# Patient Record
Sex: FEMALE | Race: WHITE | NOT HISPANIC OR LATINO | Employment: OTHER | URBAN - METROPOLITAN AREA
[De-identification: names, ages, dates, MRNs, and addresses within clinical notes are randomized per-mention and may not be internally consistent; named-entity substitution may affect disease eponyms.]

---

## 2017-01-26 DIAGNOSIS — C90.00 MULTIPLE MYELOMA, REMISSION STATUS UNSPECIFIED (H): Primary | ICD-10-CM

## 2017-02-23 DIAGNOSIS — C90.00 MULTIPLE MYELOMA, REMISSION STATUS UNSPECIFIED (H): ICD-10-CM

## 2017-02-28 DIAGNOSIS — C90.00 MULTIPLE MYELOMA WITHOUT REMISSION (H): ICD-10-CM

## 2017-02-28 LAB
ALBUMIN SERPL-MCNC: 3.5 G/DL (ref 3.4–5)
ALP SERPL-CCNC: 38 U/L (ref 40–150)
ALT SERPL W P-5'-P-CCNC: 24 U/L (ref 0–50)
ANION GAP SERPL CALCULATED.3IONS-SCNC: 10 MMOL/L (ref 3–14)
AST SERPL W P-5'-P-CCNC: 13 U/L (ref 0–45)
BASOPHILS # BLD AUTO: 0 10E9/L (ref 0–0.2)
BASOPHILS NFR BLD AUTO: 0.8 %
BILIRUB SERPL-MCNC: 0.4 MG/DL (ref 0.2–1.3)
BUN SERPL-MCNC: 10 MG/DL (ref 7–30)
CALCIUM SERPL-MCNC: 8.8 MG/DL (ref 8.5–10.1)
CHLORIDE SERPL-SCNC: 103 MMOL/L (ref 94–109)
CO2 SERPL-SCNC: 26 MMOL/L (ref 20–32)
CREAT SERPL-MCNC: 0.73 MG/DL (ref 0.52–1.04)
DIFFERENTIAL METHOD BLD: ABNORMAL
EOSINOPHIL # BLD AUTO: 0.1 10E9/L (ref 0–0.7)
EOSINOPHIL NFR BLD AUTO: 4.2 %
ERYTHROCYTE [DISTWIDTH] IN BLOOD BY AUTOMATED COUNT: 15.7 % (ref 10–15)
GFR SERPL CREATININE-BSD FRML MDRD: 80 ML/MIN/1.7M2
GLUCOSE SERPL-MCNC: 83 MG/DL (ref 70–99)
HCT VFR BLD AUTO: 36.3 % (ref 35–47)
HGB BLD-MCNC: 12.1 G/DL (ref 11.7–15.7)
IMM GRANULOCYTES # BLD: 0 10E9/L (ref 0–0.4)
IMM GRANULOCYTES NFR BLD: 0.4 %
LDH SERPL L TO P-CCNC: 141 U/L (ref 81–234)
LYMPHOCYTES # BLD AUTO: 1.4 10E9/L (ref 0.8–5.3)
LYMPHOCYTES NFR BLD AUTO: 54 %
MCH RBC QN AUTO: 33.4 PG (ref 26.5–33)
MCHC RBC AUTO-ENTMCNC: 33.3 G/DL (ref 31.5–36.5)
MCV RBC AUTO: 100 FL (ref 78–100)
MONOCYTES # BLD AUTO: 0.4 10E9/L (ref 0–1.3)
MONOCYTES NFR BLD AUTO: 13.4 %
NEUTROPHILS # BLD AUTO: 0.7 10E9/L (ref 1.6–8.3)
NEUTROPHILS NFR BLD AUTO: 27.2 %
NRBC # BLD AUTO: 0 10*3/UL
NRBC BLD AUTO-RTO: 0 /100
PLATELET # BLD AUTO: 135 10E9/L (ref 150–450)
POTASSIUM SERPL-SCNC: 3.9 MMOL/L (ref 3.4–5.3)
PROT SERPL-MCNC: 7.2 G/DL (ref 6.8–8.8)
RBC # BLD AUTO: 3.62 10E12/L (ref 3.8–5.2)
SODIUM SERPL-SCNC: 139 MMOL/L (ref 133–144)
WBC # BLD AUTO: 2.6 10E9/L (ref 4–11)

## 2017-02-28 PROCEDURE — 84165 PROTEIN E-PHORESIS SERUM: CPT | Performed by: INTERNAL MEDICINE

## 2017-02-28 PROCEDURE — 85025 COMPLETE CBC W/AUTO DIFF WBC: CPT | Performed by: INTERNAL MEDICINE

## 2017-02-28 PROCEDURE — 00000402 ZZHCL STATISTIC TOTAL PROTEIN: Performed by: INTERNAL MEDICINE

## 2017-02-28 PROCEDURE — 82232 ASSAY OF BETA-2 PROTEIN: CPT | Performed by: INTERNAL MEDICINE

## 2017-02-28 PROCEDURE — 36415 COLL VENOUS BLD VENIPUNCTURE: CPT | Performed by: INTERNAL MEDICINE

## 2017-02-28 PROCEDURE — 80053 COMPREHEN METABOLIC PANEL: CPT | Performed by: INTERNAL MEDICINE

## 2017-02-28 PROCEDURE — 83615 LACTATE (LD) (LDH) ENZYME: CPT | Performed by: INTERNAL MEDICINE

## 2017-02-28 NOTE — PROGRESS NOTES
Chief Complaint   Patient presents with     Blood Draw     lab draw in left hand      Lizzy Marino CMA

## 2017-03-01 LAB
ALBUMIN SERPL ELPH-MCNC: 4.2 G/DL (ref 3.7–5.1)
ALPHA1 GLOB SERPL ELPH-MCNC: 0.3 G/DL (ref 0.2–0.4)
ALPHA2 GLOB SERPL ELPH-MCNC: 0.6 G/DL (ref 0.5–0.9)
B-GLOBULIN SERPL ELPH-MCNC: 0.5 G/DL (ref 0.6–1)
B2 MICROGLOB SERPL-MCNC: 1.6 MG/L
GAMMA GLOB SERPL ELPH-MCNC: 1.3 G/DL (ref 0.7–1.6)
M PROTEIN SERPL ELPH-MCNC: 1 G/DL
PROT PATTERN SERPL ELPH-IMP: ABNORMAL

## 2017-03-17 ENCOUNTER — ONCOLOGY VISIT (OUTPATIENT)
Dept: TRANSPLANT | Facility: CLINIC | Age: 67
End: 2017-03-17
Attending: INTERNAL MEDICINE
Payer: COMMERCIAL

## 2017-03-17 VITALS
SYSTOLIC BLOOD PRESSURE: 132 MMHG | RESPIRATION RATE: 18 BRPM | OXYGEN SATURATION: 99 % | DIASTOLIC BLOOD PRESSURE: 76 MMHG | TEMPERATURE: 97.6 F | BODY MASS INDEX: 22.62 KG/M2 | WEIGHT: 124.5 LBS | HEART RATE: 55 BPM

## 2017-03-17 DIAGNOSIS — C90.00 MULTIPLE MYELOMA WITHOUT REMISSION (H): Primary | ICD-10-CM

## 2017-03-17 PROCEDURE — 99213 OFFICE O/P EST LOW 20 MIN: CPT | Mod: ZF

## 2017-03-17 ASSESSMENT — PAIN SCALES - GENERAL: PAINLEVEL: MILD PAIN (2)

## 2017-03-17 NOTE — NURSING NOTE
Chief Complaint   Patient presents with     RECHECK     Post BMT for MM here for provider       Roxana Laguna CMA March 17, 2017 11:24 AM  Medications and allergies reviewed. Vitals done see flow sheets. Face to face time 8 minutes.

## 2017-03-17 NOTE — MR AVS SNAPSHOT
After Visit Summary   3/17/2017    Shena Hartmann    MRN: [id number]           Patient Information     Date Of Birth          1950        Visit Information        Provider Department      3/17/2017 11:00 AM Lui Hills MD [facility] Blood and Marrow Transplant        Today's Diagnoses     Multiple myeloma without remission (H)    -  1          Cl[facility]ics and Surgery Center ([facility])  [address] [place] [address]  Phone: [phone]  Clinic Hours:   Monday-Friday:    8am to 4:30pm   Weekends and holidays:    8am to noon (in general)  If your fever is 100.5  or greater,   call the clinic.  After hours call the   hospital at [phone] or   [phone]. Ask for the BMT   fellow for pediatric or adult patients            Follow-ups after your visit        Follow-up notes from your care team     Return for Lab Work, Routine Visit.      Your next 10 appointments already scheduled     Jun 16, 2017  7:30 AM CDT   Masonic Lab Draw with  MASONIC LAB DRAW   [facility] Masonic Lab Draw ([facility])    [address] [place] [address]   [phone]            Sep 08, 2017  7:30 AM CDT   Masonic Lab Draw with  MASONIC LAB DRAW   [facility] Masonic Lab Draw ([facility])    [address] [place] [address]   [phone]            Sep 15, 2017 11:00 AM CDT   Return with Lui Hills MD   [facility] Blood and Marrow Transplant ([facility])    [address] [place] [address]   [phone]              Future tests that were ordered for you today     Open Standing Orders        Priority Remaining Interval Expires Ordered    Comprehensive metabolic panel Routine 2/2 each visit 12/17/2017 3/17/2017    Protein electrophoresis Routine 2/2 each visit 12/17/2017 3/17/2017    Lactate Dehydrogenase Routine 2/2 each visit 12/17/2017  3/17/2017    CRP inflammation Routine 2/2 each visit 12/17/2017 3/17/2017    CBC with platelets differential Routine 2/2 each visit 12/17/2017 3/17/2017            Who to contact     If you have questions or need follow up information about today's clinic visit or your schedule please contact [facility] BLOOD AND MARROW TRANSPLANT directly at [phone].  Normal or non-critical lab and imaging results will be communicated to you by [employer], letter or phone within 4 business days after the clinic has received the results. If you do not hear from us within 7 days, please contact the clinic through Purple[employer] or phone. If you have a critical or abnormal lab result, we will notify you by phone as soon as possible.  Submit refill requests through CleverMiles or call your pharmacy and they will forward the refill request to us. Please allow 3 business days for your refill to be completed.          Additional Information About Your Visit        [employer] Information     [employer] gives you secure access to your electronic health record. If you see a primary care provider, you can also send messages to your care team and make appointments. If you have questions, please call your primary care clinic.  If you do not have a primary care provider, please call [phone] and they will assist you.        Care EveryWhere ID     This is your Care EveryWhere ID. This could be used by other organizations to access your [place] medical records  [id number]        Your Vitals Were     Pulse Temperature Respirations Pulse Oximetry BMI (Body Mass Index)       55 97.6  F (36.4  C) (Oral) 18 99% 22.62 kg/m2        Blood Pressure from Last 3 Encounters:   03/17/17 132/76   12/16/16 126/75   09/09/16 124/74    Weight from Last 3 Encounters:   03/17/17 56.5 kg (124 lb 8 oz)   12/16/16 57.6 kg (126 lb 15.8 oz)   09/09/16 57.2 kg (126 lb)               Recent Review Flowsheet Data     BMT Recent Results Latest Ref Rng & Units 1/25/2016 3/11/2016  4/1/2016 6/10/2016 9/7/2016 12/9/2016 2/28/2017    WBC 4.0 - 11.0 10e9/L 3.3(L) 2.6(L) 2.1(L) 3.4(L) 3.3(L) 2.1(L) 2.6(L)    Hemoglobin 11.7 - 15.7 g/dL 12.1 12.5 12.3 12.5 12.8 12.2 12.1    Platelet Count 150 - 450 10e9/L 123(L) 143(L) 86(L) 150 121(L) 129(L) 135(L)    Neutrophils (Absolute) 1.6 - 8.3 10e9/L 1.1(L) 1.0(L) 0.4(LL) 1.5(L) 1.4(L) 0.6(L) 0.7(L)    Sodium 133 - 144 mmol/L 138 139 136 138 137 140 139    Potassium 3.4 - 5.3 mmol/L 4.1 4.2 4.2 4.1 4.0 3.6 3.9    Chloride 94 - 109 mmol/L 103 103 98 103 100 106 103    Glucose 70 - 99 mg/dL 77 81 100(H) 68(L) 88 99 83    Urea Nitrogen 7 - 30 mg/dL 13 15 10 16 15 15 10    Creatinine 0.52 - 1.04 mg/dL 0.77 0.74 0.76 0.81 0.74 0.80 0.73    Calcium (Total) 8.5 - 10.1 mg/dL 8.9 9.0 8.6 9.3 9.4 9.2 8.8    Protein (Total) 6.8 - 8.8 g/dL 7.6 7.9 7.8 7.9 7.8 7.4 7.2    Albumin 3.4 - 5.0 g/dL 3.8 3.8 3.6 3.8 3.9 3.6 3.5    Alkaline Phosphatase 40 - 150 U/L 48 53 44 50 46 41 38(L)    AST 0 - 45 U/L 17 16 22 22 17 18 13    ALT 0 - 50 U/L 30 35 32 28 32 32 24    MCV 78 - 100 fl 101(H) 103(H) 101(H) 104(H) 102(H) 100 100               Primary Care Provider Office Phone # Fax #    Lui Vivek Hills [phone]        [facility] [address] [facility] [address]  [facility] [address]        Thank you!     Thank you for choosing [facility] BLOOD AND MARROW TRANSPLANT  for your care. Our goal is always to provide you with excellent care. Hearing back from our patients is one way we can continue to improve our services. Please take a few minutes to complete the written survey that you may receive in the mail after your visit with us. Thank you!             Your Updated Medication List - Protect others around you: Learn how to safely use, store and throw away your medicines at www.disposemymeds.org.          This list is accurate as of: 3/17/17 11:53 AM.  Always use your most recent med list.                   Brand Name Dispense Instructions for use     CALCIUM-VITAMIN D-VITAMIN K PO      Take 2 tablets by mouth daily.       cholecalciferol 5000 UNITS Caps capsule    vitamin D3    90 capsule    Take 1 capsule (5,000 Units) by mouth daily       clobetasol 0.05 % ointment    TEMOVATE    45 g    Apply topically 2 times daily Apply to dermatitic plaques on hands 2 times per day for 2-3 days/week during flares.       COENZYME Q-10 PO      Take 30 mg by mouth daily       desonide 0.05 % ointment    DESOWEN    60 g    Use twice daily on the face as needed.       magnesium 100 MG Caps      Take 117 mg by mouth 3 times daily       MULTIVITAMIN PO      Take 1 tablet by mouth 2 times daily.       * order for DME     6 Device    6 mastectomy bras       * order for DME     1 Device    R mastectomy prosthesis       pomalidomide 1 MG capsule    POMALYST    32 capsule    Take 2 mg alternating with 1 mg daily. Take on days 1-21 days. [facility]   [id number]       triamcinolone 0.1 % cream    KENALOG    80 g    Apply topically 2 times daily       ZINC PO      Take by mouth daily       * Notice:  This list has 2 medication(s) that are the same as other medications prescribed for you. Read the directions carefully, and ask your doctor or other care provider to review them with you.

## 2017-03-17 NOTE — PROGRESS NOTES
/76  Pulse 55  Temp 97.6  F (36.4  C) (Oral)  Resp 18  Wt 56.5 kg (124 lb 8 oz)  SpO2 99%  BMI 22.62 kg/m2    Wt Readings from Last 4 Encounters:   03/17/17 56.5 kg (124 lb 8 oz)   12/16/16 57.6 kg (126 lb 15.8 oz)   09/09/16 57.2 kg (126 lb)   08/22/16 57.2 kg (126 lb 3.2 oz)     HISTORY OF PRESENT ILLNESS:  Shena was seen and examined by me today.  She is a 66-year-old woman with multiple myeloma who is now 1,086 days after autologous transplantation for multiple myeloma.  Shena has had quite resistant disease but remarkably has been doing well on pomalidomide at 2 mg alternating with 1 mg in 21-day cycles.  Symptomatically she is doing really well.  Her energy level is good and she really has no major restrictions.  She has been able to travel without difficulty.  She still has neuropathy which she manages on her own and cyclic rash with her pomalidomide.  She has no shortness of breath or cough.  She has had no signs or symptoms of infection.  She has had no evidence of thrombosis.  She has no known adenopathy.  The rest of a    10-point review of systems is unremarkable.       PHYSICAL EXAMINATION:   GENERAL:  Overall, Shena looks well and is in no distress.   VITAL SIGNS:  Her vital signs are normal and she is afebrile.  A weight is stable at 56.5 kg.   HEENT:  Exam is unremarkable.  There are no oral lesions.  Pupils are equal, round and reactive to light.   LYMPH:  There is no cervical, axillary or inguinal adenopathy.   LUNGS:  Clear to auscultation and percussion.   CARDIAC:  Exam is without murmurs.   ABDOMEN:  Exam is benign and there is no organomegaly.   EXTREMITIES:  Lower extremities are without edema.   SKIN:  There are no skin rashes.       LABORATORY DATA:  On 02/28 Shena had a full panel of restaging labs.  A CBC shows a hemoglobin of 12.1, a platelet count of 135,000 and a white count of 2,600 with 700 absolute neutrophils.  She has always had borderline neutropenia and this is relatively  stable for her.  Electrolytes are normal with a BUN of 10 and a creatinine of 0.73.  Liver function tests are really within normal limits.  The most important thing about her labs is that her SPEP showing her monoclonal protein is down to 1 gram.  It was 1 gram in 12/2016, but prior to that was even higher.        IMPRESSION:  She is clearly a good responder to pomalidomide and we will continue this therapy.  Since Shena has been so stable, I will see her for labs only in 3 months and then see her with a routine visit at 6 months.  She knows to contact us earlier than her next scheduled visit should the need arise.  We are going to keep her therapy the same for now.  We had a long discussion about long-term treatment and given the good response and decreased M-spike now compared to even 18 months ago, I would continue at this dose.  It is possible that we will see a further drop in her spike.  All of their questions have been answered today.

## 2017-03-22 DIAGNOSIS — C90.00 MULTIPLE MYELOMA, REMISSION STATUS UNSPECIFIED (H): ICD-10-CM

## 2017-04-17 ENCOUNTER — MYC MEDICAL ADVICE (OUTPATIENT)
Dept: ONCOLOGY | Facility: CLINIC | Age: 67
End: 2017-04-17

## 2017-04-17 DIAGNOSIS — C50.919 MALIGNANT NEOPLASM OF FEMALE BREAST, UNSPECIFIED LATERALITY, UNSPECIFIED SITE OF BREAST: ICD-10-CM

## 2017-04-18 DIAGNOSIS — C90.00 MULTIPLE MYELOMA, REMISSION STATUS UNSPECIFIED (H): ICD-10-CM

## 2017-05-19 DIAGNOSIS — C90.00 MULTIPLE MYELOMA, REMISSION STATUS UNSPECIFIED (H): ICD-10-CM

## 2017-06-15 DIAGNOSIS — C90.00 MULTIPLE MYELOMA, REMISSION STATUS UNSPECIFIED (H): ICD-10-CM

## 2017-06-16 DIAGNOSIS — C90.00 MULTIPLE MYELOMA WITHOUT REMISSION (H): ICD-10-CM

## 2017-06-16 LAB
ALBUMIN SERPL-MCNC: 3.9 G/DL (ref 3.4–5)
ALP SERPL-CCNC: 50 U/L (ref 40–150)
ALT SERPL W P-5'-P-CCNC: 29 U/L (ref 0–50)
ANION GAP SERPL CALCULATED.3IONS-SCNC: 7 MMOL/L (ref 3–14)
AST SERPL W P-5'-P-CCNC: 19 U/L (ref 0–45)
BASOPHILS # BLD AUTO: 0 10E9/L (ref 0–0.2)
BASOPHILS NFR BLD AUTO: 0.9 %
BILIRUB SERPL-MCNC: 0.5 MG/DL (ref 0.2–1.3)
BUN SERPL-MCNC: 13 MG/DL (ref 7–30)
CALCIUM SERPL-MCNC: 9.5 MG/DL (ref 8.5–10.1)
CHLORIDE SERPL-SCNC: 103 MMOL/L (ref 94–109)
CO2 SERPL-SCNC: 29 MMOL/L (ref 20–32)
CREAT SERPL-MCNC: 0.86 MG/DL (ref 0.52–1.04)
CRP SERPL-MCNC: <2.9 MG/L (ref 0–8)
DIFFERENTIAL METHOD BLD: ABNORMAL
EOSINOPHIL # BLD AUTO: 0.1 10E9/L (ref 0–0.7)
EOSINOPHIL NFR BLD AUTO: 4.3 %
ERYTHROCYTE [DISTWIDTH] IN BLOOD BY AUTOMATED COUNT: 15.3 % (ref 10–15)
GFR SERPL CREATININE-BSD FRML MDRD: 66 ML/MIN/1.7M2
GLUCOSE SERPL-MCNC: 69 MG/DL (ref 70–99)
HCT VFR BLD AUTO: 39.8 % (ref 35–47)
HGB BLD-MCNC: 13.2 G/DL (ref 11.7–15.7)
IMM GRANULOCYTES # BLD: 0 10E9/L (ref 0–0.4)
IMM GRANULOCYTES NFR BLD: 0 %
LDH SERPL L TO P-CCNC: 156 U/L (ref 81–234)
LYMPHOCYTES # BLD AUTO: 1.4 10E9/L (ref 0.8–5.3)
LYMPHOCYTES NFR BLD AUTO: 43.2 %
MCH RBC QN AUTO: 33.3 PG (ref 26.5–33)
MCHC RBC AUTO-ENTMCNC: 33.2 G/DL (ref 31.5–36.5)
MCV RBC AUTO: 101 FL (ref 78–100)
MONOCYTES # BLD AUTO: 0.6 10E9/L (ref 0–1.3)
MONOCYTES NFR BLD AUTO: 16.7 %
NEUTROPHILS # BLD AUTO: 1.2 10E9/L (ref 1.6–8.3)
NEUTROPHILS NFR BLD AUTO: 34.9 %
NRBC # BLD AUTO: 0 10*3/UL
NRBC BLD AUTO-RTO: 0 /100
PLATELET # BLD AUTO: 153 10E9/L (ref 150–450)
POTASSIUM SERPL-SCNC: 4 MMOL/L (ref 3.4–5.3)
PROT SERPL-MCNC: 7.6 G/DL (ref 6.8–8.8)
RBC # BLD AUTO: 3.96 10E12/L (ref 3.8–5.2)
SODIUM SERPL-SCNC: 139 MMOL/L (ref 133–144)
WBC # BLD AUTO: 3.3 10E9/L (ref 4–11)

## 2017-06-16 PROCEDURE — 36415 COLL VENOUS BLD VENIPUNCTURE: CPT | Performed by: INTERNAL MEDICINE

## 2017-06-16 PROCEDURE — 80053 COMPREHEN METABOLIC PANEL: CPT | Performed by: INTERNAL MEDICINE

## 2017-06-16 PROCEDURE — 84165 PROTEIN E-PHORESIS SERUM: CPT | Performed by: INTERNAL MEDICINE

## 2017-06-16 PROCEDURE — 86140 C-REACTIVE PROTEIN: CPT | Performed by: INTERNAL MEDICINE

## 2017-06-16 PROCEDURE — 85025 COMPLETE CBC W/AUTO DIFF WBC: CPT | Performed by: INTERNAL MEDICINE

## 2017-06-16 PROCEDURE — 83615 LACTATE (LD) (LDH) ENZYME: CPT | Performed by: INTERNAL MEDICINE

## 2017-06-16 PROCEDURE — 00000402 ZZHCL STATISTIC TOTAL PROTEIN: Performed by: INTERNAL MEDICINE

## 2017-06-19 LAB
ALBUMIN SERPL ELPH-MCNC: 4.5 G/DL (ref 3.7–5.1)
ALPHA1 GLOB SERPL ELPH-MCNC: 0.3 G/DL (ref 0.2–0.4)
ALPHA2 GLOB SERPL ELPH-MCNC: 0.7 G/DL (ref 0.5–0.9)
B-GLOBULIN SERPL ELPH-MCNC: 0.6 G/DL (ref 0.6–1)
GAMMA GLOB SERPL ELPH-MCNC: 1.4 G/DL (ref 0.7–1.6)
M PROTEIN SERPL ELPH-MCNC: 1 G/DL
PROT PATTERN SERPL ELPH-IMP: ABNORMAL

## 2017-07-12 DIAGNOSIS — C90.00 MULTIPLE MYELOMA, REMISSION STATUS UNSPECIFIED (H): ICD-10-CM

## 2017-08-08 DIAGNOSIS — C90.00 MULTIPLE MYELOMA, REMISSION STATUS UNSPECIFIED (H): ICD-10-CM

## 2017-09-08 DIAGNOSIS — C90.00 MULTIPLE MYELOMA WITHOUT REMISSION (H): ICD-10-CM

## 2017-09-08 LAB
ALBUMIN SERPL-MCNC: 3.6 G/DL (ref 3.4–5)
ALP SERPL-CCNC: 44 U/L (ref 40–150)
ALT SERPL W P-5'-P-CCNC: 34 U/L (ref 0–50)
ANION GAP SERPL CALCULATED.3IONS-SCNC: 6 MMOL/L (ref 3–14)
AST SERPL W P-5'-P-CCNC: 32 U/L (ref 0–45)
BASOPHILS # BLD AUTO: 0 10E9/L (ref 0–0.2)
BASOPHILS NFR BLD AUTO: 1.3 %
BILIRUB SERPL-MCNC: 0.4 MG/DL (ref 0.2–1.3)
BUN SERPL-MCNC: 13 MG/DL (ref 7–30)
CALCIUM SERPL-MCNC: 9 MG/DL (ref 8.5–10.1)
CHLORIDE SERPL-SCNC: 103 MMOL/L (ref 94–109)
CO2 SERPL-SCNC: 27 MMOL/L (ref 20–32)
CREAT SERPL-MCNC: 0.74 MG/DL (ref 0.52–1.04)
CRP SERPL-MCNC: <2.9 MG/L (ref 0–8)
DIFFERENTIAL METHOD BLD: ABNORMAL
EOSINOPHIL # BLD AUTO: 0.2 10E9/L (ref 0–0.7)
EOSINOPHIL NFR BLD AUTO: 5.8 %
ERYTHROCYTE [DISTWIDTH] IN BLOOD BY AUTOMATED COUNT: 15.2 % (ref 10–15)
GFR SERPL CREATININE-BSD FRML MDRD: 79 ML/MIN/1.7M2
GLUCOSE SERPL-MCNC: 93 MG/DL (ref 70–99)
HCT VFR BLD AUTO: 39.5 % (ref 35–47)
HGB BLD-MCNC: 13.1 G/DL (ref 11.7–15.7)
IMM GRANULOCYTES # BLD: 0 10E9/L (ref 0–0.4)
IMM GRANULOCYTES NFR BLD: 0 %
LDH SERPL L TO P-CCNC: 166 U/L (ref 81–234)
LYMPHOCYTES # BLD AUTO: 1.4 10E9/L (ref 0.8–5.3)
LYMPHOCYTES NFR BLD AUTO: 43.4 %
MCH RBC QN AUTO: 33.2 PG (ref 26.5–33)
MCHC RBC AUTO-ENTMCNC: 33.2 G/DL (ref 31.5–36.5)
MCV RBC AUTO: 100 FL (ref 78–100)
MONOCYTES # BLD AUTO: 0.4 10E9/L (ref 0–1.3)
MONOCYTES NFR BLD AUTO: 13.5 %
NEUTROPHILS # BLD AUTO: 1.1 10E9/L (ref 1.6–8.3)
NEUTROPHILS NFR BLD AUTO: 36 %
NRBC # BLD AUTO: 0 10*3/UL
NRBC BLD AUTO-RTO: 0 /100
PLATELET # BLD AUTO: 144 10E9/L (ref 150–450)
POTASSIUM SERPL-SCNC: 4 MMOL/L (ref 3.4–5.3)
PROT SERPL-MCNC: 7.4 G/DL (ref 6.8–8.8)
RBC # BLD AUTO: 3.94 10E12/L (ref 3.8–5.2)
SODIUM SERPL-SCNC: 136 MMOL/L (ref 133–144)
WBC # BLD AUTO: 3.1 10E9/L (ref 4–11)

## 2017-09-08 PROCEDURE — 83615 LACTATE (LD) (LDH) ENZYME: CPT | Performed by: INTERNAL MEDICINE

## 2017-09-08 PROCEDURE — 00000402 ZZHCL STATISTIC TOTAL PROTEIN: Performed by: INTERNAL MEDICINE

## 2017-09-08 PROCEDURE — 86140 C-REACTIVE PROTEIN: CPT | Performed by: INTERNAL MEDICINE

## 2017-09-08 PROCEDURE — 85025 COMPLETE CBC W/AUTO DIFF WBC: CPT | Performed by: INTERNAL MEDICINE

## 2017-09-08 PROCEDURE — 84165 PROTEIN E-PHORESIS SERUM: CPT | Performed by: INTERNAL MEDICINE

## 2017-09-08 PROCEDURE — 80053 COMPREHEN METABOLIC PANEL: CPT | Performed by: INTERNAL MEDICINE

## 2017-09-08 NOTE — NURSING NOTE
Chief Complaint   Patient presents with     Blood Draw     labs collected from L arm venipuncture     Alka Brown RN

## 2017-09-11 LAB
ALBUMIN SERPL ELPH-MCNC: 4.4 G/DL (ref 3.7–5.1)
ALPHA1 GLOB SERPL ELPH-MCNC: 0.3 G/DL (ref 0.2–0.4)
ALPHA2 GLOB SERPL ELPH-MCNC: 0.7 G/DL (ref 0.5–0.9)
B-GLOBULIN SERPL ELPH-MCNC: 0.6 G/DL (ref 0.6–1)
GAMMA GLOB SERPL ELPH-MCNC: 1.3 G/DL (ref 0.7–1.6)
M PROTEIN SERPL ELPH-MCNC: 0.9 G/DL
PROT PATTERN SERPL ELPH-IMP: ABNORMAL

## 2017-09-13 DIAGNOSIS — C90.00 MULTIPLE MYELOMA, REMISSION STATUS UNSPECIFIED (H): ICD-10-CM

## 2017-09-15 ENCOUNTER — ONCOLOGY VISIT (OUTPATIENT)
Dept: TRANSPLANT | Facility: CLINIC | Age: 67
End: 2017-09-15
Attending: INTERNAL MEDICINE
Payer: COMMERCIAL

## 2017-09-15 VITALS
OXYGEN SATURATION: 98 % | HEIGHT: 62 IN | BODY MASS INDEX: 23.57 KG/M2 | DIASTOLIC BLOOD PRESSURE: 76 MMHG | WEIGHT: 128.1 LBS | RESPIRATION RATE: 16 BRPM | HEART RATE: 78 BPM | TEMPERATURE: 97.1 F | SYSTOLIC BLOOD PRESSURE: 122 MMHG

## 2017-09-15 DIAGNOSIS — C90.00 MULTIPLE MYELOMA NOT HAVING ACHIEVED REMISSION (H): Primary | ICD-10-CM

## 2017-09-15 PROCEDURE — 99212 OFFICE O/P EST SF 10 MIN: CPT | Mod: ZF

## 2017-09-15 ASSESSMENT — PAIN SCALES - GENERAL: PAINLEVEL: MILD PAIN (2)

## 2017-09-15 NOTE — MR AVS SNAPSHOT
After Visit Summary   9/15/2017    Shena Hartmann    MRN: [id number]           Patient Information     Date Of Birth          1950        Visit Information        Provider Department      9/15/2017 11:00 AM Lui Hills MD [facility] Blood and Marrow Transplant        Today's Diagnoses     Multiple myeloma not having achieved remission (H)    -  1          Clinics and Surgery Center ([facility])  [address] [place] [address]  Phone: [phone]  Clinic Hours:   Monday-Thursday:7am to 7pm   Friday: 7am to 5pm   Weekends and holidays:    8am to noon (in general)  If your fever is 100.5  or greater,   call the clinic.  After hours call the   hospital at [phone] or   [phone]. Ask for the BMT   fellow on-call            Follow-ups after your visit        Your next 10 appointments already scheduled     Oct 17, 2017  3:15 PM CDT   (Arrive by 3:00 PM)   MR LUMBAR SPINE W/O & W CONTRAST with [facility] [facility] ([facility])    [address] [place] [address] [phone]           Take your medicines as usual, unless your doctor tells you not to. Bring a list of your current medicines to your exam (including vitamins, minerals and over-the-counter drugs).  You will be given intravenous contrast for this exam. To prepare:   The day before your exam, drink extra fluids at least six 8-ounce glasses (unless your doctor tells you to restrict your fluids).   Have a blood test (creatinine test) within 30 days of your exam. Go to your clinic or Diagnostic Imaging Department for this test.  The MRI machine uses a strong magnet. Please wear clothes without metal (snaps, zippers). A sweatsuit works well, or we may give you a hospital gown.  Please remove any body piercings and hair extensions before you arrive. You will also remove watches, jewelry, hairpins, wallets, dentures, partial dental plates and hearing aids.  You may wear contact lenses, and you may be able to wear your rings. We have a safe place to keep your personal items, but it is safer to leave them at home.   **IMPORTANT** THE INSTRUCTIONS BELOW ARE ONLY FOR THOSE PATIENTS WHO HAVE BEEN TOLD THEY WILL RECEIVE SEDATION OR GENERAL ANESTHESIA DURING THEIR MRI PROCEDURE:  IF YOU WILL RECEIVE SEDATION (take medicine to help you relax during your exam):   You must get the medicine from your doctor before you arrive. Bring the medicine to the exam. Do not take it at home.   Arrive one hour early. Bring someone who can take you home after the test. Your medicine will make you sleepy. After the exam, you may not drive, take a bus or take a taxi by yourself.   No eating 8 hours before your exam. You may have clear liquids up until 4 hours before your exam. (Clear liquids include water, clear tea, black coffee and fruit juice without pulp.)  IF YOU WILL RECEIVE ANESTHESIA (be asleep for your exam):   Arrive 1 1/2 hours early. Bring someone who can take you home after the test. You may not drive, take a bus or take a taxi by yourself.   No eating 8 hours before your exam. You may have clear liquids up until 4 hours before your exam. (Clear liquids include water, clear tea, black coffee and fruit juice without pulp.)  Please call the Imaging Department at your exam site with any questions.            Oct 17, 2017  4:00 PM CDT   (Arrive by 3:45 PM)   MR THORACIC SPINE W/O & W CONTRAST with [address] [facility] ([facility])    [address] [address] [phone]           Take your medicines as usual, unless your doctor tells you not to. Bring a list of your current medicines to your exam (including vitamins, minerals and over-the-counter drugs).  You will be given intravenous contrast for this exam. To prepare:   The day before your exam, drink extra fluids at least six 8-ounce glasses (unless your  doctor tells you to restrict your fluids).   Have a blood test (creatinine test) within 30 days of your exam. Go to your clinic or Diagnostic Imaging Department for this test.  The MRI machine uses a strong magnet. Please wear clothes without metal (snaps, zippers). A sweatsuit works well, or we may give you a hospital gown.  Please remove any body piercings and hair extensions before you arrive. You will also remove watches, jewelry, hairpins, wallets, dentures, partial dental plates and hearing aids. You may wear contact lenses, and you may be able to wear your rings. We have a safe place to keep your personal items, but it is safer to leave them at home.   **IMPORTANT** THE INSTRUCTIONS BELOW ARE ONLY FOR THOSE PATIENTS WHO HAVE BEEN TOLD THEY WILL RECEIVE SEDATION OR GENERAL ANESTHESIA DURING THEIR MRI PROCEDURE:  IF YOU WILL RECEIVE SEDATION (take medicine to help you relax during your exam):   You must get the medicine from your doctor before you arrive. Bring the medicine to the exam. Do not take it at home.   Arrive one hour early. Bring someone who can take you home after the test. Your medicine will make you sleepy. After the exam, you may not drive, take a bus or take a taxi by yourself.   No eating 8 hours before your exam. You may have clear liquids up until 4 hours before your exam. (Clear liquids include water, clear tea, black coffee and fruit juice without pulp.)  IF YOU WILL RECEIVE ANESTHESIA (be asleep for your exam):   Arrive 1 1/2 hours early. Bring someone who can take you home after the test. You may not drive, take a bus or take a taxi by yourself.   No eating 8 hours before your exam. You may have clear liquids up until 4 hours before your exam. (Clear liquids include water, clear tea, black coffee and fruit juice without pulp.)  Please call the Imaging Department at your exam site with any questions.            Dec 01, 2017  7:30 AM [facility]   Masonic Lab Draw with  wireWAXONIC LAB DRAW   M  [facility] Masonic Lab Draw ([facility])    [address]  [address] [facility] [address]   [phone]            Feb 23, 2018  7:30 AM CST   Masonic Lab Draw with  MASONIC LAB DRAW   [facility] Masonic Lab Draw ([facility])    [address] [address]   [phone]            Mar 02, 2018  7:30 AM CST   Return with Lui Hills MD   [facility] Blood and Marrow Transplant ([facility])    [address] [place] [address]   [phone]              Future tests that were ordered for you today     Open Standing Orders        Priority Remaining Interval Expires Ordered    Comprehensive metabolic panel Routine 4/4 every 3 months 9/15/2018 9/15/2017    CBC with platelets differential Routine 4/4 every 3 months 9/15/2018 9/15/2017    Protein electrophoresis Routine 4/4 every 3 months 9/15/2018 9/15/2017          Open Future Orders        Priority Expected Expires Ordered    MRI Lumbar spine w & w/o contrast Routine  9/15/2018 9/15/2017    MRI Thoracic spine w & w/o contrast Routine  9/15/2018 9/15/2017            Who to contact     If you have questions or need follow up information about today's clinic visit or your schedule please contact [facility] BLOOD AND MARROW TRANSPLANT directly at [phone].  Normal or non-critical lab and imaging results will be communicated to you by MyChart, letter or phone within 4 business days after the clinic has received the results. If you do not hear from us within 7 days, please contact the clinic through MyChart or phone. If you have a critical or abnormal lab result, we will notify you by phone as soon as possible.  Submit refill requests through [employer] or call your pharmacy and they will forward the refill request to us. Please allow 3 business days for your refill to be completed.          Additional Information About Your  "Visit        [employer] Information     [employer] gives you secure access to your electronic health record. If you see a primary care provider, you can also send messages to your care team and make appointments. If you have questions, please call your primary care clinic.  If you do not have a primary care provider, please call [phone] and they will assist you.        Care EveryWhere ID     This is your Care EveryWhere ID. This could be used by other organizations to access your [facility] medical records  [id number]        Your Vitals Were     Pulse Temperature Respirations Height Pulse Oximetry BMI (Body Mass Index)    78 97.1  F (36.2  C) (Oral) 16 1.575 m (5' 2\") 98% 23.43 kg/m2       Blood Pressure from Last 3 Encounters:   09/15/17 122/76   03/17/17 132/76   12/16/16 126/75    Weight from Last 3 Encounters:   09/15/17 58.1 kg (128 lb 1.6 oz)   03/17/17 56.5 kg (124 lb 8 oz)   12/16/16 57.6 kg (126 lb 15.8 oz)               Recent Review Flowsheet Data     BMT Recent Results Latest Ref Rng & Units 4/1/2016 6/10/2016 9/7/2016 12/9/2016 2/28/2017 6/16/2017 9/8/2017    WBC 4.0 - 11.0 10e9/L 2.1(L) 3.4(L) 3.3(L) 2.1(L) 2.6(L) 3.3(L) 3.1(L)    Hemoglobin 11.7 - 15.7 g/dL 12.3 12.5 12.8 12.2 12.1 13.2 13.1    Platelet Count 150 - 450 10e9/L 86(L) 150 121(L) 129(L) 135(L) 153 144(L)    Neutrophils (Absolute) 1.6 - 8.3 10e9/L 0.4(LL) 1.5(L) 1.4(L) 0.6(L) 0.7(L) 1.2(L) 1.1(L)    INR 0.86 - 1.14 - - - - - - -    Sodium 133 - 144 mmol/L 136 138 137 140 139 139 136    Potassium 3.4 - 5.3 mmol/L 4.2 4.1 4.0 3.6 3.9 4.0 4.0    Chloride 94 - 109 mmol/L 98 103 100 106 103 103 103    Glucose 70 - 99 mg/dL 100(H) 68(L) 88 99 83 69(L) 93    Urea Nitrogen 7 - 30 mg/dL 10 16 15 15 10 13 13    Creatinine 0.52 - 1.04 mg/dL 0.76 0.81 0.74 0.80 0.73 0.86 0.74    Calcium (Total) 8.5 - 10.1 mg/dL 8.6 9.3 9.4 9.2 8.8 9.5 9.0    Protein (Total) 6.8 - 8.8 g/dL 7.8 7.9 7.8 7.4 7.2 7.6 7.4    Albumin 3.4 - 5.0 g/dL 3.6 3.8 3.9 3.6 3.5 3.9 3.6 "    Alkaline Phosphatase 40 - 150 U/L 44 50 46 41 38(L) 50 44    AST 0 - 45 U/L 22 22 17 18 13 19 32    ALT 0 - 50 U/L 32 28 32 32 24 29 34    MCV 78 - 100 fl 101(H) 104(H) 102(H) 100 100 101(H) 100               Primary Care Provider Office Phone # Fax #    Lui Vivek Hills [phone]       [address] [facility] [address]  [facility] [address]        Equal Access to Services     KASANDRA ALVARENGA : Hadii aad ku hadasho Soomaali, waaxda luqadaha, qaybta kaalmada adeegyada, waxay idiin hayaan adejero garcía . So [facility] [phone].    ATENCIÓN: Si habla español, tiene a franks disposición servicios gratuitos de asistencia lingüística. [facility] [phone].    We comply with applicable federal civil rights laws and [place] laws. We do not discriminate on the basis of race, color, national origin, age, disability sex, sexual orientation or gender identity.            Thank you!     Thank you for choosing [facility] BLOOD AND MARROW TRANSPLANT  for your care. Our goal is always to provide you with excellent care. Hearing back from our patients is one way we can continue to improve our services. Please take a few minutes to complete the written survey that you may receive in the mail after your visit with us. Thank you!             Your Updated Medication List - Protect others around you: Learn how to safely use, store and throw away your medicines at www.disposemymeds.org.          This list is accurate as of: 9/15/17 11:40 AM.  Always use your most recent med list.                   Brand Name Dispense Instructions for use Diagnosis    CALCIUM-VITAMIN D-VITAMIN K PO      Take 2 tablets by mouth daily.        cholecalciferol 5000 UNITS Caps capsule    vitamin D3    90 capsule    Take 1 capsule (5,000 Units) by mouth daily    Osteoporosis       clobetasol 0.05 % ointment    TEMOVATE    45 g    Apply topically 2 times daily Apply to dermatitic plaques on hands 2 times per day for 2-3 days/week during flares.     Dermatitis       COENZYME Q-10 PO      Take 30 mg by mouth daily        desonide 0.05 % ointment    DESOWEN    60 g    Use twice daily on the face as needed.    Dermatitis       magnesium 100 MG Caps      Take 117 mg by mouth 3 times daily        MULTIVITAMIN PO      Take 1 tablet by mouth 2 times daily.        * order for DME     6 Device    6 mastectomy bras  Length of need: 99 months    Malignant neoplasm of female breast, unspecified laterality, unspecified site of breast       * order for DME     1 Device    R mastectomy prosthesis   Length of need:  99 months    Malignant neoplasm of female breast, unspecified laterality, unspecified site of breast       pomalidomide 1 MG capsule    POMALYST    32 capsule    Take 2 mg alternating with 1 mg daily. Take on days 1-21 days. auth  [id number]    Multiple myeloma, remission status unspecified (H)       triamcinolone 0.1 % cream    KENALOG    80 g    Apply topically 2 times daily    Dermatitis       ZINC PO      Take by mouth daily        * Notice:  This list has 2 medication(s) that are the same as other medications prescribed for you. Read the directions carefully, and ask your doctor or other care provider to review them with you.

## 2017-09-15 NOTE — NURSING NOTE
"Oncology Rooming Note    September 15, 2017 11:01 AM   Shena Hartmann is a 66 year old female who presents for:    Chief Complaint   Patient presents with     RECHECK     MM     Initial Vitals: /76  Pulse 78  Temp 97.1  F (36.2  C) (Oral)  Resp 16  Ht 1.575 m (5' 2\")  Wt 58.1 kg (128 lb 1.6 oz)  SpO2 98%  BMI 23.43 kg/m2 Estimated body mass index is 23.43 kg/(m^2) as calculated from the following:    Height as of this encounter: 1.575 m (5' 2\").    Weight as of this encounter: 58.1 kg (128 lb 1.6 oz). Body surface area is 1.59 meters squared.  Mild Pain (2) Comment: body ache & back   No LMP recorded. Patient is postmenopausal.  Allergies reviewed: Yes  Medications reviewed: Yes    Medications: Medication refills not needed today.  Pharmacy name entered into [employer]:    [facility] - [place], [place] - [address]  [facility] - [place], [place] - [address] AT [facility] [address]  WRITTEN PRESCRIPTION REQUESTED    Clinical concerns: n/a     7 minutes for nursing intake (face to face time)     JORGE A CHÁVEZ CMA              "

## 2017-09-15 NOTE — PROGRESS NOTES
"Wt Readings from Last 4 Encounters:   09/15/17 58.1 kg (128 lb 1.6 oz)   03/17/17 56.5 kg (124 lb 8 oz)   12/16/16 57.6 kg (126 lb 15.8 oz)   09/09/16 57.2 kg (126 lb)     /76  Pulse 78  Temp 97.1  F (36.2  C) (Oral)  Resp 16  Ht 1.575 m (5' 2\")  Wt 58.1 kg (128 lb 1.6 oz)  SpO2 98%  BMI 23.43 kg/m2    Shena was seen and examined by me today. She is a 66-year-old woman who is now over 3 years after autologous transplantation for an aggressive multiple myeloma. She has been on possible limited in my maintenance oral therapy since transplant. Symptomatically she is doing fairly well. She had prolonged pancytopenia which is now better. She does complain of ongoing low back pain which has been a chronic symptom. However, it is slightly worse and she is traveling more. This is only of concern since she has had a history of multiple myeloma and bony disease in the past. She has had no fevers chills or sweats. She has no change in GI symptoms. She continues to get skin symptoms wall taking Palmalyst. The rest of the 10 point review of systems is unremarkable.    Physical exam: Overall Shena looks well today and is in no acute distress. She is afebrile and her vital signs are normal. Her weight today is 58.1 kg. HEENT exam is unremarkable. There are no oral lesions. Pupils are equal round and reactive to light. There is no cervical, axillary or inguinal adenopathy. Lungs are clear to auscultation percussion. Abdominal exam is benign and there is no organomegaly. Lower extremities are without edema. There are no skin rashes.    Laboratory evaluation: Electrolytes are normal with the BUNs of 13 and a creatinine of 0.74. Liver function tests are within normal limits. A CBC shows a hemoglobin of 13.1, platelet count of 144,000 and a white count of 3100 with 1100 absolute neutrophils. A serum protein electrophoresis shows a monoclonal protein of 0.9 grams.  This is actually the best that it has been for years. Her " last protein spike was 1 g.    It is my overall impression at [facility] is stable. She will continue on her current oral maintenance therapy for her multiple myeloma. My only real concern today is her back pain which seems to be slightly worse. Since she has not had any high-resolution imaging for more than 4 years, I have ordered a thoracic and lumbar MRI of the back. Will get this study in October. I am expecting some ongoing degenerative disease and vertebral collapse that she has had previously. The main question is whether there is any evidence of myelomatous lesions. I did discuss localized radiation therapy should this occur. She agrees with the plan and we will get these imaging studies. I scheduled her to return to see me in November in last week find any abnormalities on the MRI. All of her questions have been answered today.    Lui Hills

## 2017-10-05 DIAGNOSIS — C90.00 MULTIPLE MYELOMA, REMISSION STATUS UNSPECIFIED (H): ICD-10-CM

## 2017-11-02 DIAGNOSIS — C90.00 MULTIPLE MYELOMA, REMISSION STATUS UNSPECIFIED (H): ICD-10-CM

## 2017-11-03 ENCOUNTER — TELEPHONE (OUTPATIENT)
Dept: PHARMACY | Facility: OTHER | Age: 67
End: 2017-11-03

## 2017-11-03 NOTE — TELEPHONE ENCOUNTER
PA Initiation    Medication: Pomalyst  Insurance Company:    [facility] - [place], [place] - [facility] [address]  Filling Pharmacy Phone: [phone]  Filling Pharmacy Fax: [phone]  Start Date: 11/3/2017

## 2017-11-06 NOTE — TELEPHONE ENCOUNTER
Prior Authorization Approval    Authorization Effective Date: 11/3/2017  Authorization Expiration Date: 11/3/2018  Medication: Pomalyst  Approved Dose/Quantity:  Reference #: [id number]   Insurance Company:    Expected CoPay:       CoPay Card Available:      Foundation Assistance Needed:    Which Pharmacy is filling the prescription (Not needed for infusion/clinic administered): [facility] - [place] [address]  Pharmacy Notified:    Patient Notified:

## 2017-11-30 DIAGNOSIS — C90.00 MULTIPLE MYELOMA, REMISSION STATUS UNSPECIFIED (H): ICD-10-CM

## 2017-12-01 DIAGNOSIS — C90.00 MULTIPLE MYELOMA NOT HAVING ACHIEVED REMISSION (H): ICD-10-CM

## 2017-12-01 LAB
ALBUMIN SERPL-MCNC: 3.6 G/DL (ref 3.4–5)
ALP SERPL-CCNC: 46 U/L (ref 40–150)
ALT SERPL W P-5'-P-CCNC: 26 U/L (ref 0–50)
ANION GAP SERPL CALCULATED.3IONS-SCNC: 8 MMOL/L (ref 3–14)
AST SERPL W P-5'-P-CCNC: 18 U/L (ref 0–45)
BASOPHILS # BLD AUTO: 0 10E9/L (ref 0–0.2)
BASOPHILS NFR BLD AUTO: 1 %
BILIRUB SERPL-MCNC: 0.5 MG/DL (ref 0.2–1.3)
BUN SERPL-MCNC: 15 MG/DL (ref 7–30)
CALCIUM SERPL-MCNC: 9.4 MG/DL (ref 8.5–10.1)
CHLORIDE SERPL-SCNC: 103 MMOL/L (ref 94–109)
CO2 SERPL-SCNC: 27 MMOL/L (ref 20–32)
CREAT SERPL-MCNC: 0.82 MG/DL (ref 0.52–1.04)
DIFFERENTIAL METHOD BLD: ABNORMAL
EOSINOPHIL # BLD AUTO: 0.2 10E9/L (ref 0–0.7)
EOSINOPHIL NFR BLD AUTO: 6.2 %
ERYTHROCYTE [DISTWIDTH] IN BLOOD BY AUTOMATED COUNT: 15.2 % (ref 10–15)
GFR SERPL CREATININE-BSD FRML MDRD: 70 ML/MIN/1.7M2
GLUCOSE SERPL-MCNC: 92 MG/DL (ref 70–99)
HCT VFR BLD AUTO: 38.3 % (ref 35–47)
HGB BLD-MCNC: 12.8 G/DL (ref 11.7–15.7)
IMM GRANULOCYTES # BLD: 0 10E9/L (ref 0–0.4)
IMM GRANULOCYTES NFR BLD: 0 %
LYMPHOCYTES # BLD AUTO: 1.1 10E9/L (ref 0.8–5.3)
LYMPHOCYTES NFR BLD AUTO: 38.4 %
MCH RBC QN AUTO: 33.2 PG (ref 26.5–33)
MCHC RBC AUTO-ENTMCNC: 33.4 G/DL (ref 31.5–36.5)
MCV RBC AUTO: 100 FL (ref 78–100)
MONOCYTES # BLD AUTO: 0.5 10E9/L (ref 0–1.3)
MONOCYTES NFR BLD AUTO: 16.4 %
NEUTROPHILS # BLD AUTO: 1.1 10E9/L (ref 1.6–8.3)
NEUTROPHILS NFR BLD AUTO: 38 %
NRBC # BLD AUTO: 0 10*3/UL
NRBC BLD AUTO-RTO: 0 /100
PLATELET # BLD AUTO: 158 10E9/L (ref 150–450)
POTASSIUM SERPL-SCNC: 3.8 MMOL/L (ref 3.4–5.3)
PROT SERPL-MCNC: 7.2 G/DL (ref 6.8–8.8)
RBC # BLD AUTO: 3.85 10E12/L (ref 3.8–5.2)
SODIUM SERPL-SCNC: 139 MMOL/L (ref 133–144)
WBC # BLD AUTO: 2.9 10E9/L (ref 4–11)

## 2017-12-01 PROCEDURE — 00000402 ZZHCL STATISTIC TOTAL PROTEIN: Performed by: INTERNAL MEDICINE

## 2017-12-01 PROCEDURE — 85025 COMPLETE CBC W/AUTO DIFF WBC: CPT | Performed by: INTERNAL MEDICINE

## 2017-12-01 PROCEDURE — 84165 PROTEIN E-PHORESIS SERUM: CPT | Performed by: INTERNAL MEDICINE

## 2017-12-01 PROCEDURE — 80053 COMPREHEN METABOLIC PANEL: CPT | Performed by: INTERNAL MEDICINE

## 2017-12-04 LAB
ALBUMIN SERPL ELPH-MCNC: 4.4 G/DL (ref 3.7–5.1)
ALPHA1 GLOB SERPL ELPH-MCNC: 0.3 G/DL (ref 0.2–0.4)
ALPHA2 GLOB SERPL ELPH-MCNC: 0.6 G/DL (ref 0.5–0.9)
B-GLOBULIN SERPL ELPH-MCNC: 0.6 G/DL (ref 0.6–1)
GAMMA GLOB SERPL ELPH-MCNC: 1.2 G/DL (ref 0.7–1.6)
M PROTEIN SERPL ELPH-MCNC: 0.8 G/DL
PROT PATTERN SERPL ELPH-IMP: ABNORMAL

## 2017-12-26 DIAGNOSIS — C90.00 MULTIPLE MYELOMA, REMISSION STATUS UNSPECIFIED (H): ICD-10-CM

## 2018-01-26 ENCOUNTER — OFFICE VISIT (OUTPATIENT)
Dept: DERMATOLOGY | Facility: CLINIC | Age: 68
End: 2018-01-26
Payer: COMMERCIAL

## 2018-01-26 DIAGNOSIS — D18.01 CHERRY ANGIOMA: Primary | ICD-10-CM

## 2018-01-26 DIAGNOSIS — L30.9 DERMATITIS: ICD-10-CM

## 2018-01-26 DIAGNOSIS — D22.9 MULTIPLE BENIGN NEVI: ICD-10-CM

## 2018-01-26 DIAGNOSIS — C90.00 MULTIPLE MYELOMA, REMISSION STATUS UNSPECIFIED (H): ICD-10-CM

## 2018-01-26 DIAGNOSIS — L82.1 SEBORRHEIC KERATOSIS: ICD-10-CM

## 2018-01-26 RX ORDER — DESONIDE 0.5 MG/G
OINTMENT TOPICAL
Qty: 60 G | Refills: 1 | Status: SHIPPED | OUTPATIENT
Start: 2018-01-26 | End: 2020-07-07

## 2018-01-26 RX ORDER — TRIAMCINOLONE ACETONIDE 1 MG/G
CREAM TOPICAL 2 TIMES DAILY
Qty: 80 G | Refills: 1 | Status: SHIPPED | OUTPATIENT
Start: 2018-01-26 | End: 2020-07-07

## 2018-01-26 RX ORDER — CLOBETASOL PROPIONATE 0.5 MG/G
OINTMENT TOPICAL 2 TIMES DAILY
Qty: 45 G | Refills: 0 | Status: SHIPPED | OUTPATIENT
Start: 2018-01-26 | End: 2020-07-07

## 2018-01-26 ASSESSMENT — PAIN SCALES - GENERAL: PAINLEVEL: NO PAIN (0)

## 2018-01-26 NOTE — PROGRESS NOTES
[facility] Dermatology Note      Dermatology Problem List:  1. Dermatitis  2. Hx of stem cell transplant for MM  - on pomalidomide    Encounter Date: Jan 26, 2018    CC:   Chief Complaint   Patient presents with     Skin Check     Shena is here today fr a skin check.          History of Present Illness:  Ms. Shena Hartmann is a 67 year old female who presents as a follow-up for dermatitis and skin check. The patient was last seen 10/28/2016 when she was encouraged to practice gentle skin care and use PRN topical corticosteroids for pomolidomide induced dermatitis. She had no evidence of skin cancer at that time. She is currently using the triamcinolone roughly twice a month, typically during the first week of her chemo cycle when the dermatitis flares the most. For the hand dermatitis she uses the clobetasol about once a week. She has no specific skin concerns today, no new spots that are changing, bleeding, tender, etc.    Past Medical History:   Patient Active Problem List   Diagnosis     Pain in thoracic spine     Febrile neutropenia (H)     Multiple myeloma, dx 2002,  s/p ASCBMT 3/26/14     Cough     RSV (respiratory syncytial virus pneumonia)     Personal history of malignant neoplasm of breast     Seasonal allergies     Osteoporosis     Past Medical History:   Diagnosis Date     Breast cancer (H) 1994    right     H/O stem cell transplant (H) 3/2014     Multiple myeloma, without mention of having achieved remission 11/6/2012     Past Surgical History:   Procedure Laterality Date     MASTECTOMY      right     PICC INSERTION  9/10/2013    5fr DL Power PICC, 44cm (1cm external) in the L lateral brachial vein with tip in the low SVC       Social History:  The patient is retired. The patient denies use of tanning beds. Recently returned from [place].    Medications:  Current Outpatient Prescriptions   Medication Sig Dispense Refill     pomalidomide (POMALYST) 1 MG capsule Take 2 mg alternating  with 1 mg daily. Take on days 1-21 days. auth  [id number] 32 capsule 0     order for DME 6 mastectomy bras  Length of need: 99 months 6 Device 1     order for DME R mastectomy prosthesis   Length of need:  99 months 1 Device 1     desonide (DESOWEN) 0.05 % ointment Use twice daily on the face as needed. 60 g 1     triamcinolone (KENALOG) 0.1 % cream Apply topically 2 times daily 80 g 1     cholecalciferol (VITAMIN D3) 5000 UNITS CAPS capsule Take 1 capsule (5,000 Units) by mouth daily 90 capsule 3     Multiple Vitamins-Minerals (ZINC PO) Take by mouth daily        clobetasol (TEMOVATE) 0.05 % ointment Apply topically 2 times daily Apply to dermatitic plaques on hands 2 times per day for 2-3 days/week during flares. 45 g 0     COENZYME Q-10 PO Take 30 mg by mouth daily        magnesium 100 MG CAPS Take 117 mg by mouth 3 times daily       Multiple Vitamins-Minerals (MULTIVITAMIN OR) Take 1 tablet by mouth 2 times daily.       CALCIUM-VITAMIN D-VITAMIN K PO Take 2 tablets by mouth daily.       [DISCONTINUED] pomalidomide (POMALYST) 1 MG capsule Take 2 mg alternating with 1 mg daily. Take on days 1-21 days. auth  [id number] 32 capsule 0        Allergies   Allergen Reactions     Cayenne Pepper [Cayenne]      Corn-Related Products GI Disturbance     Levaquin Other (See Comments)     Tendon pain     Milk Protein GI Disturbance     Mold      Facial rash/lip swelling     Morphine Sulfate Other (See Comments)     Per pt - see things (hallucination) when she was on Morphine .     Pollen Extract      Environmental allergies      Shrimp Nausea and Vomiting     Tomato      Lip swelling, facial rash     Azithromycin Rash     Keflex [Cephalexin] Rash     Oat Rash     Tape [Adhesive Tape] Itching and Rash     All adhesives     Wheat Rash     Swelling of lips         Review of Systems:  -As per HPI  -Constitutional: Otherwise well, in her general state of health  -Skin: As above in HPI. No additional skin concerns.    Physical  exam:  Vitals: There were no vitals taken for this visit.  GEN: This is a well developed, well-nourished female in no acute distress, in a pleasant mood.    SKIN: Full skin, which includes the head/face, both arms, chest, back, abdomen,both legs, genitalia and/or groin buttocks, digits and/or nails, was examined.  - FP type III  -Tanned skin  -Several medium brown well circumscribed waxy papules on the trunk and extremities, including a larger 1-1.5cm waxy plaque on the left anterior thigh w/ comedeolike openings under dermoscopy  - Several medium brown <6mm evenly pigmented brown macules on the trunk  -Numerous bright red dome shaped vascular papules on the trunk and extremities  -No other lesions of concern on areas examined.     Impression/Plan:  1. Seborrheic keratosis and Cherry angiomas    Benign etiology reviewed, no treatment indicated      2. Multiple clinically benign nevi on the trunk    ABCDs of melanoma were discussed and self skin checks were advised.     Sun precaution was advised including the use of sun screens of SPF 30 or higher, sun protective clothing, and avoidance of tanning beds.      3. Pomalidomide induced dermatitis, well controlled with topical steroids    Continue PRN triamcionlone to body, desonide to face and clobetasol to hands. Refills provided today. Did reiterate that it is important to take breaks with topical steroids to avoid atrophy/striae. If she is needing steroids for longer than 1-2 weeks at time she should make a follow up appointment      Follow-up in 1 year, earlier for new or changing lesions.       Dr. Linda Rebollar staffed the patient.    Staff Involved:  Resident(Hadry Mccarthy)/Staff(as above)    I saw and evaluated this patient with Dr. Mccarthy. The above note has been read and reviewed and where appropriate amended and corrected. It accurately reflects my clinical findings, observations, diagnoses, treatment recommendations and follow-up plans.    Ruma Mckeon  MD Smooth  Clinical Professor   Department of Dermatology  [facility]

## 2018-01-26 NOTE — NURSING NOTE
Dermatology Rooming Note    Shena Hartmann's goals for this visit include:   Chief Complaint   Patient presents with     Skin Check     Shena is here today fr a skin check.      DIVYA Hernandez

## 2018-01-26 NOTE — PATIENT INSTRUCTIONS
We refilled your steroid creams today. It is okay to continue using these as needed, as you are.  Follow up in 1 year.  Thank you!

## 2018-01-26 NOTE — MR AVS SNAPSHOT
After Visit Summary   1/26/2018    Shena Hartmann    MRN: [id number]           Patient Information     Date Of Birth          1950        Visit Information        Provider Department      1/26/2018 10:15 AM Ruma Rebollar MD [facility] Dermatology        Today's Diagnoses     Cherry angioma    -  1    Seborrheic keratosis        Multiple benign nevi        Dermatitis          Care Instructions    We refilled your steroid creams today. It is okay to continue using these as needed, as you are.  Follow up in 1 year.  Thank you!          Follow-ups after your visit        Follow-up notes from your care team     Return in about 1 year (around 1/26/2019).      Your next 10 appointments already scheduled     Feb 23, 2018  7:30 AM CST   [facility] with  [facility]   [facility] Draw ([facility])    [address] [place] [address]  [address] [place] [id number] [phone]            Mar 02, 2018  7:30 AM CST   Return with Lui Hills MD   [facility] Blood and Marrow Transplant ([facility])    [address] [place] [address]  [address] [place] [id number] [phone]              Who to contact     Please call your clinic at [phone] to:    Ask questions about your health    Make or cancel appointments    Discuss your medicines    Learn about your test results    Speak to your doctor   If you have compliments or concerns about an experience at your clinic, or if you wish to file a complaint, please contact [facility] Patient Relations at [phone] or email us at [email or web address]         Additional Information About Your Visit        MyChart Information     [employer] gives you secure access to your electronic health record. If you see a primary care provider, you can also send messages to your care team and make appointments. If you have questions, please call your primary care  clinic.  If you do not have a primary care provider, please call [phone] and they will assist you.      [employer] is an electronic gateway that provides easy, online access to your medical records. With [employer], you can request a clinic appointment, read your test results, renew a prescription or communicate with your care team.     To access your existing account, please contact your [facility] or call [phone] for assistance.        Care EveryWhere ID     This is your Care EveryWhere ID. This could be used by other organizations to access your [facility] medical records  [id number]         Blood Pressure from Last 3 Encounters:   09/15/17 122/76   03/17/17 132/76   12/16/16 126/75    Weight from Last 3 Encounters:   09/15/17 58.1 kg (128 lb 1.6 oz)   03/17/17 56.5 kg (124 lb 8 oz)   12/16/16 57.6 kg (126 lb 15.8 oz)              Today, you had the following     No orders found for display         Today's Medication Changes          These changes are accurate as of 1/26/18 11:13 AM.  If you have any questions, ask your nurse or doctor.               These medicines have changed or have updated prescriptions.        Dose/Directions    pomalidomide 1 MG capsule   Commonly known as:  POMALYST   This may have changed:  additional instructions   Used for:  Multiple myeloma, remission status unspecified (H)   Changed by:  Lui Hills MD        Take 2 mg alternating with 1 mg daily. Take on days 1-21 days. auth  [id number]   Quantity:  32 capsule   Refills:  0            Where to get your medicines      These medications were sent to [facility] - [place], [place] - [address] [place] [address], [facility] [address]    Hours:  Mon-Fri 8:30am-5:00pm Toll Free [phone] Phone:  [phone]     pomalidomide 1 MG capsule         These medications were sent to [facility] [place], [place] - [address] [place] [address]  [address] [facility] [address], [facility] [address]    Hours:  TRANSPLANT PHONE NUMBER [phone] Phone:  [phone]     clobetasol 0.05 % ointment    desonide 0.05 % ointment    triamcinolone 0.1 % cream                Primary Care Provider Office Phone # Fax #    Lui Vivek Hills [phone]       [address] [place] [address] [address]  [facility] [phone]        Equal Access to Services     KASANDRA ALVARENGA : Hadii aad ku hadasho Soomaali, waaxda luqadaha, qaybta kaalmada adeegyada, waxay idiin hayaan kelley garcía . So [facility] [phone].    ATENCIÓN: Si habla español, tiene a franks disposición servicios gratuitos de asistencia lingüística. [facility] [phone].    We comply with applicable federal civil rights laws and [place] laws. We do not discriminate on the basis of race, color, national origin, age, disability, sex, sexual orientation, or gender identity.            Thank you!     Thank you for choosing [facility]ERMAT[facility]  for your care. Our goal is always to provide you with excellent care. Hearing back from our patients is one way we can continue to improve our services. Please take a few minutes to complete the written survey that you may receive in the mail after your visit with us. Thank you!             Your Updated Medication List - Protect others around you: Learn how to safely use, store and throw away your medicines at www.disposemymeds.org.          This list is accurate as of 1/26/18 11:13 AM.  Always use your most recent med list.                   Brand Name Dispense Instructions for use Diagnosis    CALCIUM-VITAMIN D-VITAMIN K PO      Take 2 tablets by mouth daily.        cholecalciferol 5000 UNITS Caps capsule    vitamin D3    90 capsule    Take 1 capsule (5,000 Units) by mouth daily    Osteoporosis       clobetasol 0.05 % ointment    TEMOVATE    45 g    Apply topically 2 times daily Apply to dermatitic plaques on hands 2 times per day for 2-3 days/week during flares.     Dermatitis       COENZYME Q-10 PO      Take 30 mg by mouth daily        desonide 0.05 % ointment    DESOWEN    60 g    Use twice daily on the face as needed.    Dermatitis       magnesium 100 MG Caps      Take 117 mg by mouth 3 times daily        MULTIVITAMIN PO      Take 1 tablet by mouth 2 times daily.        * order for DME     6 Device    6 mastectomy bras  Length of need: 99 months    Malignant neoplasm of female breast, unspecified laterality, unspecified site of breast       * order for DME     1 Device    R mastectomy prosthesis   Length of need:  99 months    Malignant neoplasm of female breast, unspecified laterality, unspecified site of breast       pomalidomide 1 MG capsule    POMALYST    32 capsule    Take 2 mg alternating with 1 mg daily. Take on days 1-21 days. auth  [id number]    Multiple myeloma, remission status unspecified (H)       triamcinolone 0.1 % cream    KENALOG    80 g    Apply topically 2 times daily    Dermatitis       ZINC PO      Take by mouth daily        * Notice:  This list has 2 medication(s) that are the same as other medications prescribed for you. Read the directions carefully, and ask your doctor or other care provider to review them with you.

## 2018-01-26 NOTE — LETTER
1/26/2018       RE: Shena Hartmann  [address] [place] [address]     Dear Colleague,    Thank you for referring your patient, Shena Hartmann, to the [facility] DERMATOLOGY at [facility]. Please see a copy of my visit note below.    [facility] Dermatology Note      Dermatology Problem List:  1. Dermatitis  2. Hx of stem cell transplant for MM  - on pomalidomide    Encounter Date: Jan 26, 2018    CC:   Chief Complaint   Patient presents with     Skin Check     Shena is here today fr a skin check.          History of Present Illness:  Ms. Shena Hartmann is a 67 year old female who presents as a follow-up for dermatitis and skin check. The patient was last seen 10/28/2016 when she was encouraged to practice gentle skin care and use PRN topical corticosteroids for pomolidomide induced dermatitis. She had no evidence of skin cancer at that time. She is currently using the triamcinolone roughly twice a month, typically during the first week of her chemo cycle when the dermatitis flares the most. For the hand dermatitis she uses the clobetasol about once a week. She has no specific skin concerns today, no new spots that are changing, bleeding, tender, etc.    Past Medical History:   Patient Active Problem List   Diagnosis     Pain in thoracic spine     Febrile neutropenia (H)     Multiple myeloma, dx 2002,  s/p ASCBMT 3/26/14     Cough     RSV (respiratory syncytial virus pneumonia)     Personal history of malignant neoplasm of breast     Seasonal allergies     Osteoporosis     Past Medical History:   Diagnosis Date     Breast cancer (H) 1994    right     H/O stem cell transplant (H) 3/2014     Multiple myeloma, without mention of having achieved remission 11/6/2012     Past Surgical History:   Procedure Laterality Date     MASTECTOMY      right     PICC INSERTION  9/10/2013    5fr DL Power PICC, 44cm (1cm external) in the L lateral brachial vein with  tip in the low [facility]       Social History:  The patient is retired. The patient denies use of tanning beds. Recently returned from [place].    Medications:  Current Outpatient Prescriptions   Medication Sig Dispense Refill     pomalidomide (POMALYST) 1 MG capsule Take 2 mg alternating with 1 mg daily. Take on days 1-21 days. auth  [id number] 32 capsule 0     order for DME 6 mastectomy bras  Length of need: 99 months 6 Device 1     order for DME R mastectomy prosthesis   Length of need:  99 months 1 Device 1     desonide (DESOWEN) 0.05 % ointment Use twice daily on the face as needed. 60 g 1     triamcinolone (KENALOG) 0.1 % cream Apply topically 2 times daily 80 g 1     cholecalciferol (VITAMIN D3) 5000 UNITS CAPS capsule Take 1 capsule (5,000 Units) by mouth daily 90 capsule 3     Multiple Vitamins-Minerals (ZINC PO) Take by mouth daily        clobetasol (TEMOVATE) 0.05 % ointment Apply topically 2 times daily Apply to dermatitic plaques on hands 2 times per day for 2-3 days/week during flares. 45 g 0     COENZYME Q-10 PO Take 30 mg by mouth daily        magnesium 100 MG CAPS Take 117 mg by mouth 3 times daily       Multiple Vitamins-Minerals (MULTIVITAMIN OR) Take 1 tablet by mouth 2 times daily.       CALCIUM-VITAMIN D-VITAMIN K PO Take 2 tablets by mouth daily.       [DISCONTINUED] pomalidomide (POMALYST) 1 MG capsule Take 2 mg alternating with 1 mg daily. Take on days 1-21 days. auth  [id number] 32 capsule 0        Allergies   Allergen Reactions     Cayenne Pepper [Cayenne]      Corn-Related Products GI Disturbance     Levaquin Other (See Comments)     Tendon pain     Milk Protein GI Disturbance     Mold      Facial rash/lip swelling     Morphine Sulfate Other (See Comments)     Per pt - see things (hallucination) when she was on Morphine .     Pollen Extract      Environmental allergies      Shrimp Nausea and Vomiting     Tomato      Lip swelling, facial rash     Azithromycin Rash     Keflex [Cephalexin] Rash     Oat  Rash     Tape [Adhesive Tape] Itching and Rash     All adhesives     Wheat Rash     Swelling of lips         Review of Systems:  -As per HPI  -Constitutional: Otherwise well, in her general state of health  -Skin: As above in HPI. No additional skin concerns.    Physical exam:  Vitals: There were no vitals taken for this visit.  GEN: This is a well developed, well-nourished female in no acute distress, in a pleasant mood.    SKIN: Full skin, which includes the head/face, both arms, chest, back, abdomen,both legs, genitalia and/or groin buttocks, digits and/or nails, was examined.  - FP type III  -Tanned skin  -Several medium brown well circumscribed waxy papules on the trunk and extremities, including a larger 1-1.5cm waxy plaque on the left anterior thigh w/ comedeolike openings under dermoscopy  - Several medium brown <6mm evenly pigmented brown macules on the trunk  -Numerous bright red dome shaped vascular papules on the trunk and extremities  -No other lesions of concern on areas examined.     Impression/Plan:  1. Seborrheic keratosis and Cherry angiomas    Benign etiology reviewed, no treatment indicated      2. Multiple clinically benign nevi on the trunk    ABCDs of melanoma were discussed and self skin checks were advised.     Sun precaution was advised including the use of sun screens of SPF 30 or higher, sun protective clothing, and avoidance of tanning beds.      3. Pomalidomide induced dermatitis, well controlled with topical steroids    Continue PRN triamcionlone to body, desonide to face and clobetasol to hands. Refills provided today. Did reiterate that it is important to take breaks with topical steroids to avoid atrophy/striae. If she is needing steroids for longer than 1-2 weeks at time she should make a follow up appointment      Follow-up in 1 year, earlier for new or changing lesions.       Dr. Linda Rebollar staffed the patient.    Staff Involved:  Resident(Hardy Mccarthy)/Staff(as  above)    I saw and evaluated this patient with Dr. Mccarthy. The above note has been read and reviewed and where appropriate amended and corrected. It accurately reflects my clinical findings, observations, diagnoses, treatment recommendations and follow-up plans.    Ruma Rebollar MD  Clinical Professor   Department of Dermatology  [facility]

## 2018-02-15 DIAGNOSIS — C90.00 MULTIPLE MYELOMA (H): Primary | ICD-10-CM

## 2018-02-15 DIAGNOSIS — Z85.3 PERSONAL HISTORY OF MALIGNANT NEOPLASM OF BREAST: ICD-10-CM

## 2018-02-26 DIAGNOSIS — C90.00 MULTIPLE MYELOMA, REMISSION STATUS UNSPECIFIED (H): ICD-10-CM

## 2018-03-09 DIAGNOSIS — J32.9 SINUSITIS: ICD-10-CM

## 2018-03-21 ENCOUNTER — TRANSFERRED RECORDS (OUTPATIENT)
Dept: HEALTH INFORMATION MANAGEMENT | Facility: CLINIC | Age: 68
End: 2018-03-21

## 2018-03-23 DIAGNOSIS — C90.00 MULTIPLE MYELOMA (H): ICD-10-CM

## 2018-03-23 DIAGNOSIS — C90.00 MULTIPLE MYELOMA NOT HAVING ACHIEVED REMISSION (H): ICD-10-CM

## 2018-03-23 LAB
ALBUMIN SERPL-MCNC: 3.6 G/DL (ref 3.4–5)
ALP SERPL-CCNC: 48 U/L (ref 40–150)
ALT SERPL W P-5'-P-CCNC: 21 U/L (ref 0–50)
ANION GAP SERPL CALCULATED.3IONS-SCNC: 7 MMOL/L (ref 3–14)
AST SERPL W P-5'-P-CCNC: 17 U/L (ref 0–45)
BASOPHILS # BLD AUTO: 0 10E9/L (ref 0–0.2)
BASOPHILS NFR BLD AUTO: 1 %
BILIRUB SERPL-MCNC: 0.3 MG/DL (ref 0.2–1.3)
BUN SERPL-MCNC: 15 MG/DL (ref 7–30)
CALCIUM SERPL-MCNC: 9.4 MG/DL (ref 8.5–10.1)
CHLORIDE SERPL-SCNC: 100 MMOL/L (ref 94–109)
CHOLEST SERPL-MCNC: 234 MG/DL
CO2 SERPL-SCNC: 30 MMOL/L (ref 20–32)
CREAT SERPL-MCNC: 0.78 MG/DL (ref 0.52–1.04)
DIFFERENTIAL METHOD BLD: ABNORMAL
EOSINOPHIL # BLD AUTO: 0.2 10E9/L (ref 0–0.7)
EOSINOPHIL NFR BLD AUTO: 5.7 %
ERYTHROCYTE [DISTWIDTH] IN BLOOD BY AUTOMATED COUNT: 14.8 % (ref 10–15)
GFR SERPL CREATININE-BSD FRML MDRD: 74 ML/MIN/1.7M2
GLUCOSE SERPL-MCNC: 85 MG/DL (ref 70–99)
HCT VFR BLD AUTO: 38.4 % (ref 35–47)
HDLC SERPL-MCNC: 64 MG/DL
HGB BLD-MCNC: 12.8 G/DL (ref 11.7–15.7)
IMM GRANULOCYTES # BLD: 0 10E9/L (ref 0–0.4)
IMM GRANULOCYTES NFR BLD: 0 %
LDLC SERPL CALC-MCNC: 153 MG/DL
LYMPHOCYTES # BLD AUTO: 1.4 10E9/L (ref 0.8–5.3)
LYMPHOCYTES NFR BLD AUTO: 46.3 %
MCH RBC QN AUTO: 32.9 PG (ref 26.5–33)
MCHC RBC AUTO-ENTMCNC: 33.3 G/DL (ref 31.5–36.5)
MCV RBC AUTO: 99 FL (ref 78–100)
MONOCYTES # BLD AUTO: 0.4 10E9/L (ref 0–1.3)
MONOCYTES NFR BLD AUTO: 14.7 %
NEUTROPHILS # BLD AUTO: 1 10E9/L (ref 1.6–8.3)
NEUTROPHILS NFR BLD AUTO: 32.3 %
NONHDLC SERPL-MCNC: 170 MG/DL
NRBC # BLD AUTO: 0 10*3/UL
NRBC BLD AUTO-RTO: 0 /100
PLATELET # BLD AUTO: 158 10E9/L (ref 150–450)
POTASSIUM SERPL-SCNC: 4.1 MMOL/L (ref 3.4–5.3)
PROT SERPL-MCNC: 7.4 G/DL (ref 6.8–8.8)
RBC # BLD AUTO: 3.89 10E12/L (ref 3.8–5.2)
SODIUM SERPL-SCNC: 136 MMOL/L (ref 133–144)
TRIGL SERPL-MCNC: 85 MG/DL
WBC # BLD AUTO: 3 10E9/L (ref 4–11)

## 2018-03-23 PROCEDURE — 80061 LIPID PANEL: CPT | Performed by: INTERNAL MEDICINE

## 2018-03-23 PROCEDURE — 84165 PROTEIN E-PHORESIS SERUM: CPT | Performed by: INTERNAL MEDICINE

## 2018-03-23 PROCEDURE — 85025 COMPLETE CBC W/AUTO DIFF WBC: CPT | Performed by: INTERNAL MEDICINE

## 2018-03-23 PROCEDURE — 00000402 ZZHCL STATISTIC TOTAL PROTEIN: Performed by: INTERNAL MEDICINE

## 2018-03-23 PROCEDURE — 84478 ASSAY OF TRIGLYCERIDES: CPT | Performed by: INTERNAL MEDICINE

## 2018-03-23 PROCEDURE — 80053 COMPREHEN METABOLIC PANEL: CPT | Performed by: INTERNAL MEDICINE

## 2018-03-23 NOTE — NURSING NOTE
Chief Complaint   Patient presents with     Blood Draw     venipuncture labs drawn.     Annette Houston, CMA

## 2018-03-26 DIAGNOSIS — C90.00 MULTIPLE MYELOMA, REMISSION STATUS UNSPECIFIED (H): ICD-10-CM

## 2018-03-26 LAB
ALBUMIN SERPL ELPH-MCNC: 4 G/DL (ref 3.7–5.1)
ALPHA1 GLOB SERPL ELPH-MCNC: 0.3 G/DL (ref 0.2–0.4)
ALPHA2 GLOB SERPL ELPH-MCNC: 0.8 G/DL (ref 0.5–0.9)
B-GLOBULIN SERPL ELPH-MCNC: 0.6 G/DL (ref 0.6–1)
GAMMA GLOB SERPL ELPH-MCNC: 1.2 G/DL (ref 0.7–1.6)
M PROTEIN SERPL ELPH-MCNC: 0.9 G/DL
PROT PATTERN SERPL ELPH-IMP: ABNORMAL

## 2018-03-30 ENCOUNTER — ONCOLOGY VISIT (OUTPATIENT)
Dept: TRANSPLANT | Facility: CLINIC | Age: 68
End: 2018-03-30
Attending: INTERNAL MEDICINE
Payer: COMMERCIAL

## 2018-03-30 VITALS
BODY MASS INDEX: 23.19 KG/M2 | WEIGHT: 126.8 LBS | TEMPERATURE: 96.6 F | DIASTOLIC BLOOD PRESSURE: 64 MMHG | OXYGEN SATURATION: 98 % | SYSTOLIC BLOOD PRESSURE: 137 MMHG | HEART RATE: 90 BPM

## 2018-03-30 DIAGNOSIS — C90.00 MULTIPLE MYELOMA NOT HAVING ACHIEVED REMISSION (H): Primary | ICD-10-CM

## 2018-03-30 PROCEDURE — G0463 HOSPITAL OUTPT CLINIC VISIT: HCPCS

## 2018-03-30 ASSESSMENT — PAIN SCALES - GENERAL: PAINLEVEL: NO PAIN (0)

## 2018-03-30 NOTE — MR AVS SNAPSHOT
After Visit Summary   3/30/2018    Shena Hartmann    MRN: [id number]           Patient Information     Date Of Birth          1950        Visit Information        Provider Department      3/30/2018 7:30 AM Lui Hills MD [facility] Blood and Marrow Transplant        Today's Diagnoses     Multiple myeloma not having achieved remission (H)    -  1          [facility] ([facility]  [address] [place] [address]  Phone: [phone]  Clinic Hours:   Monday-Thursday:7am to 7pm   Friday: 7am to 5pm   Weekends and holidays:    8am to noon (in general)  If your fever is 100.5  or greater,   call the clinic.  After hours call the   hospital at [phone] or   [phone]. Ask for the BMT   fellow on-call            Follow-ups after your visit        Follow-up notes from your care team     Return in about 6 months (around 9/30/2018).      Your next 10 appointments already scheduled     Aug 31, 2018  7:30 AM CDT   [facility] Draw with  [facility]   [facility] Draw ([facility])    [address] [place] [address]  [address] [place] [address] [id number] [phone]            Sep 07, 2018  7:30 AM CDT   Return with Lui Hills MD   [facility] Blood and Marrow Transplant ([facility])    [address] [place] [id number] [phone]              Future tests that were ordered for you today     Open Future Orders        Priority Expected Expires Ordered    Comprehensive metabolic panel Routine 9/30/2018 12/30/2018 3/30/2018    CBC with platelets differential Routine 9/30/2018 12/30/2018 3/30/2018    Protein electrophoresis Routine 9/30/2018 12/30/2018 3/30/2018    25- OH-Vitamin D Routine 9/30/2018 12/30/2018 3/30/2018            Who to contact     If you have questions or need follow up information about today's clinic visit or your schedule please contact [facility] BLOOD AND  MARROW TRANSPLANT directly at [phone].  Normal or non-critical lab and imaging results will be communicated to you by LyfeSystemshart, letter or phone within 4 business days after the clinic has received the results. If you do not hear from us within 7 days, please contact the clinic through Crittercismt or phone. If you have a critical or abnormal lab result, we will notify you by phone as soon as possible.  Submit refill requests through Weemba or call your pharmacy and they will forward the refill request to us. Please allow 3 business days for your refill to be completed.          Additional Information About Your Visit        LyfeSystemsharDissolve Information     Weemba gives you secure access to your electronic health record. If you see a primary care provider, you can also send messages to your care team and make appointments. If you have questions, please call your primary care clinic.  If you do not have a primary care provider, please call [phone] and they will assist you.        Care EveryWhere ID     This is your Care EveryWhere ID. This could be used by other organizations to access your [place] medical records  [id number]        Your Vitals Were     Pulse Temperature Pulse Oximetry BMI (Body Mass Index)          90 96.6  F (35.9  C) (Oral) 98% 23.19 kg/m2         Blood Pressure from Last 3 Encounters:   03/30/18 137/64   09/15/17 122/76   03/17/17 132/76    Weight from Last 3 Encounters:   03/30/18 57.5 kg (126 lb 12.8 oz)   09/15/17 58.1 kg (128 lb 1.6 oz)   03/17/17 56.5 kg (124 lb 8 oz)               Recent Review Flowsheet Data     BMT Recent Results Latest Ref Rng & Units 9/7/2016 12/9/2016 2/28/2017 6/16/2017 9/8/2017 12/1/2017 3/23/2018    WBC 4.0 - 11.0 10e9/L 3.3(L) 2.1(L) 2.6(L) 3.3(L) 3.1(L) 2.9(L) 3.0(L)    Hemoglobin 11.7 - 15.7 g/dL 12.8 12.2 12.1 13.2 13.1 12.8 12.8    Platelet Count 150 - 450 10e9/L 121(L) 129(L) 135(L) 153 144(L) 158 158    Neutrophils (Absolute) 1.6 - 8.3 10e9/L 1.4(L) 0.6(L)  0.7(L) 1.2(L) 1.1(L) 1.1(L) 1.0(L)    INR 0.86 - 1.14 - - - - - - -    Sodium 133 - 144 mmol/L 137 140 139 139 136 139 136    Potassium 3.4 - 5.3 mmol/L 4.0 3.6 3.9 4.0 4.0 3.8 4.1    Chloride 94 - 109 mmol/L 100 106 103 103 103 103 100    Glucose 70 - 99 mg/dL 88 99 83 69(L) 93 92 85    Urea Nitrogen 7 - 30 mg/dL 15 15 10 13 13 15 15    Creatinine 0.52 - 1.04 mg/dL 0.74 0.80 0.73 0.86 0.74 0.82 0.78    Calcium (Total) 8.5 - 10.1 mg/dL 9.4 9.2 8.8 9.5 9.0 9.4 9.4    Protein (Total) 6.8 - 8.8 g/dL 7.8 7.4 7.2 7.6 7.4 7.2 7.4    Albumin 3.4 - 5.0 g/dL 3.9 3.6 3.5 3.9 3.6 3.6 3.6    Alkaline Phosphatase 40 - 150 U/L 46 41 38(L) 50 44 46 48    AST 0 - 45 U/L 17 18 13 19 32 18 17    ALT 0 - 50 U/L 32 32 24 29 34 26 21    MCV 78 - 100 fl 102(H) 100 100 101(H) 100 100 99               Primary Care Provider Office Phone # Fax #    Lui Vivek Hills [phone]       [address] [place] [address]        Equal Access to Services     [facility] AH: Hadii aad ku hadasho Soomaali, waaxda luqadaha, qaybta kaalmada adeegyada, jesse harper. So [facility] [phone].    ATENCIÓN: Si habla español, tiene a franks disposición servicios gratuitos de asistencia lingüística. Alanaame al [phone].    We comply with applicable federal civil rights laws and [place] laws. We do not discriminate on the basis of race, color, national origin, age, disability, sex, sexual orientation, or gender identity.            Thank you!     Thank you for choosing [facility] BLOOD AND MARROW TRANSPLANT  for your care. Our goal is always to provide you with excellent care. Hearing back from our patients is one way we can continue to improve our services. Please take a few minutes to complete the written survey that you may receive in the mail after your visit with us. Thank you!             Your Updated Medication List - Protect others around you: Learn how to safely use, store and throw away your  medicines at www.disposemymeds.org.          This list is accurate as of 3/30/18  8:22 AM.  Always use your most recent med list.                   Brand Name Dispense Instructions for use Diagnosis    amoxicillin-clavulanate 875-125 MG per tablet    AUGMENTIN    20 tablet    Take 1 tablet by mouth 2 times daily    Sinusitis       CALCIUM-VITAMIN D-VITAMIN K PO      Take 2 tablets by mouth daily.        cholecalciferol 5000 UNITS Caps capsule    vitamin D3    90 capsule    Take 1 capsule (5,000 Units) by mouth daily    Osteoporosis       clobetasol 0.05 % ointment    TEMOVATE    45 g    Apply topically 2 times daily Apply to dermatitic plaques on hands 2 times per day for 2-3 days/week during flares.    Dermatitis       COENZYME Q-10 PO      Take 30 mg by mouth daily        desonide 0.05 % ointment    DESOWEN    60 g    Use twice daily on the face as needed.    Dermatitis       magnesium 100 MG Caps      Take 117 mg by mouth 3 times daily        MULTIVITAMIN PO      Take 1 tablet by mouth 2 times daily.        * order for DME     6 Device    6 mastectomy bras  Length of need: 99 months    Malignant neoplasm of female breast, unspecified laterality, unspecified site of breast       * order for DME     1 Device    R mastectomy prosthesis   Length of need:  99 months    Malignant neoplasm of female breast, unspecified laterality, unspecified site of breast       pomalidomide 1 MG capsule    POMALYST    32 capsule    Take 2 mg alternating with 1 mg daily. Take on days 1-21 days. auth  [id number]    Multiple myeloma, remission status unspecified (H)       triamcinolone 0.1 % cream    KENALOG    80 g    Apply topically 2 times daily    Dermatitis       ZINC PO      Take by mouth daily        * Notice:  This list has 2 medication(s) that are the same as other medications prescribed for you. Read the directions carefully, and ask your doctor or other care provider to review them with you.

## 2018-03-30 NOTE — PROGRESS NOTES
/64 (BP Location: Left arm, Patient Position: Left side, Cuff Size: Adult Regular)  Pulse 90  Temp 96.6  F (35.9  C) (Oral)  Wt 57.5 kg (126 lb 12.8 oz)  SpO2 98%  BMI 23.19 kg/m2    Wt Readings from Last 4 Encounters:   03/30/18 57.5 kg (126 lb 12.8 oz)   09/15/17 58.1 kg (128 lb 1.6 oz)   03/17/17 56.5 kg (124 lb 8 oz)   12/16/16 57.6 kg (126 lb 15.8 oz)     Shena was seen and examined by me today. She is a 67-year-old woman who is now 4 years after autologous transplant for her multiple myeloma. The biggest problem is that she has had somewhat refractory disease. Higher to transplant and she never fully cleared her protein spike. She is actually doing remarkably well on pamalidimide at 2 mg alternating with 1 mg for the 21 day cycle. She has noted recurrent symptoms associated with being on treatment including fatigue, joint aches, muscle stiffness, muscle pain and this has some. Some of this is probably related to neuropathy which is gradually getting slightly worse. Her stiffness improves with exercise. Importantly, she has fairly good energy in her performance status is near normal other then these annoying symptoms. She has had some mild upper respiratory tract infections but is otherwise well. She denies any shortness of breath or cough. She has no new pain. Respiratory complaint review of systems is unremarkable.    Physical exam: Shena looks well and is in no distress today. Her vital signs are normal. HEENT exam is unremarkable. There are no oral lesions. Pupils are equal round and reactive to light. There is no cervical, axillary or inguinal adenopathy. Lungs are clear to auscultation percussion. Abdominal exam is benign and there is no organomegaly. Lower extremities are without edema. There are no skin rashes. She does have rash when on treatment.    Laboratory evaluation Shena R normal BUN of 15 and a creatinine of 0.78. A CBC is stable with a hemoglobin of 12.8, platelet count of 158,000  and a white count of 3000 with 1000 absolute neutrophils. Liver function tests are within normal limits.Her M-slike is stable at 0.9 grams for the past year.    Overall impression is that Shena is stable. We spent agreed to live time today talking about quality of life with side effects associated with her pamalidimide. I offered her the possibility of decreasing from 21 days monthly to 18 days monthly which is a minor change and would give her a few more days off therapy which she enjoys. She understands that there is no data regarding this change so the decision should be directed on quality issues. She understands this and will think about it. I will see her again in 6 months. We will add on a vitamin D at her next visit. Over questions have been answered today.    Lui Hills

## 2018-03-30 NOTE — NURSING NOTE
"Oncology Rooming Note    March 30, 2018 7:33 AM   Shena Hartmann is a 67 year old female who presents for:    No chief complaint on file.    Initial Vitals: /64 (BP Location: Left arm, Patient Position: Left side, Cuff Size: Adult Regular)  Pulse 90  Temp 96.6  F (35.9  C) (Oral)  Wt 57.5 kg (126 lb 12.8 oz)  SpO2 98%  BMI 23.19 kg/m2 Estimated body mass index is 23.19 kg/(m^2) as calculated from the following:    Height as of 9/15/17: 1.575 m (5' 2\").    Weight as of this encounter: 57.5 kg (126 lb 12.8 oz). Body surface area is 1.59 meters squared.  No Pain (0) Comment: Data Unavailable   No LMP recorded. Patient is postmenopausal.  Allergies reviewed: Yes  Medications reviewed: Yes    Medications: Medication refills not needed today.  Pharmacy name entered into HEMINGWAY:    [facility] - [place], [place] - [address]  [id number] - [place] - [address] [facility] [address]  WRITTEN PRESCRIPTION REQUESTED  [id number] - [place], [place] - [address] [facility] & [address].    Clinical concerns: none     6 minutes for nursing intake (face to face time)     Dacia Lei MA                "

## 2018-04-20 DIAGNOSIS — C90.00 MULTIPLE MYELOMA, REMISSION STATUS UNSPECIFIED (H): ICD-10-CM

## 2018-05-23 DIAGNOSIS — C90.00 MULTIPLE MYELOMA, REMISSION STATUS UNSPECIFIED (H): ICD-10-CM

## 2018-06-19 DIAGNOSIS — C90.00 MULTIPLE MYELOMA, REMISSION STATUS UNSPECIFIED (H): ICD-10-CM

## 2018-07-13 DIAGNOSIS — C90.00 MULTIPLE MYELOMA, REMISSION STATUS UNSPECIFIED (H): ICD-10-CM

## 2018-08-09 DIAGNOSIS — C90.00 MULTIPLE MYELOMA, REMISSION STATUS UNSPECIFIED (H): ICD-10-CM

## 2018-08-09 DIAGNOSIS — C90.00 MULTIPLE MYELOMA NOT HAVING ACHIEVED REMISSION (H): ICD-10-CM

## 2018-08-09 LAB
ALBUMIN SERPL-MCNC: 3.7 G/DL (ref 3.4–5)
ALP SERPL-CCNC: 50 U/L (ref 40–150)
ALT SERPL W P-5'-P-CCNC: 28 U/L (ref 0–50)
ANION GAP SERPL CALCULATED.3IONS-SCNC: 8 MMOL/L (ref 3–14)
AST SERPL W P-5'-P-CCNC: 20 U/L (ref 0–45)
BASOPHILS # BLD AUTO: 0 10E9/L (ref 0–0.2)
BASOPHILS NFR BLD AUTO: 1.1 %
BILIRUB SERPL-MCNC: 0.4 MG/DL (ref 0.2–1.3)
BUN SERPL-MCNC: 16 MG/DL (ref 7–30)
CALCIUM SERPL-MCNC: 9.1 MG/DL (ref 8.5–10.1)
CHLORIDE SERPL-SCNC: 102 MMOL/L (ref 94–109)
CO2 SERPL-SCNC: 29 MMOL/L (ref 20–32)
CREAT SERPL-MCNC: 0.87 MG/DL (ref 0.52–1.04)
DEPRECATED CALCIDIOL+CALCIFEROL SERPL-MC: 130 UG/L (ref 20–75)
DIFFERENTIAL METHOD BLD: ABNORMAL
EOSINOPHIL # BLD AUTO: 0.2 10E9/L (ref 0–0.7)
EOSINOPHIL NFR BLD AUTO: 4.3 %
ERYTHROCYTE [DISTWIDTH] IN BLOOD BY AUTOMATED COUNT: 14.8 % (ref 10–15)
GFR SERPL CREATININE-BSD FRML MDRD: 65 ML/MIN/1.7M2
GLUCOSE SERPL-MCNC: 54 MG/DL (ref 70–99)
HCT VFR BLD AUTO: 40.9 % (ref 35–47)
HGB BLD-MCNC: 13.8 G/DL (ref 11.7–15.7)
IMM GRANULOCYTES # BLD: 0 10E9/L (ref 0–0.4)
IMM GRANULOCYTES NFR BLD: 0 %
LYMPHOCYTES # BLD AUTO: 1.3 10E9/L (ref 0.8–5.3)
LYMPHOCYTES NFR BLD AUTO: 37.3 %
MCH RBC QN AUTO: 33.2 PG (ref 26.5–33)
MCHC RBC AUTO-ENTMCNC: 33.7 G/DL (ref 31.5–36.5)
MCV RBC AUTO: 98 FL (ref 78–100)
MONOCYTES # BLD AUTO: 0.7 10E9/L (ref 0–1.3)
MONOCYTES NFR BLD AUTO: 18.8 %
NEUTROPHILS # BLD AUTO: 1.4 10E9/L (ref 1.6–8.3)
NEUTROPHILS NFR BLD AUTO: 38.5 %
NRBC # BLD AUTO: 0 10*3/UL
NRBC BLD AUTO-RTO: 0 /100
PLATELET # BLD AUTO: 150 10E9/L (ref 150–450)
POTASSIUM SERPL-SCNC: 3.8 MMOL/L (ref 3.4–5.3)
PROT SERPL-MCNC: 7.4 G/DL (ref 6.8–8.8)
RBC # BLD AUTO: 4.16 10E12/L (ref 3.8–5.2)
SODIUM SERPL-SCNC: 139 MMOL/L (ref 133–144)
WBC # BLD AUTO: 3.5 10E9/L (ref 4–11)

## 2018-08-09 PROCEDURE — 80053 COMPREHEN METABOLIC PANEL: CPT | Performed by: INTERNAL MEDICINE

## 2018-08-09 PROCEDURE — 00000402 ZZHCL STATISTIC TOTAL PROTEIN: Performed by: INTERNAL MEDICINE

## 2018-08-09 PROCEDURE — 36415 COLL VENOUS BLD VENIPUNCTURE: CPT

## 2018-08-09 PROCEDURE — 85025 COMPLETE CBC W/AUTO DIFF WBC: CPT | Performed by: INTERNAL MEDICINE

## 2018-08-09 PROCEDURE — 82306 VITAMIN D 25 HYDROXY: CPT | Performed by: INTERNAL MEDICINE

## 2018-08-09 PROCEDURE — 84165 PROTEIN E-PHORESIS SERUM: CPT | Performed by: INTERNAL MEDICINE

## 2018-08-09 NOTE — NURSING NOTE
Chief Complaint   Patient presents with     Blood Draw     Labs only     Vitals done, labs drawn by venipuncture.  See doc flow sheets for details.  Rosamaria García CMA

## 2018-08-10 LAB
ALBUMIN SERPL ELPH-MCNC: 4.3 G/DL (ref 3.7–5.1)
ALPHA1 GLOB SERPL ELPH-MCNC: 0.3 G/DL (ref 0.2–0.4)
ALPHA2 GLOB SERPL ELPH-MCNC: 0.6 G/DL (ref 0.5–0.9)
B-GLOBULIN SERPL ELPH-MCNC: 0.6 G/DL (ref 0.6–1)
GAMMA GLOB SERPL ELPH-MCNC: 1.1 G/DL (ref 0.7–1.6)
M PROTEIN SERPL ELPH-MCNC: 0.7 G/DL
PROT PATTERN SERPL ELPH-IMP: ABNORMAL

## 2018-08-17 ENCOUNTER — ONCOLOGY VISIT (OUTPATIENT)
Dept: TRANSPLANT | Facility: CLINIC | Age: 68
End: 2018-08-17
Attending: INTERNAL MEDICINE
Payer: COMMERCIAL

## 2018-08-17 VITALS
HEART RATE: 76 BPM | WEIGHT: 126.4 LBS | DIASTOLIC BLOOD PRESSURE: 76 MMHG | SYSTOLIC BLOOD PRESSURE: 124 MMHG | BODY MASS INDEX: 23.12 KG/M2 | TEMPERATURE: 97.1 F | OXYGEN SATURATION: 97 %

## 2018-08-17 DIAGNOSIS — C90.00 MULTIPLE MYELOMA NOT HAVING ACHIEVED REMISSION (H): Primary | ICD-10-CM

## 2018-08-17 DIAGNOSIS — C90.01 MULTIPLE MYELOMA IN REMISSION (H): Primary | ICD-10-CM

## 2018-08-17 RX ORDER — SACCHAROMYCES BOULARDII 250 MG
250 CAPSULE ORAL 2 TIMES DAILY
COMMUNITY
End: 2019-03-04

## 2018-08-17 NOTE — MR AVS SNAPSHOT
After Visit Summary   8/17/2018    Shena Hartmann    MRN: [id number]           Patient Information     Date Of Birth          1950        Visit Information        Provider Department      8/17/2018 7:30 AM Lui Hills MD [facility] Blood and Marrow Transplant        Today's Diagnoses     Multiple myeloma not having achieved remission (H)    -  1          [facility] [place]  [address] [place] [address]  Phone: [phone]  Clinic Hours:   Monday-Thursday:7am to 7pm   Friday: 7am to 5pm   Weekends and holidays:    8am to noon (in general)  If your fever is 100.5  or greater,   call the clinic.  After hours call the   hospital at [phone] or   [phone]. Ask for the BMT   fellow on-call            Follow-ups after your visit        Follow-up notes from your care team     Return in about 6 months (around 2/15/2019).      Your next 10 appointments already scheduled     Feb 22, 2019  7:30 AM CST   [facility] with  [facility]   [facility] ([facility])    [address] [place] [address]  [address] [place] [address] [id number] [phone]            Mar 01, 2019  7:30 AM CST   Return with Lui Hills MD   [facility] Blood and Marrow Transplant ([facility])    [address] [place] [id number] [phone]              Future tests that were ordered for you today     Open Standing Orders        Priority Remaining Interval Expires Ordered    Comprehensive metabolic panel Routine 2/2 8/17/2019 8/17/2018    Protein electrophoresis Routine 2/2 8/17/2019 8/17/2018    CBC with platelets differential Routine 2/2 8/17/2019 8/17/2018    Beta 2 microglobulin Routine 2/2 8/17/2019 8/17/2018          Open Future Orders        Priority Expected Expires Ordered    MRI Lumbar spine w & w/o contrast Routine  8/17/2019 8/17/2018    MR Cervical Spine w/o & w Contrast  Routine  8/17/2019 8/17/2018            Who to contact     If you have questions or need follow up information about today's clinic visit or your schedule please contact [facility] BLOOD AND MARROW TRANSPLANT directly at [phone].  Normal or non-critical lab and imaging results will be communicated to you by [employer]art, letter or phone within 4 business days after the clinic has received the results. If you do not hear from us within 7 days, please contact the clinic through [employer] or phone. If you have a critical or abnormal lab result, we will notify you by phone as soon as possible.  Submit refill requests through [facility] [employer] ([facility] [place]) or call your pharmacy and they will forward the refill request to us. Please allow 3 business days for your refill to be completed.          Additional Information About Your Visit        [facility] ([facility] [place]) Information     [facility] [employer] [facility] [place] gives you secure access to your electronic health record. If you see a primary care provider, you can also send messages to your care team and make appointments. If you have questions, please call your primary care clinic.  If you do not have a primary care provider, please call [phone] and they will assist you.        Care EveryWhere ID     This is your Care EveryWhere ID. This could be used by other organizations to access your [facility] medical records  [id number]        Your Vitals Were     Pulse Temperature Pulse Oximetry BMI (Body Mass Index)          76 97.1  F (36.2  C) 97% 23.12 kg/m2         Blood Pressure from Last 3 Encounters:   08/17/18 124/76   03/30/18 137/64   09/15/17 122/76    Weight from Last 3 Encounters:   08/17/18 57.3 kg (126 lb 6.4 oz)   03/30/18 57.5 kg (126 lb 12.8 oz)   09/15/17 58.1 kg (128 lb 1.6 oz)               Recent Review Flowsheet Data     BMT Recent Results Latest Ref Rng & Units 12/9/2016 2/28/2017 6/16/2017 9/8/2017 12/1/2017 3/23/2018 8/9/2018    WBC 4.0 - 11.0 10e9/L 2.1(L) 2.6(L) 3.3(L) 3.1(L) 2.9(L) 3.0(L) 3.5(L)    Hemoglobin  11.7 - 15.7 g/dL 12.2 12.1 13.2 13.1 12.8 12.8 13.8    Platelet Count 150 - 450 10e9/L 129(L) 135(L) 153 144(L) 158 158 150    Neutrophils (Absolute) 1.6 - 8.3 10e9/L 0.6(L) 0.7(L) 1.2(L) 1.1(L) 1.1(L) 1.0(L) 1.4(L)    INR 0.86 - 1.14 - - - - - - -    Sodium 133 - 144 mmol/L 140 139 139 136 139 136 139    Potassium 3.4 - 5.3 mmol/L 3.6 3.9 4.0 4.0 3.8 4.1 3.8    Chloride 94 - 109 mmol/L 106 103 103 103 103 100 102    Glucose 70 - 99 mg/dL 99 83 69(L) 93 92 85 54(L)    Urea Nitrogen 7 - 30 mg/dL 15 10 13 13 15 15 16    Creatinine 0.52 - 1.04 mg/dL 0.80 0.73 0.86 0.74 0.82 0.78 0.87    Calcium (Total) 8.5 - 10.1 mg/dL 9.2 8.8 9.5 9.0 9.4 9.4 9.1    Protein (Total) 6.8 - 8.8 g/dL 7.4 7.2 7.6 7.4 7.2 7.4 7.4    Albumin 3.4 - 5.0 g/dL 3.6 3.5 3.9 3.6 3.6 3.6 3.7    Alkaline Phosphatase 40 - 150 U/L 41 38(L) 50 44 46 48 50    AST 0 - 45 U/L 18 13 19 32 18 17 20    ALT 0 - 50 U/L 32 24 29 34 26 21 28    MCV 78 - 100 fl 100 100 101(H) 100 100 99 98               Primary Care Provider Office Phone # Fax #    Lui Hills [phone]       [address] [place] [address]  [facility] [address]        Equal Access to Services     [facility] LYLE : Hadii aad ku luiz Hairston, waaxda luqlingha, qaybta kaalmada jesse nazarioDonaldaachula ah. So [facility] [phone].    ATENCIÓN: Si habla español, tiene a franks disposición servicios gratuitos de asistencia lingüística. Llame al [phone].    We comply with applicable federal civil rights laws and [place] laws. We do not discriminate on the basis of race, color, national origin, age, disability, sex, sexual orientation, or gender identity.            Thank you!     Thank you for choosing [facility] BLOOD [facility] MARROW TRANSPLANT  for your care. Our goal is always to provide you with excellent care. Hearing back from our patients is one way we can continue to improve our services. Please take a few minutes to complete the written survey that you may  receive in the mail after your visit with us. Thank you!             Your Updated Medication List - Protect others around you: Learn how to safely use, store and throw away your medicines at www.disposemymeds.org.          This list is accurate as of 8/17/18  8:26 AM.  Always use your most recent med list.                   Brand Name Dispense Instructions for use Diagnosis    amoxicillin-clavulanate 875-125 MG per tablet    AUGMENTIN    20 tablet    Take 1 tablet by mouth 2 times daily    Sinusitis       CALCIUM-VITAMIN D-VITAMIN K PO      Take 2 tablets by mouth daily.        cholecalciferol 5000 units Caps capsule    vitamin D3    90 capsule    Take 1 capsule (5,000 Units) by mouth daily    Osteoporosis       clobetasol 0.05 % ointment    TEMOVATE    45 g    Apply topically 2 times daily Apply to dermatitic plaques on hands 2 times per day for 2-3 days/week during flares.    Dermatitis       COENZYME Q-10 PO      Take 30 mg by mouth daily        desonide 0.05 % ointment    DESOWEN    60 g    Use twice daily on the face as needed.    Dermatitis       magnesium 100 MG Caps      Take 117 mg by mouth 3 times daily        MULTIVITAMIN PO      Take 1 tablet by mouth 2 times daily.        * order for DME     6 Device    6 mastectomy bras  Length of need: 99 months    Malignant neoplasm of female breast, unspecified laterality, unspecified site of breast       * order for DME     1 Device    R mastectomy prosthesis   Length of need:  99 months    Malignant neoplasm of female breast, unspecified laterality, unspecified site of breast       pomalidomide 1 MG capsule    POMALYST    29 capsule    Take 2 mg alternating with 1 mg daily. Take on days 1-18 days.  auth  :[id number]    Multiple myeloma, remission status unspecified (H)       saccharomyces boulardii 250 MG capsule    FLORASTOR     Take 250 mg by mouth 2 times daily        triamcinolone 0.1 % cream    KENALOG    80 g    Apply topically 2 times daily    Dermatitis        ZINC PO      Take by mouth daily        * Notice:  This list has 2 medication(s) that are the same as other medications prescribed for you. Read the directions carefully, and ask your doctor or other care provider to review them with you.

## 2018-08-17 NOTE — NURSING NOTE
"Oncology Rooming Note    August 17, 2018 7:37 AM   Shena Hartmann is a 67 year old female who presents for:    No chief complaint on file.    Initial Vitals: /76  Pulse 76  Temp 97.1  F (36.2  C)  Wt 57.3 kg (126 lb 6.4 oz)  SpO2 97%  BMI 23.12 kg/m2 Estimated body mass index is 23.12 kg/(m^2) as calculated from the following:    Height as of 9/15/17: 1.575 m (5' 2\").    Weight as of this encounter: 57.3 kg (126 lb 6.4 oz). Body surface area is 1.58 meters squared.  Data Unavailable Comment: usual aches and pains   No LMP recorded. Patient is postmenopausal.  Allergies reviewed: Yes  Medications reviewed: Yes    Medications: Medication refills not needed today.  Pharmacy name entered into [employer]:    [facility] - [place], [place] - [address]  [id number] - [place], [place] - [address] AT [facility] [address]  WRITTEN PRESCRIPTION REQUESTED  [id number] - [place], [place] - [address] AT [facility]. & [address].    Clinical concerns: Pt is having more headaches and pressure in her head, than normal.       6 minutes for nursing intake (face to face time)     Dacia Lei MA                "

## 2018-08-17 NOTE — PROGRESS NOTES
Shena was seen and examined by me today.  She is a 67-year-old woman who is now 4 years after autologous transplantation for an aggressive multiple myeloma.  I had followed her for a number of years without therapy as her myeloma was smoldering.  When she progressed, she did so aggressively.  Her M spike increased very shortly after transplant and since that time she has been on pomalidomide at 2 mg alternating with 1 mg 18 day cycle.  Overall, Shena is doing well.  She does continue to have Pomalyst side effects including headache, loose stools, and decreased energy with some skin changes.  Overall, these are manageable.  She has no signs or symptoms of infection.  She is no bleeding or signs of thrombosis.  Her energy level is fair.  She looks quite good today.  She is traveling in keeping busy.  Her performance status is 90%.  She works out frequently.    Physical exam overall Shena looks well and is in no distress today.  Her vital signs are normal.  HEENT exam is unremarkable.  There are no oral lesions.  Pupils are equal round and reactive to light.  There is no cervical, axillary or inguinal adenopathy.  Abdominal exam is benign.  Lower extremities without edema.  There are no skin rashes.  Lungs are clear to auscultation percussion.    Laboratory evaluation: Electrolytes are normal with a BUN of 16 and a creatinine of 0.87.  CBC shows a hemoglobin of 13.8, platelet count of 150,000 and a white count of 3500 with 1400 absolute neutrophils.  Serum protein electrophoresis is remarkably at its lowest at 0.7 g.    My only concern today is that she has mild neck and lower back pain.  This is not surprising since in 2017 and MRI showed stable disease but with significant arthritic changes.  Given that she is having mild side effects from the current therapy we talked about the possibility of decreasing her cycle from 18 days to 14 days.  I would feel more comfortable doing this having another evaluation of her  multiple myeloma other than her as SPEP.  We will order an MRI of the cervical and lumbar spine to be done sometime over the next 2 months.  If these do not show any progression then I would feel comfortable going down to 14 day cycles.  Shena is in agreement with this plan.  We discussed that we could always increase the dose but sometimes progressions are heard to predict or rescue.  I will see her again in February.  If we decrease her pomalidomide dose further we should check as perhaps yearly in 3 month intervals.  All of her questions have been answered today.    Lui Hills MD

## 2018-09-07 DIAGNOSIS — C90.00 MULTIPLE MYELOMA, REMISSION STATUS UNSPECIFIED (H): ICD-10-CM

## 2018-10-02 ENCOUNTER — RADIANT APPOINTMENT (OUTPATIENT)
Dept: MRI IMAGING | Facility: CLINIC | Age: 68
End: 2018-10-02
Payer: COMMERCIAL

## 2018-10-02 DIAGNOSIS — C90.01 MULTIPLE MYELOMA IN REMISSION (H): ICD-10-CM

## 2018-10-02 RX ORDER — GADOBUTROL 604.72 MG/ML
7.5 INJECTION INTRAVENOUS ONCE
Status: COMPLETED | OUTPATIENT
Start: 2018-10-02 | End: 2018-10-02

## 2018-10-02 RX ADMIN — GADOBUTROL 6 ML: 604.72 INJECTION INTRAVENOUS at 07:33

## 2018-10-02 NOTE — DISCHARGE INSTRUCTIONS
MRI Contrast Discharge Instructions    The IV contrast you received today will pass out of your body in your  urine. This will happen in the next 24 hours. You will not feel this process.  Your urine will not change color.    Drink at least 4 extra glasses of water or juice today (unless your doctor  has restricted your fluids). This reduces the stress on your kidneys.  You may take your regular medicines.    If you are on dialysis: It is best to have dialysis today.    If you have a reaction: Most reactions happen right away. If you have  any new symptoms after leaving the hospital (such as hives or swelling),  call your hospital at the correct number below. Or call your family doctor.  If you have breathing distress or wheezing, call 911.    Special instructions: ***    I have read and understand the above information.    Signature:______________________________________ Date:___________    Staff:__________________________________________ Date:___________     Time:__________    [facility] Departments:    ___[place]: [phone]  ___[facility]: [phone]  ___[place]: [phone] ___[facility]: [phone]  ___[facility]: [phone]  ___[facility]: [phone]  ___[phone]  ___[facility]: [phone]  ___[phone]

## 2018-10-04 ENCOUNTER — TELEPHONE (OUTPATIENT)
Dept: TRANSPLANT | Facility: CLINIC | Age: 68
End: 2018-10-04

## 2018-10-04 NOTE — TELEPHONE ENCOUNTER
[facility] Prior Authorization Team   Phone: [phone]  Fax: [phone]    PA Initiation    Medication: Pomalyst  Insurance Company: [employer] - Phone [phone] Fax [phone]  [facility] the [facility] - [place], [place] - [facility] [address]  Filling Pharmacy Phone: [phone]  Filling Pharmacy Fax: [phone]  Start Date: 10/4/2018

## 2018-10-04 NOTE — TELEPHONE ENCOUNTER
Prior Authorization Approval    Authorization Effective Date: 11/4/2018  Authorization Expiration Date: 11/4/2019  Medication: Pomalyst  Approved Dose/Quantity: 21 caps per 28 days  Reference #: [id number]   Insurance Company: SOHM - Phone [phone] Fax [phone]  Expected CoPay: $75.00     CoPay Card Available: No   Foundation Assistance Needed: N/A  Which Pharmacy is filling the prescription (Not needed for infusion/clinic administered): [facility] - [place] [address]  Pharmacy Notified: Yes  Patient Notified: No - Comment:  Proactive PA Request

## 2018-10-08 DIAGNOSIS — C90.00 MULTIPLE MYELOMA, REMISSION STATUS UNSPECIFIED (H): ICD-10-CM

## 2018-10-16 DIAGNOSIS — C90.00 MULTIPLE MYELOMA, REMISSION STATUS UNSPECIFIED (H): Primary | ICD-10-CM

## 2018-10-17 ENCOUNTER — RADIANT APPOINTMENT (OUTPATIENT)
Dept: GENERAL RADIOLOGY | Facility: CLINIC | Age: 68
End: 2018-10-17
Attending: INTERNAL MEDICINE
Payer: COMMERCIAL

## 2018-10-17 DIAGNOSIS — C90.00 MULTIPLE MYELOMA, REMISSION STATUS UNSPECIFIED (H): ICD-10-CM

## 2018-11-05 DIAGNOSIS — C90.00 MULTIPLE MYELOMA, REMISSION STATUS UNSPECIFIED (H): ICD-10-CM

## 2018-11-12 DIAGNOSIS — C90.00 MULTIPLE MYELOMA, REMISSION STATUS UNSPECIFIED (H): Primary | ICD-10-CM

## 2018-11-12 RX ORDER — HYDROCODONE BITARTRATE AND ACETAMINOPHEN 5; 325 MG/1; MG/1
1 TABLET ORAL EVERY 6 HOURS PRN
Qty: 25 TABLET | Refills: 0 | Status: SHIPPED | OUTPATIENT
Start: 2018-11-12 | End: 2019-03-04

## 2018-11-27 DIAGNOSIS — C90.00 MULTIPLE MYELOMA, REMISSION STATUS UNSPECIFIED (H): ICD-10-CM

## 2018-11-27 DIAGNOSIS — C90.00 MULTIPLE MYELOMA NOT HAVING ACHIEVED REMISSION (H): ICD-10-CM

## 2018-11-27 LAB
ALBUMIN SERPL-MCNC: 3.9 G/DL (ref 3.4–5)
ALP SERPL-CCNC: 52 U/L (ref 40–150)
ALT SERPL W P-5'-P-CCNC: 29 U/L (ref 0–50)
ANION GAP SERPL CALCULATED.3IONS-SCNC: 8 MMOL/L (ref 3–14)
AST SERPL W P-5'-P-CCNC: 18 U/L (ref 0–45)
B2 MICROGLOB SERPL-MCNC: 2.1 MG/L
BASOPHILS # BLD AUTO: 0 10E9/L (ref 0–0.2)
BASOPHILS NFR BLD AUTO: 0.8 %
BILIRUB SERPL-MCNC: 0.4 MG/DL (ref 0.2–1.3)
BUN SERPL-MCNC: 18 MG/DL (ref 7–30)
CALCIUM SERPL-MCNC: 8.8 MG/DL (ref 8.5–10.1)
CHLORIDE SERPL-SCNC: 102 MMOL/L (ref 94–109)
CO2 SERPL-SCNC: 27 MMOL/L (ref 20–32)
CREAT SERPL-MCNC: 0.74 MG/DL (ref 0.52–1.04)
DIFFERENTIAL METHOD BLD: ABNORMAL
EOSINOPHIL # BLD AUTO: 0.2 10E9/L (ref 0–0.7)
EOSINOPHIL NFR BLD AUTO: 4.5 %
ERYTHROCYTE [DISTWIDTH] IN BLOOD BY AUTOMATED COUNT: 15.4 % (ref 10–15)
GFR SERPL CREATININE-BSD FRML MDRD: 78 ML/MIN/1.7M2
GLUCOSE SERPL-MCNC: 117 MG/DL (ref 70–99)
HCT VFR BLD AUTO: 40.4 % (ref 35–47)
HGB BLD-MCNC: 13.7 G/DL (ref 11.7–15.7)
IMM GRANULOCYTES # BLD: 0 10E9/L (ref 0–0.4)
IMM GRANULOCYTES NFR BLD: 0.3 %
LYMPHOCYTES # BLD AUTO: 1.5 10E9/L (ref 0.8–5.3)
LYMPHOCYTES NFR BLD AUTO: 42.9 %
MCH RBC QN AUTO: 33.8 PG (ref 26.5–33)
MCHC RBC AUTO-ENTMCNC: 33.9 G/DL (ref 31.5–36.5)
MCV RBC AUTO: 100 FL (ref 78–100)
MONOCYTES # BLD AUTO: 0.7 10E9/L (ref 0–1.3)
MONOCYTES NFR BLD AUTO: 19.3 %
NEUTROPHILS # BLD AUTO: 1.2 10E9/L (ref 1.6–8.3)
NEUTROPHILS NFR BLD AUTO: 32.2 %
NRBC # BLD AUTO: 0 10*3/UL
NRBC BLD AUTO-RTO: 0 /100
PLATELET # BLD AUTO: 146 10E9/L (ref 150–450)
POTASSIUM SERPL-SCNC: 3.9 MMOL/L (ref 3.4–5.3)
PROT SERPL-MCNC: 7.6 G/DL (ref 6.8–8.8)
RBC # BLD AUTO: 4.05 10E12/L (ref 3.8–5.2)
SODIUM SERPL-SCNC: 137 MMOL/L (ref 133–144)
WBC # BLD AUTO: 3.6 10E9/L (ref 4–11)

## 2018-11-27 PROCEDURE — 82232 ASSAY OF BETA-2 PROTEIN: CPT | Performed by: INTERNAL MEDICINE

## 2018-11-27 PROCEDURE — 84165 PROTEIN E-PHORESIS SERUM: CPT | Performed by: INTERNAL MEDICINE

## 2018-11-27 PROCEDURE — 36415 COLL VENOUS BLD VENIPUNCTURE: CPT

## 2018-11-27 PROCEDURE — 85025 COMPLETE CBC W/AUTO DIFF WBC: CPT | Performed by: INTERNAL MEDICINE

## 2018-11-27 PROCEDURE — 00000402 ZZHCL STATISTIC TOTAL PROTEIN: Performed by: INTERNAL MEDICINE

## 2018-11-27 PROCEDURE — 80053 COMPREHEN METABOLIC PANEL: CPT | Performed by: INTERNAL MEDICINE

## 2018-11-27 NOTE — NURSING NOTE
Chief Complaint   Patient presents with     Blood Draw     venipuncture done by TYLOR Cabello CMA on 11/27/2018 at 8:05 AM

## 2018-11-29 LAB
ALBUMIN SERPL ELPH-MCNC: 4.6 G/DL (ref 3.7–5.1)
ALPHA1 GLOB SERPL ELPH-MCNC: 0.3 G/DL (ref 0.2–0.4)
ALPHA2 GLOB SERPL ELPH-MCNC: 0.7 G/DL (ref 0.5–0.9)
B-GLOBULIN SERPL ELPH-MCNC: 0.6 G/DL (ref 0.6–1)
GAMMA GLOB SERPL ELPH-MCNC: 1.2 G/DL (ref 0.7–1.6)
M PROTEIN SERPL ELPH-MCNC: 0.7 G/DL
PROT PATTERN SERPL ELPH-IMP: ABNORMAL

## 2018-11-30 DIAGNOSIS — M81.0 OSTEOPOROSIS: ICD-10-CM

## 2018-11-30 DIAGNOSIS — C90.00 MULTIPLE MYELOMA (H): Primary | ICD-10-CM

## 2018-12-06 ENCOUNTER — HOSPITAL ENCOUNTER (OUTPATIENT)
Dept: MRI IMAGING | Facility: CLINIC | Age: 68
Discharge: HOME | End: 2018-12-06
Attending: INTERNAL MEDICINE | Admitting: INTERNAL MEDICINE
Payer: COMMERCIAL

## 2018-12-06 DIAGNOSIS — M81.0 OSTEOPOROSIS: ICD-10-CM

## 2018-12-06 DIAGNOSIS — C90.00 MULTIPLE MYELOMA (H): ICD-10-CM

## 2018-12-06 PROCEDURE — 25500064 ZZH RX 255 OP 636: Performed by: INTERNAL MEDICINE

## 2018-12-06 PROCEDURE — 73723 MRI JOINT LWR EXTR W/O&W/DYE: CPT | Mod: RT

## 2018-12-06 PROCEDURE — A9585 GADOBUTROL INJECTION: HCPCS | Performed by: INTERNAL MEDICINE

## 2018-12-06 RX ORDER — GADOBUTROL 604.72 MG/ML
7.5 INJECTION INTRAVENOUS ONCE
Status: COMPLETED | OUTPATIENT
Start: 2018-12-06 | End: 2018-12-06

## 2018-12-06 RX ADMIN — GADOBUTROL 5.2 ML: 604.72 INJECTION INTRAVENOUS at 16:31

## 2018-12-11 DIAGNOSIS — C90.00 MULTIPLE MYELOMA, REMISSION STATUS UNSPECIFIED (H): Primary | ICD-10-CM

## 2018-12-13 NOTE — TELEPHONE ENCOUNTER
FUTURE VISIT INFORMATION      FUTURE VISIT INFORMATION:    Date: 12/14/18    Time: 1130    Location: [facility]TH[facility]  REFERRAL INFORMATION:    Referring provider:  DR STARR    Referring providers clinic:  BMT    Reason for visit/diagnosis  R HIP    RECORDS REQUESTED FROM:       Clinic name Comments Records Status Imaging Status                                         RECORDS IN CHART

## 2018-12-14 ENCOUNTER — PRE VISIT (OUTPATIENT)
Dept: ORTHOPEDICS | Facility: CLINIC | Age: 68
End: 2018-12-14

## 2018-12-14 ENCOUNTER — APPOINTMENT (OUTPATIENT)
Dept: LAB | Facility: CLINIC | Age: 68
End: 2018-12-14
Attending: NURSE PRACTITIONER
Payer: COMMERCIAL

## 2018-12-14 ENCOUNTER — ANCILLARY PROCEDURE (OUTPATIENT)
Dept: GENERAL RADIOLOGY | Facility: CLINIC | Age: 68
End: 2018-12-14
Attending: NURSE PRACTITIONER
Payer: COMMERCIAL

## 2018-12-14 ENCOUNTER — ONCOLOGY VISIT (OUTPATIENT)
Dept: TRANSPLANT | Facility: CLINIC | Age: 68
End: 2018-12-14
Attending: NURSE PRACTITIONER
Payer: COMMERCIAL

## 2018-12-14 VITALS
HEART RATE: 109 BPM | SYSTOLIC BLOOD PRESSURE: 161 MMHG | BODY MASS INDEX: 22.3 KG/M2 | OXYGEN SATURATION: 96 % | DIASTOLIC BLOOD PRESSURE: 85 MMHG | WEIGHT: 121.9 LBS | RESPIRATION RATE: 18 BRPM | TEMPERATURE: 99.8 F

## 2018-12-14 DIAGNOSIS — C90.01 MULTIPLE MYELOMA IN REMISSION (H): Primary | ICD-10-CM

## 2018-12-14 DIAGNOSIS — C90.00 MULTIPLE MYELOMA NOT HAVING ACHIEVED REMISSION (H): ICD-10-CM

## 2018-12-14 LAB
ANION GAP SERPL CALCULATED.3IONS-SCNC: 12 MMOL/L (ref 3–14)
BASOPHILS # BLD AUTO: 0 10E9/L (ref 0–0.2)
BASOPHILS NFR BLD AUTO: 0.2 %
BUN SERPL-MCNC: 8 MG/DL (ref 7–30)
CALCIUM SERPL-MCNC: 9.1 MG/DL (ref 8.5–10.1)
CHLORIDE SERPL-SCNC: 94 MMOL/L (ref 94–109)
CO2 SERPL-SCNC: 24 MMOL/L (ref 20–32)
CREAT SERPL-MCNC: 0.64 MG/DL (ref 0.52–1.04)
DIFFERENTIAL METHOD BLD: ABNORMAL
EOSINOPHIL # BLD AUTO: 0 10E9/L (ref 0–0.7)
EOSINOPHIL NFR BLD AUTO: 0.4 %
ERYTHROCYTE [DISTWIDTH] IN BLOOD BY AUTOMATED COUNT: 14.7 % (ref 10–15)
FLUAV+FLUBV AG SPEC QL: NEGATIVE
FLUAV+FLUBV AG SPEC QL: NEGATIVE
GFR SERPL CREATININE-BSD FRML MDRD: >90 ML/MIN/1.7M2
GLUCOSE SERPL-MCNC: 180 MG/DL (ref 70–99)
GRAM STN SPEC: NORMAL
HCT VFR BLD AUTO: 38.2 % (ref 35–47)
HGB BLD-MCNC: 13.2 G/DL (ref 11.7–15.7)
IMM GRANULOCYTES # BLD: 0 10E9/L (ref 0–0.4)
IMM GRANULOCYTES NFR BLD: 0.4 %
LYMPHOCYTES # BLD AUTO: 0.5 10E9/L (ref 0.8–5.3)
LYMPHOCYTES NFR BLD AUTO: 8.4 %
Lab: NORMAL
MCH RBC QN AUTO: 33.4 PG (ref 26.5–33)
MCHC RBC AUTO-ENTMCNC: 34.6 G/DL (ref 31.5–36.5)
MCV RBC AUTO: 97 FL (ref 78–100)
MONOCYTES # BLD AUTO: 0.2 10E9/L (ref 0–1.3)
MONOCYTES NFR BLD AUTO: 3.6 %
NEUTROPHILS # BLD AUTO: 4.8 10E9/L (ref 1.6–8.3)
NEUTROPHILS NFR BLD AUTO: 87 %
NRBC # BLD AUTO: 0 10*3/UL
NRBC BLD AUTO-RTO: 0 /100
PLATELET # BLD AUTO: 221 10E9/L (ref 150–450)
POTASSIUM SERPL-SCNC: 3.6 MMOL/L (ref 3.4–5.3)
RADIOLOGIST FLAGS: NORMAL
RBC # BLD AUTO: 3.95 10E12/L (ref 3.8–5.2)
RSV AG SPEC QL: NEGATIVE
SODIUM SERPL-SCNC: 129 MMOL/L (ref 133–144)
SPECIMEN SOURCE: NORMAL
WBC # BLD AUTO: 5.5 10E9/L (ref 4–11)

## 2018-12-14 PROCEDURE — 85025 COMPLETE CBC W/AUTO DIFF WBC: CPT | Performed by: NURSE PRACTITIONER

## 2018-12-14 PROCEDURE — 00000402 ZZHCL STATISTIC TOTAL PROTEIN: Performed by: NURSE PRACTITIONER

## 2018-12-14 PROCEDURE — 36415 COLL VENOUS BLD VENIPUNCTURE: CPT

## 2018-12-14 PROCEDURE — 84165 PROTEIN E-PHORESIS SERUM: CPT | Performed by: NURSE PRACTITIONER

## 2018-12-14 PROCEDURE — 87804 INFLUENZA ASSAY W/OPTIC: CPT | Performed by: NURSE PRACTITIONER

## 2018-12-14 PROCEDURE — 82232 ASSAY OF BETA-2 PROTEIN: CPT | Performed by: NURSE PRACTITIONER

## 2018-12-14 PROCEDURE — 87205 SMEAR GRAM STAIN: CPT | Performed by: NURSE PRACTITIONER

## 2018-12-14 PROCEDURE — 87077 CULTURE AEROBIC IDENTIFY: CPT | Performed by: NURSE PRACTITIONER

## 2018-12-14 PROCEDURE — G0463 HOSPITAL OUTPT CLINIC VISIT: HCPCS | Mod: ZF

## 2018-12-14 PROCEDURE — 80048 BASIC METABOLIC PNL TOTAL CA: CPT | Performed by: NURSE PRACTITIONER

## 2018-12-14 PROCEDURE — 87070 CULTURE OTHR SPECIMN AEROBIC: CPT | Performed by: NURSE PRACTITIONER

## 2018-12-14 PROCEDURE — 87807 RSV ASSAY W/OPTIC: CPT | Performed by: NURSE PRACTITIONER

## 2018-12-14 ASSESSMENT — PAIN SCALES - GENERAL: PAINLEVEL: EXTREME PAIN (8)

## 2018-12-14 NOTE — NURSING NOTE
Chief Complaint   Patient presents with     Blood Draw     Labs drawn via  by RN in lab. VS taken. T 99.8.      Philly Scott RN

## 2018-12-14 NOTE — NURSING NOTE
"Oncology Rooming Note    December 14, 2018 11:47 AM   Shena Hartmann is a 68 year old female who presents for:    Chief Complaint   Patient presents with     Blood Draw     Labs drawn via  by RN in lab. VS taken. T 99.8.      Initial Vitals: /85 (BP Location: Right arm, Patient Position: Sitting, Cuff Size: Adult Regular)   Pulse 109   Temp 99.8  F (37.7  C) (Oral)   Resp 18   Wt 55.3 kg (121 lb 14.4 oz)   SpO2 96%   BMI 22.30 kg/m   Estimated body mass index is 22.3 kg/m  as calculated from the following:    Height as of 9/15/17: 1.575 m (5' 2\").    Weight as of this encounter: 55.3 kg (121 lb 14.4 oz). Body surface area is 1.56 meters squared.  Extreme Pain (8) Comment: Data Unavailable   No LMP recorded. Patient is postmenopausal.  Allergies reviewed: Yes  Medications reviewed: Yes    Medications: Medication refills not needed today.  Pharmacy name entered into [employer]cer:    [facility] - [place], [place] - [address]   [id number] - [place], [place] - [address] AT [facility] [address]  WRITTEN PRESCRIPTION REQUESTED  [id number] - [place], [place] - [address] [facility] [facility] AT [facility]. & [address].    Clinical concerns: Yes patient has a cough, plugged ears, fever, chills, and sweats. States that this started Tuesday and is worsening.     8 minutes for nursing intake (face to face time)     Lizzy Morales CMA              "

## 2018-12-14 NOTE — PROGRESS NOTES
BMT Clinic Note     Ms. Hartmann is a 67yo female, 4yr s/p auto PBSCT for MM.      HPI:  Shena is an add on today.  She developed a sore throat on Monday.  By Tuesday she had chills & a temp to 101.3 with sinus congestion & cough.  C/O ear congestion.  Review of Systems: 10 point ROS negative except as noted above.    Physical Exam:   Blood pressure 161/85, pulse 109, temperature 99.8  F (37.7  C), temperature source Oral, resp. rate 18, weight 55.3 kg (121 lb 14.4 oz), SpO2 96 %, not currently breastfeeding.  General: NAD   Eyes: : KYAW, sclera anicteric   Nose/Mouth/Throat: OP clear, buccal mucosa moist, no ulcerations.  L TM injected  Lungs: CTA bilaterally  Cardiovascular: RRR, no M/R/G   Abdominal/Rectal: +BS, soft, NT, ND  Lymphatics: no edema  Skin: no rashes or petechaie  Neuro: A&O   Labs:  Lab Results   Component Value Date    WBC 5.5 12/14/2018    ANEU 4.8 12/14/2018    HGB 13.2 12/14/2018    HCT 38.2 12/14/2018     12/14/2018     (L) 12/14/2018    POTASSIUM 3.6 12/14/2018    CHLORIDE 94 12/14/2018    CO2 24 12/14/2018     (H) 12/14/2018    BUN 8 12/14/2018    CR 0.64 12/14/2018    MAG 1.8 12/12/2014    INR 1.14 03/31/2014     Assessment and Plan: Ms. Hartmann is a 67yo female, 4yr s/p auto PBSCT for MM.   Rapid RSV, Infl A & B negative.  RVP & sputum pending.  CXR shows mild COPD with faint RML opacity.  Pt was previously given a prescription for Augmentin that she never started.  Instructed her to start the Augmentin 1 tab BID.      Instructed to try Afrin nasal spray BID x3 days    Mari Blanco

## 2018-12-14 NOTE — NURSING NOTE
Nasal swabs were done in both nostrils, patient tolerated well with no complications. Samples sent down to lab.    Lizzy Morales MA

## 2018-12-16 ASSESSMENT — ENCOUNTER SYMPTOMS
EYE WATERING: 0
NECK PAIN: 0
EYE REDNESS: 0
MYALGIAS: 1
EYE PAIN: 0
MUSCLE WEAKNESS: 0
TROUBLE SWALLOWING: 0
ARTHRALGIAS: 1
DOUBLE VISION: 0
BACK PAIN: 1
EYE IRRITATION: 0
SINUS CONGESTION: 1
JOINT SWELLING: 0
SMELL DISTURBANCE: 0
STIFFNESS: 1
SORE THROAT: 1
NECK MASS: 0
MUSCLE CRAMPS: 1
SINUS PAIN: 1
TASTE DISTURBANCE: 0
HOARSE VOICE: 0

## 2018-12-17 LAB
ALBUMIN SERPL ELPH-MCNC: 4.3 G/DL (ref 3.7–5.1)
ALPHA1 GLOB SERPL ELPH-MCNC: 0.4 G/DL (ref 0.2–0.4)
ALPHA2 GLOB SERPL ELPH-MCNC: 0.8 G/DL (ref 0.5–0.9)
B-GLOBULIN SERPL ELPH-MCNC: 0.6 G/DL (ref 0.6–1)
B2 MICROGLOB SERPL-MCNC: 2.1 MG/L
BACTERIA SPEC CULT: ABNORMAL
BACTERIA SPEC CULT: ABNORMAL
GAMMA GLOB SERPL ELPH-MCNC: 1.2 G/DL (ref 0.7–1.6)
M PROTEIN SERPL ELPH-MCNC: 0.8 G/DL
PROT PATTERN SERPL ELPH-IMP: ABNORMAL
SPECIMEN SOURCE: ABNORMAL

## 2018-12-18 DIAGNOSIS — C90.00 MULTIPLE MYELOMA, REMISSION STATUS UNSPECIFIED (H): ICD-10-CM

## 2018-12-19 ENCOUNTER — TELEPHONE (OUTPATIENT)
Dept: TRANSPLANT | Facility: CLINIC | Age: 68
End: 2018-12-19

## 2018-12-19 ENCOUNTER — OFFICE VISIT (OUTPATIENT)
Dept: ORTHOPEDICS | Facility: CLINIC | Age: 68
End: 2018-12-19
Payer: COMMERCIAL

## 2018-12-19 VITALS — BODY MASS INDEX: 22.13 KG/M2 | WEIGHT: 121 LBS

## 2018-12-19 DIAGNOSIS — M25.551 HIP PAIN, RIGHT: Primary | ICD-10-CM

## 2018-12-19 NOTE — TELEPHONE ENCOUNTER
Talked with Shena.  She had a sputum cx on 12/14 with light growth of beta hemolytic strep.  She was started on Augmentin BID starting on that day & will complete a 10 day course.  Her fevers have resolved & congestion & cough are improved.  Today she is feeling a little better.      Mari

## 2018-12-19 NOTE — PROGRESS NOTES
Referring provider: Lui Hills MD    CC: L hip soreness    HPI: 68F, with history of MM (s/p BMT, undergoing chemotherapy),  L hip, groin, and thigh pain that began in September 2018. Began spontaneously with no preceding injury. First she had spasms in her lower back, which progressed to her gluteal region, and her iliopsoas. Then she developed an excruciating sharp shooting pain that radiated from the anterior thigh, the lateral side of the knee, and into her lateral proximal calf. This radicular pain is now resolved, but she has persisting anterior groin and lateral thigh pain. Interventions so far that have been helpful: massage therapy, , Tylenol, heat. On active chemotherapy for multiple myeloma. Exacerbated by sitting or resisted hip ROM with a Theraband. Improved by standing or lying down flat. No new paresthesias or motor weakness. Prevented her from being as active and work out. Currently recovering from beta-hemolytic strep sinus infection. Has undergone a lumbar spine MRI but not seen a spine specialist.    PMH:  Past Medical History:   Diagnosis Date     Breast cancer (H) 1994    right     H/O stem cell transplant (H) 3/2014     Multiple myeloma, without mention of having achieved remission 11/6/2012     PSH:  Past Surgical History:   Procedure Laterality Date     MASTECTOMY      right     PICC INSERTION  9/10/2013    5fr DL Power PICC, 44cm (1cm external) in the L lateral brachial vein with tip in the low SVC     Meds:  Current Outpatient Medications   Medication     amoxicillin-clavulanate (AUGMENTIN) 875-125 MG per tablet     CALCIUM-VITAMIN D-VITAMIN K PO     cholecalciferol (VITAMIN D3) 5000 UNITS CAPS capsule     clobetasol (TEMOVATE) 0.05 % ointment     COENZYME Q-10 PO     desonide (DESOWEN) 0.05 % ointment     HYDROcodone-acetaminophen (NORCO) 5-325 MG per tablet     magnesium 100 MG CAPS     Multiple Vitamins-Minerals (MULTIVITAMIN OR)     Multiple Vitamins-Minerals (ZINC  PO)     order for DME     order for DME     pomalidomide (POMALYST) 1 MG capsule     saccharomyces boulardii (FLORASTOR) 250 MG capsule     triamcinolone (KENALOG) 0.1 % cream     Allergies:   Allergies   Allergen Reactions     Cayenne Pepper [Cayenne]      Corn-Related Products GI Disturbance     Levaquin Other (See Comments)     Tendon pain     Milk Protein GI Disturbance     Mold      Facial rash/lip swelling     Morphine Sulfate Other (See Comments)     Per pt - see things (hallucination) when she was on Morphine .     Pollen Extract      Environmental allergies      Shrimp Nausea and Vomiting     Tomato      Lip swelling, facial rash     Azithromycin Rash     Keflex [Cephalexin] Rash     Oat Rash     Tape [Adhesive Tape] Itching and Rash     All adhesives     Wheat Rash     Swelling of lips     SH: Lives in [place] with . 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 Nonsmoker, no drugs, no ETOH.     ROS: Noncontributory except as stated above.    P/Ex:  General: Well-appearing 68-year-old female.  No apparent distress.  Alert and oriented appropriately.  Very pleasant.  HEENT: Atraumatic, normocephalic.  Respiratory: Breathing unlabored on room air.  Cardiovascular: Extremities warm and well-perfused throughout.  MSK: Focused exam of the right hip and thigh shows no joint effusion or any areas of swelling.  Very tender to palpation at the psoas tendon.  Mildly tender along the greater trochanter.  Also somewhat tender at the piriformis muscle near the initial tuberosity.  Otherwise, no tenderness on palpation in the remainder of the thigh, knee, or lower leg.  Performs an active straight leg raise to 75 degrees with no pain.  5/5 strength with resisted hip flexion, knee flexion/extension, ankle dorsiflexion/plantar flexion/eversion, and EHL/FHL.  Sensation intact light touch in the L3-S1 nerve distributions.  1+ reflexes at patella and Achilles.  Negative passive straight leg raise.  Negative  FADIR test. Hip passive motion is full and painless, flexion 100, internal rotation 25, external rotation 60.  Gait is normal and not antalgic.  Climbs unassisted onto the exam table.  Normal standing alignment of the spine.  No tenderness on palpation of the cervical, thoracic, or lumbar vertebrae, although she is somewhat tender in the right lumbar paraspinal muscles.    IMAGING:  MRI right hip on 12/6/2018: Mild degenerative changes with small anterior and superior labral tilt tears and subchondral cyst.  No bony abnormalities.  Mild amount of fluid with high T2 signal around the insertion of the short external rotators on the greater trochanter.  Tendons and ligaments are intact.    Bilateral hip x-rays on 10/17/2018: Mild degenerative changes in both hips.  No lytic lesions.  No acute osseous abnormalities.    MRI cervical, thoracic, and lumbar spine on 10/2/2018: Mild to moderate multilevel cervical spondylosis, with moderate right and left neuroforaminal stenosis at C3-4.  Multilevel thoracic and lumbar spondylosis with no significant neural foraminal or canal narrowing.    ASSESSMENT: Right hip iliopsoas tendinitis    PLAN: We discussed with the patient that her symptoms seem more consistent with muscular pain, psoas tendinitis, and mild greater trochanter bursitis. Her pain has gradually improved from September with conservative management, and we are optimistic that it will continue doing so. She has no new neurological deficits or manifestation of radicular pain. We recommend topical Voltaren gel for symptomatic relief on her upcoming plane flight and continuing PT. She is agreeable with this plan. Follow up PRN.    Seen and discussed with Dr. Quick, who agrees with above plan and findings.    Fawn Valdez [facility]  Orthopaedic PGY4  Pager: [phone]        Answers for HPI/ROS submitted by the patient on 12/16/2018   General Symptoms: No  Skin Symptoms: No  HENT Symptoms: Yes  EYE SYMPTOMS:  Yes  HEART SYMPTOMS: No  LUNG SYMPTOMS: No  INTESTINAL SYMPTOMS: No  URINARY SYMPTOMS: No  GYNECOLOGIC SYMPTOMS: No  BREAST SYMPTOMS: No  SKELETAL SYMPTOMS: Yes  BLOOD SYMPTOMS: No  NERVOUS SYSTEM SYMPTOMS: No  MENTAL HEALTH SYMPTOMS: No  Ear pain: No  Ear discharge: No  Hearing loss: No  Tinnitus: No  Nosebleeds: No  Congestion: Yes  Sinus pain: Yes  Trouble swallowing: No   Voice hoarseness: No  Mouth sores: No  Sore throat: Yes  Tooth pain: No  Gum tenderness: No  Bleeding gums: No  Change in taste: No  Change in sense of smell: No  Dry mouth: No  Hearing aid used: No  Neck lump: No  Eye pain: No  Vision loss: Yes  Dry eyes: No  Watery eyes: No  Eye bulging: No  Double vision: No  Flashing of lights: No  Spots: No  Floaters: No  Redness: No  Crossed eyes: No  Tunnel Vision: No  Yellowing of eyes: No  Eye irritation: No  Back pain: Yes  Muscle aches: Yes  Neck pain: No  Swollen joints: No  Joint pain: Yes  Bone pain: No  Muscle cramps: Yes  Muscle weakness: No  Joint stiffness: Yes  Bone fracture: No

## 2018-12-19 NOTE — LETTER
12/19/2018       RE: Shena Hartmann  [address] [place] [address]     Dear Colleague,    Thank you for referring your patient, Shena Hartmann, [facility]. Please see a copy of my visit note below.    I was present with the resident during the history and exam.  I discussed the case with the resident and agree with the findings as documented in the assessment and plan.    Referring provider: Lui Hills MD    CC: L hip soreness    HPI: 68F, with history of MM (s/p BMT, undergoing chemotherapy),  L hip, groin, and thigh pain that began in September 2018. Began spontaneously with no preceding injury. First she had spasms in her lower back, which progressed to her gluteal region, and her iliopsoas. Then she developed an excruciating sharp shooting pain that radiated from the anterior thigh, the lateral side of the knee, and into her lateral proximal calf. This radicular pain is now resolved, but she has persisting anterior groin and lateral thigh pain. Interventions so far that have been helpful: massage therapy, , Tylenol, heat. On active chemotherapy for multiple myeloma. Exacerbated by sitting or resisted hip ROM with a Theraband. Improved by standing or lying down flat. No new paresthesias or motor weakness. Prevented her from being as active and work out. Currently recovering from beta-hemolytic strep sinus infection. Has undergone a lumbar spine MRI but not seen a spine specialist.    PMH:  Past Medical History:   Diagnosis Date     Breast cancer (H) 1994    right     H/O stem cell transplant (H) 3/2014     Multiple myeloma, without mention of having achieved remission 11/6/2012     PSH:  Past Surgical History:   Procedure Laterality Date     MASTECTOMY      right     PICC INSERTION  9/10/2013    5fr DL Power PICC, 44cm (1cm external) in the L lateral brachial vein with tip in the low SVC      Meds:  Current Outpatient Medications   Medication     amoxicillin-clavulanate (AUGMENTIN) 875-125 MG per tablet     CALCIUM-VITAMIN D-VITAMIN K PO     cholecalciferol (VITAMIN D3) 5000 UNITS CAPS capsule     clobetasol (TEMOVATE) 0.05 % ointment     COENZYME Q-10 PO     desonide (DESOWEN) 0.05 % ointment     HYDROcodone-acetaminophen (NORCO) 5-325 MG per tablet     magnesium 100 MG CAPS     Multiple Vitamins-Minerals (MULTIVITAMIN OR)     Multiple Vitamins-Minerals (ZINC PO)     order for DME     order for DME     pomalidomide (POMALYST) 1 MG capsule     saccharomyces boulardii (FLORASTOR) 250 MG capsule     triamcinolone (KENALOG) 0.1 % cream     Allergies:   Allergies   Allergen Reactions     Cayenne Pepper [Cayenne]      Corn-Related Products GI Disturbance     Levaquin Other (See Comments)     Tendon pain     Milk Protein GI Disturbance     Mold      Facial rash/lip swelling     Morphine Sulfate Other (See Comments)     Per pt - see things (hallucination) when she was on Morphine .     Pollen Extract      Environmental allergies      Shrimp Nausea and Vomiting     Tomato      Lip swelling, facial rash     Azithromycin Rash     Keflex [Cephalexin] Rash     Oat Rash     Tape [Adhesive Tape] Itching and Rash     All adhesives     Wheat Rash     Swelling of lips     SH: Lives in [place] with . 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 Nonsmoker, no drugs, no ETOH.     ROS: Noncontributory except as stated above.    P/Ex:  General: Well-appearing 68-year-old female.  No apparent distress.  Alert and oriented appropriately.  Very pleasant.  HEENT: Atraumatic, normocephalic.  Respiratory: Breathing unlabored on room air.  Cardiovascular: Extremities warm and well-perfused throughout.  MSK: Focused exam of the right hip and thigh shows no joint effusion or any areas of swelling.  Very tender to palpation at the psoas tendon.  Mildly tender along the greater trochanter.  Also somewhat tender at  the piriformis muscle near the initial tuberosity.  Otherwise, no tenderness on palpation in the remainder of the thigh, knee, or lower leg.  Performs an active straight leg raise to 75 degrees with no pain.  5/5 strength with resisted hip flexion, knee flexion/extension, ankle dorsiflexion/plantar flexion/eversion, and EHL/FHL.  Sensation intact light touch in the L3-S1 nerve distributions.  1+ reflexes at patella and Achilles.  Negative passive straight leg raise.  Negative FADIR test. Hip passive motion is full and painless, flexion 100, internal rotation 25, external rotation 60.  Gait is normal and not antalgic.  Climbs unassisted onto the exam table.  Normal standing alignment of the spine.  No tenderness on palpation of the cervical, thoracic, or lumbar vertebrae, although she is somewhat tender in the right lumbar paraspinal muscles.    IMAGING:  MRI right hip on 12/6/2018: Mild degenerative changes with small anterior and superior labral tilt tears and subchondral cyst.  No bony abnormalities.  Mild amount of fluid with high T2 signal around the insertion of the short external rotators on the greater trochanter.  Tendons and ligaments are intact.    Bilateral hip x-rays on 10/17/2018: Mild degenerative changes in both hips.  No lytic lesions.  No acute osseous abnormalities.    MRI cervical, thoracic, and lumbar spine on 10/2/2018: Mild to moderate multilevel cervical spondylosis, with moderate right and left neuroforaminal stenosis at C3-4.  Multilevel thoracic and lumbar spondylosis with no significant neural foraminal or canal narrowing.    ASSESSMENT: Right hip iliopsoas tendinitis    PLAN: We discussed with the patient that her symptoms seem more consistent with muscular pain, psoas tendinitis, and mild greater trochanter bursitis. Her pain has gradually improved from September with conservative management, and we are optimistic that it will continue doing so. She has no new neurological deficits or  manifestation of radicular pain. We recommend topical Voltaren gel for symptomatic relief on her upcoming plane flight and continuing PT. She is agreeable with this plan. Follow up PRN.    Seen and discussed with Dr. Quick, who agrees with above plan and findings.    Fawn Arayah  Orthopaedic PGY4  Pager: [phone]        Answers for HPI/ROS submitted by the patient on 12/16/2018   General Symptoms: No  Skin Symptoms: No  HENT Symptoms: Yes  EYE SYMPTOMS: Yes  HEART SYMPTOMS: No  LUNG SYMPTOMS: No  INTESTINAL SYMPTOMS: No  URINARY SYMPTOMS: No  GYNECOLOGIC SYMPTOMS: No  BREAST SYMPTOMS: No  SKELETAL SYMPTOMS: Yes  BLOOD SYMPTOMS: No  NERVOUS SYSTEM SYMPTOMS: No  MENTAL HEALTH SYMPTOMS: No  Ear pain: No  Ear discharge: No  Hearing loss: No  Tinnitus: No  Nosebleeds: No  Congestion: Yes  Sinus pain: Yes  Trouble swallowing: No   Voice hoarseness: No  Mouth sores: No  Sore throat: Yes  Tooth pain: No  Gum tenderness: No  Bleeding gums: No  Change in taste: No  Change in sense of smell: No  Dry mouth: No  Hearing aid used: No  Neck lump: No  Eye pain: No  Vision loss: Yes  Dry eyes: No  Watery eyes: No  Eye bulging: No  Double vision: No  Flashing of lights: No  Spots: No  Floaters: No  Redness: No  Crossed eyes: No  Tunnel Vision: No  Yellowing of eyes: No  Eye irritation: No  Back pain: Yes  Muscle aches: Yes  Neck pain: No  Swollen joints: No  Joint pain: Yes  Bone pain: No  Muscle cramps: Yes  Muscle weakness: No  Joint stiffness: Yes  Bone fracture: No      Again, thank you for allowing me to participate in the care of your patient.      Sincerely,    Iglesia Quick MD

## 2018-12-19 NOTE — NURSING NOTE
Reason For Visit:   Chief Complaint   Patient presents with     Right Hip - Consult       Wt 54.9 kg (121 lb)   BMI 22.13 kg/m      Pain Assessment  Patient Currently in Pain: Yes  0-10 Pain Scale: 2  Primary Pain Location: Hip  Pain Orientation: Left  Pain Descriptors: Aching  Aggravating Factors: Exercise    Familia Burr, ATC

## 2019-01-24 ENCOUNTER — TELEPHONE (OUTPATIENT)
Dept: TRANSPLANT | Facility: CLINIC | Age: 69
End: 2019-01-24

## 2019-01-24 DIAGNOSIS — C90.00 MULTIPLE MYELOMA, REMISSION STATUS UNSPECIFIED (H): ICD-10-CM

## 2019-01-24 NOTE — TELEPHONE ENCOUNTER
Oral Chemotherapy Monitoring Program   Medication: Pomalyst    Rx: 4mg PO daily on days 1 through 21 of 28 day cycle   Auth #: [id number]   Risk Category:Adult Female Not of Reproductive Potential  Routine survey questions reviewed.   Rx to be Escribed to [facility]          Lian Vladimir      Oncology Clinic Liaison      [address], [address]      [place] [address]      Ph: [phone]/Fax: [phone]

## 2019-01-28 ENCOUNTER — OFFICE VISIT (OUTPATIENT)
Dept: DERMATOLOGY | Facility: CLINIC | Age: 69
End: 2019-01-28
Payer: COMMERCIAL

## 2019-01-28 DIAGNOSIS — D23.72 DYSPLASTIC NEVUS OF LEFT LOWER EXTREMITY: Primary | ICD-10-CM

## 2019-01-28 DIAGNOSIS — L23.89 ALLERGIC CONTACT DERMATITIS DUE TO OTHER AGENTS: ICD-10-CM

## 2019-01-28 DIAGNOSIS — L82.1 SK (SEBORRHEIC KERATOSIS): ICD-10-CM

## 2019-01-28 DIAGNOSIS — D18.00 ANGIOMA: ICD-10-CM

## 2019-01-28 DIAGNOSIS — L56.8 PHOTODERMATITIS OR PHOTOSENSITIVITY: ICD-10-CM

## 2019-01-28 RX ORDER — CHOLECALCIFEROL (VITAMIN D3) 125 MCG
CAPSULE ORAL DAILY
COMMUNITY
End: 2020-11-30

## 2019-01-28 ASSESSMENT — PAIN SCALES - GENERAL: PAINLEVEL: NO PAIN (0)

## 2019-01-28 NOTE — LETTER
"1/28/2019       RE: Shena Hartmann  [address] [place] [address]     Dear Colleague,    Thank you for referring your patient, Shena Hartmann, to the [facility] DERMATOLOGY at [facility]. Please see a copy of my visit note below.    [facility] D[facility]atology Note      Dermatology Problem List:    Specialty Problems     None        1. Hx of stem cell transplant for MM  - on pomalidomide    CC:   Skin Check (Shena is here for skin check, concern about \"Left ear feels different .\")        Encounter Date: Jan 28, 2019    History of Present Illness:  Ms. Shena Hartmann is a 68 year old female who presents as a referral from Self.    Patient had since early childhood problems with eczemas, but with Thalidomide and later Pomalidomide (since 4.5years) recurrent dermatitis with sometimes blisters and papules on face, neck, back of hands and forearms. Patient was 2 weeks ago in [place] and the rash seemed to be worse.  In addition some reaction with sweating and heat on the mastectomy site below the breast prosthetic (silicone)        Past Medical History:   Patient Active Problem List   Diagnosis     Pain in thoracic spine     Febrile neutropenia (H)     Multiple myeloma, dx 2002,  s/p ASCBMT 3/26/14     Cough     RSV (respiratory syncytial virus pneumonia)     Personal history of malignant neoplasm of breast     Seasonal allergies     Osteoporosis     Past Medical History:   Diagnosis Date     Breast cancer (H) 1994    right     H/O stem cell transplant (H) 3/2014     Multiple myeloma, without mention of having achieved remission 11/6/2012       Allergy History:     Allergies   Allergen Reactions     Cayenne Pepper [Cayenne]      Corn-Related Products GI Disturbance     Levaquin Other (See Comments)     Tendon pain     Milk Protein GI Disturbance     Mold      Facial rash/lip swelling     Morphine Sulfate Other (See Comments)     Per pt - see things " (hallucination) when she was on Morphine .     Pollen Extract      Environmental allergies      Shrimp Nausea and Vomiting     Tomato      Lip swelling, facial rash     Azithromycin Rash     Keflex [Cephalexin] Rash     Oat Rash     Tape [Adhesive Tape] Itching and Rash     All adhesives     Wheat Rash     Swelling of lips       Social History:       reports that  has never smoked. she has never used smokeless tobacco. She reports that she drinks alcohol. She reports that she does not use drugs.      Family History:  Family History   Problem Relation Age of Onset     Cancer Paternal Grandmother         skin cancer     Hypertension Mother      Hypertension Father      Cerebrovascular Disease Mother      Prostate Cancer Father      Cerebrovascular Disease Mother        Medications:  Current Outpatient Medications   Medication Sig Dispense Refill     CALCIUM-VITAMIN D-VITAMIN K PO Take 2 tablets by mouth daily.       cholecalciferol (VITAMIN D3) 5000 UNITS CAPS capsule Take 1 capsule (5,000 Units) by mouth daily 90 capsule 3     clobetasol (TEMOVATE) 0.05 % ointment Apply topically 2 times daily Apply to dermatitic plaques on hands 2 times per day for 2-3 days/week during flares. 45 g 0     COENZYME Q-10 PO Take 30 mg by mouth daily        desonide (DESOWEN) 0.05 % ointment Use twice daily on the face as needed. 60 g 1     Lactobacillus (PROBIOTIC ACIDOPHILUS) CAPS Take by mouth twice a week       magnesium 100 MG CAPS Take 117 mg by mouth 3 times daily       Multiple Vitamins-Minerals (MULTIVITAMIN OR) Take 1 tablet by mouth 2 times daily.       Multiple Vitamins-Minerals (ZINC PO) Take by mouth daily        order for DME 6 mastectomy bras  Length of need: 99 months 6 Device 1     order for DME R mastectomy prosthesis   Length of need:  99 months 1 Device 1     pomalidomide (POMALYST) 1 MG capsule Take 1 capsule (1 mg) by mouth daily Take 2 mg alternating with 1 mg daily. Take on days 1-14  auth  :  [id number] 29 capsule  0     triamcinolone (KENALOG) 0.1 % cream Apply topically 2 times daily 80 g 1     amoxicillin-clavulanate (AUGMENTIN) 875-125 MG per tablet Take 1 tablet by mouth 2 times daily (Patient not taking: Reported on 3/30/2018) 20 tablet 0     diclofenac (VOLTAREN) 1 % topical gel Place onto the skin 4 times daily (Patient not taking: Reported on 1/28/2019) 100 g 0     HYDROcodone-acetaminophen (NORCO) 5-325 MG per tablet Take 1 tablet by mouth every 6 hours as needed for severe pain (Patient not taking: Reported on 12/14/2018) 25 tablet 0     saccharomyces boulardii (FLORASTOR) 250 MG capsule Take 250 mg by mouth 2 times daily             Review of Systems:  -As per HPI  -Constitutional: The patient denies fatigue, fevers, chills, unintended weight loss, and night sweats.  -HEENT: Patient denies nonhealing oral sores.  -Skin: As above in HPI. No additional skin concerns.    Physical exam:  Vitals: There were no vitals taken for this visit.  GEN: This is a well developed, well-nourished female in no acute distress, in a pleasant mood.    SKIN: Full skin, which includes the head/face, both arms, chest, back, abdomen,both legs, genitalia and/or groin buttocks, digits and/or nails, was examined.  Many seborrhoic keratoses on trunk and less on extremities. One bigger one on right upper leg ventral side.  On the left upper leg ventrolateral a 5mm diameter, pigmented lesion, not symmetric and different colors. According to patient there since birth, but not sure if recently changes of color    -No other lesions of concern on areas examined.     Impression/Plan:    1) possible phototoxic/photoallergic reaction to Thalidomide and Pomalidomide    Intensify the physical sun protection (clothes, hat) and use physical sun screens    Uses for flare ups Triamcinolone cream and Clobetasole/Desowen (uses per year about 1/2 tube)    2) possible allergic contact dermatitis to silicone prosthesis and soaps/disinfectant    If worse can plan  patch tests --> but patient has it in the moment under control    3) atypical melanocytic naevus left upper leg ventrolateral  Shave biopsy:  After discussion of benefits and risks including but not limited to bleeding/bruising, pain/swelling, infection, scar, incomplete removal, nerve damage/numbness, recurrence, and non-diagnostic biopsy, written consent, verbal consent and photographs were obtained. Time-out was performed. The area was cleaned with isopropyl alcohol. 0.5mL of 1% lidocaine with epinephrine was injected to obtain adequate anesthesia of the lesion on the left upper leg. A shave biopsy was performed. Hemostasis was achieved with aluminium chloride. Vaseline and a sterile dressing were applied. The patient tolerated the procedure and no complications were noted. The patient was provided with verbal and written post care instructions.     4) many cherry angiomas and seborrhoic keratoses  --> observe    Follow-up in 1 year or according to histology    Again, thank you for allowing me to participate in the care of your patient.      Sincerely,  Sunil Rosario MD

## 2019-01-28 NOTE — PROGRESS NOTES
"[facility] Dermatology Note      Dermatology Problem List:    Specialty Problems     None        1. Hx of stem cell transplant for MM  - on pomalidomide    CC:   Skin Check (Shena is here for skin check, concern about \"Left ear feels different .\")        Encounter Date: Jan 28, 2019    History of Present Illness:  Ms. Shena Hartmann is a 68 year old female who presents as a referral from Self.    Patient had since early childhood problems with eczemas, but with Thalidomide and later Pomalidomide (since 4.5years) recurrent dermatitis with sometimes blisters and papules on face, neck, back of hands and forearms. Patient was 2 weeks ago in [place] and the rash seemed to be worse.  In addition some reaction with sweating and heat on the mastectomy site below the breast prosthetic (silicone)        Past Medical History:   Patient Active Problem List   Diagnosis     Pain in thoracic spine     Febrile neutropenia (H)     Multiple myeloma, dx 2002,  s/p ASCBMT 3/26/14     Cough     RSV (respiratory syncytial virus pneumonia)     Personal history of malignant neoplasm of breast     Seasonal allergies     Osteoporosis     Past Medical History:   Diagnosis Date     Breast cancer (H) 1994    right     H/O stem cell transplant (H) 3/2014     Multiple myeloma, without mention of having achieved remission 11/6/2012       Allergy History:     Allergies   Allergen Reactions     Cayenne Pepper [Cayenne]      Corn-Related Products GI Disturbance     Levaquin Other (See Comments)     Tendon pain     Milk Protein GI Disturbance     Mold      Facial rash/lip swelling     Morphine Sulfate Other (See Comments)     Per pt - see things (hallucination) when she was on Morphine .     Pollen Extract      Environmental allergies      Shrimp Nausea and Vomiting     Tomato      Lip swelling, facial rash     Azithromycin Rash     Keflex [Cephalexin] Rash     Oat Rash     Tape [Adhesive Tape] Itching and Rash     All adhesives "     Wheat Rash     Swelling of lips       Social History:       reports that  has never smoked. she has never used smokeless tobacco. She reports that she drinks alcohol. She reports that she does not use drugs.      Family History:  Family History   Problem Relation Age of Onset     Cancer Paternal Grandmother         skin cancer     Hypertension Mother      Hypertension Father      Cerebrovascular Disease Mother      Prostate Cancer Father      Cerebrovascular Disease Mother        Medications:  Current Outpatient Medications   Medication Sig Dispense Refill     CALCIUM-VITAMIN D-VITAMIN K PO Take 2 tablets by mouth daily.       cholecalciferol (VITAMIN D3) 5000 UNITS CAPS capsule Take 1 capsule (5,000 Units) by mouth daily 90 capsule 3     clobetasol (TEMOVATE) 0.05 % ointment Apply topically 2 times daily Apply to dermatitic plaques on hands 2 times per day for 2-3 days/week during flares. 45 g 0     COENZYME Q-10 PO Take 30 mg by mouth daily        desonide (DESOWEN) 0.05 % ointment Use twice daily on the face as needed. 60 g 1     Lactobacillus (PROBIOTIC ACIDOPHILUS) CAPS Take by mouth twice a week       magnesium 100 MG CAPS Take 117 mg by mouth 3 times daily       Multiple Vitamins-Minerals (MULTIVITAMIN OR) Take 1 tablet by mouth 2 times daily.       Multiple Vitamins-Minerals (ZINC PO) Take by mouth daily        order for DME 6 mastectomy bras  Length of need: 99 months 6 Device 1     order for DME R mastectomy prosthesis   Length of need:  99 months 1 Device 1     pomalidomide (POMALYST) 1 MG capsule Take 1 capsule (1 mg) by mouth daily Take 2 mg alternating with 1 mg daily. Take on days 1-14  auth  :  [id number] 29 capsule 0     triamcinolone (KENALOG) 0.1 % cream Apply topically 2 times daily 80 g 1     amoxicillin-clavulanate (AUGMENTIN) 875-125 MG per tablet Take 1 tablet by mouth 2 times daily (Patient not taking: Reported on 3/30/2018) 20 tablet 0     diclofenac (VOLTAREN) 1 % topical gel Place onto  the skin 4 times daily (Patient not taking: Reported on 1/28/2019) 100 g 0     HYDROcodone-acetaminophen (NORCO) 5-325 MG per tablet Take 1 tablet by mouth every 6 hours as needed for severe pain (Patient not taking: Reported on 12/14/2018) 25 tablet 0     saccharomyces boulardii (FLORASTOR) 250 MG capsule Take 250 mg by mouth 2 times daily             Review of Systems:  -As per HPI  -Constitutional: The patient denies fatigue, fevers, chills, unintended weight loss, and night sweats.  -HEENT: Patient denies nonhealing oral sores.  -Skin: As above in HPI. No additional skin concerns.    Physical exam:  Vitals: There were no vitals taken for this visit.  GEN: This is a well developed, well-nourished female in no acute distress, in a pleasant mood.    SKIN: Full skin, which includes the head/face, both arms, chest, back, abdomen,both legs, genitalia and/or groin buttocks, digits and/or nails, was examined.  Many seborrhoic keratoses on trunk and less on extremities. One bigger one on right upper leg ventral side.  On the left upper leg ventrolateral a 5mm diameter, pigmented lesion, not symmetric and different colors. According to patient there since birth, but not sure if recently changes of color    -No other lesions of concern on areas examined.     Impression/Plan:    1) possible phototoxic/photoallergic reaction to Thalidomide and Pomalidomide    Intensify the physical sun protection (clothes, hat) and use physical sun screens    Uses for flare ups Triamcinolone cream and Clobetasole/Desowen (uses per year about 1/2 tube)    2) possible allergic contact dermatitis to silicone prosthesis and soaps/disinfectant    If worse can plan patch tests --> but patient has it in the moment under control    3) atypical melanocytic naevus left upper leg ventrolateral  Shave biopsy:  After discussion of benefits and risks including but not limited to bleeding/bruising, pain/swelling, infection, scar, incomplete removal, nerve  damage/numbness, recurrence, and non-diagnostic biopsy, written consent, verbal consent and photographs were obtained. Time-out was performed. The area was cleaned with isopropyl alcohol. 0.5mL of 1% lidocaine with epinephrine was injected to obtain adequate anesthesia of the lesion on the left upper leg. A shave biopsy was performed. Hemostasis was achieved with aluminium chloride. Vaseline and a sterile dressing were applied. The patient tolerated the procedure and no complications were noted. The patient was provided with verbal and written post care instructions.     Histology:  Specimen #:  Collected: 1/28/2019   FINAL DIAGNOSIS: Skin, left upper leg ventrolateral: Compound melanocytic nevus with mild atypia -     4) many cherry angiomas and seborrhoic keratoses  --> observe    Follow-up in 1 year or according to histology

## 2019-01-29 NOTE — PATIENT INSTRUCTIONS

## 2019-02-01 LAB — COPATH REPORT: NORMAL

## 2019-02-12 ENCOUNTER — DOCUMENTATION ONLY (OUTPATIENT)
Dept: CARE COORDINATION | Facility: CLINIC | Age: 69
End: 2019-02-12

## 2019-02-18 ENCOUNTER — TELEPHONE (OUTPATIENT)
Dept: TRANSPLANT | Facility: CLINIC | Age: 69
End: 2019-02-18

## 2019-02-18 DIAGNOSIS — C90.00 MULTIPLE MYELOMA, REMISSION STATUS UNSPECIFIED (H): ICD-10-CM

## 2019-02-18 NOTE — TELEPHONE ENCOUNTER
Oral Chemotherapy Monitoring Program   Medication: Pomalyst  Rx: 1mg PO daily on days 1 through 1 of 14 day cycle   Auth #: [id number]   Risk Category: Adult Female Not of Reproductive Potential  Routine survey questions reviewed.   Rx to be Escribed to  [facility]      [facility]  Oncology Clinic Liaison  [address], [address]  [place] [address]  Ph: [phone]/Fax: [phone]

## 2019-02-19 DIAGNOSIS — Z12.31 VISIT FOR SCREENING MAMMOGRAM: ICD-10-CM

## 2019-02-22 DIAGNOSIS — C90.00 MULTIPLE MYELOMA NOT HAVING ACHIEVED REMISSION (H): ICD-10-CM

## 2019-02-22 LAB
ALBUMIN SERPL-MCNC: 3.7 G/DL (ref 3.4–5)
ALP SERPL-CCNC: 56 U/L (ref 40–150)
ALT SERPL W P-5'-P-CCNC: 24 U/L (ref 0–50)
ANION GAP SERPL CALCULATED.3IONS-SCNC: 6 MMOL/L (ref 3–14)
AST SERPL W P-5'-P-CCNC: 18 U/L (ref 0–45)
BASOPHILS # BLD AUTO: 0 10E9/L (ref 0–0.2)
BASOPHILS NFR BLD AUTO: 1.2 %
BILIRUB SERPL-MCNC: 0.4 MG/DL (ref 0.2–1.3)
BUN SERPL-MCNC: 18 MG/DL (ref 7–30)
CALCIUM SERPL-MCNC: 9.1 MG/DL (ref 8.5–10.1)
CHLORIDE SERPL-SCNC: 100 MMOL/L (ref 94–109)
CO2 SERPL-SCNC: 30 MMOL/L (ref 20–32)
CREAT SERPL-MCNC: 0.72 MG/DL (ref 0.52–1.04)
DIFFERENTIAL METHOD BLD: ABNORMAL
EOSINOPHIL # BLD AUTO: 0.1 10E9/L (ref 0–0.7)
EOSINOPHIL NFR BLD AUTO: 3.6 %
ERYTHROCYTE [DISTWIDTH] IN BLOOD BY AUTOMATED COUNT: 14.8 % (ref 10–15)
GFR SERPL CREATININE-BSD FRML MDRD: 86 ML/MIN/{1.73_M2}
GLUCOSE SERPL-MCNC: 71 MG/DL (ref 70–99)
HCT VFR BLD AUTO: 38.7 % (ref 35–47)
HGB BLD-MCNC: 13.5 G/DL (ref 11.7–15.7)
IMM GRANULOCYTES # BLD: 0 10E9/L (ref 0–0.4)
IMM GRANULOCYTES NFR BLD: 0 %
LYMPHOCYTES # BLD AUTO: 1.3 10E9/L (ref 0.8–5.3)
LYMPHOCYTES NFR BLD AUTO: 37.3 %
MCH RBC QN AUTO: 34.4 PG (ref 26.5–33)
MCHC RBC AUTO-ENTMCNC: 34.9 G/DL (ref 31.5–36.5)
MCV RBC AUTO: 99 FL (ref 78–100)
MONOCYTES # BLD AUTO: 0.6 10E9/L (ref 0–1.3)
MONOCYTES NFR BLD AUTO: 18.3 %
NEUTROPHILS # BLD AUTO: 1.3 10E9/L (ref 1.6–8.3)
NEUTROPHILS NFR BLD AUTO: 39.6 %
NRBC # BLD AUTO: 0 10*3/UL
NRBC BLD AUTO-RTO: 0 /100
PLATELET # BLD AUTO: 155 10E9/L (ref 150–450)
POTASSIUM SERPL-SCNC: 3.8 MMOL/L (ref 3.4–5.3)
PROT SERPL-MCNC: 7.4 G/DL (ref 6.8–8.8)
RBC # BLD AUTO: 3.92 10E12/L (ref 3.8–5.2)
SODIUM SERPL-SCNC: 136 MMOL/L (ref 133–144)
WBC # BLD AUTO: 3.4 10E9/L (ref 4–11)

## 2019-02-22 PROCEDURE — 84165 PROTEIN E-PHORESIS SERUM: CPT | Performed by: INTERNAL MEDICINE

## 2019-02-22 PROCEDURE — 36415 COLL VENOUS BLD VENIPUNCTURE: CPT

## 2019-02-22 PROCEDURE — 85025 COMPLETE CBC W/AUTO DIFF WBC: CPT | Performed by: INTERNAL MEDICINE

## 2019-02-22 PROCEDURE — 80053 COMPREHEN METABOLIC PANEL: CPT | Performed by: INTERNAL MEDICINE

## 2019-02-22 PROCEDURE — 00000402 ZZHCL STATISTIC TOTAL PROTEIN: Performed by: INTERNAL MEDICINE

## 2019-02-26 LAB
ALBUMIN SERPL ELPH-MCNC: 4.3 G/DL (ref 3.7–5.1)
ALPHA1 GLOB SERPL ELPH-MCNC: 0.2 G/DL (ref 0.2–0.4)
ALPHA2 GLOB SERPL ELPH-MCNC: 0.6 G/DL (ref 0.5–0.9)
B-GLOBULIN SERPL ELPH-MCNC: 0.6 G/DL (ref 0.6–1)
GAMMA GLOB SERPL ELPH-MCNC: 1.2 G/DL (ref 0.7–1.6)
M PROTEIN SERPL ELPH-MCNC: 0.9 G/DL
PROT PATTERN SERPL ELPH-IMP: ABNORMAL

## 2019-03-01 ENCOUNTER — ONCOLOGY VISIT (OUTPATIENT)
Dept: TRANSPLANT | Facility: CLINIC | Age: 69
End: 2019-03-01
Attending: INTERNAL MEDICINE
Payer: COMMERCIAL

## 2019-03-01 VITALS
DIASTOLIC BLOOD PRESSURE: 87 MMHG | BODY MASS INDEX: 21.95 KG/M2 | OXYGEN SATURATION: 96 % | TEMPERATURE: 96.6 F | WEIGHT: 120 LBS | HEART RATE: 96 BPM | SYSTOLIC BLOOD PRESSURE: 132 MMHG

## 2019-03-01 DIAGNOSIS — C90.00 MULTIPLE MYELOMA NOT HAVING ACHIEVED REMISSION (H): ICD-10-CM

## 2019-03-01 PROCEDURE — 83615 LACTATE (LD) (LDH) ENZYME: CPT | Performed by: INTERNAL MEDICINE

## 2019-03-01 PROCEDURE — G0463 HOSPITAL OUTPT CLINIC VISIT: HCPCS | Mod: ZF

## 2019-03-01 PROCEDURE — G0463 HOSPITAL OUTPT CLINIC VISIT: HCPCS

## 2019-03-01 ASSESSMENT — PAIN SCALES - GENERAL: PAINLEVEL: NO PAIN (0)

## 2019-03-01 NOTE — NURSING NOTE
"Oncology Rooming Note    March 1, 2019 7:40 AM   Shena Hartmann is a 68 year old female who presents for:    Chief Complaint   Patient presents with     RECHECK     RTN- Multiple Myeloma     Initial Vitals: /87   Pulse 96   Temp 96.6  F (35.9  C) (Oral)   Wt 54.4 kg (120 lb)   SpO2 96%   BMI 21.95 kg/m   Estimated body mass index is 21.95 kg/m  as calculated from the following:    Height as of 9/15/17: 1.575 m (5' 2\").    Weight as of this encounter: 54.4 kg (120 lb). Body surface area is 1.54 meters squared.  No Pain (0) Comment: Data Unavailable   No LMP recorded. Patient is postmenopausal.  Allergies reviewed: Yes  Medications reviewed: Yes    Medications: Medication refills not needed today.  Pharmacy name entered into [facility]:    [facility] - [place], [place] - [address]  [id number] - [place], [place] - [address] AT [facility] [address]  WRITTEN PRESCRIPTION REQUESTED  [id number] - [place], [place] - [address] AT [address]    Clinical concerns: None       Lizzy Morales CMA              "

## 2019-03-01 NOTE — PROGRESS NOTES
/87   Pulse 96   Temp 96.6  F (35.9  C) (Oral)   Wt 54.4 kg (120 lb)   SpO2 96%   BMI 21.95 kg/m      Wt Readings from Last 4 Encounters:   03/01/19 54.4 kg (120 lb)   12/19/18 54.9 kg (121 lb)   12/14/18 55.3 kg (121 lb 14.4 oz)   08/17/18 57.3 kg (126 lb 6.4 oz)     Shena was seen and examined by me today.  She is a 68-year-old woman with multiple myeloma who is now 4 years after autologous transplantation.  She has been on maintenance therapy with pomalidomide and recently we decreased her dose to 14 days on and 14 days off at 1 mg alternating with 2 mg for the 14 days.  She feels relatively well but has had some symptomatic issues.  She had a prolonged, presumed viral URI in December that lasted through January.  She finally recovered.  The other area of major concern was with some persistent right hip pain.  Given her diagnosis of multiple myeloma we were concerned but this was ultimately evaluated with MRI and found to be a hip bursitis.  There was no specific therapy and the symptoms have eventually resolved.  Otherwise her performance status is fairly good.  She keeps busy and is doing well.  Energy can be a problem on some occasions but she is doing a number of symptomatic therapies that help and she has been able to travel to [place] which she enjoyed.  The rest of a 10 point review of systems is unremarkable.    Physical exam overall Shena looks well and is in no distress.  HEENT exam is unremarkable.  There are no oral lesions.  Pupils are equal round and reactive to light.  Vital signs are normal and her weight is stable as listed above.  Lungs are clear to auscultation percussion.  Cardiac exam is without gallops or murmurs.  Abdominal exam is benign and there is no organomegaly.  Lower extremities are without edema.  There are no skin rashes.  I note that she had a recent skin lesion biopsied which showed some atypia but was benign.    Laboratory evaluation: Electrolytes are normal with a  BUN of 18 and a creatinine of 0.72.  CBC shows a hemoglobin of 13.5, platelet count of 155,000 and a white count of 3400 with 1300 absolute neutrophils.    The major change today is that her as Pap is very slightly higher at 0.9 g.  Her da had been 0.7 g after transplant and there is been a very gradual increase to 0.8 and now 0.9.    Is my overall impression that Zach is doing well.  The last time her M spike was 1 g was in 2017.  This may be reflective of our current dosing of 14 days on and 14 days off.  She really appreciates the this schedule because of minor side effects.  If her SPEP increases to 1 g we will increase her dose to 2 mg daily for the 14 days she is on drug.  Shena agrees with this plan.  I scheduled follow-up for her on September 6 and she will check labs only in 3 months.  All of her questions have been answered today.    Dr. Lui Hills MD

## 2019-03-04 ENCOUNTER — ONCOLOGY VISIT (OUTPATIENT)
Dept: ONCOLOGY | Facility: CLINIC | Age: 69
End: 2019-03-04
Attending: INTERNAL MEDICINE
Payer: COMMERCIAL

## 2019-03-04 VITALS
SYSTOLIC BLOOD PRESSURE: 131 MMHG | OXYGEN SATURATION: 97 % | BODY MASS INDEX: 21.94 KG/M2 | WEIGHT: 119.2 LBS | RESPIRATION RATE: 16 BRPM | HEART RATE: 84 BPM | HEIGHT: 62 IN | DIASTOLIC BLOOD PRESSURE: 88 MMHG

## 2019-03-04 DIAGNOSIS — Z85.3 PERSONAL HISTORY OF MALIGNANT NEOPLASM OF BREAST: Primary | ICD-10-CM

## 2019-03-04 PROCEDURE — G0463 HOSPITAL OUTPT CLINIC VISIT: HCPCS | Mod: ZF

## 2019-03-04 PROCEDURE — 99214 OFFICE O/P EST MOD 30 MIN: CPT | Mod: ZP | Performed by: INTERNAL MEDICINE

## 2019-03-04 ASSESSMENT — MIFFLIN-ST. JEOR: SCORE: 1023.94

## 2019-03-04 NOTE — LETTER
"3/4/2019       RE: Shena Hartmann  [address] [place] [address]     Dear Colleague,    Thank you for referring your patient, Shena Hartmann, to the [facility]. Please see a copy of my visit note below.    March 4, 2019    CC:  History of breast cancer and multiple myeloma    HPI:  Ms Chatterjee has a history of multiple myeloma being followed by Dr Hills.     Her myeloma is briefly summarized later on in this note.   Today she is here because she has a past history of Breast cancer which was diagnosed in Oct 1994. It was treated with a right mastectomy and right axillary LN dissection. 8 out of 16 lymph nodes were positive. She did not have reconstruction done. This was followed by 4 cycles of adjuvant Cytoxan, Adriamycin and 5 FU ( CAF ) chemotherapy which finished in Feb 1995. She did not get radiation or hormonal treatment. Note I do not have those records to review and this history is per patient's recall of events.  Since then she has been followed by serial annual mammograms and has been doing well from the breast cancer aspect. Recently in Nov 2015 she had Thermal Imaging study done which showed some abnormality on the right side. Therefore she was referred by Dr Hills to see us.  She is now seen in the breast clinic prn.      Briefly her myeloma history is summarized below:  She was diagnosed with \"smoldering multiple myeloma\" in 2001, which was watched until 12/2012, when she presented with symptomatic multiple myeloma requiring therapy.   She initially was treated with Revlimid and dexamethasone. However, given poor tolerance and not adequate response to therapy, that was subsequently switched to Velcade and dexamethasone. She received this therapy until 04/2013. However, given minimal response to therapy, Cytoxan was added to the regimen. She received CyBorD until 08/2013, and had a maximal reduction of M-protein to 2.6-2.8 g/dL. A bone marrow biopsy still showed " significant disease. Then she received 2 cycles of high-dose Cytoxan with inadequate decline in M-spike. She was then switched D-PACE. She received 2 cycles; one in November 2013, and the last one on 12/05/2013. Despite that she had significant disease burden. She underwent Cytoxan priming on 2/20/14. She received Melphalan conditioning and then received her Auto HCT on 3/27/14.   Since then she has been maintained on pomalidomide maintenance therapy after really a modest response after autologous transplant. She remains on this regimen.  Her M spike is mildly increased.      Today she tells me that she is feeling fine and has good energy.  She has no new pain or new swellings or lumps. She has been eating well without nausea or vomiting and her weight has not changed. She exercises regularly and has no trouble doing that. Her breathing is stable. No frequent infections.   She does comment on irritation in the right chest wall after exercising or perspiring.      She has no new medical history.  She has been working with PT for back pain and hip pain.    No new family history.      ROS:  A 10 point comprehensive ROS was otherwise neg        Active Ambulatory Problems     Diagnosis Date Noted     Pain in thoracic spine 08/26/2013     Febrile neutropenia (H) 09/18/2013     Multiple myeloma, dx 2002,  s/p ASCBMT 3/26/14 10/07/2013     Cough 01/24/2014     RSV (respiratory syncytial virus pneumonia) 01/27/2014     Personal history of malignant neoplasm of breast 10/14/2014     Seasonal allergies 10/27/2015     Osteoporosis 11/01/2015     Resolved Ambulatory Problems     Diagnosis Date Noted     RSV bronchiolitis 01/27/2014     Past Medical History:   Diagnosis Date     Breast cancer (H) 1994     H/O stem cell transplant (H) 3/2014     Multiple myeloma, without mention of having achieved remission 11/6/2012     Current Outpatient Medications   Medication     CALCIUM-VITAMIN D-VITAMIN K PO     cholecalciferol (VITAMIN D3)  5000 UNITS CAPS capsule     clobetasol (TEMOVATE) 0.05 % ointment     COENZYME Q-10 PO     desonide (DESOWEN) 0.05 % ointment     Lactobacillus (PROBIOTIC ACIDOPHILUS) CAPS     magnesium 100 MG CAPS     Multiple Vitamins-Minerals (MULTIVITAMIN OR)     Multiple Vitamins-Minerals (ZINC PO)     order for DME     order for DME     pomalidomide (POMALYST) 1 MG capsule     triamcinolone (KENALOG) 0.1 % cream     diclofenac (VOLTAREN) 1 % topical gel     No current facility-administered medications for this visit.      Facility-Administered Medications Ordered in Other Visits   Medication     plerixafor (MOZOBIL) injection SOLN 12.4 mg     potassium chloride 20 mEq in 50 mL IVPB     potassium chloride SA (K-DUR,KLOR-CON M) CR tablet 20 mEq     Family History   Problem Relation Age of Onset     Cancer Paternal Grandmother         skin cancer     Hypertension Mother      Hypertension Father      Cerebrovascular Disease Mother      Prostate Cancer Father      Cerebrovascular Disease Mother    She has no siblings. She has 2 daughters aged 40 and 37.   Father had Prostate ca at 80  Paternal GF - Glioblastoma at 69  Maternal GF- Lung cancer  Social History     Socioeconomic History     Marital status:      Spouse name: Not on file     Number of children: Not on file     Years of education: Not on file     Highest education level: Not on file   Occupational History     Not on file   Social Needs     Financial resource strain: Not on file     Food insecurity:     Worry: Not on file     Inability: Not on file     Transportation needs:     Medical: Not on file     Non-medical: Not on file   Tobacco Use     Smoking status: Never Smoker     Smokeless tobacco: Never Used   Substance and Sexual Activity     Alcohol use: Yes     Comment: occ     Drug use: No     Sexual activity: Not on file   Lifestyle     Physical activity:     Days per week: Not on file     Minutes per session: Not on file     Stress: Not on file   Relationships  "    Social connections:     Talks on phone: Not on file     Gets together: Not on file     Attends Buddhism service: Not on file     Active member of club or organization: Not on file     Attends meetings of clubs or organizations: Not on file     Relationship status: Not on file     Intimate partner violence:     Fear of current or ex partner: Not on file     Emotionally abused: Not on file     Physically abused: Not on file     Forced sexual activity: Not on file   Other Topics Concern     Parent/sibling w/ CABG, MI or angioplasty before 65F 55M? Not Asked      Service No     Blood Transfusions No     Caffeine Concern No     Occupational Exposure No     Hobby Hazards No     Sleep Concern No     Stress Concern No     Weight Concern No     Special Diet No     Back Care No     Exercise Yes     Comment: 3-4 x a week.      Bike Helmet Yes     Seat Belt Yes     Self-Exams No   Social History Narrative     Not on file   She has never smoked and occasionally drinks etoh. No drugs. 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     Pollen Extract      Environmental allergies      Shrimp Nausea and Vomiting     Tomato      Lip swelling, facial rash     Azithromycin Rash     Keflex [Cephalexin] Rash     Oat Rash     Tape [Adhesive Tape] Itching and Rash     All adhesives     Wheat Rash     Swelling of lips     Physical exam  /88   Pulse 84   Resp 16   Ht 1.575 m (5' 2\")   Wt 54.1 kg (119 lb 3.2 oz)   SpO2 97%   Breastfeeding? No   BMI 21.80 kg/m     General: no acute distress  HEENT: ANATOLY, " no palor or icterus.   NECK: supple - no cervical or supraclavicular LAD  CVS: s1s2 no m r g . No edema  CHEST: clear to auscultation b/l  ABDOMEN: soft non tender no hepatosplenomegaly  NEURO: AAOX3  Grossly non focal neuro exam  SKIN: no obvious rashes  LYMPH NODES: no palpable cervical, supraclavicular, axillary or inguinal LAD  BREAST: right sided mastectomy and well healed surgical scar. No palpable abnormality or lumps noted on either side.        Mammogram 2/19/19 - benign      Assessment and Recommendations    1. Breast Cancer: She is now more than 20 years out from her diagnosis and completion of the therapy.  She had an updated mammogram a few weeks ago that was benign.  We discussed the importance of yearly mammograms.      We discussed prn follow up in the breast clinic.    She herself prefers thermography alternating with mammography.  She and I have discussed the role of thermography in breast cancer screening.  This is her preference.    2. Osteopororis- she has tried pamidronate and is not interested in retreating this.  Weight bearing exercises.  Calcium and vitamin D.      3. Multiple Myeloma s/p Auto HCT on 3/27/2014- on Pomalidomide maintenance- being followed by Dr Reinier Da Silva MD

## 2019-03-04 NOTE — PROGRESS NOTES
"March 4, 2019    CC:  History of breast cancer and multiple myeloma    HPI:  Ms Shena has a history of multiple myeloma being followed by Dr Hills.     Her myeloma is briefly summarized later on in this note.   Today she is here because she has a past history of Breast cancer which was diagnosed in Oct 1994. It was treated with a right mastectomy and right axillary LN dissection. 8 out of 16 lymph nodes were positive. She did not have reconstruction done. This was followed by 4 cycles of adjuvant Cytoxan, Adriamycin and 5 FU ( CAF ) chemotherapy which finished in Feb 1995. She did not get radiation or hormonal treatment. Note I do not have those records to review and this history is per patient's recall of events.  Since then she has been followed by serial annual mammograms and has been doing well from the breast cancer aspect. Recently in Nov 2015 she had Thermal Imaging study done which showed some abnormality on the right side. Therefore she was referred by Dr Hills to see us.  She is now seen in the breast clinic prn.      Briefly her myeloma history is summarized below:  She was diagnosed with \"smoldering multiple myeloma\" in 2001, which was watched until 12/2012, when she presented with symptomatic multiple myeloma requiring therapy.   She initially was treated with Revlimid and dexamethasone. However, given poor tolerance and not adequate response to therapy, that was subsequently switched to Velcade and dexamethasone. She received this therapy until 04/2013. However, given minimal response to therapy, Cytoxan was added to the regimen. She received CyBorD until 08/2013, and had a maximal reduction of M-protein to 2.6-2.8 g/dL. A bone marrow biopsy still showed significant disease. Then she received 2 cycles of high-dose Cytoxan with inadequate decline in M-spike. She was then switched D-PACE. She received 2 cycles; one in November 2013, and the last one on 12/05/2013. Despite that she had significant " disease burden. She underwent Cytoxan priming on 2/20/14. She received Melphalan conditioning and then received her Auto HCT on 3/27/14.   Since then she has been maintained on pomalidomide maintenance therapy after really a modest response after autologous transplant. She remains on this regimen.  Her M spike is mildly increased.      Today she tells me that she is feeling fine and has good energy.  She has no new pain or new swellings or lumps. She has been eating well without nausea or vomiting and her weight has not changed. She exercises regularly and has no trouble doing that. Her breathing is stable. No frequent infections.   She does comment on irritation in the right chest wall after exercising or perspiring.      She has no new medical history.  She has been working with PT for back pain and hip pain.    No new family history.      ROS:  A 10 point comprehensive ROS was otherwise neg        Active Ambulatory Problems     Diagnosis Date Noted     Pain in thoracic spine 08/26/2013     Febrile neutropenia (H) 09/18/2013     Multiple myeloma, dx 2002,  s/p ASCBMT 3/26/14 10/07/2013     Cough 01/24/2014     RSV (respiratory syncytial virus pneumonia) 01/27/2014     Personal history of malignant neoplasm of breast 10/14/2014     Seasonal allergies 10/27/2015     Osteoporosis 11/01/2015     Resolved Ambulatory Problems     Diagnosis Date Noted     RSV bronchiolitis 01/27/2014     Past Medical History:   Diagnosis Date     Breast cancer (H) 1994     H/O stem cell transplant (H) 3/2014     Multiple myeloma, without mention of having achieved remission 11/6/2012     Current Outpatient Medications   Medication     CALCIUM-VITAMIN D-VITAMIN K PO     cholecalciferol (VITAMIN D3) 5000 UNITS CAPS capsule     clobetasol (TEMOVATE) 0.05 % ointment     COENZYME Q-10 PO     desonide (DESOWEN) 0.05 % ointment     Lactobacillus (PROBIOTIC ACIDOPHILUS) CAPS     magnesium 100 MG CAPS     Multiple Vitamins-Minerals (MULTIVITAMIN  OR)     Multiple Vitamins-Minerals (ZINC PO)     order for DME     order for DME     pomalidomide (POMALYST) 1 MG capsule     triamcinolone (KENALOG) 0.1 % cream     diclofenac (VOLTAREN) 1 % topical gel     No current facility-administered medications for this visit.      Facility-Administered Medications Ordered in Other Visits   Medication     plerixafor (MOZOBIL) injection SOLN 12.4 mg     potassium chloride 20 mEq in 50 mL IVPB     potassium chloride SA (K-DUR,KLOR-CON M) CR tablet 20 mEq     Family History   Problem Relation Age of Onset     Cancer Paternal Grandmother         skin cancer     Hypertension Mother      Hypertension Father      Cerebrovascular Disease Mother      Prostate Cancer Father      Cerebrovascular Disease Mother    She has no siblings. She has 2 daughters aged 40 and 37.   Father had Prostate ca at 80  Paternal GF - Glioblastoma at 69  Maternal GF- Lung cancer  Social History     Socioeconomic History     Marital status:      Spouse name: Not on file     Number of children: Not on file     Years of education: Not on file     Highest education level: Not on file   Occupational History     Not on file   Social Needs     Financial resource strain: Not on file     Food insecurity:     Worry: Not on file     Inability: Not on file     Transportation needs:     Medical: Not on file     Non-medical: Not on file   Tobacco Use     Smoking status: Never Smoker     Smokeless tobacco: Never Used   Substance and Sexual Activity     Alcohol use: Yes     Comment: occ     Drug use: No     Sexual activity: Not on file   Lifestyle     Physical activity:     Days per week: Not on file     Minutes per session: Not on file     Stress: Not on file   Relationships     Social connections:     Talks on phone: Not on file     Gets together: Not on file     Attends Jew service: Not on file     Active member of club or organization: Not on file     Attends meetings of clubs or organizations: Not on  "file     Relationship status: Not on file     Intimate partner violence:     Fear of current or ex partner: Not on file     Emotionally abused: Not on file     Physically abused: Not on file     Forced sexual activity: Not on file   Other Topics Concern     Parent/sibling w/ CABG, MI or angioplasty before 65F 55M? Not Asked      Service No     Blood Transfusions No     Caffeine Concern No     Occupational Exposure No     Hobby Hazards No     Sleep Concern No     Stress Concern No     Weight Concern No     Special Diet No     Back Care No     Exercise Yes     Comment: 3-4 x a week.      Bike Helmet Yes     Seat Belt Yes     Self-Exams No   Social History Narrative     Not on file   She has never smoked and occasionally drinks etoh. No drugs. 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     Pollen Extract      Environmental allergies      Shrimp Nausea and Vomiting     Tomato      Lip swelling, facial rash     Azithromycin Rash     Keflex [Cephalexin] Rash     Oat Rash     Tape [Adhesive Tape] Itching and Rash     All adhesives     Wheat Rash     Swelling of lips     Physical exam  /88   Pulse 84   Resp 16   Ht 1.575 m (5' 2\")   Wt 54.1 kg (119 lb 3.2 oz)   SpO2 97%   Breastfeeding? No   BMI 21.80 kg/m    General: no acute distress  HEENT: PERRLA, no palor or icterus.   NECK: supple - no cervical or supraclavicular LAD  CVS: s1s2 no m r g . No edema  CHEST: clear to auscultation b/l  ABDOMEN: soft non tender no hepatosplenomegaly  NEURO: AAOX3  Grossly non focal neuro exam  SKIN: no " obvious rashes  LYMPH NODES: no palpable cervical, supraclavicular, axillary or inguinal LAD  BREAST: right sided mastectomy and well healed surgical scar. No palpable abnormality or lumps noted on either side.        Mammogram 2/19/19 - benign      Assessment and Recommendations    1. Breast Cancer: She is now more than 20 years out from her diagnosis and completion of the therapy.  She had an updated mammogram a few weeks ago that was benign.  We discussed the importance of yearly mammograms.      We discussed prn follow up in the breast clinic.    She herself prefers thermography alternating with mammography.  She and I have discussed the role of thermography in breast cancer screening.  This is her preference.    2. Osteopororis- she has tried pamidronate and is not interested in retreating this.  Weight bearing exercises.  Calcium and vitamin D.      3. Multiple Myeloma s/p Auto HCT on 3/27/2014- on Pomalidomide maintenance- being followed by Dr Reinier Da Silva MD

## 2019-03-05 ENCOUNTER — MEDICAL CORRESPONDENCE (OUTPATIENT)
Dept: HEALTH INFORMATION MANAGEMENT | Facility: CLINIC | Age: 69
End: 2019-03-05

## 2019-03-05 ENCOUNTER — OFFICE VISIT (OUTPATIENT)
Dept: FAMILY MEDICINE | Facility: CLINIC | Age: 69
End: 2019-03-05
Payer: COMMERCIAL

## 2019-03-05 VITALS
OXYGEN SATURATION: 96 % | HEART RATE: 86 BPM | WEIGHT: 120.5 LBS | HEIGHT: 62 IN | SYSTOLIC BLOOD PRESSURE: 129 MMHG | BODY MASS INDEX: 22.18 KG/M2 | DIASTOLIC BLOOD PRESSURE: 84 MMHG

## 2019-03-05 DIAGNOSIS — Z01.818 PREOP GENERAL PHYSICAL EXAM: Primary | ICD-10-CM

## 2019-03-05 DIAGNOSIS — Z85.3 PERSONAL HISTORY OF MALIGNANT NEOPLASM OF BREAST: ICD-10-CM

## 2019-03-05 ASSESSMENT — PAIN SCALES - GENERAL: PAINLEVEL: NO PAIN (0)

## 2019-03-05 ASSESSMENT — MIFFLIN-ST. JEOR: SCORE: 1029.83

## 2019-03-05 NOTE — NURSING NOTE
"Chief Complaint   Patient presents with     Pre-Op Exam     Pt is here for a pre-op examination.      /84   Pulse 86   Ht 1.575 m (5' 2\")   Wt 54.7 kg (120 lb 8 oz)   SpO2 96%   BMI 22.04 kg/m      Diana [facility]  11:05 AM 3/5/2019  "

## 2019-03-05 NOTE — LETTER
3/5/2019      RE: Shena Hartmann  [address] [place] [address]          [facility]  [address]  [address]  [facility] [address] [phone]    PRE-OP EVALUATION:  Today's date: 3/5/2019    Shena Hartmann (: 1950) presents for pre-operative evaluation assessment as requested by Dr. Rohith Butcher  She requires evaluation and anesthesia risk assessment prior to undergoing surgery/procedure for treatment of cataracts.    Proposed Surgery/ Procedure: KPE, cataract  Surgery   Date of Surgery/ Procedure: 3/12/19 and 19  Time of Surgery/ Procedure:  unknown  Hospital/Surgical Facility: [facility] - Fax #:  [phone]  Primary Physician: Lui Hills  Type of Anesthesia Anticipated: topical anesthesia     Patient does have a healthcare directive.      1. NO - Do you have a history of heart attack, stroke, stent, bypass or surgery on an artery in the head, neck, heart or legs?  2. NO - Do you ever have any pain or discomfort in your chest?  3. NO - Do you have a history of  Heart Failure?  4. NO - Are you troubled by shortness of breath when: walking on the level, up a slight hill or at night?  5. NO - Do you currently have a cold, bronchitis or other respiratory infection?  6. NO - Do you have a cough, shortness of breath or wheezing?  7. NO - Do you sometimes get pains in the calves of your legs when you walk?  8. NO - Do you or anyone in your family have previous history of blood clots?  9. NO - Do you or does anyone in your family have a serious bleeding problem such as prolonged bleeding following surgeries or cuts?  10. NO - Have you ever had problems with anemia or been told to take iron pills?  Stem cell transplant in 2014.   History of multiple myeloma followed by oncology.   11. NO - Have you had any abnormal blood loss such as black, tarry or bloody stools, or abnormal vaginal bleeding?  12. YES - Have you ever had a  blood transfusion?   2014.   Hemoglobin   Date Value Ref Range Status   02/22/2019 13.5 11.7 - 15.7 g/dL Final     13. NO - Have you or any of your relatives ever had problems with anesthesia?  14. NO - Do you have sleep apnea, excessive snoring or daytime drowsiness?  15. NO - Do you have any prosthetic heart valves?  16. NO - Do you have prosthetic joints?  17. NO - Is there any chance that you may be pregnant?      HPI:     HPI related to upcoming procedure:    Shena presents to clinic today for cataract surgery of her right eye. Her vision has progressively declined over the past several months. She has noticed increased difficutly with nighttime driving. Bight lights often cause extreme blurring for her. She has also noticed increased difficulty with reading subtitles on the tv and street signs while driving.  She will have be having cataract surgery of her left eye.     See problem list for active medical problems.  Problems all longstanding and stable, except as noted/documented.  See ROS for pertinent symptoms related to these conditions.                 History of breast cancer which was diagnosed October 1994 - treated with right mastectomy and right axillary LN dissection.   History of multiple myeloma - last saw oncology on 3/4/2019.    Diagnosed 2001.       History of BMT in 2014.     Has been maintained on pomalidomide maintenance therapy since .                                                                                                                             .    MEDICAL HISTORY:     Patient Active Problem List    Diagnosis Date Noted     Osteoporosis 11/01/2015     Priority: Medium     Seasonal allergies 10/27/2015     Priority: Medium     Personal history of malignant neoplasm of breast 10/14/2014     Priority: Medium     RSV (respiratory syncytial virus pneumonia) 01/27/2014     Priority: Medium     Cough 01/24/2014     Priority: Medium     Multiple myeloma, dx 2002,  s/p ASCBMT 3/26/14  10/07/2013     Priority: Medium     Febrile neutropenia (H) 09/18/2013     Priority: Medium     Pain in thoracic spine 08/26/2013     Priority: Medium      Past Medical History:   Diagnosis Date     Breast cancer (H) 1994    right     H/O stem cell transplant (H) 3/2014     Multiple myeloma, without mention of having achieved remission 11/6/2012     Past Surgical History:   Procedure Laterality Date     MASTECTOMY      Right, with lymphe node disection       PICC INSERTION  9/10/2013    5fr DL Power PICC, 44cm (1cm external) in the L lateral brachial vein with tip in the low SVC     Current Outpatient Medications   Medication Sig Dispense Refill     CALCIUM-VITAMIN D-VITAMIN K PO Take 2 tablets by mouth daily.       cholecalciferol (VITAMIN D3) 5000 UNITS CAPS capsule Take 1 capsule (5,000 Units) by mouth daily 90 capsule 3     clobetasol (TEMOVATE) 0.05 % ointment Apply topically 2 times daily Apply to dermatitic plaques on hands 2 times per day for 2-3 days/week during flares. 45 g 0     COENZYME Q-10 PO Take 30 mg by mouth daily        desonide (DESOWEN) 0.05 % ointment Use twice daily on the face as needed. 60 g 1     Lactobacillus (PROBIOTIC ACIDOPHILUS) CAPS Take by mouth twice a week       magnesium 100 MG CAPS Take 117 mg by mouth 3 times daily       Multiple Vitamins-Minerals (MULTIVITAMIN OR) Take 1 tablet by mouth 2 times daily.       Multiple Vitamins-Minerals (ZINC PO) Take by mouth daily        order for DME Equipment being ordered: right breast prosthesis 2 Units 3     order for DME 6 mastectomy bras  Length of need: 99 months 6 Device 1     order for DME R mastectomy prosthesis   Length of need:  99 months 1 Device 1     pomalidomide (POMALYST) 1 MG capsule Take 1 capsule (1 mg) by mouth daily Take 2 mg alternating with 1 mg daily. Take on days 1-14  auth  :   [id number] 29 capsule 0     triamcinolone (KENALOG) 0.1 % cream Apply topically 2 times daily 80 g 1     diclofenac (VOLTAREN) 1 % topical gel  "Place onto the skin 4 times daily (Patient not taking: Reported on 1/28/2019) 100 g 0     OTC products: None, except as noted above    Allergies   Allergen Reactions     Cayenne Pepper [Cayenne]      Corn-Related Products GI Disturbance     Levaquin Other (See Comments)     Tendon pain     Milk Protein GI Disturbance     Mold      Facial rash/lip swelling     Morphine Sulfate Other (See Comments)     Per pt - see things (hallucination) when she was on Morphine .     Pollen Extract      Environmental allergies      Shrimp Nausea and Vomiting     Tomato      Lip swelling, facial rash     Azithromycin Rash     Keflex [Cephalexin] Rash     Oat Rash     Tape [Adhesive Tape] Itching and Rash     All adhesives     Wheat Rash     Swelling of lips      Latex Allergy: latex sensitivity     Social History     Tobacco Use     Smoking status: Never Smoker     Smokeless tobacco: Never Used   Substance Use Topics     Alcohol use: Yes     Comment: occ     History   Drug Use No       REVIEW OF SYSTEMS:   Constitutional, neuro, ENT, endocrine, pulmonary, cardiac, gastrointestinal, genitourinary, musculoskeletal, integument and psychiatric systems are negative, except as otherwise noted.    EXAM:   /84   Pulse 86   Ht 1.575 m (5' 2\")   Wt 54.7 kg (120 lb 8 oz)   SpO2 96%   BMI 22.04 kg/m       GENERAL APPEARANCE: healthy, alert and no distress     EYES: EOMI, PERRL     HENT: ear canals and TM's normal and nose and mouth without ulcers or lesions     NECK: no adenopathy, no asymmetry, masses, or scars and thyroid normal to palpation     RESP: lungs clear to auscultation - no rales, rhonchi or wheezes     CV: regular rates and rhythm, normal S1 S2, no S3 or S4 and no murmur, click or rub     ABDOMEN:  soft, nontender, no HSM or masses and bowel sounds normal     MS: extremities normal- no gross deformities noted, no evidence of inflammation in joints, FROM in all extremities.     SKIN: no suspicious lesions or rashes     " NEURO: Normal strength and tone, sensory exam grossly normal, mentation intact and speech normal     PSYCH: mentation appears normal. and affect normal/bright     LYMPHATICS: No cervical adenopathy    DIAGNOSTICS:   No labs or EKG required for low risk surgery (cataract, skin procedure, breast biopsy, etc)    Recent lab work 2/22/19.    Recent Labs   Lab Test 02/22/19  0810 12/14/18  1124  03/31/14  0322  03/25/14  0941   HGB 13.5 13.2   < > 8.9*   < > 10.3*    221   < > 122*   < > 184   INR  --   --   --  1.14  --  1.05    129*   < > 138   < > 139   POTASSIUM 3.8 3.6   < > 3.1*   < > 3.9   CR 0.72 0.64   < > 0.53   < > 0.70    < > = values in this interval not displayed.     IMPRESSION:   Reason for surgery/procedure: cataracts  Diagnosis/reason for consult: cataract surgery - right and left. KPE    The proposed surgical procedure is considered LOW risk.    REVISED CARDIAC RISK INDEX  The patient has the following serious cardiovascular risks for perioperative complications such as (MI, PE, VFib and 3  AV Block):  No serious cardiac risks  INTERPRETATION: 0 risks: Class I (very low risk - 0.4% complication rate)    The patient has the following additional risks for perioperative complications:  No identified additional risks    Alexadenies questions at this time regarding surgery. She would like to establish care with the NP clinic at a future date. She was provided contact information and advised to contact the clinic to make an appointment once she has completed her surgeries. She would like to defer a flu vaccine today.      ICD-10-CM    1. Preop general physical exam Z01.818    2. Personal history of malignant neoplasm of breast Z85.3      RECOMMENDATIONS:       --Patient is to take all scheduled medications on the day of surgery EXCEPT for modifications listed below.    APPROVAL GIVEN to proceed with proposed procedure, without further diagnostic evaluation       I was present with the NPP student  who participated in the service and in the documentation of the services provided. I have verified the history and personally performed the physical exam and medical decision making, as documented by the student and edited by eleazar Macedo NP Student MARINO Hernandes CNP  03/05/19  12:15 PM        Signed Electronically by:        MARINO Garrett CNP    Copy of this evaluation report is provided to requesting physician.    [place] Preop Guidelines    Revised Cardiac Risk Index    Surgeon (please enter first and last name):  Rohith Butcher  Fax number for Preop Evaluation:  [phone]  Location of Surgery: [facility]  [place]  Date of Surgery:  3/12/19 , 4/2/19  Procedure:  Cataracts Surgery  History of reaction to anesthesia?  No      MARINO Garrett CNP

## 2019-03-05 NOTE — PROGRESS NOTES
Surgeon (please enter first and last name):  Rohith Butcher  Fax number for Preop Evaluation:  [phone]  Location of Surgery: [facility]  [place]  Date of Surgery:  3/12/19 , 4/2/19  Procedure:  Cataracts Surgery  History of reaction to anesthesia?  No

## 2019-03-05 NOTE — PATIENT INSTRUCTIONS
[facility] Medication Refill Request Information:  * Please contact your pharmacy regarding ANY request for medication refills.  ** NP Clinic Prescription Fax = [phone]  * Please allow 3 business days for routine medication refills.  * Please allow 5 business days for controlled substance medication refills.     [facility] Test Result notification information:  *You will be notified with in 7-10 days of your appointment day regarding the results of your test.  If you are on MyChart you will be notified as soon as the provider has reviewed the results and signed off on them.    [facility]: [phone]     Before Your Surgery      Call your surgeon if there is any change in your health. This includes signs of a cold or flu (such as a sore throat, runny nose, cough, rash or fever).    Do not smoke, drink alcohol or take over the counter medicine (unless your surgeon or primary care doctor tells you to) for the 24 hours before and after surgery.    If you take prescribed drugs: Follow your doctor s orders about which medicines to take and which to stop until after surgery.    Eating and drinking prior to surgery: follow the instructions from your surgeon    Take a shower or bath the night before surgery. Use the soap your surgeon gave you to gently clean your skin. If you do not have soap from your surgeon, use your regular soap. Do not shave or scrub the surgery site.  Wear clean pajamas and have clean sheets on your bed.

## 2019-03-05 NOTE — PROGRESS NOTES
[facility]  [address]  5th Floor  [facility] [id number] [phone]    PRE-OP EVALUATION:  Today's date: 3/5/2019    Shena Hartmann (: 1950) presents for pre-operative evaluation assessment as requested by Dr. Rohith Butcher  She requires evaluation and anesthesia risk assessment prior to undergoing surgery/procedure for treatment of cataracts.    Proposed Surgery/ Procedure: KPE, cataract  Surgery   Date of Surgery/ Procedure: 3/12/19 and 19  Time of Surgery/ Procedure:  unknown  Hospital/Surgical Facility: [facility] - Fax #:  [phone]  Primary Physician: Lui Hills  Type of Anesthesia Anticipated: topical anesthesia     Patient does have a healthcare directive.      1. NO - Do you have a history of heart attack, stroke, stent, bypass or surgery on an artery in the head, neck, heart or legs?  2. NO - Do you ever have any pain or discomfort in your chest?  3. NO - Do you have a history of  Heart Failure?  4. NO - Are you troubled by shortness of breath when: walking on the level, up a slight hill or at night?  5. NO - Do you currently have a cold, bronchitis or other respiratory infection?  6. NO - Do you have a cough, shortness of breath or wheezing?  7. NO - Do you sometimes get pains in the calves of your legs when you walk?  8. NO - Do you or anyone in your family have previous history of blood clots?  9. NO - Do you or does anyone in your family have a serious bleeding problem such as prolonged bleeding following surgeries or cuts?  10. NO - Have you ever had problems with anemia or been told to take iron pills?  Stem cell transplant in .   History of multiple myeloma followed by oncology.   11. NO - Have you had any abnormal blood loss such as black, tarry or bloody stools, or abnormal vaginal bleeding?  12. YES - Have you ever had a blood transfusion?   .   Hemoglobin   Date Value Ref Range Status   2019 13.5  11.7 - 15.7 g/dL Final     13. NO - Have you or any of your relatives ever had problems with anesthesia?  14. NO - Do you have sleep apnea, excessive snoring or daytime drowsiness?  15. NO - Do you have any prosthetic heart valves?  16. NO - Do you have prosthetic joints?  17. NO - Is there any chance that you may be pregnant?      HPI:     HPI related to upcoming procedure:    Shena presents to clinic today for cataract surgery of her right eye. Her vision has progressively declined over the past several months. She has noticed increased difficutly with nighttime driving. Bight lights often cause extreme blurring for her. She has also noticed increased difficulty with reading subtitles on the tv and street signs while driving.  She will have be having cataract surgery of her left eye.     See problem list for active medical problems.  Problems all longstanding and stable, except as noted/documented.  See ROS for pertinent symptoms related to these conditions.                 History of breast cancer which was diagnosed October 1994 - treated with right mastectomy and right axillary LN dissection.   History of multiple myeloma - last saw oncology on 3/4/2019.    Diagnosed 2001.       History of BMT in 2014.     Has been maintained on pomalidomide maintenance therapy since .                                                                                                                             .    MEDICAL HISTORY:     Patient Active Problem List    Diagnosis Date Noted     Osteoporosis 11/01/2015     Priority: Medium     Seasonal allergies 10/27/2015     Priority: Medium     Personal history of malignant neoplasm of breast 10/14/2014     Priority: Medium     RSV (respiratory syncytial virus pneumonia) 01/27/2014     Priority: Medium     Cough 01/24/2014     Priority: Medium     Multiple myeloma, dx 2002,  s/p ASCBMT 3/26/14 10/07/2013     Priority: Medium     Febrile neutropenia (H) 09/18/2013     Priority:  Medium     Pain in thoracic spine 08/26/2013     Priority: Medium      Past Medical History:   Diagnosis Date     Breast cancer (H) 1994    right     H/O stem cell transplant (H) 3/2014     Multiple myeloma, without mention of having achieved remission 11/6/2012     Past Surgical History:   Procedure Laterality Date     MASTECTOMY      Right, with lymphe node disection       PICC INSERTION  9/10/2013    5fr DL Power PICC, 44cm (1cm external) in the L lateral brachial vein with tip in the low SVC     Current Outpatient Medications   Medication Sig Dispense Refill     CALCIUM-VITAMIN D-VITAMIN K PO Take 2 tablets by mouth daily.       cholecalciferol (VITAMIN D3) 5000 UNITS CAPS capsule Take 1 capsule (5,000 Units) by mouth daily 90 capsule 3     clobetasol (TEMOVATE) 0.05 % ointment Apply topically 2 times daily Apply to dermatitic plaques on hands 2 times per day for 2-3 days/week during flares. 45 g 0     COENZYME Q-10 PO Take 30 mg by mouth daily        desonide (DESOWEN) 0.05 % ointment Use twice daily on the face as needed. 60 g 1     Lactobacillus (PROBIOTIC ACIDOPHILUS) CAPS Take by mouth twice a week       magnesium 100 MG CAPS Take 117 mg by mouth 3 times daily       Multiple Vitamins-Minerals (MULTIVITAMIN OR) Take 1 tablet by mouth 2 times daily.       Multiple Vitamins-Minerals (ZINC PO) Take by mouth daily        order for DME Equipment being ordered: right breast prosthesis 2 Units 3     order for DME 6 mastectomy bras  Length of need: 99 months 6 Device 1     order for DME R mastectomy prosthesis   Length of need:  99 months 1 Device 1     pomalidomide (POMALYST) 1 MG capsule Take 1 capsule (1 mg) by mouth daily Take 2 mg alternating with 1 mg daily. Take on days 1-14  auth  :   [id number] 29 capsule 0     triamcinolone (KENALOG) 0.1 % cream Apply topically 2 times daily 80 g 1     diclofenac (VOLTAREN) 1 % topical gel Place onto the skin 4 times daily (Patient not taking: Reported on 1/28/2019) 100 g 0  "    OTC products: None, except as noted above    Allergies   Allergen Reactions     Cayenne Pepper [Cayenne]      Corn-Related Products GI Disturbance     Levaquin Other (See Comments)     Tendon pain     Milk Protein GI Disturbance     Mold      Facial rash/lip swelling     Morphine Sulfate Other (See Comments)     Per pt - see things (hallucination) when she was on Morphine .     Pollen Extract      Environmental allergies      Shrimp Nausea and Vomiting     Tomato      Lip swelling, facial rash     Azithromycin Rash     Keflex [Cephalexin] Rash     Oat Rash     Tape [Adhesive Tape] Itching and Rash     All adhesives     Wheat Rash     Swelling of lips      Latex Allergy: latex sensitivity     Social History     Tobacco Use     Smoking status: Never Smoker     Smokeless tobacco: Never Used   Substance Use Topics     Alcohol use: Yes     Comment: occ     History   Drug Use No       REVIEW OF SYSTEMS:   Constitutional, neuro, ENT, endocrine, pulmonary, cardiac, gastrointestinal, genitourinary, musculoskeletal, integument and psychiatric systems are negative, except as otherwise noted.    EXAM:   /84   Pulse 86   Ht 1.575 m (5' 2\")   Wt 54.7 kg (120 lb 8 oz)   SpO2 96%   BMI 22.04 kg/m      GENERAL APPEARANCE: healthy, alert and no distress     EYES: EOMI, PERRL     HENT: ear canals and TM's normal and nose and mouth without ulcers or lesions     NECK: no adenopathy, no asymmetry, masses, or scars and thyroid normal to palpation     RESP: lungs clear to auscultation - no rales, rhonchi or wheezes     CV: regular rates and rhythm, normal S1 S2, no S3 or S4 and no murmur, click or rub     ABDOMEN:  soft, nontender, no HSM or masses and bowel sounds normal     MS: extremities normal- no gross deformities noted, no evidence of inflammation in joints, FROM in all extremities.     SKIN: no suspicious lesions or rashes     NEURO: Normal strength and tone, sensory exam grossly normal, mentation intact and speech " normal     PSYCH: mentation appears normal. and affect normal/bright     LYMPHATICS: No cervical adenopathy    DIAGNOSTICS:   No labs or EKG required for low risk surgery (cataract, skin procedure, breast biopsy, etc)    Recent lab work 2/22/19.    Recent Labs   Lab Test 02/22/19  0810 12/14/18  1124  03/31/14  0322  03/25/14  0941   HGB 13.5 13.2   < > 8.9*   < > 10.3*    221   < > 122*   < > 184   INR  --   --   --  1.14  --  1.05    129*   < > 138   < > 139   POTASSIUM 3.8 3.6   < > 3.1*   < > 3.9   CR 0.72 0.64   < > 0.53   < > 0.70    < > = values in this interval not displayed.     IMPRESSION:   Reason for surgery/procedure: cataracts  Diagnosis/reason for consult: cataract surgery - right and left. KPE    The proposed surgical procedure is considered LOW risk.    REVISED CARDIAC RISK INDEX  The patient has the following serious cardiovascular risks for perioperative complications such as (MI, PE, VFib and 3  AV Block):  No serious cardiac risks  INTERPRETATION: 0 risks: Class I (very low risk - 0.4% complication rate)    The patient has the following additional risks for perioperative complications:  No identified additional risks    Alexadenies questions at this time regarding surgery. She would like to establish care with the NP clinic at a future date. She was provided contact information and advised to contact the clinic to make an appointment once she has completed her surgeries. She would like to defer a flu vaccine today.      ICD-10-CM    1. Preop general physical exam Z01.818    2. Personal history of malignant neoplasm of breast Z85.3      RECOMMENDATIONS:       --Patient is to take all scheduled medications on the day of surgery EXCEPT for modifications listed below.    APPROVAL GIVEN to proceed with proposed procedure, without further diagnostic evaluation       I was present with the NPP student who participated in the service and in the documentation of the services provided. I have  verified the history and personally performed the physical exam and medical decision making, as documented by the student and edited by eleazar Macedo NP Student MARINO Hernandes CNP  03/05/19  12:15 PM        Signed Electronically by:        MARINO Garrett, CNP    Copy of this evaluation report is provided to requesting physician.    [place] Preop Guidelines    Revised Cardiac Risk Index

## 2019-03-18 ENCOUNTER — TELEPHONE (OUTPATIENT)
Dept: ONCOLOGY | Facility: CLINIC | Age: 69
End: 2019-03-18

## 2019-03-18 DIAGNOSIS — C90.00 MULTIPLE MYELOMA, REMISSION STATUS UNSPECIFIED (H): ICD-10-CM

## 2019-03-18 NOTE — TELEPHONE ENCOUNTER
Oral Chemotherapy Monitoring Program   Medication: Pomalyst  Rx: 1mg PO daily on days 1 through 21 of 28 day cycle   Auth #: [id number]   Risk Category: Adult Female Not of Reproductive Potential  Routine survey questions reviewed.   Rx to be Escribed to [facility]      Lian Vladimir  Oncology Clinic Liaison  [address], [address]  [place] [address]  Ph: [phone]/Fax: [phone]

## 2019-04-17 ENCOUNTER — TELEPHONE (OUTPATIENT)
Dept: PHARMACY | Facility: CLINIC | Age: 69
End: 2019-04-17

## 2019-04-17 DIAGNOSIS — C90.00 MULTIPLE MYELOMA, REMISSION STATUS UNSPECIFIED (H): ICD-10-CM

## 2019-04-17 NOTE — TELEPHONE ENCOUNTER
Oral Chemotherapy Monitoring Program   Medication: Pomalyst  Rx: 1mg PO daily on days 1 through 21 of 28 day cycle   Auth #: [id number]   Risk Category: Adult Female Not of Reproductive Potential  Routine survey questions reviewed.   Rx to be Escribed to [facility]      Lian [facility]  Oncology Clinic Liaison  [address], [address]  [place] [address]  Ph: [phone]/Fax: [phone]

## 2019-05-09 ENCOUNTER — TELEPHONE (OUTPATIENT)
Dept: PHARMACY | Facility: CLINIC | Age: 69
End: 2019-05-09

## 2019-05-09 DIAGNOSIS — C90.00 MULTIPLE MYELOMA, REMISSION STATUS UNSPECIFIED (H): ICD-10-CM

## 2019-05-09 NOTE — TELEPHONE ENCOUNTER
Oral Chemotherapy Monitoring Program   Medication: Pomalyst   Rx: take 1mg alternating with 2mg PO daily on days 1 through 14 of 21 day cycle   Auth #: [id number]   Risk Category: Adult female not of reproductive potential  Routine survey questions reviewed.   Rx to be Escribed to [facility]      Lian Gilbert  Oncology Clinic Liaison  [address], [address]  [place] [address]  Ph: [phone]/Fax: [phone]

## 2019-06-03 DIAGNOSIS — C90.00 MULTIPLE MYELOMA NOT HAVING ACHIEVED REMISSION (H): ICD-10-CM

## 2019-06-03 LAB
ALBUMIN SERPL-MCNC: 3.8 G/DL (ref 3.4–5)
ALP SERPL-CCNC: 70 U/L (ref 40–150)
ALT SERPL W P-5'-P-CCNC: 25 U/L (ref 0–50)
ANION GAP SERPL CALCULATED.3IONS-SCNC: 5 MMOL/L (ref 3–14)
AST SERPL W P-5'-P-CCNC: 18 U/L (ref 0–45)
BASOPHILS # BLD AUTO: 0 10E9/L (ref 0–0.2)
BASOPHILS NFR BLD AUTO: 0.8 %
BILIRUB SERPL-MCNC: 0.4 MG/DL (ref 0.2–1.3)
BUN SERPL-MCNC: 15 MG/DL (ref 7–30)
CALCIUM SERPL-MCNC: 9 MG/DL (ref 8.5–10.1)
CHLORIDE SERPL-SCNC: 102 MMOL/L (ref 94–109)
CO2 SERPL-SCNC: 29 MMOL/L (ref 20–32)
CREAT SERPL-MCNC: 0.77 MG/DL (ref 0.52–1.04)
CRP SERPL-MCNC: <2.9 MG/L (ref 0–8)
DIFFERENTIAL METHOD BLD: NORMAL
EOSINOPHIL # BLD AUTO: 0.1 10E9/L (ref 0–0.7)
EOSINOPHIL NFR BLD AUTO: 1.2 %
ERYTHROCYTE [DISTWIDTH] IN BLOOD BY AUTOMATED COUNT: 14.6 % (ref 10–15)
GFR SERPL CREATININE-BSD FRML MDRD: 79 ML/MIN/{1.73_M2}
GLUCOSE SERPL-MCNC: 68 MG/DL (ref 70–99)
HCT VFR BLD AUTO: 38.8 % (ref 35–47)
HGB BLD-MCNC: 13.1 G/DL (ref 11.7–15.7)
IMM GRANULOCYTES # BLD: 0 10E9/L (ref 0–0.4)
IMM GRANULOCYTES NFR BLD: 0.2 %
LDH SERPL L TO P-CCNC: 176 U/L (ref 81–234)
LYMPHOCYTES # BLD AUTO: 1.3 10E9/L (ref 0.8–5.3)
LYMPHOCYTES NFR BLD AUTO: 27 %
MCH RBC QN AUTO: 33 PG (ref 26.5–33)
MCHC RBC AUTO-ENTMCNC: 33.8 G/DL (ref 31.5–36.5)
MCV RBC AUTO: 98 FL (ref 78–100)
MONOCYTES # BLD AUTO: 0.3 10E9/L (ref 0–1.3)
MONOCYTES NFR BLD AUTO: 6.4 %
NEUTROPHILS # BLD AUTO: 3.1 10E9/L (ref 1.6–8.3)
NEUTROPHILS NFR BLD AUTO: 64.4 %
NRBC # BLD AUTO: 0 10*3/UL
NRBC BLD AUTO-RTO: 0 /100
PLATELET # BLD AUTO: 254 10E9/L (ref 150–450)
POTASSIUM SERPL-SCNC: 3.8 MMOL/L (ref 3.4–5.3)
PROT SERPL-MCNC: 7.6 G/DL (ref 6.8–8.8)
RBC # BLD AUTO: 3.97 10E12/L (ref 3.8–5.2)
SODIUM SERPL-SCNC: 136 MMOL/L (ref 133–144)
WBC # BLD AUTO: 4.8 10E9/L (ref 4–11)

## 2019-06-03 PROCEDURE — 83615 LACTATE (LD) (LDH) ENZYME: CPT | Performed by: INTERNAL MEDICINE

## 2019-06-03 PROCEDURE — 80053 COMPREHEN METABOLIC PANEL: CPT | Performed by: INTERNAL MEDICINE

## 2019-06-03 PROCEDURE — 86140 C-REACTIVE PROTEIN: CPT | Performed by: INTERNAL MEDICINE

## 2019-06-03 PROCEDURE — 00000402 ZZHCL STATISTIC TOTAL PROTEIN: Performed by: INTERNAL MEDICINE

## 2019-06-03 PROCEDURE — 84165 PROTEIN E-PHORESIS SERUM: CPT | Performed by: INTERNAL MEDICINE

## 2019-06-03 PROCEDURE — 85025 COMPLETE CBC W/AUTO DIFF WBC: CPT | Performed by: INTERNAL MEDICINE

## 2019-06-03 NOTE — NURSING NOTE
Chief Complaint   Patient presents with     Lab Only     labs drawn with vpt by rn.     Labs drawn with vpt by rn.  Pt tolerated well.     Mahnaz Andersen RN

## 2019-06-04 LAB
ALBUMIN SERPL ELPH-MCNC: 4.5 G/DL (ref 3.7–5.1)
ALPHA1 GLOB SERPL ELPH-MCNC: 0.3 G/DL (ref 0.2–0.4)
ALPHA2 GLOB SERPL ELPH-MCNC: 0.8 G/DL (ref 0.5–0.9)
B-GLOBULIN SERPL ELPH-MCNC: 0.6 G/DL (ref 0.6–1)
GAMMA GLOB SERPL ELPH-MCNC: 1.3 G/DL (ref 0.7–1.6)
M PROTEIN SERPL ELPH-MCNC: 0.9 G/DL
PROT PATTERN SERPL ELPH-IMP: ABNORMAL

## 2019-06-11 ENCOUNTER — TELEPHONE (OUTPATIENT)
Dept: PHARMACY | Facility: CLINIC | Age: 69
End: 2019-06-11

## 2019-06-11 DIAGNOSIS — C90.00 MULTIPLE MYELOMA, REMISSION STATUS UNSPECIFIED (H): ICD-10-CM

## 2019-06-11 NOTE — TELEPHONE ENCOUNTER
Oral Chemotherapy Monitoring Program   Medication: pomalyst  Rx: 2mg alternating with 1mg every other day on days 1 through 14 of 28 day cycle   Auth #: [id number]   Risk Category: Adult Female not of Reproductive Potential  Routine survey questions reviewed.   Rx to be Escribed to [facility]      Lian Gilbert  Oncology Clinic Liaison  [address], [address]  [place] [address]  Ph: [phone]/Fax: [phone]

## 2019-07-15 ENCOUNTER — TELEPHONE (OUTPATIENT)
Dept: PHARMACY | Facility: CLINIC | Age: 69
End: 2019-07-15

## 2019-07-15 DIAGNOSIS — C90.00 MULTIPLE MYELOMA, REMISSION STATUS UNSPECIFIED (H): ICD-10-CM

## 2019-07-15 NOTE — TELEPHONE ENCOUNTER
Oral Chemotherapy Monitoring Program   Medication: Pomalyst    Rx: take 1mg alternating with 2mg PO daily on days 1 through 14 of 21 day cycle   Auth #: [id number]  Risk Category: Adult female not of reproductive potential  Routine survey questions reviewed.   Rx to be Escribed to [facility]        Lian Gilbert  Oncology Clinic Liaison  [address], [address]  [place] [address]  Ph: [phone]/Fax: [phone]

## 2019-08-06 ENCOUNTER — TELEPHONE (OUTPATIENT)
Dept: PHARMACY | Facility: CLINIC | Age: 69
End: 2019-08-06

## 2019-08-06 DIAGNOSIS — C90.00 MULTIPLE MYELOMA, REMISSION STATUS UNSPECIFIED (H): ICD-10-CM

## 2019-08-06 NOTE — TELEPHONE ENCOUNTER
Oral Chemotherapy Monitoring Program   Medication: Pomalyst  Rx: 1mg alternating with 2mg PO daily on days 1 through 14 of 21 day cycle   Auth #:[id number]  Risk Category: Adult Female Not of Reproductive Potential  Routine survey questions reviewed.   Rx to be Escribed to [facility]      Lian Gilbert  Oncology Clinic Liaison  [address], [address]  [place] [address]  Ph: [phone]/Fax: [phone]

## 2019-08-29 DIAGNOSIS — C90.00 MULTIPLE MYELOMA NOT HAVING ACHIEVED REMISSION (H): ICD-10-CM

## 2019-08-29 LAB
ALBUMIN SERPL-MCNC: 3.8 G/DL (ref 3.4–5)
ALP SERPL-CCNC: 52 U/L (ref 40–150)
ALT SERPL W P-5'-P-CCNC: 25 U/L (ref 0–50)
ANION GAP SERPL CALCULATED.3IONS-SCNC: 5 MMOL/L (ref 3–14)
AST SERPL W P-5'-P-CCNC: 15 U/L (ref 0–45)
BASOPHILS # BLD AUTO: 0 10E9/L (ref 0–0.2)
BASOPHILS NFR BLD AUTO: 0.7 %
BILIRUB SERPL-MCNC: 0.4 MG/DL (ref 0.2–1.3)
BUN SERPL-MCNC: 13 MG/DL (ref 7–30)
CALCIUM SERPL-MCNC: 9.2 MG/DL (ref 8.5–10.1)
CHLORIDE SERPL-SCNC: 106 MMOL/L (ref 94–109)
CO2 SERPL-SCNC: 28 MMOL/L (ref 20–32)
CREAT SERPL-MCNC: 0.83 MG/DL (ref 0.52–1.04)
CRP SERPL-MCNC: <2.9 MG/L (ref 0–8)
DIFFERENTIAL METHOD BLD: ABNORMAL
EOSINOPHIL # BLD AUTO: 0.1 10E9/L (ref 0–0.7)
EOSINOPHIL NFR BLD AUTO: 2.1 %
ERYTHROCYTE [DISTWIDTH] IN BLOOD BY AUTOMATED COUNT: 14.6 % (ref 10–15)
GFR SERPL CREATININE-BSD FRML MDRD: 72 ML/MIN/{1.73_M2}
GLUCOSE SERPL-MCNC: 60 MG/DL (ref 70–99)
HCT VFR BLD AUTO: 40.7 % (ref 35–47)
HGB BLD-MCNC: 13.4 G/DL (ref 11.7–15.7)
IMM GRANULOCYTES # BLD: 0 10E9/L (ref 0–0.4)
IMM GRANULOCYTES NFR BLD: 0.2 %
LDH SERPL L TO P-CCNC: 156 U/L (ref 81–234)
LYMPHOCYTES # BLD AUTO: 1.4 10E9/L (ref 0.8–5.3)
LYMPHOCYTES NFR BLD AUTO: 31.2 %
MCH RBC QN AUTO: 33.1 PG (ref 26.5–33)
MCHC RBC AUTO-ENTMCNC: 32.9 G/DL (ref 31.5–36.5)
MCV RBC AUTO: 101 FL (ref 78–100)
MONOCYTES # BLD AUTO: 0.4 10E9/L (ref 0–1.3)
MONOCYTES NFR BLD AUTO: 9.3 %
NEUTROPHILS # BLD AUTO: 2.5 10E9/L (ref 1.6–8.3)
NEUTROPHILS NFR BLD AUTO: 56.5 %
NRBC # BLD AUTO: 0 10*3/UL
NRBC BLD AUTO-RTO: 0 /100
PLATELET # BLD AUTO: 179 10E9/L (ref 150–450)
POTASSIUM SERPL-SCNC: 4 MMOL/L (ref 3.4–5.3)
PROT SERPL-MCNC: 7.3 G/DL (ref 6.8–8.8)
RBC # BLD AUTO: 4.05 10E12/L (ref 3.8–5.2)
SODIUM SERPL-SCNC: 139 MMOL/L (ref 133–144)
WBC # BLD AUTO: 4.4 10E9/L (ref 4–11)

## 2019-08-29 PROCEDURE — 86140 C-REACTIVE PROTEIN: CPT | Performed by: INTERNAL MEDICINE

## 2019-08-29 PROCEDURE — 36415 COLL VENOUS BLD VENIPUNCTURE: CPT

## 2019-08-29 PROCEDURE — 84165 PROTEIN E-PHORESIS SERUM: CPT | Performed by: INTERNAL MEDICINE

## 2019-08-29 PROCEDURE — 83615 LACTATE (LD) (LDH) ENZYME: CPT | Performed by: INTERNAL MEDICINE

## 2019-08-29 PROCEDURE — 85025 COMPLETE CBC W/AUTO DIFF WBC: CPT | Performed by: INTERNAL MEDICINE

## 2019-08-29 PROCEDURE — 00000402 ZZHCL STATISTIC TOTAL PROTEIN: Performed by: INTERNAL MEDICINE

## 2019-08-29 PROCEDURE — 80053 COMPREHEN METABOLIC PANEL: CPT | Performed by: INTERNAL MEDICINE

## 2019-08-29 NOTE — NURSING NOTE
Chief Complaint   Patient presents with     Blood Draw     labs drawn via vpt by rn. vs taken      Blood drawn via vpt by RN in lab. VS taken. Pt checked into next appointment.   Christine To RN

## 2019-08-30 LAB
ALBUMIN SERPL ELPH-MCNC: 4.3 G/DL (ref 3.7–5.1)
ALPHA1 GLOB SERPL ELPH-MCNC: 0.3 G/DL (ref 0.2–0.4)
ALPHA2 GLOB SERPL ELPH-MCNC: 0.7 G/DL (ref 0.5–0.9)
B-GLOBULIN SERPL ELPH-MCNC: 0.6 G/DL (ref 0.6–1)
GAMMA GLOB SERPL ELPH-MCNC: 1.3 G/DL (ref 0.7–1.6)
M PROTEIN SERPL ELPH-MCNC: 1 G/DL
PROT PATTERN SERPL ELPH-IMP: ABNORMAL

## 2019-09-06 ENCOUNTER — ONCOLOGY VISIT (OUTPATIENT)
Dept: TRANSPLANT | Facility: CLINIC | Age: 69
End: 2019-09-06
Attending: INTERNAL MEDICINE
Payer: COMMERCIAL

## 2019-09-06 VITALS
DIASTOLIC BLOOD PRESSURE: 76 MMHG | TEMPERATURE: 97.3 F | WEIGHT: 121.5 LBS | SYSTOLIC BLOOD PRESSURE: 132 MMHG | OXYGEN SATURATION: 96 % | BODY MASS INDEX: 22.22 KG/M2 | HEART RATE: 78 BPM

## 2019-09-06 DIAGNOSIS — C90.00 MULTIPLE MYELOMA NOT HAVING ACHIEVED REMISSION (H): Primary | ICD-10-CM

## 2019-09-06 DIAGNOSIS — C90.00 MULTIPLE MYELOMA, REMISSION STATUS UNSPECIFIED (H): ICD-10-CM

## 2019-09-06 PROCEDURE — G0463 HOSPITAL OUTPT CLINIC VISIT: HCPCS

## 2019-09-06 ASSESSMENT — PAIN SCALES - GENERAL: PAINLEVEL: NO PAIN (0)

## 2019-09-06 NOTE — PROGRESS NOTES
/76   Pulse 78   Temp 97.3  F (36.3  C) (Oral)   Wt 55.1 kg (121 lb 8 oz)   SpO2 96%   BMI 22.22 kg/m      Wt Readings from Last 4 Encounters:   09/06/19 55.1 kg (121 lb 8 oz)   03/05/19 54.7 kg (120 lb 8 oz)   03/04/19 54.1 kg (119 lb 3.2 oz)   03/01/19 54.4 kg (120 lb)     Shena was seen and examined by me today.  She is a 68-year-old woman with breast cancer 25 years ago and multiple myeloma for many years who underwent autologous transplant approximately 4 years ago.  Zach is now on maintenance therapy with pomalidomide which is now being dosed at 2 alternating with 1 mg orally every 14 days followed by 14 days of no therapy.  This dosing schedule was modified based on her side effects.  She is feeling remarkably better on this dosing.  Her energy level is better and she has been traveling.  She has no major signs or symptoms or physical limitations.  She did have some hip pain which is finally improved and probably was musculoskeletal in origin.  Initially we were concerned that this represented bony pain.  However, this was not the case and the symptoms are now resolved.  She has had no infections or signs or symptoms of bleeding or thrombosis.  She is otherwise doing well.  The rest of the 10 point review of systems is unremarkable.    Physical exam overall Shena looks well and is in no distress today.  Her vital signs are stable as he is her weight.  HEENT exam is unremarkable.  Pupils are equal round reactive to light.  There is no cervical, axillary or inguinal adenopathy.  Lungs are clear to auscultation percussion.  Cardiac exam is without gallops or murmurs.  Abdominal exam is benign and there is no organomegaly.  Lower extremities are without edema.  There are no skin rashes.      Laboratory evaluation: Electrolytes are normal with a BUN of 13 and a creatinine of 0.83.  CBC shows a hemoglobin of 13.4, platelet count of 179,000 and a white count of 4400 with 2500 absolute neutrophils.   Serum protein electrophoresis shows 1 g of a monoclonal protein.  I have reviewed the monoclonal proteins over the past year and this is really within range of what she has been for several years.  Having said that, this is slightly increased from a da of 0.7 g which may be reflective of our modified dosing.    It is my overall impression that Zach is doing well and has really minimal symptoms.  She has no obvious progression of her multiple myeloma but obviously still has residual disease.  I spent a great deal of time with her discussing her M spike's and we will continue to monitor these every 3 months.  I will also see her again in 6 months.  Given her schedule that is tailored to symptom management and the likelihood that this will be on going for many years, we agreed that keeping with this dosing interval is our first priority.  If her M spike continues to creep upward we may consider increasing her dose to 2 mg daily for the 14-day on treatment period.  She is in agreement with this plan.  All of her questions have been answered today.    Dr. Lui Hills MD

## 2019-09-06 NOTE — NURSING NOTE
"Oncology Rooming Note    September 6, 2019 2:56 PM   Shena Hartmann is a 68 year old female who presents for:    Chief Complaint   Patient presents with     RECHECK     Return: MM     Initial Vitals: /76   Pulse 78   Temp 97.3  F (36.3  C) (Oral)   Wt 55.1 kg (121 lb 8 oz)   SpO2 96%   BMI 22.22 kg/m   Estimated body mass index is 22.22 kg/m  as calculated from the following:    Height as of 3/5/19: 1.575 m (5' 2\").    Weight as of this encounter: 55.1 kg (121 lb 8 oz). Body surface area is 1.55 meters squared.  No Pain (0) Comment: Data Unavailable   No LMP recorded. Patient is postmenopausal.  Allergies reviewed: Yes  Medications reviewed: Yes    Medications: Medication refills not needed today.  Pharmacy name entered into [facility]:    [facility][id number] - [place], [place] - [address] AT [facility] [address]  WRITTEN PRESCRIPTION REQUESTED  [facility] #[id number] - [place], [place] - [address]  [facility] - [place], [place] - [address]    Clinical concerns: Would like to discuss Mahin Morales CMA              "

## 2019-10-01 ENCOUNTER — TELEPHONE (OUTPATIENT)
Dept: PHARMACY | Facility: CLINIC | Age: 69
End: 2019-10-01

## 2019-10-01 DIAGNOSIS — C90.00 MULTIPLE MYELOMA, REMISSION STATUS UNSPECIFIED (H): ICD-10-CM

## 2019-10-01 NOTE — TELEPHONE ENCOUNTER
Oral Chemotherapy Monitoring Program   Medication: Pomalyst  Rx: 1mg PO daily on days 1 through 21 of 28 day cycle   Auth #: [id number]   Risk Category: Adult female not of reproductive potential  Routine survey questions reviewed.   Rx to be Escribed to [facility]      Lian [facility]  Oncology Clinic Liaison  [address], [address]  [place] [address]  Ph: [phone]/Fax: [phone]

## 2019-10-04 ENCOUNTER — HEALTH MAINTENANCE LETTER (OUTPATIENT)
Age: 69
End: 2019-10-04

## 2019-10-25 ENCOUNTER — TELEPHONE (OUTPATIENT)
Dept: TRANSPLANT | Facility: CLINIC | Age: 69
End: 2019-10-25

## 2019-10-25 NOTE — TELEPHONE ENCOUNTER
Prior Authorization Approval    Authorization Effective Date: 11/5/2019  Authorization Expiration Date: 11/5/2020  Medication: Pomalyst approved  Approved Dose/Quantity: 21 for 28  Reference #: [id number]   Insurance Company: [employer] Phone [phone] Fax [phone]  Expected CoPay: $15     CoPay Card Available:      Foundation Assistance Needed:    Which Pharmacy is filling the prescription (Not needed for infusion/clinic administered): [facility] - [place], [place] - [address]  Pharmacy Notified: Yes  Patient Notified:

## 2019-10-28 ENCOUNTER — TELEPHONE (OUTPATIENT)
Dept: PHARMACY | Facility: CLINIC | Age: 69
End: 2019-10-28

## 2019-10-28 DIAGNOSIS — C90.00 MULTIPLE MYELOMA, REMISSION STATUS UNSPECIFIED (H): ICD-10-CM

## 2019-10-28 NOTE — TELEPHONE ENCOUNTER
Oral Chemotherapy Monitoring Program   Medication: Pomalyst  Rx: 1mg alternating with 2mg PO daily on days 1 through 14 of 21 day cycle   Auth #: [id number]   Risk Category: Adult Female Not of reproductive potential  Routine survey questions reviewed.   Rx to be Escribed to [facility]      Lian Gilbert  Oncology Clinic Liaison  [address], [address]  [place] [address]  Ph: [phone]/Fax: [phone]

## 2019-11-05 DIAGNOSIS — M25.551 HIP PAIN, RIGHT: ICD-10-CM

## 2019-11-27 DIAGNOSIS — C90.00 MULTIPLE MYELOMA, REMISSION STATUS UNSPECIFIED (H): ICD-10-CM

## 2019-12-06 DIAGNOSIS — C90.00 MULTIPLE MYELOMA NOT HAVING ACHIEVED REMISSION (H): ICD-10-CM

## 2019-12-06 LAB
ALBUMIN SERPL-MCNC: 4 G/DL (ref 3.4–5)
ALP SERPL-CCNC: 52 U/L (ref 40–150)
ALT SERPL W P-5'-P-CCNC: 31 U/L (ref 0–50)
ANION GAP SERPL CALCULATED.3IONS-SCNC: 5 MMOL/L (ref 3–14)
AST SERPL W P-5'-P-CCNC: 19 U/L (ref 0–45)
B2 MICROGLOB SERPL-MCNC: 2.2 MG/L
BASOPHILS # BLD AUTO: 0 10E9/L (ref 0–0.2)
BASOPHILS NFR BLD AUTO: 1.3 %
BILIRUB SERPL-MCNC: 0.5 MG/DL (ref 0.2–1.3)
BUN SERPL-MCNC: 17 MG/DL (ref 7–30)
CALCIUM SERPL-MCNC: 9.6 MG/DL (ref 8.5–10.1)
CHLORIDE SERPL-SCNC: 104 MMOL/L (ref 94–109)
CO2 SERPL-SCNC: 29 MMOL/L (ref 20–32)
CREAT SERPL-MCNC: 0.86 MG/DL (ref 0.52–1.04)
CRP SERPL-MCNC: <2.9 MG/L (ref 0–8)
DIFFERENTIAL METHOD BLD: ABNORMAL
EOSINOPHIL # BLD AUTO: 0.1 10E9/L (ref 0–0.7)
EOSINOPHIL NFR BLD AUTO: 2.4 %
ERYTHROCYTE [DISTWIDTH] IN BLOOD BY AUTOMATED COUNT: 15.2 % (ref 10–15)
GFR SERPL CREATININE-BSD FRML MDRD: 69 ML/MIN/{1.73_M2}
GLUCOSE SERPL-MCNC: 73 MG/DL (ref 70–99)
HCT VFR BLD AUTO: 40.5 % (ref 35–47)
HGB BLD-MCNC: 13.5 G/DL (ref 11.7–15.7)
IMM GRANULOCYTES # BLD: 0 10E9/L (ref 0–0.4)
IMM GRANULOCYTES NFR BLD: 0 %
LDH SERPL L TO P-CCNC: 178 U/L (ref 81–234)
LYMPHOCYTES # BLD AUTO: 1.3 10E9/L (ref 0.8–5.3)
LYMPHOCYTES NFR BLD AUTO: 44.1 %
MCH RBC QN AUTO: 33.3 PG (ref 26.5–33)
MCHC RBC AUTO-ENTMCNC: 33.3 G/DL (ref 31.5–36.5)
MCV RBC AUTO: 100 FL (ref 78–100)
MONOCYTES # BLD AUTO: 0.6 10E9/L (ref 0–1.3)
MONOCYTES NFR BLD AUTO: 19.9 %
NEUTROPHILS # BLD AUTO: 1 10E9/L (ref 1.6–8.3)
NEUTROPHILS NFR BLD AUTO: 32.3 %
NRBC # BLD AUTO: 0 10*3/UL
NRBC BLD AUTO-RTO: 0 /100
PLATELET # BLD AUTO: 206 10E9/L (ref 150–450)
POTASSIUM SERPL-SCNC: 3.4 MMOL/L (ref 3.4–5.3)
PROT SERPL-MCNC: 7.8 G/DL (ref 6.8–8.8)
RBC # BLD AUTO: 4.06 10E12/L (ref 3.8–5.2)
SODIUM SERPL-SCNC: 138 MMOL/L (ref 133–144)
WBC # BLD AUTO: 3 10E9/L (ref 4–11)

## 2019-12-06 PROCEDURE — 86140 C-REACTIVE PROTEIN: CPT | Performed by: INTERNAL MEDICINE

## 2019-12-06 PROCEDURE — 80053 COMPREHEN METABOLIC PANEL: CPT | Performed by: INTERNAL MEDICINE

## 2019-12-06 PROCEDURE — 85025 COMPLETE CBC W/AUTO DIFF WBC: CPT | Performed by: INTERNAL MEDICINE

## 2019-12-06 PROCEDURE — 82232 ASSAY OF BETA-2 PROTEIN: CPT | Performed by: INTERNAL MEDICINE

## 2019-12-06 PROCEDURE — 00000402 ZZHCL STATISTIC TOTAL PROTEIN: Performed by: INTERNAL MEDICINE

## 2019-12-06 PROCEDURE — 83615 LACTATE (LD) (LDH) ENZYME: CPT | Performed by: INTERNAL MEDICINE

## 2019-12-06 PROCEDURE — 84165 PROTEIN E-PHORESIS SERUM: CPT | Performed by: INTERNAL MEDICINE

## 2019-12-06 NOTE — NURSING NOTE
Chief Complaint   Patient presents with     Blood Draw     Labs drawn via VPT by RN in lab.      Labs collected from venipuncture by RN.     Ching KRAUS RN PHN BSN  BMT/Oncology Lab

## 2019-12-09 LAB
ALBUMIN SERPL ELPH-MCNC: 4.5 G/DL (ref 3.7–5.1)
ALPHA1 GLOB SERPL ELPH-MCNC: 0.3 G/DL (ref 0.2–0.4)
ALPHA2 GLOB SERPL ELPH-MCNC: 0.7 G/DL (ref 0.5–0.9)
B-GLOBULIN SERPL ELPH-MCNC: 0.6 G/DL (ref 0.6–1)
GAMMA GLOB SERPL ELPH-MCNC: 1.4 G/DL (ref 0.7–1.6)
M PROTEIN SERPL ELPH-MCNC: 1.1 G/DL
PROT PATTERN SERPL ELPH-IMP: ABNORMAL

## 2019-12-18 DIAGNOSIS — C90.00 MULTIPLE MYELOMA, REMISSION STATUS UNSPECIFIED (H): ICD-10-CM

## 2020-01-21 ENCOUNTER — TELEPHONE (OUTPATIENT)
Dept: ONCOLOGY | Facility: CLINIC | Age: 70
End: 2020-01-21

## 2020-01-21 DIAGNOSIS — C90.00 MULTIPLE MYELOMA, REMISSION STATUS UNSPECIFIED (H): ICD-10-CM

## 2020-01-21 NOTE — TELEPHONE ENCOUNTER
Oral Chemotherapy Monitoring Program   Medication: Pomalyst  Rx: 1mg PO daily on days 1 through 14 of 28 day cycle   Auth #: [id number]   Risk Category: Adult female not of reproductive potential  Routine survey questions reviewed.   Rx to be Escribed to [facility]    Paloma Payne  [facility]  Oncology Pharmacy Liaison   [email or web address]  [phone] (phone)  [phone] (fax)

## 2020-02-08 ENCOUNTER — HEALTH MAINTENANCE LETTER (OUTPATIENT)
Age: 70
End: 2020-02-08

## 2020-02-20 ENCOUNTER — TELEPHONE (OUTPATIENT)
Dept: ONCOLOGY | Facility: CLINIC | Age: 70
End: 2020-02-20

## 2020-02-20 DIAGNOSIS — C90.00 MULTIPLE MYELOMA, REMISSION STATUS UNSPECIFIED (H): ICD-10-CM

## 2020-02-20 NOTE — TELEPHONE ENCOUNTER
Oral Chemotherapy Monitoring Program   Medication: Pomalyst  Rx: 1mg PO daily on days 1 through 14 of 28 day cycle   Auth #: [id number]   Risk Category: Adult female not of reproductive capacity  Routine survey questions reviewed.   Rx to be Escribed to [facility]    Paloma Payne  [facility]  Oncology Pharmacy Liaison   [email or web address]  [phone] (phone)  [phone] (fax)

## 2020-03-02 DIAGNOSIS — C90.00 MULTIPLE MYELOMA NOT HAVING ACHIEVED REMISSION (H): ICD-10-CM

## 2020-03-02 LAB
ALBUMIN SERPL-MCNC: 3.6 G/DL (ref 3.4–5)
ALP SERPL-CCNC: 59 U/L (ref 40–150)
ALT SERPL W P-5'-P-CCNC: 27 U/L (ref 0–50)
ANION GAP SERPL CALCULATED.3IONS-SCNC: 6 MMOL/L (ref 3–14)
AST SERPL W P-5'-P-CCNC: 18 U/L (ref 0–45)
BASOPHILS # BLD AUTO: 0 10E9/L (ref 0–0.2)
BASOPHILS NFR BLD AUTO: 1 %
BILIRUB SERPL-MCNC: 0.3 MG/DL (ref 0.2–1.3)
BUN SERPL-MCNC: 14 MG/DL (ref 7–30)
CALCIUM SERPL-MCNC: 9.4 MG/DL (ref 8.5–10.1)
CHLORIDE SERPL-SCNC: 104 MMOL/L (ref 94–109)
CO2 SERPL-SCNC: 27 MMOL/L (ref 20–32)
CREAT SERPL-MCNC: 0.72 MG/DL (ref 0.52–1.04)
CRP SERPL-MCNC: <2.9 MG/L (ref 0–8)
DIFFERENTIAL METHOD BLD: NORMAL
EOSINOPHIL # BLD AUTO: 0.1 10E9/L (ref 0–0.7)
EOSINOPHIL NFR BLD AUTO: 2.2 %
ERYTHROCYTE [DISTWIDTH] IN BLOOD BY AUTOMATED COUNT: 15 % (ref 10–15)
GFR SERPL CREATININE-BSD FRML MDRD: 86 ML/MIN/{1.73_M2}
GLUCOSE SERPL-MCNC: 79 MG/DL (ref 70–99)
HCT VFR BLD AUTO: 37.8 % (ref 35–47)
HGB BLD-MCNC: 12.8 G/DL (ref 11.7–15.7)
IMM GRANULOCYTES # BLD: 0 10E9/L (ref 0–0.4)
IMM GRANULOCYTES NFR BLD: 0.2 %
LDH SERPL L TO P-CCNC: 148 U/L (ref 81–234)
LYMPHOCYTES # BLD AUTO: 1.3 10E9/L (ref 0.8–5.3)
LYMPHOCYTES NFR BLD AUTO: 32.1 %
MCH RBC QN AUTO: 33 PG (ref 26.5–33)
MCHC RBC AUTO-ENTMCNC: 33.9 G/DL (ref 31.5–36.5)
MCV RBC AUTO: 97 FL (ref 78–100)
MONOCYTES # BLD AUTO: 0.6 10E9/L (ref 0–1.3)
MONOCYTES NFR BLD AUTO: 13.4 %
NEUTROPHILS # BLD AUTO: 2.1 10E9/L (ref 1.6–8.3)
NEUTROPHILS NFR BLD AUTO: 51.1 %
NRBC # BLD AUTO: 0 10*3/UL
NRBC BLD AUTO-RTO: 0 /100
PLATELET # BLD AUTO: 227 10E9/L (ref 150–450)
POTASSIUM SERPL-SCNC: 4 MMOL/L (ref 3.4–5.3)
PROT SERPL-MCNC: 7.7 G/DL (ref 6.8–8.8)
RBC # BLD AUTO: 3.88 10E12/L (ref 3.8–5.2)
SODIUM SERPL-SCNC: 136 MMOL/L (ref 133–144)
WBC # BLD AUTO: 4.2 10E9/L (ref 4–11)

## 2020-03-02 PROCEDURE — 00000402 ZZHCL STATISTIC TOTAL PROTEIN: Performed by: INTERNAL MEDICINE

## 2020-03-02 PROCEDURE — 86140 C-REACTIVE PROTEIN: CPT | Performed by: INTERNAL MEDICINE

## 2020-03-02 PROCEDURE — 82232 ASSAY OF BETA-2 PROTEIN: CPT | Performed by: INTERNAL MEDICINE

## 2020-03-02 PROCEDURE — 80053 COMPREHEN METABOLIC PANEL: CPT | Performed by: INTERNAL MEDICINE

## 2020-03-02 PROCEDURE — 84165 PROTEIN E-PHORESIS SERUM: CPT | Performed by: INTERNAL MEDICINE

## 2020-03-02 PROCEDURE — 85025 COMPLETE CBC W/AUTO DIFF WBC: CPT | Performed by: INTERNAL MEDICINE

## 2020-03-02 PROCEDURE — 83615 LACTATE (LD) (LDH) ENZYME: CPT | Performed by: INTERNAL MEDICINE

## 2020-03-03 LAB
ALBUMIN SERPL ELPH-MCNC: 4.3 G/DL (ref 3.7–5.1)
ALPHA1 GLOB SERPL ELPH-MCNC: 0.3 G/DL (ref 0.2–0.4)
ALPHA2 GLOB SERPL ELPH-MCNC: 0.8 G/DL (ref 0.5–0.9)
B-GLOBULIN SERPL ELPH-MCNC: 0.6 G/DL (ref 0.6–1)
B2 MICROGLOB SERPL-MCNC: 2.5 MG/L
GAMMA GLOB SERPL ELPH-MCNC: 1.6 G/DL (ref 0.7–1.6)
M PROTEIN SERPL ELPH-MCNC: 1.2 G/DL
PROT PATTERN SERPL ELPH-IMP: ABNORMAL

## 2020-03-06 ENCOUNTER — ONCOLOGY VISIT (OUTPATIENT)
Dept: TRANSPLANT | Facility: CLINIC | Age: 70
End: 2020-03-06
Attending: INTERNAL MEDICINE
Payer: COMMERCIAL

## 2020-03-06 VITALS
SYSTOLIC BLOOD PRESSURE: 141 MMHG | DIASTOLIC BLOOD PRESSURE: 81 MMHG | BODY MASS INDEX: 22.7 KG/M2 | RESPIRATION RATE: 16 BRPM | HEART RATE: 84 BPM | TEMPERATURE: 97.5 F | WEIGHT: 124.12 LBS | OXYGEN SATURATION: 98 %

## 2020-03-06 DIAGNOSIS — C90.00 MULTIPLE MYELOMA NOT HAVING ACHIEVED REMISSION (H): Primary | ICD-10-CM

## 2020-03-06 PROCEDURE — G0463 HOSPITAL OUTPT CLINIC VISIT: HCPCS | Mod: ZF

## 2020-03-06 ASSESSMENT — PAIN SCALES - GENERAL: PAINLEVEL: NO PAIN (0)

## 2020-03-06 NOTE — PROGRESS NOTES
BP (!) 141/81   Pulse 84   Temp 97.5  F (36.4  C)   Resp 16   Wt 56.3 kg (124 lb 1.9 oz)   SpO2 98%   BMI 22.70 kg/m      Wt Readings from Last 4 Encounters:   03/06/20 56.3 kg (124 lb 1.9 oz)   09/06/19 55.1 kg (121 lb 8 oz)   03/05/19 54.7 kg (120 lb 8 oz)   03/04/19 54.1 kg (119 lb 3.2 oz)     Shena was seen and examined by me today.  She is a 69-year-old woman with breast cancer more than 25 years ago and multiple myeloma for more than 10 years who underwent autologous transplantation approximately 6 years ago.  Her disease has been somewhat resistant to standard therapies but she has been doing well on pomalidomide at attenuated doses.  She has a number of different side effects from medications and additionally we had her on 2 mg alternating with 1 mg every 21 days.  Her current schedule is 2 mg alternating with 1 mg every 14 days.  She is tolerating this very well and we have done this for enough time that she is very attentive to her side effect profile.  Symptomatically she is been doing remarkably well.  Her major issues are some recurrent viral infections and these are typical upper respiratory tract infections.  She has not required any courses of antibiotics and she is recovered on her own is a typical URI.  Energy wise she has been doing fairly well.  Her life has been quite busy as her daughter and daughters children are living with her and her .  She travels frequently and her performance status is essentially normal.  Depressed for 10 point review of systems is unremarkable.    Physical exam: Overall Shena looks well and is in no distress today.  Her vital signs are normal except for a slightly elevated blood pressure which she will follow.  Her weight is stable.  HEENT exam is unremarkable.  There are no oral lesions.  Pupils are equal round reactive to light.  There is no cervical, axillary or inguinal adenopathy.  Lungs are clear to auscultation percussion.  Cardiac exam is without  murmurs.  Abdominal exam is benign and there is no organomegaly.  Lower extremities are without edema.    Laboratory evaluation: Electrolytes are normal with a BUN of 14 and a creatinine of 0.72.  A beta-2 microglobulin is at the upper limit of normal at 2.5.  CRP is normal.  Liver function tests are within normal limits including a total protein and albumin.  CBC shows a hemoglobin of 12.8, platelet count of 227,000 and a white count of 4200 with 2100 absolute neutrophils.  Her counts have been stable for some time.    It is my overall impression that Rickey is clinically stable.  Her biggest concern in discussion today is on her serum protein electrophoresis where her monoclonal protein is increased to 1.2 g.  Her da monoclonal protein on pomalidomide after transplant was 0.7 g.  Even though the paraprotein is modest, there has been a real and convincing 30% increase.  Edwin is not having any bone pain or any other symptoms related to multiple myeloma.  Given this slight progression, we discussed going up on her pomalidomide dosing to 2 mg 14 days on and 14 days off.  She is amenable to trying this on her next cycle which starts on March 29.  My goal is to slowly increase her dose on this schedule to maintain suppression and ideally get her monoclonal protein closer to 1 g or lower.  She agrees with this strategy and will carefully monitor her side effects.  I will see her again on August 28 and she knows how to contact me if she should develop any difficulties.  All of her questions have been answered today.    Dr. Lui Hills MD

## 2020-03-06 NOTE — NURSING NOTE
"Oncology Rooming Note    March 6, 2020 7:52 AM   Shena Hartmann is a 69 year old female who presents for:    Chief Complaint   Patient presents with     RECHECK     provider visit s/p bmt txp for multiple myeloma     Initial Vitals: BP (!) 141/81   Pulse 84   Temp 97.5  F (36.4  C)   Resp 16   Wt 56.3 kg (124 lb 1.9 oz)   SpO2 98%   BMI 22.70 kg/m   Estimated body mass index is 22.7 kg/m  as calculated from the following:    Height as of 3/5/19: 1.575 m (5' 2\").    Weight as of this encounter: 56.3 kg (124 lb 1.9 oz). Body surface area is 1.57 meters squared.  No Pain (0) Comment: Data Unavailable   No LMP recorded. Patient is postmenopausal.  Allergies reviewed: Yes  Medications reviewed: Yes    Medications: Medication refills not needed today.  Pharmacy name entered into [employer]:    [id number] - [place], [place] - [address] [facility] [address]  WRITTEN PRESCRIPTION REQUESTED  [facility] - [place], [place] - [address]    Clinical concerns: Patient denies fevers/N/V/D.  She has had a \"cold\" the last week and symptoms are resolving.      TAMMY REYES RN              "

## 2020-03-19 ENCOUNTER — TELEPHONE (OUTPATIENT)
Dept: ONCOLOGY | Facility: CLINIC | Age: 70
End: 2020-03-19

## 2020-03-19 DIAGNOSIS — C90.00 MULTIPLE MYELOMA, REMISSION STATUS UNSPECIFIED (H): ICD-10-CM

## 2020-03-19 NOTE — TELEPHONE ENCOUNTER
Oral Chemotherapy Monitoring Program   Medication:Pomalyst  Rx: 2mg PO daily on days 1 through 14 of 28 day cycle   Auth #:[id number]  Risk Category:Adult Female NOT of reproductive potential  Routine survey questions reviewed.   Rx to be Escribed to [facility]   Oncology Pharmacy Liaison  [email or web address]   [phone] (phone)   [phone] (fax)

## 2020-04-15 DIAGNOSIS — C90.00 MULTIPLE MYELOMA, REMISSION STATUS UNSPECIFIED (H): ICD-10-CM

## 2020-04-22 ENCOUNTER — TELEPHONE (OUTPATIENT)
Dept: DERMATOLOGY | Facility: CLINIC | Age: 70
End: 2020-04-22

## 2020-04-22 NOTE — TELEPHONE ENCOUNTER
Attempted to call patient to offer video/telephone visits if patient has no urgent concerns for skin check, no answer. LVM for patient to call back, number was provided. Pt informed that NextDigest message will be sent.    Radha Connell LPN

## 2020-05-11 DIAGNOSIS — C90.00 MULTIPLE MYELOMA, REMISSION STATUS UNSPECIFIED (H): ICD-10-CM

## 2020-06-01 DIAGNOSIS — C90.00 MULTIPLE MYELOMA NOT HAVING ACHIEVED REMISSION (H): ICD-10-CM

## 2020-06-01 LAB
ALBUMIN SERPL-MCNC: 3.4 G/DL (ref 3.4–5)
ALP SERPL-CCNC: 60 U/L (ref 40–150)
ALT SERPL W P-5'-P-CCNC: 27 U/L (ref 0–50)
ANION GAP SERPL CALCULATED.3IONS-SCNC: 6 MMOL/L (ref 3–14)
AST SERPL W P-5'-P-CCNC: 16 U/L (ref 0–45)
BASOPHILS # BLD AUTO: 0 10E9/L (ref 0–0.2)
BASOPHILS NFR BLD AUTO: 0.6 %
BILIRUB SERPL-MCNC: 0.3 MG/DL (ref 0.2–1.3)
BUN SERPL-MCNC: 14 MG/DL (ref 7–30)
CALCIUM SERPL-MCNC: 9.3 MG/DL (ref 8.5–10.1)
CHLORIDE SERPL-SCNC: 104 MMOL/L (ref 94–109)
CO2 SERPL-SCNC: 28 MMOL/L (ref 20–32)
CREAT SERPL-MCNC: 0.8 MG/DL (ref 0.52–1.04)
CRP SERPL-MCNC: <2.9 MG/L (ref 0–8)
DIFFERENTIAL METHOD BLD: ABNORMAL
EOSINOPHIL # BLD AUTO: 0.1 10E9/L (ref 0–0.7)
EOSINOPHIL NFR BLD AUTO: 1.9 %
ERYTHROCYTE [DISTWIDTH] IN BLOOD BY AUTOMATED COUNT: 14.6 % (ref 10–15)
GFR SERPL CREATININE-BSD FRML MDRD: 75 ML/MIN/{1.73_M2}
GLUCOSE SERPL-MCNC: 77 MG/DL (ref 70–99)
HCT VFR BLD AUTO: 36.9 % (ref 35–47)
HGB BLD-MCNC: 12.5 G/DL (ref 11.7–15.7)
IMM GRANULOCYTES # BLD: 0 10E9/L (ref 0–0.4)
IMM GRANULOCYTES NFR BLD: 0.3 %
LDH SERPL L TO P-CCNC: 135 U/L (ref 81–234)
LYMPHOCYTES # BLD AUTO: 1.3 10E9/L (ref 0.8–5.3)
LYMPHOCYTES NFR BLD AUTO: 39.2 %
MCH RBC QN AUTO: 33.2 PG (ref 26.5–33)
MCHC RBC AUTO-ENTMCNC: 33.9 G/DL (ref 31.5–36.5)
MCV RBC AUTO: 98 FL (ref 78–100)
MONOCYTES # BLD AUTO: 0.3 10E9/L (ref 0–1.3)
MONOCYTES NFR BLD AUTO: 7.8 %
NEUTROPHILS # BLD AUTO: 1.6 10E9/L (ref 1.6–8.3)
NEUTROPHILS NFR BLD AUTO: 50.2 %
NRBC # BLD AUTO: 0 10*3/UL
NRBC BLD AUTO-RTO: 0 /100
PLATELET # BLD AUTO: 220 10E9/L (ref 150–450)
POTASSIUM SERPL-SCNC: 3.8 MMOL/L (ref 3.4–5.3)
PROT SERPL-MCNC: 7.9 G/DL (ref 6.8–8.8)
RBC # BLD AUTO: 3.76 10E12/L (ref 3.8–5.2)
SODIUM SERPL-SCNC: 138 MMOL/L (ref 133–144)
WBC # BLD AUTO: 3.2 10E9/L (ref 4–11)

## 2020-06-01 PROCEDURE — 86140 C-REACTIVE PROTEIN: CPT | Performed by: INTERNAL MEDICINE

## 2020-06-01 PROCEDURE — 80053 COMPREHEN METABOLIC PANEL: CPT | Performed by: INTERNAL MEDICINE

## 2020-06-01 PROCEDURE — 84165 PROTEIN E-PHORESIS SERUM: CPT | Performed by: INTERNAL MEDICINE

## 2020-06-01 PROCEDURE — 83615 LACTATE (LD) (LDH) ENZYME: CPT | Performed by: INTERNAL MEDICINE

## 2020-06-01 PROCEDURE — 00000402 ZZHCL STATISTIC TOTAL PROTEIN: Performed by: INTERNAL MEDICINE

## 2020-06-01 PROCEDURE — 85025 COMPLETE CBC W/AUTO DIFF WBC: CPT | Performed by: INTERNAL MEDICINE

## 2020-06-01 PROCEDURE — 82232 ASSAY OF BETA-2 PROTEIN: CPT | Performed by: INTERNAL MEDICINE

## 2020-06-02 LAB
ALBUMIN SERPL ELPH-MCNC: 4 G/DL (ref 3.7–5.1)
ALPHA1 GLOB SERPL ELPH-MCNC: 0.3 G/DL (ref 0.2–0.4)
ALPHA2 GLOB SERPL ELPH-MCNC: 0.7 G/DL (ref 0.5–0.9)
B-GLOBULIN SERPL ELPH-MCNC: 0.6 G/DL (ref 0.6–1)
GAMMA GLOB SERPL ELPH-MCNC: 2 G/DL (ref 0.7–1.6)
M PROTEIN SERPL ELPH-MCNC: 1.7 G/DL
PROT PATTERN SERPL ELPH-IMP: ABNORMAL

## 2020-06-03 LAB — B2 MICROGLOB SERPL-MCNC: 2.4 MG/L

## 2020-06-05 ENCOUNTER — VIRTUAL VISIT (OUTPATIENT)
Dept: TRANSPLANT | Facility: CLINIC | Age: 70
End: 2020-06-05
Attending: INTERNAL MEDICINE
Payer: COMMERCIAL

## 2020-06-05 DIAGNOSIS — C90.02 MULTIPLE MYELOMA IN RELAPSE (H): Primary | ICD-10-CM

## 2020-06-05 NOTE — PROGRESS NOTES
"Shena Hartmann is a 69 year old female who is being evaluated via a billable video visit.      The patient has been notified of following:     \"This video visit will be conducted via a call between you and your physician/provider. We have found that certain health care needs can be provided without the need for an in-person physical exam.  This service lets us provide the care you need with a video conversation.  If a prescription is necessary we can send it directly to your pharmacy.  If lab work is needed we can place an order for that and you can then stop by our lab to have the test done at a later time.    Video visits are billed at different rates depending on your insurance coverage.  Please reach out to your insurance provider with any questions.    If during the course of the call the physician/provider feels a video visit is not appropriate, you will not be charged for this service.\"    Patient has given verbal consent for Video visit? Yes    How would you like to obtain your AVS? [facility]    Patient would like the video invitation sent by: Send to e-mail at: [email or web address]    Will anyone else be joining your video visit? No       Milo Holguin LPN      Video-Visit Details    Type of service:  Video Visit    Video Start Time: 4:00 PM  Video End Time: 4:58 PM    Originating Location (pt. Location): Home    Distant Location (provider location):  [facility]     Platform used for Video Visit: [facility]    Lui Hills MD    BMT Staff Note for this visit    Shena and I had a long conversation today regarding the progression of her multiple myeloma.  She is a 69-year-old woman who I have known for many years.  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  Shena's disease has been somewhat refractory in that she progressed soon after autologous transplant and did not fully " suppress her disease as well as standard maintenance therapy.  It was not until we started her on pomalidomide that she gained some control.  Her best response to pomalidomide was an M spike under 1 g.  She is been very sensitive to medications and initially only tolerated pomalidomide at 1 mg alternating with 2 mg daily for 21-day cycles.  Because of quality of life and side effects, eventually this was decreased to 18-day cycles in 2018 and eventually to 14 days on and 14 days off.  Her M protein gradually increased to 1.2 g.  Unfortunately, the main reason for this call today is to discuss her recent labs showing a further increase to 1.7 g.    Symptomatically, Shena is doing well and is up at her cabin today.  Her performance status is still quite good and she remains busy.  She denies any fevers chills or sweats.  She has no nausea vomiting or diarrhea.  Her energy level is low while on pomalidomide and the side effects are still quite annoying.  She has no shortness of breath or cough.  She has no headache, visual disturbance or other neurologic complaints.  She is actually doing quite well.    Physical exam: By appearance, Shena looks well and is in no distress today.  There are no obvious facial abnormalities or rash visible on the call today.  She denies any edema.  She denies any abdominal tenderness.    Laboratory evaluation: Electrolytes are normal with a BUN of 14 and a creatinine of 0.8.  Liver function tests are within normal limits today.  A C-reactive protein is low.  A total LDH is 135.  A CBC shows a hemoglobin of 12.5, platelet count of 220,000 and a white count of 3200 with 1600 absolute neutrophils.  As already mentioned above, her monoclonal protein is 1.7 g and this is increased from 1.2 g on March 2, 2020.  Her da monoclonal protein spike was 0.7 g on August 9, 2018 and November 27, 2018.  While her disease has been steady, the current rise represents a real and significant increase  despite the fact that she is asymptomatic and has no evidence of any increased bone pain.    I spent approximately 1 hour with Shena today on this video conference.  We discussed the significance of her labs and many different options.  She understands that while this progression is not urgent, it will likely continue.  Therefore a change in course is warranted.  We discussed the following options.  First, we can go back to her 21-day course of pomalidomide that was tolerated.  This can be alone or in combination with weekly Decadron.  We had a long discussion regarding the side effects of Decadron and how she tolerated it in the past.  I think that 40 mg of Decadron weekly would likely be too much for her.  We also discussed monoclonal antibody therapy in addition to pomalidomide and Decadron and our first choice would be daratumumab.  We reviewed the infusional related side effects and the clinical benefits of this antibody.  Zach and her  are well versed on these options during our conversation today.  At the present time we will go back to daratumumab 2 mg alternating with 1 mg and 21-day cycles and add on weekly Decadron.  She will start this new therapy in approximately 2 weeks and we will get an SPEP prior to her starting.  In August, after 2 cycles, we will repeat her labs.  If her SPEP continues to increase will need to add on daratumumab.  If she is getting good control, then we can continue this regimen which obviates the need for frequent in clinic infusions.  Edwin understands that when we start daratumumab that it is given weekly for 8 doses, every other week for 8 doses and then monthly.  Part of her choices based on quality of life decisions and continuing her therapy as oral without frequent visits.  However, I do think that adding daratumumab is the next best step if needed.  Her and her  agree with this plan.  All of their questions have been answered today.  I will write for the  ROX[facility] on June 19 and send a prescription for weekly Decadron starting at 20 mg.  She understands that if this becomes intolerable intolerable she can decrease to 10 mg weekly.  I will see her again in August.    Dr. Lui Hills MD

## 2020-06-05 NOTE — LETTER
"    6/5/2020         RE: Shena Hartmann  [address] [place] [address]        Dear Colleague,    Thank you for referring your patient, Shena Hartmann, to the [facility]. Please see a copy of my visit note below.    Shena Hartmann is a 69 year old female who is being evaluated via a billable video visit.      The patient has been notified of following:     \"This video visit will be conducted via a call between you and your physician/provider. We have found that certain health care needs can be provided without the need for an in-person physical exam.  This service lets us provide the care you need with a video conversation.  If a prescription is necessary we can send it directly to your pharmacy.  If lab work is needed we can place an order for that and you can then stop by our lab to have the test done at a later time.    Video visits are billed at different rates depending on your insurance coverage.  Please reach out to your insurance provider with any questions.    If during the course of the call the physician/provider feels a video visit is not appropriate, you will not be charged for this service.\"    Patient has given verbal consent for Video visit? Yes    How would you like to obtain your AVS? [facility]    Patient would like the video invitation sent by: Send to e-mail at: [email or web address]    Will anyone else be joining your video visit? Ardiana Holguin LPN      Video-Visit Details    Type of service:  Video Visit    Video Start Time: 4:00 PM  Video End Time: 4:58 PM    Originating Location (pt. Location): Home    Distant Location (provider location):  [facility]     Platform used for Video Visit: Tiffany Hills MD    BMT Staff Note for this visit    Shena and I had a long conversation today regarding the progression of her multiple myeloma.  She is a 69-year-old woman who I have known for many years.  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  Shena's disease has been somewhat refractory in that she progressed soon after autologous transplant and did not fully suppress her disease as well as standard maintenance therapy.  It was not until we started her on pomalidomide that she gained some control.  Her best response to pomalidomide was an M spike under 1 g.  She is been very sensitive to medications and initially only tolerated pomalidomide at 1 mg alternating with 2 mg daily for 21-day cycles.  Because of quality of life and side effects, eventually this was decreased to 18-day cycles in 2018 and eventually to 14 days on and 14 days off.  Her M protein gradually increased to 1.2 g.  Unfortunately, the main reason for this call today is to discuss her recent labs showing a further increase to 1.7 g.    Symptomatically, Shena is doing well and is up at her cabin today.  Her performance status is still quite good and she remains busy.  She denies any fevers chills or sweats.  She has no nausea vomiting or diarrhea.  Her energy level is low while on pomalidomide and the side effects are still quite annoying.  She has no shortness of breath or cough.  She has no headache, visual disturbance or other neurologic complaints.  She is actually doing quite well.    Physical exam: By appearance, Shena looks well and is in no distress today.  There are no obvious facial abnormalities or rash visible on the call today.  She denies any edema.  She denies any abdominal tenderness.    Laboratory evaluation: Electrolytes are normal with a BUN of 14 and a creatinine of 0.8.  Liver function tests are within normal limits today.  A C-reactive protein is low.  A total LDH is 135.  A CBC shows a hemoglobin of 12.5, platelet count of 220,000 and a white count of 3200 with 1600 absolute neutrophils.  As already mentioned above, her  monoclonal protein is 1.7 g and this is increased from 1.2 g on March 2, 2020.  Her da monoclonal protein spike was 0.7 g on August 9, 2018 and November 27, 2018.  While her disease has been steady, the current rise represents a real and significant increase despite the fact that she is asymptomatic and has no evidence of any increased bone pain.    I spent approximately 1 hour with Shena today on this video conference.  We discussed the significance of her labs and many different options.  She understands that while this progression is not urgent, it will likely continue.  Therefore a change in course is warranted.  We discussed the following options.  First, we can go back to her 21-day course of pomalidomide that was tolerated.  This can be alone or in combination with weekly Decadron.  We had a long discussion regarding the side effects of Decadron and how she tolerated it in the past.  I think that 40 mg of Decadron weekly would likely be too much for her.  We also discussed monoclonal antibody therapy in addition to pomalidomide and Decadron and our first choice would be daratumumab.  We reviewed the infusional related side effects and the clinical benefits of this antibody.  Zach and her  are well versed on these options during our conversation today.  At the present time we will go back to daratumumab 2 mg alternating with 1 mg and 21-day cycles and add on weekly Decadron.  She will start this new therapy in approximately 2 weeks and we will get an SPEP prior to her starting.  In August, after 2 cycles, we will repeat her labs.  If her SPEP continues to increase will need to add on daratumumab.  If she is getting good control, then we can continue this regimen which obviates the need for frequent in clinic infusions.  Edwin understands that when we start daratumumab that it is given weekly for 8 doses, every other week for 8 doses and then monthly.  Part of her choices based on quality of life  decisions and continuing her therapy as oral without frequent visits.  However, I do think that adding daratumumab is the next best step if needed.  Her and her  agree with this plan.  All of their questions have been answered today.  I will write for the SPEP on June 19 and send a prescription for weekly Decadron starting at 20 mg.  She understands that if this becomes intolerable intolerable she can decrease to 10 mg weekly.  I will see her again in August.    Dr. Lui Hills MD        Again, thank you for allowing me to participate in the care of your patient.        Sincerely,        BMT DOM

## 2020-06-06 RX ORDER — DEXAMETHASONE 4 MG/1
20 TABLET ORAL WEEKLY
Qty: 60 TABLET | Refills: 3 | Status: SHIPPED | OUTPATIENT
Start: 2020-06-06 | End: 2020-09-08

## 2020-06-08 ENCOUNTER — CARE COORDINATION (OUTPATIENT)
Dept: TRANSPLANT | Facility: CLINIC | Age: 70
End: 2020-06-08

## 2020-06-08 RX ORDER — ASPIRIN 81 MG/1
81 TABLET, CHEWABLE ORAL DAILY
Qty: 30 TABLET | Refills: 0 | Status: ON HOLD | COMMUNITY
Start: 2020-06-08 | End: 2024-08-05

## 2020-06-08 NOTE — PROGRESS NOTES
Pt adding dex to her regimen. Per pharm, and SD pt should start a baby asa to  Prevent clots. Pt called and will buy some otc

## 2020-06-16 DIAGNOSIS — C90.00 MULTIPLE MYELOMA, REMISSION STATUS UNSPECIFIED (H): ICD-10-CM

## 2020-06-19 DIAGNOSIS — C90.02 MULTIPLE MYELOMA IN RELAPSE (H): ICD-10-CM

## 2020-06-19 DIAGNOSIS — C90.00 MULTIPLE MYELOMA, REMISSION STATUS UNSPECIFIED (H): Primary | ICD-10-CM

## 2020-06-19 LAB
ALBUMIN SERPL ELPH-MCNC: 4.3 G/DL (ref 3.7–5.1)
ALPHA1 GLOB SERPL ELPH-MCNC: 0.3 G/DL (ref 0.2–0.4)
ALPHA2 GLOB SERPL ELPH-MCNC: 0.7 G/DL (ref 0.5–0.9)
B-GLOBULIN SERPL ELPH-MCNC: 0.6 G/DL (ref 0.6–1)
GAMMA GLOB SERPL ELPH-MCNC: 2.1 G/DL (ref 0.7–1.6)
M PROTEIN SERPL ELPH-MCNC: 2 G/DL
PROT PATTERN SERPL ELPH-IMP: ABNORMAL

## 2020-06-19 PROCEDURE — 84165 PROTEIN E-PHORESIS SERUM: CPT | Performed by: INTERNAL MEDICINE

## 2020-06-19 PROCEDURE — 00000402 ZZHCL STATISTIC TOTAL PROTEIN: Performed by: INTERNAL MEDICINE

## 2020-07-03 ENCOUNTER — TELEPHONE (OUTPATIENT)
Dept: DERMATOLOGY | Facility: CLINIC | Age: 70
End: 2020-07-03

## 2020-07-03 NOTE — TELEPHONE ENCOUNTER
Left voicemail asking to call clinic back, to let us know if she wants to reschedule her upcoming appointment, do a virtual visit, or be seen in person. Clinic number provided for call back.    Germaine Carreon LPN

## 2020-07-07 ENCOUNTER — OFFICE VISIT (OUTPATIENT)
Dept: DERMATOLOGY | Facility: CLINIC | Age: 70
End: 2020-07-07
Payer: COMMERCIAL

## 2020-07-07 DIAGNOSIS — L82.1 SK (SEBORRHEIC KERATOSIS): ICD-10-CM

## 2020-07-07 DIAGNOSIS — L30.9 DERMATITIS: Primary | ICD-10-CM

## 2020-07-07 RX ORDER — TRIAMCINOLONE ACETONIDE 1 MG/G
OINTMENT TOPICAL 2 TIMES DAILY
Qty: 80 G | Refills: 4 | Status: SHIPPED | OUTPATIENT
Start: 2020-07-07 | End: 2022-06-08

## 2020-07-07 RX ORDER — DESONIDE 0.5 MG/G
OINTMENT TOPICAL 2 TIMES DAILY
Qty: 60 G | Refills: 4 | Status: SHIPPED | OUTPATIENT
Start: 2020-07-07 | End: 2022-06-08

## 2020-07-07 RX ORDER — TRIAMCINOLONE ACETONIDE 1 MG/G
OINTMENT TOPICAL 2 TIMES DAILY
Qty: 80 G | Refills: 4 | Status: SHIPPED | OUTPATIENT
Start: 2020-07-07 | End: 2020-07-07

## 2020-07-07 RX ORDER — CLOBETASOL PROPIONATE 0.5 MG/G
OINTMENT TOPICAL 2 TIMES DAILY
Qty: 60 G | Refills: 4 | Status: SHIPPED | OUTPATIENT
Start: 2020-07-07 | End: 2020-07-07

## 2020-07-07 RX ORDER — CLOBETASOL PROPIONATE 0.5 MG/G
OINTMENT TOPICAL 2 TIMES DAILY
Qty: 60 G | Refills: 4 | Status: SHIPPED | OUTPATIENT
Start: 2020-07-07 | End: 2022-06-08

## 2020-07-07 RX ORDER — DESONIDE 0.5 MG/G
OINTMENT TOPICAL 2 TIMES DAILY
Qty: 60 G | Refills: 4 | Status: SHIPPED | OUTPATIENT
Start: 2020-07-07 | End: 2020-07-07

## 2020-07-07 ASSESSMENT — PAIN SCALES - GENERAL: PAINLEVEL: MILD PAIN (3)

## 2020-07-07 NOTE — LETTER
7/7/2020       RE: Shena Hartmann  [address] [place] [address]     Dear Colleague,    Thank you for referring your patient, Shena Hartmann, to the [facility] DERMATOLOGY at [facility]. Please see a copy of my visit note below.    [facility] Dermatology Note      Dermatology Problem List:  1. History of stem cell transplant for multiple myeloma  2. Possible phototoxic/photoallergic reaction to thalidomide or pamolidomide  3. Possible allergic contact dermatitis to silicone prosthesis and soaps/disinfectant  4. Atypical melanocytic nevus left upper leg s/p biopsy   5. Cherry angiomas  6. Seborrheic keratoses       Encounter Date: Jul 7, 2020    CC:   Skin check    History of Present Illness:  Ms. Shena Hartmann is a 69 year old female who presents as a follow-up for total body skin exam. The patient was last seen 1/28/2019 by Dr. Rosario for possible phototoxic/photoallergic reaction pomalidomide which she had been taking for multiple myeloma. This rash is under control with tramcinolone 0.1% for the skin, clobetasol 0.05% for hands and desonide .05% for face.  She has two brown spots one on the neck and one on the leg that she feels are growing but not changing color.  She also was at the [employer] recently and developed a rash after cleaning the boat.  She has various allergies and believes this to be the cause.  The rash is located on the thighs and legs, also smaller areas include the arms and back.  It is itchy but not painful.  There are small bumps associated.  She has been using triamcinalone ointment and it is improving.      Past Medical History:   Patient Active Problem List   Diagnosis     Pain in thoracic spine     Febrile neutropenia (H)     Multiple myeloma, dx 2002,  s/p ASCBMT 3/26/14     Cough     RSV (respiratory syncytial virus pneumonia)     Personal history of malignant neoplasm of breast     Seasonal allergies      Osteoporosis     Past Medical History:   Diagnosis Date     Breast cancer (H) 1994    right     H/O stem cell transplant (H) 3/2014     Multiple myeloma, without mention of having achieved remission 11/6/2012     Past Surgical History:   Procedure Laterality Date     MASTECTOMY      Right, with lymphe node disection       PICC INSERTION  9/10/2013    5fr DL Power PICC, 44cm (1cm external) in the L lateral brachial vein with tip in the low SVC       Social History:  Patient reports that she has never smoked. She has never used smokeless tobacco. She reports current alcohol use. She reports that she does not use drugs.    Family History:  Family History   Problem Relation Age of Onset     Cancer Paternal Grandmother         skin cancer     Hypertension Mother      Cerebrovascular Disease Mother      Hypertension Father      Prostate Cancer Father        Medications:  Current Outpatient Medications   Medication Sig Dispense Refill     aspirin (ASA) 81 MG chewable tablet Take 1 tablet (81 mg) by mouth daily 30 tablet 0     CALCIUM-VITAMIN D-VITAMIN K PO Take 2 tablets by mouth daily.       cholecalciferol (VITAMIN D3) 5000 UNITS CAPS capsule Take 1 capsule (5,000 Units) by mouth daily 90 capsule 3     clobetasol (TEMOVATE) 0.05 % ointment Apply topically 2 times daily Apply to dermatitic plaques on hands 2 times per day for 2-3 days/week during flares. 45 g 0     COENZYME Q-10 PO Take 30 mg by mouth daily        desonide (DESOWEN) 0.05 % ointment Use twice daily on the face as needed. 60 g 1     dexamethasone (DECADRON) 4 MG tablet Take 5 tablets (20 mg) by mouth once a week 60 tablet 3     Lactobacillus (PROBIOTIC ACIDOPHILUS) CAPS Take by mouth twice a week       magnesium 100 MG CAPS Take 117 mg by mouth 3 times daily       Multiple Vitamins-Minerals (MULTIVITAMIN OR) Take 1 tablet by mouth 2 times daily.       Multiple Vitamins-Minerals (ZINC PO) Take by mouth daily        order for DME Equipment being ordered:  right breast prosthesis 2 Units 3     order for DME 6 mastectomy bras  Length of need: 99 months 6 Device 1     order for DME R mastectomy prosthesis   Length of need:  99 months 1 Device 1     pomalidomide (POMALYST) 1 MG capsule Alternate 1 cap with 2 caps. Take on days 1-21 of repeated 28 day cycles. 32 capsule 0     triamcinolone (KENALOG) 0.1 % cream Apply topically 2 times daily 80 g 1        Allergies   Allergen Reactions     Cayenne Pepper [Cayenne]      Corn-Related Products GI Disturbance     Levaquin Other (See Comments)     Tendon pain     Milk Protein GI Disturbance     Mold      Facial rash/lip swelling     Morphine Sulfate Other (See Comments)     Per pt - see things (hallucination) when she was on Morphine .     Pollen Extract      Environmental allergies      Shrimp Nausea and Vomiting     Tomato      Lip swelling, facial rash     Azithromycin Rash     Keflex [Cephalexin] Rash     Oat Rash     Tape [Adhesive Tape] Itching and Rash     All adhesives     Wheat Rash     Swelling of lips         Review of Systems:  -As per HPI  -Constitutional: Otherwise feeling well today, in usual state of health.  -HEENT: Patient denies nonhealing oral sores.  -Skin: As above in HPI. No additional skin concerns.    Physical exam:  Vitals: There were no vitals taken for this visit.  GEN: This is a well developed, well-nourished female in no acute distress, in a pleasant mood.    SKIN: Total skin excluding the undergarment areas but including the buttocks was performed. The exam included the head/face, neck, both arms, chest, back, abdomen, both legs, digits and/or nails.   -Bhagat skin type: III  -There are waxy stuck on tan to brown papules on the trunk, face, neck, legs.  -There are dome shaped bright red papules on the trunk, arms.   -Groups of erythematous papules in scattered distribution involving the thighs, arms, back.  Lesions appear to be resolving.    Impression/Plan:  1. Allergic contact dermatitis-  resolving - likely reaction to allergen at her [place].    - Continue triamcinolone ointment to affected area  - counseled on PIH    2. Rash with breast prosthesis  - keep area dry  - can apply desonide for the breast area    3. Seborrheic keratoses   - Seborrheic keratosis: Discussed benign nature of lesions.    4. Possible phototoxic/photoallergic reaction to thalidomide or pamolidomide, well-controlled per patient  - Provided refills for triamcinolone 0.1%, desonide 0.05%, clobetasol 0.05%      CC Referred Self, MD  No address on file on close of this encounter.  Follow-up in 1 year, earlier for new or changing lesions.       Dr. Mcknight staffed the patient.    Staff Involved:  Resident(Adelia Grant MD)/Staff  I, Neela Mcknight MD, saw this patient with the resident and agree with the resident s findings and plan of care as documented in the resident s note.    Again, thank you for allowing me to participate in the care of your patient.      Sincerely,    Neela Mcknight MD

## 2020-07-07 NOTE — PROGRESS NOTES
[facility] Dermatology Note      Dermatology Problem List:  1. History of stem cell transplant for multiple myeloma  2. Possible phototoxic/photoallergic reaction to thalidomide or pamolidomide  3. Possible allergic contact dermatitis to silicone prosthesis and soaps/disinfectant  4. Atypical melanocytic nevus left upper leg s/p biopsy   5. Cherry angiomas  6. Seborrheic keratoses       Encounter Date: Jul 7, 2020    CC:   Skin check    History of Present Illness:  Ms. Shena Hartmann is a 69 year old female who presents as a follow-up for total body skin exam. The patient was last seen 1/28/2019 by Dr. Rosario for possible phototoxic/photoallergic reaction pomalidomide which she had been taking for multiple myeloma. This rash is under control with tramcinolone 0.1% for the skin, clobetasol 0.05% for hands and desonide .05% for face.  She has two brown spots one on the neck and one on the leg that she feels are growing but not changing color.  She also was at the [email or web address] recently and developed a rash after cleaning the boat.  She has various allergies and believes this to be the cause.  The rash is located on the thighs and legs, also smaller areas include the arms and back.  It is itchy but not painful.  There are small bumps associated.  She has been using triamcinalone ointment and it is improving.      Past Medical History:   Patient Active Problem List   Diagnosis     Pain in thoracic spine     Febrile neutropenia (H)     Multiple myeloma, dx 2002,  s/p ASCBMT 3/26/14     Cough     RSV (respiratory syncytial virus pneumonia)     Personal history of malignant neoplasm of breast     Seasonal allergies     Osteoporosis     Past Medical History:   Diagnosis Date     Breast cancer (H) 1994    right     H/O stem cell transplant (H) 3/2014     Multiple myeloma, without mention of having achieved remission 11/6/2012     Past Surgical History:   Procedure Laterality Date     MASTECTOMY       Right, with lymphe node disection       PICC INSERTION  9/10/2013    5fr DL Power PICC, 44cm (1cm external) in the L lateral brachial vein with tip in the low SVC       Social History:  Patient reports that she has never smoked. She has never used smokeless tobacco. She reports current alcohol use. She reports that she does not use drugs.    Family History:  Family History   Problem Relation Age of Onset     Cancer Paternal Grandmother         skin cancer     Hypertension Mother      Cerebrovascular Disease Mother      Hypertension Father      Prostate Cancer Father        Medications:  Current Outpatient Medications   Medication Sig Dispense Refill     aspirin (ASA) 81 MG chewable tablet Take 1 tablet (81 mg) by mouth daily 30 tablet 0     CALCIUM-VITAMIN D-VITAMIN K PO Take 2 tablets by mouth daily.       cholecalciferol (VITAMIN D3) 5000 UNITS CAPS capsule Take 1 capsule (5,000 Units) by mouth daily 90 capsule 3     clobetasol (TEMOVATE) 0.05 % ointment Apply topically 2 times daily Apply to dermatitic plaques on hands 2 times per day for 2-3 days/week during flares. 45 g 0     COENZYME Q-10 PO Take 30 mg by mouth daily        desonide (DESOWEN) 0.05 % ointment Use twice daily on the face as needed. 60 g 1     dexamethasone (DECADRON) 4 MG tablet Take 5 tablets (20 mg) by mouth once a week 60 tablet 3     Lactobacillus (PROBIOTIC ACIDOPHILUS) CAPS Take by mouth twice a week       magnesium 100 MG CAPS Take 117 mg by mouth 3 times daily       Multiple Vitamins-Minerals (MULTIVITAMIN OR) Take 1 tablet by mouth 2 times daily.       Multiple Vitamins-Minerals (ZINC PO) Take by mouth daily        order for DME Equipment being ordered: right breast prosthesis 2 Units 3     order for DME 6 mastectomy bras  Length of need: 99 months 6 Device 1     order for DME R mastectomy prosthesis   Length of need:  99 months 1 Device 1     pomalidomide (POMALYST) 1 MG capsule Alternate 1 cap with 2 caps. Take on days 1-21 of  repeated 28 day cycles. 32 capsule 0     triamcinolone (KENALOG) 0.1 % cream Apply topically 2 times daily 80 g 1        Allergies   Allergen Reactions     Cayenne Pepper [Cayenne]      Corn-Related Products GI Disturbance     Levaquin Other (See Comments)     Tendon pain     Milk Protein GI Disturbance     Mold      Facial rash/lip swelling     Morphine Sulfate Other (See Comments)     Per pt - see things (hallucination) when she was on Morphine .     Pollen Extract      Environmental allergies      Shrimp Nausea and Vomiting     Tomato      Lip swelling, facial rash     Azithromycin Rash     Keflex [Cephalexin] Rash     Oat Rash     Tape [Adhesive Tape] Itching and Rash     All adhesives     Wheat Rash     Swelling of lips         Review of Systems:  -As per HPI  -Constitutional: Otherwise feeling well today, in usual state of health.  -HEENT: Patient denies nonhealing oral sores.  -Skin: As above in HPI. No additional skin concerns.    Physical exam:  Vitals: There were no vitals taken for this visit.  GEN: This is a well developed, well-nourished female in no acute distress, in a pleasant mood.    SKIN: Total skin excluding the undergarment areas but including the buttocks was performed. The exam included the head/face, neck, both arms, chest, back, abdomen, both legs, digits and/or nails.   -Bhagat skin type: III  -There are waxy stuck on tan to brown papules on the trunk, face, neck, legs.  -There are dome shaped bright red papules on the trunk, arms.   -Groups of erythematous papules in scattered distribution involving the thighs, arms, back.  Lesions appear to be resolving.    Impression/Plan:  1. Allergic contact dermatitis- resolving - likely reaction to allergen at her [employer].    - Continue triamcinolone ointment to affected area  - counseled on PIH    2. Rash with breast prosthesis  - keep area dry  - can apply desonide for the breast area    3. Seborrheic keratoses   - Seborrheic keratosis:  Discussed benign nature of lesions.    4. Possible phototoxic/photoallergic reaction to thalidomide or pamolidomide, well-controlled per patient  - Provided refills for triamcinolone 0.1%, desonide 0.05%, clobetasol 0.05%      CC Referred Self, MD  No address on file on close of this encounter.  Follow-up in 1 year, earlier for new or changing lesions.       Dr. Mcknight staffed the patient.    Staff Involved:  Resident(Adelia Grant MD)/Staff  I, Neela Mcknight MD, saw this patient with the resident and agree with the resident s findings and plan of care as documented in the resident s note.

## 2020-07-16 ENCOUNTER — TELEPHONE (OUTPATIENT)
Dept: ONCOLOGY | Facility: CLINIC | Age: 70
End: 2020-07-16

## 2020-07-16 DIAGNOSIS — C90.00 MULTIPLE MYELOMA, REMISSION STATUS UNSPECIFIED (H): Primary | ICD-10-CM

## 2020-07-16 NOTE — TELEPHONE ENCOUNTER
PA Initiation    Medication: POMALYST - QTY EXCEPTION PA SUBMITTED - (Key: [id number])  Insurance Company: [employer] - Phone [phone] Fax [phone]  Start Date: 7/16/2020    [facility]   Oncology Pharmacy Liaison  [email or web address]   [phone] (phone)   [phone] (fax)

## 2020-07-20 NOTE — TELEPHONE ENCOUNTER
Oral Chemotherapy Monitoring Program   Medication:POMALYST  Rx: TAKE 1MG ALTERNATING EVERY OTHER DAY WITH 2MG ON DAYS 1-21 OF 28 DAY CYCLE  Auth #: [id number]   Risk Category: Adult Female NOT of reproductive potential  Routine survey questions reviewed.   Rx to be Escribed to [facility]   Oncology Pharmacy Liaison  [email or web address]   [phone] (phone)   [phone] (fax)

## 2020-08-05 DIAGNOSIS — C90.00 MULTIPLE MYELOMA, REMISSION STATUS UNSPECIFIED (H): ICD-10-CM

## 2020-08-10 DIAGNOSIS — C90.00 MULTIPLE MYELOMA, REMISSION STATUS UNSPECIFIED (H): ICD-10-CM

## 2020-08-24 DIAGNOSIS — C90.00 MULTIPLE MYELOMA NOT HAVING ACHIEVED REMISSION (H): ICD-10-CM

## 2020-08-24 LAB
ALBUMIN SERPL-MCNC: 3.4 G/DL (ref 3.4–5)
ALP SERPL-CCNC: 61 U/L (ref 40–150)
ALT SERPL W P-5'-P-CCNC: 24 U/L (ref 0–50)
ANION GAP SERPL CALCULATED.3IONS-SCNC: 5 MMOL/L (ref 3–14)
AST SERPL W P-5'-P-CCNC: 17 U/L (ref 0–45)
B2 MICROGLOB SERPL-MCNC: 3.2 MG/L
BASOPHILS # BLD AUTO: 0 10E9/L (ref 0–0.2)
BASOPHILS NFR BLD AUTO: 0.9 %
BILIRUB SERPL-MCNC: 0.4 MG/DL (ref 0.2–1.3)
BUN SERPL-MCNC: 14 MG/DL (ref 7–30)
CALCIUM SERPL-MCNC: 9.1 MG/DL (ref 8.5–10.1)
CHLORIDE SERPL-SCNC: 101 MMOL/L (ref 94–109)
CO2 SERPL-SCNC: 28 MMOL/L (ref 20–32)
CREAT SERPL-MCNC: 0.84 MG/DL (ref 0.52–1.04)
CRP SERPL-MCNC: <2.9 MG/L (ref 0–8)
DIFFERENTIAL METHOD BLD: ABNORMAL
EOSINOPHIL # BLD AUTO: 0.2 10E9/L (ref 0–0.7)
EOSINOPHIL NFR BLD AUTO: 5 %
ERYTHROCYTE [DISTWIDTH] IN BLOOD BY AUTOMATED COUNT: 15.5 % (ref 10–15)
GFR SERPL CREATININE-BSD FRML MDRD: 71 ML/MIN/{1.73_M2}
GLUCOSE SERPL-MCNC: 77 MG/DL (ref 70–99)
HCT VFR BLD AUTO: 35.5 % (ref 35–47)
HGB BLD-MCNC: 12 G/DL (ref 11.7–15.7)
IMM GRANULOCYTES # BLD: 0 10E9/L (ref 0–0.4)
IMM GRANULOCYTES NFR BLD: 0.3 %
LDH SERPL L TO P-CCNC: 143 U/L (ref 81–234)
LYMPHOCYTES # BLD AUTO: 1.6 10E9/L (ref 0.8–5.3)
LYMPHOCYTES NFR BLD AUTO: 48.9 %
MCH RBC QN AUTO: 33.9 PG (ref 26.5–33)
MCHC RBC AUTO-ENTMCNC: 33.8 G/DL (ref 31.5–36.5)
MCV RBC AUTO: 100 FL (ref 78–100)
MONOCYTES # BLD AUTO: 0.4 10E9/L (ref 0–1.3)
MONOCYTES NFR BLD AUTO: 13.1 %
NEUTROPHILS # BLD AUTO: 1 10E9/L (ref 1.6–8.3)
NEUTROPHILS NFR BLD AUTO: 31.8 %
NRBC # BLD AUTO: 0 10*3/UL
NRBC BLD AUTO-RTO: 0 /100
PLATELET # BLD AUTO: 205 10E9/L (ref 150–450)
POTASSIUM SERPL-SCNC: 3.7 MMOL/L (ref 3.4–5.3)
PROT SERPL-MCNC: 8.6 G/DL (ref 6.8–8.8)
RBC # BLD AUTO: 3.54 10E12/L (ref 3.8–5.2)
SODIUM SERPL-SCNC: 134 MMOL/L (ref 133–144)
WBC # BLD AUTO: 3.2 10E9/L (ref 4–11)

## 2020-08-24 PROCEDURE — 82232 ASSAY OF BETA-2 PROTEIN: CPT | Performed by: INTERNAL MEDICINE

## 2020-08-24 PROCEDURE — 80053 COMPREHEN METABOLIC PANEL: CPT | Performed by: INTERNAL MEDICINE

## 2020-08-24 PROCEDURE — 00000402 ZZHCL STATISTIC TOTAL PROTEIN: Performed by: INTERNAL MEDICINE

## 2020-08-24 PROCEDURE — 86140 C-REACTIVE PROTEIN: CPT | Performed by: INTERNAL MEDICINE

## 2020-08-24 PROCEDURE — 83615 LACTATE (LD) (LDH) ENZYME: CPT | Performed by: INTERNAL MEDICINE

## 2020-08-24 PROCEDURE — 85025 COMPLETE CBC W/AUTO DIFF WBC: CPT | Performed by: INTERNAL MEDICINE

## 2020-08-24 PROCEDURE — 84165 PROTEIN E-PHORESIS SERUM: CPT | Performed by: INTERNAL MEDICINE

## 2020-08-25 LAB
ALBUMIN SERPL ELPH-MCNC: 4.2 G/DL (ref 3.7–5.1)
ALPHA1 GLOB SERPL ELPH-MCNC: 0.3 G/DL (ref 0.2–0.4)
ALPHA2 GLOB SERPL ELPH-MCNC: 0.8 G/DL (ref 0.5–0.9)
B-GLOBULIN SERPL ELPH-MCNC: 0.6 G/DL (ref 0.6–1)
GAMMA GLOB SERPL ELPH-MCNC: 2.6 G/DL (ref 0.7–1.6)
M PROTEIN SERPL ELPH-MCNC: 2.4 G/DL
PROT PATTERN SERPL ELPH-IMP: ABNORMAL

## 2020-08-28 ENCOUNTER — ONCOLOGY VISIT (OUTPATIENT)
Dept: TRANSPLANT | Facility: CLINIC | Age: 70
End: 2020-08-28
Attending: INTERNAL MEDICINE
Payer: COMMERCIAL

## 2020-08-28 VITALS
WEIGHT: 124.3 LBS | TEMPERATURE: 97.8 F | BODY MASS INDEX: 22.73 KG/M2 | RESPIRATION RATE: 16 BRPM | OXYGEN SATURATION: 97 % | HEART RATE: 86 BPM | SYSTOLIC BLOOD PRESSURE: 130 MMHG | DIASTOLIC BLOOD PRESSURE: 77 MMHG

## 2020-08-28 DIAGNOSIS — C90.00 MULTIPLE MYELOMA NOT HAVING ACHIEVED REMISSION (H): Primary | ICD-10-CM

## 2020-08-28 PROCEDURE — G0463 HOSPITAL OUTPT CLINIC VISIT: HCPCS

## 2020-08-28 RX ORDER — NALOXONE HYDROCHLORIDE 0.4 MG/ML
.1-.4 INJECTION, SOLUTION INTRAMUSCULAR; INTRAVENOUS; SUBCUTANEOUS
Status: CANCELLED | OUTPATIENT
Start: 2020-09-01

## 2020-08-28 RX ORDER — ACETAMINOPHEN 325 MG/1
650 TABLET ORAL ONCE
Status: CANCELLED | OUTPATIENT
Start: 2020-09-07

## 2020-08-28 RX ORDER — LORAZEPAM 2 MG/ML
0.5 INJECTION INTRAMUSCULAR EVERY 4 HOURS PRN
Status: CANCELLED
Start: 2020-09-21

## 2020-08-28 RX ORDER — LORAZEPAM 2 MG/ML
0.5 INJECTION INTRAMUSCULAR EVERY 4 HOURS PRN
Status: CANCELLED
Start: 2020-09-14

## 2020-08-28 RX ORDER — METHYLPREDNISOLONE SODIUM SUCCINATE 125 MG/2ML
125 INJECTION, POWDER, LYOPHILIZED, FOR SOLUTION INTRAMUSCULAR; INTRAVENOUS
Status: CANCELLED
Start: 2020-09-21

## 2020-08-28 RX ORDER — DIPHENHYDRAMINE HYDROCHLORIDE 50 MG/ML
50 INJECTION INTRAMUSCULAR; INTRAVENOUS
Status: CANCELLED
Start: 2020-09-21

## 2020-08-28 RX ORDER — LORAZEPAM 2 MG/ML
0.5 INJECTION INTRAMUSCULAR EVERY 4 HOURS PRN
Status: CANCELLED
Start: 2020-09-07

## 2020-08-28 RX ORDER — DEXAMETHASONE 4 MG/1
4 TABLET ORAL ONCE
Status: CANCELLED | OUTPATIENT
Start: 2020-09-01

## 2020-08-28 RX ORDER — HEPARIN SODIUM,PORCINE 10 UNIT/ML
5 VIAL (ML) INTRAVENOUS
Status: CANCELLED | OUTPATIENT
Start: 2020-09-07

## 2020-08-28 RX ORDER — ALBUTEROL SULFATE 90 UG/1
1-2 AEROSOL, METERED RESPIRATORY (INHALATION)
Status: CANCELLED
Start: 2020-09-14

## 2020-08-28 RX ORDER — NALOXONE HYDROCHLORIDE 0.4 MG/ML
.1-.4 INJECTION, SOLUTION INTRAMUSCULAR; INTRAVENOUS; SUBCUTANEOUS
Status: CANCELLED | OUTPATIENT
Start: 2020-09-21

## 2020-08-28 RX ORDER — ALBUTEROL SULFATE 90 UG/1
1-2 AEROSOL, METERED RESPIRATORY (INHALATION)
Status: CANCELLED
Start: 2020-09-07

## 2020-08-28 RX ORDER — DIPHENHYDRAMINE HYDROCHLORIDE 50 MG/ML
50 INJECTION INTRAMUSCULAR; INTRAVENOUS
Status: CANCELLED
Start: 2020-09-01

## 2020-08-28 RX ORDER — ALBUTEROL SULFATE 90 UG/1
1-2 AEROSOL, METERED RESPIRATORY (INHALATION)
Status: CANCELLED
Start: 2020-09-01

## 2020-08-28 RX ORDER — HEPARIN SODIUM,PORCINE 10 UNIT/ML
5 VIAL (ML) INTRAVENOUS
Status: CANCELLED | OUTPATIENT
Start: 2020-09-14

## 2020-08-28 RX ORDER — HEPARIN SODIUM (PORCINE) LOCK FLUSH IV SOLN 100 UNIT/ML 100 UNIT/ML
5 SOLUTION INTRAVENOUS
Status: CANCELLED | OUTPATIENT
Start: 2020-09-14

## 2020-08-28 RX ORDER — SODIUM CHLORIDE 9 MG/ML
1000 INJECTION, SOLUTION INTRAVENOUS CONTINUOUS PRN
Status: CANCELLED
Start: 2020-09-14

## 2020-08-28 RX ORDER — SODIUM CHLORIDE 9 MG/ML
1000 INJECTION, SOLUTION INTRAVENOUS CONTINUOUS PRN
Status: CANCELLED
Start: 2020-09-07

## 2020-08-28 RX ORDER — METHYLPREDNISOLONE SODIUM SUCCINATE 125 MG/2ML
125 INJECTION, POWDER, LYOPHILIZED, FOR SOLUTION INTRAMUSCULAR; INTRAVENOUS
Status: CANCELLED
Start: 2020-09-01

## 2020-08-28 RX ORDER — MEPERIDINE HYDROCHLORIDE 25 MG/ML
25 INJECTION INTRAMUSCULAR; INTRAVENOUS; SUBCUTANEOUS EVERY 30 MIN PRN
Status: CANCELLED | OUTPATIENT
Start: 2020-09-14

## 2020-08-28 RX ORDER — DIPHENHYDRAMINE HCL 25 MG
25 CAPSULE ORAL ONCE
Status: CANCELLED | OUTPATIENT
Start: 2020-09-21

## 2020-08-28 RX ORDER — HEPARIN SODIUM (PORCINE) LOCK FLUSH IV SOLN 100 UNIT/ML 100 UNIT/ML
5 SOLUTION INTRAVENOUS
Status: CANCELLED | OUTPATIENT
Start: 2020-09-01

## 2020-08-28 RX ORDER — MEPERIDINE HYDROCHLORIDE 25 MG/ML
25 INJECTION INTRAMUSCULAR; INTRAVENOUS; SUBCUTANEOUS EVERY 30 MIN PRN
Status: CANCELLED | OUTPATIENT
Start: 2020-09-01

## 2020-08-28 RX ORDER — DIPHENHYDRAMINE HCL 25 MG
25 CAPSULE ORAL ONCE
Status: CANCELLED | OUTPATIENT
Start: 2020-09-14

## 2020-08-28 RX ORDER — DEXAMETHASONE 4 MG/1
4 TABLET ORAL ONCE
Status: CANCELLED | OUTPATIENT
Start: 2020-09-21

## 2020-08-28 RX ORDER — ALBUTEROL SULFATE 0.83 MG/ML
2.5 SOLUTION RESPIRATORY (INHALATION)
Status: CANCELLED | OUTPATIENT
Start: 2020-09-07

## 2020-08-28 RX ORDER — HEPARIN SODIUM,PORCINE 10 UNIT/ML
5 VIAL (ML) INTRAVENOUS
Status: CANCELLED | OUTPATIENT
Start: 2020-09-01

## 2020-08-28 RX ORDER — DIPHENHYDRAMINE HYDROCHLORIDE 50 MG/ML
50 INJECTION INTRAMUSCULAR; INTRAVENOUS
Status: CANCELLED
Start: 2020-09-14

## 2020-08-28 RX ORDER — DIPHENHYDRAMINE HYDROCHLORIDE 50 MG/ML
50 INJECTION INTRAMUSCULAR; INTRAVENOUS
Status: CANCELLED
Start: 2020-09-07

## 2020-08-28 RX ORDER — MEPERIDINE HYDROCHLORIDE 25 MG/ML
25 INJECTION INTRAMUSCULAR; INTRAVENOUS; SUBCUTANEOUS EVERY 30 MIN PRN
Status: CANCELLED | OUTPATIENT
Start: 2020-09-07

## 2020-08-28 RX ORDER — EPINEPHRINE 1 MG/ML
0.3 INJECTION, SOLUTION INTRAMUSCULAR; SUBCUTANEOUS EVERY 5 MIN PRN
Status: CANCELLED | OUTPATIENT
Start: 2020-09-01

## 2020-08-28 RX ORDER — MEPERIDINE HYDROCHLORIDE 25 MG/ML
25 INJECTION INTRAMUSCULAR; INTRAVENOUS; SUBCUTANEOUS EVERY 30 MIN PRN
Status: CANCELLED | OUTPATIENT
Start: 2020-09-21

## 2020-08-28 RX ORDER — METHYLPREDNISOLONE SODIUM SUCCINATE 125 MG/2ML
125 INJECTION, POWDER, LYOPHILIZED, FOR SOLUTION INTRAMUSCULAR; INTRAVENOUS
Status: CANCELLED
Start: 2020-09-14

## 2020-08-28 RX ORDER — DEXAMETHASONE 4 MG/1
4 TABLET ORAL ONCE
Status: CANCELLED | OUTPATIENT
Start: 2020-09-14

## 2020-08-28 RX ORDER — MONTELUKAST SODIUM 10 MG/1
10 TABLET ORAL ONCE
Status: CANCELLED | OUTPATIENT
Start: 2020-09-01

## 2020-08-28 RX ORDER — HEPARIN SODIUM,PORCINE 10 UNIT/ML
5 VIAL (ML) INTRAVENOUS
Status: CANCELLED | OUTPATIENT
Start: 2020-09-21

## 2020-08-28 RX ORDER — LORAZEPAM 2 MG/ML
0.5 INJECTION INTRAMUSCULAR EVERY 4 HOURS PRN
Status: CANCELLED
Start: 2020-09-01

## 2020-08-28 RX ORDER — MONTELUKAST SODIUM 10 MG/1
10 TABLET ORAL ONCE
Status: CANCELLED | OUTPATIENT
Start: 2020-09-14

## 2020-08-28 RX ORDER — ALBUTEROL SULFATE 90 UG/1
1-2 AEROSOL, METERED RESPIRATORY (INHALATION)
Status: CANCELLED
Start: 2020-09-21

## 2020-08-28 RX ORDER — EPINEPHRINE 1 MG/ML
0.3 INJECTION, SOLUTION INTRAMUSCULAR; SUBCUTANEOUS EVERY 5 MIN PRN
Status: CANCELLED | OUTPATIENT
Start: 2020-09-21

## 2020-08-28 RX ORDER — DIPHENHYDRAMINE HCL 25 MG
25 CAPSULE ORAL ONCE
Status: CANCELLED | OUTPATIENT
Start: 2020-09-07

## 2020-08-28 RX ORDER — METHYLPREDNISOLONE SODIUM SUCCINATE 125 MG/2ML
125 INJECTION, POWDER, LYOPHILIZED, FOR SOLUTION INTRAMUSCULAR; INTRAVENOUS
Status: CANCELLED
Start: 2020-09-07

## 2020-08-28 RX ORDER — SODIUM CHLORIDE 9 MG/ML
1000 INJECTION, SOLUTION INTRAVENOUS CONTINUOUS PRN
Status: CANCELLED
Start: 2020-09-21

## 2020-08-28 RX ORDER — EPINEPHRINE 1 MG/ML
0.3 INJECTION, SOLUTION INTRAMUSCULAR; SUBCUTANEOUS EVERY 5 MIN PRN
Status: CANCELLED | OUTPATIENT
Start: 2020-09-07

## 2020-08-28 RX ORDER — ALBUTEROL SULFATE 0.83 MG/ML
2.5 SOLUTION RESPIRATORY (INHALATION)
Status: CANCELLED | OUTPATIENT
Start: 2020-09-21

## 2020-08-28 RX ORDER — NALOXONE HYDROCHLORIDE 0.4 MG/ML
.1-.4 INJECTION, SOLUTION INTRAMUSCULAR; INTRAVENOUS; SUBCUTANEOUS
Status: CANCELLED | OUTPATIENT
Start: 2020-09-07

## 2020-08-28 RX ORDER — HEPARIN SODIUM (PORCINE) LOCK FLUSH IV SOLN 100 UNIT/ML 100 UNIT/ML
5 SOLUTION INTRAVENOUS
Status: CANCELLED | OUTPATIENT
Start: 2020-09-21

## 2020-08-28 RX ORDER — NALOXONE HYDROCHLORIDE 0.4 MG/ML
.1-.4 INJECTION, SOLUTION INTRAMUSCULAR; INTRAVENOUS; SUBCUTANEOUS
Status: CANCELLED | OUTPATIENT
Start: 2020-09-14

## 2020-08-28 RX ORDER — EPINEPHRINE 1 MG/ML
0.3 INJECTION, SOLUTION INTRAMUSCULAR; SUBCUTANEOUS EVERY 5 MIN PRN
Status: CANCELLED | OUTPATIENT
Start: 2020-09-14

## 2020-08-28 RX ORDER — HEPARIN SODIUM (PORCINE) LOCK FLUSH IV SOLN 100 UNIT/ML 100 UNIT/ML
5 SOLUTION INTRAVENOUS
Status: CANCELLED | OUTPATIENT
Start: 2020-09-07

## 2020-08-28 RX ORDER — ALBUTEROL SULFATE 0.83 MG/ML
2.5 SOLUTION RESPIRATORY (INHALATION)
Status: CANCELLED | OUTPATIENT
Start: 2020-09-14

## 2020-08-28 RX ORDER — ACETAMINOPHEN 325 MG/1
650 TABLET ORAL ONCE
Status: CANCELLED | OUTPATIENT
Start: 2020-09-21

## 2020-08-28 RX ORDER — SODIUM CHLORIDE 9 MG/ML
1000 INJECTION, SOLUTION INTRAVENOUS CONTINUOUS PRN
Status: CANCELLED
Start: 2020-09-01

## 2020-08-28 RX ORDER — DIPHENHYDRAMINE HCL 25 MG
25 CAPSULE ORAL ONCE
Status: CANCELLED | OUTPATIENT
Start: 2020-09-01

## 2020-08-28 RX ORDER — MONTELUKAST SODIUM 10 MG/1
10 TABLET ORAL ONCE
Status: CANCELLED | OUTPATIENT
Start: 2020-09-07

## 2020-08-28 RX ORDER — ACETAMINOPHEN 325 MG/1
650 TABLET ORAL ONCE
Status: CANCELLED | OUTPATIENT
Start: 2020-09-14

## 2020-08-28 RX ORDER — DEXAMETHASONE 4 MG/1
4 TABLET ORAL ONCE
Status: CANCELLED | OUTPATIENT
Start: 2020-09-07

## 2020-08-28 RX ORDER — ACETAMINOPHEN 325 MG/1
650 TABLET ORAL ONCE
Status: CANCELLED | OUTPATIENT
Start: 2020-09-01

## 2020-08-28 RX ORDER — ALBUTEROL SULFATE 0.83 MG/ML
2.5 SOLUTION RESPIRATORY (INHALATION)
Status: CANCELLED | OUTPATIENT
Start: 2020-09-01

## 2020-08-28 ASSESSMENT — PAIN SCALES - GENERAL: PAINLEVEL: NO PAIN (0)

## 2020-08-28 NOTE — NURSING NOTE
"Oncology Rooming Note    August 28, 2020 7:36 AM   Shena Hartmann is a 69 year old female who presents for:    Chief Complaint   Patient presents with     RECHECK     Pt is here for a rnt for MM     Initial Vitals: Blood Pressure 130/77   Pulse 86   Temperature 97.8  F (36.6  C) (Oral)   Respiration 16   Weight 56.4 kg (124 lb 4.8 oz)   Oxygen Saturation 97%   Body Mass Index 22.73 kg/m   Estimated body mass index is 22.73 kg/m  as calculated from the following:    Height as of 3/5/19: 1.575 m (5' 2\").    Weight as of this encounter: 56.4 kg (124 lb 4.8 oz). Body surface area is 1.57 meters squared.  No Pain (0) Comment: Data Unavailable   No LMP recorded. Patient is postmenopausal.  Allergies reviewed: Yes  Medications reviewed: Yes    Medications: Medication refills not needed today.  Pharmacy name entered into [employer]:    [id number] - [place], [place] - [address] [facility] [facility] [address]  WRITTEN PRESCRIPTION REQUESTED  [facility] - [place], [place] - [address]    Clinical concerns: none       Dacia Lei MA            "

## 2020-08-28 NOTE — PROGRESS NOTES
[facility] note    Shena was seen and examined by me today.  She is a 69-year-old woman with multiple myeloma that was smoldering for many years but ultimately progressed with refractory disease and underwent autologous transplantation 6 years ago.  She has never been in a complete remission and has been on multiple medications since transplant.  Most recently she has been on pomalidomide 2 weeks on and 2 weeks off because of side effects.  She now has clear evidence of progression with an increasing M spike.  Since June she has been On pomalidomide at 2 alternating with 1 mg p.o. for 21 days of each month.  We also tried putting her dexamethasone initially at 20 mg weekly but this was poorly tolerated.  After 3 weeks, she decreased to 10 mg and then 1 week later to 4 mg.  She has now been off Decadron for 5 weeks and is actually starting to feel somewhat better.  On Decadron she had headaches and blood pressure spikes.  I should note that Shena has been incredibly sensitive to her medications and this is not completely unexpected.  She is currently still on her pomalidomide.  She is here today to discuss her restaging labs.  Symptomatically she denies any fevers chills or sweats.  She has no nausea vomiting or diarrhea.  She has no shortness of breath or cough.  She has not had any infections or COVID exposures.  She has no new bone pain.  The rest of the 10 point review of systems is unremarkable.    Physical exam overall Shena looks well and is in no distress today.  HEENT exam is unremarkable.  There are no oral lesions.  Pupils were equal round reactive to light.  There is no cervical, axillary or inguinal adenopathy.  Lungs are clear to auscultation percussion.  Cardiac exam is without gallops or murmurs.  Abdominal exam is benign and there is no organomegaly.  Lower extremities are without edema.  There are no skin rashes.    Laboratory evaluation: Electrolytes are normal with a creatinine of 0.84.  CBC shows a  hemoglobin of 12, platelet count of 205,000 and a white count of 3200 with adequate neutrophils.  Her C-reactive protein is normal.  Her beta-2 microglobulin is elevated at 3.2 and this is a change from past values.  Her serum protein electrophoresis is also elevated again despite being back on pomalidomide at the doses that she previously responded to.  Her SPEP is 2.4 g which is the highest that it is been post transplant.    I spent 45 minutes face-to-face with Shena today discussing the risks, options and benefits of different therapies and how to proceed.  It is very clear that she is unable to tolerate Decadron for any extended doses.  She is tolerating her pomalidomide and we will keep this the same.  I have discussed her care with some of my colleagues.  I think the next step in her therapy is to try daratumumab which can now be given subcutaneously to minimize side effects.  I had a long discussion with Shena and her  (by telephone) about these options.  We also discussed experimental therapies including an anti-CD38 antibody conjugated with a toxin as well as emerging cell therapies in this area.  At the present time we have all agreed to proceed with daratumumab.  I have written orders and this will be given subcutaneously on a weekly basis for 8 doses and then 8 doses on an every other week basis and then monthly.  She understands and premedications will be given even with the subcutaneous dosing.  We will try to minimize her drug Decadron exposure because of side effects to 4 mg and an additional 4 mg after the first dose.  I am hoping that she will see a decrease in her M spike which is the best way to follow her disease.  I have scheduled her for follow-up October 2 when I will see her again.  She was also scheduled for weekly infusion visits for the next 8 weeks.  All of her and her 's questions have been answered.  They are well-informed about this new treatment plan and she will get  her first dose on Monday.    Dr. Lui Hills MD

## 2020-08-28 NOTE — LETTER
8/28/2020         RE: Shena Hartmann  [address] [place] [address]        Dear Colleague,    Thank you for referring your patient, Shena Hartmann, to the [facility] BLOOD AND MARROW TRANSPLANT. Please see a copy of my visit note below.    BMTstaff note    Shena was seen and examined by me today.  She is a 69-year-old woman with multiple myeloma that was smoldering for many years but ultimately progressed with refractory disease and underwent autologous transplantation 6 years ago.  She has never been in a complete remission and has been on multiple medications since transplant.  Most recently she has been on pomalidomide 2 weeks on and 2 weeks off because of side effects.  She now has clear evidence of progression with an increasing M spike.  Since June she has been On pomalidomide at 2 alternating with 1 mg p.o. for 21 days of each month.  We also tried putting her dexamethasone initially at 20 mg weekly but this was poorly tolerated.  After 3 weeks, she decreased to 10 mg and then 1 week later to 4 mg.  She has now been off Decadron for 5 weeks and is actually starting to feel somewhat better.  On Decadron she had headaches and blood pressure spikes.  I should note that Shena has been incredibly sensitive to her medications and this is not completely unexpected.  She is currently still on her pomalidomide.  She is here today to discuss her restaging labs.  Symptomatically she denies any fevers chills or sweats.  She has no nausea vomiting or diarrhea.  She has no shortness of breath or cough.  She has not had any infections or COVID exposures.  She has no new bone pain.  The rest of the 10 point review of systems is unremarkable.    Physical exam overall Shena looks well and is in no distress today.  HEENT exam is unremarkable.  There are no oral lesions.  Pupils were equal round reactive to light.  There is no cervical, axillary or inguinal adenopathy.  Lungs are clear to auscultation  percussion.  Cardiac exam is without gallops or murmurs.  Abdominal exam is benign and there is no organomegaly.  Lower extremities are without edema.  There are no skin rashes.    Laboratory evaluation: Electrolytes are normal with a creatinine of 0.84.  CBC shows a hemoglobin of 12, platelet count of 205,000 and a white count of 3200 with adequate neutrophils.  Her C-reactive protein is normal.  Her beta-2 microglobulin is elevated at 3.2 and this is a change from past values.  Her serum protein electrophoresis is also elevated again despite being back on pomalidomide at the doses that she previously responded to.  Her SPEP is 2.4 g which is the highest that it is been post transplant.    I spent 45 minutes face-to-face with Shena today discussing the risks, options and benefits of different therapies and how to proceed.  It is very clear that she is unable to tolerate Decadron for any extended doses.  She is tolerating her pomalidomide and we will keep this the same.  I have discussed her care with some of my colleagues.  I think the next step in her therapy is to try daratumumab which can now be given subcutaneously to minimize side effects.  I had a long discussion with Shena and her  (by telephone) about these options.  We also discussed experimental therapies including an anti-CD38 antibody conjugated with a toxin as well as emerging cell therapies in this area.  At the present time we have all agreed to proceed with daratumumab.  I have written orders and this will be given subcutaneously on a weekly basis for 8 doses and then 8 doses on an every other week basis and then monthly.  She understands and premedications will be given even with the subcutaneous dosing.  We will try to minimize her drug Decadron exposure because of side effects to 4 mg and an additional 4 mg after the first dose.  I am hoping that she will see a decrease in her M spike which is the best way to follow her disease.  I have  scheduled her for follow-up October 2 when I will see her again.  She was also scheduled for weekly infusion visits for the next 8 weeks.  All of her and her 's questions have been answered.  They are well-informed about this new treatment plan and she will get her first dose on Monday.    Dr. Lui Hills MD

## 2020-08-31 DIAGNOSIS — C90.00 MULTIPLE MYELOMA NOT HAVING ACHIEVED REMISSION (H): Primary | ICD-10-CM

## 2020-09-01 ENCOUNTER — INFUSION THERAPY VISIT (OUTPATIENT)
Dept: TRANSPLANT | Facility: CLINIC | Age: 70
End: 2020-09-01
Attending: INTERNAL MEDICINE
Payer: COMMERCIAL

## 2020-09-01 VITALS
OXYGEN SATURATION: 94 % | RESPIRATION RATE: 12 BRPM | TEMPERATURE: 98.7 F | HEART RATE: 86 BPM | DIASTOLIC BLOOD PRESSURE: 70 MMHG | SYSTOLIC BLOOD PRESSURE: 120 MMHG

## 2020-09-01 DIAGNOSIS — C90.00 MULTIPLE MYELOMA NOT HAVING ACHIEVED REMISSION (H): Primary | ICD-10-CM

## 2020-09-01 LAB
BASOPHILS # BLD AUTO: 0 10E9/L (ref 0–0.2)
BASOPHILS NFR BLD AUTO: 1.1 %
DIFFERENTIAL METHOD BLD: ABNORMAL
EOSINOPHIL # BLD AUTO: 0.1 10E9/L (ref 0–0.7)
EOSINOPHIL NFR BLD AUTO: 3.2 %
ERYTHROCYTE [DISTWIDTH] IN BLOOD BY AUTOMATED COUNT: 15.4 % (ref 10–15)
HCT VFR BLD AUTO: 33.7 % (ref 35–47)
HGB BLD-MCNC: 11.6 G/DL (ref 11.7–15.7)
IMM GRANULOCYTES # BLD: 0 10E9/L (ref 0–0.4)
IMM GRANULOCYTES NFR BLD: 0.3 %
LYMPHOCYTES # BLD AUTO: 1.4 10E9/L (ref 0.8–5.3)
LYMPHOCYTES NFR BLD AUTO: 38.2 %
MCH RBC QN AUTO: 34.1 PG (ref 26.5–33)
MCHC RBC AUTO-ENTMCNC: 34.4 G/DL (ref 31.5–36.5)
MCV RBC AUTO: 99 FL (ref 78–100)
MONOCYTES # BLD AUTO: 0.6 10E9/L (ref 0–1.3)
MONOCYTES NFR BLD AUTO: 15.6 %
NEUTROPHILS # BLD AUTO: 1.6 10E9/L (ref 1.6–8.3)
NEUTROPHILS NFR BLD AUTO: 41.6 %
NRBC # BLD AUTO: 0 10*3/UL
NRBC BLD AUTO-RTO: 0 /100
PLATELET # BLD AUTO: 194 10E9/L (ref 150–450)
RBC # BLD AUTO: 3.4 10E12/L (ref 3.8–5.2)
WBC # BLD AUTO: 3.8 10E9/L (ref 4–11)

## 2020-09-01 PROCEDURE — 96365 THER/PROPH/DIAG IV INF INIT: CPT

## 2020-09-01 PROCEDURE — 85025 COMPLETE CBC W/AUTO DIFF WBC: CPT | Performed by: INTERNAL MEDICINE

## 2020-09-01 PROCEDURE — 40000141 ZZH STATISTIC PERIPHERAL IV START W/O US GUIDANCE: Mod: ZF

## 2020-09-01 PROCEDURE — 80053 COMPREHEN METABOLIC PANEL: CPT | Performed by: INTERNAL MEDICINE

## 2020-09-01 PROCEDURE — C9399 UNCLASSIFIED DRUGS OR BIOLOG: HCPCS | Mod: ZF | Performed by: INTERNAL MEDICINE

## 2020-09-01 PROCEDURE — 96372 THER/PROPH/DIAG INJ SC/IM: CPT

## 2020-09-01 PROCEDURE — 96413 CHEMO IV INFUSION 1 HR: CPT

## 2020-09-01 PROCEDURE — 25800030 ZZH RX IP 258 OP 636: Mod: ZF | Performed by: INTERNAL MEDICINE

## 2020-09-01 PROCEDURE — 25000128 H RX IP 250 OP 636: Mod: ZF | Performed by: INTERNAL MEDICINE

## 2020-09-01 PROCEDURE — 25000132 ZZH RX MED GY IP 250 OP 250 PS 637: Mod: ZF | Performed by: INTERNAL MEDICINE

## 2020-09-01 RX ORDER — ACETAMINOPHEN 325 MG/1
650 TABLET ORAL ONCE
Status: COMPLETED | OUTPATIENT
Start: 2020-09-01 | End: 2020-09-01

## 2020-09-01 RX ORDER — DIPHENHYDRAMINE HCL 25 MG
25 CAPSULE ORAL ONCE
Status: COMPLETED | OUTPATIENT
Start: 2020-09-01 | End: 2020-09-01

## 2020-09-01 RX ORDER — MONTELUKAST SODIUM 10 MG/1
10 TABLET ORAL ONCE
Status: COMPLETED | OUTPATIENT
Start: 2020-09-01 | End: 2020-09-01

## 2020-09-01 RX ADMIN — ACETAMINOPHEN 650 MG: 325 TABLET ORAL at 08:34

## 2020-09-01 RX ADMIN — DIPHENHYDRAMINE HYDROCHLORIDE 25 MG: 25 CAPSULE ORAL at 08:35

## 2020-09-01 RX ADMIN — DARATUMUMAB AND HYALURONIDASE-FIHJ (HUMAN RECOMBINANT) 1800 MG: 1800; 30000 INJECTION SUBCUTANEOUS at 09:46

## 2020-09-01 RX ADMIN — DEXAMETHASONE SODIUM PHOSPHATE 4 MG: 10 INJECTION, SOLUTION INTRAMUSCULAR; INTRAVENOUS at 08:35

## 2020-09-01 RX ADMIN — MONTELUKAST SODIUM 10 MG: 10 TABLET, COATED ORAL at 08:35

## 2020-09-01 ASSESSMENT — PAIN SCALES - GENERAL: PAINLEVEL: MILD PAIN (2)

## 2020-09-01 NOTE — PROGRESS NOTES
"Infusion Nursing Note:  Shena Hartmann presents today for subcutaneous Yen first injection.    Patient seen by provider today: No   present during visit today: Not Applicable.    Note: Patient presents for first subcutaneous injection of Yen. Patient reports generalized pain rated 2/10 otherwise stable.     Pre medication given. Dexamethasone given IV per pharmacy/provider recommendation. Patient tolerated Yen injection relatively well in left lower abdomen. Injection given via 23g needle over 6 minutes. Half way through patient reported \"fuzziness and pressure\" in head. BP noted to slightly increase ( see doc note). Following injection, BP normalized.     Patient monitored for 3.5 hours. VSS, denies any additional symptoms. Injection site unremarkable. Patient aware to take Decadron 4mg PO tomorrow.     Patient provider discharge paperwork and stable at discharge.     Intravenous Access:  Peripheral IV placed.    Treatment Conditions:  Lab Results   Component Value Date    HGB 11.6 09/01/2020     Lab Results   Component Value Date    WBC 3.8 09/01/2020      Lab Results   Component Value Date    ANEU 1.6 09/01/2020     Lab Results   Component Value Date     09/01/2020      Lab Results   Component Value Date     08/24/2020                   Lab Results   Component Value Date    POTASSIUM 3.7 08/24/2020           Lab Results   Component Value Date    MAG 1.8 12/12/2014            Lab Results   Component Value Date    CR 0.84 08/24/2020                   Lab Results   Component Value Date    PAULINE 9.1 08/24/2020                Lab Results   Component Value Date    BILITOTAL 0.4 08/24/2020           Lab Results   Component Value Date    ALBUMIN 3.4 08/24/2020                    Lab Results   Component Value Date    ALT 24 08/24/2020           Lab Results   Component Value Date    AST 17 08/24/2020           Post Infusion Assessment:  Patient tolerated injection without incident.  Patient " observed for 3.5 hours post injection per protocol.  Site patent and intact, free from redness, edema or discomfort.  Access discontinued per protocol.       Discharge Plan:   Discharge instructions reviewed with patient.  Patient and/or family verbalized understanding of discharge instructions and all questions answered.  AVS to patient via [employer].  Patient will return Tuesday 9/8 for next appointment.   Patient discharged in stable condition accompanied by: .  Departure Mode: Ambulatory.    Radha Walter RN

## 2020-09-01 NOTE — NURSING NOTE
Chief Complaint   Patient presents with     Infusion     Scheduled Daratunamub s/p BMT txp for MM

## 2020-09-01 NOTE — LETTER
"    9/1/2020         RE: Shena Hartmann  [address] [place] [address]        Dear Colleague,    Thank you for referring your patient, Shena Hartmann, to the [facility]PL[facility]. Please see a copy of my visit note below.    Infusion Nursing Note:  Shena Hartmann presents today for subcutaneous Yen first injection.    Patient seen by provider today: No   present during visit today: Not Applicable.    Note: Patient presents for first subcutaneous injection of Yen. Patient reports generalized pain rated 2/10 otherwise stable.     Pre medication given. Dexamethasone given IV per pharmacy/provider recommendation. Patient tolerated Yen injection relatively well in left lower abdomen. Injection given via 23g needle over 6 minutes. Half way through patient reported \"fuzziness and pressure\" in head. BP noted to slightly increase ( see doc note). Following injection, BP normalized.     Patient monitored for 3.5 hours. VSS, denies any additional symptoms. Injection site unremarkable. Patient aware to take Decadron 4mg PO tomorrow.     Patient provider discharge paperwork and stable at discharge.     Intravenous Access:  Peripheral IV placed.    Treatment Conditions:  Lab Results   Component Value Date    HGB 11.6 09/01/2020     Lab Results   Component Value Date    WBC 3.8 09/01/2020      Lab Results   Component Value Date    ANEU 1.6 09/01/2020     Lab Results   Component Value Date     09/01/2020      Lab Results   Component Value Date     08/24/2020                   Lab Results   Component Value Date    POTASSIUM 3.7 08/24/2020           Lab Results   Component Value Date    MAG 1.8 12/12/2014            Lab Results   Component Value Date    CR 0.84 08/24/2020                   Lab Results   Component Value Date    PAULINE 9.1 08/24/2020                Lab Results   Component Value Date    BILITOTAL 0.4 08/24/2020           Lab Results   Component Value Date    " ALBUMIN 3.4 08/24/2020                    Lab Results   Component Value Date    ALT 24 08/24/2020           Lab Results   Component Value Date    AST 17 08/24/2020           Post Infusion Assessment:  Patient tolerated injection without incident.  Patient observed for 3.5 hours post injection per protocol.  Site patent and intact, free from redness, edema or discomfort.  Access discontinued per protocol.       Discharge Plan:   Discharge instructions reviewed with patient.  Patient and/or family verbalized understanding of discharge instructions and all questions answered.  AVS to patient via [employer].  Patient will return Tuesday 9/8 for next appointment.   Patient discharged in stable condition accompanied by: .  Departure Mode: Ambulatory.    Radha Walter RN                          Again, thank you for allowing me to participate in the care of your patient.        Sincerely,        [facility] [place]

## 2020-09-08 ENCOUNTER — APPOINTMENT (OUTPATIENT)
Dept: LAB | Facility: CLINIC | Age: 70
End: 2020-09-08
Attending: INTERNAL MEDICINE
Payer: COMMERCIAL

## 2020-09-08 ENCOUNTER — INFUSION THERAPY VISIT (OUTPATIENT)
Dept: TRANSPLANT | Facility: CLINIC | Age: 70
End: 2020-09-08
Attending: INTERNAL MEDICINE
Payer: COMMERCIAL

## 2020-09-08 VITALS
WEIGHT: 124.4 LBS | DIASTOLIC BLOOD PRESSURE: 64 MMHG | BODY MASS INDEX: 22.75 KG/M2 | HEART RATE: 79 BPM | RESPIRATION RATE: 16 BRPM | TEMPERATURE: 97.7 F | SYSTOLIC BLOOD PRESSURE: 136 MMHG | OXYGEN SATURATION: 98 %

## 2020-09-08 DIAGNOSIS — C90.00 MULTIPLE MYELOMA NOT HAVING ACHIEVED REMISSION (H): Primary | ICD-10-CM

## 2020-09-08 LAB
BASOPHILS # BLD AUTO: 0 10E9/L (ref 0–0.2)
BASOPHILS NFR BLD AUTO: 0.8 %
DIFFERENTIAL METHOD BLD: ABNORMAL
EOSINOPHIL # BLD AUTO: 0.3 10E9/L (ref 0–0.7)
EOSINOPHIL NFR BLD AUTO: 9.6 %
ERYTHROCYTE [DISTWIDTH] IN BLOOD BY AUTOMATED COUNT: 15.1 % (ref 10–15)
HCT VFR BLD AUTO: 37.4 % (ref 35–47)
HGB BLD-MCNC: 12.6 G/DL (ref 11.7–15.7)
IMM GRANULOCYTES # BLD: 0 10E9/L (ref 0–0.4)
IMM GRANULOCYTES NFR BLD: 0 %
LYMPHOCYTES # BLD AUTO: 0.9 10E9/L (ref 0.8–5.3)
LYMPHOCYTES NFR BLD AUTO: 35.8 %
MCH RBC QN AUTO: 34 PG (ref 26.5–33)
MCHC RBC AUTO-ENTMCNC: 33.7 G/DL (ref 31.5–36.5)
MCV RBC AUTO: 101 FL (ref 78–100)
MONOCYTES # BLD AUTO: 0.4 10E9/L (ref 0–1.3)
MONOCYTES NFR BLD AUTO: 15 %
NEUTROPHILS # BLD AUTO: 1 10E9/L (ref 1.6–8.3)
NEUTROPHILS NFR BLD AUTO: 38.8 %
NRBC # BLD AUTO: 0 10*3/UL
NRBC BLD AUTO-RTO: 0 /100
PLATELET # BLD AUTO: 145 10E9/L (ref 150–450)
RBC # BLD AUTO: 3.71 10E12/L (ref 3.8–5.2)
WBC # BLD AUTO: 2.6 10E9/L (ref 4–11)

## 2020-09-08 PROCEDURE — 25000128 H RX IP 250 OP 636: Mod: ZF | Performed by: INTERNAL MEDICINE

## 2020-09-08 PROCEDURE — 96365 THER/PROPH/DIAG IV INF INIT: CPT

## 2020-09-08 PROCEDURE — 25800030 ZZH RX IP 258 OP 636: Mod: ZF | Performed by: INTERNAL MEDICINE

## 2020-09-08 PROCEDURE — 36415 COLL VENOUS BLD VENIPUNCTURE: CPT

## 2020-09-08 PROCEDURE — 96366 THER/PROPH/DIAG IV INF ADDON: CPT

## 2020-09-08 PROCEDURE — 85025 COMPLETE CBC W/AUTO DIFF WBC: CPT | Performed by: INTERNAL MEDICINE

## 2020-09-08 PROCEDURE — C9399 UNCLASSIFIED DRUGS OR BIOLOG: HCPCS | Mod: ZF | Performed by: INTERNAL MEDICINE

## 2020-09-08 PROCEDURE — 25000132 ZZH RX MED GY IP 250 OP 250 PS 637: Mod: ZF | Performed by: INTERNAL MEDICINE

## 2020-09-08 PROCEDURE — 96372 THER/PROPH/DIAG INJ SC/IM: CPT

## 2020-09-08 PROCEDURE — 96401 CHEMO ANTI-NEOPL SQ/IM: CPT

## 2020-09-08 RX ORDER — MONTELUKAST SODIUM 10 MG/1
10 TABLET ORAL ONCE
Status: COMPLETED | OUTPATIENT
Start: 2020-09-08 | End: 2020-09-08

## 2020-09-08 RX ORDER — DIPHENHYDRAMINE HCL 25 MG
25 CAPSULE ORAL ONCE
Status: COMPLETED | OUTPATIENT
Start: 2020-09-08 | End: 2020-09-08

## 2020-09-08 RX ORDER — ACETAMINOPHEN 325 MG/1
650 TABLET ORAL ONCE
Status: COMPLETED | OUTPATIENT
Start: 2020-09-08 | End: 2020-09-08

## 2020-09-08 RX ADMIN — ACETAMINOPHEN 650 MG: 325 TABLET ORAL at 08:13

## 2020-09-08 RX ADMIN — MONTELUKAST SODIUM 10 MG: 10 TABLET, COATED ORAL at 08:13

## 2020-09-08 RX ADMIN — DIPHENHYDRAMINE HYDROCHLORIDE 25 MG: 25 CAPSULE ORAL at 08:13

## 2020-09-08 RX ADMIN — DARATUMUMAB AND HYALURONIDASE-FIHJ (HUMAN RECOMBINANT) 1800 MG: 1800; 30000 INJECTION SUBCUTANEOUS at 09:37

## 2020-09-08 RX ADMIN — DEXAMETHASONE SODIUM PHOSPHATE 4 MG: 10 INJECTION, SOLUTION INTRAMUSCULAR; INTRAVENOUS at 08:30

## 2020-09-08 ASSESSMENT — PAIN SCALES - GENERAL: PAINLEVEL: NO PAIN (0)

## 2020-09-08 NOTE — LETTER
9/8/2020         RE: Shena Hartmann  [address] [place] [address]        Dear Colleague,    Thank you for referring your patient, Shena Hartmann, to the [facility] BLOOD AND MARROW TRANSPLANT. Please see a copy of my visit note below.    Infusion Nursing Note:  Shena Hartmann presents today for Yen.    Patient seen by provider today: No   present during visit today: Not Applicable.    Note: Patient received subcutaneous Daratumumab given over 5 minutes in the right abdomen. Patient had premeds of Dex 4mg IV, Benedryl 25mg, Tylenol 650mg, Singulair 10mg.  Patient complains of head pressure r/t Dex and is a familiar side effect for her. Also acid, heartburn feeling but declined Pepcid. Patient and Dr. Hills agreed she will not take Dex dose tomorrow. Reinier OK'd administration despite low ANC over the telephone.  Post injection, site was clean dry and intact.  Vitals were stable.    Intravenous Access:  Peripheral IV placed for IV Dex.    Treatment Conditions:  Lab Results   Component Value Date    HGB 12.6 09/08/2020     Lab Results   Component Value Date    WBC 2.6 09/08/2020      Lab Results   Component Value Date    ANEU 1.0 09/08/2020     Lab Results   Component Value Date     09/08/2020      Lab Results   Component Value Date     08/24/2020                   Lab Results   Component Value Date    POTASSIUM 3.7 08/24/2020           Lab Results   Component Value Date    MAG 1.8 12/12/2014            Lab Results   Component Value Date    CR 0.84 08/24/2020                   Lab Results   Component Value Date    PAULINE 9.1 08/24/2020                Lab Results   Component Value Date    BILITOTAL 0.4 08/24/2020           Lab Results   Component Value Date    ALBUMIN 3.4 08/24/2020                    Lab Results   Component Value Date    ALT 24 08/24/2020           Lab Results   Component Value Date    AST 17 08/24/2020       Results reviewed, labs MET treatment parameters, ok  to proceed with treatment.      Post Infusion Assessment:  Patient tolerated infusion without incident.  Patient tolerated injection without incident.  Blood return noted pre and post infusion.  Site patent and intact, free from redness, edema or discomfort.  No evidence of extravasations.  Access discontinued per protocol.       Discharge Plan:   Discharge instructions reviewed with: Patient.  Patient and/or family verbalized understanding of discharge instructions and all questions answered.  Patient discharged in stable condition accompanied by: self.  Departure Mode: Ambulatory.    Marj Holder RN                          Again, thank you for allowing me to participate in the care of your patient.        Sincerely,        [facility]

## 2020-09-08 NOTE — NURSING NOTE
Blood drawn via vpt by Paramedic in lab. VS taken. Pt checked into next appointment.   Baron Malcolm  Paramedic

## 2020-09-08 NOTE — PROGRESS NOTES
Infusion Nursing Note:  Shena Hartmann presents today for Yen.    Patient seen by provider today: No   present during visit today: Not Applicable.    Note: Patient received subcutaneous Daratumumab given over 5 minutes in the right abdomen. Patient had premeds of Dex 4mg IV, Benedryl 25mg, Tylenol 650mg, Singulair 10mg.  Patient complains of head pressure r/t Dex and is a familiar side effect for her. Also acid, heartburn feeling but declined Pepcid. Patient and Dr. Hills agreed she will not take Dex dose tomorrow. Reinier OK'd administration despite low ANC over the telephone.  Post injection, site was clean dry and intact.  Vitals were stable.    Intravenous Access:  Peripheral IV placed for IV Dex.    Treatment Conditions:  Lab Results   Component Value Date    HGB 12.6 09/08/2020     Lab Results   Component Value Date    WBC 2.6 09/08/2020      Lab Results   Component Value Date    ANEU 1.0 09/08/2020     Lab Results   Component Value Date     09/08/2020      Lab Results   Component Value Date     08/24/2020                   Lab Results   Component Value Date    POTASSIUM 3.7 08/24/2020           Lab Results   Component Value Date    MAG 1.8 12/12/2014            Lab Results   Component Value Date    CR 0.84 08/24/2020                   Lab Results   Component Value Date    PAULINE 9.1 08/24/2020                Lab Results   Component Value Date    BILITOTAL 0.4 08/24/2020           Lab Results   Component Value Date    ALBUMIN 3.4 08/24/2020                    Lab Results   Component Value Date    ALT 24 08/24/2020           Lab Results   Component Value Date    AST 17 08/24/2020       Results reviewed, labs MET treatment parameters, ok to proceed with treatment.      Post Infusion Assessment:  Patient tolerated infusion without incident.  Patient tolerated injection without incident.  Blood return noted pre and post infusion.  Site patent and intact, free from redness, edema or  discomfort.  No evidence of extravasations.  Access discontinued per protocol.       Discharge Plan:   Discharge instructions reviewed with: Patient.  Patient and/or family verbalized understanding of discharge instructions and all questions answered.  Patient discharged in stable condition accompanied by: self.  Departure Mode: Ambulatory.    Marj Holder RN

## 2020-09-09 DIAGNOSIS — C90.00 MULTIPLE MYELOMA, REMISSION STATUS UNSPECIFIED (H): Primary | ICD-10-CM

## 2020-09-14 ENCOUNTER — TELEPHONE (OUTPATIENT)
Dept: ONCOLOGY | Facility: CLINIC | Age: 70
End: 2020-09-14

## 2020-09-14 ENCOUNTER — INFUSION THERAPY VISIT (OUTPATIENT)
Dept: TRANSPLANT | Facility: CLINIC | Age: 70
End: 2020-09-14
Attending: INTERNAL MEDICINE
Payer: COMMERCIAL

## 2020-09-14 VITALS
DIASTOLIC BLOOD PRESSURE: 67 MMHG | OXYGEN SATURATION: 98 % | TEMPERATURE: 98.3 F | BODY MASS INDEX: 23.41 KG/M2 | WEIGHT: 128 LBS | HEART RATE: 98 BPM | RESPIRATION RATE: 16 BRPM | SYSTOLIC BLOOD PRESSURE: 137 MMHG

## 2020-09-14 DIAGNOSIS — C90.00 MULTIPLE MYELOMA, REMISSION STATUS UNSPECIFIED (H): Primary | ICD-10-CM

## 2020-09-14 DIAGNOSIS — C90.00 MULTIPLE MYELOMA, REMISSION STATUS UNSPECIFIED (H): ICD-10-CM

## 2020-09-14 DIAGNOSIS — C90.00 MULTIPLE MYELOMA NOT HAVING ACHIEVED REMISSION (H): Primary | ICD-10-CM

## 2020-09-14 DIAGNOSIS — C90.00 MULTIPLE MYELOMA NOT HAVING ACHIEVED REMISSION (H): ICD-10-CM

## 2020-09-14 LAB
ALBUMIN SERPL-MCNC: 3.3 G/DL (ref 3.4–5)
ALP SERPL-CCNC: 67 U/L (ref 40–150)
ALT SERPL W P-5'-P-CCNC: 27 U/L (ref 0–50)
ANION GAP SERPL CALCULATED.3IONS-SCNC: 5 MMOL/L (ref 3–14)
AST SERPL W P-5'-P-CCNC: 14 U/L (ref 0–45)
B2 MICROGLOB SERPL-MCNC: 2.3 MG/L
BASOPHILS # BLD AUTO: 0 10E9/L (ref 0–0.2)
BASOPHILS NFR BLD AUTO: 0.8 %
BILIRUB SERPL-MCNC: 0.4 MG/DL (ref 0.2–1.3)
BUN SERPL-MCNC: 20 MG/DL (ref 7–30)
CALCIUM SERPL-MCNC: 8.7 MG/DL (ref 8.5–10.1)
CHLORIDE SERPL-SCNC: 106 MMOL/L (ref 94–109)
CO2 SERPL-SCNC: 28 MMOL/L (ref 20–32)
CREAT SERPL-MCNC: 0.71 MG/DL (ref 0.52–1.04)
CRP SERPL-MCNC: <2.9 MG/L (ref 0–8)
DIFFERENTIAL METHOD BLD: ABNORMAL
EOSINOPHIL # BLD AUTO: 0.1 10E9/L (ref 0–0.7)
EOSINOPHIL NFR BLD AUTO: 3.8 %
ERYTHROCYTE [DISTWIDTH] IN BLOOD BY AUTOMATED COUNT: 15.2 % (ref 10–15)
GFR SERPL CREATININE-BSD FRML MDRD: 86 ML/MIN/{1.73_M2}
GLUCOSE SERPL-MCNC: 75 MG/DL (ref 70–99)
HCT VFR BLD AUTO: 35 % (ref 35–47)
HGB BLD-MCNC: 11.9 G/DL (ref 11.7–15.7)
IMM GRANULOCYTES # BLD: 0 10E9/L (ref 0–0.4)
IMM GRANULOCYTES NFR BLD: 0.4 %
KAPPA LC UR-MCNC: 29.08 MG/DL (ref 0.33–1.94)
KAPPA LC/LAMBDA SER: 153.05 {RATIO} (ref 0.26–1.65)
LAMBDA LC SERPL-MCNC: 0.19 MG/DL (ref 0.57–2.63)
LYMPHOCYTES # BLD AUTO: 0.9 10E9/L (ref 0.8–5.3)
LYMPHOCYTES NFR BLD AUTO: 33.5 %
MCH RBC QN AUTO: 33.9 PG (ref 26.5–33)
MCHC RBC AUTO-ENTMCNC: 34 G/DL (ref 31.5–36.5)
MCV RBC AUTO: 100 FL (ref 78–100)
MONOCYTES # BLD AUTO: 0.4 10E9/L (ref 0–1.3)
MONOCYTES NFR BLD AUTO: 16 %
NEUTROPHILS # BLD AUTO: 1.2 10E9/L (ref 1.6–8.3)
NEUTROPHILS NFR BLD AUTO: 45.5 %
NRBC # BLD AUTO: 0 10*3/UL
NRBC BLD AUTO-RTO: 0 /100
PLATELET # BLD AUTO: 189 10E9/L (ref 150–450)
POTASSIUM SERPL-SCNC: 3.8 MMOL/L (ref 3.4–5.3)
PROT SERPL-MCNC: 7.9 G/DL (ref 6.8–8.8)
RBC # BLD AUTO: 3.51 10E12/L (ref 3.8–5.2)
SODIUM SERPL-SCNC: 139 MMOL/L (ref 133–144)
WBC # BLD AUTO: 2.6 10E9/L (ref 4–11)

## 2020-09-14 PROCEDURE — 80053 COMPREHEN METABOLIC PANEL: CPT | Performed by: INTERNAL MEDICINE

## 2020-09-14 PROCEDURE — 82784 ASSAY IGA/IGD/IGG/IGM EACH: CPT | Performed by: INTERNAL MEDICINE

## 2020-09-14 PROCEDURE — 40000141 ZZH STATISTIC PERIPHERAL IV START W/O US GUIDANCE

## 2020-09-14 PROCEDURE — 83883 ASSAY NEPHELOMETRY NOT SPEC: CPT | Performed by: INTERNAL MEDICINE

## 2020-09-14 PROCEDURE — 25800030 ZZH RX IP 258 OP 636: Mod: ZF | Performed by: INTERNAL MEDICINE

## 2020-09-14 PROCEDURE — 96372 THER/PROPH/DIAG INJ SC/IM: CPT

## 2020-09-14 PROCEDURE — 86334 IMMUNOFIX E-PHORESIS SERUM: CPT | Performed by: INTERNAL MEDICINE

## 2020-09-14 PROCEDURE — 84165 PROTEIN E-PHORESIS SERUM: CPT | Performed by: INTERNAL MEDICINE

## 2020-09-14 PROCEDURE — 00000402 ZZHCL STATISTIC TOTAL PROTEIN: Performed by: INTERNAL MEDICINE

## 2020-09-14 PROCEDURE — C9399 UNCLASSIFIED DRUGS OR BIOLOG: HCPCS | Mod: ZF | Performed by: INTERNAL MEDICINE

## 2020-09-14 PROCEDURE — 36415 COLL VENOUS BLD VENIPUNCTURE: CPT

## 2020-09-14 PROCEDURE — 96365 THER/PROPH/DIAG IV INF INIT: CPT

## 2020-09-14 PROCEDURE — 25000128 H RX IP 250 OP 636: Mod: ZF | Performed by: INTERNAL MEDICINE

## 2020-09-14 PROCEDURE — 96401 CHEMO ANTI-NEOPL SQ/IM: CPT

## 2020-09-14 PROCEDURE — 85025 COMPLETE CBC W/AUTO DIFF WBC: CPT | Performed by: INTERNAL MEDICINE

## 2020-09-14 PROCEDURE — 86140 C-REACTIVE PROTEIN: CPT | Performed by: INTERNAL MEDICINE

## 2020-09-14 PROCEDURE — 82232 ASSAY OF BETA-2 PROTEIN: CPT | Performed by: INTERNAL MEDICINE

## 2020-09-14 PROCEDURE — 25000132 ZZH RX MED GY IP 250 OP 250 PS 637: Mod: ZF | Performed by: INTERNAL MEDICINE

## 2020-09-14 RX ORDER — MONTELUKAST SODIUM 10 MG/1
10 TABLET ORAL ONCE
Status: COMPLETED | OUTPATIENT
Start: 2020-09-14 | End: 2020-09-14

## 2020-09-14 RX ORDER — ACETAMINOPHEN 325 MG/1
650 TABLET ORAL ONCE
Status: COMPLETED | OUTPATIENT
Start: 2020-09-14 | End: 2020-09-14

## 2020-09-14 RX ORDER — DIPHENHYDRAMINE HCL 25 MG
25 CAPSULE ORAL ONCE
Status: COMPLETED | OUTPATIENT
Start: 2020-09-14 | End: 2020-09-14

## 2020-09-14 RX ADMIN — DIPHENHYDRAMINE HYDROCHLORIDE 25 MG: 25 CAPSULE ORAL at 08:37

## 2020-09-14 RX ADMIN — ACETAMINOPHEN 650 MG: 325 TABLET ORAL at 08:37

## 2020-09-14 RX ADMIN — DARATUMUMAB AND HYALURONIDASE-FIHJ (HUMAN RECOMBINANT) 1800 MG: 1800; 30000 INJECTION SUBCUTANEOUS at 09:47

## 2020-09-14 RX ADMIN — MONTELUKAST SODIUM 10 MG: 10 TABLET, COATED ORAL at 08:37

## 2020-09-14 RX ADMIN — DEXAMETHASONE SODIUM PHOSPHATE 4 MG: 10 INJECTION, SOLUTION INTRAMUSCULAR; INTRAVENOUS at 08:44

## 2020-09-14 ASSESSMENT — PAIN SCALES - GENERAL: PAINLEVEL: MILD PAIN (2)

## 2020-09-14 NOTE — PROGRESS NOTES
Infusion Nursing Note:  Shena Hartmann presents today for subcutaneous Daratumumab.    Patient seen by provider today: No   present during visit today: Not Applicable.    Note: Patient here for 3rd dose of subcutaneous Daratumumab.  Lab parameters were met to receive today's dose.  Assessment today unremarkable and patient states she has tolerated the previous 2 doses well.  She states she does have some issues with the decadron pre med where she gets a fuzzy feeling around her head like a band and has some acid reflux.  She manages these symptoms with her homeopathic meds and declines any intervention at this point.      Daratumumab given subcutaneous over 7 minutes today per patient request.  She tolerated it well. Pre meds given: Decadron IV over 15 minutes, 25 mg oral benadryl, 650 mg tylenol, 10 mg oral singulair.     Intravenous Access:  Peripheral IV placed.    Treatment Conditions:  Lab Results   Component Value Date    HGB 11.9 09/14/2020     Lab Results   Component Value Date    WBC 2.6 09/14/2020      Lab Results   Component Value Date    ANEU 1.2 09/14/2020     Lab Results   Component Value Date     09/14/2020      Lab Results   Component Value Date     09/14/2020                   Lab Results   Component Value Date    POTASSIUM 3.8 09/14/2020           Lab Results   Component Value Date    MAG 1.8 12/12/2014            Lab Results   Component Value Date    CR 0.71 09/14/2020                   Lab Results   Component Value Date    PAULINE 8.7 09/14/2020                Lab Results   Component Value Date    BILITOTAL 0.4 09/14/2020           Lab Results   Component Value Date    ALBUMIN 3.3 09/14/2020                    Lab Results   Component Value Date    ALT 27 09/14/2020           Lab Results   Component Value Date    AST 14 09/14/2020       Results reviewed, labs MET treatment parameters, ok to proceed with treatment.      Post Infusion Assessment:  Patient tolerated infusion  without incident.  Patient tolerated injection without incident.  Blood return noted pre and post infusion.  Site patent and intact, free from redness, edema or discomfort.  No evidence of extravasations.  Access discontinued per protocol.       Discharge Plan:   AVS to patient via MYCHART.  Patient will return next week for next appointment.   Patient discharged in stable condition accompanied by: self.  Departure Mode: Ambulatory.    JANETH MINAYA RN

## 2020-09-14 NOTE — TELEPHONE ENCOUNTER
Oral Chemotherapy Monitoring Program   Medication: POMALYST  Rx: 1mg PO EVERY OTHER DAY interchanging WITH 2MG daily on days 1 through 21 of 28 day cycle   Auth #:[id number]  Risk Category: adult female not of reproductive potential  Routine survey questions reviewed.   Rx to be Escribed to [facility]   Oncology Pharmacy Liaison  [email or web address]   [phone] (phone)   [phone] (fax)

## 2020-09-14 NOTE — LETTER
9/14/2020         RE: Shena Hartmann  [address] [place] [address]        Dear Colleague,    Thank you for referring your patient, Shena Hartmann, to the [facility] BLOOD AND MARROW TRANSPLANT. Please see a copy of my visit note below.    Infusion Nursing Note:  Shena Hartmann presents today for subcutaneous Daratumumab.    Patient seen by provider today: No   present during visit today: Not Applicable.    Note: Patient here for 3rd dose of subcutaneous Daratumumab.  Lab parameters were met to receive today's dose.  Assessment today unremarkable and patient states she has tolerated the previous 2 doses well.  She states she does have some issues with the decadron pre med where she gets a fuzzy feeling around her head like a band and has some acid reflux.  She manages these symptoms with her homeopathic meds and declines any intervention at this point.      Daratumumab given subcutaneous over 7 minutes today per patient request.  She tolerated it well. Pre meds given: Decadron IV over 15 minutes, 25 mg oral benadryl, 650 mg tylenol, 10 mg oral singulair.     Intravenous Access:  Peripheral IV placed.    Treatment Conditions:  Lab Results   Component Value Date    HGB 11.9 09/14/2020     Lab Results   Component Value Date    WBC 2.6 09/14/2020      Lab Results   Component Value Date    ANEU 1.2 09/14/2020     Lab Results   Component Value Date     09/14/2020      Lab Results   Component Value Date     09/14/2020                   Lab Results   Component Value Date    POTASSIUM 3.8 09/14/2020           Lab Results   Component Value Date    MAG 1.8 12/12/2014            Lab Results   Component Value Date    CR 0.71 09/14/2020                   Lab Results   Component Value Date    PAULINE 8.7 09/14/2020                Lab Results   Component Value Date    BILITOTAL 0.4 09/14/2020           Lab Results   Component Value Date    ALBUMIN 3.3 09/14/2020                    Lab Results    Component Value Date    ALT 27 09/14/2020           Lab Results   Component Value Date    AST 14 09/14/2020       Results reviewed, labs MET treatment parameters, ok to proceed with treatment.      Post Infusion Assessment:  Patient tolerated infusion without incident.  Patient tolerated injection without incident.  Blood return noted pre and post infusion.  Site patent and intact, free from redness, edema or discomfort.  No evidence of extravasations.  Access discontinued per protocol.       Discharge Plan:   AVS to patient via MYCHART.  Patient will return next week for next appointment.   Patient discharged in stable condition accompanied by: self.  Departure Mode: Ambulatory.    JANETH MINAYA RN                          Again, thank you for allowing me to participate in the care of your patient.        Sincerely,        [facility]

## 2020-09-14 NOTE — NURSING NOTE
"Oncology Rooming Note    September 14, 2020 9:08 AM   Shena Hartmann is a 69 year old female who presents for:    Chief Complaint   Patient presents with     Infusion     Daratumumab, pre meds, med review, Multiple Myeloma     Initial Vitals: /67 (BP Location: Left arm, Patient Position: Sitting, Cuff Size: Adult Regular)   Pulse 98   Temp 98.3  F (36.8  C) (Oral)   Resp 16   Wt 58.1 kg (128 lb)   SpO2 98%   BMI 23.41 kg/m   Estimated body mass index is 23.41 kg/m  as calculated from the following:    Height as of 3/5/19: 1.575 m (5' 2\").    Weight as of this encounter: 58.1 kg (128 lb). Body surface area is 1.59 meters squared.  Mild Pain (2) Comment: Data Unavailable   No LMP recorded. Patient is postmenopausal.  Allergies reviewed: Yes  Medications reviewed: Yes    Medications: Medication refills not needed today.  Pharmacy name entered into [facility]:    [facility] #[id number] - [place], [place] - [address] [place] [facility] [place] [address]  WRITTEN PRESCRIPTION REQUESTED  [facility] - [place], [place] - [address]              "

## 2020-09-15 LAB
ALBUMIN SERPL ELPH-MCNC: 4.1 G/DL (ref 3.7–5.1)
ALPHA1 GLOB SERPL ELPH-MCNC: 0.3 G/DL (ref 0.2–0.4)
ALPHA2 GLOB SERPL ELPH-MCNC: 0.9 G/DL (ref 0.5–0.9)
B-GLOBULIN SERPL ELPH-MCNC: 0.7 G/DL (ref 0.6–1)
GAMMA GLOB SERPL ELPH-MCNC: 2.3 G/DL (ref 0.7–1.6)
IGA SERPL-MCNC: 6 MG/DL (ref 84–499)
IGG SERPL-MCNC: 2416 MG/DL (ref 610–1616)
IGM SERPL-MCNC: <10 MG/DL (ref 35–242)
M PROTEIN SERPL ELPH-MCNC: 2.1 G/DL
PROT PATTERN SERPL ELPH-IMP: ABNORMAL
PROT PATTERN SERPL IFE-IMP: ABNORMAL

## 2020-09-16 DIAGNOSIS — C90.00 MULTIPLE MYELOMA NOT HAVING ACHIEVED REMISSION (H): Primary | ICD-10-CM

## 2020-09-16 RX ORDER — HEPARIN SODIUM,PORCINE 10 UNIT/ML
5 VIAL (ML) INTRAVENOUS
Status: CANCELLED | OUTPATIENT
Start: 2020-10-19

## 2020-09-16 RX ORDER — EPINEPHRINE 1 MG/ML
0.3 INJECTION, SOLUTION INTRAMUSCULAR; SUBCUTANEOUS EVERY 5 MIN PRN
Status: CANCELLED | OUTPATIENT
Start: 2020-10-12

## 2020-09-16 RX ORDER — HEPARIN SODIUM,PORCINE 10 UNIT/ML
5 VIAL (ML) INTRAVENOUS
Status: CANCELLED | OUTPATIENT
Start: 2020-09-28

## 2020-09-16 RX ORDER — LORAZEPAM 2 MG/ML
0.5 INJECTION INTRAMUSCULAR EVERY 4 HOURS PRN
Status: CANCELLED
Start: 2020-10-12

## 2020-09-16 RX ORDER — HEPARIN SODIUM (PORCINE) LOCK FLUSH IV SOLN 100 UNIT/ML 100 UNIT/ML
5 SOLUTION INTRAVENOUS
Status: CANCELLED | OUTPATIENT
Start: 2020-10-12

## 2020-09-16 RX ORDER — EPINEPHRINE 1 MG/ML
0.3 INJECTION, SOLUTION INTRAMUSCULAR; SUBCUTANEOUS EVERY 5 MIN PRN
Status: CANCELLED | OUTPATIENT
Start: 2020-09-28

## 2020-09-16 RX ORDER — ACETAMINOPHEN 325 MG/1
650 TABLET ORAL ONCE
Status: CANCELLED | OUTPATIENT
Start: 2020-10-05

## 2020-09-16 RX ORDER — HEPARIN SODIUM (PORCINE) LOCK FLUSH IV SOLN 100 UNIT/ML 100 UNIT/ML
5 SOLUTION INTRAVENOUS
Status: CANCELLED | OUTPATIENT
Start: 2020-10-19

## 2020-09-16 RX ORDER — DIPHENHYDRAMINE HYDROCHLORIDE 50 MG/ML
50 INJECTION INTRAMUSCULAR; INTRAVENOUS
Status: CANCELLED
Start: 2020-10-05

## 2020-09-16 RX ORDER — HEPARIN SODIUM,PORCINE 10 UNIT/ML
5 VIAL (ML) INTRAVENOUS
Status: CANCELLED | OUTPATIENT
Start: 2020-10-05

## 2020-09-16 RX ORDER — HEPARIN SODIUM,PORCINE 10 UNIT/ML
5 VIAL (ML) INTRAVENOUS
Status: CANCELLED | OUTPATIENT
Start: 2020-10-12

## 2020-09-16 RX ORDER — ALBUTEROL SULFATE 90 UG/1
1-2 AEROSOL, METERED RESPIRATORY (INHALATION)
Status: CANCELLED
Start: 2020-10-12

## 2020-09-16 RX ORDER — NALOXONE HYDROCHLORIDE 0.4 MG/ML
.1-.4 INJECTION, SOLUTION INTRAMUSCULAR; INTRAVENOUS; SUBCUTANEOUS
Status: CANCELLED | OUTPATIENT
Start: 2020-10-12

## 2020-09-16 RX ORDER — EPINEPHRINE 1 MG/ML
0.3 INJECTION, SOLUTION INTRAMUSCULAR; SUBCUTANEOUS EVERY 5 MIN PRN
Status: CANCELLED | OUTPATIENT
Start: 2020-10-05

## 2020-09-16 RX ORDER — DIPHENHYDRAMINE HCL 25 MG
25 CAPSULE ORAL ONCE
Status: CANCELLED | OUTPATIENT
Start: 2020-10-12

## 2020-09-16 RX ORDER — ALBUTEROL SULFATE 90 UG/1
1-2 AEROSOL, METERED RESPIRATORY (INHALATION)
Status: CANCELLED
Start: 2020-09-28

## 2020-09-16 RX ORDER — HEPARIN SODIUM (PORCINE) LOCK FLUSH IV SOLN 100 UNIT/ML 100 UNIT/ML
5 SOLUTION INTRAVENOUS
Status: CANCELLED | OUTPATIENT
Start: 2020-10-05

## 2020-09-16 RX ORDER — MEPERIDINE HYDROCHLORIDE 25 MG/ML
25 INJECTION INTRAMUSCULAR; INTRAVENOUS; SUBCUTANEOUS EVERY 30 MIN PRN
Status: CANCELLED | OUTPATIENT
Start: 2020-10-19

## 2020-09-16 RX ORDER — DIPHENHYDRAMINE HYDROCHLORIDE 50 MG/ML
50 INJECTION INTRAMUSCULAR; INTRAVENOUS
Status: CANCELLED
Start: 2020-10-19

## 2020-09-16 RX ORDER — ACETAMINOPHEN 325 MG/1
650 TABLET ORAL ONCE
Status: CANCELLED | OUTPATIENT
Start: 2020-10-19

## 2020-09-16 RX ORDER — MEPERIDINE HYDROCHLORIDE 25 MG/ML
25 INJECTION INTRAMUSCULAR; INTRAVENOUS; SUBCUTANEOUS EVERY 30 MIN PRN
Status: CANCELLED | OUTPATIENT
Start: 2020-10-05

## 2020-09-16 RX ORDER — MEPERIDINE HYDROCHLORIDE 25 MG/ML
25 INJECTION INTRAMUSCULAR; INTRAVENOUS; SUBCUTANEOUS EVERY 30 MIN PRN
Status: CANCELLED | OUTPATIENT
Start: 2020-10-12

## 2020-09-16 RX ORDER — SODIUM CHLORIDE 9 MG/ML
1000 INJECTION, SOLUTION INTRAVENOUS CONTINUOUS PRN
Status: CANCELLED
Start: 2020-09-28

## 2020-09-16 RX ORDER — LORAZEPAM 2 MG/ML
0.5 INJECTION INTRAMUSCULAR EVERY 4 HOURS PRN
Status: CANCELLED
Start: 2020-09-28

## 2020-09-16 RX ORDER — SODIUM CHLORIDE 9 MG/ML
1000 INJECTION, SOLUTION INTRAVENOUS CONTINUOUS PRN
Status: CANCELLED
Start: 2020-10-19

## 2020-09-16 RX ORDER — DIPHENHYDRAMINE HYDROCHLORIDE 50 MG/ML
50 INJECTION INTRAMUSCULAR; INTRAVENOUS
Status: CANCELLED
Start: 2020-10-12

## 2020-09-16 RX ORDER — ALBUTEROL SULFATE 0.83 MG/ML
2.5 SOLUTION RESPIRATORY (INHALATION)
Status: CANCELLED | OUTPATIENT
Start: 2020-10-05

## 2020-09-16 RX ORDER — METHYLPREDNISOLONE SODIUM SUCCINATE 125 MG/2ML
125 INJECTION, POWDER, LYOPHILIZED, FOR SOLUTION INTRAMUSCULAR; INTRAVENOUS
Status: CANCELLED
Start: 2020-10-19

## 2020-09-16 RX ORDER — SODIUM CHLORIDE 9 MG/ML
1000 INJECTION, SOLUTION INTRAVENOUS CONTINUOUS PRN
Status: CANCELLED
Start: 2020-10-05

## 2020-09-16 RX ORDER — METHYLPREDNISOLONE SODIUM SUCCINATE 125 MG/2ML
125 INJECTION, POWDER, LYOPHILIZED, FOR SOLUTION INTRAMUSCULAR; INTRAVENOUS
Status: CANCELLED
Start: 2020-09-28

## 2020-09-16 RX ORDER — HEPARIN SODIUM (PORCINE) LOCK FLUSH IV SOLN 100 UNIT/ML 100 UNIT/ML
5 SOLUTION INTRAVENOUS
Status: CANCELLED | OUTPATIENT
Start: 2020-09-28

## 2020-09-16 RX ORDER — NALOXONE HYDROCHLORIDE 0.4 MG/ML
.1-.4 INJECTION, SOLUTION INTRAMUSCULAR; INTRAVENOUS; SUBCUTANEOUS
Status: CANCELLED | OUTPATIENT
Start: 2020-10-05

## 2020-09-16 RX ORDER — LORAZEPAM 2 MG/ML
0.5 INJECTION INTRAMUSCULAR EVERY 4 HOURS PRN
Status: CANCELLED
Start: 2020-10-05

## 2020-09-16 RX ORDER — ALBUTEROL SULFATE 0.83 MG/ML
2.5 SOLUTION RESPIRATORY (INHALATION)
Status: CANCELLED | OUTPATIENT
Start: 2020-10-12

## 2020-09-16 RX ORDER — METHYLPREDNISOLONE SODIUM SUCCINATE 125 MG/2ML
125 INJECTION, POWDER, LYOPHILIZED, FOR SOLUTION INTRAMUSCULAR; INTRAVENOUS
Status: CANCELLED
Start: 2020-10-05

## 2020-09-16 RX ORDER — DIPHENHYDRAMINE HCL 25 MG
25 CAPSULE ORAL ONCE
Status: CANCELLED | OUTPATIENT
Start: 2020-10-19

## 2020-09-16 RX ORDER — ALBUTEROL SULFATE 90 UG/1
1-2 AEROSOL, METERED RESPIRATORY (INHALATION)
Status: CANCELLED
Start: 2020-10-05

## 2020-09-16 RX ORDER — DIPHENHYDRAMINE HCL 25 MG
25 CAPSULE ORAL ONCE
Status: CANCELLED | OUTPATIENT
Start: 2020-10-05

## 2020-09-16 RX ORDER — METHYLPREDNISOLONE SODIUM SUCCINATE 125 MG/2ML
125 INJECTION, POWDER, LYOPHILIZED, FOR SOLUTION INTRAMUSCULAR; INTRAVENOUS
Status: CANCELLED
Start: 2020-10-12

## 2020-09-16 RX ORDER — MEPERIDINE HYDROCHLORIDE 25 MG/ML
25 INJECTION INTRAMUSCULAR; INTRAVENOUS; SUBCUTANEOUS EVERY 30 MIN PRN
Status: CANCELLED | OUTPATIENT
Start: 2020-09-28

## 2020-09-16 RX ORDER — EPINEPHRINE 1 MG/ML
0.3 INJECTION, SOLUTION INTRAMUSCULAR; SUBCUTANEOUS EVERY 5 MIN PRN
Status: CANCELLED | OUTPATIENT
Start: 2020-10-19

## 2020-09-16 RX ORDER — ACETAMINOPHEN 325 MG/1
650 TABLET ORAL ONCE
Status: CANCELLED | OUTPATIENT
Start: 2020-10-12

## 2020-09-16 RX ORDER — ALBUTEROL SULFATE 0.83 MG/ML
2.5 SOLUTION RESPIRATORY (INHALATION)
Status: CANCELLED | OUTPATIENT
Start: 2020-10-19

## 2020-09-16 RX ORDER — ACETAMINOPHEN 325 MG/1
650 TABLET ORAL ONCE
Status: CANCELLED | OUTPATIENT
Start: 2020-09-28

## 2020-09-16 RX ORDER — NALOXONE HYDROCHLORIDE 0.4 MG/ML
.1-.4 INJECTION, SOLUTION INTRAMUSCULAR; INTRAVENOUS; SUBCUTANEOUS
Status: CANCELLED | OUTPATIENT
Start: 2020-09-28

## 2020-09-16 RX ORDER — DIPHENHYDRAMINE HCL 25 MG
25 CAPSULE ORAL ONCE
Status: CANCELLED | OUTPATIENT
Start: 2020-09-28

## 2020-09-16 RX ORDER — ALBUTEROL SULFATE 0.83 MG/ML
2.5 SOLUTION RESPIRATORY (INHALATION)
Status: CANCELLED | OUTPATIENT
Start: 2020-09-28

## 2020-09-16 RX ORDER — NALOXONE HYDROCHLORIDE 0.4 MG/ML
.1-.4 INJECTION, SOLUTION INTRAMUSCULAR; INTRAVENOUS; SUBCUTANEOUS
Status: CANCELLED | OUTPATIENT
Start: 2020-10-19

## 2020-09-16 RX ORDER — LORAZEPAM 2 MG/ML
0.5 INJECTION INTRAMUSCULAR EVERY 4 HOURS PRN
Status: CANCELLED
Start: 2020-10-19

## 2020-09-16 RX ORDER — DIPHENHYDRAMINE HYDROCHLORIDE 50 MG/ML
50 INJECTION INTRAMUSCULAR; INTRAVENOUS
Status: CANCELLED
Start: 2020-09-28

## 2020-09-16 RX ORDER — ALBUTEROL SULFATE 90 UG/1
1-2 AEROSOL, METERED RESPIRATORY (INHALATION)
Status: CANCELLED
Start: 2020-10-19

## 2020-09-16 RX ORDER — SODIUM CHLORIDE 9 MG/ML
1000 INJECTION, SOLUTION INTRAVENOUS CONTINUOUS PRN
Status: CANCELLED
Start: 2020-10-12

## 2020-09-21 ENCOUNTER — INFUSION THERAPY VISIT (OUTPATIENT)
Dept: TRANSPLANT | Facility: CLINIC | Age: 70
End: 2020-09-21
Attending: INTERNAL MEDICINE
Payer: COMMERCIAL

## 2020-09-21 VITALS
TEMPERATURE: 97.9 F | OXYGEN SATURATION: 98 % | SYSTOLIC BLOOD PRESSURE: 119 MMHG | BODY MASS INDEX: 22.68 KG/M2 | RESPIRATION RATE: 16 BRPM | HEART RATE: 85 BPM | DIASTOLIC BLOOD PRESSURE: 71 MMHG | WEIGHT: 124 LBS

## 2020-09-21 DIAGNOSIS — C90.00 MULTIPLE MYELOMA NOT HAVING ACHIEVED REMISSION (H): Primary | ICD-10-CM

## 2020-09-21 DIAGNOSIS — C90.00 MULTIPLE MYELOMA NOT HAVING ACHIEVED REMISSION (H): ICD-10-CM

## 2020-09-21 DIAGNOSIS — C90.00 MULTIPLE MYELOMA, REMISSION STATUS UNSPECIFIED (H): ICD-10-CM

## 2020-09-21 LAB
ALBUMIN SERPL-MCNC: 3.2 G/DL (ref 3.4–5)
ALP SERPL-CCNC: 59 U/L (ref 40–150)
ALT SERPL W P-5'-P-CCNC: 24 U/L (ref 0–50)
ANION GAP SERPL CALCULATED.3IONS-SCNC: 9 MMOL/L (ref 3–14)
AST SERPL W P-5'-P-CCNC: 12 U/L (ref 0–45)
BASOPHILS # BLD AUTO: 0 10E9/L (ref 0–0.2)
BASOPHILS NFR BLD AUTO: 0.9 %
BILIRUB SERPL-MCNC: 0.2 MG/DL (ref 0.2–1.3)
BUN SERPL-MCNC: 17 MG/DL (ref 7–30)
CALCIUM SERPL-MCNC: 9 MG/DL (ref 8.5–10.1)
CHLORIDE SERPL-SCNC: 103 MMOL/L (ref 94–109)
CO2 SERPL-SCNC: 26 MMOL/L (ref 20–32)
CREAT SERPL-MCNC: 0.81 MG/DL (ref 0.52–1.04)
CRP SERPL-MCNC: <2.9 MG/L (ref 0–8)
DIFFERENTIAL METHOD BLD: ABNORMAL
EOSINOPHIL # BLD AUTO: 0.1 10E9/L (ref 0–0.7)
EOSINOPHIL NFR BLD AUTO: 2.7 %
ERYTHROCYTE [DISTWIDTH] IN BLOOD BY AUTOMATED COUNT: 14.9 % (ref 10–15)
GFR SERPL CREATININE-BSD FRML MDRD: 74 ML/MIN/{1.73_M2}
GLUCOSE SERPL-MCNC: 70 MG/DL (ref 70–99)
HCT VFR BLD AUTO: 35.6 % (ref 35–47)
HGB BLD-MCNC: 11.9 G/DL (ref 11.7–15.7)
IMM GRANULOCYTES # BLD: 0 10E9/L (ref 0–0.4)
IMM GRANULOCYTES NFR BLD: 0 %
LYMPHOCYTES # BLD AUTO: 0.8 10E9/L (ref 0.8–5.3)
LYMPHOCYTES NFR BLD AUTO: 35.6 %
MCH RBC QN AUTO: 33.4 PG (ref 26.5–33)
MCHC RBC AUTO-ENTMCNC: 33.4 G/DL (ref 31.5–36.5)
MCV RBC AUTO: 100 FL (ref 78–100)
MONOCYTES # BLD AUTO: 0.3 10E9/L (ref 0–1.3)
MONOCYTES NFR BLD AUTO: 12.9 %
NEUTROPHILS # BLD AUTO: 1.1 10E9/L (ref 1.6–8.3)
NEUTROPHILS NFR BLD AUTO: 47.9 %
NRBC # BLD AUTO: 0 10*3/UL
NRBC BLD AUTO-RTO: 0 /100
PLATELET # BLD AUTO: 196 10E9/L (ref 150–450)
POTASSIUM SERPL-SCNC: 3.7 MMOL/L (ref 3.4–5.3)
PROT SERPL-MCNC: 8.1 G/DL (ref 6.8–8.8)
RBC # BLD AUTO: 3.56 10E12/L (ref 3.8–5.2)
SODIUM SERPL-SCNC: 138 MMOL/L (ref 133–144)
WBC # BLD AUTO: 2.3 10E9/L (ref 4–11)

## 2020-09-21 PROCEDURE — 82784 ASSAY IGA/IGD/IGG/IGM EACH: CPT | Performed by: INTERNAL MEDICINE

## 2020-09-21 PROCEDURE — 83883 ASSAY NEPHELOMETRY NOT SPEC: CPT | Performed by: INTERNAL MEDICINE

## 2020-09-21 PROCEDURE — 25000132 ZZH RX MED GY IP 250 OP 250 PS 637: Mod: ZF | Performed by: INTERNAL MEDICINE

## 2020-09-21 PROCEDURE — 86140 C-REACTIVE PROTEIN: CPT | Performed by: INTERNAL MEDICINE

## 2020-09-21 PROCEDURE — 86334 IMMUNOFIX E-PHORESIS SERUM: CPT | Performed by: INTERNAL MEDICINE

## 2020-09-21 PROCEDURE — 96372 THER/PROPH/DIAG INJ SC/IM: CPT

## 2020-09-21 PROCEDURE — 82232 ASSAY OF BETA-2 PROTEIN: CPT | Performed by: INTERNAL MEDICINE

## 2020-09-21 PROCEDURE — 96401 CHEMO ANTI-NEOPL SQ/IM: CPT

## 2020-09-21 PROCEDURE — C9399 UNCLASSIFIED DRUGS OR BIOLOG: HCPCS | Mod: ZF | Performed by: INTERNAL MEDICINE

## 2020-09-21 PROCEDURE — 25000128 H RX IP 250 OP 636: Mod: ZF | Performed by: INTERNAL MEDICINE

## 2020-09-21 PROCEDURE — 40000141 ZZH STATISTIC PERIPHERAL IV START W/O US GUIDANCE

## 2020-09-21 PROCEDURE — 84165 PROTEIN E-PHORESIS SERUM: CPT | Performed by: INTERNAL MEDICINE

## 2020-09-21 PROCEDURE — 00000402 ZZHCL STATISTIC TOTAL PROTEIN: Performed by: INTERNAL MEDICINE

## 2020-09-21 PROCEDURE — 85025 COMPLETE CBC W/AUTO DIFF WBC: CPT | Performed by: INTERNAL MEDICINE

## 2020-09-21 PROCEDURE — 80053 COMPREHEN METABOLIC PANEL: CPT | Performed by: INTERNAL MEDICINE

## 2020-09-21 RX ORDER — ACETAMINOPHEN 325 MG/1
650 TABLET ORAL ONCE
Status: COMPLETED | OUTPATIENT
Start: 2020-09-21 | End: 2020-09-21

## 2020-09-21 RX ORDER — DIPHENHYDRAMINE HCL 25 MG
25 CAPSULE ORAL ONCE
Status: COMPLETED | OUTPATIENT
Start: 2020-09-21 | End: 2020-09-21

## 2020-09-21 RX ADMIN — DARATUMUMAB AND HYALURONIDASE-FIHJ (HUMAN RECOMBINANT) 1800 MG: 1800; 30000 INJECTION SUBCUTANEOUS at 09:26

## 2020-09-21 RX ADMIN — ACETAMINOPHEN 650 MG: 325 TABLET ORAL at 08:50

## 2020-09-21 RX ADMIN — DIPHENHYDRAMINE HYDROCHLORIDE 25 MG: 25 CAPSULE ORAL at 08:50

## 2020-09-21 ASSESSMENT — PAIN SCALES - GENERAL: PAINLEVEL: MODERATE PAIN (4)

## 2020-09-21 NOTE — LETTER
9/21/2020         RE: Shena Hartmann  [address] [place] [address]        Dear Colleague,    Thank you for referring your patient, Shena Hartmann, to the [facility] BLOOD AND MARROW TRANSPLANT. Please see a copy of my visit note below.    Infusion Nursing Note:  Shena Hartmann presents today for Cycle 1, Day 22 Daratumumab Fastpro.    Patient seen by provider today: No   present during visit today: Not Applicable.    Note: Labs were monitored.  ANC was 1.1 (parameter 1.2) - Dr. Hills was contacted and he gave verbal okay to go ahead with today's treatment.  Patient assessment was completed and unremarkable except:  Patient has mild fatigue.  She woke this morning with generalized bone and muscle pain.  Patient states this is chronic and as she moves around and exercises the pain/discomfort resolves.  Patient has a history of numbness in her bilateral legs and feet.  She has tingling in her left hand and on the tip of her tongue.  Patient states this has been exacerbated from the Decadron and as she is weaning off of it, the symptoms are improving.  Patient denies any recent fevers/nausea/vomiting/diarrhea/respiratory symptoms.      Intravenous Access:  Peripheral IV placed.    Treatment Conditions:  Dr. Hills was contacted and he gave verbal order to hold premed 4 mg Decadron today due to Patient's previous side effects from medication (headache, head pressure, increased blood pressure.)  Patient was given 650 mg oral Tylenol and 25 mg oral Benadryl prior to receiving scheduled Daratumumab subcutaneous injection in her lower right abdomen.      Post Infusion Assessment:  Patient tolerated injection without incident.      Discharge Plan:   Patient discharged in stable condition accompanied by: father.    TAMMY REYES RN                          Again, thank you for allowing me to participate in the care of your patient.        Sincerely,        [facility]

## 2020-09-21 NOTE — PROGRESS NOTES
Infusion Nursing Note:  Shena Hartmann presents today for Cycle 1, Day 22 Daratumumab Fastpro.    Patient seen by provider today: No   present during visit today: Not Applicable.    Note: Labs were monitored.  ANC was 1.1 (parameter 1.2) - Dr. Hills was contacted and he gave verbal okay to go ahead with today's treatment.  Patient assessment was completed and unremarkable except:  Patient has mild fatigue.  She woke this morning with generalized bone and muscle pain.  Patient states this is chronic and as she moves around and exercises the pain/discomfort resolves.  Patient has a history of numbness in her bilateral legs and feet.  She has tingling in her left hand and on the tip of her tongue.  Patient states this has been exacerbated from the Decadron and as she is weaning off of it, the symptoms are improving.  Patient denies any recent fevers/nausea/vomiting/diarrhea/respiratory symptoms.      Intravenous Access:  Peripheral IV placed.    Treatment Conditions:  Dr. Hills was contacted and he gave verbal order to hold premed 4 mg Decadron today due to Patient's previous side effects from medication (headache, head pressure, increased blood pressure.)  Patient was given 650 mg oral Tylenol and 25 mg oral Benadryl prior to receiving scheduled Daratumumab subcutaneous injection in her lower right abdomen.      Post Infusion Assessment:  Patient tolerated injection without incident.      Discharge Plan:   Patient discharged in stable condition accompanied by: father.    TAMMY REYES RN

## 2020-09-21 NOTE — NURSING NOTE
"Oncology Rooming Note    September 21, 2020 10:11 AM   Shena Hartmann is a 69 year old female who presents for:    Chief Complaint   Patient presents with     Infusion     scheduled chemo injection s/p bmt txp for multiple myeloma     Initial Vitals: /71 (BP Location: Left arm, Patient Position: Sitting, Cuff Size: Adult Regular)   Pulse 85   Temp 97.9  F (36.6  C) (Oral)   Resp 16   Wt 56.2 kg (124 lb)   SpO2 98%   BMI 22.68 kg/m   Estimated body mass index is 22.68 kg/m  as calculated from the following:    Height as of 3/5/19: 1.575 m (5' 2\").    Weight as of this encounter: 56.2 kg (124 lb). Body surface area is 1.57 meters squared.  Moderate Pain (4) Comment: Data Unavailable   No LMP recorded. Patient is postmenopausal.  Allergies reviewed: Yes  Medications reviewed: Yes    Medications: Medication refills not needed today.  Pharmacy name entered into [facility]:    [facility] #[id number] - [place], [place] - [address] AT [facility] [address]  WRITTEN PRESCRIPTION REQUESTED  [facility] - [place], [place] - [address]              "

## 2020-09-22 LAB
ALBUMIN SERPL ELPH-MCNC: 4.1 G/DL (ref 3.7–5.1)
ALPHA1 GLOB SERPL ELPH-MCNC: 0.3 G/DL (ref 0.2–0.4)
ALPHA2 GLOB SERPL ELPH-MCNC: 0.7 G/DL (ref 0.5–0.9)
B-GLOBULIN SERPL ELPH-MCNC: 0.6 G/DL (ref 0.6–1)
GAMMA GLOB SERPL ELPH-MCNC: 2.1 G/DL (ref 0.7–1.6)
IGA SERPL-MCNC: 5 MG/DL (ref 84–499)
IGG SERPL-MCNC: 2404 MG/DL (ref 610–1616)
IGM SERPL-MCNC: <10 MG/DL (ref 35–242)
KAPPA LC UR-MCNC: 31.63 MG/DL (ref 0.33–1.94)
KAPPA LC/LAMBDA SER: ABNORMAL {RATIO} (ref 0.26–1.65)
LAMBDA LC SERPL-MCNC: <0.13 MG/DL (ref 0.57–2.63)
M PROTEIN SERPL ELPH-MCNC: 1.8 G/DL
PROT PATTERN SERPL ELPH-IMP: ABNORMAL
PROT PATTERN SERPL IFE-IMP: ABNORMAL

## 2020-09-23 LAB — B2 MICROGLOB SERPL-MCNC: 2.2 MG/L

## 2020-09-28 ENCOUNTER — APPOINTMENT (OUTPATIENT)
Dept: LAB | Facility: CLINIC | Age: 70
End: 2020-09-28
Attending: INTERNAL MEDICINE
Payer: COMMERCIAL

## 2020-09-28 ENCOUNTER — INFUSION THERAPY VISIT (OUTPATIENT)
Dept: TRANSPLANT | Facility: CLINIC | Age: 70
End: 2020-09-28
Attending: INTERNAL MEDICINE
Payer: COMMERCIAL

## 2020-09-28 VITALS
TEMPERATURE: 97.8 F | HEART RATE: 72 BPM | BODY MASS INDEX: 22.81 KG/M2 | DIASTOLIC BLOOD PRESSURE: 82 MMHG | RESPIRATION RATE: 16 BRPM | WEIGHT: 124.7 LBS | SYSTOLIC BLOOD PRESSURE: 148 MMHG | OXYGEN SATURATION: 98 %

## 2020-09-28 DIAGNOSIS — C90.00 MULTIPLE MYELOMA NOT HAVING ACHIEVED REMISSION (H): Primary | ICD-10-CM

## 2020-09-28 LAB
ALBUMIN SERPL-MCNC: 3.2 G/DL (ref 3.4–5)
ALP SERPL-CCNC: 53 U/L (ref 40–150)
ALT SERPL W P-5'-P-CCNC: 23 U/L (ref 0–50)
ANION GAP SERPL CALCULATED.3IONS-SCNC: 5 MMOL/L (ref 3–14)
AST SERPL W P-5'-P-CCNC: 12 U/L (ref 0–45)
BASOPHILS # BLD AUTO: 0 10E9/L (ref 0–0.2)
BASOPHILS NFR BLD AUTO: 0.5 %
BILIRUB SERPL-MCNC: 0.3 MG/DL (ref 0.2–1.3)
BUN SERPL-MCNC: 16 MG/DL (ref 7–30)
CALCIUM SERPL-MCNC: 9.1 MG/DL (ref 8.5–10.1)
CHLORIDE SERPL-SCNC: 102 MMOL/L (ref 94–109)
CO2 SERPL-SCNC: 29 MMOL/L (ref 20–32)
CREAT SERPL-MCNC: 0.81 MG/DL (ref 0.52–1.04)
DIFFERENTIAL METHOD BLD: ABNORMAL
EOSINOPHIL # BLD AUTO: 0.1 10E9/L (ref 0–0.7)
EOSINOPHIL NFR BLD AUTO: 1.9 %
ERYTHROCYTE [DISTWIDTH] IN BLOOD BY AUTOMATED COUNT: 14.9 % (ref 10–15)
GFR SERPL CREATININE-BSD FRML MDRD: 74 ML/MIN/{1.73_M2}
GLUCOSE SERPL-MCNC: 79 MG/DL (ref 70–99)
HCT VFR BLD AUTO: 35.9 % (ref 35–47)
HGB BLD-MCNC: 12 G/DL (ref 11.7–15.7)
IMM GRANULOCYTES # BLD: 0 10E9/L (ref 0–0.4)
IMM GRANULOCYTES NFR BLD: 0.3 %
LYMPHOCYTES # BLD AUTO: 1.1 10E9/L (ref 0.8–5.3)
LYMPHOCYTES NFR BLD AUTO: 28.2 %
MCH RBC QN AUTO: 33.4 PG (ref 26.5–33)
MCHC RBC AUTO-ENTMCNC: 33.4 G/DL (ref 31.5–36.5)
MCV RBC AUTO: 100 FL (ref 78–100)
MONOCYTES # BLD AUTO: 0.7 10E9/L (ref 0–1.3)
MONOCYTES NFR BLD AUTO: 19.9 %
NEUTROPHILS # BLD AUTO: 1.8 10E9/L (ref 1.6–8.3)
NEUTROPHILS NFR BLD AUTO: 49.2 %
NRBC # BLD AUTO: 0 10*3/UL
NRBC BLD AUTO-RTO: 0 /100
PLATELET # BLD AUTO: 172 10E9/L (ref 150–450)
POTASSIUM SERPL-SCNC: 3.9 MMOL/L (ref 3.4–5.3)
PROT SERPL-MCNC: 7.6 G/DL (ref 6.8–8.8)
RBC # BLD AUTO: 3.59 10E12/L (ref 3.8–5.2)
SODIUM SERPL-SCNC: 135 MMOL/L (ref 133–144)
WBC # BLD AUTO: 3.7 10E9/L (ref 4–11)

## 2020-09-28 PROCEDURE — 25000128 H RX IP 250 OP 636: Mod: ZF | Performed by: INTERNAL MEDICINE

## 2020-09-28 PROCEDURE — 25000132 ZZH RX MED GY IP 250 OP 250 PS 637: Mod: ZF | Performed by: INTERNAL MEDICINE

## 2020-09-28 PROCEDURE — C9399 UNCLASSIFIED DRUGS OR BIOLOG: HCPCS | Mod: ZF | Performed by: INTERNAL MEDICINE

## 2020-09-28 PROCEDURE — 36415 COLL VENOUS BLD VENIPUNCTURE: CPT

## 2020-09-28 PROCEDURE — 80053 COMPREHEN METABOLIC PANEL: CPT | Performed by: INTERNAL MEDICINE

## 2020-09-28 PROCEDURE — 96372 THER/PROPH/DIAG INJ SC/IM: CPT

## 2020-09-28 PROCEDURE — 96401 CHEMO ANTI-NEOPL SQ/IM: CPT

## 2020-09-28 PROCEDURE — 85025 COMPLETE CBC W/AUTO DIFF WBC: CPT | Performed by: INTERNAL MEDICINE

## 2020-09-28 RX ORDER — DIPHENHYDRAMINE HCL 25 MG
25 CAPSULE ORAL ONCE
Status: COMPLETED | OUTPATIENT
Start: 2020-09-28 | End: 2020-09-28

## 2020-09-28 RX ORDER — ACETAMINOPHEN 325 MG/1
650 TABLET ORAL ONCE
Status: COMPLETED | OUTPATIENT
Start: 2020-09-28 | End: 2020-09-28

## 2020-09-28 RX ADMIN — DARATUMUMAB AND HYALURONIDASE-FIHJ (HUMAN RECOMBINANT) 1800 MG: 1800; 30000 INJECTION SUBCUTANEOUS at 09:00

## 2020-09-28 RX ADMIN — ACETAMINOPHEN 650 MG: 325 TABLET ORAL at 08:16

## 2020-09-28 RX ADMIN — DIPHENHYDRAMINE HYDROCHLORIDE 25 MG: 25 CAPSULE ORAL at 08:16

## 2020-09-28 ASSESSMENT — PAIN SCALES - GENERAL: PAINLEVEL: MILD PAIN (2)

## 2020-09-28 NOTE — PROGRESS NOTES
Infusion Nursing Note:  Shena Hartmann presents today for scheduled Cycle 2, Day 1 Daratumumab Fastpro injection.    Patient seen by provider today: No   present during visit today: Not Applicable.    Note: Labs were monitored.  Lab parameters for today's treatment were met.  Patient assessment was completed and unremarkable except:  Patient has mild fatigue.  She has mild generalized body discomfort.  Patient has a history of tingling in bilateral hands and feet - which she says changes in regards to where she is in her treatment cycle.        Treatment Conditions:  Patient received 650 mg oral Tylenol and 25 mg oral Benadryl prior to receiving today's daratumumab fastpro injection in her lower left abdomen.    Note:  Patient refused 4 mg IV Decadron - which has been ok'd by Dr. Hills.  For future injections, please try the 23 gauge butterfly needle.      Post Infusion Assessment:  Patient tolerated injection without incident.       Discharge Plan:   Patient discharged in stable condition accompanied by: .    TAMMY REYES RN

## 2020-09-28 NOTE — NURSING NOTE
Chief Complaint   Patient presents with     Infusion     scheduled chemo injection s/p bmt txp for multiple myeloma     Blood Draw     Labs drawn via VPT by RN in lab. VS taken. Pt checked in for next appt     Labs collected from venipuncture by RN. Vitals taken. Checked in for appointment(s).    Per pt, she will discuss her light chains, CRP, BETA 1, and protein electrophoresis with her provider to clarify orders. JIC tubes drawn if they are needed.     Ching KRAUS RN PHN BSN  BMT/Oncology Lab

## 2020-09-28 NOTE — LETTER
9/28/2020         RE: Shena Hartmann  [address] [place] [address]        Dear Colleague,    Thank you for referring your patient, Shena Hartmann, to the [facility] BLOOD AND MARROW TRANSPLANT. Please see a copy of my visit note below.    Infusion Nursing Note:  Shena Hartmann presents today for scheduled Cycle 2, Day 1 Daratumumab Fastpro injection.    Patient seen by provider today: No   present during visit today: Not Applicable.    Note: Labs were monitored.  Lab parameters for today's treatment were met.  Patient assessment was completed and unremarkable except:  Patient has mild fatigue.  She has mild generalized body discomfort.  Patient has a history of tingling in bilateral hands and feet - which she says changes in regards to where she is in her treatment cycle.        Treatment Conditions:  Patient received 650 mg oral Tylenol and 25 mg oral Benadryl prior to receiving today's daratumumab fastpro injection in her lower left abdomen.    Note:  Patient refused 4 mg IV Decadron - which has been ok'd by Dr. Hills.  For future injections, please try the 23 gauge butterfly needle.      Post Infusion Assessment:  Patient tolerated injection without incident.       Discharge Plan:   Patient discharged in stable condition accompanied by: .    TAMMY REYES RN                          Again, thank you for allowing me to participate in the care of your patient.        Sincerely,        [facility]

## 2020-09-28 NOTE — NURSING NOTE
"Oncology Rooming Note    September 28, 2020 8:24 AM   Shena Hartmann is a 69 year old female who presents for:    Chief Complaint   Patient presents with     Infusion     scheduled chemo injection s/p bmt txp for multiple myeloma     Blood Draw     Labs drawn via VPT by RN in lab. VS taken. Pt checked in for next appt     Initial Vitals: BP (!) 148/82 (BP Location: Right arm, Patient Position: Sitting, Cuff Size: Adult Regular)   Pulse 72   Temp 97.8  F (36.6  C) (Oral)   Resp 16   Wt 56.6 kg (124 lb 11.2 oz)   SpO2 98%   BMI 22.81 kg/m   Estimated body mass index is 22.81 kg/m  as calculated from the following:    Height as of 3/5/19: 1.575 m (5' 2\").    Weight as of this encounter: 56.6 kg (124 lb 11.2 oz). Body surface area is 1.57 meters squared.  Mild Pain (2) Comment: general aches and pain   No LMP recorded. Patient is postmenopausal.  Allergies reviewed: Yes  Medications reviewed: Yes    Medications: Medication refills not needed today.  Pharmacy name entered into [facility]:    [id number] - [place], [place] - [address] AT [facility] [address]  WRITTEN PRESCRIPTION REQUESTED  [facility] - [place], [place] - [address]              "

## 2020-10-05 ENCOUNTER — INFUSION THERAPY VISIT (OUTPATIENT)
Dept: TRANSPLANT | Facility: CLINIC | Age: 70
End: 2020-10-05
Attending: INTERNAL MEDICINE
Payer: COMMERCIAL

## 2020-10-05 ENCOUNTER — APPOINTMENT (OUTPATIENT)
Dept: LAB | Facility: CLINIC | Age: 70
End: 2020-10-05
Attending: INTERNAL MEDICINE
Payer: COMMERCIAL

## 2020-10-05 VITALS
RESPIRATION RATE: 18 BRPM | BODY MASS INDEX: 22.79 KG/M2 | TEMPERATURE: 97.9 F | DIASTOLIC BLOOD PRESSURE: 68 MMHG | OXYGEN SATURATION: 96 % | HEART RATE: 76 BPM | SYSTOLIC BLOOD PRESSURE: 133 MMHG | WEIGHT: 124.6 LBS

## 2020-10-05 DIAGNOSIS — C90.00 MULTIPLE MYELOMA NOT HAVING ACHIEVED REMISSION (H): Primary | ICD-10-CM

## 2020-10-05 LAB
BASOPHILS # BLD AUTO: 0 10E9/L (ref 0–0.2)
BASOPHILS NFR BLD AUTO: 1 %
DIFFERENTIAL METHOD BLD: ABNORMAL
EOSINOPHIL # BLD AUTO: 0.2 10E9/L (ref 0–0.7)
EOSINOPHIL NFR BLD AUTO: 4.9 %
ERYTHROCYTE [DISTWIDTH] IN BLOOD BY AUTOMATED COUNT: 15 % (ref 10–15)
HCT VFR BLD AUTO: 34.1 % (ref 35–47)
HGB BLD-MCNC: 11.2 G/DL (ref 11.7–15.7)
IMM GRANULOCYTES # BLD: 0 10E9/L (ref 0–0.4)
IMM GRANULOCYTES NFR BLD: 0 %
LYMPHOCYTES # BLD AUTO: 1 10E9/L (ref 0.8–5.3)
LYMPHOCYTES NFR BLD AUTO: 32.1 %
MCH RBC QN AUTO: 33.5 PG (ref 26.5–33)
MCHC RBC AUTO-ENTMCNC: 32.8 G/DL (ref 31.5–36.5)
MCV RBC AUTO: 102 FL (ref 78–100)
MONOCYTES # BLD AUTO: 0.6 10E9/L (ref 0–1.3)
MONOCYTES NFR BLD AUTO: 18.8 %
NEUTROPHILS # BLD AUTO: 1.3 10E9/L (ref 1.6–8.3)
NEUTROPHILS NFR BLD AUTO: 43.2 %
NRBC # BLD AUTO: 0 10*3/UL
NRBC BLD AUTO-RTO: 0 /100
PLATELET # BLD AUTO: 130 10E9/L (ref 150–450)
RBC # BLD AUTO: 3.34 10E12/L (ref 3.8–5.2)
WBC # BLD AUTO: 3.1 10E9/L (ref 4–11)

## 2020-10-05 PROCEDURE — 250N000013 HC RX MED GY IP 250 OP 250 PS 637: Performed by: INTERNAL MEDICINE

## 2020-10-05 PROCEDURE — 96372 THER/PROPH/DIAG INJ SC/IM: CPT | Performed by: INTERNAL MEDICINE

## 2020-10-05 PROCEDURE — C9399 UNCLASSIFIED DRUGS OR BIOLOG: HCPCS | Performed by: INTERNAL MEDICINE

## 2020-10-05 PROCEDURE — C9062 DARATUMUMAB HYALURONIDASE: HCPCS | Performed by: INTERNAL MEDICINE

## 2020-10-05 PROCEDURE — 85025 COMPLETE CBC W/AUTO DIFF WBC: CPT | Performed by: PATHOLOGY

## 2020-10-05 PROCEDURE — 96401 CHEMO ANTI-NEOPL SQ/IM: CPT | Performed by: INTERNAL MEDICINE

## 2020-10-05 PROCEDURE — 99207 PR NO CHARGE LOS: CPT

## 2020-10-05 PROCEDURE — 36415 COLL VENOUS BLD VENIPUNCTURE: CPT

## 2020-10-05 PROCEDURE — 250N000011 HC RX IP 250 OP 636: Performed by: INTERNAL MEDICINE

## 2020-10-05 RX ORDER — DIPHENHYDRAMINE HCL 25 MG
25 CAPSULE ORAL ONCE
Status: COMPLETED | OUTPATIENT
Start: 2020-10-05 | End: 2020-10-05

## 2020-10-05 RX ORDER — ACETAMINOPHEN 325 MG/1
650 TABLET ORAL ONCE
Status: COMPLETED | OUTPATIENT
Start: 2020-10-05 | End: 2020-10-05

## 2020-10-05 RX ADMIN — DIPHENHYDRAMINE HYDROCHLORIDE 25 MG: 25 CAPSULE ORAL at 08:28

## 2020-10-05 RX ADMIN — DARATUMUMAB AND HYALURONIDASE-FIHJ (HUMAN RECOMBINANT) 1800 MG: 1800; 30000 INJECTION SUBCUTANEOUS at 08:56

## 2020-10-05 RX ADMIN — ACETAMINOPHEN 650 MG: 325 TABLET ORAL at 08:28

## 2020-10-05 ASSESSMENT — PAIN SCALES - GENERAL: PAINLEVEL: MILD PAIN (2)

## 2020-10-05 NOTE — NURSING NOTE
"Oncology Rooming Note    October 5, 2020 8:21 AM   Shena Hartmann is a 69 year old female who presents for:    Chief Complaint   Patient presents with     Infusion     scheduled chemo injection s/p bmt txp for multiple myeloma     Lab Only     venipuncture     Initial Vitals: /68   Pulse 76   Temp 97.9  F (36.6  C)   Resp 18   Wt 56.5 kg (124 lb 9.6 oz)   SpO2 96%   BMI 22.79 kg/m   Estimated body mass index is 22.79 kg/m  as calculated from the following:    Height as of 3/5/19: 1.575 m (5' 2\").    Weight as of this encounter: 56.5 kg (124 lb 9.6 oz). Body surface area is 1.57 meters squared.  Mild Pain (2) Comment: generalized body aches   No LMP recorded. Patient is postmenopausal.  Allergies reviewed: Yes  Medications reviewed: Yes    Medications: Medication refills not needed today.  Pharmacy name entered into [facility]:    [facility] #[id number] - [place], [place] - [address] AT [facility] [address]  WRITTEN PRESCRIPTION REQUESTED  [facility] - [place], [place] - [address]              "

## 2020-10-05 NOTE — PROGRESS NOTES
,Infusion Nursing Note:  Shena Hartmann presents today for scheduled Daratumumab Fastpro injection.    Patient seen by provider today: No   present during visit today: Not Applicable.    Note: Labs were monitored and lab parameters for today's treatment were met.  Patient assessment was completed and unremarkable except:  Patient has tingling in bilateral feet - which she states is at her baseline.  Patient has mild generalized aching in her bones/muscles mostly when she wakes in the morning.  The discomfort lessens with activity.        Treatment Conditions:  Patient received 650 mg oral Tylenol and 25 mg oral Benadryl prior to receiving scheduled Cycle 2, Day 8 Daratumumab Fastpro injection in her lower right abdomen.      Post Infusion Assessment:  Patient tolerated injection without incident. There was a small area of light pink around the injection site immediately after injection.      Discharge Plan:   Patient discharged in stable condition accompanied by: .    TAMMY REYES RN

## 2020-10-05 NOTE — LETTER
10/5/2020         RE: Shena Hartmann  [address] [place] [address]        Dear Colleague,    Thank you for referring your patient, Shena Hartmann, to the [facility] BLOOD AND MARROW TRANSPLANT PROGRAM [place]. Please see a copy of my visit note below.    ,Infusion Nursing Note:  Shena Hartmann presents today for scheduled Daratumumab Fastpro injection.    Patient seen by provider today: No   present during visit today: Not Applicable.    Note: Labs were monitored and lab parameters for today's treatment were met.  Patient assessment was completed and unremarkable except:  Patient has tingling in bilateral feet - which she states is at her baseline.  Patient has mild generalized aching in her bones/muscles mostly when she wakes in the morning.  The discomfort lessens with activity.        Treatment Conditions:  Patient received 650 mg oral Tylenol and 25 mg oral Benadryl prior to receiving scheduled Cycle 2, Day 8 Daratumumab Fastpro injection in her lower right abdomen.      Post Infusion Assessment:  Patient tolerated injection without incident. There was a small area of light pink around the injection site immediately after injection.      Discharge Plan:   Patient discharged in stable condition accompanied by: .    TAMMY REYES, MELISSA                            Again, thank you for allowing me to participate in the care of your patient.        Sincerely,        [facility]

## 2020-10-08 ENCOUNTER — TELEPHONE (OUTPATIENT)
Dept: ONCOLOGY | Facility: CLINIC | Age: 70
End: 2020-10-08

## 2020-10-08 DIAGNOSIS — C90.00 MULTIPLE MYELOMA, REMISSION STATUS UNSPECIFIED (H): Primary | ICD-10-CM

## 2020-10-08 NOTE — TELEPHONE ENCOUNTER
Oral Chemotherapy Monitoring Program   Medication: POMALYST  Rx: 1mg PO EVERY OTHER DAY interchanging WITH 2MG daily on days 1 through 21 of 28 day cycle   Auth #: [id number]  Risk Category: ADULT FEMALE NOT OF REPRODUCTIVE POTENTIAL  Routine survey questions reviewed.   Rx to be Escribed to [facility]   Oncology Pharmacy Liaison  [email or web address]   [phone] (phone)   [phone] (fax)

## 2020-10-12 ENCOUNTER — INFUSION THERAPY VISIT (OUTPATIENT)
Dept: TRANSPLANT | Facility: CLINIC | Age: 70
End: 2020-10-12
Attending: INTERNAL MEDICINE
Payer: COMMERCIAL

## 2020-10-12 ENCOUNTER — ONCOLOGY VISIT (OUTPATIENT)
Dept: TRANSPLANT | Facility: CLINIC | Age: 70
End: 2020-10-12
Attending: STUDENT IN AN ORGANIZED HEALTH CARE EDUCATION/TRAINING PROGRAM
Payer: COMMERCIAL

## 2020-10-12 ENCOUNTER — APPOINTMENT (OUTPATIENT)
Dept: LAB | Facility: CLINIC | Age: 70
End: 2020-10-12
Attending: INTERNAL MEDICINE
Payer: COMMERCIAL

## 2020-10-12 ENCOUNTER — TELEPHONE (OUTPATIENT)
Dept: ONCOLOGY | Facility: CLINIC | Age: 70
End: 2020-10-12

## 2020-10-12 VITALS
OXYGEN SATURATION: 96 % | DIASTOLIC BLOOD PRESSURE: 73 MMHG | TEMPERATURE: 97.9 F | BODY MASS INDEX: 22.5 KG/M2 | WEIGHT: 123 LBS | RESPIRATION RATE: 18 BRPM | HEART RATE: 74 BPM | SYSTOLIC BLOOD PRESSURE: 124 MMHG

## 2020-10-12 DIAGNOSIS — J12.1 RSV (RESPIRATORY SYNCYTIAL VIRUS PNEUMONIA): ICD-10-CM

## 2020-10-12 DIAGNOSIS — C90.00 MULTIPLE MYELOMA, REMISSION STATUS UNSPECIFIED (H): ICD-10-CM

## 2020-10-12 DIAGNOSIS — C90.00 MULTIPLE MYELOMA NOT HAVING ACHIEVED REMISSION (H): Primary | ICD-10-CM

## 2020-10-12 LAB
ALBUMIN SERPL-MCNC: 3.3 G/DL (ref 3.4–5)
ALP SERPL-CCNC: 58 U/L (ref 40–150)
ALT SERPL W P-5'-P-CCNC: 23 U/L (ref 0–50)
ANION GAP SERPL CALCULATED.3IONS-SCNC: 4 MMOL/L (ref 3–14)
AST SERPL W P-5'-P-CCNC: 14 U/L (ref 0–45)
BASOPHILS # BLD AUTO: 0 10E9/L (ref 0–0.2)
BASOPHILS NFR BLD AUTO: 0.6 %
BILIRUB SERPL-MCNC: 0.3 MG/DL (ref 0.2–1.3)
BUN SERPL-MCNC: 10 MG/DL (ref 7–30)
CALCIUM SERPL-MCNC: 9.1 MG/DL (ref 8.5–10.1)
CHLORIDE SERPL-SCNC: 102 MMOL/L (ref 94–109)
CO2 SERPL-SCNC: 29 MMOL/L (ref 20–32)
CREAT SERPL-MCNC: 0.85 MG/DL (ref 0.52–1.04)
DIFFERENTIAL METHOD BLD: ABNORMAL
EOSINOPHIL # BLD AUTO: 0.1 10E9/L (ref 0–0.7)
EOSINOPHIL NFR BLD AUTO: 2.6 %
ERYTHROCYTE [DISTWIDTH] IN BLOOD BY AUTOMATED COUNT: 14.9 % (ref 10–15)
GFR SERPL CREATININE-BSD FRML MDRD: 70 ML/MIN/{1.73_M2}
GLUCOSE SERPL-MCNC: 72 MG/DL (ref 70–99)
HCT VFR BLD AUTO: 35.8 % (ref 35–47)
HGB BLD-MCNC: 11.9 G/DL (ref 11.7–15.7)
IMM GRANULOCYTES # BLD: 0 10E9/L (ref 0–0.4)
IMM GRANULOCYTES NFR BLD: 0.3 %
LYMPHOCYTES # BLD AUTO: 1.2 10E9/L (ref 0.8–5.3)
LYMPHOCYTES NFR BLD AUTO: 39.4 %
MCH RBC QN AUTO: 33.5 PG (ref 26.5–33)
MCHC RBC AUTO-ENTMCNC: 33.2 G/DL (ref 31.5–36.5)
MCV RBC AUTO: 101 FL (ref 78–100)
MONOCYTES # BLD AUTO: 0.8 10E9/L (ref 0–1.3)
MONOCYTES NFR BLD AUTO: 24.5 %
NEUTROPHILS # BLD AUTO: 1 10E9/L (ref 1.6–8.3)
NEUTROPHILS NFR BLD AUTO: 32.6 %
NRBC # BLD AUTO: 0 10*3/UL
NRBC BLD AUTO-RTO: 0 /100
PLATELET # BLD AUTO: 168 10E9/L (ref 150–450)
POTASSIUM SERPL-SCNC: 4.5 MMOL/L (ref 3.4–5.3)
PROT SERPL-MCNC: 8.1 G/DL (ref 6.8–8.8)
RBC # BLD AUTO: 3.55 10E12/L (ref 3.8–5.2)
SODIUM SERPL-SCNC: 135 MMOL/L (ref 133–144)
WBC # BLD AUTO: 3.1 10E9/L (ref 4–11)

## 2020-10-12 PROCEDURE — 96372 THER/PROPH/DIAG INJ SC/IM: CPT | Performed by: STUDENT IN AN ORGANIZED HEALTH CARE EDUCATION/TRAINING PROGRAM

## 2020-10-12 PROCEDURE — 250N000013 HC RX MED GY IP 250 OP 250 PS 637: Performed by: INTERNAL MEDICINE

## 2020-10-12 PROCEDURE — 96372 THER/PROPH/DIAG INJ SC/IM: CPT | Performed by: INTERNAL MEDICINE

## 2020-10-12 PROCEDURE — 80053 COMPREHEN METABOLIC PANEL: CPT | Performed by: PATHOLOGY

## 2020-10-12 PROCEDURE — 85025 COMPLETE CBC W/AUTO DIFF WBC: CPT | Performed by: PATHOLOGY

## 2020-10-12 PROCEDURE — C9062 DARATUMUMAB HYALURONIDASE: HCPCS | Performed by: INTERNAL MEDICINE

## 2020-10-12 PROCEDURE — 250N000011 HC RX IP 250 OP 636: Performed by: STUDENT IN AN ORGANIZED HEALTH CARE EDUCATION/TRAINING PROGRAM

## 2020-10-12 PROCEDURE — 99214 OFFICE O/P EST MOD 30 MIN: CPT

## 2020-10-12 PROCEDURE — 250N000011 HC RX IP 250 OP 636: Performed by: INTERNAL MEDICINE

## 2020-10-12 PROCEDURE — C9399 UNCLASSIFIED DRUGS OR BIOLOG: HCPCS | Performed by: INTERNAL MEDICINE

## 2020-10-12 PROCEDURE — G0463 HOSPITAL OUTPT CLINIC VISIT: HCPCS | Mod: 25

## 2020-10-12 RX ORDER — ACYCLOVIR 400 MG/1
800 TABLET ORAL EVERY 12 HOURS
Qty: 120 TABLET | Refills: 1 | Status: SHIPPED | OUTPATIENT
Start: 2020-10-12 | End: 2020-11-30

## 2020-10-12 RX ORDER — FLUCONAZOLE 100 MG/1
100 TABLET ORAL DAILY
Qty: 30 TABLET | Refills: 1 | Status: SHIPPED | OUTPATIENT
Start: 2020-10-12 | End: 2020-11-30

## 2020-10-12 RX ORDER — HEPARIN SODIUM,PORCINE 10 UNIT/ML
5 VIAL (ML) INTRAVENOUS
Status: CANCELLED | OUTPATIENT
Start: 2020-10-12

## 2020-10-12 RX ORDER — HEPARIN SODIUM (PORCINE) LOCK FLUSH IV SOLN 100 UNIT/ML 100 UNIT/ML
5 SOLUTION INTRAVENOUS
Status: CANCELLED | OUTPATIENT
Start: 2020-10-12

## 2020-10-12 RX ORDER — HEPARIN SODIUM,PORCINE 10 UNIT/ML
5 VIAL (ML) INTRAVENOUS
Status: DISCONTINUED | OUTPATIENT
Start: 2020-10-12 | End: 2020-10-12 | Stop reason: HOSPADM

## 2020-10-12 RX ORDER — HEPARIN SODIUM (PORCINE) LOCK FLUSH IV SOLN 100 UNIT/ML 100 UNIT/ML
5 SOLUTION INTRAVENOUS
Status: DISCONTINUED | OUTPATIENT
Start: 2020-10-12 | End: 2020-10-12 | Stop reason: HOSPADM

## 2020-10-12 RX ORDER — PENICILLIN V POTASSIUM 250 MG/1
250 TABLET, FILM COATED ORAL 2 TIMES DAILY
Qty: 60 TABLET | Refills: 1 | Status: SHIPPED | OUTPATIENT
Start: 2020-10-12 | End: 2020-11-30

## 2020-10-12 RX ORDER — ACETAMINOPHEN 325 MG/1
650 TABLET ORAL ONCE
Status: COMPLETED | OUTPATIENT
Start: 2020-10-12 | End: 2020-10-12

## 2020-10-12 RX ORDER — DIPHENHYDRAMINE HCL 25 MG
25 CAPSULE ORAL ONCE
Status: COMPLETED | OUTPATIENT
Start: 2020-10-12 | End: 2020-10-12

## 2020-10-12 RX ADMIN — DARATUMUMAB AND HYALURONIDASE-FIHJ (HUMAN RECOMBINANT) 1800 MG: 1800; 30000 INJECTION SUBCUTANEOUS at 09:05

## 2020-10-12 RX ADMIN — FILGRASTIM-SNDZ 300 MCG: 300 INJECTION, SOLUTION INTRAVENOUS; SUBCUTANEOUS at 09:33

## 2020-10-12 RX ADMIN — DIPHENHYDRAMINE HYDROCHLORIDE 25 MG: 25 CAPSULE ORAL at 08:16

## 2020-10-12 RX ADMIN — ACETAMINOPHEN 650 MG: 325 TABLET ORAL at 08:16

## 2020-10-12 ASSESSMENT — PAIN SCALES - GENERAL: PAINLEVEL: MODERATE PAIN (4)

## 2020-10-12 NOTE — PROGRESS NOTES
"BMT Progress Note      Subjective  CC \"cold symptoms\"    HPI: Shena has been noticed a transient set of symptoms over the last 1-2 weeks during her most recent pomalyst cycle. She has paresthesias on her jaw to her head, then on opposite ear lobe. Now since resolved. Congestion with ear popping. Change in taste on half of tongue. Sensation of mouth sores without visible sores. Sneezing. No cough, no fever. No sick contacts.    ROS: 8 point review with pertinent positive and negatives in HPI      Physical Exam:  Vitals reviewed in infusion, wnl  Gen: NAD  HEENT: Sclera anicteric, MMM, no erythema or lesions on OP  Pulm: CTA bilaterally  Cardiac:RRR without murmurs rubs or gallops  Abd: soft, non tender, non distended  Skin: no rash on face or head visible  Extremities: no edema  Neuro: A&O    Lab Results   Component Value Date    WBC 3.1 10/12/2020     Lab Results   Component Value Date    RBC 3.55 10/12/2020     Lab Results   Component Value Date    HGB 11.9 10/12/2020     Lab Results   Component Value Date    HCT 35.8 10/12/2020     Lab Results   Component Value Date     10/12/2020     Lab Results   Component Value Date    MCH 33.5 10/12/2020     Lab Results   Component Value Date    MCHC 33.2 10/12/2020     Lab Results   Component Value Date    RDW 14.9 10/12/2020     Lab Results   Component Value Date     10/12/2020     Last Comprehensive Metabolic Panel:  Sodium   Date Value Ref Range Status   10/12/2020 135 133 - 144 mmol/L Final     Potassium   Date Value Ref Range Status   10/12/2020 4.5 3.4 - 5.3 mmol/L Final     Chloride   Date Value Ref Range Status   10/12/2020 102 94 - 109 mmol/L Final     Carbon Dioxide   Date Value Ref Range Status   10/12/2020 29 20 - 32 mmol/L Final     Anion Gap   Date Value Ref Range Status   10/12/2020 4 3 - 14 mmol/L Final     Glucose   Date Value Ref Range Status   10/12/2020 72 70 - 99 mg/dL Final     Urea Nitrogen   Date Value Ref Range Status   10/12/2020 10 7 - " 30 mg/dL Final     Creatinine   Date Value Ref Range Status   10/12/2020 0.85 0.52 - 1.04 mg/dL Final     GFR Estimate   Date Value Ref Range Status   10/12/2020 70 >60 mL/min/[1.73_m2] Final     Comment:     Non  GFR Calc  Starting 12/18/2018, serum creatinine based estimated GFR (eGFR) will be   calculated using the Chronic Kidney Disease Epidemiology Collaboration   (CKD-EPI) equation.       Calcium   Date Value Ref Range Status   10/12/2020 9.1 8.5 - 10.1 mg/dL Final           Assessment and plan    BMT: MM s/p auto hsct 6 years 6 mo  Pom/Yen with falling ANC. See ID plan below    ID:  Resume prophy meds: fluconazole, penVK (allergies), ACV (VZV dosing)  Symptomatic COVID swab ordered for varied symptoms.         Plan: start acyclovir, penvk and fluc for falling ANC.   GCSF given as anc 1000  Requested patient call if symptoms worsen, and to go to the ED for SOB, fever or difficulty breathing.  RTC: See DOM next week  Dr Hills at his next available (he requested to see her 10/2, however this appt was never made)    Marjan Pendleton PAC  [phone]

## 2020-10-12 NOTE — TELEPHONE ENCOUNTER
PA Initiation    Medication: POMALYST - PA SUBMITTED - (Key: [id number])  Insurance Company: [employer] - Phone [phone] Fax [phone]  Pharmacy Filling the Rx: [facility] - [place], [place] - [address]  Start Date: 10/12/2020    [facility]   Oncology Pharmacy Liaison  [email or web address]   [phone] (phone)   [phone] (fax)

## 2020-10-12 NOTE — PROGRESS NOTES
"Infusion Nursing Note:  Shena Hartmann presents today for Cycle 2, Day 15 Daratumumab Fastpro.    Patient seen by provider today: Yes: Marjan Pendleton - assessed vague cold symptoms   present during visit today: Not Applicable.    Note: Labs were monitored.  Patient's ANC was 1.0 today, parameter 1.2. Dr. Hills was contacted and he gave verbal okay to go ahead with today's treatment.  Patient assessment was completed and unremarkable except: Patient is fatigued. She reports having sore gums. Patient reports having \"vague cold symptoms,\" but denies any nasal congestion/cough.  Patient has a history of tingling in bilateral feet and fingers.  Occasionally, she has mild tingling in the tip of her tongue - she states this is all at her baseline.      Treatment Conditions:  Patient was given 650 mg oral Tylenol and 25 mg oral Benadryl prior to receiving scheduled Daratumumab Fastpro injection in her lower left abdomen.  Please note:  Patient does not take Decadron pre-med and Dr. Hills has okayed this.  Per Provider Keerthi, Patient received an add-on G-CSF injection in her lower right abdomen.      Post Infusion Assessment:  Patient tolerated injection without incident.       Discharge Plan:   Patient discharged in stable condition accompanied by: .    TAMMY REYES RN                        "

## 2020-10-12 NOTE — LETTER
"    10/12/2020         RE: Shena Hartmann  [address] [place] [address]        Dear Colleague,    Thank you for referring your patient, Shena Hartmann, to the [facility] BLOOD AND MARROW TRANSPLANT PROGRAM [place]. Please see a copy of my visit note below.    Chief Complaint   Patient presents with     Blood Draw     VPT blood draw and vitals     Venipuncture labs drawn from left arm       Infusion Nursing Note:  Shena Hartmann presents today for Cycle 2, Day 15 Daratumumab Fastpro.    Patient seen by provider today: Yes: Marjan Pendleton - assessed vague cold symptoms   present during visit today: Not Applicable.    Note: Labs were monitored.  Patient's ANC was 1.0 today, parameter 1.2. Dr. Hills was contacted and he gave verbal okay to go ahead with today's treatment.  Patient assessment was completed and unremarkable except: Patient is fatigued. She reports having sore gums. Patient reports having \"vague cold symptoms,\" but denies any nasal congestion/cough.  Patient has a history of tingling in bilateral feet and fingers.  Occasionally, she has mild tingling in the tip of her tongue - she states this is all at her baseline.      Treatment Conditions:  Patient was given 650 mg oral Tylenol and 25 mg oral Benadryl prior to receiving scheduled Daratumumab Fastpro injection in her lower left abdomen.  Please note:  Patient does not take Decadron pre-med and Dr. Hills has okayed this.  Per Provider Keerthi, Patient received an add-on G-CSF injection in her lower right abdomen.      Post Infusion Assessment:  Patient tolerated injection without incident.       Discharge Plan:   Patient discharged in stable condition accompanied by: .    TAMMY REYES RN                            Again, thank you for allowing me to participate in the care of your patient.        Sincerely,        [facility]    "

## 2020-10-12 NOTE — NURSING NOTE
"Oncology Rooming Note    October 12, 2020 8:33 AM   Shena Hartmann is a 69 year old female who presents for:    Chief Complaint   Patient presents with     Blood Draw     VPT blood draw and vitals     Infusion     scheduled chemo injection s/p bmt txp for multiple myeloma     Initial Vitals: /73   Pulse 74   Temp 97.9  F (36.6  C) (Oral)   Resp 18   Wt 55.8 kg (123 lb)   SpO2 96%   BMI 22.50 kg/m   Estimated body mass index is 22.5 kg/m  as calculated from the following:    Height as of 3/5/19: 1.575 m (5' 2\").    Weight as of this encounter: 55.8 kg (123 lb). Body surface area is 1.56 meters squared.  Moderate Pain (4) Comment: generalized aching pain   No LMP recorded. Patient is postmenopausal.  Allergies reviewed: Yes  Medications reviewed: Yes    Medications: Medication refills not needed today.  Pharmacy name entered into [facility]:    [facility] #[id number] - [place], [place] - [address] AT [facility] [address]  WRITTEN PRESCRIPTION REQUESTED  [facility] - [place], [place] - [address]    Clinical concerns: Patient denies fevers/N/V/D.  She has had vague cold symptoms this week.  Requested Provider to assess.      TAMMY REYES RN              "

## 2020-10-12 NOTE — TELEPHONE ENCOUNTER
Prior Authorization Not Needed per Insurance    Medication: POMALYST - PA NOT NEEDED - (Key: [id number])  Insurance Company: [employer] - Phone [phone] Fax [phone]  Pharmacy Filling the Rx: [facility] - [place], [place] - [facility] [address]    [facility]   Oncology Pharmacy Liaison  [email or web address]   [phone] (phone)   [phone] (fax)

## 2020-10-12 NOTE — LETTER
"    10/12/2020         RE: Shena Hartmann  [address] [place] [address]        Dear Colleague,    Thank you for referring your patient, Shena Hartmann, to the [facility] BLOOD AND MARROW TRANSPLANT PROGRAM [place]. Please see a copy of my visit note below.    BMT Progress Note      Subjective  CC \"cold symptoms\"    HPI: Shena has been noticed a transient set of symptoms over the last 1-2 weeks during her most recent pomalyst cycle. She has paresthesias on her jaw to her head, then on opposite ear lobe. Now since resolved. Congestion with ear popping. Change in taste on half of tongue. Sensation of mouth sores without visible sores. Sneezing. No cough, no fever. No sick contacts.    ROS: 8 point review with pertinent positive and negatives in HPI      Physical Exam:  Vitals reviewed in infusion, wnl  Gen: NAD  HEENT: Sclera anicteric, MMM, no erythema or lesions on OP  Pulm: CTA bilaterally  Cardiac:RRR without murmurs rubs or gallops  Abd: soft, non tender, non distended  Skin: no rash on face or head visible  Extremities: no edema  Neuro: A&O    Lab Results   Component Value Date    WBC 3.1 10/12/2020     Lab Results   Component Value Date    RBC 3.55 10/12/2020     Lab Results   Component Value Date    HGB 11.9 10/12/2020     Lab Results   Component Value Date    HCT 35.8 10/12/2020     Lab Results   Component Value Date     10/12/2020     Lab Results   Component Value Date    MCH 33.5 10/12/2020     Lab Results   Component Value Date    MCHC 33.2 10/12/2020     Lab Results   Component Value Date    RDW 14.9 10/12/2020     Lab Results   Component Value Date     10/12/2020     Last Comprehensive Metabolic Panel:  Sodium   Date Value Ref Range Status   10/12/2020 135 133 - 144 mmol/L Final     Potassium   Date Value Ref Range Status   10/12/2020 4.5 3.4 - 5.3 mmol/L Final     Chloride   Date Value Ref Range Status   10/12/2020 102 94 - 109 mmol/L Final     Carbon Dioxide "   Date Value Ref Range Status   10/12/2020 29 20 - 32 mmol/L Final     Anion Gap   Date Value Ref Range Status   10/12/2020 4 3 - 14 mmol/L Final     Glucose   Date Value Ref Range Status   10/12/2020 72 70 - 99 mg/dL Final     Urea Nitrogen   Date Value Ref Range Status   10/12/2020 10 7 - 30 mg/dL Final     Creatinine   Date Value Ref Range Status   10/12/2020 0.85 0.52 - 1.04 mg/dL Final     GFR Estimate   Date Value Ref Range Status   10/12/2020 70 >60 mL/min/[1.73_m2] Final     Comment:     Non  GFR Calc  Starting 12/18/2018, serum creatinine based estimated GFR (eGFR) will be   calculated using the Chronic Kidney Disease Epidemiology Collaboration   (CKD-EPI) equation.       Calcium   Date Value Ref Range Status   10/12/2020 9.1 8.5 - 10.1 mg/dL Final           Assessment and plan    BMT: MM s/p auto hsct 6 years 6 mo  Pom/Yen with falling ANC. See ID plan below    ID:  Resume prophy meds: fluconazole, penVK (allergies), ACV (VZV dosing)  Symptomatic COVID swab ordered for varied symptoms.         Plan: start acyclovir, penvk and fluc for falling ANC.   GCSF given as anc 1000  Requested patient call if symptoms worsen, and to go to the ED for SOB, fever or difficulty breathing.  RTC: See DOM next week  Dr Hlils at his next available (he requested to see her 10/2, however this appt was never made)    Marjan Pendleton PAC  [phone]      Again, thank you for allowing me to participate in the care of your patient.        Sincerely,        BMT Advanced Practice Provider

## 2020-10-19 ENCOUNTER — INFUSION THERAPY VISIT (OUTPATIENT)
Dept: TRANSPLANT | Facility: CLINIC | Age: 70
End: 2020-10-19
Attending: INTERNAL MEDICINE
Payer: COMMERCIAL

## 2020-10-19 ENCOUNTER — APPOINTMENT (OUTPATIENT)
Dept: LAB | Facility: CLINIC | Age: 70
End: 2020-10-19
Attending: INTERNAL MEDICINE
Payer: COMMERCIAL

## 2020-10-19 DIAGNOSIS — C90.00 MULTIPLE MYELOMA NOT HAVING ACHIEVED REMISSION (H): Primary | ICD-10-CM

## 2020-10-19 PROCEDURE — 36415 COLL VENOUS BLD VENIPUNCTURE: CPT

## 2020-10-19 PROCEDURE — C9399 UNCLASSIFIED DRUGS OR BIOLOG: HCPCS | Performed by: INTERNAL MEDICINE

## 2020-10-19 PROCEDURE — 99207 PR NO CHARGE LOS: CPT

## 2020-10-19 PROCEDURE — 250N000011 HC RX IP 250 OP 636: Performed by: INTERNAL MEDICINE

## 2020-10-19 PROCEDURE — 250N000013 HC RX MED GY IP 250 OP 250 PS 637: Performed by: INTERNAL MEDICINE

## 2020-10-19 PROCEDURE — 96372 THER/PROPH/DIAG INJ SC/IM: CPT | Performed by: INTERNAL MEDICINE

## 2020-10-19 PROCEDURE — C9062 DARATUMUMAB HYALURONIDASE: HCPCS | Performed by: INTERNAL MEDICINE

## 2020-10-19 RX ORDER — ACETAMINOPHEN 325 MG/1
650 TABLET ORAL ONCE
Status: COMPLETED | OUTPATIENT
Start: 2020-10-19 | End: 2020-10-19

## 2020-10-19 RX ORDER — DIPHENHYDRAMINE HCL 25 MG
25 CAPSULE ORAL ONCE
Status: COMPLETED | OUTPATIENT
Start: 2020-10-19 | End: 2020-10-19

## 2020-10-19 RX ADMIN — ACETAMINOPHEN 650 MG: 325 TABLET ORAL at 08:21

## 2020-10-19 RX ADMIN — DIPHENHYDRAMINE HYDROCHLORIDE 25 MG: 25 CAPSULE ORAL at 08:21

## 2020-10-19 RX ADMIN — DARATUMUMAB AND HYALURONIDASE-FIHJ (HUMAN RECOMBINANT) 1800 MG: 1800; 30000 INJECTION SUBCUTANEOUS at 08:48

## 2020-10-19 NOTE — PROGRESS NOTES
Infusion Nursing Note:  Shena Hartmann presents today for scheduled Cycle 2, Day 22 Daratumumab Fastpro.    Patient seen by provider today: No   present during visit today: Not Applicable.    Note: Labs were monitored and lab parameters for today's treatment were met.  Patient assessment was completed and unremarkable except:  Patient has a history of tingling in bilateral feet and hands and mild tingling in her tongue - which she states is at her baseline.  Patient has chronic generalized body aches, which is at her baseline this morning.      Treatment Conditions:  Patient received 650 mg oral Tylenol and 25 mg oral Benadryl prior to receiving scheduled Daratumumab Fastpro injection in her lower right abdomen.      Note:  Patient does not take Decadron pre-med and Dr. Hills has approved this.      Post Infusion Assessment:  Patient tolerated injection without incident.       Discharge Plan:   Patient was ambulatory and discharged in the care of her .    TAMMY REYES RN

## 2020-10-19 NOTE — NURSING NOTE
"Oncology Rooming Note    October 19, 2020 8:24 AM   Shena Hartmann is a 69 year old female who presents for:    Chief Complaint   Patient presents with     Infusion     scheduled chemo injection s/p bmt txp for multiple myeloma     Initial Vitals: There were no vitals taken for this visit. Estimated body mass index is 22.44 kg/m  as calculated from the following:    Height as of 3/5/19: 1.575 m (5' 2\").    Weight as of an earlier encounter on 10/19/20: 55.7 kg (122 lb 11.2 oz). There is no height or weight on file to calculate BSA.  Data Unavailable Comment: Data Unavailable   No LMP recorded. Patient is postmenopausal.  Allergies reviewed: Yes  Medications reviewed: Yes    Medications: Medication refills not needed today.  Pharmacy name entered into [employer]:    [facility] #[id number] - [place], [place] - [address] AT [facility] [address]  WRITTEN PRESCRIPTION REQUESTED  [facility] - [place], [place] - [address]    Clinical concerns: Patient denies fevers/N/V/D/respiratory symptoms.  Patient does report chronic generalized aching and declined the need for intervention today.      TAMMY REYES RN              "

## 2020-10-30 DIAGNOSIS — C90.00 MULTIPLE MYELOMA NOT HAVING ACHIEVED REMISSION (H): Primary | ICD-10-CM

## 2020-10-30 RX ORDER — HEPARIN SODIUM (PORCINE) LOCK FLUSH IV SOLN 100 UNIT/ML 100 UNIT/ML
5 SOLUTION INTRAVENOUS
Status: CANCELLED | OUTPATIENT
Start: 2020-11-02

## 2020-10-30 RX ORDER — DIPHENHYDRAMINE HCL 25 MG
25 CAPSULE ORAL ONCE
Status: CANCELLED | OUTPATIENT
Start: 2020-11-16

## 2020-10-30 RX ORDER — HEPARIN SODIUM,PORCINE 10 UNIT/ML
5 VIAL (ML) INTRAVENOUS
Status: CANCELLED | OUTPATIENT
Start: 2020-11-16

## 2020-10-30 RX ORDER — ALBUTEROL SULFATE 0.83 MG/ML
2.5 SOLUTION RESPIRATORY (INHALATION)
Status: CANCELLED | OUTPATIENT
Start: 2020-11-02

## 2020-10-30 RX ORDER — MEPERIDINE HYDROCHLORIDE 25 MG/ML
25 INJECTION INTRAMUSCULAR; INTRAVENOUS; SUBCUTANEOUS EVERY 30 MIN PRN
Status: CANCELLED | OUTPATIENT
Start: 2020-11-16

## 2020-10-30 RX ORDER — ACETAMINOPHEN 325 MG/1
650 TABLET ORAL ONCE
Status: CANCELLED | OUTPATIENT
Start: 2020-11-16

## 2020-10-30 RX ORDER — DIPHENHYDRAMINE HCL 25 MG
25 CAPSULE ORAL ONCE
Status: CANCELLED | OUTPATIENT
Start: 2020-11-02

## 2020-10-30 RX ORDER — EPINEPHRINE 1 MG/ML
0.3 INJECTION, SOLUTION, CONCENTRATE INTRAVENOUS EVERY 5 MIN PRN
Status: CANCELLED | OUTPATIENT
Start: 2020-11-02

## 2020-10-30 RX ORDER — NALOXONE HYDROCHLORIDE 0.4 MG/ML
.1-.4 INJECTION, SOLUTION INTRAMUSCULAR; INTRAVENOUS; SUBCUTANEOUS
Status: CANCELLED | OUTPATIENT
Start: 2020-11-16

## 2020-10-30 RX ORDER — NALOXONE HYDROCHLORIDE 0.4 MG/ML
.1-.4 INJECTION, SOLUTION INTRAMUSCULAR; INTRAVENOUS; SUBCUTANEOUS
Status: CANCELLED | OUTPATIENT
Start: 2020-11-02

## 2020-10-30 RX ORDER — ALBUTEROL SULFATE 90 UG/1
1-2 AEROSOL, METERED RESPIRATORY (INHALATION)
Status: CANCELLED
Start: 2020-11-16

## 2020-10-30 RX ORDER — HEPARIN SODIUM (PORCINE) LOCK FLUSH IV SOLN 100 UNIT/ML 100 UNIT/ML
5 SOLUTION INTRAVENOUS
Status: CANCELLED | OUTPATIENT
Start: 2020-11-16

## 2020-10-30 RX ORDER — MEPERIDINE HYDROCHLORIDE 25 MG/ML
25 INJECTION INTRAMUSCULAR; INTRAVENOUS; SUBCUTANEOUS EVERY 30 MIN PRN
Status: CANCELLED | OUTPATIENT
Start: 2020-11-02

## 2020-10-30 RX ORDER — LORAZEPAM 2 MG/ML
0.5 INJECTION INTRAMUSCULAR EVERY 4 HOURS PRN
Status: CANCELLED
Start: 2020-11-16

## 2020-10-30 RX ORDER — HEPARIN SODIUM,PORCINE 10 UNIT/ML
5 VIAL (ML) INTRAVENOUS
Status: CANCELLED | OUTPATIENT
Start: 2020-11-02

## 2020-10-30 RX ORDER — ACETAMINOPHEN 325 MG/1
650 TABLET ORAL ONCE
Status: CANCELLED | OUTPATIENT
Start: 2020-11-02

## 2020-10-30 RX ORDER — SODIUM CHLORIDE 9 MG/ML
1000 INJECTION, SOLUTION INTRAVENOUS CONTINUOUS PRN
Status: CANCELLED
Start: 2020-11-02

## 2020-10-30 RX ORDER — LORAZEPAM 2 MG/ML
0.5 INJECTION INTRAMUSCULAR EVERY 4 HOURS PRN
Status: CANCELLED
Start: 2020-11-02

## 2020-10-30 RX ORDER — DIPHENHYDRAMINE HYDROCHLORIDE 50 MG/ML
50 INJECTION INTRAMUSCULAR; INTRAVENOUS
Status: CANCELLED
Start: 2020-11-02

## 2020-10-30 RX ORDER — SODIUM CHLORIDE 9 MG/ML
1000 INJECTION, SOLUTION INTRAVENOUS CONTINUOUS PRN
Status: CANCELLED
Start: 2020-11-16

## 2020-10-30 RX ORDER — EPINEPHRINE 1 MG/ML
0.3 INJECTION, SOLUTION, CONCENTRATE INTRAVENOUS EVERY 5 MIN PRN
Status: CANCELLED | OUTPATIENT
Start: 2020-11-16

## 2020-10-30 RX ORDER — ALBUTEROL SULFATE 90 UG/1
1-2 AEROSOL, METERED RESPIRATORY (INHALATION)
Status: CANCELLED
Start: 2020-11-02

## 2020-10-30 RX ORDER — DIPHENHYDRAMINE HYDROCHLORIDE 50 MG/ML
50 INJECTION INTRAMUSCULAR; INTRAVENOUS
Status: CANCELLED
Start: 2020-11-16

## 2020-10-30 RX ORDER — ALBUTEROL SULFATE 0.83 MG/ML
2.5 SOLUTION RESPIRATORY (INHALATION)
Status: CANCELLED | OUTPATIENT
Start: 2020-11-16

## 2020-11-02 ENCOUNTER — INFUSION THERAPY VISIT (OUTPATIENT)
Dept: TRANSPLANT | Facility: CLINIC | Age: 70
End: 2020-11-02
Payer: COMMERCIAL

## 2020-11-02 ENCOUNTER — ONCOLOGY VISIT (OUTPATIENT)
Dept: TRANSPLANT | Facility: CLINIC | Age: 70
End: 2020-11-02
Attending: INTERNAL MEDICINE
Payer: COMMERCIAL

## 2020-11-02 ENCOUNTER — APPOINTMENT (OUTPATIENT)
Dept: LAB | Facility: CLINIC | Age: 70
End: 2020-11-02
Attending: INTERNAL MEDICINE
Payer: COMMERCIAL

## 2020-11-02 VITALS
TEMPERATURE: 97.6 F | HEART RATE: 77 BPM | DIASTOLIC BLOOD PRESSURE: 70 MMHG | SYSTOLIC BLOOD PRESSURE: 126 MMHG | WEIGHT: 122.2 LBS | OXYGEN SATURATION: 97 % | RESPIRATION RATE: 18 BRPM | BODY MASS INDEX: 22.35 KG/M2

## 2020-11-02 DIAGNOSIS — C90.00 MULTIPLE MYELOMA, REMISSION STATUS UNSPECIFIED (H): ICD-10-CM

## 2020-11-02 DIAGNOSIS — C90.00 MULTIPLE MYELOMA NOT HAVING ACHIEVED REMISSION (H): ICD-10-CM

## 2020-11-02 DIAGNOSIS — Z86.19: ICD-10-CM

## 2020-11-02 DIAGNOSIS — C90.00 MULTIPLE MYELOMA NOT HAVING ACHIEVED REMISSION (H): Primary | ICD-10-CM

## 2020-11-02 LAB
ALBUMIN SERPL-MCNC: 3.7 G/DL (ref 3.4–5)
ALP SERPL-CCNC: 56 U/L (ref 40–150)
ALT SERPL W P-5'-P-CCNC: 28 U/L (ref 0–50)
ANION GAP SERPL CALCULATED.3IONS-SCNC: 7 MMOL/L (ref 3–14)
ANISOCYTOSIS BLD QL SMEAR: SLIGHT
AST SERPL W P-5'-P-CCNC: 12 U/L (ref 0–45)
BASOPHILS # BLD AUTO: 0 10E9/L (ref 0–0.2)
BASOPHILS NFR BLD AUTO: 0 %
BILIRUB SERPL-MCNC: 0.3 MG/DL (ref 0.2–1.3)
BUN SERPL-MCNC: 16 MG/DL (ref 7–30)
CALCIUM SERPL-MCNC: 9.4 MG/DL (ref 8.5–10.1)
CHLORIDE SERPL-SCNC: 104 MMOL/L (ref 94–109)
CO2 SERPL-SCNC: 26 MMOL/L (ref 20–32)
CREAT SERPL-MCNC: 0.81 MG/DL (ref 0.52–1.04)
DIFFERENTIAL METHOD BLD: ABNORMAL
EOSINOPHIL # BLD AUTO: 0.3 10E9/L (ref 0–0.7)
EOSINOPHIL NFR BLD AUTO: 10.5 %
ERYTHROCYTE [DISTWIDTH] IN BLOOD BY AUTOMATED COUNT: 15.5 % (ref 10–15)
GFR SERPL CREATININE-BSD FRML MDRD: 74 ML/MIN/{1.73_M2}
GLUCOSE SERPL-MCNC: 73 MG/DL (ref 70–99)
HCT VFR BLD AUTO: 35.6 % (ref 35–47)
HGB BLD-MCNC: 11.8 G/DL (ref 11.7–15.7)
LYMPHOCYTES # BLD AUTO: 1.3 10E9/L (ref 0.8–5.3)
LYMPHOCYTES NFR BLD AUTO: 51.8 %
MCH RBC QN AUTO: 33.5 PG (ref 26.5–33)
MCHC RBC AUTO-ENTMCNC: 33.1 G/DL (ref 31.5–36.5)
MCV RBC AUTO: 101 FL (ref 78–100)
MONOCYTES # BLD AUTO: 0.3 10E9/L (ref 0–1.3)
MONOCYTES NFR BLD AUTO: 11.4 %
NEUTROPHILS # BLD AUTO: 0.7 10E9/L (ref 1.6–8.3)
NEUTROPHILS NFR BLD AUTO: 26.3 %
PLATELET # BLD AUTO: 133 10E9/L (ref 150–450)
PLATELET # BLD EST: ABNORMAL 10*3/UL
POTASSIUM SERPL-SCNC: 4 MMOL/L (ref 3.4–5.3)
PROT SERPL-MCNC: 8.1 G/DL (ref 6.8–8.8)
RBC # BLD AUTO: 3.52 10E12/L (ref 3.8–5.2)
SODIUM SERPL-SCNC: 138 MMOL/L (ref 133–144)
WBC # BLD AUTO: 2.6 10E9/L (ref 4–11)

## 2020-11-02 PROCEDURE — 250N000013 HC RX MED GY IP 250 OP 250 PS 637: Performed by: INTERNAL MEDICINE

## 2020-11-02 PROCEDURE — 96401 CHEMO ANTI-NEOPL SQ/IM: CPT | Performed by: STUDENT IN AN ORGANIZED HEALTH CARE EDUCATION/TRAINING PROGRAM

## 2020-11-02 PROCEDURE — 85025 COMPLETE CBC W/AUTO DIFF WBC: CPT | Performed by: INTERNAL MEDICINE

## 2020-11-02 PROCEDURE — C9062 DARATUMUMAB HYALURONIDASE: HCPCS | Performed by: INTERNAL MEDICINE

## 2020-11-02 PROCEDURE — 250N000011 HC RX IP 250 OP 636: Performed by: INTERNAL MEDICINE

## 2020-11-02 PROCEDURE — 80053 COMPREHEN METABOLIC PANEL: CPT | Performed by: INTERNAL MEDICINE

## 2020-11-02 PROCEDURE — 99207 PR NO CHARGE LOS: CPT

## 2020-11-02 PROCEDURE — 999N000104 HC STATISTIC NO CHARGE

## 2020-11-02 PROCEDURE — 96372 THER/PROPH/DIAG INJ SC/IM: CPT | Performed by: STUDENT IN AN ORGANIZED HEALTH CARE EDUCATION/TRAINING PROGRAM

## 2020-11-02 PROCEDURE — 250N000011 HC RX IP 250 OP 636: Performed by: STUDENT IN AN ORGANIZED HEALTH CARE EDUCATION/TRAINING PROGRAM

## 2020-11-02 PROCEDURE — 99214 OFFICE O/P EST MOD 30 MIN: CPT

## 2020-11-02 PROCEDURE — 96372 THER/PROPH/DIAG INJ SC/IM: CPT | Performed by: INTERNAL MEDICINE

## 2020-11-02 PROCEDURE — G0463 HOSPITAL OUTPT CLINIC VISIT: HCPCS | Mod: 25

## 2020-11-02 RX ORDER — HEPARIN SODIUM (PORCINE) LOCK FLUSH IV SOLN 100 UNIT/ML 100 UNIT/ML
5 SOLUTION INTRAVENOUS
Status: CANCELLED | OUTPATIENT
Start: 2020-11-02

## 2020-11-02 RX ORDER — HEPARIN SODIUM,PORCINE 10 UNIT/ML
5 VIAL (ML) INTRAVENOUS
Status: CANCELLED | OUTPATIENT
Start: 2020-11-02

## 2020-11-02 RX ORDER — DIPHENHYDRAMINE HCL 25 MG
25 CAPSULE ORAL ONCE
Status: COMPLETED | OUTPATIENT
Start: 2020-11-02 | End: 2020-11-02

## 2020-11-02 RX ORDER — ACETAMINOPHEN 325 MG/1
650 TABLET ORAL ONCE
Status: COMPLETED | OUTPATIENT
Start: 2020-11-02 | End: 2020-11-02

## 2020-11-02 RX ADMIN — ACETAMINOPHEN 650 MG: 325 TABLET ORAL at 08:05

## 2020-11-02 RX ADMIN — DIPHENHYDRAMINE HYDROCHLORIDE 25 MG: 25 CAPSULE ORAL at 08:05

## 2020-11-02 RX ADMIN — DARATUMUMAB AND HYALURONIDASE-FIHJ (HUMAN RECOMBINANT) 1800 MG: 1800; 30000 INJECTION SUBCUTANEOUS at 08:46

## 2020-11-02 RX ADMIN — FILGRASTIM-SNDZ 300 MCG: 300 INJECTION, SOLUTION INTRAVENOUS; SUBCUTANEOUS at 09:01

## 2020-11-02 ASSESSMENT — PAIN SCALES - GENERAL: PAINLEVEL: MODERATE PAIN (4)

## 2020-11-02 NOTE — LETTER
11/2/2020         RE: Shena Hartmann  [address] [place] [address]        Dear Colleague,    Thank you for referring your patient, Shena Hartmann, to the [facility] BLOOD AND MARROW TRANSPLANT PROGRAM [place]. Please see a copy of my visit note below.    BMT Daily Progress Note   11/02/2020    Patient ID:  Shena Hartmann is a 70 year old female, currently 6 years, 7 months s/p autologous peripheral blood stem cell transplant.  She has never had a complete response, and has been on various medications since her transplant, including pomalidomide, decadron and, most recently, Pom + daratumamab.      Diagnosis MM Multiple myeloma  HCT Type Autologous    Prep Regimen Melphalan  Cytoxan   Donor Source No data was found    GVHD Prophylaxis No  Primary BMT MD Oscar Hills     INTERVAL  HISTORY     Shena is here for labs, follow up and daratumumab, today.  Her last visit on 10/19; per notes, a provider did not see her that day.  Prior to that, on 10/12, she was noted to have falling counts and prophylactic antimicrobials were resumed.  Today, Shena complains of fatigue, generalized aches/pains and head pressure when her blood pressure is high.  Her blood pressure fluctuates daily.  She also notes that her neuropathy is a bit worse on pom + riky, but not as bad as it was when she was on pom/dex.  She has no rashes, n/v/d, bleeding, abdominal pain, cough, fever, shortness of breath or other acute issues.     Review of Systems: 8 point ROS negative except as noted above.    Scheduled Medications    Current Outpatient Medications   Medication     acyclovir (ZOVIRAX) 400 MG tablet     aspirin (ASA) 81 MG chewable tablet     CALCIUM-VITAMIN D-VITAMIN K PO     cholecalciferol (VITAMIN D3) 5000 UNITS CAPS capsule     clobetasol (TEMOVATE) 0.05 % external ointment     COENZYME Q-10 PO     desonide (DESOWEN) 0.05 % external ointment     fluconazole (DIFLUCAN) 100 MG tablet     Lactobacillus (PROBIOTIC  ACIDOPHILUS) CAPS     magnesium 100 MG CAPS     Multiple Vitamins-Minerals (MULTIVITAMIN OR)     order for DME     order for DME     penicillin V (VEETID) 250 MG tablet     pomalidomide (POMALYST) 1 MG capsule     pomalidomide (POMALYST) 1 MG capsule     triamcinolone (KENALOG) 0.1 % external ointment     No current facility-administered medications for this visit.      Facility-Administered Medications Ordered in Other Visits   Medication     plerixafor (MOZOBIL) injection SOLN 12.4 mg     potassium chloride 20 mEq in 50 mL IVPB     potassium chloride SA (K-DUR,KLOR-CON M) CR tablet 20 mEq       PHYSICAL EXAM     Weight In/Out     Wt Readings from Last 3 Encounters:   10/19/20 55.7 kg (122 lb 11.2 oz)   10/12/20 55.8 kg (123 lb)   10/05/20 56.5 kg (124 lb 9.6 oz)      [unfilled]       KPS:  90    /70   Pulse 77   Temp 97.6  F (36.4  C)   Resp 18   Wt 55.4 kg (122 lb 3.2 oz)   SpO2 97%   BMI 22.35 kg/m         General: NAD   Eyes: : KYAW, sclera anicteric   Nose/Mouth/Throat: OP clear, buccal mucosa moist, no ulcerations   Lungs: no increased work of breathing or coughing  Cardiovascular: strong radial pulse with regular rhythm  Abdominal/Rectal: +BS, soft, NT, ND, No HSM   Lymphatics: no edema   Skin: no rashes or petechaie; no injection site reaction on her abdomen  Neuro: A&O     Current aGVHD staging:  Skin 0, UGI 0, LGI 0, Liver 0 (keep in note through day +180, delete if auto)    LABS AND IMAGING      I have assessed all abnormal lab values for their clinical significance and any values considered clinically significant have been addressed in the assessment and plan      Lab Results   Component Value Date    WBC 2.6 (L) 11/02/2020    ANEU 2.5 10/19/2020    HGB 11.8 11/02/2020    HCT 35.6 11/02/2020     (L) 11/02/2020     11/02/2020    POTASSIUM 4.0 11/02/2020    CHLORIDE 104 11/02/2020    CO2 26 11/02/2020    GLC 73 11/02/2020    BUN 16 11/02/2020    CR 0.81 11/02/2020    MAG 1.8  12/12/2014    INR 1.14 03/31/2014             OVERALL PLAN   Shena Hartmann is a 70 year old female, currently on pom/riky for her multiple myeloma.    Proceed with planned daratumumab today; noted to have ANC of 0.6 and plts down to 133K, today .  Will give growth factor and proceed with riky as planned.     Continue prophylactic antimicrobials.    Return in 2 weeks for labs, provider, daratumamab; will need refills of antimicrobials if they are to continue.     HTN: this is intermittent.  Offered her the option of prn hydralazine if her symptoms (head pressure) are bothersome.  She is hesitant to add a medication at this time.    PN: patient's neuropathy is not interfering with ADLs, though it is somewhat worse since adding riky.  Better than when she was on pom/dex, however.  No additional interventions.    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        Sincerely,        BMT Advanced Practice Provider

## 2020-11-02 NOTE — NURSING NOTE
Chief Complaint   Patient presents with     Lab Only     venipuncture, vitals checked     Maria Eugenia Murdock RN on 11/2/2020 at 7:38 AM

## 2020-11-02 NOTE — PROGRESS NOTES
Infusion Nursing Note:  Shena Hartmann presents today for Daratumumab SQ.    Patient seen by provider today: Yes: MADISON Gaspar   present during visit today: Not Applicable.    Note: Patient here for D1, C3 Daratumumab.  Assessment by provider prior to treatment today.      ANC 0.7 today.  Dr. Hills was contacted and verbal OK to give Yen today and to also give Zarxio.      Pre meds of 25mg oral benadryl and 650 mg tylenol.  Patient declines Decadron and Dr. Hills aware.      Daratumumab given subcutaneous in left lower abdomen over 6 minutes.  Zarxio given subcutaneous in right lower abdomen.  Patient tolerated both well.      Intravenous Access:  N/A.    Treatment Conditions:  Lab Results   Component Value Date    HGB 11.8 11/02/2020     Lab Results   Component Value Date    WBC 2.6 11/02/2020      Lab Results   Component Value Date    ANEU 0.7 11/02/2020     Lab Results   Component Value Date     11/02/2020      Lab Results   Component Value Date     11/02/2020                   Lab Results   Component Value Date    POTASSIUM 4.0 11/02/2020           Lab Results   Component Value Date    MAG 1.8 12/12/2014            Lab Results   Component Value Date    CR 0.81 11/02/2020                   Lab Results   Component Value Date    PAULINE 9.4 11/02/2020                Lab Results   Component Value Date    BILITOTAL 0.3 11/02/2020           Lab Results   Component Value Date    ALBUMIN 3.7 11/02/2020                    Lab Results   Component Value Date    ALT 28 11/02/2020           Lab Results   Component Value Date    AST 12 11/02/2020       Results reviewed, labs did NOT meet treatment parameters: ANC 0.7 (see above).      Post Infusion Assessment:  Patient tolerated injection without incident.       Discharge Plan:   Copy of AVS reviewed with patient and/or family.  Patient will return in 2 weeks for next appointment.  Patient discharged in stable condition accompanied by:  self.  Departure Mode: Ambulatory.    JANETH MINAYA RN

## 2020-11-02 NOTE — PROGRESS NOTES
BMT Daily Progress Note   11/02/2020    Patient ID:  Shena Hartmann is a 70 year old female, currently 6 years, 7 months s/p autologous peripheral blood stem cell transplant.  She has never had a complete response, and has been on various medications since her transplant, including pomalidomide, decadron and, most recently, Pom + daratumamab.      Diagnosis MM Multiple myeloma  HCT Type Autologous    Prep Regimen Melphalan  Cytoxan   Donor Source No data was found    GVHD Prophylaxis No  Primary BMT MD Oscar Hills     INTERVAL  HISTORY     Shena is here for labs, follow up and daratumumab, today.  Her last visit on 10/19; per notes, a provider did not see her that day.  Prior to that, on 10/12, she was noted to have falling counts and prophylactic antimicrobials were resumed.  Today, Shena complains of fatigue, generalized aches/pains and head pressure when her blood pressure is high.  Her blood pressure fluctuates daily.  She also notes that her neuropathy is a bit worse on pom + riky, but not as bad as it was when she was on pom/dex.  She has no rashes, n/v/d, bleeding, abdominal pain, cough, fever, shortness of breath or other acute issues.     Review of Systems: 8 point ROS negative except as noted above.    Scheduled Medications    Current Outpatient Medications   Medication     acyclovir (ZOVIRAX) 400 MG tablet     aspirin (ASA) 81 MG chewable tablet     CALCIUM-VITAMIN D-VITAMIN K PO     cholecalciferol (VITAMIN D3) 5000 UNITS CAPS capsule     clobetasol (TEMOVATE) 0.05 % external ointment     COENZYME Q-10 PO     desonide (DESOWEN) 0.05 % external ointment     fluconazole (DIFLUCAN) 100 MG tablet     Lactobacillus (PROBIOTIC ACIDOPHILUS) CAPS     magnesium 100 MG CAPS     Multiple Vitamins-Minerals (MULTIVITAMIN OR)     order for DME     order for DME     penicillin V (VEETID) 250 MG tablet     pomalidomide (POMALYST) 1 MG capsule     pomalidomide (POMALYST) 1 MG capsule     triamcinolone (KENALOG) 0.1 %  external ointment     No current facility-administered medications for this visit.      Facility-Administered Medications Ordered in Other Visits   Medication     plerixafor (MOZOBIL) injection SOLN 12.4 mg     potassium chloride 20 mEq in 50 mL IVPB     potassium chloride SA (K-DUR,KLOR-CON M) CR tablet 20 mEq       PHYSICAL EXAM     Weight In/Out     Wt Readings from Last 3 Encounters:   10/19/20 55.7 kg (122 lb 11.2 oz)   10/12/20 55.8 kg (123 lb)   10/05/20 56.5 kg (124 lb 9.6 oz)      [unfilled]       S:  90    /70   Pulse 77   Temp 97.6  F (36.4  C)   Resp 18   Wt 55.4 kg (122 lb 3.2 oz)   SpO2 97%   BMI 22.35 kg/m         General: NAD   Eyes: : KYAW, sclera anicteric   Nose/Mouth/Throat: OP clear, buccal mucosa moist, no ulcerations   Lungs: no increased work of breathing or coughing  Cardiovascular: strong radial pulse with regular rhythm  Abdominal/Rectal: +BS, soft, NT, ND, No HSM   Lymphatics: no edema   Skin: no rashes or petechaie; no injection site reaction on her abdomen  Neuro: A&O     Current aGVHD staging:  Skin 0, UGI 0, LGI 0, Liver 0 (keep in note through day +180, delete if auto)    LABS AND IMAGING      I have assessed all abnormal lab values for their clinical significance and any values considered clinically significant have been addressed in the assessment and plan      Lab Results   Component Value Date    WBC 2.6 (L) 11/02/2020    ANEU 2.5 10/19/2020    HGB 11.8 11/02/2020    HCT 35.6 11/02/2020     (L) 11/02/2020     11/02/2020    POTASSIUM 4.0 11/02/2020    CHLORIDE 104 11/02/2020    CO2 26 11/02/2020    GLC 73 11/02/2020    BUN 16 11/02/2020    CR 0.81 11/02/2020    MAG 1.8 12/12/2014    INR 1.14 03/31/2014             OVERALL PLAN   Shena Hartmann is a 70 year old female, currently on pom/riky for her multiple myeloma.    Proceed with planned daratumumab today; noted to have ANC of 0.6 and plts down to 133K, today .  Will give growth factor and proceed  with riky as planned.     Continue prophylactic antimicrobials.    Return in 2 weeks for labs, provider, debraatumamab; will need refills of antimicrobials if they are to continue.     HTN: this is intermittent.  Offered her the option of prn hydralazine if her symptoms (head pressure) are bothersome.  She is hesitant to add a medication at this time.    PN: patient's neuropathy is not interfering with ADLs, though it is somewhat worse since adding riky.  Better than when she was on pom/dex, however.  No additional interventions.    Known issues that I take into account for medical decisions, with salient changes to the plan considering these complexities noted above:  1) multiple myeloma, s/p autologous peripheral blood stem cell transplant  2) intermittent hypertension  3) peripheral neuropathy  4) neutropenia and thrombocytopenia secondary to biologic agents for the treatment of multiple myeloma    Analy West PA-C  11/2/2020

## 2020-11-05 ENCOUNTER — TELEPHONE (OUTPATIENT)
Dept: ONCOLOGY | Facility: CLINIC | Age: 70
End: 2020-11-05

## 2020-11-05 DIAGNOSIS — C90.00 MULTIPLE MYELOMA, REMISSION STATUS UNSPECIFIED (H): Primary | ICD-10-CM

## 2020-11-05 NOTE — TELEPHONE ENCOUNTER
Oral Chemotherapy Monitoring Program   Medication:POMALYST  Rx: 1mg PO daily on days 1 through 21 of 28 day cycle   Auth #:[id number]  Risk Category:ADULT FEMALE NOT OF REPRODUCTIVE POTENTIAL  Routine survey questions reviewed.   Rx to be Escribed to [facility]   Oncology Pharmacy Liaison  [email or web address]   [phone] (phone)   [phone] (fax)

## 2020-11-08 ENCOUNTER — HEALTH MAINTENANCE LETTER (OUTPATIENT)
Age: 70
End: 2020-11-08

## 2020-11-16 ENCOUNTER — APPOINTMENT (OUTPATIENT)
Dept: LAB | Facility: CLINIC | Age: 70
End: 2020-11-16
Attending: INTERNAL MEDICINE
Payer: COMMERCIAL

## 2020-11-16 ENCOUNTER — INFUSION THERAPY VISIT (OUTPATIENT)
Dept: TRANSPLANT | Facility: CLINIC | Age: 70
End: 2020-11-16
Attending: INTERNAL MEDICINE
Payer: COMMERCIAL

## 2020-11-16 VITALS
TEMPERATURE: 97.8 F | RESPIRATION RATE: 16 BRPM | DIASTOLIC BLOOD PRESSURE: 74 MMHG | BODY MASS INDEX: 22.17 KG/M2 | HEART RATE: 76 BPM | OXYGEN SATURATION: 97 % | SYSTOLIC BLOOD PRESSURE: 135 MMHG | WEIGHT: 121.2 LBS

## 2020-11-16 DIAGNOSIS — C90.00 MULTIPLE MYELOMA NOT HAVING ACHIEVED REMISSION (H): Primary | ICD-10-CM

## 2020-11-16 LAB
ALBUMIN SERPL-MCNC: 3.5 G/DL (ref 3.4–5)
ALP SERPL-CCNC: 54 U/L (ref 40–150)
ALT SERPL W P-5'-P-CCNC: 28 U/L (ref 0–50)
ANION GAP SERPL CALCULATED.3IONS-SCNC: 5 MMOL/L (ref 3–14)
AST SERPL W P-5'-P-CCNC: 18 U/L (ref 0–45)
BASOPHILS # BLD AUTO: 0 10E9/L (ref 0–0.2)
BASOPHILS NFR BLD AUTO: 0.8 %
BILIRUB SERPL-MCNC: 0.4 MG/DL (ref 0.2–1.3)
BUN SERPL-MCNC: 17 MG/DL (ref 7–30)
CALCIUM SERPL-MCNC: 9.3 MG/DL (ref 8.5–10.1)
CHLORIDE SERPL-SCNC: 102 MMOL/L (ref 94–109)
CO2 SERPL-SCNC: 28 MMOL/L (ref 20–32)
CREAT SERPL-MCNC: 0.83 MG/DL (ref 0.52–1.04)
DIFFERENTIAL METHOD BLD: ABNORMAL
EOSINOPHIL # BLD AUTO: 0.1 10E9/L (ref 0–0.7)
EOSINOPHIL NFR BLD AUTO: 2.5 %
ERYTHROCYTE [DISTWIDTH] IN BLOOD BY AUTOMATED COUNT: 16.3 % (ref 10–15)
GFR SERPL CREATININE-BSD FRML MDRD: 71 ML/MIN/{1.73_M2}
GLUCOSE SERPL-MCNC: 76 MG/DL (ref 70–99)
HCT VFR BLD AUTO: 35.4 % (ref 35–47)
HGB BLD-MCNC: 11.7 G/DL (ref 11.7–15.7)
IMM GRANULOCYTES # BLD: 0 10E9/L (ref 0–0.4)
IMM GRANULOCYTES NFR BLD: 0.3 %
LYMPHOCYTES # BLD AUTO: 1.3 10E9/L (ref 0.8–5.3)
LYMPHOCYTES NFR BLD AUTO: 32.8 %
MCH RBC QN AUTO: 33.3 PG (ref 26.5–33)
MCHC RBC AUTO-ENTMCNC: 33.1 G/DL (ref 31.5–36.5)
MCV RBC AUTO: 101 FL (ref 78–100)
MONOCYTES # BLD AUTO: 0.2 10E9/L (ref 0–1.3)
MONOCYTES NFR BLD AUTO: 5.6 %
NEUTROPHILS # BLD AUTO: 2.3 10E9/L (ref 1.6–8.3)
NEUTROPHILS NFR BLD AUTO: 58 %
NRBC # BLD AUTO: 0 10*3/UL
NRBC BLD AUTO-RTO: 0 /100
PLATELET # BLD AUTO: 216 10E9/L (ref 150–450)
POTASSIUM SERPL-SCNC: 3.7 MMOL/L (ref 3.4–5.3)
PROT SERPL-MCNC: 7.8 G/DL (ref 6.8–8.8)
RBC # BLD AUTO: 3.51 10E12/L (ref 3.8–5.2)
SODIUM SERPL-SCNC: 135 MMOL/L (ref 133–144)
WBC # BLD AUTO: 4 10E9/L (ref 4–11)

## 2020-11-16 PROCEDURE — 96372 THER/PROPH/DIAG INJ SC/IM: CPT | Performed by: INTERNAL MEDICINE

## 2020-11-16 PROCEDURE — 250N000013 HC RX MED GY IP 250 OP 250 PS 637: Performed by: INTERNAL MEDICINE

## 2020-11-16 PROCEDURE — 250N000011 HC RX IP 250 OP 636: Performed by: INTERNAL MEDICINE

## 2020-11-16 PROCEDURE — 85025 COMPLETE CBC W/AUTO DIFF WBC: CPT | Performed by: PHYSICIAN ASSISTANT

## 2020-11-16 PROCEDURE — 36415 COLL VENOUS BLD VENIPUNCTURE: CPT

## 2020-11-16 PROCEDURE — 80053 COMPREHEN METABOLIC PANEL: CPT | Performed by: PHYSICIAN ASSISTANT

## 2020-11-16 PROCEDURE — C9062 DARATUMUMAB HYALURONIDASE: HCPCS | Performed by: INTERNAL MEDICINE

## 2020-11-16 RX ORDER — ACETAMINOPHEN 325 MG/1
650 TABLET ORAL ONCE
Status: COMPLETED | OUTPATIENT
Start: 2020-11-16 | End: 2020-11-16

## 2020-11-16 RX ORDER — DIPHENHYDRAMINE HCL 25 MG
25 CAPSULE ORAL ONCE
Status: COMPLETED | OUTPATIENT
Start: 2020-11-16 | End: 2020-11-16

## 2020-11-16 RX ADMIN — DIPHENHYDRAMINE HYDROCHLORIDE 25 MG: 25 CAPSULE ORAL at 08:29

## 2020-11-16 RX ADMIN — ACETAMINOPHEN 650 MG: 325 TABLET ORAL at 08:29

## 2020-11-16 RX ADMIN — DARATUMUMAB AND HYALURONIDASE-FIHJ (HUMAN RECOMBINANT) 1800 MG: 1800; 30000 INJECTION SUBCUTANEOUS at 08:54

## 2020-11-16 ASSESSMENT — PAIN SCALES - GENERAL: PAINLEVEL: MILD PAIN (3)

## 2020-11-16 NOTE — NURSING NOTE
Chief Complaint   Patient presents with     Blood Draw     Labs drawn via VPT by RN in lab. VS taken. Pt checked in for next appt     Labs collected from venipuncture by RN. Vitals taken. Checked in for appointment(s).    Ching KRAUS RN PHN BSN  BMT/Oncology Lab

## 2020-11-16 NOTE — PROGRESS NOTES
Infusion Nursing Note:  Shena Hartmann presents today for scheduled Cycle 3, Day 15 Daratumumab Fastpro.    Patient seen by provider today: No   present during visit today: Not Applicable.    Note: Labs were monitored.  Lab parameters for today's treatment were met.  Patient assessment was completed and unremarkable except:  Patient has mild fatigue.  She has a history of mild tingling in bilateral hands.  She has tingling in bilateral lower legs - which she states is at her baseline.  Patient has generalized body aches, rating it a 3/10 this morning.  She declined the need for intervention today.    Treatment Conditions:  Patient received 650 mg oral Tylenol and 25 mg oral Benadryl prior to receiving scheduled Daratumumab Fastpro injection in her lower right lower abdomen.    Note:  Patient declines taking the prescribed Decadron prescribes as a premed.  Dr. Hills is aware and has agreed to  Patient not taking it.    Post Infusion Assessment:  Patient tolerated injection without incident.       Discharge Plan:   Patient discharged in stable condition accompanied by: self.   is picking Patient up at front door.    TAMMY REYES RN

## 2020-11-16 NOTE — LETTER
11/16/2020         RE: Shena Hartmann  [address] [place] [address]        Dear Colleague,    Thank you for referring your patient, Shena Hartmann, to the [facility] BLOOD AND MARROW TRANSPLANT PROGRAM [place]. Please see a copy of my visit note below.    Infusion Nursing Note:  Shena Hartmann presents today for scheduled Cycle 3, Day 15 Daratumumab Fastpro.    Patient seen by provider today: No   present during visit today: Not Applicable.    Note: Labs were monitored.  Lab parameters for today's treatment were met.  Patient assessment was completed and unremarkable except:  Patient has mild fatigue.  She has a history of mild tingling in bilateral hands.  She has tingling in bilateral lower legs - which she states is at her baseline.  Patient has generalized body aches, rating it a 3/10 this morning.  She declined the need for intervention today.    Treatment Conditions:  Patient received 650 mg oral Tylenol and 25 mg oral Benadryl prior to receiving scheduled Daratumumab Fastpro injection in her lower right lower abdomen.    Note:  Patient declines taking the prescribed Decadron prescribes as a premed.  Dr. Hills is aware and has agreed to  Patient not taking it.    Post Infusion Assessment:  Patient tolerated injection without incident.       Discharge Plan:   Patient discharged in stable condition accompanied by: self.   is picking Patient up at front door.    TAMMY REYES RN                            Again, thank you for allowing me to participate in the care of your patient.        Sincerely,        [facility]

## 2020-11-25 DIAGNOSIS — C90.00 MULTIPLE MYELOMA NOT HAVING ACHIEVED REMISSION (H): Primary | ICD-10-CM

## 2020-11-25 RX ORDER — LORAZEPAM 2 MG/ML
0.5 INJECTION INTRAMUSCULAR EVERY 4 HOURS PRN
Status: CANCELLED
Start: 2020-11-30

## 2020-11-25 RX ORDER — ACETAMINOPHEN 325 MG/1
650 TABLET ORAL ONCE
Status: CANCELLED | OUTPATIENT
Start: 2020-12-14

## 2020-11-25 RX ORDER — ACETAMINOPHEN 325 MG/1
650 TABLET ORAL ONCE
Status: CANCELLED | OUTPATIENT
Start: 2020-11-30

## 2020-11-25 RX ORDER — DIPHENHYDRAMINE HYDROCHLORIDE 50 MG/ML
50 INJECTION INTRAMUSCULAR; INTRAVENOUS
Status: CANCELLED
Start: 2020-12-14

## 2020-11-25 RX ORDER — HEPARIN SODIUM,PORCINE 10 UNIT/ML
5 VIAL (ML) INTRAVENOUS
Status: CANCELLED | OUTPATIENT
Start: 2020-11-30

## 2020-11-25 RX ORDER — DIPHENHYDRAMINE HYDROCHLORIDE 50 MG/ML
50 INJECTION INTRAMUSCULAR; INTRAVENOUS
Status: CANCELLED
Start: 2020-11-30

## 2020-11-25 RX ORDER — NALOXONE HYDROCHLORIDE 0.4 MG/ML
.1-.4 INJECTION, SOLUTION INTRAMUSCULAR; INTRAVENOUS; SUBCUTANEOUS
Status: CANCELLED | OUTPATIENT
Start: 2020-11-30

## 2020-11-25 RX ORDER — LORAZEPAM 2 MG/ML
0.5 INJECTION INTRAMUSCULAR EVERY 4 HOURS PRN
Status: CANCELLED
Start: 2020-12-14

## 2020-11-25 RX ORDER — HEPARIN SODIUM (PORCINE) LOCK FLUSH IV SOLN 100 UNIT/ML 100 UNIT/ML
5 SOLUTION INTRAVENOUS
Status: CANCELLED | OUTPATIENT
Start: 2020-11-30

## 2020-11-25 RX ORDER — ALBUTEROL SULFATE 90 UG/1
1-2 AEROSOL, METERED RESPIRATORY (INHALATION)
Status: CANCELLED
Start: 2020-11-30

## 2020-11-25 RX ORDER — EPINEPHRINE 1 MG/ML
0.3 INJECTION, SOLUTION, CONCENTRATE INTRAVENOUS EVERY 5 MIN PRN
Status: CANCELLED | OUTPATIENT
Start: 2020-12-14

## 2020-11-25 RX ORDER — MEPERIDINE HYDROCHLORIDE 25 MG/ML
25 INJECTION INTRAMUSCULAR; INTRAVENOUS; SUBCUTANEOUS EVERY 30 MIN PRN
Status: CANCELLED | OUTPATIENT
Start: 2020-12-14

## 2020-11-25 RX ORDER — SODIUM CHLORIDE 9 MG/ML
1000 INJECTION, SOLUTION INTRAVENOUS CONTINUOUS PRN
Status: CANCELLED
Start: 2020-11-30

## 2020-11-25 RX ORDER — DIPHENHYDRAMINE HCL 25 MG
25 CAPSULE ORAL ONCE
Status: CANCELLED | OUTPATIENT
Start: 2020-11-30

## 2020-11-25 RX ORDER — HEPARIN SODIUM,PORCINE 10 UNIT/ML
5 VIAL (ML) INTRAVENOUS
Status: CANCELLED | OUTPATIENT
Start: 2020-12-14

## 2020-11-25 RX ORDER — MEPERIDINE HYDROCHLORIDE 25 MG/ML
25 INJECTION INTRAMUSCULAR; INTRAVENOUS; SUBCUTANEOUS EVERY 30 MIN PRN
Status: CANCELLED | OUTPATIENT
Start: 2020-11-30

## 2020-11-25 RX ORDER — HEPARIN SODIUM (PORCINE) LOCK FLUSH IV SOLN 100 UNIT/ML 100 UNIT/ML
5 SOLUTION INTRAVENOUS
Status: CANCELLED | OUTPATIENT
Start: 2020-12-14

## 2020-11-25 RX ORDER — NALOXONE HYDROCHLORIDE 0.4 MG/ML
.1-.4 INJECTION, SOLUTION INTRAMUSCULAR; INTRAVENOUS; SUBCUTANEOUS
Status: CANCELLED | OUTPATIENT
Start: 2020-12-14

## 2020-11-25 RX ORDER — ALBUTEROL SULFATE 0.83 MG/ML
2.5 SOLUTION RESPIRATORY (INHALATION)
Status: CANCELLED | OUTPATIENT
Start: 2020-11-30

## 2020-11-25 RX ORDER — SODIUM CHLORIDE 9 MG/ML
1000 INJECTION, SOLUTION INTRAVENOUS CONTINUOUS PRN
Status: CANCELLED
Start: 2020-12-14

## 2020-11-25 RX ORDER — EPINEPHRINE 1 MG/ML
0.3 INJECTION, SOLUTION, CONCENTRATE INTRAVENOUS EVERY 5 MIN PRN
Status: CANCELLED | OUTPATIENT
Start: 2020-11-30

## 2020-11-25 RX ORDER — ALBUTEROL SULFATE 0.83 MG/ML
2.5 SOLUTION RESPIRATORY (INHALATION)
Status: CANCELLED | OUTPATIENT
Start: 2020-12-14

## 2020-11-25 RX ORDER — DIPHENHYDRAMINE HCL 25 MG
25 CAPSULE ORAL ONCE
Status: CANCELLED | OUTPATIENT
Start: 2020-12-14

## 2020-11-25 RX ORDER — ALBUTEROL SULFATE 90 UG/1
1-2 AEROSOL, METERED RESPIRATORY (INHALATION)
Status: CANCELLED
Start: 2020-12-14

## 2020-11-30 ENCOUNTER — ONCOLOGY VISIT (OUTPATIENT)
Dept: TRANSPLANT | Facility: CLINIC | Age: 70
End: 2020-11-30
Attending: PHYSICIAN ASSISTANT
Payer: COMMERCIAL

## 2020-11-30 ENCOUNTER — APPOINTMENT (OUTPATIENT)
Dept: LAB | Facility: CLINIC | Age: 70
End: 2020-11-30
Attending: PHYSICIAN ASSISTANT
Payer: COMMERCIAL

## 2020-11-30 VITALS
RESPIRATION RATE: 16 BRPM | TEMPERATURE: 97.6 F | SYSTOLIC BLOOD PRESSURE: 131 MMHG | OXYGEN SATURATION: 98 % | DIASTOLIC BLOOD PRESSURE: 65 MMHG | BODY MASS INDEX: 22.7 KG/M2 | HEART RATE: 72 BPM | WEIGHT: 124.1 LBS

## 2020-11-30 DIAGNOSIS — C90.00 MULTIPLE MYELOMA, REMISSION STATUS UNSPECIFIED (H): ICD-10-CM

## 2020-11-30 DIAGNOSIS — C90.00 MULTIPLE MYELOMA NOT HAVING ACHIEVED REMISSION (H): Primary | ICD-10-CM

## 2020-11-30 DIAGNOSIS — Z86.19: ICD-10-CM

## 2020-11-30 DIAGNOSIS — C90.00 MULTIPLE MYELOMA NOT HAVING ACHIEVED REMISSION (H): ICD-10-CM

## 2020-11-30 LAB
ALBUMIN SERPL-MCNC: 3.4 G/DL (ref 3.4–5)
ALP SERPL-CCNC: 64 U/L (ref 40–150)
ALT SERPL W P-5'-P-CCNC: 32 U/L (ref 0–50)
ANION GAP SERPL CALCULATED.3IONS-SCNC: 4 MMOL/L (ref 3–14)
AST SERPL W P-5'-P-CCNC: 17 U/L (ref 0–45)
B2 MICROGLOB SERPL-MCNC: 2.4 MG/L
BASOPHILS # BLD AUTO: 0 10E9/L (ref 0–0.2)
BASOPHILS NFR BLD AUTO: 1 %
BILIRUB SERPL-MCNC: 0.3 MG/DL (ref 0.2–1.3)
BUN SERPL-MCNC: 22 MG/DL (ref 7–30)
CALCIUM SERPL-MCNC: 9.1 MG/DL (ref 8.5–10.1)
CHLORIDE SERPL-SCNC: 106 MMOL/L (ref 94–109)
CO2 SERPL-SCNC: 28 MMOL/L (ref 20–32)
CREAT SERPL-MCNC: 0.81 MG/DL (ref 0.52–1.04)
CRP SERPL-MCNC: <2.9 MG/L (ref 0–8)
DIFFERENTIAL METHOD BLD: ABNORMAL
EOSINOPHIL # BLD AUTO: 0.3 10E9/L (ref 0–0.7)
EOSINOPHIL NFR BLD AUTO: 9.4 %
ERYTHROCYTE [DISTWIDTH] IN BLOOD BY AUTOMATED COUNT: 16.1 % (ref 10–15)
GFR SERPL CREATININE-BSD FRML MDRD: 74 ML/MIN/{1.73_M2}
GLUCOSE SERPL-MCNC: 70 MG/DL (ref 70–99)
HCT VFR BLD AUTO: 36.1 % (ref 35–47)
HGB BLD-MCNC: 12.1 G/DL (ref 11.7–15.7)
IMM GRANULOCYTES # BLD: 0 10E9/L (ref 0–0.4)
IMM GRANULOCYTES NFR BLD: 0 %
LYMPHOCYTES # BLD AUTO: 1.4 10E9/L (ref 0.8–5.3)
LYMPHOCYTES NFR BLD AUTO: 43.9 %
MCH RBC QN AUTO: 34.1 PG (ref 26.5–33)
MCHC RBC AUTO-ENTMCNC: 33.5 G/DL (ref 31.5–36.5)
MCV RBC AUTO: 102 FL (ref 78–100)
MONOCYTES # BLD AUTO: 0.6 10E9/L (ref 0–1.3)
MONOCYTES NFR BLD AUTO: 20.6 %
NEUTROPHILS # BLD AUTO: 0.8 10E9/L (ref 1.6–8.3)
NEUTROPHILS NFR BLD AUTO: 25.1 %
NRBC # BLD AUTO: 0 10*3/UL
NRBC BLD AUTO-RTO: 0 /100
PLATELET # BLD AUTO: 163 10E9/L (ref 150–450)
PLATELET # BLD EST: ABNORMAL 10*3/UL
POTASSIUM SERPL-SCNC: 4.1 MMOL/L (ref 3.4–5.3)
PROT SERPL-MCNC: 7.7 G/DL (ref 6.8–8.8)
RBC # BLD AUTO: 3.55 10E12/L (ref 3.8–5.2)
RBC MORPH BLD: ABNORMAL
SODIUM SERPL-SCNC: 138 MMOL/L (ref 133–144)
WBC # BLD AUTO: 3.1 10E9/L (ref 4–11)

## 2020-11-30 PROCEDURE — 80053 COMPREHEN METABOLIC PANEL: CPT | Performed by: INTERNAL MEDICINE

## 2020-11-30 PROCEDURE — 96372 THER/PROPH/DIAG INJ SC/IM: CPT | Performed by: STUDENT IN AN ORGANIZED HEALTH CARE EDUCATION/TRAINING PROGRAM

## 2020-11-30 PROCEDURE — 250N000013 HC RX MED GY IP 250 OP 250 PS 637: Performed by: INTERNAL MEDICINE

## 2020-11-30 PROCEDURE — 84165 PROTEIN E-PHORESIS SERUM: CPT | Mod: TC | Performed by: INTERNAL MEDICINE

## 2020-11-30 PROCEDURE — 99214 OFFICE O/P EST MOD 30 MIN: CPT

## 2020-11-30 PROCEDURE — C9062 DARATUMUMAB HYALURONIDASE: HCPCS | Performed by: INTERNAL MEDICINE

## 2020-11-30 PROCEDURE — 86140 C-REACTIVE PROTEIN: CPT | Performed by: INTERNAL MEDICINE

## 2020-11-30 PROCEDURE — G0463 HOSPITAL OUTPT CLINIC VISIT: HCPCS

## 2020-11-30 PROCEDURE — 96372 THER/PROPH/DIAG INJ SC/IM: CPT | Performed by: INTERNAL MEDICINE

## 2020-11-30 PROCEDURE — 85025 COMPLETE CBC W/AUTO DIFF WBC: CPT | Performed by: INTERNAL MEDICINE

## 2020-11-30 PROCEDURE — 82232 ASSAY OF BETA-2 PROTEIN: CPT | Performed by: INTERNAL MEDICINE

## 2020-11-30 PROCEDURE — 999N001036 HC STATISTIC TOTAL PROTEIN: Performed by: INTERNAL MEDICINE

## 2020-11-30 PROCEDURE — 250N000011 HC RX IP 250 OP 636: Performed by: INTERNAL MEDICINE

## 2020-11-30 PROCEDURE — 250N000011 HC RX IP 250 OP 636: Performed by: STUDENT IN AN ORGANIZED HEALTH CARE EDUCATION/TRAINING PROGRAM

## 2020-11-30 PROCEDURE — 84165 PROTEIN E-PHORESIS SERUM: CPT | Mod: 26 | Performed by: PATHOLOGY

## 2020-11-30 PROCEDURE — 36415 COLL VENOUS BLD VENIPUNCTURE: CPT

## 2020-11-30 RX ORDER — DIPHENHYDRAMINE HCL 25 MG
25 CAPSULE ORAL ONCE
Status: COMPLETED | OUTPATIENT
Start: 2020-11-30 | End: 2020-11-30

## 2020-11-30 RX ORDER — ACETAMINOPHEN 325 MG/1
650 TABLET ORAL ONCE
Status: COMPLETED | OUTPATIENT
Start: 2020-11-30 | End: 2020-11-30

## 2020-11-30 RX ORDER — HEPARIN SODIUM,PORCINE 10 UNIT/ML
5 VIAL (ML) INTRAVENOUS
Status: CANCELLED | OUTPATIENT
Start: 2020-11-30

## 2020-11-30 RX ORDER — HEPARIN SODIUM (PORCINE) LOCK FLUSH IV SOLN 100 UNIT/ML 100 UNIT/ML
5 SOLUTION INTRAVENOUS
Status: CANCELLED | OUTPATIENT
Start: 2020-11-30

## 2020-11-30 RX ADMIN — FILGRASTIM-SNDZ 300 MCG: 300 INJECTION, SOLUTION INTRAVENOUS; SUBCUTANEOUS at 09:11

## 2020-11-30 RX ADMIN — DARATUMUMAB AND HYALURONIDASE-FIHJ (HUMAN RECOMBINANT) 1800 MG: 1800; 30000 INJECTION SUBCUTANEOUS at 09:11

## 2020-11-30 RX ADMIN — DIPHENHYDRAMINE HYDROCHLORIDE 25 MG: 25 CAPSULE ORAL at 08:25

## 2020-11-30 RX ADMIN — ACETAMINOPHEN 650 MG: 325 TABLET ORAL at 08:25

## 2020-11-30 ASSESSMENT — PAIN SCALES - GENERAL: PAINLEVEL: MODERATE PAIN (4)

## 2020-11-30 NOTE — PROGRESS NOTES
Infusion Nursing Note:  Shena Hartmann presents today for chemo.    Patient seen by provider today: No   present during visit today: Not Applicable.    Note: Patient arrives for day 1, cycle 4 daratumumab.  Premedicated with 650mg tylenol, 25mg benadryl.  Patient declines dexamethasone as has done previously and provider is aware.  Daratumumab administered subcutaneous left abdomen over 5 minutes and patient tolerated well.  GCSF given subcutaneous right abdomen, no further complaints today.    Intravenous Access:  No Intravenous access/labs at this visit.    Treatment Conditions:  Lab Results   Component Value Date    HGB 12.1 11/30/2020     Lab Results   Component Value Date    WBC 3.1 11/30/2020      Lab Results   Component Value Date    ANEU 0.8 11/30/2020     Lab Results   Component Value Date     11/30/2020      Results reviewed, labs MET treatment parameters, ok to proceed with treatment.      Post Infusion Assessment:  Patient tolerated injection without incident.  No evidence of extravasations.  Access discontinued per protocol.       Discharge Plan:   Patient discharged in stable condition accompanied by: self.  Departure Mode: Ambulatory.    Alka Us RN

## 2020-11-30 NOTE — NURSING NOTE
Chief Complaint   Patient presents with     Blood Draw     labs drawn with vpt by rn.  vs taken     Labs drawn with vpt by rn.  Pt tolerated well.  VS taken.  Pt checked in for next appt.    Mahnaz Andersen RN

## 2020-11-30 NOTE — NURSING NOTE
"Oncology Rooming Note    November 30, 2020 7:45 AM   Shena Hartmann is a 70 year old female who presents for:    Chief Complaint   Patient presents with     Blood Draw     labs drawn with vpt by rn.  vs taken     Oncology Clinic Visit     multiple myeloma     Initial Vitals: /65 (BP Location: Left arm, Patient Position: Sitting, Cuff Size: Adult Regular)   Pulse 72   Temp 97.6  F (36.4  C) (Tympanic)   Resp 16   Wt 56.3 kg (124 lb 1.6 oz)   SpO2 98%   BMI 22.70 kg/m   Estimated body mass index is 22.7 kg/m  as calculated from the following:    Height as of 3/5/19: 1.575 m (5' 2\").    Weight as of this encounter: 56.3 kg (124 lb 1.6 oz). Body surface area is 1.57 meters squared.  Moderate Pain (4) Comment: Data Unavailable   No LMP recorded. Patient is postmenopausal.  Allergies reviewed: Yes  Medications reviewed: Yes    Medications: MEDICATION REFILLS NEEDED TODAY. Provider was notified. Pt needs Pomalyst refilled.    Pharmacy name entered into [facility]:    [id number] - [place], [place] - [address] [facility] [address]  WRITTEN PRESCRIPTION REQUESTED  [facility] - [place], [place] - [address]    Clinical concerns: no new concerns       Lisette Brian CMA            "

## 2020-11-30 NOTE — PROGRESS NOTES
"BMT Daily Progress Note   11/30/2020    Patient ID:  Shena Hartmann is a 70 year old female, currently 6 years, 7 months s/p autologous peripheral blood stem cell transplant.  She has never had a complete response, and has been on various medications since her transplant, including pomalidomide, decadron and, most recently, Pom + daratumamab.      Diagnosis MM Multiple myeloma  HCT Type Autologous    Prep Regimen Melphalan  Cytoxan   Donor Source No data was found    GVHD Prophylaxis No  Primary BMT MD Oscar Hills     CC: here for daratumumab   INTERVAL  HISTORY   Shena still notes fluctuating blood pressure and stable neuropathy.  She states that she feels a bit better now that she is no longer taking antimicrobials.    She has no rashes, n/v/d, bleeding, abdominal pain, cough, fever, shortness of breath, chest pain, abdominal pain nor other acute issues. She did have a burst blood vessel in her right eye that occurred while she was preparing Thanksgiving dinner.  She also has some \"pressure\" sensation in her head when her blood pressure runs higher, which it does intermittently per her home BP device.  Readings here have been okay.     Review of Systems: 8 point ROS negative except as noted above.    Scheduled Medications    Current Outpatient Medications   Medication     aspirin (ASA) 81 MG chewable tablet     CALCIUM-VITAMIN D-VITAMIN K PO     cholecalciferol (VITAMIN D3) 5000 UNITS CAPS capsule     clobetasol (TEMOVATE) 0.05 % external ointment     COENZYME Q-10 PO     desonide (DESOWEN) 0.05 % external ointment     magnesium 100 MG CAPS     Multiple Vitamins-Minerals (MULTIVITAMIN OR)     order for DME     order for DME     pomalidomide (POMALYST) 1 MG capsule     selenium 20 mcg/mL SUSP suspension     triamcinolone (KENALOG) 0.1 % external ointment     No current facility-administered medications for this visit.      Facility-Administered Medications Ordered in Other Visits   Medication     plerixafor " (MOZOBIL) injection SOLN 12.4 mg     potassium chloride 20 mEq in 50 mL IVPB     potassium chloride SA (K-DUR,KLOR-CON M) CR tablet 20 mEq       PHYSICAL EXAM     Weight In/Out     Wt Readings from Last 3 Encounters:   11/30/20 56.3 kg (124 lb 1.6 oz)   11/16/20 55 kg (121 lb 3.2 oz)   11/02/20 55.4 kg (122 lb 3.2 oz)      [unfilled]       KPS:  90    /65 (BP Location: Left arm, Patient Position: Sitting, Cuff Size: Adult Regular)   Pulse 72   Temp 97.6  F (36.4  C) (Tympanic)   Resp 16   Wt 56.3 kg (124 lb 1.6 oz)   SpO2 98%   BMI 22.70 kg/m         General: NAD   Eyes: : KYAW, sclera anicteric but right eye has a resolving subconjunctival hemorrhage medially.   Nose/Mouth/Throat: OP clear, buccal mucosa moist, no ulcerations   Lungs: no increased work of breathing or coughing; lungs sound clear throughout  Cardiovascular: rrr, no m/r/g  Abdominal/Rectal: +BS, soft, NT, ND, No HSM   Lymphatics: no edema   Skin: no rashes or petechaie   Neuro: A&O     Current aGVHD staging:  Skin 0, UGI 0, LGI 0, Liver 0 (keep in note through day +180, delete if auto)    LABS AND IMAGING      I have assessed all abnormal lab values for their clinical significance and any values considered clinically significant have been addressed in the assessment and plan      Lab Results   Component Value Date    WBC 3.1 (L) 11/30/2020    ANEU 0.8 (L) 11/30/2020    HGB 12.1 11/30/2020    HCT 36.1 11/30/2020     11/30/2020     11/16/2020    POTASSIUM 3.7 11/16/2020    CHLORIDE 102 11/16/2020    CO2 28 11/16/2020    GLC 76 11/16/2020    BUN 17 11/16/2020    CR 0.83 11/16/2020    MAG 1.8 12/12/2014    INR 1.14 03/31/2014             OVERALL PLAN   Shena Hartmann is a 70 year old female, currently on pom/riky for her multiple myeloma.    Proceed with planned daratumumab today; noted to have ANC of 0.8. Will give growth factor and proceed with riky as planned.   In cycle 3, she did pomalyst 1mg daily (instead of her  usual 1mg alternating every other day with 2mg) to hopefully avoid neutropenia.  She will discuss dosing with Dr. Hills on Friday.     ID: she is hoping we can avoid antimicrobials, if possible.  She isn't opposed to taking them, but hopefully she will get an adequate response to GCSF, as she did last time, and not need them.  GCSF today and confer with Dr. Hills on Friday about whether or not to resume antimicrobials.     HTN: this is intermittent.      PN: stable neuropathy.  She wears an infrared boot that she got from her chiropractor for this and she finds that very helpful.     Note: chemistry machine is broken. Checked back about 1 hour and 20 minutes after they were drawn and the machine is still down.    Known issues that I take into account for medical decisions, with salient changes to the plan considering these complexities noted above:  1) multiple myeloma, s/p autologous peripheral blood stem cell transplant  2) intermittent hypertension  3) peripheral neuropathy  4) neutropenia secondary to biologic agents for the treatment of multiple myeloma    Analy West PA-C  11/30/2020

## 2020-11-30 NOTE — LETTER
"    11/30/2020         RE: Shena Hartmann  [address] [place] [address]        Dear Colleague,    Thank you for referring your patient, Shena Hartmann, to the [facility] BLOOD AND MARROW TRANSPLANT PROGRAM [place]. Please see a copy of my visit note below.    BMT Daily Progress Note   11/30/2020    Patient ID:  Shena Hartmann is a 70 year old female, currently 6 years, 7 months s/p autologous peripheral blood stem cell transplant.  She has never had a complete response, and has been on various medications since her transplant, including pomalidomide, decadron and, most recently, Pom + daratumamab.      Diagnosis MM Multiple myeloma  HCT Type Autologous    Prep Regimen Melphalan  Cytoxan   Donor Source No data was found    GVHD Prophylaxis No  Primary BMT MD Oscar Hills     CC: here for daratumumab   INTERVAL  HISTORY   Shena flanagan notes fluctuating blood pressure and stable neuropathy.  She states that she feels a bit better now that she is no longer taking antimicrobials.    She has no rashes, n/v/d, bleeding, abdominal pain, cough, fever, shortness of breath, chest pain, abdominal pain nor other acute issues. She did have a burst blood vessel in her right eye that occurred while she was preparing Thanksgiving dinner.  She also has some \"pressure\" sensation in her head when her blood pressure runs higher, which it does intermittently per her home BP device.  Readings here have been okay.     Review of Systems: 8 point ROS negative except as noted above.    Scheduled Medications    Current Outpatient Medications   Medication     aspirin (ASA) 81 MG chewable tablet     CALCIUM-VITAMIN D-VITAMIN K PO     cholecalciferol (VITAMIN D3) 5000 UNITS CAPS capsule     clobetasol (TEMOVATE) 0.05 % external ointment     COENZYME Q-10 PO     desonide (DESOWEN) 0.05 % external ointment     magnesium 100 MG CAPS     Multiple Vitamins-Minerals (MULTIVITAMIN OR)     order for DME     order for DME     " pomalidomide (POMALYST) 1 MG capsule     selenium 20 mcg/mL SUSP suspension     triamcinolone (KENALOG) 0.1 % external ointment     No current facility-administered medications for this visit.      Facility-Administered Medications Ordered in Other Visits   Medication     plerixafor (MOZOBIL) injection SOLN 12.4 mg     potassium chloride 20 mEq in 50 mL IVPB     potassium chloride SA (K-DUR,KLOR-CON M) CR tablet 20 mEq       PHYSICAL EXAM     Weight In/Out     Wt Readings from Last 3 Encounters:   11/30/20 56.3 kg (124 lb 1.6 oz)   11/16/20 55 kg (121 lb 3.2 oz)   11/02/20 55.4 kg (122 lb 3.2 oz)      [unfilled]       KPS:  90    /65 (BP Location: Left arm, Patient Position: Sitting, Cuff Size: Adult Regular)   Pulse 72   Temp 97.6  F (36.4  C) (Tympanic)   Resp 16   Wt 56.3 kg (124 lb 1.6 oz)   SpO2 98%   BMI 22.70 kg/m         General: NAD   Eyes: : KYAW, sclera anicteric but right eye has a resolving subconjunctival hemorrhage medially.   Nose/Mouth/Throat: OP clear, buccal mucosa moist, no ulcerations   Lungs: no increased work of breathing or coughing; lungs sound clear throughout  Cardiovascular: rrr, no m/r/g  Abdominal/Rectal: +BS, soft, NT, ND, No HSM   Lymphatics: no edema   Skin: no rashes or petechaie   Neuro: A&O     Current aGVHD staging:  Skin 0, UGI 0, LGI 0, Liver 0 (keep in note through day +180, delete if auto)    LABS AND IMAGING      I have assessed all abnormal lab values for their clinical significance and any values considered clinically significant have been addressed in the assessment and plan      Lab Results   Component Value Date    WBC 3.1 (L) 11/30/2020    ANEU 0.8 (L) 11/30/2020    HGB 12.1 11/30/2020    HCT 36.1 11/30/2020     11/30/2020     11/16/2020    POTASSIUM 3.7 11/16/2020    CHLORIDE 102 11/16/2020    CO2 28 11/16/2020    GLC 76 11/16/2020    BUN 17 11/16/2020    CR 0.83 11/16/2020    MAG 1.8 12/12/2014    INR 1.14 03/31/2014             OVERALL  PLAN   Shena Hartmann is a 70 year old female, currently on pom/riky for her multiple myeloma.    Proceed with planned daratumumab today; noted to have ANC of 0.8. Will give growth factor and proceed with riky as planned.   In cycle 3, she did pomalyst 1mg daily (instead of her usual 1mg alternating every other day with 2mg) to hopefully avoid neutropenia.  She will discuss dosing with Dr. Hills on Friday.     ID: she is hoping we can avoid antimicrobials, if possible.  She isn't opposed to taking them, but hopefully she will get an adequate response to GCSF, as she did last time, and not need them.  GCSF today and confer with Dr. Hills on Friday about whether or not to resume antimicrobials.     HTN: this is intermittent.      PN: stable neuropathy.  She wears an infrared boot that she got from her chiropractor for this and she finds that very helpful.     Note: chemistry machine is broken. Checked back about 1 hour and 20 minutes after they were drawn and the machine is still down.    Known issues that I take into account for medical decisions, with salient changes to the plan considering these complexities noted above:  1) multiple myeloma, s/p autologous peripheral blood stem cell transplant  2) intermittent hypertension  3) peripheral neuropathy  4) neutropenia secondary to biologic agents for the treatment of multiple myeloma    Analy West PA-C  11/30/2020       Again, thank you for allowing me to participate in the care of your patient.        Sincerely,        BMT Advanced Practice Provider

## 2020-11-30 NOTE — LETTER
11/30/2020         RE: Shena Hartmann  [address] [place] [address]        Dear Colleague,    Thank you for referring your patient, Shena Hartmann, to the [facility] BLOOD AND MARROW TRANSPLANT PROGRAM [place]. Please see a copy of my visit note below.    Infusion Nursing Note:  Shena Hartmann presents today for chemo.    Patient seen by provider today: No   present during visit today: Not Applicable.    Note: Patient arrives for day 1, cycle 4 daratumumab.  Premedicated with 650mg tylenol, 25mg benadryl.  Patient declines dexamethasone as has done previously and provider is aware.  Daratumumab administered subcutaneous left abdomen over 5 minutes and patient tolerated well.  GCSF given subcutaneous right abdomen, no further complaints today.    Intravenous Access:  No Intravenous access/labs at this visit.    Treatment Conditions:  Lab Results   Component Value Date    HGB 12.1 11/30/2020     Lab Results   Component Value Date    WBC 3.1 11/30/2020      Lab Results   Component Value Date    ANEU 0.8 11/30/2020     Lab Results   Component Value Date     11/30/2020      Results reviewed, labs MET treatment parameters, ok to proceed with treatment.      Post Infusion Assessment:  Patient tolerated injection without incident.  No evidence of extravasations.  Access discontinued per protocol.       Discharge Plan:   Patient discharged in stable condition accompanied by: self.  Departure Mode: Ambulatory.    Alka Us RN                            Again, thank you for allowing me to participate in the care of your patient.        Sincerely,        [facility]

## 2020-12-01 ENCOUNTER — TELEPHONE (OUTPATIENT)
Dept: ONCOLOGY | Facility: CLINIC | Age: 70
End: 2020-12-01

## 2020-12-01 DIAGNOSIS — C90.00 MULTIPLE MYELOMA, REMISSION STATUS UNSPECIFIED (H): Primary | ICD-10-CM

## 2020-12-01 LAB
ALBUMIN SERPL ELPH-MCNC: 4.1 G/DL (ref 3.7–5.1)
ALPHA1 GLOB SERPL ELPH-MCNC: 0.3 G/DL (ref 0.2–0.4)
ALPHA2 GLOB SERPL ELPH-MCNC: 0.7 G/DL (ref 0.5–0.9)
B-GLOBULIN SERPL ELPH-MCNC: 0.5 G/DL (ref 0.6–1)
GAMMA GLOB SERPL ELPH-MCNC: 1.5 G/DL (ref 0.7–1.6)
M PROTEIN SERPL ELPH-MCNC: 1.2 G/DL
PROT PATTERN SERPL ELPH-IMP: ABNORMAL

## 2020-12-01 NOTE — TELEPHONE ENCOUNTER
Oral Chemotherapy Monitoring Program   Medication: POMALYST  Rx: 1mg PO daily alternating with 2mg on days 1 through 21 of 28 day cycle   Auth #:[id number]  Risk Category:adult female not of reproductive potential  Routine survey questions reviewed.   Rx to be Escribed to [facility]   Oncology Pharmacy Liaison  [email or web address]   [phone] (phone)   [phone] (fax)

## 2020-12-04 ENCOUNTER — VIRTUAL VISIT (OUTPATIENT)
Dept: TRANSPLANT | Facility: CLINIC | Age: 70
End: 2020-12-04
Attending: INTERNAL MEDICINE
Payer: COMMERCIAL

## 2020-12-04 DIAGNOSIS — C90.02 MULTIPLE MYELOMA IN RELAPSE (H): Primary | ICD-10-CM

## 2020-12-04 PROCEDURE — 99215 OFFICE O/P EST HI 40 MIN: CPT | Mod: 95 | Performed by: INTERNAL MEDICINE

## 2020-12-04 NOTE — PROGRESS NOTES
"Shena Hartmann is a 70 year old female who is being evaluated via a billable video visit.      The patient has been notified of following:     \"This video visit will be conducted via a call between you and your physician/provider. We have found that certain health care needs can be provided without the need for an in-person physical exam.  This service lets us provide the care you need with a video conversation.  If a prescription is necessary we can send it directly to your pharmacy.  If lab work is needed we can place an order for that and you can then stop by our lab to have the test done at a later time.    Video visits are billed at different rates depending on your insurance coverage.  Please reach out to your insurance provider with any questions.    If during the course of the call the physician/provider feels a video visit is not appropriate, you will not be charged for this service.\"    Patient has given verbal consent for Video visit? Yes  How would you like to obtain your AVS? MyChart  If you are dropped from the video visit, the video invite should be resent to: Text to cell phone: [phone]  Will anyone else be joining your video visit? No        Video-Visit Details    Type of service:  Video Visit    Video Start Time: 8:22   Video End Time: 8:59    Originating Location (pt. Location): Home    Distant Location (provider location):  [facility] BLOOD AND MARROW TRANSPLANT PROGRAM [place]     Platform used for Video Visit: Tiffany Hills MD        Pt has no new needs   Hien Otto CMA on 12/4/2020 at 7:26 AM    Shena was seen and examined by me today.  She is a 70-year-old woman with multiple myeloma who is over 6-1/2 years after autologous transplantation.  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  After transplant, she clearly had a modest response " with rapid progression of her M spike.  She had fairly good control with pomalidomide alone and her M spike da nicely came down to 0.7 in 2018.  Because of side effects and modest neutropenia and thrombocytopenia, we have been decreasing her dose.  She also is very sensitive to medications and has had body aches with pomalidomide.  She obviously had myeloma progression with an M spike increase in June of this year to 2 g and an even higher peak on the August 24 of 2.4 pounds.  At that time it was clear that we needed to change therapy.  We started her on daratumumab, pomalidomide and Decadron.  She tolerated the Decadron poorly and once we found that she tolerated daratumumab well, we discontinued the Decadron.  She completed 8 weekly doses and is currently getting daratumumab every other week.  Because of low blood counts, her last pomalidomide course was 1 mg daily for 21 days.  My intention was to again increase this to 2 mg alternating with 1 mg for the 21 days.    Symptomatically she seems to be doing fair.  She has not had any signs or symptoms of Covid.  She remains careful and is socially distance.  She knows that she is immunosuppressed and at increased risk.  She denies any fever chills or sweats.  She has no nausea vomiting or diarrhea.  The other new complaint is that she has had intermittent hypertension.  Now that she is off of steroids I cannot think of any part of her therapy that is clearly causing this.  She has taken intermittent hydralazine and I did recommend that if it continues we probably need to start a low-dose of something in the future.  She will continue to watch this and is capable of taking her blood pressure at home.  She also complains of some restless sleep.    Laboratory evaluation: Electrolytes are normal with a BUN of 22 and a creatinine of 0.81.  Liver function tests are within normal limits.  A beta-2 microglobulin is 2.4.  A CBC shows a hemoglobin of 12.1, platelet count of  163,000 and a white count of 3100 with 800 absolute neutrophils.  I should note that she has a CBC checked every time she gets daratumumab and she has had occasional growth factor for moderate neutropenia.  We did discuss this risk and at the present time we will hold off on prophylactic antivirals and antibacterials pending her blood counts.  We talked about varicella reactivation and starting acyclovir immediately should this occur.  If her neutrophil count continues to be on the lower side we can revisit this at some point in the future.    The most important thing we discussed at length at this visit was her current M spike of 1.2 g.  She is currently responding to her current regimen of daratumumab and pomalidomide.    It is my overall impression that Shena is clinically stable but is still having side effects that she relates to her pomalidomide.  For this reason I am going to recommend that we permanently decrease her 21-day cycles to 1 mg daily.  It is combination regimens it is very hard to understand exactly what is mediating the response.  My sense is that that daratumumab is contributing significantly.  We will make this change on her next planned cycle of pomalidomide and we will recheck an M spike in February.  Depending on that M spike, we will make her next follow-up appointment.  All of her and her 's questions have been answered today.    Dr. Lui Hills MD

## 2020-12-04 NOTE — LETTER
"    12/4/2020         RE: Shena Hartmann  [address] [place] [address]        Dear Colleague,    Thank you for referring your patient, Shena Hartmann, to the [facility] BLOOD AND MARROW TRANSPLANT PROGRAM [place]. Please see a copy of my visit note below.    Shena Hartmann is a 70 year old female who is being evaluated via a billable video visit.      The patient has been notified of following:     \"This video visit will be conducted via a call between you and your physician/provider. We have found that certain health care needs can be provided without the need for an in-person physical exam.  This service lets us provide the care you need with a video conversation.  If a prescription is necessary we can send it directly to your pharmacy.  If lab work is needed we can place an order for that and you can then stop by our lab to have the test done at a later time.    Video visits are billed at different rates depending on your insurance coverage.  Please reach out to your insurance provider with any questions.    If during the course of the call the physician/provider feels a video visit is not appropriate, you will not be charged for this service.\"    Patient has given verbal consent for Video visit? Yes  How would you like to obtain your AVS? MyChart  If you are dropped from the video visit, the video invite should be resent to: Text to cell phone: [phone]  Will anyone else be joining your video visit? No        Video-Visit Details    Type of service:  Video Visit    Video Start Time: 8:22   Video End Time: 8:59    Originating Location (pt. Location): Home    Distant Location (provider location):  [facility] BLOOD AND MARROW TRANSPLANT PROGRAM [place]     Platform used for Video Visit: Tiffany Hills MD        Pt has no new needs   Hien Otto CMA on 12/4/2020 at 7:26 AM    Shena was seen and examined by me today.  She is a 70-year-old woman with " multiple myeloma who is over 6-1/2 years after autologous transplantation.  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  After transplant, she clearly had a modest response with rapid progression of her M spike.  She had fairly good control with pomalidomide alone and her M spike da nicely came down to 0.7 in 2018.  Because of side effects and modest neutropenia and thrombocytopenia, we have been decreasing her dose.  She also is very sensitive to medications and has had body aches with pomalidomide.  She obviously had myeloma progression with an M spike increase in June of this year to 2 g and an even higher peak on the August 24 of 2.4 pounds.  At that time it was clear that we needed to change therapy.  We started her on daratumumab, pomalidomide and Decadron.  She tolerated the Decadron poorly and once we found that she tolerated daratumumab well, we discontinued the Decadron.  She completed 8 weekly doses and is currently getting daratumumab every other week.  Because of low blood counts, her last pomalidomide course was 1 mg daily for 21 days.  My intention was to again increase this to 2 mg alternating with 1 mg for the 21 days.    Symptomatically she seems to be doing fair.  She has not had any signs or symptoms of Covid.  She remains careful and is socially distance.  She knows that she is immunosuppressed and at increased risk.  She denies any fever chills or sweats.  She has no nausea vomiting or diarrhea.  The other new complaint is that she has had intermittent hypertension.  Now that she is off of steroids I cannot think of any part of her therapy that is clearly causing this.  She has taken intermittent hydralazine and I did recommend that if it continues we probably need to start a low-dose of something in the future.  She will continue to watch this and is capable of taking her  blood pressure at home.  She also complains of some restless sleep.    Laboratory evaluation: Electrolytes are normal with a BUN of 22 and a creatinine of 0.81.  Liver function tests are within normal limits.  A beta-2 microglobulin is 2.4.  A CBC shows a hemoglobin of 12.1, platelet count of 163,000 and a white count of 3100 with 800 absolute neutrophils.  I should note that she has a CBC checked every time she gets daratumumab and she has had occasional growth factor for moderate neutropenia.  We did discuss this risk and at the present time we will hold off on prophylactic antivirals and antibacterials pending her blood counts.  We talked about varicella reactivation and starting acyclovir immediately should this occur.  If her neutrophil count continues to be on the lower side we can revisit this at some point in the future.    The most important thing we discussed at length at this visit was her current M spike of 1.2 g.  She is currently responding to her current regimen of daratumumab and pomalidomide.    It is my overall impression that Shena is clinically stable but is still having side effects that she relates to her pomalidomide.  For this reason I am going to recommend that we permanently decrease her 21-day cycles to 1 mg daily.  It is combination regimens it is very hard to understand exactly what is mediating the response.  My sense is that that daratumumab is contributing significantly.  We will make this change on her next planned cycle of pomalidomide and we will recheck an M spike in February.  Depending on that M spike, we will make her next follow-up appointment.  All of her and her 's questions have been answered today.    Dr. Lui Hills MD

## 2020-12-05 ENCOUNTER — TELEPHONE (OUTPATIENT)
Dept: PHARMACY | Facility: CLINIC | Age: 70
End: 2020-12-05

## 2020-12-14 ENCOUNTER — APPOINTMENT (OUTPATIENT)
Dept: LAB | Facility: CLINIC | Age: 70
End: 2020-12-14
Attending: INTERNAL MEDICINE
Payer: COMMERCIAL

## 2020-12-14 ENCOUNTER — INFUSION THERAPY VISIT (OUTPATIENT)
Dept: TRANSPLANT | Facility: CLINIC | Age: 70
End: 2020-12-14
Attending: INTERNAL MEDICINE
Payer: COMMERCIAL

## 2020-12-14 VITALS
SYSTOLIC BLOOD PRESSURE: 111 MMHG | RESPIRATION RATE: 16 BRPM | OXYGEN SATURATION: 99 % | HEART RATE: 77 BPM | TEMPERATURE: 96.6 F | DIASTOLIC BLOOD PRESSURE: 66 MMHG | BODY MASS INDEX: 22.39 KG/M2 | WEIGHT: 122.4 LBS

## 2020-12-14 DIAGNOSIS — C90.00 MULTIPLE MYELOMA NOT HAVING ACHIEVED REMISSION (H): Primary | ICD-10-CM

## 2020-12-14 LAB
ALBUMIN SERPL-MCNC: 3.5 G/DL (ref 3.4–5)
ALP SERPL-CCNC: 60 U/L (ref 40–150)
ALT SERPL W P-5'-P-CCNC: 31 U/L (ref 0–50)
ANION GAP SERPL CALCULATED.3IONS-SCNC: 5 MMOL/L (ref 3–14)
AST SERPL W P-5'-P-CCNC: 18 U/L (ref 0–45)
BASOPHILS # BLD AUTO: 0 10E9/L (ref 0–0.2)
BASOPHILS NFR BLD AUTO: 0.7 %
BILIRUB SERPL-MCNC: 0.4 MG/DL (ref 0.2–1.3)
BUN SERPL-MCNC: 16 MG/DL (ref 7–30)
CALCIUM SERPL-MCNC: 9.4 MG/DL (ref 8.5–10.1)
CHLORIDE SERPL-SCNC: 103 MMOL/L (ref 94–109)
CO2 SERPL-SCNC: 30 MMOL/L (ref 20–32)
CREAT SERPL-MCNC: 0.88 MG/DL (ref 0.52–1.04)
DIFFERENTIAL METHOD BLD: ABNORMAL
EOSINOPHIL # BLD AUTO: 0.2 10E9/L (ref 0–0.7)
EOSINOPHIL NFR BLD AUTO: 3.8 %
ERYTHROCYTE [DISTWIDTH] IN BLOOD BY AUTOMATED COUNT: 15.9 % (ref 10–15)
GFR SERPL CREATININE-BSD FRML MDRD: 67 ML/MIN/{1.73_M2}
GLUCOSE SERPL-MCNC: 69 MG/DL (ref 70–99)
HCT VFR BLD AUTO: 37.4 % (ref 35–47)
HGB BLD-MCNC: 12.4 G/DL (ref 11.7–15.7)
IMM GRANULOCYTES # BLD: 0 10E9/L (ref 0–0.4)
IMM GRANULOCYTES NFR BLD: 0 %
LYMPHOCYTES # BLD AUTO: 1.4 10E9/L (ref 0.8–5.3)
LYMPHOCYTES NFR BLD AUTO: 33.7 %
MCH RBC QN AUTO: 33.1 PG (ref 26.5–33)
MCHC RBC AUTO-ENTMCNC: 33.2 G/DL (ref 31.5–36.5)
MCV RBC AUTO: 100 FL (ref 78–100)
MONOCYTES # BLD AUTO: 0.2 10E9/L (ref 0–1.3)
MONOCYTES NFR BLD AUTO: 5.7 %
NEUTROPHILS # BLD AUTO: 2.4 10E9/L (ref 1.6–8.3)
NEUTROPHILS NFR BLD AUTO: 56.1 %
NRBC # BLD AUTO: 0 10*3/UL
NRBC BLD AUTO-RTO: 0 /100
PLATELET # BLD AUTO: 209 10E9/L (ref 150–450)
POTASSIUM SERPL-SCNC: 4.1 MMOL/L (ref 3.4–5.3)
PROT SERPL-MCNC: 8 G/DL (ref 6.8–8.8)
RBC # BLD AUTO: 3.75 10E12/L (ref 3.8–5.2)
SODIUM SERPL-SCNC: 138 MMOL/L (ref 133–144)
WBC # BLD AUTO: 4.2 10E9/L (ref 4–11)

## 2020-12-14 PROCEDURE — 85025 COMPLETE CBC W/AUTO DIFF WBC: CPT | Performed by: PHYSICIAN ASSISTANT

## 2020-12-14 PROCEDURE — 250N000013 HC RX MED GY IP 250 OP 250 PS 637: Performed by: INTERNAL MEDICINE

## 2020-12-14 PROCEDURE — 96372 THER/PROPH/DIAG INJ SC/IM: CPT | Performed by: INTERNAL MEDICINE

## 2020-12-14 PROCEDURE — 250N000011 HC RX IP 250 OP 636: Performed by: INTERNAL MEDICINE

## 2020-12-14 PROCEDURE — C9062 DARATUMUMAB HYALURONIDASE: HCPCS | Performed by: INTERNAL MEDICINE

## 2020-12-14 PROCEDURE — 36415 COLL VENOUS BLD VENIPUNCTURE: CPT

## 2020-12-14 PROCEDURE — 80053 COMPREHEN METABOLIC PANEL: CPT | Performed by: PHYSICIAN ASSISTANT

## 2020-12-14 RX ORDER — DIPHENHYDRAMINE HCL 25 MG
25 CAPSULE ORAL ONCE
Status: COMPLETED | OUTPATIENT
Start: 2020-12-14 | End: 2020-12-14

## 2020-12-14 RX ORDER — ACETAMINOPHEN 325 MG/1
650 TABLET ORAL ONCE
Status: COMPLETED | OUTPATIENT
Start: 2020-12-14 | End: 2020-12-14

## 2020-12-14 RX ADMIN — DARATUMUMAB AND HYALURONIDASE-FIHJ (HUMAN RECOMBINANT) 1800 MG: 1800; 30000 INJECTION SUBCUTANEOUS at 08:45

## 2020-12-14 RX ADMIN — DIPHENHYDRAMINE HYDROCHLORIDE 25 MG: 25 CAPSULE ORAL at 08:22

## 2020-12-14 RX ADMIN — ACETAMINOPHEN 650 MG: 325 TABLET ORAL at 08:22

## 2020-12-14 ASSESSMENT — PAIN SCALES - GENERAL: PAINLEVEL: MODERATE PAIN (5)

## 2020-12-14 NOTE — PROGRESS NOTES
Infusion Nursing Note:  Shena Hartmann presents today for scheduled Cycle 4, Day 15 Daratumumab      Patient seen by provider today: No   present during visit today: Not Applicable.    Note: Labs were monitored and lab parameters for today's treatment were met.  Patient assessment was completed and unremarkable except:  Patient has increased fatigue.  Patient has a history of tingling mildly in bilateral hands, more in bilateral lower extremities.        Treatment Conditions:  Patient received 650 mg oral Tylenol and 25 mg oral Benadryl prior to receiving scheduled Daratumumab injection in her lower right abdomen.    Patient has a 4 mg oral Decadron listed as a premed - she refuses this med.  Provider is aware and is ok with her not taking this med.      Post Infusion Assessment:  Patient tolerated injection without incident.       Discharge Plan:   Patient discharged in stable condition accompanied by: self.   is picking Patient up at Clinic front door.  She is aware of her next appointment date/time.    TAMMY REYES RN

## 2020-12-14 NOTE — LETTER
12/14/2020         RE: Shena Hartmann  [address] [place] [address]        Dear Colleague,    Thank you for referring your patient, Shena Hartmann, to the [facility] BLOOD AND MARROW TRANSPLANT PROGRAM [place]. Please see a copy of my visit note below.    Infusion Nursing Note:  Shena Hartmann presents today for scheduled Cycle 4, Day 15 Daratumumab      Patient seen by provider today: No   present during visit today: Not Applicable.    Note: Labs were monitored and lab parameters for today's treatment were met.  Patient assessment was completed and unremarkable except:  Patient has increased fatigue.  Patient has a history of tingling mildly in bilateral hands, more in bilateral lower extremities.        Treatment Conditions:  Patient received 650 mg oral Tylenol and 25 mg oral Benadryl prior to receiving scheduled Daratumumab injection in her lower right abdomen.    Patient has a 4 mg oral Decadron listed as a premed - she refuses this med.  Provider is aware and is ok with her not taking this med.      Post Infusion Assessment:  Patient tolerated injection without incident.       Discharge Plan:   Patient discharged in stable condition accompanied by: self.   is picking Patient up at Clinic front door.  She is aware of her next appointment date/time.    TAMMY REYES, MELISSA                            Again, thank you for allowing me to participate in the care of your patient.        Sincerely,        [facility]

## 2020-12-14 NOTE — NURSING NOTE
Chief Complaint   Patient presents with     Infusion     scheduled chemo injection s/p bmt txp for multiple myeloma     Blood Draw     Labs drawn via  by RN in lab. VS taken.      Labs drawn via venipuncture. Vital signs taken. Checked into next appointment.   Radha Walter RN

## 2020-12-22 ENCOUNTER — DOCUMENTATION ONLY (OUTPATIENT)
Dept: ONCOLOGY | Facility: CLINIC | Age: 70
End: 2020-12-22

## 2020-12-27 DIAGNOSIS — C90.00 MULTIPLE MYELOMA NOT HAVING ACHIEVED REMISSION (H): Primary | ICD-10-CM

## 2020-12-27 RX ORDER — ACETAMINOPHEN 325 MG/1
650 TABLET ORAL ONCE
Status: CANCELLED | OUTPATIENT
Start: 2020-12-28

## 2020-12-27 RX ORDER — DIPHENHYDRAMINE HCL 25 MG
25 CAPSULE ORAL ONCE
Status: CANCELLED | OUTPATIENT
Start: 2020-12-28

## 2020-12-27 RX ORDER — SODIUM CHLORIDE 9 MG/ML
1000 INJECTION, SOLUTION INTRAVENOUS CONTINUOUS PRN
Status: CANCELLED
Start: 2021-01-11

## 2020-12-27 RX ORDER — DIPHENHYDRAMINE HYDROCHLORIDE 50 MG/ML
50 INJECTION INTRAMUSCULAR; INTRAVENOUS
Status: CANCELLED
Start: 2021-01-11

## 2020-12-27 RX ORDER — LORAZEPAM 2 MG/ML
0.5 INJECTION INTRAMUSCULAR EVERY 4 HOURS PRN
Status: CANCELLED
Start: 2021-01-11

## 2020-12-27 RX ORDER — ALBUTEROL SULFATE 90 UG/1
1-2 AEROSOL, METERED RESPIRATORY (INHALATION)
Status: CANCELLED
Start: 2020-12-28

## 2020-12-27 RX ORDER — METHYLPREDNISOLONE SODIUM SUCCINATE 125 MG/2ML
125 INJECTION, POWDER, LYOPHILIZED, FOR SOLUTION INTRAMUSCULAR; INTRAVENOUS
Status: CANCELLED
Start: 2020-12-28

## 2020-12-27 RX ORDER — DIPHENHYDRAMINE HCL 25 MG
25 CAPSULE ORAL ONCE
Status: CANCELLED | OUTPATIENT
Start: 2021-01-11

## 2020-12-27 RX ORDER — NALOXONE HYDROCHLORIDE 0.4 MG/ML
.1-.4 INJECTION, SOLUTION INTRAMUSCULAR; INTRAVENOUS; SUBCUTANEOUS
Status: CANCELLED | OUTPATIENT
Start: 2021-01-11

## 2020-12-27 RX ORDER — ACETAMINOPHEN 325 MG/1
650 TABLET ORAL ONCE
Status: CANCELLED | OUTPATIENT
Start: 2021-01-11

## 2020-12-27 RX ORDER — ALBUTEROL SULFATE 90 UG/1
1-2 AEROSOL, METERED RESPIRATORY (INHALATION)
Status: CANCELLED
Start: 2021-01-11

## 2020-12-27 RX ORDER — LORAZEPAM 2 MG/ML
0.5 INJECTION INTRAMUSCULAR EVERY 4 HOURS PRN
Status: CANCELLED
Start: 2020-12-28

## 2020-12-27 RX ORDER — ALBUTEROL SULFATE 0.83 MG/ML
2.5 SOLUTION RESPIRATORY (INHALATION)
Status: CANCELLED | OUTPATIENT
Start: 2020-12-28

## 2020-12-27 RX ORDER — METHYLPREDNISOLONE SODIUM SUCCINATE 125 MG/2ML
125 INJECTION, POWDER, LYOPHILIZED, FOR SOLUTION INTRAMUSCULAR; INTRAVENOUS
Status: CANCELLED
Start: 2021-01-11

## 2020-12-27 RX ORDER — HEPARIN SODIUM (PORCINE) LOCK FLUSH IV SOLN 100 UNIT/ML 100 UNIT/ML
5 SOLUTION INTRAVENOUS
Status: CANCELLED | OUTPATIENT
Start: 2020-12-28

## 2020-12-27 RX ORDER — EPINEPHRINE 1 MG/ML
0.3 INJECTION, SOLUTION INTRAMUSCULAR; SUBCUTANEOUS EVERY 5 MIN PRN
Status: CANCELLED | OUTPATIENT
Start: 2021-01-11

## 2020-12-27 RX ORDER — HEPARIN SODIUM,PORCINE 10 UNIT/ML
5 VIAL (ML) INTRAVENOUS
Status: CANCELLED | OUTPATIENT
Start: 2021-01-11

## 2020-12-27 RX ORDER — MEPERIDINE HYDROCHLORIDE 25 MG/ML
25 INJECTION INTRAMUSCULAR; INTRAVENOUS; SUBCUTANEOUS EVERY 30 MIN PRN
Status: CANCELLED | OUTPATIENT
Start: 2020-12-28

## 2020-12-27 RX ORDER — MEPERIDINE HYDROCHLORIDE 25 MG/ML
25 INJECTION INTRAMUSCULAR; INTRAVENOUS; SUBCUTANEOUS EVERY 30 MIN PRN
Status: CANCELLED | OUTPATIENT
Start: 2021-01-11

## 2020-12-27 RX ORDER — EPINEPHRINE 1 MG/ML
0.3 INJECTION, SOLUTION INTRAMUSCULAR; SUBCUTANEOUS EVERY 5 MIN PRN
Status: CANCELLED | OUTPATIENT
Start: 2020-12-28

## 2020-12-27 RX ORDER — NALOXONE HYDROCHLORIDE 0.4 MG/ML
.1-.4 INJECTION, SOLUTION INTRAMUSCULAR; INTRAVENOUS; SUBCUTANEOUS
Status: CANCELLED | OUTPATIENT
Start: 2020-12-28

## 2020-12-27 RX ORDER — DIPHENHYDRAMINE HYDROCHLORIDE 50 MG/ML
50 INJECTION INTRAMUSCULAR; INTRAVENOUS
Status: CANCELLED
Start: 2020-12-28

## 2020-12-27 RX ORDER — ALBUTEROL SULFATE 0.83 MG/ML
2.5 SOLUTION RESPIRATORY (INHALATION)
Status: CANCELLED | OUTPATIENT
Start: 2021-01-11

## 2020-12-27 RX ORDER — SODIUM CHLORIDE 9 MG/ML
1000 INJECTION, SOLUTION INTRAVENOUS CONTINUOUS PRN
Status: CANCELLED
Start: 2020-12-28

## 2020-12-27 RX ORDER — HEPARIN SODIUM (PORCINE) LOCK FLUSH IV SOLN 100 UNIT/ML 100 UNIT/ML
5 SOLUTION INTRAVENOUS
Status: CANCELLED | OUTPATIENT
Start: 2021-01-11

## 2020-12-27 RX ORDER — HEPARIN SODIUM,PORCINE 10 UNIT/ML
5 VIAL (ML) INTRAVENOUS
Status: CANCELLED | OUTPATIENT
Start: 2020-12-28

## 2020-12-28 ENCOUNTER — ONCOLOGY VISIT (OUTPATIENT)
Dept: TRANSPLANT | Facility: CLINIC | Age: 70
End: 2020-12-28
Attending: NURSE PRACTITIONER
Payer: COMMERCIAL

## 2020-12-28 ENCOUNTER — APPOINTMENT (OUTPATIENT)
Dept: LAB | Facility: CLINIC | Age: 70
End: 2020-12-28
Attending: NURSE PRACTITIONER
Payer: COMMERCIAL

## 2020-12-28 VITALS
OXYGEN SATURATION: 98 % | WEIGHT: 122.2 LBS | TEMPERATURE: 97.4 F | SYSTOLIC BLOOD PRESSURE: 131 MMHG | RESPIRATION RATE: 18 BRPM | DIASTOLIC BLOOD PRESSURE: 68 MMHG | HEART RATE: 84 BPM | BODY MASS INDEX: 22.35 KG/M2

## 2020-12-28 DIAGNOSIS — C90.00 MULTIPLE MYELOMA NOT HAVING ACHIEVED REMISSION (H): ICD-10-CM

## 2020-12-28 DIAGNOSIS — C90.00 MULTIPLE MYELOMA NOT HAVING ACHIEVED REMISSION (H): Primary | ICD-10-CM

## 2020-12-28 LAB
ALBUMIN SERPL-MCNC: 3.6 G/DL (ref 3.4–5)
ALP SERPL-CCNC: 61 U/L (ref 40–150)
ALT SERPL W P-5'-P-CCNC: 34 U/L (ref 0–50)
ANION GAP SERPL CALCULATED.3IONS-SCNC: 6 MMOL/L (ref 3–14)
AST SERPL W P-5'-P-CCNC: 17 U/L (ref 0–45)
BASOPHILS # BLD AUTO: 0 10E9/L (ref 0–0.2)
BASOPHILS NFR BLD AUTO: 0.7 %
BILIRUB SERPL-MCNC: 0.3 MG/DL (ref 0.2–1.3)
BUN SERPL-MCNC: 20 MG/DL (ref 7–30)
CALCIUM SERPL-MCNC: 9.3 MG/DL (ref 8.5–10.1)
CHLORIDE SERPL-SCNC: 104 MMOL/L (ref 94–109)
CO2 SERPL-SCNC: 30 MMOL/L (ref 20–32)
CREAT SERPL-MCNC: 0.8 MG/DL (ref 0.52–1.04)
DIFFERENTIAL METHOD BLD: ABNORMAL
EOSINOPHIL # BLD AUTO: 0.2 10E9/L (ref 0–0.7)
EOSINOPHIL NFR BLD AUTO: 5.6 %
ERYTHROCYTE [DISTWIDTH] IN BLOOD BY AUTOMATED COUNT: 15.6 % (ref 10–15)
GFR SERPL CREATININE-BSD FRML MDRD: 75 ML/MIN/{1.73_M2}
GLUCOSE SERPL-MCNC: 78 MG/DL (ref 70–99)
HCT VFR BLD AUTO: 38.9 % (ref 35–47)
HGB BLD-MCNC: 12.7 G/DL (ref 11.7–15.7)
IMM GRANULOCYTES # BLD: 0 10E9/L (ref 0–0.4)
IMM GRANULOCYTES NFR BLD: 0 %
LYMPHOCYTES # BLD AUTO: 2 10E9/L (ref 0.8–5.3)
LYMPHOCYTES NFR BLD AUTO: 49.3 %
MCH RBC QN AUTO: 33.7 PG (ref 26.5–33)
MCHC RBC AUTO-ENTMCNC: 32.6 G/DL (ref 31.5–36.5)
MCV RBC AUTO: 103 FL (ref 78–100)
MONOCYTES # BLD AUTO: 0.7 10E9/L (ref 0–1.3)
MONOCYTES NFR BLD AUTO: 17.6 %
NEUTROPHILS # BLD AUTO: 1.1 10E9/L (ref 1.6–8.3)
NEUTROPHILS NFR BLD AUTO: 26.8 %
NRBC # BLD AUTO: 0 10*3/UL
NRBC BLD AUTO-RTO: 0 /100
PLATELET # BLD AUTO: 160 10E9/L (ref 150–450)
POTASSIUM SERPL-SCNC: 4 MMOL/L (ref 3.4–5.3)
PROT SERPL-MCNC: 7.9 G/DL (ref 6.8–8.8)
RBC # BLD AUTO: 3.77 10E12/L (ref 3.8–5.2)
SODIUM SERPL-SCNC: 139 MMOL/L (ref 133–144)
WBC # BLD AUTO: 4.1 10E9/L (ref 4–11)

## 2020-12-28 PROCEDURE — G0463 HOSPITAL OUTPT CLINIC VISIT: HCPCS | Mod: 25

## 2020-12-28 PROCEDURE — 999N000104 HC STATISTIC NO CHARGE

## 2020-12-28 PROCEDURE — 80053 COMPREHEN METABOLIC PANEL: CPT | Performed by: INTERNAL MEDICINE

## 2020-12-28 PROCEDURE — 99214 OFFICE O/P EST MOD 30 MIN: CPT

## 2020-12-28 PROCEDURE — 85025 COMPLETE CBC W/AUTO DIFF WBC: CPT | Performed by: INTERNAL MEDICINE

## 2020-12-28 PROCEDURE — 250N000013 HC RX MED GY IP 250 OP 250 PS 637: Performed by: INTERNAL MEDICINE

## 2020-12-28 PROCEDURE — 250N000011 HC RX IP 250 OP 636: Performed by: INTERNAL MEDICINE

## 2020-12-28 PROCEDURE — 96372 THER/PROPH/DIAG INJ SC/IM: CPT | Performed by: INTERNAL MEDICINE

## 2020-12-28 PROCEDURE — C9062 DARATUMUMAB HYALURONIDASE: HCPCS | Performed by: INTERNAL MEDICINE

## 2020-12-28 RX ORDER — ACETAMINOPHEN 325 MG/1
650 TABLET ORAL ONCE
Status: COMPLETED | OUTPATIENT
Start: 2020-12-28 | End: 2020-12-28

## 2020-12-28 RX ORDER — DIPHENHYDRAMINE HCL 25 MG
25 CAPSULE ORAL ONCE
Status: DISCONTINUED | OUTPATIENT
Start: 2020-12-28 | End: 2020-12-28 | Stop reason: HOSPADM

## 2020-12-28 RX ADMIN — ACETAMINOPHEN 650 MG: 325 TABLET ORAL at 08:04

## 2020-12-28 RX ADMIN — DARATUMUMAB AND HYALURONIDASE-FIHJ (HUMAN RECOMBINANT) 1800 MG: 1800; 30000 INJECTION SUBCUTANEOUS at 08:32

## 2020-12-28 ASSESSMENT — PAIN SCALES - GENERAL: PAINLEVEL: MODERATE PAIN (4)

## 2020-12-28 NOTE — NURSING NOTE
Chief Complaint   Patient presents with     Lab Only     venipuncture, vitals checked     Maria Eugenia Murdock RN on 12/28/2020 at 7:34 AM

## 2020-12-28 NOTE — PROGRESS NOTES
BMT Clinic Note   12/28/2020    Patient ID:  Shena Hartmann is a 70 year old female, currently 6 years, 9 months s/p autologous peripheral blood stem cell transplant.  She has never had a complete response, and has been on various medications since her transplant, including pomalidomide, decadron and, most recently, Pom + daratumamab.      Diagnosis MM Multiple myeloma  HCT Type Autologous    Prep Regimen Melphalan  Cytoxan   Donor Source No data was found    GVHD Prophylaxis No  Primary BMT MD Oscar Hills     INTERVAL  HISTORY   Shena continues to be bothered by fatigue & diffuse achiness.  She manages her symptoms with homeopathic remedies & only occasionaly takes Tylenol.  Her BP has been good this week with SBP in the 120's at home.  She is eating well with no N/V/D.  Afebrile with no cough or SOB.  No bleeding or rash.      Review of Systems: 8 point ROS negative except as noted above.  PHYSICAL EXAM     KPS:  90    /68   Pulse 84   Temp 97.4  F (36.3  C)   Resp 18   Wt 55.4 kg (122 lb 3.2 oz)   SpO2 98%   BMI 22.35 kg/m       General: NAD   Eyes: : KYAW, sclera anicteric   Nose/Mouth/Throat: OP clear, buccal mucosa moist, no ulcerations   Lungs: CTA  Cardiovascular: rrr, no m/r/g  Abdominal/Rectal: +BS, soft, NT, ND  Lymphatics: no edema   Skin: no rashes or petechaie   Neuro: A&O     LABS AND IMAGING      I have assessed all abnormal lab values for their clinical significance and any values considered clinically significant have been addressed in the assessment and plan      Lab Results   Component Value Date    WBC 4.1 12/28/2020    ANEU 1.1 (L) 12/28/2020    HGB 12.7 12/28/2020    HCT 38.9 12/28/2020     12/28/2020     12/28/2020    POTASSIUM 4.0 12/28/2020    CHLORIDE 104 12/28/2020    CO2 30 12/28/2020    GLC 78 12/28/2020    BUN 20 12/28/2020    CR 0.80 12/28/2020    MAG 1.8 12/12/2014    INR 1.14 03/31/2014     OVERALL PLAN   Shena Hartmann is a 70 year old female, currently  on pom/riky for her multiple myeloma.    1.  MM:  Proceed with planned daratumumab today  - Cont pomalyst 1mg daily.     2.  ID: she is hoping we can avoid antimicrobials, if possible.  Not neutropenic today, no GCSF    3.  CV:  Hx of HTN: Not an issue recenetly     4.  Neuro: stable neuropathy.  She wears an infrared boot that she got from her chiropractor for this and she finds that very helpful.     Mari Blanco

## 2020-12-28 NOTE — NURSING NOTE
"Oncology Rooming Note    December 28, 2020 9:32 AM   Shena Hartmann is a 70 year old female who presents for:    Chief Complaint   Patient presents with     Lab Only     venipuncture, vitals checked     RECHECK     provider visit, sceduled chemo injection s/p bmt txp for multiple myeloma     Initial Vitals: /68   Pulse 84   Temp 97.4  F (36.3  C)   Resp 18   Wt 55.4 kg (122 lb 3.2 oz)   SpO2 98%   BMI 22.35 kg/m   Estimated body mass index is 22.35 kg/m  as calculated from the following:    Height as of 3/5/19: 1.575 m (5' 2\").    Weight as of this encounter: 55.4 kg (122 lb 3.2 oz). Body surface area is 1.56 meters squared.  Moderate Pain (4) Comment: Data Unavailable   No LMP recorded. Patient is postmenopausal.  Allergies reviewed: Yes  Medications reviewed: Yes    Medications: Medication refills not needed today.  Pharmacy name entered into [facility]:    [id number] - [place], [place] - [address] [facility] [address]  WRITTEN PRESCRIPTION REQUESTED  [facility] - [place], [place] - [address]    Clinical concerns:  Patient denies fevers/N/V/D/respiratory symptoms.  She reports having 4/10 generalized bone/joint/muscle pain she relates to pomalyst.  She decline the need for pain intervention today.      TAMMY REYES RN              "

## 2020-12-28 NOTE — LETTER
12/28/2020         RE: Shena Hartmann  [address] [place] [address]        Dear Colleague,    Thank you for referring your patient, Shena Hartmann, to the [facility] BLOOD AND MARROW TRANSPLANT PROGRAM [place]. Please see a copy of my visit note below.    Infusion Nursing Note:  Shena Hartmann presents today for scheduled Cycle 5, Day 1 Daratumumab.    Patient seen by provider today: Yes: Mari Blanco   present during visit today: Not Applicable.    Note: Labs were monitored.  Lab parameters for today's treatment were met. Patient assessment was completed and unremarkable except:  Patient is fatigued.  Patient reports 4/10 generalized pain in her bones/joints/muscles she relates to Pomalyst.  She declined the need for pain intervention today.  Patient has a history of mild tingling in bilateral hands and more tingling in her bilateral lower extremities - which she states is at her baseline.          Treatment Conditions:  Patient received 650 mg oral Tylenol prior to her scheduled Daratumumab injection in her lower left abdomen.      Note: Patient declined her Benadryl premed today. Patient has not taken her Decadron premed for the past several injections - Dr. Hills is aware and is okay with this.      Post Infusion Assessment:  Patient tolerated injection without incident.       Discharge Plan:   Patient discharged in stable condition accompanied by: self.  Patient's  is picking her up at the front door of the clinic.  She was given a copy of her AVS and is aware of her next scheduled appointment.    TAMMY REYES RN                            Again, thank you for allowing me to participate in the care of your patient.        Sincerely,        [facility]

## 2020-12-28 NOTE — LETTER
12/28/2020         RE: Shena Hartmann  [address] [place] [address]        Dear Colleague,    Thank you for referring your patient, Shena Hartmann, to the [facility] BLOOD AND MARROW TRANSPLANT PROGRAM [place]. Please see a copy of my visit note below.    BMT Clinic Note   12/28/2020    Patient ID:  Shena Hartmann is a 70 year old female, currently 6 years, 9 months s/p autologous peripheral blood stem cell transplant.  She has never had a complete response, and has been on various medications since her transplant, including pomalidomide, decadron and, most recently, Pom + daratumamab.      Diagnosis MM Multiple myeloma  HCT Type Autologous    Prep Regimen Melphalan  Cytoxan   Donor Source No data was found    GVHD Prophylaxis No  Primary BMT MD Oscar Hills     INTERVAL  HISTORY   Shena continues to be bothered by fatigue & diffuse achiness.  She manages her symptoms with homeopathic remedies & only occasionaly takes Tylenol.  Her BP has been good this week with SBP in the 120's at home.  She is eating well with no N/V/D.  Afebrile with no cough or SOB.  No bleeding or rash.      Review of Systems: 8 point ROS negative except as noted above.  PHYSICAL EXAM     KPS:  90    /68   Pulse 84   Temp 97.4  F (36.3  C)   Resp 18   Wt 55.4 kg (122 lb 3.2 oz)   SpO2 98%   BMI 22.35 kg/m       General: NAD   Eyes: : KYAW, sclera anicteric   Nose/Mouth/Throat: OP clear, buccal mucosa moist, no ulcerations   Lungs: CTA  Cardiovascular: rrr, no m/r/g  Abdominal/Rectal: +BS, soft, NT, ND  Lymphatics: no edema   Skin: no rashes or petechaie   Neuro: A&O     LABS AND IMAGING      I have assessed all abnormal lab values for their clinical significance and any values considered clinically significant have been addressed in the assessment and plan      Lab Results   Component Value Date    WBC 4.1 12/28/2020    ANEU 1.1 (L) 12/28/2020    HGB 12.7 12/28/2020    HCT 38.9 12/28/2020      12/28/2020     12/28/2020    POTASSIUM 4.0 12/28/2020    CHLORIDE 104 12/28/2020    CO2 30 12/28/2020    GLC 78 12/28/2020    BUN 20 12/28/2020    CR 0.80 12/28/2020    MAG 1.8 12/12/2014    INR 1.14 03/31/2014     OVERALL PLAN   Shena Hartmann is a 70 year old female, currently on pom/riky for her multiple myeloma.    1.  MM:  Proceed with planned daratumumab today  - Cont pomalyst 1mg daily.     2.  ID: she is hoping we can avoid antimicrobials, if possible.  Not neutropenic today, no GCSF    3.  CV:  Hx of HTN: Not an issue recenetly     4.  Neuro: stable neuropathy.  She wears an infrared boot that she got from her chiropractor for this and she finds that very helpful.     Mari Blanco      Again, thank you for allowing me to participate in the care of your patient.        Sincerely,        BMT Advanced Practice Provider

## 2020-12-28 NOTE — PROGRESS NOTES
Infusion Nursing Note:  Shena Hartmann presents today for scheduled Cycle 5, Day 1 Daratumumab.    Patient seen by provider today: Yes: Mari Blanco   present during visit today: Not Applicable.    Note: Labs were monitored.  Lab parameters for today's treatment were met. Patient assessment was completed and unremarkable except:  Patient is fatigued.  Patient reports 4/10 generalized pain in her bones/joints/muscles she relates to Pomalyst.  She declined the need for pain intervention today.  Patient has a history of mild tingling in bilateral hands and more tingling in her bilateral lower extremities - which she states is at her baseline.          Treatment Conditions:  Patient received 650 mg oral Tylenol prior to her scheduled Daratumumab injection in her lower left abdomen.      Note: Patient declined her Benadryl premed today. Patient has not taken her Decadron premed for the past several injections - Dr. Hills is aware and is okay with this.      Post Infusion Assessment:  Patient tolerated injection without incident.       Discharge Plan:   Patient discharged in stable condition accompanied by: self.  Patient's  is picking her up at the front door of the clinic.  She was given a copy of her AVS and is aware of her next scheduled appointment.    TAMMY REYES, RN

## 2020-12-29 DIAGNOSIS — C90.00 MULTIPLE MYELOMA, REMISSION STATUS UNSPECIFIED (H): Primary | ICD-10-CM

## 2020-12-30 ENCOUNTER — TELEPHONE (OUTPATIENT)
Dept: ONCOLOGY | Facility: CLINIC | Age: 70
End: 2020-12-30

## 2020-12-30 NOTE — TELEPHONE ENCOUNTER
Oral Chemotherapy Monitoring Program   Medication:POMALYST  Rx: 1mg PO daily on days 1 through 21 (ONLY FILLING 10# CAPSULES) of 28 day cycle   Auth #:[id number]  Risk Category: ADULT FEMALE NOT OF REPRODUCTIVE POTENTIAL  Routine survey questions reviewed.   Rx to be Escribed to [facility]   Oncology Pharmacy Liaison  [email or web address]   [phone] (phone)   [phone] (fax)

## 2021-01-11 ENCOUNTER — APPOINTMENT (OUTPATIENT)
Dept: LAB | Facility: CLINIC | Age: 71
End: 2021-01-11
Attending: INTERNAL MEDICINE
Payer: COMMERCIAL

## 2021-01-11 ENCOUNTER — INFUSION THERAPY VISIT (OUTPATIENT)
Dept: TRANSPLANT | Facility: CLINIC | Age: 71
End: 2021-01-11
Attending: INTERNAL MEDICINE
Payer: COMMERCIAL

## 2021-01-11 VITALS
WEIGHT: 124.2 LBS | DIASTOLIC BLOOD PRESSURE: 81 MMHG | BODY MASS INDEX: 22.72 KG/M2 | TEMPERATURE: 97.9 F | OXYGEN SATURATION: 97 % | HEART RATE: 80 BPM | SYSTOLIC BLOOD PRESSURE: 141 MMHG | RESPIRATION RATE: 16 BRPM

## 2021-01-11 DIAGNOSIS — C90.00 MULTIPLE MYELOMA NOT HAVING ACHIEVED REMISSION (H): Primary | ICD-10-CM

## 2021-01-11 LAB
BASOPHILS # BLD AUTO: 0 10E9/L (ref 0–0.2)
BASOPHILS NFR BLD AUTO: 0.5 %
DIFFERENTIAL METHOD BLD: ABNORMAL
EOSINOPHIL # BLD AUTO: 0.1 10E9/L (ref 0–0.7)
EOSINOPHIL NFR BLD AUTO: 2.1 %
ERYTHROCYTE [DISTWIDTH] IN BLOOD BY AUTOMATED COUNT: 14.9 % (ref 10–15)
HCT VFR BLD AUTO: 37.2 % (ref 35–47)
HGB BLD-MCNC: 12.4 G/DL (ref 11.7–15.7)
IMM GRANULOCYTES # BLD: 0 10E9/L (ref 0–0.4)
IMM GRANULOCYTES NFR BLD: 0 %
LYMPHOCYTES # BLD AUTO: 2.1 10E9/L (ref 0.8–5.3)
LYMPHOCYTES NFR BLD AUTO: 55.5 %
MCH RBC QN AUTO: 33.3 PG (ref 26.5–33)
MCHC RBC AUTO-ENTMCNC: 33.3 G/DL (ref 31.5–36.5)
MCV RBC AUTO: 100 FL (ref 78–100)
MONOCYTES # BLD AUTO: 0.5 10E9/L (ref 0–1.3)
MONOCYTES NFR BLD AUTO: 13.7 %
NEUTROPHILS # BLD AUTO: 1.1 10E9/L (ref 1.6–8.3)
NEUTROPHILS NFR BLD AUTO: 28.2 %
NRBC # BLD AUTO: 0 10*3/UL
NRBC BLD AUTO-RTO: 0 /100
PLATELET # BLD AUTO: 184 10E9/L (ref 150–450)
RBC # BLD AUTO: 3.72 10E12/L (ref 3.8–5.2)
WBC # BLD AUTO: 3.8 10E9/L (ref 4–11)

## 2021-01-11 PROCEDURE — 250N000013 HC RX MED GY IP 250 OP 250 PS 637: Performed by: INTERNAL MEDICINE

## 2021-01-11 PROCEDURE — 250N000011 HC RX IP 250 OP 636: Performed by: INTERNAL MEDICINE

## 2021-01-11 PROCEDURE — 96409 CHEMO IV PUSH SNGL DRUG: CPT

## 2021-01-11 PROCEDURE — 36415 COLL VENOUS BLD VENIPUNCTURE: CPT

## 2021-01-11 PROCEDURE — 85025 COMPLETE CBC W/AUTO DIFF WBC: CPT | Performed by: INTERNAL MEDICINE

## 2021-01-11 RX ORDER — ACETAMINOPHEN 325 MG/1
650 TABLET ORAL ONCE
Status: COMPLETED | OUTPATIENT
Start: 2021-01-11 | End: 2021-01-11

## 2021-01-11 RX ORDER — DIPHENHYDRAMINE HCL 25 MG
25 CAPSULE ORAL ONCE
Status: DISCONTINUED | OUTPATIENT
Start: 2021-01-11 | End: 2021-01-11 | Stop reason: HOSPADM

## 2021-01-11 RX ADMIN — ACETAMINOPHEN 650 MG: 325 TABLET ORAL at 08:10

## 2021-01-11 RX ADMIN — DARATUMUMAB AND HYALURONIDASE-FIHJ (HUMAN RECOMBINANT) 1800 MG: 1800; 30000 INJECTION SUBCUTANEOUS at 08:39

## 2021-01-11 ASSESSMENT — PAIN SCALES - GENERAL: PAINLEVEL: MODERATE PAIN (4)

## 2021-01-11 NOTE — PROGRESS NOTES
Infusion Nursing Note:  Shena Hartmann presents today for Cycle 5 day 15 subcutaneous Daratumumab.    Patient seen by provider today: No   present during visit today: Not Applicable.    Note: Labs monitored. Meets parameters for inj. Premeds administered. Pt declines benadryl and decadron.  Subcutaneous Yen administered via 23g butterfly needle in right abd over 5mins. Pt tolerated inj. Pressure held on inj site for 3mins post.     Intravenous Access:  No Intravenous access/labs at this visit.    Treatment Conditions:  Results reviewed, labs MET treatment parameters, ok to proceed with treatment.      Post Infusion Assessment:  Patient tolerated injection without incident.       Discharge Plan:   Patient discharged in stable condition accompanied by: self.  Departure Mode: Ambulatory.    Darek Bryant RN

## 2021-01-11 NOTE — PROGRESS NOTES
Chief Complaint   Patient presents with     Blood Draw     Vitals and blood drawn via VPT by LPN. Pt checked into appt.      DOREEN Chamberlain LPN

## 2021-01-11 NOTE — LETTER
1/11/2021         RE: Shena Hartmann  [address] [place] [address]        Dear Colleague,    Thank you for referring your patient, Shena Hartmann, to the [facility] BLOOD AND MARROW TRANSPLANT PROGRAM [place]. Please see a copy of my visit note below.    Chief Complaint   Patient presents with     Blood Draw     Vitals and blood drawn via VPT by LPN. Pt checked into keyana Chamberlain LPN    Infusion Nursing Note:  Shena Hartmann presents today for Cycle 5 day 15 subcutaneous Daratumumab.    Patient seen by provider today: No   present during visit today: Not Applicable.    Note: Labs monitored. Meets parameters for inj. Premeds administered. Pt declines benadryl and decadron.  Subcutaneous Yen administered via 23g butterfly needle in right abd over 5mins. Pt tolerated inj. Pressure held on inj site for 3mins post.     Intravenous Access:  No Intravenous access/labs at this visit.    Treatment Conditions:  Results reviewed, labs MET treatment parameters, ok to proceed with treatment.      Post Infusion Assessment:  Patient tolerated injection without incident.       Discharge Plan:   Patient discharged in stable condition accompanied by: self.  Departure Mode: Ambulatory.    Darek Bryant RN                            Again, thank you for allowing me to participate in the care of your patient.        Sincerely,        [facility]

## 2021-01-21 DIAGNOSIS — C90.00 MULTIPLE MYELOMA NOT HAVING ACHIEVED REMISSION (H): Primary | ICD-10-CM

## 2021-01-21 RX ORDER — NALOXONE HYDROCHLORIDE 0.4 MG/ML
.1-.4 INJECTION, SOLUTION INTRAMUSCULAR; INTRAVENOUS; SUBCUTANEOUS
Status: CANCELLED | OUTPATIENT
Start: 2021-02-08

## 2021-01-21 RX ORDER — ACETAMINOPHEN 325 MG/1
650 TABLET ORAL ONCE
Status: CANCELLED | OUTPATIENT
Start: 2021-01-25

## 2021-01-21 RX ORDER — DIPHENHYDRAMINE HCL 25 MG
25 CAPSULE ORAL ONCE
Status: CANCELLED | OUTPATIENT
Start: 2021-01-25

## 2021-01-21 RX ORDER — EPINEPHRINE 1 MG/ML
0.3 INJECTION, SOLUTION, CONCENTRATE INTRAVENOUS EVERY 5 MIN PRN
Status: CANCELLED | OUTPATIENT
Start: 2021-01-25

## 2021-01-21 RX ORDER — ACETAMINOPHEN 325 MG/1
650 TABLET ORAL ONCE
Status: CANCELLED | OUTPATIENT
Start: 2021-02-08

## 2021-01-21 RX ORDER — ALBUTEROL SULFATE 90 UG/1
1-2 AEROSOL, METERED RESPIRATORY (INHALATION)
Status: CANCELLED
Start: 2021-02-08

## 2021-01-21 RX ORDER — ALBUTEROL SULFATE 0.83 MG/ML
2.5 SOLUTION RESPIRATORY (INHALATION)
Status: CANCELLED | OUTPATIENT
Start: 2021-01-25

## 2021-01-21 RX ORDER — HEPARIN SODIUM (PORCINE) LOCK FLUSH IV SOLN 100 UNIT/ML 100 UNIT/ML
5 SOLUTION INTRAVENOUS
Status: CANCELLED | OUTPATIENT
Start: 2021-02-08

## 2021-01-21 RX ORDER — ALBUTEROL SULFATE 0.83 MG/ML
2.5 SOLUTION RESPIRATORY (INHALATION)
Status: CANCELLED | OUTPATIENT
Start: 2021-02-08

## 2021-01-21 RX ORDER — DIPHENHYDRAMINE HCL 25 MG
25 CAPSULE ORAL ONCE
Status: CANCELLED | OUTPATIENT
Start: 2021-02-08

## 2021-01-21 RX ORDER — LORAZEPAM 2 MG/ML
0.5 INJECTION INTRAMUSCULAR EVERY 4 HOURS PRN
Status: CANCELLED
Start: 2021-02-08

## 2021-01-21 RX ORDER — SODIUM CHLORIDE 9 MG/ML
1000 INJECTION, SOLUTION INTRAVENOUS CONTINUOUS PRN
Status: CANCELLED
Start: 2021-01-25

## 2021-01-21 RX ORDER — DIPHENHYDRAMINE HYDROCHLORIDE 50 MG/ML
50 INJECTION INTRAMUSCULAR; INTRAVENOUS
Status: CANCELLED
Start: 2021-01-25

## 2021-01-21 RX ORDER — HEPARIN SODIUM (PORCINE) LOCK FLUSH IV SOLN 100 UNIT/ML 100 UNIT/ML
5 SOLUTION INTRAVENOUS
Status: CANCELLED | OUTPATIENT
Start: 2021-01-25

## 2021-01-21 RX ORDER — ALBUTEROL SULFATE 90 UG/1
1-2 AEROSOL, METERED RESPIRATORY (INHALATION)
Status: CANCELLED
Start: 2021-01-25

## 2021-01-21 RX ORDER — HEPARIN SODIUM,PORCINE 10 UNIT/ML
5 VIAL (ML) INTRAVENOUS
Status: CANCELLED | OUTPATIENT
Start: 2021-01-25

## 2021-01-21 RX ORDER — MEPERIDINE HYDROCHLORIDE 25 MG/ML
25 INJECTION INTRAMUSCULAR; INTRAVENOUS; SUBCUTANEOUS EVERY 30 MIN PRN
Status: CANCELLED | OUTPATIENT
Start: 2021-02-08

## 2021-01-21 RX ORDER — SODIUM CHLORIDE 9 MG/ML
1000 INJECTION, SOLUTION INTRAVENOUS CONTINUOUS PRN
Status: CANCELLED
Start: 2021-02-08

## 2021-01-21 RX ORDER — EPINEPHRINE 1 MG/ML
0.3 INJECTION, SOLUTION, CONCENTRATE INTRAVENOUS EVERY 5 MIN PRN
Status: CANCELLED | OUTPATIENT
Start: 2021-02-08

## 2021-01-21 RX ORDER — NALOXONE HYDROCHLORIDE 0.4 MG/ML
.1-.4 INJECTION, SOLUTION INTRAMUSCULAR; INTRAVENOUS; SUBCUTANEOUS
Status: CANCELLED | OUTPATIENT
Start: 2021-01-25

## 2021-01-21 RX ORDER — HEPARIN SODIUM,PORCINE 10 UNIT/ML
5 VIAL (ML) INTRAVENOUS
Status: CANCELLED | OUTPATIENT
Start: 2021-02-08

## 2021-01-21 RX ORDER — DIPHENHYDRAMINE HYDROCHLORIDE 50 MG/ML
50 INJECTION INTRAMUSCULAR; INTRAVENOUS
Status: CANCELLED
Start: 2021-02-08

## 2021-01-21 RX ORDER — MEPERIDINE HYDROCHLORIDE 25 MG/ML
25 INJECTION INTRAMUSCULAR; INTRAVENOUS; SUBCUTANEOUS EVERY 30 MIN PRN
Status: CANCELLED | OUTPATIENT
Start: 2021-01-25

## 2021-01-21 RX ORDER — LORAZEPAM 2 MG/ML
0.5 INJECTION INTRAMUSCULAR EVERY 4 HOURS PRN
Status: CANCELLED
Start: 2021-01-25

## 2021-01-25 ENCOUNTER — INFUSION THERAPY VISIT (OUTPATIENT)
Dept: TRANSPLANT | Facility: CLINIC | Age: 71
End: 2021-01-25
Attending: INTERNAL MEDICINE
Payer: COMMERCIAL

## 2021-01-25 ENCOUNTER — APPOINTMENT (OUTPATIENT)
Dept: LAB | Facility: CLINIC | Age: 71
End: 2021-01-25
Attending: INTERNAL MEDICINE
Payer: COMMERCIAL

## 2021-01-25 VITALS
RESPIRATION RATE: 16 BRPM | OXYGEN SATURATION: 99 % | DIASTOLIC BLOOD PRESSURE: 68 MMHG | SYSTOLIC BLOOD PRESSURE: 109 MMHG | WEIGHT: 122.9 LBS | TEMPERATURE: 97.4 F | HEART RATE: 77 BPM | BODY MASS INDEX: 22.48 KG/M2

## 2021-01-25 DIAGNOSIS — C90.00 MULTIPLE MYELOMA NOT HAVING ACHIEVED REMISSION (H): Primary | ICD-10-CM

## 2021-01-25 LAB
ALBUMIN SERPL-MCNC: 3.5 G/DL (ref 3.4–5)
ALP SERPL-CCNC: 58 U/L (ref 40–150)
ALT SERPL W P-5'-P-CCNC: 27 U/L (ref 0–50)
ANION GAP SERPL CALCULATED.3IONS-SCNC: 6 MMOL/L (ref 3–14)
AST SERPL W P-5'-P-CCNC: 16 U/L (ref 0–45)
BASOPHILS # BLD AUTO: 0 10E9/L (ref 0–0.2)
BASOPHILS NFR BLD AUTO: 0.5 %
BILIRUB SERPL-MCNC: 0.2 MG/DL (ref 0.2–1.3)
BUN SERPL-MCNC: 18 MG/DL (ref 7–30)
CALCIUM SERPL-MCNC: 9 MG/DL (ref 8.5–10.1)
CHLORIDE SERPL-SCNC: 104 MMOL/L (ref 94–109)
CO2 SERPL-SCNC: 26 MMOL/L (ref 20–32)
CREAT SERPL-MCNC: 0.76 MG/DL (ref 0.52–1.04)
DIFFERENTIAL METHOD BLD: ABNORMAL
EOSINOPHIL # BLD AUTO: 0.1 10E9/L (ref 0–0.7)
EOSINOPHIL NFR BLD AUTO: 3.1 %
ERYTHROCYTE [DISTWIDTH] IN BLOOD BY AUTOMATED COUNT: 14.5 % (ref 10–15)
GFR SERPL CREATININE-BSD FRML MDRD: 79 ML/MIN/{1.73_M2}
GLUCOSE SERPL-MCNC: 84 MG/DL (ref 70–99)
HCT VFR BLD AUTO: 35.6 % (ref 35–47)
HGB BLD-MCNC: 12.1 G/DL (ref 11.7–15.7)
IMM GRANULOCYTES # BLD: 0 10E9/L (ref 0–0.4)
IMM GRANULOCYTES NFR BLD: 0 %
LYMPHOCYTES # BLD AUTO: 1.7 10E9/L (ref 0.8–5.3)
LYMPHOCYTES NFR BLD AUTO: 44 %
MCH RBC QN AUTO: 33.5 PG (ref 26.5–33)
MCHC RBC AUTO-ENTMCNC: 34 G/DL (ref 31.5–36.5)
MCV RBC AUTO: 99 FL (ref 78–100)
MONOCYTES # BLD AUTO: 0.7 10E9/L (ref 0–1.3)
MONOCYTES NFR BLD AUTO: 18 %
NEUTROPHILS # BLD AUTO: 1.3 10E9/L (ref 1.6–8.3)
NEUTROPHILS NFR BLD AUTO: 34.4 %
NRBC # BLD AUTO: 0 10*3/UL
NRBC BLD AUTO-RTO: 0 /100
PLATELET # BLD AUTO: 136 10E9/L (ref 150–450)
POTASSIUM SERPL-SCNC: 4.1 MMOL/L (ref 3.4–5.3)
PROT SERPL-MCNC: 7.6 G/DL (ref 6.8–8.8)
RBC # BLD AUTO: 3.61 10E12/L (ref 3.8–5.2)
SODIUM SERPL-SCNC: 136 MMOL/L (ref 133–144)
WBC # BLD AUTO: 3.8 10E9/L (ref 4–11)

## 2021-01-25 PROCEDURE — 85025 COMPLETE CBC W/AUTO DIFF WBC: CPT | Performed by: INTERNAL MEDICINE

## 2021-01-25 PROCEDURE — 36415 COLL VENOUS BLD VENIPUNCTURE: CPT

## 2021-01-25 PROCEDURE — 250N000013 HC RX MED GY IP 250 OP 250 PS 637: Performed by: INTERNAL MEDICINE

## 2021-01-25 PROCEDURE — 96413 CHEMO IV INFUSION 1 HR: CPT

## 2021-01-25 PROCEDURE — 250N000011 HC RX IP 250 OP 636: Performed by: INTERNAL MEDICINE

## 2021-01-25 PROCEDURE — 80053 COMPREHEN METABOLIC PANEL: CPT | Performed by: INTERNAL MEDICINE

## 2021-01-25 RX ORDER — ACETAMINOPHEN 325 MG/1
650 TABLET ORAL ONCE
Status: COMPLETED | OUTPATIENT
Start: 2021-01-25 | End: 2021-01-25

## 2021-01-25 RX ADMIN — ACETAMINOPHEN 650 MG: 325 TABLET ORAL at 08:10

## 2021-01-25 RX ADMIN — DARATUMUMAB AND HYALURONIDASE-FIHJ (HUMAN RECOMBINANT) 1800 MG: 1800; 30000 INJECTION SUBCUTANEOUS at 09:13

## 2021-01-25 ASSESSMENT — PAIN SCALES - GENERAL: PAINLEVEL: SEVERE PAIN (6)

## 2021-01-25 NOTE — LETTER
1/25/2021         RE: Shena Hartmann  [address] [place] [address]        Dear Colleague,    Thank you for referring your patient, Shena Hartmann, to the [facility] BLOOD AND MARROW TRANSPLANT PROGRAM [place]. Please see a copy of my visit note below.    Infusion Nursing Note:  Shena Hartmann presents today for subcutaneous Yen.    Patient seen by provider today: No   present during visit today: Not Applicable.    Note: Patient here for subcutaneous Daratumumab.  She reports having joint and muscle pain, and states this is her typical pattern of pain towards the end of her Pomalyst cycle and she declines any intervention today.  Remainder of assessment unremarkable.    Labs met parameter for today's dose.  Patient declines decadron and benadryl pre meds, so only 650 mg tylenol given today.    Daratumumab given subcutaneous x1 slowly over 6 minutes.     Intravenous Access:  n/a.    Treatment Conditions:  Lab Results   Component Value Date    HGB 12.1 01/25/2021     Lab Results   Component Value Date    WBC 3.8 01/25/2021      Lab Results   Component Value Date    ANEU 1.3 01/25/2021     Lab Results   Component Value Date     01/25/2021      Lab Results   Component Value Date     01/25/2021                   Lab Results   Component Value Date    POTASSIUM 4.1 01/25/2021           Lab Results   Component Value Date    MAG 1.8 12/12/2014            Lab Results   Component Value Date    CR 0.76 01/25/2021                   Lab Results   Component Value Date    PAULINE 9.0 01/25/2021                Lab Results   Component Value Date    BILITOTAL 0.2 01/25/2021           Lab Results   Component Value Date    ALBUMIN 3.5 01/25/2021                    Lab Results   Component Value Date    ALT 27 01/25/2021           Lab Results   Component Value Date    AST 16 01/25/2021       Results reviewed, labs MET treatment parameters, ok to proceed with treatment.      Post  Infusion Assessment:  Patient tolerated injection without incident.       Discharge Plan:   AVS to patient via MYCHART.  Patient will return in 2 Mondays for next appointment.   Patient discharged in stable condition accompanied by: self.  Departure Mode: Ambulatory.    JANETH MINAYA RN                            Again, thank you for allowing me to participate in the care of your patient.        Sincerely,        [facility]

## 2021-01-25 NOTE — NURSING NOTE
Chief Complaint   Patient presents with     Blood Draw     Labs drawn via  by RN. VS taken     Labs drawn with vpt by rn.  Pt tolerated well.  VS taken.  Pt checked in for next appt.    Bernardino Pisano RN

## 2021-01-25 NOTE — PROGRESS NOTES
Infusion Nursing Note:  Shena Hartmann presents today for subcutaneous Yen.    Patient seen by provider today: No   present during visit today: Not Applicable.    Note: Patient here for subcutaneous Daratumumab.  She reports having joint and muscle pain, and states this is her typical pattern of pain towards the end of her Pomalyst cycle and she declines any intervention today.  Remainder of assessment unremarkable.    Labs met parameter for today's dose.  Patient declines decadron and benadryl pre meds, so only 650 mg tylenol given today.    Daratumumab given subcutaneous x1 slowly over 6 minutes.     Intravenous Access:  n/a.    Treatment Conditions:  Lab Results   Component Value Date    HGB 12.1 01/25/2021     Lab Results   Component Value Date    WBC 3.8 01/25/2021      Lab Results   Component Value Date    ANEU 1.3 01/25/2021     Lab Results   Component Value Date     01/25/2021      Lab Results   Component Value Date     01/25/2021                   Lab Results   Component Value Date    POTASSIUM 4.1 01/25/2021           Lab Results   Component Value Date    MAG 1.8 12/12/2014            Lab Results   Component Value Date    CR 0.76 01/25/2021                   Lab Results   Component Value Date    PAULINE 9.0 01/25/2021                Lab Results   Component Value Date    BILITOTAL 0.2 01/25/2021           Lab Results   Component Value Date    ALBUMIN 3.5 01/25/2021                    Lab Results   Component Value Date    ALT 27 01/25/2021           Lab Results   Component Value Date    AST 16 01/25/2021       Results reviewed, labs MET treatment parameters, ok to proceed with treatment.      Post Infusion Assessment:  Patient tolerated injection without incident.       Discharge Plan:   [facility]  Patient will return in 2 Mondays for next appointment.   Patient discharged in stable condition accompanied by: self.  Departure Mode: Ambulatory.    JANETH MINAYA,  RN

## 2021-01-25 NOTE — NURSING NOTE
"Oncology Rooming Note    January 25, 2021 8:53 AM   Shena Hartmann is a 70 year old female who presents for:    Chief Complaint   Patient presents with     Blood Draw     Labs drawn via  by RN. VS taken     Infusion     SQ Daratumumab, med review, Multiple Myeloma     Initial Vitals: /68   Pulse 77   Temp 97.4  F (36.3  C) (Oral)   Resp 16   Wt 55.7 kg (122 lb 14.4 oz)   SpO2 99%   BMI 22.48 kg/m   Estimated body mass index is 22.48 kg/m  as calculated from the following:    Height as of 3/5/19: 1.575 m (5' 2\").    Weight as of this encounter: 55.7 kg (122 lb 14.4 oz). Body surface area is 1.56 meters squared.  Severe Pain (6) Comment: Data Unavailable   No LMP recorded. Patient is postmenopausal.  Allergies reviewed: Yes  Medications reviewed: Yes    Medications: Medication refills not needed today.  Pharmacy name entered into [facility]:    [facility][id number] - [place], [place] - [address] [facility] [address]  WRITTEN PRESCRIPTION REQUESTED  [facility] - [place], [place] - [address]              "

## 2021-01-27 DIAGNOSIS — C90.00 MULTIPLE MYELOMA, REMISSION STATUS UNSPECIFIED (H): Primary | ICD-10-CM

## 2021-01-28 ENCOUNTER — TELEPHONE (OUTPATIENT)
Dept: ONCOLOGY | Facility: CLINIC | Age: 71
End: 2021-01-28

## 2021-01-28 NOTE — TELEPHONE ENCOUNTER
Oral Chemotherapy Monitoring Program   Medication: POMALYST  Rx: 1mg PO daily on days 1 through 21 of 28 day cycle   Auth #: [id number]  Risk Category: ADULT FEMALE NOT OF REPRODUCTIVE POTENTIAL  Routine survey questions reviewed.   Rx to be Escribed to [facility]   Oncology Pharmacy Liaison  [email or web address]   [phone] (phone)   [phone] (fax)

## 2021-02-08 ENCOUNTER — INFUSION THERAPY VISIT (OUTPATIENT)
Dept: TRANSPLANT | Facility: CLINIC | Age: 71
End: 2021-02-08
Attending: INTERNAL MEDICINE
Payer: COMMERCIAL

## 2021-02-08 ENCOUNTER — APPOINTMENT (OUTPATIENT)
Dept: LAB | Facility: CLINIC | Age: 71
End: 2021-02-08
Attending: INTERNAL MEDICINE
Payer: COMMERCIAL

## 2021-02-08 VITALS
WEIGHT: 124.2 LBS | HEART RATE: 65 BPM | BODY MASS INDEX: 22.72 KG/M2 | SYSTOLIC BLOOD PRESSURE: 123 MMHG | RESPIRATION RATE: 16 BRPM | DIASTOLIC BLOOD PRESSURE: 77 MMHG | TEMPERATURE: 97.9 F | OXYGEN SATURATION: 96 %

## 2021-02-08 DIAGNOSIS — C90.00 MULTIPLE MYELOMA NOT HAVING ACHIEVED REMISSION (H): Primary | ICD-10-CM

## 2021-02-08 LAB
B2 MICROGLOB SERPL-MCNC: 2.4 MG/L
BASOPHILS # BLD AUTO: 0 10E9/L (ref 0–0.2)
BASOPHILS NFR BLD AUTO: 0.5 %
CRP SERPL-MCNC: <2.9 MG/L (ref 0–8)
DEPRECATED CALCIDIOL+CALCIFEROL SERPL-MC: 96 UG/L (ref 20–75)
DIFFERENTIAL METHOD BLD: ABNORMAL
EOSINOPHIL # BLD AUTO: 0.1 10E9/L (ref 0–0.7)
EOSINOPHIL NFR BLD AUTO: 3.5 %
ERYTHROCYTE [DISTWIDTH] IN BLOOD BY AUTOMATED COUNT: 14.6 % (ref 10–15)
HCT VFR BLD AUTO: 37.6 % (ref 35–47)
HGB BLD-MCNC: 12.6 G/DL (ref 11.7–15.7)
IMM GRANULOCYTES # BLD: 0 10E9/L (ref 0–0.4)
IMM GRANULOCYTES NFR BLD: 0.3 %
LYMPHOCYTES # BLD AUTO: 1.8 10E9/L (ref 0.8–5.3)
LYMPHOCYTES NFR BLD AUTO: 48.9 %
MCH RBC QN AUTO: 33.3 PG (ref 26.5–33)
MCHC RBC AUTO-ENTMCNC: 33.5 G/DL (ref 31.5–36.5)
MCV RBC AUTO: 100 FL (ref 78–100)
MONOCYTES # BLD AUTO: 0.6 10E9/L (ref 0–1.3)
MONOCYTES NFR BLD AUTO: 14.8 %
NEUTROPHILS # BLD AUTO: 1.2 10E9/L (ref 1.6–8.3)
NEUTROPHILS NFR BLD AUTO: 32 %
NRBC # BLD AUTO: 0 10*3/UL
NRBC BLD AUTO-RTO: 0 /100
PLATELET # BLD AUTO: 196 10E9/L (ref 150–450)
RBC # BLD AUTO: 3.78 10E12/L (ref 3.8–5.2)
WBC # BLD AUTO: 3.7 10E9/L (ref 4–11)

## 2021-02-08 PROCEDURE — 86140 C-REACTIVE PROTEIN: CPT | Performed by: INTERNAL MEDICINE

## 2021-02-08 PROCEDURE — 84165 PROTEIN E-PHORESIS SERUM: CPT | Mod: TC | Performed by: INTERNAL MEDICINE

## 2021-02-08 PROCEDURE — 85025 COMPLETE CBC W/AUTO DIFF WBC: CPT | Performed by: INTERNAL MEDICINE

## 2021-02-08 PROCEDURE — 96401 CHEMO ANTI-NEOPL SQ/IM: CPT

## 2021-02-08 PROCEDURE — 96372 THER/PROPH/DIAG INJ SC/IM: CPT

## 2021-02-08 PROCEDURE — 999N001036 HC STATISTIC TOTAL PROTEIN: Performed by: INTERNAL MEDICINE

## 2021-02-08 PROCEDURE — 82306 VITAMIN D 25 HYDROXY: CPT | Performed by: INTERNAL MEDICINE

## 2021-02-08 PROCEDURE — 82232 ASSAY OF BETA-2 PROTEIN: CPT | Performed by: INTERNAL MEDICINE

## 2021-02-08 PROCEDURE — 36415 COLL VENOUS BLD VENIPUNCTURE: CPT

## 2021-02-08 PROCEDURE — 250N000011 HC RX IP 250 OP 636: Performed by: INTERNAL MEDICINE

## 2021-02-08 PROCEDURE — 84165 PROTEIN E-PHORESIS SERUM: CPT | Mod: 26 | Performed by: PATHOLOGY

## 2021-02-08 PROCEDURE — 250N000013 HC RX MED GY IP 250 OP 250 PS 637: Performed by: INTERNAL MEDICINE

## 2021-02-08 RX ORDER — ACETAMINOPHEN 325 MG/1
650 TABLET ORAL ONCE
Status: COMPLETED | OUTPATIENT
Start: 2021-02-08 | End: 2021-02-08

## 2021-02-08 RX ADMIN — DARATUMUMAB AND HYALURONIDASE-FIHJ (HUMAN RECOMBINANT) 1800 MG: 1800; 30000 INJECTION SUBCUTANEOUS at 09:10

## 2021-02-08 RX ADMIN — ACETAMINOPHEN 650 MG: 325 TABLET ORAL at 08:37

## 2021-02-08 ASSESSMENT — PAIN SCALES - GENERAL: PAINLEVEL: MODERATE PAIN (4)

## 2021-02-08 NOTE — LETTER
2/8/2021         RE: Shena Hartmann  [address] [place] [address]        Dear Colleague,    Thank you for referring your patient, Shena Hartmann, to the [facility] BLOOD AND MARROW TRANSPLANT PROGRAM [place]. Please see a copy of my visit note below.    Infusion Nursing Note:  Shena Hartmann presents today for subcutaneous Darazalex Faspro. See MAR.     Patient seen by provider today: No   present during visit today: Not Applicable.    Note: Pt received Cycle 6, Day 15 Darzalex Faspro subcutaneous injection over 6 minutes in Right Lower Abd. Pt tolerated injection well. Pt met lab parameters for today's dose. Pt assessment completed by this RN prior as Pt was not seen by a Provider today- see Doc Flowsheet. Pt declined IV Decadron Premed as well as oral Benadryl Premed. Tylenol 650 mg PO given 30 minutes prior to Yen. Chemo checked with another RN Prior to administration per protocol. Pt d/c'd in stable condition- has been tolerating drug well so no need to monitor.       Intravenous Access:  No Intravenous access    Treatment Conditions:  Results reviewed, labs MET treatment parameters, ok to proceed with treatment.      Post Infusion Assessment:  Patient tolerated injection without incident.       Discharge Plan:   Patient discharged in stable condition accompanied by: self.  Departure Mode: Ambulatory.    Rachel Merritt RN                            Again, thank you for allowing me to participate in the care of your patient.        Sincerely,        [facility]

## 2021-02-08 NOTE — PROGRESS NOTES
Infusion Nursing Note:  Shena Hartmann presents today for subcutaneous Darazalex Faspro. See MAR.     Patient seen by provider today: No   present during visit today: Not Applicable.    Note: Pt received Cycle 6, Day 15 Darzalex Faspro subcutaneous injection over 6 minutes in Right Lower Abd. Pt tolerated injection well. Pt met lab parameters for today's dose. Pt assessment completed by this RN prior as Pt was not seen by a Provider today- see Doc Flowsheet. Pt declined IV Decadron Premed as well as oral Benadryl Premed. Tylenol 650 mg PO given 30 minutes prior to Yen. Chemo checked with another RN Prior to administration per protocol. Pt d/c'd in stable condition- has been tolerating drug well so no need to monitor.       Intravenous Access:  No Intravenous access    Treatment Conditions:  Results reviewed, labs MET treatment parameters, ok to proceed with treatment.      Post Infusion Assessment:  Patient tolerated injection without incident.       Discharge Plan:   Patient discharged in stable condition accompanied by: self.  Departure Mode: Ambulatory.    Rachel Merritt RN

## 2021-02-08 NOTE — NURSING NOTE
Chief Complaint   Patient presents with     Blood Draw     Vitals, blood drawn via VPT by LPN. Pt checked into appt.      RECHECK     Pt here for SQ Yen today. Pt is s/p BMT for Multiple Myeloma.      Rachel Merritt RN

## 2021-02-08 NOTE — NURSING NOTE
Chief Complaint   Patient presents with     Blood Draw     Vitals, blood drawn via VPT by LPN. Pt checked into appt.      JIC tube drawn and sent IB to provider regarding orders for a CMP.    DOREEN Chamberlain LPN

## 2021-02-09 LAB
ALBUMIN SERPL ELPH-MCNC: 4.5 G/DL (ref 3.7–5.1)
ALPHA1 GLOB SERPL ELPH-MCNC: 0.3 G/DL (ref 0.2–0.4)
ALPHA2 GLOB SERPL ELPH-MCNC: 0.7 G/DL (ref 0.5–0.9)
B-GLOBULIN SERPL ELPH-MCNC: 0.6 G/DL (ref 0.6–1)
GAMMA GLOB SERPL ELPH-MCNC: 1.6 G/DL (ref 0.7–1.6)
M PROTEIN SERPL ELPH-MCNC: 1.2 G/DL
PROT PATTERN SERPL ELPH-IMP: ABNORMAL

## 2021-02-23 ENCOUNTER — TELEPHONE (OUTPATIENT)
Dept: ONCOLOGY | Facility: CLINIC | Age: 71
End: 2021-02-23

## 2021-02-23 DIAGNOSIS — Z12.31 VISIT FOR SCREENING MAMMOGRAM: ICD-10-CM

## 2021-02-23 DIAGNOSIS — C90.00 MULTIPLE MYELOMA, REMISSION STATUS UNSPECIFIED (H): Primary | ICD-10-CM

## 2021-02-23 PROCEDURE — 77067 SCR MAMMO BI INCL CAD: CPT | Mod: GC | Performed by: RADIOLOGY

## 2021-02-23 PROCEDURE — 77063 BREAST TOMOSYNTHESIS BI: CPT | Mod: GC | Performed by: RADIOLOGY

## 2021-02-23 NOTE — TELEPHONE ENCOUNTER
Oral Chemotherapy Monitoring Program   Medication: POMALYST  Rx: 1mg PO daily on days 1 through 21 of 28 day cycle   Auth #:[id number]   Risk Category: ADULT FEMALE NOT OF REPRODUCTIVE POTENTIAL  Routine survey questions reviewed.   Rx to be Escribed to [facility]   Oncology Pharmacy Liaison  [email or web address]   [phone] (phone)   [phone] (fax)

## 2021-03-15 ENCOUNTER — INFUSION THERAPY VISIT (OUTPATIENT)
Dept: TRANSPLANT | Facility: CLINIC | Age: 71
End: 2021-03-15
Attending: INTERNAL MEDICINE
Payer: COMMERCIAL

## 2021-03-15 ENCOUNTER — APPOINTMENT (OUTPATIENT)
Dept: LAB | Facility: CLINIC | Age: 71
End: 2021-03-15
Attending: INTERNAL MEDICINE
Payer: COMMERCIAL

## 2021-03-15 VITALS
RESPIRATION RATE: 16 BRPM | TEMPERATURE: 98.1 F | BODY MASS INDEX: 22.57 KG/M2 | DIASTOLIC BLOOD PRESSURE: 74 MMHG | OXYGEN SATURATION: 98 % | HEART RATE: 84 BPM | WEIGHT: 123.4 LBS | SYSTOLIC BLOOD PRESSURE: 139 MMHG

## 2021-03-15 DIAGNOSIS — C90.00 MULTIPLE MYELOMA NOT HAVING ACHIEVED REMISSION (H): Primary | ICD-10-CM

## 2021-03-15 DIAGNOSIS — C90.00 MULTIPLE MYELOMA NOT HAVING ACHIEVED REMISSION (H): ICD-10-CM

## 2021-03-15 DIAGNOSIS — C90.00 MULTIPLE MYELOMA, REMISSION STATUS UNSPECIFIED (H): Primary | ICD-10-CM

## 2021-03-15 LAB
BASOPHILS # BLD AUTO: 0 10E9/L (ref 0–0.2)
BASOPHILS NFR BLD AUTO: 0.6 %
DIFFERENTIAL METHOD BLD: ABNORMAL
EOSINOPHIL # BLD AUTO: 0.1 10E9/L (ref 0–0.7)
EOSINOPHIL NFR BLD AUTO: 1.8 %
ERYTHROCYTE [DISTWIDTH] IN BLOOD BY AUTOMATED COUNT: 14.9 % (ref 10–15)
HCT VFR BLD AUTO: 38.4 % (ref 35–47)
HGB BLD-MCNC: 12.8 G/DL (ref 11.7–15.7)
IMM GRANULOCYTES # BLD: 0 10E9/L (ref 0–0.4)
IMM GRANULOCYTES NFR BLD: 0.2 %
LYMPHOCYTES # BLD AUTO: 1.7 10E9/L (ref 0.8–5.3)
LYMPHOCYTES NFR BLD AUTO: 35 %
MCH RBC QN AUTO: 33.2 PG (ref 26.5–33)
MCHC RBC AUTO-ENTMCNC: 33.3 G/DL (ref 31.5–36.5)
MCV RBC AUTO: 100 FL (ref 78–100)
MONOCYTES # BLD AUTO: 0.9 10E9/L (ref 0–1.3)
MONOCYTES NFR BLD AUTO: 17.4 %
NEUTROPHILS # BLD AUTO: 2.2 10E9/L (ref 1.6–8.3)
NEUTROPHILS NFR BLD AUTO: 45 %
NRBC # BLD AUTO: 0 10*3/UL
NRBC BLD AUTO-RTO: 0 /100
PLATELET # BLD AUTO: 185 10E9/L (ref 150–450)
RBC # BLD AUTO: 3.86 10E12/L (ref 3.8–5.2)
WBC # BLD AUTO: 4.9 10E9/L (ref 4–11)

## 2021-03-15 PROCEDURE — 36415 COLL VENOUS BLD VENIPUNCTURE: CPT

## 2021-03-15 PROCEDURE — 250N000011 HC RX IP 250 OP 636: Performed by: INTERNAL MEDICINE

## 2021-03-15 PROCEDURE — 96401 CHEMO ANTI-NEOPL SQ/IM: CPT

## 2021-03-15 PROCEDURE — 250N000013 HC RX MED GY IP 250 OP 250 PS 637: Performed by: INTERNAL MEDICINE

## 2021-03-15 PROCEDURE — 85025 COMPLETE CBC W/AUTO DIFF WBC: CPT | Performed by: INTERNAL MEDICINE

## 2021-03-15 RX ORDER — ALBUTEROL SULFATE 90 UG/1
1-2 AEROSOL, METERED RESPIRATORY (INHALATION)
Status: CANCELLED
Start: 2021-03-15

## 2021-03-15 RX ORDER — NALOXONE HYDROCHLORIDE 0.4 MG/ML
.1-.4 INJECTION, SOLUTION INTRAMUSCULAR; INTRAVENOUS; SUBCUTANEOUS
Status: CANCELLED | OUTPATIENT
Start: 2021-03-15

## 2021-03-15 RX ORDER — ALBUTEROL SULFATE 0.83 MG/ML
2.5 SOLUTION RESPIRATORY (INHALATION)
Status: CANCELLED | OUTPATIENT
Start: 2021-03-15

## 2021-03-15 RX ORDER — LORAZEPAM 2 MG/ML
0.5 INJECTION INTRAMUSCULAR EVERY 4 HOURS PRN
Status: CANCELLED
Start: 2021-03-15

## 2021-03-15 RX ORDER — DIPHENHYDRAMINE HYDROCHLORIDE 50 MG/ML
50 INJECTION INTRAMUSCULAR; INTRAVENOUS
Status: CANCELLED
Start: 2021-03-15

## 2021-03-15 RX ORDER — SODIUM CHLORIDE 9 MG/ML
1000 INJECTION, SOLUTION INTRAVENOUS CONTINUOUS PRN
Status: CANCELLED
Start: 2021-03-15

## 2021-03-15 RX ORDER — MEPERIDINE HYDROCHLORIDE 25 MG/ML
25 INJECTION INTRAMUSCULAR; INTRAVENOUS; SUBCUTANEOUS EVERY 30 MIN PRN
Status: CANCELLED | OUTPATIENT
Start: 2021-03-15

## 2021-03-15 RX ORDER — ACETAMINOPHEN 325 MG/1
650 TABLET ORAL ONCE
Status: COMPLETED | OUTPATIENT
Start: 2021-03-15 | End: 2021-03-15

## 2021-03-15 RX ORDER — EPINEPHRINE 1 MG/ML
0.3 INJECTION, SOLUTION, CONCENTRATE INTRAVENOUS EVERY 5 MIN PRN
Status: CANCELLED | OUTPATIENT
Start: 2021-03-15

## 2021-03-15 RX ORDER — DIPHENHYDRAMINE HCL 25 MG
25 CAPSULE ORAL ONCE
Status: CANCELLED | OUTPATIENT
Start: 2021-03-15

## 2021-03-15 RX ORDER — HEPARIN SODIUM (PORCINE) LOCK FLUSH IV SOLN 100 UNIT/ML 100 UNIT/ML
5 SOLUTION INTRAVENOUS
Status: CANCELLED | OUTPATIENT
Start: 2021-03-15

## 2021-03-15 RX ORDER — HEPARIN SODIUM,PORCINE 10 UNIT/ML
5 VIAL (ML) INTRAVENOUS
Status: CANCELLED | OUTPATIENT
Start: 2021-03-15

## 2021-03-15 RX ADMIN — ACETAMINOPHEN 650 MG: 325 TABLET ORAL at 08:57

## 2021-03-15 RX ADMIN — DARATUMUMAB AND HYALURONIDASE-FIHJ (HUMAN RECOMBINANT) 1800 MG: 1800; 30000 INJECTION SUBCUTANEOUS at 09:23

## 2021-03-15 ASSESSMENT — PAIN SCALES - GENERAL: PAINLEVEL: MODERATE PAIN (4)

## 2021-03-15 NOTE — PROGRESS NOTES
Infusion Nursing Note:  Shena Hartmann presents today for Cycle 7 Day 1 Darzalex Faspro injection.    Patient seen by provider today: No   present during visit today: Not Applicable.    Note:   Pt received 1800 mg Darzalex Faspro injection to left side abdomen over 6 minutes. Pt tolerated injection well.  Pt assessment completed on pt arrival.  Pt declined benadryl and decadron but was given 650 mg tylenol as premed 30 minutes prior to Darzalex injection.        Intravenous Access:  No Intravenous access at this visit.    Treatment Conditions:  Lab Results   Component Value Date    HGB 12.8 03/15/2021     Lab Results   Component Value Date    WBC 4.9 03/15/2021      Lab Results   Component Value Date    ANEU 2.2 03/15/2021     Lab Results   Component Value Date     03/15/2021      Pt met parameters for daratumumab injection today, no replacement needs today.    Post Infusion Assessment:  Patient tolerated injection without incident.       Discharge Plan:   Discharge instructions reviewed with: Patient.  Copy of AVS reviewed with patient and/or family.  Patient discharged in stable condition accompanied by: .  Departure Mode: Ambulatory.    Neela Ding RN

## 2021-03-15 NOTE — NURSING NOTE
Chief Complaint   Patient presents with     Blood Draw     labs drawn with vpt by rn.   vs taken     Infusion     pt here for subcutaneous daratumumab injection.  history multiple myeloma.     Smitha Ding RN

## 2021-03-15 NOTE — LETTER
3/15/2021         RE: Shena Hartmann  [address] [place] [address]        Dear Colleague,    Thank you for referring your patient, Shena Hartmann, to the [facility] BLOOD AND MARROW TRANSPLANT PROGRAM [place]. Please see a copy of my visit note below.    Infusion Nursing Note:  Shena Hartmann presents today for Cycle 7 Day 1 Darzalex Faspro injection.    Patient seen by provider today: No   present during visit today: Not Applicable.    Note:   Pt received 1800 mg Darzalex Faspro injection to left side abdomen over 6 minutes. Pt tolerated injection well.  Pt assessment completed on pt arrival.  Pt declined benadryl and decadron but was given 650 mg tylenol as premed 30 minutes prior to Darzalex injection.        Intravenous Access:  No Intravenous access at this visit.    Treatment Conditions:  Lab Results   Component Value Date    HGB 12.8 03/15/2021     Lab Results   Component Value Date    WBC 4.9 03/15/2021      Lab Results   Component Value Date    ANEU 2.2 03/15/2021     Lab Results   Component Value Date     03/15/2021      Pt met parameters for daratumumab injection today, no replacement needs today.    Post Infusion Assessment:  Patient tolerated injection without incident.       Discharge Plan:   Discharge instructions reviewed with: Patient.  Copy of AVS reviewed with patient and/or family.  Patient discharged in stable condition accompanied by: .  Departure Mode: Ambulatory.    Neela Ding RN                            Again, thank you for allowing me to participate in the care of your patient.        Sincerely,        [facility]

## 2021-03-16 ENCOUNTER — TELEPHONE (OUTPATIENT)
Dept: ONCOLOGY | Facility: CLINIC | Age: 71
End: 2021-03-16

## 2021-03-16 NOTE — TELEPHONE ENCOUNTER
Oral Chemotherapy Monitoring Program   Medication: POMALYST  Rx: 1mg PO daily on days 1 through 28 of 28 day cycle   Auth #:[id number]  Risk Category: ADULT FEMALE NOT OF REPRODUCTIVE POTENTIAL  Routine survey questions reviewed.   Rx to be Escribed to [facility]   Oncology Pharmacy Liaison  [email or web address]   [phone] (phone)   [phone] (fax)

## 2021-03-28 ENCOUNTER — HEALTH MAINTENANCE LETTER (OUTPATIENT)
Age: 71
End: 2021-03-28

## 2021-04-02 ENCOUNTER — VIRTUAL VISIT (OUTPATIENT)
Dept: TRANSPLANT | Facility: CLINIC | Age: 71
End: 2021-04-02
Attending: INTERNAL MEDICINE
Payer: COMMERCIAL

## 2021-04-02 DIAGNOSIS — C90.02 MULTIPLE MYELOMA IN RELAPSE (H): Primary | ICD-10-CM

## 2021-04-02 PROCEDURE — 99215 OFFICE O/P EST HI 40 MIN: CPT | Mod: 95 | Performed by: INTERNAL MEDICINE

## 2021-04-02 NOTE — LETTER
4/2/2021         RE: Shena Hartmann  [address] [place] [address]        Dear Colleague,    Thank you for referring your patient, Shena Hartmann, to the [facility] BLOOD AND MARROW TRANSPLANT PROGRAM [place]. Please see a copy of my visit note below.    BMT virtual visit follow-up note    I saw Shena on a virtual visit from 8 AM to 8:40 AM totaling 40 minutes of face-to-face time.    Briefly, Shena is a 70-year-old woman initially diagnosed with an elevated M spike in 2002.  She eventually progressed and had fairly refractory disease and in March 2014 (7 years ago) she underwent autologous transplantation.  She has been on maintenance therapy ever since transplant where she had only a transient response to the transplant itself.  Initially she was on Revlimid and then pomalidomide.  On pomalidomide, she eventually obtained an M spike of less than 1 g.  She is really had a difficult time tolerating all of her therapies without side effects.  We have been slowly decreasing her pomalidomide dose initially tolerating 2 alternating with 1 mg and then try to give her more treatment 3 days.  Eventually she began to again progress and was started on daratumumab, and increase in pomalidomide and Decadron (which also induces many side effects).  She is currently on monthly daratumumab, off Decadron and on pomalidomide (at doses attenuated based on side effects) and 1 mg 21 days out of every month.  She has really been feeling fairly well although continues to have some right hip and shoulder pain which is chronic.  These were evaluated by MRI last year but eventually the symptoms resolved.  She has had no Covid exposures or infections.  She has no shortness of breath or cough.  She continues to have some fatigue while on pomalidomide.  The symptoms have been chronic.  She has otherwise been active and is doing well.  The biggest issues on this visit are how to respond to her long-term plan for  multiple myeloma control.  The rest of the 10 point review of systems is unremarkable.    Physical exam: Overall Shena looks well and is in no distress today.  She has no facial abnormalities and pupils appear normal.  I do not see any rash.  She is easily conversant and in good spirits today.    Laboratory evaluation: A CBC shows a hemoglobin of 12.8, platelet count of 185,000 and a white count of 4900 with 2200 absolute neutrophils.  Her last few M spike's were 1.2 g on November 30 and February 8.  Note that before any daratumumab, her monoclonal protein peak at 2.4 g on August 24, 2020.  Her post transplant da on November 27, 2018 was an M spike of 0.7.  She has had no evidence of renal dysfunction.  Her last bone imaging was in December 2018.    Patient had a great deal of time with Shena discussing her disease and whether or not the are getting adequate control.  She is certainly responding to the current therapy but her current M spike is greater than her best response after autologous transplantation.  She is clinically stable and other than minor aches and pains, has no obvious bone progression.  However, given her recent progression looking at her bones at higher resolution is warranted.  I have ordered a PET scan which should be in the better screen for bony lesions and x-rays.  I am hoping that this will be scheduled later this month.  We will check another SPEP on her April 12 visit.  I have also scheduled additional Yen infusion visits including May 10, June 7, July 5 in August 2.  I will have a virtual visit with her on August 20.  I also discussed some of the newer unfolding cell therapies given the history of her disease.  She is aware that some TCAR therapy is being FDA approved for multiple myeloma.  I would like her to see my colleague, Dr. Callahan in May.  I spoke with him about Shena and he is happy to give recommendations regarding the timing and next steps of therapy should she not  maintain or improve her current response.  Zach is in agreement with this plan.  Orders have been written for the schedulers to carry out this plan.    Dr. Lui Hills MD      Again, thank you for allowing me to participate in the care of your patient.      Sincerely,    Lui Hills MD

## 2021-04-07 NOTE — PROGRESS NOTES
BMT virtual visit follow-up note    I saw Shena on a virtual visit from 8 AM to 8:40 AM totaling 40 minutes of face-to-face time.    Briefly, Shena is a 70-year-old woman initially diagnosed with an elevated M spike in 2002.  She eventually progressed and had fairly refractory disease and in March 2014 (7 years ago) she underwent autologous transplantation.  She has been on maintenance therapy ever since transplant where she had only a transient response to the transplant itself.  Initially she was on Revlimid and then pomalidomide.  On pomalidomide, she eventually obtained an M spike of less than 1 g.  She is really had a difficult time tolerating all of her therapies without side effects.  We have been slowly decreasing her pomalidomide dose initially tolerating 2 alternating with 1 mg and then try to give her more treatment 3 days.  Eventually she began to again progress and was started on daratumumab, and increase in pomalidomide and Decadron (which also induces many side effects).  She is currently on monthly daratumumab, off Decadron and on pomalidomide (at doses attenuated based on side effects) and 1 mg 21 days out of every month.  She has really been feeling fairly well although continues to have some right hip and shoulder pain which is chronic.  These were evaluated by MRI last year but eventually the symptoms resolved.  She has had no Covid exposures or infections.  She has no shortness of breath or cough.  She continues to have some fatigue while on pomalidomide.  The symptoms have been chronic.  She has otherwise been active and is doing well.  The biggest issues on this visit are how to respond to her long-term plan for multiple myeloma control.  The rest of the 10 point review of systems is unremarkable.    Physical exam: Overall Shena looks well and is in no distress today.  She has no facial abnormalities and pupils appear normal.  I do not see any rash.  She is easily conversant and in good  spirits today.    Laboratory evaluation: A CBC shows a hemoglobin of 12.8, platelet count of 185,000 and a white count of 4900 with 2200 absolute neutrophils.  Her last few M spike's were 1.2 g on November 30 and February 8.  Note that before any daratumumab, her monoclonal protein peak at 2.4 g on August 24, 2020.  Her post transplant da on November 27, 2018 was an M spike of 0.7.  She has had no evidence of renal dysfunction.  Her last bone imaging was in December 2018.    Patient had a great deal of time with Shena discussing her disease and whether or not the are getting adequate control.  She is certainly responding to the current therapy but her current M spike is greater than her best response after autologous transplantation.  She is clinically stable and other than minor aches and pains, has no obvious bone progression.  However, given her recent progression looking at her bones at higher resolution is warranted.  I have ordered a PET scan which should be in the better screen for bony lesions and x-rays.  I am hoping that this will be scheduled later this month.  We will check another SPEP on her April 12 visit.  I have also scheduled additional Yen infusion visits including May 10, June 7, July 5 in August 2.  I will have a virtual visit with her on August 20.  I also discussed some of the newer unfolding cell therapies given the history of her disease.  She is aware that some TCAR therapy is being FDA approved for multiple myeloma.  I would like her to see my colleague, Dr. Callahan in May.  I spoke with him about Shena and he is happy to give recommendations regarding the timing and next steps of therapy should she not maintain or improve her current response.  Zach is in agreement with this plan.  Orders have been written for the schedulers to carry out this plan.    Dr. Lui Hills MD

## 2021-04-11 RX ORDER — MEPERIDINE HYDROCHLORIDE 25 MG/ML
25 INJECTION INTRAMUSCULAR; INTRAVENOUS; SUBCUTANEOUS EVERY 30 MIN PRN
Status: CANCELLED | OUTPATIENT
Start: 2021-04-12

## 2021-04-11 RX ORDER — SODIUM CHLORIDE 9 MG/ML
1000 INJECTION, SOLUTION INTRAVENOUS CONTINUOUS PRN
Status: CANCELLED
Start: 2021-04-12

## 2021-04-11 RX ORDER — HEPARIN SODIUM (PORCINE) LOCK FLUSH IV SOLN 100 UNIT/ML 100 UNIT/ML
5 SOLUTION INTRAVENOUS
Status: CANCELLED | OUTPATIENT
Start: 2021-04-12

## 2021-04-11 RX ORDER — EPINEPHRINE 1 MG/ML
0.3 INJECTION, SOLUTION INTRAMUSCULAR; SUBCUTANEOUS EVERY 5 MIN PRN
Status: CANCELLED | OUTPATIENT
Start: 2021-04-12

## 2021-04-11 RX ORDER — LORAZEPAM 2 MG/ML
0.5 INJECTION INTRAMUSCULAR EVERY 4 HOURS PRN
Status: CANCELLED
Start: 2021-04-12

## 2021-04-11 RX ORDER — METHYLPREDNISOLONE SODIUM SUCCINATE 125 MG/2ML
125 INJECTION, POWDER, LYOPHILIZED, FOR SOLUTION INTRAMUSCULAR; INTRAVENOUS
Status: CANCELLED
Start: 2021-04-12

## 2021-04-11 RX ORDER — DIPHENHYDRAMINE HCL 25 MG
25 CAPSULE ORAL ONCE
Status: CANCELLED | OUTPATIENT
Start: 2021-04-12

## 2021-04-11 RX ORDER — ALBUTEROL SULFATE 0.83 MG/ML
2.5 SOLUTION RESPIRATORY (INHALATION)
Status: CANCELLED | OUTPATIENT
Start: 2021-04-12

## 2021-04-11 RX ORDER — DIPHENHYDRAMINE HYDROCHLORIDE 50 MG/ML
50 INJECTION INTRAMUSCULAR; INTRAVENOUS
Status: CANCELLED
Start: 2021-04-12

## 2021-04-11 RX ORDER — NALOXONE HYDROCHLORIDE 0.4 MG/ML
.1-.4 INJECTION, SOLUTION INTRAMUSCULAR; INTRAVENOUS; SUBCUTANEOUS
Status: CANCELLED | OUTPATIENT
Start: 2021-04-12

## 2021-04-11 RX ORDER — ALBUTEROL SULFATE 90 UG/1
1-2 AEROSOL, METERED RESPIRATORY (INHALATION)
Status: CANCELLED
Start: 2021-04-12

## 2021-04-11 RX ORDER — HEPARIN SODIUM,PORCINE 10 UNIT/ML
5 VIAL (ML) INTRAVENOUS
Status: CANCELLED | OUTPATIENT
Start: 2021-04-12

## 2021-04-12 ENCOUNTER — INFUSION THERAPY VISIT (OUTPATIENT)
Dept: TRANSPLANT | Facility: CLINIC | Age: 71
End: 2021-04-12
Payer: COMMERCIAL

## 2021-04-12 ENCOUNTER — APPOINTMENT (OUTPATIENT)
Dept: LAB | Facility: CLINIC | Age: 71
End: 2021-04-12
Attending: INTERNAL MEDICINE
Payer: COMMERCIAL

## 2021-04-12 VITALS
TEMPERATURE: 97.7 F | WEIGHT: 123.5 LBS | OXYGEN SATURATION: 97 % | DIASTOLIC BLOOD PRESSURE: 75 MMHG | SYSTOLIC BLOOD PRESSURE: 128 MMHG | BODY MASS INDEX: 22.59 KG/M2 | HEART RATE: 75 BPM | RESPIRATION RATE: 18 BRPM

## 2021-04-12 DIAGNOSIS — C90.02 MULTIPLE MYELOMA IN RELAPSE (H): ICD-10-CM

## 2021-04-12 DIAGNOSIS — C90.00 MULTIPLE MYELOMA NOT HAVING ACHIEVED REMISSION (H): Primary | ICD-10-CM

## 2021-04-12 LAB
ALBUMIN SERPL-MCNC: 3.5 G/DL (ref 3.4–5)
ALP SERPL-CCNC: 59 U/L (ref 40–150)
ALT SERPL W P-5'-P-CCNC: 27 U/L (ref 0–50)
ANION GAP SERPL CALCULATED.3IONS-SCNC: <1 MMOL/L (ref 3–14)
AST SERPL W P-5'-P-CCNC: 12 U/L (ref 0–45)
B2 MICROGLOB SERPL-MCNC: 2.1 MG/L
BASOPHILS # BLD AUTO: 0 10E9/L (ref 0–0.2)
BASOPHILS NFR BLD AUTO: 0.4 %
BILIRUB SERPL-MCNC: 0.3 MG/DL (ref 0.2–1.3)
BUN SERPL-MCNC: 15 MG/DL (ref 7–30)
CALCIUM SERPL-MCNC: 9 MG/DL (ref 8.5–10.1)
CHLORIDE SERPL-SCNC: 107 MMOL/L (ref 94–109)
CO2 SERPL-SCNC: 31 MMOL/L (ref 20–32)
CREAT SERPL-MCNC: 0.87 MG/DL (ref 0.52–1.04)
CRP SERPL-MCNC: <2.9 MG/L (ref 0–8)
DIFFERENTIAL METHOD BLD: ABNORMAL
EOSINOPHIL # BLD AUTO: 0.2 10E9/L (ref 0–0.7)
EOSINOPHIL NFR BLD AUTO: 3.3 %
ERYTHROCYTE [DISTWIDTH] IN BLOOD BY AUTOMATED COUNT: 15.2 % (ref 10–15)
GFR SERPL CREATININE-BSD FRML MDRD: 67 ML/MIN/{1.73_M2}
GLUCOSE SERPL-MCNC: 53 MG/DL (ref 70–99)
HCT VFR BLD AUTO: 38.4 % (ref 35–47)
HGB BLD-MCNC: 12.6 G/DL (ref 11.7–15.7)
IMM GRANULOCYTES # BLD: 0 10E9/L (ref 0–0.4)
IMM GRANULOCYTES NFR BLD: 0 %
LYMPHOCYTES # BLD AUTO: 2 10E9/L (ref 0.8–5.3)
LYMPHOCYTES NFR BLD AUTO: 41.8 %
MCH RBC QN AUTO: 33 PG (ref 26.5–33)
MCHC RBC AUTO-ENTMCNC: 32.8 G/DL (ref 31.5–36.5)
MCV RBC AUTO: 101 FL (ref 78–100)
MONOCYTES # BLD AUTO: 0.8 10E9/L (ref 0–1.3)
MONOCYTES NFR BLD AUTO: 16.1 %
NEUTROPHILS # BLD AUTO: 1.8 10E9/L (ref 1.6–8.3)
NEUTROPHILS NFR BLD AUTO: 38.4 %
NRBC # BLD AUTO: 0 10*3/UL
NRBC BLD AUTO-RTO: 0 /100
PLATELET # BLD AUTO: 174 10E9/L (ref 150–450)
POTASSIUM SERPL-SCNC: 4.3 MMOL/L (ref 3.4–5.3)
PROT SERPL-MCNC: 7.6 G/DL (ref 6.8–8.8)
RBC # BLD AUTO: 3.82 10E12/L (ref 3.8–5.2)
SODIUM SERPL-SCNC: 140 MMOL/L (ref 133–144)
WBC # BLD AUTO: 4.8 10E9/L (ref 4–11)

## 2021-04-12 PROCEDURE — 85025 COMPLETE CBC W/AUTO DIFF WBC: CPT | Performed by: INTERNAL MEDICINE

## 2021-04-12 PROCEDURE — 84165 PROTEIN E-PHORESIS SERUM: CPT | Mod: 26 | Performed by: STUDENT IN AN ORGANIZED HEALTH CARE EDUCATION/TRAINING PROGRAM

## 2021-04-12 PROCEDURE — 80053 COMPREHEN METABOLIC PANEL: CPT | Performed by: INTERNAL MEDICINE

## 2021-04-12 PROCEDURE — 250N000011 HC RX IP 250 OP 636: Performed by: INTERNAL MEDICINE

## 2021-04-12 PROCEDURE — 82232 ASSAY OF BETA-2 PROTEIN: CPT | Performed by: INTERNAL MEDICINE

## 2021-04-12 PROCEDURE — 999N001036 HC STATISTIC TOTAL PROTEIN: Performed by: INTERNAL MEDICINE

## 2021-04-12 PROCEDURE — 86140 C-REACTIVE PROTEIN: CPT | Performed by: INTERNAL MEDICINE

## 2021-04-12 PROCEDURE — 36415 COLL VENOUS BLD VENIPUNCTURE: CPT

## 2021-04-12 PROCEDURE — 96401 CHEMO ANTI-NEOPL SQ/IM: CPT

## 2021-04-12 PROCEDURE — 250N000013 HC RX MED GY IP 250 OP 250 PS 637: Performed by: INTERNAL MEDICINE

## 2021-04-12 PROCEDURE — 84165 PROTEIN E-PHORESIS SERUM: CPT | Mod: TC | Performed by: INTERNAL MEDICINE

## 2021-04-12 RX ORDER — ACETAMINOPHEN 325 MG/1
650 TABLET ORAL ONCE
Status: COMPLETED | OUTPATIENT
Start: 2021-04-12 | End: 2021-04-12

## 2021-04-12 RX ADMIN — DARATUMUMAB AND HYALURONIDASE-FIHJ (HUMAN RECOMBINANT) 1800 MG: 1800; 30000 INJECTION SUBCUTANEOUS at 08:40

## 2021-04-12 RX ADMIN — ACETAMINOPHEN 650 MG: 325 TABLET ORAL at 08:19

## 2021-04-12 ASSESSMENT — PAIN SCALES - GENERAL: PAINLEVEL: MODERATE PAIN (5)

## 2021-04-12 NOTE — PROGRESS NOTES
Infusion Nursing Note:  Shena Hartmann presents today for Cycle 8 subcutaneous Daratumumab.    Patient seen by provider today: No   present during visit today: Not Applicable.     Note: Labs monitored. Meets parameters for inj. Premeds tylenol administered. Pt declines benadryl and decadron.  Subcutaneous Yen administered via 21g butterfly needle in right abd over 5mins. Pt tolerated inj. Pressure held on inj site for 3mins post.      Intravenous Access:  No Intravenous access/labs at this visit.     Treatment Conditions:  Results reviewed, labs MET treatment parameters, ok to proceed with treatment.        Post Infusion Assessment:  Patient tolerated injection without incident.         Discharge Plan:   Patient discharged in stable condition accompanied by: self.  Departure Mode: Ambulatory.

## 2021-04-13 LAB
ALBUMIN SERPL ELPH-MCNC: 4.3 G/DL (ref 3.7–5.1)
ALPHA1 GLOB SERPL ELPH-MCNC: 0.3 G/DL (ref 0.2–0.4)
ALPHA2 GLOB SERPL ELPH-MCNC: 0.7 G/DL (ref 0.5–0.9)
B-GLOBULIN SERPL ELPH-MCNC: 0.6 G/DL (ref 0.6–1)
GAMMA GLOB SERPL ELPH-MCNC: 1.5 G/DL (ref 0.7–1.6)
M PROTEIN SERPL ELPH-MCNC: 1.2 G/DL
PROT PATTERN SERPL ELPH-IMP: ABNORMAL

## 2021-04-15 ENCOUNTER — TELEPHONE (OUTPATIENT)
Dept: ONCOLOGY | Facility: CLINIC | Age: 71
End: 2021-04-15

## 2021-04-15 DIAGNOSIS — C90.00 MULTIPLE MYELOMA, REMISSION STATUS UNSPECIFIED (H): Primary | ICD-10-CM

## 2021-04-15 NOTE — TELEPHONE ENCOUNTER
Oral Chemotherapy Monitoring Program   Medication: POMALYST  Rx: 1mg PO daily on days 1 through 21 of 28 day cycle   Auth #:[id number]  Risk Category: ADULT FEMALE NOT OF REPRODUCTIVE POTENTIAL  Routine survey questions reviewed.   Rx to be Escribed to [facility]   Oncology Pharmacy Liaison  [email or web address]   [phone] (phone)   [phone] (fax)

## 2021-04-23 ENCOUNTER — ANCILLARY PROCEDURE (OUTPATIENT)
Dept: PET IMAGING | Facility: CLINIC | Age: 71
End: 2021-04-23
Attending: INTERNAL MEDICINE
Payer: COMMERCIAL

## 2021-04-23 DIAGNOSIS — C90.02 MULTIPLE MYELOMA IN RELAPSE (H): ICD-10-CM

## 2021-04-23 LAB — GLUCOSE SERPL-MCNC: 105 MG/DL (ref 70–99)

## 2021-05-09 DIAGNOSIS — C90.00 MULTIPLE MYELOMA NOT HAVING ACHIEVED REMISSION (H): Primary | ICD-10-CM

## 2021-05-09 RX ORDER — METHYLPREDNISOLONE SODIUM SUCCINATE 125 MG/2ML
125 INJECTION, POWDER, LYOPHILIZED, FOR SOLUTION INTRAMUSCULAR; INTRAVENOUS
Status: CANCELLED
Start: 2021-05-10

## 2021-05-09 RX ORDER — MEPERIDINE HYDROCHLORIDE 25 MG/ML
25 INJECTION INTRAMUSCULAR; INTRAVENOUS; SUBCUTANEOUS EVERY 30 MIN PRN
Status: CANCELLED | OUTPATIENT
Start: 2021-05-10

## 2021-05-09 RX ORDER — DIPHENHYDRAMINE HCL 25 MG
25 CAPSULE ORAL ONCE
Status: CANCELLED | OUTPATIENT
Start: 2021-05-10

## 2021-05-09 RX ORDER — SODIUM CHLORIDE 9 MG/ML
1000 INJECTION, SOLUTION INTRAVENOUS CONTINUOUS PRN
Status: CANCELLED
Start: 2021-05-10

## 2021-05-09 RX ORDER — NALOXONE HYDROCHLORIDE 0.4 MG/ML
.1-.4 INJECTION, SOLUTION INTRAMUSCULAR; INTRAVENOUS; SUBCUTANEOUS
Status: CANCELLED | OUTPATIENT
Start: 2021-05-10

## 2021-05-09 RX ORDER — LORAZEPAM 2 MG/ML
0.5 INJECTION INTRAMUSCULAR EVERY 4 HOURS PRN
Status: CANCELLED
Start: 2021-05-10

## 2021-05-09 RX ORDER — ALBUTEROL SULFATE 90 UG/1
1-2 AEROSOL, METERED RESPIRATORY (INHALATION)
Status: CANCELLED
Start: 2021-05-10

## 2021-05-09 RX ORDER — EPINEPHRINE 1 MG/ML
0.3 INJECTION, SOLUTION INTRAMUSCULAR; SUBCUTANEOUS EVERY 5 MIN PRN
Status: CANCELLED | OUTPATIENT
Start: 2021-05-10

## 2021-05-09 RX ORDER — HEPARIN SODIUM (PORCINE) LOCK FLUSH IV SOLN 100 UNIT/ML 100 UNIT/ML
5 SOLUTION INTRAVENOUS
Status: CANCELLED | OUTPATIENT
Start: 2021-05-10

## 2021-05-09 RX ORDER — HEPARIN SODIUM,PORCINE 10 UNIT/ML
5 VIAL (ML) INTRAVENOUS
Status: CANCELLED | OUTPATIENT
Start: 2021-05-10

## 2021-05-09 RX ORDER — ACETAMINOPHEN 325 MG/1
650 TABLET ORAL ONCE
Status: CANCELLED | OUTPATIENT
Start: 2021-05-10

## 2021-05-09 RX ORDER — DIPHENHYDRAMINE HYDROCHLORIDE 50 MG/ML
50 INJECTION INTRAMUSCULAR; INTRAVENOUS
Status: CANCELLED
Start: 2021-05-10

## 2021-05-09 RX ORDER — ALBUTEROL SULFATE 0.83 MG/ML
2.5 SOLUTION RESPIRATORY (INHALATION)
Status: CANCELLED | OUTPATIENT
Start: 2021-05-10

## 2021-05-10 ENCOUNTER — APPOINTMENT (OUTPATIENT)
Dept: LAB | Facility: CLINIC | Age: 71
End: 2021-05-10
Attending: INTERNAL MEDICINE
Payer: COMMERCIAL

## 2021-05-10 ENCOUNTER — INFUSION THERAPY VISIT (OUTPATIENT)
Dept: TRANSPLANT | Facility: CLINIC | Age: 71
End: 2021-05-10
Attending: INTERNAL MEDICINE
Payer: COMMERCIAL

## 2021-05-10 VITALS
BODY MASS INDEX: 22.81 KG/M2 | DIASTOLIC BLOOD PRESSURE: 73 MMHG | RESPIRATION RATE: 16 BRPM | OXYGEN SATURATION: 96 % | HEART RATE: 66 BPM | TEMPERATURE: 98 F | SYSTOLIC BLOOD PRESSURE: 131 MMHG | WEIGHT: 124.7 LBS

## 2021-05-10 DIAGNOSIS — C90.00 MULTIPLE MYELOMA NOT HAVING ACHIEVED REMISSION (H): Primary | ICD-10-CM

## 2021-05-10 LAB
ALBUMIN SERPL-MCNC: 3.6 G/DL (ref 3.4–5)
ALP SERPL-CCNC: 54 U/L (ref 40–150)
ALT SERPL W P-5'-P-CCNC: 28 U/L (ref 0–50)
ANION GAP SERPL CALCULATED.3IONS-SCNC: 5 MMOL/L (ref 3–14)
AST SERPL W P-5'-P-CCNC: 13 U/L (ref 0–45)
BASOPHILS # BLD AUTO: 0 10E9/L (ref 0–0.2)
BASOPHILS NFR BLD AUTO: 0.9 %
BILIRUB SERPL-MCNC: 0.4 MG/DL (ref 0.2–1.3)
BUN SERPL-MCNC: 20 MG/DL (ref 7–30)
CALCIUM SERPL-MCNC: 9.2 MG/DL (ref 8.5–10.1)
CHLORIDE SERPL-SCNC: 105 MMOL/L (ref 94–109)
CO2 SERPL-SCNC: 30 MMOL/L (ref 20–32)
CREAT SERPL-MCNC: 0.82 MG/DL (ref 0.52–1.04)
DIFFERENTIAL METHOD BLD: NORMAL
EOSINOPHIL # BLD AUTO: 0.1 10E9/L (ref 0–0.7)
EOSINOPHIL NFR BLD AUTO: 2 %
ERYTHROCYTE [DISTWIDTH] IN BLOOD BY AUTOMATED COUNT: 15 % (ref 10–15)
GFR SERPL CREATININE-BSD FRML MDRD: 73 ML/MIN/{1.73_M2}
GLUCOSE SERPL-MCNC: 67 MG/DL (ref 70–99)
HCT VFR BLD AUTO: 37.5 % (ref 35–47)
HGB BLD-MCNC: 12.6 G/DL (ref 11.7–15.7)
IMM GRANULOCYTES # BLD: 0 10E9/L (ref 0–0.4)
IMM GRANULOCYTES NFR BLD: 0.2 %
LYMPHOCYTES # BLD AUTO: 1.9 10E9/L (ref 0.8–5.3)
LYMPHOCYTES NFR BLD AUTO: 42.3 %
MCH RBC QN AUTO: 33 PG (ref 26.5–33)
MCHC RBC AUTO-ENTMCNC: 33.6 G/DL (ref 31.5–36.5)
MCV RBC AUTO: 98 FL (ref 78–100)
MONOCYTES # BLD AUTO: 0.8 10E9/L (ref 0–1.3)
MONOCYTES NFR BLD AUTO: 17.3 %
NEUTROPHILS # BLD AUTO: 1.7 10E9/L (ref 1.6–8.3)
NEUTROPHILS NFR BLD AUTO: 37.3 %
NRBC # BLD AUTO: 0 10*3/UL
NRBC BLD AUTO-RTO: 0 /100
PLATELET # BLD AUTO: 176 10E9/L (ref 150–450)
POTASSIUM SERPL-SCNC: 4 MMOL/L (ref 3.4–5.3)
PROT SERPL-MCNC: 7.6 G/DL (ref 6.8–8.8)
RBC # BLD AUTO: 3.82 10E12/L (ref 3.8–5.2)
SODIUM SERPL-SCNC: 140 MMOL/L (ref 133–144)
WBC # BLD AUTO: 4.5 10E9/L (ref 4–11)

## 2021-05-10 PROCEDURE — 250N000011 HC RX IP 250 OP 636: Performed by: INTERNAL MEDICINE

## 2021-05-10 PROCEDURE — 250N000013 HC RX MED GY IP 250 OP 250 PS 637: Performed by: INTERNAL MEDICINE

## 2021-05-10 PROCEDURE — 80053 COMPREHEN METABOLIC PANEL: CPT | Performed by: INTERNAL MEDICINE

## 2021-05-10 PROCEDURE — 36415 COLL VENOUS BLD VENIPUNCTURE: CPT

## 2021-05-10 PROCEDURE — 96401 CHEMO ANTI-NEOPL SQ/IM: CPT

## 2021-05-10 PROCEDURE — 85025 COMPLETE CBC W/AUTO DIFF WBC: CPT | Performed by: INTERNAL MEDICINE

## 2021-05-10 RX ORDER — ACETAMINOPHEN 325 MG/1
650 TABLET ORAL ONCE
Status: COMPLETED | OUTPATIENT
Start: 2021-05-10 | End: 2021-05-10

## 2021-05-10 RX ADMIN — ACETAMINOPHEN 650 MG: 325 TABLET ORAL at 08:29

## 2021-05-10 RX ADMIN — DARATUMUMAB AND HYALURONIDASE-FIHJ (HUMAN RECOMBINANT) 1800 MG: 1800; 30000 INJECTION SUBCUTANEOUS at 09:17

## 2021-05-10 ASSESSMENT — PAIN SCALES - GENERAL: PAINLEVEL: MODERATE PAIN (4)

## 2021-05-10 NOTE — LETTER
5/10/2021         RE: Shena Hartmann  [address] [place] [address]        Dear Colleague,    Thank you for referring your patient, Shena Hartmann, to the [facility] BLOOD AND MARROW TRANSPLANT PROGRAM [place]. Please see a copy of my visit note below.    Infusion Nursing Note:  Shena Hartmann presents today for Day 1 cycle 9 Daratumumab.    Patient seen by provider today: No   present during visit today: Not Applicable.    Note: Patient here for her scheduled subcutaneous Daratumumab. No provider visit. Labs monitored and patient met parameters for treatment today. Assessment completed prior to chemo administration. Pre-meds of or tylenol 30 minutes prior. She declined the decadron and benadryl and says she has tolerated chemo without in prior doses. Chemo checked with two RNs prior to administration. Administered in left abdominal tissue over 6 minutes. Patient held pressure with gauze afterwards.     Intravenous Access:  No Intravenous access/labs at this visit.    Treatment Conditions:  Lab Results   Component Value Date    HGB 12.6 05/10/2021     Lab Results   Component Value Date    WBC 4.5 05/10/2021      Lab Results   Component Value Date    ANEU 1.7 05/10/2021     Lab Results   Component Value Date     05/10/2021      Results reviewed, labs MET treatment parameters, ok to proceed with treatment.      Post Infusion Assessment:  Patient tolerated injection without incident.       Discharge Plan:   Patient discharged in stable condition accompanied by: self.  Departure Mode: Ambulatory.    MARTY BAEZ RN                            Again, thank you for allowing me to participate in the care of your patient.        Sincerely,        [facility]

## 2021-05-10 NOTE — PROGRESS NOTES
Infusion Nursing Note:  Shena Hartmann presents today for Day 1 cycle 9 Daratumumab.    Patient seen by provider today: No   present during visit today: Not Applicable.    Note: Patient here for her scheduled subcutaneous Daratumumab. No provider visit. Labs monitored and patient met parameters for treatment today. Assessment completed prior to chemo administration. Pre-meds of or tylenol 30 minutes prior. She declined the decadron and benadryl and says she has tolerated chemo without in prior doses. Chemo checked with two RNs prior to administration. Administered in left abdominal tissue over 6 minutes. Patient held pressure with gauze afterwards.     Intravenous Access:  No Intravenous access/labs at this visit.    Treatment Conditions:  Lab Results   Component Value Date    HGB 12.6 05/10/2021     Lab Results   Component Value Date    WBC 4.5 05/10/2021      Lab Results   Component Value Date    ANEU 1.7 05/10/2021     Lab Results   Component Value Date     05/10/2021      Results reviewed, labs MET treatment parameters, ok to proceed with treatment.      Post Infusion Assessment:  Patient tolerated injection without incident.       Discharge Plan:   Patient discharged in stable condition accompanied by: self.  Departure Mode: Ambulatory.    MARTY BAEZ RN

## 2021-05-10 NOTE — NURSING NOTE
"Oncology Rooming Note    May 10, 2021 8:20 AM   Shena Hartmann is a 70 year old female who presents for:    Chief Complaint   Patient presents with     Blood Draw     Vitals, blood drawn via VPT by LPN. Pt checked into appt.      Infusion     day 1 cycle 9 Yen s/p bmt for MM     Initial Vitals: /71   Pulse 71   Temp 97.6  F (36.4  C) (Oral)   Resp 16   Wt 56.6 kg (124 lb 11.2 oz)   SpO2 99%   BMI 22.81 kg/m   Estimated body mass index is 22.81 kg/m  as calculated from the following:    Height as of 3/5/19: 1.575 m (5' 2\").    Weight as of this encounter: 56.6 kg (124 lb 11.2 oz). Body surface area is 1.57 meters squared.  Moderate Pain (4) Comment: Data Unavailable   No LMP recorded. Patient is postmenopausal.  Allergies reviewed: Yes  Medications reviewed: Yes    Medications: Medication refills not needed today.  Pharmacy name entered into [facility]:    [id number] - [place], [place] - [address] AT [facility] [place] [place] [address]  WRITTEN PRESCRIPTION REQUESTED  [facility] - [place], [place] - [address]    Clinical concerns: none    MARTY BAEZ RN              "

## 2021-05-12 ENCOUNTER — OFFICE VISIT (OUTPATIENT)
Dept: TRANSPLANT | Facility: CLINIC | Age: 71
End: 2021-05-12
Attending: INTERNAL MEDICINE
Payer: COMMERCIAL

## 2021-05-12 VITALS
HEART RATE: 70 BPM | TEMPERATURE: 98 F | OXYGEN SATURATION: 97 % | RESPIRATION RATE: 16 BRPM | SYSTOLIC BLOOD PRESSURE: 158 MMHG | DIASTOLIC BLOOD PRESSURE: 81 MMHG | HEIGHT: 62 IN | BODY MASS INDEX: 23.08 KG/M2 | WEIGHT: 125.4 LBS

## 2021-05-12 DIAGNOSIS — C90.02 MULTIPLE MYELOMA IN RELAPSE (H): Primary | ICD-10-CM

## 2021-05-12 PROCEDURE — G0463 HOSPITAL OUTPT CLINIC VISIT: HCPCS

## 2021-05-12 PROCEDURE — 99215 OFFICE O/P EST HI 40 MIN: CPT | Mod: GC | Performed by: INTERNAL MEDICINE

## 2021-05-12 ASSESSMENT — PAIN SCALES - GENERAL: PAINLEVEL: MODERATE PAIN (4)

## 2021-05-12 ASSESSMENT — MIFFLIN-ST. JEOR: SCORE: 1039.06

## 2021-05-12 NOTE — PROGRESS NOTES
"   Hematology/Oncology Consult Note    Shena Hartmann MRN# [id number]   Age: 70 year old YOB: 1950          Reason for Consult:   RRMM           History of Present Illness:   History obtained from chart review and confirmed with patient.    Shena Hartmann is a 70 year old female with a history of multiple myeloma outlined below (from Dr. Moncada note and updated from patient history)  \"Briefly, Shena is a 70-year-old woman initially diagnosed with an elevated M spike in 2002.  She eventually progressed and had fairly refractory disease and in March 2014 (7 years ago) she underwent autologous transplantation.   Prior to autologous transplantation she also received velcade for several cycles which she reports did not have much effect to her disease..   She had been on maintenance therapy ever since transplant where she had only a transient response to the transplant itself.  Initially she was on Revlimid and then pomalidomide.  She was also on thalidomide in 2014 which caused her to have a severe rash.  On pomalidomide, she eventually obtained an M spike of less than 1 g.  She is really had a difficult time tolerating all of her therapies without side effects.  We have been slowly decreasing her pomalidomide dose initially tolerating 2 alternating with 1 mg and then try to give her more treatment 3 days.  Eventually she began to again progress and was started on daratumumab, and increase in pomalidomide and Decadron (which also induces many side effects).  She is currently on monthly daratumumab, off Decadron and on pomalidomide (at doses attenuated based on side effects) and 1 mg 21 days out of every month\"    Ms. Hartmann presents to clinic today with her . She confirms the above myeloma history and adds that the side effects with decadron are completely intolerable, with headaches and high blood pressure being predominant. With the imids, she has had significant rashes requiring her to visit " with dermatology and severe fatigue, even with dose reductions, that affects her daily activities.             Assessment and Plan:   Shena Hartmann is a 70 year old female with Multiple myeloma (see oncology history outlined below) currently on daratumumab plus reduced dose pomalidomide, with recent lab work showing a stable M spike of 1.2 and a recent PET CT scan with no evidence of active myeloma lesions.  She presents today to discuss available therapeutic options at the time she does progress    She is very sensitive to imids and dexamethasone which presents a challenge in choosing subsequent therapies. Nearly every therapy in myeloma is with dexamethasone.    We reviewed the available options under the following categories.    1.  FDA approved medications    Under this category we discussed first that a very good option for her would be carfilzomib which is a proteosome inhibitor similar to bortezomib but can be efficacious in patients who have progressed on bortezomib.  It is given as an IV treatment and has good response rates.  Major side effect of concern with carfilzomib is cardiac toxicity which would require a baseline echo and clinical monitoring.      Another option is selinexor which  acts to induce apoptosis in myeloma cells via selective inhibitor of nuclear export (SINE).  It is an oral agent however its efficacy is on the lower side and can come with significant side effects such as nausea and fatigue, requiring IV fluids support during initiation of therapy .     The third agent is elotuzumab, which is a SLAMF7 antibody.  It is given as an IV therapy and is generally well-tolerated.    The next we  mentioned was Isatuximab which is an anti-CD38 monoclonal antibody similar to daratumumab.      Recently approved is Belantamab Mafadotin  which is an antibody drug conjugate targeting BCMA. It is given as an IV therapy and does not require the use of steroids with it, but the particular toxicity of  concern is ocular toxicity that requires frequent visits with ophthalmology for monitoring. Fortunately, so far this particular toxicity appears to be reversible in most patients.    Another option would be utilization of venetoclax if she were found to harbor a t(11,14 translocation), so we would consider repeating a bone marrow biopsy to evaluate for this at time of progression.     The other class of medications includes chemotherapy options including melflufen (melphelan flufenamide) or bendamustine.    2. Cellular therapies    CAR-T therapy (Clint-vita) was recently approved for treatment of relapsed refractory multiple myeloma.  We briefly discussed that this involves collection of T cells from her, their genetic modification in a lab to attack myeloma,  followed by chemotherapy and reinfusion of the cells.  It carries a very high efficacy rate on the order of 70 to 80% and responses can last long on an average of 6 to 12 months.  However it does come with risk of 5% severe side effects in terms of cytokine release syndrome or neurologic toxicity or even mortality.     Other options include enrolling in clinical trial of therapies like  NK cells, which can be off-the-shelf therapy given his IV infusion of cells and potentially has less toxicity profile than CAR-T.      Also in clinical trials are therapies like bispecific antibodies with varying targets which can be efficacious like CAR-T with similar toxicity profile.    At this time, her disease appears stable. Her M spike is at 1.2 for the last 6 months, she does not have anemia or renal dysfunction and PET scan in 4/2021 has no evidence of active diseae. There does not appear to be any current need to change her regimen of daratumumab-pomalidomide. She is unable to tolerate decadron due to significant side effects, so future regimens would have to be two drug rather than three drug. At time of progression, we discussed that the most reasonable options  would be to   1. explore carfilzomib or elotuzumab or in combination -https://ashpublications.org/blood/article/132/Supplement%201/1975/615635/K-Nlpgb-3-Study-of-Carfilzomib-Plus-Elotuzumab  2. Obtain a bone marrow biopsy to assess for t(11,14) to see if venetoclax would be beneficial (venetoclax-velcade)  3. Explore clinical trial options like    4: Explore clinical trial options at [facility]    She and her  expressed understanding.     Patient seen with Dr. London Pham MD  PGY5 Fellow. Hematology/Oncology/Transplantation    The patient was discussed with the clinical fellow, seen and examined by me. All labs and imaging were reviewed. I  I agree with the fellows note and have been responsible for the care plan and interpretation of progress. Any additional information to the fellows note has been documented below.      I discussed all the above and modified Dr. Pham's note  -My first choice in progression would be a carfilzomib based regimen +/- renee ;  [facility] clinical trial or BCMA CART  -She will continue to followup with Dr. Hills at this time        Mao MACKENZIE MS  Attending Physician  Pager - [phone]  Email - [email or web address]               Review of Systems:   A comprehensive ROS was performed with the patient and was found to be negative with the exception of that noted in the HPI above.       Past Medical History:     Past Medical History:   Diagnosis Date     Breast cancer (H) 1994    right     H/O stem cell transplant (H) 3/2014     Multiple myeloma, without mention of having achieved remission 11/6/2012            Past Surgical History:     Past Surgical History:   Procedure Laterality Date     MASTECTOMY      Right, with lymphe node disection       PICC INSERTION  9/10/2013    5fr DL Power PICC, 44cm (1cm external) in the L lateral brachial vein with tip in the low SVC            Social History:     Social History     Social History Narrative     Not on  "file              Family History:     Family History   Problem Relation Age of Onset     Cancer Paternal Grandmother         skin cancer     Hypertension Mother      Cerebrovascular Disease Mother      Hypertension Father      Prostate Cancer Father             Allergies:     Allergies   Allergen Reactions     Cayenne Pepper [Cayenne]      Corn-Related Products GI Disturbance     Levaquin Other (See Comments)     Tendon pain     Milk Protein GI Disturbance     Mold      Facial rash/lip swelling     Morphine Sulfate Other (See Comments)     Per pt - see things (hallucination) when she was on Morphine .     Pollen Extract      Environmental allergies      Shrimp Nausea and Vomiting     Tomato      Lip swelling, facial rash     Azithromycin Rash     Keflex [Cephalexin] Rash     Oat Rash     Tape [Adhesive Tape] Itching and Rash     All adhesives     Wheat Rash     Swelling of lips            Medications:   (Not in a hospital admission)           Physical Exam:   BP (!) 158/81 (BP Location: Left arm, Patient Position: Sitting, Cuff Size: Adult Regular)   Pulse 70   Temp 98  F (36.7  C) (Oral)   Resp 16   Ht 1.57 m (5' 1.81\")   Wt 56.9 kg (125 lb 6.4 oz)   SpO2 97%   BMI 23.08 kg/m    Vitals:    05/12/21 1126   Weight: 56.9 kg (125 lb 6.4 oz)     Appears well, no distress, sitting in chair  Breathing comfortably on room air         Data:     Lab Results   Component Value Date    WBC 4.5 05/10/2021     Lab Results   Component Value Date    RBC 3.82 05/10/2021     Lab Results   Component Value Date    HGB 12.6 05/10/2021     Lab Results   Component Value Date    HCT 37.5 05/10/2021     No components found for: MCT  Lab Results   Component Value Date    MCV 98 05/10/2021     Lab Results   Component Value Date    MCH 33.0 05/10/2021     Lab Results   Component Value Date    MCHC 33.6 05/10/2021     Lab Results   Component Value Date    RDW 15.0 05/10/2021     Lab Results   Component Value Date     05/10/2021 "                           PET CT 4/23/2021                                           IMPRESSION: This patient with a history of multiple myeloma status  post autologous stem cell transplant and chemotherapy: No active  disease.  1. No suspicious FDG uptake visualized within the head/neck neck,  chest, abdomen or pelvis.  2. Permeative appearance of the spine consistent with treated myeloma.

## 2021-05-12 NOTE — LETTER
"    5/12/2021         RE: Shena Hartmann  [address] [place] [address]        Dear Colleague,    Thank you for referring your patient, Shena Hartmann, to the [facility] BLOOD AND MARROW TRANSPLANT PROGRAM [place]. Please see a copy of my visit note below.       Hematology/Oncology Consult Note    Shena Hartmann MRN# [id number]   Age: 70 year old YOB: 1950          Reason for Consult:   RRMM           History of Present Illness:   History obtained from chart review and confirmed with patient.    Shena Hartmann is a 70 year old female with a history of multiple myeloma outlined below (from Dr. Moncada note and updated from patient history)  \"Briefly, Shena is a 70-year-old woman initially diagnosed with an elevated M spike in 2002.  She eventually progressed and had fairly refractory disease and in March 2014 (7 years ago) she underwent autologous transplantation.   Prior to autologous transplantation she also received velcade for several cycles which she reports did not have much effect to her disease..   She had been on maintenance therapy ever since transplant where she had only a transient response to the transplant itself.  Initially she was on Revlimid and then pomalidomide.  She was also on thalidomide in 2014 which caused her to have a severe rash.  On pomalidomide, she eventually obtained an M spike of less than 1 g.  She is really had a difficult time tolerating all of her therapies without side effects.  We have been slowly decreasing her pomalidomide dose initially tolerating 2 alternating with 1 mg and then try to give her more treatment 3 days.  Eventually she began to again progress and was started on daratumumab, and increase in pomalidomide and Decadron (which also induces many side effects).  She is currently on monthly daratumumab, off Decadron and on pomalidomide (at doses attenuated based on side effects) and 1 mg 21 days out of every month\"    Ms. " Shahbaz presents to clinic today with her . She confirms the above myeloma history and adds that the side effects with decadron are completely intolerable, with headaches and high blood pressure being predominant. With the imids, she has had significant rashes requiring her to visit with dermatology and severe fatigue, even with dose reductions, that affects her daily activities.             Assessment and Plan:   Shena Hartmann is a 70 year old female with Multiple myeloma (see oncology history outlined below) currently on daratumumab plus reduced dose pomalidomide, with recent lab work showing a stable M spike of 1.2 and a recent PET CT scan with no evidence of active myeloma lesions.  She presents today to discuss available therapeutic options at the time she does progress    She is very sensitive to imids and dexamethasone which presents a challenge in choosing subsequent therapies. Nearly every therapy in myeloma is with dexamethasone.    We reviewed the available options under the following categories.    1.  FDA approved medications    Under this category we discussed first that a very good option for her would be carfilzomib which is a proteosome inhibitor similar to bortezomib but can be efficacious in patients who have progressed on bortezomib.  It is given as an IV treatment and has good response rates.  Major side effect of concern with carfilzomib is cardiac toxicity which would require a baseline echo and clinical monitoring.      Another option is selinexor which  acts to induce apoptosis in myeloma cells via selective inhibitor of nuclear export (SINE).  It is an oral agent however its efficacy is on the lower side and can come with significant side effects such as nausea and fatigue, requiring IV fluids support during initiation of therapy .     The third agent is elotuzumab, which is a SLAMF7 antibody.  It is given as an IV therapy and is generally well-tolerated.    The next we  mentioned  was Isatuximab which is an anti-CD38 monoclonal antibody similar to daratumumab.      Recently approved is Belantamab Mafadotin  which is an antibody drug conjugate targeting BCMA. It is given as an IV therapy and does not require the use of steroids with it, but the particular toxicity of concern is ocular toxicity that requires frequent visits with ophthalmology for monitoring. Fortunately, so far this particular toxicity appears to be reversible in most patients.    Another option would be utilization of venetoclax if she were found to harbor a t(11,14 translocation), so we would consider repeating a bone marrow biopsy to evaluate for this at time of progression.     The other class of medications includes chemotherapy options including melflufen (melphelan flufenamide) or bendamustine.    2. Cellular therapies    CAR-T therapy (Clint-vita) was recently approved for treatment of relapsed refractory multiple myeloma.  We briefly discussed that this involves collection of T cells from her, their genetic modification in a lab to attack myeloma,  followed by chemotherapy and reinfusion of the cells.  It carries a very high efficacy rate on the order of 70 to 80% and responses can last long on an average of 6 to 12 months.  However it does come with risk of 5% severe side effects in terms of cytokine release syndrome or neurologic toxicity or even mortality.     Other options include enrolling in clinical trial of therapies like  NK cells, which can be off-the-shelf therapy given his IV infusion of cells and potentially has less toxicity profile than CAR-T.      Also in clinical trials are therapies like bispecific antibodies with varying targets which can be efficacious like CAR-T with similar toxicity profile.    At this time, her disease appears stable. Her M spike is at 1.2 for the last 6 months, she does not have anemia or renal dysfunction and PET scan in 4/2021 has no evidence of active diseae. There does not  appear to be any current need to change her regimen of daratumumab-pomalidomide. She is unable to tolerate decadron due to significant side effects, so future regimens would have to be two drug rather than three drug. At time of progression, we discussed that the most reasonable options would be to   1. explore carfilzomib or elotuzumab or in combination -https://ashpublications.org/blood/article/132/Supplement%201/1975/507630/C-Lulwr-8-Study-of-Carfilzomib-Plus-Elotuzumab  2. Obtain a bone marrow biopsy to assess for t(11,14) to see if venetoclax would be beneficial (venetoclax-velcade)  3. Explore clinical trial options like    4: Explore clinical trial options at [facility]    She and her  expressed understanding.     Patient seen with Dr. London Pham MD  PGY5 Fellow. Hematology/Oncology/Transplantation    The patient was discussed with the clinical fellow, seen and examined by me. All labs and imaging were reviewed. I  I agree with the fellows note and have been responsible for the care plan and interpretation of progress. Any additional information to the fellows note has been documented below.      I discussed all the above and modified Dr. Pham's note  -My first choice in progression would be a carfilzomib based regimen +/- renee ;  [facility] clinical trial or BCMA CART  -She will continue to followup with Dr. Hills at this time        Mao MACKENZIE MS  Attending Physician  Pager - [phone]  Email - [email or web address]               Review of Systems:   A comprehensive ROS was performed with the patient and was found to be negative with the exception of that noted in the HPI above.       Past Medical History:     Past Medical History:   Diagnosis Date     Breast cancer (H) 1994    right     H/O stem cell transplant (H) 3/2014     Multiple myeloma, without mention of having achieved remission 11/6/2012            Past Surgical History:     Past Surgical History:  "  Procedure Laterality Date     MASTECTOMY      Right, with lymphe node disection       PICC INSERTION  9/10/2013    5fr DL Power PICC, 44cm (1cm external) in the L lateral brachial vein with tip in the low SVC            Social History:     Social History     Social History Narrative     Not on file              Family History:     Family History   Problem Relation Age of Onset     Cancer Paternal Grandmother         skin cancer     Hypertension Mother      Cerebrovascular Disease Mother      Hypertension Father      Prostate Cancer Father             Allergies:     Allergies   Allergen Reactions     Cayenne Pepper [Cayenne]      Corn-Related Products GI Disturbance     Levaquin Other (See Comments)     Tendon pain     Milk Protein GI Disturbance     Mold      Facial rash/lip swelling     Morphine Sulfate Other (See Comments)     Per pt - see things (hallucination) when she was on Morphine .     Pollen Extract      Environmental allergies      Shrimp Nausea and Vomiting     Tomato      Lip swelling, facial rash     Azithromycin Rash     Keflex [Cephalexin] Rash     Oat Rash     Tape [Adhesive Tape] Itching and Rash     All adhesives     Wheat Rash     Swelling of lips            Medications:   (Not in a hospital admission)           Physical Exam:   BP (!) 158/81 (BP Location: Left arm, Patient Position: Sitting, Cuff Size: Adult Regular)   Pulse 70   Temp 98  F (36.7  C) (Oral)   Resp 16   Ht 1.57 m (5' 1.81\")   Wt 56.9 kg (125 lb 6.4 oz)   SpO2 97%   BMI 23.08 kg/m    Vitals:    05/12/21 1126   Weight: 56.9 kg (125 lb 6.4 oz)     Appears well, no distress, sitting in chair  Breathing comfortably on room air         Data:     Lab Results   Component Value Date    WBC 4.5 05/10/2021     Lab Results   Component Value Date    RBC 3.82 05/10/2021     Lab Results   Component Value Date    HGB 12.6 05/10/2021     Lab Results   Component Value Date    HCT 37.5 05/10/2021     No components found for: MCT  Lab " Results   Component Value Date    MCV 98 05/10/2021     Lab Results   Component Value Date    MCH 33.0 05/10/2021     Lab Results   Component Value Date    MCHC 33.6 05/10/2021     Lab Results   Component Value Date    RDW 15.0 05/10/2021     Lab Results   Component Value Date     05/10/2021                           PET CT 4/23/2021                                           IMPRESSION: This patient with a history of multiple myeloma status  post autologous stem cell transplant and chemotherapy: No active  disease.  1. No suspicious FDG uptake visualized within the head/neck neck,  chest, abdomen or pelvis.  2. Permeative appearance of the spine consistent with treated myeloma.      Again, thank you for allowing me to participate in the care of your patient.        Sincerely,        Mao Callahan MD

## 2021-05-12 NOTE — NURSING NOTE
"Oncology Rooming Note    May 12, 2021 11:29 AM   Shena Hartmann is a 70 year old female who presents for:    Chief Complaint   Patient presents with     Oncology Clinic Visit     NEW; MULTIPLE MYELOMA     Initial Vitals: BP (!) 158/81 (BP Location: Left arm, Patient Position: Sitting, Cuff Size: Adult Regular)   Pulse 70   Temp 98  F (36.7  C) (Oral)   Resp 16   Ht 1.57 m (5' 1.81\")   Wt 56.9 kg (125 lb 6.4 oz)   SpO2 97%   BMI 23.08 kg/m   Estimated body mass index is 23.08 kg/m  as calculated from the following:    Height as of this encounter: 1.57 m (5' 1.81\").    Weight as of this encounter: 56.9 kg (125 lb 6.4 oz). Body surface area is 1.58 meters squared.  Moderate Pain (4) Comment: Data Unavailable   No LMP recorded. Patient is postmenopausal.  Allergies reviewed: Yes  Medications reviewed: Yes    Medications: Medication refills not needed today.  Pharmacy name entered into [facility]:    [id number] - [place], [place] - [address] [facility] [address]  WRITTEN PRESCRIPTION REQUESTED  [facility] - [place], [place] - [address]    Clinical concerns: New emerging therapies.       Iesha Quevedo CMA              "

## 2021-05-14 ENCOUNTER — TELEPHONE (OUTPATIENT)
Dept: ONCOLOGY | Facility: CLINIC | Age: 71
End: 2021-05-14

## 2021-05-14 DIAGNOSIS — C90.00 MULTIPLE MYELOMA, REMISSION STATUS UNSPECIFIED (H): Primary | ICD-10-CM

## 2021-05-14 NOTE — TELEPHONE ENCOUNTER
Oral Chemotherapy Monitoring Program   Medication: pomalyst  Rx: 1mg PO daily on days 1 through 21 of 28 day cycle   Auth #: [id number]   Risk Category: Adult Female Not of Reproductive Potential  Routine survey questions reviewed.   [facility]  Oncology Pharmacy [email or web address]  [phone] (phone  [phone] (fax

## 2021-06-07 ENCOUNTER — APPOINTMENT (OUTPATIENT)
Dept: LAB | Facility: CLINIC | Age: 71
End: 2021-06-07
Attending: FAMILY MEDICINE
Payer: COMMERCIAL

## 2021-06-07 ENCOUNTER — TELEPHONE (OUTPATIENT)
Dept: TRANSPLANT | Facility: CLINIC | Age: 71
End: 2021-06-07

## 2021-06-07 ENCOUNTER — INFUSION THERAPY VISIT (OUTPATIENT)
Dept: TRANSPLANT | Facility: CLINIC | Age: 71
End: 2021-06-07
Attending: FAMILY MEDICINE
Payer: COMMERCIAL

## 2021-06-07 VITALS
OXYGEN SATURATION: 98 % | BODY MASS INDEX: 22.84 KG/M2 | HEART RATE: 77 BPM | TEMPERATURE: 98.4 F | DIASTOLIC BLOOD PRESSURE: 84 MMHG | RESPIRATION RATE: 16 BRPM | WEIGHT: 124.1 LBS | SYSTOLIC BLOOD PRESSURE: 147 MMHG

## 2021-06-07 DIAGNOSIS — C90.00 MULTIPLE MYELOMA NOT HAVING ACHIEVED REMISSION (H): Primary | ICD-10-CM

## 2021-06-07 LAB
BASOPHILS # BLD AUTO: 0 10E9/L (ref 0–0.2)
BASOPHILS NFR BLD AUTO: 0.9 %
DIFFERENTIAL METHOD BLD: ABNORMAL
EOSINOPHIL # BLD AUTO: 0.1 10E9/L (ref 0–0.7)
EOSINOPHIL NFR BLD AUTO: 2.3 %
ERYTHROCYTE [DISTWIDTH] IN BLOOD BY AUTOMATED COUNT: 14.6 % (ref 10–15)
HCT VFR BLD AUTO: 36 % (ref 35–47)
HGB BLD-MCNC: 12.2 G/DL (ref 11.7–15.7)
IMM GRANULOCYTES # BLD: 0 10E9/L (ref 0–0.4)
IMM GRANULOCYTES NFR BLD: 0.2 %
LYMPHOCYTES # BLD AUTO: 2 10E9/L (ref 0.8–5.3)
LYMPHOCYTES NFR BLD AUTO: 43.4 %
MCH RBC QN AUTO: 33.2 PG (ref 26.5–33)
MCHC RBC AUTO-ENTMCNC: 33.9 G/DL (ref 31.5–36.5)
MCV RBC AUTO: 98 FL (ref 78–100)
MONOCYTES # BLD AUTO: 0.9 10E9/L (ref 0–1.3)
MONOCYTES NFR BLD AUTO: 18.1 %
NEUTROPHILS # BLD AUTO: 1.7 10E9/L (ref 1.6–8.3)
NEUTROPHILS NFR BLD AUTO: 35.1 %
NRBC # BLD AUTO: 0 10*3/UL
NRBC BLD AUTO-RTO: 0 /100
PLATELET # BLD AUTO: 169 10E9/L (ref 150–450)
RBC # BLD AUTO: 3.67 10E12/L (ref 3.8–5.2)
WBC # BLD AUTO: 4.7 10E9/L (ref 4–11)

## 2021-06-07 PROCEDURE — 250N000011 HC RX IP 250 OP 636: Performed by: INTERNAL MEDICINE

## 2021-06-07 PROCEDURE — 250N000013 HC RX MED GY IP 250 OP 250 PS 637: Performed by: INTERNAL MEDICINE

## 2021-06-07 PROCEDURE — 85025 COMPLETE CBC W/AUTO DIFF WBC: CPT | Performed by: INTERNAL MEDICINE

## 2021-06-07 PROCEDURE — 36415 COLL VENOUS BLD VENIPUNCTURE: CPT

## 2021-06-07 PROCEDURE — 96401 CHEMO ANTI-NEOPL SQ/IM: CPT

## 2021-06-07 RX ORDER — ACETAMINOPHEN 325 MG/1
650 TABLET ORAL ONCE
Status: COMPLETED | OUTPATIENT
Start: 2021-06-07 | End: 2021-06-07

## 2021-06-07 RX ORDER — SODIUM CHLORIDE 9 MG/ML
1000 INJECTION, SOLUTION INTRAVENOUS CONTINUOUS PRN
Status: CANCELLED
Start: 2021-06-07

## 2021-06-07 RX ORDER — HEPARIN SODIUM,PORCINE 10 UNIT/ML
5 VIAL (ML) INTRAVENOUS
Status: CANCELLED | OUTPATIENT
Start: 2021-06-07

## 2021-06-07 RX ORDER — MEPERIDINE HYDROCHLORIDE 25 MG/ML
25 INJECTION INTRAMUSCULAR; INTRAVENOUS; SUBCUTANEOUS EVERY 30 MIN PRN
Status: CANCELLED | OUTPATIENT
Start: 2021-06-07

## 2021-06-07 RX ORDER — HEPARIN SODIUM (PORCINE) LOCK FLUSH IV SOLN 100 UNIT/ML 100 UNIT/ML
5 SOLUTION INTRAVENOUS
Status: CANCELLED | OUTPATIENT
Start: 2021-06-07

## 2021-06-07 RX ORDER — DIPHENHYDRAMINE HCL 25 MG
25 CAPSULE ORAL ONCE
Status: DISCONTINUED | OUTPATIENT
Start: 2021-06-07 | End: 2021-06-07 | Stop reason: HOSPADM

## 2021-06-07 RX ORDER — DIPHENHYDRAMINE HYDROCHLORIDE 50 MG/ML
50 INJECTION INTRAMUSCULAR; INTRAVENOUS
Status: CANCELLED
Start: 2021-06-07

## 2021-06-07 RX ORDER — LORAZEPAM 2 MG/ML
0.5 INJECTION INTRAMUSCULAR EVERY 4 HOURS PRN
Status: CANCELLED
Start: 2021-06-07

## 2021-06-07 RX ORDER — EPINEPHRINE 1 MG/ML
0.3 INJECTION, SOLUTION INTRAMUSCULAR; SUBCUTANEOUS EVERY 5 MIN PRN
Status: CANCELLED | OUTPATIENT
Start: 2021-06-07

## 2021-06-07 RX ORDER — NALOXONE HYDROCHLORIDE 0.4 MG/ML
.1-.4 INJECTION, SOLUTION INTRAMUSCULAR; INTRAVENOUS; SUBCUTANEOUS
Status: CANCELLED | OUTPATIENT
Start: 2021-06-07

## 2021-06-07 RX ORDER — ACETAMINOPHEN 325 MG/1
650 TABLET ORAL ONCE
Status: CANCELLED | OUTPATIENT
Start: 2021-06-07

## 2021-06-07 RX ORDER — METHYLPREDNISOLONE SODIUM SUCCINATE 125 MG/2ML
125 INJECTION, POWDER, LYOPHILIZED, FOR SOLUTION INTRAMUSCULAR; INTRAVENOUS
Status: CANCELLED
Start: 2021-06-07

## 2021-06-07 RX ORDER — ALBUTEROL SULFATE 0.83 MG/ML
2.5 SOLUTION RESPIRATORY (INHALATION)
Status: CANCELLED | OUTPATIENT
Start: 2021-06-07

## 2021-06-07 RX ORDER — DIPHENHYDRAMINE HCL 25 MG
25 CAPSULE ORAL ONCE
Status: CANCELLED | OUTPATIENT
Start: 2021-06-07

## 2021-06-07 RX ORDER — ALBUTEROL SULFATE 90 UG/1
1-2 AEROSOL, METERED RESPIRATORY (INHALATION)
Status: CANCELLED
Start: 2021-06-07

## 2021-06-07 RX ADMIN — ACETAMINOPHEN 650 MG: 325 TABLET, FILM COATED ORAL at 08:27

## 2021-06-07 RX ADMIN — DARATUMUMAB AND HYALURONIDASE-FIHJ (HUMAN RECOMBINANT) 1800 MG: 1800; 30000 INJECTION SUBCUTANEOUS at 09:21

## 2021-06-07 ASSESSMENT — PAIN SCALES - GENERAL: PAINLEVEL: MODERATE PAIN (4)

## 2021-06-07 NOTE — PROGRESS NOTES
Infusion Nursing Note:  Shena Hartmann presents today for Day 1, Cycle 10 of Daratumumab.    Patient seen by provider today: No   present during visit today: Not Applicable.    Note: Lab results monitored; no parameters for treatment    Intravenous Access:  Lab draw via venipuncture    Treatment Conditions:  Day 1, Cycle 10 of subcutaneous daratumumab. Received 650mg PO tylenol, declined benadryl and decadron, states she has tolerated chemo without it in the past. Administered in RLQ of abdomen.      Post Infusion Assessment:  Patient tolerated injection without incident.       Discharge Plan:   Patient discharged in stable condition accompanied by: self.      Yusra Brush RN

## 2021-06-07 NOTE — NURSING NOTE
Chief Complaint   Patient presents with     Blood Draw     Labs drawn via  by RN in lab. VS taken     Infusion     Scheduled chemo s/p BMT r/t MM     Day 1, Cycle 10 Daratumumab    Yusra Brush RN

## 2021-06-07 NOTE — TELEPHONE ENCOUNTER
[facility] Prior Authorization Team   Phone: [phone]  Fax: [phone]    PA Initiation    Medication: Pomalyst  Insurance Company: [employer] - Phone [phone] Fax [phone]  Pharmacy Filling the Rx: [facility] - [place], [place] - [facility] [address]  Filling Pharmacy Phone: [phone]  Filling Pharmacy Fax: [phone]  Start Date: 6/7/2021  *Proactive* PA

## 2021-06-07 NOTE — NURSING NOTE
Chief Complaint   Patient presents with     Blood Draw     Labs drawn via  by RN in lab. VS taken       Labs collected from venipuncture by RN. Vitals taken. Checked in for appointment(s).    Alize Horton RN

## 2021-06-08 DIAGNOSIS — C90.00 MULTIPLE MYELOMA NOT HAVING ACHIEVED REMISSION (H): Primary | ICD-10-CM

## 2021-06-09 NOTE — TELEPHONE ENCOUNTER
Prior Authorization Approval    Authorization Effective Date: 6/20/2021  Authorization Expiration Date: 6/20/2022  Medication: Pomalyst  Approved Dose/Quantity: ud  Reference #: [id number]   Insurance Company: [employer] Phone [phone] Fax [phone]  Expected CoPay: $25     CoPay Card Available: Yes    Foundation Assistance Needed:    Which Pharmacy is filling the prescription (Not needed for infusion/clinic administered): [facility] - [place] [address]  Pharmacy Notified: Yes  Patient Notified: Yes

## 2021-06-16 DIAGNOSIS — C90.00 MULTIPLE MYELOMA, REMISSION STATUS UNSPECIFIED (H): Primary | ICD-10-CM

## 2021-07-05 ENCOUNTER — APPOINTMENT (OUTPATIENT)
Dept: LAB | Facility: CLINIC | Age: 71
End: 2021-07-05
Attending: INTERNAL MEDICINE
Payer: COMMERCIAL

## 2021-07-05 ENCOUNTER — INFUSION THERAPY VISIT (OUTPATIENT)
Dept: TRANSPLANT | Facility: CLINIC | Age: 71
End: 2021-07-05
Attending: INTERNAL MEDICINE
Payer: COMMERCIAL

## 2021-07-05 VITALS
DIASTOLIC BLOOD PRESSURE: 77 MMHG | RESPIRATION RATE: 16 BRPM | WEIGHT: 125 LBS | BODY MASS INDEX: 23 KG/M2 | HEART RATE: 81 BPM | TEMPERATURE: 97.7 F | SYSTOLIC BLOOD PRESSURE: 136 MMHG | OXYGEN SATURATION: 98 %

## 2021-07-05 DIAGNOSIS — C90.00 MULTIPLE MYELOMA NOT HAVING ACHIEVED REMISSION (H): Primary | ICD-10-CM

## 2021-07-05 LAB
BASOPHILS # BLD AUTO: 0 10E9/L (ref 0–0.2)
BASOPHILS NFR BLD AUTO: 0.6 %
DIFFERENTIAL METHOD BLD: ABNORMAL
EOSINOPHIL # BLD AUTO: 0.1 10E9/L (ref 0–0.7)
EOSINOPHIL NFR BLD AUTO: 2.3 %
ERYTHROCYTE [DISTWIDTH] IN BLOOD BY AUTOMATED COUNT: 15.3 % (ref 10–15)
HCT VFR BLD AUTO: 38.1 % (ref 35–47)
HGB BLD-MCNC: 13 G/DL (ref 11.7–15.7)
IMM GRANULOCYTES # BLD: 0 10E9/L (ref 0–0.4)
IMM GRANULOCYTES NFR BLD: 0.2 %
LYMPHOCYTES # BLD AUTO: 1.7 10E9/L (ref 0.8–5.3)
LYMPHOCYTES NFR BLD AUTO: 36.2 %
MCH RBC QN AUTO: 33.3 PG (ref 26.5–33)
MCHC RBC AUTO-ENTMCNC: 34.1 G/DL (ref 31.5–36.5)
MCV RBC AUTO: 98 FL (ref 78–100)
MONOCYTES # BLD AUTO: 0.7 10E9/L (ref 0–1.3)
MONOCYTES NFR BLD AUTO: 15.4 %
NEUTROPHILS # BLD AUTO: 2.1 10E9/L (ref 1.6–8.3)
NEUTROPHILS NFR BLD AUTO: 45.3 %
NRBC # BLD AUTO: 0 10*3/UL
NRBC BLD AUTO-RTO: 0 /100
PLATELET # BLD AUTO: 216 10E9/L (ref 150–450)
RBC # BLD AUTO: 3.9 10E12/L (ref 3.8–5.2)
WBC # BLD AUTO: 4.7 10E9/L (ref 4–11)

## 2021-07-05 PROCEDURE — 250N000011 HC RX IP 250 OP 636: Performed by: INTERNAL MEDICINE

## 2021-07-05 PROCEDURE — 84165 PROTEIN E-PHORESIS SERUM: CPT | Mod: TC | Performed by: INTERNAL MEDICINE

## 2021-07-05 PROCEDURE — 250N000013 HC RX MED GY IP 250 OP 250 PS 637: Performed by: INTERNAL MEDICINE

## 2021-07-05 PROCEDURE — 84165 PROTEIN E-PHORESIS SERUM: CPT | Mod: 26 | Performed by: PATHOLOGY

## 2021-07-05 PROCEDURE — 36415 COLL VENOUS BLD VENIPUNCTURE: CPT

## 2021-07-05 PROCEDURE — 999N001036 HC STATISTIC TOTAL PROTEIN: Performed by: INTERNAL MEDICINE

## 2021-07-05 PROCEDURE — 96401 CHEMO ANTI-NEOPL SQ/IM: CPT

## 2021-07-05 PROCEDURE — 85025 COMPLETE CBC W/AUTO DIFF WBC: CPT | Performed by: INTERNAL MEDICINE

## 2021-07-05 RX ORDER — SODIUM CHLORIDE 9 MG/ML
1000 INJECTION, SOLUTION INTRAVENOUS CONTINUOUS PRN
Status: CANCELLED
Start: 2021-07-05

## 2021-07-05 RX ORDER — ACETAMINOPHEN 325 MG/1
650 TABLET ORAL ONCE
Status: COMPLETED | OUTPATIENT
Start: 2021-07-05 | End: 2021-07-05

## 2021-07-05 RX ORDER — ALBUTEROL SULFATE 0.83 MG/ML
2.5 SOLUTION RESPIRATORY (INHALATION)
Status: CANCELLED | OUTPATIENT
Start: 2021-07-05

## 2021-07-05 RX ORDER — DIPHENHYDRAMINE HCL 25 MG
25 CAPSULE ORAL ONCE
Status: CANCELLED | OUTPATIENT
Start: 2021-07-05

## 2021-07-05 RX ORDER — MEPERIDINE HYDROCHLORIDE 25 MG/ML
25 INJECTION INTRAMUSCULAR; INTRAVENOUS; SUBCUTANEOUS EVERY 30 MIN PRN
Status: CANCELLED | OUTPATIENT
Start: 2021-07-05

## 2021-07-05 RX ORDER — HEPARIN SODIUM,PORCINE 10 UNIT/ML
5 VIAL (ML) INTRAVENOUS
Status: CANCELLED | OUTPATIENT
Start: 2021-07-05

## 2021-07-05 RX ORDER — ALBUTEROL SULFATE 90 UG/1
1-2 AEROSOL, METERED RESPIRATORY (INHALATION)
Status: CANCELLED
Start: 2021-07-05

## 2021-07-05 RX ORDER — DIPHENHYDRAMINE HYDROCHLORIDE 50 MG/ML
50 INJECTION INTRAMUSCULAR; INTRAVENOUS
Status: CANCELLED
Start: 2021-07-05

## 2021-07-05 RX ORDER — NALOXONE HYDROCHLORIDE 0.4 MG/ML
.1-.4 INJECTION, SOLUTION INTRAMUSCULAR; INTRAVENOUS; SUBCUTANEOUS
Status: CANCELLED | OUTPATIENT
Start: 2021-07-05

## 2021-07-05 RX ORDER — HEPARIN SODIUM (PORCINE) LOCK FLUSH IV SOLN 100 UNIT/ML 100 UNIT/ML
5 SOLUTION INTRAVENOUS
Status: CANCELLED | OUTPATIENT
Start: 2021-07-05

## 2021-07-05 RX ORDER — LORAZEPAM 2 MG/ML
0.5 INJECTION INTRAMUSCULAR EVERY 4 HOURS PRN
Status: CANCELLED
Start: 2021-07-05

## 2021-07-05 RX ORDER — EPINEPHRINE 1 MG/ML
0.3 INJECTION, SOLUTION INTRAMUSCULAR; SUBCUTANEOUS EVERY 5 MIN PRN
Status: CANCELLED | OUTPATIENT
Start: 2021-07-05

## 2021-07-05 RX ORDER — METHYLPREDNISOLONE SODIUM SUCCINATE 125 MG/2ML
125 INJECTION, POWDER, LYOPHILIZED, FOR SOLUTION INTRAMUSCULAR; INTRAVENOUS
Status: CANCELLED
Start: 2021-07-05

## 2021-07-05 RX ADMIN — ACETAMINOPHEN 650 MG: 325 TABLET ORAL at 08:45

## 2021-07-05 RX ADMIN — DARATUMUMAB AND HYALURONIDASE-FIHJ (HUMAN RECOMBINANT) 1800 MG: 1800; 30000 INJECTION SUBCUTANEOUS at 09:16

## 2021-07-05 ASSESSMENT — PAIN SCALES - GENERAL: PAINLEVEL: MODERATE PAIN (4)

## 2021-07-05 NOTE — LETTER
7/5/2021         RE: Shena Hartmann  [address] [place] [address]        Dear Colleague,    Thank you for referring your patient, Shena Hartmann, to the [facility] BLOOD AND MARROW TRANSPLANT PROGRAM [place]. Please see a copy of my visit note below.    Infusion Nursing Note:  Shena Hartmann presents today for subcutaneous daratumumab.    Patient seen by provider today: No   present during visit today: Not Applicable.    Note: Lab results monitored    Intravenous Access:  Labs drawn without difficulty via     Treatment Conditions:  650mg PO tylenol given as premeds; pt declined dex and benadryl.  Yen checked by 2nd RN prior to administration. Subcutaneous yen given in LLQ of abdomen over 6 minutes.    Post Infusion Assessment:  Patient tolerated injection without incident.       Discharge Plan:   Patient discharged in stable condition accompanied by: self.      Yusra Brush, RN                          Again, thank you for allowing me to participate in the care of your patient.        Sincerely,        [facility]

## 2021-07-05 NOTE — NURSING NOTE
Chief Complaint   Patient presents with     Blood Draw     Labs drawn via  by RN in lab . VS taken.      Infusion     Scheduled chemo injection s/p BMT r/t MM     Day 1, Cycle 11 Daratumumab    Yusra Brush RN

## 2021-07-05 NOTE — NURSING NOTE
Chief Complaint   Patient presents with     Blood Draw     Labs drawn via  by RN in lab . VS taken.      Diaz Webb RN

## 2021-07-05 NOTE — PROGRESS NOTES
Infusion Nursing Note:  Shena Hartmann presents today for subcutaneous daratumumab.    Patient seen by provider today: No   present during visit today: Not Applicable.    Note: Lab results monitored    Intravenous Access:  Labs drawn without difficulty via     Treatment Conditions:  650mg PO tylenol given as premeds; pt declined dex and benadryl.  Yen checked by 2nd RN prior to administration. Subcutaneous yen given in LLQ of abdomen over 6 minutes.    Post Infusion Assessment:  Patient tolerated injection without incident.       Discharge Plan:   Patient discharged in stable condition accompanied by: self.      Yusra Brush RN

## 2021-07-06 LAB
ALBUMIN SERPL ELPH-MCNC: 4.5 G/DL (ref 3.7–5.1)
ALPHA1 GLOB SERPL ELPH-MCNC: 0.3 G/DL (ref 0.2–0.4)
ALPHA2 GLOB SERPL ELPH-MCNC: 0.8 G/DL (ref 0.5–0.9)
B-GLOBULIN SERPL ELPH-MCNC: 0.7 G/DL (ref 0.6–1)
GAMMA GLOB SERPL ELPH-MCNC: 1.7 G/DL (ref 0.7–1.6)
M PROTEIN SERPL ELPH-MCNC: 1.5 G/DL
PROT PATTERN SERPL ELPH-IMP: ABNORMAL

## 2021-07-08 ENCOUNTER — TELEPHONE (OUTPATIENT)
Dept: ONCOLOGY | Facility: CLINIC | Age: 71
End: 2021-07-08

## 2021-07-08 DIAGNOSIS — C90.00 MULTIPLE MYELOMA, REMISSION STATUS UNSPECIFIED (H): Primary | ICD-10-CM

## 2021-07-08 NOTE — TELEPHONE ENCOUNTER
Oral Chemotherapy Monitoring Program    Medication: Pomalyst  Rx:  1mg PO daily on days 1-21 of 28 day cycle  #21/28 days    Auth #: [id number]  Risk Category:Adult female NOT of reproductive capacity    Routine survey questions reviewed.    Thank you, any further questions feel free to contact me if needed    Tavia Posadas   [facility] Liaison, alpha split P[facility]Z  Phone: [phone]  Fax: [phone]  Email: [email or web address]

## 2021-08-01 RX ORDER — ALBUTEROL SULFATE 0.83 MG/ML
2.5 SOLUTION RESPIRATORY (INHALATION)
Status: CANCELLED | OUTPATIENT
Start: 2021-08-02

## 2021-08-01 RX ORDER — ALBUTEROL SULFATE 90 UG/1
1-2 AEROSOL, METERED RESPIRATORY (INHALATION)
Status: CANCELLED
Start: 2021-08-02

## 2021-08-01 RX ORDER — LORAZEPAM 2 MG/ML
0.5 INJECTION INTRAMUSCULAR EVERY 4 HOURS PRN
Status: CANCELLED
Start: 2021-08-02

## 2021-08-01 RX ORDER — EPINEPHRINE 1 MG/ML
0.3 INJECTION, SOLUTION INTRAMUSCULAR; SUBCUTANEOUS EVERY 5 MIN PRN
Status: CANCELLED | OUTPATIENT
Start: 2021-08-02

## 2021-08-01 RX ORDER — METHYLPREDNISOLONE SODIUM SUCCINATE 125 MG/2ML
125 INJECTION, POWDER, LYOPHILIZED, FOR SOLUTION INTRAMUSCULAR; INTRAVENOUS
Status: CANCELLED
Start: 2021-08-02

## 2021-08-01 RX ORDER — DIPHENHYDRAMINE HCL 25 MG
25 CAPSULE ORAL ONCE
Status: CANCELLED | OUTPATIENT
Start: 2021-08-02

## 2021-08-01 RX ORDER — DIPHENHYDRAMINE HYDROCHLORIDE 50 MG/ML
50 INJECTION INTRAMUSCULAR; INTRAVENOUS
Status: CANCELLED
Start: 2021-08-02

## 2021-08-01 RX ORDER — HEPARIN SODIUM (PORCINE) LOCK FLUSH IV SOLN 100 UNIT/ML 100 UNIT/ML
5 SOLUTION INTRAVENOUS
Status: CANCELLED | OUTPATIENT
Start: 2021-08-02

## 2021-08-01 RX ORDER — NALOXONE HYDROCHLORIDE 0.4 MG/ML
.1-.4 INJECTION, SOLUTION INTRAMUSCULAR; INTRAVENOUS; SUBCUTANEOUS
Status: CANCELLED | OUTPATIENT
Start: 2021-08-02

## 2021-08-01 RX ORDER — HEPARIN SODIUM,PORCINE 10 UNIT/ML
5 VIAL (ML) INTRAVENOUS
Status: CANCELLED | OUTPATIENT
Start: 2021-08-02

## 2021-08-01 RX ORDER — SODIUM CHLORIDE 9 MG/ML
1000 INJECTION, SOLUTION INTRAVENOUS CONTINUOUS PRN
Status: CANCELLED
Start: 2021-08-02

## 2021-08-01 RX ORDER — MEPERIDINE HYDROCHLORIDE 25 MG/ML
25 INJECTION INTRAMUSCULAR; INTRAVENOUS; SUBCUTANEOUS EVERY 30 MIN PRN
Status: CANCELLED | OUTPATIENT
Start: 2021-08-02

## 2021-08-02 ENCOUNTER — INFUSION THERAPY VISIT (OUTPATIENT)
Dept: TRANSPLANT | Facility: CLINIC | Age: 71
End: 2021-08-02
Attending: INTERNAL MEDICINE
Payer: COMMERCIAL

## 2021-08-02 ENCOUNTER — APPOINTMENT (OUTPATIENT)
Dept: LAB | Facility: CLINIC | Age: 71
End: 2021-08-02
Attending: INTERNAL MEDICINE
Payer: COMMERCIAL

## 2021-08-02 VITALS
TEMPERATURE: 97.9 F | SYSTOLIC BLOOD PRESSURE: 117 MMHG | DIASTOLIC BLOOD PRESSURE: 67 MMHG | OXYGEN SATURATION: 98 % | WEIGHT: 125.2 LBS | HEART RATE: 76 BPM | RESPIRATION RATE: 16 BRPM | BODY MASS INDEX: 23.04 KG/M2

## 2021-08-02 DIAGNOSIS — C90.00 MULTIPLE MYELOMA NOT HAVING ACHIEVED REMISSION (H): Primary | ICD-10-CM

## 2021-08-02 LAB
BASOPHILS # BLD AUTO: 0 10E3/UL (ref 0–0.2)
BASOPHILS NFR BLD AUTO: 1 %
EOSINOPHIL # BLD AUTO: 0.1 10E3/UL (ref 0–0.7)
EOSINOPHIL NFR BLD AUTO: 3 %
ERYTHROCYTE [DISTWIDTH] IN BLOOD BY AUTOMATED COUNT: 15.2 % (ref 10–15)
HCT VFR BLD AUTO: 34.5 % (ref 35–47)
HGB BLD-MCNC: 12 G/DL (ref 11.7–15.7)
HOLD SPECIMEN: NORMAL
IMM GRANULOCYTES # BLD: 0 10E3/UL
IMM GRANULOCYTES NFR BLD: 0 %
LYMPHOCYTES # BLD AUTO: 2.2 10E3/UL (ref 0.8–5.3)
LYMPHOCYTES NFR BLD AUTO: 43 %
MCH RBC QN AUTO: 33.9 PG (ref 26.5–33)
MCHC RBC AUTO-ENTMCNC: 34.8 G/DL (ref 31.5–36.5)
MCV RBC AUTO: 98 FL (ref 78–100)
MONOCYTES # BLD AUTO: 0.8 10E3/UL (ref 0–1.3)
MONOCYTES NFR BLD AUTO: 16 %
NEUTROPHILS # BLD AUTO: 1.9 10E3/UL (ref 1.6–8.3)
NEUTROPHILS NFR BLD AUTO: 37 %
NRBC # BLD AUTO: 0 10E3/UL
NRBC BLD AUTO-RTO: 0 /100
PLATELET # BLD AUTO: 182 10E3/UL (ref 150–450)
RBC # BLD AUTO: 3.54 10E6/UL (ref 3.8–5.2)
WBC # BLD AUTO: 5.1 10E3/UL (ref 4–11)

## 2021-08-02 PROCEDURE — 36415 COLL VENOUS BLD VENIPUNCTURE: CPT

## 2021-08-02 PROCEDURE — 250N000011 HC RX IP 250 OP 636: Performed by: INTERNAL MEDICINE

## 2021-08-02 PROCEDURE — 96401 CHEMO ANTI-NEOPL SQ/IM: CPT

## 2021-08-02 PROCEDURE — 250N000013 HC RX MED GY IP 250 OP 250 PS 637: Performed by: INTERNAL MEDICINE

## 2021-08-02 PROCEDURE — 85025 COMPLETE CBC W/AUTO DIFF WBC: CPT

## 2021-08-02 RX ORDER — ACETAMINOPHEN 325 MG/1
650 TABLET ORAL ONCE
Status: COMPLETED | OUTPATIENT
Start: 2021-08-02 | End: 2021-08-02

## 2021-08-02 RX ADMIN — DARATUMUMAB AND HYALURONIDASE-FIHJ (HUMAN RECOMBINANT) 1800 MG: 1800; 30000 INJECTION SUBCUTANEOUS at 08:45

## 2021-08-02 RX ADMIN — ACETAMINOPHEN 650 MG: 325 TABLET ORAL at 08:15

## 2021-08-02 ASSESSMENT — PAIN SCALES - GENERAL: PAINLEVEL: MILD PAIN (3)

## 2021-08-02 NOTE — LETTER
8/2/2021         RE: Shena Hartmann  [address] [place] [address]        Dear Colleague,    Thank you for referring your patient, Shena Hartmann, to the [facility] BLOOD AND MARROW TRANSPLANT PROGRAM [place]. Please see a copy of my visit note below.    Infusion Nursing Note:  Shena Hartmann presents today for darsalex injection.    Patient seen by provider today: No   present during visit today: Not Applicable.    Note:     Pt received 650 mg oral tylenol as premed.  Pt states no longer taking benadryl or dexamethasone as premeds.    Pt received darzalex faspro 1800 mg injection to right side lower abdomen, given over 6 minutes.      Intravenous Access:  No Intravenous access/labs at this visit.    Treatment Conditions:  Lab Results   Component Value Date    HGB 12.0 08/02/2021    HGB 13.0 07/05/2021     Lab Results   Component Value Date    WBC 5.1 08/02/2021    WBC 4.7 07/05/2021      Lab Results   Component Value Date    ANEU 2.1 07/05/2021     Lab Results   Component Value Date     08/02/2021     07/05/2021      Lab Results   Component Value Date     05/10/2021                   Lab Results   Component Value Date    POTASSIUM 4.0 05/10/2021           Lab Results   Component Value Date    MAG 1.8 12/12/2014            Lab Results   Component Value Date    CR 0.82 05/10/2021                   Lab Results   Component Value Date    PAULINE 9.2 05/10/2021                Lab Results   Component Value Date    BILITOTAL 0.4 05/10/2021           Lab Results   Component Value Date    ALBUMIN 3.6 05/10/2021                    Lab Results   Component Value Date    ALT 28 05/10/2021           Lab Results   Component Value Date    AST 13 05/10/2021           Post Infusion Assessment:  Patient tolerated injection without incident.  Site patent and intact, free from redness, edema or discomfort.        Discharge Plan:   Copy of AVS reviewed with patient and/or family.     Patient discharged in stable condition accompanied by: self.  Departure Mode: Ambulatory.      Neela Ding RN                          Again, thank you for allowing me to participate in the care of your patient.        Sincerely,        [facility]

## 2021-08-02 NOTE — NURSING NOTE
Chief Complaint   Patient presents with     Blood Draw     Labs drawn from  by RN in lab. Vitals taken. Checked into next appointment.      Labs drawn by venipuncture by RN in lab.  Vital signs taken.  Pt checked in to next appointment.    Nidih Jaramillo RN

## 2021-08-02 NOTE — NURSING NOTE
"Oncology Rooming Note    August 2, 2021 9:12 AM   Shena Hartmann is a 70 year old female who presents for:    Chief Complaint   Patient presents with     Blood Draw     Labs drawn from  by RN in lab. Vitals taken. Checked into next appointment.      Infusion     pt here for scheduled darzalex injection.  history of multiple myeloma.     Initial Vitals: /67 (BP Location: Right arm, Patient Position: Sitting, Cuff Size: Adult Regular)   Pulse 76   Temp 97.9  F (36.6  C) (Oral)   Resp 16   Wt 56.8 kg (125 lb 3.2 oz)   SpO2 98%   BMI 23.04 kg/m   Estimated body mass index is 23.04 kg/m  as calculated from the following:    Height as of 5/12/21: 1.57 m (5' 1.81\").    Weight as of this encounter: 56.8 kg (125 lb 3.2 oz). Body surface area is 1.57 meters squared.  Mild Pain (3) Comment: Data Unavailable   No LMP recorded. Patient is postmenopausal.  Allergies reviewed: Yes  Medications reviewed: Yes    Medications: Medication refills not needed today.  Pharmacy name entered into [facility]:    [id number] - [place], [place] - [address] AT [facility] [address]  WRITTEN PRESCRIPTION REQUESTED  [facility] - [place], [place] - [address]    Clinical concerns: none       Neela Ding RN                  "

## 2021-08-02 NOTE — PROGRESS NOTES
Infusion Nursing Note:  Shena Hartmann presents today for darsalex injection.    Patient seen by provider today: No   present during visit today: Not Applicable.    Note:     Pt received 650 mg oral tylenol as premed.  Pt states no longer taking benadryl or dexamethasone as premeds.    Pt received darzalex faspro 1800 mg injection to right side lower abdomen, given over 6 minutes.      Intravenous Access:  No Intravenous access/labs at this visit.    Treatment Conditions:  Lab Results   Component Value Date    HGB 12.0 08/02/2021    HGB 13.0 07/05/2021     Lab Results   Component Value Date    WBC 5.1 08/02/2021    WBC 4.7 07/05/2021      Lab Results   Component Value Date    ANEU 2.1 07/05/2021     Lab Results   Component Value Date     08/02/2021     07/05/2021      Lab Results   Component Value Date     05/10/2021                   Lab Results   Component Value Date    POTASSIUM 4.0 05/10/2021           Lab Results   Component Value Date    MAG 1.8 12/12/2014            Lab Results   Component Value Date    CR 0.82 05/10/2021                   Lab Results   Component Value Date    PAULINE 9.2 05/10/2021                Lab Results   Component Value Date    BILITOTAL 0.4 05/10/2021           Lab Results   Component Value Date    ALBUMIN 3.6 05/10/2021                    Lab Results   Component Value Date    ALT 28 05/10/2021           Lab Results   Component Value Date    AST 13 05/10/2021           Post Infusion Assessment:  Patient tolerated injection without incident.  Site patent and intact, free from redness, edema or discomfort.        Discharge Plan:   Copy of AVS reviewed with patient and/or family.    Patient discharged in stable condition accompanied by: self.  Departure Mode: Ambulatory.      Neela Ding RN

## 2021-08-05 DIAGNOSIS — C90.00 MULTIPLE MYELOMA, REMISSION STATUS UNSPECIFIED (H): Primary | ICD-10-CM

## 2021-08-06 ENCOUNTER — TELEPHONE (OUTPATIENT)
Dept: ONCOLOGY | Facility: CLINIC | Age: 71
End: 2021-08-06

## 2021-08-06 NOTE — TELEPHONE ENCOUNTER
Oral Chemotherapy Monitoring Program    Medication: Pomalyst  Rx:  1mg PO daily on days 1-21 of 28 day cycle    Auth #: [id number]  Risk Category: Adult female NOT of reproductive capacity    Routine survey questions reviewed.    Thank you, if you have any further questions please feel free to contact me.    Nidhi Mora  Lead Oncology Liaison  [email or web address]  Phone: [phone]  Fax: [phone]

## 2021-08-27 ENCOUNTER — VIRTUAL VISIT (OUTPATIENT)
Dept: TRANSPLANT | Facility: CLINIC | Age: 71
End: 2021-08-27
Attending: INTERNAL MEDICINE
Payer: COMMERCIAL

## 2021-08-27 DIAGNOSIS — C90.01 MULTIPLE MYELOMA IN REMISSION (H): ICD-10-CM

## 2021-08-27 DIAGNOSIS — C90.00 MULTIPLE MYELOMA NOT HAVING ACHIEVED REMISSION (H): ICD-10-CM

## 2021-08-27 DIAGNOSIS — C90.01 MULTIPLE MYELOMA IN REMISSION (H): Primary | ICD-10-CM

## 2021-08-27 PROCEDURE — 99215 OFFICE O/P EST HI 40 MIN: CPT | Mod: 95 | Performed by: INTERNAL MEDICINE

## 2021-08-27 RX ORDER — LORAZEPAM 2 MG/ML
0.5 INJECTION INTRAMUSCULAR EVERY 4 HOURS PRN
Status: CANCELLED
Start: 2021-08-30

## 2021-08-27 RX ORDER — ALBUTEROL SULFATE 0.83 MG/ML
2.5 SOLUTION RESPIRATORY (INHALATION)
Status: CANCELLED | OUTPATIENT
Start: 2021-08-30

## 2021-08-27 RX ORDER — ACETAMINOPHEN 325 MG/1
650 TABLET ORAL ONCE
Status: CANCELLED | OUTPATIENT
Start: 2021-08-30

## 2021-08-27 RX ORDER — EPINEPHRINE 1 MG/ML
0.3 INJECTION, SOLUTION INTRAMUSCULAR; SUBCUTANEOUS EVERY 5 MIN PRN
Status: CANCELLED | OUTPATIENT
Start: 2021-08-30

## 2021-08-27 RX ORDER — METHYLPREDNISOLONE SODIUM SUCCINATE 125 MG/2ML
125 INJECTION, POWDER, LYOPHILIZED, FOR SOLUTION INTRAMUSCULAR; INTRAVENOUS
Status: CANCELLED
Start: 2021-08-30

## 2021-08-27 RX ORDER — HEPARIN SODIUM,PORCINE 10 UNIT/ML
5 VIAL (ML) INTRAVENOUS
Status: CANCELLED | OUTPATIENT
Start: 2021-08-30

## 2021-08-27 RX ORDER — NALOXONE HYDROCHLORIDE 0.4 MG/ML
.1-.4 INJECTION, SOLUTION INTRAMUSCULAR; INTRAVENOUS; SUBCUTANEOUS
Status: CANCELLED | OUTPATIENT
Start: 2021-08-30

## 2021-08-27 RX ORDER — SODIUM CHLORIDE 9 MG/ML
1000 INJECTION, SOLUTION INTRAVENOUS CONTINUOUS PRN
Status: CANCELLED
Start: 2021-08-30

## 2021-08-27 RX ORDER — MEPERIDINE HYDROCHLORIDE 25 MG/ML
25 INJECTION INTRAMUSCULAR; INTRAVENOUS; SUBCUTANEOUS EVERY 30 MIN PRN
Status: CANCELLED | OUTPATIENT
Start: 2021-08-30

## 2021-08-27 RX ORDER — ALBUTEROL SULFATE 90 UG/1
1-2 AEROSOL, METERED RESPIRATORY (INHALATION)
Status: CANCELLED
Start: 2021-08-30

## 2021-08-27 RX ORDER — DIPHENHYDRAMINE HYDROCHLORIDE 50 MG/ML
50 INJECTION INTRAMUSCULAR; INTRAVENOUS
Status: CANCELLED
Start: 2021-08-30

## 2021-08-27 RX ORDER — HEPARIN SODIUM (PORCINE) LOCK FLUSH IV SOLN 100 UNIT/ML 100 UNIT/ML
5 SOLUTION INTRAVENOUS
Status: CANCELLED | OUTPATIENT
Start: 2021-08-30

## 2021-08-27 RX ORDER — DIPHENHYDRAMINE HCL 25 MG
25 CAPSULE ORAL ONCE
Status: CANCELLED | OUTPATIENT
Start: 2021-08-30

## 2021-08-27 NOTE — PROGRESS NOTES
"Shena is a 70 year old who is being evaluated via a billable video visit.      How would you like to obtain your AVS? MyChart     If the video visit is dropped, the invitation should be resent by: Text to cell phone: [phone]     Will anyone else be joining your video visit? No          Vitals - Patient Reported  Weight (Patient Reported): 55.3 kg (122 lb)  Height (Patient Reported): 157.5 cm (5' 2\")  BMI (Based on Pt Reported Ht/Wt): 22.31  Pain Score: Moderate Pain (4)  Pain Loc:  (BONES AND MUSCLE PAIN)    Milo Holguin LPN    Video Start Time: 8am  Video-Visit Details    Type of service:  Video Visit    Video End Time:830    Originating Location (pt. Location): Home    Distant Location (provider location):  [facility] BLOOD AND MARROW TRANSPLANT PROGRAM [place]     Platform used for Video Visit: Tiffany  "

## 2021-08-27 NOTE — PROGRESS NOTES
BMT staff note    Shena was seen by me on the video visit this morning for 30 minutes with a total working care time of 45 minutes.  She is a 70-year-old woman who I have followed for more than 10 years who initially had breast cancer and then smoldering multiple myeloma that evidently progressed.  She has undergone autologous transplantation with very little response and has been on maintenance therapy with pomalidomide and daratumumab.  The biggest issue has been drug intolerance but she still seems to be eliciting disease control.  I recently referred her to my colleague, Dr. Gil, for second opinion.  I have extensively reviewed his recent note from May 12, 2021 and I will briefly summarize his recommendations below.  Over the past several months she has been doing clinically well. She is currently on monthly daratumumab and pomalidomide at 1 mg daily for 21-day cycles. She continues to have increased fatigue with her daratumumab and also side effects from pomalidomide. She has had various lower back pains but these have not been seen and may be due to increased activity. Occasionally during her pomalidomide she requires Tylenol. Overall she remains clinically active and has had no signs or symptoms of infection. She has had no Covid exposures and has not been vaccinated. I spent a great deal of time with her discussing our recommendations for vaccination including in patients who are immunosuppressed. She understands that some immunosuppressed patients are also now approved for third dosing. I gave her significant information to promote the importance of vaccination given the severity of the pandemic.    The rest of the review of symptoms other than that listed above is really unremarkable. Again, we have been dealing with multiple myeloma for almost 20 years.    Physical exam: Overall, Shena looks well.  She is talkative and articulate.  On visual virtual exam HEENT exam is unremarkable.  Pupils and  extraocular movements are normal.  She has no respiratory distress.  There are no obvious rashes.  She verbally confirmed this today.    Laboratory evaluation: A CBC on August 2 showed a hemoglobin of 12, platelet count of 182,000 and white count of 5100 with 1900 absolute neutrophils.  Her last M spike was 1.5 g on July 5 which is just slightly elevated from 1.2.  Chemistries were normal in May.  An M spike in labs needs repeating.    It is my overall impression, which Dr. Callahan agrees with, that there does not appear to be any current need to change her regimen of daratumumab-pomalidomide. I have ordered a repeat M spike for her next daratumumab infusion on Monday. Further infusions were also ordered for September 27, October 25, November 22 and December 20. We had a long discussion about the fact that her M spike is 1.5 g, up slightly from 1.2 g. We have agreed that if her M spike is 1.2 g or lower we will make no changes. However, if the M spike is still greater than 1.2 g we will increase her pomalidomide to 2 mg alternating with 1 mg and her 21-day cycle. In the past she has tolerated this regimen. We plan to follow the plan below as recommended in her recent second opinion. She is worried about some of the side effects outlined in her recent consultation. Both her and her  read the discussion today and all of their questions have been answered. I will see Shena on a return visit in clinic on December 3. Orders in follow-up daratumumab appointments have been made.    From Dr. Callahan's note: She is unable to tolerate decadron due to significant side effects, so future regimens would have to be two drug rather than three drug. At time of progression, we discussed that the most reasonable options would be to   1. explore carfilzomib or elotuzumab or in combination -https://ashpublications.org/blood/article/132/Supplement%201/1975/956146/K-Adsfr-8-Study-of-Carfilzomib-Plus-Elotuzumab  2. Obtain a  bone marrow biopsy to assess for t(11,14) to see if venetoclax would be beneficial (venetoclax-velcade)  3. Explore clinical trial options like    4: Explore clinical trial options at [facility]

## 2021-08-27 NOTE — LETTER
8/27/2021         RE: Shena Hartmann  [address] [place] [address]        Dear Colleague,    Thank you for referring your patient, Shena Hartmann, to the [facility] BLOOD AND MARROW TRANSPLANT PROGRAM [place]. Please see a copy of my visit note below.    BMT staff note    Shena was seen by me on the video visit this morning for 30 minutes with a total working care time of 45 minutes.  She is a 70-year-old woman who I have followed for more than 10 years who initially had breast cancer and then smoldering multiple myeloma that evidently progressed.  She has undergone autologous transplantation with very little response and has been on maintenance therapy with pomalidomide and daratumumab.  The biggest issue has been drug intolerance but she still seems to be eliciting disease control.  I recently referred her to my colleague, Dr. Gil, for second opinion.  I have extensively reviewed his recent note from May 12, 2021 and I will briefly summarize his recommendations below.  Over the past several months she has been doing clinically well. She is currently on monthly daratumumab and pomalidomide at 1 mg daily for 21-day cycles. She continues to have increased fatigue with her daratumumab and also side effects from pomalidomide. She has had various lower back pains but these have not been seen and may be due to increased activity. Occasionally during her pomalidomide she requires Tylenol. Overall she remains clinically active and has had no signs or symptoms of infection. She has had no Covid exposures and has not been vaccinated. I spent a great deal of time with her discussing our recommendations for vaccination including in patients who are immunosuppressed. She understands that some immunosuppressed patients are also now approved for third dosing. I gave her significant information to promote the importance of vaccination given the severity of the pandemic.    The rest of the  review of symptoms other than that listed above is really unremarkable. Again, we have been dealing with multiple myeloma for almost 20 years.    Physical exam: Overall, Shena looks well.  She is talkative and articulate.  On visual virtual exam HEENT exam is unremarkable.  Pupils and extraocular movements are normal.  She has no respiratory distress.  There are no obvious rashes.  She verbally confirmed this today.    Laboratory evaluation: A CBC on August 2 showed a hemoglobin of 12, platelet count of 182,000 and white count of 5100 with 1900 absolute neutrophils.  Her last M spike was 1.5 g on July 5 which is just slightly elevated from 1.2.  Chemistries were normal in May.  An M spike in labs needs repeating.    It is my overall impression, which Dr. Callahan agrees with, that there does not appear to be any current need to change her regimen of daratumumab-pomalidomide. I have ordered a repeat M spike for her next daratumumab infusion on Monday. Further infusions were also ordered for September 27, October 25, November 22 and December 20. We had a long discussion about the fact that her M spike is 1.5 g, up slightly from 1.2 g. We have agreed that if her M spike is 1.2 g or lower we will make no changes. However, if the M spike is still greater than 1.2 g we will increase her pomalidomide to 2 mg alternating with 1 mg and her 21-day cycle. In the past she has tolerated this regimen. We plan to follow the plan below as recommended in her recent second opinion. She is worried about some of the side effects outlined in her recent consultation. Both her and her  read the discussion today and all of their questions have been answered. I will see Shena on a return visit in clinic on December 3. Orders in follow-up daratumumab appointments have been made.    From Dr. Callahan's note: She is unable to tolerate decadron due to significant side effects, so future regimens would have to be two drug rather than  "three drug. At time of progression, we discussed that the most reasonable options would be to   1. explore carfilzomib or elotuzumab or in combination -https://ashpublications.org/blood/article/132/Supplement%201/1825/557408/F-Wnigs-4-Study-of-Carfilzomib-Plus-Elotuzumab  2. Obtain a bone marrow biopsy to assess for t(11,14) to see if venetoclax would be beneficial (venetoclax-velcade)  3. Explore clinical trial options like    4: Explore clinical trial options at [facility]       Shena is a 70 year old who is being evaluated via a billable video visit.      Vitals - Patient Reported  Weight (Patient Reported): 55.3 kg (122 lb)  Height (Patient Reported): 157.5 cm (5' 2\")  BMI (Based on Pt Reported Ht/Wt): 22.31  Pain Score: Moderate Pain (4)  Pain Loc:  (BONES AND MUSCLE PAIN)    Milo Hills MD  "

## 2021-08-30 ENCOUNTER — APPOINTMENT (OUTPATIENT)
Dept: LAB | Facility: CLINIC | Age: 71
End: 2021-08-30
Attending: INTERNAL MEDICINE
Payer: COMMERCIAL

## 2021-08-30 ENCOUNTER — MYC MEDICAL ADVICE (OUTPATIENT)
Dept: TRANSPLANT | Facility: CLINIC | Age: 71
End: 2021-08-30

## 2021-08-30 ENCOUNTER — INFUSION THERAPY VISIT (OUTPATIENT)
Dept: TRANSPLANT | Facility: CLINIC | Age: 71
End: 2021-08-30
Attending: INTERNAL MEDICINE
Payer: COMMERCIAL

## 2021-08-30 VITALS
RESPIRATION RATE: 16 BRPM | SYSTOLIC BLOOD PRESSURE: 120 MMHG | WEIGHT: 124.5 LBS | DIASTOLIC BLOOD PRESSURE: 73 MMHG | TEMPERATURE: 97.6 F | BODY MASS INDEX: 22.91 KG/M2 | OXYGEN SATURATION: 100 % | HEART RATE: 76 BPM

## 2021-08-30 DIAGNOSIS — C90.00 MULTIPLE MYELOMA NOT HAVING ACHIEVED REMISSION (H): ICD-10-CM

## 2021-08-30 DIAGNOSIS — C90.01 MULTIPLE MYELOMA IN REMISSION (H): Primary | ICD-10-CM

## 2021-08-30 LAB
ALBUMIN SERPL-MCNC: 3.5 G/DL (ref 3.4–5)
ALP SERPL-CCNC: 55 U/L (ref 40–150)
ALT SERPL W P-5'-P-CCNC: 28 U/L (ref 0–50)
ANION GAP SERPL CALCULATED.3IONS-SCNC: 4 MMOL/L (ref 3–14)
AST SERPL W P-5'-P-CCNC: 15 U/L (ref 0–45)
B2 MICROGLOB TUMOR MARKER SER-MCNC: 2.1 MG/L
BASOPHILS # BLD AUTO: 0.1 10E3/UL (ref 0–0.2)
BASOPHILS NFR BLD AUTO: 1 %
BILIRUB SERPL-MCNC: 0.4 MG/DL (ref 0.2–1.3)
BUN SERPL-MCNC: 18 MG/DL (ref 7–30)
CALCIUM SERPL-MCNC: 9.4 MG/DL (ref 8.5–10.1)
CHLORIDE BLD-SCNC: 105 MMOL/L (ref 94–109)
CO2 SERPL-SCNC: 28 MMOL/L (ref 20–32)
CREAT SERPL-MCNC: 0.74 MG/DL (ref 0.52–1.04)
CRP SERPL-MCNC: <2.9 MG/L (ref 0–8)
EOSINOPHIL # BLD AUTO: 0.2 10E3/UL (ref 0–0.7)
EOSINOPHIL NFR BLD AUTO: 3 %
ERYTHROCYTE [DISTWIDTH] IN BLOOD BY AUTOMATED COUNT: 15.2 % (ref 10–15)
GFR SERPL CREATININE-BSD FRML MDRD: 82 ML/MIN/1.73M2
GLUCOSE BLD-MCNC: 90 MG/DL (ref 70–99)
HCT VFR BLD AUTO: 36.5 % (ref 35–47)
HGB BLD-MCNC: 12.4 G/DL (ref 11.7–15.7)
IMM GRANULOCYTES # BLD: 0 10E3/UL
IMM GRANULOCYTES NFR BLD: 0 %
LYMPHOCYTES # BLD AUTO: 1.9 10E3/UL (ref 0.8–5.3)
LYMPHOCYTES NFR BLD AUTO: 38 %
MCH RBC QN AUTO: 33 PG (ref 26.5–33)
MCHC RBC AUTO-ENTMCNC: 34 G/DL (ref 31.5–36.5)
MCV RBC AUTO: 97 FL (ref 78–100)
MONOCYTES # BLD AUTO: 0.8 10E3/UL (ref 0–1.3)
MONOCYTES NFR BLD AUTO: 16 %
NEUTROPHILS # BLD AUTO: 2.1 10E3/UL (ref 1.6–8.3)
NEUTROPHILS NFR BLD AUTO: 42 %
NRBC # BLD AUTO: 0 10E3/UL
NRBC BLD AUTO-RTO: 0 /100
PLATELET # BLD AUTO: 190 10E3/UL (ref 150–450)
POTASSIUM BLD-SCNC: 3.8 MMOL/L (ref 3.4–5.3)
PROT SERPL-MCNC: 7.7 G/DL (ref 6.8–8.8)
RBC # BLD AUTO: 3.76 10E6/UL (ref 3.8–5.2)
SODIUM SERPL-SCNC: 137 MMOL/L (ref 133–144)
TOTAL PROTEIN SERUM FOR ELP: 7.4 G/DL (ref 6.8–8.8)
WBC # BLD AUTO: 5 10E3/UL (ref 4–11)

## 2021-08-30 PROCEDURE — 86140 C-REACTIVE PROTEIN: CPT

## 2021-08-30 PROCEDURE — 82565 ASSAY OF CREATININE: CPT

## 2021-08-30 PROCEDURE — 85025 COMPLETE CBC W/AUTO DIFF WBC: CPT

## 2021-08-30 PROCEDURE — 250N000013 HC RX MED GY IP 250 OP 250 PS 637: Performed by: INTERNAL MEDICINE

## 2021-08-30 PROCEDURE — 36415 COLL VENOUS BLD VENIPUNCTURE: CPT

## 2021-08-30 PROCEDURE — 82232 ASSAY OF BETA-2 PROTEIN: CPT

## 2021-08-30 PROCEDURE — 250N000011 HC RX IP 250 OP 636: Performed by: INTERNAL MEDICINE

## 2021-08-30 PROCEDURE — 96401 CHEMO ANTI-NEOPL SQ/IM: CPT

## 2021-08-30 PROCEDURE — 82040 ASSAY OF SERUM ALBUMIN: CPT

## 2021-08-30 PROCEDURE — 84165 PROTEIN E-PHORESIS SERUM: CPT | Mod: TC | Performed by: PATHOLOGY

## 2021-08-30 PROCEDURE — 84155 ASSAY OF PROTEIN SERUM: CPT

## 2021-08-30 PROCEDURE — 84165 PROTEIN E-PHORESIS SERUM: CPT | Mod: 26 | Performed by: PATHOLOGY

## 2021-08-30 RX ORDER — ZINC GLUCONATE 50 MG
50 TABLET ORAL DAILY
COMMUNITY
End: 2022-05-16

## 2021-08-30 RX ORDER — ACETAMINOPHEN 325 MG/1
650 TABLET ORAL ONCE
Status: COMPLETED | OUTPATIENT
Start: 2021-08-30 | End: 2021-08-30

## 2021-08-30 RX ADMIN — DARATUMUMAB AND HYALURONIDASE-FIHJ (HUMAN RECOMBINANT) 1800 MG: 1800; 30000 INJECTION SUBCUTANEOUS at 08:49

## 2021-08-30 RX ADMIN — ACETAMINOPHEN 650 MG: 325 TABLET ORAL at 08:21

## 2021-08-30 ASSESSMENT — PAIN SCALES - GENERAL: PAINLEVEL: MODERATE PAIN (4)

## 2021-08-30 NOTE — PROGRESS NOTES
Infusion Nursing Note:  Shena Hartmann presents today for scheduled darzalex injection.    Patient seen by provider today: No   present during visit today: Not Applicable.    Note: Patient assessment completed. Premedicated with Tylenol 650 mg PO. Patient declined benadryl and decadron. Met parameters for injection.     Received darzalex 1800 mg subcutaneous injection to left lower abdomen over 6 minutes.       Intravenous Access:  No Intravenous access.    Treatment Conditions:  Lab Results   Component Value Date    HGB 12.4 08/30/2021    HGB 13.0 07/05/2021     Lab Results   Component Value Date    WBC 5.0 08/30/2021    WBC 4.7 07/05/2021      Lab Results   Component Value Date    ANEU 2.1 07/05/2021     Lab Results   Component Value Date     08/30/2021     07/05/2021          Post Infusion Assessment:  Patient tolerated injection without incident.       Discharge Plan:   Copy of AVS reviewed with patient and/or family.   Patient discharged in stable condition accompanied by: .  Departure Mode: Ambulatory.      Radha Walter RN

## 2021-08-30 NOTE — LETTER
8/30/2021         RE: Shena Hartmann  [address] [place] [address]        Dear Colleague,    Thank you for referring your patient, Shena Hartmann, to the [facility] BLOOD AND MARROW TRANSPLANT PROGRAM [place]. Please see a copy of my visit note below.    Infusion Nursing Note:  Shena Hartmann presents today for scheduled darzalex injection.    Patient seen by provider today: No   present during visit today: Not Applicable.    Note: Patient assessment completed. Premedicated with Tylenol 650 mg PO. Patient declined benadryl and decadron. Met parameters for injection.     Received darzalex 1800 mg subcutaneous injection to left lower abdomen over 6 minutes.       Intravenous Access:  No Intravenous access.    Treatment Conditions:  Lab Results   Component Value Date    HGB 12.4 08/30/2021    HGB 13.0 07/05/2021     Lab Results   Component Value Date    WBC 5.0 08/30/2021    WBC 4.7 07/05/2021      Lab Results   Component Value Date    ANEU 2.1 07/05/2021     Lab Results   Component Value Date     08/30/2021     07/05/2021          Post Infusion Assessment:  Patient tolerated injection without incident.       Discharge Plan:   Copy of AVS reviewed with patient and/or family.   Patient discharged in stable condition accompanied by: .  Departure Mode: Ambulatory.      Radha Walter RN                          Again, thank you for allowing me to participate in the care of your patient.        Sincerely,        [facility]

## 2021-08-30 NOTE — NURSING NOTE
Chief Complaint   Patient presents with     Blood Draw     VItals, blood drawn via VPT by LPN. Pt checked into appt.      DOREEN Chamberlain LPN

## 2021-08-31 LAB
ALBUMIN SERPL ELPH-MCNC: 4.2 G/DL (ref 3.7–5.1)
ALPHA1 GLOB SERPL ELPH-MCNC: 0.3 G/DL (ref 0.2–0.4)
ALPHA2 GLOB SERPL ELPH-MCNC: 0.7 G/DL (ref 0.5–0.9)
B-GLOBULIN SERPL ELPH-MCNC: 0.6 G/DL (ref 0.6–1)
GAMMA GLOB SERPL ELPH-MCNC: 1.7 G/DL (ref 0.7–1.6)
M PROTEIN SERPL ELPH-MCNC: 1.1 G/DL
PROT PATTERN SERPL ELPH-IMP: ABNORMAL

## 2021-09-03 ENCOUNTER — TELEPHONE (OUTPATIENT)
Dept: ONCOLOGY | Facility: CLINIC | Age: 71
End: 2021-09-03

## 2021-09-03 DIAGNOSIS — C90.00 MULTIPLE MYELOMA, REMISSION STATUS UNSPECIFIED (H): Primary | ICD-10-CM

## 2021-09-03 NOTE — TELEPHONE ENCOUNTER
Oral Chemotherapy Monitoring Program    Medication: Pomalyst  Rx:  1-2mg PO daily 31/28 ds    Auth #: [id number]  Risk Category: Adult female NOT of reproductive capacity  Routine survey questions reviewed. yes      Tavia Suazo Pharmacy Liaison, alpha split [facility]  Phone: [phone]  Fax: [phone]  Email: [email or web address]

## 2021-09-03 NOTE — TELEPHONE ENCOUNTER
PA needed for dose increase.   PA Initiation    Medication: Pomalyst   Insurance Company: [employer] - Phone [phone] Fax [phone]  Pharmacy Filling the Rx: [facility] - [place], [place] - [facility] [address]  Filling Pharmacy Phone:    Filling Pharmacy Fax:    Start Date: 9/3/2021

## 2021-09-03 NOTE — TELEPHONE ENCOUNTER
Prior Authorization Approval    Authorization Effective Date: 9/3/2021  Authorization Expiration Date: 6/20/2022  Medication: Pomalyst approval  Approved Dose/Quantity: 31/28   Reference #: Key: [id number]   Insurance Company: [employer] [phone] Fax [phone]  Expected CoPay:       CoPay Card Available:      Foundation Assistance Needed:    Which Pharmacy is filling the prescription (Not needed for infusion/clinic administered): [facility] - [place], [place] - [facility] [address]  Pharmacy Notified: No  Patient Notified: No

## 2021-09-11 ENCOUNTER — HEALTH MAINTENANCE LETTER (OUTPATIENT)
Age: 71
End: 2021-09-11

## 2021-09-26 DIAGNOSIS — C90.00 MULTIPLE MYELOMA NOT HAVING ACHIEVED REMISSION (H): Primary | ICD-10-CM

## 2021-09-26 RX ORDER — ALBUTEROL SULFATE 90 UG/1
1-2 AEROSOL, METERED RESPIRATORY (INHALATION)
Status: CANCELLED
Start: 2021-10-25

## 2021-09-26 RX ORDER — DIPHENHYDRAMINE HCL 25 MG
25 CAPSULE ORAL
Status: CANCELLED
Start: 2021-10-25

## 2021-09-26 RX ORDER — HEPARIN SODIUM,PORCINE 10 UNIT/ML
5 VIAL (ML) INTRAVENOUS
Status: CANCELLED | OUTPATIENT
Start: 2021-10-25

## 2021-09-26 RX ORDER — MEPERIDINE HYDROCHLORIDE 25 MG/ML
25 INJECTION INTRAMUSCULAR; INTRAVENOUS; SUBCUTANEOUS EVERY 30 MIN PRN
Status: CANCELLED | OUTPATIENT
Start: 2021-09-27

## 2021-09-26 RX ORDER — DIPHENHYDRAMINE HYDROCHLORIDE 50 MG/ML
50 INJECTION INTRAMUSCULAR; INTRAVENOUS
Status: CANCELLED
Start: 2021-10-25

## 2021-09-26 RX ORDER — EPINEPHRINE 1 MG/ML
0.3 INJECTION, SOLUTION INTRAMUSCULAR; SUBCUTANEOUS EVERY 5 MIN PRN
Status: CANCELLED | OUTPATIENT
Start: 2021-10-25

## 2021-09-26 RX ORDER — DIPHENHYDRAMINE HYDROCHLORIDE 50 MG/ML
50 INJECTION INTRAMUSCULAR; INTRAVENOUS
Status: CANCELLED
Start: 2021-09-27

## 2021-09-26 RX ORDER — HEPARIN SODIUM,PORCINE 10 UNIT/ML
5 VIAL (ML) INTRAVENOUS
Status: CANCELLED | OUTPATIENT
Start: 2021-09-27

## 2021-09-26 RX ORDER — DIPHENHYDRAMINE HCL 25 MG
25 CAPSULE ORAL
Status: CANCELLED
Start: 2021-09-27

## 2021-09-26 RX ORDER — ALBUTEROL SULFATE 0.83 MG/ML
2.5 SOLUTION RESPIRATORY (INHALATION)
Status: CANCELLED | OUTPATIENT
Start: 2021-09-27

## 2021-09-26 RX ORDER — METHYLPREDNISOLONE SODIUM SUCCINATE 125 MG/2ML
125 INJECTION, POWDER, LYOPHILIZED, FOR SOLUTION INTRAMUSCULAR; INTRAVENOUS
Status: CANCELLED
Start: 2021-10-25

## 2021-09-26 RX ORDER — LORAZEPAM 2 MG/ML
0.5 INJECTION INTRAMUSCULAR EVERY 4 HOURS PRN
Status: CANCELLED
Start: 2021-10-25

## 2021-09-26 RX ORDER — HEPARIN SODIUM (PORCINE) LOCK FLUSH IV SOLN 100 UNIT/ML 100 UNIT/ML
5 SOLUTION INTRAVENOUS
Status: CANCELLED | OUTPATIENT
Start: 2021-09-27

## 2021-09-26 RX ORDER — ALBUTEROL SULFATE 0.83 MG/ML
2.5 SOLUTION RESPIRATORY (INHALATION)
Status: CANCELLED | OUTPATIENT
Start: 2021-10-25

## 2021-09-26 RX ORDER — SODIUM CHLORIDE 9 MG/ML
1000 INJECTION, SOLUTION INTRAVENOUS CONTINUOUS PRN
Status: CANCELLED
Start: 2021-09-27

## 2021-09-26 RX ORDER — HEPARIN SODIUM (PORCINE) LOCK FLUSH IV SOLN 100 UNIT/ML 100 UNIT/ML
5 SOLUTION INTRAVENOUS
Status: CANCELLED | OUTPATIENT
Start: 2021-10-25

## 2021-09-26 RX ORDER — MEPERIDINE HYDROCHLORIDE 25 MG/ML
25 INJECTION INTRAMUSCULAR; INTRAVENOUS; SUBCUTANEOUS EVERY 30 MIN PRN
Status: CANCELLED | OUTPATIENT
Start: 2021-10-25

## 2021-09-26 RX ORDER — METHYLPREDNISOLONE SODIUM SUCCINATE 125 MG/2ML
125 INJECTION, POWDER, LYOPHILIZED, FOR SOLUTION INTRAMUSCULAR; INTRAVENOUS
Status: CANCELLED
Start: 2021-09-27

## 2021-09-26 RX ORDER — ACETAMINOPHEN 325 MG/1
650 TABLET ORAL
Status: CANCELLED
Start: 2021-09-27

## 2021-09-26 RX ORDER — ALBUTEROL SULFATE 90 UG/1
1-2 AEROSOL, METERED RESPIRATORY (INHALATION)
Status: CANCELLED
Start: 2021-09-27

## 2021-09-26 RX ORDER — NALOXONE HYDROCHLORIDE 0.4 MG/ML
.1-.4 INJECTION, SOLUTION INTRAMUSCULAR; INTRAVENOUS; SUBCUTANEOUS
Status: CANCELLED | OUTPATIENT
Start: 2021-10-25

## 2021-09-26 RX ORDER — EPINEPHRINE 1 MG/ML
0.3 INJECTION, SOLUTION INTRAMUSCULAR; SUBCUTANEOUS EVERY 5 MIN PRN
Status: CANCELLED | OUTPATIENT
Start: 2021-09-27

## 2021-09-26 RX ORDER — ACETAMINOPHEN 325 MG/1
650 TABLET ORAL
Status: CANCELLED
Start: 2021-10-25

## 2021-09-26 RX ORDER — LORAZEPAM 2 MG/ML
0.5 INJECTION INTRAMUSCULAR EVERY 4 HOURS PRN
Status: CANCELLED
Start: 2021-09-27

## 2021-09-26 RX ORDER — SODIUM CHLORIDE 9 MG/ML
1000 INJECTION, SOLUTION INTRAVENOUS CONTINUOUS PRN
Status: CANCELLED
Start: 2021-10-25

## 2021-09-26 RX ORDER — NALOXONE HYDROCHLORIDE 0.4 MG/ML
.1-.4 INJECTION, SOLUTION INTRAMUSCULAR; INTRAVENOUS; SUBCUTANEOUS
Status: CANCELLED | OUTPATIENT
Start: 2021-09-27

## 2021-09-27 ENCOUNTER — APPOINTMENT (OUTPATIENT)
Dept: LAB | Facility: CLINIC | Age: 71
End: 2021-09-27
Attending: INTERNAL MEDICINE
Payer: COMMERCIAL

## 2021-09-27 ENCOUNTER — INFUSION THERAPY VISIT (OUTPATIENT)
Dept: TRANSPLANT | Facility: CLINIC | Age: 71
End: 2021-09-27
Attending: INTERNAL MEDICINE
Payer: COMMERCIAL

## 2021-09-27 VITALS
WEIGHT: 124.1 LBS | TEMPERATURE: 97.9 F | RESPIRATION RATE: 16 BRPM | BODY MASS INDEX: 22.84 KG/M2 | DIASTOLIC BLOOD PRESSURE: 74 MMHG | OXYGEN SATURATION: 97 % | HEART RATE: 71 BPM | SYSTOLIC BLOOD PRESSURE: 136 MMHG

## 2021-09-27 DIAGNOSIS — C90.01 MULTIPLE MYELOMA IN REMISSION (H): Primary | ICD-10-CM

## 2021-09-27 DIAGNOSIS — C90.00 MULTIPLE MYELOMA NOT HAVING ACHIEVED REMISSION (H): ICD-10-CM

## 2021-09-27 LAB
ALBUMIN SERPL-MCNC: 3.3 G/DL (ref 3.4–5)
ALP SERPL-CCNC: 52 U/L (ref 40–150)
ALT SERPL W P-5'-P-CCNC: 26 U/L (ref 0–50)
ANION GAP SERPL CALCULATED.3IONS-SCNC: 6 MMOL/L (ref 3–14)
AST SERPL W P-5'-P-CCNC: 18 U/L (ref 0–45)
B2 MICROGLOB TUMOR MARKER SER-MCNC: 2.6 MG/L
BASOPHILS # BLD AUTO: 0 10E3/UL (ref 0–0.2)
BASOPHILS NFR BLD AUTO: 1 %
BILIRUB SERPL-MCNC: 0.4 MG/DL (ref 0.2–1.3)
BUN SERPL-MCNC: 16 MG/DL (ref 7–30)
CALCIUM SERPL-MCNC: 9.2 MG/DL (ref 8.5–10.1)
CHLORIDE BLD-SCNC: 103 MMOL/L (ref 94–109)
CO2 SERPL-SCNC: 27 MMOL/L (ref 20–32)
CREAT SERPL-MCNC: 0.84 MG/DL (ref 0.52–1.04)
CRP SERPL-MCNC: <2.9 MG/L (ref 0–8)
EOSINOPHIL # BLD AUTO: 0.1 10E3/UL (ref 0–0.7)
EOSINOPHIL NFR BLD AUTO: 3 %
ERYTHROCYTE [DISTWIDTH] IN BLOOD BY AUTOMATED COUNT: 15.1 % (ref 10–15)
GFR SERPL CREATININE-BSD FRML MDRD: 71 ML/MIN/1.73M2
GLUCOSE BLD-MCNC: 70 MG/DL (ref 70–99)
HCT VFR BLD AUTO: 36.7 % (ref 35–47)
HGB BLD-MCNC: 12.3 G/DL (ref 11.7–15.7)
IMM GRANULOCYTES # BLD: 0 10E3/UL
IMM GRANULOCYTES NFR BLD: 0 %
LYMPHOCYTES # BLD AUTO: 1.5 10E3/UL (ref 0.8–5.3)
LYMPHOCYTES NFR BLD AUTO: 35 %
MCH RBC QN AUTO: 32.7 PG (ref 26.5–33)
MCHC RBC AUTO-ENTMCNC: 33.5 G/DL (ref 31.5–36.5)
MCV RBC AUTO: 98 FL (ref 78–100)
MONOCYTES # BLD AUTO: 0.7 10E3/UL (ref 0–1.3)
MONOCYTES NFR BLD AUTO: 16 %
NEUTROPHILS # BLD AUTO: 1.9 10E3/UL (ref 1.6–8.3)
NEUTROPHILS NFR BLD AUTO: 45 %
NRBC # BLD AUTO: 0 10E3/UL
NRBC BLD AUTO-RTO: 0 /100
PLATELET # BLD AUTO: 174 10E3/UL (ref 150–450)
POTASSIUM BLD-SCNC: 3.9 MMOL/L (ref 3.4–5.3)
PROT SERPL-MCNC: 7.9 G/DL (ref 6.8–8.8)
RBC # BLD AUTO: 3.76 10E6/UL (ref 3.8–5.2)
SODIUM SERPL-SCNC: 136 MMOL/L (ref 133–144)
TOTAL PROTEIN SERUM FOR ELP: 7.8 G/DL (ref 6.8–8.8)
WBC # BLD AUTO: 4.2 10E3/UL (ref 4–11)

## 2021-09-27 PROCEDURE — 80053 COMPREHEN METABOLIC PANEL: CPT

## 2021-09-27 PROCEDURE — 86140 C-REACTIVE PROTEIN: CPT

## 2021-09-27 PROCEDURE — 82232 ASSAY OF BETA-2 PROTEIN: CPT

## 2021-09-27 PROCEDURE — 36415 COLL VENOUS BLD VENIPUNCTURE: CPT

## 2021-09-27 PROCEDURE — 250N000011 HC RX IP 250 OP 636: Performed by: INTERNAL MEDICINE

## 2021-09-27 PROCEDURE — 85004 AUTOMATED DIFF WBC COUNT: CPT

## 2021-09-27 PROCEDURE — 96401 CHEMO ANTI-NEOPL SQ/IM: CPT

## 2021-09-27 PROCEDURE — 84165 PROTEIN E-PHORESIS SERUM: CPT | Mod: TC | Performed by: PATHOLOGY

## 2021-09-27 PROCEDURE — 84165 PROTEIN E-PHORESIS SERUM: CPT | Mod: 26 | Performed by: PATHOLOGY

## 2021-09-27 PROCEDURE — 84155 ASSAY OF PROTEIN SERUM: CPT

## 2021-09-27 RX ADMIN — DARATUMUMAB AND HYALURONIDASE-FIHJ (HUMAN RECOMBINANT) 1800 MG: 1800; 30000 INJECTION SUBCUTANEOUS at 09:07

## 2021-09-27 ASSESSMENT — PAIN SCALES - GENERAL: PAINLEVEL: MILD PAIN (3)

## 2021-09-27 NOTE — NURSING NOTE
Chief Complaint   Patient presents with     Blood Draw     Labs drawn by  in lab by RN. VS taken.      Infusion     Scheduled chemo s/p BMT r/t MM     Day 1, Cycle 14 Subcutaneous Daratumumab    Yusra Brush RN

## 2021-09-27 NOTE — NURSING NOTE
Chief Complaint   Patient presents with     Blood Draw     Labs drawn by  in lab by RN. VS taken.      Labs collected from venipuncture by RN. Vitals taken. Checked in for appointment(s).  Shane Vargas RN

## 2021-09-27 NOTE — PROGRESS NOTES
Infusion Nursing Note:  Shena Hartmann presents today for Day 1, Cycle 14 of Daratumumab.    Patient seen by provider today: No   present during visit today: Not Applicable.    Note: Lab results monitored; met treatment parameters.    Intravenous Access:  n/a    Treatment Conditions:  PRN premeds not given; Daratumumab checked by 2nd RN; given subcutaneously over 5 minutes in RLQ of abdomen.    Post Infusion Assessment:  Patient tolerated injection without incident.     Discharge Plan:   Patient discharged in stable condition accompanied by: self.      Yusra Brush RN

## 2021-09-27 NOTE — LETTER
9/27/2021         RE: Shena Hartmann  [address] [place] [address]        Dear Colleague,    Thank you for referring your patient, Shena Hartmann, to the [facility] BLOOD AND MARROW TRANSPLANT PROGRAM [place]. Please see a copy of my visit note below.    Infusion Nursing Note:  Shena Hartmann presents today for Day 1, Cycle 14 of Daratumumab.    Patient seen by provider today: No   present during visit today: Not Applicable.    Note: Lab results monitored; met treatment parameters.    Intravenous Access:  n/a    Treatment Conditions:  PRN premeds not given; Daratumumab checked by 2nd RN; given subcutaneously over 5 minutes in RLQ of abdomen.    Post Infusion Assessment:  Patient tolerated injection without incident.     Discharge Plan:   Patient discharged in stable condition accompanied by: self.      Yusra Brush RN                          Again, thank you for allowing me to participate in the care of your patient.        Sincerely,        [facility] Treatment [place]

## 2021-09-28 LAB
ALBUMIN SERPL ELPH-MCNC: 4.3 G/DL (ref 3.7–5.1)
ALPHA1 GLOB SERPL ELPH-MCNC: 0.3 G/DL (ref 0.2–0.4)
ALPHA2 GLOB SERPL ELPH-MCNC: 0.8 G/DL (ref 0.5–0.9)
B-GLOBULIN SERPL ELPH-MCNC: 0.6 G/DL (ref 0.6–1)
GAMMA GLOB SERPL ELPH-MCNC: 1.8 G/DL (ref 0.7–1.6)
M PROTEIN SERPL ELPH-MCNC: 1.4 G/DL
PROT PATTERN SERPL ELPH-IMP: ABNORMAL

## 2021-10-01 ENCOUNTER — TELEPHONE (OUTPATIENT)
Dept: ONCOLOGY | Facility: CLINIC | Age: 71
End: 2021-10-01

## 2021-10-01 DIAGNOSIS — C90.00 MULTIPLE MYELOMA, REMISSION STATUS UNSPECIFIED (H): Primary | ICD-10-CM

## 2021-10-01 NOTE — ORAL ONC MGMT
Oral Chemotherapy Monitoring Program    Medication: Pomalyst  Rx:  1mg PO daily on days 1-21    Auth #: [id number]  Risk Category:Adult female NOT of reproductive capacity    Routine survey questions reviewed.    Thank you, if you have any further questions please feel free to contact me.    Josué Alfonso  Lead Oncology Liaison  [email or web address]  Phone: [phone]  Fax: [phone]

## 2021-10-25 ENCOUNTER — APPOINTMENT (OUTPATIENT)
Dept: LAB | Facility: CLINIC | Age: 71
End: 2021-10-25
Attending: INTERNAL MEDICINE
Payer: COMMERCIAL

## 2021-10-25 ENCOUNTER — INFUSION THERAPY VISIT (OUTPATIENT)
Dept: TRANSPLANT | Facility: CLINIC | Age: 71
End: 2021-10-25
Attending: INTERNAL MEDICINE
Payer: COMMERCIAL

## 2021-10-25 VITALS
SYSTOLIC BLOOD PRESSURE: 140 MMHG | OXYGEN SATURATION: 97 % | TEMPERATURE: 98.5 F | BODY MASS INDEX: 22.86 KG/M2 | RESPIRATION RATE: 16 BRPM | WEIGHT: 124.2 LBS | DIASTOLIC BLOOD PRESSURE: 72 MMHG | HEART RATE: 75 BPM

## 2021-10-25 DIAGNOSIS — C90.01 MULTIPLE MYELOMA IN REMISSION (H): Primary | ICD-10-CM

## 2021-10-25 DIAGNOSIS — C90.00 MULTIPLE MYELOMA NOT HAVING ACHIEVED REMISSION (H): ICD-10-CM

## 2021-10-25 LAB
ALBUMIN SERPL-MCNC: 3.6 G/DL (ref 3.4–5)
ALP SERPL-CCNC: 56 U/L (ref 40–150)
ALT SERPL W P-5'-P-CCNC: 30 U/L (ref 0–50)
ANION GAP SERPL CALCULATED.3IONS-SCNC: 4 MMOL/L (ref 3–14)
AST SERPL W P-5'-P-CCNC: 21 U/L (ref 0–45)
B2 MICROGLOB TUMOR MARKER SER-MCNC: 2.4 MG/L
BASOPHILS # BLD AUTO: 0 10E3/UL (ref 0–0.2)
BASOPHILS NFR BLD AUTO: 1 %
BILIRUB SERPL-MCNC: 0.5 MG/DL (ref 0.2–1.3)
BUN SERPL-MCNC: 20 MG/DL (ref 7–30)
CALCIUM SERPL-MCNC: 9.6 MG/DL (ref 8.5–10.1)
CHLORIDE BLD-SCNC: 103 MMOL/L (ref 94–109)
CO2 SERPL-SCNC: 29 MMOL/L (ref 20–32)
CREAT SERPL-MCNC: 0.88 MG/DL (ref 0.52–1.04)
CRP SERPL-MCNC: <2.9 MG/L (ref 0–8)
EOSINOPHIL # BLD AUTO: 0.1 10E3/UL (ref 0–0.7)
EOSINOPHIL NFR BLD AUTO: 3 %
ERYTHROCYTE [DISTWIDTH] IN BLOOD BY AUTOMATED COUNT: 15.5 % (ref 10–15)
GFR SERPL CREATININE-BSD FRML MDRD: 66 ML/MIN/1.73M2
GLUCOSE BLD-MCNC: 66 MG/DL (ref 70–99)
HCT VFR BLD AUTO: 38.9 % (ref 35–47)
HGB BLD-MCNC: 12.7 G/DL (ref 11.7–15.7)
IMM GRANULOCYTES # BLD: 0 10E3/UL
IMM GRANULOCYTES NFR BLD: 0 %
LYMPHOCYTES # BLD AUTO: 1.9 10E3/UL (ref 0.8–5.3)
LYMPHOCYTES NFR BLD AUTO: 39 %
MCH RBC QN AUTO: 32.2 PG (ref 26.5–33)
MCHC RBC AUTO-ENTMCNC: 32.6 G/DL (ref 31.5–36.5)
MCV RBC AUTO: 99 FL (ref 78–100)
MONOCYTES # BLD AUTO: 0.7 10E3/UL (ref 0–1.3)
MONOCYTES NFR BLD AUTO: 13 %
NEUTROPHILS # BLD AUTO: 2.2 10E3/UL (ref 1.6–8.3)
NEUTROPHILS NFR BLD AUTO: 44 %
NRBC # BLD AUTO: 0 10E3/UL
NRBC BLD AUTO-RTO: 0 /100
PLATELET # BLD AUTO: 195 10E3/UL (ref 150–450)
POTASSIUM BLD-SCNC: 3.4 MMOL/L (ref 3.4–5.3)
PROT SERPL-MCNC: 8.5 G/DL (ref 6.8–8.8)
RBC # BLD AUTO: 3.95 10E6/UL (ref 3.8–5.2)
SODIUM SERPL-SCNC: 136 MMOL/L (ref 133–144)
TOTAL PROTEIN SERUM FOR ELP: 7.9 G/DL (ref 6.8–8.8)
WBC # BLD AUTO: 4.9 10E3/UL (ref 4–11)

## 2021-10-25 PROCEDURE — 36415 COLL VENOUS BLD VENIPUNCTURE: CPT

## 2021-10-25 PROCEDURE — 84165 PROTEIN E-PHORESIS SERUM: CPT | Mod: TC | Performed by: PATHOLOGY

## 2021-10-25 PROCEDURE — 250N000013 HC RX MED GY IP 250 OP 250 PS 637: Performed by: INTERNAL MEDICINE

## 2021-10-25 PROCEDURE — 86140 C-REACTIVE PROTEIN: CPT

## 2021-10-25 PROCEDURE — 96401 CHEMO ANTI-NEOPL SQ/IM: CPT

## 2021-10-25 PROCEDURE — 250N000011 HC RX IP 250 OP 636: Performed by: INTERNAL MEDICINE

## 2021-10-25 PROCEDURE — 85004 AUTOMATED DIFF WBC COUNT: CPT

## 2021-10-25 PROCEDURE — 84155 ASSAY OF PROTEIN SERUM: CPT

## 2021-10-25 PROCEDURE — 80053 COMPREHEN METABOLIC PANEL: CPT

## 2021-10-25 PROCEDURE — 82232 ASSAY OF BETA-2 PROTEIN: CPT

## 2021-10-25 PROCEDURE — 84165 PROTEIN E-PHORESIS SERUM: CPT | Mod: 26 | Performed by: PATHOLOGY

## 2021-10-25 RX ORDER — ACETAMINOPHEN 325 MG/1
650 TABLET ORAL
Status: COMPLETED | OUTPATIENT
Start: 2021-10-25 | End: 2021-10-25

## 2021-10-25 RX ADMIN — DARATUMUMAB AND HYALURONIDASE-FIHJ (HUMAN RECOMBINANT) 1800 MG: 1800; 30000 INJECTION SUBCUTANEOUS at 08:54

## 2021-10-25 RX ADMIN — ACETAMINOPHEN 650 MG: 325 TABLET ORAL at 08:36

## 2021-10-25 ASSESSMENT — PAIN SCALES - GENERAL: PAINLEVEL: MODERATE PAIN (4)

## 2021-10-25 NOTE — PROGRESS NOTES
Infusion Nursing Note:  Shena Hartmann presents today for daratumumab injection.    Patient seen by provider today: No   present during visit today: Not Applicable.    Note: Shena presents today for scheduled daratumumab injection. Labs were monitored, no other transfusion needed. Shena received 650mg tylenol as premed. Feeling overall well today despite some ongoing fatigue. See flowsheets. Injection administered in left lower quadrant over 5 minutes. Tolerated well.      Intravenous Access:  No Intravenous access/labs at this visit.    Treatment Conditions:  Lab Results   Component Value Date    HGB 12.7 10/25/2021    WBC 4.9 10/25/2021    ANEU 2.1 07/05/2021    ANEUTAUTO 2.2 10/25/2021     10/25/2021      Lab Results   Component Value Date     10/25/2021    POTASSIUM 3.4 10/25/2021    MAG 1.8 12/12/2014    CR 0.88 10/25/2021    PAULINE 9.6 10/25/2021    BILITOTAL 0.5 10/25/2021    ALBUMIN 3.6 10/25/2021    ALT 30 10/25/2021    AST 21 10/25/2021     Results reviewed, labs MET treatment parameters, ok to proceed with treatment.      Post Infusion Assessment:  Patient tolerated injection without incident.  Site patent and intact, free from redness, edema or discomfort.       Discharge Plan:   Copy of AVS reviewed with patient and/or family.  Patient aware of next appt.  Patient discharged in stable condition accompanied by: self.  Departure Mode: Ambulatory.      Yusra Nava RN

## 2021-10-25 NOTE — NURSING NOTE
Chief Complaint   Patient presents with     Blood Draw     Labs drawn via  by RN. VS taken.     Labs drawn with vpt by rn.  Pt tolerated well.  VS taken.  Pt checked in for next appt.    Bernardino Pisano RN

## 2021-10-25 NOTE — LETTER
10/25/2021         RE: Shena Hartmann  [address] [place] [address]        Dear Colleague,    Thank you for referring your patient, Shena Hartmann, to the [facility] BLOOD AND MARROW TRANSPLANT PROGRAM [place]. Please see a copy of my visit note below.    Infusion Nursing Note:  Shena Hartmann presents today for daratumumab injection.    Patient seen by provider today: No   present during visit today: Not Applicable.    Note: Shena presents today for scheduled daratumumab injection. Labs were monitored, no other transfusion needed. hSena received 650mg tylenol as premed. Feeling overall well today despite some ongoing fatigue. See flowsheets. Injection administered in left lower quadrant over 5 minutes. Tolerated well.      Intravenous Access:  No Intravenous access/labs at this visit.    Treatment Conditions:  Lab Results   Component Value Date    HGB 12.7 10/25/2021    WBC 4.9 10/25/2021    ANEU 2.1 07/05/2021    ANEUTAUTO 2.2 10/25/2021     10/25/2021      Lab Results   Component Value Date     10/25/2021    POTASSIUM 3.4 10/25/2021    MAG 1.8 12/12/2014    CR 0.88 10/25/2021    PAULINE 9.6 10/25/2021    BILITOTAL 0.5 10/25/2021    ALBUMIN 3.6 10/25/2021    ALT 30 10/25/2021    AST 21 10/25/2021     Results reviewed, labs MET treatment parameters, ok to proceed with treatment.      Post Infusion Assessment:  Patient tolerated injection without incident.  Site patent and intact, free from redness, edema or discomfort.       Discharge Plan:   Copy of AVS reviewed with patient and/or family.  Patient aware of next appt.  Patient discharged in stable condition accompanied by: self.  Departure Mode: Ambulatory.      Yusra Nava RN                        Again, thank you for allowing me to participate in the care of your patient.        Sincerely,        [facility]

## 2021-10-26 LAB
ALBUMIN SERPL ELPH-MCNC: 4.2 G/DL (ref 3.7–5.1)
ALPHA1 GLOB SERPL ELPH-MCNC: 0.3 G/DL (ref 0.2–0.4)
ALPHA2 GLOB SERPL ELPH-MCNC: 0.8 G/DL (ref 0.5–0.9)
B-GLOBULIN SERPL ELPH-MCNC: 0.6 G/DL (ref 0.6–1)
GAMMA GLOB SERPL ELPH-MCNC: 2 G/DL (ref 0.7–1.6)
M PROTEIN SERPL ELPH-MCNC: 1.1 G/DL
PROT PATTERN SERPL ELPH-IMP: ABNORMAL

## 2021-10-28 DIAGNOSIS — C90.00 MULTIPLE MYELOMA, REMISSION STATUS UNSPECIFIED (H): Primary | ICD-10-CM

## 2021-11-01 ENCOUNTER — TELEPHONE (OUTPATIENT)
Dept: ONCOLOGY | Facility: CLINIC | Age: 71
End: 2021-11-01

## 2021-11-01 NOTE — ORAL ONC MGMT
Per Dr. Hills, patient will not increase pomalidomide dose, continue pomalidomide 1mg daily for 21 days of 28 day cycle. Updated [place] plan and released refill to [facility].      Oh Vuong, PharmD  Hematology/Oncology Clinical Pharmacist  [facility]  [facility]

## 2021-11-01 NOTE — TELEPHONE ENCOUNTER
Oral Chemotherapy Monitoring Program    Medication: Pomalyst  Rx:  1mg PO daily 21/28 day cycle    Auth #: [id number]  Risk Category: Adult female NOT of reproductive capacity    Routine survey questions reviewed. yes        Tavia Suazo Pharmacy Liaison, alpha split P[employer]  Phone: [phone]  Fax: [phone]  Email: [email or web address]

## 2021-11-22 ENCOUNTER — APPOINTMENT (OUTPATIENT)
Dept: LAB | Facility: CLINIC | Age: 71
End: 2021-11-22
Attending: INTERNAL MEDICINE
Payer: COMMERCIAL

## 2021-11-22 ENCOUNTER — INFUSION THERAPY VISIT (OUTPATIENT)
Dept: TRANSPLANT | Facility: CLINIC | Age: 71
End: 2021-11-22
Attending: INTERNAL MEDICINE
Payer: COMMERCIAL

## 2021-11-22 VITALS
TEMPERATURE: 97.4 F | SYSTOLIC BLOOD PRESSURE: 140 MMHG | RESPIRATION RATE: 16 BRPM | OXYGEN SATURATION: 99 % | DIASTOLIC BLOOD PRESSURE: 80 MMHG | BODY MASS INDEX: 22.93 KG/M2 | WEIGHT: 124.6 LBS | HEART RATE: 85 BPM

## 2021-11-22 DIAGNOSIS — C90.01 MULTIPLE MYELOMA IN REMISSION (H): Primary | ICD-10-CM

## 2021-11-22 DIAGNOSIS — C90.00 MULTIPLE MYELOMA NOT HAVING ACHIEVED REMISSION (H): ICD-10-CM

## 2021-11-22 DIAGNOSIS — C90.00 MULTIPLE MYELOMA NOT HAVING ACHIEVED REMISSION (H): Primary | ICD-10-CM

## 2021-11-22 LAB
ALBUMIN SERPL-MCNC: 3.3 G/DL (ref 3.4–5)
ALP SERPL-CCNC: 55 U/L (ref 40–150)
ALT SERPL W P-5'-P-CCNC: 27 U/L (ref 0–50)
ANION GAP SERPL CALCULATED.3IONS-SCNC: 10 MMOL/L (ref 3–14)
AST SERPL W P-5'-P-CCNC: 17 U/L (ref 0–45)
B2 MICROGLOB TUMOR MARKER SER-MCNC: 2.1 MG/L
BASOPHILS # BLD AUTO: 0 10E3/UL (ref 0–0.2)
BASOPHILS NFR BLD AUTO: 0 %
BILIRUB SERPL-MCNC: 0.4 MG/DL (ref 0.2–1.3)
BUN SERPL-MCNC: 17 MG/DL (ref 7–30)
CALCIUM SERPL-MCNC: 8.9 MG/DL (ref 8.5–10.1)
CHLORIDE BLD-SCNC: 104 MMOL/L (ref 94–109)
CO2 SERPL-SCNC: 27 MMOL/L (ref 20–32)
CREAT SERPL-MCNC: 0.7 MG/DL (ref 0.52–1.04)
CRP SERPL-MCNC: <2.9 MG/L (ref 0–8)
EOSINOPHIL # BLD AUTO: 0.1 10E3/UL (ref 0–0.7)
EOSINOPHIL NFR BLD AUTO: 2 %
ERYTHROCYTE [DISTWIDTH] IN BLOOD BY AUTOMATED COUNT: 15.9 % (ref 10–15)
GFR SERPL CREATININE-BSD FRML MDRD: 87 ML/MIN/1.73M2
GLUCOSE BLD-MCNC: 72 MG/DL (ref 70–99)
HCT VFR BLD AUTO: 35.3 % (ref 35–47)
HGB BLD-MCNC: 11.9 G/DL (ref 11.7–15.7)
IMM GRANULOCYTES # BLD: 0 10E3/UL
IMM GRANULOCYTES NFR BLD: 0 %
LYMPHOCYTES # BLD AUTO: 1.7 10E3/UL (ref 0.8–5.3)
LYMPHOCYTES NFR BLD AUTO: 31 %
MCH RBC QN AUTO: 33 PG (ref 26.5–33)
MCHC RBC AUTO-ENTMCNC: 33.7 G/DL (ref 31.5–36.5)
MCV RBC AUTO: 98 FL (ref 78–100)
MONOCYTES # BLD AUTO: 0.9 10E3/UL (ref 0–1.3)
MONOCYTES NFR BLD AUTO: 16 %
NEUTROPHILS # BLD AUTO: 2.6 10E3/UL (ref 1.6–8.3)
NEUTROPHILS NFR BLD AUTO: 51 %
NRBC # BLD AUTO: 0 10E3/UL
NRBC BLD AUTO-RTO: 0 /100
PLATELET # BLD AUTO: 183 10E3/UL (ref 150–450)
POTASSIUM BLD-SCNC: 3.9 MMOL/L (ref 3.4–5.3)
PROT SERPL-MCNC: 8 G/DL (ref 6.8–8.8)
RBC # BLD AUTO: 3.61 10E6/UL (ref 3.8–5.2)
SODIUM SERPL-SCNC: 141 MMOL/L (ref 133–144)
TOTAL PROTEIN SERUM FOR ELP: 7.8 G/DL (ref 6.8–8.8)
WBC # BLD AUTO: 5.3 10E3/UL (ref 4–11)

## 2021-11-22 PROCEDURE — 84165 PROTEIN E-PHORESIS SERUM: CPT | Mod: TC | Performed by: PATHOLOGY

## 2021-11-22 PROCEDURE — 96401 CHEMO ANTI-NEOPL SQ/IM: CPT

## 2021-11-22 PROCEDURE — 84165 PROTEIN E-PHORESIS SERUM: CPT | Mod: 26 | Performed by: PATHOLOGY

## 2021-11-22 PROCEDURE — 86140 C-REACTIVE PROTEIN: CPT

## 2021-11-22 PROCEDURE — 85025 COMPLETE CBC W/AUTO DIFF WBC: CPT

## 2021-11-22 PROCEDURE — 36415 COLL VENOUS BLD VENIPUNCTURE: CPT

## 2021-11-22 PROCEDURE — 250N000013 HC RX MED GY IP 250 OP 250 PS 637: Performed by: INTERNAL MEDICINE

## 2021-11-22 PROCEDURE — 80053 COMPREHEN METABOLIC PANEL: CPT

## 2021-11-22 PROCEDURE — 84155 ASSAY OF PROTEIN SERUM: CPT | Mod: 91

## 2021-11-22 PROCEDURE — 250N000011 HC RX IP 250 OP 636: Performed by: INTERNAL MEDICINE

## 2021-11-22 PROCEDURE — 82232 ASSAY OF BETA-2 PROTEIN: CPT

## 2021-11-22 RX ORDER — SODIUM CHLORIDE 9 MG/ML
1000 INJECTION, SOLUTION INTRAVENOUS CONTINUOUS PRN
Status: CANCELLED
Start: 2021-11-22

## 2021-11-22 RX ORDER — MEPERIDINE HYDROCHLORIDE 25 MG/ML
25 INJECTION INTRAMUSCULAR; INTRAVENOUS; SUBCUTANEOUS EVERY 30 MIN PRN
Status: CANCELLED | OUTPATIENT
Start: 2021-11-22

## 2021-11-22 RX ORDER — HEPARIN SODIUM (PORCINE) LOCK FLUSH IV SOLN 100 UNIT/ML 100 UNIT/ML
5 SOLUTION INTRAVENOUS
Status: CANCELLED | OUTPATIENT
Start: 2021-11-22

## 2021-11-22 RX ORDER — DIPHENHYDRAMINE HYDROCHLORIDE 50 MG/ML
50 INJECTION INTRAMUSCULAR; INTRAVENOUS
Status: CANCELLED
Start: 2021-11-22

## 2021-11-22 RX ORDER — EPINEPHRINE 1 MG/ML
0.3 INJECTION, SOLUTION, CONCENTRATE INTRAVENOUS EVERY 5 MIN PRN
Status: CANCELLED | OUTPATIENT
Start: 2021-11-22

## 2021-11-22 RX ORDER — HEPARIN SODIUM,PORCINE 10 UNIT/ML
5 VIAL (ML) INTRAVENOUS
Status: CANCELLED | OUTPATIENT
Start: 2021-11-22

## 2021-11-22 RX ORDER — ACETAMINOPHEN 325 MG/1
650 TABLET ORAL
Status: CANCELLED
Start: 2021-11-22

## 2021-11-22 RX ORDER — LORAZEPAM 2 MG/ML
0.5 INJECTION INTRAMUSCULAR EVERY 4 HOURS PRN
Status: CANCELLED
Start: 2021-11-22

## 2021-11-22 RX ORDER — ACETAMINOPHEN 325 MG/1
650 TABLET ORAL
Status: COMPLETED | OUTPATIENT
Start: 2021-11-22 | End: 2021-11-22

## 2021-11-22 RX ORDER — ALBUTEROL SULFATE 90 UG/1
1-2 AEROSOL, METERED RESPIRATORY (INHALATION)
Status: CANCELLED
Start: 2021-11-22

## 2021-11-22 RX ORDER — NALOXONE HYDROCHLORIDE 0.4 MG/ML
.1-.4 INJECTION, SOLUTION INTRAMUSCULAR; INTRAVENOUS; SUBCUTANEOUS
Status: CANCELLED | OUTPATIENT
Start: 2021-11-22

## 2021-11-22 RX ORDER — ALBUTEROL SULFATE 0.83 MG/ML
2.5 SOLUTION RESPIRATORY (INHALATION)
Status: CANCELLED | OUTPATIENT
Start: 2021-11-22

## 2021-11-22 RX ORDER — DIPHENHYDRAMINE HCL 25 MG
25 CAPSULE ORAL
Status: CANCELLED
Start: 2021-11-22

## 2021-11-22 RX ADMIN — DARATUMUMAB AND HYALURONIDASE-FIHJ (HUMAN RECOMBINANT) 1800 MG: 1800; 30000 INJECTION SUBCUTANEOUS at 09:09

## 2021-11-22 RX ADMIN — ACETAMINOPHEN 650 MG: 325 TABLET ORAL at 08:25

## 2021-11-22 ASSESSMENT — PAIN SCALES - GENERAL: PAINLEVEL: MODERATE PAIN (4)

## 2021-11-22 NOTE — LETTER
11/22/2021         RE: Shena Hartmann  [address] [place] [address]        Dear Colleague,    Thank you for referring your patient, Shena Hartmann, to the [facility] BLOOD AND MARROW TRANSPLANT PROGRAM [place]. Please see a copy of my visit note below.    Infusion Nursing Note:  Shena Hartmann presents today for scheduled injection.    Patient seen by provider today: No   present during visit today: Not Applicable.    Note: Patient received subcutaneous daratumumab per treatment plan. Patient met parameters. Injection given over 5 minutes in lower R quadrant of abdomen. Patient requested 650 tylenol prior to injection. Patient tolerated well. Provider confirmed that patient does not need to be taking dexamethasone and will remove this for future injections.      Intravenous Access:  Lab drawn via venipuncture    Treatment Conditions:  Results reviewed, labs MET treatment parameters, ok to proceed with treatment.      Post Infusion Assessment:  Patient tolerated infusion without incident.       Discharge Plan:   Patient and/or family verbalized understanding of discharge instructions and all questions answered.      Neelima Pepe RN                          Again, thank you for allowing me to participate in the care of your patient.        Sincerely,        [facility]

## 2021-11-22 NOTE — NURSING NOTE
"Oncology Rooming Note    November 22, 2021 10:30 AM   Shena Hartmann is a 71 year old female who presents for:    Chief Complaint   Patient presents with     Blood Draw     Labs drawn via  by rn in lab. VS taken.     Infusion     Hx MM here for scheduled infusion     Initial Vitals: BP (!) 140/80 (BP Location: Right arm, Patient Position: Sitting, Cuff Size: Adult Regular)   Pulse 85   Temp 97.4  F (36.3  C) (Oral)   Resp 16   Wt 56.5 kg (124 lb 9.6 oz)   SpO2 99%   BMI 22.93 kg/m   Estimated body mass index is 22.93 kg/m  as calculated from the following:    Height as of 5/12/21: 1.57 m (5' 1.81\").    Weight as of this encounter: 56.5 kg (124 lb 9.6 oz). Body surface area is 1.57 meters squared.  Moderate Pain (4) Comment: Data Unavailable   No LMP recorded. Patient is postmenopausal.  Allergies reviewed: Yes  Medications reviewed: Yes    Medications: Medication refills not needed today.  Pharmacy name entered into [facility]:    [id number] - [place], [place] - [address] AT [facility] [address]  WRITTEN PRESCRIPTION REQUESTED  [facility] - [place], [place] - [address]    Clinical concerns: N/A      Neelima Pepe RN              "

## 2021-11-22 NOTE — PROGRESS NOTES
Infusion Nursing Note:  Shena Hartmann presents today for scheduled injection.    Patient seen by provider today: No   present during visit today: Not Applicable.    Note: Patient received subcutaneous daratumumab per treatment plan. Patient met parameters. Injection given over 5 minutes in lower R quadrant of abdomen. Patient requested 650 tylenol prior to injection. Patient tolerated well. Provider confirmed that patient does not need to be taking dexamethasone and will remove this for future injections.      Intravenous Access:  Lab drawn via venipuncture    Treatment Conditions:  Results reviewed, labs MET treatment parameters, ok to proceed with treatment.      Post Infusion Assessment:  Patient tolerated infusion without incident.       Discharge Plan:   Patient and/or family verbalized understanding of discharge instructions and all questions answered.      Neelima Pepe RN

## 2021-11-22 NOTE — NURSING NOTE
Chief Complaint   Patient presents with     Blood Draw     Labs drawn via  by rn in lab. VS taken.     Labs collected from venipuncture by RN. Vitals taken. Checked in for appointment(s).    Balwinder Andrade RN

## 2021-11-23 LAB
ALBUMIN SERPL ELPH-MCNC: 4 G/DL (ref 3.7–5.1)
ALPHA1 GLOB SERPL ELPH-MCNC: 0.4 G/DL (ref 0.2–0.4)
ALPHA2 GLOB SERPL ELPH-MCNC: 0.8 G/DL (ref 0.5–0.9)
B-GLOBULIN SERPL ELPH-MCNC: 0.6 G/DL (ref 0.6–1)
GAMMA GLOB SERPL ELPH-MCNC: 2 G/DL (ref 0.7–1.6)
M PROTEIN SERPL ELPH-MCNC: 1.4 G/DL
PROT PATTERN SERPL ELPH-IMP: ABNORMAL

## 2021-12-01 ENCOUNTER — TELEPHONE (OUTPATIENT)
Dept: ONCOLOGY | Facility: CLINIC | Age: 71
End: 2021-12-01
Payer: COMMERCIAL

## 2021-12-01 DIAGNOSIS — C90.00 MULTIPLE MYELOMA, REMISSION STATUS UNSPECIFIED (H): Primary | ICD-10-CM

## 2021-12-01 NOTE — TELEPHONE ENCOUNTER
Oral Chemotherapy Monitoring Program    Medication: Pomalyst  Rx:  1mg PO daily 21/28    Auth #: [id number]  Risk Category: Adult female NOT of reproductive capacity    Routine survey questions reviewed. yes        Tavia Posadas   [facility] Liaison, alpha split P-[facility]  Phone: [phone]  Fax: [phone]  Email: [email or web address]

## 2021-12-03 ENCOUNTER — ONCOLOGY VISIT (OUTPATIENT)
Dept: TRANSPLANT | Facility: CLINIC | Age: 71
End: 2021-12-03
Attending: INTERNAL MEDICINE
Payer: COMMERCIAL

## 2021-12-03 VITALS
TEMPERATURE: 97.5 F | HEART RATE: 74 BPM | WEIGHT: 123.4 LBS | DIASTOLIC BLOOD PRESSURE: 78 MMHG | BODY MASS INDEX: 22.71 KG/M2 | SYSTOLIC BLOOD PRESSURE: 162 MMHG | OXYGEN SATURATION: 99 %

## 2021-12-03 DIAGNOSIS — C90.00 MULTIPLE MYELOMA NOT HAVING ACHIEVED REMISSION (H): Primary | ICD-10-CM

## 2021-12-03 PROCEDURE — G0463 HOSPITAL OUTPT CLINIC VISIT: HCPCS

## 2021-12-03 PROCEDURE — 99214 OFFICE O/P EST MOD 30 MIN: CPT | Performed by: INTERNAL MEDICINE

## 2021-12-03 RX ORDER — VALACYCLOVIR HYDROCHLORIDE 500 MG/1
500 TABLET, FILM COATED ORAL 2 TIMES DAILY
Qty: 180 TABLET | Refills: 3 | Status: SHIPPED | OUTPATIENT
Start: 2021-12-03 | End: 2023-03-20

## 2021-12-03 ASSESSMENT — PAIN SCALES - GENERAL: PAINLEVEL: NO PAIN (0)

## 2021-12-03 NOTE — PROGRESS NOTES
BMT Staff Note    Shena was seen and examined by me in clinic today in person.  I spent a total of 35 minutes face-to-face with her and her .  Briefly, she is a 71-year-old woman who I have followed for more than 10 years who initially had breast cancer and then smoldering multiple myeloma that eventually progressed.  She has undergone autologous transplantation with very little response and has been on maintenance therapy with pomalidomide and daratumumab.  Her current pomalidomide dose is 1 mg every 21 days of the month.  She is now on monthly daratumumab.  She still is having ongoing issues with her therapy which she has been a chronic problem.  She discontinued the steroids with her daratumumab because of side effects.  She only uses Tylenol premedication.  She still notices some symptoms for about a week after each her dose.  She continues to have some GI side effects as well as fatigue and some ongoing malaise.  A newer issue was repeated cold sores.  Spent a great deal of time today discussing Covid vaccination and she still has not been vaccinated.  Am not sure that would be effective in her because of her multiple myeloma but I did clearly recommend proceeding with vaccination.  Cold sores have been an intermittent issue but have been more persistent over the past several weeks.  She continues to be active despite her fatigue.  She has had no acute infections or hospitalizations.  She has no shortness of breath or cough.  The rest of the 10 point review of systems is unremarkable.    Physical exam: She looks overall well and in no distress today.  HEENT exam is unremarkable.  There are no oral lesions.  Pupils are equal round reactive to light.  There is no cervical, axillary or inguinal adenopathy.  Lungs are clear to auscultation percussion.  Cardiac exam is without murmurs.  Abdominal exam is benign and there is no organomegaly.  Lower extremities are without edema.  There are no skin  rashes.    Laboratory evaluation: Electrolytes are normal with a BUN of 17 and a creatinine of 0.7.  Liver function tests are within normal limits.  An albumin is slightly low at 3.3.  CBC shows a hemoglobin of 11.9, platelet count of 183,000 and white count of 5300 with 2600 absolute neutrophils.  Her most recent M spike is at 1.4 g, up slightly from 1.1 g but similar to the measurement prior to that.    It is my overall impression that Shena is stable on current therapy.  Given that her M spike is fluctuating, she is reluctant to increase her pomalidomide further because of increased side effects and quality of life issues.  I completely understand and agree with her decision.  However, if her M spike goes above 1.5, we will need to further discuss these options or alternative therapy.  We discussed her cold sores and given her increased frequency, it probably represents her immune health.  We will start her on Valtrex prophylaxis at 500 mg p.o. twice daily.  I gave her a 3-month supply today.  I would like to follow her labs monthly for the next 3 months to understand her M protein stability.  We will get this at the time of her monthly Yen which is planned for January 24, February 21, March 21, and April 18.  All of her and her 's questions have been answered today.    Dr. Lui Hills

## 2021-12-03 NOTE — LETTER
12/3/2021         RE: Shena Hartmann  [address] [place] [address]        Dear Colleague,    Thank you for referring your patient, Shena Hartmann, to the [facility]LOOD [facility]PL[facility] PROGRAM [place]. Please see a copy of my visit note below.    BMT Staff Note    Shena was seen and examined by me in clinic today in person.  I spent a total of 35 minutes face-to-face with her and her .  Briefly, she is a 71-year-old woman who I have followed for more than 10 years who initially had breast cancer and then smoldering multiple myeloma that eventually progressed.  She has undergone autologous transplantation with very little response and has been on maintenance therapy with pomalidomide and daratumumab.  Her current pomalidomide dose is 1 mg every 21 days of the month.  She is now on monthly daratumumab.  She still is having ongoing issues with her therapy which she has been a chronic problem.  She discontinued the steroids with her daratumumab because of side effects.  She only uses Tylenol premedication.  She still notices some symptoms for about a week after each her dose.  She continues to have some GI side effects as well as fatigue and some ongoing malaise.  A newer issue was repeated cold sores.  Spent a great deal of time today discussing Covid vaccination and she still has not been vaccinated.  Am not sure that would be effective in her because of her multiple myeloma but I did clearly recommend proceeding with vaccination.  Cold sores have been an intermittent issue but have been more persistent over the past several weeks.  She continues to be active despite her fatigue.  She has had no acute infections or hospitalizations.  She has no shortness of breath or cough.  The rest of the 10 point review of systems is unremarkable.    Physical exam: She looks overall well and in no distress today.  HEENT exam is unremarkable.  There are no oral lesions.  Pupils are  equal round reactive to light.  There is no cervical, axillary or inguinal adenopathy.  Lungs are clear to auscultation percussion.  Cardiac exam is without murmurs.  Abdominal exam is benign and there is no organomegaly.  Lower extremities are without edema.  There are no skin rashes.    Laboratory evaluation: Electrolytes are normal with a BUN of 17 and a creatinine of 0.7.  Liver function tests are within normal limits.  An albumin is slightly low at 3.3.  CBC shows a hemoglobin of 11.9, platelet count of 183,000 and white count of 5300 with 2600 absolute neutrophils.  Her most recent M spike is at 1.4 g, up slightly from 1.1 g but similar to the measurement prior to that.    It is my overall impression that Shena is stable on current therapy.  Given that her M spike is fluctuating, she is reluctant to increase her pomalidomide further because of increased side effects and quality of life issues.  I completely understand and agree with her decision.  However, if her M spike goes above 1.5, we will need to further discuss these options or alternative therapy.  We discussed her cold sores and given her increased frequency, it probably represents her immune health.  We will start her on Valtrex prophylaxis at 500 mg p.o. twice daily.  I gave her a 3-month supply today.  I would like to follow her labs monthly for the next 3 months to understand her M protein stability.  We will get this at the time of her monthly Yen which is planned for January 24, February 21, March 21, and April 18.  All of her and her 's questions have been answered today.    Dr. Lui Hills

## 2021-12-16 DIAGNOSIS — C50.911 MALIGNANT NEOPLASM OF RIGHT FEMALE BREAST, UNSPECIFIED ESTROGEN RECEPTOR STATUS, UNSPECIFIED SITE OF BREAST (H): Primary | ICD-10-CM

## 2021-12-20 ENCOUNTER — INFUSION THERAPY VISIT (OUTPATIENT)
Dept: TRANSPLANT | Facility: CLINIC | Age: 71
End: 2021-12-20
Attending: INTERNAL MEDICINE
Payer: COMMERCIAL

## 2021-12-20 ENCOUNTER — APPOINTMENT (OUTPATIENT)
Dept: LAB | Facility: CLINIC | Age: 71
End: 2021-12-20
Attending: INTERNAL MEDICINE
Payer: COMMERCIAL

## 2021-12-20 VITALS
BODY MASS INDEX: 22.95 KG/M2 | TEMPERATURE: 97.5 F | WEIGHT: 124.7 LBS | RESPIRATION RATE: 16 BRPM | OXYGEN SATURATION: 98 % | DIASTOLIC BLOOD PRESSURE: 81 MMHG | HEART RATE: 77 BPM | SYSTOLIC BLOOD PRESSURE: 139 MMHG

## 2021-12-20 DIAGNOSIS — C90.00 MULTIPLE MYELOMA NOT HAVING ACHIEVED REMISSION (H): Primary | ICD-10-CM

## 2021-12-20 LAB
ALBUMIN SERPL-MCNC: 3.5 G/DL (ref 3.4–5)
ALP SERPL-CCNC: 57 U/L (ref 40–150)
ALT SERPL W P-5'-P-CCNC: 28 U/L (ref 0–50)
ANION GAP SERPL CALCULATED.3IONS-SCNC: 6 MMOL/L (ref 3–14)
AST SERPL W P-5'-P-CCNC: 19 U/L (ref 0–45)
BASOPHILS # BLD AUTO: 0.1 10E3/UL (ref 0–0.2)
BASOPHILS NFR BLD AUTO: 1 %
BILIRUB SERPL-MCNC: 0.4 MG/DL (ref 0.2–1.3)
BUN SERPL-MCNC: 18 MG/DL (ref 7–30)
CALCIUM SERPL-MCNC: 9.2 MG/DL (ref 8.5–10.1)
CHLORIDE BLD-SCNC: 105 MMOL/L (ref 94–109)
CO2 SERPL-SCNC: 29 MMOL/L (ref 20–32)
CREAT SERPL-MCNC: 0.73 MG/DL (ref 0.52–1.04)
EOSINOPHIL # BLD AUTO: 0.1 10E3/UL (ref 0–0.7)
EOSINOPHIL NFR BLD AUTO: 2 %
ERYTHROCYTE [DISTWIDTH] IN BLOOD BY AUTOMATED COUNT: 16.6 % (ref 10–15)
GFR SERPL CREATININE-BSD FRML MDRD: 83 ML/MIN/1.73M2
GLUCOSE BLD-MCNC: 58 MG/DL (ref 70–99)
HCT VFR BLD AUTO: 35.7 % (ref 35–47)
HGB BLD-MCNC: 11.9 G/DL (ref 11.7–15.7)
IMM GRANULOCYTES # BLD: 0 10E3/UL
IMM GRANULOCYTES NFR BLD: 0 %
LYMPHOCYTES # BLD AUTO: 1.9 10E3/UL (ref 0.8–5.3)
LYMPHOCYTES NFR BLD AUTO: 38 %
MCH RBC QN AUTO: 33.1 PG (ref 26.5–33)
MCHC RBC AUTO-ENTMCNC: 33.3 G/DL (ref 31.5–36.5)
MCV RBC AUTO: 99 FL (ref 78–100)
MONOCYTES # BLD AUTO: 0.9 10E3/UL (ref 0–1.3)
MONOCYTES NFR BLD AUTO: 18 %
NEUTROPHILS # BLD AUTO: 2 10E3/UL (ref 1.6–8.3)
NEUTROPHILS NFR BLD AUTO: 41 %
NRBC # BLD AUTO: 0 10E3/UL
NRBC BLD AUTO-RTO: 0 /100
PLATELET # BLD AUTO: 173 10E3/UL (ref 150–450)
POTASSIUM BLD-SCNC: 3.8 MMOL/L (ref 3.4–5.3)
PROT SERPL-MCNC: 8.1 G/DL (ref 6.8–8.8)
RBC # BLD AUTO: 3.6 10E6/UL (ref 3.8–5.2)
SODIUM SERPL-SCNC: 140 MMOL/L (ref 133–144)
TOTAL PROTEIN SERUM FOR ELP: 7.8 G/DL (ref 6.8–8.8)
WBC # BLD AUTO: 4.9 10E3/UL (ref 4–11)

## 2021-12-20 PROCEDURE — 250N000011 HC RX IP 250 OP 636: Performed by: INTERNAL MEDICINE

## 2021-12-20 PROCEDURE — 84165 PROTEIN E-PHORESIS SERUM: CPT | Mod: TC | Performed by: PATHOLOGY

## 2021-12-20 PROCEDURE — 36415 COLL VENOUS BLD VENIPUNCTURE: CPT

## 2021-12-20 PROCEDURE — 250N000013 HC RX MED GY IP 250 OP 250 PS 637: Performed by: INTERNAL MEDICINE

## 2021-12-20 PROCEDURE — 80053 COMPREHEN METABOLIC PANEL: CPT

## 2021-12-20 PROCEDURE — 84165 PROTEIN E-PHORESIS SERUM: CPT | Mod: 26 | Performed by: PATHOLOGY

## 2021-12-20 PROCEDURE — 84155 ASSAY OF PROTEIN SERUM: CPT | Mod: 91

## 2021-12-20 PROCEDURE — 85025 COMPLETE CBC W/AUTO DIFF WBC: CPT

## 2021-12-20 PROCEDURE — 96401 CHEMO ANTI-NEOPL SQ/IM: CPT

## 2021-12-20 RX ORDER — ACETAMINOPHEN 325 MG/1
650 TABLET ORAL
Status: COMPLETED | OUTPATIENT
Start: 2021-12-20 | End: 2021-12-20

## 2021-12-20 RX ORDER — SODIUM CHLORIDE 9 MG/ML
1000 INJECTION, SOLUTION INTRAVENOUS CONTINUOUS PRN
Status: CANCELLED
Start: 2021-12-20

## 2021-12-20 RX ORDER — HEPARIN SODIUM,PORCINE 10 UNIT/ML
5 VIAL (ML) INTRAVENOUS
Status: CANCELLED | OUTPATIENT
Start: 2021-12-20

## 2021-12-20 RX ORDER — EPINEPHRINE 1 MG/ML
0.3 INJECTION, SOLUTION INTRAMUSCULAR; SUBCUTANEOUS EVERY 5 MIN PRN
Status: CANCELLED | OUTPATIENT
Start: 2021-12-20

## 2021-12-20 RX ORDER — DIPHENHYDRAMINE HCL 25 MG
25 CAPSULE ORAL
Status: CANCELLED
Start: 2021-12-20

## 2021-12-20 RX ORDER — HEPARIN SODIUM (PORCINE) LOCK FLUSH IV SOLN 100 UNIT/ML 100 UNIT/ML
5 SOLUTION INTRAVENOUS
Status: CANCELLED | OUTPATIENT
Start: 2021-12-20

## 2021-12-20 RX ORDER — MEPERIDINE HYDROCHLORIDE 25 MG/ML
25 INJECTION INTRAMUSCULAR; INTRAVENOUS; SUBCUTANEOUS EVERY 30 MIN PRN
Status: CANCELLED | OUTPATIENT
Start: 2021-12-20

## 2021-12-20 RX ORDER — NALOXONE HYDROCHLORIDE 0.4 MG/ML
.1-.4 INJECTION, SOLUTION INTRAMUSCULAR; INTRAVENOUS; SUBCUTANEOUS
Status: CANCELLED | OUTPATIENT
Start: 2021-12-20

## 2021-12-20 RX ORDER — METHYLPREDNISOLONE SODIUM SUCCINATE 125 MG/2ML
125 INJECTION, POWDER, LYOPHILIZED, FOR SOLUTION INTRAMUSCULAR; INTRAVENOUS
Status: CANCELLED
Start: 2021-12-20

## 2021-12-20 RX ORDER — LORAZEPAM 2 MG/ML
0.5 INJECTION INTRAMUSCULAR EVERY 4 HOURS PRN
Status: CANCELLED
Start: 2021-12-20

## 2021-12-20 RX ORDER — DIPHENHYDRAMINE HYDROCHLORIDE 50 MG/ML
50 INJECTION INTRAMUSCULAR; INTRAVENOUS
Status: CANCELLED
Start: 2021-12-20

## 2021-12-20 RX ORDER — ALBUTEROL SULFATE 0.83 MG/ML
2.5 SOLUTION RESPIRATORY (INHALATION)
Status: CANCELLED | OUTPATIENT
Start: 2021-12-20

## 2021-12-20 RX ORDER — ALBUTEROL SULFATE 90 UG/1
1-2 AEROSOL, METERED RESPIRATORY (INHALATION)
Status: CANCELLED
Start: 2021-12-20

## 2021-12-20 RX ADMIN — DARATUMUMAB AND HYALURONIDASE-FIHJ (HUMAN RECOMBINANT) 1800 MG: 1800; 30000 INJECTION SUBCUTANEOUS at 08:41

## 2021-12-20 RX ADMIN — ACETAMINOPHEN 650 MG: 325 TABLET ORAL at 08:12

## 2021-12-20 ASSESSMENT — PAIN SCALES - GENERAL: PAINLEVEL: MODERATE PAIN (4)

## 2021-12-20 NOTE — LETTER
12/20/2021         RE: Shena Hartmann  [address] [place] [address]        Dear Colleague,    Thank you for referring your patient, Shena Hartmann, to the [facility] BLOOD AND MARROW TRANSPLANT PROGRAM [place]. Please see a copy of my visit note below.    orders signed    Infusion Nursing Note:  Shena Hartmann presents today for scheduled injection.    Patient seen by provider today: No   present during visit today: Not Applicable.    Note: Patient received riky subcutaneous injection in L lower abdomen per treatment plan. Patient met parameters. Premedicated with tylenol per patient's request.      Intravenous Access: Labs assessed and parameters met.     Treatment Conditions:  Results reviewed, labs MET treatment parameters, ok to proceed with treatment.      Post Infusion Assessment:  Patient tolerated infusion without incident.       Discharge Plan:   Discharge instructions reviewed with: Patient.      Neelima Pepe RN                          Again, thank you for allowing me to participate in the care of your patient.        [facility]

## 2021-12-20 NOTE — NURSING NOTE
"Oncology Rooming Note    December 20, 2021 8:14 AM   Shena Hartmann is a 71 year old female who presents for:    Chief Complaint   Patient presents with     Blood Draw     Labs drawn via  by RN in lab.  VS taken     Infusion     Hx MM here for scheduled injection     Initial Vitals: /81   Pulse 77   Temp 97.5  F (36.4  C) (Oral)   Resp 16   Wt 56.6 kg (124 lb 11.2 oz)   SpO2 98%   BMI 22.95 kg/m   Estimated body mass index is 22.95 kg/m  as calculated from the following:    Height as of 5/12/21: 1.57 m (5' 1.81\").    Weight as of this encounter: 56.6 kg (124 lb 11.2 oz). Body surface area is 1.57 meters squared.  Moderate Pain (4) Comment: Data Unavailable   No LMP recorded. Patient is postmenopausal.  Allergies reviewed: Yes  Medications reviewed: Yes    Medications: Medication refills not needed today.  Pharmacy name entered into [facility]:    [id number] - [place], [place] - [address] [facility] [address]  WRITTEN PRESCRIPTION REQUESTED  [facility] - [place], [place] - [address]    Clinical concerns: N/A      Neelima Pepe RN              "

## 2021-12-20 NOTE — NURSING NOTE
Chief Complaint   Patient presents with     Blood Draw     Labs drawn via  by RN in lab.  VS taken       Labs collected from venipuncture by RN. Vitals taken. Checked in for appointment(s).    Audrey Hammonds RN

## 2021-12-20 NOTE — PROGRESS NOTES
Infusion Nursing Note:  Shena Hartmann presents today for scheduled injection.    Patient seen by provider today: No   present during visit today: Not Applicable.    Note: Patient received riky subcutaneous injection in L lower abdomen per treatment plan. Patient met parameters. Premedicated with tylenol per patient's request.      Intravenous Access: Labs assessed and parameters met.     Treatment Conditions:  Results reviewed, labs MET treatment parameters, ok to proceed with treatment.      Post Infusion Assessment:  Patient tolerated infusion without incident.       Discharge Plan:   Discharge instructions reviewed with: Patient.      Neelima Pepe RN

## 2021-12-21 LAB
ALBUMIN SERPL ELPH-MCNC: 4 G/DL (ref 3.7–5.1)
ALPHA1 GLOB SERPL ELPH-MCNC: 0.3 G/DL (ref 0.2–0.4)
ALPHA2 GLOB SERPL ELPH-MCNC: 0.8 G/DL (ref 0.5–0.9)
B-GLOBULIN SERPL ELPH-MCNC: 0.6 G/DL (ref 0.6–1)
GAMMA GLOB SERPL ELPH-MCNC: 2.1 G/DL (ref 0.7–1.6)
M PROTEIN SERPL ELPH-MCNC: 1.9 G/DL
PROT PATTERN SERPL ELPH-IMP: ABNORMAL

## 2021-12-28 ENCOUNTER — TELEPHONE (OUTPATIENT)
Dept: ONCOLOGY | Facility: CLINIC | Age: 71
End: 2021-12-28
Payer: COMMERCIAL

## 2021-12-28 DIAGNOSIS — C90.00 MULTIPLE MYELOMA, REMISSION STATUS UNSPECIFIED (H): Primary | ICD-10-CM

## 2021-12-28 NOTE — TELEPHONE ENCOUNTER
PA Initiation    Medication: Pomalyst  Insurance Company: [email or web address] - Phone [phone] Fax [phone]  Pharmacy Filling the Rx: [facility] - [place], [place] - [facility] [address]  Filling Pharmacy Phone:    Filling Pharmacy Fax:    Start Date: 12/28/2021

## 2021-12-28 NOTE — TELEPHONE ENCOUNTER
Prior Authorization Approval    Authorization Effective Date: 12/28/2021  Authorization Expiration Date: 6/20/2022  Medication: Pomalyst- approved  Approved Dose/Quantity: 32/ 28 day supply  Reference #: [id number]   Insurance Company: [employer] Phone [phone] Fax [phone]  Expected CoPay: $0     CoPay Card Available:      Foundation Assistance Needed:    Which Pharmacy is filling the prescription (Not needed for infusion/clinic administered): [facility] - [place], [place] - [address]  Pharmacy Notified: No  Patient Notified: No

## 2021-12-28 NOTE — TELEPHONE ENCOUNTER
Oral Chemotherapy Monitoring Program    Medication:Pomalyst  Rx:  1mg PO daily 32 capsules for a 28 day supply    Auth #: [id number]  Risk Category: Adult female NOT of reproductive capacity    Routine survey questions reviewed. yes        Tavia Suazo Pharmacy Liaison, alpha split P-Z  Phone: [phone]  Fax: [phone]  Email: [email or web address]

## 2022-01-11 ENCOUNTER — TELEPHONE (OUTPATIENT)
Dept: ONCOLOGY | Facility: CLINIC | Age: 72
End: 2022-01-11
Payer: COMMERCIAL

## 2022-01-11 NOTE — TELEPHONE ENCOUNTER
Oral Chemotherapy Monitoring Program    Placed call to patient in follow up of pomalidomide therapy.    Left message to please call back in follow up of therapy. No patient or drug names were mentioned.    Elaina Sheppard  Pharmacy Intern  [facility]  [phone]

## 2022-01-13 ENCOUNTER — TELEPHONE (OUTPATIENT)
Dept: ONCOLOGY | Facility: CLINIC | Age: 72
End: 2022-01-13
Payer: COMMERCIAL

## 2022-01-13 NOTE — TELEPHONE ENCOUNTER
Oral Chemotherapy Monitoring Program     Placed call to Shena Hartmann in follow up of Polmalyst oral chemotherapy.     Left a message requesting a call back. No drug names were mentioned in the voicemail. Will update when response is received.      Billie MatosD, BCACP  Oral Chemotherapy Monitoring Program  [facility]  [phone]  January 13, 2022

## 2022-01-17 ENCOUNTER — TELEPHONE (OUTPATIENT)
Dept: ONCOLOGY | Facility: CLINIC | Age: 72
End: 2022-01-17
Payer: COMMERCIAL

## 2022-01-17 NOTE — TELEPHONE ENCOUNTER
Oral Chemotherapy Monitoring Program     Placed call to patient in follow up of oral chemotherapy. Left message requesting call back. No drug names were mentioned. Will update when response received.     Grace Myhre, PharmD  Hematology/Oncology Clinical Pharmacist  [facility]  [phone]

## 2022-01-19 ENCOUNTER — TELEPHONE (OUTPATIENT)
Dept: ONCOLOGY | Facility: CLINIC | Age: 72
End: 2022-01-19
Payer: COMMERCIAL

## 2022-01-19 NOTE — TELEPHONE ENCOUNTER
Oral Chemotherapy Monitoring Program     Placed call to patient in follow up of oral chemotherapy. Left message requesting call back. No drug names were mentioned. Will update when response received.     This is the 4th attempt to reach the patient to assess how she is doing since increasing her Pomalyst dose from 1mg daily to 1mg/2mg alternating for 3 weeks on, 1 week off.    Grace Myhre, PharmD  Hematology/Oncology Clinical Pharmacist  [facility]  [phone]

## 2022-01-23 DIAGNOSIS — C90.00 MULTIPLE MYELOMA NOT HAVING ACHIEVED REMISSION (H): Primary | ICD-10-CM

## 2022-01-23 RX ORDER — ALBUTEROL SULFATE 90 UG/1
1-2 AEROSOL, METERED RESPIRATORY (INHALATION)
Status: CANCELLED
Start: 2022-01-23

## 2022-01-23 RX ORDER — ACETAMINOPHEN 325 MG/1
650 TABLET ORAL
Status: CANCELLED
Start: 2022-01-23

## 2022-01-23 RX ORDER — ALBUTEROL SULFATE 0.83 MG/ML
2.5 SOLUTION RESPIRATORY (INHALATION)
Status: CANCELLED | OUTPATIENT
Start: 2022-01-23

## 2022-01-23 RX ORDER — HEPARIN SODIUM,PORCINE 10 UNIT/ML
5 VIAL (ML) INTRAVENOUS
Status: CANCELLED | OUTPATIENT
Start: 2022-01-23

## 2022-01-23 RX ORDER — HEPARIN SODIUM (PORCINE) LOCK FLUSH IV SOLN 100 UNIT/ML 100 UNIT/ML
5 SOLUTION INTRAVENOUS
Status: CANCELLED | OUTPATIENT
Start: 2022-01-23

## 2022-01-23 RX ORDER — DIPHENHYDRAMINE HCL 25 MG
25 CAPSULE ORAL
Status: CANCELLED
Start: 2022-01-23

## 2022-01-23 RX ORDER — DIPHENHYDRAMINE HYDROCHLORIDE 50 MG/ML
50 INJECTION INTRAMUSCULAR; INTRAVENOUS
Status: CANCELLED
Start: 2022-01-23

## 2022-01-23 RX ORDER — LORAZEPAM 2 MG/ML
0.5 INJECTION INTRAMUSCULAR EVERY 4 HOURS PRN
Status: CANCELLED
Start: 2022-01-23

## 2022-01-23 RX ORDER — NALOXONE HYDROCHLORIDE 0.4 MG/ML
.1-.4 INJECTION, SOLUTION INTRAMUSCULAR; INTRAVENOUS; SUBCUTANEOUS
Status: CANCELLED | OUTPATIENT
Start: 2022-01-23

## 2022-01-23 RX ORDER — METHYLPREDNISOLONE SODIUM SUCCINATE 125 MG/2ML
125 INJECTION, POWDER, LYOPHILIZED, FOR SOLUTION INTRAMUSCULAR; INTRAVENOUS
Status: CANCELLED
Start: 2022-01-23

## 2022-01-23 RX ORDER — SODIUM CHLORIDE 9 MG/ML
1000 INJECTION, SOLUTION INTRAVENOUS CONTINUOUS PRN
Status: CANCELLED
Start: 2022-01-23

## 2022-01-23 RX ORDER — EPINEPHRINE 1 MG/ML
0.3 INJECTION, SOLUTION INTRAMUSCULAR; SUBCUTANEOUS EVERY 5 MIN PRN
Status: CANCELLED | OUTPATIENT
Start: 2022-01-23

## 2022-01-23 RX ORDER — MEPERIDINE HYDROCHLORIDE 25 MG/ML
25 INJECTION INTRAMUSCULAR; INTRAVENOUS; SUBCUTANEOUS EVERY 30 MIN PRN
Status: CANCELLED | OUTPATIENT
Start: 2022-01-23

## 2022-01-24 ENCOUNTER — TELEPHONE (OUTPATIENT)
Dept: ONCOLOGY | Facility: CLINIC | Age: 72
End: 2022-01-24

## 2022-01-24 ENCOUNTER — APPOINTMENT (OUTPATIENT)
Dept: LAB | Facility: CLINIC | Age: 72
End: 2022-01-24
Attending: INTERNAL MEDICINE
Payer: COMMERCIAL

## 2022-01-24 ENCOUNTER — INFUSION THERAPY VISIT (OUTPATIENT)
Dept: TRANSPLANT | Facility: CLINIC | Age: 72
End: 2022-01-24
Attending: INTERNAL MEDICINE
Payer: COMMERCIAL

## 2022-01-24 VITALS
HEART RATE: 68 BPM | RESPIRATION RATE: 16 BRPM | OXYGEN SATURATION: 97 % | DIASTOLIC BLOOD PRESSURE: 80 MMHG | WEIGHT: 122.9 LBS | TEMPERATURE: 98.1 F | BODY MASS INDEX: 22.62 KG/M2 | SYSTOLIC BLOOD PRESSURE: 144 MMHG

## 2022-01-24 DIAGNOSIS — C90.00 MULTIPLE MYELOMA NOT HAVING ACHIEVED REMISSION (H): Primary | ICD-10-CM

## 2022-01-24 DIAGNOSIS — C90.00 MULTIPLE MYELOMA, REMISSION STATUS UNSPECIFIED (H): ICD-10-CM

## 2022-01-24 LAB
ALBUMIN SERPL-MCNC: 3.3 G/DL (ref 3.4–5)
ALP SERPL-CCNC: 66 U/L (ref 40–150)
ALT SERPL W P-5'-P-CCNC: 30 U/L (ref 0–50)
ANION GAP SERPL CALCULATED.3IONS-SCNC: 10 MMOL/L (ref 3–14)
AST SERPL W P-5'-P-CCNC: 16 U/L (ref 0–45)
BASOPHILS # BLD AUTO: 0 10E3/UL (ref 0–0.2)
BASOPHILS NFR BLD AUTO: 1 %
BILIRUB SERPL-MCNC: 0.3 MG/DL (ref 0.2–1.3)
BUN SERPL-MCNC: 20 MG/DL (ref 7–30)
CALCIUM SERPL-MCNC: 9 MG/DL (ref 8.5–10.1)
CHLORIDE BLD-SCNC: 103 MMOL/L (ref 94–109)
CO2 SERPL-SCNC: 27 MMOL/L (ref 20–32)
CREAT SERPL-MCNC: 0.8 MG/DL (ref 0.52–1.04)
EOSINOPHIL # BLD AUTO: 0.2 10E3/UL (ref 0–0.7)
EOSINOPHIL NFR BLD AUTO: 5 %
ERYTHROCYTE [DISTWIDTH] IN BLOOD BY AUTOMATED COUNT: 16.6 % (ref 10–15)
GFR SERPL CREATININE-BSD FRML MDRD: 78 ML/MIN/1.73M2
GLUCOSE BLD-MCNC: 99 MG/DL (ref 70–99)
HCT VFR BLD AUTO: 34.4 % (ref 35–47)
HGB BLD-MCNC: 11.2 G/DL (ref 11.7–15.7)
IMM GRANULOCYTES # BLD: 0 10E3/UL
IMM GRANULOCYTES NFR BLD: 0 %
LYMPHOCYTES # BLD AUTO: 1.6 10E3/UL (ref 0.8–5.3)
LYMPHOCYTES NFR BLD AUTO: 40 %
MCH RBC QN AUTO: 32.3 PG (ref 26.5–33)
MCHC RBC AUTO-ENTMCNC: 32.6 G/DL (ref 31.5–36.5)
MCV RBC AUTO: 99 FL (ref 78–100)
MONOCYTES # BLD AUTO: 0.8 10E3/UL (ref 0–1.3)
MONOCYTES NFR BLD AUTO: 20 %
NEUTROPHILS # BLD AUTO: 1.3 10E3/UL (ref 1.6–8.3)
NEUTROPHILS NFR BLD AUTO: 34 %
NRBC # BLD AUTO: 0 10E3/UL
NRBC BLD AUTO-RTO: 0 /100
PLATELET # BLD AUTO: 138 10E3/UL (ref 150–450)
POTASSIUM BLD-SCNC: 4.1 MMOL/L (ref 3.4–5.3)
PROT SERPL-MCNC: 7.8 G/DL (ref 6.8–8.8)
RBC # BLD AUTO: 3.47 10E6/UL (ref 3.8–5.2)
SODIUM SERPL-SCNC: 140 MMOL/L (ref 133–144)
TOTAL PROTEIN SERUM FOR ELP: 7.7 G/DL (ref 6.8–8.8)
WBC # BLD AUTO: 3.9 10E3/UL (ref 4–11)

## 2022-01-24 PROCEDURE — 96401 CHEMO ANTI-NEOPL SQ/IM: CPT

## 2022-01-24 PROCEDURE — 36415 COLL VENOUS BLD VENIPUNCTURE: CPT

## 2022-01-24 PROCEDURE — 84165 PROTEIN E-PHORESIS SERUM: CPT | Mod: 26 | Performed by: PATHOLOGY

## 2022-01-24 PROCEDURE — 85025 COMPLETE CBC W/AUTO DIFF WBC: CPT

## 2022-01-24 PROCEDURE — 250N000011 HC RX IP 250 OP 636: Performed by: INTERNAL MEDICINE

## 2022-01-24 PROCEDURE — 250N000013 HC RX MED GY IP 250 OP 250 PS 637: Performed by: INTERNAL MEDICINE

## 2022-01-24 PROCEDURE — 84165 PROTEIN E-PHORESIS SERUM: CPT | Mod: TC | Performed by: PATHOLOGY

## 2022-01-24 PROCEDURE — 84155 ASSAY OF PROTEIN SERUM: CPT

## 2022-01-24 PROCEDURE — 80053 COMPREHEN METABOLIC PANEL: CPT

## 2022-01-24 RX ORDER — ACETAMINOPHEN 325 MG/1
650 TABLET ORAL
Status: COMPLETED | OUTPATIENT
Start: 2022-01-24 | End: 2022-01-24

## 2022-01-24 RX ADMIN — DARATUMUMAB AND HYALURONIDASE-FIHJ (HUMAN RECOMBINANT) 1800 MG: 1800; 30000 INJECTION SUBCUTANEOUS at 09:12

## 2022-01-24 RX ADMIN — ACETAMINOPHEN 650 MG: 325 TABLET ORAL at 08:37

## 2022-01-24 ASSESSMENT — PAIN SCALES - GENERAL: PAINLEVEL: MODERATE PAIN (4)

## 2022-01-24 NOTE — TELEPHONE ENCOUNTER
Oral Chemotherapy Monitoring Program    Medication: Pomalyst  Rx:  1mg PO daily 32/28 ds    Auth #: [id number]  Risk Category: Adult female NOT of reproductive capacity    Routine survey questions reviewed. Left voicemail        Tavia Posadas   [facility] Pharmacy Liaison, alpha split P-Z  Phone: [phone]  Fax: [phone]  Email: [email or web address]

## 2022-01-24 NOTE — NURSING NOTE
Chief Complaint   Patient presents with     Blood Draw     labs drawn by venipuncture by rn in lab. vital signs taken.     Infusion     Pt here for SQ Daratumumab. Pt is s/p BMT for Multiple Myeloma.      Rachel Merritt RN

## 2022-01-24 NOTE — PROGRESS NOTES
Infusion Nursing Note:  Shena Hartmann presents today for subcutaneous Daratumumab. See MAR.     Patient seen by provider today: No   present during visit today: Not Applicable.    Note: Pt received Cycle 18, Day 1 Daratumumab subcutaneous administration in Right Lower Abd over 6 minutes. Tylenol 650 mg PO given 30 min prior per Pt request. Pt tolerated Yen well. Pt assessment completed by this RN prior as Pt was not scheduled with a provider today (see flowsheet). Refill sent to speciality pharmacy for Pomalyst. Chemo checked with another RN per protocol. Following Yen Pt left clinic ambulatory-  picking her up at front entrance.          Intravenous Access:  NA    Treatment Conditions:  Lab Results   Component Value Date    HGB 11.2 (L) 01/24/2022    WBC 3.9 (L) 01/24/2022    ANEU 2.1 07/05/2021    ANEUTAUTO 1.3 (L) 01/24/2022     (L) 01/24/2022      Results reviewed, labs MET treatment parameters, ok to proceed with treatment.      Post Infusion Assessment:  Patient tolerated injection without incident.       Discharge Plan:   Prescription refills given for Pomalyst.  Patient discharged in stable condition accompanied by: self and .  Departure Mode: Ambulatory.      Rachel Merritt RN

## 2022-01-24 NOTE — LETTER
1/24/2022         RE: Shena Hartmann  [address] [place] [address]        Dear Colleague,    Thank you for referring your patient, Shena Hartmann, to the [facility] BLOOD AND MARROW TRANSPLANT PROGRAM [place]. Please see a copy of my visit note below.    Infusion Nursing Note:  Shena Hartmann presents today for subcutaneous Daratumumab. See MAR.     Patient seen by provider today: No   present during visit today: Not Applicable.    Note: Pt received Cycle 18, Day 1 Daratumumab subcutaneous administration in Right Lower Abd over 6 minutes. Tylenol 650 mg PO given 30 min prior per Pt request. Pt tolerated Yen well. Pt assessment completed by this RN prior as Pt was not scheduled with a provider today (see flowsheet). Refill sent to speciality pharmacy for Pomalyst. Chemo checked with another RN per protocol. Following Yen Pt left clinic ambulatory-  picking her up at front entrance.          Intravenous Access:  NA    Treatment Conditions:  Lab Results   Component Value Date    HGB 11.2 (L) 01/24/2022    WBC 3.9 (L) 01/24/2022    ANEU 2.1 07/05/2021    ANEUTAUTO 1.3 (L) 01/24/2022     (L) 01/24/2022      Results reviewed, labs MET treatment parameters, ok to proceed with treatment.      Post Infusion Assessment:  Patient tolerated injection without incident.       Discharge Plan:   Prescription refills given for Pomalyst.  Patient discharged in stable condition accompanied by: self and .  Departure Mode: Ambulatory.    Rachel Merritt RN          Again, thank you for allowing me to participate in the care of your patient.      Sincerely,    [facility]

## 2022-01-24 NOTE — NURSING NOTE
Chief Complaint   Patient presents with     Blood Draw     labs drawn by venipuncture by rn in lab. vital signs taken.     Labs drawn by venipuncture by RN in lab.  Vital signs taken.  Pt checked in to next appointment.    Smitha Ding RN

## 2022-01-25 LAB
ALBUMIN SERPL ELPH-MCNC: 4.1 G/DL (ref 3.7–5.1)
ALPHA1 GLOB SERPL ELPH-MCNC: 0.3 G/DL (ref 0.2–0.4)
ALPHA2 GLOB SERPL ELPH-MCNC: 0.7 G/DL (ref 0.5–0.9)
B-GLOBULIN SERPL ELPH-MCNC: 0.6 G/DL (ref 0.6–1)
GAMMA GLOB SERPL ELPH-MCNC: 2 G/DL (ref 0.7–1.6)
M PROTEIN SERPL ELPH-MCNC: 1.7 G/DL
PROT PATTERN SERPL ELPH-IMP: ABNORMAL

## 2022-01-25 ASSESSMENT — ACTIVITIES OF DAILY LIVING (ADL): CURRENT_FUNCTION: NO ASSISTANCE NEEDED

## 2022-02-01 ENCOUNTER — OFFICE VISIT (OUTPATIENT)
Dept: FAMILY MEDICINE | Facility: CLINIC | Age: 72
End: 2022-02-01
Payer: COMMERCIAL

## 2022-02-01 VITALS
SYSTOLIC BLOOD PRESSURE: 154 MMHG | HEART RATE: 77 BPM | OXYGEN SATURATION: 97 % | DIASTOLIC BLOOD PRESSURE: 76 MMHG | WEIGHT: 119 LBS | HEIGHT: 62 IN | BODY MASS INDEX: 21.9 KG/M2

## 2022-02-01 DIAGNOSIS — Z78.0 POSTMENOPAUSAL STATUS: ICD-10-CM

## 2022-02-01 DIAGNOSIS — Z00.00 ENCOUNTER FOR MEDICARE ANNUAL WELLNESS EXAM: Primary | ICD-10-CM

## 2022-02-01 DIAGNOSIS — I10 BENIGN ESSENTIAL HYPERTENSION: ICD-10-CM

## 2022-02-01 DIAGNOSIS — E55.9 VITAMIN D DEFICIENCY: ICD-10-CM

## 2022-02-01 PROCEDURE — 99387 INIT PM E/M NEW PAT 65+ YRS: CPT | Mod: 25 | Performed by: STUDENT IN AN ORGANIZED HEALTH CARE EDUCATION/TRAINING PROGRAM

## 2022-02-01 PROCEDURE — 99213 OFFICE O/P EST LOW 20 MIN: CPT | Mod: 25 | Performed by: STUDENT IN AN ORGANIZED HEALTH CARE EDUCATION/TRAINING PROGRAM

## 2022-02-01 PROCEDURE — 90732 PPSV23 VACC 2 YRS+ SUBQ/IM: CPT | Performed by: STUDENT IN AN ORGANIZED HEALTH CARE EDUCATION/TRAINING PROGRAM

## 2022-02-01 PROCEDURE — 90471 IMMUNIZATION ADMIN: CPT | Performed by: STUDENT IN AN ORGANIZED HEALTH CARE EDUCATION/TRAINING PROGRAM

## 2022-02-01 RX ORDER — LISINOPRIL 2.5 MG/1
2.5 TABLET ORAL DAILY
Qty: 90 TABLET | Refills: 0 | Status: SHIPPED | OUTPATIENT
Start: 2022-02-01 | End: 2022-04-28

## 2022-02-01 ASSESSMENT — MIFFLIN-ST. JEOR: SCORE: 1000.35

## 2022-02-01 ASSESSMENT — ACTIVITIES OF DAILY LIVING (ADL): CURRENT_FUNCTION: NO ASSISTANCE NEEDED

## 2022-02-01 NOTE — PATIENT INSTRUCTIONS
Thomas Villalobos    Patient Education     Low-Salt Diet  This diet removes foods that are high in salt. It also limits the amount of salt you use when cooking. It is most often used for people with high blood pressure, fluid retention (edema), and kidney, liver, or heart disease.   Table salt has the mineral sodium. Your body needs sodium to work normally. But too much sodium can make your health problems worse. Your healthcare provider advises a low-salt (low-sodium) diet for you. Your total daily allowed amount of salt is 1,500 to 2,300 milligrams (mg). This is less than 1 teaspoon of table salt. This means you can have only about 500 to 700 mg of sodium at each meal. People with certain health problems should limit salt intake to the lower end of the advised range.     When you cook, don t add much salt. If you can cook without using salt, even better. Don t add salt to your food at the table.   When shopping, read food labels. Salt is often called sodium on the label. Choose foods that are salt-free, low salt, or very low salt. Note that foods with reduced salt may not lower your salt intake enough.     Beans, potatoes, and pasta  OK: Dry beans, split peas, lentils, potatoes, rice, macaroni, pasta, spaghetti with no added salt, canned beans with no added salt   Avoid: Salted potato chips; regular (salt added) canned beans   Breads and grains  OK: Low-sodium breads, rolls, cereals, and cakes; low-salt crackers, matzo crackers   Avoid: Salted crackers, pretzels, tortilla chips, popcorn, and other salty snacks; Indonesian toast, pancakes, muffins, regular bread   Dairy  OK: Milk, chocolate milk, hot chocolate mix, low-salt cheeses, yogurt   Avoid: Processed cheese and cheese spreads; Roquefort, Camembert, and cottage cheese; buttermilk, instant breakfast drink   Desserts  OK: Ice cream, frozen yogurt, juice bars, gelatin, cookies and pies, sugar, honey, jelly, hard candy   Avoid: Most pies, cakes and cookies made with  salt; instant pudding   Drinks  OK: Tea, coffee, fizzy (carbonated) drinks, juices   Avoid: Flavored coffees, electrolyte replacement drinks, sports drinks   Meats  OK: All fresh meat, fish, poultry, low-salt tuna, eggs, egg substitute   Avoid: Smoked, pickled, brine-cured, or salted meats and fish, and processed poultry injected with salt or marinade. This includes nguyen, chipped beef, corned beef, hot dogs, deli meats, ham, kosher meats, salt pork, sausage, canned tuna, salted codfish, smoked salmon, herring, sardines, and anchovies.   Seasonings  OK: Most seasonings are okay. Good substitutes for salt include: fresh herb blends, hot sauce, lemon, garlic, gayle, vinegar, dry mustard, parsley, cilantro, horseradish, tomato paste, margarine, mayonnaise, unsalted butter, cream cheese, vegetable and olive oil, cream, low-salt salad dressing and gravy.   Avoid: Regular ketchup, relishes, pickles, soy sauce, teriyaki sauce, Worcestershire sauce, BBQ sauce, tartar sauce, meat tenderizer, chili sauce, regular gravy, regular salad dressing, salted butter   Soups  OK: Low-salt soups and broths made with allowed foods   Avoid: Bouillon cubes, soups with smoked or salted meats, regular soup and broth   Vegetables  OK: Most vegetables are okay, including canned vegetables with no salt added, and plain frozen vegetables; low-salt tomato and vegetable juices   Avoid: Sauerkraut and other brined vegetables; pickles and pickled vegetables; tomato juice, olives, canned vegetables with salt, frozen vegetables with sauces   Materialise last reviewed this educational content on 2/1/2020 2000-2021 [employer]. All rights reserved. This information is not intended as a substitute for professional medical care. Always follow your healthcare professional's instructions.           Patient Education   Personalized Prevention Plan  You are due for the preventive services outlined below.  Your care team is available to assist  you in scheduling these services.  If you have already completed any of these items, please share that information with your care team to update in your medical record.  Health Maintenance Due   Topic Date Due     ANNUAL REVIEW OF HM ORDERS  Never done     COVID-19 Vaccine (1) Never done     Zoster (Shingles) Vaccine (1 of 2) Never done     Pneumococcal Vaccine (2 of 4 - PPSV23) 08/12/2016     FALL RISK ASSESSMENT  07/07/2021     Flu Vaccine (1) 09/01/2021       Urinary Incontinence, Female (Adult)   Urinary incontinence means loss of bladder control. This problem affects many women, especially as they get older. If you have incontinence, you may be embarrassed to ask for help. But know that this problem can be treated.   Types of Incontinence  There are different types of incontinence. Two of the main types are described here. You can have more than one type.     Stress incontinence. With this type, urine leaks when pressure (stress) is put on the bladder. This may happen when you cough, sneeze, or laugh. Stress incontinence most often occurs because the pelvic floor muscles that support the bladder and urethra are weak. This can happen after pregnancy and vaginal childbirth or a hysterectomy. It can also be due to excess body weight or hormone changes.    Urge incontinence (also called overactive bladder). With this type, a sudden urge to urinate is felt often. This may happen even though there may not be much urine in the bladder. The need to urinate often during the night is common. Urge incontinence most often occurs because of bladder spasms. This may be due to bladder irritation or infection. Damage to bladder nerves or pelvic muscles, constipation, and certain medicines can also lead to urge incontinence.  Treatment depends on the cause. Further evaluation is needed to find the type you have. This will likely include an exam and certain tests. Based on the results, you and your healthcare provider can then  plan treatment. Until a diagnosis is made, the home care tips below can help ease symptoms.   Home care    Do pelvic floor muscle exercises, if they are prescribed. The pelvic floor muscles help support the bladder and urethra. Many women find that their symptoms improve when doing special exercises that strengthen these muscles. To do the exercises, contract the muscles you would use to stop your stream of urine. But do this when you re not urinating. Hold for 10 seconds, then relax. Repeat 10 to 20 times in a row, at least 3 times a day. Your healthcare provider may give you other instructions for how to do the exercises and how often.    Keep a bladder diary. This helps track how often and how much you urinate over a set period of time. Bring this diary with you to your next visit with the provider. The information can help your provider learn more about your bladder problem.    Lose weight, if advised to by your provider. Extra weight puts pressure on the bladder. Your provider can help you create a weight-loss plan that s right for you. This may include exercising more and making certain diet changes.    Don't have foods and drinks that may irritate the bladder. These can include alcohol and caffeinated drinks.    Quit smoking. Smoking and other tobacco use can lead to a long-term (chronic) cough that strains the pelvic floor muscles. Smoking may also damage the bladder and urethra. Talk with your provider about treatments or methods you can use to quit smoking.    If drinking large amounts of fluid makes you have symptoms, you may be advised to limit your fluid intake. You may also be advised to drink most of your fluids during the day and to limit fluids at night.    If you re worried about urine leakage or accidents, you may wear absorbent pads to catch urine. Change the pads often. This helps reduce discomfort. It may also reduce the risk of skin or bladder infections.    Follow-up care  Follow up with your  healthcare provider, or as directed. It may take some to find the right treatment for your problem. But healthy lifestyle changes can be made right away. These include such things as exercising on a regular basis, eating a healthy diet, losing weight (if needed), and quitting smoking. Your treatment plan may include special therapies or medicines. Certain procedures or surgery may also be options. Talk about any questions you have with your provider.   When to seek medical advice  Call the healthcare provider right away if any of these occur:    Fever of 100.4 F (38 C) or higher, or as directed by your provider    Bladder pain or fullness    Belly swelling    Nausea or vomiting    Back pain    Weakness, dizziness, or fainting  StayWell last reviewed this educational content on 1/1/2020 2000-2021 [employer]. All rights reserved. This information is not intended as a substitute for professional medical care. Always follow your healthcare professional's instructions.

## 2022-02-01 NOTE — PROGRESS NOTES
"SUBJECTIVE:   Shena Hartmann is a 71 year old female who presents for Preventive Visit.    Patient has been advised of split billing requirements and indicates understanding: Yes  Are you in the first 12 months of your Medicare coverage?  No    Healthy Habits:     In general, how would you rate your overall health?  Good    Frequency of exercise:  4-5 days/week    Duration of exercise:  45-60 minutes    Do you usually eat at least 4 servings of fruit and vegetables a day, include whole grains    & fiber and avoid regularly eating high fat or \"junk\" foods?  Yes    Taking medications regularly:  Yes    Medication side effects:  Muscle aches and Other    Ability to successfully perform activities of daily living:  No assistance needed    Home Safety:  Throw rugs in the hallway    Hearing Impairment:  No hearing concerns    In the past 6 months, have you been bothered by leaking of urine? Yes    In general, how would you rate your overall mental or emotional health?  Excellent      PHQ-2 Total Score: 0    Additional concerns today:  Yes    Patient requesting to establish care.  Offered providers accepting new patients at the clinic.    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  Currently on immunotherapy.  Patient notes these medications have slowly increase her blood pressure over time.  Current readings at home are 140-160/80 in the morning.  Patient utilizing alternative therapies and lifestyle changes but not noticing a change.  Open to starting any medication.    Patient exercises at anytime fitness, defy aging class    Lives at home with  and daughter    Do you feel safe in your environment? Yes    Have you ever done Advance Care Planning? (For example, a Health Directive, POLST, or a discussion with a medical provider or your loved ones about your wishes): Yes, patient states has an Advance Care Planning document and will bring a copy to the clinic.    Fall risk       Cognitive " Screening   1) Repeat 3 items (Leader, Season, Table)    2) Clock draw: NORMAL  3) 3 item recall: Recalls 3 objects  Results: 3 items recalled: COGNITIVE IMPAIRMENT LESS LIKELY    Mini-CogTM Copyright S Alex. Licensed by the author for use in [facility]; reprinted with permission (rafia@[facility]). All rights reserved.      Do you have sleep apnea, excessive snoring or daytime drowsiness?: no    Reviewed and updated as needed this visit by clinical staff  Tobacco  Allergies  Meds  Problems  Med Hx  Surg Hx  Fam Hx         Reviewed and updated as needed this visit by Provider  Tobacco  Allergies  Meds  Problems  Med Hx  Surg Hx  Fam Hx        Social History     Tobacco Use     Smoking status: Never Smoker     Smokeless tobacco: Never Used   Substance Use Topics     Alcohol use: Yes     Comment: occ     Alcohol Use 1/25/2022   Prescreen: >3 drinks/day or >7 drinks/week? Not Applicable       Current providers sharing in care for this patient include:   Patient Care Team:  Lui Hills MD as PCP - General (Internal Medicine)  Lui Hills MD as Referring Physician (Oncology)  Misty Jackson APRN CNP as Referring Physician (Nurse Practitioner)  Kevin Eagle MD as MD (Gastroenterology)  Ruma Rebollar MD as MD (Dermatology)  Sunil Rosario MD as MD (Dermatology)  Iglesia Quick MD as MD (Orthopaedic Surgery)  Neeal Mcknight MD as MD (Dermatology)  Lui Hills MD as Assigned PCP  Mao Callahan MD as Assigned Cancer Care Provider    The following health maintenance items are reviewed in Epic and correct as of today:  Health Maintenance Due   Topic Date Due     COVID-19 Vaccine (1) Never done     ZOSTER IMMUNIZATION (1 of 2) Never done     INFLUENZA VACCINE (1) 09/01/2021   Discussed each immunization.  Patient open to pneumococcal but declines all others    Lab work is in process  Pneumonia Vaccine:Adults age 65+ who  "received Pneumovax (PPSV23) at 65 years or older: Should be given PCV13 > 1 year after their most recent PPSV23  Mammogram Screening: Mammogram Screening: sees breast specialist for history of breast cancer    Review of Systems  Constitutional, HEENT, cardiovascular, pulmonary, gi and gu systems are negative, except as otherwise noted.    OBJECTIVE:   BP (!) 154/76   Pulse 77   Ht 1.563 m (5' 1.52\")   Wt 54 kg (119 lb)   SpO2 97%   BMI 22.11 kg/m   Estimated body mass index is 22.11 kg/m  as calculated from the following:    Height as of this encounter: 1.563 m (5' 1.52\").    Weight as of this encounter: 54 kg (119 lb).  Physical Exam  GENERAL: healthy, alert and no distress  EYES: Eyes grossly normal to inspection, PERRL and conjunctivae and sclerae normal  HENT: ear canals and TM's normal, nose and mouth without ulcers or lesions  NECK: no adenopathy, no asymmetry, masses, or scars and thyroid normal to palpation  RESP: lungs clear to auscultation - no rales, rhonchi or wheezes  CV: regular rate and rhythm, normal S1 S2, no S3 or S4, no murmur, click or rub, no peripheral edema and peripheral pulses strong  ABDOMEN: soft, nontender, no hepatosplenomegaly, no masses and bowel sounds normal  MS: no gross musculoskeletal defects noted, no edema  SKIN: no suspicious lesions or rashes  NEURO: Normal strength and tone, mentation intact and speech normal  PSYCH: mentation appears normal, affect normal/bright    The 10-year ASCVD risk score (Magy LOIS Jr., et al., 2013) is: 20%    Values used to calculate the score:      Age: 71 years      Sex: Female      Is Non- : No      Diabetic: No      Tobacco smoker: No      Systolic Blood Pressure: 154 mmHg      Is BP treated: Yes      HDL Cholesterol: 64 mg/dL      Total Cholesterol: 234 mg/dL    Diagnostic Test Results:  Labs reviewed in Epic    ASSESSMENT / PLAN:   Shena was seen today for medicare visit.    Diagnoses and all orders for this " "visit:    Encounter for Medicare annual wellness exam: Patient doing well.  Has established a healthy lifestyle.  Patient seeing oncology for status post bone marrow transplant.  Patient's blood pressure noted to be slightly elevated today.  Discussed treatment.  Patient open to a small dose.  Patient's ASCVD risk elevated to 20%.  Patient declines statin therapy.  Discussed all immunizations.  Patient not interested in influenza zoster COVID.    Benign essential hypertension: Patient to follow-up in 4 weeks for blood pressure reevaluation.  She takes her blood pressure at home  -     lisinopril (ZESTRIL) 2.5 MG tablet; Take 1 tablet (2.5 mg) by mouth daily    Postmenopausal status  -     DX Hip/Pelvis/Spine; Future    Vitamin D deficiency  -     Vitamin D Deficiency; Future    Other orders  -     REVIEW OF HEALTH MAINTENANCE PROTOCOL ORDERS  -     PPSV23, IM/SUBQ (2+ YRS) - [id number]    Patient has been advised of split billing requirements and indicates understanding: No  .  COUNSELING:  Reviewed preventive health counseling, as reflected in patient instructions       Regular exercise       Healthy diet/nutrition       Bladder control       Fall risk prevention       Osteoporosis prevention/bone health       Advanced Planning     Estimated body mass index is 22.11 kg/m  as calculated from the following:    Height as of this encounter: 1.563 m (5' 1.52\").    Weight as of this encounter: 54 kg (119 lb).      She reports that she has never smoked. She has never used smokeless tobacco.    Appropriate preventive services were discussed with this patient, inluding applicable screening as appropriate for cardiovascular disease, diabetes, osteopenia/osteoporosis, and glaucoma.  As appropriate for age/gender, discussed screening for colorectal cancer, prostate cancer, breast cancer, and cervical cancer. Checklist reviewing preventive services available has been given to the patient.    Reviewed patients plan of care and " provided an AVS. The Basic Care Plan (routine screening as documented in Health Maintenance) for Shena meets the Care Plan requirement. This Care Plan has been established and reviewed with the Patient.    Counseling Resources:  ATP IV Guidelines  Pooled Cohorts Equation Calculator  Breast Cancer Risk Calculator  Breast Cancer: Medication to Reduce Risk  FRAX Risk Assessment  ICSI Preventive Guidelines  Dietary Guidelines for Americans, 2010  [employer]'s MyPlate  ASA Prophylaxis  Lung CA Screening    Chelsie Bunch DO  [facility]    Identified Health Risks:    Information on urinary incontinence and treatment options given to patient.

## 2022-02-08 ENCOUNTER — ANCILLARY PROCEDURE (OUTPATIENT)
Dept: BONE DENSITY | Facility: CLINIC | Age: 72
End: 2022-02-08
Attending: STUDENT IN AN ORGANIZED HEALTH CARE EDUCATION/TRAINING PROGRAM
Payer: COMMERCIAL

## 2022-02-08 DIAGNOSIS — Z78.0 POST-MENOPAUSAL: ICD-10-CM

## 2022-02-08 DIAGNOSIS — Z78.0 POSTMENOPAUSAL STATUS: ICD-10-CM

## 2022-02-08 PROCEDURE — 77081 DXA BONE DENSITY APPENDICULR: CPT | Mod: 59 | Performed by: INTERNAL MEDICINE

## 2022-02-08 PROCEDURE — 77080 DXA BONE DENSITY AXIAL: CPT | Mod: 59 | Performed by: INTERNAL MEDICINE

## 2022-02-20 DIAGNOSIS — C90.00 MULTIPLE MYELOMA NOT HAVING ACHIEVED REMISSION (H): Primary | ICD-10-CM

## 2022-02-20 RX ORDER — MEPERIDINE HYDROCHLORIDE 25 MG/ML
25 INJECTION INTRAMUSCULAR; INTRAVENOUS; SUBCUTANEOUS EVERY 30 MIN PRN
Status: CANCELLED | OUTPATIENT
Start: 2022-02-20

## 2022-02-20 RX ORDER — LORAZEPAM 2 MG/ML
0.5 INJECTION INTRAMUSCULAR EVERY 4 HOURS PRN
Status: CANCELLED
Start: 2022-02-20

## 2022-02-20 RX ORDER — ACETAMINOPHEN 325 MG/1
650 TABLET ORAL
Status: CANCELLED
Start: 2022-02-20

## 2022-02-20 RX ORDER — DIPHENHYDRAMINE HCL 25 MG
25 CAPSULE ORAL
Status: CANCELLED
Start: 2022-02-20

## 2022-02-20 RX ORDER — EPINEPHRINE 1 MG/ML
0.3 INJECTION, SOLUTION INTRAMUSCULAR; SUBCUTANEOUS EVERY 5 MIN PRN
Status: CANCELLED | OUTPATIENT
Start: 2022-02-20

## 2022-02-20 RX ORDER — HEPARIN SODIUM (PORCINE) LOCK FLUSH IV SOLN 100 UNIT/ML 100 UNIT/ML
5 SOLUTION INTRAVENOUS
Status: CANCELLED | OUTPATIENT
Start: 2022-02-20

## 2022-02-20 RX ORDER — ALBUTEROL SULFATE 0.83 MG/ML
2.5 SOLUTION RESPIRATORY (INHALATION)
Status: CANCELLED | OUTPATIENT
Start: 2022-02-20

## 2022-02-20 RX ORDER — HEPARIN SODIUM,PORCINE 10 UNIT/ML
5 VIAL (ML) INTRAVENOUS
Status: CANCELLED | OUTPATIENT
Start: 2022-02-20

## 2022-02-20 RX ORDER — NALOXONE HYDROCHLORIDE 0.4 MG/ML
.1-.4 INJECTION, SOLUTION INTRAMUSCULAR; INTRAVENOUS; SUBCUTANEOUS
Status: CANCELLED | OUTPATIENT
Start: 2022-02-20

## 2022-02-20 RX ORDER — SODIUM CHLORIDE 9 MG/ML
1000 INJECTION, SOLUTION INTRAVENOUS CONTINUOUS PRN
Status: CANCELLED
Start: 2022-02-20

## 2022-02-20 RX ORDER — ALBUTEROL SULFATE 90 UG/1
1-2 AEROSOL, METERED RESPIRATORY (INHALATION)
Status: CANCELLED
Start: 2022-02-20

## 2022-02-20 RX ORDER — METHYLPREDNISOLONE SODIUM SUCCINATE 125 MG/2ML
125 INJECTION, POWDER, LYOPHILIZED, FOR SOLUTION INTRAMUSCULAR; INTRAVENOUS
Status: CANCELLED
Start: 2022-02-20

## 2022-02-20 RX ORDER — DIPHENHYDRAMINE HYDROCHLORIDE 50 MG/ML
50 INJECTION INTRAMUSCULAR; INTRAVENOUS
Status: CANCELLED
Start: 2022-02-20

## 2022-02-21 ENCOUNTER — TELEPHONE (OUTPATIENT)
Dept: TRANSPLANT | Facility: CLINIC | Age: 72
End: 2022-02-21

## 2022-02-21 ENCOUNTER — APPOINTMENT (OUTPATIENT)
Dept: LAB | Facility: CLINIC | Age: 72
End: 2022-02-21
Attending: INTERNAL MEDICINE
Payer: COMMERCIAL

## 2022-02-21 ENCOUNTER — INFUSION THERAPY VISIT (OUTPATIENT)
Dept: TRANSPLANT | Facility: CLINIC | Age: 72
End: 2022-02-21
Attending: INTERNAL MEDICINE
Payer: COMMERCIAL

## 2022-02-21 VITALS
OXYGEN SATURATION: 98 % | SYSTOLIC BLOOD PRESSURE: 153 MMHG | DIASTOLIC BLOOD PRESSURE: 72 MMHG | BODY MASS INDEX: 22.7 KG/M2 | RESPIRATION RATE: 16 BRPM | HEART RATE: 70 BPM | WEIGHT: 122.2 LBS

## 2022-02-21 DIAGNOSIS — C90.00 MULTIPLE MYELOMA NOT HAVING ACHIEVED REMISSION (H): Primary | ICD-10-CM

## 2022-02-21 DIAGNOSIS — C90.00 MULTIPLE MYELOMA, REMISSION STATUS UNSPECIFIED (H): ICD-10-CM

## 2022-02-21 DIAGNOSIS — E55.9 VITAMIN D DEFICIENCY: ICD-10-CM

## 2022-02-21 LAB
ALBUMIN SERPL-MCNC: 3.6 G/DL (ref 3.4–5)
ALP SERPL-CCNC: 53 U/L (ref 40–150)
ALT SERPL W P-5'-P-CCNC: 25 U/L (ref 0–50)
ANION GAP SERPL CALCULATED.3IONS-SCNC: 7 MMOL/L (ref 3–14)
AST SERPL W P-5'-P-CCNC: 18 U/L (ref 0–45)
BASOPHILS # BLD AUTO: 0 10E3/UL (ref 0–0.2)
BASOPHILS NFR BLD AUTO: 1 %
BILIRUB SERPL-MCNC: 0.4 MG/DL (ref 0.2–1.3)
BUN SERPL-MCNC: 20 MG/DL (ref 7–30)
CALCIUM SERPL-MCNC: 9.2 MG/DL (ref 8.5–10.1)
CHLORIDE BLD-SCNC: 103 MMOL/L (ref 94–109)
CO2 SERPL-SCNC: 28 MMOL/L (ref 20–32)
CREAT SERPL-MCNC: 0.74 MG/DL (ref 0.52–1.04)
DEPRECATED CALCIDIOL+CALCIFEROL SERPL-MC: 139 UG/L (ref 20–75)
EOSINOPHIL # BLD AUTO: 0.2 10E3/UL (ref 0–0.7)
EOSINOPHIL NFR BLD AUTO: 7 %
ERYTHROCYTE [DISTWIDTH] IN BLOOD BY AUTOMATED COUNT: 17 % (ref 10–15)
GFR SERPL CREATININE-BSD FRML MDRD: 86 ML/MIN/1.73M2
GLUCOSE BLD-MCNC: 62 MG/DL (ref 70–99)
HCT VFR BLD AUTO: 36 % (ref 35–47)
HGB BLD-MCNC: 12.1 G/DL (ref 11.7–15.7)
IMM GRANULOCYTES # BLD: 0 10E3/UL
IMM GRANULOCYTES NFR BLD: 0 %
LYMPHOCYTES # BLD AUTO: 1.6 10E3/UL (ref 0.8–5.3)
LYMPHOCYTES NFR BLD AUTO: 45 %
MCH RBC QN AUTO: 33.5 PG (ref 26.5–33)
MCHC RBC AUTO-ENTMCNC: 33.6 G/DL (ref 31.5–36.5)
MCV RBC AUTO: 100 FL (ref 78–100)
MONOCYTES # BLD AUTO: 0.6 10E3/UL (ref 0–1.3)
MONOCYTES NFR BLD AUTO: 16 %
NEUTROPHILS # BLD AUTO: 1.1 10E3/UL (ref 1.6–8.3)
NEUTROPHILS NFR BLD AUTO: 31 %
NRBC # BLD AUTO: 0 10E3/UL
NRBC BLD AUTO-RTO: 0 /100
PLATELET # BLD AUTO: 147 10E3/UL (ref 150–450)
POTASSIUM BLD-SCNC: 3.9 MMOL/L (ref 3.4–5.3)
PROT SERPL-MCNC: 8.5 G/DL (ref 6.8–8.8)
RBC # BLD AUTO: 3.61 10E6/UL (ref 3.8–5.2)
SODIUM SERPL-SCNC: 138 MMOL/L (ref 133–144)
TOTAL PROTEIN SERUM FOR ELP: 8 G/DL (ref 6.8–8.8)
WBC # BLD AUTO: 3.6 10E3/UL (ref 4–11)

## 2022-02-21 PROCEDURE — 96401 CHEMO ANTI-NEOPL SQ/IM: CPT

## 2022-02-21 PROCEDURE — 250N000013 HC RX MED GY IP 250 OP 250 PS 637: Performed by: INTERNAL MEDICINE

## 2022-02-21 PROCEDURE — 84155 ASSAY OF PROTEIN SERUM: CPT

## 2022-02-21 PROCEDURE — 84165 PROTEIN E-PHORESIS SERUM: CPT | Mod: TC | Performed by: PATHOLOGY

## 2022-02-21 PROCEDURE — 82306 VITAMIN D 25 HYDROXY: CPT

## 2022-02-21 PROCEDURE — 85004 AUTOMATED DIFF WBC COUNT: CPT

## 2022-02-21 PROCEDURE — 36415 COLL VENOUS BLD VENIPUNCTURE: CPT

## 2022-02-21 PROCEDURE — 250N000011 HC RX IP 250 OP 636: Performed by: INTERNAL MEDICINE

## 2022-02-21 PROCEDURE — 80053 COMPREHEN METABOLIC PANEL: CPT

## 2022-02-21 PROCEDURE — 84165 PROTEIN E-PHORESIS SERUM: CPT | Mod: 26 | Performed by: PATHOLOGY

## 2022-02-21 RX ORDER — ACETAMINOPHEN 325 MG/1
650 TABLET ORAL
Status: COMPLETED | OUTPATIENT
Start: 2022-02-21 | End: 2022-02-21

## 2022-02-21 RX ADMIN — ACETAMINOPHEN 650 MG: 325 TABLET ORAL at 08:15

## 2022-02-21 RX ADMIN — DARATUMUMAB AND HYALURONIDASE-FIHJ (HUMAN RECOMBINANT) 1800 MG: 1800; 30000 INJECTION SUBCUTANEOUS at 08:35

## 2022-02-21 ASSESSMENT — PAIN SCALES - GENERAL: PAINLEVEL: MODERATE PAIN (4)

## 2022-02-21 NOTE — LETTER
2/21/2022         RE: Shena Hartmann  [address] [place] [address]        Dear Colleague,    Thank you for referring your patient, Shena Hartmann, to the [facility]LOOD [facility]ARR[facility]PLANT [facility] [place]. Please see a copy of my visit note below.    Infusion Nursing Note:  Shena Hartmann presents today for Day 1, Cycle 19 of Daratumumab.  Patient seen by provider today: No   present during visit today: Not Applicable.    Note: Lab results monitored; ANC 1.1, Plt ct 147. Premed of 650mg PO Tylenol given prior to injection. Daratumumab checked with 2nd RN prior to administration, given subcutaneously over 5 minutes in LLQ of abdomen. Patient tolerated well.    Intravenous Access:  No Intravenous access/labs at this visit.    Treatment Conditions:  Results reviewed, labs MET treatment parameters, ok to proceed with treatment.      Post Infusion Assessment:  Patient tolerated injection without incident.       Discharge Plan:   Patient discharged in stable condition accompanied by: self.      Yusra Brush RN                          Again, thank you for allowing me to participate in the care of your patient.        Sincerely,        [facility]

## 2022-02-21 NOTE — TELEPHONE ENCOUNTER
----- Message from Chelsie Bunch DO sent at 2/9/2022 12:48 PM CST -----  Result provided via my chart. Will you call to get her scheduled or a osteoporosis consult?     Chelsie Bunch DO  [facility]

## 2022-02-21 NOTE — PROGRESS NOTES
Infusion Nursing Note:  Shena Hartmann presents today for Day 1, Cycle 19 of Daratumumab.  Patient seen by provider today: No   present during visit today: Not Applicable.    Note: Lab results monitored; ANC 1.1, Plt ct 147. Premed of 650mg PO Tylenol given prior to injection. Daratumumab checked with 2nd RN prior to administration, given subcutaneously over 5 minutes in LLQ of abdomen. Patient tolerated well.    Intravenous Access:  No Intravenous access/labs at this visit.    Treatment Conditions:  Results reviewed, labs MET treatment parameters, ok to proceed with treatment.      Post Infusion Assessment:  Patient tolerated injection without incident.       Discharge Plan:   Patient discharged in stable condition accompanied by: self.      Yusra Brush RN

## 2022-02-21 NOTE — NURSING NOTE
Chief Complaint   Patient presents with     Blood Draw     Labs drawn via  by RN in lab.  VS taken       Labs drawn from PIV placed by RN. Line flushed with saline. Vitals taken. Pt checked in for appointment(s).    Audrey Hammonds RN

## 2022-02-22 ENCOUNTER — TELEPHONE (OUTPATIENT)
Dept: ONCOLOGY | Facility: CLINIC | Age: 72
End: 2022-02-22
Payer: COMMERCIAL

## 2022-02-22 DIAGNOSIS — C90.00 MULTIPLE MYELOMA, REMISSION STATUS UNSPECIFIED (H): Primary | ICD-10-CM

## 2022-02-22 LAB
ALBUMIN SERPL ELPH-MCNC: 4.2 G/DL (ref 3.7–5.1)
ALPHA1 GLOB SERPL ELPH-MCNC: 0.3 G/DL (ref 0.2–0.4)
ALPHA2 GLOB SERPL ELPH-MCNC: 0.7 G/DL (ref 0.5–0.9)
B-GLOBULIN SERPL ELPH-MCNC: 0.6 G/DL (ref 0.6–1)
GAMMA GLOB SERPL ELPH-MCNC: 2.2 G/DL (ref 0.7–1.6)
M PROTEIN SERPL ELPH-MCNC: 1.8 G/DL
PROT PATTERN SERPL ELPH-IMP: ABNORMAL

## 2022-02-22 NOTE — TELEPHONE ENCOUNTER
Oral Chemotherapy Monitoring Program    Medication: Pomalyst  Rx:  1mg PO daily 32/28 ds    Auth #: [id number]  Risk Category: Adult female NOT of reproductive capacity    Routine survey questions reviewed. Left voicemail         Tavia Posadas   [facility]  Phone: [phone]  Fax: [phone]  Email: [email or web address]

## 2022-02-24 ENCOUNTER — ANCILLARY PROCEDURE (OUTPATIENT)
Dept: MAMMOGRAPHY | Facility: CLINIC | Age: 72
End: 2022-02-24
Attending: INTERNAL MEDICINE
Payer: COMMERCIAL

## 2022-02-24 ENCOUNTER — ONCOLOGY VISIT (OUTPATIENT)
Dept: ONCOLOGY | Facility: CLINIC | Age: 72
End: 2022-02-24
Attending: INTERNAL MEDICINE
Payer: COMMERCIAL

## 2022-02-24 VITALS — WEIGHT: 121 LBS | OXYGEN SATURATION: 100 % | HEART RATE: 66 BPM | BODY MASS INDEX: 22.48 KG/M2

## 2022-02-24 DIAGNOSIS — C50.911 MALIGNANT NEOPLASM OF RIGHT FEMALE BREAST, UNSPECIFIED ESTROGEN RECEPTOR STATUS, UNSPECIFIED SITE OF BREAST (H): ICD-10-CM

## 2022-02-24 DIAGNOSIS — C90.00 MULTIPLE MYELOMA, REMISSION STATUS UNSPECIFIED (H): Primary | ICD-10-CM

## 2022-02-24 PROCEDURE — 76642 ULTRASOUND BREAST LIMITED: CPT | Mod: LT | Performed by: STUDENT IN AN ORGANIZED HEALTH CARE EDUCATION/TRAINING PROGRAM

## 2022-02-24 PROCEDURE — 77065 DX MAMMO INCL CAD UNI: CPT | Mod: LT | Performed by: STUDENT IN AN ORGANIZED HEALTH CARE EDUCATION/TRAINING PROGRAM

## 2022-02-24 PROCEDURE — 77067 SCR MAMMO BI INCL CAD: CPT | Performed by: STUDENT IN AN ORGANIZED HEALTH CARE EDUCATION/TRAINING PROGRAM

## 2022-02-24 PROCEDURE — 99213 OFFICE O/P EST LOW 20 MIN: CPT | Performed by: INTERNAL MEDICINE

## 2022-02-24 PROCEDURE — 77061 BREAST TOMOSYNTHESIS UNI: CPT | Mod: LT | Performed by: STUDENT IN AN ORGANIZED HEALTH CARE EDUCATION/TRAINING PROGRAM

## 2022-02-24 PROCEDURE — 77063 BREAST TOMOSYNTHESIS BI: CPT | Mod: 59 | Performed by: STUDENT IN AN ORGANIZED HEALTH CARE EDUCATION/TRAINING PROGRAM

## 2022-02-24 PROCEDURE — G0463 HOSPITAL OUTPT CLINIC VISIT: HCPCS

## 2022-02-24 ASSESSMENT — PAIN SCALES - GENERAL: PAINLEVEL: NO PAIN (1)

## 2022-02-24 NOTE — NURSING NOTE
"Oncology Rooming Note    February 24, 2022 1:51 PM   Shena Hartmann is a 71 year old female who presents for:    Chief Complaint   Patient presents with     Oncology Clinic Visit     breast cancer     Initial Vitals: Pulse 66   Wt 54.9 kg (121 lb)   SpO2 100%   BMI 22.48 kg/m   Estimated body mass index is 22.48 kg/m  as calculated from the following:    Height as of 2/1/22: 1.563 m (5' 1.52\").    Weight as of this encounter: 54.9 kg (121 lb). Body surface area is 1.54 meters squared.  No Pain (1) Comment: Data Unavailable   No LMP recorded. Patient is postmenopausal.  Allergies reviewed: Yes  Medications reviewed: Yes    Medications: Medication refills not needed today.  Pharmacy name entered into [employer]:    [id number] - [place], [place] - [address] [place] [facility] [address]  WRITTEN PRESCRIPTION REQUESTED  [facility] - [place], [place] - [address]    Clinical concerns: none       Lisette Brian CMA            "

## 2022-02-24 NOTE — PROGRESS NOTES
"February 24, 2022    CC:  History of breast cancer and multiple myeloma    HPI:  Ms Chatterjee has a history of multiple myeloma being followed by Dr Hills.     Breast cancer history:    Today she is here because she has a past history of Breast cancer which was diagnosed in Oct 1994. It was treated with a right mastectomy and right axillary LN dissection. 8 out of 16 lymph nodes were positive. She did not have reconstruction done. This was followed by 4 cycles of adjuvant Cytoxan, Adriamycin and 5 FU ( CAF ) chemotherapy which finished in Feb 1995. She did not get radiation or hormonal treatment. Note I do not have those records to review and this history is per patient's recall of events.  Since then she has been followed by serial annual mammograms and has been doing well from the breast cancer aspect.    Briefly her myeloma history is summarized below:  She was diagnosed with \"smoldering multiple myeloma\" in 2001, which was watched until 12/2012, when she presented with symptomatic multiple myeloma requiring therapy.   She initially was treated with Revlimid and dexamethasone. However, given poor tolerance and not adequate response to therapy, that was subsequently switched to Velcade and dexamethasone. She received this therapy until 04/2013. However, given minimal response to therapy, Cytoxan was added to the regimen. She received CyBorD until 08/2013, and had a maximal reduction of M-protein to 2.6-2.8 g/dL. A bone marrow biopsy still showed significant disease. Then she received 2 cycles of high-dose Cytoxan with inadequate decline in M-spike. She was then switched D-PACE. She received 2 cycles; one in November 2013, and the last one on 12/05/2013. Despite that she had significant disease burden. She underwent Cytoxan priming on 2/20/14. She received Melphalan conditioning and then received her Auto HCT on 3/27/14.   Since then she has been maintained on pomalidomide maintenance therapy along with daratumumab.  " Her M spike is mildly increased.      Interval History:    In returning today, she tells me that she is feeling fine and has good energy.  She has no new pain or new swellings or lumps. She has been eating well without nausea or vomiting and her weight has not changed. She exercises regularly and has no trouble doing that. Her breathing is stable. No frequent infections.   She had a mammogram today and has questions about it.    She has not received covid vaccinations and is not interested in the current ones.      She has mild fatigue from her infusion earlier this week.      No breast nodules, no upper extremity restriction or lymphedema.    She exercises daily.      She has no new medical history.       No new family history.      ROS:  A 10 point comprehensive ROS was otherwise neg        Active Ambulatory Problems     Diagnosis Date Noted     Pain in thoracic spine 08/26/2013     Multiple myeloma, dx 2002,  s/p ASCBMT 3/26/14 10/07/2013     History of respiratory syncytial virus infection 01/27/2014     Personal history of malignant neoplasm of breast 10/14/2014     Seasonal allergies 10/27/2015     Osteoporosis 11/01/2015     Resolved Ambulatory Problems     Diagnosis Date Noted     Febrile neutropenia (H) 09/18/2013     Cough 01/24/2014     RSV bronchiolitis 01/27/2014     Past Medical History:   Diagnosis Date     Breast cancer (H) 1994     H/O stem cell transplant (H) 3/2014     Multiple myeloma, without mention of having achieved remission 11/6/2012     Current Outpatient Medications   Medication     Ascorbic Acid (VITAMIN C PO)     aspirin (ASA) 81 MG chewable tablet     CALCIUM-VITAMIN D-VITAMIN K PO     cholecalciferol (VITAMIN D3) 5000 UNITS CAPS capsule     clobetasol (TEMOVATE) 0.05 % external ointment     COENZYME Q-10 PO     Cyanocobalamin 1000 MCG/ML LIQD     desonide (DESOWEN) 0.05 % external ointment     lisinopril (ZESTRIL) 2.5 MG tablet     magnesium 100 MG CAPS     Multiple Vitamins-Minerals  (MULTIVITAMIN OR)     order for DME     order for DME     pomalidomide (POMALYST) 1 MG capsule     pomalidomide (POMALYST) 1 MG capsule     triamcinolone (KENALOG) 0.1 % external ointment     UNABLE TO FIND     valACYclovir (VALTREX) 500 MG tablet     zinc gluconate 50 MG tablet     No current facility-administered medications for this visit.     Facility-Administered Medications Ordered in Other Visits   Medication     plerixafor (MOZOBIL) injection SOLN 12.4 mg     potassium chloride 20 mEq in 50 mL IVPB     potassium chloride SA (K-DUR,KLOR-CON M) CR tablet 20 mEq     Family History   Problem Relation Age of Onset     Cancer Paternal Grandmother         skin cancer     Hypertension Mother      Cerebrovascular Disease Mother      Hypertension Father      Prostate Cancer Father    She has no siblings. She has 2 daughters aged 40 and 37.   Father had Prostate ca at 80  Paternal GF - Glioblastoma at 69  Maternal GF- Lung cancer  Social History     Socioeconomic History     Marital status:      Spouse name: Not on file     Number of children: Not on file     Years of education: Not on file     Highest education level: Not on file   Occupational History     Not on file   Tobacco Use     Smoking status: Never Smoker     Smokeless tobacco: Never Used   Substance and Sexual Activity     Alcohol use: Yes     Comment: occ     Drug use: No     Sexual activity: Not on file   Other Topics Concern     Parent/sibling w/ CABG, MI or angioplasty before 65F 55M? Not Asked      Service No     Blood Transfusions No     Caffeine Concern No     Occupational Exposure No     Hobby Hazards No     Sleep Concern No     Stress Concern No     Weight Concern No     Special Diet No     Back Care No     Exercise Yes     Comment: 3-4 x a week.      Bike Helmet Yes     Seat Belt Yes     Self-Exams No   Social History Narrative     Not on file     Social Determinants of Health     Financial Resource Strain: Not on file   Food  Insecurity: Not on file   Transportation Needs: Not on file   Physical Activity: Not on file   Stress: Not on file   Social Connections: Not on file   Intimate Partner Violence: Not At Risk     Fear of Current or Ex-Partner: No     Emotionally Abused: No     Physically Abused: No     Sexually Abused: No   Housing Stability: Not on file   She has never smoked and occasionally drinks etoh. No drugs. 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     Pollen Extract      Environmental allergies      Shrimp Nausea and Vomiting     Tomato      Lip swelling, facial rash     Azithromycin Rash     Keflex [Cephalexin] Rash     Oat Rash     Tape [Adhesive Tape] Itching and Rash     All adhesives     Wheat Rash     Swelling of lips     Physical exam  Pulse 66   Wt 54.9 kg (121 lb)   SpO2 100%   BMI 22.48 kg/m    General: no acute distress  HEENT: PERRLA, no palor or icterus.   NECK: supple - no cervical or supraclavicular LAD  CVS: s1s2 no m r g . No edema  CHEST: clear to auscultation b/l  ABDOMEN: soft non tender no hepatosplenomegaly  NEURO: AAOX3  Grossly non focal neuro exam  SKIN: no obvious rashes  LYMPH NODES: no palpable cervical, supraclavicular, axillary or inguinal LAD  BREAST: right sided mastectomy and well healed surgical scar. No palpable abnormality or lumps noted on either side.      Mammogram today was reviewed by me; suspicious area visualized.  Diagnostic imaging recommended.      Assessment and Recommendations    1. Breast Cancer: She is now more than 20 years out from her diagnosis and  completion of the therapy.   We discussed the importance of yearly mammograms.  She needs diagnostic imaging with mammogram and ultrasound.  This will occur today.      2. Osteopororis- she has tried pamidronate and is not interested in retreating this.  I reviewed her dexa scan with her demonstrating osteoporosis with T score -2.5.  Weight bearing exercises.  Calcium and vitamin D. She is not interested in bisphosphonates.     3. Multiple Myeloma s/p Auto HCT on 3/27/2014- on Pomalidomide maintenance with daratumumab- being followed by Dr Hills    4. She has had prior anthracyclines; we reviewed the importance of CV risk reduction.  She is on antihypertensives with lisinopril.  We discussed the importance of seeing PCP for cholesterol mgmt, etc.      Neelima Da Silva MD

## 2022-02-24 NOTE — LETTER
"    2/24/2022         RE: Shena Hartmann  [address] [place] [address]        Dear Colleague,    Thank you for referring your patient, Shena Hartmann, to the [facility]. Please see a copy of my visit note below.    February 24, 2022    CC:  History of breast cancer and multiple myeloma    HPI:  Ms Chatterjee has a history of multiple myeloma being followed by Dr Hills.     Breast cancer history:    Today she is here because she has a past history of Breast cancer which was diagnosed in Oct 1994. It was treated with a right mastectomy and right axillary LN dissection. 8 out of 16 lymph nodes were positive. She did not have reconstruction done. This was followed by 4 cycles of adjuvant Cytoxan, Adriamycin and 5 FU ( CAF ) chemotherapy which finished in Feb 1995. She did not get radiation or hormonal treatment. Note I do not have those records to review and this history is per patient's recall of events.  Since then she has been followed by serial annual mammograms and has been doing well from the breast cancer aspect.    Briefly her myeloma history is summarized below:  She was diagnosed with \"smoldering multiple myeloma\" in 2001, which was watched until 12/2012, when she presented with symptomatic multiple myeloma requiring therapy.   She initially was treated with Revlimid and dexamethasone. However, given poor tolerance and not adequate response to therapy, that was subsequently switched to Velcade and dexamethasone. She received this therapy until 04/2013. However, given minimal response to therapy, Cytoxan was added to the regimen. She received CyBorD until 08/2013, and had a maximal reduction of M-protein to 2.6-2.8 g/dL. A bone marrow biopsy still showed significant disease. Then she received 2 cycles of high-dose Cytoxan with inadequate decline in M-spike. She was then switched D-PACE. She received 2 cycles; one in November 2013, and the last one on 12/05/2013. Despite " that she had significant disease burden. She underwent Cytoxan priming on 2/20/14. She received Melphalan conditioning and then received her Auto HCT on 3/27/14.   Since then she has been maintained on pomalidomide maintenance therapy along with daratumumab.  Her M spike is mildly increased.      Interval History:    In returning today, she tells me that she is feeling fine and has good energy.  She has no new pain or new swellings or lumps. She has been eating well without nausea or vomiting and her weight has not changed. She exercises regularly and has no trouble doing that. Her breathing is stable. No frequent infections.   She had a mammogram today and has questions about it.    She has not received covid vaccinations and is not interested in the current ones.      She has mild fatigue from her infusion earlier this week.      No breast nodules, no upper extremity restriction or lymphedema.    She exercises daily.      She has no new medical history.       No new family history.      ROS:  A 10 point comprehensive ROS was otherwise neg        Active Ambulatory Problems     Diagnosis Date Noted     Pain in thoracic spine 08/26/2013     Multiple myeloma, dx 2002,  s/p ASCBMT 3/26/14 10/07/2013     History of respiratory syncytial virus infection 01/27/2014     Personal history of malignant neoplasm of breast 10/14/2014     Seasonal allergies 10/27/2015     Osteoporosis 11/01/2015     Resolved Ambulatory Problems     Diagnosis Date Noted     Febrile neutropenia (H) 09/18/2013     Cough 01/24/2014     RSV bronchiolitis 01/27/2014     Past Medical History:   Diagnosis Date     Breast cancer (H) 1994     H/O stem cell transplant (H) 3/2014     Multiple myeloma, without mention of having achieved remission 11/6/2012     Current Outpatient Medications   Medication     Ascorbic Acid (VITAMIN C PO)     aspirin (ASA) 81 MG chewable tablet     CALCIUM-VITAMIN D-VITAMIN K PO     cholecalciferol (VITAMIN D3) 5000 UNITS CAPS  capsule     clobetasol (TEMOVATE) 0.05 % external ointment     COENZYME Q-10 PO     Cyanocobalamin 1000 MCG/ML LIQD     desonide (DESOWEN) 0.05 % external ointment     lisinopril (ZESTRIL) 2.5 MG tablet     magnesium 100 MG CAPS     Multiple Vitamins-Minerals (MULTIVITAMIN OR)     order for DME     order for DME     pomalidomide (POMALYST) 1 MG capsule     pomalidomide (POMALYST) 1 MG capsule     triamcinolone (KENALOG) 0.1 % external ointment     UNABLE TO FIND     valACYclovir (VALTREX) 500 MG tablet     zinc gluconate 50 MG tablet     No current facility-administered medications for this visit.     Facility-Administered Medications Ordered in Other Visits   Medication     plerixafor (MOZOBIL) injection SOLN 12.4 mg     potassium chloride 20 mEq in 50 mL IVPB     potassium chloride SA (K-DUR,KLOR-CON M) CR tablet 20 mEq     Family History   Problem Relation Age of Onset     Cancer Paternal Grandmother         skin cancer     Hypertension Mother      Cerebrovascular Disease Mother      Hypertension Father      Prostate Cancer Father    She has no siblings. She has 2 daughters aged 40 and 37.   Father had Prostate ca at 80  Paternal GF - Glioblastoma at 69  Maternal GF- Lung cancer  Social History     Socioeconomic History     Marital status:      Spouse name: Not on file     Number of children: Not on file     Years of education: Not on file     Highest education level: Not on file   Occupational History     Not on file   Tobacco Use     Smoking status: Never Smoker     Smokeless tobacco: Never Used   Substance and Sexual Activity     Alcohol use: Yes     Comment: occ     Drug use: No     Sexual activity: Not on file   Other Topics Concern     Parent/sibling w/ CABG, MI or angioplasty before 65F 55M? Not Asked      Service No     Blood Transfusions No     Caffeine Concern No     Occupational Exposure No     Hobby Hazards No     Sleep Concern No     Stress Concern No     Weight Concern No     Special  Diet No     Back Care No     Exercise Yes     Comment: 3-4 x a week.      Bike Helmet Yes     Seat Belt Yes     Self-Exams No   Social History Narrative     Not on file     Social Determinants of Health     Financial Resource Strain: Not on file   Food Insecurity: Not on file   Transportation Needs: Not on file   Physical Activity: Not on file   Stress: Not on file   Social Connections: Not on file   Intimate Partner Violence: Not At Risk     Fear of Current or Ex-Partner: No     Emotionally Abused: No     Physically Abused: No     Sexually Abused: No   Housing Stability: Not on file   She has never smoked and occasionally drinks etoh. No drugs. 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     Pollen Extract      Environmental allergies      Shrimp Nausea and Vomiting     Tomato      Lip swelling, facial rash     Azithromycin Rash     Keflex [Cephalexin] Rash     Oat Rash     Tape [Adhesive Tape] Itching and Rash     All adhesives     Wheat Rash     Swelling of lips     Physical exam  Pulse 66   Wt 54.9 kg (121 lb)   SpO2 100%   BMI 22.48 kg/m    General: no acute distress  HEENT: PERRLA, no palor or icterus.   NECK: supple - no cervical or supraclavicular LAD  CVS: s1s2 no m r g . No edema  CHEST: clear to auscultation b/l  ABDOMEN: soft non tender no hepatosplenomegaly  NEURO: AAOX3  Grossly non focal neuro exam  SKIN: no obvious rashes  LYMPH NODES: no palpable cervical, supraclavicular, axillary or inguinal LAD  BREAST: right sided mastectomy and well healed surgical scar. No palpable abnormality  or lumps noted on either side.      Mammogram today was reviewed by me; suspicious area visualized.  Diagnostic imaging recommended.      Assessment and Recommendations    1. Breast Cancer: She is now more than 20 years out from her diagnosis and completion of the therapy.   We discussed the importance of yearly mammograms.  She needs diagnostic imaging with mammogram and ultrasound.  This will occur today.      2. Osteopororis- she has tried pamidronate and is not interested in retreating this.  I reviewed her dexa scan with her demonstrating osteoporosis with T score -2.5.  Weight bearing exercises.  Calcium and vitamin D. She is not interested in bisphosphonates.     3. Multiple Myeloma s/p Auto HCT on 3/27/2014- on Pomalidomide maintenance with daratumumab- being followed by Dr Hills    4. She has had prior anthracyclines; we reviewed the importance of CV risk reduction.  She is on antihypertensives with lisinopril.  We discussed the importance of seeing PCP for cholesterol mgmt, etc.      Neelima Da Silva MD

## 2022-03-02 ENCOUNTER — OFFICE VISIT (OUTPATIENT)
Dept: INTERNAL MEDICINE | Facility: CLINIC | Age: 72
End: 2022-03-02
Payer: COMMERCIAL

## 2022-03-02 VITALS
BODY MASS INDEX: 22.5 KG/M2 | HEART RATE: 84 BPM | SYSTOLIC BLOOD PRESSURE: 142 MMHG | DIASTOLIC BLOOD PRESSURE: 62 MMHG | OXYGEN SATURATION: 97 % | WEIGHT: 121.1 LBS

## 2022-03-02 DIAGNOSIS — I10 PRIMARY HYPERTENSION: Primary | ICD-10-CM

## 2022-03-02 PROCEDURE — 99214 OFFICE O/P EST MOD 30 MIN: CPT | Performed by: INTERNAL MEDICINE

## 2022-03-02 NOTE — PROGRESS NOTES
Assessment & Plan     (I10) Primary hypertension  (primary encounter diagnosis)  Comment: mild  Plan: home numbers are better. Gets frequent rechecks through her hematology office. Will watch. If remains above 140 regularly, then will plan to bump up lisinopril.  Last bmp looked fine.    Osteoporosis-upcoming appointment with Dr Cardona. Defer management.      Total time used in face-to-face, coordination or care and documentation is equal to 30 minutes.     Return in about 6 months (around 9/2/2022) for Follow up, with me.    Thomas Villalobos MD  [facility]    Ramya Chatterjee is a 71 year old who presents for the following health issues    HPI   Pt is here to establish care.  Ongoing treatment for her MM through the [facility]  Prior BMT, immunosuppressive therapy as well as pomalidamide.   Recent dexascan with -2.5 at femoral necks. Referred to osteoporosis care        Objective    BP (!) 142/62 (BP Location: Left arm, Patient Position: Sitting, Cuff Size: Adult Regular)   Pulse 84   Wt 54.9 kg (121 lb 1.6 oz)   SpO2 97%   BMI 22.50 kg/m    Body mass index is 22.5 kg/m .  Physical Exam   Gen:nad

## 2022-03-04 ENCOUNTER — ANCILLARY PROCEDURE (OUTPATIENT)
Dept: MAMMOGRAPHY | Facility: CLINIC | Age: 72
End: 2022-03-04
Attending: INTERNAL MEDICINE
Payer: COMMERCIAL

## 2022-03-04 DIAGNOSIS — C50.911 MALIGNANT NEOPLASM OF RIGHT FEMALE BREAST, UNSPECIFIED ESTROGEN RECEPTOR STATUS, UNSPECIFIED SITE OF BREAST (H): ICD-10-CM

## 2022-03-04 PROCEDURE — 19000 PUNCTURE ASPIR CYST BREAST: CPT | Mod: LT | Performed by: RADIOLOGY

## 2022-03-04 PROCEDURE — 76942 ECHO GUIDE FOR BIOPSY: CPT | Mod: GC | Performed by: RADIOLOGY

## 2022-03-04 PROCEDURE — 77065 DX MAMMO INCL CAD UNI: CPT | Mod: LT | Performed by: RADIOLOGY

## 2022-03-11 ENCOUNTER — OFFICE VISIT (OUTPATIENT)
Dept: INTERNAL MEDICINE | Facility: CLINIC | Age: 72
End: 2022-03-11
Payer: COMMERCIAL

## 2022-03-11 VITALS
OXYGEN SATURATION: 99 % | HEIGHT: 62 IN | WEIGHT: 120.5 LBS | HEART RATE: 80 BPM | BODY MASS INDEX: 22.18 KG/M2 | SYSTOLIC BLOOD PRESSURE: 130 MMHG | DIASTOLIC BLOOD PRESSURE: 76 MMHG

## 2022-03-11 DIAGNOSIS — C90.00 MULTIPLE MYELOMA NOT HAVING ACHIEVED REMISSION (H): ICD-10-CM

## 2022-03-11 DIAGNOSIS — M81.0 OSTEOPOROSIS WITHOUT CURRENT PATHOLOGICAL FRACTURE, UNSPECIFIED OSTEOPOROSIS TYPE: Primary | ICD-10-CM

## 2022-03-11 DIAGNOSIS — R53.82 CHRONIC FATIGUE: ICD-10-CM

## 2022-03-11 DIAGNOSIS — Z85.3 PERSONAL HISTORY OF MALIGNANT NEOPLASM OF BREAST: ICD-10-CM

## 2022-03-11 DIAGNOSIS — Z92.29 PERSONAL HISTORY OF OTHER DRUG THERAPY: ICD-10-CM

## 2022-03-11 PROCEDURE — 99215 OFFICE O/P EST HI 40 MIN: CPT | Performed by: INTERNAL MEDICINE

## 2022-03-11 NOTE — PATIENT INSTRUCTIONS
1.  Desired calcium intake 1200 mg to 1500 mg daily between diet and supplement.    2.  Check insurance for Prolia.    3.  Skip Vitamin D for 2 to 3 months, the restart 2000 international unit(s) daily.  Recheck level in 3 to 4 months.    4.  I will notify you of test results.      5.  If starting Prolia, schedule between Immunotherapy    6.  Recheck bone density after one year on new treatment with clinic visit to follow

## 2022-03-12 NOTE — PROGRESS NOTES
ASSESSMENT:  1. Osteoporosis without current pathological fracture, unspecified osteoporosis type  Shena's recent bone density study was reviewed.  She had lowest T-scores of -2.6 in the left femoral neck and -2.5 in the right femoral neck.  She reports a 3-1/2 inch height loss since young adulthood.  She was treated with pamidronate in the past.  She reports that she has severe bone pain which lasted for a month after this.  She also has a history of multiple myeloma with previous stem cell transplant.  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  She has had repeated courses of steroids as part of treatment for myeloma.  Management options for osteoporosis were discussed.  She would not be a good candidate for Tymlos or Forteo since she has had previous spinal radiation.  She is reluctant to consider bisphosphonates after her severe bone pain with pamidronate.  After discussion, she would be most interested in  Prolia  - Tissue transglutaminase kathy IgA and IgG; Future  - Parathyroid Hormone Intact; Future  - TSH; Future  - denosumab (PROLIA) injection 60 mg  - denosumab (PROLIA) injection 60 mg    2. Chronic fatigue  TSH is checked as part of screening for secondary causes of low bone mass  - TSH; Future    3. Personal history of other drug therapy  She had previous therapy with pamidronate as noted above  - denosumab (PROLIA) injection 60 mg  - denosumab (PROLIA) injection 60 mg    4. Multiple myeloma not having achieved remission (H)  She is still receiving therapy for multiple myeloma    5. Personal history of malignant neoplasm of breast  She has a past history of breast cancer    6.  Excessive vitamin D intake:  She had been taking 5000 IU of vitamin D twice daily.  Vitamin D level was 139 on 2/22/2021.  She has skipped her vitamin D supplements since then    PLAN:  Patient Instructions   1.  Desired calcium intake 1200 mg to 1500 mg daily between diet and supplement.  She was given  printed information on calcium content of common foods    2.  Check insurance for Prolia.    3.  Skip Vitamin D for 2 to 3 months, the restart 2000 international unit(s) daily.  Recheck level in 3 to 4 months.    4.  I will notify you of test results.      5.  If starting Prolia, schedule between Immunotherapy    6.  Recheck bone density after one year on new treatment with clinic visit to follow    Orders Placed This Encounter   Procedures     Tissue transglutaminase kathy IgA and IgG     Parathyroid Hormone Intact     TSH     There are no discontinued medications.    Return in about 1 year (around 3/11/2023).    ASSESSED PROBLEMS:  See above      CHIEF COMPLAINT:  Osteoporosis consult     HISTORY OF PRESENT ILLNESS:  Shena is a 71 year old female seen to discuss management of osteoporosis.  Her most recent bone density study from 2/8/2022 showed T-scores of -2.6 in the left femoral neck and -2.5 in the right femoral neck.  Her past history is notable for breast cancer and multiple myeloma.  She underwent stem cell transplant for this.  She did have rib fractures and spinal fracture at T8 with a plasmacytoma.  This was treated with local radiation.  She did receive pamidronate in the past.  She reported severe bone pain which lasted for a month after this.    She is now taking a supplement called pro cruz.  She had used vitamin D 5000 units twice daily.  She stopped this after a high vitamin D level of 139 on 2/21/2022.  She reports a height loss of 3-1/2 inches since young adulthood.  She had menopause about age 53 she is a non-smoker with no significant alcohol intake.  There is no history of nephrolithiasis.  Family history is notable for mother with osteoporosis.  Shena has received multiple steroid courses associated with treatment for multiple myeloma.    REVIEW OF SYSTEMS:  See above  Comprehensive review of systems is otherwise negative.    PFSH:  .  He has been participating in an exercise program  "affiliated with [employer].    TOBACCO USE:  History   Smoking Status     Never Smoker   Smokeless Tobacco     Never Used       VITALS:  Vitals:    03/11/22 1356   BP: 130/76   BP Location: Left arm   Patient Position: Sitting   Cuff Size: Adult Regular   Pulse: 80   SpO2: 99%   Weight: 54.7 kg (120 lb 8 oz)   Height: 1.562 m (5' 1.5\")     Wt Readings from Last 3 Encounters:   03/11/22 54.7 kg (120 lb 8 oz)   03/02/22 54.9 kg (121 lb 1.6 oz)   02/24/22 54.9 kg (121 lb)       PHYSICAL EXAM:  Constitutional:   Reveals an alert pleasant talkative woman with appropriate affect.  She does not seem in acute distress.  She ambulates easily.  She can get up from a chair without use of her arms.  Vitals: per nursing notes.  HEENT: Atraumatic  Eyes: No conjunctival hyperemia.  Neck:  Supple, no carotid bruits or adenopathy.  Back:  No spine or CVA pain.  Moderate thoracic kyphosis  Thorax:  No bony deformities.  Lungs: Clear to A&P without rales or wheezes.  Respiratory effort normal.  Cardiac:   Regular rate and rhythm, normal S1, S2, no murmur or gallop.  Abdomen:  Soft, active bowel sounds without bruits, mass, or tenderness..  Extremities:   No peripheral edema.     Skin:  No jaundice, peripheral cyanosis or lesions to suggest malignancy.  Neuro:  Alert and oriented..  No gross focal deficits.  Psychiatric:  Memory intact, mood appropriate.    DATA REVIEWED:  Additional History from Old Records Summarized (2): Oncology records reviewed  Decision to Obtain Records (1): None.   Radiology Tests Summarized or Ordered (1): DEXA scan reviewed  Labs Reviewed or Ordered (1): Labs reviewed and ordered  Medicine Test Summarized or Ordered (1): None.   Independent Review of EKG or X-RAY(2 each): None.    The visit lasted a total of 45 minutes face to face with the patient. Over 50% of the time was spent counseling and educating the patient about evaluation and management of osteoporosis.    Dragon dictation was used for this " note. Speech recognition errors are a possibility.     MEDICATIONS:  Current Outpatient Medications   Medication Sig Dispense Refill     Ascorbic Acid (VITAMIN C PO) Take 1,000 mg by mouth 2 times daily        aspirin (ASA) 81 MG chewable tablet Take 1 tablet (81 mg) by mouth daily 30 tablet 0     CALCIUM-VITAMIN D-VITAMIN K PO Take 2 tablets by mouth daily.       clobetasol (TEMOVATE) 0.05 % external ointment Apply topically 2 times daily Apply to hands as needed for dermatitis 60 g 4     COENZYME Q-10 PO Take 100 mg by mouth daily        Cyanocobalamin 1000 MCG/ML LIQD Take 500 mcg by mouth 2 times daily       desonide (DESOWEN) 0.05 % external ointment Apply topically 2 times daily Apply to hands as needed for dermatitis 60 g 4     lisinopril (ZESTRIL) 2.5 MG tablet Take 1 tablet (2.5 mg) by mouth daily 90 tablet 0     magnesium 100 MG CAPS Take 117 mg by mouth 3 times daily       Multiple Vitamins-Minerals (MULTIVITAMIN OR) Take 1 tablet by mouth 2 times daily.       order for DME 6 mastectomy bras  Length of need: 99 months 6 Device 1     order for DME R mastectomy prosthesis   Length of need:  99 months 1 Device 1     pomalidomide (POMALYST) 1 MG capsule Alternate 2mg daily with 1mg daily for each 21 day cycle. 32 capsule 0     triamcinolone (KENALOG) 0.1 % external ointment Apply topically 2 times daily 80 g 4     UNABLE TO FIND MEDICATION NAME: Quercitin 500 mg daily       valACYclovir (VALTREX) 500 MG tablet Take 1 tablet (500 mg) by mouth 2 times daily 180 tablet 3     zinc gluconate 50 MG tablet Take 50 mg by mouth daily

## 2022-03-21 ENCOUNTER — INFUSION THERAPY VISIT (OUTPATIENT)
Dept: TRANSPLANT | Facility: CLINIC | Age: 72
End: 2022-03-21
Attending: INTERNAL MEDICINE
Payer: COMMERCIAL

## 2022-03-21 ENCOUNTER — APPOINTMENT (OUTPATIENT)
Dept: LAB | Facility: CLINIC | Age: 72
End: 2022-03-21
Attending: INTERNAL MEDICINE
Payer: COMMERCIAL

## 2022-03-21 ENCOUNTER — TELEPHONE (OUTPATIENT)
Dept: ONCOLOGY | Facility: CLINIC | Age: 72
End: 2022-03-21

## 2022-03-21 VITALS
DIASTOLIC BLOOD PRESSURE: 74 MMHG | HEART RATE: 62 BPM | RESPIRATION RATE: 16 BRPM | WEIGHT: 120.7 LBS | OXYGEN SATURATION: 96 % | BODY MASS INDEX: 22.44 KG/M2 | TEMPERATURE: 98.5 F | SYSTOLIC BLOOD PRESSURE: 152 MMHG

## 2022-03-21 DIAGNOSIS — R53.82 CHRONIC FATIGUE: ICD-10-CM

## 2022-03-21 DIAGNOSIS — Z79.899 ENCOUNTER FOR LONG-TERM (CURRENT) USE OF MEDICATIONS: ICD-10-CM

## 2022-03-21 DIAGNOSIS — C90.00 MULTIPLE MYELOMA NOT HAVING ACHIEVED REMISSION (H): Primary | ICD-10-CM

## 2022-03-21 DIAGNOSIS — M81.0 OSTEOPOROSIS WITHOUT CURRENT PATHOLOGICAL FRACTURE, UNSPECIFIED OSTEOPOROSIS TYPE: ICD-10-CM

## 2022-03-21 DIAGNOSIS — Z79.899 ENCOUNTER FOR LONG-TERM (CURRENT) USE OF MEDICATIONS: Primary | ICD-10-CM

## 2022-03-21 DIAGNOSIS — C90.00 MULTIPLE MYELOMA, REMISSION STATUS UNSPECIFIED (H): Primary | ICD-10-CM

## 2022-03-21 LAB
ALBUMIN SERPL-MCNC: 3.5 G/DL (ref 3.4–5)
ALP SERPL-CCNC: 60 U/L (ref 40–150)
ALT SERPL W P-5'-P-CCNC: 24 U/L (ref 0–50)
ANION GAP SERPL CALCULATED.3IONS-SCNC: 8 MMOL/L (ref 3–14)
AST SERPL W P-5'-P-CCNC: 15 U/L (ref 0–45)
BASOPHILS # BLD AUTO: 0 10E3/UL (ref 0–0.2)
BASOPHILS NFR BLD AUTO: 1 %
BILIRUB SERPL-MCNC: 0.4 MG/DL (ref 0.2–1.3)
BUN SERPL-MCNC: 16 MG/DL (ref 7–30)
CALCIUM SERPL-MCNC: 9 MG/DL (ref 8.5–10.1)
CHLORIDE BLD-SCNC: 103 MMOL/L (ref 94–109)
CO2 SERPL-SCNC: 26 MMOL/L (ref 20–32)
CREAT SERPL-MCNC: 0.8 MG/DL (ref 0.52–1.04)
EOSINOPHIL # BLD AUTO: 0.2 10E3/UL (ref 0–0.7)
EOSINOPHIL NFR BLD AUTO: 5 %
ERYTHROCYTE [DISTWIDTH] IN BLOOD BY AUTOMATED COUNT: 16 % (ref 10–15)
GFR SERPL CREATININE-BSD FRML MDRD: 78 ML/MIN/1.73M2
GLUCOSE BLD-MCNC: 78 MG/DL (ref 70–99)
HCT VFR BLD AUTO: 35.2 % (ref 35–47)
HGB BLD-MCNC: 11.9 G/DL (ref 11.7–15.7)
IMM GRANULOCYTES # BLD: 0 10E3/UL
IMM GRANULOCYTES NFR BLD: 0 %
LYMPHOCYTES # BLD AUTO: 1.6 10E3/UL (ref 0.8–5.3)
LYMPHOCYTES NFR BLD AUTO: 43 %
MCH RBC QN AUTO: 33.5 PG (ref 26.5–33)
MCHC RBC AUTO-ENTMCNC: 33.8 G/DL (ref 31.5–36.5)
MCV RBC AUTO: 99 FL (ref 78–100)
MONOCYTES # BLD AUTO: 0.5 10E3/UL (ref 0–1.3)
MONOCYTES NFR BLD AUTO: 15 %
NEUTROPHILS # BLD AUTO: 1.3 10E3/UL (ref 1.6–8.3)
NEUTROPHILS NFR BLD AUTO: 36 %
NRBC # BLD AUTO: 0 10E3/UL
NRBC BLD AUTO-RTO: 0 /100
PLATELET # BLD AUTO: 123 10E3/UL (ref 150–450)
POTASSIUM BLD-SCNC: 3.9 MMOL/L (ref 3.4–5.3)
PROT SERPL-MCNC: 8.3 G/DL (ref 6.8–8.8)
PTH-INTACT SERPL-MCNC: 32 PG/ML (ref 15–65)
RBC # BLD AUTO: 3.55 10E6/UL (ref 3.8–5.2)
SODIUM SERPL-SCNC: 137 MMOL/L (ref 133–144)
T4 FREE SERPL-MCNC: 0.83 NG/DL (ref 0.76–1.46)
TOTAL PROTEIN SERUM FOR ELP: 8 G/DL (ref 6.8–8.8)
TSH SERPL DL<=0.005 MIU/L-ACNC: 5.79 MU/L (ref 0.4–4)
WBC # BLD AUTO: 3.6 10E3/UL (ref 4–11)

## 2022-03-21 PROCEDURE — 86364 TISS TRNSGLTMNASE EA IG CLAS: CPT

## 2022-03-21 PROCEDURE — 84155 ASSAY OF PROTEIN SERUM: CPT | Mod: 91

## 2022-03-21 PROCEDURE — 84443 ASSAY THYROID STIM HORMONE: CPT

## 2022-03-21 PROCEDURE — 96401 CHEMO ANTI-NEOPL SQ/IM: CPT

## 2022-03-21 PROCEDURE — 83970 ASSAY OF PARATHORMONE: CPT

## 2022-03-21 PROCEDURE — 84439 ASSAY OF FREE THYROXINE: CPT

## 2022-03-21 PROCEDURE — 250N000013 HC RX MED GY IP 250 OP 250 PS 637: Performed by: INTERNAL MEDICINE

## 2022-03-21 PROCEDURE — 85025 COMPLETE CBC W/AUTO DIFF WBC: CPT | Performed by: INTERNAL MEDICINE

## 2022-03-21 PROCEDURE — 250N000011 HC RX IP 250 OP 636: Performed by: INTERNAL MEDICINE

## 2022-03-21 PROCEDURE — 84165 PROTEIN E-PHORESIS SERUM: CPT | Mod: 26 | Performed by: PATHOLOGY

## 2022-03-21 PROCEDURE — 80053 COMPREHEN METABOLIC PANEL: CPT

## 2022-03-21 PROCEDURE — 84165 PROTEIN E-PHORESIS SERUM: CPT | Mod: TC | Performed by: PATHOLOGY

## 2022-03-21 PROCEDURE — 36415 COLL VENOUS BLD VENIPUNCTURE: CPT

## 2022-03-21 PROCEDURE — 82040 ASSAY OF SERUM ALBUMIN: CPT

## 2022-03-21 RX ORDER — ACETAMINOPHEN 325 MG/1
650 TABLET ORAL
Status: COMPLETED | OUTPATIENT
Start: 2022-03-21 | End: 2022-03-21

## 2022-03-21 RX ORDER — DIPHENHYDRAMINE HYDROCHLORIDE 50 MG/ML
50 INJECTION INTRAMUSCULAR; INTRAVENOUS
Status: CANCELLED
Start: 2022-03-21

## 2022-03-21 RX ORDER — EPINEPHRINE 1 MG/ML
0.3 INJECTION, SOLUTION, CONCENTRATE INTRAVENOUS EVERY 5 MIN PRN
Status: CANCELLED | OUTPATIENT
Start: 2022-03-21

## 2022-03-21 RX ORDER — NALOXONE HYDROCHLORIDE 0.4 MG/ML
.1-.4 INJECTION, SOLUTION INTRAMUSCULAR; INTRAVENOUS; SUBCUTANEOUS
Status: CANCELLED | OUTPATIENT
Start: 2022-03-21

## 2022-03-21 RX ORDER — ACETAMINOPHEN 325 MG/1
650 TABLET ORAL
Status: CANCELLED
Start: 2022-03-21

## 2022-03-21 RX ORDER — ALBUTEROL SULFATE 0.83 MG/ML
2.5 SOLUTION RESPIRATORY (INHALATION)
Status: CANCELLED | OUTPATIENT
Start: 2022-03-21

## 2022-03-21 RX ORDER — MEPERIDINE HYDROCHLORIDE 25 MG/ML
25 INJECTION INTRAMUSCULAR; INTRAVENOUS; SUBCUTANEOUS EVERY 30 MIN PRN
Status: CANCELLED | OUTPATIENT
Start: 2022-03-21

## 2022-03-21 RX ORDER — ALBUTEROL SULFATE 90 UG/1
1-2 AEROSOL, METERED RESPIRATORY (INHALATION)
Status: CANCELLED
Start: 2022-03-21

## 2022-03-21 RX ORDER — DIPHENHYDRAMINE HCL 25 MG
25 CAPSULE ORAL
Status: CANCELLED
Start: 2022-03-21

## 2022-03-21 RX ORDER — HEPARIN SODIUM (PORCINE) LOCK FLUSH IV SOLN 100 UNIT/ML 100 UNIT/ML
5 SOLUTION INTRAVENOUS
Status: CANCELLED | OUTPATIENT
Start: 2022-03-21

## 2022-03-21 RX ORDER — SODIUM CHLORIDE 9 MG/ML
1000 INJECTION, SOLUTION INTRAVENOUS CONTINUOUS PRN
Status: CANCELLED
Start: 2022-03-21

## 2022-03-21 RX ORDER — HEPARIN SODIUM,PORCINE 10 UNIT/ML
5 VIAL (ML) INTRAVENOUS
Status: CANCELLED | OUTPATIENT
Start: 2022-03-21

## 2022-03-21 RX ORDER — LORAZEPAM 2 MG/ML
0.5 INJECTION INTRAMUSCULAR EVERY 4 HOURS PRN
Status: CANCELLED
Start: 2022-03-21

## 2022-03-21 RX ADMIN — ACETAMINOPHEN 650 MG: 325 TABLET ORAL at 09:17

## 2022-03-21 RX ADMIN — DARATUMUMAB AND HYALURONIDASE-FIHJ (HUMAN RECOMBINANT) 1800 MG: 1800; 30000 INJECTION SUBCUTANEOUS at 09:55

## 2022-03-21 ASSESSMENT — PAIN SCALES - GENERAL: PAINLEVEL: NO PAIN (0)

## 2022-03-21 NOTE — TELEPHONE ENCOUNTER
Oral Chemotherapy Monitoring Program    Medication:Pomalyst  Rx:  1mg PO daily 32/ 28 ds    Auth #: [id number]  Risk Category: Adult female NOT of reproductive capacity    Routine survey questions reviewed. yes        Tavia Suazo Pharmacy Liaison, alpha split P-Z  Phone: [phone]  Fax: [phone]  Email: [email or web address]

## 2022-03-21 NOTE — LETTER
3/21/2022         RE: Shena Hartmann  [address] [place] [address]        Dear Colleague,    Thank you for referring your patient, Shena Hartmann, to the [facility] BLOOD AND MARROW TRANSPLANT PROGRAM [place]. Please see a copy of my visit note below.    Infusion Nursing Note:  Shena Hartmann presents today for subcutaneous Yen. See MAR.     Patient seen by provider today: No   present during visit today: Not Applicable.    Note: Pt received Cycle 20, Day 1 subcutaneous Yen administered in Right Lower Abd. Pt tolerated injection well over 6 minutes. Tylenol 650 mg Premed given per Pt request 30 minutes prior. Pt assessment completed by this RN (see Flowsheet) and Lab parameters met. Chemo checked with another RN per protocol prior to administration. VSS prior to Yen. Following injection Pt left clinic ambulatory in stable condition.      Intravenous Access:  NA    Treatment Conditions:  Results reviewed, labs MET treatment parameters, ok to proceed with treatment.    Post Infusion Assessment:  Patient tolerated injection without incident.     Discharge Plan:   Patient discharged in stable condition accompanied by: self.  Departure Mode: Ambulatory.  Pt will be in contact with Dr. Hills to discuss her future appointments.      Rachel Merritt, MELISSA      Again, thank you for allowing me to participate in the care of your patient.      Sincerely,    [facility]

## 2022-03-21 NOTE — NURSING NOTE
Chief Complaint   Patient presents with     Blood Draw     Labs drawn via  by RN in lab.  VS taken     Infusion     Pt here for SQ Yen. Pt is s/p BMT for Multiple Myeloma.      Rachel Merritt RN

## 2022-03-21 NOTE — PROGRESS NOTES
Infusion Nursing Note:  Shena Hartmann presents today for subcutaneous Yen. See MAR.     Patient seen by provider today: No   present during visit today: Not Applicable.    Note: Pt received Cycle 20, Day 1 subcutaneous Yen administered in Right Lower Abd. Pt tolerated injection well over 6 minutes. Tylenol 650 mg Premed given per Pt request 30 minutes prior. Pt assessment completed by this RN (see Flowsheet) and Lab parameters met. Chemo checked with another RN per protocol prior to administration. VSS prior to Yen. Following injection Pt left clinic ambulatory in stable condition.        Intravenous Access:  NA    Treatment Conditions:  Results reviewed, labs MET treatment parameters, ok to proceed with treatment.      Post Infusion Assessment:  Patient tolerated injection without incident.       Discharge Plan:   Patient discharged in stable condition accompanied by: self.  Departure Mode: Ambulatory.  Pt will be in contact with Dr. Hills to discuss her future appointments.      Rachel Merritt RN                       16

## 2022-03-22 LAB
ALBUMIN SERPL ELPH-MCNC: 4.2 G/DL (ref 3.7–5.1)
ALPHA1 GLOB SERPL ELPH-MCNC: 0.3 G/DL (ref 0.2–0.4)
ALPHA2 GLOB SERPL ELPH-MCNC: 0.7 G/DL (ref 0.5–0.9)
B-GLOBULIN SERPL ELPH-MCNC: 0.6 G/DL (ref 0.6–1)
GAMMA GLOB SERPL ELPH-MCNC: 2.3 G/DL (ref 0.7–1.6)
M PROTEIN SERPL ELPH-MCNC: 1.9 G/DL
PROT PATTERN SERPL ELPH-IMP: ABNORMAL
TTG IGA SER-ACNC: <1 U/ML
TTG IGG SER-ACNC: 1.1 U/ML

## 2022-03-28 ENCOUNTER — PATIENT OUTREACH (OUTPATIENT)
Dept: ONCOLOGY | Facility: CLINIC | Age: 72
End: 2022-03-28
Payer: COMMERCIAL

## 2022-03-28 NOTE — PROGRESS NOTES
Lui Hills [email or web address]  Mon 3/28/2022 7:28 AM  Minh Chatterjee,    Not sure exactly what happened but will have the schedulers change the dates as you suggested. The only change I asked for is that on April 29 we have our visit virtually at 730 as I am also rounding on the inpatient BMT unit right after our visit. Does that work for you?    Veronica or Sherrie, can you get this to the schedulers.     I do not see an issue with Prolia.    Oscar    On Sun, Mar 27, 2022 at 4:23 PM Shena Hartmann <[email or web address]> wrote:  Dear Oscar,  I was totally confused about why my every 4 week Yen injection & labs scheduled for April 18 was rescheduled to April 29 (when I have an appt. with you). That makes the injection 11 days late & the ordered m-spike would not have results in time for our appt. Am I missing something here? I sent a message on My Chart when I accidentally noticed the change, & Veronica Valentine said you requested the change in the schedule. She said she would contact you (but that was a couple weeks ago) & I have not heard anything back. Since my m-spike has remained fairly stable these past months, I did not think we were to make changes in drugs yet. Please let me know what is going on.    If this was just an accident, please reschedule the labs & Yen back to April 18. This is my last scheduled Yen. If the thought is to continue the same schedule, you would need to also schedule labs & Yen on the following dates:  May 16  Teresa 13  July 11  August 8    I also have been to the osteoporosis clinic recently & the doc is recommending Prolia injections every 6 months as a preventative (not a biphosphonate like I tried in 2002). Can you see any reason not to do this while I am doing immunotherapy & Pomalyst? Just wanted to check with you before I proceed; you know how sensitive I can be.    Let me know on these concerns. See you in late April. Hope all is  well.    Thanks,  Shena [email or web address]  [phone] (cell)    Sent from my iPad    ======    Author sent e-mail stating would contact Clinic Coordinators.  In-basket message sent to Clinic Coordinators and copied to author (as well as BMT coworker, Veronica Valentine RN).

## 2022-04-16 DIAGNOSIS — Z79.899 ENCOUNTER FOR LONG-TERM (CURRENT) USE OF MEDICATIONS: ICD-10-CM

## 2022-04-16 DIAGNOSIS — C90.00 MULTIPLE MYELOMA, REMISSION STATUS UNSPECIFIED (H): Primary | ICD-10-CM

## 2022-04-18 ENCOUNTER — TELEPHONE (OUTPATIENT)
Dept: ONCOLOGY | Facility: CLINIC | Age: 72
End: 2022-04-18
Payer: COMMERCIAL

## 2022-04-18 DIAGNOSIS — C90.00 MULTIPLE MYELOMA, REMISSION STATUS UNSPECIFIED (H): Primary | ICD-10-CM

## 2022-04-18 NOTE — TELEPHONE ENCOUNTER
Oral Chemotherapy Monitoring Program    Medication: Pomalyst  Rx:  1mg PO daily 32/28 ds    Auth #: [id number]  Risk Category: Adult female NOT of reproductive capacity    Routine survey questions reviewed. yes        Tavia Suazo Pharmacy Liaison, alpha split R-Z  Phone: [phone]  Fax: [phone]  Email: [email or web address]

## 2022-04-19 ENCOUNTER — APPOINTMENT (OUTPATIENT)
Dept: LAB | Facility: CLINIC | Age: 72
End: 2022-04-19
Attending: INTERNAL MEDICINE
Payer: COMMERCIAL

## 2022-04-19 ENCOUNTER — INFUSION THERAPY VISIT (OUTPATIENT)
Dept: ONCOLOGY | Facility: CLINIC | Age: 72
End: 2022-04-19
Attending: INTERNAL MEDICINE
Payer: COMMERCIAL

## 2022-04-19 VITALS
BODY MASS INDEX: 22.53 KG/M2 | WEIGHT: 121.2 LBS | TEMPERATURE: 98 F | DIASTOLIC BLOOD PRESSURE: 75 MMHG | RESPIRATION RATE: 16 BRPM | SYSTOLIC BLOOD PRESSURE: 136 MMHG | OXYGEN SATURATION: 99 % | HEART RATE: 70 BPM

## 2022-04-19 DIAGNOSIS — C90.00 MULTIPLE MYELOMA NOT HAVING ACHIEVED REMISSION (H): Primary | ICD-10-CM

## 2022-04-19 DIAGNOSIS — Z79.899 ENCOUNTER FOR LONG-TERM (CURRENT) USE OF MEDICATIONS: ICD-10-CM

## 2022-04-19 DIAGNOSIS — C90.00 MULTIPLE MYELOMA, REMISSION STATUS UNSPECIFIED (H): ICD-10-CM

## 2022-04-19 LAB
ALBUMIN SERPL-MCNC: 3.2 G/DL (ref 3.4–5)
ALP SERPL-CCNC: 58 U/L (ref 40–150)
ALT SERPL W P-5'-P-CCNC: 25 U/L (ref 0–50)
ANION GAP SERPL CALCULATED.3IONS-SCNC: 8 MMOL/L (ref 3–14)
AST SERPL W P-5'-P-CCNC: 16 U/L (ref 0–45)
BASOPHILS # BLD AUTO: 0 10E3/UL (ref 0–0.2)
BASOPHILS NFR BLD AUTO: 1 %
BILIRUB SERPL-MCNC: 0.2 MG/DL (ref 0.2–1.3)
BUN SERPL-MCNC: 14 MG/DL (ref 7–30)
CALCIUM SERPL-MCNC: 8.6 MG/DL (ref 8.5–10.1)
CHLORIDE BLD-SCNC: 100 MMOL/L (ref 94–109)
CO2 SERPL-SCNC: 27 MMOL/L (ref 20–32)
CREAT SERPL-MCNC: 0.72 MG/DL (ref 0.52–1.04)
EOSINOPHIL # BLD AUTO: 0.2 10E3/UL (ref 0–0.7)
EOSINOPHIL NFR BLD AUTO: 4 %
ERYTHROCYTE [DISTWIDTH] IN BLOOD BY AUTOMATED COUNT: 15.9 % (ref 10–15)
GFR SERPL CREATININE-BSD FRML MDRD: 89 ML/MIN/1.73M2
GLUCOSE BLD-MCNC: 98 MG/DL (ref 70–99)
HCT VFR BLD AUTO: 32.5 % (ref 35–47)
HGB BLD-MCNC: 10.9 G/DL (ref 11.7–15.7)
IMM GRANULOCYTES # BLD: 0 10E3/UL
IMM GRANULOCYTES NFR BLD: 0 %
LDH SERPL L TO P-CCNC: 118 U/L (ref 81–234)
LYMPHOCYTES # BLD AUTO: 2.1 10E3/UL (ref 0.8–5.3)
LYMPHOCYTES NFR BLD AUTO: 45 %
MCH RBC QN AUTO: 33.1 PG (ref 26.5–33)
MCHC RBC AUTO-ENTMCNC: 33.5 G/DL (ref 31.5–36.5)
MCV RBC AUTO: 99 FL (ref 78–100)
MONOCYTES # BLD AUTO: 0.7 10E3/UL (ref 0–1.3)
MONOCYTES NFR BLD AUTO: 15 %
NEUTROPHILS # BLD AUTO: 1.6 10E3/UL (ref 1.6–8.3)
NEUTROPHILS NFR BLD AUTO: 35 %
NRBC # BLD AUTO: 0 10E3/UL
NRBC BLD AUTO-RTO: 0 /100
PLATELET # BLD AUTO: 131 10E3/UL (ref 150–450)
POTASSIUM BLD-SCNC: 4.1 MMOL/L (ref 3.4–5.3)
PROT SERPL-MCNC: 7.6 G/DL (ref 6.8–8.8)
RBC # BLD AUTO: 3.29 10E6/UL (ref 3.8–5.2)
SODIUM SERPL-SCNC: 135 MMOL/L (ref 133–144)
TOTAL PROTEIN SERUM FOR ELP: 7.6 G/DL (ref 6.8–8.8)
WBC # BLD AUTO: 4.6 10E3/UL (ref 4–11)

## 2022-04-19 PROCEDURE — 250N000011 HC RX IP 250 OP 636: Performed by: INTERNAL MEDICINE

## 2022-04-19 PROCEDURE — 85025 COMPLETE CBC W/AUTO DIFF WBC: CPT | Performed by: INTERNAL MEDICINE

## 2022-04-19 PROCEDURE — 84155 ASSAY OF PROTEIN SERUM: CPT | Mod: 91

## 2022-04-19 PROCEDURE — 36415 COLL VENOUS BLD VENIPUNCTURE: CPT

## 2022-04-19 PROCEDURE — 84165 PROTEIN E-PHORESIS SERUM: CPT | Mod: TC | Performed by: PATHOLOGY

## 2022-04-19 PROCEDURE — 96401 CHEMO ANTI-NEOPL SQ/IM: CPT

## 2022-04-19 PROCEDURE — 83615 LACTATE (LD) (LDH) ENZYME: CPT

## 2022-04-19 PROCEDURE — 80053 COMPREHEN METABOLIC PANEL: CPT

## 2022-04-19 PROCEDURE — 82232 ASSAY OF BETA-2 PROTEIN: CPT

## 2022-04-19 PROCEDURE — 84165 PROTEIN E-PHORESIS SERUM: CPT | Mod: 26

## 2022-04-19 PROCEDURE — 250N000013 HC RX MED GY IP 250 OP 250 PS 637: Performed by: INTERNAL MEDICINE

## 2022-04-19 RX ORDER — DIPHENHYDRAMINE HYDROCHLORIDE 50 MG/ML
50 INJECTION INTRAMUSCULAR; INTRAVENOUS
Status: CANCELLED
Start: 2022-04-19

## 2022-04-19 RX ORDER — DIPHENHYDRAMINE HCL 25 MG
25 CAPSULE ORAL
Status: CANCELLED
Start: 2022-05-16

## 2022-04-19 RX ORDER — NALOXONE HYDROCHLORIDE 0.4 MG/ML
.1-.4 INJECTION, SOLUTION INTRAMUSCULAR; INTRAVENOUS; SUBCUTANEOUS
Status: CANCELLED | OUTPATIENT
Start: 2022-05-16

## 2022-04-19 RX ORDER — EPINEPHRINE 1 MG/ML
0.3 INJECTION, SOLUTION, CONCENTRATE INTRAVENOUS EVERY 5 MIN PRN
Status: CANCELLED | OUTPATIENT
Start: 2022-05-16

## 2022-04-19 RX ORDER — SODIUM CHLORIDE 9 MG/ML
1000 INJECTION, SOLUTION INTRAVENOUS CONTINUOUS PRN
Status: CANCELLED
Start: 2022-05-16

## 2022-04-19 RX ORDER — ACETAMINOPHEN 325 MG/1
650 TABLET ORAL
Status: CANCELLED
Start: 2022-05-16

## 2022-04-19 RX ORDER — DIPHENHYDRAMINE HYDROCHLORIDE 50 MG/ML
50 INJECTION INTRAMUSCULAR; INTRAVENOUS
Status: CANCELLED
Start: 2022-05-16

## 2022-04-19 RX ORDER — HEPARIN SODIUM (PORCINE) LOCK FLUSH IV SOLN 100 UNIT/ML 100 UNIT/ML
5 SOLUTION INTRAVENOUS
Status: CANCELLED | OUTPATIENT
Start: 2022-04-19

## 2022-04-19 RX ORDER — MEPERIDINE HYDROCHLORIDE 25 MG/ML
25 INJECTION INTRAMUSCULAR; INTRAVENOUS; SUBCUTANEOUS EVERY 30 MIN PRN
Status: CANCELLED | OUTPATIENT
Start: 2022-04-19

## 2022-04-19 RX ORDER — ALBUTEROL SULFATE 0.83 MG/ML
2.5 SOLUTION RESPIRATORY (INHALATION)
Status: CANCELLED | OUTPATIENT
Start: 2022-05-16

## 2022-04-19 RX ORDER — MEPERIDINE HYDROCHLORIDE 25 MG/ML
25 INJECTION INTRAMUSCULAR; INTRAVENOUS; SUBCUTANEOUS EVERY 30 MIN PRN
Status: CANCELLED | OUTPATIENT
Start: 2022-05-16

## 2022-04-19 RX ORDER — NALOXONE HYDROCHLORIDE 0.4 MG/ML
.1-.4 INJECTION, SOLUTION INTRAMUSCULAR; INTRAVENOUS; SUBCUTANEOUS
Status: CANCELLED | OUTPATIENT
Start: 2022-04-19

## 2022-04-19 RX ORDER — EPINEPHRINE 1 MG/ML
0.3 INJECTION, SOLUTION, CONCENTRATE INTRAVENOUS EVERY 5 MIN PRN
Status: CANCELLED | OUTPATIENT
Start: 2022-04-19

## 2022-04-19 RX ORDER — ALBUTEROL SULFATE 90 UG/1
1-2 AEROSOL, METERED RESPIRATORY (INHALATION)
Status: CANCELLED
Start: 2022-05-16

## 2022-04-19 RX ORDER — LORAZEPAM 2 MG/ML
0.5 INJECTION INTRAMUSCULAR EVERY 4 HOURS PRN
Status: CANCELLED
Start: 2022-05-16

## 2022-04-19 RX ORDER — ALBUTEROL SULFATE 0.83 MG/ML
2.5 SOLUTION RESPIRATORY (INHALATION)
Status: CANCELLED | OUTPATIENT
Start: 2022-04-19

## 2022-04-19 RX ORDER — HEPARIN SODIUM,PORCINE 10 UNIT/ML
5 VIAL (ML) INTRAVENOUS
Status: CANCELLED | OUTPATIENT
Start: 2022-05-16

## 2022-04-19 RX ORDER — HEPARIN SODIUM (PORCINE) LOCK FLUSH IV SOLN 100 UNIT/ML 100 UNIT/ML
5 SOLUTION INTRAVENOUS
Status: CANCELLED | OUTPATIENT
Start: 2022-05-16

## 2022-04-19 RX ORDER — LORAZEPAM 2 MG/ML
0.5 INJECTION INTRAMUSCULAR EVERY 4 HOURS PRN
Status: CANCELLED
Start: 2022-04-19

## 2022-04-19 RX ORDER — ACETAMINOPHEN 325 MG/1
650 TABLET ORAL
Status: CANCELLED
Start: 2022-04-19

## 2022-04-19 RX ORDER — ACETAMINOPHEN 325 MG/1
650 TABLET ORAL
Status: COMPLETED | OUTPATIENT
Start: 2022-04-19 | End: 2022-04-19

## 2022-04-19 RX ORDER — HEPARIN SODIUM,PORCINE 10 UNIT/ML
5 VIAL (ML) INTRAVENOUS
Status: CANCELLED | OUTPATIENT
Start: 2022-04-19

## 2022-04-19 RX ORDER — SODIUM CHLORIDE 9 MG/ML
1000 INJECTION, SOLUTION INTRAVENOUS CONTINUOUS PRN
Status: CANCELLED
Start: 2022-04-19

## 2022-04-19 RX ORDER — ALBUTEROL SULFATE 90 UG/1
1-2 AEROSOL, METERED RESPIRATORY (INHALATION)
Status: CANCELLED
Start: 2022-04-19

## 2022-04-19 RX ORDER — DIPHENHYDRAMINE HCL 25 MG
25 CAPSULE ORAL
Status: CANCELLED
Start: 2022-04-19

## 2022-04-19 RX ADMIN — ACETAMINOPHEN 650 MG: 325 TABLET ORAL at 14:57

## 2022-04-19 RX ADMIN — DARATUMUMAB AND HYALURONIDASE-FIHJ (HUMAN RECOMBINANT) 1800 MG: 1800; 30000 INJECTION SUBCUTANEOUS at 15:36

## 2022-04-19 ASSESSMENT — PAIN SCALES - GENERAL: PAINLEVEL: MILD PAIN (2)

## 2022-04-19 NOTE — PATIENT INSTRUCTIONS
[facility] Triage and after hours / weekends / holidays:  [phone]    Please call the triage or after hours line if you experience a temperature greater than or equal to 100.4, shaking chills, have uncontrolled nausea, vomiting and/or diarrhea, dizziness, shortness of breath, chest pain, bleeding, unexplained bruising, or if you have any other new/concerning symptoms, questions or concerns.      If you are having any concerning symptoms or wish to speak to a provider before your next infusion visit, please call your care coordinator or triage to notify them so we can adequately serve you.     If you need a refill on a narcotic prescription or other medication, please call before your infusion appointment.                 April 2022 Sunday Monday Tuesday Wednesday Thursday Friday Saturday                            1     2       3     4     5     6     7     8     9       10     11     12     13     14     15     16       17     18     19    LAB PERIPHERAL   2:00 PM   (15 min.)   [facility] DRAW   [facility]    ONC INFUSION 1 HR (60 MIN)   2:30 PM   (60 min.)    ONC INFUSION NURSE   [facility] 20     21     22     23       24     25     26     27     28     29    VIDEO VISIT RETURN   7:15 AM   (30 min.)   Lui Hills MD   [facility] Blood and Marrow Transplant Program [place] [address]                   May 2022      [place] Monday Tuesday Wednesday Thursday Friday Saturday   1     2     3    MA VISIT   2:00 PM   (15 min.)   [facility] MA/LPN   [facility] 4     5     6     7       8     9     10     11     12     13     14       15     16    LAB PERIPHERAL   8:30 AM   (15 min.)   [facility] DRAW   [facility]    ONC INFUSION 1 HR (60 MIN)   9:00 AM   (60 min.)    ONC INFUSION NURSE   [facility] 17     18     19     20     21       22     23      24     25     26     27     28       29     30     31                                           Lab Results:  Recent Results (from the past 12 hour(s))   Comprehensive metabolic panel (BMP + Alb, Alk Phos, ALT, AST, Total. Bili, TP)    Collection Time: 04/19/22  2:17 PM   Result Value Ref Range    Sodium 135 133 - 144 mmol/L    Potassium 4.1 3.4 - 5.3 mmol/L    Chloride 100 94 - 109 mmol/L    Carbon Dioxide (CO2) 27 20 - 32 mmol/L    Anion Gap 8 3 - 14 mmol/L    Urea Nitrogen 14 7 - 30 mg/dL    Creatinine 0.72 0.52 - 1.04 mg/dL    Calcium 8.6 8.5 - 10.1 mg/dL    Glucose 98 70 - 99 mg/dL    Alkaline Phosphatase 58 40 - 150 U/L    AST 16 0 - 45 U/L    ALT 25 0 - 50 U/L    Protein Total 7.6 6.8 - 8.8 g/dL    Albumin 3.2 (L) 3.4 - 5.0 g/dL    Bilirubin Total 0.2 0.2 - 1.3 mg/dL    GFR Estimate 89 >60 mL/min/1.73m2   Lactate Dehydrogenase    Collection Time: 04/19/22  2:17 PM   Result Value Ref Range    Lactate Dehydrogenase 118 81 - 234 U/L   CBC with platelets and differential    Collection Time: 04/19/22  2:17 PM   Result Value Ref Range    WBC Count 4.6 4.0 - 11.0 10e3/uL    RBC Count 3.29 (L) 3.80 - 5.20 10e6/uL    Hemoglobin 10.9 (L) 11.7 - 15.7 g/dL    Hematocrit 32.5 (L) 35.0 - 47.0 %    MCV 99 78 - 100 fL    MCH 33.1 (H) 26.5 - 33.0 pg    MCHC 33.5 31.5 - 36.5 g/dL    RDW 15.9 (H) 10.0 - 15.0 %    Platelet Count 131 (L) 150 - 450 10e3/uL    % Neutrophils 35 %    % Lymphocytes 45 %    % Monocytes 15 %    % Eosinophils 4 %    % Basophils 1 %    % Immature Granulocytes 0 %    NRBCs per 100 WBC 0 <1 /100    Absolute Neutrophils 1.6 1.6 - 8.3 10e3/uL    Absolute Lymphocytes 2.1 0.8 - 5.3 10e3/uL    Absolute Monocytes 0.7 0.0 - 1.3 10e3/uL    Absolute Eosinophils 0.2 0.0 - 0.7 10e3/uL    Absolute Basophils 0.0 0.0 - 0.2 10e3/uL    Absolute Immature Granulocytes 0.0 <=0.4 10e3/uL    Absolute NRBCs 0.0 10e3/uL

## 2022-04-19 NOTE — PROGRESS NOTES
Infusion Nursing Note:  Shena Hartmann presents today for Day 1 Cycle 21 Darzalex .    Patient seen by provider today: No   present during visit today: Not Applicable.    Note: Patient presents to infusion feeling well. Patient denies acute discomfort and states no acute complaints or concerns needing to be addressed today. Specifically, patient denies s/s of infection such as fever, sore throat, cough, chest pain, shortness of breath, or changes in taste/smell. Pt states yesterday was her last day of every 21 day Pomalyst, therefore she's on her off week. Patient states she's been in contact with oral chemo to make sure refill is available for when she starts her next cycle.       Intravenous Access:  No Intravenous access at this visit.    Treatment Conditions:  Lab Results   Component Value Date    HGB 10.9 (L) 04/19/2022    WBC 4.6 04/19/2022    ANEU 2.1 07/05/2021    ANEUTAUTO 1.6 04/19/2022     (L) 04/19/2022      Lab Results   Component Value Date     04/19/2022    POTASSIUM 4.1 04/19/2022    MAG 1.8 12/12/2014    CR 0.72 04/19/2022    PAULINE 8.6 04/19/2022    BILITOTAL 0.2 04/19/2022    ALBUMIN 3.2 (L) 04/19/2022    ALT 25 04/19/2022    AST 16 04/19/2022     Results reviewed, labs MET treatment parameters, ok to proceed with treatment.      Post Infusion Assessment:  Patient tolerated 1 Darzalex injection via L mid abdomen over 5 minutes using 25 gauge butterfly needle without incident.       Discharge Plan:   Patient declined prescription refills.  Discharge instructions reviewed with: Patient.  Patient and/or family verbalized understanding of discharge instructions and all questions answered.  Copy of AVS reviewed with patient and/or family.  Patient will return 5/16 for next appointment.  Patient discharged in stable condition accompanied by: self.  Departure Mode: Ambulatory.  Face to Face time: 0 minutes.      Ranjit Jorgensen RN

## 2022-04-20 LAB
ALBUMIN SERPL ELPH-MCNC: 4 G/DL (ref 3.7–5.1)
ALPHA1 GLOB SERPL ELPH-MCNC: 0.3 G/DL (ref 0.2–0.4)
ALPHA2 GLOB SERPL ELPH-MCNC: 0.7 G/DL (ref 0.5–0.9)
B-GLOBULIN SERPL ELPH-MCNC: 0.5 G/DL (ref 0.6–1)
B2 MICROGLOB TUMOR MARKER SER-MCNC: 2.6 MG/L
GAMMA GLOB SERPL ELPH-MCNC: 2.1 G/DL (ref 0.7–1.6)
M PROTEIN SERPL ELPH-MCNC: 1.7 G/DL
PROT PATTERN SERPL ELPH-IMP: ABNORMAL

## 2022-04-21 ENCOUNTER — MYC MEDICAL ADVICE (OUTPATIENT)
Dept: INTERNAL MEDICINE | Facility: CLINIC | Age: 72
End: 2022-04-21
Payer: COMMERCIAL

## 2022-04-21 DIAGNOSIS — I10 BENIGN ESSENTIAL HYPERTENSION: ICD-10-CM

## 2022-04-28 DIAGNOSIS — I10 BENIGN ESSENTIAL HYPERTENSION: ICD-10-CM

## 2022-04-28 RX ORDER — LISINOPRIL 2.5 MG/1
2.5 TABLET ORAL DAILY
Qty: 90 TABLET | Refills: 3 | Status: SHIPPED | OUTPATIENT
Start: 2022-04-28 | End: 2023-02-09

## 2022-04-29 ENCOUNTER — VIRTUAL VISIT (OUTPATIENT)
Dept: TRANSPLANT | Facility: CLINIC | Age: 72
End: 2022-04-29
Attending: INTERNAL MEDICINE
Payer: COMMERCIAL

## 2022-04-29 DIAGNOSIS — C90.00 MULTIPLE MYELOMA NOT HAVING ACHIEVED REMISSION (H): Primary | ICD-10-CM

## 2022-04-29 PROCEDURE — G0463 HOSPITAL OUTPT CLINIC VISIT: HCPCS | Mod: GT,95

## 2022-04-29 PROCEDURE — 99215 OFFICE O/P EST HI 40 MIN: CPT | Mod: 95 | Performed by: INTERNAL MEDICINE

## 2022-04-29 NOTE — PROGRESS NOTES
Shena is a 71 year old who is being evaluated via a billable video visit.      How would you like to obtain your AVS? MyChart  If the video visit is dropped, the invitation should be resent by: Send to e-mail at: [email or web address]  Will anyone else be joining your video visit? Adriana SERRANO        Video Start Time:   Video-Visit Details    Type of service:  Video Visit    Video End Time:    Originating Location (pt. Location): Home    Distant Location (provider location):  [facility] BLOOD AND MARROW TRANSPLANT PROGRAM [employer]     Platform used for Video Visit: [email or web address]

## 2022-04-29 NOTE — PROGRESS NOTES
BMT staff clinic visit note    I saw Shena on a virtual video visit today from 7:30 AM to 8:05 AM face-to-face.  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  She has been on various therapies post transplant for and any adequate response.  Currently she is on monthly daratumumab, and pomalidomide at 2 mg alternating with 1 mg and a 21-day cycle.  She had been on 1 mg daily for the cycle and her M spike has slowly been increasing.  She has had many side effects from the pomalidomide and daratumumab including bone and muscle weakness, fatigue, decreased stamina, increased hot flashes and some queasiness with daratumumab.  The main issue which is ongoing for Shena is the risk benefit ratio of these therapies and attention to quality of life issues.  We continue to discuss those today.  Shena has been started on a low-dose of lisinopril to control her blood pressure.  Her stamina is definitely not what it used to be.  Having said that, she seems to manage fairly well given the complexity of her disease.  She denies any shortness of breath or cough.  She is not having any pain.  The rest of the 10 point review of systems is unremarkable.  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  Initially she took this prophylactically but has tried to minimize her medications and she only takes it when there is sudden onset of a cold sore and she continues for several weeks.  This seems to be working well for her and she can continue.  She understands that this is reflective of her overall immune health.    Physical exam: Visually on video visit, she is in no distress.  I do not see any rash.  Facial muscles are normal.  She is not tachypneic.  She appears fairly well.    Laboratory evaluation: Electrolytes are normal with the BUN of 14 and a creatinine of 0.72.  Liver function tests are  within normal limits.  Beta-2 microglobulin is slightly elevated at 2.6 and this is not new.  And LDH she is only 18.  She has had a high vitamin D at 139 and we will repeat this in June.  Noted today is a high TSH of 5.79 with an upper limit of 4.  We will repeat this in June as well and this may need follow-up for replacement.  A CBC shows a hemoglobin of 10.9, platelet count of 131,000 and a white count of 4600 with 1600 absolute neutrophils.  Noted her mild thrombocytopenia is not new.  Her most recent serum protein electrophoresis is 1.7 g down from 1.9 g on March 21 and 1.8 g on February 21.  I am hoping that this trend may continue.    I spent a great deal of time with Shena today discussing her therapy.  We discussed some of the recommendations from our myeloma expert 1 year ago.  This included the possibility of some other FDA approved drugs as well as cell based therapy with T cells or NK cells.  I think we are both in agreement at the present time that we will not consider these other options until we have obvious progression.  At the present time we will maintain the current regimen.  She is scheduled for daratumumab through August.  I will see her again on November 18.  She knows how to contact me should the need arise.    Dr. Lui Hills MD

## 2022-04-29 NOTE — LETTER
4/29/2022     RE: Shena Hartmann  [address] [place] [address]    Dear Colleague,    Thank you for referring your patient, Shena Hartmann, to the [facility] BLOOD AND MARROW TRANSPLANT PROGRAM [place]. Please see a copy of my visit note below.    BMT staff clinic visit note    I saw Shena on a virtual video visit today from 7:30 AM to 8:05 AM face-to-face.  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  She has been on various therapies post transplant for and any adequate response.  Currently she is on monthly daratumumab, and pomalidomide at 2 mg alternating with 1 mg and a 21-day cycle.  She had been on 1 mg daily for the cycle and her M spike has slowly been increasing.  She has had many side effects from the pomalidomide and daratumumab including bone and muscle weakness, fatigue, decreased stamina, increased hot flashes and some queasiness with daratumumab.  The main issue which is ongoing for Shena is the risk benefit ratio of these therapies and attention to quality of life issues.  We continue to discuss those today.  Shena has been started on a low-dose of lisinopril to control her blood pressure.  Her stamina is definitely not what it used to be.  Having said that, she seems to manage fairly well given the complexity of her disease.  She denies any shortness of breath or cough.  She is not having any pain.  The rest of the 10 point review of systems is unremarkable.  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  Initially she took this prophylactically but has tried to minimize her medications and she only takes it when there is sudden onset of a cold sore and she continues for several weeks.  This seems to be working well for her and she can continue.  She understands that this is reflective of her overall immune health.    Physical  exam: Visually on video visit, she is in no distress.  I do not see any rash.  Facial muscles are normal.  She is not tachypneic.  She appears fairly well.    Laboratory evaluation: Electrolytes are normal with the BUN of 14 and a creatinine of 0.72.  Liver function tests are within normal limits.  Beta-2 microglobulin is slightly elevated at 2.6 and this is not new.  And LDH she is only 18.  She has had a high vitamin D at 139 and we will repeat this in June.  Noted today is a high TSH of 5.79 with an upper limit of 4.  We will repeat this in June as well and this may need follow-up for replacement.  A CBC shows a hemoglobin of 10.9, platelet count of 131,000 and a white count of 4600 with 1600 absolute neutrophils.  Noted her mild thrombocytopenia is not new.  Her most recent serum protein electrophoresis is 1.7 g down from 1.9 g on March 21 and 1.8 g on February 21.  I am hoping that this trend may continue.    I spent a great deal of time with Shena today discussing her therapy.  We discussed some of the recommendations from our myeloma expert 1 year ago.  This included the possibility of some other FDA approved drugs as well as cell based therapy with T cells or NK cells.  I think we are both in agreement at the present time that we will not consider these other options until we have obvious progression.  At the present time we will maintain the current regimen.  She is scheduled for daratumumab through August.  I will see her again on November 18.  She knows how to contact me should the need arise.    Dr. Lui Hills MD    Shena is a 71 year old who is being evaluated via a billable video visit.      How would you like to obtain your AVS? MyChart  If the video visit is dropped, the invitation should be resent by: Send to e-mail at: [email or web address]  Will anyone else be joining your video visit? No     Arianne SERRANO      Video Start Time:   Video-Visit Details    Type of service:  Video  Visit    Video End Time:    Originating Location (pt. Location): Home    Distant Location (provider location):  [facility] BLOOD AND MARROW TRANSPLANT PROGRAM [place]     Platform used for Video Visit: Tiffany

## 2022-05-01 RX ORDER — LISINOPRIL 2.5 MG/1
TABLET ORAL
Qty: 90 TABLET | Refills: 0 | OUTPATIENT
Start: 2022-05-01

## 2022-05-03 ENCOUNTER — ALLIED HEALTH/NURSE VISIT (OUTPATIENT)
Dept: FAMILY MEDICINE | Facility: CLINIC | Age: 72
End: 2022-05-03
Payer: COMMERCIAL

## 2022-05-03 DIAGNOSIS — M81.0 OSTEOPOROSIS: Primary | ICD-10-CM

## 2022-05-03 PROCEDURE — 96372 THER/PROPH/DIAG INJ SC/IM: CPT | Performed by: INTERNAL MEDICINE

## 2022-05-03 PROCEDURE — 99207 PR NO CHARGE NURSE ONLY: CPT

## 2022-05-03 NOTE — PROGRESS NOTES
"Prolia Injection Phone Screen      Screening questions have been asked 2-3 days prior to administration visit for Prolia. If any questions are answered with \"Yes,\" this phone encounter were will routed to ordering provider for further evaluation.     1.  When was the last injection?  NEW    2.  Has insurance for this injection been verified?  Yes    3.  Did you experience any new onset achiness or rashes that lasted for over a month with your previous Prolia injection?   NEW    4.  Do you have a fever over 101?F or a new deep cough that is unusual for you today? No    5.  Have you started any new medications in the last 6 months that you were told could affect your immune system? These may have been prescribed by oncologist, transplant, rheumatology, or dermatology.   No    6.  In the last 6 months have you have gastric bypass or parathyroid surgery?   No    7.  Do you plan dental work requiring drilling into the bone such as implants/extractions or oral surgery in the next 2-3 months?   No    8. Do you have new insurance since the last injection?    9. Have you received the Covid-19 vaccine? No  If No - Proceed with Prolia injection  If Yes - Date of vaccination . Will need to wait until 2 weeks after 2nd dose of Covid-19 vaccine before administering Prolia       Patient informed if symptoms discussed above present prior to their administration appointment, they are to notify clinic immediately.     DELFINO BECKER LPN    Clinic Administered Medication Documentation    Administrations This Visit     denosumab (PROLIA) injection 60 mg     Admin Date  05/03/2022 Action  Given Dose  60 mg Route  Subcutaneous Site  Left Lower Abdomen Administered By  Delfino Becker LPN    Ordering Provider: Dominik Cardona MD    Patient Supplied?: No                  Prolia Documentation    Prior to injection, verified patient identity using patient's name and date of birth. Medication was administered. Please see MAR and " medication order for additional information. Patient instructed to report any adverse reaction to staff immediately .    Indication: Prolia  (denosumab) is a prescription medicine used to treat osteoporosis in patients who:     Are at high risk for fracture, meaning patients who have had a fracture related to osteoporosis, or who have multiple risk factors for fracture.    Cannot use another osteoporosis medicine or other osteoporosis medicines did not work well.    The timeline for early/late injections would be 4 weeks early and any time after the 6 month arun. If a patient receives their injection late, then the subsequent injection would be 6 months from the date that they actually received the injection.    When was the last injection?  NEW  Was the last injection at least 6 months ago? Yes  Has the prior authorization been completed?  Yes  Is there an active order (within the past 365 days) in the chart?  Yes  Patient denied having dental work involving the bone in the past 6 months?  Yes  Patient denies a plan to dental work involving the bone in the next 6 months? Yes    The following steps were completed to comply with the REMS program for Prolia:    Confirms that patient received education from RN or provider via the Medication Guide and Patient Counseling Chart, including the serious risks of Prolia  and the symptoms of each risk.    Told the patient to seek prompt medical attention if they have signs or symptoms of any of the serious risks, as described in the Medication Guide and Patient Counseling Chart that was reviewed between the patient and RN or LP.    Provided each patient a copy of the Medication Guide and Patient Brochure.      Was entire vial of medication used? Yes  Vial/Syringe: Syringe  Expiration Date:  05/31/24  Was the medication not being billed by clinic? No

## 2022-05-16 ENCOUNTER — INFUSION THERAPY VISIT (OUTPATIENT)
Dept: ONCOLOGY | Facility: CLINIC | Age: 72
End: 2022-05-16
Attending: INTERNAL MEDICINE
Payer: COMMERCIAL

## 2022-05-16 ENCOUNTER — TELEPHONE (OUTPATIENT)
Dept: ONCOLOGY | Facility: CLINIC | Age: 72
End: 2022-05-16

## 2022-05-16 ENCOUNTER — APPOINTMENT (OUTPATIENT)
Dept: LAB | Facility: CLINIC | Age: 72
End: 2022-05-16
Attending: INTERNAL MEDICINE
Payer: COMMERCIAL

## 2022-05-16 VITALS
RESPIRATION RATE: 16 BRPM | TEMPERATURE: 97.7 F | WEIGHT: 120.9 LBS | OXYGEN SATURATION: 98 % | BODY MASS INDEX: 22.47 KG/M2 | DIASTOLIC BLOOD PRESSURE: 67 MMHG | HEART RATE: 82 BPM | SYSTOLIC BLOOD PRESSURE: 136 MMHG

## 2022-05-16 DIAGNOSIS — C90.00 MULTIPLE MYELOMA, REMISSION STATUS UNSPECIFIED (H): Primary | ICD-10-CM

## 2022-05-16 DIAGNOSIS — C90.00 MULTIPLE MYELOMA NOT HAVING ACHIEVED REMISSION (H): Primary | ICD-10-CM

## 2022-05-16 LAB
ALBUMIN SERPL-MCNC: 3.2 G/DL (ref 3.4–5)
ALP SERPL-CCNC: 55 U/L (ref 40–150)
ALT SERPL W P-5'-P-CCNC: 22 U/L (ref 0–50)
ANION GAP SERPL CALCULATED.3IONS-SCNC: 6 MMOL/L (ref 3–14)
AST SERPL W P-5'-P-CCNC: 17 U/L (ref 0–45)
BASOPHILS # BLD AUTO: 0 10E3/UL (ref 0–0.2)
BASOPHILS NFR BLD AUTO: 1 %
BILIRUB SERPL-MCNC: 0.3 MG/DL (ref 0.2–1.3)
BUN SERPL-MCNC: 15 MG/DL (ref 7–30)
CALCIUM SERPL-MCNC: 9.2 MG/DL (ref 8.5–10.1)
CHLORIDE BLD-SCNC: 104 MMOL/L (ref 94–109)
CO2 SERPL-SCNC: 28 MMOL/L (ref 20–32)
CREAT SERPL-MCNC: 0.76 MG/DL (ref 0.52–1.04)
EOSINOPHIL # BLD AUTO: 0.2 10E3/UL (ref 0–0.7)
EOSINOPHIL NFR BLD AUTO: 5 %
ERYTHROCYTE [DISTWIDTH] IN BLOOD BY AUTOMATED COUNT: 17.1 % (ref 10–15)
GFR SERPL CREATININE-BSD FRML MDRD: 83 ML/MIN/1.73M2
GLUCOSE BLD-MCNC: 73 MG/DL (ref 70–99)
HCT VFR BLD AUTO: 32.7 % (ref 35–47)
HGB BLD-MCNC: 10.8 G/DL (ref 11.7–15.7)
IMM GRANULOCYTES # BLD: 0 10E3/UL
IMM GRANULOCYTES NFR BLD: 0 %
LYMPHOCYTES # BLD AUTO: 1.5 10E3/UL (ref 0.8–5.3)
LYMPHOCYTES NFR BLD AUTO: 44 %
MCH RBC QN AUTO: 33.4 PG (ref 26.5–33)
MCHC RBC AUTO-ENTMCNC: 33 G/DL (ref 31.5–36.5)
MCV RBC AUTO: 101 FL (ref 78–100)
MONOCYTES # BLD AUTO: 0.6 10E3/UL (ref 0–1.3)
MONOCYTES NFR BLD AUTO: 17 %
NEUTROPHILS # BLD AUTO: 1.1 10E3/UL (ref 1.6–8.3)
NEUTROPHILS NFR BLD AUTO: 33 %
NRBC # BLD AUTO: 0 10E3/UL
NRBC BLD AUTO-RTO: 0 /100
PLATELET # BLD AUTO: 125 10E3/UL (ref 150–450)
POTASSIUM BLD-SCNC: 4.1 MMOL/L (ref 3.4–5.3)
PROT SERPL-MCNC: 8 G/DL (ref 6.8–8.8)
RBC # BLD AUTO: 3.23 10E6/UL (ref 3.8–5.2)
SODIUM SERPL-SCNC: 138 MMOL/L (ref 133–144)
TOTAL PROTEIN SERUM FOR ELP: 7.9 G/DL (ref 6.8–8.8)
WBC # BLD AUTO: 3.4 10E3/UL (ref 4–11)

## 2022-05-16 PROCEDURE — 84155 ASSAY OF PROTEIN SERUM: CPT

## 2022-05-16 PROCEDURE — 85025 COMPLETE CBC W/AUTO DIFF WBC: CPT | Performed by: INTERNAL MEDICINE

## 2022-05-16 PROCEDURE — 80053 COMPREHEN METABOLIC PANEL: CPT

## 2022-05-16 PROCEDURE — 250N000013 HC RX MED GY IP 250 OP 250 PS 637: Performed by: INTERNAL MEDICINE

## 2022-05-16 PROCEDURE — 250N000011 HC RX IP 250 OP 636: Performed by: INTERNAL MEDICINE

## 2022-05-16 PROCEDURE — 84165 PROTEIN E-PHORESIS SERUM: CPT | Mod: 26

## 2022-05-16 PROCEDURE — 96401 CHEMO ANTI-NEOPL SQ/IM: CPT

## 2022-05-16 PROCEDURE — 84165 PROTEIN E-PHORESIS SERUM: CPT | Mod: TC | Performed by: PATHOLOGY

## 2022-05-16 PROCEDURE — 36415 COLL VENOUS BLD VENIPUNCTURE: CPT | Performed by: INTERNAL MEDICINE

## 2022-05-16 RX ORDER — ACETAMINOPHEN 325 MG/1
650 TABLET ORAL
Status: COMPLETED | OUTPATIENT
Start: 2022-05-16 | End: 2022-05-16

## 2022-05-16 RX ADMIN — ACETAMINOPHEN 650 MG: 325 TABLET ORAL at 09:14

## 2022-05-16 RX ADMIN — DARATUMUMAB AND HYALURONIDASE-FIHJ (HUMAN RECOMBINANT) 1800 MG: 1800; 30000 INJECTION SUBCUTANEOUS at 09:46

## 2022-05-16 ASSESSMENT — PAIN SCALES - GENERAL: PAINLEVEL: MODERATE PAIN (4)

## 2022-05-16 NOTE — PATIENT INSTRUCTIONS
[facility] Triage and after hours / weekends / holidays:  [phone]    Please call the triage or after hours line if you experience a temperature greater than or equal to 100.4, shaking chills, have uncontrolled nausea, vomiting and/or diarrhea, dizziness, shortness of breath, chest pain, bleeding, unexplained bruising, or if you have any other new/concerning symptoms, questions or concerns.      If you are having any concerning symptoms or wish to speak to a provider before your next infusion visit, please call your care coordinator or triage to notify them so we can adequately serve you.     If you need a refill on a narcotic prescription or other medication, please call before your infusion appointment.

## 2022-05-16 NOTE — PROGRESS NOTES
"Infusion Nursing Note:  Shena Hartmann presents today for Cycle 22 Day 1 Darzalex Faspro.    Patient spoke with provider today: No    Treatment Conditions:  Lab Results   Component Value Date    HGB 10.8 (L) 05/16/2022    WBC 3.4 (L) 05/16/2022    ANEU 2.1 07/05/2021    ANEUTAUTO 1.1 (L) 05/16/2022     (L) 05/16/2022      Lab Results   Component Value Date     05/16/2022    POTASSIUM 4.1 05/16/2022    MAG 1.8 12/12/2014    CR 0.76 05/16/2022    PAULINE 9.2 05/16/2022    BILITOTAL 0.3 05/16/2022    ALBUMIN 3.2 (L) 05/16/2022    ALT 22 05/16/2022    AST 17 05/16/2022     Results reviewed, labs MET treatment parameters, ok to proceed with treatment.    Pain: 4/10, generalized muscle and bone pain.  Takes Tylenol PRN at home.  Tylenol given as premedication for Darzalex today per pt preference.       Note: Denies fever/chills, SOB or cough. Neuropathy controlled.    ANC 1.1, meets parameters for treatment today.  Neutropenic precautions reviewed with pt. Pt instructed to call day/night with chills/ temp >=100.4.    Pt stated she will take her last Pomalyst today and will be on her \"off week\" this week from Pomalyst. She has requested a refill so she can start it again next week.       Post Injection Assessment:  Patient tolerated injection to RLQ abdomen without incident.    Discharge Plan:   Patient declined prescription refills.  Discharge instructions reviewed with: Patient.  Patient and/or family verbalized understanding of discharge instructions and all questions answered.  Copy of AVS reviewed with patient and/or family.  Patient will return 6/13/22 for next appointment.  Patient discharged in stable condition accompanied by: self.  Departure Mode: Ambulatory.    Idalia Meraz RN  "

## 2022-05-16 NOTE — TELEPHONE ENCOUNTER
Oral Chemotherapy Monitoring Program    Medication: Pomalyst  Rx:  1mg PO daily 32/28 ds    Auth #: [id number]  Risk Category: Adult female NOT of reproductive capacity    Routine survey questions reviewed. yes        Tavia Suazo Pharmacy Liaison, alpha split R-Z  Phone: [phone]  Fax: [phone]  Email: [email or web address]

## 2022-05-17 LAB
ALBUMIN SERPL ELPH-MCNC: 4 G/DL (ref 3.7–5.1)
ALPHA1 GLOB SERPL ELPH-MCNC: 0.3 G/DL (ref 0.2–0.4)
ALPHA2 GLOB SERPL ELPH-MCNC: 0.7 G/DL (ref 0.5–0.9)
B-GLOBULIN SERPL ELPH-MCNC: 0.6 G/DL (ref 0.6–1)
GAMMA GLOB SERPL ELPH-MCNC: 2.3 G/DL (ref 0.7–1.6)
M PROTEIN SERPL ELPH-MCNC: 1.9 G/DL
PROT PATTERN SERPL ELPH-IMP: ABNORMAL

## 2022-06-02 ENCOUNTER — TELEPHONE (OUTPATIENT)
Dept: INTERNAL MEDICINE | Facility: CLINIC | Age: 72
End: 2022-06-02

## 2022-06-02 NOTE — TELEPHONE ENCOUNTER
Reason for Call:  Other    Detailed comments: Pt has new insurance-[employer] as of 6/1/22 and was told to call in with it, it is updated in her chart.  She wants to make sure that the prolia will be prior authorized with her new insurance.    Does new order need to go in for PA with her new insurance?    Phone Number Patient can be reached at: Home number on file [phone] (home)    Best Time: any    Can we leave a detailed message on this number? YES    Call taken on 6/2/2022 at 3:42 PM by Pam J. Behr

## 2022-06-08 ENCOUNTER — OFFICE VISIT (OUTPATIENT)
Dept: DERMATOLOGY | Facility: CLINIC | Age: 72
End: 2022-06-08
Payer: MEDICARE

## 2022-06-08 DIAGNOSIS — Z12.83 SKIN EXAM, SCREENING FOR CANCER: Primary | ICD-10-CM

## 2022-06-08 DIAGNOSIS — D22.9 MULTIPLE NEVI: ICD-10-CM

## 2022-06-08 DIAGNOSIS — L30.9 DERMATITIS: ICD-10-CM

## 2022-06-08 DIAGNOSIS — L82.1 SK (SEBORRHEIC KERATOSIS): ICD-10-CM

## 2022-06-08 PROCEDURE — 99213 OFFICE O/P EST LOW 20 MIN: CPT | Mod: GC | Performed by: DERMATOLOGY

## 2022-06-08 RX ORDER — CLOBETASOL PROPIONATE 0.5 MG/G
OINTMENT TOPICAL 2 TIMES DAILY
Qty: 15 G | Refills: 11 | Status: SHIPPED | OUTPATIENT
Start: 2022-06-08 | End: 2022-12-23

## 2022-06-08 RX ORDER — TRIAMCINOLONE ACETONIDE 1 MG/G
OINTMENT TOPICAL 2 TIMES DAILY
Qty: 15 G | Refills: 11 | Status: ON HOLD | OUTPATIENT
Start: 2022-06-08 | End: 2024-08-05

## 2022-06-08 RX ORDER — DESONIDE 0.5 MG/G
OINTMENT TOPICAL 2 TIMES DAILY
Qty: 15 G | Refills: 11 | Status: ON HOLD | OUTPATIENT
Start: 2022-06-08 | End: 2024-08-05

## 2022-06-08 ASSESSMENT — PAIN SCALES - GENERAL: PAINLEVEL: MODERATE PAIN (4)

## 2022-06-08 NOTE — NURSING NOTE
Dermatology Rooming Note    Shena Hartmann's goals for this visit include:   Chief Complaint   Patient presents with     Skin Check     Shena is here for a skin check and has no spots of concern.     Charly Hernandez, EMT

## 2022-06-08 NOTE — LETTER
6/8/2022       RE: Shena Hartmann  [address] [place] [address]     Dear Colleague,    Thank you for referring your patient, Shena Hartmann, to the [facility] DERMATOLOGY CLINIC [place] at [facility]. Please see a copy of my visit note below.    [facility] Dermatology Note  Encounter Date: Jun 8, 2022  Office Visit     Dermatology Problem List:  1. History of stem cell transplant for multiple myeloma  2. Possible phototoxic/photoallergic reaction to thalidomide or pamolidomide  3. Possible allergic contact dermatitis to silicone prosthesis and soaps/disinfectant  4. Atypical melanocytic nevus left upper leg s/p biopsy   5. Cherry angiomas  6. Seborrheic keratoses    ____________________________________________    Assessment & Plan:     #FBSE   - Multiple benign appearing nevi, cherry angiomas and SKs scattered throughout trunk, back, b/l upper and lower extremities and face. Noted to have 1-1 cm lentigo on L cheek with benign appearance. Plan for routine follow up for FBSE in 1 year, sooner if concerning lesions arise.    #H/o phototoxic/photoallergic reaction and contact dermatitis  -Refills of clobetasol, desonide and triamcinolone given    Procedures Performed:   None    Follow-up: 1 year(s) in-person, or earlier for new or changing lesions    Staff and Resident:     Precepted with Dr. Mcknight.    Lulu Tejada MD  Pager # [phone]  [facility] Residency  I, Neela Mcknight MD, saw this patient with the resident and agree with the resident s findings and plan of care as documented in the resident s note.    ____________________________________________    CC: Skin Check (Shena is here for a skin check and has no spots of concern.)    HPI:    Ms. Shena Hartmann is a(n) 71 year old female who presents today as a return patient for FBSE.    A few spots that are bothersome in appearance, but none of  particular concern.    Significant history of allergic contact dermatitis, phototoxic/photoallergic reactions. These are well managed with triamcinolone 0.1%, desonide 0.05%, and clobetasol 0.05%.    Patient is otherwise feeling well, without additional skin concerns.    Labs Reviewed:  N/A    Physical Exam:  Vitals: There were no vitals taken for this visit.  SKIN: Total skin excluding the undergarment areas was performed. The exam included the head/face, neck, both arms, chest, back, abdomen, both legs, digits and/or nails.     - There are multiple dome shaped bright red papules scattered throughout arms and trunk.  Multiple regular brown pigmented macules and papules are identified on the arms, back, trunk, legs and feet. - none with atypia on dermoscopy  1 cm brown macules on sun exposed L cheek, benign appearing with even pigmentation on dermoscopy.  There are waxy stuck on tan to brown papules on the trunk, back, neck, face, arms and legs.    - No other lesions of concern on areas examined.     Medications:  Current Outpatient Medications   Medication     Ascorbic Acid (VITAMIN C PO)     aspirin (ASA) 81 MG chewable tablet     CALCIUM-VITAMIN D-VITAMIN K PO     clobetasol (TEMOVATE) 0.05 % external ointment     COENZYME Q-10 PO     Cyanocobalamin 1000 MCG/ML LIQD     desonide (DESOWEN) 0.05 % external ointment     lisinopril (ZESTRIL) 2.5 MG tablet     magnesium 100 MG CAPS     Multiple Vitamins-Minerals (MULTIVITAMIN OR)     order for DME     order for DME     pomalidomide (POMALYST) 1 MG capsule     triamcinolone (KENALOG) 0.1 % external ointment     UNABLE TO FIND     ZINC PICOLINATE PO     pomalidomide (POMALYST) 1 MG capsule     valACYclovir (VALTREX) 500 MG tablet     Current Facility-Administered Medications   Medication     denosumab (PROLIA) injection 60 mg     Facility-Administered Medications Ordered in Other Visits   Medication     plerixafor (MOZOBIL) injection SOLN 12.4 mg     potassium chloride  20 mEq in 50 mL IVPB     potassium chloride SA (K-DUR,KLOR-CON M) CR tablet 20 mEq      Past Medical History:   Patient Active Problem List   Diagnosis     Pain in thoracic spine     Multiple myeloma, dx 2002,  s/p ASCBMT 3/26/14     History of respiratory syncytial virus infection     Personal history of malignant neoplasm of breast     Seasonal allergies     Osteoporosis     Past Medical History:   Diagnosis Date     Breast cancer (H) 1994    right     H/O stem cell transplant (H) 3/2014     Multiple myeloma, without mention of having achieved remission 11/6/2012       CC Neela Mcknight MD  [address] [facility] [address]  [place] [address] on close of this encounter.

## 2022-06-08 NOTE — PROGRESS NOTES
[facility] Dermatology Note  Encounter Date: Jun 8, 2022  Office Visit     Dermatology Problem List:  1. History of stem cell transplant for multiple myeloma  2. Possible phototoxic/photoallergic reaction to thalidomide or pamolidomide  3. Possible allergic contact dermatitis to silicone prosthesis and soaps/disinfectant  4. Atypical melanocytic nevus left upper leg s/p biopsy   5. Cherry angiomas  6. Seborrheic keratoses    ____________________________________________    Assessment & Plan:     #FBSE   - Multiple benign appearing nevi, cherry angiomas and SKs scattered throughout trunk, back, b/l upper and lower extremities and face. Noted to have 1-1 cm lentigo on L cheek with benign appearance. Plan for routine follow up for FBSE in 1 year, sooner if concerning lesions arise.    #H/o phototoxic/photoallergic reaction and contact dermatitis  -Refills of clobetasol, desonide and triamcinolone given    Procedures Performed:   None    Follow-up: 1 year(s) in-person, or earlier for new or changing lesions    Staff and Resident:     Precepted with Dr. Mcknight.    Lulu Tejada MD  Pager # [phone]  [facility] Residency  I, Neela Mcknight MD, saw this patient with the resident and agree with the resident s findings and plan of care as documented in the resident s note.    ____________________________________________    CC: Skin Check (Shena is here for a skin check and has no spots of concern.)    HPI:    Ms. Shena Hartmann is a(n) 71 year old female who presents today as a return patient for FBSE.    A few spots that are bothersome in appearance, but none of particular concern.    Significant history of allergic contact dermatitis, phototoxic/photoallergic reactions. These are well managed with triamcinolone 0.1%, desonide 0.05%, and clobetasol 0.05%.    Patient is otherwise feeling well, without additional skin concerns.    Labs Reviewed:  N/A    Physical Exam:  Vitals:  There were no vitals taken for this visit.  SKIN: Total skin excluding the undergarment areas was performed. The exam included the head/face, neck, both arms, chest, back, abdomen, both legs, digits and/or nails.     - There are multiple dome shaped bright red papules scattered throughout arms and trunk.  Multiple regular brown pigmented macules and papules are identified on the arms, back, trunk, legs and feet. - none with atypia on dermoscopy  1 cm brown macules on sun exposed L cheek, benign appearing with even pigmentation on dermoscopy.  There are waxy stuck on tan to brown papules on the trunk, back, neck, face, arms and legs.    - No other lesions of concern on areas examined.     Medications:  Current Outpatient Medications   Medication     Ascorbic Acid (VITAMIN C PO)     aspirin (ASA) 81 MG chewable tablet     CALCIUM-VITAMIN D-VITAMIN K PO     clobetasol (TEMOVATE) 0.05 % external ointment     COENZYME Q-10 PO     Cyanocobalamin 1000 MCG/ML LIQD     desonide (DESOWEN) 0.05 % external ointment     lisinopril (ZESTRIL) 2.5 MG tablet     magnesium 100 MG CAPS     Multiple Vitamins-Minerals (MULTIVITAMIN OR)     order for DME     order for DME     pomalidomide (POMALYST) 1 MG capsule     triamcinolone (KENALOG) 0.1 % external ointment     UNABLE TO FIND     ZINC PICOLINATE PO     pomalidomide (POMALYST) 1 MG capsule     valACYclovir (VALTREX) 500 MG tablet     Current Facility-Administered Medications   Medication     denosumab (PROLIA) injection 60 mg     Facility-Administered Medications Ordered in Other Visits   Medication     plerixafor (MOZOBIL) injection SOLN 12.4 mg     potassium chloride 20 mEq in 50 mL IVPB     potassium chloride SA (K-DUR,KLOR-CON M) CR tablet 20 mEq      Past Medical History:   Patient Active Problem List   Diagnosis     Pain in thoracic spine     Multiple myeloma, dx 2002,  s/p ASCBMT 3/26/14     History of respiratory syncytial virus infection     Personal history of malignant  neoplasm of breast     Seasonal allergies     Osteoporosis     Past Medical History:   Diagnosis Date     Breast cancer (H) 1994    right     H/O stem cell transplant (H) 3/2014     Multiple myeloma, without mention of having achieved remission 11/6/2012       CC Neela Mcknight MD  [address] [place] [address] [address]  [place] [address] on close of this encounter.

## 2022-06-09 ENCOUNTER — TELEPHONE (OUTPATIENT)
Dept: ONCOLOGY | Facility: CLINIC | Age: 72
End: 2022-06-09
Payer: MEDICARE

## 2022-06-09 DIAGNOSIS — C90.00 MULTIPLE MYELOMA, REMISSION STATUS UNSPECIFIED (H): Primary | ICD-10-CM

## 2022-06-09 NOTE — TELEPHONE ENCOUNTER
Oral Chemotherapy Monitoring Program    Medication: Pomalyst  Rx:  1mg PO daily 32/28 ds    Auth #: [id number]  Risk Category: Adult female NOT of reproductive capacity    Routine survey questions reviewed. yes      Tavia Suazo Pharmacy Liaison, alpha split R[employer]Z  Phone: [phone]  Fax: [phone]  Email: [email or web address]

## 2022-06-13 ENCOUNTER — INFUSION THERAPY VISIT (OUTPATIENT)
Dept: ONCOLOGY | Facility: CLINIC | Age: 72
End: 2022-06-13
Attending: INTERNAL MEDICINE
Payer: MEDICARE

## 2022-06-13 ENCOUNTER — PATIENT OUTREACH (OUTPATIENT)
Dept: ONCOLOGY | Facility: CLINIC | Age: 72
End: 2022-06-13

## 2022-06-13 ENCOUNTER — LAB (OUTPATIENT)
Dept: LAB | Facility: CLINIC | Age: 72
End: 2022-06-13
Attending: INTERNAL MEDICINE
Payer: MEDICARE

## 2022-06-13 ENCOUNTER — MYC MEDICAL ADVICE (OUTPATIENT)
Dept: TRANSPLANT | Facility: CLINIC | Age: 72
End: 2022-06-13
Payer: MEDICARE

## 2022-06-13 VITALS
BODY MASS INDEX: 22.01 KG/M2 | WEIGHT: 118.4 LBS | SYSTOLIC BLOOD PRESSURE: 157 MMHG | RESPIRATION RATE: 16 BRPM | OXYGEN SATURATION: 98 % | TEMPERATURE: 98.1 F | HEART RATE: 82 BPM | DIASTOLIC BLOOD PRESSURE: 76 MMHG

## 2022-06-13 DIAGNOSIS — C90.00 MULTIPLE MYELOMA NOT HAVING ACHIEVED REMISSION (H): Primary | ICD-10-CM

## 2022-06-13 DIAGNOSIS — C90.00 MULTIPLE MYELOMA, REMISSION STATUS UNSPECIFIED (H): ICD-10-CM

## 2022-06-13 DIAGNOSIS — R79.89 ELEVATED TSH: ICD-10-CM

## 2022-06-13 DIAGNOSIS — M85.80 OSTEOPENIA: ICD-10-CM

## 2022-06-13 DIAGNOSIS — R53.83 FATIGUE: ICD-10-CM

## 2022-06-13 DIAGNOSIS — Z79.899 ENCOUNTER FOR LONG-TERM (CURRENT) USE OF MEDICATIONS: ICD-10-CM

## 2022-06-13 LAB
BASOPHILS # BLD AUTO: 0 10E3/UL (ref 0–0.2)
BASOPHILS NFR BLD AUTO: 1 %
DEPRECATED CALCIDIOL+CALCIFEROL SERPL-MC: 89 UG/L (ref 20–75)
EOSINOPHIL # BLD AUTO: 0.2 10E3/UL (ref 0–0.7)
EOSINOPHIL NFR BLD AUTO: 4 %
ERYTHROCYTE [DISTWIDTH] IN BLOOD BY AUTOMATED COUNT: 17 % (ref 10–15)
HCT VFR BLD AUTO: 34.1 % (ref 35–47)
HGB BLD-MCNC: 11.4 G/DL (ref 11.7–15.7)
IMM GRANULOCYTES # BLD: 0 10E3/UL
IMM GRANULOCYTES NFR BLD: 0 %
LYMPHOCYTES # BLD AUTO: 1.7 10E3/UL (ref 0.8–5.3)
LYMPHOCYTES NFR BLD AUTO: 40 %
MCH RBC QN AUTO: 33.6 PG (ref 26.5–33)
MCHC RBC AUTO-ENTMCNC: 33.4 G/DL (ref 31.5–36.5)
MCV RBC AUTO: 101 FL (ref 78–100)
MONOCYTES # BLD AUTO: 0.9 10E3/UL (ref 0–1.3)
MONOCYTES NFR BLD AUTO: 21 %
NEUTROPHILS # BLD AUTO: 1.4 10E3/UL (ref 1.6–8.3)
NEUTROPHILS NFR BLD AUTO: 34 %
NRBC # BLD AUTO: 0 10E3/UL
NRBC BLD AUTO-RTO: 0 /100
PLAT MORPH BLD: ABNORMAL
PLATELET # BLD AUTO: 140 10E3/UL (ref 150–450)
RBC # BLD AUTO: 3.39 10E6/UL (ref 3.8–5.2)
RBC MORPH BLD: ABNORMAL
T4 FREE SERPL-MCNC: 0.97 NG/DL (ref 0.76–1.46)
T4 FREE SERPL-MCNC: 0.99 NG/DL (ref 0.76–1.46)
TARGETS BLD QL SMEAR: SLIGHT
TOTAL PROTEIN SERUM FOR ELP: 8 G/DL (ref 6.8–8.8)
TSH SERPL DL<=0.005 MIU/L-ACNC: 9.31 MU/L (ref 0.4–4)
TSH SERPL DL<=0.005 MIU/L-ACNC: 9.47 MU/L (ref 0.4–4)
WBC # BLD AUTO: 4.2 10E3/UL (ref 4–11)

## 2022-06-13 PROCEDURE — 84443 ASSAY THYROID STIM HORMONE: CPT

## 2022-06-13 PROCEDURE — 85025 COMPLETE CBC W/AUTO DIFF WBC: CPT | Performed by: INTERNAL MEDICINE

## 2022-06-13 PROCEDURE — 250N000011 HC RX IP 250 OP 636: Performed by: INTERNAL MEDICINE

## 2022-06-13 PROCEDURE — 84439 ASSAY OF FREE THYROXINE: CPT

## 2022-06-13 PROCEDURE — 84165 PROTEIN E-PHORESIS SERUM: CPT | Mod: TC | Performed by: PATHOLOGY

## 2022-06-13 PROCEDURE — 82306 VITAMIN D 25 HYDROXY: CPT

## 2022-06-13 PROCEDURE — 96401 CHEMO ANTI-NEOPL SQ/IM: CPT

## 2022-06-13 PROCEDURE — 36415 COLL VENOUS BLD VENIPUNCTURE: CPT

## 2022-06-13 PROCEDURE — 84155 ASSAY OF PROTEIN SERUM: CPT

## 2022-06-13 PROCEDURE — 250N000013 HC RX MED GY IP 250 OP 250 PS 637: Performed by: INTERNAL MEDICINE

## 2022-06-13 RX ORDER — LORAZEPAM 2 MG/ML
0.5 INJECTION INTRAMUSCULAR EVERY 4 HOURS PRN
Status: CANCELLED
Start: 2022-06-13

## 2022-06-13 RX ORDER — MEPERIDINE HYDROCHLORIDE 25 MG/ML
25 INJECTION INTRAMUSCULAR; INTRAVENOUS; SUBCUTANEOUS EVERY 30 MIN PRN
Status: CANCELLED | OUTPATIENT
Start: 2022-06-13

## 2022-06-13 RX ORDER — DIPHENHYDRAMINE HCL 25 MG
25 CAPSULE ORAL
Status: CANCELLED
Start: 2022-06-13

## 2022-06-13 RX ORDER — DIPHENHYDRAMINE HYDROCHLORIDE 50 MG/ML
50 INJECTION INTRAMUSCULAR; INTRAVENOUS
Status: CANCELLED
Start: 2022-06-13

## 2022-06-13 RX ORDER — SODIUM CHLORIDE 9 MG/ML
1000 INJECTION, SOLUTION INTRAVENOUS CONTINUOUS PRN
Status: CANCELLED
Start: 2022-06-13

## 2022-06-13 RX ORDER — EPINEPHRINE 1 MG/ML
0.3 INJECTION, SOLUTION, CONCENTRATE INTRAVENOUS EVERY 5 MIN PRN
Status: CANCELLED | OUTPATIENT
Start: 2022-06-13

## 2022-06-13 RX ORDER — HEPARIN SODIUM (PORCINE) LOCK FLUSH IV SOLN 100 UNIT/ML 100 UNIT/ML
5 SOLUTION INTRAVENOUS
Status: CANCELLED | OUTPATIENT
Start: 2022-06-13

## 2022-06-13 RX ORDER — HEPARIN SODIUM,PORCINE 10 UNIT/ML
5 VIAL (ML) INTRAVENOUS
Status: CANCELLED | OUTPATIENT
Start: 2022-06-13

## 2022-06-13 RX ORDER — ALBUTEROL SULFATE 0.83 MG/ML
2.5 SOLUTION RESPIRATORY (INHALATION)
Status: CANCELLED | OUTPATIENT
Start: 2022-06-13

## 2022-06-13 RX ORDER — ALBUTEROL SULFATE 90 UG/1
1-2 AEROSOL, METERED RESPIRATORY (INHALATION)
Status: CANCELLED
Start: 2022-06-13

## 2022-06-13 RX ORDER — ACETAMINOPHEN 325 MG/1
650 TABLET ORAL
Status: CANCELLED
Start: 2022-06-13

## 2022-06-13 RX ORDER — NALOXONE HYDROCHLORIDE 0.4 MG/ML
.1-.4 INJECTION, SOLUTION INTRAMUSCULAR; INTRAVENOUS; SUBCUTANEOUS
Status: CANCELLED | OUTPATIENT
Start: 2022-06-13

## 2022-06-13 RX ORDER — ACETAMINOPHEN 325 MG/1
650 TABLET ORAL
Status: COMPLETED | OUTPATIENT
Start: 2022-06-13 | End: 2022-06-13

## 2022-06-13 RX ADMIN — ACETAMINOPHEN 650 MG: 325 TABLET ORAL at 09:44

## 2022-06-13 RX ADMIN — DARATUMUMAB AND HYALURONIDASE-FIHJ (HUMAN RECOMBINANT) 1800 MG: 1800; 30000 INJECTION SUBCUTANEOUS at 09:58

## 2022-06-13 ASSESSMENT — PAIN SCALES - GENERAL: PAINLEVEL: NO PAIN (0)

## 2022-06-13 NOTE — PATIENT INSTRUCTIONS
[facility] Main Line: [phone]    Call triage nurse with chills and/or temperature greater than or equal to 100.4, uncontrolled nausea/vomiting, diarrhea, constipation, dizziness, shortness of breath, chest pain, bleeding, unexplained bruising, or any new/concerning symptoms, questions/concerns.   If you are having any concerning symptoms or wish to speak to a provider before your next infusion visit, please call your care coordinator or triage to notify them so we can adequately serve you.   Nurse Triage line:  [phone]    If after hours, weekends, or holidays, call main hospital  and ask for Oncology doctor on call @ [phone]      Latest Reference Range & Units 06/13/22 08:22   T4 Free 0.76 - 1.46 ng/dL 0.97   TSH 0.40 - 4.00 mU/L 9.31 (H)   WBC 4.0 - 11.0 10e3/uL 4.2   Hemoglobin 11.7 - 15.7 g/dL 11.4 (L)   Hematocrit 35.0 - 47.0 % 34.1 (L)   Platelet Count 150 - 450 10e3/uL 140 (L)   RBC Count 3.80 - 5.20 10e6/uL 3.39 (L)   MCV 78 - 100 fL 101 (H)   MCH 26.5 - 33.0 pg 33.6 (H)   MCHC 31.5 - 36.5 g/dL 33.4   RDW 10.0 - 15.0 % 17.0 (H)   % Neutrophils % 34   % Lymphocytes % 40   % Monocytes % 21   % Eosinophils % 4   % Basophils % 1   Absolute Basophils 0.0 - 0.2 10e3/uL 0.0   Absolute Eosinophils 0.0 - 0.7 10e3/uL 0.2   Absolute Immature Granulocytes <=0.4 10e3/uL 0.0   Absolute Lymphocytes 0.8 - 5.3 10e3/uL 1.7   Absolute Monocytes 0.0 - 1.3 10e3/uL 0.9   % Immature Granulocytes % 0   Absolute Neutrophils 1.6 - 8.3 10e3/uL 1.4 (L)   Absolute NRBCs 10e3/uL 0.0   NRBCs per 100 WBC <1 /100 0   RBC Morphology  Confirmed RBC Indices   Platelet Morphology Automated Count Confirmed. Platelet morphology is normal.  Automated Count Confirmed. Platelet morphology is normal.   Target Cells None Seen  Slight !   (H): Data is abnormally high  (L): Data is abnormally low  !: Data is abnormal

## 2022-06-13 NOTE — PROGRESS NOTES
Per communication with Dr. Reuben Hills, add fatigue, osteopenia and elevated TSH to diagnoses for TSH with T4 reflex and vitamin D deficiency screening lab orders drawn today, 6/13/22.  Spoke with Doris, lab [facility] staff member reported both tests are now completed.  She will try to add diagnoses to orders for TSH with T4 reflex and vitamin D but may need to re-run tests to do so.  We discussed ABN warnings.

## 2022-06-13 NOTE — PROGRESS NOTES
Infusion Nursing Note:  Shena Hartmann presents today for C23D1 darzalex Faspro.    Patient seen by provider today: No   present during visit today: Not Applicable.    Note: Patient reported to clinic today with no new complaints or concerns. Patient is very tearful today, her father is in the hospital in critical condition.  Patient requested Tylenol as premedication stating it helps her feel better due to side effects of Darzalex Faspro.    Intravenous Access:  Labs drawn without difficulty.  By lab staff.    Treatment Conditions:  Lab Results   Component Value Date    HGB 11.4 (L) 06/13/2022    WBC 4.2 06/13/2022    ANEU 2.1 07/05/2021    ANEUTAUTO 1.4 (L) 06/13/2022     (L) 06/13/2022      Lab Results   Component Value Date     05/16/2022    POTASSIUM 4.1 05/16/2022    MAG 1.8 12/12/2014    CR 0.76 05/16/2022    PAULINE 9.2 05/16/2022    BILITOTAL 0.3 05/16/2022    ALBUMIN 3.2 (L) 05/16/2022    ALT 22 05/16/2022    AST 17 05/16/2022     Results reviewed, labs MET treatment parameters, ok to proceed with treatment.    Post Infusion Assessment:  Patient tolerated injection without incident. One injection of Darzalex Faspro in left side low abdomen subQ.  Site patent and intact, free from redness, edema or discomfort.  No evidence of extravasations.     Discharge Plan:   Patient declined prescription refills.  Discharge instructions reviewed with: Patient.  Patient and/or family verbalized understanding of discharge instructions and all questions answered.  Copy of AVS reviewed with patient and/or family.  Patient will return 7/11/22 for next appointment.  Patient discharged in stable condition accompanied by: self.  Departure Mode: Ambulatory.  Face to Face time: 10 minutes.    Guerrero Ramos RN

## 2022-06-13 NOTE — NURSING NOTE
Chief Complaint   Patient presents with     Blood Draw     VPT blood draw from left arm by lab RN. Vitals taken and appointment arrived     Nadia Thomas RN

## 2022-06-14 LAB
ALBUMIN SERPL ELPH-MCNC: 4 G/DL (ref 3.7–5.1)
ALPHA1 GLOB SERPL ELPH-MCNC: 0.3 G/DL (ref 0.2–0.4)
ALPHA2 GLOB SERPL ELPH-MCNC: 0.7 G/DL (ref 0.5–0.9)
B-GLOBULIN SERPL ELPH-MCNC: 0.6 G/DL (ref 0.6–1)
GAMMA GLOB SERPL ELPH-MCNC: 2.4 G/DL (ref 0.7–1.6)
M PROTEIN SERPL ELPH-MCNC: 2.1 G/DL
PROT PATTERN SERPL ELPH-IMP: ABNORMAL

## 2022-06-14 PROCEDURE — 84165 PROTEIN E-PHORESIS SERUM: CPT | Mod: 26

## 2022-06-16 DIAGNOSIS — C90.00 MULTIPLE MYELOMA, REMISSION STATUS UNSPECIFIED (H): Primary | ICD-10-CM

## 2022-07-07 ENCOUNTER — TELEPHONE (OUTPATIENT)
Dept: ONCOLOGY | Facility: CLINIC | Age: 72
End: 2022-07-07

## 2022-07-07 DIAGNOSIS — E03.9 ACQUIRED HYPOTHYROIDISM: Primary | ICD-10-CM

## 2022-07-07 DIAGNOSIS — C90.00 MULTIPLE MYELOMA, REMISSION STATUS UNSPECIFIED (H): Primary | ICD-10-CM

## 2022-07-07 RX ORDER — LEVOTHYROXINE SODIUM 50 UG/1
50 TABLET ORAL DAILY
Qty: 90 TABLET | Refills: 3 | Status: SHIPPED | OUTPATIENT
Start: 2022-07-07 | End: 2023-06-29

## 2022-07-07 NOTE — TELEPHONE ENCOUNTER
Prior Authorization Approval    Authorization Effective Date: 7/7/2022  Authorization Expiration Date: 7/6/2025  Medication: Pomalyst PA and quantity limit - approved  Approved Dose/Quantity: 32/28 DS  Reference #: [id number]   Insurance Company: [employer] - Phone [phone] Fax [phone]  Expected CoPay:       CoPay Card Available:      Foundation Assistance Needed:    Which Pharmacy is filling the prescription (Not needed for infusion/clinic administered): [facility] - [place] [address]   Pharmacy Notified: No  Patient Notified: Yes    Needed to obtain a PA and quantity limit override.   PA expires 7/6/2025  Quantity limit expires 7/7/2023  Ref # [id number]

## 2022-07-08 ENCOUNTER — TELEPHONE (OUTPATIENT)
Dept: ONCOLOGY | Facility: CLINIC | Age: 72
End: 2022-07-08

## 2022-07-08 NOTE — TELEPHONE ENCOUNTER
Oral Chemotherapy Monitoring Program    Medication: Pomalyst  Rx:  1mg PO daily 32 / 28 ds    Auth #: [id number]  Risk Category: Adult female NOT of reproductive capacity    Routine survey questions reviewed. yes      Tavia Suazo Pharmacy Liaison, alpha split R-Z  Phone: [phone]  Fax: [phone]  Email: [email or web address]

## 2022-07-11 ENCOUNTER — APPOINTMENT (OUTPATIENT)
Dept: LAB | Facility: CLINIC | Age: 72
End: 2022-07-11
Payer: MEDICARE

## 2022-07-11 ENCOUNTER — INFUSION THERAPY VISIT (OUTPATIENT)
Dept: ONCOLOGY | Facility: CLINIC | Age: 72
End: 2022-07-11
Attending: INTERNAL MEDICINE
Payer: MEDICARE

## 2022-07-11 VITALS
RESPIRATION RATE: 18 BRPM | SYSTOLIC BLOOD PRESSURE: 144 MMHG | DIASTOLIC BLOOD PRESSURE: 76 MMHG | TEMPERATURE: 97.9 F | WEIGHT: 117.8 LBS | HEART RATE: 75 BPM | BODY MASS INDEX: 21.9 KG/M2 | OXYGEN SATURATION: 98 %

## 2022-07-11 DIAGNOSIS — C90.00 MULTIPLE MYELOMA, REMISSION STATUS UNSPECIFIED (H): ICD-10-CM

## 2022-07-11 DIAGNOSIS — C90.00 MULTIPLE MYELOMA NOT HAVING ACHIEVED REMISSION (H): Primary | ICD-10-CM

## 2022-07-11 LAB
ALBUMIN SERPL-MCNC: 3.1 G/DL (ref 3.4–5)
ALP SERPL-CCNC: 50 U/L (ref 40–150)
ALT SERPL W P-5'-P-CCNC: 21 U/L (ref 0–50)
ANION GAP SERPL CALCULATED.3IONS-SCNC: <1 MMOL/L (ref 3–14)
AST SERPL W P-5'-P-CCNC: 15 U/L (ref 0–45)
B2 MICROGLOB TUMOR MARKER SER-MCNC: 3.1 MG/L
BASOPHILS # BLD AUTO: 0 10E3/UL (ref 0–0.2)
BASOPHILS NFR BLD AUTO: 1 %
BILIRUB SERPL-MCNC: 0.4 MG/DL (ref 0.2–1.3)
BUN SERPL-MCNC: 14 MG/DL (ref 7–30)
CALCIUM SERPL-MCNC: 8.9 MG/DL (ref 8.5–10.1)
CHLORIDE BLD-SCNC: 103 MMOL/L (ref 94–109)
CO2 SERPL-SCNC: 29 MMOL/L (ref 20–32)
CREAT SERPL-MCNC: 0.7 MG/DL (ref 0.52–1.04)
CRP SERPL-MCNC: <2.9 MG/L (ref 0–8)
EOSINOPHIL # BLD AUTO: 0.1 10E3/UL (ref 0–0.7)
EOSINOPHIL NFR BLD AUTO: 3 %
ERYTHROCYTE [DISTWIDTH] IN BLOOD BY AUTOMATED COUNT: 16.1 % (ref 10–15)
GFR SERPL CREATININE-BSD FRML MDRD: >90 ML/MIN/1.73M2
GLUCOSE BLD-MCNC: 77 MG/DL (ref 70–99)
HCT VFR BLD AUTO: 32.9 % (ref 35–47)
HGB BLD-MCNC: 10.8 G/DL (ref 11.7–15.7)
IMM GRANULOCYTES # BLD: 0 10E3/UL
IMM GRANULOCYTES NFR BLD: 0 %
KAPPA LC FREE SER-MCNC: 53.59 MG/DL (ref 0.33–1.94)
KAPPA LC FREE/LAMBDA FREE SER NEPH: 282.05 {RATIO} (ref 0.26–1.65)
LAMBDA LC FREE SERPL-MCNC: 0.19 MG/DL (ref 0.57–2.63)
LDH SERPL L TO P-CCNC: 121 U/L (ref 81–234)
LYMPHOCYTES # BLD AUTO: 1.6 10E3/UL (ref 0.8–5.3)
LYMPHOCYTES NFR BLD AUTO: 44 %
MCH RBC QN AUTO: 33.1 PG (ref 26.5–33)
MCHC RBC AUTO-ENTMCNC: 32.8 G/DL (ref 31.5–36.5)
MCV RBC AUTO: 101 FL (ref 78–100)
MONOCYTES # BLD AUTO: 0.6 10E3/UL (ref 0–1.3)
MONOCYTES NFR BLD AUTO: 17 %
NEUTROPHILS # BLD AUTO: 1.3 10E3/UL (ref 1.6–8.3)
NEUTROPHILS NFR BLD AUTO: 35 %
NRBC # BLD AUTO: 0 10E3/UL
NRBC BLD AUTO-RTO: 0 /100
PLATELET # BLD AUTO: 120 10E3/UL (ref 150–450)
POTASSIUM BLD-SCNC: 4 MMOL/L (ref 3.4–5.3)
PROT SERPL-MCNC: 8 G/DL (ref 6.8–8.8)
RBC # BLD AUTO: 3.26 10E6/UL (ref 3.8–5.2)
SODIUM SERPL-SCNC: 132 MMOL/L (ref 133–144)
TOTAL PROTEIN SERUM FOR ELP: 7.3 G/DL (ref 6.4–8.3)
WBC # BLD AUTO: 3.6 10E3/UL (ref 4–11)

## 2022-07-11 PROCEDURE — 96401 CHEMO ANTI-NEOPL SQ/IM: CPT

## 2022-07-11 PROCEDURE — 250N000011 HC RX IP 250 OP 636: Performed by: INTERNAL MEDICINE

## 2022-07-11 PROCEDURE — 80053 COMPREHEN METABOLIC PANEL: CPT

## 2022-07-11 PROCEDURE — 83615 LACTATE (LD) (LDH) ENZYME: CPT

## 2022-07-11 PROCEDURE — 84165 PROTEIN E-PHORESIS SERUM: CPT | Mod: TC | Performed by: PATHOLOGY

## 2022-07-11 PROCEDURE — 83521 IG LIGHT CHAINS FREE EACH: CPT

## 2022-07-11 PROCEDURE — 84155 ASSAY OF PROTEIN SERUM: CPT

## 2022-07-11 PROCEDURE — 82232 ASSAY OF BETA-2 PROTEIN: CPT

## 2022-07-11 PROCEDURE — 36415 COLL VENOUS BLD VENIPUNCTURE: CPT

## 2022-07-11 PROCEDURE — 86140 C-REACTIVE PROTEIN: CPT

## 2022-07-11 PROCEDURE — 250N000013 HC RX MED GY IP 250 OP 250 PS 637: Performed by: INTERNAL MEDICINE

## 2022-07-11 PROCEDURE — 85025 COMPLETE CBC W/AUTO DIFF WBC: CPT

## 2022-07-11 RX ORDER — ACETAMINOPHEN 325 MG/1
650 TABLET ORAL
Status: CANCELLED
Start: 2022-07-11

## 2022-07-11 RX ORDER — ACETAMINOPHEN 325 MG/1
650 TABLET ORAL
Status: CANCELLED
Start: 2022-08-08

## 2022-07-11 RX ORDER — MEPERIDINE HYDROCHLORIDE 25 MG/ML
25 INJECTION INTRAMUSCULAR; INTRAVENOUS; SUBCUTANEOUS EVERY 30 MIN PRN
Status: CANCELLED | OUTPATIENT
Start: 2022-08-08

## 2022-07-11 RX ORDER — LORAZEPAM 2 MG/ML
0.5 INJECTION INTRAMUSCULAR EVERY 4 HOURS PRN
Status: CANCELLED
Start: 2022-07-11

## 2022-07-11 RX ORDER — DIPHENHYDRAMINE HCL 25 MG
25 CAPSULE ORAL
Status: CANCELLED
Start: 2022-07-11

## 2022-07-11 RX ORDER — NALOXONE HYDROCHLORIDE 0.4 MG/ML
.1-.4 INJECTION, SOLUTION INTRAMUSCULAR; INTRAVENOUS; SUBCUTANEOUS
Status: CANCELLED | OUTPATIENT
Start: 2022-07-11

## 2022-07-11 RX ORDER — DIPHENHYDRAMINE HCL 25 MG
25 CAPSULE ORAL
Status: CANCELLED
Start: 2022-08-08

## 2022-07-11 RX ORDER — SODIUM CHLORIDE 9 MG/ML
1000 INJECTION, SOLUTION INTRAVENOUS CONTINUOUS PRN
Status: CANCELLED
Start: 2022-07-11

## 2022-07-11 RX ORDER — DIPHENHYDRAMINE HYDROCHLORIDE 50 MG/ML
50 INJECTION INTRAMUSCULAR; INTRAVENOUS
Status: CANCELLED
Start: 2022-07-11

## 2022-07-11 RX ORDER — HEPARIN SODIUM (PORCINE) LOCK FLUSH IV SOLN 100 UNIT/ML 100 UNIT/ML
5 SOLUTION INTRAVENOUS
Status: CANCELLED | OUTPATIENT
Start: 2022-08-08

## 2022-07-11 RX ORDER — ALBUTEROL SULFATE 0.83 MG/ML
2.5 SOLUTION RESPIRATORY (INHALATION)
Status: CANCELLED | OUTPATIENT
Start: 2022-07-11

## 2022-07-11 RX ORDER — ALBUTEROL SULFATE 90 UG/1
1-2 AEROSOL, METERED RESPIRATORY (INHALATION)
Status: CANCELLED
Start: 2022-07-11

## 2022-07-11 RX ORDER — ALBUTEROL SULFATE 0.83 MG/ML
2.5 SOLUTION RESPIRATORY (INHALATION)
Status: CANCELLED | OUTPATIENT
Start: 2022-08-08

## 2022-07-11 RX ORDER — HEPARIN SODIUM (PORCINE) LOCK FLUSH IV SOLN 100 UNIT/ML 100 UNIT/ML
5 SOLUTION INTRAVENOUS
Status: CANCELLED | OUTPATIENT
Start: 2022-07-11

## 2022-07-11 RX ORDER — ACETAMINOPHEN 325 MG/1
650 TABLET ORAL
Status: COMPLETED | OUTPATIENT
Start: 2022-07-11 | End: 2022-07-11

## 2022-07-11 RX ORDER — EPINEPHRINE 1 MG/ML
0.3 INJECTION, SOLUTION, CONCENTRATE INTRAVENOUS EVERY 5 MIN PRN
Status: CANCELLED | OUTPATIENT
Start: 2022-08-08

## 2022-07-11 RX ORDER — LORAZEPAM 2 MG/ML
0.5 INJECTION INTRAMUSCULAR EVERY 4 HOURS PRN
Status: CANCELLED
Start: 2022-08-08

## 2022-07-11 RX ORDER — NALOXONE HYDROCHLORIDE 0.4 MG/ML
.1-.4 INJECTION, SOLUTION INTRAMUSCULAR; INTRAVENOUS; SUBCUTANEOUS
Status: CANCELLED | OUTPATIENT
Start: 2022-08-08

## 2022-07-11 RX ORDER — MEPERIDINE HYDROCHLORIDE 25 MG/ML
25 INJECTION INTRAMUSCULAR; INTRAVENOUS; SUBCUTANEOUS EVERY 30 MIN PRN
Status: CANCELLED | OUTPATIENT
Start: 2022-07-11

## 2022-07-11 RX ORDER — SODIUM CHLORIDE 9 MG/ML
1000 INJECTION, SOLUTION INTRAVENOUS CONTINUOUS PRN
Status: CANCELLED
Start: 2022-08-08

## 2022-07-11 RX ORDER — DIPHENHYDRAMINE HYDROCHLORIDE 50 MG/ML
50 INJECTION INTRAMUSCULAR; INTRAVENOUS
Status: CANCELLED
Start: 2022-08-08

## 2022-07-11 RX ORDER — HEPARIN SODIUM,PORCINE 10 UNIT/ML
5 VIAL (ML) INTRAVENOUS
Status: CANCELLED | OUTPATIENT
Start: 2022-07-11

## 2022-07-11 RX ORDER — ALBUTEROL SULFATE 90 UG/1
1-2 AEROSOL, METERED RESPIRATORY (INHALATION)
Status: CANCELLED
Start: 2022-08-08

## 2022-07-11 RX ORDER — EPINEPHRINE 1 MG/ML
0.3 INJECTION, SOLUTION, CONCENTRATE INTRAVENOUS EVERY 5 MIN PRN
Status: CANCELLED | OUTPATIENT
Start: 2022-07-11

## 2022-07-11 RX ORDER — HEPARIN SODIUM,PORCINE 10 UNIT/ML
5 VIAL (ML) INTRAVENOUS
Status: CANCELLED | OUTPATIENT
Start: 2022-08-08

## 2022-07-11 RX ADMIN — DARATUMUMAB AND HYALURONIDASE-FIHJ (HUMAN RECOMBINANT) 1800 MG: 1800; 30000 INJECTION SUBCUTANEOUS at 09:27

## 2022-07-11 RX ADMIN — ACETAMINOPHEN 650 MG: 325 TABLET ORAL at 09:02

## 2022-07-11 ASSESSMENT — PAIN SCALES - GENERAL: PAINLEVEL: NO PAIN (0)

## 2022-07-11 NOTE — PROGRESS NOTES
Infusion Nursing Note:  Shena Hartmann presents today for C24D1 Darzalex Faspro.    Patient seen by provider today: No   present during visit today: Not Applicable.    Note: Shena denied fevers, chills, cough, SOB, and chest pain. Offered no new questions or concerns today.    Given tylenol as premed for injection per her request. Pt does not take decadron as premed d/t side effects.    Reported that at times her neuropathy in her feet seems to be getting a little worse. However, she reported that it is still manageable and not too bothersome.    Patient reported that she is will need a refill of her pomalyst. Stated that the oral chemo pharmacy has already been in touch with her about this.    Intravenous Access:  Labs drawn without difficulty.    Treatment Conditions:   Latest Reference Range & Units 07/11/22 08:30   Glucose 70 - 99 mg/dL 77   WBC 4.0 - 11.0 10e3/uL 3.6 (L)   Hemoglobin 11.7 - 15.7 g/dL 10.8 (L)   Hematocrit 35.0 - 47.0 % 32.9 (L)   Platelet Count 150 - 450 10e3/uL 120 (L)   RBC Count 3.80 - 5.20 10e6/uL 3.26 (L)   MCV 78 - 100 fL 101 (H)   MCH 26.5 - 33.0 pg 33.1 (H)   MCHC 31.5 - 36.5 g/dL 32.8   RDW 10.0 - 15.0 % 16.1 (H)   % Neutrophils % 35   % Lymphocytes % 44   % Monocytes % 17   % Eosinophils % 3   % Basophils % 1   Absolute Basophils 0.0 - 0.2 10e3/uL 0.0   Absolute Eosinophils 0.0 - 0.7 10e3/uL 0.1   Absolute Immature Granulocytes <=0.4 10e3/uL 0.0   Absolute Lymphocytes 0.8 - 5.3 10e3/uL 1.6   Absolute Monocytes 0.0 - 1.3 10e3/uL 0.6   % Immature Granulocytes % 0   Absolute Neutrophils 1.6 - 8.3 10e3/uL 1.3 (L)   Absolute NRBCs 10e3/uL 0.0   NRBCs per 100 WBC <1 /100 0     Results reviewed, labs MET treatment parameters, ok to proceed with treatment.    Post Infusion Assessment:  Patient tolerated darzalex faspro injection to RLQ without incident.  No evidence of extravasations.     Discharge Plan:   Patient declined prescription refills.  Discharge instructions reviewed  with: Patient.  Patient and/or family verbalized understanding of discharge instructions and all questions answered.  Copy of AVS given to patient.  Patient will return 8/8 for next appointment.   Patient discharged in stable condition accompanied by: self.  Departure Mode: Ambulatory.  Face to Face time: 0.      Mae Roy RN

## 2022-07-11 NOTE — NURSING NOTE
Chief Complaint   Patient presents with     Labs Only     Venipuncture, vitals checked     Chemotherapy     C24D1 Darzalex Faspro     Maria Eugenia Murdock RN on 7/11/2022 at 8:32 AM

## 2022-07-11 NOTE — PATIENT INSTRUCTIONS
[facility] Triage and after hours / weekends / holidays:  [phone]    Please call the triage or after hours line if you experience a temperature greater than or equal to 100.4, shaking chills, have uncontrolled nausea, vomiting and/or diarrhea, dizziness, shortness of breath, chest pain, bleeding, unexplained bruising, or if you have any other new/concerning symptoms, questions or concerns.      If you are having any concerning symptoms or wish to speak to a provider before your next infusion visit, please call your care coordinator or triage to notify them so we can adequately serve you.     If you need a refill on a narcotic prescription or other medication, please call before your infusion appointment.

## 2022-07-12 LAB
ALBUMIN SERPL ELPH-MCNC: 3.7 G/DL (ref 3.7–5.1)
ALPHA1 GLOB SERPL ELPH-MCNC: 0.3 G/DL (ref 0.2–0.4)
ALPHA2 GLOB SERPL ELPH-MCNC: 0.6 G/DL (ref 0.5–0.9)
B-GLOBULIN SERPL ELPH-MCNC: 0.5 G/DL (ref 0.6–1)
GAMMA GLOB SERPL ELPH-MCNC: 2.1 G/DL (ref 0.7–1.6)
M PROTEIN SERPL ELPH-MCNC: 1.8 G/DL
PROT PATTERN SERPL ELPH-IMP: ABNORMAL

## 2022-07-12 PROCEDURE — 84165 PROTEIN E-PHORESIS SERUM: CPT | Mod: 26 | Performed by: PATHOLOGY

## 2022-08-05 ENCOUNTER — TELEPHONE (OUTPATIENT)
Dept: ONCOLOGY | Facility: CLINIC | Age: 72
End: 2022-08-05

## 2022-08-05 DIAGNOSIS — C90.00 MULTIPLE MYELOMA, REMISSION STATUS UNSPECIFIED (H): Primary | ICD-10-CM

## 2022-08-05 NOTE — TELEPHONE ENCOUNTER
Oral Chemotherapy Monitoring Program    Medication: Pomalyst  Rx:  1mg PO daily 32/28 ds    Auth #: [id number]  Risk Category: Adult female NOT of reproductive capacity    Routine survey questions reviewed. yes      Tavia Suazo Pharmacy Liaison, alpha split R-Z  Phone: [phone]  Fax: [phone]  Email: [email or web address]

## 2022-08-08 ENCOUNTER — INFUSION THERAPY VISIT (OUTPATIENT)
Dept: ONCOLOGY | Facility: CLINIC | Age: 72
End: 2022-08-08
Attending: INTERNAL MEDICINE
Payer: MEDICARE

## 2022-08-08 ENCOUNTER — APPOINTMENT (OUTPATIENT)
Dept: LAB | Facility: CLINIC | Age: 72
End: 2022-08-08
Payer: MEDICARE

## 2022-08-08 VITALS
WEIGHT: 117.8 LBS | RESPIRATION RATE: 16 BRPM | TEMPERATURE: 97.6 F | HEART RATE: 69 BPM | SYSTOLIC BLOOD PRESSURE: 143 MMHG | OXYGEN SATURATION: 99 % | DIASTOLIC BLOOD PRESSURE: 75 MMHG | BODY MASS INDEX: 21.9 KG/M2

## 2022-08-08 DIAGNOSIS — C90.00 MULTIPLE MYELOMA, REMISSION STATUS UNSPECIFIED (H): Primary | ICD-10-CM

## 2022-08-08 DIAGNOSIS — C90.00 MULTIPLE MYELOMA NOT HAVING ACHIEVED REMISSION (H): ICD-10-CM

## 2022-08-08 LAB
ALBUMIN SERPL-MCNC: 3.3 G/DL (ref 3.4–5)
ALP SERPL-CCNC: 56 U/L (ref 40–150)
ALT SERPL W P-5'-P-CCNC: 24 U/L (ref 0–50)
ANION GAP SERPL CALCULATED.3IONS-SCNC: 7 MMOL/L (ref 3–14)
AST SERPL W P-5'-P-CCNC: 18 U/L (ref 0–45)
B2 MICROGLOB TUMOR MARKER SER-MCNC: 3.3 MG/L
BASOPHILS # BLD AUTO: 0 10E3/UL (ref 0–0.2)
BASOPHILS NFR BLD AUTO: 1 %
BILIRUB SERPL-MCNC: 0.3 MG/DL (ref 0.2–1.3)
BUN SERPL-MCNC: 15 MG/DL (ref 7–30)
CALCIUM SERPL-MCNC: 9.4 MG/DL (ref 8.5–10.1)
CHLORIDE BLD-SCNC: 104 MMOL/L (ref 94–109)
CO2 SERPL-SCNC: 26 MMOL/L (ref 20–32)
CREAT SERPL-MCNC: 0.65 MG/DL (ref 0.52–1.04)
CRP SERPL-MCNC: <2.9 MG/L (ref 0–8)
EOSINOPHIL # BLD AUTO: 0.2 10E3/UL (ref 0–0.7)
EOSINOPHIL NFR BLD AUTO: 5 %
ERYTHROCYTE [DISTWIDTH] IN BLOOD BY AUTOMATED COUNT: 15.8 % (ref 10–15)
GFR SERPL CREATININE-BSD FRML MDRD: >90 ML/MIN/1.73M2
GLUCOSE BLD-MCNC: 71 MG/DL (ref 70–99)
HCT VFR BLD AUTO: 33.5 % (ref 35–47)
HGB BLD-MCNC: 11.2 G/DL (ref 11.7–15.7)
IMM GRANULOCYTES # BLD: 0 10E3/UL
IMM GRANULOCYTES NFR BLD: 0 %
KAPPA LC FREE SER-MCNC: 49.34 MG/DL (ref 0.33–1.94)
KAPPA LC FREE/LAMBDA FREE SER NEPH: 290.24 {RATIO} (ref 0.26–1.65)
LAMBDA LC FREE SERPL-MCNC: 0.17 MG/DL (ref 0.57–2.63)
LDH SERPL L TO P-CCNC: 135 U/L (ref 81–234)
LYMPHOCYTES # BLD AUTO: 1.4 10E3/UL (ref 0.8–5.3)
LYMPHOCYTES NFR BLD AUTO: 41 %
MCH RBC QN AUTO: 33.3 PG (ref 26.5–33)
MCHC RBC AUTO-ENTMCNC: 33.4 G/DL (ref 31.5–36.5)
MCV RBC AUTO: 100 FL (ref 78–100)
MONOCYTES # BLD AUTO: 0.7 10E3/UL (ref 0–1.3)
MONOCYTES NFR BLD AUTO: 20 %
NEUTROPHILS # BLD AUTO: 1.2 10E3/UL (ref 1.6–8.3)
NEUTROPHILS NFR BLD AUTO: 33 %
NRBC # BLD AUTO: 0 10E3/UL
NRBC BLD AUTO-RTO: 0 /100
PLATELET # BLD AUTO: 126 10E3/UL (ref 150–450)
POTASSIUM BLD-SCNC: 4.1 MMOL/L (ref 3.4–5.3)
PROT SERPL-MCNC: 8.4 G/DL (ref 6.8–8.8)
RBC # BLD AUTO: 3.36 10E6/UL (ref 3.8–5.2)
SODIUM SERPL-SCNC: 137 MMOL/L (ref 133–144)
TOTAL PROTEIN SERUM FOR ELP: 7.8 G/DL (ref 6.4–8.3)
WBC # BLD AUTO: 3.5 10E3/UL (ref 4–11)

## 2022-08-08 PROCEDURE — 83615 LACTATE (LD) (LDH) ENZYME: CPT

## 2022-08-08 PROCEDURE — 85025 COMPLETE CBC W/AUTO DIFF WBC: CPT

## 2022-08-08 PROCEDURE — 250N000011 HC RX IP 250 OP 636: Performed by: INTERNAL MEDICINE

## 2022-08-08 PROCEDURE — 80053 COMPREHEN METABOLIC PANEL: CPT

## 2022-08-08 PROCEDURE — 250N000013 HC RX MED GY IP 250 OP 250 PS 637: Performed by: INTERNAL MEDICINE

## 2022-08-08 PROCEDURE — 83521 IG LIGHT CHAINS FREE EACH: CPT

## 2022-08-08 PROCEDURE — 84155 ASSAY OF PROTEIN SERUM: CPT

## 2022-08-08 PROCEDURE — 36415 COLL VENOUS BLD VENIPUNCTURE: CPT

## 2022-08-08 PROCEDURE — 86140 C-REACTIVE PROTEIN: CPT

## 2022-08-08 PROCEDURE — 96401 CHEMO ANTI-NEOPL SQ/IM: CPT

## 2022-08-08 PROCEDURE — 82232 ASSAY OF BETA-2 PROTEIN: CPT

## 2022-08-08 PROCEDURE — 84165 PROTEIN E-PHORESIS SERUM: CPT | Mod: TC | Performed by: STUDENT IN AN ORGANIZED HEALTH CARE EDUCATION/TRAINING PROGRAM

## 2022-08-08 RX ORDER — ACETAMINOPHEN 325 MG/1
650 TABLET ORAL
Status: COMPLETED | OUTPATIENT
Start: 2022-08-08 | End: 2022-08-08

## 2022-08-08 RX ADMIN — DARATUMUMAB AND HYALURONIDASE-FIHJ (HUMAN RECOMBINANT) 1800 MG: 1800; 30000 INJECTION SUBCUTANEOUS at 10:01

## 2022-08-08 RX ADMIN — ACETAMINOPHEN 650 MG: 325 TABLET ORAL at 08:55

## 2022-08-08 ASSESSMENT — PAIN SCALES - GENERAL: PAINLEVEL: MILD PAIN (3)

## 2022-08-08 NOTE — PATIENT INSTRUCTIONS
[facility] Triage and after hours / weekends / holidays:  [phone]    Please call the triage or after hours line if you experience a temperature greater than or equal to 100.4, shaking chills, have uncontrolled nausea, vomiting and/or diarrhea, dizziness, shortness of breath, chest pain, bleeding, unexplained bruising, or if you have any other new/concerning symptoms, questions or concerns.      If you are having any concerning symptoms or wish to speak to a provider before your next infusion visit, please call your care coordinator or triage to notify them so we can adequately serve you.     If you need a refill on a narcotic prescription or other medication, please call before your infusion appointment.                 August 2022 Sunday Monday Tuesday Wednesday Thursday Friday Saturday        1     2     3     4     5     6       7     8    LAB PERIPHERAL   8:00 AM   (15 min.)   [facility] LAB DRAW   [facility]    ONC INFUSION 1 HR (60 MIN)   8:30 AM   (60 min.)    ONC INFUSION NURSE   [facility] 9     10     11     12     13       14     15     16     17     18     19     20       21     22     23     24     25     26     27       28     29     30     31                                  September 2022 Sunday Monday Tuesday Wednesday Thursday Friday Saturday                       1    MYC OFFICE VISIT   10:40 AM   (30 min.)   Thomas Villalobos MD   [facility] 2     3       4     5     6    LAB PERIPHERAL   8:30 AM   (15 min.)   [facility] LAB DRAW   [facility]    ONC INFUSION 1 HR (60 MIN)   9:00 AM   (60 min.)    ONC INFUSION NURSE   [facility] 7     8     9     10       11     12     13     14     15     16     17       18     19     20     21     22     23     24       25     26     27     28     29     30                            Lab  Results:  Recent Results (from the past 12 hour(s))   CRP inflammation    Collection Time: 08/08/22  8:18 AM   Result Value Ref Range    CRP Inflammation <2.9 0.0 - 8.0 mg/L   Lactate Dehydrogenase    Collection Time: 08/08/22  8:18 AM   Result Value Ref Range    Lactate Dehydrogenase 135 81 - 234 U/L   Comprehensive metabolic panel    Collection Time: 08/08/22  8:18 AM   Result Value Ref Range    Sodium 137 133 - 144 mmol/L    Potassium 4.1 3.4 - 5.3 mmol/L    Chloride 104 94 - 109 mmol/L    Carbon Dioxide (CO2) 26 20 - 32 mmol/L    Anion Gap 7 3 - 14 mmol/L    Urea Nitrogen 15 7 - 30 mg/dL    Creatinine 0.65 0.52 - 1.04 mg/dL    Calcium 9.4 8.5 - 10.1 mg/dL    Glucose 71 70 - 99 mg/dL    Alkaline Phosphatase 56 40 - 150 U/L    AST 18 0 - 45 U/L    ALT 24 0 - 50 U/L    Protein Total 8.4 6.8 - 8.8 g/dL    Albumin 3.3 (L) 3.4 - 5.0 g/dL    Bilirubin Total 0.3 0.2 - 1.3 mg/dL    GFR Estimate >90 >60 mL/min/1.73m2   CBC with platelets and differential    Collection Time: 08/08/22  8:18 AM   Result Value Ref Range    WBC Count 3.5 (L) 4.0 - 11.0 10e3/uL    RBC Count 3.36 (L) 3.80 - 5.20 10e6/uL    Hemoglobin 11.2 (L) 11.7 - 15.7 g/dL    Hematocrit 33.5 (L) 35.0 - 47.0 %     78 - 100 fL    MCH 33.3 (H) 26.5 - 33.0 pg    MCHC 33.4 31.5 - 36.5 g/dL    RDW 15.8 (H) 10.0 - 15.0 %    Platelet Count 126 (L) 150 - 450 10e3/uL    % Neutrophils 33 %    % Lymphocytes 41 %    % Monocytes 20 %    % Eosinophils 5 %    % Basophils 1 %    % Immature Granulocytes 0 %    NRBCs per 100 WBC 0 <1 /100    Absolute Neutrophils 1.2 (L) 1.6 - 8.3 10e3/uL    Absolute Lymphocytes 1.4 0.8 - 5.3 10e3/uL    Absolute Monocytes 0.7 0.0 - 1.3 10e3/uL    Absolute Eosinophils 0.2 0.0 - 0.7 10e3/uL    Absolute Basophils 0.0 0.0 - 0.2 10e3/uL    Absolute Immature Granulocytes 0.0 <=0.4 10e3/uL    Absolute NRBCs 0.0 10e3/uL

## 2022-08-08 NOTE — PROGRESS NOTES
Infusion Nursing Note:  Shena Hartmann presents today for .Day 1 Cycle 25 Daratumumab subcutaneous injection   Patient seen by provider today: No   present during visit today: Not Applicable.    Note: Patient presents to infusion feeling well. Patient denies acute discomfort and states no acute complaints or concerns needing to be addressed today. Patient states chronic neuropathy overall is stable and that she continues to go to a neuropathy clinic every 2 weeks that has overall decreased symptoms by 60%. Patient confirms she is taking Pomalyst as prescribed..    Intravenous Access:  No Intravenous access at this visit.    Treatment Conditions:  Lab Results   Component Value Date    HGB 11.2 (L) 08/08/2022    WBC 3.5 (L) 08/08/2022    ANEU 2.1 07/05/2021    ANEUTAUTO 1.2 (L) 08/08/2022     (L) 08/08/2022      Lab Results   Component Value Date     08/08/2022    POTASSIUM 4.1 08/08/2022    MAG 1.8 12/12/2014    CR 0.65 08/08/2022    PAULINE 9.4 08/08/2022    BILITOTAL 0.3 08/08/2022    ALBUMIN 3.3 (L) 08/08/2022    ALT 24 08/08/2022    AST 18 08/08/2022     Results reviewed, labs MET treatment parameters, ok to proceed with treatment.    Post Infusion Assessment:  Patient tolerated 1 subcutaneous injection of Daratumumab via left lower abdomen without incident. 25 gauge butterfly needle used without incident.     Discharge Plan:   Patient declined prescription refills.  Discharge instructions reviewed with: Patient.  Patient and/or family verbalized understanding of discharge instructions and all questions answered.  Copy of AVS reviewed with patient and/or family.  Patient will return 9/6 for next appointment.  Patient discharged in stable condition accompanied by: self.  Departure Mode: Ambulatory.  Face to Face time: 0 minutes.      Ranjit Jorgensen RN

## 2022-08-09 LAB
ALBUMIN SERPL ELPH-MCNC: 3.9 G/DL (ref 3.7–5.1)
ALPHA1 GLOB SERPL ELPH-MCNC: 0.3 G/DL (ref 0.2–0.4)
ALPHA2 GLOB SERPL ELPH-MCNC: 0.7 G/DL (ref 0.5–0.9)
B-GLOBULIN SERPL ELPH-MCNC: 0.6 G/DL (ref 0.6–1)
GAMMA GLOB SERPL ELPH-MCNC: 2.3 G/DL (ref 0.7–1.6)
M PROTEIN SERPL ELPH-MCNC: 2 G/DL
PROT PATTERN SERPL ELPH-IMP: ABNORMAL

## 2022-08-09 PROCEDURE — 84165 PROTEIN E-PHORESIS SERUM: CPT | Mod: 26 | Performed by: STUDENT IN AN ORGANIZED HEALTH CARE EDUCATION/TRAINING PROGRAM

## 2022-08-15 DIAGNOSIS — C90.00 MULTIPLE MYELOMA, REMISSION STATUS UNSPECIFIED (H): Primary | ICD-10-CM

## 2022-08-15 DIAGNOSIS — Z79.899 ENCOUNTER FOR LONG-TERM (CURRENT) USE OF MEDICATIONS: ICD-10-CM

## 2022-08-30 ENCOUNTER — PATIENT OUTREACH (OUTPATIENT)
Dept: ONCOLOGY | Facility: CLINIC | Age: 72
End: 2022-08-30

## 2022-08-30 DIAGNOSIS — Z79.899 ENCOUNTER FOR LONG-TERM (CURRENT) USE OF MEDICATIONS: ICD-10-CM

## 2022-08-30 DIAGNOSIS — C90.00 MULTIPLE MYELOMA, REMISSION STATUS UNSPECIFIED (H): Primary | ICD-10-CM

## 2022-09-01 ENCOUNTER — OFFICE VISIT (OUTPATIENT)
Dept: INTERNAL MEDICINE | Facility: CLINIC | Age: 72
End: 2022-09-01
Payer: MEDICARE

## 2022-09-01 VITALS
HEART RATE: 79 BPM | HEIGHT: 61 IN | TEMPERATURE: 98.3 F | RESPIRATION RATE: 14 BRPM | WEIGHT: 116.5 LBS | DIASTOLIC BLOOD PRESSURE: 60 MMHG | SYSTOLIC BLOOD PRESSURE: 100 MMHG | BODY MASS INDEX: 21.99 KG/M2 | OXYGEN SATURATION: 96 %

## 2022-09-01 DIAGNOSIS — D70.1 AGRANULOCYTOSIS SECONDARY TO CANCER CHEMOTHERAPY (CODE) (H): ICD-10-CM

## 2022-09-01 DIAGNOSIS — E03.9 ACQUIRED HYPOTHYROIDISM: ICD-10-CM

## 2022-09-01 DIAGNOSIS — I10 PRIMARY HYPERTENSION: Primary | ICD-10-CM

## 2022-09-01 DIAGNOSIS — C90.00 MULTIPLE MYELOMA, REMISSION STATUS UNSPECIFIED (H): Primary | ICD-10-CM

## 2022-09-01 PROCEDURE — 99214 OFFICE O/P EST MOD 30 MIN: CPT | Performed by: INTERNAL MEDICINE

## 2022-09-01 NOTE — PROGRESS NOTES
"  Assessment & Plan     (I10) Primary hypertension  (primary encounter diagnosis)  Comment: controlled on low dose ACE-I. 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    (E03.9) Acquired hypothyroidism  Comment: will need recheck later this fall  Plan: on replacement now.    Right shoulder pain--prior modified radical mastectomy likely affecting musculature in the area as well. Some home exercises though may benefit from formal PT>  She will get back to me about a referral.                 No follow-ups on file.    Thomas Villalobos MD  [facility]    Ramya Chatterjee is a 71 year old, presenting for the following health issues:  Hypertension and Shoulder Pain      Shoulder Pain    History of Present Illness       Hypertension: She presents for follow up of hypertension.  She does check blood pressure  regularly outside of the clinic. Outpatient blood pressures have not been over 140/90. She follows a low salt diet.       Right shoulder pain--somewhat chronic. Mastectomy/modified radical has affected her strength. Some flares take weeks to improve, mostly anterior pain when it happens.          Review of Systems         Objective    /60   Pulse 79   Temp 98.3  F (36.8  C) (Oral)   Resp 14   Ht 1.562 m (5' 1.5\")   Wt 52.8 kg (116 lb 8 oz)   SpO2 96%   BMI 21.66 kg/m    Body mass index is 21.66 kg/m .  Physical Exam   Right shoulder--reasonable ROM and strength.                    .  ..  "

## 2022-09-06 ENCOUNTER — INFUSION THERAPY VISIT (OUTPATIENT)
Dept: ONCOLOGY | Facility: CLINIC | Age: 72
End: 2022-09-06
Attending: INTERNAL MEDICINE
Payer: MEDICARE

## 2022-09-06 ENCOUNTER — APPOINTMENT (OUTPATIENT)
Dept: LAB | Facility: CLINIC | Age: 72
End: 2022-09-06
Attending: INTERNAL MEDICINE
Payer: MEDICARE

## 2022-09-06 VITALS
OXYGEN SATURATION: 98 % | DIASTOLIC BLOOD PRESSURE: 71 MMHG | WEIGHT: 119.8 LBS | SYSTOLIC BLOOD PRESSURE: 138 MMHG | BODY MASS INDEX: 22.27 KG/M2 | TEMPERATURE: 97.5 F | HEART RATE: 81 BPM | RESPIRATION RATE: 16 BRPM

## 2022-09-06 DIAGNOSIS — C90.00 MULTIPLE MYELOMA NOT HAVING ACHIEVED REMISSION (H): Primary | ICD-10-CM

## 2022-09-06 DIAGNOSIS — C90.00 MULTIPLE MYELOMA, REMISSION STATUS UNSPECIFIED (H): ICD-10-CM

## 2022-09-06 DIAGNOSIS — Z79.899 ENCOUNTER FOR LONG-TERM (CURRENT) USE OF MEDICATIONS: ICD-10-CM

## 2022-09-06 LAB
ALBUMIN SERPL BCG-MCNC: 3.7 G/DL (ref 3.5–5.2)
ALBUMIN UR-MCNC: ABNORMAL MG/DL
ALP SERPL-CCNC: 48 U/L (ref 35–104)
ALT SERPL W P-5'-P-CCNC: 15 U/L (ref 10–35)
ANION GAP SERPL CALCULATED.3IONS-SCNC: 10 MMOL/L (ref 7–15)
APPEARANCE UR: CLEAR
AST SERPL W P-5'-P-CCNC: 18 U/L (ref 10–35)
BACTERIA #/AREA URNS HPF: ABNORMAL /HPF
BASOPHILS # BLD AUTO: 0 10E3/UL (ref 0–0.2)
BASOPHILS NFR BLD AUTO: 1 %
BILIRUB SERPL-MCNC: 0.2 MG/DL
BILIRUB UR QL STRIP: NEGATIVE
BUN SERPL-MCNC: 14.9 MG/DL (ref 8–23)
CALCIUM SERPL-MCNC: 9.4 MG/DL (ref 8.8–10.2)
CHLORIDE SERPL-SCNC: 99 MMOL/L (ref 98–107)
COLOR UR AUTO: YELLOW
CREAT SERPL-MCNC: 0.76 MG/DL (ref 0.51–0.95)
CRP SERPL-MCNC: 3.15 MG/L
DEPRECATED HCO3 PLAS-SCNC: 26 MMOL/L (ref 22–29)
EOSINOPHIL # BLD AUTO: 0.1 10E3/UL (ref 0–0.7)
EOSINOPHIL NFR BLD AUTO: 4 %
ERYTHROCYTE [DISTWIDTH] IN BLOOD BY AUTOMATED COUNT: 16.1 % (ref 10–15)
GFR SERPL CREATININE-BSD FRML MDRD: 83 ML/MIN/1.73M2
GLUCOSE SERPL-MCNC: 61 MG/DL (ref 70–99)
GLUCOSE UR STRIP-MCNC: NEGATIVE MG/DL
HCT VFR BLD AUTO: 33.9 % (ref 35–47)
HGB BLD-MCNC: 11.1 G/DL (ref 11.7–15.7)
HGB UR QL STRIP: ABNORMAL
IMM GRANULOCYTES # BLD: 0 10E3/UL
IMM GRANULOCYTES NFR BLD: 0 %
KAPPA LC FREE SER-MCNC: 50.55 MG/DL (ref 0.33–1.94)
KAPPA LC FREE/LAMBDA FREE SER NEPH: 266.05 {RATIO} (ref 0.26–1.65)
KETONES UR STRIP-MCNC: NEGATIVE MG/DL
LAMBDA LC FREE SERPL-MCNC: 0.19 MG/DL (ref 0.57–2.63)
LDH SERPL L TO P-CCNC: 129 U/L (ref 0–250)
LEUKOCYTE ESTERASE UR QL STRIP: NEGATIVE
LYMPHOCYTES # BLD AUTO: 1.7 10E3/UL (ref 0.8–5.3)
LYMPHOCYTES NFR BLD AUTO: 49 %
MCH RBC QN AUTO: 33 PG (ref 26.5–33)
MCHC RBC AUTO-ENTMCNC: 32.7 G/DL (ref 31.5–36.5)
MCV RBC AUTO: 101 FL (ref 78–100)
MONOCYTES # BLD AUTO: 0.6 10E3/UL (ref 0–1.3)
MONOCYTES NFR BLD AUTO: 17 %
MUCOUS THREADS #/AREA URNS LPF: PRESENT /LPF
NEUTROPHILS # BLD AUTO: 1 10E3/UL (ref 1.6–8.3)
NEUTROPHILS NFR BLD AUTO: 29 %
NITRATE UR QL: NEGATIVE
NRBC # BLD AUTO: 0 10E3/UL
NRBC BLD AUTO-RTO: 0 /100
PH UR STRIP: 6 [PH] (ref 5–7)
PLATELET # BLD AUTO: 122 10E3/UL (ref 150–450)
POTASSIUM SERPL-SCNC: 4.2 MMOL/L (ref 3.4–5.3)
PROT SERPL-MCNC: 7.9 G/DL (ref 6.4–8.3)
RBC # BLD AUTO: 3.36 10E6/UL (ref 3.8–5.2)
RBC URINE: 4 /HPF
SODIUM SERPL-SCNC: 135 MMOL/L (ref 136–145)
SP GR UR STRIP: 1.02 (ref 1–1.03)
TOTAL PROTEIN SERUM FOR ELP: 7.6 G/DL (ref 6.4–8.3)
UROBILINOGEN UR STRIP-MCNC: NORMAL MG/DL
WBC # BLD AUTO: 3.5 10E3/UL (ref 4–11)
WBC URINE: 12 /HPF

## 2022-09-06 PROCEDURE — 84165 PROTEIN E-PHORESIS SERUM: CPT | Mod: TC | Performed by: PATHOLOGY

## 2022-09-06 PROCEDURE — 84155 ASSAY OF PROTEIN SERUM: CPT | Mod: 91

## 2022-09-06 PROCEDURE — 96401 CHEMO ANTI-NEOPL SQ/IM: CPT

## 2022-09-06 PROCEDURE — 86140 C-REACTIVE PROTEIN: CPT

## 2022-09-06 PROCEDURE — 36415 COLL VENOUS BLD VENIPUNCTURE: CPT | Performed by: INTERNAL MEDICINE

## 2022-09-06 PROCEDURE — 82232 ASSAY OF BETA-2 PROTEIN: CPT

## 2022-09-06 PROCEDURE — 250N000013 HC RX MED GY IP 250 OP 250 PS 637: Performed by: INTERNAL MEDICINE

## 2022-09-06 PROCEDURE — 80053 COMPREHEN METABOLIC PANEL: CPT

## 2022-09-06 PROCEDURE — 83521 IG LIGHT CHAINS FREE EACH: CPT

## 2022-09-06 PROCEDURE — 83615 LACTATE (LD) (LDH) ENZYME: CPT

## 2022-09-06 PROCEDURE — 250N000011 HC RX IP 250 OP 636: Performed by: INTERNAL MEDICINE

## 2022-09-06 PROCEDURE — 81001 URINALYSIS AUTO W/SCOPE: CPT

## 2022-09-06 PROCEDURE — 85025 COMPLETE CBC W/AUTO DIFF WBC: CPT | Performed by: INTERNAL MEDICINE

## 2022-09-06 RX ORDER — SODIUM CHLORIDE 9 MG/ML
1000 INJECTION, SOLUTION INTRAVENOUS CONTINUOUS PRN
Status: CANCELLED
Start: 2022-09-06

## 2022-09-06 RX ORDER — DIPHENHYDRAMINE HYDROCHLORIDE 50 MG/ML
50 INJECTION INTRAMUSCULAR; INTRAVENOUS
Status: CANCELLED
Start: 2022-10-03

## 2022-09-06 RX ORDER — ALBUTEROL SULFATE 90 UG/1
1-2 AEROSOL, METERED RESPIRATORY (INHALATION)
Status: CANCELLED
Start: 2022-09-06

## 2022-09-06 RX ORDER — LORAZEPAM 2 MG/ML
0.5 INJECTION INTRAMUSCULAR EVERY 4 HOURS PRN
Status: CANCELLED
Start: 2022-10-03

## 2022-09-06 RX ORDER — ALBUTEROL SULFATE 90 UG/1
1-2 AEROSOL, METERED RESPIRATORY (INHALATION)
Status: CANCELLED
Start: 2022-10-03

## 2022-09-06 RX ORDER — SODIUM CHLORIDE 9 MG/ML
1000 INJECTION, SOLUTION INTRAVENOUS CONTINUOUS PRN
Status: CANCELLED
Start: 2022-10-03

## 2022-09-06 RX ORDER — NALOXONE HYDROCHLORIDE 0.4 MG/ML
.1-.4 INJECTION, SOLUTION INTRAMUSCULAR; INTRAVENOUS; SUBCUTANEOUS
Status: CANCELLED | OUTPATIENT
Start: 2022-09-06

## 2022-09-06 RX ORDER — ALBUTEROL SULFATE 0.83 MG/ML
2.5 SOLUTION RESPIRATORY (INHALATION)
Status: CANCELLED | OUTPATIENT
Start: 2022-10-03

## 2022-09-06 RX ORDER — DIPHENHYDRAMINE HYDROCHLORIDE 50 MG/ML
50 INJECTION INTRAMUSCULAR; INTRAVENOUS
Status: CANCELLED
Start: 2022-09-06

## 2022-09-06 RX ORDER — HEPARIN SODIUM,PORCINE 10 UNIT/ML
5 VIAL (ML) INTRAVENOUS
Status: CANCELLED | OUTPATIENT
Start: 2022-10-03

## 2022-09-06 RX ORDER — DIPHENHYDRAMINE HCL 25 MG
25 CAPSULE ORAL
Status: CANCELLED
Start: 2022-09-06

## 2022-09-06 RX ORDER — HEPARIN SODIUM (PORCINE) LOCK FLUSH IV SOLN 100 UNIT/ML 100 UNIT/ML
5 SOLUTION INTRAVENOUS
Status: CANCELLED | OUTPATIENT
Start: 2022-09-06

## 2022-09-06 RX ORDER — MEPERIDINE HYDROCHLORIDE 25 MG/ML
25 INJECTION INTRAMUSCULAR; INTRAVENOUS; SUBCUTANEOUS EVERY 30 MIN PRN
Status: CANCELLED | OUTPATIENT
Start: 2022-10-03

## 2022-09-06 RX ORDER — ACETAMINOPHEN 325 MG/1
650 TABLET ORAL
Status: COMPLETED | OUTPATIENT
Start: 2022-09-06 | End: 2022-09-06

## 2022-09-06 RX ORDER — EPINEPHRINE 1 MG/ML
0.3 INJECTION, SOLUTION, CONCENTRATE INTRAVENOUS EVERY 5 MIN PRN
Status: CANCELLED | OUTPATIENT
Start: 2022-10-03

## 2022-09-06 RX ORDER — ACETAMINOPHEN 325 MG/1
650 TABLET ORAL
Status: CANCELLED
Start: 2022-09-06

## 2022-09-06 RX ORDER — ACETAMINOPHEN 325 MG/1
650 TABLET ORAL
Status: CANCELLED
Start: 2022-10-03

## 2022-09-06 RX ORDER — DIPHENHYDRAMINE HCL 25 MG
25 CAPSULE ORAL
Status: CANCELLED
Start: 2022-10-03

## 2022-09-06 RX ORDER — EPINEPHRINE 1 MG/ML
0.3 INJECTION, SOLUTION, CONCENTRATE INTRAVENOUS EVERY 5 MIN PRN
Status: CANCELLED | OUTPATIENT
Start: 2022-09-06

## 2022-09-06 RX ORDER — ALBUTEROL SULFATE 0.83 MG/ML
2.5 SOLUTION RESPIRATORY (INHALATION)
Status: CANCELLED | OUTPATIENT
Start: 2022-09-06

## 2022-09-06 RX ORDER — HEPARIN SODIUM (PORCINE) LOCK FLUSH IV SOLN 100 UNIT/ML 100 UNIT/ML
5 SOLUTION INTRAVENOUS
Status: CANCELLED | OUTPATIENT
Start: 2022-10-03

## 2022-09-06 RX ORDER — NALOXONE HYDROCHLORIDE 0.4 MG/ML
.1-.4 INJECTION, SOLUTION INTRAMUSCULAR; INTRAVENOUS; SUBCUTANEOUS
Status: CANCELLED | OUTPATIENT
Start: 2022-10-03

## 2022-09-06 RX ORDER — LORAZEPAM 2 MG/ML
0.5 INJECTION INTRAMUSCULAR EVERY 4 HOURS PRN
Status: CANCELLED
Start: 2022-09-06

## 2022-09-06 RX ORDER — MEPERIDINE HYDROCHLORIDE 25 MG/ML
25 INJECTION INTRAMUSCULAR; INTRAVENOUS; SUBCUTANEOUS EVERY 30 MIN PRN
Status: CANCELLED | OUTPATIENT
Start: 2022-09-06

## 2022-09-06 RX ORDER — HEPARIN SODIUM,PORCINE 10 UNIT/ML
5 VIAL (ML) INTRAVENOUS
Status: CANCELLED | OUTPATIENT
Start: 2022-09-06

## 2022-09-06 RX ADMIN — DARATUMUMAB AND HYALURONIDASE-FIHJ (HUMAN RECOMBINANT) 1800 MG: 1800; 30000 INJECTION SUBCUTANEOUS at 10:45

## 2022-09-06 RX ADMIN — ACETAMINOPHEN 650 MG: 325 TABLET ORAL at 10:14

## 2022-09-06 ASSESSMENT — PAIN SCALES - GENERAL: PAINLEVEL: MODERATE PAIN (4)

## 2022-09-06 NOTE — PATIENT INSTRUCTIONS
[facility] Triage and after hours / weekends / holidays:  [phone]    Please call the triage or after hours line if you experience a temperature greater than or equal to 100.5, shaking chills, have uncontrolled nausea, vomiting and/or diarrhea, dizziness, shortness of breath, chest pain, bleeding, unexplained bruising, or if you have any other new/concerning symptoms, questions or concerns.      If you are having any concerning symptoms or wish to speak to a provider before your next infusion visit, please call your care coordinator or triage to notify them so we can adequately serve you.     If you need a refill on a narcotic prescription or other medication, please call before your infusion appointment.        September 2022 Sunday Monday Tuesday Wednesday Thursday Friday Saturday                       1    MYC OFFICE VISIT   10:40 AM   (30 min.)   Thomas Villalobos MD   [facility] 2     3       4     5     6    LAB PERIPHERAL   8:30 AM   (15 min.)    [facility] DRAW   [facility]    ONC INFUSION 1 HR (60 MIN)   9:00 AM   (60 min.)    ONC INFUSION NURSE   [facility] 7     8     9     10       11     12     13     14     15     16     17       18     19     20     21     22     23     24       25     26     27     28     29     30 October 2022 Sunday Monday Tuesday Wednesday Thursday Friday Saturday                                 1       2     3    LAB PERIPHERAL   8:00 AM   (15 min.)    [facility] DRAW   [facility]    ONC INFUSION 1 HR (60 MIN)   8:30 AM   (60 min.)    ONC INFUSION NURSE   [facility] 4     5     6     7     8       9     10     11     12     13     14     15       16     17     18     19     20     21     22       23  Happy Birthday!     24     25     26     27     28     29       30     31    LAB  PERIPHERAL   8:00 AM   (15 min.)   [facility] LAB DRAW   [facility]    ONC INFUSION 1 HR (60 MIN)   8:30 AM   (60 min.)    ONC INFUSION NURSE   [facility]                                             Recent Results (from the past 24 hour(s))   CRP inflammation    Collection Time: 09/06/22  8:45 AM   Result Value Ref Range    CRP Inflammation 3.15 <5.00 mg/L   Comprehensive metabolic panel    Collection Time: 09/06/22  8:45 AM   Result Value Ref Range    Sodium 135 (L) 136 - 145 mmol/L    Potassium 4.2 3.4 - 5.3 mmol/L    Creatinine 0.76 0.51 - 0.95 mg/dL    Urea Nitrogen 14.9 8.0 - 23.0 mg/dL    Chloride 99 98 - 107 mmol/L    Carbon Dioxide (CO2) 26 22 - 29 mmol/L    Anion Gap 10 7 - 15 mmol/L    Glucose 61 (L) 70 - 99 mg/dL    Calcium 9.4 8.8 - 10.2 mg/dL    Protein Total 7.9 6.4 - 8.3 g/dL    Albumin 3.7 3.5 - 5.2 g/dL    Bilirubin Total 0.2 <=1.2 mg/dL    Alkaline Phosphatase 48 35 - 104 U/L    AST 18 10 - 35 U/L    ALT 15 10 - 35 U/L    GFR Estimate 83 >60 mL/min/1.73m2   CBC with platelets and differential    Collection Time: 09/06/22  8:45 AM   Result Value Ref Range    WBC Count 3.5 (L) 4.0 - 11.0 10e3/uL    RBC Count 3.36 (L) 3.80 - 5.20 10e6/uL    Hemoglobin 11.1 (L) 11.7 - 15.7 g/dL    Hematocrit 33.9 (L) 35.0 - 47.0 %     (H) 78 - 100 fL    MCH 33.0 26.5 - 33.0 pg    MCHC 32.7 31.5 - 36.5 g/dL    RDW 16.1 (H) 10.0 - 15.0 %    Platelet Count 122 (L) 150 - 450 10e3/uL    % Neutrophils 29 %    % Lymphocytes 49 %    % Monocytes 17 %    % Eosinophils 4 %    % Basophils 1 %    % Immature Granulocytes 0 %    NRBCs per 100 WBC 0 <1 /100    Absolute Neutrophils 1.0 (L) 1.6 - 8.3 10e3/uL    Absolute Lymphocytes 1.7 0.8 - 5.3 10e3/uL    Absolute Monocytes 0.6 0.0 - 1.3 10e3/uL    Absolute Eosinophils 0.1 0.0 - 0.7 10e3/uL    Absolute Basophils 0.0 0.0 - 0.2 10e3/uL    Absolute Immature Granulocytes 0.0 <=0.4 10e3/uL    Absolute NRBCs 0.0 10e3/uL    Routine UA with microscopic - No culture    Collection Time: 09/06/22  8:51 AM   Result Value Ref Range    Color Urine Yellow Colorless, Straw, Light Yellow, Yellow    Appearance Urine Clear Clear    Glucose Urine Negative Negative mg/dL    Bilirubin Urine Negative Negative    Ketones Urine Negative Negative mg/dL    Specific Gravity Urine 1.025 1.003 - 1.035    Blood Urine Trace (A) Negative    pH Urine 6.0 5.0 - 7.0    Protein Albumin Urine Trace (A) Negative mg/dL    Urobilinogen Urine Normal Normal, 2.0 mg/dL    Nitrite Urine Negative Negative    Leukocyte Esterase Urine Negative Negative    Bacteria Urine Moderate (A) None Seen /HPF    Mucus Urine Present (A) None Seen /LPF    RBC Urine 4 (H) <=2 /HPF    WBC Urine 12 (H) <=5 /HPF

## 2022-09-06 NOTE — NURSING NOTE
Chief Complaint   Patient presents with     Blood Draw     Labs drawn via  by RN. VS taken.     Labs drawn with  by rn.  Pt tolerated well.  VS taken.  Pt checked in for next appt.    Urine lab collected.    Bernardino Pisano RN

## 2022-09-06 NOTE — PROGRESS NOTES
Infusion Nursing Note:  Shena Hartmann presents today for cycle 26 day 1 darzalex faspro.    Patient seen by provider today: No   present during visit today: Not Applicable.    Note:   Patient reports 4/10 generalized body aches today. She declines interventions. She reports her neuropathy is stable. She denies fevers, chills, SOB, chest pain, nausea, and diarrhea.    Intravenous Access:  No Intravenous access at this visit.    Treatment Conditions:  Lab Results   Component Value Date    HGB 11.1 (L) 09/06/2022    WBC 3.5 (L) 09/06/2022    ANEU 2.1 07/05/2021    ANEUTAUTO 1.0 (L) 09/06/2022     (L) 09/06/2022      Lab Results   Component Value Date     (L) 09/06/2022    POTASSIUM 4.2 09/06/2022    MAG 1.8 12/12/2014    CR 0.76 09/06/2022    PAULINE 9.4 09/06/2022    BILITOTAL 0.2 09/06/2022    ALBUMIN 3.7 09/06/2022    ALT 15 09/06/2022    AST 18 09/06/2022     Results reviewed, labs MET treatment parameters, ok to proceed with treatment.    Post Infusion Assessment:  Patient tolerated injection into right lower abdomen without incident.     Discharge Plan:   Patient declined prescription refills.  Discharge instructions reviewed with: Patient.  Patient and/or family verbalized understanding of discharge instructions and all questions answered.  AVS to patient via [employer].  Patient will return 10/3 for next appointment.   Patient discharged in stable condition accompanied by: self.  Departure Mode: Ambulatory.      Esthela Schmitz RN

## 2022-09-07 ENCOUNTER — TELEPHONE (OUTPATIENT)
Dept: ONCOLOGY | Facility: CLINIC | Age: 72
End: 2022-09-07

## 2022-09-07 DIAGNOSIS — C90.00 MULTIPLE MYELOMA, REMISSION STATUS UNSPECIFIED (H): ICD-10-CM

## 2022-09-07 LAB
ALBUMIN SERPL ELPH-MCNC: 3.7 G/DL (ref 3.7–5.1)
ALPHA1 GLOB SERPL ELPH-MCNC: 0.3 G/DL (ref 0.2–0.4)
ALPHA2 GLOB SERPL ELPH-MCNC: 0.7 G/DL (ref 0.5–0.9)
B-GLOBULIN SERPL ELPH-MCNC: 0.6 G/DL (ref 0.6–1)
B2 MICROGLOB TUMOR MARKER SER-MCNC: 3.1 MG/L
GAMMA GLOB SERPL ELPH-MCNC: 2.3 G/DL (ref 0.7–1.6)
M PROTEIN SERPL ELPH-MCNC: 2.1 G/DL
PROT PATTERN SERPL ELPH-IMP: ABNORMAL

## 2022-09-07 PROCEDURE — 84165 PROTEIN E-PHORESIS SERUM: CPT | Mod: 26 | Performed by: PATHOLOGY

## 2022-09-07 NOTE — TELEPHONE ENCOUNTER
[place]/ Assistance Approved    Medication Pomalyst  Amount/ $ 12,000.00  [facility]  Phone #   Fax #   Effective Dates 08/08/2022-08/07/2023  Additional Information   Patient notified? yes

## 2022-09-07 NOTE — TELEPHONE ENCOUNTER
Oral Chemotherapy Monitoring Program    Medication: Pomalyst  Rx:  1mg PO daily 21/28 ds    Auth #: [id number]  Risk Category: Adult female NOT of reproductive capacity    Routine survey questions reviewed. She was in a zoom call, will call me back        Tavia Suazo Pharmacy Liaison, alpha split R[employer]Z  Phone: [phone]  Fax: [phone]  Email: [email or web address]

## 2022-09-27 ENCOUNTER — MYC MEDICAL ADVICE (OUTPATIENT)
Dept: INTERNAL MEDICINE | Facility: CLINIC | Age: 72
End: 2022-09-27

## 2022-09-27 DIAGNOSIS — G89.29 CHRONIC RIGHT SHOULDER PAIN: Primary | ICD-10-CM

## 2022-09-27 DIAGNOSIS — M25.511 CHRONIC RIGHT SHOULDER PAIN: Primary | ICD-10-CM

## 2022-09-28 ENCOUNTER — TELEPHONE (OUTPATIENT)
Dept: ONCOLOGY | Facility: CLINIC | Age: 72
End: 2022-09-28

## 2022-09-28 DIAGNOSIS — C90.00 MULTIPLE MYELOMA, REMISSION STATUS UNSPECIFIED (H): Primary | ICD-10-CM

## 2022-09-28 NOTE — TELEPHONE ENCOUNTER
Oral Chemotherapy Monitoring Program    Medication: Pomalyst  Rx:  1mg PO daily 32/ 28 day supply    Auth #: [id number]  Risk Category: Adult female NOT of reproductive capacity    Routine survey questions reviewed. yes    Thank you, any further questions feel free to contact me if needed    Tavia Posadas   [facility] Pharmacy Liaison, alpha split R[employer]Z  Phone: [phone]  Fax: [phone]  Email: [email or web address]

## 2022-10-03 ENCOUNTER — APPOINTMENT (OUTPATIENT)
Dept: LAB | Facility: CLINIC | Age: 72
End: 2022-10-03
Attending: INTERNAL MEDICINE
Payer: MEDICARE

## 2022-10-03 ENCOUNTER — INFUSION THERAPY VISIT (OUTPATIENT)
Dept: ONCOLOGY | Facility: CLINIC | Age: 72
End: 2022-10-03
Attending: INTERNAL MEDICINE
Payer: MEDICARE

## 2022-10-03 VITALS
OXYGEN SATURATION: 98 % | WEIGHT: 121 LBS | SYSTOLIC BLOOD PRESSURE: 143 MMHG | HEART RATE: 86 BPM | RESPIRATION RATE: 18 BRPM | DIASTOLIC BLOOD PRESSURE: 69 MMHG | BODY MASS INDEX: 22.5 KG/M2 | TEMPERATURE: 97.6 F

## 2022-10-03 DIAGNOSIS — E03.9 ACQUIRED HYPOTHYROIDISM: ICD-10-CM

## 2022-10-03 DIAGNOSIS — C90.00 MULTIPLE MYELOMA NOT HAVING ACHIEVED REMISSION (H): ICD-10-CM

## 2022-10-03 DIAGNOSIS — Z79.899 ENCOUNTER FOR LONG-TERM (CURRENT) USE OF MEDICATIONS: ICD-10-CM

## 2022-10-03 DIAGNOSIS — C90.00 MULTIPLE MYELOMA, REMISSION STATUS UNSPECIFIED (H): Primary | ICD-10-CM

## 2022-10-03 LAB
ALBUMIN SERPL BCG-MCNC: 3.7 G/DL (ref 3.5–5.2)
ALP SERPL-CCNC: 60 U/L (ref 35–104)
ALT SERPL W P-5'-P-CCNC: 18 U/L (ref 10–35)
ANION GAP SERPL CALCULATED.3IONS-SCNC: 9 MMOL/L (ref 7–15)
AST SERPL W P-5'-P-CCNC: 17 U/L (ref 10–35)
B2 MICROGLOB TUMOR MARKER SER-MCNC: 3.2 MG/L
BASOPHILS # BLD AUTO: 0 10E3/UL (ref 0–0.2)
BASOPHILS NFR BLD AUTO: 1 %
BILIRUB SERPL-MCNC: 0.2 MG/DL
BUN SERPL-MCNC: 14.2 MG/DL (ref 8–23)
CALCIUM SERPL-MCNC: 9.4 MG/DL (ref 8.8–10.2)
CHLORIDE SERPL-SCNC: 99 MMOL/L (ref 98–107)
CREAT SERPL-MCNC: 0.77 MG/DL (ref 0.51–0.95)
CRP SERPL-MCNC: <3 MG/L
DEPRECATED HCO3 PLAS-SCNC: 26 MMOL/L (ref 22–29)
EOSINOPHIL # BLD AUTO: 0.1 10E3/UL (ref 0–0.7)
EOSINOPHIL NFR BLD AUTO: 4 %
ERYTHROCYTE [DISTWIDTH] IN BLOOD BY AUTOMATED COUNT: 16 % (ref 10–15)
GFR SERPL CREATININE-BSD FRML MDRD: 82 ML/MIN/1.73M2
GLUCOSE SERPL-MCNC: 71 MG/DL (ref 70–99)
HCT VFR BLD AUTO: 33.1 % (ref 35–47)
HGB BLD-MCNC: 11.1 G/DL (ref 11.7–15.7)
IMM GRANULOCYTES # BLD: 0 10E3/UL
IMM GRANULOCYTES NFR BLD: 0 %
KAPPA LC FREE SER-MCNC: 57.9 MG/DL (ref 0.33–1.94)
KAPPA LC FREE/LAMBDA FREE SER NEPH: 241.25 {RATIO} (ref 0.26–1.65)
LAMBDA LC FREE SERPL-MCNC: 0.24 MG/DL (ref 0.57–2.63)
LDH SERPL L TO P-CCNC: 132 U/L (ref 0–250)
LYMPHOCYTES # BLD AUTO: 1.6 10E3/UL (ref 0.8–5.3)
LYMPHOCYTES NFR BLD AUTO: 46 %
MCH RBC QN AUTO: 33.3 PG (ref 26.5–33)
MCHC RBC AUTO-ENTMCNC: 33.5 G/DL (ref 31.5–36.5)
MCV RBC AUTO: 99 FL (ref 78–100)
MONOCYTES # BLD AUTO: 0.6 10E3/UL (ref 0–1.3)
MONOCYTES NFR BLD AUTO: 17 %
NEUTROPHILS # BLD AUTO: 1.1 10E3/UL (ref 1.6–8.3)
NEUTROPHILS NFR BLD AUTO: 32 %
NRBC # BLD AUTO: 0 10E3/UL
NRBC BLD AUTO-RTO: 0 /100
PLATELET # BLD AUTO: 114 10E3/UL (ref 150–450)
POTASSIUM SERPL-SCNC: 4.3 MMOL/L (ref 3.4–5.3)
PROT SERPL-MCNC: 7.8 G/DL (ref 6.4–8.3)
RBC # BLD AUTO: 3.33 10E6/UL (ref 3.8–5.2)
SODIUM SERPL-SCNC: 134 MMOL/L (ref 136–145)
TOTAL PROTEIN SERUM FOR ELP: 7.4 G/DL (ref 6.4–8.3)
TSH SERPL DL<=0.005 MIU/L-ACNC: 1.82 UIU/ML (ref 0.3–4.2)
WBC # BLD AUTO: 3.4 10E3/UL (ref 4–11)

## 2022-10-03 PROCEDURE — 82232 ASSAY OF BETA-2 PROTEIN: CPT

## 2022-10-03 PROCEDURE — 84165 PROTEIN E-PHORESIS SERUM: CPT | Mod: TC | Performed by: STUDENT IN AN ORGANIZED HEALTH CARE EDUCATION/TRAINING PROGRAM

## 2022-10-03 PROCEDURE — 84443 ASSAY THYROID STIM HORMONE: CPT

## 2022-10-03 PROCEDURE — 80053 COMPREHEN METABOLIC PANEL: CPT

## 2022-10-03 PROCEDURE — 83615 LACTATE (LD) (LDH) ENZYME: CPT

## 2022-10-03 PROCEDURE — 36415 COLL VENOUS BLD VENIPUNCTURE: CPT

## 2022-10-03 PROCEDURE — 84155 ASSAY OF PROTEIN SERUM: CPT

## 2022-10-03 PROCEDURE — 96401 CHEMO ANTI-NEOPL SQ/IM: CPT

## 2022-10-03 PROCEDURE — 86140 C-REACTIVE PROTEIN: CPT

## 2022-10-03 PROCEDURE — 250N000013 HC RX MED GY IP 250 OP 250 PS 637: Performed by: INTERNAL MEDICINE

## 2022-10-03 PROCEDURE — 85025 COMPLETE CBC W/AUTO DIFF WBC: CPT

## 2022-10-03 PROCEDURE — 250N000011 HC RX IP 250 OP 636: Performed by: INTERNAL MEDICINE

## 2022-10-03 PROCEDURE — 83521 IG LIGHT CHAINS FREE EACH: CPT

## 2022-10-03 RX ORDER — ACETAMINOPHEN 325 MG/1
650 TABLET ORAL
Status: COMPLETED | OUTPATIENT
Start: 2022-10-03 | End: 2022-10-03

## 2022-10-03 RX ADMIN — DARATUMUMAB AND HYALURONIDASE-FIHJ (HUMAN RECOMBINANT) 1800 MG: 1800; 30000 INJECTION SUBCUTANEOUS at 09:21

## 2022-10-03 RX ADMIN — ACETAMINOPHEN 650 MG: 325 TABLET ORAL at 08:53

## 2022-10-03 ASSESSMENT — PAIN SCALES - GENERAL: PAINLEVEL: MODERATE PAIN (4)

## 2022-10-03 NOTE — PATIENT INSTRUCTIONS
[facility] Triage and after hours / weekends / holidays:  [phone]    Please call the triage or after hours line if you experience a temperature greater than or equal to 100.5, shaking chills, have uncontrolled nausea, vomiting and/or diarrhea, dizziness, shortness of breath, chest pain, bleeding, unexplained bruising, or if you have any other new/concerning symptoms, questions or concerns.      If you are having any concerning symptoms or wish to speak to a provider before your next infusion visit, please call your care coordinator or triage to notify them so we can adequately serve you.     If you need a refill on a narcotic prescription or other medication, please call before your infusion appointment.        October 2022 Sunday Monday Tuesday Wednesday Thursday Friday Saturday                                 1       2     3    LAB PERIPHERAL   8:00 AM   (15 min.)   [facility] LAB DRAW   [facility]    ONC INFUSION 1 HR (60 MIN)   8:30 AM   (60 min.)    ONC INFUSION NURSE   [facility] 4     5     6     7     8       9     10     11     12     13     14     15       16     17     18     19     20     21     22       23  Happy Birthday!     24     25     26     27     28     29       30     31    LAB PERIPHERAL   8:00 AM   (15 min.)   [facility] LAB DRAW   [facility]    ONC INFUSION 1 HR (60 MIN)   8:30 AM   (60 min.)    ONC INFUSION NURSE   [facility]                                        November 2022 Sunday Monday Tuesday Wednesday Thursday Friday Saturday             1     2     3     4     5       6     7     8     9     10     11     12       13     14    MA VISIT  10:30 AM   (15 min.)   SPMW MA/LPN   [facility] 15     16     17     18    BMT RETURN   7:30 AM   (30 min.)   Lui Hills MD   [facility] Blood and Marrow Transplant  Program [place] 19       20     21     22     23     24     25     26       27     28    LAB PERIPHERAL   8:00 AM   (15 min.)   [facility] LAB DRAW   [facility]    ONC INFUSION 1 HR (60 MIN)   8:30 AM   (60 min.)    ONC INFUSION NURSE   [facility] 29     30                                       Recent Results (from the past 24 hour(s))   CRP inflammation    Collection Time: 10/03/22  8:21 AM   Result Value Ref Range    CRP Inflammation <3.00 <5.00 mg/L   Comprehensive metabolic panel    Collection Time: 10/03/22  8:21 AM   Result Value Ref Range    Sodium 134 (L) 136 - 145 mmol/L    Potassium 4.3 3.4 - 5.3 mmol/L    Chloride 99 98 - 107 mmol/L    Carbon Dioxide (CO2) 26 22 - 29 mmol/L    Anion Gap 9 7 - 15 mmol/L    Urea Nitrogen 14.2 8.0 - 23.0 mg/dL    Creatinine 0.77 0.51 - 0.95 mg/dL    Calcium 9.4 8.8 - 10.2 mg/dL    Glucose 71 70 - 99 mg/dL    Alkaline Phosphatase 60 35 - 104 U/L    AST 17 10 - 35 U/L    ALT 18 10 - 35 U/L    Protein Total 7.8 6.4 - 8.3 g/dL    Albumin 3.7 3.5 - 5.2 g/dL    Bilirubin Total 0.2 <=1.2 mg/dL    GFR Estimate 82 >60 mL/min/1.73m2   CBC with platelets and differential    Collection Time: 10/03/22  8:21 AM   Result Value Ref Range    WBC Count 3.4 (L) 4.0 - 11.0 10e3/uL    RBC Count 3.33 (L) 3.80 - 5.20 10e6/uL    Hemoglobin 11.1 (L) 11.7 - 15.7 g/dL    Hematocrit 33.1 (L) 35.0 - 47.0 %    MCV 99 78 - 100 fL    MCH 33.3 (H) 26.5 - 33.0 pg    MCHC 33.5 31.5 - 36.5 g/dL    RDW 16.0 (H) 10.0 - 15.0 %    Platelet Count 114 (L) 150 - 450 10e3/uL    % Neutrophils 32 %    % Lymphocytes 46 %    % Monocytes 17 %    % Eosinophils 4 %    % Basophils 1 %    % Immature Granulocytes 0 %    NRBCs per 100 WBC 0 <1 /100    Absolute Neutrophils 1.1 (L) 1.6 - 8.3 10e3/uL    Absolute Lymphocytes 1.6 0.8 - 5.3 10e3/uL    Absolute Monocytes 0.6 0.0 - 1.3 10e3/uL    Absolute Eosinophils 0.1 0.0 - 0.7 10e3/uL    Absolute Basophils 0.0 0.0 - 0.2  10e3/uL    Absolute Immature Granulocytes 0.0 <=0.4 10e3/uL    Absolute NRBCs 0.0 10e3/uL

## 2022-10-03 NOTE — NURSING NOTE
Chief Complaint   Patient presents with     Labs Only     Venipuncture, vitals       Maria Eugenia Murdock RN on 10/3/2022 at 8:25 AM

## 2022-10-03 NOTE — PROGRESS NOTES
Infusion Nursing Note:  Shena Hartmann presents today for cycle 27 day 1 darzalex faspro.    Patient seen by provider today: No   present during visit today: Not Applicable.    Note:   Patient reports usual body aches today. She declines interventions. She reports ongoing fatigue. She denies fevers, chills, SOB, chest pain, nausea, and diarrhea.    Intravenous Access:  No Intravenous access at this visit.    Treatment Conditions:  Lab Results   Component Value Date    HGB 11.1 (L) 10/03/2022    WBC 3.4 (L) 10/03/2022    ANEU 2.1 07/05/2021    ANEUTAUTO 1.1 (L) 10/03/2022     (L) 10/03/2022      Lab Results   Component Value Date     (L) 10/03/2022    POTASSIUM 4.3 10/03/2022    MAG 1.8 12/12/2014    CR 0.77 10/03/2022    PAULINE 9.4 10/03/2022    BILITOTAL 0.2 10/03/2022    ALBUMIN 3.7 10/03/2022    ALT 18 10/03/2022    AST 17 10/03/2022     Results reviewed, labs MET treatment parameters, ok to proceed with treatment.    Post Infusion Assessment:  Patient tolerated injection into left lower abdomen without incident.     Discharge Plan:   Patient declined prescription refills.  Discharge instructions reviewed with: Patient.  Patient and/or family verbalized understanding of discharge instructions and all questions answered.  Copy of AVS reviewed with patient and/or family.  Patient will return 10/31 for next appointment.   Patient discharged in stable condition accompanied by: self.  Departure Mode: Ambulatory.      Esthela Schmitz RN

## 2022-10-04 LAB
ALBUMIN SERPL ELPH-MCNC: 3.7 G/DL (ref 3.7–5.1)
ALPHA1 GLOB SERPL ELPH-MCNC: 0.3 G/DL (ref 0.2–0.4)
ALPHA2 GLOB SERPL ELPH-MCNC: 0.7 G/DL (ref 0.5–0.9)
B-GLOBULIN SERPL ELPH-MCNC: 0.5 G/DL (ref 0.6–1)
GAMMA GLOB SERPL ELPH-MCNC: 2.2 G/DL (ref 0.7–1.6)
M PROTEIN SERPL ELPH-MCNC: 2.1 G/DL
PROT PATTERN SERPL ELPH-IMP: ABNORMAL

## 2022-10-04 PROCEDURE — 84165 PROTEIN E-PHORESIS SERUM: CPT | Mod: 26

## 2022-10-09 ENCOUNTER — MYC MEDICAL ADVICE (OUTPATIENT)
Dept: INTERNAL MEDICINE | Facility: CLINIC | Age: 72
End: 2022-10-09

## 2022-10-24 ENCOUNTER — TRANSFERRED RECORDS (OUTPATIENT)
Dept: HEALTH INFORMATION MANAGEMENT | Facility: CLINIC | Age: 72
End: 2022-10-24

## 2022-10-27 ENCOUNTER — TELEPHONE (OUTPATIENT)
Dept: ONCOLOGY | Facility: CLINIC | Age: 72
End: 2022-10-27

## 2022-10-27 DIAGNOSIS — C90.00 MULTIPLE MYELOMA, REMISSION STATUS UNSPECIFIED (H): Primary | ICD-10-CM

## 2022-10-27 NOTE — TELEPHONE ENCOUNTER
Oral Chemotherapy Monitoring Program    Medication: Pomalyst  Rx: 1mg PO daily 32/28 ds     Auth #: [id number]  Risk Category: Adult female NOT of reproductive capacity    Routine survey questions reviewed. yes        Tavia Suazo Pharmacy Liaison, [facility]  Phone: [phone]  Fax: [phone]  Email: [email or web address]

## 2022-10-28 DIAGNOSIS — C90.00 MULTIPLE MYELOMA NOT HAVING ACHIEVED REMISSION (H): Primary | ICD-10-CM

## 2022-10-28 RX ORDER — NALOXONE HYDROCHLORIDE 0.4 MG/ML
.1-.4 INJECTION, SOLUTION INTRAMUSCULAR; INTRAVENOUS; SUBCUTANEOUS
Status: CANCELLED | OUTPATIENT
Start: 2022-10-31

## 2022-10-28 RX ORDER — DIPHENHYDRAMINE HCL 25 MG
25 CAPSULE ORAL
Status: CANCELLED
Start: 2022-10-31

## 2022-10-28 RX ORDER — ACETAMINOPHEN 325 MG/1
650 TABLET ORAL
Status: CANCELLED
Start: 2022-10-31

## 2022-10-28 RX ORDER — HEPARIN SODIUM,PORCINE 10 UNIT/ML
5 VIAL (ML) INTRAVENOUS
Status: CANCELLED | OUTPATIENT
Start: 2022-10-31

## 2022-10-28 RX ORDER — DIPHENHYDRAMINE HYDROCHLORIDE 50 MG/ML
50 INJECTION INTRAMUSCULAR; INTRAVENOUS
Status: CANCELLED
Start: 2022-10-31

## 2022-10-28 RX ORDER — SODIUM CHLORIDE 9 MG/ML
1000 INJECTION, SOLUTION INTRAVENOUS CONTINUOUS PRN
Status: CANCELLED
Start: 2022-10-31

## 2022-10-28 RX ORDER — LORAZEPAM 2 MG/ML
0.5 INJECTION INTRAMUSCULAR EVERY 4 HOURS PRN
Status: CANCELLED
Start: 2022-10-31

## 2022-10-28 RX ORDER — EPINEPHRINE 1 MG/ML
0.3 INJECTION, SOLUTION, CONCENTRATE INTRAVENOUS EVERY 5 MIN PRN
Status: CANCELLED | OUTPATIENT
Start: 2022-10-31

## 2022-10-28 RX ORDER — ALBUTEROL SULFATE 90 UG/1
1-2 AEROSOL, METERED RESPIRATORY (INHALATION)
Status: CANCELLED
Start: 2022-10-31

## 2022-10-28 RX ORDER — ALBUTEROL SULFATE 0.83 MG/ML
2.5 SOLUTION RESPIRATORY (INHALATION)
Status: CANCELLED | OUTPATIENT
Start: 2022-10-31

## 2022-10-28 RX ORDER — HEPARIN SODIUM (PORCINE) LOCK FLUSH IV SOLN 100 UNIT/ML 100 UNIT/ML
5 SOLUTION INTRAVENOUS
Status: CANCELLED | OUTPATIENT
Start: 2022-10-31

## 2022-10-28 RX ORDER — MEPERIDINE HYDROCHLORIDE 25 MG/ML
25 INJECTION INTRAMUSCULAR; INTRAVENOUS; SUBCUTANEOUS EVERY 30 MIN PRN
Status: CANCELLED | OUTPATIENT
Start: 2022-10-31

## 2022-10-30 ENCOUNTER — HEALTH MAINTENANCE LETTER (OUTPATIENT)
Age: 72
End: 2022-10-30

## 2022-10-31 ENCOUNTER — INFUSION THERAPY VISIT (OUTPATIENT)
Dept: ONCOLOGY | Facility: CLINIC | Age: 72
End: 2022-10-31
Attending: INTERNAL MEDICINE
Payer: MEDICARE

## 2022-10-31 ENCOUNTER — APPOINTMENT (OUTPATIENT)
Dept: LAB | Facility: CLINIC | Age: 72
End: 2022-10-31
Attending: INTERNAL MEDICINE
Payer: MEDICARE

## 2022-10-31 VITALS
DIASTOLIC BLOOD PRESSURE: 64 MMHG | RESPIRATION RATE: 16 BRPM | HEART RATE: 65 BPM | BODY MASS INDEX: 22.44 KG/M2 | OXYGEN SATURATION: 99 % | WEIGHT: 120.7 LBS | SYSTOLIC BLOOD PRESSURE: 138 MMHG | TEMPERATURE: 97.8 F

## 2022-10-31 DIAGNOSIS — C90.00 MULTIPLE MYELOMA, REMISSION STATUS UNSPECIFIED (H): ICD-10-CM

## 2022-10-31 DIAGNOSIS — Z79.899 ENCOUNTER FOR LONG-TERM (CURRENT) USE OF MEDICATIONS: ICD-10-CM

## 2022-10-31 DIAGNOSIS — C90.00 MULTIPLE MYELOMA NOT HAVING ACHIEVED REMISSION (H): Primary | ICD-10-CM

## 2022-10-31 LAB
ALBUMIN SERPL BCG-MCNC: 3.8 G/DL (ref 3.5–5.2)
ALP SERPL-CCNC: 53 U/L (ref 35–104)
ALT SERPL W P-5'-P-CCNC: 17 U/L (ref 10–35)
ANION GAP SERPL CALCULATED.3IONS-SCNC: 8 MMOL/L (ref 7–15)
AST SERPL W P-5'-P-CCNC: 17 U/L (ref 10–35)
B2 MICROGLOB TUMOR MARKER SER-MCNC: 3.2 MG/L
BASOPHILS # BLD AUTO: 0 10E3/UL (ref 0–0.2)
BASOPHILS NFR BLD AUTO: 1 %
BILIRUB SERPL-MCNC: 0.3 MG/DL
BUN SERPL-MCNC: 11.7 MG/DL (ref 8–23)
CALCIUM SERPL-MCNC: 9.5 MG/DL (ref 8.8–10.2)
CHLORIDE SERPL-SCNC: 100 MMOL/L (ref 98–107)
CREAT SERPL-MCNC: 0.79 MG/DL (ref 0.51–0.95)
CRP SERPL-MCNC: 3.46 MG/L
DEPRECATED HCO3 PLAS-SCNC: 27 MMOL/L (ref 22–29)
EOSINOPHIL # BLD AUTO: 0.1 10E3/UL (ref 0–0.7)
EOSINOPHIL NFR BLD AUTO: 4 %
ERYTHROCYTE [DISTWIDTH] IN BLOOD BY AUTOMATED COUNT: 16.6 % (ref 10–15)
GFR SERPL CREATININE-BSD FRML MDRD: 79 ML/MIN/1.73M2
GLUCOSE SERPL-MCNC: 57 MG/DL (ref 70–99)
HCT VFR BLD AUTO: 33.5 % (ref 35–47)
HGB BLD-MCNC: 11.2 G/DL (ref 11.7–15.7)
IMM GRANULOCYTES # BLD: 0 10E3/UL
IMM GRANULOCYTES NFR BLD: 0 %
KAPPA LC FREE SER-MCNC: 65.57 MG/DL (ref 0.33–1.94)
KAPPA LC FREE/LAMBDA FREE SER NEPH: 273.21 {RATIO} (ref 0.26–1.65)
LAMBDA LC FREE SERPL-MCNC: 0.24 MG/DL (ref 0.57–2.63)
LDH SERPL L TO P-CCNC: 131 U/L (ref 0–250)
LYMPHOCYTES # BLD AUTO: 1.6 10E3/UL (ref 0.8–5.3)
LYMPHOCYTES NFR BLD AUTO: 45 %
MCH RBC QN AUTO: 33.9 PG (ref 26.5–33)
MCHC RBC AUTO-ENTMCNC: 33.4 G/DL (ref 31.5–36.5)
MCV RBC AUTO: 102 FL (ref 78–100)
MONOCYTES # BLD AUTO: 0.6 10E3/UL (ref 0–1.3)
MONOCYTES NFR BLD AUTO: 16 %
NEUTROPHILS # BLD AUTO: 1.2 10E3/UL (ref 1.6–8.3)
NEUTROPHILS NFR BLD AUTO: 34 %
NRBC # BLD AUTO: 0 10E3/UL
NRBC BLD AUTO-RTO: 0 /100
PLATELET # BLD AUTO: 119 10E3/UL (ref 150–450)
POTASSIUM SERPL-SCNC: 4.3 MMOL/L (ref 3.4–5.3)
PROT SERPL-MCNC: 8.2 G/DL (ref 6.4–8.3)
RBC # BLD AUTO: 3.3 10E6/UL (ref 3.8–5.2)
SODIUM SERPL-SCNC: 135 MMOL/L (ref 136–145)
TOTAL PROTEIN SERUM FOR ELP: 7.6 G/DL (ref 6.4–8.3)
WBC # BLD AUTO: 3.5 10E3/UL (ref 4–11)

## 2022-10-31 PROCEDURE — 96401 CHEMO ANTI-NEOPL SQ/IM: CPT

## 2022-10-31 PROCEDURE — 84155 ASSAY OF PROTEIN SERUM: CPT | Mod: 91

## 2022-10-31 PROCEDURE — 80053 COMPREHEN METABOLIC PANEL: CPT

## 2022-10-31 PROCEDURE — 86140 C-REACTIVE PROTEIN: CPT

## 2022-10-31 PROCEDURE — 83615 LACTATE (LD) (LDH) ENZYME: CPT

## 2022-10-31 PROCEDURE — 250N000013 HC RX MED GY IP 250 OP 250 PS 637: Performed by: INTERNAL MEDICINE

## 2022-10-31 PROCEDURE — 84165 PROTEIN E-PHORESIS SERUM: CPT | Mod: TC | Performed by: PATHOLOGY

## 2022-10-31 PROCEDURE — 250N000011 HC RX IP 250 OP 636: Performed by: INTERNAL MEDICINE

## 2022-10-31 PROCEDURE — 83521 IG LIGHT CHAINS FREE EACH: CPT

## 2022-10-31 PROCEDURE — 82232 ASSAY OF BETA-2 PROTEIN: CPT

## 2022-10-31 PROCEDURE — 85004 AUTOMATED DIFF WBC COUNT: CPT | Performed by: INTERNAL MEDICINE

## 2022-10-31 PROCEDURE — 36415 COLL VENOUS BLD VENIPUNCTURE: CPT

## 2022-10-31 RX ORDER — ACETAMINOPHEN 325 MG/1
650 TABLET ORAL
Status: COMPLETED | OUTPATIENT
Start: 2022-10-31 | End: 2022-10-31

## 2022-10-31 RX ADMIN — DARATUMUMAB AND HYALURONIDASE-FIHJ (HUMAN RECOMBINANT) 1800 MG: 1800; 30000 INJECTION SUBCUTANEOUS at 09:25

## 2022-10-31 RX ADMIN — ACETAMINOPHEN 650 MG: 325 TABLET ORAL at 08:54

## 2022-10-31 ASSESSMENT — PAIN SCALES - GENERAL: PAINLEVEL: MODERATE PAIN (5)

## 2022-10-31 NOTE — PROGRESS NOTES
Infusion Nursing Note:  Shena Hartmann presents today for Cycle 28 Darzalex Faspro    Patient seen by provider today: No    Note: Pt has continued muscle aches from her Pomalyst but declines need for intervention today. States she occasionally takes Tylenol at home, but also does a lot of non-pharmacologic measures at home to help manage the pain. Will discuss with Dr. Hills at her next visit in a couple weeks.    Treatment Conditions:  Lab Results   Component Value Date    HGB 11.2 (L) 10/31/2022    WBC 3.5 (L) 10/31/2022    ANEU 2.1 07/05/2021    ANEUTAUTO 1.2 (L) 10/31/2022     (L) 10/31/2022      Lab Results   Component Value Date     (L) 10/31/2022    POTASSIUM 4.3 10/31/2022    MAG 1.8 12/12/2014    CR 0.79 10/31/2022    PAULINE 9.5 10/31/2022    BILITOTAL 0.3 10/31/2022    ALBUMIN 3.8 10/31/2022    ALT 17 10/31/2022    AST 17 10/31/2022     Results reviewed, labs MET treatment parameters, ok to proceed with treatment.    Post Infusion Assessment:  Patient tolerated injection without incident to RIGHT abdomen.     Discharge Plan:   Patient declined prescription refills.  Copy of AVS reviewed with patient and/or family.  Patient will return 11/28 for next appointment.  Patient discharged in stable condition accompanied by: self.  Departure Mode: Ambulatory.    Sailaja Robles RN

## 2022-10-31 NOTE — NURSING NOTE
Chief Complaint   Patient presents with     Blood Draw     Vitals, blood drawn via VPT by LPN. Pt checked into appt.     Tisha Watters/ DOREEN Chamberlain LPN

## 2022-10-31 NOTE — PATIENT INSTRUCTIONS
Contact Numbers    [facility] Main Line (for Scheduling/Triage/After Hours Nurse Line): [phone]    Please call the [facility] nurse triage or the after hours nurse line if you experience a temperature greater than or equal to 100.5, shaking chills, have uncontrolled nausea, vomiting and/or diarrhea, dizziness, lightheadedness, shortness of breath, chest pain, bleeding, unexplained bruising, or if you have any other new/concerning symptoms, questions or concerns.     If you are having any concerning symptoms or wish to speak to a provider before your next infusion visit, please call your care coordinator or triage to notify them so we can adequately serve you.     If you need any refills on medications (narcotics or other medications), please call before your infusion appointment.       October 2022 Sunday Monday Tuesday Wednesday Thursday Friday Saturday                                 1       2     3    LAB PERIPHERAL   8:00 AM   (15 min.)   [facility] LAB DRAW   [facility]    ONC INFUSION 1 HR (60 MIN)   8:30 AM   (60 min.)    ONC INFUSION NURSE   [facility] 4     5     6     7     8       9     10     11     12     13     14     15       16     17     18     19     20     21     22       23  Happy Birthday!     24     25     26     27     28     29       30     31    LAB PERIPHERAL   8:00 AM   (15 min.)   [facility] LAB DRAW   [facility]    ONC INFUSION 1 HR (60 MIN)   8:30 AM   (60 min.)    ONC INFUSION NURSE   [facility]                                        November 2022 Sunday Monday Tuesday Wednesday Thursday Friday Saturday             1     2     3     4     5       6     7     8     9     10     11     12       13     14    MA VISIT   1:00 PM   (15 min.)   SPMW MA/LPN   [facility] 15     16     17     18    BMT RETURN   7:30 AM   (30 min.)   [place]  Lui Doyle MD   [facility] Blood and Marrow Transplant Program [place] 19       20     21     22     23     24     25     26       27     28    LAB PERIPHERAL   8:00 AM   (15 min.)   [facility] LAB DRAW   [facility]    ONC INFUSION 1 HR (60 MIN)   8:30 AM   (60 min.)    ONC INFUSION NURSE   [facility] 29     30                                      Lab Results:  Recent Results (from the past 12 hour(s))   Comprehensive metabolic panel    Collection Time: 10/31/22  8:13 AM   Result Value Ref Range    Sodium 135 (L) 136 - 145 mmol/L    Potassium 4.3 3.4 - 5.3 mmol/L    Chloride 100 98 - 107 mmol/L    Carbon Dioxide (CO2) 27 22 - 29 mmol/L    Anion Gap 8 7 - 15 mmol/L    Urea Nitrogen 11.7 8.0 - 23.0 mg/dL    Creatinine 0.79 0.51 - 0.95 mg/dL    Calcium 9.5 8.8 - 10.2 mg/dL    Glucose 57 (L) 70 - 99 mg/dL    Alkaline Phosphatase 53 35 - 104 U/L    AST 17 10 - 35 U/L    ALT 17 10 - 35 U/L    Protein Total 8.2 6.4 - 8.3 g/dL    Albumin 3.8 3.5 - 5.2 g/dL    Bilirubin Total 0.3 <=1.2 mg/dL    GFR Estimate 79 >60 mL/min/1.73m2   CRP inflammation    Collection Time: 10/31/22  8:13 AM   Result Value Ref Range    CRP Inflammation 3.46 <5.00 mg/L   CBC with platelets and differential    Collection Time: 10/31/22  8:13 AM   Result Value Ref Range    WBC Count 3.5 (L) 4.0 - 11.0 10e3/uL    RBC Count 3.30 (L) 3.80 - 5.20 10e6/uL    Hemoglobin 11.2 (L) 11.7 - 15.7 g/dL    Hematocrit 33.5 (L) 35.0 - 47.0 %     (H) 78 - 100 fL    MCH 33.9 (H) 26.5 - 33.0 pg    MCHC 33.4 31.5 - 36.5 g/dL    RDW 16.6 (H) 10.0 - 15.0 %    Platelet Count 119 (L) 150 - 450 10e3/uL    % Neutrophils 34 %    % Lymphocytes 45 %    % Monocytes 16 %    % Eosinophils 4 %    % Basophils 1 %    % Immature Granulocytes 0 %    NRBCs per 100 WBC 0 <1 /100    Absolute Neutrophils 1.2 (L) 1.6 - 8.3 10e3/uL    Absolute Lymphocytes 1.6 0.8 - 5.3 10e3/uL    Absolute Monocytes 0.6 0.0 - 1.3  10e3/uL    Absolute Eosinophils 0.1 0.0 - 0.7 10e3/uL    Absolute Basophils 0.0 0.0 - 0.2 10e3/uL    Absolute Immature Granulocytes 0.0 <=0.4 10e3/uL    Absolute NRBCs 0.0 10e3/uL

## 2022-11-01 LAB
ALBUMIN SERPL ELPH-MCNC: 3.7 G/DL (ref 3.7–5.1)
ALPHA1 GLOB SERPL ELPH-MCNC: 0.3 G/DL (ref 0.2–0.4)
ALPHA2 GLOB SERPL ELPH-MCNC: 0.7 G/DL (ref 0.5–0.9)
B-GLOBULIN SERPL ELPH-MCNC: 0.6 G/DL (ref 0.6–1)
GAMMA GLOB SERPL ELPH-MCNC: 2.3 G/DL (ref 0.7–1.6)
M PROTEIN SERPL ELPH-MCNC: 2 G/DL
PROT PATTERN SERPL ELPH-IMP: ABNORMAL

## 2022-11-01 PROCEDURE — 84165 PROTEIN E-PHORESIS SERUM: CPT | Mod: 26

## 2022-11-14 ENCOUNTER — ALLIED HEALTH/NURSE VISIT (OUTPATIENT)
Dept: FAMILY MEDICINE | Facility: CLINIC | Age: 72
End: 2022-11-14
Payer: MEDICARE

## 2022-11-14 DIAGNOSIS — M81.0 OSTEOPOROSIS: Primary | ICD-10-CM

## 2022-11-14 PROCEDURE — 96372 THER/PROPH/DIAG INJ SC/IM: CPT | Performed by: INTERNAL MEDICINE

## 2022-11-14 PROCEDURE — 99207 PR NO CHARGE NURSE ONLY: CPT

## 2022-11-14 NOTE — PROGRESS NOTES
Clinic Administered Medication Documentation     Indication: Prolia  (denosumab) is a prescription medicine used to treat osteoporosis in patients who:     Are at high risk for fracture, meaning patients who have had a fracture related to osteoporosis, or who have multiple risk factors for fracture     Cannot use another osteoporosis medicine or other osteoporosis medicines did not work well   The timeline for early/late injections would be 4 weeks early and any time after the 6 month arun. If a patient receives their injection late, then the subsequent injection would be 6 months from the date that they actually received the injection    1.  When was the last injection?  5/3/22  2.  Did they check with their insurance for this calendar year?  Yes  3.  Is there an order in the chart?  Yes  4.  Has the patient had dental work involving the bone in the past month or will have work in the next 6 months?  No  5.  Did you have the patient wait 15 minutes after the injection?  Yes  6.  Remember to use .injection under the medication notes    The following steps were completed to comply with the REMS program for Prolia:    Reviewed information in the Medication Guide and Patient Counseling Chart, including the serious risks of Prolia  and the symptoms of each risk.    Advised patient to seek prompt medical attention if they have signs or symptoms of any of the serious risks.  Provided each patient a copy of the Medication Guide and Patient Brochure.    Clinic Administered Medication Documentation    Administrations This Visit     denosumab (PROLIA) injection 60 mg     Admin Date  11/14/2022 Action  Given Dose  60 mg Route  Subcutaneous Site  Left Arm Administered By  Edwar Lugo RN    Ordering Provider: Dominik Cardona MD    Patient Supplied?: No              Prolia Documentation    Prior to injection, verified patient identity using patient's name and date of birth. Medication was administered. Please see MAR and  medication order for additional information. Patient instructed to report any adverse reaction to staff immediately .    Indication: Prolia  (denosumab) is a prescription medicine used to treat osteoporosis in patients who:     Are at high risk for fracture, meaning patients who have had a fracture related to osteoporosis, or who have multiple risk factors for fracture.    Cannot use another osteoporosis medicine or other osteoporosis medicines did not work well.    The timeline for early/late injections would be 4 weeks early and any time after the 6 month arun. If a patient receives their injection late, then the subsequent injection would be 6 months from the date that they actually received the injection.    When was the last injection?  5/3/2022  Was the last injection at least 6 months ago? Yes  Has the prior authorization been completed?  Yes  Is there an active order (within the past 365 days) in the chart?  Yes  Patient denies any dental work involving the bone (e.g. tooth extraction or dental implants) in the past 4 weeks?  Yes  Patient denies plans for any dental work involving the bone (e.g. tooth extraction or dental implants) in the next 4 weeks? Yes    The following steps were completed to comply with the REMS program for Prolia:    Reviewed information in the Medication Guide and Patient Counseling Chart, including the serious risks of Prolia  and the symptoms of each risk.    Advised patient to seek prompt medical attention if they have signs or symptoms of any of the serious risks.    Provided each patient a copy of the Medication Guide and Patient Brochure.      Was entire vial of medication used? Yes  Vial/Syringe: Single dose vial  Expiration Date:  04/30/2025  Was this medication supplied by the patient? No

## 2022-11-18 ENCOUNTER — PATIENT OUTREACH (OUTPATIENT)
Dept: ONCOLOGY | Facility: CLINIC | Age: 72
End: 2022-11-18

## 2022-11-18 ENCOUNTER — ONCOLOGY VISIT (OUTPATIENT)
Dept: TRANSPLANT | Facility: CLINIC | Age: 72
End: 2022-11-18
Attending: INTERNAL MEDICINE
Payer: MEDICARE

## 2022-11-18 VITALS
HEART RATE: 79 BPM | DIASTOLIC BLOOD PRESSURE: 74 MMHG | OXYGEN SATURATION: 98 % | BODY MASS INDEX: 22.31 KG/M2 | SYSTOLIC BLOOD PRESSURE: 133 MMHG | WEIGHT: 120 LBS | RESPIRATION RATE: 16 BRPM | TEMPERATURE: 97.6 F

## 2022-11-18 DIAGNOSIS — C90.00 MULTIPLE MYELOMA NOT HAVING ACHIEVED REMISSION (H): Primary | ICD-10-CM

## 2022-11-18 PROCEDURE — G0463 HOSPITAL OUTPT CLINIC VISIT: HCPCS

## 2022-11-18 PROCEDURE — 99215 OFFICE O/P EST HI 40 MIN: CPT | Performed by: INTERNAL MEDICINE

## 2022-11-18 ASSESSMENT — PAIN SCALES - GENERAL: PAINLEVEL: MODERATE PAIN (5)

## 2022-11-18 NOTE — PROGRESS NOTES
"Wt Readings from Last 4 Encounters:   11/18/22 54.4 kg (120 lb)   10/31/22 54.7 kg (120 lb 11.2 oz)   10/03/22 54.9 kg (121 lb)   09/06/22 54.3 kg (119 lb 12.8 oz)     /74   Pulse 79   Temp 97.6  F (36.4  C) (Oral)   Resp 16   Wt 54.4 kg (120 lb)   SpO2 98%   BMI 22.31 kg/m      Shena was seen and examined by me today.  She is a 72-year-old woman who I have been following with multiple myeloma for more than 20 years.  She was initially diagnosed in 2002.  For almost 10 years she was left on NC smoldering multiple myeloma that meeting the threshold for or desiring treatment.  Eventually she progressed.  She has gone autologous transplantation in 2014.  Her course was complicated that she never achieved a complete response even after autologous transplant and has been on therapy since that time.  She has had significant side effects from multiple medications and IMIDs.  This includes side effects from thalidomide many many years ago.  She also had side effects from Revlimid.  Overall  Is active, she continues to significant chronic pain symptoms.  She is currently on monthly daratumumab and pomalidomide with 21-day cycles and 2 mg alternating with 1 mg doses.  This dose is really based on the significant side effects that she continues.  She has been quite sensitive to almost all medications.  We have modified her dosing plan based on the benefit versus risk of side effects.  She rates her chronic pain in the 4-5 range.  She continues to have lower extremity cramping and feels overall \"sludging\" of her muscles and body.  She also has some pain in her hands and feet and this may be partially related to neuropathy.  On the positive side, she has not had any COVID exposure or illness.  She has also had no real symptoms of an upper respiratory tract infection.  She is fairly cautious with exposures she is probably good in this flu season.  Her weight has been stable.  She has had some deaths in the family and " her  has gone through treatment for state cancer which is progressed over the past year.  Otherwise, she is now on thyroid replacement.  The rest of the 10 point review of systems is not significantly changed.  I do not see that she really has any new bone pain other than the chronic pain mentioned above.    Physical exam: Overall Shena looks well and is in no acute distress today.  HEENT exam is unremarkable.  There is no cervical, axillary or inguinal adenopathy.  Lungs are clear to auscultation percussion.  Cardiac exam is without murmurs.  Abdominal exam is benign and there is no organomegaly.  Lower extremities are without edema.  There are no skin rashes.    Laboratory evaluation her last labs were on October 31.  At this time his CBC showed a hemoglobin of 11, stable platelet count of 119,000, and a white count of 3500 with adequate absolute neutrophils.  Her LDH is low.  Beta-2 microglobulin L-spine.  Liver function tests have been normal except for low albumin which is chronic.  Her M spike continues to be slightly over grams since the beginning of the year.  I had hoped that on this chronic therapy with she will decrease that is not the case.  Her kappa light chains are markedly elevated lambda light chains.  A CRP was within normal range.    Is my overall impression Shena has multiple myeloma for over 20 years.  My biggest concern is that her M spike is hovering around 2 g.  Based on her symptoms, we are hesitant to increase her pomalidomide.  We spent a great deal of time today discussing options.  We talked about your drug possibilities including carfilzomib and CAR-T therapy.  She understands the side effects of CAR-T therapy including cytokine release syndrome and neurotoxicity.  She is obviously a bit reluctant to think about these options given the side effect.  There are also additional monoclonal antibodies which may be more active.  Therefore, to be seen by my colleague, Nighat Chavez, to  possibly take over her care.  Shena will be out the beginning of January and we are requesting this consult after January 23 before the end of February.  At this time we will continue her monthly daratumumab.  She is already scheduled in December and we will add on visits for January 23, February 15 and March 20 of next year.  Obviously, this plan may change.  We will continue with her pomalidomide at the current dosing.  All of her questions have been answered today.    Dr. Lui Hills MD

## 2022-11-18 NOTE — LETTER
"    11/18/2022         RE: Shena Hartmann  [address] [place] [address]        Dear Colleague,    Thank you for referring your patient, Shena Hartmann, to the [facility]OD AND [facility]ARROW [facility] [place]. Please see a copy of my visit note below.    Wt Readings from Last 4 Encounters:   11/18/22 54.4 kg (120 lb)   10/31/22 54.7 kg (120 lb 11.2 oz)   10/03/22 54.9 kg (121 lb)   09/06/22 54.3 kg (119 lb 12.8 oz)     /74   Pulse 79   Temp 97.6  F (36.4  C) (Oral)   Resp 16   Wt 54.4 kg (120 lb)   SpO2 98%   BMI 22.31 kg/m      Shena was seen and examined by me today.  She is a 72-year-old woman who I have been following with multiple myeloma for more than 20 years.  She was initially diagnosed in 2002.  For almost 10 years she was left on NC smoldering multiple myeloma that meeting the threshold for or desiring treatment.  Eventually she progressed.  She has gone autologous transplantation in 2014.  Her course was complicated that she never achieved a complete response even after autologous transplant and has been on therapy since that time.  She has had significant side effects from multiple medications and IMIDs.  This includes side effects from thalidomide many many years ago.  She also had side effects from Revlimid.  Overall  Is active, she continues to significant chronic pain symptoms.  She is currently on monthly daratumumab and pomalidomide with 21-day cycles and 2 mg alternating with 1 mg doses.  This dose is really based on the significant side effects that she continues.  She has been quite sensitive to almost all medications.  We have modified her dosing plan based on the benefit versus risk of side effects.  She rates her chronic pain in the 4-5 range.  She continues to have lower extremity cramping and feels overall \"sludging\" of her muscles and body.  She also has some pain in her hands and feet and this may be partially related to neuropathy.  On the " positive side, she has not had any COVID exposure or illness.  She has also had no real symptoms of an upper respiratory tract infection.  She is fairly cautious with exposures she is probably good in this flu season.  Her weight has been stable.  She has had some deaths in the family and her  has gone through treatment for state cancer which is progressed over the past year.  Otherwise, she is now on thyroid replacement.  The rest of the 10 point review of systems is not significantly changed.  I do not see that she really has any new bone pain other than the chronic pain mentioned above.    Physical exam: Overall Shena looks well and is in no acute distress today.  HEENT exam is unremarkable.  There is no cervical, axillary or inguinal adenopathy.  Lungs are clear to auscultation percussion.  Cardiac exam is without murmurs.  Abdominal exam is benign and there is no organomegaly.  Lower extremities are without edema.  There are no skin rashes.    Laboratory evaluation her last labs were on October 31.  At this time his CBC showed a hemoglobin of 11, stable platelet count of 119,000, and a white count of 3500 with adequate absolute neutrophils.  Her LDH is low.  Beta-2 microglobulin L-spine.  Liver function tests have been normal except for low albumin which is chronic.  Her M spike continues to be slightly over grams since the beginning of the year.  I had hoped that on this chronic therapy with she will decrease that is not the case.  Her kappa light chains are markedly elevated lambda light chains.  A CRP was within normal range.    Is my overall impression Shena has multiple myeloma for over 20 years.  My biggest concern is that her M spike is hovering around 2 g.  Based on her symptoms, we are hesitant to increase her pomalidomide.  We spent a great deal of time today discussing options.  We talked about your drug possibilities including carfilzomib and CAR-T therapy.  She understands the side effects  of CAR-T therapy including cytokine release syndrome and neurotoxicity.  She is obviously a bit reluctant to think about these options given the side effect.  There are also additional monoclonal antibodies which may be more active.  Therefore, to be seen by my colleague, Nighat Chavez, to possibly take over her care.  Shena will be out the beginning of January and we are requesting this consult after January 23 before the end of February.  At this time we will continue her monthly daratumumab.  She is already scheduled in December and we will add on visits for January 23, February 15 and March 20 of next year.  Obviously, this plan may change.  We will continue with her pomalidomide at the current dosing.  All of her questions have been answered today.        Again, thank you for allowing me to participate in the care of your patient.      Sincerely,    Lui Hills MD

## 2022-11-18 NOTE — NURSING NOTE
"Oncology Rooming Note    November 18, 2022 7:43 AM   Shena Hartmann is a 72 year old female who presents for:    Chief Complaint   Patient presents with     Oncology Clinic Visit     Multiple myeloma     Initial Vitals: /74   Pulse 79   Temp 97.6  F (36.4  C) (Oral)   Resp 16   Wt 54.4 kg (120 lb)   SpO2 98%   BMI 22.31 kg/m   Estimated body mass index is 22.31 kg/m  as calculated from the following:    Height as of 9/1/22: 1.562 m (5' 1.5\").    Weight as of this encounter: 54.4 kg (120 lb). Body surface area is 1.54 meters squared.  Moderate Pain (5) Comment: Data Unavailable   No LMP recorded. Patient is postmenopausal.  Allergies reviewed: Yes  Medications reviewed: Yes    Medications: Medication refills not needed today.  Pharmacy name entered into [employer]:    [id number] - [place], [place] - [address] [facility] [address]  WRITTEN PRESCRIPTION REQUESTED  [facility] - [place], [place] - [address]    Clinical concerns: none       Lisette Brian CMA            "

## 2022-11-18 NOTE — PROGRESS NOTES
[facility]: Cancer Care                                                                                          Pt had to leave after visit with Dr. Reuben Hills so RN did not have opportunity to speak with pt.  Per Dr. Reuben Hills, he will discuss pt with Dr. Chavez re: probable transfer of care regarding her multiple myeloma.  He will discuss with Dr. Chavez on Wed, 11/23. Dr. Hills included appt request with Scott in his check out note.  RN sent in-basket to coworker, Ama KRAUS, care coordinator who works with Dr. Chavez's patients.  Copied Dr. Chavez and Nurse Navigation.     Signature:  Sherrie Hamilton, MELISSA, OCN

## 2022-11-25 ENCOUNTER — TELEPHONE (OUTPATIENT)
Dept: ONCOLOGY | Facility: CLINIC | Age: 72
End: 2022-11-25

## 2022-11-25 DIAGNOSIS — C90.00 MULTIPLE MYELOMA, REMISSION STATUS UNSPECIFIED (H): Primary | ICD-10-CM

## 2022-11-25 NOTE — TELEPHONE ENCOUNTER
Oral Chemotherapy Monitoring Program    Medication: Pomalyst  Rx:  1mg PO daily 32 / per 28 day supply    Auth #: [id number]  Risk Category: Adult female NOT of reproductive capacity    Routine survey questions reviewed. yes        Tavia Suazo Pharmacy Liaison, alpha split R-Z  Phone: [phone]  Fax: [phone]  Email: [email or web address]

## 2022-11-28 ENCOUNTER — APPOINTMENT (OUTPATIENT)
Dept: LAB | Facility: CLINIC | Age: 72
End: 2022-11-28
Attending: INTERNAL MEDICINE
Payer: MEDICARE

## 2022-11-28 ENCOUNTER — INFUSION THERAPY VISIT (OUTPATIENT)
Dept: ONCOLOGY | Facility: CLINIC | Age: 72
End: 2022-11-28
Attending: INTERNAL MEDICINE
Payer: MEDICARE

## 2022-11-28 VITALS
HEART RATE: 88 BPM | OXYGEN SATURATION: 97 % | BODY MASS INDEX: 22.66 KG/M2 | TEMPERATURE: 98.7 F | WEIGHT: 121.9 LBS | DIASTOLIC BLOOD PRESSURE: 75 MMHG | RESPIRATION RATE: 16 BRPM | SYSTOLIC BLOOD PRESSURE: 147 MMHG

## 2022-11-28 DIAGNOSIS — Z79.899 ENCOUNTER FOR LONG-TERM (CURRENT) USE OF MEDICATIONS: ICD-10-CM

## 2022-11-28 DIAGNOSIS — C90.00 MULTIPLE MYELOMA NOT HAVING ACHIEVED REMISSION (H): Primary | ICD-10-CM

## 2022-11-28 DIAGNOSIS — C90.00 MULTIPLE MYELOMA, REMISSION STATUS UNSPECIFIED (H): ICD-10-CM

## 2022-11-28 LAB
ALBUMIN SERPL BCG-MCNC: 3.8 G/DL (ref 3.5–5.2)
ALP SERPL-CCNC: 55 U/L (ref 35–104)
ALT SERPL W P-5'-P-CCNC: 17 U/L (ref 10–35)
ANION GAP SERPL CALCULATED.3IONS-SCNC: 10 MMOL/L (ref 7–15)
AST SERPL W P-5'-P-CCNC: 15 U/L (ref 10–35)
B2 MICROGLOB TUMOR MARKER SER-MCNC: 2.5 MG/L
BASOPHILS # BLD AUTO: 0 10E3/UL (ref 0–0.2)
BASOPHILS NFR BLD AUTO: 1 %
BILIRUB SERPL-MCNC: 0.3 MG/DL
BUN SERPL-MCNC: 13.1 MG/DL (ref 8–23)
CALCIUM SERPL-MCNC: 9.2 MG/DL (ref 8.8–10.2)
CHLORIDE SERPL-SCNC: 96 MMOL/L (ref 98–107)
CREAT SERPL-MCNC: 0.71 MG/DL (ref 0.51–0.95)
CRP SERPL-MCNC: 33.2 MG/L
DEPRECATED HCO3 PLAS-SCNC: 26 MMOL/L (ref 22–29)
EOSINOPHIL # BLD AUTO: 0.1 10E3/UL (ref 0–0.7)
EOSINOPHIL NFR BLD AUTO: 3 %
ERYTHROCYTE [DISTWIDTH] IN BLOOD BY AUTOMATED COUNT: 16.3 % (ref 10–15)
GFR SERPL CREATININE-BSD FRML MDRD: 90 ML/MIN/1.73M2
GLUCOSE SERPL-MCNC: 105 MG/DL (ref 70–99)
HCT VFR BLD AUTO: 31.5 % (ref 35–47)
HGB BLD-MCNC: 10.8 G/DL (ref 11.7–15.7)
IMM GRANULOCYTES # BLD: 0 10E3/UL
IMM GRANULOCYTES NFR BLD: 0 %
KAPPA LC FREE SER-MCNC: 57.48 MG/DL (ref 0.33–1.94)
KAPPA LC FREE/LAMBDA FREE SER NEPH: 53.72 {RATIO} (ref 0.26–1.65)
LAMBDA LC FREE SERPL-MCNC: 1.07 MG/DL (ref 0.57–2.63)
LDH SERPL L TO P-CCNC: 133 U/L (ref 0–250)
LYMPHOCYTES # BLD AUTO: 1.2 10E3/UL (ref 0.8–5.3)
LYMPHOCYTES NFR BLD AUTO: 34 %
MCH RBC QN AUTO: 33.6 PG (ref 26.5–33)
MCHC RBC AUTO-ENTMCNC: 34.3 G/DL (ref 31.5–36.5)
MCV RBC AUTO: 98 FL (ref 78–100)
MONOCYTES # BLD AUTO: 0.7 10E3/UL (ref 0–1.3)
MONOCYTES NFR BLD AUTO: 20 %
NEUTROPHILS # BLD AUTO: 1.5 10E3/UL (ref 1.6–8.3)
NEUTROPHILS NFR BLD AUTO: 42 %
NRBC # BLD AUTO: 0 10E3/UL
NRBC BLD AUTO-RTO: 0 /100
PLATELET # BLD AUTO: 119 10E3/UL (ref 150–450)
POTASSIUM SERPL-SCNC: 3.9 MMOL/L (ref 3.4–5.3)
PROT SERPL-MCNC: 8.1 G/DL (ref 6.4–8.3)
RBC # BLD AUTO: 3.21 10E6/UL (ref 3.8–5.2)
SODIUM SERPL-SCNC: 132 MMOL/L (ref 136–145)
TOTAL PROTEIN SERUM FOR ELP: 7.8 G/DL (ref 6.4–8.3)
WBC # BLD AUTO: 3.4 10E3/UL (ref 4–11)

## 2022-11-28 PROCEDURE — 84155 ASSAY OF PROTEIN SERUM: CPT | Mod: 91

## 2022-11-28 PROCEDURE — 36415 COLL VENOUS BLD VENIPUNCTURE: CPT

## 2022-11-28 PROCEDURE — 83521 IG LIGHT CHAINS FREE EACH: CPT

## 2022-11-28 PROCEDURE — 96401 CHEMO ANTI-NEOPL SQ/IM: CPT

## 2022-11-28 PROCEDURE — 84165 PROTEIN E-PHORESIS SERUM: CPT | Mod: TC | Performed by: PATHOLOGY

## 2022-11-28 PROCEDURE — 250N000013 HC RX MED GY IP 250 OP 250 PS 637: Performed by: INTERNAL MEDICINE

## 2022-11-28 PROCEDURE — 86140 C-REACTIVE PROTEIN: CPT

## 2022-11-28 PROCEDURE — 80053 COMPREHEN METABOLIC PANEL: CPT

## 2022-11-28 PROCEDURE — 250N000011 HC RX IP 250 OP 636: Performed by: INTERNAL MEDICINE

## 2022-11-28 PROCEDURE — 83615 LACTATE (LD) (LDH) ENZYME: CPT

## 2022-11-28 PROCEDURE — 85025 COMPLETE CBC W/AUTO DIFF WBC: CPT | Performed by: INTERNAL MEDICINE

## 2022-11-28 PROCEDURE — 82232 ASSAY OF BETA-2 PROTEIN: CPT

## 2022-11-28 RX ORDER — ACETAMINOPHEN 325 MG/1
650 TABLET ORAL
Status: CANCELLED
Start: 2022-11-28

## 2022-11-28 RX ORDER — DIPHENHYDRAMINE HCL 25 MG
25 CAPSULE ORAL
Status: CANCELLED
Start: 2022-11-28

## 2022-11-28 RX ORDER — NALOXONE HYDROCHLORIDE 0.4 MG/ML
.1-.4 INJECTION, SOLUTION INTRAMUSCULAR; INTRAVENOUS; SUBCUTANEOUS
Status: CANCELLED | OUTPATIENT
Start: 2022-11-28

## 2022-11-28 RX ORDER — SODIUM CHLORIDE 9 MG/ML
1000 INJECTION, SOLUTION INTRAVENOUS CONTINUOUS PRN
Status: CANCELLED
Start: 2022-11-28

## 2022-11-28 RX ORDER — LORAZEPAM 2 MG/ML
0.5 INJECTION INTRAMUSCULAR EVERY 4 HOURS PRN
Status: CANCELLED
Start: 2022-11-28

## 2022-11-28 RX ORDER — MEPERIDINE HYDROCHLORIDE 25 MG/ML
25 INJECTION INTRAMUSCULAR; INTRAVENOUS; SUBCUTANEOUS EVERY 30 MIN PRN
Status: CANCELLED | OUTPATIENT
Start: 2022-11-28

## 2022-11-28 RX ORDER — HEPARIN SODIUM,PORCINE 10 UNIT/ML
5 VIAL (ML) INTRAVENOUS
Status: CANCELLED | OUTPATIENT
Start: 2022-11-28

## 2022-11-28 RX ORDER — ALBUTEROL SULFATE 0.83 MG/ML
2.5 SOLUTION RESPIRATORY (INHALATION)
Status: CANCELLED | OUTPATIENT
Start: 2022-11-28

## 2022-11-28 RX ORDER — HEPARIN SODIUM (PORCINE) LOCK FLUSH IV SOLN 100 UNIT/ML 100 UNIT/ML
5 SOLUTION INTRAVENOUS
Status: CANCELLED | OUTPATIENT
Start: 2022-11-28

## 2022-11-28 RX ORDER — DIPHENHYDRAMINE HYDROCHLORIDE 50 MG/ML
50 INJECTION INTRAMUSCULAR; INTRAVENOUS
Status: CANCELLED
Start: 2022-11-28

## 2022-11-28 RX ORDER — EPINEPHRINE 1 MG/ML
0.3 INJECTION, SOLUTION, CONCENTRATE INTRAVENOUS EVERY 5 MIN PRN
Status: CANCELLED | OUTPATIENT
Start: 2022-11-28

## 2022-11-28 RX ORDER — ALBUTEROL SULFATE 90 UG/1
1-2 AEROSOL, METERED RESPIRATORY (INHALATION)
Status: CANCELLED
Start: 2022-11-28

## 2022-11-28 RX ORDER — ACETAMINOPHEN 325 MG/1
650 TABLET ORAL
Status: COMPLETED | OUTPATIENT
Start: 2022-11-28 | End: 2022-11-28

## 2022-11-28 RX ADMIN — ACETAMINOPHEN 650 MG: 325 TABLET ORAL at 09:08

## 2022-11-28 RX ADMIN — DARATUMUMAB AND HYALURONIDASE-FIHJ (HUMAN RECOMBINANT) 1800 MG: 1800; 30000 INJECTION SUBCUTANEOUS at 09:52

## 2022-11-28 ASSESSMENT — PAIN SCALES - GENERAL: PAINLEVEL: SEVERE PAIN (6)

## 2022-11-28 NOTE — PATIENT INSTRUCTIONS
[facility] Triage and after hours / weekends / holidays:  [phone]    Please call the triage or after hours line if you experience a temperature greater than or equal to 100.4, shaking chills, have uncontrolled nausea, vomiting and/or diarrhea, dizziness, shortness of breath, chest pain, bleeding, unexplained bruising, or if you have any other new/concerning symptoms, questions or concerns.      If you are having any concerning symptoms or wish to speak to a provider before your next infusion visit, please call your care coordinator or triage to notify them so we can adequately serve you.     If you need a refill on a narcotic prescription or other medication, please call before your infusion appointment.                November 2022 Sunday Monday Tuesday Wednesday Thursday Friday Saturday             1     2     3     4     5       6     7     8     9     10     11     12       13     14    MA VISIT   1:00 PM   (15 min.)   SPMW MA/LPN   [facility] 15     16     17     18    BMT RETURN   7:30 AM   (30 min.)   Lui Hills MD   [facility] Blood and Marrow Transplant [facility] [place] 19       20     21     22     23     24     25     26       27     28    LAB PERIPHERAL   8:00 AM   (15 min.)   [facility]   [facility]    ONC INFUSION 1 HR (60 MIN)   8:30 AM   (60 min.)    ONC INFUSION NURSE   [facility] 29 30 December 2022 Sunday Monday Tuesday Wednesday Thursday Friday Saturday                       1     2     3       4     5     6     7     8     9     10       11     12     13     14     15     16     17       18     19     20     21     22     23     24       25     26    LAB PERIPHERAL   7:00 AM   (15 min.)    [facility]   [facility]    ONC INFUSION 1 HR (60 MIN)   7:30 AM   (60 min.)   [facility] ONC INFUSION NURSE    [facility] 27     28     29     30     31                        Recent Results (from the past 24 hour(s))   Comprehensive metabolic panel    Collection Time: 11/28/22  8:28 AM   Result Value Ref Range    Sodium 132 (L) 136 - 145 mmol/L    Potassium 3.9 3.4 - 5.3 mmol/L    Chloride 96 (L) 98 - 107 mmol/L    Carbon Dioxide (CO2) 26 22 - 29 mmol/L    Anion Gap 10 7 - 15 mmol/L    Urea Nitrogen 13.1 8.0 - 23.0 mg/dL    Creatinine 0.71 0.51 - 0.95 mg/dL    Calcium 9.2 8.8 - 10.2 mg/dL    Glucose 105 (H) 70 - 99 mg/dL    Alkaline Phosphatase 55 35 - 104 U/L    AST 15 10 - 35 U/L    ALT 17 10 - 35 U/L    Protein Total 8.1 6.4 - 8.3 g/dL    Albumin 3.8 3.5 - 5.2 g/dL    Bilirubin Total 0.3 <=1.2 mg/dL    GFR Estimate 90 >60 mL/min/1.73m2   CRP inflammation    Collection Time: 11/28/22  8:28 AM   Result Value Ref Range    CRP Inflammation 33.20 (H) <5.00 mg/L   CBC with platelets and differential    Collection Time: 11/28/22  8:28 AM   Result Value Ref Range    WBC Count 3.4 (L) 4.0 - 11.0 10e3/uL    RBC Count 3.21 (L) 3.80 - 5.20 10e6/uL    Hemoglobin 10.8 (L) 11.7 - 15.7 g/dL    Hematocrit 31.5 (L) 35.0 - 47.0 %    MCV 98 78 - 100 fL    MCH 33.6 (H) 26.5 - 33.0 pg    MCHC 34.3 31.5 - 36.5 g/dL    RDW 16.3 (H) 10.0 - 15.0 %    Platelet Count 119 (L) 150 - 450 10e3/uL    % Neutrophils 42 %    % Lymphocytes 34 %    % Monocytes 20 %    % Eosinophils 3 %    % Basophils 1 %    % Immature Granulocytes 0 %    NRBCs per 100 WBC 0 <1 /100    Absolute Neutrophils 1.5 (L) 1.6 - 8.3 10e3/uL    Absolute Lymphocytes 1.2 0.8 - 5.3 10e3/uL    Absolute Monocytes 0.7 0.0 - 1.3 10e3/uL    Absolute Eosinophils 0.1 0.0 - 0.7 10e3/uL    Absolute Basophils 0.0 0.0 - 0.2 10e3/uL    Absolute Immature Granulocytes 0.0 <=0.4 10e3/uL    Absolute NRBCs 0.0 10e3/uL

## 2022-11-28 NOTE — PROGRESS NOTES
Infusion Nursing Note:  Shena Hartmann presents today for Cycle 29 Day 1 Darzalex Faspro.   Patient seen by provider today: No    Note: Patient presents to the infusion center today c/o a common cold symptoms, body aches, runny nose, chills, cough and fatigue. State she has not had a fever and her covid test was negative. Denies any chest pain or shortness of breath or any other new symptoms or concerns.     Patient requests Tylenol prior to riky injection.     In basket message sent to Dr. Hills and TARIK Elise regarding her abnormal CRP 33.20.    Intravenous Access:  No Intravenous access at this visit.    Treatment Conditions:   Latest Reference Range & Units 11/28/22 08:28   Sodium 136 - 145 mmol/L 132 (L)   Potassium 3.4 - 5.3 mmol/L 3.9   Chloride 98 - 107 mmol/L 96 (L)   Carbon Dioxide (CO2) 22 - 29 mmol/L 26   Urea Nitrogen 8.0 - 23.0 mg/dL 13.1   Creatinine 0.51 - 0.95 mg/dL 0.71   GFR Estimate >60 mL/min/1.73m2 90   Calcium 8.8 - 10.2 mg/dL 9.2   Anion Gap 7 - 15 mmol/L 10   Albumin 3.5 - 5.2 g/dL 3.8   Protein Total 6.4 - 8.3 g/dL 8.1   Alkaline Phosphatase 35 - 104 U/L 55   ALT 10 - 35 U/L 17   AST 10 - 35 U/L 15   Bilirubin Total <=1.2 mg/dL 0.3   CRP Inflammation <5.00 mg/L 33.20 (H)   Glucose 70 - 99 mg/dL 105 (H)   WBC 4.0 - 11.0 10e3/uL 3.4 (L)   Hemoglobin 11.7 - 15.7 g/dL 10.8 (L)   Hematocrit 35.0 - 47.0 % 31.5 (L)   Platelet Count 150 - 450 10e3/uL 119 (L)   RBC Count 3.80 - 5.20 10e6/uL 3.21 (L)   MCV 78 - 100 fL 98   MCH 26.5 - 33.0 pg 33.6 (H)   MCHC 31.5 - 36.5 g/dL 34.3   RDW 10.0 - 15.0 % 16.3 (H)   % Neutrophils % 42   % Lymphocytes % 34   % Monocytes % 20   % Eosinophils % 3   % Basophils % 1   Absolute Basophils 0.0 - 0.2 10e3/uL 0.0   Absolute Eosinophils 0.0 - 0.7 10e3/uL 0.1   Absolute Immature Granulocytes <=0.4 10e3/uL 0.0   Absolute Lymphocytes 0.8 - 5.3 10e3/uL 1.2   Absolute Monocytes 0.0 - 1.3 10e3/uL 0.7   % Immature Granulocytes % 0   Absolute Neutrophils  1.6 - 8.3 10e3/uL 1.5 (L)   Absolute NRBCs 10e3/uL 0.0   NRBCs per 100 WBC <1 /100 0     Results reviewed, labs MET treatment parameters, ok to proceed with treatment.    Post Infusion Assessment:  Patient tolerated injection in left lower abd without incident.without incident.  Site patent and intact, free from redness, edema or discomfort.     Discharge Plan:   Patient declined prescription refills.  Discharge instructions reviewed with: Patient.  Patient and/or family verbalized understanding of discharge instructions and all questions answered.  AVS to patient via [employer].  Patient will return 12/26 for next appointment.   Patient discharged in stable condition accompanied by: self.  Departure Mode: Ambulatory.      Lianne Sanches RN

## 2022-11-29 LAB
ALBUMIN SERPL ELPH-MCNC: 3.7 G/DL (ref 3.7–5.1)
ALPHA1 GLOB SERPL ELPH-MCNC: 0.4 G/DL (ref 0.2–0.4)
ALPHA2 GLOB SERPL ELPH-MCNC: 0.8 G/DL (ref 0.5–0.9)
B-GLOBULIN SERPL ELPH-MCNC: 0.6 G/DL (ref 0.6–1)
GAMMA GLOB SERPL ELPH-MCNC: 2.4 G/DL (ref 0.7–1.6)
M PROTEIN SERPL ELPH-MCNC: 2.1 G/DL
PROT PATTERN SERPL ELPH-IMP: ABNORMAL

## 2022-11-29 PROCEDURE — 84165 PROTEIN E-PHORESIS SERUM: CPT | Mod: 26

## 2022-12-14 ENCOUNTER — MYC MEDICAL ADVICE (OUTPATIENT)
Dept: INTERNAL MEDICINE | Facility: CLINIC | Age: 72
End: 2022-12-14

## 2022-12-14 ENCOUNTER — OFFICE VISIT (OUTPATIENT)
Dept: URGENT CARE | Facility: URGENT CARE | Age: 72
End: 2022-12-14
Payer: MEDICARE

## 2022-12-14 VITALS
OXYGEN SATURATION: 97 % | HEART RATE: 83 BPM | TEMPERATURE: 99.2 F | BODY MASS INDEX: 22.5 KG/M2 | SYSTOLIC BLOOD PRESSURE: 140 MMHG | WEIGHT: 121 LBS | DIASTOLIC BLOOD PRESSURE: 80 MMHG

## 2022-12-14 DIAGNOSIS — J20.9 ACUTE BRONCHITIS WITH SYMPTOMS GREATER THAN 10 DAYS: Primary | ICD-10-CM

## 2022-12-14 DIAGNOSIS — I10 PRIMARY HYPERTENSION: ICD-10-CM

## 2022-12-14 DIAGNOSIS — D84.9 IMMUNOSUPPRESSED STATUS (H): ICD-10-CM

## 2022-12-14 PROCEDURE — 99213 OFFICE O/P EST LOW 20 MIN: CPT | Performed by: NURSE PRACTITIONER

## 2022-12-14 NOTE — PATIENT INSTRUCTIONS
Discharge Instructions  Upper Respiratory Infection    The upper respiratory tract includes the sinuses, nasal passages, pharynx, and larynx. A URI, or upper respiratory infection, is an infection of any of the parts of the upper airway. Symptoms include runny nose, congestion, sore throat, cough, and fever. URIs are almost always caused by a virus. Antibiotics do not help with virus infections, so are not used for an ordinary URI. A URI is very contagious through coughing and nasal secretions; make sure you wash your hands often and clean surfaces after sneezing, coughing or touching them.  Viruses can live on surfaces for up to 3 days.      Return to the Emergency Department if:  Any of the symptoms you have get much worse.  You seem very sick, like being too weak to get up.  You have any new symptoms, especially serious things like chest pain.   You are short of breath.   You have a severe headache.  You are vomiting so much you can t keep fluids or medicines down.  You have confusion or seem unusually drowsy.  You have a seizure or convulsion.    Follow-up:    You should start to improve in 3 - 5 days.  A cough can linger for up to six weeks, but overall you should be feeling much better.  See your doctor if you have a fever for more than 3 days, or if you are not feeling better within 5 days.      What can I do to help myself?  Fill any prescriptions the doctor gave you and take them right away  If you have a fever, get plenty of rest and drink lots of fluids, especially water. Using a humidifier or saline nose spray will also help loosen secretions.   What clothes or blankets you have on won t change your fever. Do what is comfortable for you.  Bathing or sponging in lukewarm water may help you feel better.  Tylenol  (acetaminophen), Motrin  (ibuprofen), or Advil  (ibuprofen) help bring fever down and may help you feel more comfortable. Be sure to read and follow the package directions, and ask your doctor if  you have questions.  Do not drink alcohol.  Decongestants may help you feel better. You may use decongestant nose sprays Afrin  (oxymetazoline) or Kan-Synephrine  (phenylephrine hydrochloride) for up to 3 days, or may use a decongestant tablet like Sudafed  (pseudoephedrine).  If you were given a prescription for medicine here today, be sure to read all of the information (including the package insert) that comes with your prescription.  This will include important information about the medicine, its side effects, and any warnings that you need to know about.  The pharmacist who fills the prescription can provide more information and answer questions you may have about the medicine.  If you have questions or concerns that the pharmacist cannot address, please call or return to the Emergency Department.   Opioid Medication Information    Pain medications are among the most commonly prescribed medicines, so we are including this information for all our patients. If you did not receive pain medication or get a prescription for pain medicine, you can ignore it.     You may have been given a prescription for an opioid (narcotic) pain medicine and/or have received a pain medicine while here in the Emergency Department. These medicines can make you drowsy or impaired. You must not drive, operate dangerous equipment, or engage in any other dangerous activities while taking these medications. If you drive while taking these medications, you could be arrested for DUI, or driving under the influence. Do not drink any alcohol while you are taking these medications.     Opioid pain medications can cause addiction. If you have a history of chemical dependency of any type, you are at a higher risk of becoming addicted to pain medications.  Only take these prescribed medications to treat your pain when all other options have been tried. Take it for as short a time and as few doses as possible. Store your pain pills in a secure  place, as they are frequently stolen and provide a dangerous opportunity for children or visitors in your house to start abusing these powerful medications. We will not replace any lost or stolen medicine.  As soon as your pain is better, you should flush all your remaining medication.     Many prescription pain medications contain Tylenol  (acetaminophen), including Vicodin , Tylenol #3 , Norco , Lortab , and Percocet .  You should not take any extra pills of Tylenol  if you are using these prescription medications or you can get very sick.  Do not ever take more than 3000 mg of acetaminophen in any 24 hour period.    All opioids tend to cause constipation. Drink plenty of water and eat foods that have a lot of fiber, such as fruits, vegetables, prune juice, apple juice and high fiber cereal.  Take a laxative if you don t move your bowels at least every other day. Miralax , Milk of Magnesia, Colace , or Senna  can be used to keep you regular.      Remember that you can always come back to the Emergency Department if you are not able to see your regular doctor in the amount of time listed above, if you get any new symptoms, or if there is anything that worries you.

## 2022-12-14 NOTE — PROGRESS NOTES
Chief Complaint   Patient presents with     Urgent Care     Pt has had cold for the last 19 days but went away for a little and came back Starting Tuesday pt has ongoing cold that moved to lungs, phlem has changed to watery,yellow, deeper         ICD-10-CM    1. Acute bronchitis with symptoms greater than 10 days  J20.9 amoxicillin-clavulanate (AUGMENTIN) 875-125 MG tablet      2. Immunosuppressed status (H)  D84.9 amoxicillin-clavulanate (AUGMENTIN) 875-125 MG tablet      3. Primary hypertension  I10       We will go ahead and treat with antibiotics due to her immunosuppression.  If she develops worsening symptoms or shortness of breath she will go to the emergency room.    Patient takes her blood pressure medications at night and she did take them last evening.  She was seen earlier today for a laser eye procedure and her blood pressure there was normal.  Encouraged her to follow-up with her primary care provider as scheduled for blood pressure check.    Subjective     Shena Hartmann is an 72 year old female who presents to clinic today for 3 weeks of hot flashes, chills, sore throat, fatigued, loose stools, cough is productive,    She does receive immunotherapy. She did COVID test that was negative.        ROS: 10 point ROS neg other than the symptoms noted above in the HPI.       Objective    BP (!) 140/80   Pulse 83   Temp 99.2  F (37.3  C) (Oral)   Wt 54.9 kg (121 lb)   SpO2 97%   BMI 22.50 kg/m    Nurses notes and VS have been reviewed.    Physical Exam       GENERAL APPEARANCE: alert and mild distress     EYES: PERRL, EOMI, sclera non-icteric     HENT: oral exam benign, mucus membranes intact, without ulcers or lesions     NECK: no adenopathy or asymmetry, thyroid normal to palpation     RESP: Slightly decreased breath sounds in the right lower lobe with coarse crackles otherwise lungs are clear     CV: regular rates and rhythm, no murmurs, rubs, or gallop     MS: extremities normal- no gross  deformities noted; normal muscle tone.     SKIN: no suspicious lesions or rashes     NEURO: Normal strength and tone, mentation intact and speech normal     PSYCH: normal thought process; no significant mood disturbance    Patient Instructions   Discharge Instructions  Upper Respiratory Infection    The upper respiratory tract includes the sinuses, nasal passages, pharynx, and larynx. A URI, or upper respiratory infection, is an infection of any of the parts of the upper airway. Symptoms include runny nose, congestion, sore throat, cough, and fever. URIs are almost always caused by a virus. Antibiotics do not help with virus infections, so are not used for an ordinary URI. A URI is very contagious through coughing and nasal secretions; make sure you wash your hands often and clean surfaces after sneezing, coughing or touching them.  Viruses can live on surfaces for up to 3 days.      Return to the Emergency Department if:    Any of the symptoms you have get much worse.    You seem very sick, like being too weak to get up.    You have any new symptoms, especially serious things like chest pain.     You are short of breath.     You have a severe headache.    You are vomiting so much you can t keep fluids or medicines down.    You have confusion or seem unusually drowsy.    You have a seizure or convulsion.    Follow-up:      You should start to improve in 3 - 5 days.  A cough can linger for up to six weeks, but overall you should be feeling much better.  See your doctor if you have a fever for more than 3 days, or if you are not feeling better within 5 days.      What can I do to help myself?    Fill any prescriptions the doctor gave you and take them right away    If you have a fever, get plenty of rest and drink lots of fluids, especially water. Using a humidifier or saline nose spray will also help loosen secretions.     What clothes or blankets you have on won t change your fever. Do what is comfortable for  you.    Bathing or sponging in lukewarm water may help you feel better.    Tylenol  (acetaminophen), Motrin  (ibuprofen), or Advil  (ibuprofen) help bring fever down and may help you feel more comfortable. Be sure to read and follow the package directions, and ask your doctor if you have questions.    Do not drink alcohol.    Decongestants may help you feel better. You may use decongestant nose sprays Afrin  (oxymetazoline) or Kan-Synephrine  (phenylephrine hydrochloride) for up to 3 days, or may use a decongestant tablet like Sudafed  (pseudoephedrine).  If you were given a prescription for medicine here today, be sure to read all of the information (including the package insert) that comes with your prescription.  This will include important information about the medicine, its side effects, and any warnings that you need to know about.  The pharmacist who fills the prescription can provide more information and answer questions you may have about the medicine.  If you have questions or concerns that the pharmacist cannot address, please call or return to the Emergency Department.   Opioid Medication Information    Pain medications are among the most commonly prescribed medicines, so we are including this information for all our patients. If you did not receive pain medication or get a prescription for pain medicine, you can ignore it.     You may have been given a prescription for an opioid (narcotic) pain medicine and/or have received a pain medicine while here in the Emergency Department. These medicines can make you drowsy or impaired. You must not drive, operate dangerous equipment, or engage in any other dangerous activities while taking these medications. If you drive while taking these medications, you could be arrested for DUI, or driving under the influence. Do not drink any alcohol while you are taking these medications.     Opioid pain medications can cause addiction. If you have a history of chemical  dependency of any type, you are at a higher risk of becoming addicted to pain medications.  Only take these prescribed medications to treat your pain when all other options have been tried. Take it for as short a time and as few doses as possible. Store your pain pills in a secure place, as they are frequently stolen and provide a dangerous opportunity for children or visitors in your house to start abusing these powerful medications. We will not replace any lost or stolen medicine.  As soon as your pain is better, you should flush all your remaining medication.     Many prescription pain medications contain Tylenol  (acetaminophen), including Vicodin , Tylenol #3 , Norco , Lortab , and Percocet .  You should not take any extra pills of Tylenol  if you are using these prescription medications or you can get very sick.  Do not ever take more than 3000 mg of acetaminophen in any 24 hour period.    All opioids tend to cause constipation. Drink plenty of water and eat foods that have a lot of fiber, such as fruits, vegetables, prune juice, apple juice and high fiber cereal.  Take a laxative if you don t move your bowels at least every other day. Miralax , Milk of Magnesia, Colace , or Senna  can be used to keep you regular.      Remember that you can always come back to the Emergency Department if you are not able to see your regular doctor in the amount of time listed above, if you get any new symptoms, or if there is anything that worries you.         MARINO Otto, [facility] Urgent Care Provider    The use of Dragon/Infoxel dictation services may have been used to construct the content in this note; any grammatical or spelling errors are non-intentional. Please contact the author of this note directly if you are in need of any clarification.

## 2022-12-19 ENCOUNTER — MYC MEDICAL ADVICE (OUTPATIENT)
Dept: TRANSPLANT | Facility: CLINIC | Age: 72
End: 2022-12-19

## 2022-12-19 ENCOUNTER — ANCILLARY PROCEDURE (OUTPATIENT)
Dept: GENERAL RADIOLOGY | Facility: CLINIC | Age: 72
End: 2022-12-19
Attending: INTERNAL MEDICINE
Payer: MEDICARE

## 2022-12-19 ENCOUNTER — OFFICE VISIT (OUTPATIENT)
Dept: INTERNAL MEDICINE | Facility: CLINIC | Age: 72
End: 2022-12-19
Payer: MEDICARE

## 2022-12-19 VITALS
DIASTOLIC BLOOD PRESSURE: 76 MMHG | BODY MASS INDEX: 21.62 KG/M2 | WEIGHT: 116.31 LBS | SYSTOLIC BLOOD PRESSURE: 132 MMHG | RESPIRATION RATE: 16 BRPM | HEART RATE: 76 BPM

## 2022-12-19 DIAGNOSIS — R05.1 ACUTE COUGH: Primary | ICD-10-CM

## 2022-12-19 DIAGNOSIS — C90.00 MULTIPLE MYELOMA NOT HAVING ACHIEVED REMISSION (H): ICD-10-CM

## 2022-12-19 DIAGNOSIS — R05.1 ACUTE COUGH: ICD-10-CM

## 2022-12-19 DIAGNOSIS — J06.9 UPPER RESPIRATORY TRACT INFECTION, UNSPECIFIED TYPE: ICD-10-CM

## 2022-12-19 PROCEDURE — 71046 X-RAY EXAM CHEST 2 VIEWS: CPT | Mod: TC | Performed by: RADIOLOGY

## 2022-12-19 PROCEDURE — 99214 OFFICE O/P EST MOD 30 MIN: CPT | Performed by: INTERNAL MEDICINE

## 2022-12-19 ASSESSMENT — PAIN SCALES - GENERAL: PAINLEVEL: MILD PAIN (2)

## 2022-12-19 NOTE — PROGRESS NOTES
1. Acute cough  She may have had/has pneumonia which seems to be responding to Augmentin.  Her lung exam today was unremarkable.  We will obtain a chest x-ray and if an infiltrate is present would add doxycycline.  She has numerous antibiotic allergies.  - XR Chest 2 Views; Future    2. Upper respiratory tract infection, unspecified type    3. Multiple myeloma not having achieved remission (H)  Noted, she is considering postponing her upcoming infusion and she will discuss with her oncology team    Subjective   Shena is a 72 year old, presenting for the following health issues:  UC follow up  (Pt seen at [facility] for respiratory illness and cough. Seen last Wednesday. Pt started on Augmentin. Pt feels slightly doing better- is more tired, less cough but more productive. Pt states harder to get up)  Has had almost a month of cough and upper respiratory symptoms now settling in her chest.  She went to urgent care and had some abnormal lung sounds in the right base and was put on Augmentin.  She is gradually feeling better but still has a cough.  She had some chills and subjective fever previously but not now.  She receives immunotherapy for multiple myeloma.    HPI       ED/UC Followup:    Facility:  [facility]   Date of visit: 12/14/22  Reason for visit: cough and respiratory illness  Current Status: slightly better       Review of Systems         Objective    /76 (BP Location: Left arm, Patient Position: Sitting, Cuff Size: Adult Regular)   Pulse 76   Resp 16   Wt 52.8 kg (116 lb 5 oz)   BMI 21.62 kg/m    Body mass index is 21.62 kg/m .  Physical Exam   Marked scoliosis.  Lungs are clear to auscultation bilaterally cardiovascular regular rate rhythm murmur gallop or rub

## 2022-12-23 ENCOUNTER — TELEPHONE (OUTPATIENT)
Dept: ONCOLOGY | Facility: CLINIC | Age: 72
End: 2022-12-23

## 2022-12-23 DIAGNOSIS — C90.00 MULTIPLE MYELOMA, REMISSION STATUS UNSPECIFIED (H): Primary | ICD-10-CM

## 2022-12-23 NOTE — TELEPHONE ENCOUNTER
Oral Chemotherapy Monitoring Program    Medication: Pomalyst  Rx:  1mg PO daily 32/28 ds    Auth #: [id number]  Risk Category: Adult female NOT of reproductive capacity    Routine survey questions reviewed. yes        Tavia Suazo Pharmacy Liaison, [employer] R[employer]  Phone: [phone]  Fax: [phone]  Email: [email or web address]

## 2022-12-27 ENCOUNTER — MYC MEDICAL ADVICE (OUTPATIENT)
Dept: INTERNAL MEDICINE | Facility: CLINIC | Age: 72
End: 2022-12-27

## 2023-01-18 NOTE — NURSING NOTE
Chief Complaint   Patient presents with     Blood Draw     Vitals, blood drawn via VPT by LPN. Pt checked into appt.      DOREEN Chamberlain LPN   not examined

## 2023-01-20 ENCOUNTER — TELEPHONE (OUTPATIENT)
Dept: ONCOLOGY | Facility: CLINIC | Age: 73
End: 2023-01-20
Payer: MEDICARE

## 2023-01-20 DIAGNOSIS — C90.00 MULTIPLE MYELOMA, REMISSION STATUS UNSPECIFIED (H): Primary | ICD-10-CM

## 2023-01-20 NOTE — PROGRESS NOTES
RECORDS STATUS - BREAST    RECORDS REQUESTED FROM: EPIC   DATE REQUESTED: 1/31/2023    Action    Action Taken 1/20/2023 10:16AM DANYEL    I called pt Shena twice- unavailable.       NOTES DETAILS STATUS   OFFICE NOTE from referring provider     OFFICE NOTE from medical oncologist Complete Epic- 1/23/2023 Multiple Myeloma     11/28/2022  Multiple Myeloma     More in [facility]   OPERATIVE REPORT     MEDICATION LIST Complete [facility]   CLINICAL TRIAL TREATMENTS TO DATE     LABS     REQUEST BLOCKS FOR ALL BREAST CANCER PTS     PATHOLOGY REPORTS  (Tissue diagnosis, Stage, ER/NV percentage positive and intensity of staining, HER2 IHC, FISH, and all biopsies from breast and any distant metastasis)                     GENONOMIC TESTING     TYPE:   (Next Generation Sequencing, including Foundation One testing, and Oncotype score)     IMAGING (NEED IMAGES & REPORT)     CT SCANS     MRI     MAMMO Complete Breast Scans are in PACS   ULTRASOUND Complete Breast Scans are in PACS   PET     BONE SCAN     BRAIN MRI

## 2023-01-20 NOTE — TELEPHONE ENCOUNTER
Oral Chemotherapy Monitoring Program    Medication: Pomalyst  Rx:  1mg PO daily 32/28 ds    Auth #: [id number]  Risk Category: Adult female NOT of reproductive capacity    Routine survey questions reviewed. Yes            Tavia Suazo Pharmacy Liaison, alpha split R[employer]  Phone: [phone]  Fax: [phone]  Email: [email or web address]

## 2023-01-23 ENCOUNTER — APPOINTMENT (OUTPATIENT)
Dept: LAB | Facility: CLINIC | Age: 73
End: 2023-01-23
Attending: STUDENT IN AN ORGANIZED HEALTH CARE EDUCATION/TRAINING PROGRAM
Payer: MEDICARE

## 2023-01-23 ENCOUNTER — INFUSION THERAPY VISIT (OUTPATIENT)
Dept: ONCOLOGY | Facility: CLINIC | Age: 73
End: 2023-01-23
Attending: OPHTHALMOLOGY
Payer: MEDICARE

## 2023-01-23 VITALS
RESPIRATION RATE: 18 BRPM | OXYGEN SATURATION: 97 % | DIASTOLIC BLOOD PRESSURE: 71 MMHG | HEART RATE: 78 BPM | SYSTOLIC BLOOD PRESSURE: 140 MMHG | TEMPERATURE: 98.1 F | BODY MASS INDEX: 21.97 KG/M2 | WEIGHT: 118.2 LBS

## 2023-01-23 DIAGNOSIS — C90.00 MULTIPLE MYELOMA NOT HAVING ACHIEVED REMISSION (H): ICD-10-CM

## 2023-01-23 DIAGNOSIS — C90.00 MULTIPLE MYELOMA NOT HAVING ACHIEVED REMISSION (H): Primary | ICD-10-CM

## 2023-01-23 DIAGNOSIS — C90.00 MULTIPLE MYELOMA, REMISSION STATUS UNSPECIFIED (H): Primary | ICD-10-CM

## 2023-01-23 DIAGNOSIS — Z79.899 ENCOUNTER FOR LONG-TERM (CURRENT) USE OF MEDICATIONS: ICD-10-CM

## 2023-01-23 LAB
ALBUMIN SERPL BCG-MCNC: 3.8 G/DL (ref 3.5–5.2)
ALP SERPL-CCNC: 52 U/L (ref 35–104)
ALT SERPL W P-5'-P-CCNC: 18 U/L (ref 10–35)
ANION GAP SERPL CALCULATED.3IONS-SCNC: 8 MMOL/L (ref 7–15)
AST SERPL W P-5'-P-CCNC: 16 U/L (ref 10–35)
B2 MICROGLOB TUMOR MARKER SER-MCNC: 3.4 MG/L
BASOPHILS # BLD AUTO: 0 10E3/UL (ref 0–0.2)
BASOPHILS NFR BLD AUTO: 1 %
BILIRUB SERPL-MCNC: 0.3 MG/DL
BUN SERPL-MCNC: 14.5 MG/DL (ref 8–23)
CALCIUM SERPL-MCNC: 9.4 MG/DL (ref 8.8–10.2)
CHLORIDE SERPL-SCNC: 100 MMOL/L (ref 98–107)
CREAT SERPL-MCNC: 0.69 MG/DL (ref 0.51–0.95)
CRP SERPL-MCNC: 4.4 MG/L
DEPRECATED HCO3 PLAS-SCNC: 27 MMOL/L (ref 22–29)
EOSINOPHIL # BLD AUTO: 0.1 10E3/UL (ref 0–0.7)
EOSINOPHIL NFR BLD AUTO: 3 %
ERYTHROCYTE [DISTWIDTH] IN BLOOD BY AUTOMATED COUNT: 17.9 % (ref 10–15)
GFR SERPL CREATININE-BSD FRML MDRD: >90 ML/MIN/1.73M2
GLUCOSE SERPL-MCNC: 88 MG/DL (ref 70–99)
HCT VFR BLD AUTO: 28.5 % (ref 35–47)
HGB BLD-MCNC: 9.7 G/DL (ref 11.7–15.7)
IMM GRANULOCYTES # BLD: 0 10E3/UL
IMM GRANULOCYTES NFR BLD: 0 %
KAPPA LC FREE SER-MCNC: 77.85 MG/DL (ref 0.33–1.94)
KAPPA LC FREE/LAMBDA FREE SER NEPH: 324.38 {RATIO} (ref 0.26–1.65)
LAMBDA LC FREE SERPL-MCNC: 0.24 MG/DL (ref 0.57–2.63)
LDH SERPL L TO P-CCNC: 125 U/L (ref 0–250)
LYMPHOCYTES # BLD AUTO: 1.8 10E3/UL (ref 0.8–5.3)
LYMPHOCYTES NFR BLD AUTO: 44 %
MCH RBC QN AUTO: 34.4 PG (ref 26.5–33)
MCHC RBC AUTO-ENTMCNC: 34 G/DL (ref 31.5–36.5)
MCV RBC AUTO: 101 FL (ref 78–100)
MONOCYTES # BLD AUTO: 0.6 10E3/UL (ref 0–1.3)
MONOCYTES NFR BLD AUTO: 15 %
NEUTROPHILS # BLD AUTO: 1.5 10E3/UL (ref 1.6–8.3)
NEUTROPHILS NFR BLD AUTO: 37 %
NRBC # BLD AUTO: 0 10E3/UL
NRBC BLD AUTO-RTO: 0 /100
PLATELET # BLD AUTO: 125 10E3/UL (ref 150–450)
POTASSIUM SERPL-SCNC: 4 MMOL/L (ref 3.4–5.3)
PROT SERPL-MCNC: 8.2 G/DL (ref 6.4–8.3)
RBC # BLD AUTO: 2.82 10E6/UL (ref 3.8–5.2)
SODIUM SERPL-SCNC: 135 MMOL/L (ref 136–145)
TOTAL PROTEIN SERUM FOR ELP: 7.8 G/DL (ref 6.4–8.3)
WBC # BLD AUTO: 4.1 10E3/UL (ref 4–11)

## 2023-01-23 PROCEDURE — 85025 COMPLETE CBC W/AUTO DIFF WBC: CPT

## 2023-01-23 PROCEDURE — 83615 LACTATE (LD) (LDH) ENZYME: CPT

## 2023-01-23 PROCEDURE — 250N000011 HC RX IP 250 OP 636: Performed by: INTERNAL MEDICINE

## 2023-01-23 PROCEDURE — 80053 COMPREHEN METABOLIC PANEL: CPT

## 2023-01-23 PROCEDURE — 84165 PROTEIN E-PHORESIS SERUM: CPT | Mod: TC | Performed by: PATHOLOGY

## 2023-01-23 PROCEDURE — 36415 COLL VENOUS BLD VENIPUNCTURE: CPT

## 2023-01-23 PROCEDURE — 83521 IG LIGHT CHAINS FREE EACH: CPT | Mod: 59

## 2023-01-23 PROCEDURE — 84155 ASSAY OF PROTEIN SERUM: CPT

## 2023-01-23 PROCEDURE — 86140 C-REACTIVE PROTEIN: CPT

## 2023-01-23 PROCEDURE — 84165 PROTEIN E-PHORESIS SERUM: CPT | Mod: 26

## 2023-01-23 PROCEDURE — 250N000013 HC RX MED GY IP 250 OP 250 PS 637: Performed by: INTERNAL MEDICINE

## 2023-01-23 PROCEDURE — 96401 CHEMO ANTI-NEOPL SQ/IM: CPT

## 2023-01-23 PROCEDURE — 82232 ASSAY OF BETA-2 PROTEIN: CPT

## 2023-01-23 RX ORDER — LORAZEPAM 2 MG/ML
0.5 INJECTION INTRAMUSCULAR EVERY 4 HOURS PRN
Status: CANCELLED
Start: 2023-01-23

## 2023-01-23 RX ORDER — EPINEPHRINE 1 MG/ML
0.3 INJECTION, SOLUTION, CONCENTRATE INTRAVENOUS EVERY 5 MIN PRN
Status: CANCELLED | OUTPATIENT
Start: 2023-01-23

## 2023-01-23 RX ORDER — SODIUM CHLORIDE 9 MG/ML
1000 INJECTION, SOLUTION INTRAVENOUS CONTINUOUS PRN
Status: CANCELLED
Start: 2023-01-23

## 2023-01-23 RX ORDER — NALOXONE HYDROCHLORIDE 0.4 MG/ML
.1-.4 INJECTION, SOLUTION INTRAMUSCULAR; INTRAVENOUS; SUBCUTANEOUS
Status: CANCELLED | OUTPATIENT
Start: 2023-01-23

## 2023-01-23 RX ORDER — DIPHENHYDRAMINE HYDROCHLORIDE 50 MG/ML
50 INJECTION INTRAMUSCULAR; INTRAVENOUS
Status: CANCELLED
Start: 2023-01-23

## 2023-01-23 RX ORDER — ALBUTEROL SULFATE 90 UG/1
1-2 AEROSOL, METERED RESPIRATORY (INHALATION)
Status: CANCELLED
Start: 2023-01-23

## 2023-01-23 RX ORDER — HEPARIN SODIUM,PORCINE 10 UNIT/ML
5 VIAL (ML) INTRAVENOUS
Status: CANCELLED | OUTPATIENT
Start: 2023-01-23

## 2023-01-23 RX ORDER — ACETAMINOPHEN 325 MG/1
650 TABLET ORAL
Status: CANCELLED
Start: 2023-01-23

## 2023-01-23 RX ORDER — MEPERIDINE HYDROCHLORIDE 25 MG/ML
25 INJECTION INTRAMUSCULAR; INTRAVENOUS; SUBCUTANEOUS EVERY 30 MIN PRN
Status: CANCELLED | OUTPATIENT
Start: 2023-01-23

## 2023-01-23 RX ORDER — HEPARIN SODIUM (PORCINE) LOCK FLUSH IV SOLN 100 UNIT/ML 100 UNIT/ML
5 SOLUTION INTRAVENOUS
Status: CANCELLED | OUTPATIENT
Start: 2023-01-23

## 2023-01-23 RX ORDER — ALBUTEROL SULFATE 0.83 MG/ML
2.5 SOLUTION RESPIRATORY (INHALATION)
Status: CANCELLED | OUTPATIENT
Start: 2023-01-23

## 2023-01-23 RX ORDER — DIPHENHYDRAMINE HCL 25 MG
25 CAPSULE ORAL
Status: CANCELLED
Start: 2023-01-23

## 2023-01-23 RX ORDER — ACETAMINOPHEN 325 MG/1
650 TABLET ORAL
Status: COMPLETED | OUTPATIENT
Start: 2023-01-23 | End: 2023-01-23

## 2023-01-23 RX ADMIN — DARATUMUMAB AND HYALURONIDASE-FIHJ (HUMAN RECOMBINANT) 1800 MG: 1800; 30000 INJECTION SUBCUTANEOUS at 08:27

## 2023-01-23 RX ADMIN — ACETAMINOPHEN 650 MG: 325 TABLET ORAL at 08:03

## 2023-01-23 ASSESSMENT — PAIN SCALES - GENERAL: PAINLEVEL: MILD PAIN (2)

## 2023-01-23 NOTE — NURSING NOTE
Chief Complaint   Patient presents with     Blood Draw     Labs drawn via  by RN in lab. VS taken.      Labs collected from venipuncture by RN. Vitals taken. Checked in for appointment(s).    Sailaja Logan RN

## 2023-01-23 NOTE — PROGRESS NOTES
Infusion Nursing Note:  Shena Hartmann presents today for cycle 30 day 1 darzalex faspro injection.    Patient seen by provider today: No   present during visit today: Not Applicable.    Note: Pt presents today generally feeling well. No s/s of infection or new discomfort. Dr. Hills signed orders today. Pt wishes to proceed with planned treatment.     Intravenous Access:  No Intravenous access at this visit.    Treatment Conditions:  Lab Results   Component Value Date    HGB 9.7 (L) 01/23/2023    WBC 4.1 01/23/2023    ANEU 2.1 07/05/2021    ANEUTAUTO 1.5 (L) 01/23/2023     (L) 01/23/2023      Lab Results   Component Value Date     (L) 01/23/2023    POTASSIUM 4.0 01/23/2023    MAG 1.8 12/12/2014    CR 0.69 01/23/2023    PAULINE 9.4 01/23/2023    BILITOTAL 0.3 01/23/2023    ALBUMIN 3.8 01/23/2023    ALT 18 01/23/2023    AST 16 01/23/2023     Results reviewed, labs MET treatment parameters, ok to proceed with treatment.    Post Infusion Assessment:  Patient tolerated one darzalex faspro injection to RIGHT abdomen without incident.     Discharge Plan:   Patient declined prescription refills.  Discharge instructions reviewed with: Patient.  Patient and/or family verbalized understanding of discharge instructions and all questions answered.  Copy of AVS reviewed with patient and/or family.  Patient will return 02/20/23 for next appointment.  Patient discharged in stable condition accompanied by: self.  Departure Mode: Ambulatory.      Emily Meese, RN

## 2023-01-23 NOTE — PATIENT INSTRUCTIONS
[facility] Triage and after hours / weekends / holidays:  [phone]    Please call the triage or after hours line if you experience a temperature greater than or equal to 100.4, shaking chills, have uncontrolled nausea, vomiting and/or diarrhea, dizziness, shortness of breath, chest pain, bleeding, unexplained bruising, or if you have any other new/concerning symptoms, questions or concerns.      If you are having any concerning symptoms or wish to speak to a provider before your next infusion visit, please call your care coordinator or triage to notify them so we can adequately serve you.     If you need a refill on a narcotic prescription or other medication, please call before your infusion appointment.                January 2023 Sunday Monday Tuesday Wednesday Thursday Friday Saturday   1     2     3     4     5     6     7       8     9     10     11     12     13     14       15     16     17     18     19     20     21       22     23    LAB PERIPHERAL   7:00 AM   (15 min.)   [facility] DRAW   [facility]    ONC INFUSION 1 HR (60 MIN)   7:30 AM   (60 min.)    ONC INFUSION NURSE   [facility] 24     25     26     27     28       29     30     31    NEW  12:45 PM   (60 min.)   Nighat Chavez MD   [facility]                                   February 2023 Sunday Monday Tuesday Wednesday Thursday Friday Saturday                  1     2     3     4       5     6     7     8     9    ANNUAL WELLNESS  10:40 AM   (30 min.)   Thomas Villalobos MD   [facility] 10     11       12     13     14     15     16     17     18       19     20    LAB PERIPHERAL   7:00 AM   (15 min.)    [facility]RAW   [facility]    ONC INFUSION 1 HR (60 MIN)   7:30 AM   (60 min.)    ONC INFUSION NURSE   [facility] 21     22     23      24     25       26     27    MA SCREENING DIGITAL BILATERAL   8:30 AM   (15 min.)   [email or web address]   [facility] [place]    RETURN   8:45 AM   (30 min.)   Neelima Da Silva MD   [facility] 28                                           Lab Results:  Recent Results (from the past 12 hour(s))   CRP inflammation    Collection Time: 01/23/23  7:31 AM   Result Value Ref Range    CRP Inflammation 4.40 <5.00 mg/L   Comprehensive metabolic panel    Collection Time: 01/23/23  7:31 AM   Result Value Ref Range    Sodium 135 (L) 136 - 145 mmol/L    Potassium 4.0 3.4 - 5.3 mmol/L    Chloride 100 98 - 107 mmol/L    Carbon Dioxide (CO2) 27 22 - 29 mmol/L    Anion Gap 8 7 - 15 mmol/L    Urea Nitrogen 14.5 8.0 - 23.0 mg/dL    Creatinine 0.69 0.51 - 0.95 mg/dL    Calcium 9.4 8.8 - 10.2 mg/dL    Glucose 88 70 - 99 mg/dL    Alkaline Phosphatase 52 35 - 104 U/L    AST 16 10 - 35 U/L    ALT 18 10 - 35 U/L    Protein Total 8.2 6.4 - 8.3 g/dL    Albumin 3.8 3.5 - 5.2 g/dL    Bilirubin Total 0.3 <=1.2 mg/dL    GFR Estimate >90 >60 mL/min/1.73m2   CBC with platelets and differential    Collection Time: 01/23/23  7:31 AM   Result Value Ref Range    WBC Count 4.1 4.0 - 11.0 10e3/uL    RBC Count 2.82 (L) 3.80 - 5.20 10e6/uL    Hemoglobin 9.7 (L) 11.7 - 15.7 g/dL    Hematocrit 28.5 (L) 35.0 - 47.0 %     (H) 78 - 100 fL    MCH 34.4 (H) 26.5 - 33.0 pg    MCHC 34.0 31.5 - 36.5 g/dL    RDW 17.9 (H) 10.0 - 15.0 %    Platelet Count 125 (L) 150 - 450 10e3/uL    % Neutrophils 37 %    % Lymphocytes 44 %    % Monocytes 15 %    % Eosinophils 3 %    % Basophils 1 %    % Immature Granulocytes 0 %    NRBCs per 100 WBC 0 <1 /100    Absolute Neutrophils 1.5 (L) 1.6 - 8.3 10e3/uL    Absolute Lymphocytes 1.8 0.8 - 5.3 10e3/uL    Absolute Monocytes 0.6 0.0 - 1.3 10e3/uL    Absolute Eosinophils 0.1 0.0 - 0.7 10e3/uL    Absolute Basophils 0.0 0.0 - 0.2 10e3/uL    Absolute Immature Granulocytes 0.0 <=0.4  10e3/uL    Absolute NRBCs 0.0 10e3/uL

## 2023-01-24 LAB
ALBUMIN SERPL ELPH-MCNC: 3.6 G/DL (ref 3.7–5.1)
ALPHA1 GLOB SERPL ELPH-MCNC: 0.3 G/DL (ref 0.2–0.4)
ALPHA2 GLOB SERPL ELPH-MCNC: 0.7 G/DL (ref 0.5–0.9)
B-GLOBULIN SERPL ELPH-MCNC: 0.6 G/DL (ref 0.6–1)
GAMMA GLOB SERPL ELPH-MCNC: 2.5 G/DL (ref 0.7–1.6)
M PROTEIN SERPL ELPH-MCNC: 2.3 G/DL
PROT PATTERN SERPL ELPH-IMP: ABNORMAL

## 2023-01-27 DIAGNOSIS — C90.00 MULTIPLE MYELOMA NOT HAVING ACHIEVED REMISSION (H): ICD-10-CM

## 2023-01-27 RX ORDER — VALACYCLOVIR HYDROCHLORIDE 500 MG/1
500 TABLET, FILM COATED ORAL 2 TIMES DAILY
Qty: 180 TABLET | Refills: 3 | OUTPATIENT
Start: 2023-01-27

## 2023-01-31 ENCOUNTER — PRE VISIT (OUTPATIENT)
Dept: ONCOLOGY | Facility: CLINIC | Age: 73
End: 2023-01-31
Payer: MEDICARE

## 2023-01-31 ENCOUNTER — ONCOLOGY VISIT (OUTPATIENT)
Dept: ONCOLOGY | Facility: CLINIC | Age: 73
End: 2023-01-31
Attending: STUDENT IN AN ORGANIZED HEALTH CARE EDUCATION/TRAINING PROGRAM
Payer: MEDICARE

## 2023-01-31 ENCOUNTER — PATIENT OUTREACH (OUTPATIENT)
Dept: ONCOLOGY | Facility: CLINIC | Age: 73
End: 2023-01-31

## 2023-01-31 VITALS
BODY MASS INDEX: 21.9 KG/M2 | WEIGHT: 119 LBS | DIASTOLIC BLOOD PRESSURE: 83 MMHG | SYSTOLIC BLOOD PRESSURE: 140 MMHG | HEART RATE: 77 BPM | RESPIRATION RATE: 16 BRPM | HEIGHT: 62 IN | OXYGEN SATURATION: 99 % | TEMPERATURE: 97 F

## 2023-01-31 DIAGNOSIS — C90.02 MULTIPLE MYELOMA IN RELAPSE (H): Primary | ICD-10-CM

## 2023-01-31 DIAGNOSIS — C90.00 MULTIPLE MYELOMA NOT HAVING ACHIEVED REMISSION (H): ICD-10-CM

## 2023-01-31 PROCEDURE — G0463 HOSPITAL OUTPT CLINIC VISIT: HCPCS

## 2023-01-31 PROCEDURE — G0463 HOSPITAL OUTPT CLINIC VISIT: HCPCS | Performed by: STUDENT IN AN ORGANIZED HEALTH CARE EDUCATION/TRAINING PROGRAM

## 2023-01-31 PROCEDURE — 99215 OFFICE O/P EST HI 40 MIN: CPT | Performed by: STUDENT IN AN ORGANIZED HEALTH CARE EDUCATION/TRAINING PROGRAM

## 2023-01-31 RX ORDER — VALACYCLOVIR HYDROCHLORIDE 500 MG/1
500 TABLET, FILM COATED ORAL 2 TIMES DAILY
Qty: 180 TABLET | Refills: 3 | Status: CANCELLED | OUTPATIENT
Start: 2023-01-31

## 2023-01-31 ASSESSMENT — PAIN SCALES - GENERAL: PAINLEVEL: NO PAIN (0)

## 2023-01-31 NOTE — PROGRESS NOTES
[facility]: Cancer Care                                                                                          I met with Shena and her  Sonny after her visit with Dr. Nighat Chavez    I provided her with contact information for Dr. Chavez and myself.  She was also given [facility] information for Carfilzomib, Dexamethasone and Isatuximab    She plans to review this information and contact us with her decision about how she would like to treat    Signature:  Shabnam Hall RN

## 2023-01-31 NOTE — LETTER
"    1/31/2023         RE: Shena Hartmann  [address] [place] [address]        Dear Colleague,    Thank you for referring your patient, Shena Hartmann, to the [facility]. Please see a copy of my visit note below.    Malignant Hematology Progress Note    Oncologic Hx:  - Breast cancer dx in 1994. Underwent surgery and chemotherapy without reoccurence  - MGUS dx 1999  - T8 pathologic fracture with radiation  -revlimid and velcade  2013  Cytoxan IV 9/2013  D-PACE 11/2013  Auto 3/27/14  5/2014 thalidomide caused rash so switched to pomalidomide   - on kenalog and clobetasol creams for IMID rash  - FLC began to rise so riky added to pom 9/2020  Skipped riky Dec d/t cold virus  - she has been on xgeva for an unclear amount of time  - Most recently Kappa FLC 77.9, M spike 2.3    Interval Hx: Shena presents with her  to talk about treatment options for her myeloma. She denies any new sx. No bone pains.     Pertinent PMH:  HTN, hypothyroidism    Family Hx- prostate ca, pancreatic , GBM- grandfather, paternal aunt breast cancer      Physical Exam  BP (!) 140/83   Pulse 77   Temp 97  F (36.1  C) (Oral)   Resp 16   Ht 1.564 m (5' 1.58\")   Wt 54 kg (119 lb)   SpO2 99%   BMI 22.07 kg/m    Constitutional: NAD  Eyes: no scleral icterus  ENT: no oral lesions  Lymph: no cervical, supraclavicular LAD  Pulm: CTAB  CV: RRR, no m/r/g  GI: bowel sounds present, soft and nontender to palpation  MSK: No edema in the extremities  Neuro: alert, conversant, normal gait  Psych: appropriate mood and affect    Assessment and Plan: Shena has relapsed myeloma. Luckily it seems to be growing slowly. Today we discussed treatment options which include kyprolis +/- isatuximab or +/- selinexor as well as CAR-T cells and BiTE therapy. I think that kyrpolis will most likely be the best given high efficacy and low side effect profile. Unfortunately she will need a port as she has poor IV access. " She would like to think about her options and will get back to me regarding how she would like to proceed.     Nighat Chavez MD PhD  More than half of this 50 min visit was spent counseling the patient

## 2023-01-31 NOTE — NURSING NOTE
"Oncology Rooming Note    January 31, 2023 12:49 PM   Shena Hartmann is a 72 year old female who presents for:    Chief Complaint   Patient presents with     Oncology Clinic Visit     Multiple myeloma     Initial Vitals: BP (!) 140/83   Pulse 77   Temp 97  F (36.1  C) (Oral)   Resp 16   Ht 1.564 m (5' 1.58\")   Wt 54 kg (119 lb)   SpO2 99%   BMI 22.07 kg/m   Estimated body mass index is 22.07 kg/m  as calculated from the following:    Height as of this encounter: 1.564 m (5' 1.58\").    Weight as of this encounter: 54 kg (119 lb). Body surface area is 1.53 meters squared.  No Pain (0) Comment: Data Unavailable   No LMP recorded. Patient is postmenopausal.  Allergies reviewed: Yes  Medications reviewed: Yes    Medications: Medication refills not needed today.  Pharmacy name entered into [employer]:    [id number] - [place], [place] - [address] [place]  WRITTEN PRESCRIPTION REQUESTED  [facility] - [place], [place] - [address]    Clinical concerns: none       Lisette Brian CMA            "

## 2023-01-31 NOTE — PROGRESS NOTES
"Malignant Hematology Progress Note    Oncologic Hx:  - Breast cancer dx in 1994. Underwent surgery and chemotherapy without reoccurence  - MGUS dx 1999  - T8 pathologic fracture with radiation  -revlimid and velcade  2013  Cytoxan IV 9/2013  D-PACE 11/2013  Auto 3/27/14  5/2014 thalidomide caused rash so switched to pomalidomide   - on kenalog and clobetasol creams for IMID rash  - FLC began to rise so riky added to pom 9/2020  Skipped riky Dec d/t cold virus  - she has been on xgeva for an unclear amount of time  - Most recently Kappa FLC 77.9, M spike 2.3    Interval Hx: Shena presents with her  to talk about treatment options for her myeloma. She denies any new sx. No bone pains.     Pertinent PMH:  HTN, hypothyroidism    Family Hx- prostate ca, pancreatic , GBM- grandfather, paternal aunt breast cancer      Physical Exam  BP (!) 140/83   Pulse 77   Temp 97  F (36.1  C) (Oral)   Resp 16   Ht 1.564 m (5' 1.58\")   Wt 54 kg (119 lb)   SpO2 99%   BMI 22.07 kg/m    Constitutional: NAD  Eyes: no scleral icterus  ENT: no oral lesions  Lymph: no cervical, supraclavicular LAD  Pulm: CTAB  CV: RRR, no m/r/g  GI: bowel sounds present, soft and nontender to palpation  MSK: No edema in the extremities  Neuro: alert, conversant, normal gait  Psych: appropriate mood and affect    Assessment and Plan: Shena has relapsed myeloma. Luckily it seems to be growing slowly. Today we discussed treatment options which include kyprolis +/- isatuximab or +/- selinexor as well as CAR-T cells and BiTE therapy. I think that kyrpolis will most likely be the best given high efficacy and low side effect profile. Unfortunately she will need a port as she has poor IV access. She would like to think about her options and will get back to me regarding how she would like to proceed.     Nighat Chavez MD PhD  More than half of this 50 min visit was spent counseling the patient        "

## 2023-02-04 ASSESSMENT — ENCOUNTER SYMPTOMS
CONSTIPATION: 0
MYALGIAS: 0
PARESTHESIAS: 0
SORE THROAT: 0
HEMATURIA: 0
DYSURIA: 0
DIZZINESS: 0
NAUSEA: 0
FEVER: 0
NERVOUS/ANXIOUS: 0
WEAKNESS: 0
EYE PAIN: 0
ABDOMINAL PAIN: 0
HEADACHES: 0
JOINT SWELLING: 0
DIARRHEA: 0
ARTHRALGIAS: 0
BREAST MASS: 0
HEARTBURN: 0
COUGH: 0
HEMATOCHEZIA: 0
FREQUENCY: 0
SHORTNESS OF BREATH: 0
PALPITATIONS: 0
CHILLS: 0

## 2023-02-04 ASSESSMENT — ACTIVITIES OF DAILY LIVING (ADL): CURRENT_FUNCTION: NO ASSISTANCE NEEDED

## 2023-02-06 DIAGNOSIS — C90.02 MULTIPLE MYELOMA IN RELAPSE (H): Primary | ICD-10-CM

## 2023-02-08 ENCOUNTER — MYC MEDICAL ADVICE (OUTPATIENT)
Dept: INTERNAL MEDICINE | Facility: CLINIC | Age: 73
End: 2023-02-08
Payer: MEDICARE

## 2023-02-08 NOTE — TELEPHONE ENCOUNTER
Typically we can do it on the same day, rooming staff will need to t-up both notes.    Thomas Villalobos MD on 2/8/2023 at 9:03 AM

## 2023-02-08 NOTE — TELEPHONE ENCOUNTER
Pt is getting a port-a-cath placed on 2/15. Pt has her Annual Wellness appointment with you tomorrow (2/9). Pt needs pre-op for this procedure. Do we need to convert tomorrow's appointment to a preop or combine the two appointments.       How would you like to proceed?    Thank you,   Kiki

## 2023-02-09 ENCOUNTER — OFFICE VISIT (OUTPATIENT)
Dept: INTERNAL MEDICINE | Facility: CLINIC | Age: 73
End: 2023-02-09
Payer: MEDICARE

## 2023-02-09 VITALS
RESPIRATION RATE: 16 BRPM | OXYGEN SATURATION: 99 % | HEART RATE: 81 BPM | WEIGHT: 119.5 LBS | HEIGHT: 61 IN | DIASTOLIC BLOOD PRESSURE: 72 MMHG | BODY MASS INDEX: 22.56 KG/M2 | TEMPERATURE: 98.5 F | SYSTOLIC BLOOD PRESSURE: 126 MMHG

## 2023-02-09 DIAGNOSIS — I10 PRIMARY HYPERTENSION: ICD-10-CM

## 2023-02-09 DIAGNOSIS — D84.9 IMMUNOSUPPRESSED STATUS (H): ICD-10-CM

## 2023-02-09 DIAGNOSIS — Z94.84 STEM CELLS TRANSPLANT STATUS (H): ICD-10-CM

## 2023-02-09 DIAGNOSIS — M81.0 OSTEOPOROSIS, UNSPECIFIED OSTEOPOROSIS TYPE, UNSPECIFIED PATHOLOGICAL FRACTURE PRESENCE: ICD-10-CM

## 2023-02-09 DIAGNOSIS — C90.00 MULTIPLE MYELOMA NOT HAVING ACHIEVED REMISSION (H): ICD-10-CM

## 2023-02-09 DIAGNOSIS — Z85.3 PERSONAL HISTORY OF MALIGNANT NEOPLASM OF BREAST: ICD-10-CM

## 2023-02-09 DIAGNOSIS — I10 BENIGN ESSENTIAL HYPERTENSION: ICD-10-CM

## 2023-02-09 DIAGNOSIS — Z01.818 PREOPERATIVE EXAMINATION: ICD-10-CM

## 2023-02-09 DIAGNOSIS — Z00.00 ENCOUNTER FOR MEDICARE ANNUAL WELLNESS EXAM: Primary | ICD-10-CM

## 2023-02-09 PROCEDURE — 99214 OFFICE O/P EST MOD 30 MIN: CPT | Mod: 25 | Performed by: INTERNAL MEDICINE

## 2023-02-09 PROCEDURE — G0439 PPPS, SUBSEQ VISIT: HCPCS | Performed by: INTERNAL MEDICINE

## 2023-02-09 RX ORDER — LISINOPRIL 2.5 MG/1
2.5 TABLET ORAL DAILY
Qty: 90 TABLET | Refills: 3 | Status: SHIPPED | OUTPATIENT
Start: 2023-02-09 | End: 2023-03-07

## 2023-02-09 ASSESSMENT — ENCOUNTER SYMPTOMS
DIZZINESS: 0
DYSURIA: 0
BREAST MASS: 0
ARTHRALGIAS: 0
CHILLS: 0
HEARTBURN: 0
PARESTHESIAS: 0
EYE PAIN: 0
MYALGIAS: 0
PALPITATIONS: 0
HEMATOCHEZIA: 0
FREQUENCY: 0
SHORTNESS OF BREATH: 0
FEVER: 0
WEAKNESS: 0
HEADACHES: 0
SORE THROAT: 0
ABDOMINAL PAIN: 0
HEMATURIA: 0
COUGH: 0
DIARRHEA: 0
NAUSEA: 0
JOINT SWELLING: 0
CONSTIPATION: 0
NERVOUS/ANXIOUS: 0

## 2023-02-09 ASSESSMENT — ACTIVITIES OF DAILY LIVING (ADL): CURRENT_FUNCTION: NO ASSISTANCE NEEDED

## 2023-02-09 NOTE — PROGRESS NOTES
"SUBJECTIVE:   Shena is a 72 year old who presents for Preventive Visit.    Patient has been advised of split billing requirements and indicates understanding: Yes  Are you in the first 12 months of your Medicare coverage?  No    Healthy Habits:     In general, how would you rate your overall health?  Good    Frequency of exercise:  4-5 days/week    Duration of exercise:  45-60 minutes    Do you usually eat at least 4 servings of fruit and vegetables a day, include whole grains    & fiber and avoid regularly eating high fat or \"junk\" foods?  Yes    Taking medications regularly:  Yes    Medication side effects:  Muscle aches    Ability to successfully perform activities of daily living:  No assistance needed    Home Safety:  No safety concerns identified    Hearing Impairment:  No hearing concerns    In the past 6 months, have you been bothered by leaking of urine?  No    In general, how would you rate your overall mental or emotional health?  Excellent      PHQ-2 Total Score: 0    Additional concerns today:  Yes      Have you ever done Advance Care Planning? (For example, a Health Directive, POLST, or a discussion with a medical provider or your loved ones about your wishes): Yes, advance care planning is on file.       Fall risk  Fallen 2 or more times in the past year?: No  Any fall with injury in the past year?: No    Cognitive Screening Patient has no identifiable cognitive impairment based on our visit today through our conversation and examination     Do you have sleep apnea, excessive snoring or daytime drowsiness?: no    Reviewed and updated as needed this visit by clinical staff     Meds              Reviewed and updated as needed this visit by Provider                 Social History     Tobacco Use     Smoking status: Never     Smokeless tobacco: Never   Substance Use Topics     Alcohol use: Yes     Comment: occ     If you drink alcohol do you typically have >3 drinks per day or >7 drinks per week? " No    Alcohol Use 2/4/2023   Prescreen: >3 drinks/day or >7 drinks/week? No               Current providers sharing in care for this patient include:   Patient Care Team:  Thomas Villalobos MD as PCP - General (Internal Medicine)  Lui Hills MD as Referring Physician (Oncology)  Misty Jackson, APRN CNP as Referring Physician (Nurse Practitioner)  Kevin Eagle MD as MD (Gastroenterology)  Ruma Rebollar MD as MD (Dermatology)  Sunil Rosario MD as MD (Dermatology)  Iglesia Quick MD as MD (Orthopaedic Surgery)  Neela Mcknight MD as MD (Dermatology)  Lui Hills MD as Assigned PCP  Neelima Da Silva MD as Assigned Cancer Care Provider  Sherrie Hamilton RN as Specialty Care Coordinator (Hematology & Oncology)  Neela Mcknight MD as Assigned Surgical Provider  Shabnam Hall, RN as Specialty Care Coordinator (Hematology & Oncology)    The following health maintenance items are reviewed in Epic and correct as of today:  Health Maintenance   Topic Date Due     COVID-19 Vaccine (1) Never done     ZOSTER IMMUNIZATION (1 of 2) Never done     INFLUENZA VACCINE (1) 09/01/2022     ANNUAL REVIEW OF HM ORDERS  02/01/2023     MEDICARE ANNUAL WELLNESS VISIT  02/01/2023     LIPID  03/23/2023     FALL RISK ASSESSMENT  02/09/2024     MAMMO SCREENING  02/24/2024     COLORECTAL CANCER SCREENING  11/18/2025     DTAP/TDAP/TD IMMUNIZATION (4 - Td or Tdap) 06/01/2026     ADVANCE CARE PLANNING  02/02/2027     DEXA  02/08/2037     HEPATITIS C SCREENING  Completed     PHQ-2 (once per calendar year)  Completed     Pneumococcal Vaccine: 65+ Years  Completed     IPV IMMUNIZATION  Aged Out     MENINGITIS IMMUNIZATION  Aged Out               Review of Systems   Constitutional: Negative for chills and fever.   HENT: Negative for congestion, ear pain, hearing loss and sore throat.    Eyes: Negative for pain and visual disturbance.   Respiratory: Negative for  "cough and shortness of breath.    Cardiovascular: Negative for chest pain, palpitations and peripheral edema.   Gastrointestinal: Negative for abdominal pain, constipation, diarrhea, heartburn, hematochezia and nausea.   Breasts:  Negative for tenderness, breast mass and discharge.   Genitourinary: Negative for dysuria, frequency, genital sores, hematuria, pelvic pain, urgency, vaginal bleeding and vaginal discharge.   Musculoskeletal: Negative for arthralgias, joint swelling and myalgias.   Skin: Negative for rash.   Neurological: Negative for dizziness, weakness, headaches and paresthesias.   Psychiatric/Behavioral: Negative for mood changes. The patient is not nervous/anxious.          OBJECTIVE:   /72 (BP Location: Left arm, Patient Position: Sitting, Cuff Size: Adult Regular)   Pulse 81   Temp 98.5  F (36.9  C) (Oral)   Resp 16   Ht 1.549 m (5' 1\")   Wt 54.2 kg (119 lb 8 oz)   SpO2 99%   BMI 22.58 kg/m   Estimated body mass index is 22.58 kg/m  as calculated from the following:    Height as of this encounter: 1.549 m (5' 1\").    Weight as of this encounter: 54.2 kg (119 lb 8 oz).  Physical Exam  GENERAL: healthy, alert and no distress  EYES: Eyes grossly normal to inspection, PERRL and conjunctivae and sclerae normal  HENT: ear canals and TM's normal, nose and mouth without ulcers or lesions  NECK: no adenopathy, no asymmetry, masses, or scars and thyroid normal to palpation  RESP: lungs clear to auscultation - no rales, rhonchi or wheezes  CV: regular rate and rhythm, normal S1 S2, no S3 or S4, no murmur, click or rub, no peripheral edema and peripheral pulses strong  ABDOMEN: soft, nontender, no hepatosplenomegaly, no masses and bowel sounds normal  MS: no gross musculoskeletal defects noted, no edema  SKIN: no suspicious lesions or rashes  NEURO: Normal strength and tone, mentation intact and speech normal  PSYCH: mentation appears normal, affect normal/bright        ASSESSMENT / PLAN: "   (Z00.00) Encounter for Medicare annual wellness exam  (primary encounter diagnosis)  Comment: stable, main issue is ongoing treatment for MM  Plan: see below    (I10) Primary hypertension  Comment: controlled  Plan: Will plan to continue on previous medication without changes     (M81.0) Osteoporosis, unspecified osteoporosis type, unspecified pathological fracture presence  Comment: on Prolia--has had two injections.   Plan: recommend one more year, then recheck DEXA a year from now.    (Z94.84) Stem cells transplant status (H)  Comment: hx of  Plan: continued treatment for MM    (D84.9) Immunosuppressed status (H)  Comment: prior and ongoing chemo  Plan: precautions tken    (Z85.3) Personal history of malignant neoplasm of breast  Comment: distant  Plan: no evidence of recurrence.    (C90.00) Multiple myeloma not having achieved remission (H)  Comment: ongoing treatment, persisting  Plan: port to be placed, preop today. Doing quite well despite this.            COUNSELING:  Reviewed preventive health counseling, as reflected in patient instructions        She reports that she has never smoked. She has never used smokeless tobacco.      Appropriate preventive services were discussed with this patient, including applicable screening as appropriate for cardiovascular disease, diabetes, osteopenia/osteoporosis, and glaucoma.  As appropriate for age/gender, discussed screening for colorectal cancer, prostate cancer, breast cancer, and cervical cancer. Checklist reviewing preventive services available has been given to the patient.    Reviewed patients plan of care and provided an AVS. The Basic Care Plan (routine screening as documented in Health Maintenance) for Shena meets the Care Plan requirement. This Care Plan has been established and reviewed with the Patient.          Thomas Villalobos MD  [facility]    Identified Health Risks:

## 2023-02-09 NOTE — PATIENT INSTRUCTIONS
Patient Education   Personalized Prevention Plan  You are due for the preventive services outlined below.  Your care team is available to assist you in scheduling these services.  If you have already completed any of these items, please share that information with your care team to update in your medical record.  Health Maintenance Due   Topic Date Due     COVID-19 Vaccine (1) Never done     Zoster (Shingles) Vaccine (1 of 2) Never done     Flu Vaccine (1) 09/01/2022     ANNUAL REVIEW OF HM ORDERS  02/01/2023     Annual Wellness Visit  02/01/2023

## 2023-02-09 NOTE — PROGRESS NOTES
[facility]  [address] [place] [address]  Phone: [phone]  Fax: [phone]  Primary Provider: Zuhair Villalobos  Pre-op Performing Provider: ZUHAIR VILLALOBOS      PREOPERATIVE EVALUATION:  Today's date: 2/9/2023    Shena Hartmann is a 72 year old female who presents for a preoperative evaluation.    Surgical Information:  Surgery/Procedure: Vascular Access Port  Surgery Location: [facility]   Surgeon: Dr Luc Antunez  Surgery Date: 2/15/23  Time of Surgery: TBD  Where patient plans to recover: At home with family  Fax number for surgical facility: Note does not need to be faxed, will be available electronically in Epic.    Type of Anesthesia Anticipated: to be determined    Assessment & Plan     The proposed surgical procedure is considered LOW risk.    (Z01.818) Preoperative examination  Comment: port being placed for chemo   Plan: okay for procedure, stable.    (C90.00) Multiple myeloma not having achieved remission (H)  Comment: recurrent  Plan: see above      (I10) Benign essential hypertension  Comment: controlled  Plan: lisinopril (ZESTRIL) 2.5 MG tablet        Will plan to continue on previous medication without changes               RECOMMENDATION:  APPROVAL GIVEN to proceed with proposed procedure, without further diagnostic evaluation.            Subjective     HPI related to upcoming procedure: MM, ongoing treatment, port to be placed.      No flowsheet data found.    Health Care Directive:  Patient has a Health Care Directive on file        Review of Systems   Constitutional: Negative for chills and fever.   HENT: Negative for congestion, ear pain, hearing loss and sore throat.    Eyes: Negative for pain and visual disturbance.   Respiratory: Negative for cough and shortness of breath.    Cardiovascular: Negative for chest pain, palpitations and peripheral edema.   Gastrointestinal: Negative for abdominal pain, constipation,  diarrhea, heartburn, hematochezia and nausea.   Breasts:  Negative for tenderness, breast mass and discharge.   Genitourinary: Negative for dysuria, frequency, genital sores, hematuria, pelvic pain, urgency, vaginal bleeding and vaginal discharge.   Musculoskeletal: Negative for arthralgias, joint swelling and myalgias.   Skin: Negative for rash.   Neurological: Negative for dizziness, weakness, headaches and paresthesias.   Psychiatric/Behavioral: Negative for mood changes. The patient is not nervous/anxious.      CONSTITUTIONAL: NEGATIVE for fever, chills, change in weight  ENT/MOUTH: NEGATIVE for ear, mouth and throat problems  RESP: NEGATIVE for significant cough or SOB  CV: NEGATIVE for chest pain, palpitations or peripheral edema    Patient Active Problem List    Diagnosis Date Noted     Stem cells transplant status (H) 02/09/2023     Priority: Medium     Agranulocytosis secondary to cancer chemotherapy (CODE) (H) 09/01/2022     Priority: Medium     Primary hypertension 09/01/2022     Priority: Medium     Osteoporosis 11/01/2015     Priority: Medium     Seasonal allergies 10/27/2015     Priority: Medium     Personal history of malignant neoplasm of breast 10/14/2014     Priority: Medium     History of respiratory syncytial virus infection 01/27/2014     Priority: Medium     Multiple myeloma, dx 2002,  s/p ASCBMT 3/26/14 10/07/2013     Priority: Medium     Pain in thoracic spine 08/26/2013     Priority: Medium      Past Medical History:   Diagnosis Date     Breast cancer (H) 01/01/1994    right     H/O stem cell transplant (H) 03/01/2014     History of blood transfusion 2013 & 2014    During stem cell tx process     Hypertension Sept. 2021    Runs 140 s systolically, about 20 above my usual     Multiple myeloma, without mention of having achieved remission 11/06/2012     Past Surgical History:   Procedure Laterality Date     BIOPSY  10/1994; [id number]    Lt. Breast in '94; multiple bone marrow bx's for MM      BREAST SURGERY  10/1994    Lt. Mastectomy w/lymph node resection     COLONOSCOPY  11/2015    Normal results     EYE SURGERY  2020    Bilateral cataract surgery     MASTECTOMY      Right, with lymphe node disection       PICC INSERTION  09/10/2013    5fr DL Power PICC, 44cm (1cm external) in the L lateral brachial vein with tip in the low SVC     TRANSPLANT  3/27/2014    Autologous stem cell tx     Current Outpatient Medications   Medication Sig Dispense Refill     Ascorbic Acid (VITAMIN C PO) Take 1,000 mg by mouth 2 times daily        aspirin (ASA) 81 MG chewable tablet Take 1 tablet (81 mg) by mouth daily 30 tablet 0     CALCIUM-VITAMIN D-VITAMIN K PO Take 2 tablets by mouth daily.       COENZYME Q-10 PO Take 100 mg by mouth daily        Cyanocobalamin 1000 MCG/ML LIQD Take 500 mcg by mouth 2 times daily       desonide (DESOWEN) 0.05 % external ointment Apply topically 2 times daily Apply to face as needed for dermatitis 15 g 11     levothyroxine (SYNTHROID/LEVOTHROID) 50 MCG tablet Take 1 tablet (50 mcg) by mouth daily 90 tablet 3     lisinopril (ZESTRIL) 2.5 MG tablet Take 1 tablet (2.5 mg) by mouth daily 90 tablet 3     magnesium 100 MG CAPS Take 117 mg by mouth 3 times daily       Multiple Vitamins-Minerals (MULTIVITAMIN OR) Take 1 tablet by mouth 2 times daily.       order for DME 6 mastectomy bras  Length of need: 99 months 6 Device 1     order for DME R mastectomy prosthesis   Length of need:  99 months 1 Device 1     pomalidomide (POMALYST) 1 MG capsule Alternate 2mg daily with 1mg daily for 21 days of a 28 day cycle. 32 capsule 0     triamcinolone (KENALOG) 0.1 % external ointment Apply topically 2 times daily 15 g 11     valACYclovir (VALTREX) 500 MG tablet Take 1 tablet (500 mg) by mouth 2 times daily 180 tablet 3     ZINC PICOLINATE PO Take 30 mg by mouth         Allergies   Allergen Reactions     Cayenne Pepper [Cayenne]      Corn-Related Products GI Disturbance     Levaquin Other (See Comments)      "Tendon pain     Milk Protein GI Disturbance     Mold      Facial rash/lip swelling     Morphine Sulfate Other (See Comments)     Per pt - see things (hallucination) when she was on Morphine .     Pollen Extract      Environmental allergies      Shrimp Nausea and Vomiting     Tomato      Lip swelling, facial rash     Azithromycin Rash     Keflex [Cephalexin] Rash     Oat Rash     Tape [Adhesive Tape] Itching and Rash     All adhesives     Wheat Rash     Swelling of lips        Social History     Tobacco Use     Smoking status: Never     Smokeless tobacco: Never   Substance Use Topics     Alcohol use: Yes     Comment: occ       History   Drug Use No         Objective     /72 (BP Location: Left arm, Patient Position: Sitting, Cuff Size: Adult Regular)   Pulse 81   Temp 98.5  F (36.9  C) (Oral)   Resp 16   Ht 1.549 m (5' 1\")   Wt 54.2 kg (119 lb 8 oz)   SpO2 99%   BMI 22.58 kg/m      Physical Exam  GENERAL APPEARANCE: healthy, alert and no distress  HENT: ear canals and TM's normal and nose and mouth without ulcers or lesions  RESP: lungs clear to auscultation - no rales, rhonchi or wheezes  CV: regular rate and rhythm, normal S1 S2, no S3 or S4 and no murmur, click or rub   ABDOMEN: soft, nontender, no HSM or masses and bowel sounds normal  NEURO: Normal strength and tone, sensory exam grossly normal, mentation intact and speech normal    Recent Labs   Lab Test 01/23/23  0731 11/28/22  0828   HGB 9.7* 10.8*   * 119*   * 132*   POTASSIUM 4.0 3.9   CR 0.69 0.71        Diagnostics:  No labs were ordered during this visit.   No EKG required for low risk surgery (cataract, skin procedure, breast biopsy, etc).    Revised Cardiac Risk Index (RCRI):  The patient has the following serious cardiovascular risks for perioperative complications:   - No serious cardiac risks = 0 points     RCRI Interpretation: 0 points: Class I (very low risk - 0.4% complication rate)           Signed Electronically by: " "Thomas Villalobos MD  Copy of this evaluation report is provided to requesting physician.      Answers for HPI/ROS submitted by the patient on 2/4/2023  In general, how would you rate your overall physical health?: good  Frequency of exercise:: 4-5 days/week  Do you usually eat at least 4 servings of fruit and vegetables a day, include whole grains & fiber, and avoid regularly eating high fat or \"junk\" foods? : Yes  Taking medications regularly:: Yes  Medication side effects:: Muscle aches  Activities of Daily Living: no assistance needed  Home safety: no safety concerns identified  Hearing Impairment:: no hearing concerns  In the past 6 months, have you been bothered by leaking of urine?: No  In general, how would you rate your overall mental or emotional health?: excellent  Additional concerns today:: Yes  Duration of exercise:: 45-60 minutes      "

## 2023-02-14 ENCOUNTER — ANESTHESIA EVENT (OUTPATIENT)
Dept: SURGERY | Facility: AMBULATORY SURGERY CENTER | Age: 73
End: 2023-02-14
Payer: MEDICARE

## 2023-02-14 RX ORDER — OXYCODONE HYDROCHLORIDE 5 MG/1
5 TABLET ORAL EVERY 4 HOURS PRN
Status: DISCONTINUED | OUTPATIENT
Start: 2023-02-14 | End: 2023-02-16 | Stop reason: HOSPADM

## 2023-02-14 RX ORDER — OXYCODONE HYDROCHLORIDE 5 MG/1
10 TABLET ORAL EVERY 4 HOURS PRN
Status: DISCONTINUED | OUTPATIENT
Start: 2023-02-14 | End: 2023-02-16 | Stop reason: HOSPADM

## 2023-02-15 ENCOUNTER — ANCILLARY PROCEDURE (OUTPATIENT)
Dept: RADIOLOGY | Facility: AMBULATORY SURGERY CENTER | Age: 73
End: 2023-02-15
Attending: STUDENT IN AN ORGANIZED HEALTH CARE EDUCATION/TRAINING PROGRAM
Payer: MEDICARE

## 2023-02-15 ENCOUNTER — ANESTHESIA (OUTPATIENT)
Dept: SURGERY | Facility: AMBULATORY SURGERY CENTER | Age: 73
End: 2023-02-15
Payer: MEDICARE

## 2023-02-15 ENCOUNTER — HOSPITAL ENCOUNTER (OUTPATIENT)
Facility: AMBULATORY SURGERY CENTER | Age: 73
Discharge: HOME | End: 2023-02-15
Attending: RADIOLOGY
Payer: MEDICARE

## 2023-02-15 VITALS
TEMPERATURE: 96.9 F | HEART RATE: 68 BPM | OXYGEN SATURATION: 97 % | RESPIRATION RATE: 16 BRPM | HEIGHT: 61 IN | BODY MASS INDEX: 21.71 KG/M2 | DIASTOLIC BLOOD PRESSURE: 76 MMHG | WEIGHT: 115 LBS | SYSTOLIC BLOOD PRESSURE: 130 MMHG

## 2023-02-15 DIAGNOSIS — C90.02 MULTIPLE MYELOMA IN RELAPSE (H): ICD-10-CM

## 2023-02-15 PROCEDURE — 76937 US GUIDE VASCULAR ACCESS: CPT | Mod: 26 | Performed by: RADIOLOGY

## 2023-02-15 PROCEDURE — 36561 INSERT TUNNELED CV CATH: CPT | Mod: LT

## 2023-02-15 PROCEDURE — 77001 FLUOROGUIDE FOR VEIN DEVICE: CPT | Mod: 26 | Performed by: RADIOLOGY

## 2023-02-15 PROCEDURE — 36561 INSERT TUNNELED CV CATH: CPT | Mod: LT | Performed by: RADIOLOGY

## 2023-02-15 DEVICE — CATH PORT POWERPORT CLEARVUE SLIM 8FR 5618000: Type: IMPLANTABLE DEVICE | Site: CHEST | Status: FUNCTIONAL

## 2023-02-15 RX ORDER — CLINDAMYCIN PHOSPHATE 900 MG/50ML
900 INJECTION, SOLUTION INTRAVENOUS SEE ADMIN INSTRUCTIONS
Status: DISCONTINUED | OUTPATIENT
Start: 2023-02-15 | End: 2023-02-16 | Stop reason: HOSPADM

## 2023-02-15 RX ORDER — SODIUM CHLORIDE, SODIUM LACTATE, POTASSIUM CHLORIDE, CALCIUM CHLORIDE 600; 310; 30; 20 MG/100ML; MG/100ML; MG/100ML; MG/100ML
INJECTION, SOLUTION INTRAVENOUS CONTINUOUS
Status: DISCONTINUED | OUTPATIENT
Start: 2023-02-15 | End: 2023-02-16 | Stop reason: HOSPADM

## 2023-02-15 RX ORDER — LIDOCAINE 40 MG/G
CREAM TOPICAL
Status: DISCONTINUED | OUTPATIENT
Start: 2023-02-15 | End: 2023-02-16 | Stop reason: HOSPADM

## 2023-02-15 RX ORDER — HYDROMORPHONE HYDROCHLORIDE 1 MG/ML
0.4 INJECTION, SOLUTION INTRAMUSCULAR; INTRAVENOUS; SUBCUTANEOUS EVERY 5 MIN PRN
Status: DISCONTINUED | OUTPATIENT
Start: 2023-02-15 | End: 2023-02-16 | Stop reason: HOSPADM

## 2023-02-15 RX ORDER — LIDOCAINE HYDROCHLORIDE 20 MG/ML
INJECTION, SOLUTION INFILTRATION; PERINEURAL PRN
Status: DISCONTINUED | OUTPATIENT
Start: 2023-02-15 | End: 2023-02-15

## 2023-02-15 RX ORDER — PROPOFOL 10 MG/ML
INJECTION, EMULSION INTRAVENOUS PRN
Status: DISCONTINUED | OUTPATIENT
Start: 2023-02-15 | End: 2023-02-15

## 2023-02-15 RX ORDER — HEPARIN SODIUM (PORCINE) LOCK FLUSH IV SOLN 100 UNIT/ML 100 UNIT/ML
5-10 SOLUTION INTRAVENOUS
Status: DISCONTINUED | OUTPATIENT
Start: 2023-02-15 | End: 2023-02-16 | Stop reason: HOSPADM

## 2023-02-15 RX ORDER — ACETAMINOPHEN 325 MG/1
975 TABLET ORAL ONCE
Status: COMPLETED | OUTPATIENT
Start: 2023-02-15 | End: 2023-02-15

## 2023-02-15 RX ORDER — PROPOFOL 10 MG/ML
INJECTION, EMULSION INTRAVENOUS CONTINUOUS PRN
Status: DISCONTINUED | OUTPATIENT
Start: 2023-02-15 | End: 2023-02-15

## 2023-02-15 RX ORDER — HYDROMORPHONE HYDROCHLORIDE 1 MG/ML
0.2 INJECTION, SOLUTION INTRAMUSCULAR; INTRAVENOUS; SUBCUTANEOUS EVERY 5 MIN PRN
Status: DISCONTINUED | OUTPATIENT
Start: 2023-02-15 | End: 2023-02-16 | Stop reason: HOSPADM

## 2023-02-15 RX ORDER — HEPARIN SODIUM,PORCINE 10 UNIT/ML
5-10 VIAL (ML) INTRAVENOUS
Status: DISCONTINUED | OUTPATIENT
Start: 2023-02-15 | End: 2023-02-16 | Stop reason: HOSPADM

## 2023-02-15 RX ORDER — HEPARIN SODIUM,PORCINE 10 UNIT/ML
5-10 VIAL (ML) INTRAVENOUS EVERY 24 HOURS
Status: DISCONTINUED | OUTPATIENT
Start: 2023-02-15 | End: 2023-02-16 | Stop reason: HOSPADM

## 2023-02-15 RX ORDER — ACETAMINOPHEN 325 MG/1
325-650 TABLET ORAL EVERY 6 HOURS PRN
COMMUNITY

## 2023-02-15 RX ORDER — FENTANYL CITRATE 50 UG/ML
50 INJECTION, SOLUTION INTRAMUSCULAR; INTRAVENOUS EVERY 5 MIN PRN
Status: DISCONTINUED | OUTPATIENT
Start: 2023-02-15 | End: 2023-02-16 | Stop reason: HOSPADM

## 2023-02-15 RX ORDER — ONDANSETRON 2 MG/ML
4 INJECTION INTRAMUSCULAR; INTRAVENOUS EVERY 30 MIN PRN
Status: DISCONTINUED | OUTPATIENT
Start: 2023-02-15 | End: 2023-02-16 | Stop reason: HOSPADM

## 2023-02-15 RX ORDER — LIDOCAINE HYDROCHLORIDE 10 MG/ML
INJECTION, SOLUTION EPIDURAL; INFILTRATION; INTRACAUDAL; PERINEURAL DAILY PRN
Status: DISCONTINUED | OUTPATIENT
Start: 2023-02-15 | End: 2023-02-15 | Stop reason: HOSPADM

## 2023-02-15 RX ORDER — CLINDAMYCIN PHOSPHATE 900 MG/50ML
900 INJECTION, SOLUTION INTRAVENOUS
Status: COMPLETED | OUTPATIENT
Start: 2023-02-15 | End: 2023-02-15

## 2023-02-15 RX ORDER — ONDANSETRON 4 MG/1
4 TABLET, ORALLY DISINTEGRATING ORAL EVERY 30 MIN PRN
Status: DISCONTINUED | OUTPATIENT
Start: 2023-02-15 | End: 2023-02-16 | Stop reason: HOSPADM

## 2023-02-15 RX ORDER — OXYCODONE HYDROCHLORIDE 5 MG/1
5 TABLET ORAL EVERY 4 HOURS PRN
Status: DISCONTINUED | OUTPATIENT
Start: 2023-02-15 | End: 2023-02-16 | Stop reason: HOSPADM

## 2023-02-15 RX ORDER — OXYCODONE HYDROCHLORIDE 5 MG/1
10 TABLET ORAL EVERY 4 HOURS PRN
Status: DISCONTINUED | OUTPATIENT
Start: 2023-02-15 | End: 2023-02-16 | Stop reason: HOSPADM

## 2023-02-15 RX ORDER — FENTANYL CITRATE 50 UG/ML
25 INJECTION, SOLUTION INTRAMUSCULAR; INTRAVENOUS EVERY 5 MIN PRN
Status: DISCONTINUED | OUTPATIENT
Start: 2023-02-15 | End: 2023-02-16 | Stop reason: HOSPADM

## 2023-02-15 RX ADMIN — ACETAMINOPHEN 975 MG: 325 TABLET ORAL at 07:09

## 2023-02-15 RX ADMIN — LIDOCAINE HYDROCHLORIDE 40 MG: 20 INJECTION, SOLUTION INFILTRATION; PERINEURAL at 08:03

## 2023-02-15 RX ADMIN — PROPOFOL 40 MG: 10 INJECTION, EMULSION INTRAVENOUS at 08:04

## 2023-02-15 RX ADMIN — PROPOFOL 200 MCG/KG/MIN: 10 INJECTION, EMULSION INTRAVENOUS at 08:03

## 2023-02-15 RX ADMIN — CLINDAMYCIN PHOSPHATE 900 MG: 900 INJECTION, SOLUTION INTRAVENOUS at 07:57

## 2023-02-15 RX ADMIN — SODIUM CHLORIDE, SODIUM LACTATE, POTASSIUM CHLORIDE, CALCIUM CHLORIDE: 600; 310; 30; 20 INJECTION, SOLUTION INTRAVENOUS at 07:57

## 2023-02-15 NOTE — OP NOTE
PROCEDURE 2/15/2023:  1. Ultrasound guidance for venous access  2. Tunneled port (age > 5 yrs.) placement  3. Fluoroscopic guidance placement    Clinical History: Multiple myeloma. Vascular port placement requested.    Comparisons: PET/CT dated 4/23/2021    Staff Radiologist: MD MERVIN Loera, LAZARO MALIK MD, attest that I was present for all critical portions of the procedure and was immediately available to provide guidance and assistance during the remainder of the procedure.     Assistant: Jameson Staton PA-C    Medications: The patient was placed on continuous monitoring. Monitored anesthesia care was provided by anesthesiology service. For details please refer to their dedicated note. Vital signs and sedation monitored by nursing staff under Interventional Radiologists supervision. The patient remained stable throughout the procedure.    Fluoroscopy time: 2 minutes.    PROCEDURE: The patient understood the limitations, alternatives, and risks of the procedure and requested the procedure be performed. Both written and oral consent were obtained.    Limited jugular ultrasound documented jugular vein patency.    The left neck and upper chest were prepped and draped in the usual sterile fashion. Ten to one volume mixture of 1% lidocaine without epinephrine buffered with 8.4% bicarbonate solution was used for local anesthesia.     Under ultrasound guidance, left internal jugular venotomy was made with a micropuncture needle. Permanent copy of the image documenting the vein was entered into the patients record.    Under fluoroscopic guidance, the micropuncture needle was exchanged over guidewire for the micropuncture sheath. Catheter length measured with the 0.018 guidewire. Micropuncture sheath saline locked. A subcutaneous pocket was created for the port reservoir over the left anterior chest using a combination of sharp and blunt dissection. The pocket was liberally irrigated with copious amounts of  normal saline solution. Catheter was subcutaneously tunneled from the left anterior chest pocket to the left internal jugular venotomy site. Catheter was cut to length. Micropuncture sheath exchanged over guidewire for the peel-away sheath. Catheter placed through the peel-away sheath and advanced under fluoroscopic guidance to the right atrium. Peel-away sheath removed. Port aspirated and flushed adequately. Port heparin locked with 100:1 heparin solution. The left anterior chest incision was closed with four 3-0 Vicryl interrupted sutures, a running 4-0 Monocryl subcuticular suture, and Dermabond. Left internal jugular venotomy site closed with Dermabond. No immediate complication. Minimal blood loss. No samples to pathology.      IMPRESSION:   1. Ultrasound guided left internal jugular venotomy.  2. Left internal jugular 23 cm 8 [place] Slim single lumen power injectable port placed. Tip in the right atrium. Port heparin locked and ready for use.

## 2023-02-15 NOTE — DISCHARGE INSTRUCTIONS
A collaboration between [facility] and [facility]  Experts in minimally invasive, targeted treatments performed using imaging guidance    Venous Access Device,  Port Catheter or Tunneled or Non-Tunneled Central Line Placement    Today you had a procedure today to install a venous access device; either a tunneled central vein catheter or a subcutaneous port catheter.    After you go home:  Drink plenty of fluids.  Generally 6-8 (8 ounce) glasses a day is recommended.  Resume your regular diet unless otherwise ordered by a medical provider.  Keep any applied tape/gauze dressings clean and dry.  Change tape/gauze dressings if they get wet or soiled.  You may shower the following day after procedure, however cover and protect from moisture any tape/gauze dressings.  You may let water hit and run over dried skin glue, but do not scrub.  Pat the area dry after showering.  Port placement incisions are closed with absorbable suture, meaning they do not need to be removed at a later date, and a topical skin adhesive (skin glue).  This glue will wear off in 7-14 days.  Do not remove before this time.  If 14 days have passed and residual glue is present, you may gently remove it.  Do not apply gels, lotions, or ointments to the glue site for the first 10 days as this may cause the glue to prematurely soften and fail.  Do not perform strenuous activities or lift greater than 10 pounds for the next three days.  If there is bleeding or oozing from the procedure site, apply firm pressure to the area for 5-10 minutes.  If the bleeding continues seek medical advice at the numbers below.  Mild procedure site discomfort can be treated with an ice pack and over-the-counter pain relievers.        For 24 hours after any sedation used:  Relax and take it easy.  No strenuous activities.  Do not drive or operate machines at home or at work.  No alcohol consumption.  Do not make any  important or legal decisions.    Call our Interventional Radiology (IR) service if:  If you start bleeding from the procedure site.  If you do start to bleed from the site, lie down and hold some pressure on the site.  Our radiology provider can help you decide if you need to return to the hospital.  If you have new or worsening pain related to the procedure.  If you have concerning swelling at the procedure site.  If you develop persistent nausea or vomiting.  If you develop hives or a rash or any unexplained itching.  If you have a fever of greater than 100.5  F and chills in the first 5 days after procedure.  Any other concerns related to your procedure.      [facility]  Interventional Radiology (IR)  [address]  [address] [facility] [place] [address]    Contact Number:  [phone]  (IR control desk)  Monday - Friday 8:00 am - 4:30 pm    After hours for urgent concerns:  [phone]  After 4:30 pm Monday - Friday, Weekends and Holidays.   Ask for Interventional Radiology on-call.  Someone is available 24 hours a day.  [facility] toll free number:  [phone]

## 2023-02-15 NOTE — ANESTHESIA PREPROCEDURE EVALUATION
Anesthesia Pre-Procedure Evaluation    Patient: Shena Hartmann   MRN: [id number] : 1950        Procedure : Procedure(s):  INSERTION, VASCULAR ACCESS PORT          Past Medical History:   Diagnosis Date     Breast cancer (H) 1994    right     H/O stem cell transplant (H) 2014     History of blood transfusion  &     During stem cell tx process     Hypertension 2021    Runs 140 s systolically, about 20 above my usual     Multiple myeloma, without mention of having achieved remission 2012      Past Surgical History:   Procedure Laterality Date     BIOPSY  10/1994; [id number]    Lt. Breast in ; multiple bone marrow bx's for MM     BREAST SURGERY  10/1994    Lt. Mastectomy w/lymph node resection     COLONOSCOPY  2015    Normal results     EYE SURGERY      Bilateral cataract surgery     MASTECTOMY      Right, with lymphe node disection       PICC INSERTION  09/10/2013    5fr DL Power PICC, 44cm (1cm external) in the L lateral brachial vein with tip in the low SVC     TRANSPLANT  3/27/2014    Autologous stem cell tx      Allergies   Allergen Reactions     Cayenne Pepper [Cayenne]      Corn-Related Products GI Disturbance     Levaquin Other (See Comments)     Tendon pain     Milk Protein GI Disturbance     Mold      Facial rash/lip swelling     Morphine Sulfate Other (See Comments)     Per pt - see things (hallucination) when she was on Morphine .     Pollen Extract      Environmental allergies      Shrimp Nausea and Vomiting     Tomato      Lip swelling, facial rash     Azithromycin Rash     Keflex [Cephalexin] Rash     Oat Rash     Tape [Adhesive Tape] Itching and Rash     All adhesives     Wheat Rash     Swelling of lips      Social History     Tobacco Use     Smoking status: Never     Smokeless tobacco: Never   Substance Use Topics     Alcohol use: Yes     Comment: occ      Wt Readings from Last 1 Encounters:   02/15/23 52.2 kg (115 lb)        Anesthesia Evaluation   Pt  has had prior anesthetic.     No history of anesthetic complications       ROS/MED HX  ENT/Pulmonary:       Neurologic:       Cardiovascular:     (+) hypertension-----    METS/Exercise Tolerance:     Hematologic:     (+) history of blood transfusion,     Musculoskeletal:       GI/Hepatic:       Renal/Genitourinary:       Endo:       Psychiatric/Substance Use:       Infectious Disease:       Malignancy: Comment: Multiple Myeloma s/p stem cell transplant  (+) Malignancy, History of Breast.Breast CA status post Surgery.        Other:               OUTSIDE LABS:  CBC:   Lab Results   Component Value Date    WBC 4.1 01/23/2023    WBC 3.4 (L) 11/28/2022    HGB 9.7 (L) 01/23/2023    HGB 10.8 (L) 11/28/2022    HCT 28.5 (L) 01/23/2023    HCT 31.5 (L) 11/28/2022     (L) 01/23/2023     (L) 11/28/2022     BMP:   Lab Results   Component Value Date     (L) 01/23/2023     (L) 11/28/2022    POTASSIUM 4.0 01/23/2023    POTASSIUM 3.9 11/28/2022    CHLORIDE 100 01/23/2023    CHLORIDE 96 (L) 11/28/2022    CO2 27 01/23/2023    CO2 26 11/28/2022    BUN 14.5 01/23/2023    BUN 13.1 11/28/2022    CR 0.69 01/23/2023    CR 0.71 11/28/2022    GLC 88 01/23/2023     (H) 11/28/2022     COAGS:   Lab Results   Component Value Date    PTT 29 03/25/2014    INR 1.14 03/31/2014     POC: No results found for: BGM, HCG, HCGS  HEPATIC:   Lab Results   Component Value Date    ALBUMIN 3.8 01/23/2023    PROTTOTAL 8.2 01/23/2023    ALT 18 01/23/2023    AST 16 01/23/2023    ALKPHOS 52 01/23/2023    BILITOTAL 0.3 01/23/2023     OTHER:   Lab Results   Component Value Date    PH 7.46 (H) 01/16/2014    LACT 0.8 09/17/2013    PAULINE 9.4 01/23/2023    PHOS 3.1 03/31/2014    MAG 1.8 12/12/2014    LIPASE 20 04/14/2014    AMYLASE 83 04/14/2014    TSH 1.82 10/03/2022    T4 0.97 06/13/2022    T4 0.99 06/13/2022    CRP <2.9 08/08/2022       Anesthesia Plan    ASA Status:  3   NPO Status:  NPO Appropriate    Anesthesia Type: MAC.     - Reason  for MAC: straight local not clinically adequate   Induction: Intravenous, Propofol.   Maintenance: TIVA.        Consents    Anesthesia Plan(s) and associated risks, benefits, and realistic alternatives discussed. Questions answered and patient/representative(s) expressed understanding.    - Discussed:     - Discussed with:  Patient      - Extended Intubation/Ventilatory Support Discussed: No.      - Patient is DNR/DNI Status: No         Postoperative Care       PONV prophylaxis: Background Propofol Infusion     Comments:           H&P reviewed: Unable to attach H&P to encounter due to EHR limitations. H&P Update: appropriate H&P reviewed, patient examined. No interval changes since H&P (within 30 days).         Wilberto Patiño MD

## 2023-02-15 NOTE — ANESTHESIA POSTPROCEDURE EVALUATION
Patient: Shena Hartmann    Procedure: Procedure(s):  INSERTION, VASCULAR ACCESS PORT       Anesthesia Type:  MAC    Note:  Disposition: Outpatient   Postop Pain Control: Uneventful            Sign Out: Well controlled pain   PONV: No   Neuro/Psych: Uneventful            Sign Out: Acceptable/Baseline neuro status   Airway/Respiratory: Uneventful            Sign Out: Acceptable/Baseline resp. status   CV/Hemodynamics: Uneventful            Sign Out: Acceptable CV status; No obvious hypovolemia; No obvious fluid overload   Other NRE: NONE   DID A NON-ROUTINE EVENT OCCUR? No           Last vitals:  Vitals Value Taken Time   /76 02/15/23 0857   Temp 36.1  C (96.9  F) 02/15/23 0857   Pulse 68 02/15/23 0857   Resp 16 02/15/23 0857   SpO2 97 % 02/15/23 0857       Electronically Signed By: Wilberto Patiño MD  February 15, 2023  9:38 AM

## 2023-02-15 NOTE — ANESTHESIA CARE TRANSFER NOTE
Patient: Shena Hartmann    Procedure: Procedure(s):  INSERTION, VASCULAR ACCESS PORT       Diagnosis: Multiple myeloma in relapse (H) [C90.02]  Diagnosis Additional Information: No value filed.    Anesthesia Type:   MAC     Note:    Oropharynx: oropharynx clear of all foreign objects  Level of Consciousness: awake  Oxygen Supplementation: room air      Dentition: dentition unchanged  Vital Signs Stable: post-procedure vital signs reviewed and stable  Report to RN Given: handoff report given  Patient transferred to: Phase II    Handoff Report: Identifed the Patient, Identified the Reponsible Provider, Reviewed the pertinent medical history, Discussed the surgical course, Reviewed Intra-OP anesthesia mangement and issues during anesthesia, Set expectations for post-procedure period and Allowed opportunity for questions and acknowledgement of understanding      Vitals:  Vitals Value Taken Time   BP     Temp     Pulse     Resp     SpO2         Electronically Signed By: MARINO Whitehead CRNA  February 15, 2023  8:41 AM

## 2023-02-16 ENCOUNTER — PATIENT OUTREACH (OUTPATIENT)
Dept: ONCOLOGY | Facility: CLINIC | Age: 73
End: 2023-02-16
Payer: MEDICARE

## 2023-02-16 DIAGNOSIS — C90.02 MULTIPLE MYELOMA IN RELAPSE (H): Primary | ICD-10-CM

## 2023-02-16 DIAGNOSIS — C90.00 MULTIPLE MYELOMA NOT HAVING ACHIEVED REMISSION (H): ICD-10-CM

## 2023-02-16 DIAGNOSIS — E85.89 OTHER AMYLOIDOSIS (H): ICD-10-CM

## 2023-02-16 RX ORDER — ALBUTEROL SULFATE 0.83 MG/ML
2.5 SOLUTION RESPIRATORY (INHALATION)
Status: CANCELLED | OUTPATIENT
Start: 2023-03-06

## 2023-02-16 RX ORDER — HEPARIN SODIUM (PORCINE) LOCK FLUSH IV SOLN 100 UNIT/ML 100 UNIT/ML
5 SOLUTION INTRAVENOUS
Status: CANCELLED | OUTPATIENT
Start: 2023-02-20

## 2023-02-16 RX ORDER — HEPARIN SODIUM,PORCINE 10 UNIT/ML
5 VIAL (ML) INTRAVENOUS
Status: CANCELLED | OUTPATIENT
Start: 2023-02-27

## 2023-02-16 RX ORDER — EPINEPHRINE 1 MG/ML
0.3 INJECTION, SOLUTION INTRAMUSCULAR; SUBCUTANEOUS EVERY 5 MIN PRN
Status: CANCELLED | OUTPATIENT
Start: 2023-02-27

## 2023-02-16 RX ORDER — DIPHENHYDRAMINE HYDROCHLORIDE 50 MG/ML
50 INJECTION INTRAMUSCULAR; INTRAVENOUS
Status: CANCELLED
Start: 2023-03-06

## 2023-02-16 RX ORDER — ALBUTEROL SULFATE 0.83 MG/ML
2.5 SOLUTION RESPIRATORY (INHALATION)
Status: CANCELLED | OUTPATIENT
Start: 2023-03-13

## 2023-02-16 RX ORDER — LORAZEPAM 2 MG/ML
0.5 INJECTION INTRAMUSCULAR EVERY 4 HOURS PRN
Status: CANCELLED | OUTPATIENT
Start: 2023-02-20

## 2023-02-16 RX ORDER — MEPERIDINE HYDROCHLORIDE 25 MG/ML
25 INJECTION INTRAMUSCULAR; INTRAVENOUS; SUBCUTANEOUS EVERY 30 MIN PRN
Status: CANCELLED | OUTPATIENT
Start: 2023-03-06

## 2023-02-16 RX ORDER — PROCHLORPERAZINE MALEATE 5 MG
5 TABLET ORAL EVERY 6 HOURS PRN
Qty: 30 TABLET | Refills: 11 | Status: ON HOLD | OUTPATIENT
Start: 2023-02-16 | End: 2024-08-05

## 2023-02-16 RX ORDER — LORAZEPAM 2 MG/ML
0.5 INJECTION INTRAMUSCULAR EVERY 4 HOURS PRN
Status: CANCELLED | OUTPATIENT
Start: 2023-02-27

## 2023-02-16 RX ORDER — EPINEPHRINE 1 MG/ML
0.3 INJECTION, SOLUTION INTRAMUSCULAR; SUBCUTANEOUS EVERY 5 MIN PRN
Status: CANCELLED | OUTPATIENT
Start: 2023-02-20

## 2023-02-16 RX ORDER — MEPERIDINE HYDROCHLORIDE 25 MG/ML
25 INJECTION INTRAMUSCULAR; INTRAVENOUS; SUBCUTANEOUS EVERY 30 MIN PRN
Status: CANCELLED | OUTPATIENT
Start: 2023-02-20

## 2023-02-16 RX ORDER — MEPERIDINE HYDROCHLORIDE 25 MG/ML
25 INJECTION INTRAMUSCULAR; INTRAVENOUS; SUBCUTANEOUS EVERY 30 MIN PRN
Status: CANCELLED | OUTPATIENT
Start: 2023-03-13

## 2023-02-16 RX ORDER — DIPHENHYDRAMINE HYDROCHLORIDE 50 MG/ML
50 INJECTION INTRAMUSCULAR; INTRAVENOUS
Status: CANCELLED
Start: 2023-02-27

## 2023-02-16 RX ORDER — METHYLPREDNISOLONE SODIUM SUCCINATE 125 MG/2ML
125 INJECTION, POWDER, LYOPHILIZED, FOR SOLUTION INTRAMUSCULAR; INTRAVENOUS
Status: CANCELLED
Start: 2023-03-13

## 2023-02-16 RX ORDER — DIPHENHYDRAMINE HYDROCHLORIDE 50 MG/ML
50 INJECTION INTRAMUSCULAR; INTRAVENOUS
Status: CANCELLED
Start: 2023-02-20

## 2023-02-16 RX ORDER — METHYLPREDNISOLONE SODIUM SUCCINATE 125 MG/2ML
125 INJECTION, POWDER, LYOPHILIZED, FOR SOLUTION INTRAMUSCULAR; INTRAVENOUS
Status: CANCELLED
Start: 2023-02-27

## 2023-02-16 RX ORDER — HEPARIN SODIUM (PORCINE) LOCK FLUSH IV SOLN 100 UNIT/ML 100 UNIT/ML
5 SOLUTION INTRAVENOUS
Status: CANCELLED | OUTPATIENT
Start: 2023-03-13

## 2023-02-16 RX ORDER — ALBUTEROL SULFATE 90 UG/1
1-2 AEROSOL, METERED RESPIRATORY (INHALATION)
Status: CANCELLED
Start: 2023-02-27

## 2023-02-16 RX ORDER — MEPERIDINE HYDROCHLORIDE 25 MG/ML
25 INJECTION INTRAMUSCULAR; INTRAVENOUS; SUBCUTANEOUS EVERY 30 MIN PRN
Status: CANCELLED | OUTPATIENT
Start: 2023-02-27

## 2023-02-16 RX ORDER — EPINEPHRINE 1 MG/ML
0.3 INJECTION, SOLUTION INTRAMUSCULAR; SUBCUTANEOUS EVERY 5 MIN PRN
Status: CANCELLED | OUTPATIENT
Start: 2023-03-06

## 2023-02-16 RX ORDER — HEPARIN SODIUM (PORCINE) LOCK FLUSH IV SOLN 100 UNIT/ML 100 UNIT/ML
5 SOLUTION INTRAVENOUS
Status: CANCELLED | OUTPATIENT
Start: 2023-02-27

## 2023-02-16 RX ORDER — DIPHENHYDRAMINE HYDROCHLORIDE 50 MG/ML
50 INJECTION INTRAMUSCULAR; INTRAVENOUS
Status: CANCELLED
Start: 2023-03-13

## 2023-02-16 RX ORDER — ALBUTEROL SULFATE 0.83 MG/ML
2.5 SOLUTION RESPIRATORY (INHALATION)
Status: CANCELLED | OUTPATIENT
Start: 2023-02-27

## 2023-02-16 RX ORDER — ACYCLOVIR 400 MG/1
400 TABLET ORAL EVERY 12 HOURS
Qty: 60 TABLET | Refills: 11 | Status: SHIPPED | OUTPATIENT
Start: 2023-02-16 | End: 2024-01-15

## 2023-02-16 RX ORDER — LORAZEPAM 2 MG/ML
0.5 INJECTION INTRAMUSCULAR EVERY 4 HOURS PRN
Status: CANCELLED | OUTPATIENT
Start: 2023-03-06

## 2023-02-16 RX ORDER — ACETAMINOPHEN 325 MG/1
650 TABLET ORAL ONCE
Status: CANCELLED | OUTPATIENT
Start: 2023-03-06

## 2023-02-16 RX ORDER — HEPARIN SODIUM,PORCINE 10 UNIT/ML
5 VIAL (ML) INTRAVENOUS
Status: CANCELLED | OUTPATIENT
Start: 2023-03-13

## 2023-02-16 RX ORDER — HEPARIN SODIUM,PORCINE 10 UNIT/ML
5 VIAL (ML) INTRAVENOUS
Status: CANCELLED | OUTPATIENT
Start: 2023-03-06

## 2023-02-16 RX ORDER — METHYLPREDNISOLONE SODIUM SUCCINATE 125 MG/2ML
125 INJECTION, POWDER, LYOPHILIZED, FOR SOLUTION INTRAMUSCULAR; INTRAVENOUS
Status: CANCELLED
Start: 2023-02-20

## 2023-02-16 RX ORDER — ACETAMINOPHEN 325 MG/1
650 TABLET ORAL ONCE
Status: CANCELLED | OUTPATIENT
Start: 2023-02-20

## 2023-02-16 RX ORDER — ALBUTEROL SULFATE 90 UG/1
1-2 AEROSOL, METERED RESPIRATORY (INHALATION)
Status: CANCELLED
Start: 2023-03-06

## 2023-02-16 RX ORDER — ALBUTEROL SULFATE 90 UG/1
1-2 AEROSOL, METERED RESPIRATORY (INHALATION)
Status: CANCELLED
Start: 2023-02-20

## 2023-02-16 RX ORDER — METHYLPREDNISOLONE SODIUM SUCCINATE 125 MG/2ML
125 INJECTION, POWDER, LYOPHILIZED, FOR SOLUTION INTRAMUSCULAR; INTRAVENOUS
Status: CANCELLED
Start: 2023-03-06

## 2023-02-16 RX ORDER — ALBUTEROL SULFATE 0.83 MG/ML
2.5 SOLUTION RESPIRATORY (INHALATION)
Status: CANCELLED | OUTPATIENT
Start: 2023-02-20

## 2023-02-16 RX ORDER — LORAZEPAM 2 MG/ML
0.5 INJECTION INTRAMUSCULAR EVERY 4 HOURS PRN
Status: CANCELLED | OUTPATIENT
Start: 2023-03-13

## 2023-02-16 RX ORDER — HEPARIN SODIUM,PORCINE 10 UNIT/ML
5 VIAL (ML) INTRAVENOUS
Status: CANCELLED | OUTPATIENT
Start: 2023-02-20

## 2023-02-16 RX ORDER — EPINEPHRINE 1 MG/ML
0.3 INJECTION, SOLUTION INTRAMUSCULAR; SUBCUTANEOUS EVERY 5 MIN PRN
Status: CANCELLED | OUTPATIENT
Start: 2023-03-13

## 2023-02-16 RX ORDER — HEPARIN SODIUM (PORCINE) LOCK FLUSH IV SOLN 100 UNIT/ML 100 UNIT/ML
5 SOLUTION INTRAVENOUS
Status: CANCELLED | OUTPATIENT
Start: 2023-03-06

## 2023-02-16 RX ORDER — ALBUTEROL SULFATE 90 UG/1
1-2 AEROSOL, METERED RESPIRATORY (INHALATION)
Status: CANCELLED
Start: 2023-03-13

## 2023-02-16 RX ORDER — ACETAMINOPHEN 325 MG/1
650 TABLET ORAL ONCE
Status: CANCELLED | OUTPATIENT
Start: 2023-02-27

## 2023-02-16 RX ORDER — ACETAMINOPHEN 325 MG/1
650 TABLET ORAL ONCE
Status: CANCELLED | OUTPATIENT
Start: 2023-03-13

## 2023-02-16 NOTE — PROGRESS NOTES
[facility]: Cancer Care Plan of Care Education Note                                    Discussion with Patient:                                                      Call placed to Shena to review her upcoming change in treatment to Isatuximab and Carfilzomib.  I answered her questions in regard to treatment schedule and follow up appointments    We reviewed the side effects, lab monitoring and when to contact a provider        Assessment:                                                      Assessment completed with:: Patient    Current living arrangement  I live in a private home with spouse    Plan of Care Education   Diagnosis:: Multiple Myeloma  Does patient understand diagnosis?: Yes  Tx plan/regimen:: Carfilzomib and Isatuximab  Does patient understand treatment plan/regimen?: Yes  Preparing for treatment:: Reviewed treatment preparation information with patient (vascular access, day of chemo, visitor policy, what to bring, etc.)  Vascular access:: Port (New port placed 2/15/2023)  Side effect education:: Diarrhea/Constipation;Fatigue;Infection;Lab value monitoring (anemia, neutropenia, thrombocytopenia);Mylosuppression;Nausea/Vomiting  Transportation means:: Regular car  Safety/self care at home reviewed with patient:: Yes  Informal Support system:: Spouse  Coping - concerns/fears reviewed with patient:: Yes  Plan of Care:: Lab appointment;WANDA follow-up appointment;Treatment schedule  When to call provider:: Bleeding;Increased shortness of breath;New/worsening pain;Shaking chills;Temperature >100.4F;Uncontrolled diarrhea/constipation;Uncontrolled nausea/vomiting  Reasons for deferring treatment reviewed with patient:: Yes    Evaluation of Learning  Patient Education Provided: Yes  Readiness:: Acceptance  Method:: Literature;Explanation  Response:: Verbalizes understanding      Intervention/Education provided during outreach:                                                       I encouraged Shena to  call or MyChart with any further questions    Patient to follow up as scheduled at next appointment.  She understands we are adding treatment appointments to her calendar.      Signature:  Shabnam Hall RN

## 2023-02-17 DIAGNOSIS — C90.00 MULTIPLE MYELOMA NOT HAVING ACHIEVED REMISSION (H): Primary | ICD-10-CM

## 2023-02-19 LAB
ABO/RH(D): ABNORMAL
ANTIBODY SCREEN: POSITIVE
SPECIMEN EXPIRATION DATE: ABNORMAL

## 2023-02-20 ENCOUNTER — APPOINTMENT (OUTPATIENT)
Dept: LAB | Facility: CLINIC | Age: 73
End: 2023-02-20
Attending: STUDENT IN AN ORGANIZED HEALTH CARE EDUCATION/TRAINING PROGRAM
Payer: MEDICARE

## 2023-02-20 ENCOUNTER — INFUSION THERAPY VISIT (OUTPATIENT)
Dept: ONCOLOGY | Facility: CLINIC | Age: 73
End: 2023-02-20
Attending: STUDENT IN AN ORGANIZED HEALTH CARE EDUCATION/TRAINING PROGRAM
Payer: MEDICARE

## 2023-02-20 VITALS
TEMPERATURE: 97.6 F | OXYGEN SATURATION: 98 % | DIASTOLIC BLOOD PRESSURE: 74 MMHG | RESPIRATION RATE: 16 BRPM | HEART RATE: 79 BPM | WEIGHT: 119.8 LBS | BODY MASS INDEX: 22.64 KG/M2 | SYSTOLIC BLOOD PRESSURE: 166 MMHG

## 2023-02-20 DIAGNOSIS — E85.89 OTHER AMYLOIDOSIS (H): ICD-10-CM

## 2023-02-20 DIAGNOSIS — C90.00 MULTIPLE MYELOMA NOT HAVING ACHIEVED REMISSION (H): Primary | ICD-10-CM

## 2023-02-20 DIAGNOSIS — C90.02 MULTIPLE MYELOMA IN RELAPSE (H): ICD-10-CM

## 2023-02-20 LAB
ALBUMIN SERPL BCG-MCNC: 3.6 G/DL (ref 3.5–5.2)
ALP SERPL-CCNC: 50 U/L (ref 35–104)
ALT SERPL W P-5'-P-CCNC: 15 U/L (ref 10–35)
ANION GAP SERPL CALCULATED.3IONS-SCNC: <1 MMOL/L (ref 7–15)
ANTIBODY ID: NORMAL
ANTIBODY SCREEN, TUBE: NORMAL
AST SERPL W P-5'-P-CCNC: 17 U/L (ref 10–35)
BASOPHILS # BLD AUTO: 0 10E3/UL (ref 0–0.2)
BASOPHILS NFR BLD AUTO: 1 %
BILIRUB SERPL-MCNC: 0.2 MG/DL
BUN SERPL-MCNC: 12.7 MG/DL (ref 8–23)
CALCIUM SERPL-MCNC: 9.2 MG/DL (ref 8.8–10.2)
CHLORIDE SERPL-SCNC: 99 MMOL/L (ref 98–107)
CREAT SERPL-MCNC: 0.67 MG/DL (ref 0.51–0.95)
DEPRECATED HCO3 PLAS-SCNC: 39 MMOL/L (ref 22–29)
EOSINOPHIL # BLD AUTO: 0.1 10E3/UL (ref 0–0.7)
EOSINOPHIL NFR BLD AUTO: 2 %
ERYTHROCYTE [DISTWIDTH] IN BLOOD BY AUTOMATED COUNT: 16.7 % (ref 10–15)
GFR SERPL CREATININE-BSD FRML MDRD: >90 ML/MIN/1.73M2
GLUCOSE SERPL-MCNC: 78 MG/DL (ref 70–99)
HCT VFR BLD AUTO: 28.3 % (ref 35–47)
HGB BLD-MCNC: 9.5 G/DL (ref 11.7–15.7)
IMM GRANULOCYTES # BLD: 0 10E3/UL
IMM GRANULOCYTES NFR BLD: 1 %
LDH SERPL L TO P-CCNC: 128 U/L (ref 0–250)
LYMPHOCYTES # BLD AUTO: 1.7 10E3/UL (ref 0.8–5.3)
LYMPHOCYTES NFR BLD AUTO: 51 %
MCH RBC QN AUTO: 34.4 PG (ref 26.5–33)
MCHC RBC AUTO-ENTMCNC: 33.6 G/DL (ref 31.5–36.5)
MCV RBC AUTO: 103 FL (ref 78–100)
MONOCYTES # BLD AUTO: 0.5 10E3/UL (ref 0–1.3)
MONOCYTES NFR BLD AUTO: 14 %
NEUTROPHILS # BLD AUTO: 1 10E3/UL (ref 1.6–8.3)
NEUTROPHILS NFR BLD AUTO: 31 %
NRBC # BLD AUTO: 0 10E3/UL
NRBC BLD AUTO-RTO: 0 /100
NT-PROBNP SERPL-MCNC: 575 PG/ML (ref 0–900)
PLATELET # BLD AUTO: 80 10E3/UL (ref 150–450)
POTASSIUM SERPL-SCNC: 3.9 MMOL/L (ref 3.4–5.3)
PROT SERPL-MCNC: 8.2 G/DL (ref 6.4–8.3)
RBC # BLD AUTO: 2.76 10E6/UL (ref 3.8–5.2)
SODIUM SERPL-SCNC: 136 MMOL/L (ref 136–145)
SPECIMEN EXPIRATION DATE: NORMAL
SPECIMEN EXPIRATION DATE: NORMAL
TOTAL PROTEIN SERUM FOR ELP: 7.4 G/DL (ref 6.4–8.3)
WBC # BLD AUTO: 3.3 10E3/UL (ref 4–11)

## 2023-02-20 PROCEDURE — 80053 COMPREHEN METABOLIC PANEL: CPT | Performed by: STUDENT IN AN ORGANIZED HEALTH CARE EDUCATION/TRAINING PROGRAM

## 2023-02-20 PROCEDURE — 250N000011 HC RX IP 250 OP 636: Performed by: STUDENT IN AN ORGANIZED HEALTH CARE EDUCATION/TRAINING PROGRAM

## 2023-02-20 PROCEDURE — 96361 HYDRATE IV INFUSION ADD-ON: CPT

## 2023-02-20 PROCEDURE — 96415 CHEMO IV INFUSION ADDL HR: CPT

## 2023-02-20 PROCEDURE — 258N000003 HC RX IP 258 OP 636: Performed by: STUDENT IN AN ORGANIZED HEALTH CARE EDUCATION/TRAINING PROGRAM

## 2023-02-20 PROCEDURE — 84165 PROTEIN E-PHORESIS SERUM: CPT | Mod: 26

## 2023-02-20 PROCEDURE — 96367 TX/PROPH/DG ADDL SEQ IV INF: CPT

## 2023-02-20 PROCEDURE — 96417 CHEMO IV INFUS EACH ADDL SEQ: CPT

## 2023-02-20 PROCEDURE — 83880 ASSAY OF NATRIURETIC PEPTIDE: CPT

## 2023-02-20 PROCEDURE — 250N000013 HC RX MED GY IP 250 OP 250 PS 637: Performed by: STUDENT IN AN ORGANIZED HEALTH CARE EDUCATION/TRAINING PROGRAM

## 2023-02-20 PROCEDURE — 83615 LACTATE (LD) (LDH) ENZYME: CPT

## 2023-02-20 PROCEDURE — 96413 CHEMO IV INFUSION 1 HR: CPT

## 2023-02-20 PROCEDURE — 84155 ASSAY OF PROTEIN SERUM: CPT

## 2023-02-20 PROCEDURE — 84165 PROTEIN E-PHORESIS SERUM: CPT | Mod: TC | Performed by: PATHOLOGY

## 2023-02-20 PROCEDURE — 96375 TX/PRO/DX INJ NEW DRUG ADDON: CPT

## 2023-02-20 PROCEDURE — 83521 IG LIGHT CHAINS FREE EACH: CPT | Mod: 59

## 2023-02-20 PROCEDURE — 86901 BLOOD TYPING SEROLOGIC RH(D): CPT

## 2023-02-20 PROCEDURE — 86870 RBC ANTIBODY IDENTIFICATION: CPT | Performed by: STUDENT IN AN ORGANIZED HEALTH CARE EDUCATION/TRAINING PROGRAM

## 2023-02-20 PROCEDURE — 36591 DRAW BLOOD OFF VENOUS DEVICE: CPT

## 2023-02-20 PROCEDURE — 85025 COMPLETE CBC W/AUTO DIFF WBC: CPT | Performed by: STUDENT IN AN ORGANIZED HEALTH CARE EDUCATION/TRAINING PROGRAM

## 2023-02-20 RX ORDER — ACETAMINOPHEN 325 MG/1
650 TABLET ORAL ONCE
Status: COMPLETED | OUTPATIENT
Start: 2023-02-20 | End: 2023-02-20

## 2023-02-20 RX ORDER — HEPARIN SODIUM (PORCINE) LOCK FLUSH IV SOLN 100 UNIT/ML 100 UNIT/ML
5 SOLUTION INTRAVENOUS
Status: DISCONTINUED | OUTPATIENT
Start: 2023-02-20 | End: 2023-02-20 | Stop reason: HOSPADM

## 2023-02-20 RX ORDER — HEPARIN SODIUM (PORCINE) LOCK FLUSH IV SOLN 100 UNIT/ML 100 UNIT/ML
5 SOLUTION INTRAVENOUS ONCE
Status: COMPLETED | OUTPATIENT
Start: 2023-02-20 | End: 2023-02-20

## 2023-02-20 RX ADMIN — DEXAMETHASONE SODIUM PHOSPHATE: 10 INJECTION, SOLUTION INTRAMUSCULAR; INTRAVENOUS at 08:47

## 2023-02-20 RX ADMIN — Medication 5 ML: at 14:46

## 2023-02-20 RX ADMIN — Medication 5 ML: at 07:12

## 2023-02-20 RX ADMIN — CARFILZOMIB 30 MG: 30 INJECTION, POWDER, LYOPHILIZED, FOR SOLUTION INTRAVENOUS at 13:26

## 2023-02-20 RX ADMIN — ACETAMINOPHEN 650 MG: 325 TABLET ORAL at 08:28

## 2023-02-20 RX ADMIN — DIPHENHYDRAMINE HYDROCHLORIDE 50 MG: 50 INJECTION, SOLUTION INTRAMUSCULAR; INTRAVENOUS at 08:28

## 2023-02-20 RX ADMIN — DEXTROSE MONOHYDRATE 500 ML: 50 INJECTION, SOLUTION INTRAVENOUS at 12:51

## 2023-02-20 RX ADMIN — SODIUM CHLORIDE 500 MG: 9 INJECTION, SOLUTION INTRAVENOUS at 09:22

## 2023-02-20 RX ADMIN — FAMOTIDINE 20 MG: 10 INJECTION INTRAVENOUS at 08:28

## 2023-02-20 ASSESSMENT — PAIN SCALES - GENERAL: PAINLEVEL: MILD PAIN (2)

## 2023-02-20 NOTE — PATIENT INSTRUCTIONS
[facility] Triage and after hours / weekends / holidays:  [phone]    Please call the triage or after hours line if you experience a temperature greater than or equal to 100.4, shaking chills, have uncontrolled nausea, vomiting and/or diarrhea, dizziness, shortness of breath, chest pain, bleeding, unexplained bruising, or if you have any other new/concerning symptoms, questions or concerns.      If you are having any concerning symptoms or wish to speak to a provider before your next infusion visit, please call your care coordinator or triage to notify them so we can adequately serve you.     If you need a refill on a narcotic prescription or other medication, please call before your infusion appointment.                February 2023 Sunday Monday Tuesday Wednesday Thursday Friday Saturday                  1     2     3     4       5     6     7     8     9    ANNUAL WELLNESS  10:40 AM   (30 min.)   Thomas Villalobos MD   [facility] 10     11       12     13     14     15    IR CHEST PORT PLACEMENT >5 YRS   6:35 AM   (60 min.)   [email or web address]   [facility] [place]    Outpatient Visit   6:37 AM   [facility]    INSERTION, VASCULAR ACCESS PORT   8:00 AM   Luc Antunez MD   [facility] 16     17     18       19     20    LAB PERIPHERAL   7:00 AM   (15 min.)   [facility] D[facility]   [facility]    ONC INFUSION 6 HR (360 MIN)   7:30 AM   (360 min.)    ONC INFUSION NURSE   [facility] 21     22     23     24     25       26     27    MA SCREENING DIGITAL BILATERAL   8:30 AM   (15 min.)   [email or web address]   [facility] [place]    RETURN   8:45 AM   (30 min.)   Neelima Da Silva MD   [facility] 28 March 2023 Sunday Monday Tuesday Wednesday Thursday Friday Saturday 1      2     3     4       5     6     7     8     9     10     11       12     13     14     15     16     17     18       19     20    LAB PERIPHERAL   7:00 AM   (15 min.)   [facility] LAB DRAW   [facility]    ONC INFUSION 1 HR (60 MIN)   7:30 AM   (60 min.)    ONC INFUSION NURSE   [facility] 21     22     23     24     25       26     27     28     29     30     31                             Recent Results (from the past 24 hour(s))   Comprehensive metabolic panel    Collection Time: 02/20/23  7:13 AM   Result Value Ref Range    Sodium 136 136 - 145 mmol/L    Potassium 3.9 3.4 - 5.3 mmol/L    Chloride 99 98 - 107 mmol/L    Carbon Dioxide (CO2) 39 (H) 22 - 29 mmol/L    Anion Gap <1 (L) 7 - 15 mmol/L    Urea Nitrogen 12.7 8.0 - 23.0 mg/dL    Creatinine 0.67 0.51 - 0.95 mg/dL    Calcium 9.2 8.8 - 10.2 mg/dL    Glucose 78 70 - 99 mg/dL    Alkaline Phosphatase 50 35 - 104 U/L    AST 17 10 - 35 U/L    ALT 15 10 - 35 U/L    Protein Total 8.2 6.4 - 8.3 g/dL    Albumin 3.6 3.5 - 5.2 g/dL    Bilirubin Total 0.2 <=1.2 mg/dL    GFR Estimate >90 >60 mL/min/1.73m2   CBC with platelets and differential    Collection Time: 02/20/23  7:13 AM   Result Value Ref Range    WBC Count 3.3 (L) 4.0 - 11.0 10e3/uL    RBC Count 2.76 (L) 3.80 - 5.20 10e6/uL    Hemoglobin 9.5 (L) 11.7 - 15.7 g/dL    Hematocrit 28.3 (L) 35.0 - 47.0 %     (H) 78 - 100 fL    MCH 34.4 (H) 26.5 - 33.0 pg    MCHC 33.6 31.5 - 36.5 g/dL    RDW 16.7 (H) 10.0 - 15.0 %    Platelet Count 80 (L) 150 - 450 10e3/uL    % Neutrophils 31 %    % Lymphocytes 51 %    % Monocytes 14 %    % Eosinophils 2 %    % Basophils 1 %    % Immature Granulocytes 1 %    NRBCs per 100 WBC 0 <1 /100    Absolute Neutrophils 1.0 (L) 1.6 - 8.3 10e3/uL    Absolute Lymphocytes 1.7 0.8 - 5.3 10e3/uL    Absolute Monocytes 0.5 0.0 - 1.3 10e3/uL    Absolute Eosinophils 0.1 0.0 - 0.7 10e3/uL    Absolute Basophils 0.0 0.0 - 0.2 10e3/uL     Absolute Immature Granulocytes 0.0 <=0.4 10e3/uL    Absolute NRBCs 0.0 10e3/uL

## 2023-02-20 NOTE — NURSING NOTE
"Chief Complaint   Patient presents with     Port Draw     Labs drawn via port by RN. VS taken.     Port accessed with 20g 3/4\" power needle and labs drawn by rn.  Port flushed with NS and heparin.  Pt tolerated well.  VS taken.  Pt checked in for next appt.    Bernardino Pisano RN  "

## 2023-02-20 NOTE — PROGRESS NOTES
Infusion Nursing Note:  Shena Hartmann presents today for Cycle 1 Day 1 Isatuximab and Carfilzomib.    Patient seen by provider today: No    Note: Patient is receiving Isatuximab and Carfilzomib for the first time.  New medications teaching done previously by Shabnam Hall, RNCC. Writer reinforced chemotherapy teaching/side effects and schedule.     Pt instructed to call care coordinator, triage (or MD on call if after hours/weekends) with chills/temp >=100.5, questions/concerns. Pt stated understanding of plan.     Patient presents to the infusion center today feeling well. She denies any new symptoms or concerns. No fevers, chills, chest pain or shortness of breath.     Intravenous Access:  Implanted Port.    Treatment Conditions:   Latest Reference Range & Units 02/20/23 07:13   Sodium 136 - 145 mmol/L 136   Potassium 3.4 - 5.3 mmol/L 3.9   Chloride 98 - 107 mmol/L 99   Carbon Dioxide (CO2) 22 - 29 mmol/L 39 (H)   Urea Nitrogen 8.0 - 23.0 mg/dL 12.7   Creatinine 0.51 - 0.95 mg/dL 0.67   GFR Estimate >60 mL/min/1.73m2 >90   Calcium 8.8 - 10.2 mg/dL 9.2   Anion Gap 7 - 15 mmol/L <1 (L)   Albumin 3.5 - 5.2 g/dL 3.6   Protein Total 6.4 - 8.3 g/dL 8.2   Alkaline Phosphatase 35 - 104 U/L 50   ALT 10 - 35 U/L 15   AST 10 - 35 U/L 17   Bilirubin Total <=1.2 mg/dL 0.2   Glucose 70 - 99 mg/dL 78   N-Terminal Pro Bnp 0 - 900 pg/mL 575   WBC 4.0 - 11.0 10e3/uL 3.3 (L)   Hemoglobin 11.7 - 15.7 g/dL 9.5 (L)   Hematocrit 35.0 - 47.0 % 28.3 (L)   Platelet Count 150 - 450 10e3/uL 80 (L)   RBC Count 3.80 - 5.20 10e6/uL 2.76 (L)   MCV 78 - 100 fL 103 (H)   MCH 26.5 - 33.0 pg 34.4 (H)   MCHC 31.5 - 36.5 g/dL 33.6   RDW 10.0 - 15.0 % 16.7 (H)   % Neutrophils % 31   % Lymphocytes % 51   % Monocytes % 14   % Eosinophils % 2   % Basophils % 1   Absolute Basophils 0.0 - 0.2 10e3/uL 0.0   Absolute Eosinophils 0.0 - 0.7 10e3/uL 0.1   Absolute Immature Granulocytes <=0.4 10e3/uL 0.0   Absolute Lymphocytes 0.8 - 5.3 10e3/uL 1.7   Absolute  Monocytes 0.0 - 1.3 10e3/uL 0.5   % Immature Granulocytes % 1   Absolute Neutrophils 1.6 - 8.3 10e3/uL 1.0 (L)   Absolute NRBCs 10e3/uL 0.0   NRBCs per 100 WBC <1 /100 0     Results reviewed, labs MET treatment parameters, ok to proceed with treatment.    Post Infusion Assessment:  Patient tolerated infusion without incident.  Blood return noted pre and post infusion.  No evidence of extravasations.  Access discontinued per protocol.     Discharge Plan:   Patient declined prescription refills.  Discharge instructions reviewed with: Patient and Family.  Patient and/or family verbalized understanding of discharge instructions and all questions answered.  Copy of AVS reviewed with patient and/or family.  Patient will return 2/27 for next appointment.  AVS also available to patient via [facility] [employer].    Patient discharged in stable condition accompanied by: .  Departure Mode: Ambulatory.      Lianne Sanches RN

## 2023-02-21 ENCOUNTER — DOCUMENTATION ONLY (OUTPATIENT)
Dept: ONCOLOGY | Facility: CLINIC | Age: 73
End: 2023-02-21
Payer: MEDICARE

## 2023-02-21 LAB
ALBUMIN SERPL ELPH-MCNC: 3.4 G/DL (ref 3.7–5.1)
ALPHA1 GLOB SERPL ELPH-MCNC: 0.3 G/DL (ref 0.2–0.4)
ALPHA2 GLOB SERPL ELPH-MCNC: 0.7 G/DL (ref 0.5–0.9)
B-GLOBULIN SERPL ELPH-MCNC: 0.5 G/DL (ref 0.6–1)
GAMMA GLOB SERPL ELPH-MCNC: 2.5 G/DL (ref 0.7–1.6)
KAPPA LC FREE SER-MCNC: 85.32 MG/DL (ref 0.33–1.94)
KAPPA LC FREE/LAMBDA FREE SER NEPH: 406.29 {RATIO} (ref 0.26–1.65)
LAMBDA LC FREE SERPL-MCNC: 0.21 MG/DL (ref 0.57–2.63)
M PROTEIN SERPL ELPH-MCNC: 2.3 G/DL
PROT PATTERN SERPL ELPH-IMP: ABNORMAL

## 2023-02-21 NOTE — PROGRESS NOTES
[facility] Oral Chemotherapy Monitoring Program    Thank you for the opportunity to be a part in the care of this patient's oral chemotherapy. The oncology pharmacy will no longer be following this patient for oral chemotherapy. If there are any questions or the plan changes, feel free to contact us.    ORAL CHEMOTHERAPY 9/28/2022 10/27/2022 11/25/2022 12/23/2022 1/20/2023 2/20/2023 2/21/2023   Assessment Type Refill Refill Refill Refill Refill Other Discontinuation   Diagnosis Code Multiple Myeloma Multiple Myeloma Multiple Myeloma Multiple Myeloma Multiple Myeloma Multiple Myeloma Multiple Myeloma   Providers Dr. Reinier Chavez   [facility]   Drug Name Pomalyst (pomalidomide) Pomalyst (pomalidomide) Pomalyst (pomalidomide) Pomalyst (pomalidomide) Pomalyst (pomalidomide) Pomalyst (pomalidomide) Pomalyst (pomalidomide)   Dose (No Data) (No Data) (No Data) (No Data) (No Data) - -   Current Schedule Daily Daily Daily Daily Daily - -   Cycle Details 3 weeks on, 1 week off 3 weeks on, 1 week off 3 weeks on, 1 week off 3 weeks on, 1 week off 3 weeks on, 1 week off - -   Planned next cycle start date - - - - - - -   Is the dose as ordered appropriate for the patient? - - - - - - -   Pharmacist intervention for dose adjustment? - - - - - - -   Intervention(s) - - - - - - -       Tyra Moreno, PharmD, BCPS  Hematology/Oncology Clinical Pharmacist  Oral Chemotherapy Monitoring Program  [facility]  [phone]

## 2023-02-27 ENCOUNTER — ANCILLARY PROCEDURE (OUTPATIENT)
Dept: MAMMOGRAPHY | Facility: CLINIC | Age: 73
End: 2023-02-27
Payer: MEDICARE

## 2023-02-27 ENCOUNTER — INFUSION THERAPY VISIT (OUTPATIENT)
Dept: ONCOLOGY | Facility: CLINIC | Age: 73
End: 2023-02-27
Attending: STUDENT IN AN ORGANIZED HEALTH CARE EDUCATION/TRAINING PROGRAM
Payer: MEDICARE

## 2023-02-27 ENCOUNTER — ONCOLOGY VISIT (OUTPATIENT)
Dept: ONCOLOGY | Facility: CLINIC | Age: 73
End: 2023-02-27
Attending: INTERNAL MEDICINE
Payer: MEDICARE

## 2023-02-27 ENCOUNTER — APPOINTMENT (OUTPATIENT)
Dept: LAB | Facility: CLINIC | Age: 73
End: 2023-02-27
Attending: INTERNAL MEDICINE
Payer: MEDICARE

## 2023-02-27 VITALS
TEMPERATURE: 97.8 F | DIASTOLIC BLOOD PRESSURE: 67 MMHG | RESPIRATION RATE: 16 BRPM | WEIGHT: 119.1 LBS | HEART RATE: 83 BPM | OXYGEN SATURATION: 98 % | BODY MASS INDEX: 22.5 KG/M2 | SYSTOLIC BLOOD PRESSURE: 129 MMHG

## 2023-02-27 DIAGNOSIS — Z12.31 VISIT FOR SCREENING MAMMOGRAM: ICD-10-CM

## 2023-02-27 DIAGNOSIS — T45.1X5A CARDIOMYOPATHY DUE TO ANTHRACYCLINE (H): ICD-10-CM

## 2023-02-27 DIAGNOSIS — Z12.31 ENCOUNTER FOR SCREENING MAMMOGRAM FOR MALIGNANT NEOPLASM OF BREAST: ICD-10-CM

## 2023-02-27 DIAGNOSIS — I42.7 CARDIOMYOPATHY DUE TO ANTHRACYCLINE (H): ICD-10-CM

## 2023-02-27 DIAGNOSIS — C90.00 MULTIPLE MYELOMA NOT HAVING ACHIEVED REMISSION (H): Primary | ICD-10-CM

## 2023-02-27 DIAGNOSIS — C50.911 MALIGNANT NEOPLASM OF RIGHT FEMALE BREAST, UNSPECIFIED ESTROGEN RECEPTOR STATUS, UNSPECIFIED SITE OF BREAST (H): Primary | ICD-10-CM

## 2023-02-27 LAB
ALBUMIN SERPL BCG-MCNC: 3.7 G/DL (ref 3.5–5.2)
ALP SERPL-CCNC: 52 U/L (ref 35–104)
ALT SERPL W P-5'-P-CCNC: 15 U/L (ref 10–35)
ANION GAP SERPL CALCULATED.3IONS-SCNC: 11 MMOL/L (ref 7–15)
AST SERPL W P-5'-P-CCNC: 17 U/L (ref 10–35)
BASOPHILS # BLD AUTO: 0 10E3/UL (ref 0–0.2)
BASOPHILS NFR BLD AUTO: 1 %
BILIRUB SERPL-MCNC: 0.2 MG/DL
BUN SERPL-MCNC: 15.3 MG/DL (ref 8–23)
CALCIUM SERPL-MCNC: 9.2 MG/DL (ref 8.8–10.2)
CHLORIDE SERPL-SCNC: 100 MMOL/L (ref 98–107)
CREAT SERPL-MCNC: 0.75 MG/DL (ref 0.51–0.95)
DEPRECATED HCO3 PLAS-SCNC: 25 MMOL/L (ref 22–29)
EOSINOPHIL # BLD AUTO: 0 10E3/UL (ref 0–0.7)
EOSINOPHIL NFR BLD AUTO: 1 %
ERYTHROCYTE [DISTWIDTH] IN BLOOD BY AUTOMATED COUNT: 16.5 % (ref 10–15)
GFR SERPL CREATININE-BSD FRML MDRD: 84 ML/MIN/1.73M2
GLUCOSE SERPL-MCNC: 83 MG/DL (ref 70–99)
HCT VFR BLD AUTO: 29.1 % (ref 35–47)
HGB BLD-MCNC: 9.7 G/DL (ref 11.7–15.7)
IMM GRANULOCYTES # BLD: 0 10E3/UL
IMM GRANULOCYTES NFR BLD: 1 %
LYMPHOCYTES # BLD AUTO: 1.7 10E3/UL (ref 0.8–5.3)
LYMPHOCYTES NFR BLD AUTO: 47 %
MCH RBC QN AUTO: 34.2 PG (ref 26.5–33)
MCHC RBC AUTO-ENTMCNC: 33.3 G/DL (ref 31.5–36.5)
MCV RBC AUTO: 103 FL (ref 78–100)
MONOCYTES # BLD AUTO: 0.7 10E3/UL (ref 0–1.3)
MONOCYTES NFR BLD AUTO: 21 %
NEUTROPHILS # BLD AUTO: 1 10E3/UL (ref 1.6–8.3)
NEUTROPHILS NFR BLD AUTO: 29 %
NRBC # BLD AUTO: 0 10E3/UL
NRBC BLD AUTO-RTO: 0 /100
PLAT MORPH BLD: NORMAL
PLATELET # BLD AUTO: 79 10E3/UL (ref 150–450)
POTASSIUM SERPL-SCNC: 4.1 MMOL/L (ref 3.4–5.3)
PROT SERPL-MCNC: 8.2 G/DL (ref 6.4–8.3)
RBC # BLD AUTO: 2.84 10E6/UL (ref 3.8–5.2)
RBC MORPH BLD: NORMAL
SODIUM SERPL-SCNC: 136 MMOL/L (ref 136–145)
WBC # BLD AUTO: 3.5 10E3/UL (ref 4–11)

## 2023-02-27 PROCEDURE — 85025 COMPLETE CBC W/AUTO DIFF WBC: CPT | Performed by: STUDENT IN AN ORGANIZED HEALTH CARE EDUCATION/TRAINING PROGRAM

## 2023-02-27 PROCEDURE — 96367 TX/PROPH/DG ADDL SEQ IV INF: CPT

## 2023-02-27 PROCEDURE — 96413 CHEMO IV INFUSION 1 HR: CPT

## 2023-02-27 PROCEDURE — 80053 COMPREHEN METABOLIC PANEL: CPT

## 2023-02-27 PROCEDURE — 96375 TX/PRO/DX INJ NEW DRUG ADDON: CPT

## 2023-02-27 PROCEDURE — G0463 HOSPITAL OUTPT CLINIC VISIT: HCPCS | Mod: 25 | Performed by: INTERNAL MEDICINE

## 2023-02-27 PROCEDURE — 250N000011 HC RX IP 250 OP 636: Performed by: STUDENT IN AN ORGANIZED HEALTH CARE EDUCATION/TRAINING PROGRAM

## 2023-02-27 PROCEDURE — 99207 PR NO BILLABLE SERVICE THIS VISIT: CPT

## 2023-02-27 PROCEDURE — 99214 OFFICE O/P EST MOD 30 MIN: CPT | Mod: GC | Performed by: INTERNAL MEDICINE

## 2023-02-27 PROCEDURE — 250N000013 HC RX MED GY IP 250 OP 250 PS 637: Performed by: STUDENT IN AN ORGANIZED HEALTH CARE EDUCATION/TRAINING PROGRAM

## 2023-02-27 PROCEDURE — 258N000003 HC RX IP 258 OP 636: Performed by: STUDENT IN AN ORGANIZED HEALTH CARE EDUCATION/TRAINING PROGRAM

## 2023-02-27 PROCEDURE — 77067 SCR MAMMO BI INCL CAD: CPT | Mod: 52 | Performed by: RADIOLOGY

## 2023-02-27 PROCEDURE — 96415 CHEMO IV INFUSION ADDL HR: CPT

## 2023-02-27 PROCEDURE — 36591 DRAW BLOOD OFF VENOUS DEVICE: CPT

## 2023-02-27 PROCEDURE — 77063 BREAST TOMOSYNTHESIS BI: CPT | Mod: 52 | Performed by: RADIOLOGY

## 2023-02-27 PROCEDURE — 96417 CHEMO IV INFUS EACH ADDL SEQ: CPT

## 2023-02-27 PROCEDURE — 250N000011 HC RX IP 250 OP 636: Performed by: INTERNAL MEDICINE

## 2023-02-27 RX ORDER — HEPARIN SODIUM (PORCINE) LOCK FLUSH IV SOLN 100 UNIT/ML 100 UNIT/ML
500 SOLUTION INTRAVENOUS ONCE
Status: COMPLETED | OUTPATIENT
Start: 2023-02-27 | End: 2023-02-27

## 2023-02-27 RX ORDER — HEPARIN SODIUM (PORCINE) LOCK FLUSH IV SOLN 100 UNIT/ML 100 UNIT/ML
5 SOLUTION INTRAVENOUS
Status: DISCONTINUED | OUTPATIENT
Start: 2023-02-27 | End: 2023-02-27 | Stop reason: HOSPADM

## 2023-02-27 RX ORDER — ACETAMINOPHEN 325 MG/1
650 TABLET ORAL ONCE
Status: COMPLETED | OUTPATIENT
Start: 2023-02-27 | End: 2023-02-27

## 2023-02-27 RX ADMIN — Medication 500 UNITS: at 08:27

## 2023-02-27 RX ADMIN — Medication 5 ML: at 15:06

## 2023-02-27 RX ADMIN — DEXTROSE MONOHYDRATE 500 ML: 50 INJECTION, SOLUTION INTRAVENOUS at 13:26

## 2023-02-27 RX ADMIN — DIPHENHYDRAMINE HYDROCHLORIDE 50 MG: 50 INJECTION, SOLUTION INTRAMUSCULAR; INTRAVENOUS at 11:11

## 2023-02-27 RX ADMIN — FAMOTIDINE 20 MG: 10 INJECTION INTRAVENOUS at 10:40

## 2023-02-27 RX ADMIN — SODIUM CHLORIDE 500 MG: 9 INJECTION, SOLUTION INTRAVENOUS at 11:27

## 2023-02-27 RX ADMIN — CARFILZOMIB 90 MG: 30 INJECTION, POWDER, LYOPHILIZED, FOR SOLUTION INTRAVENOUS at 13:59

## 2023-02-27 RX ADMIN — ACETAMINOPHEN 650 MG: 325 TABLET ORAL at 10:40

## 2023-02-27 RX ADMIN — DEXAMETHASONE SODIUM PHOSPHATE: 10 INJECTION, SOLUTION INTRAMUSCULAR; INTRAVENOUS at 10:50

## 2023-02-27 ASSESSMENT — PAIN SCALES - GENERAL: PAINLEVEL: NO PAIN (1)

## 2023-02-27 NOTE — NURSING NOTE
"Oncology Rooming Note    February 27, 2023 9:01 AM   Shena Hartmann is a 72 year old female who presents for:    Chief Complaint   Patient presents with     Port Draw     Labs drawn via port by RN. Vitals taken.     Oncology Clinic Visit     Multiple myeloma not having achieved remission      Initial Vitals: /67 (BP Location: Left arm)   Pulse 83   Temp 97.8  F (36.6  C) (Oral)   Resp 16   Wt 54 kg (119 lb 1.6 oz)   SpO2 98%   BMI 22.50 kg/m   Estimated body mass index is 22.5 kg/m  as calculated from the following:    Height as of 2/15/23: 1.549 m (5' 1\").    Weight as of this encounter: 54 kg (119 lb 1.6 oz). Body surface area is 1.52 meters squared.  No Pain (1) Comment: Data Unavailable   No LMP recorded. Patient is postmenopausal.  Allergies reviewed: Yes  Medications reviewed: Yes    Medications: Medication refills not needed today.  Pharmacy name entered into [facility]:    [id number] - [place], [place] - [address] [facility] [address]  WRITTEN PRESCRIPTION REQUESTED  [facility] - [place], [place] - [address]    Clinical concerns: none.       Familia Steele"

## 2023-02-27 NOTE — NURSING NOTE
"Chief Complaint   Patient presents with     Port Draw     Labs drawn via port by RN. Vitals taken.     Port accessed with 20G 3/4\" flat needle by RN, labs collected, line flushed with saline and heparin.  Vitals taken. Pt checked in for appointment(s).    Kimberly Manzanares RN    "

## 2023-02-27 NOTE — PATIENT INSTRUCTIONS
Contact Numbers  [facility]: [phone]    After Hours:  [phone]  Triage: [phone]    Please call the [facility] Triage line if you experience a temperature greater than or equal to 100.5, shaking chills, have uncontrolled nausea, vomiting and/or diarrhea, dizziness, shortness of breath, chest pain, bleeding, unexplained bruising, or if you have any other new/concerning symptoms, questions or concerns.     If it is after hours, weekends, or holidays, please call the [facility]  at  [phone] and ask to speak to the Oncology doctor on call.     If you are having any concerning symptoms or wish to speak to a provider before your next infusion visit, please call your care coordinator or triage to notify them so we can adequately serve you.     If you need a refill on a narcotic prescription or other medication, please call triage before your infusion appointment.       February 2023 Sunday Monday Tuesday Wednesday Thursday Friday Saturday                  1     2     3     4       5     6     7     8     9    ANNUAL WELLNESS  10:40 AM   (30 min.)   Thomas Villalobos MD   [facility] 10     11       12     13     14     15    IR CHEST PORT PLACEMENT >5 YRS   6:35 AM   (60 min.)   [email or web address]   [facility] [place]    Outpatient Visit   6:37 AM   [facility]    INSERTION, VASCULAR ACCESS PORT   8:00 AM   Luc Antunez MD   [facility] OR 16     17     18       19     20    LAB PERIPHERAL   7:00 AM   (15 min.)   [facility] DRAW   [facility]    ONC INFUSION 6 HR (360 MIN)   7:30 AM   (360 min.)    ONC INFUSION NURSE   [facility] 21     22     23     24     25       26     27    LAB PERIPHERAL   8:15 AM   (15 min.)   [facility]   [facility]    MA SCREENING DIGITAL BILATERAL   8:30 AM   (15 min.)   [email or web address]     [facility] [place]    RETURN   8:45 AM   (30 min.)   Neelima Da Silva MD   [facility]    ONC INFUSION 4 HR (240 MIN)  10:30 AM   (240 min.)   UC ONC INFUSION NURSE   [facility] 28 March 2023 Sunday Monday Tuesday Wednesday Thursday Friday Saturday                  1     2     3     4       5     6     7    LAB PERIPHERAL   7:45 AM   (15 min.)   [facility]   [facility]    ONC INFUSION 4 HR (240 MIN)   8:30 AM   (240 min.)   UC ONC INFUSION NURSE   [facility] 8     9     10     11       12     13     14    LAB PERIPHERAL  10:15 AM   (15 min.)   [facility]   [facility]    ONC INFUSION 4 HR (240 MIN)  11:00 AM   (240 min.)   UC ONC INFUSION NURSE   [facility] 15     16     17     18       19     20    LAB PERIPHERAL   7:15 AM   (15 min.)   [facility]   [facility]    RETURN ACTIVE TREATMENT   7:45 AM   (45 min.)   Selena Suggs APRN CNP   [facility]    ONC INFUSION 1 HR (60 MIN)   8:30 AM   (240 min.)   UC ONC INFUSION NURSE   [facility] 21     22     23     24     25       26     27    ECHO COMPLETE   7:00 AM   (60 min.)   [email or web address]   [facility]    LAB PERIPHERAL   8:30 AM   (15 min.)   [facility]   [facility]    ONC INFUSION 4 HR (240 MIN)   9:00 AM   (240 min.)   UC ONC INFUSION NURSE   [facility] 28     29     30     31                            Lab Results:  Recent Results (from the past 12 hour(s))   Comprehensive metabolic panel    Collection Time: 02/27/23  8:33 AM   Result Value Ref Range    Sodium 136 136 - 145 mmol/L    Potassium 4.1 3.4 - 5.3 mmol/L     Chloride 100 98 - 107 mmol/L    Carbon Dioxide (CO2) 25 22 - 29 mmol/L    Anion Gap 11 7 - 15 mmol/L    Urea Nitrogen 15.3 8.0 - 23.0 mg/dL    Creatinine 0.75 0.51 - 0.95 mg/dL    Calcium 9.2 8.8 - 10.2 mg/dL    Glucose 83 70 - 99 mg/dL    Alkaline Phosphatase 52 35 - 104 U/L    AST 17 10 - 35 U/L    ALT 15 10 - 35 U/L    Protein Total 8.2 6.4 - 8.3 g/dL    Albumin 3.7 3.5 - 5.2 g/dL    Bilirubin Total 0.2 <=1.2 mg/dL    GFR Estimate 84 >60 mL/min/1.73m2   CBC with platelets and differential    Collection Time: 02/27/23  8:33 AM   Result Value Ref Range    WBC Count 3.5 (L) 4.0 - 11.0 10e3/uL    RBC Count 2.84 (L) 3.80 - 5.20 10e6/uL    Hemoglobin 9.7 (L) 11.7 - 15.7 g/dL    Hematocrit 29.1 (L) 35.0 - 47.0 %     (H) 78 - 100 fL    MCH 34.2 (H) 26.5 - 33.0 pg    MCHC 33.3 31.5 - 36.5 g/dL    RDW 16.5 (H) 10.0 - 15.0 %    Platelet Count 79 (L) 150 - 450 10e3/uL    % Neutrophils 29 %    % Lymphocytes 47 %    % Monocytes 21 %    % Eosinophils 1 %    % Basophils 1 %    % Immature Granulocytes 1 %    NRBCs per 100 WBC 0 <1 /100    Absolute Neutrophils 1.0 (L) 1.6 - 8.3 10e3/uL    Absolute Lymphocytes 1.7 0.8 - 5.3 10e3/uL    Absolute Monocytes 0.7 0.0 - 1.3 10e3/uL    Absolute Eosinophils 0.0 0.0 - 0.7 10e3/uL    Absolute Basophils 0.0 0.0 - 0.2 10e3/uL    Absolute Immature Granulocytes 0.0 <=0.4 10e3/uL    Absolute NRBCs 0.0 10e3/uL   RBC and Platelet Morphology    Collection Time: 02/27/23  8:33 AM   Result Value Ref Range    Platelet Assessment  Automated Count Confirmed. Platelet morphology is normal.     Automated Count Confirmed. Platelet morphology is normal.    RBC Morphology Confirmed RBC Indices

## 2023-02-27 NOTE — LETTER
"    2/27/2023         RE: Shena Hartmann  [address] [place] [address]        Dear Colleague,    Thank you for referring your patient, Shena Hartmann, to the [facility]. Please see a copy of my visit note below.    February 27, 2023    CC:  History of breast cancer and multiple myeloma    HPI:  Ms Chatterjee has a history of multiple myeloma being followed by Dr Hills and now Dr Chavez.    Breast cancer history:    Today she is here because she has a past history of Breast cancer which was diagnosed in Oct 1994. It was treated with a right mastectomy and right axillary LN dissection. 8 out of 16 lymph nodes were positive. She did not have reconstruction done. This was followed by 4 cycles of adjuvant Cytoxan, Adriamycin and 5 FU ( CAF ) chemotherapy which finished in Feb 1995. She did not get radiation or hormonal treatment. Note I do not have those records to review and this history is per patient's recall of events.  Since then she has been followed by serial annual mammograms and has been doing well from the breast cancer aspect.    Briefly her myeloma history is summarized below:  She was diagnosed with \"smoldering multiple myeloma\" in 2001, which was watched until 12/2012, when she presented with symptomatic multiple myeloma requiring therapy.   She initially was treated with Revlimid and dexamethasone. However, given poor tolerance and not adequate response to therapy, that was subsequently switched to Velcade and dexamethasone. She received this therapy until 04/2013. However, given minimal response to therapy, Cytoxan was added to the regimen. She received CyBorD until 08/2013, and had a maximal reduction of M-protein to 2.6-2.8 g/dL. A bone marrow biopsy still showed significant disease. Then she received 2 cycles of high-dose Cytoxan with inadequate decline in M-spike. She was then switched D-PACE. She received 2 cycles; one in November 2013, and the last one on " 12/05/2013. Despite that she had significant disease burden. She underwent Cytoxan priming on 2/20/14. She received Melphalan conditioning and then received her Auto HCT on 3/27/14.   Since then she has been maintained on pomalidomide maintenance therapy along with daratumumab.  Her M spike is mildly increased.      She is being switched to Isatuximab today under the care of Dr Chavez.    Interval History:    In returning today, she has no concerns.  She denies any new lumps or bumps.  She had her bilateral mammogram today.    She is currently starting a new therapy for myeloma.  She is feeling overall reasonably well, though mildly anxious about starting new treatment.    She denies any loss of appetite or nausea or vomiting.    She does have some hot flashes and vaginal dryness but that has been stable.  No new bone pains or joint pains.       Current Outpatient Medications   Medication     acetaminophen (TYLENOL) 325 MG tablet     acyclovir (ZOVIRAX) 400 MG tablet     Ascorbic Acid (VITAMIN C PO)     aspirin (ASA) 81 MG chewable tablet     CALCIUM-VITAMIN D-VITAMIN K PO     COENZYME Q-10 PO     Cyanocobalamin 1000 MCG/ML LIQD     desonide (DESOWEN) 0.05 % external ointment     levothyroxine (SYNTHROID/LEVOTHROID) 50 MCG tablet     lisinopril (ZESTRIL) 2.5 MG tablet     magnesium 100 MG CAPS     Multiple Vitamins-Minerals (MULTIVITAMIN OR)     order for DME     order for DME     prochlorperazine (COMPAZINE) 5 MG tablet     triamcinolone (KENALOG) 0.1 % external ointment     valACYclovir (VALTREX) 500 MG tablet     ZINC PICOLINATE PO     No current facility-administered medications for this visit.     Facility-Administered Medications Ordered in Other Visits   Medication     plerixafor (MOZOBIL) injection SOLN 12.4 mg     potassium chloride 20 mEq in 50 mL IVPB     potassium chloride SA (K-DUR,KLOR-CON M) CR tablet 20 mEq           /67 (BP Location: Left arm)   Pulse 83   Temp 97.8  F (36.6  C) (Oral)    Resp 16   Wt 54 kg (119 lb 1.6 oz)   SpO2 98%   BMI 22.50 kg/m    General: no acute distress  HEENT: PERRLA, no palor or icterus.   NECK: supple - no cervical or supraclavicular LAD  CVS: s1s2 no m r g . No edema  CHEST: clear to auscultation b/l  ABDOMEN: soft non tender no hepatosplenomegaly  NEURO: AAOX3  Grossly non focal neuro exam  SKIN: no obvious rashes  LYMPH NODES: no palpable cervical, supraclavicular, axillary or inguinal LAD  BREAST: right sided mastectomy and well healed surgical scar. No palpable abnormality or lumps noted on either side.      Mammogram today was reviewed by me; overall is benign.    Assessment and Recommendations    1. Breast Cancer: She is now more than 20 years out from her diagnosis and completion of the therapy.  She had a bilateral screening mammogram done today which was normal.  We also discussed about the limited utility of yearly mammograms after 75 years of age.  For now recommend continuing yearly mammograms.      2. Osteopororis- she has tried pamidronate and did not tolerate it and she was off of therapy for a long time.  Recent dexa scan with her demonstrating osteoporosis with T score -2.5.  Weight bearing exercises.  Calcium and vitamin D.  June 2022 she was started on Prolia and is getting Prolia every 6 months.    3. Multiple Myeloma s/p Auto HCT on 3/27/2014- was on Pomalidomide maintenance with daratumumab- she is now being seen by Dr Chavez and is on new treatment with kyprolis and isatuximab.     4. She has had prior anthracyclines totalling an estimation of 240 mg/m2 ; we reviewed the importance of CV risk reduction.  She is on antihypertensives with lisinopril.  We discussed the importance of seeing PCP for cholesterol mgmt, etc.  Based on cardiooncology guidelines, I recommended a new echocardiogram; this was ordered.    Patient was seen and plan was discussed with staff physician Dr. Da Silva.    Perez Harrington MD  Hematology/Oncology Fellow     Pt was seen and  evaluated by me with Dr Harrington.  I reviewed the above note and made edits to reflect my evaluation.  Solomon has no s/s of recurrence at this time.  Mammogram was reviewed by me and is normal.    Return in 1 year.  We discussed echo for ongoing heart health.      Neelima Da Silva MD

## 2023-02-27 NOTE — PROGRESS NOTES
Infusion Nursing Note:  Shena Hartmann presents today for C1D8 Sarclisa/Kyprolis.    Patient seen by provider today: Yes: Dr. Da Silva   present during visit today: Not Applicable.    Note:Patient had episode of high blood pressure last 3 days after her last infusion 165-170's/ 80-90's with headache, no double vision nor dizziness. Symptom subsided on 4th day. She is currently on Lisinopril. She monitors her BP twice a day. She still wishes to continue with her Dexamethasone 10 mg today as it was discussed with Dr. Chavez prior. IB sent to Dr. Chavez and [facility]. Instructed patient if symptoms persists/worsen to call triage. Verbalized understanding.     Intravenous Access:  Implanted Port.    Treatment Conditions:  Lab Results   Component Value Date    HGB 9.7 (L) 02/27/2023    WBC 3.5 (L) 02/27/2023    ANEU 2.1 07/05/2021    ANEUTAUTO 1.0 (L) 02/27/2023    PLT 79 (L) 02/27/2023      Lab Results   Component Value Date     02/27/2023    POTASSIUM 4.1 02/27/2023    MAG 1.8 12/12/2014    CR 0.75 02/27/2023    PAULINE 9.2 02/27/2023    BILITOTAL 0.2 02/27/2023    ALBUMIN 3.7 02/27/2023    ALT 15 02/27/2023    AST 17 02/27/2023     Results reviewed, labs MET treatment parameters, ok to proceed with treatment.    Post Infusion Assessment:  Patient tolerated infusion without incident.  Blood return noted pre and post infusion.  Site patent and intact, free from redness, edema or discomfort.  No evidence of extravasations.  Access discontinued per protocol.     Discharge Plan:   Patient declined prescription refills.  Discharge instructions reviewed with: Patient.  Patient and/or family verbalized understanding of discharge instructions and all questions answered.  AVS to patient via [employer].  Patient will return 3/7/23 for next appointment.   Patient discharged in stable condition accompanied by: self and .  Departure Mode: Ambulatory.      NIRMAL SOW RN

## 2023-02-27 NOTE — PROGRESS NOTES
"February 27, 2023    CC:  History of breast cancer and multiple myeloma    HPI:  Ms Chatterjee has a history of multiple myeloma being followed by Dr Hills and now Dr Chavez.    Breast cancer history:    Today she is here because she has a past history of Breast cancer which was diagnosed in Oct 1994. It was treated with a right mastectomy and right axillary LN dissection. 8 out of 16 lymph nodes were positive. She did not have reconstruction done. This was followed by 4 cycles of adjuvant Cytoxan, Adriamycin and 5 FU ( CAF ) chemotherapy which finished in Feb 1995. She did not get radiation or hormonal treatment. Note I do not have those records to review and this history is per patient's recall of events.  Since then she has been followed by serial annual mammograms and has been doing well from the breast cancer aspect.    Briefly her myeloma history is summarized below:  She was diagnosed with \"smoldering multiple myeloma\" in 2001, which was watched until 12/2012, when she presented with symptomatic multiple myeloma requiring therapy.   She initially was treated with Revlimid and dexamethasone. However, given poor tolerance and not adequate response to therapy, that was subsequently switched to Velcade and dexamethasone. She received this therapy until 04/2013. However, given minimal response to therapy, Cytoxan was added to the regimen. She received CyBorD until 08/2013, and had a maximal reduction of M-protein to 2.6-2.8 g/dL. A bone marrow biopsy still showed significant disease. Then she received 2 cycles of high-dose Cytoxan with inadequate decline in M-spike. She was then switched D-PACE. She received 2 cycles; one in November 2013, and the last one on 12/05/2013. Despite that she had significant disease burden. She underwent Cytoxan priming on 2/20/14. She received Melphalan conditioning and then received her Auto HCT on 3/27/14.   Since then she has been maintained on pomalidomide maintenance therapy along " with daratumumab.  Her M spike is mildly increased.      She is being switched to Isatuximab today under the care of Dr Chavez.    Interval History:    In returning today, she has no concerns.  She denies any new lumps or bumps.  She had her bilateral mammogram today.    She is currently starting a new therapy for myeloma.  She is feeling overall reasonably well, though mildly anxious about starting new treatment.    She denies any loss of appetite or nausea or vomiting.    She does have some hot flashes and vaginal dryness but that has been stable.  No new bone pains or joint pains.       Current Outpatient Medications   Medication     acetaminophen (TYLENOL) 325 MG tablet     acyclovir (ZOVIRAX) 400 MG tablet     Ascorbic Acid (VITAMIN C PO)     aspirin (ASA) 81 MG chewable tablet     CALCIUM-VITAMIN D-VITAMIN K PO     COENZYME Q-10 PO     Cyanocobalamin 1000 MCG/ML LIQD     desonide (DESOWEN) 0.05 % external ointment     levothyroxine (SYNTHROID/LEVOTHROID) 50 MCG tablet     lisinopril (ZESTRIL) 2.5 MG tablet     magnesium 100 MG CAPS     Multiple Vitamins-Minerals (MULTIVITAMIN OR)     order for DME     order for DME     prochlorperazine (COMPAZINE) 5 MG tablet     triamcinolone (KENALOG) 0.1 % external ointment     valACYclovir (VALTREX) 500 MG tablet     ZINC PICOLINATE PO     No current facility-administered medications for this visit.     Facility-Administered Medications Ordered in Other Visits   Medication     plerixafor (MOZOBIL) injection SOLN 12.4 mg     potassium chloride 20 mEq in 50 mL IVPB     potassium chloride SA (K-DUR,KLOR-CON M) CR tablet 20 mEq           /67 (BP Location: Left arm)   Pulse 83   Temp 97.8  F (36.6  C) (Oral)   Resp 16   Wt 54 kg (119 lb 1.6 oz)   SpO2 98%   BMI 22.50 kg/m    General: no acute distress  HEENT: PERRLA, no palor or icterus.   NECK: supple - no cervical or supraclavicular LAD  CVS: s1s2 no m r g . No edema  CHEST: clear to auscultation b/l  ABDOMEN:  soft non tender no hepatosplenomegaly  NEURO: AAOX3  Grossly non focal neuro exam  SKIN: no obvious rashes  LYMPH NODES: no palpable cervical, supraclavicular, axillary or inguinal LAD  BREAST: right sided mastectomy and well healed surgical scar. No palpable abnormality or lumps noted on either side.      Mammogram today was reviewed by me; overall is benign.    Assessment and Recommendations    1. Breast Cancer: She is now more than 20 years out from her diagnosis and completion of the therapy.  She had a bilateral screening mammogram done today which was normal.  We also discussed about the limited utility of yearly mammograms after 75 years of age.  For now recommend continuing yearly mammograms.      2. Osteopororis- she has tried pamidronate and did not tolerate it and she was off of therapy for a long time.  Recent dexa scan with her demonstrating osteoporosis with T score -2.5.  Weight bearing exercises.  Calcium and vitamin D.  June 2022 she was started on Prolia and is getting Prolia every 6 months.    3. Multiple Myeloma s/p Auto HCT on 3/27/2014- was on Pomalidomide maintenance with daratumumab- she is now being seen by Dr Chavez and is on new treatment with kyprolis and isatuximab.     4. She has had prior anthracyclines totalling an estimation of 240 mg/m2 ; we reviewed the importance of CV risk reduction.  She is on antihypertensives with lisinopril.  We discussed the importance of seeing PCP for cholesterol mgmt, etc.  Based on cardiooncology guidelines, I recommended a new echocardiogram; this was ordered.    Patient was seen and plan was discussed with staff physician Dr. Da Silva.    Perez Harrington MD  Hematology/Oncology Fellow     Pt was seen and evaluated by me with Dr Harrington.  I reviewed the above note and made edits to reflect my evaluation.  Solomon has no s/s of recurrence at this time.  Mammogram was reviewed by me and is normal.    Return in 1 year.  We discussed echo for ongoing heart  health.      Neelima Da Silva MD

## 2023-03-04 ENCOUNTER — MYC MEDICAL ADVICE (OUTPATIENT)
Dept: ONCOLOGY | Facility: CLINIC | Age: 73
End: 2023-03-04
Payer: MEDICARE

## 2023-03-06 DIAGNOSIS — C50.911 MALIGNANT NEOPLASM OF RIGHT FEMALE BREAST, UNSPECIFIED ESTROGEN RECEPTOR STATUS, UNSPECIFIED SITE OF BREAST (H): Primary | ICD-10-CM

## 2023-03-07 ENCOUNTER — INFUSION THERAPY VISIT (OUTPATIENT)
Dept: ONCOLOGY | Facility: CLINIC | Age: 73
End: 2023-03-07
Attending: STUDENT IN AN ORGANIZED HEALTH CARE EDUCATION/TRAINING PROGRAM
Payer: MEDICARE

## 2023-03-07 ENCOUNTER — APPOINTMENT (OUTPATIENT)
Dept: LAB | Facility: CLINIC | Age: 73
End: 2023-03-07
Attending: STUDENT IN AN ORGANIZED HEALTH CARE EDUCATION/TRAINING PROGRAM
Payer: MEDICARE

## 2023-03-07 VITALS
HEART RATE: 78 BPM | DIASTOLIC BLOOD PRESSURE: 74 MMHG | OXYGEN SATURATION: 97 % | BODY MASS INDEX: 22.75 KG/M2 | TEMPERATURE: 97.8 F | WEIGHT: 120.4 LBS | SYSTOLIC BLOOD PRESSURE: 161 MMHG | RESPIRATION RATE: 16 BRPM

## 2023-03-07 DIAGNOSIS — C90.00 MULTIPLE MYELOMA NOT HAVING ACHIEVED REMISSION (H): Primary | ICD-10-CM

## 2023-03-07 DIAGNOSIS — I10 BENIGN ESSENTIAL HYPERTENSION: ICD-10-CM

## 2023-03-07 LAB
BASOPHILS # BLD AUTO: 0 10E3/UL (ref 0–0.2)
BASOPHILS NFR BLD AUTO: 0 %
EOSINOPHIL # BLD AUTO: 0 10E3/UL (ref 0–0.7)
EOSINOPHIL NFR BLD AUTO: 1 %
ERYTHROCYTE [DISTWIDTH] IN BLOOD BY AUTOMATED COUNT: 16.2 % (ref 10–15)
HCT VFR BLD AUTO: 28.8 % (ref 35–47)
HGB BLD-MCNC: 9.6 G/DL (ref 11.7–15.7)
IMM GRANULOCYTES # BLD: 0 10E3/UL
IMM GRANULOCYTES NFR BLD: 0 %
LYMPHOCYTES # BLD AUTO: 1.4 10E3/UL (ref 0.8–5.3)
LYMPHOCYTES NFR BLD AUTO: 46 %
MCH RBC QN AUTO: 34.7 PG (ref 26.5–33)
MCHC RBC AUTO-ENTMCNC: 33.3 G/DL (ref 31.5–36.5)
MCV RBC AUTO: 104 FL (ref 78–100)
MONOCYTES # BLD AUTO: 0.5 10E3/UL (ref 0–1.3)
MONOCYTES NFR BLD AUTO: 17 %
NEUTROPHILS # BLD AUTO: 1.1 10E3/UL (ref 1.6–8.3)
NEUTROPHILS NFR BLD AUTO: 36 %
NRBC # BLD AUTO: 0 10E3/UL
NRBC BLD AUTO-RTO: 0 /100
PLATELET # BLD AUTO: 76 10E3/UL (ref 150–450)
RBC # BLD AUTO: 2.77 10E6/UL (ref 3.8–5.2)
WBC # BLD AUTO: 3.1 10E3/UL (ref 4–11)

## 2023-03-07 PROCEDURE — 99214 OFFICE O/P EST MOD 30 MIN: CPT | Performed by: REGISTERED NURSE

## 2023-03-07 PROCEDURE — 96417 CHEMO IV INFUS EACH ADDL SEQ: CPT

## 2023-03-07 PROCEDURE — 96375 TX/PRO/DX INJ NEW DRUG ADDON: CPT

## 2023-03-07 PROCEDURE — 96367 TX/PROPH/DG ADDL SEQ IV INF: CPT

## 2023-03-07 PROCEDURE — 250N000013 HC RX MED GY IP 250 OP 250 PS 637: Performed by: STUDENT IN AN ORGANIZED HEALTH CARE EDUCATION/TRAINING PROGRAM

## 2023-03-07 PROCEDURE — 250N000011 HC RX IP 250 OP 636: Performed by: STUDENT IN AN ORGANIZED HEALTH CARE EDUCATION/TRAINING PROGRAM

## 2023-03-07 PROCEDURE — 258N000003 HC RX IP 258 OP 636: Performed by: STUDENT IN AN ORGANIZED HEALTH CARE EDUCATION/TRAINING PROGRAM

## 2023-03-07 PROCEDURE — 85004 AUTOMATED DIFF WBC COUNT: CPT | Performed by: STUDENT IN AN ORGANIZED HEALTH CARE EDUCATION/TRAINING PROGRAM

## 2023-03-07 PROCEDURE — 96413 CHEMO IV INFUSION 1 HR: CPT

## 2023-03-07 RX ORDER — CLOBETASOL PROPIONATE 0.5 MG/G
OINTMENT TOPICAL 2 TIMES DAILY PRN
COMMUNITY
End: 2023-08-29

## 2023-03-07 RX ORDER — HEPARIN SODIUM (PORCINE) LOCK FLUSH IV SOLN 100 UNIT/ML 100 UNIT/ML
5 SOLUTION INTRAVENOUS
Status: DISCONTINUED | OUTPATIENT
Start: 2023-03-07 | End: 2023-03-07 | Stop reason: HOSPADM

## 2023-03-07 RX ORDER — HEPARIN SODIUM (PORCINE) LOCK FLUSH IV SOLN 100 UNIT/ML 100 UNIT/ML
500 SOLUTION INTRAVENOUS ONCE
Status: COMPLETED | OUTPATIENT
Start: 2023-03-07 | End: 2023-03-07

## 2023-03-07 RX ORDER — LISINOPRIL 2.5 MG/1
5 TABLET ORAL DAILY
Qty: 90 TABLET | Refills: 3 | COMMUNITY
Start: 2023-03-07 | End: 2023-03-13

## 2023-03-07 RX ORDER — ACETAMINOPHEN 325 MG/1
650 TABLET ORAL ONCE
Status: COMPLETED | OUTPATIENT
Start: 2023-03-07 | End: 2023-03-07

## 2023-03-07 RX ADMIN — Medication 5 ML: at 13:38

## 2023-03-07 RX ADMIN — DEXTROSE MONOHYDRATE 500 ML: 50 INJECTION, SOLUTION INTRAVENOUS at 10:08

## 2023-03-07 RX ADMIN — DIPHENHYDRAMINE HYDROCHLORIDE 25 MG: 50 INJECTION, SOLUTION INTRAMUSCULAR; INTRAVENOUS at 10:14

## 2023-03-07 RX ADMIN — CARFILZOMIB 90 MG: 30 INJECTION, POWDER, LYOPHILIZED, FOR SOLUTION INTRAVENOUS at 12:35

## 2023-03-07 RX ADMIN — DEXAMETHASONE SODIUM PHOSPHATE: 10 INJECTION, SOLUTION INTRAMUSCULAR; INTRAVENOUS at 10:33

## 2023-03-07 RX ADMIN — FAMOTIDINE 20 MG: 10 INJECTION INTRAVENOUS at 10:11

## 2023-03-07 RX ADMIN — SODIUM CHLORIDE 500 MG: 9 INJECTION, SOLUTION INTRAVENOUS at 11:15

## 2023-03-07 RX ADMIN — ACETAMINOPHEN 650 MG: 325 TABLET ORAL at 10:08

## 2023-03-07 RX ADMIN — Medication 500 UNITS: at 07:59

## 2023-03-07 ASSESSMENT — PAIN SCALES - GENERAL: PAINLEVEL: NO PAIN (0)

## 2023-03-07 NOTE — PROGRESS NOTES
"Infusion Nursing Note:  Shena Hartmann presents today for C1D15 Sarcilsa/Kyprolis.    Patient seen by provider today: No   present during visit today: Not Applicable.    Note: Pt reports head pressure and mild headaches that come and go. She reports she currently is having head pressure but no headache. She denies fevers in last week. Pt states when she was queezy, she \"made herself eat\", said she is a light eater, drinking 8-10 cups of fluids/day. She has not used compazine for nausea, as not wanting to add more to her body. She prefers a holistic approach. She did use seabands and teas. Pt has been putting neisha in her tea as well for nausea.     Pt checking BP daily at home, same time of day every day.   3/6//93, P 74  3/5/23- 175/94, P 63  3/4/23- 155/90, P 70  3/3/23- 176/92, P 67  3/2/23- 146/73, P 60 .    TORB Dr Garrett/Guerrero Ramos RN  -ok to proceed with treatment today  -pt to follow up with PCP to adjust BP medications  -check BP after tx prior to leaving clinic if systolic above 200 notify her  -if home seek emergency room care if s/s of stroke, worsening headaches/pressure, increasing BP's.    Patient aware of this and agrees with plan.  Patient will be seen by Selena Suggs CNP at 1315 today. (See note).    Ran Sarclisa at 200 ml/hr tolerated well.  /74 post infusion. Patient discharged to home with .    Intravenous Access:  Labs drawn without difficulty.  Implanted Port.  By lab staff.    Treatment Conditions:  Lab Results   Component Value Date    HGB 9.6 (L) 03/07/2023    WBC 3.1 (L) 03/07/2023    ANEU 2.1 07/05/2021    ANEUTAUTO 1.1 (L) 03/07/2023    PLT 76 (L) 03/07/2023      Results reviewed, labs MET treatment parameters, ok to proceed with treatment.    Post Infusion Assessment:  Patient tolerated infusion without incident.  Blood return noted pre and post infusion.  Site patent and intact, free from redness, edema or discomfort.  No evidence of " extravasations.  Access discontinued per protocol.     Discharge Plan:   Patient declined prescription refills.  Discharge instructions reviewed with: Patient and Family.  Patient and/or family verbalized understanding of discharge instructions and all questions answered.  Copy of AVS reviewed with patient and/or family.  Patient will return 3/14/23 for next appointment.  Patient discharged in stable condition accompanied by: self.  Departure Mode: Ambulatory.  Face to Face time: 30+ minutes.    Guerrero Ramos RN

## 2023-03-07 NOTE — LETTER
3/7/2023         RE: Shena aHrtmann  [address] [place] [address]        Dear Colleague,    Thank you for referring your patient, Shena Hartmann, to the [facility]. Please see a copy of my visit note below.    Malignant Hematology Progress Note    Oncologic Hx:  - Breast cancer dx in 1994. Underwent surgery and chemotherapy without reoccurence  - MGUS dx 1999  - T8 pathologic fracture with radiation  -revlimid and velcade  2013  Cytoxan IV 9/2013  D-PACE 11/2013  Auto 3/27/14  5/2014 thalidomide caused rash so switched to pomalidomide   - on kenalog and clobetasol creams for IMID rash  - FLC began to rise so riky added to pom 9/2020  Skipped riky Dec d/t cold virus  - she has been on xgeva for an unclear amount of time  - Most recently Kappa FLC 77.9, M spike 2.3    Interval Hx:   Shena was seen as an acute add-on today to assess hypertension and head pressure.  She checks her BP daily at home, SBP has been consistently 160s-170s with a few 180s ever since starting treatment 2 weeks ago (here today for C1D15). She had similar feeling of head pressure previously when she was on dexamethasone with previous treatment; unsure what her BP was at that time because she wasn't measuring it, but it was shortly after that treatment regimen that she was started on lisinopril.  Currently taking lisinopril 2.5 mg nightly. Denies chest pain, heart palpations, vision changes, unilateral weakness, or facial droop.    Pertinent PMH:  HTN, hypothyroidism    Family Hx- prostate ca, pancreatic , GBM- grandfather, paternal aunt breast cancer      Physical Exam  Vital Signs 3/7/2023   Systolic 167   Diastolic 78   Pulse 78   Temperature 97.8   Respirations 16   Weight (LB) 120 lb 6.4 oz   Height    BMI (Calculated)    Pain Score 0   O2 97     Constitutional: NAD  Eyes: no scleral icterus  ENT: no oral lesions  Lymph: no cervical, supraclavicular LAD  Pulm: CTAB  CV: RRR, no m/r/g  GI:  bowel sounds present, soft and nontender to palpation  MSK: No edema in the extremities  Neuro: alert, conversant, normal gait  Psych: appropriate mood and affect    Assessment and Plan:   # Relapsed Myeloma  Started kyprolis/isatuximab/dex on 2/20/23.  Here today for C1D15, presenting with hypertension and head pressure.  Currently taking lisinopril 2.5 mg nightly which she was on prior to starting this treatment.  SBP at home has been consistently 160s-170s.   - Will increase lisinopril to 5 mg nightly.  Continue to take BP daily.  She is concerned about overcorrecting and having her BP be to low.  Instructed her to hold her lisinopril if SBP < 100.   - She has many questions today about decreasing dexamethasone dosing and the long term scheduling of her current regimen.  I will reach out to Dr. Chavez to discuss these questions and get back to her via [employer] with some clarification.    40 minutes spent on the date of the encounter doing chart review, review of test results, patient visit and documentation     MARINO Fuller [facility] [address] [place] [address]  [phone]

## 2023-03-07 NOTE — PATIENT INSTRUCTIONS
[facility] Main Line: [phone]    Call triage nurse with chills and/or temperature greater than or equal to 100.4, uncontrolled nausea/vomiting, diarrhea, constipation, dizziness, shortness of breath, chest pain, bleeding, unexplained bruising, or any new/concerning symptoms, questions/concerns.   If you are having any concerning symptoms or wish to speak to a provider before your next infusion visit, please call your care coordinator or triage to notify them so we can adequately serve you.   Nurse Triage line:  [phone]    If after hours, weekends, or holidays, call main hospital  and ask for Oncology doctor on call @ [phone]      Latest Reference Range & Units 03/07/23 07:57   WBC 4.0 - 11.0 10e3/uL 3.1 (L)   Hemoglobin 11.7 - 15.7 g/dL 9.6 (L)   Hematocrit 35.0 - 47.0 % 28.8 (L)   Platelet Count 150 - 450 10e3/uL 76 (L)   RBC Count 3.80 - 5.20 10e6/uL 2.77 (L)   MCV 78 - 100 fL 104 (H)   MCH 26.5 - 33.0 pg 34.7 (H)   MCHC 31.5 - 36.5 g/dL 33.3   RDW 10.0 - 15.0 % 16.2 (H)   % Neutrophils % 36   % Lymphocytes % 46   % Monocytes % 17   % Eosinophils % 1   % Basophils % 0   Absolute Basophils 0.0 - 0.2 10e3/uL 0.0   Absolute Eosinophils 0.0 - 0.7 10e3/uL 0.0   Absolute Immature Granulocytes <=0.4 10e3/uL 0.0   Absolute Lymphocytes 0.8 - 5.3 10e3/uL 1.4   Absolute Monocytes 0.0 - 1.3 10e3/uL 0.5   % Immature Granulocytes % 0   Absolute Neutrophils 1.6 - 8.3 10e3/uL 1.1 (L)   Absolute NRBCs 10e3/uL 0.0   NRBCs per 100 WBC <1 /100 0   (L): Data is abnormally low  (H): Data is abnormally high

## 2023-03-07 NOTE — PROGRESS NOTES
Malignant Hematology Progress Note    Oncologic Hx:  - Breast cancer dx in 1994. Underwent surgery and chemotherapy without reoccurence  - MGUS dx 1999  - T8 pathologic fracture with radiation  -revlimid and velcade  2013  Cytoxan IV 9/2013  D-PACE 11/2013  Auto 3/27/14  5/2014 thalidomide caused rash so switched to pomalidomide   - on kenalog and clobetasol creams for IMID rash  - FLC began to rise so riky added to pom 9/2020  Skipped riky Dec d/t cold virus  - she has been on xgeva for an unclear amount of time  - Most recently Kappa FLC 77.9, M spike 2.3    Interval Hx:   Shena was seen as an acute add-on today to assess hypertension and head pressure.  She checks her BP daily at home, SBP has been consistently 160s-170s with a few 180s ever since starting treatment 2 weeks ago (here today for C1D15). She had similar feeling of head pressure previously when she was on dexamethasone with previous treatment; unsure what her BP was at that time because she wasn't measuring it, but it was shortly after that treatment regimen that she was started on lisinopril.  Currently taking lisinopril 2.5 mg nightly. Denies chest pain, heart palpations, vision changes, unilateral weakness, or facial droop.    Pertinent PMH:  HTN, hypothyroidism    Family Hx- prostate ca, pancreatic , GBM- grandfather, paternal aunt breast cancer      Physical Exam  Vital Signs 3/7/2023   Systolic 167   Diastolic 78   Pulse 78   Temperature 97.8   Respirations 16   Weight (LB) 120 lb 6.4 oz   Height    BMI (Calculated)    Pain Score 0   O2 97     Constitutional: NAD  Eyes: no scleral icterus  ENT: no oral lesions  Lymph: no cervical, supraclavicular LAD  Pulm: CTAB  CV: RRR, no m/r/g  GI: bowel sounds present, soft and nontender to palpation  MSK: No edema in the extremities  Neuro: alert, conversant, normal gait  Psych: appropriate mood and affect    Assessment and Plan:   # Relapsed Myeloma  Started kyprolis/isatuximab/dex on 2/20/23.  Here  today for C1D15, presenting with hypertension and head pressure.  Currently taking lisinopril 2.5 mg nightly which she was on prior to starting this treatment.  SBP at home has been consistently 160s-170s.   - Will increase lisinopril to 5 mg nightly.  Continue to take BP daily.  She is concerned about overcorrecting and having her BP be to low.  Instructed her to hold her lisinopril if SBP < 100.   - She has many questions today about decreasing dexamethasone dosing and the long term scheduling of her current regimen.  I will reach out to Dr. Chavez to discuss these questions and get back to her via [employer] with some clarification.    40 minutes spent on the date of the encounter doing chart review, review of test results, patient visit and documentation     MARINO Fuller [facility] [address] [address]  [phone]

## 2023-03-07 NOTE — NURSING NOTE
"Chief Complaint   Patient presents with     Port Draw     Labs drawn via port by RN in lab.  VS taken        Port accessed with 20 gauge 3/4\" gripper needle by RN, labs collected, line flushed with saline and heparin.  Vitals taken. Pt checked in for appointment(s).    Audrey Hammonds RN    "

## 2023-03-13 DIAGNOSIS — I10 BENIGN ESSENTIAL HYPERTENSION: ICD-10-CM

## 2023-03-13 RX ORDER — LISINOPRIL 2.5 MG/1
5 TABLET ORAL DAILY
Qty: 180 TABLET | Refills: 3 | Status: SHIPPED | OUTPATIENT
Start: 2023-03-13 | End: 2023-05-02

## 2023-03-14 ENCOUNTER — INFUSION THERAPY VISIT (OUTPATIENT)
Dept: ONCOLOGY | Facility: CLINIC | Age: 73
End: 2023-03-14
Attending: STUDENT IN AN ORGANIZED HEALTH CARE EDUCATION/TRAINING PROGRAM
Payer: MEDICARE

## 2023-03-14 ENCOUNTER — APPOINTMENT (OUTPATIENT)
Dept: LAB | Facility: CLINIC | Age: 73
End: 2023-03-14
Attending: STUDENT IN AN ORGANIZED HEALTH CARE EDUCATION/TRAINING PROGRAM
Payer: MEDICARE

## 2023-03-14 VITALS
OXYGEN SATURATION: 98 % | TEMPERATURE: 97.9 F | SYSTOLIC BLOOD PRESSURE: 132 MMHG | DIASTOLIC BLOOD PRESSURE: 68 MMHG | BODY MASS INDEX: 22.01 KG/M2 | WEIGHT: 116.5 LBS | RESPIRATION RATE: 16 BRPM | HEART RATE: 90 BPM

## 2023-03-14 DIAGNOSIS — C90.00 MULTIPLE MYELOMA NOT HAVING ACHIEVED REMISSION (H): Primary | ICD-10-CM

## 2023-03-14 LAB
BASOPHILS # BLD AUTO: 0 10E3/UL (ref 0–0.2)
BASOPHILS NFR BLD AUTO: 0 %
EOSINOPHIL # BLD AUTO: 0 10E3/UL (ref 0–0.7)
EOSINOPHIL NFR BLD AUTO: 1 %
ERYTHROCYTE [DISTWIDTH] IN BLOOD BY AUTOMATED COUNT: 16.2 % (ref 10–15)
HCT VFR BLD AUTO: 29.1 % (ref 35–47)
HGB BLD-MCNC: 9.8 G/DL (ref 11.7–15.7)
IMM GRANULOCYTES # BLD: 0 10E3/UL
IMM GRANULOCYTES NFR BLD: 0 %
LYMPHOCYTES # BLD AUTO: 1.6 10E3/UL (ref 0.8–5.3)
LYMPHOCYTES NFR BLD AUTO: 41 %
MCH RBC QN AUTO: 34.4 PG (ref 26.5–33)
MCHC RBC AUTO-ENTMCNC: 33.7 G/DL (ref 31.5–36.5)
MCV RBC AUTO: 102 FL (ref 78–100)
MONOCYTES # BLD AUTO: 0.6 10E3/UL (ref 0–1.3)
MONOCYTES NFR BLD AUTO: 16 %
NEUTROPHILS # BLD AUTO: 1.6 10E3/UL (ref 1.6–8.3)
NEUTROPHILS NFR BLD AUTO: 42 %
NRBC # BLD AUTO: 0 10E3/UL
NRBC BLD AUTO-RTO: 1 /100
PLATELET # BLD AUTO: 83 10E3/UL (ref 150–450)
RBC # BLD AUTO: 2.85 10E6/UL (ref 3.8–5.2)
WBC # BLD AUTO: 3.8 10E3/UL (ref 4–11)

## 2023-03-14 PROCEDURE — 85025 COMPLETE CBC W/AUTO DIFF WBC: CPT | Performed by: STUDENT IN AN ORGANIZED HEALTH CARE EDUCATION/TRAINING PROGRAM

## 2023-03-14 PROCEDURE — 258N000003 HC RX IP 258 OP 636: Performed by: STUDENT IN AN ORGANIZED HEALTH CARE EDUCATION/TRAINING PROGRAM

## 2023-03-14 PROCEDURE — 250N000013 HC RX MED GY IP 250 OP 250 PS 637: Performed by: STUDENT IN AN ORGANIZED HEALTH CARE EDUCATION/TRAINING PROGRAM

## 2023-03-14 PROCEDURE — 96413 CHEMO IV INFUSION 1 HR: CPT

## 2023-03-14 PROCEDURE — 96375 TX/PRO/DX INJ NEW DRUG ADDON: CPT

## 2023-03-14 PROCEDURE — 250N000011 HC RX IP 250 OP 636: Performed by: STUDENT IN AN ORGANIZED HEALTH CARE EDUCATION/TRAINING PROGRAM

## 2023-03-14 RX ORDER — HEPARIN SODIUM (PORCINE) LOCK FLUSH IV SOLN 100 UNIT/ML 100 UNIT/ML
5 SOLUTION INTRAVENOUS ONCE
Status: COMPLETED | OUTPATIENT
Start: 2023-03-14 | End: 2023-03-14

## 2023-03-14 RX ORDER — ACETAMINOPHEN 325 MG/1
650 TABLET ORAL ONCE
Status: COMPLETED | OUTPATIENT
Start: 2023-03-14 | End: 2023-03-14

## 2023-03-14 RX ORDER — HEPARIN SODIUM (PORCINE) LOCK FLUSH IV SOLN 100 UNIT/ML 100 UNIT/ML
5 SOLUTION INTRAVENOUS
Status: DISCONTINUED | OUTPATIENT
Start: 2023-03-14 | End: 2023-03-14 | Stop reason: HOSPADM

## 2023-03-14 RX ADMIN — SODIUM CHLORIDE 250 ML: 9 INJECTION, SOLUTION INTRAVENOUS at 11:20

## 2023-03-14 RX ADMIN — SODIUM CHLORIDE 500 MG: 9 INJECTION, SOLUTION INTRAVENOUS at 12:12

## 2023-03-14 RX ADMIN — Medication 5 ML: at 10:52

## 2023-03-14 RX ADMIN — DIPHENHYDRAMINE HYDROCHLORIDE 25 MG: 50 INJECTION, SOLUTION INTRAMUSCULAR; INTRAVENOUS at 11:26

## 2023-03-14 RX ADMIN — DEXAMETHASONE SODIUM PHOSPHATE: 10 INJECTION, SOLUTION INTRAMUSCULAR; INTRAVENOUS at 11:42

## 2023-03-14 RX ADMIN — Medication 5 ML: at 13:30

## 2023-03-14 RX ADMIN — ACETAMINOPHEN 650 MG: 325 TABLET ORAL at 11:21

## 2023-03-14 RX ADMIN — FAMOTIDINE 20 MG: 10 INJECTION INTRAVENOUS at 11:21

## 2023-03-14 ASSESSMENT — PAIN SCALES - GENERAL: PAINLEVEL: MILD PAIN (3)

## 2023-03-14 NOTE — NURSING NOTE
Chief Complaint   Patient presents with     Blood Draw     Port blood draw with heparin flush by lab RN. Vitals taken and appointment arrived     Nadia Thomas RN

## 2023-03-14 NOTE — PATIENT INSTRUCTIONS
[facility] Triage and after hours / weekends / holidays:  [phone]    Please call the triage or after hours line if you experience a temperature greater than or equal to 100.4, shaking chills, have uncontrolled nausea, vomiting and/or diarrhea, dizziness, shortness of breath, chest pain, bleeding, unexplained bruising, or if you have any other new/concerning symptoms, questions or concerns.      If you are having any concerning symptoms or wish to speak to a provider before your next infusion visit, please call your care coordinator or triage to notify them so we can adequately serve you.     If you need a refill on a narcotic prescription or other medication, please call before your infusion appointment.           March 2023 Sunday Monday Tuesday Wednesday Thursday Friday Saturday                  1     2     3     4       5     6     7    LAB PERIPHERAL   7:45 AM   (15 min.)   [facility] DRAW   [facility]    ONC INFUSION 4 HR (240 MIN)   8:30 AM   (240 min.)    ONC INFUSION NURSE   [facility]    RETURN CCSL   1:00 PM   (45 min.)   Selena Suggs APRN CNP   [facility] 8     9     10     11       12     13     14    LAB PERIPHERAL  10:15 AM   (15 min.)   [facility] DRAW   [facility]    ONC INFUSION 4 HR (240 MIN)  11:00 AM   (240 min.)    ONC INFUSION NURSE   [facility] 15     16     17     18       19     20    LAB PERIPHERAL   7:15 AM   (15 min.)   [facility] DRAW   [facility]    RETURN ACTIVE TREATMENT   7:45 AM   (45 min.)   Selena Suggs APRN CNP   [facility]    ONC INFUSION 1 HR (60 MIN)   8:30 AM   (240 min.)    ONC INFUSION NURSE   [facility] 21     22     23     24     25       26     27    ECHO COMPLETE   7:00 AM   (60 min.)   [facility]   [facility]    LAB PERIPHERAL   8:30 AM   (15 min.)    [facility]   [facility]    ONC INFUSION 4 HR (240 MIN)   9:00 AM   (240 min.)    ONC INFUSION NURSE   [facility] 28     29     30     31 April 2023 Sunday Monday Tuesday Wednesday Thursday Friday Saturday                                 1       2     3     4    LAB PERIPHERAL  10:45 AM   (15 min.)    [facility]RAW   [facility]    ONC INFUSION 4 HR (240 MIN)  11:30 AM   (240 min.)    ONC INFUSION NURSE   [facility] 5     6     7     8       9     10     11     12     13     14     15       16     17     18    RETURN CCSL   2:15 PM   (30 min.)   Nighat Chavez MD   [facility] 19     20     21     22       23     24     25     26     27     28     29       30                                                      Recent Results (from the past 24 hour(s))   CBC with platelets and differential    Collection Time: 03/14/23 10:52 AM   Result Value Ref Range    WBC Count 3.8 (L) 4.0 - 11.0 10e3/uL    RBC Count 2.85 (L) 3.80 - 5.20 10e6/uL    Hemoglobin 9.8 (L) 11.7 - 15.7 g/dL    Hematocrit 29.1 (L) 35.0 - 47.0 %     (H) 78 - 100 fL    MCH 34.4 (H) 26.5 - 33.0 pg    MCHC 33.7 31.5 - 36.5 g/dL    RDW 16.2 (H) 10.0 - 15.0 %    Platelet Count 83 (L) 150 - 450 10e3/uL    % Neutrophils 42 %    % Lymphocytes 41 %    % Monocytes 16 %    % Eosinophils 1 %    % Basophils 0 %    % Immature Granulocytes 0 %    NRBCs per 100 WBC 1 (H) <1 /100    Absolute Neutrophils 1.6 1.6 - 8.3 10e3/uL    Absolute Lymphocytes 1.6 0.8 - 5.3 10e3/uL    Absolute Monocytes 0.6 0.0 - 1.3 10e3/uL    Absolute Eosinophils 0.0 0.0 - 0.7 10e3/uL    Absolute Basophils 0.0 0.0 - 0.2 10e3/uL    Absolute Immature Granulocytes 0.0 <=0.4 10e3/uL    Absolute NRBCs 0.0 10e3/uL

## 2023-03-15 ENCOUNTER — MYC MEDICAL ADVICE (OUTPATIENT)
Dept: ONCOLOGY | Facility: CLINIC | Age: 73
End: 2023-03-15
Payer: MEDICARE

## 2023-03-15 NOTE — TELEPHONE ENCOUNTER
[facility]      BP is elevated 188/92   P 60   One week ago increased lisinopril from 2.5 to 5 mg takes at bedtime. That helped to get BP back into normal range.   Today BP is increased and had headache.   Having some headache along the occiput of head. Feel like increased pressure.  Took Tylenol.   Finished dexamethasone yesterday midday.   Usually slowly goes down slowly 3-4 days after treatment, gets back to baseline when she is ready for next treatment.    10:53 message sent to Selena Suggs           11:04 Per Selena Suggs   yes, please increase to 7.5 mg until systolic BP is consistently < 150, then can go back to 5 mg and continue to check BP daily    Call placed to Shena Relayed above information: yes, please increase to 7.5 mg until systolic BP is consistently < 150, then can go back to 5 mg and continue to check BP daily.     She states she is willing to give that a try and will keep us updated.

## 2023-03-16 ENCOUNTER — TELEPHONE (OUTPATIENT)
Dept: DERMATOLOGY | Facility: CLINIC | Age: 73
End: 2023-03-16
Payer: MEDICARE

## 2023-03-17 NOTE — PROGRESS NOTES
Malignant Hematology Progress Note    Oncologic Hx:  - Breast cancer dx in 1994. Underwent surgery and chemotherapy without reoccurence  - MGUS dx 1999  - T8 pathologic fracture with radiation  -revlimid and velcade  2013  Cytoxan IV 9/2013  D-PACE 11/2013  Auto 3/27/14  5/2014 thalidomide caused rash so switched to pomalidomide   - on kenalog and clobetasol creams for IMID rash  - FLC began to rise so riky added to pom 9/2020  Skipped riky Dec d/t cold virus  - she has been on xgeva for an unclear amount of time  - Most recently Kappa FLC 77.9, M spike 2.3    Interval Hx:   Has ongoing fatigue and brain fog, finds it hard to focus or to get up and do things sometimes.  Her nausea and loose stools were resolved this past week, making her think they are being caused by the Kyprolis (since she didn't get Kyprolis with D22). Systolic blood pressures have been 130s-150s, she is taking 5-7.5 mg of lisinopril, titrating based on her BP.  Headaches are better now that her BP is under better control.    ROS: 10 point ROS neg other than the symptoms noted above in the HPI.      Pertinent PMH:  HTN, hypothyroidism    Family Hx- prostate ca, pancreatic , GBM- grandfather, paternal aunt breast cancer      Physical Exam  BP (!) 141/74   Pulse 81   Temp (!) 96.7  F (35.9  C) (Oral)   Resp 16   Wt 51.3 kg (113 lb)   SpO2 99%   BMI 21.35 kg/m    Constitutional: NAD  Eyes: no scleral icterus  ENT: no oral lesions  Lymph: no cervical, supraclavicular LAD  Pulm: CTAB  CV: RRR, no m/r/g  GI: bowel sounds present, soft and nontender to palpation  MSK: No edema in the extremities  Neuro: alert, conversant, normal gait  Psych: appropriate mood and affect    Assessment and Plan:   # Relapsed Myeloma  Started kyprolis/isatuximab/dex on 2/20/23.  Had hypertension and head pressure from the dexamethasone.  Increased llisinopril to 7.5 mg, was previously on 2.5 mg nightly.  - Will hold of on dexamethasone with further cycles  - Discussed  "that schedule changes starting C2 - isatuximab is now every other week, Kyprolis remains weekly for 3 out of 4 weeks  - Unclear what the long-term plan is, will schedule her for C3.  She has follow-up with Dr. Chavez in April    Gets Prolia every 6 months, has had 2 doses, due again in May.  Would prefer to have this scheduled on her \"off\" week (week 4 of a cycle).    She will continue to titrate her lisinopril based on her blood pressures.  Anticipate that ultimately she will be able to return to 2.5 mg nightly now that she is off the dexamethasone.    40 minutes spent on the date of the encounter doing chart review, review of test results, patient visit and documentation     MARINO Fuller [facility]  [address] [place] [address] [phone]          "

## 2023-03-20 ENCOUNTER — ONCOLOGY VISIT (OUTPATIENT)
Dept: ONCOLOGY | Facility: CLINIC | Age: 73
End: 2023-03-20
Attending: REGISTERED NURSE
Payer: MEDICARE

## 2023-03-20 ENCOUNTER — INFUSION THERAPY VISIT (OUTPATIENT)
Dept: ONCOLOGY | Facility: CLINIC | Age: 73
End: 2023-03-20
Attending: STUDENT IN AN ORGANIZED HEALTH CARE EDUCATION/TRAINING PROGRAM
Payer: MEDICARE

## 2023-03-20 ENCOUNTER — APPOINTMENT (OUTPATIENT)
Dept: LAB | Facility: CLINIC | Age: 73
End: 2023-03-20
Attending: STUDENT IN AN ORGANIZED HEALTH CARE EDUCATION/TRAINING PROGRAM
Payer: MEDICARE

## 2023-03-20 VITALS
BODY MASS INDEX: 21.35 KG/M2 | SYSTOLIC BLOOD PRESSURE: 141 MMHG | WEIGHT: 113 LBS | DIASTOLIC BLOOD PRESSURE: 74 MMHG | HEART RATE: 81 BPM | OXYGEN SATURATION: 99 % | RESPIRATION RATE: 16 BRPM | TEMPERATURE: 96.7 F

## 2023-03-20 DIAGNOSIS — C90.00 MULTIPLE MYELOMA NOT HAVING ACHIEVED REMISSION (H): Primary | ICD-10-CM

## 2023-03-20 DIAGNOSIS — E85.89 OTHER AMYLOIDOSIS (H): ICD-10-CM

## 2023-03-20 DIAGNOSIS — I10 ESSENTIAL HYPERTENSION: ICD-10-CM

## 2023-03-20 DIAGNOSIS — C90.02 MULTIPLE MYELOMA IN RELAPSE (H): Primary | ICD-10-CM

## 2023-03-20 DIAGNOSIS — C90.00 MULTIPLE MYELOMA NOT HAVING ACHIEVED REMISSION (H): ICD-10-CM

## 2023-03-20 LAB
ALBUMIN SERPL BCG-MCNC: 4 G/DL (ref 3.5–5.2)
ALP SERPL-CCNC: 47 U/L (ref 35–104)
ALT SERPL W P-5'-P-CCNC: 14 U/L (ref 10–35)
ANION GAP SERPL CALCULATED.3IONS-SCNC: 9 MMOL/L (ref 7–15)
AST SERPL W P-5'-P-CCNC: 17 U/L (ref 10–35)
BASOPHILS # BLD AUTO: 0 10E3/UL (ref 0–0.2)
BASOPHILS NFR BLD AUTO: 0 %
BILIRUB SERPL-MCNC: 0.3 MG/DL
BUN SERPL-MCNC: 17.2 MG/DL (ref 8–23)
CALCIUM SERPL-MCNC: 9.4 MG/DL (ref 8.8–10.2)
CHLORIDE SERPL-SCNC: 100 MMOL/L (ref 98–107)
CREAT SERPL-MCNC: 0.73 MG/DL (ref 0.51–0.95)
DEPRECATED HCO3 PLAS-SCNC: 27 MMOL/L (ref 22–29)
EOSINOPHIL # BLD AUTO: 0.1 10E3/UL (ref 0–0.7)
EOSINOPHIL NFR BLD AUTO: 2 %
ERYTHROCYTE [DISTWIDTH] IN BLOOD BY AUTOMATED COUNT: 16.6 % (ref 10–15)
GFR SERPL CREATININE-BSD FRML MDRD: 87 ML/MIN/1.73M2
GLUCOSE SERPL-MCNC: 90 MG/DL (ref 70–99)
HCT VFR BLD AUTO: 29.4 % (ref 35–47)
HGB BLD-MCNC: 9.8 G/DL (ref 11.7–15.7)
IMM GRANULOCYTES # BLD: 0 10E3/UL
IMM GRANULOCYTES NFR BLD: 0 %
KAPPA LC FREE SER-MCNC: 36.35 MG/DL (ref 0.33–1.94)
KAPPA LC FREE/LAMBDA FREE SER NEPH: 242.33 {RATIO} (ref 0.26–1.65)
LAMBDA LC FREE SERPL-MCNC: 0.15 MG/DL (ref 0.57–2.63)
LDH SERPL L TO P-CCNC: 141 U/L (ref 0–250)
LYMPHOCYTES # BLD AUTO: 1.3 10E3/UL (ref 0.8–5.3)
LYMPHOCYTES NFR BLD AUTO: 41 %
MCH RBC QN AUTO: 34.8 PG (ref 26.5–33)
MCHC RBC AUTO-ENTMCNC: 33.3 G/DL (ref 31.5–36.5)
MCV RBC AUTO: 104 FL (ref 78–100)
MONOCYTES # BLD AUTO: 0.6 10E3/UL (ref 0–1.3)
MONOCYTES NFR BLD AUTO: 18 %
NEUTROPHILS # BLD AUTO: 1.3 10E3/UL (ref 1.6–8.3)
NEUTROPHILS NFR BLD AUTO: 39 %
NRBC # BLD AUTO: 0 10E3/UL
NRBC BLD AUTO-RTO: 0 /100
NT-PROBNP SERPL-MCNC: 335 PG/ML (ref 0–900)
PLATELET # BLD AUTO: 129 10E3/UL (ref 150–450)
POTASSIUM SERPL-SCNC: 3.7 MMOL/L (ref 3.4–5.3)
PROT SERPL-MCNC: 7.6 G/DL (ref 6.4–8.3)
RBC # BLD AUTO: 2.82 10E6/UL (ref 3.8–5.2)
SODIUM SERPL-SCNC: 136 MMOL/L (ref 136–145)
TOTAL PROTEIN SERUM FOR ELP: 7.2 G/DL (ref 6.4–8.3)
WBC # BLD AUTO: 3.2 10E3/UL (ref 4–11)

## 2023-03-20 PROCEDURE — 83521 IG LIGHT CHAINS FREE EACH: CPT | Performed by: REGISTERED NURSE

## 2023-03-20 PROCEDURE — 84165 PROTEIN E-PHORESIS SERUM: CPT | Mod: TC | Performed by: PATHOLOGY

## 2023-03-20 PROCEDURE — 85004 AUTOMATED DIFF WBC COUNT: CPT | Performed by: STUDENT IN AN ORGANIZED HEALTH CARE EDUCATION/TRAINING PROGRAM

## 2023-03-20 PROCEDURE — 258N000003 HC RX IP 258 OP 636: Performed by: REGISTERED NURSE

## 2023-03-20 PROCEDURE — 96413 CHEMO IV INFUSION 1 HR: CPT

## 2023-03-20 PROCEDURE — 84155 ASSAY OF PROTEIN SERUM: CPT | Performed by: REGISTERED NURSE

## 2023-03-20 PROCEDURE — 96375 TX/PRO/DX INJ NEW DRUG ADDON: CPT

## 2023-03-20 PROCEDURE — 36591 DRAW BLOOD OFF VENOUS DEVICE: CPT | Performed by: REGISTERED NURSE

## 2023-03-20 PROCEDURE — 83615 LACTATE (LD) (LDH) ENZYME: CPT | Performed by: STUDENT IN AN ORGANIZED HEALTH CARE EDUCATION/TRAINING PROGRAM

## 2023-03-20 PROCEDURE — 80053 COMPREHEN METABOLIC PANEL: CPT | Performed by: STUDENT IN AN ORGANIZED HEALTH CARE EDUCATION/TRAINING PROGRAM

## 2023-03-20 PROCEDURE — 96417 CHEMO IV INFUS EACH ADDL SEQ: CPT

## 2023-03-20 PROCEDURE — 250N000013 HC RX MED GY IP 250 OP 250 PS 637: Performed by: REGISTERED NURSE

## 2023-03-20 PROCEDURE — 84165 PROTEIN E-PHORESIS SERUM: CPT | Mod: 26

## 2023-03-20 PROCEDURE — G0463 HOSPITAL OUTPT CLINIC VISIT: HCPCS | Mod: 25 | Performed by: REGISTERED NURSE

## 2023-03-20 PROCEDURE — 83880 ASSAY OF NATRIURETIC PEPTIDE: CPT | Performed by: REGISTERED NURSE

## 2023-03-20 PROCEDURE — 250N000011 HC RX IP 250 OP 636: Performed by: REGISTERED NURSE

## 2023-03-20 PROCEDURE — 99215 OFFICE O/P EST HI 40 MIN: CPT | Performed by: REGISTERED NURSE

## 2023-03-20 RX ORDER — ACETAMINOPHEN 325 MG/1
650 TABLET ORAL ONCE
Status: CANCELLED | OUTPATIENT
Start: 2023-04-03

## 2023-03-20 RX ORDER — EPINEPHRINE 1 MG/ML
0.3 INJECTION, SOLUTION INTRAMUSCULAR; SUBCUTANEOUS EVERY 5 MIN PRN
Status: CANCELLED | OUTPATIENT
Start: 2023-04-03

## 2023-03-20 RX ORDER — MEPERIDINE HYDROCHLORIDE 25 MG/ML
25 INJECTION INTRAMUSCULAR; INTRAVENOUS; SUBCUTANEOUS EVERY 30 MIN PRN
Status: CANCELLED | OUTPATIENT
Start: 2023-03-20

## 2023-03-20 RX ORDER — ONDANSETRON 2 MG/ML
8 INJECTION INTRAMUSCULAR; INTRAVENOUS ONCE
Status: CANCELLED
Start: 2023-04-03 | End: 2023-04-03

## 2023-03-20 RX ORDER — EPINEPHRINE 1 MG/ML
0.3 INJECTION, SOLUTION INTRAMUSCULAR; SUBCUTANEOUS EVERY 5 MIN PRN
Status: CANCELLED | OUTPATIENT
Start: 2023-03-20

## 2023-03-20 RX ORDER — EPINEPHRINE 1 MG/ML
0.3 INJECTION, SOLUTION INTRAMUSCULAR; SUBCUTANEOUS EVERY 5 MIN PRN
Status: CANCELLED | OUTPATIENT
Start: 2023-03-27

## 2023-03-20 RX ORDER — HEPARIN SODIUM (PORCINE) LOCK FLUSH IV SOLN 100 UNIT/ML 100 UNIT/ML
5 SOLUTION INTRAVENOUS
Status: DISCONTINUED | OUTPATIENT
Start: 2023-03-20 | End: 2023-03-20 | Stop reason: HOSPADM

## 2023-03-20 RX ORDER — ACETAMINOPHEN 325 MG/1
650 TABLET ORAL ONCE
Status: CANCELLED | OUTPATIENT
Start: 2023-03-20

## 2023-03-20 RX ORDER — DIPHENHYDRAMINE HCL 25 MG
25 CAPSULE ORAL ONCE
Status: CANCELLED | OUTPATIENT
Start: 2023-03-20

## 2023-03-20 RX ORDER — METHYLPREDNISOLONE SODIUM SUCCINATE 125 MG/2ML
125 INJECTION, POWDER, LYOPHILIZED, FOR SOLUTION INTRAMUSCULAR; INTRAVENOUS
Status: CANCELLED
Start: 2023-03-27

## 2023-03-20 RX ORDER — DIPHENHYDRAMINE HYDROCHLORIDE 50 MG/ML
50 INJECTION INTRAMUSCULAR; INTRAVENOUS
Status: CANCELLED
Start: 2023-03-20

## 2023-03-20 RX ORDER — HEPARIN SODIUM (PORCINE) LOCK FLUSH IV SOLN 100 UNIT/ML 100 UNIT/ML
5 SOLUTION INTRAVENOUS ONCE
Status: COMPLETED | OUTPATIENT
Start: 2023-03-20 | End: 2023-03-20

## 2023-03-20 RX ORDER — ALBUTEROL SULFATE 90 UG/1
1-2 AEROSOL, METERED RESPIRATORY (INHALATION)
Status: CANCELLED
Start: 2023-03-20

## 2023-03-20 RX ORDER — LORAZEPAM 2 MG/ML
0.5 INJECTION INTRAMUSCULAR EVERY 4 HOURS PRN
Status: CANCELLED | OUTPATIENT
Start: 2023-03-20

## 2023-03-20 RX ORDER — ALBUTEROL SULFATE 0.83 MG/ML
2.5 SOLUTION RESPIRATORY (INHALATION)
Status: CANCELLED | OUTPATIENT
Start: 2023-03-27

## 2023-03-20 RX ORDER — DIPHENHYDRAMINE HCL 25 MG
25 CAPSULE ORAL ONCE
Status: CANCELLED | OUTPATIENT
Start: 2023-04-03

## 2023-03-20 RX ORDER — DIPHENHYDRAMINE HYDROCHLORIDE 50 MG/ML
50 INJECTION INTRAMUSCULAR; INTRAVENOUS
Status: CANCELLED
Start: 2023-03-27

## 2023-03-20 RX ORDER — MEPERIDINE HYDROCHLORIDE 25 MG/ML
25 INJECTION INTRAMUSCULAR; INTRAVENOUS; SUBCUTANEOUS EVERY 30 MIN PRN
Status: CANCELLED | OUTPATIENT
Start: 2023-03-27

## 2023-03-20 RX ORDER — ONDANSETRON 2 MG/ML
8 INJECTION INTRAMUSCULAR; INTRAVENOUS ONCE
Status: CANCELLED
Start: 2023-03-20 | End: 2023-03-20

## 2023-03-20 RX ORDER — LORAZEPAM 2 MG/ML
0.5 INJECTION INTRAMUSCULAR EVERY 4 HOURS PRN
Status: CANCELLED | OUTPATIENT
Start: 2023-03-27

## 2023-03-20 RX ORDER — METHYLPREDNISOLONE SODIUM SUCCINATE 125 MG/2ML
125 INJECTION, POWDER, LYOPHILIZED, FOR SOLUTION INTRAMUSCULAR; INTRAVENOUS
Status: CANCELLED
Start: 2023-04-03

## 2023-03-20 RX ORDER — HEPARIN SODIUM,PORCINE 10 UNIT/ML
5 VIAL (ML) INTRAVENOUS
Status: CANCELLED | OUTPATIENT
Start: 2023-03-27

## 2023-03-20 RX ORDER — DIPHENHYDRAMINE HCL 25 MG
25 CAPSULE ORAL ONCE
Status: COMPLETED | OUTPATIENT
Start: 2023-03-20 | End: 2023-03-20

## 2023-03-20 RX ORDER — DIPHENHYDRAMINE HYDROCHLORIDE 50 MG/ML
50 INJECTION INTRAMUSCULAR; INTRAVENOUS
Status: CANCELLED
Start: 2023-04-03

## 2023-03-20 RX ORDER — ALBUTEROL SULFATE 90 UG/1
1-2 AEROSOL, METERED RESPIRATORY (INHALATION)
Status: CANCELLED
Start: 2023-03-27

## 2023-03-20 RX ORDER — HEPARIN SODIUM (PORCINE) LOCK FLUSH IV SOLN 100 UNIT/ML 100 UNIT/ML
5 SOLUTION INTRAVENOUS
Status: CANCELLED | OUTPATIENT
Start: 2023-03-20

## 2023-03-20 RX ORDER — HEPARIN SODIUM (PORCINE) LOCK FLUSH IV SOLN 100 UNIT/ML 100 UNIT/ML
5 SOLUTION INTRAVENOUS
Status: CANCELLED | OUTPATIENT
Start: 2023-03-27

## 2023-03-20 RX ORDER — ONDANSETRON 2 MG/ML
8 INJECTION INTRAMUSCULAR; INTRAVENOUS ONCE
Status: COMPLETED | OUTPATIENT
Start: 2023-03-20 | End: 2023-03-20

## 2023-03-20 RX ORDER — ACETAMINOPHEN 325 MG/1
650 TABLET ORAL ONCE
Status: COMPLETED | OUTPATIENT
Start: 2023-03-20 | End: 2023-03-20

## 2023-03-20 RX ORDER — MEPERIDINE HYDROCHLORIDE 25 MG/ML
25 INJECTION INTRAMUSCULAR; INTRAVENOUS; SUBCUTANEOUS EVERY 30 MIN PRN
Status: CANCELLED | OUTPATIENT
Start: 2023-04-03

## 2023-03-20 RX ORDER — LORAZEPAM 2 MG/ML
0.5 INJECTION INTRAMUSCULAR EVERY 4 HOURS PRN
Status: CANCELLED | OUTPATIENT
Start: 2023-04-03

## 2023-03-20 RX ORDER — HEPARIN SODIUM (PORCINE) LOCK FLUSH IV SOLN 100 UNIT/ML 100 UNIT/ML
5 SOLUTION INTRAVENOUS
Status: CANCELLED | OUTPATIENT
Start: 2023-04-03

## 2023-03-20 RX ORDER — HEPARIN SODIUM,PORCINE 10 UNIT/ML
5 VIAL (ML) INTRAVENOUS
Status: CANCELLED | OUTPATIENT
Start: 2023-04-03

## 2023-03-20 RX ORDER — ALBUTEROL SULFATE 90 UG/1
1-2 AEROSOL, METERED RESPIRATORY (INHALATION)
Status: CANCELLED
Start: 2023-04-03

## 2023-03-20 RX ORDER — ALBUTEROL SULFATE 0.83 MG/ML
2.5 SOLUTION RESPIRATORY (INHALATION)
Status: CANCELLED | OUTPATIENT
Start: 2023-04-03

## 2023-03-20 RX ORDER — ALBUTEROL SULFATE 0.83 MG/ML
2.5 SOLUTION RESPIRATORY (INHALATION)
Status: CANCELLED | OUTPATIENT
Start: 2023-03-20

## 2023-03-20 RX ORDER — HEPARIN SODIUM,PORCINE 10 UNIT/ML
5 VIAL (ML) INTRAVENOUS
Status: CANCELLED | OUTPATIENT
Start: 2023-03-20

## 2023-03-20 RX ORDER — METHYLPREDNISOLONE SODIUM SUCCINATE 125 MG/2ML
125 INJECTION, POWDER, LYOPHILIZED, FOR SOLUTION INTRAMUSCULAR; INTRAVENOUS
Status: CANCELLED
Start: 2023-03-20

## 2023-03-20 RX ADMIN — FAMOTIDINE 20 MG: 10 INJECTION INTRAVENOUS at 09:17

## 2023-03-20 RX ADMIN — Medication 5 ML: at 07:35

## 2023-03-20 RX ADMIN — DIPHENHYDRAMINE HYDROCHLORIDE 25 MG: 25 CAPSULE ORAL at 09:12

## 2023-03-20 RX ADMIN — ACETAMINOPHEN 650 MG: 325 TABLET ORAL at 09:12

## 2023-03-20 RX ADMIN — SODIUM CHLORIDE 500 MG: 9 INJECTION, SOLUTION INTRAVENOUS at 09:31

## 2023-03-20 RX ADMIN — Medication 5 ML: at 11:31

## 2023-03-20 RX ADMIN — CARFILZOMIB 90 MG: 30 INJECTION, POWDER, LYOPHILIZED, FOR SOLUTION INTRAVENOUS at 10:55

## 2023-03-20 RX ADMIN — ONDANSETRON 8 MG: 2 INJECTION INTRAMUSCULAR; INTRAVENOUS at 09:13

## 2023-03-20 RX ADMIN — DEXTROSE MONOHYDRATE 250 ML: 50 INJECTION, SOLUTION INTRAVENOUS at 09:12

## 2023-03-20 ASSESSMENT — PAIN SCALES - GENERAL: PAINLEVEL: NO PAIN (0)

## 2023-03-20 NOTE — LETTER
3/20/2023         RE: Shena Hartmann  [address] [place] [address]        Dear Colleague,    Thank you for referring your patient, Shena Hartmann, to the [facility]. Please see a copy of my visit note below.    Malignant Hematology Progress Note    Oncologic Hx:  - Breast cancer dx in 1994. Underwent surgery and chemotherapy without reoccurence  - MGUS dx 1999  - T8 pathologic fracture with radiation  -revlimid and velcade  2013  Cytoxan IV 9/2013  D-PACE 11/2013  Auto 3/27/14  5/2014 thalidomide caused rash so switched to pomalidomide   - on kenalog and clobetasol creams for IMID rash  - FLC began to rise so riky added to pom 9/2020  Skipped riky Dec d/t cold virus  - she has been on xgeva for an unclear amount of time  - Most recently Kappa FLC 77.9, M spike 2.3    Interval Hx:   Has ongoing fatigue and brain fog, finds it hard to focus or to get up and do things sometimes.  Her nausea and loose stools were resolved this past week, making her think they are being caused by the Kyprolis (since she didn't get Kyprolis with D22). Systolic blood pressures have been 130s-150s, she is taking 5-7.5 mg of lisinopril, titrating based on her BP.  Headaches are better now that her BP is under better control.    ROS: 10 point ROS neg other than the symptoms noted above in the HPI.      Pertinent PMH:  HTN, hypothyroidism    Family Hx- prostate ca, pancreatic , GBM- grandfather, paternal aunt breast cancer      Physical Exam  BP (!) 141/74   Pulse 81   Temp (!) 96.7  F (35.9  C) (Oral)   Resp 16   Wt 51.3 kg (113 lb)   SpO2 99%   BMI 21.35 kg/m    Constitutional: NAD  Eyes: no scleral icterus  ENT: no oral lesions  Lymph: no cervical, supraclavicular LAD  Pulm: CTAB  CV: RRR, no m/r/g  GI: bowel sounds present, soft and nontender to palpation  MSK: No edema in the extremities  Neuro: alert, conversant, normal gait  Psych: appropriate mood and affect    Assessment and  "Plan:   # Relapsed Myeloma  Started kyprolis/isatuximab/dex on 2/20/23.  Had hypertension and head pressure from the dexamethasone.  Increased llisinopril to 7.5 mg, was previously on 2.5 mg nightly.  - Will hold of on dexamethasone with further cycles  - Discussed that schedule changes starting C2 - isatuximab is now every other week, Kyprolis remains weekly for 3 out of 4 weeks  - Unclear what the long-term plan is, will schedule her for C3.  She has follow-up with Dr. Chavez in April    Gets Prolia every 6 months, has had 2 doses, due again in May.  Would prefer to have this scheduled on her \"off\" week (week 4 of a cycle).    She will continue to titrate her lisinopril based on her blood pressures.  Anticipate that ultimately she will be able to return to 2.5 mg nightly now that she is off the dexamethasone.    40 minutes spent on the date of the encounter doing chart review, review of test results, patient visit and documentation     MARINO Fuller [facility]  [address] [place] [address]  [phone]      "

## 2023-03-20 NOTE — PROGRESS NOTES
Infusion Nursing Note:  Shena Hartmann presents today for Cycle 2, day 1 Isatuximab-irfc and Kyprolis.    Patient seen by provider today: Yes: Selena Suggs NP    Note: Patient presents to clinic today feeling well with no questions.  Pt did not request or require any intervention for pain today.    Intravenous Access:  Implanted Port.    Treatment Conditions:  Lab Results   Component Value Date    HGB 9.8 (L) 03/20/2023    WBC 3.2 (L) 03/20/2023    ANEU 2.1 07/05/2021    ANEUTAUTO 1.3 (L) 03/20/2023     (L) 03/20/2023      Lab Results   Component Value Date     03/20/2023    POTASSIUM 3.7 03/20/2023    MAG 1.8 12/12/2014    CR 0.73 03/20/2023    PAULINE 9.4 03/20/2023    BILITOTAL 0.3 03/20/2023    ALBUMIN 4.0 03/20/2023    ALT 14 03/20/2023    AST 17 03/20/2023     Results reviewed, labs MET treatment parameters, ok to proceed with treatment.    Post Infusion Assessment:  Patient tolerated infusion without incident.  Blood return noted pre and post infusion.  Site patent and intact, free from redness, edema or discomfort.  No evidence of extravasations.  Access discontinued per protocol.    Discharge Plan:   Patient declined prescription refills.  Discharge instructions reviewed with: Patient.  Patient and/or family verbalized understanding of discharge instructions and all questions answered.  AVS to patient via [employer].  Patient will return 3/27/2023 for next appointment.   Patient discharged in stable condition accompanied by: .  Departure Mode: Ambulatory.    Ansley Bull RN

## 2023-03-20 NOTE — NURSING NOTE
"Oncology Rooming Note    March 20, 2023 8:31 AM   Shena Hartmann is a 72 year old female who presents for:    Chief Complaint   Patient presents with     Port Draw     Labs drawn via port by RN. VS taken.     Oncology Clinic Visit     Multiple Myeloma     Initial Vitals: BP (!) 141/74   Pulse 81   Temp (!) 96.7  F (35.9  C) (Oral)   Resp 16   Wt 51.3 kg (113 lb)   SpO2 99%   BMI 21.35 kg/m   Estimated body mass index is 21.35 kg/m  as calculated from the following:    Height as of 2/15/23: 1.549 m (5' 1\").    Weight as of this encounter: 51.3 kg (113 lb). Body surface area is 1.49 meters squared.  No Pain (0) Comment: Data Unavailable   No LMP recorded. Patient is postmenopausal.  Allergies reviewed: Yes  Medications reviewed: Yes    Medications: Medication refills not needed today.  Pharmacy name entered into [employer]:    [id number] - [place], [place] - [address] [facility] [address]  WRITTEN PRESCRIPTION REQUESTED  [facility] - [place], [place] - [address]    Clinical concerns: Pt presents today for f/u.       Yamileth Mcknight LPN  3/20/2023              "

## 2023-03-20 NOTE — PATIENT INSTRUCTIONS
Contact Numbers    [facility] [place]: [phone]    Call with chills and/or temperature greater than or equal to 100.5, uncontrolled nausea/vomiting, diarrhea, constipation, dizziness, shortness of breath, chest pain, bleeding, unexplained bruising, or any new/concerning symptoms, questions/concerns.     If you are having any concerning symptoms or wish to speak to a provider before your next infusion visit, please call your care coordinator or triage to notify them so we can adequately serve you.       After Hours: [phone]    If after hours, weekends, or holidays, call main hospital  and ask for Oncology doctor on call.          Lab Results:  Recent Results (from the past 12 hour(s))   Lactate Dehydrogenase    Collection Time: 03/20/23  7:33 AM   Result Value Ref Range    Lactate Dehydrogenase 141 0 - 250 U/L   Comprehensive metabolic panel    Collection Time: 03/20/23  7:33 AM   Result Value Ref Range    Sodium 136 136 - 145 mmol/L    Potassium 3.7 3.4 - 5.3 mmol/L    Chloride 100 98 - 107 mmol/L    Carbon Dioxide (CO2) 27 22 - 29 mmol/L    Anion Gap 9 7 - 15 mmol/L    Urea Nitrogen 17.2 8.0 - 23.0 mg/dL    Creatinine 0.73 0.51 - 0.95 mg/dL    Calcium 9.4 8.8 - 10.2 mg/dL    Glucose 90 70 - 99 mg/dL    Alkaline Phosphatase 47 35 - 104 U/L    AST 17 10 - 35 U/L    ALT 14 10 - 35 U/L    Protein Total 7.6 6.4 - 8.3 g/dL    Albumin 4.0 3.5 - 5.2 g/dL    Bilirubin Total 0.3 <=1.2 mg/dL    GFR Estimate 87 >60 mL/min/1.73m2   N terminal pro BNP outpatient    Collection Time: 03/20/23  7:33 AM   Result Value Ref Range    N Terminal Pro BNP Outpatient 335 0 - 900 pg/mL   CBC with platelets and differential    Collection Time: 03/20/23  7:33 AM   Result Value Ref Range    WBC Count 3.2 (L) 4.0 - 11.0 10e3/uL    RBC Count 2.82 (L) 3.80 - 5.20 10e6/uL    Hemoglobin 9.8 (L) 11.7 - 15.7 g/dL    Hematocrit 29.4 (L) 35.0 - 47.0 %     (H) 78 - 100 fL    MCH 34.8 (H) 26.5 - 33.0 pg    MCHC 33.3 31.5 - 36.5  g/dL    RDW 16.6 (H) 10.0 - 15.0 %    Platelet Count 129 (L) 150 - 450 10e3/uL    % Neutrophils 39 %    % Lymphocytes 41 %    % Monocytes 18 %    % Eosinophils 2 %    % Basophils 0 %    % Immature Granulocytes 0 %    NRBCs per 100 WBC 0 <1 /100    Absolute Neutrophils 1.3 (L) 1.6 - 8.3 10e3/uL    Absolute Lymphocytes 1.3 0.8 - 5.3 10e3/uL    Absolute Monocytes 0.6 0.0 - 1.3 10e3/uL    Absolute Eosinophils 0.1 0.0 - 0.7 10e3/uL    Absolute Basophils 0.0 0.0 - 0.2 10e3/uL    Absolute Immature Granulocytes 0.0 <=0.4 10e3/uL    Absolute NRBCs 0.0 10e3/uL   Total Protein, Serum for ELP    Collection Time: 03/20/23  7:33 AM   Result Value Ref Range    Total Protein Serum for ELP 7.2 6.4 - 8.3 g/dL

## 2023-03-21 ENCOUNTER — MYC MEDICAL ADVICE (OUTPATIENT)
Dept: ONCOLOGY | Facility: CLINIC | Age: 73
End: 2023-03-21
Payer: MEDICARE

## 2023-03-21 LAB
ALBUMIN SERPL ELPH-MCNC: 3.8 G/DL (ref 3.7–5.1)
ALPHA1 GLOB SERPL ELPH-MCNC: 0.3 G/DL (ref 0.2–0.4)
ALPHA2 GLOB SERPL ELPH-MCNC: 0.7 G/DL (ref 0.5–0.9)
B-GLOBULIN SERPL ELPH-MCNC: 0.6 G/DL (ref 0.6–1)
GAMMA GLOB SERPL ELPH-MCNC: 1.9 G/DL (ref 0.7–1.6)
M PROTEIN SERPL ELPH-MCNC: 1.7 G/DL
PROT PATTERN SERPL ELPH-IMP: ABNORMAL

## 2023-03-22 NOTE — TELEPHONE ENCOUNTER
3/20/23 Cycle 2, day 1 Isatuximab-irfc and Kyprolis.   Made decision to not have steroids with this session of chemo.   Switched from IV benadryl 50mg  to oral benadryl 25 mg  with infusion on 3/20/23?  Wondering if it would help to keep Benadryl IV and at 50mg with future infusions.   /88- took 2.5 mg of lisinopril last night. Will go back up to 5mg for short while.   No fevers.   Had small amount of chills on Monday night. Not shaking chills.   Feels fatigued.   Is taking in enough fluids, feeling a little queasy.  Not taking compazine, using sea bands and unnausea inhaler of essential oils, fenal tea and neisha tea. Would utilize compazine if nausea became overwhelming.     Her plan for the day is to rest and drink fluids and take in food as she is able. Has triage number to call if she has questions or concerns.     Her schedule does not look right if wanting to do 3 weeks on and 1 week off. She is wondering about her schedule if [facility] could call her to work out her schedule.

## 2023-03-22 NOTE — TELEPHONE ENCOUNTER
Per Selena Suggs: I can change the pre-meds so she gets 50 IV benadryl prior to see if that helps,  Might make her feel a little more fatigued, but might help with the chills and shakes.     Call placed to Shena to update her on change of benadryl.

## 2023-03-27 ENCOUNTER — ANCILLARY PROCEDURE (OUTPATIENT)
Dept: CARDIOLOGY | Facility: CLINIC | Age: 73
End: 2023-03-27
Attending: INTERNAL MEDICINE
Payer: MEDICARE

## 2023-03-27 ENCOUNTER — INFUSION THERAPY VISIT (OUTPATIENT)
Dept: ONCOLOGY | Facility: CLINIC | Age: 73
End: 2023-03-27
Attending: STUDENT IN AN ORGANIZED HEALTH CARE EDUCATION/TRAINING PROGRAM
Payer: MEDICARE

## 2023-03-27 ENCOUNTER — APPOINTMENT (OUTPATIENT)
Dept: LAB | Facility: CLINIC | Age: 73
End: 2023-03-27
Attending: STUDENT IN AN ORGANIZED HEALTH CARE EDUCATION/TRAINING PROGRAM
Payer: MEDICARE

## 2023-03-27 VITALS
SYSTOLIC BLOOD PRESSURE: 164 MMHG | OXYGEN SATURATION: 98 % | TEMPERATURE: 98.1 F | WEIGHT: 115.5 LBS | HEART RATE: 72 BPM | DIASTOLIC BLOOD PRESSURE: 84 MMHG | RESPIRATION RATE: 16 BRPM | BODY MASS INDEX: 21.82 KG/M2

## 2023-03-27 DIAGNOSIS — T45.1X5A CARDIOMYOPATHY DUE TO ANTHRACYCLINE (H): ICD-10-CM

## 2023-03-27 DIAGNOSIS — C90.00 MULTIPLE MYELOMA NOT HAVING ACHIEVED REMISSION (H): Primary | ICD-10-CM

## 2023-03-27 DIAGNOSIS — C50.911 MALIGNANT NEOPLASM OF RIGHT FEMALE BREAST, UNSPECIFIED ESTROGEN RECEPTOR STATUS, UNSPECIFIED SITE OF BREAST (H): ICD-10-CM

## 2023-03-27 DIAGNOSIS — I42.7 CARDIOMYOPATHY DUE TO ANTHRACYCLINE (H): ICD-10-CM

## 2023-03-27 LAB
BASOPHILS # BLD AUTO: 0 10E3/UL (ref 0–0.2)
BASOPHILS NFR BLD AUTO: 0 %
EOSINOPHIL # BLD AUTO: 0 10E3/UL (ref 0–0.7)
EOSINOPHIL NFR BLD AUTO: 1 %
ERYTHROCYTE [DISTWIDTH] IN BLOOD BY AUTOMATED COUNT: 16.6 % (ref 10–15)
HCT VFR BLD AUTO: 27.6 % (ref 35–47)
HGB BLD-MCNC: 9.3 G/DL (ref 11.7–15.7)
IMM GRANULOCYTES # BLD: 0 10E3/UL
IMM GRANULOCYTES NFR BLD: 0 %
LVEF ECHO: NORMAL
LYMPHOCYTES # BLD AUTO: 1.1 10E3/UL (ref 0.8–5.3)
LYMPHOCYTES NFR BLD AUTO: 31 %
MCH RBC QN AUTO: 35 PG (ref 26.5–33)
MCHC RBC AUTO-ENTMCNC: 33.7 G/DL (ref 31.5–36.5)
MCV RBC AUTO: 104 FL (ref 78–100)
MONOCYTES # BLD AUTO: 0.7 10E3/UL (ref 0–1.3)
MONOCYTES NFR BLD AUTO: 19 %
NEUTROPHILS # BLD AUTO: 1.6 10E3/UL (ref 1.6–8.3)
NEUTROPHILS NFR BLD AUTO: 49 %
NRBC # BLD AUTO: 0 10E3/UL
NRBC BLD AUTO-RTO: 0 /100
PLATELET # BLD AUTO: 62 10E3/UL (ref 150–450)
RBC # BLD AUTO: 2.66 10E6/UL (ref 3.8–5.2)
WBC # BLD AUTO: 3.4 10E3/UL (ref 4–11)

## 2023-03-27 PROCEDURE — 96413 CHEMO IV INFUSION 1 HR: CPT

## 2023-03-27 PROCEDURE — 258N000003 HC RX IP 258 OP 636: Performed by: REGISTERED NURSE

## 2023-03-27 PROCEDURE — 85025 COMPLETE CBC W/AUTO DIFF WBC: CPT | Performed by: REGISTERED NURSE

## 2023-03-27 PROCEDURE — 93306 TTE W/DOPPLER COMPLETE: CPT | Performed by: INTERNAL MEDICINE

## 2023-03-27 PROCEDURE — 96375 TX/PRO/DX INJ NEW DRUG ADDON: CPT

## 2023-03-27 PROCEDURE — 250N000011 HC RX IP 250 OP 636: Performed by: STUDENT IN AN ORGANIZED HEALTH CARE EDUCATION/TRAINING PROGRAM

## 2023-03-27 PROCEDURE — 250N000011 HC RX IP 250 OP 636: Performed by: REGISTERED NURSE

## 2023-03-27 RX ORDER — HEPARIN SODIUM (PORCINE) LOCK FLUSH IV SOLN 100 UNIT/ML 100 UNIT/ML
5 SOLUTION INTRAVENOUS
Status: DISCONTINUED | OUTPATIENT
Start: 2023-03-27 | End: 2023-03-27 | Stop reason: HOSPADM

## 2023-03-27 RX ORDER — HEPARIN SODIUM (PORCINE) LOCK FLUSH IV SOLN 100 UNIT/ML 100 UNIT/ML
5 SOLUTION INTRAVENOUS EVERY 8 HOURS
Status: DISCONTINUED | OUTPATIENT
Start: 2023-03-27 | End: 2023-03-27 | Stop reason: HOSPADM

## 2023-03-27 RX ADMIN — Medication 5 ML: at 10:31

## 2023-03-27 RX ADMIN — FAMOTIDINE 20 MG: 10 INJECTION INTRAVENOUS at 09:19

## 2023-03-27 RX ADMIN — Medication 5 ML: at 08:39

## 2023-03-27 RX ADMIN — DEXTROSE MONOHYDRATE 250 ML: 50 INJECTION, SOLUTION INTRAVENOUS at 09:19

## 2023-03-27 RX ADMIN — CARFILZOMIB 90 MG: 30 INJECTION, POWDER, LYOPHILIZED, FOR SOLUTION INTRAVENOUS at 09:56

## 2023-03-27 ASSESSMENT — PAIN SCALES - GENERAL: PAINLEVEL: MODERATE PAIN (4)

## 2023-03-27 NOTE — NURSING NOTE
Chief Complaint   Patient presents with     Port Draw     Power needle. Heparin locked, vitals checked     Maria Eugenia Murdock RN on 3/27/2023 at 8:44 AM

## 2023-03-27 NOTE — PROGRESS NOTES
Infusion Nursing Note:  Shena Hartmann presents today for C2D8 Kyprolis.    Patient seen by provider today: No   present during visit today: Not Applicable.    Note: Shena presents to infusion today feeling well. She rates her pain at 4/10 all over and declines any interventions while in infusion. She denies any infectious symptoms or other concerns.    Intravenous Access:  Implanted Port.    Treatment Conditions:  Lab Results   Component Value Date    HGB 9.3 (L) 03/27/2023    WBC 3.4 (L) 03/27/2023    ANEU 2.1 07/05/2021    ANEUTAUTO 1.6 03/27/2023    PLT 62 (L) 03/27/2023      Lab Results   Component Value Date     03/20/2023    POTASSIUM 3.7 03/20/2023    MAG 1.8 12/12/2014    CR 0.73 03/20/2023    PAULINE 9.4 03/20/2023    BILITOTAL 0.3 03/20/2023    ALBUMIN 4.0 03/20/2023    ALT 14 03/20/2023    AST 17 03/20/2023     Results reviewed, labs MET treatment parameters, ok to proceed with treatment.    Post Infusion Assessment:  Patient tolerated infusion without incident.  Blood return noted pre and post infusion.  Site patent and intact, free from redness, edema or discomfort.  No evidence of extravasations.  Access discontinued per protocol.     Discharge Plan:   Patient declined prescription refills.  Discharge instructions reviewed with: Patient.  Patient and/or family verbalized understanding of discharge instructions and all questions answered.  Copy of AVS reviewed with patient and/or family.  Patient will return 4/4 for next appointment.  Patient discharged in stable condition accompanied by: self.  Departure Mode: Ambulatory.      Sarai Brambila RN

## 2023-03-27 NOTE — PATIENT INSTRUCTIONS
Contact Numbers  [facility]: [phone] (for symptom and scheduling needs)    Please call the [facility] Triage line if you experience a temperature greater than or equal to 100.4, shaking chills, have uncontrolled nausea, vomiting and/or diarrhea, dizziness, shortness of breath, chest pain, bleeding, unexplained bruising, or if you have any other new/concerning symptoms, questions or concerns.     If you are having any concerning symptoms or wish to speak to a provider before your next infusion visit, please call your care coordinator or triage to notify them so we can adequately serve you.     If you need a refill on a narcotic prescription or other medication, please call triage before your infusion appointment.            Lab Results:  Recent Results (from the past 12 hour(s))   Echocardiogram Complete    Collection Time: 03/27/23  7:41 AM   Result Value Ref Range    LVEF  60-65%    CBC with platelets and differential    Collection Time: 03/27/23  8:38 AM   Result Value Ref Range    WBC Count 3.4 (L) 4.0 - 11.0 10e3/uL    RBC Count 2.66 (L) 3.80 - 5.20 10e6/uL    Hemoglobin 9.3 (L) 11.7 - 15.7 g/dL    Hematocrit 27.6 (L) 35.0 - 47.0 %     (H) 78 - 100 fL    MCH 35.0 (H) 26.5 - 33.0 pg    MCHC 33.7 31.5 - 36.5 g/dL    RDW 16.6 (H) 10.0 - 15.0 %    Platelet Count 62 (L) 150 - 450 10e3/uL    % Neutrophils 49 %    % Lymphocytes 31 %    % Monocytes 19 %    % Eosinophils 1 %    % Basophils 0 %    % Immature Granulocytes 0 %    NRBCs per 100 WBC 0 <1 /100    Absolute Neutrophils 1.6 1.6 - 8.3 10e3/uL    Absolute Lymphocytes 1.1 0.8 - 5.3 10e3/uL    Absolute Monocytes 0.7 0.0 - 1.3 10e3/uL    Absolute Eosinophils 0.0 0.0 - 0.7 10e3/uL    Absolute Basophils 0.0 0.0 - 0.2 10e3/uL    Absolute Immature Granulocytes 0.0 <=0.4 10e3/uL    Absolute NRBCs 0.0 10e3/uL

## 2023-03-28 ENCOUNTER — MYC MEDICAL ADVICE (OUTPATIENT)
Dept: ONCOLOGY | Facility: CLINIC | Age: 73
End: 2023-03-28
Payer: MEDICARE

## 2023-03-28 NOTE — TELEPHONE ENCOUNTER
Working on getting in fluids yesterday.   Trying to work on making up from missing fluids yesterday.   Nausea is improved today, Wearing sea bands, drinking tea, and aromatherapy.     Wondering if there are more pretreatments that can be done before next chemotherapy?     Last time had benadryl, tylenol and zofran ordered.     Wondering why premeds were not ordered?

## 2023-04-04 ENCOUNTER — APPOINTMENT (OUTPATIENT)
Dept: LAB | Facility: CLINIC | Age: 73
End: 2023-04-04
Attending: STUDENT IN AN ORGANIZED HEALTH CARE EDUCATION/TRAINING PROGRAM
Payer: MEDICARE

## 2023-04-04 ENCOUNTER — INFUSION THERAPY VISIT (OUTPATIENT)
Dept: ONCOLOGY | Facility: CLINIC | Age: 73
End: 2023-04-04
Attending: STUDENT IN AN ORGANIZED HEALTH CARE EDUCATION/TRAINING PROGRAM
Payer: MEDICARE

## 2023-04-04 VITALS
OXYGEN SATURATION: 98 % | RESPIRATION RATE: 16 BRPM | HEART RATE: 79 BPM | WEIGHT: 115 LBS | DIASTOLIC BLOOD PRESSURE: 67 MMHG | SYSTOLIC BLOOD PRESSURE: 157 MMHG | TEMPERATURE: 97.4 F | BODY MASS INDEX: 21.73 KG/M2

## 2023-04-04 DIAGNOSIS — C90.00 MULTIPLE MYELOMA NOT HAVING ACHIEVED REMISSION (H): Primary | ICD-10-CM

## 2023-04-04 LAB
ALBUMIN SERPL BCG-MCNC: 3.9 G/DL (ref 3.5–5.2)
ALP SERPL-CCNC: 42 U/L (ref 35–104)
ALT SERPL W P-5'-P-CCNC: 13 U/L (ref 10–35)
ANION GAP SERPL CALCULATED.3IONS-SCNC: 9 MMOL/L (ref 7–15)
AST SERPL W P-5'-P-CCNC: 15 U/L (ref 10–35)
BASOPHILS # BLD AUTO: 0 10E3/UL (ref 0–0.2)
BASOPHILS NFR BLD AUTO: 0 %
BILIRUB SERPL-MCNC: 0.3 MG/DL
BUN SERPL-MCNC: 11.9 MG/DL (ref 8–23)
CALCIUM SERPL-MCNC: 9.5 MG/DL (ref 8.8–10.2)
CHLORIDE SERPL-SCNC: 102 MMOL/L (ref 98–107)
CREAT SERPL-MCNC: 0.7 MG/DL (ref 0.51–0.95)
DEPRECATED HCO3 PLAS-SCNC: 28 MMOL/L (ref 22–29)
EOSINOPHIL # BLD AUTO: 0 10E3/UL (ref 0–0.7)
EOSINOPHIL NFR BLD AUTO: 1 %
ERYTHROCYTE [DISTWIDTH] IN BLOOD BY AUTOMATED COUNT: 17.2 % (ref 10–15)
GFR SERPL CREATININE-BSD FRML MDRD: >90 ML/MIN/1.73M2
GLUCOSE SERPL-MCNC: 92 MG/DL (ref 70–99)
HCT VFR BLD AUTO: 28 % (ref 35–47)
HGB BLD-MCNC: 9.5 G/DL (ref 11.7–15.7)
IMM GRANULOCYTES # BLD: 0 10E3/UL
IMM GRANULOCYTES NFR BLD: 0 %
LYMPHOCYTES # BLD AUTO: 1 10E3/UL (ref 0.8–5.3)
LYMPHOCYTES NFR BLD AUTO: 26 %
MCH RBC QN AUTO: 35.6 PG (ref 26.5–33)
MCHC RBC AUTO-ENTMCNC: 33.9 G/DL (ref 31.5–36.5)
MCV RBC AUTO: 105 FL (ref 78–100)
MONOCYTES # BLD AUTO: 0.7 10E3/UL (ref 0–1.3)
MONOCYTES NFR BLD AUTO: 20 %
NEUTROPHILS # BLD AUTO: 2 10E3/UL (ref 1.6–8.3)
NEUTROPHILS NFR BLD AUTO: 53 %
NRBC # BLD AUTO: 0 10E3/UL
NRBC BLD AUTO-RTO: 0 /100
PLATELET # BLD AUTO: 90 10E3/UL (ref 150–450)
POTASSIUM SERPL-SCNC: 3.8 MMOL/L (ref 3.4–5.3)
PROT SERPL-MCNC: 6.9 G/DL (ref 6.4–8.3)
RBC # BLD AUTO: 2.67 10E6/UL (ref 3.8–5.2)
SODIUM SERPL-SCNC: 139 MMOL/L (ref 136–145)
WBC # BLD AUTO: 3.7 10E3/UL (ref 4–11)

## 2023-04-04 PROCEDURE — 96375 TX/PRO/DX INJ NEW DRUG ADDON: CPT

## 2023-04-04 PROCEDURE — 250N000011 HC RX IP 250 OP 636: Performed by: REGISTERED NURSE

## 2023-04-04 PROCEDURE — 96413 CHEMO IV INFUSION 1 HR: CPT

## 2023-04-04 PROCEDURE — 250N000013 HC RX MED GY IP 250 OP 250 PS 637: Performed by: REGISTERED NURSE

## 2023-04-04 PROCEDURE — 96417 CHEMO IV INFUS EACH ADDL SEQ: CPT

## 2023-04-04 PROCEDURE — 85025 COMPLETE CBC W/AUTO DIFF WBC: CPT | Performed by: REGISTERED NURSE

## 2023-04-04 PROCEDURE — 80053 COMPREHEN METABOLIC PANEL: CPT

## 2023-04-04 PROCEDURE — 250N000011 HC RX IP 250 OP 636: Performed by: STUDENT IN AN ORGANIZED HEALTH CARE EDUCATION/TRAINING PROGRAM

## 2023-04-04 PROCEDURE — 258N000003 HC RX IP 258 OP 636: Performed by: REGISTERED NURSE

## 2023-04-04 PROCEDURE — 36591 DRAW BLOOD OFF VENOUS DEVICE: CPT

## 2023-04-04 RX ORDER — ONDANSETRON 2 MG/ML
8 INJECTION INTRAMUSCULAR; INTRAVENOUS ONCE
Status: COMPLETED | OUTPATIENT
Start: 2023-04-04 | End: 2023-04-04

## 2023-04-04 RX ORDER — ACETAMINOPHEN 325 MG/1
650 TABLET ORAL ONCE
Status: COMPLETED | OUTPATIENT
Start: 2023-04-04 | End: 2023-04-04

## 2023-04-04 RX ORDER — HEPARIN SODIUM (PORCINE) LOCK FLUSH IV SOLN 100 UNIT/ML 100 UNIT/ML
5 SOLUTION INTRAVENOUS
Status: DISCONTINUED | OUTPATIENT
Start: 2023-04-04 | End: 2023-04-04 | Stop reason: HOSPADM

## 2023-04-04 RX ORDER — HEPARIN SODIUM (PORCINE) LOCK FLUSH IV SOLN 100 UNIT/ML 100 UNIT/ML
5 SOLUTION INTRAVENOUS ONCE
Status: COMPLETED | OUTPATIENT
Start: 2023-04-04 | End: 2023-04-04

## 2023-04-04 RX ADMIN — ONDANSETRON 8 MG: 2 INJECTION INTRAMUSCULAR; INTRAVENOUS at 12:00

## 2023-04-04 RX ADMIN — DEXTROSE MONOHYDRATE 250 ML: 50 INJECTION, SOLUTION INTRAVENOUS at 11:58

## 2023-04-04 RX ADMIN — CARFILZOMIB 90 MG: 30 INJECTION, POWDER, LYOPHILIZED, FOR SOLUTION INTRAVENOUS at 14:09

## 2023-04-04 RX ADMIN — FAMOTIDINE 20 MG: 10 INJECTION INTRAVENOUS at 12:00

## 2023-04-04 RX ADMIN — Medication 5 ML: at 14:54

## 2023-04-04 RX ADMIN — ACETAMINOPHEN 650 MG: 325 TABLET ORAL at 12:00

## 2023-04-04 RX ADMIN — Medication 5 ML: at 10:44

## 2023-04-04 RX ADMIN — SODIUM CHLORIDE 500 MG: 9 INJECTION, SOLUTION INTRAVENOUS at 12:39

## 2023-04-04 RX ADMIN — DIPHENHYDRAMINE HYDROCHLORIDE 50 MG: 50 INJECTION, SOLUTION INTRAMUSCULAR; INTRAVENOUS at 12:00

## 2023-04-04 ASSESSMENT — PAIN SCALES - GENERAL: PAINLEVEL: MODERATE PAIN (4)

## 2023-04-04 NOTE — PATIENT INSTRUCTIONS
Contact Numbers    [facility] [place] (for Scheduling/Triage/After Hours Nurse Line): [phone]    Please call the [facility] nurse triage or the after hours nurse line if you experience a temperature greater than or equal to 100.4, shaking chills, have uncontrolled nausea, vomiting and/or diarrhea, dizziness, lightheadedness, shortness of breath, chest pain, bleeding, unexplained bruising, or if you have any other new/concerning symptoms, questions or concerns.     If you are having any concerning symptoms or wish to speak to a provider before your next infusion visit, please call your care coordinator or triage to notify them so we can adequately serve you.     If you need any refills on medications (narcotics or other medications), please call before your infusion appointment.            Lab Results:  Recent Results (from the past 12 hour(s))   Comprehensive metabolic panel    Collection Time: 04/04/23 10:52 AM   Result Value Ref Range    Sodium 139 136 - 145 mmol/L    Potassium 3.8 3.4 - 5.3 mmol/L    Chloride 102 98 - 107 mmol/L    Carbon Dioxide (CO2) 28 22 - 29 mmol/L    Anion Gap 9 7 - 15 mmol/L    Urea Nitrogen 11.9 8.0 - 23.0 mg/dL    Creatinine 0.70 0.51 - 0.95 mg/dL    Calcium 9.5 8.8 - 10.2 mg/dL    Glucose 92 70 - 99 mg/dL    Alkaline Phosphatase 42 35 - 104 U/L    AST 15 10 - 35 U/L    ALT 13 10 - 35 U/L    Protein Total 6.9 6.4 - 8.3 g/dL    Albumin 3.9 3.5 - 5.2 g/dL    Bilirubin Total 0.3 <=1.2 mg/dL    GFR Estimate >90 >60 mL/min/1.73m2   CBC with platelets and differential    Collection Time: 04/04/23 10:52 AM   Result Value Ref Range    WBC Count 3.7 (L) 4.0 - 11.0 10e3/uL    RBC Count 2.67 (L) 3.80 - 5.20 10e6/uL    Hemoglobin 9.5 (L) 11.7 - 15.7 g/dL    Hematocrit 28.0 (L) 35.0 - 47.0 %     (H) 78 - 100 fL    MCH 35.6 (H) 26.5 - 33.0 pg    MCHC 33.9 31.5 - 36.5 g/dL    RDW 17.2 (H) 10.0 - 15.0 %    Platelet Count 90 (L) 150 - 450 10e3/uL    % Neutrophils 53 %    % Lymphocytes 26 %    %  Monocytes 20 %    % Eosinophils 1 %    % Basophils 0 %    % Immature Granulocytes 0 %    NRBCs per 100 WBC 0 <1 /100    Absolute Neutrophils 2.0 1.6 - 8.3 10e3/uL    Absolute Lymphocytes 1.0 0.8 - 5.3 10e3/uL    Absolute Monocytes 0.7 0.0 - 1.3 10e3/uL    Absolute Eosinophils 0.0 0.0 - 0.7 10e3/uL    Absolute Basophils 0.0 0.0 - 0.2 10e3/uL    Absolute Immature Granulocytes 0.0 <=0.4 10e3/uL    Absolute NRBCs 0.0 10e3/uL

## 2023-04-04 NOTE — PROGRESS NOTES
Infusion Nursing Note:  Shena Hartmann presents today for Cycle 2 Day 15 Sarclisa, Kyprolis.    Patient seen by provider today: No   present during visit today: Not Applicable.    Note: Shena presents to infusion today feeling well. She experienced a great amount of nausea with episodes of dizzy/lightheadedness after treatment last week. Premedications have been added per patient's request by provider. She confirmed she has enough oral antiemetics at home. She denies any symptoms or concerns today.      Intravenous Access:  Implanted Port.    Treatment Conditions:  Lab Results   Component Value Date    HGB 9.5 (L) 04/04/2023    WBC 3.7 (L) 04/04/2023    ANEU 2.1 07/05/2021    ANEUTAUTO 2.0 04/04/2023    PLT 90 (L) 04/04/2023      Lab Results   Component Value Date     04/04/2023    POTASSIUM 3.8 04/04/2023    MAG 1.8 12/12/2014    CR 0.70 04/04/2023    PAULINE 9.5 04/04/2023    BILITOTAL 0.3 04/04/2023    ALBUMIN 3.9 04/04/2023    ALT 13 04/04/2023    AST 15 04/04/2023     Results reviewed, labs MET treatment parameters, ok to proceed with treatment.    Post Infusion Assessment:  Patient tolerated infusion without incident.  Blood return noted pre and post infusion.  Site patent and intact, free from redness, edema or discomfort.  No evidence of extravasations.  Access discontinued per protocol.     Discharge Plan:   Patient declined prescription refills.  Discharge instructions reviewed with: Patient.  Patient and/or family verbalized understanding of discharge instructions and all questions answered.  AVS to patient via [employer].  Patient will return 4/18/23 for next appointment.   Patient discharged in stable condition accompanied by: .  Departure Mode: Ambulatory.      Iesha Encinas RN

## 2023-04-04 NOTE — NURSING NOTE
"Chief Complaint   Patient presents with     Port Draw     Labs drawn via port by RN. Vitals taken.     Labs drawn via port by RN. Port accessed with 20G 3/4\" power needle. Flushed with NS and heparin. Pt tolerated well. Vitals taken. Pt checked in for next appointment.    Lizzy Gonzalez, RN  "

## 2023-04-06 ENCOUNTER — TELEPHONE (OUTPATIENT)
Dept: CARDIOLOGY | Facility: CLINIC | Age: 73
End: 2023-04-06
Payer: MEDICARE

## 2023-04-06 ENCOUNTER — TELEPHONE (OUTPATIENT)
Dept: ONCOLOGY | Facility: CLINIC | Age: 73
End: 2023-04-06
Payer: MEDICARE

## 2023-04-06 DIAGNOSIS — C90.00 MULTIPLE MYELOMA NOT HAVING ACHIEVED REMISSION (H): Primary | ICD-10-CM

## 2023-04-06 DIAGNOSIS — I51.89 DIASTOLIC DYSFUNCTION: ICD-10-CM

## 2023-04-06 NOTE — TELEPHONE ENCOUNTER
Health Call Center    Phone Message    May a detailed message be left on voicemail: yes     Reason for Call: Appointment Intake    Referring Provider Name: Neelima Da Silva MD in  ONCOLOGY ADULT    Diagnosis and/or Symptoms: Multiple myeloma not having achieved remission (H) [C90.00]  Diastolic dysfunction [I51.89]    Pt experiencing progressively HBP with cancer Tx, recent change in heart echo as well.      Please call pt to discuss further.  Scheduled 5/22.  Pt sending mychart to Dr. Da Silva as well regarding HBP.    Action Taken: Message routed to:  [facility] cardio    Travel Screening: Not Applicable

## 2023-04-06 NOTE — TELEPHONE ENCOUNTER
[employer] message sent to patient regarding her higher BP and asking how sx and offer to see cards WANDA in the meantime prior to appt 05/22 with Dr. Solraes.     Pt also sent message to her oncology provider. Will follow-up with patient.

## 2023-04-06 NOTE — TELEPHONE ENCOUNTER
I called Shena to review her echocardiogram.  She has a normal LVEF.  She does have early diastolic dysfunction grade 1.      We discussed typically when this is visualized, and with her history, I would recommend she see cardiooncology.  It will be important to optimize her lisinopril, bp mgmt, etc.      She expressed understanding.  Referral placed.    Neelima Da Silva MD

## 2023-04-15 DIAGNOSIS — C90.00 MULTIPLE MYELOMA NOT HAVING ACHIEVED REMISSION (H): Primary | ICD-10-CM

## 2023-04-15 RX ORDER — HEPARIN SODIUM (PORCINE) LOCK FLUSH IV SOLN 100 UNIT/ML 100 UNIT/ML
5 SOLUTION INTRAVENOUS
Status: CANCELLED | OUTPATIENT
Start: 2023-05-01

## 2023-04-15 RX ORDER — METHYLPREDNISOLONE SODIUM SUCCINATE 125 MG/2ML
125 INJECTION, POWDER, LYOPHILIZED, FOR SOLUTION INTRAMUSCULAR; INTRAVENOUS
Status: CANCELLED
Start: 2023-04-17

## 2023-04-15 RX ORDER — EPINEPHRINE 1 MG/ML
0.3 INJECTION, SOLUTION INTRAMUSCULAR; SUBCUTANEOUS EVERY 5 MIN PRN
Status: CANCELLED | OUTPATIENT
Start: 2023-04-24

## 2023-04-15 RX ORDER — ALBUTEROL SULFATE 0.83 MG/ML
2.5 SOLUTION RESPIRATORY (INHALATION)
Status: CANCELLED | OUTPATIENT
Start: 2023-04-17

## 2023-04-15 RX ORDER — DIPHENHYDRAMINE HYDROCHLORIDE 50 MG/ML
50 INJECTION INTRAMUSCULAR; INTRAVENOUS
Status: CANCELLED
Start: 2023-04-24

## 2023-04-15 RX ORDER — DIPHENHYDRAMINE HYDROCHLORIDE 50 MG/ML
50 INJECTION INTRAMUSCULAR; INTRAVENOUS
Status: CANCELLED
Start: 2023-05-01

## 2023-04-15 RX ORDER — ALBUTEROL SULFATE 0.83 MG/ML
2.5 SOLUTION RESPIRATORY (INHALATION)
Status: CANCELLED | OUTPATIENT
Start: 2023-05-01

## 2023-04-15 RX ORDER — MEPERIDINE HYDROCHLORIDE 25 MG/ML
25 INJECTION INTRAMUSCULAR; INTRAVENOUS; SUBCUTANEOUS EVERY 30 MIN PRN
Status: CANCELLED | OUTPATIENT
Start: 2023-04-17

## 2023-04-15 RX ORDER — HEPARIN SODIUM,PORCINE 10 UNIT/ML
5 VIAL (ML) INTRAVENOUS
Status: CANCELLED | OUTPATIENT
Start: 2023-04-17

## 2023-04-15 RX ORDER — ALBUTEROL SULFATE 90 UG/1
1-2 AEROSOL, METERED RESPIRATORY (INHALATION)
Status: CANCELLED
Start: 2023-04-24

## 2023-04-15 RX ORDER — HEPARIN SODIUM (PORCINE) LOCK FLUSH IV SOLN 100 UNIT/ML 100 UNIT/ML
5 SOLUTION INTRAVENOUS
Status: CANCELLED | OUTPATIENT
Start: 2023-04-17

## 2023-04-15 RX ORDER — ACETAMINOPHEN 325 MG/1
650 TABLET ORAL ONCE
Status: CANCELLED | OUTPATIENT
Start: 2023-04-17

## 2023-04-15 RX ORDER — ALBUTEROL SULFATE 90 UG/1
1-2 AEROSOL, METERED RESPIRATORY (INHALATION)
Status: CANCELLED
Start: 2023-05-01

## 2023-04-15 RX ORDER — ONDANSETRON 2 MG/ML
8 INJECTION INTRAMUSCULAR; INTRAVENOUS ONCE
Status: CANCELLED
Start: 2023-04-24 | End: 2023-04-24

## 2023-04-15 RX ORDER — HEPARIN SODIUM,PORCINE 10 UNIT/ML
5 VIAL (ML) INTRAVENOUS
Status: CANCELLED | OUTPATIENT
Start: 2023-05-01

## 2023-04-15 RX ORDER — LORAZEPAM 2 MG/ML
0.5 INJECTION INTRAMUSCULAR EVERY 4 HOURS PRN
Status: CANCELLED | OUTPATIENT
Start: 2023-04-17

## 2023-04-15 RX ORDER — METHYLPREDNISOLONE SODIUM SUCCINATE 125 MG/2ML
125 INJECTION, POWDER, LYOPHILIZED, FOR SOLUTION INTRAMUSCULAR; INTRAVENOUS
Status: CANCELLED
Start: 2023-04-24

## 2023-04-15 RX ORDER — LORAZEPAM 2 MG/ML
0.5 INJECTION INTRAMUSCULAR EVERY 4 HOURS PRN
Status: CANCELLED | OUTPATIENT
Start: 2023-05-01

## 2023-04-15 RX ORDER — MEPERIDINE HYDROCHLORIDE 25 MG/ML
25 INJECTION INTRAMUSCULAR; INTRAVENOUS; SUBCUTANEOUS EVERY 30 MIN PRN
Status: CANCELLED | OUTPATIENT
Start: 2023-05-01

## 2023-04-15 RX ORDER — ALBUTEROL SULFATE 0.83 MG/ML
2.5 SOLUTION RESPIRATORY (INHALATION)
Status: CANCELLED | OUTPATIENT
Start: 2023-04-24

## 2023-04-15 RX ORDER — LORAZEPAM 2 MG/ML
0.5 INJECTION INTRAMUSCULAR EVERY 4 HOURS PRN
Status: CANCELLED | OUTPATIENT
Start: 2023-04-24

## 2023-04-15 RX ORDER — ONDANSETRON 2 MG/ML
8 INJECTION INTRAMUSCULAR; INTRAVENOUS ONCE
Status: CANCELLED
Start: 2023-05-01 | End: 2023-05-01

## 2023-04-15 RX ORDER — HEPARIN SODIUM (PORCINE) LOCK FLUSH IV SOLN 100 UNIT/ML 100 UNIT/ML
5 SOLUTION INTRAVENOUS
Status: CANCELLED | OUTPATIENT
Start: 2023-04-24

## 2023-04-15 RX ORDER — HEPARIN SODIUM,PORCINE 10 UNIT/ML
5 VIAL (ML) INTRAVENOUS
Status: CANCELLED | OUTPATIENT
Start: 2023-04-24

## 2023-04-15 RX ORDER — ALBUTEROL SULFATE 90 UG/1
1-2 AEROSOL, METERED RESPIRATORY (INHALATION)
Status: CANCELLED
Start: 2023-04-17

## 2023-04-15 RX ORDER — MEPERIDINE HYDROCHLORIDE 25 MG/ML
25 INJECTION INTRAMUSCULAR; INTRAVENOUS; SUBCUTANEOUS EVERY 30 MIN PRN
Status: CANCELLED | OUTPATIENT
Start: 2023-04-24

## 2023-04-15 RX ORDER — ONDANSETRON 2 MG/ML
8 INJECTION INTRAMUSCULAR; INTRAVENOUS ONCE
Status: CANCELLED
Start: 2023-04-17 | End: 2023-04-17

## 2023-04-15 RX ORDER — ACETAMINOPHEN 325 MG/1
650 TABLET ORAL ONCE
Status: CANCELLED | OUTPATIENT
Start: 2023-05-01

## 2023-04-15 RX ORDER — EPINEPHRINE 1 MG/ML
0.3 INJECTION, SOLUTION INTRAMUSCULAR; SUBCUTANEOUS EVERY 5 MIN PRN
Status: CANCELLED | OUTPATIENT
Start: 2023-04-17

## 2023-04-15 RX ORDER — DIPHENHYDRAMINE HYDROCHLORIDE 50 MG/ML
50 INJECTION INTRAMUSCULAR; INTRAVENOUS
Status: CANCELLED
Start: 2023-04-17

## 2023-04-15 RX ORDER — METHYLPREDNISOLONE SODIUM SUCCINATE 125 MG/2ML
125 INJECTION, POWDER, LYOPHILIZED, FOR SOLUTION INTRAMUSCULAR; INTRAVENOUS
Status: CANCELLED
Start: 2023-05-01

## 2023-04-15 RX ORDER — EPINEPHRINE 1 MG/ML
0.3 INJECTION, SOLUTION INTRAMUSCULAR; SUBCUTANEOUS EVERY 5 MIN PRN
Status: CANCELLED | OUTPATIENT
Start: 2023-05-01

## 2023-04-18 ENCOUNTER — INFUSION THERAPY VISIT (OUTPATIENT)
Dept: ONCOLOGY | Facility: CLINIC | Age: 73
End: 2023-04-18
Attending: STUDENT IN AN ORGANIZED HEALTH CARE EDUCATION/TRAINING PROGRAM
Payer: MEDICARE

## 2023-04-18 ENCOUNTER — APPOINTMENT (OUTPATIENT)
Dept: LAB | Facility: CLINIC | Age: 73
End: 2023-04-18
Attending: STUDENT IN AN ORGANIZED HEALTH CARE EDUCATION/TRAINING PROGRAM
Payer: MEDICARE

## 2023-04-18 VITALS
HEART RATE: 84 BPM | SYSTOLIC BLOOD PRESSURE: 160 MMHG | OXYGEN SATURATION: 97 % | WEIGHT: 114.5 LBS | BODY MASS INDEX: 21.63 KG/M2 | DIASTOLIC BLOOD PRESSURE: 78 MMHG | TEMPERATURE: 98.5 F | RESPIRATION RATE: 18 BRPM

## 2023-04-18 DIAGNOSIS — C90.00 MULTIPLE MYELOMA NOT HAVING ACHIEVED REMISSION (H): Primary | ICD-10-CM

## 2023-04-18 DIAGNOSIS — C90.02 MULTIPLE MYELOMA IN RELAPSE (H): ICD-10-CM

## 2023-04-18 DIAGNOSIS — E85.89 OTHER AMYLOIDOSIS (H): ICD-10-CM

## 2023-04-18 LAB
ALBUMIN SERPL BCG-MCNC: 4.1 G/DL (ref 3.5–5.2)
ALP SERPL-CCNC: 52 U/L (ref 35–104)
ALT SERPL W P-5'-P-CCNC: 19 U/L (ref 10–35)
ANION GAP SERPL CALCULATED.3IONS-SCNC: 8 MMOL/L (ref 7–15)
AST SERPL W P-5'-P-CCNC: 17 U/L (ref 10–35)
BASOPHILS # BLD AUTO: 0 10E3/UL (ref 0–0.2)
BASOPHILS NFR BLD AUTO: 0 %
BILIRUB SERPL-MCNC: 0.2 MG/DL
BUN SERPL-MCNC: 16.4 MG/DL (ref 8–23)
CALCIUM SERPL-MCNC: 9.4 MG/DL (ref 8.8–10.2)
CHLORIDE SERPL-SCNC: 100 MMOL/L (ref 98–107)
CREAT SERPL-MCNC: 0.76 MG/DL (ref 0.51–0.95)
DEPRECATED HCO3 PLAS-SCNC: 30 MMOL/L (ref 22–29)
EOSINOPHIL # BLD AUTO: 0.1 10E3/UL (ref 0–0.7)
EOSINOPHIL NFR BLD AUTO: 1 %
ERYTHROCYTE [DISTWIDTH] IN BLOOD BY AUTOMATED COUNT: 17.5 % (ref 10–15)
GFR SERPL CREATININE-BSD FRML MDRD: 83 ML/MIN/1.73M2
GLUCOSE SERPL-MCNC: 93 MG/DL (ref 70–99)
HCT VFR BLD AUTO: 29.1 % (ref 35–47)
HGB BLD-MCNC: 9.9 G/DL (ref 11.7–15.7)
IMM GRANULOCYTES # BLD: 0 10E3/UL
IMM GRANULOCYTES NFR BLD: 0 %
LDH SERPL L TO P-CCNC: 166 U/L (ref 0–250)
LYMPHOCYTES # BLD AUTO: 0.9 10E3/UL (ref 0.8–5.3)
LYMPHOCYTES NFR BLD AUTO: 25 %
MCH RBC QN AUTO: 36.7 PG (ref 26.5–33)
MCHC RBC AUTO-ENTMCNC: 34 G/DL (ref 31.5–36.5)
MCV RBC AUTO: 108 FL (ref 78–100)
MONOCYTES # BLD AUTO: 0.6 10E3/UL (ref 0–1.3)
MONOCYTES NFR BLD AUTO: 17 %
NEUTROPHILS # BLD AUTO: 2 10E3/UL (ref 1.6–8.3)
NEUTROPHILS NFR BLD AUTO: 57 %
NRBC # BLD AUTO: 0 10E3/UL
NRBC BLD AUTO-RTO: 0 /100
NT-PROBNP SERPL-MCNC: 380 PG/ML (ref 0–900)
PLATELET # BLD AUTO: 149 10E3/UL (ref 150–450)
POTASSIUM SERPL-SCNC: 3.8 MMOL/L (ref 3.4–5.3)
PROT SERPL-MCNC: 7 G/DL (ref 6.4–8.3)
RBC # BLD AUTO: 2.7 10E6/UL (ref 3.8–5.2)
SODIUM SERPL-SCNC: 138 MMOL/L (ref 136–145)
TOTAL PROTEIN SERUM FOR ELP: 6.7 G/DL (ref 6.4–8.3)
WBC # BLD AUTO: 3.6 10E3/UL (ref 4–11)

## 2023-04-18 PROCEDURE — 96375 TX/PRO/DX INJ NEW DRUG ADDON: CPT

## 2023-04-18 PROCEDURE — 83521 IG LIGHT CHAINS FREE EACH: CPT | Mod: 59

## 2023-04-18 PROCEDURE — 250N000011 HC RX IP 250 OP 636: Performed by: STUDENT IN AN ORGANIZED HEALTH CARE EDUCATION/TRAINING PROGRAM

## 2023-04-18 PROCEDURE — 84155 ASSAY OF PROTEIN SERUM: CPT

## 2023-04-18 PROCEDURE — 83880 ASSAY OF NATRIURETIC PEPTIDE: CPT

## 2023-04-18 PROCEDURE — 84165 PROTEIN E-PHORESIS SERUM: CPT | Mod: 26

## 2023-04-18 PROCEDURE — 80053 COMPREHEN METABOLIC PANEL: CPT | Performed by: STUDENT IN AN ORGANIZED HEALTH CARE EDUCATION/TRAINING PROGRAM

## 2023-04-18 PROCEDURE — 250N000013 HC RX MED GY IP 250 OP 250 PS 637: Performed by: STUDENT IN AN ORGANIZED HEALTH CARE EDUCATION/TRAINING PROGRAM

## 2023-04-18 PROCEDURE — 83615 LACTATE (LD) (LDH) ENZYME: CPT

## 2023-04-18 PROCEDURE — 84165 PROTEIN E-PHORESIS SERUM: CPT | Mod: TC | Performed by: STUDENT IN AN ORGANIZED HEALTH CARE EDUCATION/TRAINING PROGRAM

## 2023-04-18 PROCEDURE — 258N000003 HC RX IP 258 OP 636: Performed by: STUDENT IN AN ORGANIZED HEALTH CARE EDUCATION/TRAINING PROGRAM

## 2023-04-18 PROCEDURE — 96417 CHEMO IV INFUS EACH ADDL SEQ: CPT

## 2023-04-18 PROCEDURE — 85025 COMPLETE CBC W/AUTO DIFF WBC: CPT | Performed by: STUDENT IN AN ORGANIZED HEALTH CARE EDUCATION/TRAINING PROGRAM

## 2023-04-18 PROCEDURE — 36591 DRAW BLOOD OFF VENOUS DEVICE: CPT

## 2023-04-18 PROCEDURE — 96413 CHEMO IV INFUSION 1 HR: CPT

## 2023-04-18 RX ORDER — ONDANSETRON 2 MG/ML
8 INJECTION INTRAMUSCULAR; INTRAVENOUS ONCE
Status: COMPLETED | OUTPATIENT
Start: 2023-04-18 | End: 2023-04-18

## 2023-04-18 RX ORDER — HEPARIN SODIUM (PORCINE) LOCK FLUSH IV SOLN 100 UNIT/ML 100 UNIT/ML
5 SOLUTION INTRAVENOUS
Status: DISCONTINUED | OUTPATIENT
Start: 2023-04-18 | End: 2023-04-18 | Stop reason: HOSPADM

## 2023-04-18 RX ORDER — ACETAMINOPHEN 325 MG/1
650 TABLET ORAL ONCE
Status: COMPLETED | OUTPATIENT
Start: 2023-04-18 | End: 2023-04-18

## 2023-04-18 RX ADMIN — DEXTROSE MONOHYDRATE 250 ML: 50 INJECTION, SOLUTION INTRAVENOUS at 16:26

## 2023-04-18 RX ADMIN — SODIUM CHLORIDE 250 ML: 9 INJECTION, SOLUTION INTRAVENOUS at 14:06

## 2023-04-18 RX ADMIN — CARFILZOMIB 90 MG: 30 INJECTION, POWDER, LYOPHILIZED, FOR SOLUTION INTRAVENOUS at 16:27

## 2023-04-18 RX ADMIN — ONDANSETRON 8 MG: 2 INJECTION INTRAMUSCULAR; INTRAVENOUS at 14:07

## 2023-04-18 RX ADMIN — Medication 5 ML: at 17:02

## 2023-04-18 RX ADMIN — SODIUM CHLORIDE 500 MG: 9 INJECTION, SOLUTION INTRAVENOUS at 14:59

## 2023-04-18 RX ADMIN — DIPHENHYDRAMINE HYDROCHLORIDE 50 MG: 50 INJECTION, SOLUTION INTRAMUSCULAR; INTRAVENOUS at 14:12

## 2023-04-18 RX ADMIN — ACETAMINOPHEN 650 MG: 325 TABLET ORAL at 14:06

## 2023-04-18 RX ADMIN — FAMOTIDINE 20 MG: 10 INJECTION INTRAVENOUS at 14:10

## 2023-04-18 ASSESSMENT — PAIN SCALES - GENERAL: PAINLEVEL: MILD PAIN (2)

## 2023-04-18 NOTE — NURSING NOTE
Chief Complaint   Patient presents with     Port Draw     Port accessed  by RN, labs collected, line flushed with saline and heparin.  Vitals taken. Pt checked in for appointment(s).      Delisa Mcgovern RN

## 2023-04-18 NOTE — PATIENT INSTRUCTIONS
[facility] Triage and after hours / weekends / holidays:  [phone]    Please call the triage or after hours line if you experience a temperature greater than or equal to 100.4, shaking chills, have uncontrolled nausea, vomiting and/or diarrhea, dizziness, shortness of breath, chest pain, bleeding, unexplained bruising, or if you have any other new/concerning symptoms, questions or concerns.      If you are having any concerning symptoms or wish to speak to a provider before your next infusion visit, please call triage to notify them so we can adequately serve you.     If you need a refill on a narcotic prescription or other medication, please call before your infusion appointment.                April 2023 Sunday Monday Tuesday Wednesday Thursday Friday Saturday                                 1       2     3     4    LAB PERIPHERAL  10:45 AM   (15 min.)   [facility] DRAW   [facility]    ONC INFUSION 4 HR (240 MIN)  11:30 AM   (240 min.)    ONC INFUSION NURSE   [facility] 5     6     7     8       9     10     11     12     13     14     15       16     17     18    LAB PERIPHERAL  12:15 PM   (15 min.)   [facility] DRAW   [facility]    ONC INFUSION 3 HR (180 MIN)   1:00 PM   (180 min.)    ONC INFUSION NURSE   [facility]    RETURN CCSL   2:15 PM   (30 min.)   Nighat Chavez MD   [facility] 19     20     21     22       23     24    LAB PERIPHERAL  11:30 AM   (15 min.)   [facility] DRAW   [facility]    ONC INFUSION 1 HR (60 MIN)  12:00 PM   (60 min.)    ONC INFUSION NURSE   [facility] 25     26     27     28     29       30                                                 May 2023      [place] Monday Tuesday Wednesday Thursday Friday Saturday        1    NEW CARDIO-ONCOLOGY   7:45 AM   (30  min.)   Sadia Price MD   [facility]    LAB PERIPHERAL   8:30 AM   (15 min.)   [facility] DRAW   [facility]    ONC INFUSION 2.5 HR (150 MIN)   9:00 AM   (150 min.)    ONC INFUSION NURSE   [facility] 2     3     4     5     6       7     8     9     10     11     12     13       14     15     16     17     18     19     20       21     22     23     24     25     26     27       28     29     30     31                                       Recent Results (from the past 24 hour(s))   Comprehensive metabolic panel    Collection Time: 04/18/23 12:52 PM   Result Value Ref Range    Sodium 138 136 - 145 mmol/L    Potassium 3.8 3.4 - 5.3 mmol/L    Chloride 100 98 - 107 mmol/L    Carbon Dioxide (CO2) 30 (H) 22 - 29 mmol/L    Anion Gap 8 7 - 15 mmol/L    Urea Nitrogen 16.4 8.0 - 23.0 mg/dL    Creatinine 0.76 0.51 - 0.95 mg/dL    Calcium 9.4 8.8 - 10.2 mg/dL    Glucose 93 70 - 99 mg/dL    Alkaline Phosphatase 52 35 - 104 U/L    AST 17 10 - 35 U/L    ALT 19 10 - 35 U/L    Protein Total 7.0 6.4 - 8.3 g/dL    Albumin 4.1 3.5 - 5.2 g/dL    Bilirubin Total 0.2 <=1.2 mg/dL    GFR Estimate 83 >60 mL/min/1.73m2   N terminal pro BNP outpatient    Collection Time: 04/18/23 12:52 PM   Result Value Ref Range    N Terminal Pro BNP Outpatient 380 0 - 900 pg/mL   Lactate Dehydrogenase    Collection Time: 04/18/23 12:52 PM   Result Value Ref Range    Lactate Dehydrogenase 166 0 - 250 U/L   CBC with platelets and differential    Collection Time: 04/18/23 12:52 PM   Result Value Ref Range    WBC Count 3.6 (L) 4.0 - 11.0 10e3/uL    RBC Count 2.70 (L) 3.80 - 5.20 10e6/uL    Hemoglobin 9.9 (L) 11.7 - 15.7 g/dL    Hematocrit 29.1 (L) 35.0 - 47.0 %     (H) 78 - 100 fL    MCH 36.7 (H) 26.5 - 33.0 pg    MCHC 34.0 31.5 - 36.5 g/dL    RDW 17.5 (H) 10.0 - 15.0 %    Platelet Count 149 (L) 150 - 450 10e3/uL    % Neutrophils 57 %    % Lymphocytes 25 %    %  Monocytes 17 %    % Eosinophils 1 %    % Basophils 0 %    % Immature Granulocytes 0 %    NRBCs per 100 WBC 0 <1 /100    Absolute Neutrophils 2.0 1.6 - 8.3 10e3/uL    Absolute Lymphocytes 0.9 0.8 - 5.3 10e3/uL    Absolute Monocytes 0.6 0.0 - 1.3 10e3/uL    Absolute Eosinophils 0.1 0.0 - 0.7 10e3/uL    Absolute Basophils 0.0 0.0 - 0.2 10e3/uL    Absolute Immature Granulocytes 0.0 <=0.4 10e3/uL    Absolute NRBCs 0.0 10e3/uL

## 2023-04-18 NOTE — PROGRESS NOTES
Infusion Nursing Note:  Shena Hartmann presents today for Cycle 3 Day 1 SarcIvonne melvin.    Patient seen by provider today: Yes: Dr. Chavez   present during visit today: Not Applicable.    Note: N/A.      Intravenous Access:  Implanted Port.    Treatment Conditions:  Lab Results   Component Value Date    HGB 9.9 (L) 04/18/2023    WBC 3.6 (L) 04/18/2023    ANEU 2.1 07/05/2021    ANEUTAUTO 2.0 04/18/2023     (L) 04/18/2023      Lab Results   Component Value Date     04/18/2023    POTASSIUM 3.8 04/18/2023    MAG 1.8 12/12/2014    CR 0.76 04/18/2023    PAULINE 9.4 04/18/2023    BILITOTAL 0.2 04/18/2023    ALBUMIN 4.1 04/18/2023    ALT 19 04/18/2023    AST 17 04/18/2023     Results reviewed, labs MET treatment parameters, ok to proceed with treatment.      Post Infusion Assessment:  Patient tolerated infusion without incident.  Blood return noted pre and post infusion.  Access discontinued per protocol.       Discharge Plan:   Patient declined prescription refills.  AVS to patient via [employer] [employer].  Patient will return 4/24 for next appointment.   Patient discharged in stable condition accompanied by: self.  Departure Mode: Ambulatory.  Face to Face time: 0.      Adore Hills RN

## 2023-04-19 LAB
ALBUMIN SERPL ELPH-MCNC: 3.9 G/DL (ref 3.7–5.1)
ALPHA1 GLOB SERPL ELPH-MCNC: 0.3 G/DL (ref 0.2–0.4)
ALPHA2 GLOB SERPL ELPH-MCNC: 0.7 G/DL (ref 0.5–0.9)
B-GLOBULIN SERPL ELPH-MCNC: 0.6 G/DL (ref 0.6–1)
GAMMA GLOB SERPL ELPH-MCNC: 1.2 G/DL (ref 0.7–1.6)
KAPPA LC FREE SER-MCNC: 12.45 MG/DL (ref 0.33–1.94)
KAPPA LC FREE/LAMBDA FREE SER NEPH: ABNORMAL {RATIO}
LAMBDA LC FREE SERPL-MCNC: <0.14 MG/DL (ref 0.57–2.63)
M PROTEIN SERPL ELPH-MCNC: 1.1 G/DL
PROT PATTERN SERPL ELPH-IMP: ABNORMAL

## 2023-04-24 ENCOUNTER — APPOINTMENT (OUTPATIENT)
Dept: LAB | Facility: CLINIC | Age: 73
End: 2023-04-24
Attending: STUDENT IN AN ORGANIZED HEALTH CARE EDUCATION/TRAINING PROGRAM
Payer: MEDICARE

## 2023-04-24 ENCOUNTER — INFUSION THERAPY VISIT (OUTPATIENT)
Dept: ONCOLOGY | Facility: CLINIC | Age: 73
End: 2023-04-24
Attending: STUDENT IN AN ORGANIZED HEALTH CARE EDUCATION/TRAINING PROGRAM
Payer: MEDICARE

## 2023-04-24 VITALS
BODY MASS INDEX: 21.6 KG/M2 | SYSTOLIC BLOOD PRESSURE: 173 MMHG | DIASTOLIC BLOOD PRESSURE: 92 MMHG | TEMPERATURE: 98 F | HEART RATE: 72 BPM | OXYGEN SATURATION: 98 % | WEIGHT: 114.3 LBS | RESPIRATION RATE: 16 BRPM

## 2023-04-24 DIAGNOSIS — C90.00 MULTIPLE MYELOMA NOT HAVING ACHIEVED REMISSION (H): Primary | ICD-10-CM

## 2023-04-24 LAB
BASOPHILS # BLD AUTO: 0 10E3/UL (ref 0–0.2)
BASOPHILS NFR BLD AUTO: 0 %
EOSINOPHIL # BLD AUTO: 0.1 10E3/UL (ref 0–0.7)
EOSINOPHIL NFR BLD AUTO: 1 %
ERYTHROCYTE [DISTWIDTH] IN BLOOD BY AUTOMATED COUNT: 16.7 % (ref 10–15)
HCT VFR BLD AUTO: 27.9 % (ref 35–47)
HGB BLD-MCNC: 9.4 G/DL (ref 11.7–15.7)
IMM GRANULOCYTES # BLD: 0 10E3/UL
IMM GRANULOCYTES NFR BLD: 0 %
LYMPHOCYTES # BLD AUTO: 1 10E3/UL (ref 0.8–5.3)
LYMPHOCYTES NFR BLD AUTO: 24 %
MCH RBC QN AUTO: 36 PG (ref 26.5–33)
MCHC RBC AUTO-ENTMCNC: 33.7 G/DL (ref 31.5–36.5)
MCV RBC AUTO: 107 FL (ref 78–100)
MONOCYTES # BLD AUTO: 0.8 10E3/UL (ref 0–1.3)
MONOCYTES NFR BLD AUTO: 21 %
NEUTROPHILS # BLD AUTO: 2.1 10E3/UL (ref 1.6–8.3)
NEUTROPHILS NFR BLD AUTO: 54 %
NRBC # BLD AUTO: 0 10E3/UL
NRBC BLD AUTO-RTO: 0 /100
PLAT MORPH BLD: NORMAL
PLATELET # BLD AUTO: 88 10E3/UL (ref 150–450)
RBC # BLD AUTO: 2.61 10E6/UL (ref 3.8–5.2)
RBC MORPH BLD: NORMAL
WBC # BLD AUTO: 4 10E3/UL (ref 4–11)

## 2023-04-24 PROCEDURE — 250N000011 HC RX IP 250 OP 636: Performed by: STUDENT IN AN ORGANIZED HEALTH CARE EDUCATION/TRAINING PROGRAM

## 2023-04-24 PROCEDURE — 85025 COMPLETE CBC W/AUTO DIFF WBC: CPT | Performed by: STUDENT IN AN ORGANIZED HEALTH CARE EDUCATION/TRAINING PROGRAM

## 2023-04-24 PROCEDURE — 96413 CHEMO IV INFUSION 1 HR: CPT

## 2023-04-24 PROCEDURE — 96375 TX/PRO/DX INJ NEW DRUG ADDON: CPT

## 2023-04-24 PROCEDURE — 258N000003 HC RX IP 258 OP 636: Performed by: STUDENT IN AN ORGANIZED HEALTH CARE EDUCATION/TRAINING PROGRAM

## 2023-04-24 RX ORDER — ONDANSETRON 2 MG/ML
8 INJECTION INTRAMUSCULAR; INTRAVENOUS ONCE
Status: COMPLETED | OUTPATIENT
Start: 2023-04-24 | End: 2023-04-24

## 2023-04-24 RX ORDER — HEPARIN SODIUM (PORCINE) LOCK FLUSH IV SOLN 100 UNIT/ML 100 UNIT/ML
5 SOLUTION INTRAVENOUS
Status: DISCONTINUED | OUTPATIENT
Start: 2023-04-24 | End: 2023-04-24 | Stop reason: HOSPADM

## 2023-04-24 RX ORDER — HEPARIN SODIUM (PORCINE) LOCK FLUSH IV SOLN 100 UNIT/ML 100 UNIT/ML
5 SOLUTION INTRAVENOUS
Status: COMPLETED | OUTPATIENT
Start: 2023-04-24 | End: 2023-04-24

## 2023-04-24 RX ADMIN — Medication 5 ML: at 11:43

## 2023-04-24 RX ADMIN — FAMOTIDINE 20 MG: 10 INJECTION INTRAVENOUS at 12:57

## 2023-04-24 RX ADMIN — DIPHENHYDRAMINE HYDROCHLORIDE 50 MG: 50 INJECTION, SOLUTION INTRAMUSCULAR; INTRAVENOUS at 13:07

## 2023-04-24 RX ADMIN — DEXTROSE MONOHYDRATE 250 ML: 50 INJECTION, SOLUTION INTRAVENOUS at 12:57

## 2023-04-24 RX ADMIN — ONDANSETRON 8 MG: 2 INJECTION INTRAMUSCULAR; INTRAVENOUS at 12:56

## 2023-04-24 RX ADMIN — CARFILZOMIB 90 MG: 30 INJECTION, POWDER, LYOPHILIZED, FOR SOLUTION INTRAVENOUS at 13:48

## 2023-04-24 RX ADMIN — Medication 5 ML: at 14:24

## 2023-04-24 ASSESSMENT — PAIN SCALES - GENERAL: PAINLEVEL: NO PAIN (0)

## 2023-04-24 NOTE — PATIENT INSTRUCTIONS
[facility] Triage and after hours / weekends / holidays:  [phone]    Please call the triage or after hours line if you experience a temperature greater than or equal to 100.4, shaking chills, have uncontrolled nausea, vomiting and/or diarrhea, dizziness, shortness of breath, chest pain, bleeding, unexplained bruising, or if you have any other new/concerning symptoms, questions or concerns.      If you are having any concerning symptoms or wish to speak to a provider before your next infusion visit, please call triage to notify them so we can adequately serve you.     If you need a refill on a narcotic prescription or other medication, please call before your infusion appointment.          Latest Reference Range & Units 04/24/23 11:48   WBC 4.0 - 11.0 10e3/uL 4.0   Hemoglobin 11.7 - 15.7 g/dL 9.4 (L)   Hematocrit 35.0 - 47.0 % 27.9 (L)   Platelet Count 150 - 450 10e3/uL 88 (L)   RBC Count 3.80 - 5.20 10e6/uL 2.61 (L)   MCV 78 - 100 fL 107 (H)   MCH 26.5 - 33.0 pg 36.0 (H)   MCHC 31.5 - 36.5 g/dL 33.7   RDW 10.0 - 15.0 % 16.7 (H)   % Neutrophils % 54   % Lymphocytes % 24   % Monocytes % 21   % Eosinophils % 1   % Basophils % 0   Absolute Basophils 0.0 - 0.2 10e3/uL 0.0   Absolute Eosinophils 0.0 - 0.7 10e3/uL 0.1   Absolute Immature Granulocytes <=0.4 10e3/uL 0.0   Absolute Lymphocytes 0.8 - 5.3 10e3/uL 1.0   Absolute Monocytes 0.0 - 1.3 10e3/uL 0.8   % Immature Granulocytes % 0   Absolute Neutrophils 1.6 - 8.3 10e3/uL 2.1   Absolute NRBCs 10e3/uL 0.0   NRBCs per 100 WBC <1 /100 0   RBC Morphology  Confirmed RBC Indices   Platelet Morphology Automated Count Confirmed. Platelet morphology is normal.  Automated Count Confirmed. Platelet morphology is normal.

## 2023-04-24 NOTE — PROGRESS NOTES
Infusion Nursing Note:  Shena Hartmann presents today for Cycle 3 Day 8 Kyprolis.    Patient seen by provider today: No   present during visit today: Not Applicable.    Note: Shena comes into the clinic today doing well overall. She does not attest to any abnormal pain/chest tightness/signs of infection/dizziness/sensation changes/bruising/swelling/diarrhea/constipation/urinary issues/vision or hearing changes. Overall feels like her stomach is uneasy but does not feel nauseous quite yet. Anticipatory nausea with K[place]is infusion. She has a plan for anti-emetic when she feels she needs them. SOB with exertion.     Pre-liminary ANC 2.1    Per written information from Selena Damon CNP/Colleen Edmonds RN @ 1242 4/24/23:  - OK to proceed with tx though BP has been elevated.   - Patient should increase Lisinopril to 10mg Daily.     Intravenous Access:  Implanted Port.    Treatment Conditions:  Lab Results   Component Value Date    HGB 9.4 (L) 04/24/2023    WBC 4.0 04/24/2023    ANEU 2.1 07/05/2021    ANEUTAUTO 2.0 04/18/2023    PLT 88 (L) 04/24/2023      Results reviewed, labs MET treatment parameters, ok to proceed with treatment.      Post Infusion Assessment:  Patient tolerated infusion without incident.  Blood return noted pre and post infusion.  Site patent and intact, free from redness, edema or discomfort.  No evidence of extravasations.  Access discontinued per protocol.       Discharge Plan:   Patient declined prescription refills.  Discharge instructions reviewed with: Patient.  Patient and/or family verbalized understanding of discharge instructions and all questions answered.  Copy of AVS reviewed with patient and/or family.  Patient will return 5/1 for next appointment.  Patient discharged in stable condition accompanied by: self.  Departure Mode: Ambulatory.      Colleen Edmonds RN

## 2023-04-24 NOTE — NURSING NOTE
"Chief Complaint   Patient presents with     Port Draw     Labs drawn from port by rn.  VS taken.     Port accessed with 20 gauge 3/4\" gripper needle and labs drawn by rn.  Port flushed with NS and heparin.  Pt tolerated well.  VS taken.  Pt checked in for next appt.    Mahnaz Andersen RN      "

## 2023-04-27 ENCOUNTER — MYC MEDICAL ADVICE (OUTPATIENT)
Dept: ONCOLOGY | Facility: CLINIC | Age: 73
End: 2023-04-27
Payer: MEDICARE

## 2023-04-27 ENCOUNTER — HOSPITAL ENCOUNTER (EMERGENCY)
Facility: CLINIC | Age: 73
Discharge: HOME | End: 2023-04-27
Admitting: PHYSICIAN ASSISTANT
Payer: MEDICARE

## 2023-04-27 VITALS
OXYGEN SATURATION: 98 % | RESPIRATION RATE: 16 BRPM | HEART RATE: 63 BPM | TEMPERATURE: 98.2 F | DIASTOLIC BLOOD PRESSURE: 81 MMHG | SYSTOLIC BLOOD PRESSURE: 182 MMHG

## 2023-04-27 DIAGNOSIS — I16.0 HYPERTENSIVE URGENCY: ICD-10-CM

## 2023-04-27 DIAGNOSIS — I45.10 RIGHT BUNDLE BRANCH BLOCK: ICD-10-CM

## 2023-04-27 LAB
ALBUMIN UR-MCNC: 20 MG/DL
ANION GAP SERPL CALCULATED.3IONS-SCNC: 12 MMOL/L (ref 7–15)
ANION GAP SERPL CALCULATED.3IONS-SCNC: 15 MMOL/L (ref 7–15)
APPEARANCE UR: CLEAR
ATRIAL RATE - MUSE: 75 BPM
BACTERIA #/AREA URNS HPF: ABNORMAL /HPF
BASOPHILS # BLD AUTO: 0 10E3/UL (ref 0–0.2)
BASOPHILS NFR BLD AUTO: 0 %
BILIRUB UR QL STRIP: NEGATIVE
BUN SERPL-MCNC: 13.5 MG/DL (ref 8–23)
BUN SERPL-MCNC: 13.8 MG/DL (ref 8–23)
CALCIUM SERPL-MCNC: 9.4 MG/DL (ref 8.8–10.2)
CALCIUM SERPL-MCNC: 9.6 MG/DL (ref 8.8–10.2)
CHLORIDE SERPL-SCNC: 96 MMOL/L (ref 98–107)
CHLORIDE SERPL-SCNC: 97 MMOL/L (ref 98–107)
COLOR UR AUTO: YELLOW
CREAT SERPL-MCNC: 0.69 MG/DL (ref 0.51–0.95)
CREAT SERPL-MCNC: 0.78 MG/DL (ref 0.51–0.95)
DEPRECATED HCO3 PLAS-SCNC: 23 MMOL/L (ref 22–29)
DEPRECATED HCO3 PLAS-SCNC: 24 MMOL/L (ref 22–29)
DIASTOLIC BLOOD PRESSURE - MUSE: NORMAL MMHG
EOSINOPHIL # BLD AUTO: 0.1 10E3/UL (ref 0–0.7)
EOSINOPHIL NFR BLD AUTO: 1 %
ERYTHROCYTE [DISTWIDTH] IN BLOOD BY AUTOMATED COUNT: 16.6 % (ref 10–15)
GFR SERPL CREATININE-BSD FRML MDRD: 80 ML/MIN/1.73M2
GFR SERPL CREATININE-BSD FRML MDRD: >90 ML/MIN/1.73M2
GLUCOSE SERPL-MCNC: 91 MG/DL (ref 70–99)
GLUCOSE SERPL-MCNC: 93 MG/DL (ref 70–99)
GLUCOSE UR STRIP-MCNC: NEGATIVE MG/DL
HCT VFR BLD AUTO: 31.3 % (ref 35–47)
HGB BLD-MCNC: 10.3 G/DL (ref 11.7–15.7)
HGB UR QL STRIP: NEGATIVE
HOLD SPECIMEN: NORMAL
HOLD SPECIMEN: NORMAL
IMM GRANULOCYTES # BLD: 0 10E3/UL
IMM GRANULOCYTES NFR BLD: 0 %
INTERPRETATION ECG - MUSE: NORMAL
KETONES UR STRIP-MCNC: NEGATIVE MG/DL
LEUKOCYTE ESTERASE UR QL STRIP: ABNORMAL
LYMPHOCYTES # BLD AUTO: 0.9 10E3/UL (ref 0.8–5.3)
LYMPHOCYTES NFR BLD AUTO: 17 %
MCH RBC QN AUTO: 36.1 PG (ref 26.5–33)
MCHC RBC AUTO-ENTMCNC: 32.9 G/DL (ref 31.5–36.5)
MCV RBC AUTO: 110 FL (ref 78–100)
MONOCYTES # BLD AUTO: 1.2 10E3/UL (ref 0–1.3)
MONOCYTES NFR BLD AUTO: 24 %
MUCOUS THREADS #/AREA URNS LPF: PRESENT /LPF
NEUTROPHILS # BLD AUTO: 2.8 10E3/UL (ref 1.6–8.3)
NEUTROPHILS NFR BLD AUTO: 58 %
NITRATE UR QL: NEGATIVE
NRBC # BLD AUTO: 0 10E3/UL
NRBC BLD AUTO-RTO: 0 /100
P AXIS - MUSE: 61 DEGREES
PH UR STRIP: 7 [PH] (ref 5–7)
PLAT MORPH BLD: NORMAL
PLATELET # BLD AUTO: 65 10E3/UL (ref 150–450)
POTASSIUM SERPL-SCNC: 4.1 MMOL/L (ref 3.4–5.3)
POTASSIUM SERPL-SCNC: 4.1 MMOL/L (ref 3.4–5.3)
PR INTERVAL - MUSE: 150 MS
QRS DURATION - MUSE: 122 MS
QT - MUSE: 424 MS
QTC - MUSE: 473 MS
R AXIS - MUSE: 13 DEGREES
RBC # BLD AUTO: 2.85 10E6/UL (ref 3.8–5.2)
RBC MORPH BLD: NORMAL
RBC URINE: 6 /HPF
SODIUM SERPL-SCNC: 133 MMOL/L (ref 136–145)
SODIUM SERPL-SCNC: 134 MMOL/L (ref 136–145)
SP GR UR STRIP: 1.01 (ref 1–1.03)
SYSTOLIC BLOOD PRESSURE - MUSE: NORMAL MMHG
T AXIS - MUSE: 44 DEGREES
TROPONIN T SERPL HS-MCNC: 10 NG/L
TROPONIN T SERPL HS-MCNC: 9 NG/L
UROBILINOGEN UR STRIP-MCNC: NORMAL MG/DL
VENTRICULAR RATE- MUSE: 75 BPM
WBC # BLD AUTO: 5 10E3/UL (ref 4–11)
WBC URINE: 8 /HPF

## 2023-04-27 PROCEDURE — 250N000013 HC RX MED GY IP 250 OP 250 PS 637: Performed by: EMERGENCY MEDICINE

## 2023-04-27 PROCEDURE — 80048 BASIC METABOLIC PNL TOTAL CA: CPT | Performed by: PHYSICIAN ASSISTANT

## 2023-04-27 PROCEDURE — 99284 EMERGENCY DEPT VISIT MOD MDM: CPT | Performed by: PHYSICIAN ASSISTANT

## 2023-04-27 PROCEDURE — 84484 ASSAY OF TROPONIN QUANT: CPT | Performed by: PHYSICIAN ASSISTANT

## 2023-04-27 PROCEDURE — 250N000013 HC RX MED GY IP 250 OP 250 PS 637: Performed by: PHYSICIAN ASSISTANT

## 2023-04-27 PROCEDURE — 81003 URINALYSIS AUTO W/O SCOPE: CPT | Performed by: PHYSICIAN ASSISTANT

## 2023-04-27 PROCEDURE — 93010 ELECTROCARDIOGRAM REPORT: CPT | Performed by: PHYSICIAN ASSISTANT

## 2023-04-27 PROCEDURE — 99284 EMERGENCY DEPT VISIT MOD MDM: CPT | Mod: 25 | Performed by: PHYSICIAN ASSISTANT

## 2023-04-27 PROCEDURE — 93005 ELECTROCARDIOGRAM TRACING: CPT | Performed by: PHYSICIAN ASSISTANT

## 2023-04-27 PROCEDURE — 85025 COMPLETE CBC W/AUTO DIFF WBC: CPT | Performed by: PHYSICIAN ASSISTANT

## 2023-04-27 PROCEDURE — 36415 COLL VENOUS BLD VENIPUNCTURE: CPT | Performed by: PHYSICIAN ASSISTANT

## 2023-04-27 RX ORDER — METOPROLOL TARTRATE 50 MG
50 TABLET ORAL 2 TIMES DAILY
Qty: 14 TABLET | Refills: 0 | Status: SHIPPED | OUTPATIENT
Start: 2023-04-27 | End: 2023-05-30

## 2023-04-27 RX ORDER — METOPROLOL TARTRATE 25 MG/1
25 TABLET, FILM COATED ORAL ONCE
Status: COMPLETED | OUTPATIENT
Start: 2023-04-27 | End: 2023-04-27

## 2023-04-27 RX ADMIN — METOPROLOL TARTRATE 25 MG: 25 TABLET, FILM COATED ORAL at 17:16

## 2023-04-27 RX ADMIN — METOPROLOL TARTRATE 25 MG: 25 TABLET, FILM COATED ORAL at 13:09

## 2023-04-27 ASSESSMENT — ACTIVITIES OF DAILY LIVING (ADL)
ADLS_ACUITY_SCORE: 37

## 2023-04-27 NOTE — DISCHARGE INSTRUCTIONS
Here in the emergency department, you have reassuring exam findings.  You have no neurologic deficits.  Your heart enzyme test returned reassuring.  You do have a new right bundle branch block on your EKG, we discussed that this could be from any time point between now in 2014.  We did give you 2 doses of 25 mg metoprolol, and her blood pressure did come down, ELVER slight improvement of your headache.  I did send a prescription for metoprolol to your preferred pharmacy which I recommend starting tomorrow in addition to your lisinopril.  We discussed keeping a close eye on your blood pressures for the next few days, I recommend keeping a blood pressure diary.  We discussed following up with cardiology next week Monday.  If you develop any new or worsening symptoms in the meantime you should return right away to the emergency department going and chest pain shortness of breath worsening headache change in vision vomiting.

## 2023-04-27 NOTE — ED PROVIDER NOTES
[place] EMERGENCY DEPARTMENT ([facility])    4/27/23       ED PROVIDER NOTE      History     Chief Complaint   Patient presents with     Hypertension     HPI  Shena Hartmann is a 72 year old female with a past medical history significant for breast cancer, s/p stem cell transplant, HTN who presents to the Emergency Department for evaluation of elevated blood pressure reading.  Patient presents alone.  She states that she most recently had chemotherapy for multiple myeloma on Monday, and has been following with her oncologist in regards to gradually increasing blood pressures over the last few weeks.  She was started on lisinopril, and this has been titrated up most recently to 10 mg nightly starting on Monday from 7.5 mg.  Patient states that she took her blood pressure this morning as she normally does, and it was found to be elevated initial blood pressure at home 200/110.  She states that she did wake up with a mild bilateral occipital head pressure, 5 out of 10 that she does experience from time to time when her blood pressure is high.  This is without any associated changes in vision weakness numbness tingling new from baseline.  She not hit her head.  She is anticoagulated.  She denies any associated chest pain.  She states that she does have some dizziness type sensation when she changes position quickly, without any other lightheadedness or dizziness at rest.  She does report some dyspnea on exertion, which is not abnormal for her after chemotherapy.  She is also having some associated fatigue.  No cough.  No abdominal pain, vomiting, diarrhea.  No urinary symptoms.  Patient is scheduled to follow-up with cardiology on Monday.  Patient notes she has been able to eat and drink at home.      Past Medical History  Past Medical History:   Diagnosis Date     Breast cancer (H) 01/01/1994    right     H/O stem cell transplant (H) 03/01/2014     History of blood transfusion 2013 & 2014    During stem  cell tx process     Hypertension Sept. 2021    Runs 140 s systolically, about 20 above my usual     Multiple myeloma, without mention of having achieved remission 11/06/2012     Past Surgical History:   Procedure Laterality Date     BIOPSY  10/1994; [id number]    Lt. Breast in '94; multiple bone marrow bx's for MM     BREAST SURGERY  10/1994    Lt. Mastectomy w/lymph node resection     COLONOSCOPY  11/2015    Normal results     EYE SURGERY  2020    Bilateral cataract surgery     INSERT PORT VASCULAR ACCESS Left 2/15/2023    Procedure: INSERTION, VASCULAR ACCESS PORT;  Surgeon: Luc Antunez MD;  Location: [facility] OR     IR CHEST PORT PLACEMENT > 5 YRS OF AGE  2/15/2023     MASTECTOMY      Right, with lymphe node disection       PICC INSERTION  09/10/2013    5fr DL Power PICC, 44cm (1cm external) in the L lateral brachial vein with tip in the low SVC     TRANSPLANT  3/27/2014    Autologous stem cell tx     metoprolol tartrate (LOPRESSOR) 50 MG tablet  acetaminophen (TYLENOL) 325 MG tablet  acyclovir (ZOVIRAX) 400 MG tablet  Ascorbic Acid (VITAMIN C PO)  aspirin (ASA) 81 MG chewable tablet  CALCIUM-VITAMIN D-VITAMIN K PO  clobetasol (TEMOVATE) 0.05 % external ointment  COENZYME Q-10 PO  Cyanocobalamin 1000 MCG/ML LIQD  desonide (DESOWEN) 0.05 % external ointment  levothyroxine (SYNTHROID/LEVOTHROID) 50 MCG tablet  lisinopril (ZESTRIL) 2.5 MG tablet  magnesium 100 MG CAPS  Multiple Vitamins-Minerals (MULTIVITAMIN OR)  order for DME  order for DME  prochlorperazine (COMPAZINE) 5 MG tablet  triamcinolone (KENALOG) 0.1 % external ointment  ZINC PICOLINATE PO      Allergies   Allergen Reactions     Blood Transfusion Related (Informational Only) Other (See Comments)     Patient has a history of a clinically significant antibody against RBC antigens.  A delay in compatible RBCs may occur.      Cayenne Pepper [Cayenne]      Corn-Containing Products GI Disturbance     Levaquin Other (See Comments)     Tendon pain     Milk Protein  GI Disturbance     Mold      Facial rash/lip swelling     Morphine Sulfate Other (See Comments)     Per pt - see things (hallucination) when she was on Morphine .     Pollen Extract      Environmental allergies      Shrimp Nausea and Vomiting     Tomato      Lip swelling, facial rash     Azithromycin Rash     Keflex [Cephalexin] Rash     Oat Rash     Tape [Adhesive Tape] Itching and Rash     All adhesives     Wheat Rash     Swelling of lips     Family History  Family History   Problem Relation Age of Onset     Cancer Paternal Grandmother         skin cancer     Hypertension Mother      Cerebrovascular Disease Mother          8-11-15 from strokes     Hypertension Father      Prostate Cancer Father      Social History   Social History     Tobacco Use     Smoking status: Never     Smokeless tobacco: Never   Substance Use Topics     Alcohol use: Yes     Comment: occ     Drug use: No         A medically appropriate review of systems was performed with pertinent positives and negatives noted in the HPI, and all other systems negative.    Physical Exam   BP: (!) 191/88  Pulse: 95  Temp: 98.2  F (36.8  C)  Resp: 16  SpO2: 98 %  Physical Exam    GENERAL APPEARANCE: The patient is well developed, well appearing, and in no acute distress.  Patient is comfortably reading her Ant.  HEAD:  Normocephalic and atraumatic.   EENT: Voice normal.  NECK: Trachea is midline.No lymphadenopathy or tenderness.  LUNGS: Breath sounds are equal and clear bilaterally. No wheezes, rhonchi, or rales.  HEART: Regular rate and normal rhythm.  Radial pulses 2+ bilaterally.  ABDOMEN: Soft, flat, and benign. No mass, tenderness, guarding, or rebound.Bowel sounds are present.  EXTREMITIES: No cyanosis, clubbing, or edema.  NEUROLOGIC: No focal sensory or motor deficits are noted.  Cranial nerves II through XII intact.  Rapid altering movements, finger-to-nose testing intact.   strength 5 out of 5 bilaterally.  No pronator drift.  Patient  "without abnormal gait  PSYCHIATRIC: The patient is awake, alert.  Appropriate mood and affect.  SKIN: Warm, dry, and well perfused. Good turgor.    ED Course, Procedures, & Data     ED Course as of 04/27/23 2206   Thu Apr 27, 2023   1530 Spoke with lab staff in regards to patient's chemistry panel, they tell me that one of the analyzer is down and that results will be back \"as soon as possible\".       EKG 4/27/2023:    Normal sinus rhythm, right bundle branch block, ventricular rate 75 QTc 473.  Interpreted by myself, there does appear to be a new right bundle branch block, compared to prior EKG from 2014.  On today's EKG there does not appear to be any visible ST elevation or depression to suggest ischemia.  There is poor R wave progression.  There is a P wave before every QRS wave.     Results for orders placed or performed during the hospital encounter of 04/27/23   Basic metabolic panel     Status: Abnormal   Result Value Ref Range    Sodium 134 (L) 136 - 145 mmol/L    Potassium 4.1 3.4 - 5.3 mmol/L    Chloride 96 (L) 98 - 107 mmol/L    Carbon Dioxide (CO2) 23 22 - 29 mmol/L    Anion Gap 15 7 - 15 mmol/L    Urea Nitrogen 13.5 8.0 - 23.0 mg/dL    Creatinine 0.78 0.51 - 0.95 mg/dL    Calcium 9.6 8.8 - 10.2 mg/dL    Glucose 91 70 - 99 mg/dL    GFR Estimate 80 >60 mL/min/1.73m2   UA reflex to Microscopic     Status: Abnormal   Result Value Ref Range    Color Urine Yellow Colorless, Straw, Light Yellow, Yellow    Appearance Urine Clear Clear    Glucose Urine Negative Negative mg/dL    Bilirubin Urine Negative Negative    Ketones Urine Negative Negative mg/dL    Specific Gravity Urine 1.011 1.003 - 1.035    Blood Urine Negative Negative    pH Urine 7.0 5.0 - 7.0    Protein Albumin Urine 20 (A) Negative mg/dL    Urobilinogen Urine Normal Normal, 2.0 mg/dL    Nitrite Urine Negative Negative    Leukocyte Esterase Urine Small (A) Negative    Bacteria Urine Many (A) None Seen /HPF    RBC Urine 6 (H) <=2 /HPF    WBC Urine 8 " (H) <=5 /HPF    Mucus Urine Present (A) None Seen /LPF   Mary[facility] Draw     Status: None (In process)    Narrative    The following orders were created for panel order Mary[facility] Draw.  Procedure                               Abnormality         Status                     ---------                               -----------         ------                     Extra Blue Top Tube[[id number]]                              Final result               Extra Red Top Tube[[id number]]                               Final result               Extra Green Top (Lithium...[[id number]]                                                 Extra Purple Top Tube[[id number]]                                                         Please view results for these tests on the individual orders.   CBC with platelets and differential     Status: Abnormal   Result Value Ref Range    WBC Count 5.0 4.0 - 11.0 10e3/uL    RBC Count 2.85 (L) 3.80 - 5.20 10e6/uL    Hemoglobin 10.3 (L) 11.7 - 15.7 g/dL    Hematocrit 31.3 (L) 35.0 - 47.0 %     (H) 78 - 100 fL    MCH 36.1 (H) 26.5 - 33.0 pg    MCHC 32.9 31.5 - 36.5 g/dL    RDW 16.6 (H) 10.0 - 15.0 %    Platelet Count 65 (L) 150 - 450 10e3/uL    % Neutrophils 58 %    % Lymphocytes 17 %    % Monocytes 24 %    % Eosinophils 1 %    % Basophils 0 %    % Immature Granulocytes 0 %    NRBCs per 100 WBC 0 <1 /100    Absolute Neutrophils 2.8 1.6 - 8.3 10e3/uL    Absolute Lymphocytes 0.9 0.8 - 5.3 10e3/uL    Absolute Monocytes 1.2 0.0 - 1.3 10e3/uL    Absolute Eosinophils 0.1 0.0 - 0.7 10e3/uL    Absolute Basophils 0.0 0.0 - 0.2 10e3/uL    Absolute Immature Granulocytes 0.0 <=0.4 10e3/uL    Absolute NRBCs 0.0 10e3/uL   Extra Blue Top Tube     Status: None   Result Value Ref Range    Hold Specimen JIC    Extra Red Top Tube     Status: None   Result Value Ref Range    Hold Specimen JIC    Troponin T, High Sensitivity     Status: Normal   Result Value Ref Range    Troponin T, High Sensitivity 9 <=14 ng/L   RBC and Platelet  Morphology     Status: None   Result Value Ref Range    Platelet Assessment  Automated Count Confirmed. Platelet morphology is normal.     Automated Count Confirmed. Platelet morphology is normal.    RBC Morphology Confirmed RBC Indices    Basic metabolic panel     Status: Abnormal   Result Value Ref Range    Sodium 133 (L) 136 - 145 mmol/L    Potassium 4.1 3.4 - 5.3 mmol/L    Chloride 97 (L) 98 - 107 mmol/L    Carbon Dioxide (CO2) 24 22 - 29 mmol/L    Anion Gap 12 7 - 15 mmol/L    Urea Nitrogen 13.8 8.0 - 23.0 mg/dL    Creatinine 0.69 0.51 - 0.95 mg/dL    Calcium 9.4 8.8 - 10.2 mg/dL    Glucose 93 70 - 99 mg/dL    GFR Estimate >90 >60 mL/min/1.73m2   Troponin T, High Sensitivity     Status: Normal   Result Value Ref Range    Troponin T, High Sensitivity 10 <=14 ng/L   EKG 12 lead     Status: None   Result Value Ref Range    Systolic Blood Pressure  mmHg    Diastolic Blood Pressure  mmHg    Ventricular Rate 75 BPM    Atrial Rate 75 BPM    FL Interval 150 ms    QRS Duration 122 ms     ms    QTc 473 ms    P Axis 61 degrees    R AXIS 13 degrees    T Axis 44 degrees    Interpretation ECG       Sinus rhythm  Right bundle branch block  Abnormal ECG  Unconfirmed report - interpretation of this ECG is computer generated - see medical record for final interpretation  Confirmed by - EMERGENCY ROOM, PHYSICIAN ([id number]),  HODAN VALLEJO ([id number]) on 4/27/2023 2:35:32 PM     CBC with platelets differential     Status: Abnormal    Narrative    The following orders were created for panel order CBC with platelets differential.  Procedure                               Abnormality         Status                     ---------                               -----------         ------                     CBC with platelets and d...[[id number]]  Abnormal            Final result               RBC and Platelet Morphology[[id number]]                      Final result                 Please view results for these tests on the individual  orders.     Medications   metoprolol tartrate (LOPRESSOR) tablet 25 mg (25 mg Oral $Given 4/27/23 1303)   metoprolol tartrate (LOPRESSOR) tablet 25 mg (25 mg Oral $Given 4/27/23 3816)     Labs Ordered and Resulted from Time of ED Arrival to Time of ED Departure   BASIC METABOLIC PANEL - Abnormal       Result Value    Sodium 134 (*)     Potassium 4.1      Chloride 96 (*)     Carbon Dioxide (CO2) 23      Anion Gap 15      Urea Nitrogen 13.5      Creatinine 0.78      Calcium 9.6      Glucose 91      GFR Estimate 80     URINE MACROSCOPIC WITH REFLEX TO MICRO - Abnormal    Color Urine Yellow      Appearance Urine Clear      Glucose Urine Negative      Bilirubin Urine Negative      Ketones Urine Negative      Specific Gravity Urine 1.011      Blood Urine Negative      pH Urine 7.0      Protein Albumin Urine 20 (*)     Urobilinogen Urine Normal      Nitrite Urine Negative      Leukocyte Esterase Urine Small (*)     Bacteria Urine Many (*)     RBC Urine 6 (*)     WBC Urine 8 (*)     Mucus Urine Present (*)    CBC WITH PLATELETS AND DIFFERENTIAL - Abnormal    WBC Count 5.0      RBC Count 2.85 (*)     Hemoglobin 10.3 (*)     Hematocrit 31.3 (*)      (*)     MCH 36.1 (*)     MCHC 32.9      RDW 16.6 (*)     Platelet Count 65 (*)     % Neutrophils 58      % Lymphocytes 17      % Monocytes 24      % Eosinophils 1      % Basophils 0      % Immature Granulocytes 0      NRBCs per 100 WBC 0      Absolute Neutrophils 2.8      Absolute Lymphocytes 0.9      Absolute Monocytes 1.2      Absolute Eosinophils 0.1      Absolute Basophils 0.0      Absolute Immature Granulocytes 0.0      Absolute NRBCs 0.0     BASIC METABOLIC PANEL - Abnormal    Sodium 133 (*)     Potassium 4.1      Chloride 97 (*)     Carbon Dioxide (CO2) 24      Anion Gap 12      Urea Nitrogen 13.8      Creatinine 0.69      Calcium 9.4      Glucose 93      GFR Estimate >90     TROPONIN T, HIGH SENSITIVITY - Normal    Troponin T, High Sensitivity 9     TROPONIN T, HIGH  SENSITIVITY - Normal    Troponin T, High Sensitivity 10     RBC AND PLATELET MORPHOLOGY    Platelet Assessment        Value: Automated Count Confirmed. Platelet morphology is normal.    RBC Morphology Confirmed RBC Indices       No orders to display          Critical care was not performed.     Medical Decision Making  The patient's presentation was of moderate complexity (an undiagnosed new problem with uncertain diagnosis).    The patient's evaluation involved:  ordering and/or review of 3+ test(s) in this encounter (see separate area of note for details)    The patient's management necessitated moderate risk (prescription drug management including medications given in the ED).      Assessment & Plan    This is a 72-year-old female history of breast cancer, multiple myeloma currently undergoing chemotherapy presenting with concerns for elevated blood pressure this morning along with some mild bilateral occipital head pressure in the absence of any trauma change in vision or other red flag warning signs.  Initial presentation to the emergency department patient hypertensive 191/88.  She is without tachycardia or hypoxia.  On exam patient has no focal neurologic deficits, she has clear breath sounds.  I discussed with the recommendations for basic screening labs with her elevated blood pressure reading today.  She has not taken her lisinopril yet, we will give her an initial dose of p.o. metoprolol, 25 mg.  Patient agreeable.    CBC was reviewed, returns without leukocytosis, patient is anemic 10.3, up from 9.43 days ago.  Chemistry panel shows creatinine within normal limits.  Troponin was also ordered, and returns within age-adjusted normal range, along with delta troponin.  EKG was obtained and does show a new right bundle branch block.  There are no recent EKGs in the system, last EKG 2014, does not show this.  On my interpretation of the EKG today, there are no findings otherwise suggest ischemia.  Urinalysis  returns without proteinuria, Hematuria.  On recheck after the initial 25 mg of metoprolol, patient's blood pressure continued to be elevated 185/84.  She was given another 25 mg p.o., and on reevaluation still elevated 182/81.  She tells me she feels her headache has improved down from a 5 out of 10 to 4 out of 10.  She wishes to go home, she states she feels tired after her chemotherapy earlier in the week.  Discussed with the recommendations of close follow-up, she is scheduled to see cardiology early next week.  We will start her on metoprolol in addition to her lisinopril until she can follow-up.  We discussed returning should she develop severe headache changes in vision vomiting any other new or worsening difficulties, low suspicion of acute intracranial process, I do not feel she needs neuroimaging at this point in time.  She was also encouraged to continue with a blood pressure diary.  Patient has no other questions or concerns at this time.  Red flag signs were addressed, and they were in agreement with the patient care plan provided.    Patient seen and discussed with attending physician , who agrees with my plan of care.    I have reviewed the nursing notes. I have reviewed the findings, diagnosis, plan and need for follow up with the patient.    Discharge Medication List as of 4/27/2023  6:41 PM      START taking these medications    Details   metoprolol tartrate (LOPRESSOR) 50 MG tablet Take 1 tablet (50 mg) by mouth 2 times daily for 7 days, Disp-14 tablet, R-0, E-Prescribe             Final diagnoses:   Hypertensive urgency   Right bundle branch block       ADA Giron  [facility] EMERGENCY DEPARTMENT  4/27/2023   no previous reaction

## 2023-04-27 NOTE — TELEPHONE ENCOUNTER
"Called patient to follow up on MyC message.    Oncology Nurse Triage    Situation:   Shena reporting the following symptoms: hypertension 200/105 today. On 10 mg lisinopril as of Monday night.    She reports her chemotherapy has been causing increased blood pressures for at least a month.     She feels \"on the edge of dizziness\", if she moves too fast she gets dizzy, but otherwise she says it is manageable. She also says her head feels a little weird. Denies fevers, SOB, chest pain, significant headache, blurred vision.    She really wanted to be able to manage her Bps until she meets with the cardiologist next week, but given her currently high blood pressure I advised patient she will need intervention today and I would call her back once I discussed with her care team.    Background:   Treating Provider:   Carlos    Date of last office visit: Scott 4/18/23    Recent Treatments: C3D8 Kyprolis for multiple myeloma on 4/24/23    Recommendations:     Paged provider Scott at [phone].  2nd page at [phone].    1004 : Per Dr. Chavez, patient should present to ER at this time.  Called patient back with the recommendation for ER. Patient agreeable to plan and will work on finding a ride.           "

## 2023-04-27 NOTE — ED TRIAGE NOTES
From home for htn  200/100s, HR 60s  Cards appt set up for Monday  191/88 in triage  +headache  On chemo for MM     Triage Assessment     Row Name 04/27/23 1110       Triage Assessment (Adult)    Airway WDL WDL       Respiratory WDL    Respiratory WDL WDL       Skin Circulation/Temperature WDL    Skin Circulation/Temperature WDL WDL       Cardiac WDL    Cardiac WDL WDL       Peripheral/Neurovascular WDL    Peripheral Neurovascular WDL WDL       Cognitive/Neuro/Behavioral WDL    Cognitive/Neuro/Behavioral WDL WDL

## 2023-04-28 ENCOUNTER — PATIENT OUTREACH (OUTPATIENT)
Dept: ONCOLOGY | Facility: CLINIC | Age: 73
End: 2023-04-28
Payer: MEDICARE

## 2023-04-28 NOTE — PROGRESS NOTES
[facility]: Cancer Care                                                                                          Shena calls and leaves a message asking about getting more infusion appointments scheduled.  She also reports that her blood pressure was 169/89 today.    I called back and left her a message.  I informed her I have requested for her apopintments to get scheduled.  I also let her know I wanted to check in on how she was feeling today.  I let her know if she was having symptoms she should call back and ask for nurse triage.    Waiting to hear back from Shena    Signature:  Shabnam Hall RN

## 2023-05-01 ENCOUNTER — LAB (OUTPATIENT)
Dept: LAB | Facility: CLINIC | Age: 73
End: 2023-05-01
Attending: STUDENT IN AN ORGANIZED HEALTH CARE EDUCATION/TRAINING PROGRAM
Payer: MEDICARE

## 2023-05-01 ENCOUNTER — VIRTUAL VISIT (OUTPATIENT)
Dept: CARDIOLOGY | Facility: CLINIC | Age: 73
End: 2023-05-01
Attending: INTERNAL MEDICINE
Payer: MEDICARE

## 2023-05-01 ENCOUNTER — INFUSION THERAPY VISIT (OUTPATIENT)
Dept: ONCOLOGY | Facility: CLINIC | Age: 73
End: 2023-05-01
Attending: STUDENT IN AN ORGANIZED HEALTH CARE EDUCATION/TRAINING PROGRAM
Payer: MEDICARE

## 2023-05-01 ENCOUNTER — PATIENT OUTREACH (OUTPATIENT)
Dept: ONCOLOGY | Facility: CLINIC | Age: 73
End: 2023-05-01

## 2023-05-01 VITALS — DIASTOLIC BLOOD PRESSURE: 77 MMHG | SYSTOLIC BLOOD PRESSURE: 166 MMHG

## 2023-05-01 VITALS
SYSTOLIC BLOOD PRESSURE: 187 MMHG | HEIGHT: 63 IN | BODY MASS INDEX: 20.46 KG/M2 | DIASTOLIC BLOOD PRESSURE: 82 MMHG | WEIGHT: 115.5 LBS

## 2023-05-01 VITALS — DIASTOLIC BLOOD PRESSURE: 76 MMHG | HEART RATE: 54 BPM | SYSTOLIC BLOOD PRESSURE: 172 MMHG

## 2023-05-01 DIAGNOSIS — C90.00 MULTIPLE MYELOMA NOT HAVING ACHIEVED REMISSION (H): ICD-10-CM

## 2023-05-01 DIAGNOSIS — I99.8 POORLY CONTROLLED BLOOD PRESSURE: ICD-10-CM

## 2023-05-01 DIAGNOSIS — I10 BENIGN ESSENTIAL HYPERTENSION: Primary | ICD-10-CM

## 2023-05-01 DIAGNOSIS — E85.89 OTHER AMYLOIDOSIS (H): ICD-10-CM

## 2023-05-01 DIAGNOSIS — C90.02 MULTIPLE MYELOMA IN RELAPSE (H): ICD-10-CM

## 2023-05-01 DIAGNOSIS — C90.00 MULTIPLE MYELOMA NOT HAVING ACHIEVED REMISSION (H): Primary | ICD-10-CM

## 2023-05-01 DIAGNOSIS — I51.89 DIASTOLIC DYSFUNCTION: ICD-10-CM

## 2023-05-01 LAB
ALBUMIN SERPL BCG-MCNC: 4.1 G/DL (ref 3.5–5.2)
ALP SERPL-CCNC: 55 U/L (ref 35–104)
ALT SERPL W P-5'-P-CCNC: 28 U/L (ref 10–35)
ANION GAP SERPL CALCULATED.3IONS-SCNC: 8 MMOL/L (ref 7–15)
AST SERPL W P-5'-P-CCNC: 19 U/L (ref 10–35)
BASOPHILS # BLD AUTO: 0 10E3/UL (ref 0–0.2)
BASOPHILS NFR BLD AUTO: 0 %
BILIRUB SERPL-MCNC: 0.2 MG/DL
BUN SERPL-MCNC: 18.3 MG/DL (ref 8–23)
CALCIUM SERPL-MCNC: 9.8 MG/DL (ref 8.8–10.2)
CHLORIDE SERPL-SCNC: 99 MMOL/L (ref 98–107)
CREAT SERPL-MCNC: 0.72 MG/DL (ref 0.51–0.95)
DEPRECATED HCO3 PLAS-SCNC: 29 MMOL/L (ref 22–29)
EOSINOPHIL # BLD AUTO: 0.1 10E3/UL (ref 0–0.7)
EOSINOPHIL NFR BLD AUTO: 2 %
ERYTHROCYTE [DISTWIDTH] IN BLOOD BY AUTOMATED COUNT: 15.9 % (ref 10–15)
GFR SERPL CREATININE-BSD FRML MDRD: 88 ML/MIN/1.73M2
GLUCOSE SERPL-MCNC: 100 MG/DL (ref 70–99)
HCT VFR BLD AUTO: 30.1 % (ref 35–47)
HGB BLD-MCNC: 10 G/DL (ref 11.7–15.7)
IMM GRANULOCYTES # BLD: 0 10E3/UL
IMM GRANULOCYTES NFR BLD: 0 %
LDH SERPL L TO P-CCNC: 181 U/L (ref 0–250)
LYMPHOCYTES # BLD AUTO: 0.7 10E3/UL (ref 0.8–5.3)
LYMPHOCYTES NFR BLD AUTO: 18 %
MCH RBC QN AUTO: 35.7 PG (ref 26.5–33)
MCHC RBC AUTO-ENTMCNC: 33.2 G/DL (ref 31.5–36.5)
MCV RBC AUTO: 108 FL (ref 78–100)
MONOCYTES # BLD AUTO: 0.8 10E3/UL (ref 0–1.3)
MONOCYTES NFR BLD AUTO: 20 %
NEUTROPHILS # BLD AUTO: 2.2 10E3/UL (ref 1.6–8.3)
NEUTROPHILS NFR BLD AUTO: 60 %
NRBC # BLD AUTO: 0 10E3/UL
NRBC BLD AUTO-RTO: 0 /100
NT-PROBNP SERPL-MCNC: 700 PG/ML (ref 0–900)
PLATELET # BLD AUTO: 94 10E3/UL (ref 150–450)
POTASSIUM SERPL-SCNC: 4 MMOL/L (ref 3.4–5.3)
PROT SERPL-MCNC: 6.9 G/DL (ref 6.4–8.3)
RBC # BLD AUTO: 2.8 10E6/UL (ref 3.8–5.2)
SODIUM SERPL-SCNC: 136 MMOL/L (ref 136–145)
TOTAL PROTEIN SERUM FOR ELP: 6.6 G/DL (ref 6.4–8.3)
WBC # BLD AUTO: 3.8 10E3/UL (ref 4–11)

## 2023-05-01 PROCEDURE — 250N000011 HC RX IP 250 OP 636: Performed by: STUDENT IN AN ORGANIZED HEALTH CARE EDUCATION/TRAINING PROGRAM

## 2023-05-01 PROCEDURE — 96417 CHEMO IV INFUS EACH ADDL SEQ: CPT

## 2023-05-01 PROCEDURE — 96375 TX/PRO/DX INJ NEW DRUG ADDON: CPT

## 2023-05-01 PROCEDURE — 85004 AUTOMATED DIFF WBC COUNT: CPT

## 2023-05-01 PROCEDURE — 84155 ASSAY OF PROTEIN SERUM: CPT

## 2023-05-01 PROCEDURE — 258N000003 HC RX IP 258 OP 636: Performed by: STUDENT IN AN ORGANIZED HEALTH CARE EDUCATION/TRAINING PROGRAM

## 2023-05-01 PROCEDURE — 83521 IG LIGHT CHAINS FREE EACH: CPT | Mod: 59

## 2023-05-01 PROCEDURE — 80053 COMPREHEN METABOLIC PANEL: CPT

## 2023-05-01 PROCEDURE — 36591 DRAW BLOOD OFF VENOUS DEVICE: CPT

## 2023-05-01 PROCEDURE — 83880 ASSAY OF NATRIURETIC PEPTIDE: CPT

## 2023-05-01 PROCEDURE — 96413 CHEMO IV INFUSION 1 HR: CPT

## 2023-05-01 PROCEDURE — 250N000013 HC RX MED GY IP 250 OP 250 PS 637: Performed by: STUDENT IN AN ORGANIZED HEALTH CARE EDUCATION/TRAINING PROGRAM

## 2023-05-01 PROCEDURE — 83615 LACTATE (LD) (LDH) ENZYME: CPT

## 2023-05-01 PROCEDURE — 84165 PROTEIN E-PHORESIS SERUM: CPT | Mod: 26

## 2023-05-01 PROCEDURE — 99204 OFFICE O/P NEW MOD 45 MIN: CPT | Mod: 95 | Performed by: INTERNAL MEDICINE

## 2023-05-01 PROCEDURE — 84165 PROTEIN E-PHORESIS SERUM: CPT | Mod: TC | Performed by: PATHOLOGY

## 2023-05-01 RX ORDER — ACETAMINOPHEN 325 MG/1
650 TABLET ORAL ONCE
Status: COMPLETED | OUTPATIENT
Start: 2023-05-01 | End: 2023-05-01

## 2023-05-01 RX ORDER — HEPARIN SODIUM (PORCINE) LOCK FLUSH IV SOLN 100 UNIT/ML 100 UNIT/ML
5 SOLUTION INTRAVENOUS
Status: DISCONTINUED | OUTPATIENT
Start: 2023-05-01 | End: 2023-05-01 | Stop reason: HOSPADM

## 2023-05-01 RX ORDER — ONDANSETRON 2 MG/ML
8 INJECTION INTRAMUSCULAR; INTRAVENOUS ONCE
Status: COMPLETED | OUTPATIENT
Start: 2023-05-01 | End: 2023-05-01

## 2023-05-01 RX ORDER — HEPARIN SODIUM (PORCINE) LOCK FLUSH IV SOLN 100 UNIT/ML 100 UNIT/ML
500 SOLUTION INTRAVENOUS ONCE
Status: COMPLETED | OUTPATIENT
Start: 2023-05-01 | End: 2023-05-01

## 2023-05-01 RX ADMIN — ONDANSETRON 8 MG: 2 INJECTION INTRAMUSCULAR; INTRAVENOUS at 10:08

## 2023-05-01 RX ADMIN — SODIUM CHLORIDE 500 MG: 9 INJECTION, SOLUTION INTRAVENOUS at 10:45

## 2023-05-01 RX ADMIN — SODIUM CHLORIDE 250 ML: 9 INJECTION, SOLUTION INTRAVENOUS at 10:03

## 2023-05-01 RX ADMIN — ACETAMINOPHEN 650 MG: 325 TABLET ORAL at 10:02

## 2023-05-01 RX ADMIN — DIPHENHYDRAMINE HYDROCHLORIDE 50 MG: 50 INJECTION, SOLUTION INTRAMUSCULAR; INTRAVENOUS at 10:14

## 2023-05-01 RX ADMIN — FAMOTIDINE 20 MG: 10 INJECTION INTRAVENOUS at 10:06

## 2023-05-01 RX ADMIN — DEXTROSE MONOHYDRATE 250 ML: 50 INJECTION, SOLUTION INTRAVENOUS at 12:04

## 2023-05-01 RX ADMIN — Medication 5 ML: at 12:44

## 2023-05-01 RX ADMIN — CARFILZOMIB 90 MG: 30 INJECTION, POWDER, LYOPHILIZED, FOR SOLUTION INTRAVENOUS at 12:08

## 2023-05-01 RX ADMIN — Medication 500 UNITS: at 08:42

## 2023-05-01 ASSESSMENT — PAIN SCALES - GENERAL: PAINLEVEL: NO PAIN (0)

## 2023-05-01 NOTE — PROGRESS NOTES
I had the pleasure of participating in coordination of clinical cardiovascular care for patient, Shena Hartmann, via [employer]'s virtual visit protocol during the COVID-19 Pandemic.    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     Hypertension was initially diagnosed in Sept 2020 and was started on lisinopril in Feb 2021. The lisinopril dose has been increased to 10 mg daily. She was in the ED for /88 (4/27/23). Lopressor 50 mg bid was added and this not not resulted in much improvement in the blood pressure readings.  Blood pressure lately has been in the 160s/90s mmHg,     She notes exertional dyspnea after receiving chemotherapy and this usually last for 4-5 days.She denies chest pain, palpitations, orthopnea, PND, peripheral edema or syncope.    She is not a diabetic.  Her renal function is normal.  Family history is positive for hypertension.  There is no family history of premature coronary disease or sudden death.      Current Outpatient Medications   Medication Sig Dispense Refill     acetaminophen (TYLENOL) 325 MG tablet Take 325-650 mg by mouth every 6 hours as needed for mild pain       acyclovir (ZOVIRAX) 400 MG tablet Take 1 tablet (400 mg) by mouth every 12 hours 60 tablet 11     Ascorbic Acid (VITAMIN C PO) Take 1,000 mg by mouth 2 times daily        aspirin (ASA) 81 MG chewable tablet Take 1 tablet (81 mg) by mouth daily 30 tablet 0     CALCIUM-VITAMIN D-VITAMIN K PO Take 2 tablets by mouth daily.       clobetasol (TEMOVATE) 0.05 % external ointment Apply topically 2 times daily as needed Topically to rash on hands       COENZYME Q-10 PO Take 100 mg by mouth daily        Cyanocobalamin 1000 MCG/ML LIQD Take 500 mcg by mouth 2 times daily       desonide (DESOWEN)  0.05 % external ointment Apply topically 2 times daily Apply to face as needed for dermatitis 15 g 11     levothyroxine (SYNTHROID/LEVOTHROID) 50 MCG tablet Take 1 tablet (50 mcg) by mouth daily 90 tablet 3     lisinopril (ZESTRIL) 2.5 MG tablet Take 2 tablets (5 mg) by mouth daily (Patient taking differently: Take 5 mg by mouth daily Pt taking 7.5mg daily since 4/6) 180 tablet 3     magnesium 100 MG CAPS Take 100 mg by mouth 3 times daily       metoprolol tartrate (LOPRESSOR) 50 MG tablet Take 1 tablet (50 mg) by mouth 2 times daily for 7 days 14 tablet 0     Multiple Vitamins-Minerals (MULTIVITAMIN OR) Take 1 tablet by mouth 2 times daily.       order for DME 6 mastectomy bras  Length of need: 99 months 6 Device 1     order for DME R mastectomy prosthesis   Length of need:  99 months 1 Device 1     prochlorperazine (COMPAZINE) 5 MG tablet Take 1 tablet (5 mg) by mouth every 6 hours as needed for nausea or vomiting 30 tablet 11     triamcinolone (KENALOG) 0.1 % external ointment Apply topically 2 times daily 15 g 11     ZINC PICOLINATE PO Take 30 mg by mouth         Allergies - reviewed     Allergies   Allergen Reactions     Blood Transfusion Related (Informational Only) Other (See Comments)     Patient has a history of a clinically significant antibody against RBC antigens.  A delay in compatible RBCs may occur.      Cayenne Pepper [Cayenne]      Corn-Containing Products GI Disturbance     Levaquin Other (See Comments)     Tendon pain     Milk Protein GI Disturbance     Mold      Facial rash/lip swelling     Morphine Sulfate Other (See Comments)     Per pt - see things (hallucination) when she was on Morphine .     Pollen Extract      Environmental allergies      Shrimp Nausea and Vomiting     Tomato      Lip swelling, facial rash     Azithromycin Rash     Keflex [Cephalexin] Rash     Oat Rash     Tape [Adhesive Tape] Itching and Rash     All adhesives     Wheat Rash     Swelling of lips       Past history  -reviewed  Past Medical History:   Diagnosis Date     Breast cancer (H) 1994    right     H/O stem cell transplant (H) 2014     History of blood transfusion  &     During stem cell tx process     Hypertension 2021    Runs 140 s systolically, about 20 above my usual     Multiple myeloma, without mention of having achieved remission 2012        Social history - reviewed  Social History     Tobacco Use     Smoking status: Never     Smokeless tobacco: Never   Substance Use Topics     Alcohol use: Yes     Comment: occ     Drug use: No       Family history -reviewed  Family History   Problem Relation Age of Onset     Cancer Paternal Grandmother         skin cancer     Hypertension Mother      Cerebrovascular Disease Mother          8-11-15 from strokes     Hypertension Father      Prostate Cancer Father        ROS: non contributory on the 10-point review of system    Exam: limited due to the nature of this visit     At the infusion center today -  /81 mmHg HR 54 bpm    In general, the patient is in no acute distress.    Breathing is unlabored.    Neurologic: Alert and oriented to person/place/time, normal speech and affect    Data:    All relevant labs were personally reviewed and discussed with the patient.   Chemistry panel:   Lab Test 23  1712 23  1242   * 134*   POTASSIUM 4.1 4.1   CHLORIDE 97* 96*   CO2 24 23   ANIONGAP 12 15   GLC 93 91   BUN 13.8 13.5   CR 0.69 0.78   PAULINE 9.4 9.6   GFRESTIMATED >90 80   AST  --   --    ALT  --   --        CBC:   Recent Labs   Lab Test 23  1242 23  1148   WBC 5.0 4.0   RBC 2.85* 2.61*   HGB 10.3* 9.4*   HCT 31.3* 27.9*   * 107*   MCH 36.1* 36.0*   MCHC 32.9 33.7   RDW 16.6* 16.7*   PLT 65* 88*       Lipid Panel:  Recent Labs   Lab Test 18  0737   CHOL 234*   HDL 64   *   TRIG 85       Thyroid:   TSH   Date Value Ref Range Status   10/03/2022 1.82 0.30 - 4.20 uIU/mL Final   2022 9.31 (H)  0.40 - 4.00 mU/L Final   2022 9.47 (H) 0.40 - 4.00 mU/L Final   2015 3.59 0.40 - 4.00 mU/L Final     Comment:     Effective 2014, the reference range for this assay has changed to reflect   new instrumentation/methodology.       T4 Free   Date Value Ref Range Status   2015 0.88 0.76 - 1.46 ng/dL Final     Comment:     Effective 2014, the reference range for this assay has changed to reflect   new instrumentation/methodology.       Free T4   Date Value Ref Range Status   2022 0.97 0.76 - 1.46 ng/dL Final   2022 0.99 0.76 - 1.46 ng/dL Final     No results found for: A1C  INR   Date Value Ref Range Status   2014 1.14 0.86 - 1.14 Final       Patient's all available and relevant cardiac investigations were personally reviewed and discussed with the patient today.    Echo: 3.27.23  Normal biventricular function  Daistolic function - abnormal  No significant valve disease  No PH  IVC normal    EC23  Sinus rhythm      Assessment and Plan:  72 year old woman with    Essential hypertension  Normal biventricular function   Relapsed multiple myeloma  Personal history of Breast cancer    Shena Hartmann has hypertension likely related to the chemotherapy she is receiving for the relapsed multiple myeloma. All three agents - carfilzumab, isatuximab and dexamethasone are strongly associated with hypertension.     She is currently on lisinopril 10 mg daily and Lopressor.  Blood pressure remains elevated.  She has no heart failure symptoms.  She has normal biventricular function with no significant valvular disease.  She has impaired  diastolic function but is without hypervolemia. I will address the diastolic dysfunction by treating her hypertension. I will initiate amlodipine 2.5 mg daily and plan to up titrate it if necessary based on blood pressure response.  I have advised her to continue lisinopril and to hold the Lopressor since I am starting amlodipine. She does not need  diuretics at this point. We can start spironolactone if necessary as a third agent in the future.     Recommendations:     1. Amlodipine 2.5 mg daily in the morning  2. Continue lisinopril 10 mg at night  3. Hold lopressor for now  4. Send BP readings via [employer]  5. Follow up in 3-4 weeks       Telephone Visit Details:    Call duration:  20 minutes    In addition to visit time documented above, I spent an additional 15 minutes on data review and documentation.      Sadia Price MD, MS  Professor of Medicine  Cardiovascular Medicine

## 2023-05-01 NOTE — PROGRESS NOTES
Infusion Nursing Note:  Shena Hartmann presents today for cycle 3 day 15 sarclisa, kyprolis.    Patient seen by provider today: No   present during visit today: Not Applicable.    Note:   Patient reports her usual fatigue and LEONG. She has nausea after each treatment, but denies nausea today. She denies fevers, chills, chest pain, diarrhea, and pain.    Patient saw cardiology this morning regarding her elevated BP. BP at infusion 161/81.    Intravenous Access:  Implanted Port.    Treatment Conditions:  Lab Results   Component Value Date    HGB 10.0 (L) 05/01/2023    WBC 3.8 (L) 05/01/2023    ANEU 2.1 07/05/2021    ANEUTAUTO 2.2 05/01/2023    PLT 94 (L) 05/01/2023      Lab Results   Component Value Date     05/01/2023    POTASSIUM 4.0 05/01/2023    MAG 1.8 12/12/2014    CR 0.72 05/01/2023    PAULINE 9.8 05/01/2023    BILITOTAL 0.2 05/01/2023    ALBUMIN 4.1 05/01/2023    ALT 28 05/01/2023    AST 19 05/01/2023     Results reviewed, labs MET treatment parameters, ok to proceed with treatment.      Post Infusion Assessment:  Patient tolerated infusion without incident.  Blood return noted pre and post infusion.  Site patent and intact, free from redness, edema or discomfort.  No evidence of extravasations.  Access discontinued per protocol.       Discharge Plan:   Patient declined prescription refills.  Discharge instructions reviewed with: Patient.  Patient and/or family verbalized understanding of discharge instructions and all questions answered.  AVS to patient via [employer].  Patient due to return 5/15 for next appointment - [facility] working with scheduling to get this scheduled, patient aware.   Patient discharged in stable condition accompanied by: self.  Departure Mode: Ambulatory.      Esthela Schmitz RN

## 2023-05-01 NOTE — PROGRESS NOTES
[facility]: Cancer Care                                                                                          Shena returns my call from Friday 4/28/2023.  I let her know I was checking up on how she was doing with blood pressures.  She saw Dr. Price from Cardiology this morning via virtual visit and was advised to stop taking metoprolol and will start amlodipine.  We discussed further questions in regard to a plan for contacting a provider vs. going to the ED with elevated blood pressure concerns.  I let her know I would message Dr. Price for clarification    Shena had C3 D15 today.  We reviewed her upcoming appointments for starting cycle 4 of Carfilzomib and isatuximab.   Shena would like to scheduled through July so she may coordinate her summer vacation plans.  I let her know I would review the treatment schedule with Dr. Chavez and request appointments accordingly    Signature:  Shabnam Hall RN Care Coordinator  [facility]

## 2023-05-01 NOTE — NURSING NOTE
No chief complaint on file.    Vitals were taken and medications reconciled.    Jameson Hanks, EMT  7:48 AM

## 2023-05-01 NOTE — PATIENT INSTRUCTIONS
[facility] Triage and after hours / weekends / holidays:  [phone]    Please call the triage or after hours line if you experience a temperature greater than or equal to 100.5, shaking chills, have uncontrolled nausea, vomiting and/or diarrhea, dizziness, shortness of breath, chest pain, bleeding, unexplained bruising, or if you have any other new/concerning symptoms, questions or concerns.      If you are having any concerning symptoms or wish to speak to a provider before your next infusion visit, please call your care coordinator or triage to notify them so we can adequately serve you.     If you need a refill on a narcotic prescription or other medication, please call before your infusion appointment.                  Recent Results (from the past 24 hour(s))   N terminal pro BNP outpatient    Collection Time: 05/01/23  8:47 AM   Result Value Ref Range    N Terminal Pro BNP Outpatient 700 0 - 900 pg/mL   Comprehensive metabolic panel    Collection Time: 05/01/23  8:47 AM   Result Value Ref Range    Sodium 136 136 - 145 mmol/L    Potassium 4.0 3.4 - 5.3 mmol/L    Chloride 99 98 - 107 mmol/L    Carbon Dioxide (CO2) 29 22 - 29 mmol/L    Anion Gap 8 7 - 15 mmol/L    Urea Nitrogen 18.3 8.0 - 23.0 mg/dL    Creatinine 0.72 0.51 - 0.95 mg/dL    Calcium 9.8 8.8 - 10.2 mg/dL    Glucose 100 (H) 70 - 99 mg/dL    Alkaline Phosphatase 55 35 - 104 U/L    AST 19 10 - 35 U/L    ALT 28 10 - 35 U/L    Protein Total 6.9 6.4 - 8.3 g/dL    Albumin 4.1 3.5 - 5.2 g/dL    Bilirubin Total 0.2 <=1.2 mg/dL    GFR Estimate 88 >60 mL/min/1.73m2   Lactate Dehydrogenase    Collection Time: 05/01/23  8:47 AM   Result Value Ref Range    Lactate Dehydrogenase 181 0 - 250 U/L   Total Protein, Serum for ELP    Collection Time: 05/01/23  8:47 AM   Result Value Ref Range    Total Protein Serum for ELP 6.6 6.4 - 8.3 g/dL   CBC with platelets and differential    Collection Time: 05/01/23  8:47 AM   Result Value Ref Range    WBC Count 3.8 (L) 4.0 -  11.0 10e3/uL    RBC Count 2.80 (L) 3.80 - 5.20 10e6/uL    Hemoglobin 10.0 (L) 11.7 - 15.7 g/dL    Hematocrit 30.1 (L) 35.0 - 47.0 %     (H) 78 - 100 fL    MCH 35.7 (H) 26.5 - 33.0 pg    MCHC 33.2 31.5 - 36.5 g/dL    RDW 15.9 (H) 10.0 - 15.0 %    Platelet Count 94 (L) 150 - 450 10e3/uL    % Neutrophils 60 %    % Lymphocytes 18 %    % Monocytes 20 %    % Eosinophils 2 %    % Basophils 0 %    % Immature Granulocytes 0 %    NRBCs per 100 WBC 0 <1 /100    Absolute Neutrophils 2.2 1.6 - 8.3 10e3/uL    Absolute Lymphocytes 0.7 (L) 0.8 - 5.3 10e3/uL    Absolute Monocytes 0.8 0.0 - 1.3 10e3/uL    Absolute Eosinophils 0.1 0.0 - 0.7 10e3/uL    Absolute Basophils 0.0 0.0 - 0.2 10e3/uL    Absolute Immature Granulocytes 0.0 <=0.4 10e3/uL    Absolute NRBCs 0.0 10e3/uL

## 2023-05-01 NOTE — LETTER
5/1/2023      RE: Shena Hartmann  [address] [place] [address]       Dear Colleague,    Thank you for the opportunity to participate in the care of your patient, Shena Hartmann, at the [facility] [place] at [facility]. Please see a copy of my visit note below.    I had the pleasure of participating in coordination of clinical cardiovascular care for patient, Shena Hartmann, via [facility]'s virtual visit protocol during the COVID-19 Pandemic.    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     Hypertension was initially diagnosed in Sept 2020 and was started on lisinopril in Feb 2021. The lisinopril dose has been increased to 10 mg daily. She was in the ED for /88 (4/27/23). Lopressor 50 mg bid was added and this not not resulted in much improvement in the blood pressure readings.  Blood pressure lately has been in the 160s/90s mmHg,     She notes exertional dyspnea after receiving chemotherapy and this usually last for 4-5 days.She denies chest pain, palpitations, orthopnea, PND, peripheral edema or syncope.    She is not a diabetic.  Her renal function is normal.  Family history is positive for hypertension.  There is no family history of premature coronary disease or sudden death.      Current Outpatient Medications   Medication Sig Dispense Refill    acetaminophen (TYLENOL) 325 MG tablet Take 325-650 mg by mouth every 6 hours as needed for mild pain      acyclovir (ZOVIRAX) 400 MG tablet Take 1 tablet (400 mg) by mouth every 12 hours 60 tablet 11    Ascorbic Acid (VITAMIN C PO) Take 1,000 mg by mouth 2 times daily       aspirin (ASA) 81 MG chewable tablet Take 1 tablet (81 mg) by mouth daily 30  tablet 0    CALCIUM-VITAMIN D-VITAMIN K PO Take 2 tablets by mouth daily.      clobetasol (TEMOVATE) 0.05 % external ointment Apply topically 2 times daily as needed Topically to rash on hands      COENZYME Q-10 PO Take 100 mg by mouth daily       Cyanocobalamin 1000 MCG/ML LIQD Take 500 mcg by mouth 2 times daily      desonide (DESOWEN) 0.05 % external ointment Apply topically 2 times daily Apply to face as needed for dermatitis 15 g 11    levothyroxine (SYNTHROID/LEVOTHROID) 50 MCG tablet Take 1 tablet (50 mcg) by mouth daily 90 tablet 3    lisinopril (ZESTRIL) 2.5 MG tablet Take 2 tablets (5 mg) by mouth daily (Patient taking differently: Take 5 mg by mouth daily Pt taking 7.5mg daily since 4/6) 180 tablet 3    magnesium 100 MG CAPS Take 100 mg by mouth 3 times daily      metoprolol tartrate (LOPRESSOR) 50 MG tablet Take 1 tablet (50 mg) by mouth 2 times daily for 7 days 14 tablet 0    Multiple Vitamins-Minerals (MULTIVITAMIN OR) Take 1 tablet by mouth 2 times daily.      order for DME 6 mastectomy bras  Length of need: 99 months 6 Device 1    order for DME R mastectomy prosthesis   Length of need:  99 months 1 Device 1    prochlorperazine (COMPAZINE) 5 MG tablet Take 1 tablet (5 mg) by mouth every 6 hours as needed for nausea or vomiting 30 tablet 11    triamcinolone (KENALOG) 0.1 % external ointment Apply topically 2 times daily 15 g 11    ZINC PICOLINATE PO Take 30 mg by mouth         Allergies - reviewed     Allergies   Allergen Reactions    Blood Transfusion Related (Informational Only) Other (See Comments)     Patient has a history of a clinically significant antibody against RBC antigens.  A delay in compatible RBCs may occur.     Cayenne Pepper [Cayenne]     Corn-Containing Products GI Disturbance    Levaquin Other (See Comments)     Tendon pain    Milk Protein GI Disturbance    Mold      Facial rash/lip swelling    Morphine Sulfate Other (See Comments)     Per pt - see things (hallucination) when she was  on Morphine .    Pollen Extract      Environmental allergies     Shrimp Nausea and Vomiting    Tomato      Lip swelling, facial rash    Azithromycin Rash    Keflex [Cephalexin] Rash    Oat Rash    Tape [Adhesive Tape] Itching and Rash     All adhesives    Wheat Rash     Swelling of lips       Past history -reviewed  Past Medical History:   Diagnosis Date    Breast cancer (H) 1994    right    H/O stem cell transplant (H) 2014    History of blood transfusion  &     During stem cell tx process    Hypertension 2021    Runs 140 s systolically, about 20 above my usual    Multiple myeloma, without mention of having achieved remission 2012        Social history - reviewed  Social History     Tobacco Use    Smoking status: Never    Smokeless tobacco: Never   Substance Use Topics    Alcohol use: Yes     Comment: occ    Drug use: No       Family history -reviewed  Family History   Problem Relation Age of Onset    Cancer Paternal Grandmother         skin cancer    Hypertension Mother     Cerebrovascular Disease Mother          8-11-15 from strokes    Hypertension Father     Prostate Cancer Father        ROS: non contributory on the 10-point review of system    Exam: limited due to the nature of this visit     At the infusion center today -  /81 mmHg HR 54 bpm    In general, the patient is in no acute distress.    Breathing is unlabored.    Neurologic: Alert and oriented to person/place/time, normal speech and affect    Data:    All relevant labs were personally reviewed and discussed with the patient.   Chemistry panel:   Lab Test 23  1712 23  1242   * 134*   POTASSIUM 4.1 4.1   CHLORIDE 97* 96*   CO2 24 23   ANIONGAP 12 15   GLC 93 91   BUN 13.8 13.5   CR 0.69 0.78   PAULINE 9.4 9.6   GFRESTIMATED >90 80   AST  --   --    ALT  --   --        CBC:   Recent Labs   Lab Test 23  1242 23  1148   WBC 5.0 4.0   RBC 2.85* 2.61*   HGB 10.3* 9.4*   HCT 31.3* 27.9*   MCV  110* 107*   MCH 36.1* 36.0*   MCHC 32.9 33.7   RDW 16.6* 16.7*   PLT 65* 88*       Lipid Panel:  Recent Labs   Lab Test 18  0737   CHOL 234*   HDL 64   *   TRIG 85       Thyroid:   TSH   Date Value Ref Range Status   10/03/2022 1.82 0.30 - 4.20 uIU/mL Final   2022 9.31 (H) 0.40 - 4.00 mU/L Final   2022 9.47 (H) 0.40 - 4.00 mU/L Final   2015 3.59 0.40 - 4.00 mU/L Final     Comment:     Effective 2014, the reference range for this assay has changed to reflect   new instrumentation/methodology.       T4 Free   Date Value Ref Range Status   2015 0.88 0.76 - 1.46 ng/dL Final     Comment:     Effective 2014, the reference range for this assay has changed to reflect   new instrumentation/methodology.       Free T4   Date Value Ref Range Status   2022 0.97 0.76 - 1.46 ng/dL Final   2022 0.99 0.76 - 1.46 ng/dL Final     No results found for: A1C  INR   Date Value Ref Range Status   2014 1.14 0.86 - 1.14 Final       Patient's all available and relevant cardiac investigations were personally reviewed and discussed with the patient today.    Echo: 3.27.23  Normal biventricular function  Daistolic function - abnormal  No significant valve disease  No PH  IVC normal    EC23  Sinus rhythm      Assessment and Plan:  72 year old woman with    Essential hypertension  Normal biventricular function   Relapsed multiple myeloma  Personal history of Breast cancer    Shena Hartmann has hypertension likely related to the chemotherapy she is receiving for the relapsed multiple myeloma. All three agents - carfilzumab, isatuximab and dexamethasone are strongly associated with hypertension.     She is currently on lisinopril 10 mg daily and Lopressor.  Blood pressure remains elevated.  She has no heart failure symptoms.  She has normal biventricular function with no significant valvular disease.  She has impaired  diastolic function but is without hypervolemia. I will  address the diastolic dysfunction by treating her hypertension. I will initiate amlodipine 2.5 mg daily and plan to up titrate it if necessary based on blood pressure response.  I have advised her to continue lisinopril and to hold the Lopressor since I am starting amlodipine. She does not need diuretics at this point. We can start spironolactone if necessary as a third agent in the future.     Recommendations:     1. Amlodipine 2.5 mg daily in the morning  2. Continue lisinopril 10 mg at night  3. Hold lopressor for now  4. Send BP readings via [employer]  5. Follow up in 3-4 weeks       Telephone Visit Details:    Call duration:  20 minutes    In addition to visit time documented above, I spent an additional 15 minutes on data review and documentation.      Sadia Price MD, MS  Professor of Medicine  Cardiovascular Medicine

## 2023-05-02 LAB
ALBUMIN SERPL ELPH-MCNC: 4.1 G/DL (ref 3.7–5.1)
ALPHA1 GLOB SERPL ELPH-MCNC: 0.3 G/DL (ref 0.2–0.4)
ALPHA2 GLOB SERPL ELPH-MCNC: 0.7 G/DL (ref 0.5–0.9)
B-GLOBULIN SERPL ELPH-MCNC: 0.6 G/DL (ref 0.6–1)
GAMMA GLOB SERPL ELPH-MCNC: 1 G/DL (ref 0.7–1.6)
KAPPA LC FREE SER-MCNC: 7.96 MG/DL (ref 0.33–1.94)
KAPPA LC FREE/LAMBDA FREE SER NEPH: ABNORMAL {RATIO}
LAMBDA LC FREE SERPL-MCNC: <0.14 MG/DL (ref 0.57–2.63)
M PROTEIN SERPL ELPH-MCNC: 0.8 G/DL
PROT PATTERN SERPL ELPH-IMP: ABNORMAL

## 2023-05-02 RX ORDER — AMLODIPINE BESYLATE 2.5 MG/1
2.5 TABLET ORAL DAILY
Qty: 90 TABLET | Refills: 0 | Status: SHIPPED | OUTPATIENT
Start: 2023-05-02 | End: 2023-05-05

## 2023-05-02 RX ORDER — LISINOPRIL 10 MG/1
10 TABLET ORAL DAILY
Qty: 90 TABLET | Refills: 0 | Status: SHIPPED | OUTPATIENT
Start: 2023-05-02 | End: 2023-07-25

## 2023-05-02 NOTE — PATIENT INSTRUCTIONS
"Cardiology Providers you saw during your visit:  Dr. Price    Medication changes:   START Amlodipine 2.5 mg in the morning  CONTINUE Lisinopril 10 mg in the evening  HOLD Lopressor for now    Follow up:   Send updated blood pressure readings in 1 week   Follow up with Dr. Price in 3-4 weeks (video okay)    If you have any questions, contact  Richie Amato RN. We are encouraging the use of [employer] to communicate with your HealthCare Provider     To contact the [facility], please call, [phone]  To schedule an appointment or to leave a message for your Care Team Press #1  If you are a physician calling for another physician Press #2  For Billing Press #3  For Medical Records Press #4\"    "

## 2023-05-05 ENCOUNTER — MYC MEDICAL ADVICE (OUTPATIENT)
Dept: CARDIOLOGY | Facility: CLINIC | Age: 73
End: 2023-05-05
Payer: MEDICARE

## 2023-05-05 DIAGNOSIS — I10 BENIGN ESSENTIAL HYPERTENSION: ICD-10-CM

## 2023-05-05 RX ORDER — HYDRALAZINE HYDROCHLORIDE 10 MG/1
10 TABLET, FILM COATED ORAL EVERY 6 HOURS PRN
Qty: 20 TABLET | Refills: 1 | Status: SHIPPED | OUTPATIENT
Start: 2023-05-05 | End: 2024-01-03

## 2023-05-05 RX ORDER — AMLODIPINE BESYLATE 5 MG/1
5 TABLET ORAL DAILY
Start: 2023-05-05 | End: 2023-06-13

## 2023-05-05 NOTE — TELEPHONE ENCOUNTER
Date: 5/5/2023    Time of Call: 11:47 AM     Diagnosis:  HTN     [ TORB ] Ordering provider: Dr. Price  Order: Increase Amlodipine dose to 5 mg daily  Start Hydralazine 10 mg every 6 hours as needed for SBP > 160     Order received by: MELISSA Escoto      Follow-up/additional notes: Pt notified via [employer] message.     Richie Amato RN   Cardiology Nurse Coordinator

## 2023-05-06 DIAGNOSIS — C90.00 MULTIPLE MYELOMA NOT HAVING ACHIEVED REMISSION (H): Primary | ICD-10-CM

## 2023-05-06 RX ORDER — ONDANSETRON 4 MG/1
4 TABLET, FILM COATED ORAL EVERY 8 HOURS PRN
Qty: 60 TABLET | Refills: 11 | Status: ON HOLD | OUTPATIENT
Start: 2023-05-06 | End: 2024-08-05

## 2023-05-15 NOTE — PROGRESS NOTES
"Malignant Hematology Progress Note  Date: 05/16/2023      Oncologic Hx:  - Breast cancer dx in 1994. Underwent surgery and chemotherapy without reoccurence  - MGUS dx 1999  - T8 pathologic fracture with radiation  - Revlimid and velcade  2013  - Cytoxan IV 9/2013  - D-PACE 11/2013  - Auto 3/27/14  - 5/2014 thalidomide caused rash so switched to pomalidomide   - on kenalog and clobetasol creams for IMID rash  - FLC began to rise so riky added to pom 9/2020  - she has been on xgeva for an unclear amount of time  - Started Teodora-Kd 2/20/2023    Interval Hx:   Shena is on Teodora-Kd and presents to clinic prior to C4D1.    - She had a cardiology consult on 5/1 who recommended increasing Amlodipine dose to 5 mg daily and to start Hydralazine 10 mg every 6 hours as needed for SBP > 160.  Patient has noticed her blood pressure will increase after Kyprolis.  She feels blood pressures have been stable the farther she has been from chemotherapy but believes they will elevate again with the Kyrprolis infusion.  Reports BP elevates about 2 days post infusion and lasts about 10 days.  - Energy levels have been poor and feels they are worsening with each cycle.  She is able to perform ADLs and make simple meals.  She has hired a  and lawn maintenance.  She reports fatigue is intermittent throughout the days.  - Intermittent diarrhea, when having the diarrhea she will have 4-5 stools per day.  She has not taken anything for the diarrhea and does not want to start a bowel regimen.  She feels appetite has also decreased.  She feels she is still eating but her portions are much smaller and she gets full faster.  Toward the start of her next infusion this improves.  - Has noticed \"brain fog\" since starting treatment.  - Has intermittent dizziness and feels it is becoming harder for her to bounce back after treatment each time. She reports dizziness is random and not with position changes. Intermittently when she is feeling \"bad\" " "she will have \"tingling everywhere.\"  - Had had some spontaneous bruising and intermittent nose bleeds that resolve after about a couple hours.  Reports that the noise bleeds are only in the left nostril and are a slow ooze, has not had a nose bleed in the past couple weeks.    - She continues with baseline neuropathy that is stable.  - The night after treatment she will have fevers/chills.  This past treatment she spiked a fever of 101.2 on 5/1.  Fevers and chills are just the night after treatment and then resolve.      - Intermittent nausea after treatment, she manages with aromatherapy and zofran.    Pertinent PMH:  HTN, hypothyroidism  Family Hx- prostate ca, pancreatic , GBM- grandfather, paternal aunt breast cancer    Physical Exam  General: No acute distress  HEENT: Sclera anicteric.   Lymph: No lymphadenopathy in neck  Heart: Regular, rate, and rhythm  Lungs: Clear to ascultation bilaterally  Abdomen: Positive bowel sounds.   Extremities: no lower extremity edema  Neuro: Cranial nerves grossly intact  Rash: none    Most Recent 3 CBC's:  Recent Labs   Lab Test 05/16/23  1309 05/01/23  0847 04/27/23  1242   WBC 3.7* 3.8* 5.0   HGB 8.8* 10.0* 10.3*   * 108* 110*    94* 65*   ANEUTAUTO 1.9 2.2 2.8     Most Recent 3 BMP's:  Recent Labs   Lab Test 05/16/23  1309 05/01/23  0847 04/27/23  1712 04/27/23  1242 04/18/23  1252    136 133*   < > 138   POTASSIUM 3.7 4.0 4.1   < > 3.8   CHLORIDE 101 99 97*   < > 100   CO2 26 29 24   < > 30*   BUN 18.3 18.3 13.8   < > 16.4   CR 0.75 0.72 0.69   < > 0.76   ANIONGAP 9 8 12   < > 8   PAUILNE 9.2 9.8 9.4   < > 9.4   * 100* 93   < > 93   PROTTOTAL 6.4 6.9  --   --  7.0   ALBUMIN 4.0 4.1  --   --  4.1    < > = values in this interval not displayed.    Most Recent 3 LFT's:  Recent Labs   Lab Test 05/16/23  1309 05/01/23  0847 04/18/23  1252   AST 22 19 17   ALT 34 28 19   ALKPHOS 57 55 52   BILITOTAL 0.2 0.2 0.2    Most Recent 2 TSH and T4:  Recent Labs " "  Lab Test 10/03/22  0820 06/13/22  0822 03/21/22  0803   TSH 1.82 9.47*  9.31* 5.79*   T4  --  0.99  0.97 0.83      Latest Reference Range & Units 04/18/23 12:52   Albumin Fraction 3.7 - 5.1 g/dL 3.9   Alpha 1 Fraction 0.2 - 0.4 g/dL 0.3   Alpha 2 Fraction 0.5 - 0.9 g/dL 0.7   Beta Fraction 0.6 - 1.0 g/dL 0.6   ELP Interpretation:  Monoclonal protein (about 1.1 g/dL) seen in the gamma fraction. Previously characterized in our laboratory on 9/21/2020 as a monoclonal IgG immunoglobulin of kappa light chain type. Pathologic significance requires clinical correlation. Sailaja Johnson M.D., Ph.D.   Gamma Fraction 0.7 - 1.6 g/dL 1.2   [place] Free Lt Chain 0.33 - 1.94 mg/dL 12.45 (H)   Kappa Lambda Ratio  See Comment   Lambda Free Lt Chain 0.57 - 2.63 mg/dL <0.14 (L)   Monoclonal Peak <=0.0 g/dL 1.1 (H)   Total Protein Serum for ELP 6.4 - 8.3 g/dL 6.7   (H): Data is abnormally high  (L): Data is abnormally low  I reviewed the above labs today.      Assessment and Plan:   # Relapsed Myeloma  Started kyprolis/isatuximab/dex on 2/20/23.  Had hypertension and head pressure from the dexamethasone.  Increased llisinopril to 7.5 mg, was previously on 2.5 mg nightly.  - Will hold off on dexamethasone with further cycles  - Previously discussed  trial versus continuing Teodora-K until at least in VGPR and then switching to Teodora-K maintenance. She has not had a BCMA targeting agent so she may respond well and potentially have a treatment free interval if she achieves significant tumor debulking. She will consider her options.  - Continues with quality of life issues with Teodora-Kd and is experiencing increasing fatigue and elevated BP.  Discussed with Dr. Chavez and will change her treatment to every other week.    - Gets Prolia every 6 months, has had 2 doses, due again in 5/22.  Would prefer to have this scheduled on her \"off\" week .   - Plans to be out of town the week of June 5 and June 26th.    #HTN  Ongoing issues with elevated BP, " this is worse 2 days post Kyprolis infusion and lasts about 10 days after infusion.  Referred to cardiology and recommended increasing Amlodipine dose to 5 mg daily and to start Hydralazine 10 mg every 6 hours as needed for SBP > 160.    - Discussed she should continue to follow with cardiology and follow their recommendations regarding managing BP.  - Will continue to monitor     Plan:  - Proceed with Benita-Kd C4D1 today  - Will remove D8 Kyprolis and go to every other week treatment of Kyprolis and Sarclisa  - Prolia Q6 m.o- will receive next week 5/22  - Continue to f/u with cardiology regarding BP management  - Follow-up with labs and WANDA with infusion monthly   - Labs, Kyprolis and Sarclisa infusion every other week.     40 minutes spent on the date of the encounter doing chart review, review of test results, interpretation of tests, patient visit and documentation       MARINO Lopes CNP  [facility] [place] [address]  [phone]      Patient was seen and examined by me, as noted above. Nidhi Fink CNP, acted as a scribe. I have reviewed and edited the above note.    MARINO Fuller CNP  [facility] [address] [id number] [phone]

## 2023-05-16 ENCOUNTER — APPOINTMENT (OUTPATIENT)
Dept: LAB | Facility: CLINIC | Age: 73
End: 2023-05-16
Attending: STUDENT IN AN ORGANIZED HEALTH CARE EDUCATION/TRAINING PROGRAM
Payer: MEDICARE

## 2023-05-16 ENCOUNTER — ONCOLOGY VISIT (OUTPATIENT)
Dept: ONCOLOGY | Facility: CLINIC | Age: 73
End: 2023-05-16
Attending: STUDENT IN AN ORGANIZED HEALTH CARE EDUCATION/TRAINING PROGRAM
Payer: MEDICARE

## 2023-05-16 VITALS
RESPIRATION RATE: 16 BRPM | HEART RATE: 86 BPM | DIASTOLIC BLOOD PRESSURE: 62 MMHG | OXYGEN SATURATION: 97 % | TEMPERATURE: 98.9 F | SYSTOLIC BLOOD PRESSURE: 158 MMHG | BODY MASS INDEX: 20.09 KG/M2 | WEIGHT: 112.4 LBS

## 2023-05-16 DIAGNOSIS — M81.0 OSTEOPOROSIS, UNSPECIFIED OSTEOPOROSIS TYPE, UNSPECIFIED PATHOLOGICAL FRACTURE PRESENCE: ICD-10-CM

## 2023-05-16 DIAGNOSIS — C90.00 MULTIPLE MYELOMA NOT HAVING ACHIEVED REMISSION (H): Primary | ICD-10-CM

## 2023-05-16 DIAGNOSIS — I10 ESSENTIAL HYPERTENSION: ICD-10-CM

## 2023-05-16 LAB
ALBUMIN SERPL BCG-MCNC: 4 G/DL (ref 3.5–5.2)
ALP SERPL-CCNC: 57 U/L (ref 35–104)
ALT SERPL W P-5'-P-CCNC: 34 U/L (ref 10–35)
ANION GAP SERPL CALCULATED.3IONS-SCNC: 9 MMOL/L (ref 7–15)
AST SERPL W P-5'-P-CCNC: 22 U/L (ref 10–35)
BASOPHILS # BLD AUTO: 0 10E3/UL (ref 0–0.2)
BASOPHILS NFR BLD AUTO: 0 %
BILIRUB SERPL-MCNC: 0.2 MG/DL
BUN SERPL-MCNC: 18.3 MG/DL (ref 8–23)
CALCIUM SERPL-MCNC: 9.2 MG/DL (ref 8.8–10.2)
CHLORIDE SERPL-SCNC: 101 MMOL/L (ref 98–107)
CREAT SERPL-MCNC: 0.75 MG/DL (ref 0.51–0.95)
DEPRECATED HCO3 PLAS-SCNC: 26 MMOL/L (ref 22–29)
EOSINOPHIL # BLD AUTO: 0.1 10E3/UL (ref 0–0.7)
EOSINOPHIL NFR BLD AUTO: 3 %
ERYTHROCYTE [DISTWIDTH] IN BLOOD BY AUTOMATED COUNT: 16.2 % (ref 10–15)
GFR SERPL CREATININE-BSD FRML MDRD: 84 ML/MIN/1.73M2
GLUCOSE SERPL-MCNC: 122 MG/DL (ref 70–99)
HCT VFR BLD AUTO: 26.2 % (ref 35–47)
HGB BLD-MCNC: 8.8 G/DL (ref 11.7–15.7)
IMM GRANULOCYTES # BLD: 0 10E3/UL
IMM GRANULOCYTES NFR BLD: 0 %
LYMPHOCYTES # BLD AUTO: 1.1 10E3/UL (ref 0.8–5.3)
LYMPHOCYTES NFR BLD AUTO: 31 %
MCH RBC QN AUTO: 37.1 PG (ref 26.5–33)
MCHC RBC AUTO-ENTMCNC: 33.6 G/DL (ref 31.5–36.5)
MCV RBC AUTO: 111 FL (ref 78–100)
MONOCYTES # BLD AUTO: 0.5 10E3/UL (ref 0–1.3)
MONOCYTES NFR BLD AUTO: 14 %
NEUTROPHILS # BLD AUTO: 1.9 10E3/UL (ref 1.6–8.3)
NEUTROPHILS NFR BLD AUTO: 52 %
NRBC # BLD AUTO: 0 10E3/UL
NRBC BLD AUTO-RTO: 0 /100
PLATELET # BLD AUTO: 156 10E3/UL (ref 150–450)
POTASSIUM SERPL-SCNC: 3.7 MMOL/L (ref 3.4–5.3)
PROT SERPL-MCNC: 6.4 G/DL (ref 6.4–8.3)
RBC # BLD AUTO: 2.37 10E6/UL (ref 3.8–5.2)
SODIUM SERPL-SCNC: 136 MMOL/L (ref 136–145)
WBC # BLD AUTO: 3.7 10E3/UL (ref 4–11)

## 2023-05-16 PROCEDURE — 250N000011 HC RX IP 250 OP 636: Performed by: REGISTERED NURSE

## 2023-05-16 PROCEDURE — G0463 HOSPITAL OUTPT CLINIC VISIT: HCPCS | Mod: 25 | Performed by: REGISTERED NURSE

## 2023-05-16 PROCEDURE — 96417 CHEMO IV INFUS EACH ADDL SEQ: CPT

## 2023-05-16 PROCEDURE — 96413 CHEMO IV INFUSION 1 HR: CPT

## 2023-05-16 PROCEDURE — 85025 COMPLETE CBC W/AUTO DIFF WBC: CPT | Performed by: STUDENT IN AN ORGANIZED HEALTH CARE EDUCATION/TRAINING PROGRAM

## 2023-05-16 PROCEDURE — 258N000003 HC RX IP 258 OP 636: Performed by: STUDENT IN AN ORGANIZED HEALTH CARE EDUCATION/TRAINING PROGRAM

## 2023-05-16 PROCEDURE — 96375 TX/PRO/DX INJ NEW DRUG ADDON: CPT

## 2023-05-16 PROCEDURE — 250N000013 HC RX MED GY IP 250 OP 250 PS 637: Performed by: STUDENT IN AN ORGANIZED HEALTH CARE EDUCATION/TRAINING PROGRAM

## 2023-05-16 PROCEDURE — 80053 COMPREHEN METABOLIC PANEL: CPT | Performed by: STUDENT IN AN ORGANIZED HEALTH CARE EDUCATION/TRAINING PROGRAM

## 2023-05-16 PROCEDURE — 250N000011 HC RX IP 250 OP 636: Performed by: STUDENT IN AN ORGANIZED HEALTH CARE EDUCATION/TRAINING PROGRAM

## 2023-05-16 PROCEDURE — 99215 OFFICE O/P EST HI 40 MIN: CPT | Performed by: REGISTERED NURSE

## 2023-05-16 RX ORDER — ONDANSETRON 2 MG/ML
8 INJECTION INTRAMUSCULAR; INTRAVENOUS ONCE
Status: COMPLETED | OUTPATIENT
Start: 2023-05-16 | End: 2023-05-16

## 2023-05-16 RX ORDER — ALBUTEROL SULFATE 0.83 MG/ML
2.5 SOLUTION RESPIRATORY (INHALATION)
Status: CANCELLED | OUTPATIENT
Start: 2023-05-22

## 2023-05-16 RX ORDER — ACETAMINOPHEN 325 MG/1
650 TABLET ORAL ONCE
Status: COMPLETED | OUTPATIENT
Start: 2023-05-16 | End: 2023-05-16

## 2023-05-16 RX ORDER — METHYLPREDNISOLONE SODIUM SUCCINATE 125 MG/2ML
125 INJECTION, POWDER, LYOPHILIZED, FOR SOLUTION INTRAMUSCULAR; INTRAVENOUS
Status: CANCELLED
Start: 2023-05-22

## 2023-05-16 RX ORDER — HEPARIN SODIUM (PORCINE) LOCK FLUSH IV SOLN 100 UNIT/ML 100 UNIT/ML
5 SOLUTION INTRAVENOUS
Status: DISCONTINUED | OUTPATIENT
Start: 2023-05-16 | End: 2023-05-16 | Stop reason: HOSPADM

## 2023-05-16 RX ORDER — DIPHENHYDRAMINE HYDROCHLORIDE 50 MG/ML
50 INJECTION INTRAMUSCULAR; INTRAVENOUS
Status: CANCELLED
Start: 2023-05-22

## 2023-05-16 RX ORDER — MEPERIDINE HYDROCHLORIDE 25 MG/ML
25 INJECTION INTRAMUSCULAR; INTRAVENOUS; SUBCUTANEOUS EVERY 30 MIN PRN
Status: CANCELLED | OUTPATIENT
Start: 2023-05-22

## 2023-05-16 RX ORDER — ALBUTEROL SULFATE 90 UG/1
1-2 AEROSOL, METERED RESPIRATORY (INHALATION)
Status: CANCELLED
Start: 2023-05-22

## 2023-05-16 RX ORDER — EPINEPHRINE 1 MG/ML
0.3 INJECTION, SOLUTION INTRAMUSCULAR; SUBCUTANEOUS EVERY 5 MIN PRN
Status: CANCELLED | OUTPATIENT
Start: 2023-05-22

## 2023-05-16 RX ORDER — HEPARIN SODIUM (PORCINE) LOCK FLUSH IV SOLN 100 UNIT/ML 100 UNIT/ML
5 SOLUTION INTRAVENOUS DAILY PRN
Status: DISCONTINUED | OUTPATIENT
Start: 2023-05-16 | End: 2023-05-16 | Stop reason: HOSPADM

## 2023-05-16 RX ADMIN — FAMOTIDINE 20 MG: 10 INJECTION INTRAVENOUS at 14:36

## 2023-05-16 RX ADMIN — Medication 5 ML: at 17:03

## 2023-05-16 RX ADMIN — Medication 5 ML: at 13:04

## 2023-05-16 RX ADMIN — DIPHENHYDRAMINE HYDROCHLORIDE 50 MG: 50 INJECTION, SOLUTION INTRAMUSCULAR; INTRAVENOUS at 14:43

## 2023-05-16 RX ADMIN — SODIUM CHLORIDE 500 MG: 9 INJECTION, SOLUTION INTRAVENOUS at 15:03

## 2023-05-16 RX ADMIN — ACETAMINOPHEN 650 MG: 325 TABLET ORAL at 14:33

## 2023-05-16 RX ADMIN — SODIUM CHLORIDE 250 ML: 9 INJECTION, SOLUTION INTRAVENOUS at 14:34

## 2023-05-16 RX ADMIN — DEXTROSE MONOHYDRATE 250 ML: 50 INJECTION, SOLUTION INTRAVENOUS at 16:24

## 2023-05-16 RX ADMIN — ONDANSETRON 8 MG: 2 INJECTION INTRAMUSCULAR; INTRAVENOUS at 14:37

## 2023-05-16 RX ADMIN — CARFILZOMIB 90 MG: 30 INJECTION, POWDER, LYOPHILIZED, FOR SOLUTION INTRAVENOUS at 16:26

## 2023-05-16 ASSESSMENT — PAIN SCALES - GENERAL: PAINLEVEL: NO PAIN (0)

## 2023-05-16 NOTE — NURSING NOTE
"Oncology Rooming Note    May 16, 2023 1:17 PM   Shena Hartmann is a 72 year old female who presents for:    Chief Complaint   Patient presents with     Port Draw     Labs drawn via port by RN in lab. VS taken.      Oncology Clinic Visit     Multiple myeloma not having achieved remission      Initial Vitals: BP (!) 158/62   Pulse 86   Temp 98.9  F (37.2  C) (Oral)   Resp 16   Wt 51 kg (112 lb 6.4 oz)   SpO2 97%   BMI 20.09 kg/m   Estimated body mass index is 20.09 kg/m  as calculated from the following:    Height as of 5/1/23: 1.593 m (5' 2.72\").    Weight as of this encounter: 51 kg (112 lb 6.4 oz). Body surface area is 1.5 meters squared.  No Pain (0) Comment: Data Unavailable   No LMP recorded. Patient is postmenopausal.  Allergies reviewed: Yes  Medications reviewed: Yes    Medications: Medication refills not needed today.  Pharmacy name entered into [facility]:    [id number] - [place], [place] - [address] AT [facility] [address]  WRITTEN PRESCRIPTION REQUESTED  [facility] - [place], [place] - [address]    Clinical concerns: NONE      Mae Lieberman"

## 2023-05-16 NOTE — PATIENT INSTRUCTIONS
[facility] Triage and after hours / weekends / holidays:  [phone]    Please call the triage or after hours line if you experience a temperature greater than or equal to 100.5, shaking chills, have uncontrolled nausea, vomiting and/or diarrhea, dizziness, shortness of breath, chest pain, bleeding, unexplained bruising, or if you have any other new/concerning symptoms, questions or concerns.      If you are having any concerning symptoms or wish to speak to a provider before your next infusion visit, please call your care coordinator or triage to notify them so we can adequately serve you.     If you need a refill on a narcotic prescription or other medication, please call before your infusion appointment.

## 2023-05-16 NOTE — PROGRESS NOTES
Infusion Nursing Note:  Shena Hartmann presents today for cycle 4 day 1 konrad thomas.    Patient seen by provider today: Yes: Nidhi Fink CNP and Selena Suggs CNP   present during visit today: Not Applicable.    Note:   Patient has no questions or concerns after her appointment with Nidhi Fink and Selena Suggs.    Intravenous Access:  Implanted Port.    Treatment Conditions:  Lab Results   Component Value Date    HGB 8.8 (L) 05/16/2023    WBC 3.7 (L) 05/16/2023    ANEU 2.1 07/05/2021    ANEUTAUTO 1.9 05/16/2023     05/16/2023      Lab Results   Component Value Date     05/16/2023    POTASSIUM 3.7 05/16/2023    MAG 1.8 12/12/2014    CR 0.75 05/16/2023    PAULINE 9.2 05/16/2023    BILITOTAL 0.2 05/16/2023    ALBUMIN 4.0 05/16/2023    ALT 34 05/16/2023    AST 22 05/16/2023     Results reviewed, labs MET treatment parameters, ok to proceed with treatment.      Post Infusion Assessment:  Patient tolerated infusion without incident.  Blood return noted pre and post infusion.  Site patent and intact, free from redness, edema or discomfort.  No evidence of extravasations.  Access discontinued per protocol.       Discharge Plan:   Patient declined prescription refills.  Discharge instructions reviewed with: Patient.  Patient and/or family verbalized understanding of discharge instructions and all questions answered.  AVS to patient via [employer].  Patient will return 5/22 for next appointment.   Patient discharged in stable condition accompanied by: self.  Departure Mode: Ambulatory.      Esthela Schmitz RN

## 2023-05-16 NOTE — LETTER
5/16/2023         RE: Shena Hartmann  [address] [place] [address]        Dear Colleague,    Thank you for referring your patient, Shena Hartmann, to the [facility]. Please see a copy of my visit note below.    Malignant Hematology Progress Note  Date: 05/16/2023      Oncologic Hx:  - Breast cancer dx in 1994. Underwent surgery and chemotherapy without reoccurence  - MGUS dx 1999  - T8 pathologic fracture with radiation  - Revlimid and velcade  2013  - Cytoxan IV 9/2013  - D-PACE 11/2013  - Auto 3/27/14  - 5/2014 thalidomide caused rash so switched to pomalidomide   - on kenalog and clobetasol creams for IMID rash  - FLC began to rise so riky added to pom 9/2020  - she has been on xgeva for an unclear amount of time  - Started Teodora-Kd 2/20/2023    Interval Hx:   Shena is on Teodora-Kd and presents to clinic prior to C4D1.    - She had a cardiology consult on 5/1 who recommended increasing Amlodipine dose to 5 mg daily and to start Hydralazine 10 mg every 6 hours as needed for SBP > 160.  Patient has noticed her blood pressure will increase after Kyprolis.  She feels blood pressures have been stable the farther she has been from chemotherapy but believes they will elevate again with the Kyrprolis infusion.  Reports BP elevates about 2 days post infusion and lasts about 10 days.  - Energy levels have been poor and feels they are worsening with each cycle.  She is able to perform ADLs and make simple meals.  She has hired a  and lawn maintenance.  She reports fatigue is intermittent throughout the days.  - Intermittent diarrhea, when having the diarrhea she will have 4-5 stools per day.  She has not taken anything for the diarrhea and does not want to start a bowel regimen.  She feels appetite has also decreased.  She feels she is still eating but her portions are much smaller and she gets full faster.  Toward the start of her next infusion this improves.  - Has  "noticed \"brain fog\" since starting treatment.  - Has intermittent dizziness and feels it is becoming harder for her to bounce back after treatment each time. She reports dizziness is random and not with position changes. Intermittently when she is feeling \"bad\" she will have \"tingling everywhere.\"  - Had had some spontaneous bruising and intermittent nose bleeds that resolve after about a couple hours.  Reports that the noise bleeds are only in the left nostril and are a slow ooze, has not had a nose bleed in the past couple weeks.    - She continues with baseline neuropathy that is stable.  - The night after treatment she will have fevers/chills.  This past treatment she spiked a fever of 101.2 on 5/1.  Fevers and chills are just the night after treatment and then resolve.      - Intermittent nausea after treatment, she manages with aromatherapy and zofran.    Pertinent PMH:  HTN, hypothyroidism  Family Hx- prostate ca, pancreatic , GBM- grandfather, paternal aunt breast cancer    Physical Exam  General: No acute distress  HEENT: Sclera anicteric.   Lymph: No lymphadenopathy in neck  Heart: Regular, rate, and rhythm  Lungs: Clear to ascultation bilaterally  Abdomen: Positive bowel sounds.   Extremities: no lower extremity edema  Neuro: Cranial nerves grossly intact  Rash: none    Most Recent 3 CBC's:  Recent Labs   Lab Test 05/16/23  1309 05/01/23  0847 04/27/23  1242   WBC 3.7* 3.8* 5.0   HGB 8.8* 10.0* 10.3*   * 108* 110*    94* 65*   ANEUTAUTO 1.9 2.2 2.8     Most Recent 3 BMP's:  Recent Labs   Lab Test 05/16/23  1309 05/01/23  0847 04/27/23  1712 04/27/23  1242 04/18/23  1252    136 133*   < > 138   POTASSIUM 3.7 4.0 4.1   < > 3.8   CHLORIDE 101 99 97*   < > 100   CO2 26 29 24   < > 30*   BUN 18.3 18.3 13.8   < > 16.4   CR 0.75 0.72 0.69   < > 0.76   ANIONGAP 9 8 12   < > 8   PAULINE 9.2 9.8 9.4   < > 9.4   * 100* 93   < > 93   PROTTOTAL 6.4 6.9  --   --  7.0   ALBUMIN 4.0 4.1  --   --  " 4.1    < > = values in this interval not displayed.    Most Recent 3 LFT's:  Recent Labs   Lab Test 05/16/23  1309 05/01/23  0847 04/18/23  1252   AST 22 19 17   ALT 34 28 19   ALKPHOS 57 55 52   BILITOTAL 0.2 0.2 0.2    Most Recent 2 TSH and T4:  Recent Labs   Lab Test 10/03/22  0820 06/13/22  0822 03/21/22  0803   TSH 1.82 9.47*  9.31* 5.79*   T4  --  0.99  0.97 0.83      Latest Reference Range & Units 04/18/23 12:52   Albumin Fraction 3.7 - 5.1 g/dL 3.9   Alpha 1 Fraction 0.2 - 0.4 g/dL 0.3   Alpha 2 Fraction 0.5 - 0.9 g/dL 0.7   Beta Fraction 0.6 - 1.0 g/dL 0.6   ELP Interpretation:  Monoclonal protein (about 1.1 g/dL) seen in the gamma fraction. Previously characterized in our laboratory on 9/21/2020 as a monoclonal IgG immunoglobulin of kappa light chain type. Pathologic significance requires clinical correlation. Sailaja Johnson M.D., Ph.D.   Gamma Fraction 0.7 - 1.6 g/dL 1.2   [place] Free Lt Chain 0.33 - 1.94 mg/dL 12.45 (H)   Kappa Lambda Ratio  See Comment   [place] Free Lt Chain 0.57 - 2.63 mg/dL <0.14 (L)   Monoclonal Peak <=0.0 g/dL 1.1 (H)   Total Protein Serum for ELP 6.4 - 8.3 g/dL 6.7   (H): Data is abnormally high  (L): Data is abnormally low  I reviewed the above labs today.      Assessment and Plan:   # Relapsed Myeloma  Started kyprolis/isatuximab/dex on 2/20/23.  Had hypertension and head pressure from the dexamethasone.  Increased llisinopril to 7.5 mg, was previously on 2.5 mg nightly.  - Will hold off on dexamethasone with further cycles  - Previously discussed  trial versus continuing Teodora-K until at least in VGPR and then switching to Teodora-K maintenance. She has not had a BCMA targeting agent so she may respond well and potentially have a treatment free interval if she achieves significant tumor debulking. She will consider her options.  - Continues with quality of life issues with Teodora-Kd and is experiencing increasing fatigue and elevated BP.  Discussed with Dr. Chavez and will change her  "treatment to every other week.    - Gets Prolia every 6 months, has had 2 doses, due again in 5/22.  Would prefer to have this scheduled on her \"off\" week .   - Plans to be out of town the week of June 5 and June 26th.    #HTN  Ongoing issues with elevated BP, this is worse 2 days post Kyprolis infusion and lasts about 10 days after infusion.  Referred to cardiology and recommended increasing Amlodipine dose to 5 mg daily and to start Hydralazine 10 mg every 6 hours as needed for SBP > 160.    - Discussed she should continue to follow with cardiology and follow their recommendations regarding managing BP.  - Will continue to monitor     Plan:  - Proceed with Benita-Kd C4D1 today  - Will remove D8 Kyprolis and go to every other week treatment of Kyprolis and Sarclisa  - Prolia Q6 m.o- will receive next week 5/22  - Continue to f/u with cardiology regarding BP management  - Follow-up with labs and WANDA with infusion monthly   - Labs, Kyprolis and Sarclisa infusion every other week.     40 minutes spent on the date of the encounter doing chart review, review of test results, interpretation of tests, patient visit and documentation       MARINO Lopes CNP  [facility] [place] [address] [phone]      Patient was seen and examined by me, as noted above. Nidhi Fink CNP, acted as a scribe. I have reviewed and edited the above note.    MARION Fuller CNP  [facility] [address] [address] [phone]      "

## 2023-05-16 NOTE — NURSING NOTE
Chief Complaint   Patient presents with     Port Draw     Labs drawn via port by RN in lab. VS taken.      Labs drawn via Port accessed using 20g flat needle. Line flushed and Heparin locked. Vital signs taken. Checked into next appointment.     Radha Walter RN

## 2023-05-17 NOTE — PROGRESS NOTES
Infusion Nursing Note:  [facility]    Patient seen by provider today: No   present during visit today: Not Applicable.    Note: Patient denies any fevers or chills at home.  Reports feeling fatigued today. Was doing some laundry at home before infusion this morning and it wore her out. Continues with some mild head pressure unchanged.  BP today 132/68 and she states her reading at home this morning was better too since increasing her lisinopril dose last week.  Has some generalized body aches today 3/10, usually takes tylenol for pain at home, but held off today since she gets tylenol as premedication. Given as ordered. No other interventions needed for pain.  Body aches are ongoing.  Continues with some mild nausea and some intermittent loose stools.      Intravenous Access:  Implanted Port.    Treatment Conditions:  Lab Results   Component Value Date    HGB 9.8 (L) 03/14/2023    WBC 3.8 (L) 03/14/2023    ANEU 2.1 07/05/2021    ANEUTAUTO 1.6 03/14/2023    PLT 83 (L) 03/14/2023      Results reviewed, labs MET treatment parameters, ok to proceed with treatment.    Post Infusion Assessment:  Patient tolerated infusion without incident.  Blood return noted pre and post infusion.  Site patent and intact, free from redness, edema or discomfort.  No evidence of extravasations.  Access discontinued per protocol.     Discharge Plan:   Patient declined prescription refills.  Discharge instructions reviewed with: Patient.  Patient and/or family verbalized understanding of discharge instructions and all questions answered.  AVS to patient via [employer].  Patient will return 3/20/23 for next appointment.   Patient discharged in stable condition accompanied by: self and .  Departure Mode: Ambulatory.      Sailaja Quevedo RN                       [facility] New Patient Consultation    Subjective:  Chief Complaint   Patient presents with   • Back Pain     Patient presents for initial consultation upon request of Dr Arlene Tang. Pain at lower back radiating to Right>Left posterior thighs. Denies numbness, tingling or weakness in the legs. Pain started initially 3 years ago, the episodes usually lasted about 10 days, but this episode started 5 months ago and getting progressively worse.  Pain worse when getting up in the morning or with prolonged standing or walking.       • Other     Takes Aleve prn for pain. Finished PT yesterday, minimal pain relief. MRI LS done on 4/5/2023.       Past Medical History:    Past Medical History:   Diagnosis Date   • Essential (primary) hypertension    • High cholesterol         Past Surgical History:     Past Surgical History:   Procedure Laterality Date   • Egd     • Hysterectomy         Family History:    Family History   Problem Relation Age of Onset   • Patient is unaware of any medical problems Mother    • Hypertension Father    • Hypertension Sister    • Hypertension Brother         Current Outpatient Prescriptions:  Current Outpatient Medications   Medication Sig   • atorvastatin (LIPITOR) 40 MG tablet Take 40 mg by mouth daily.   • amLODIPine (NORVASC) 5 MG tablet Take 5 mg by mouth nightly.   • losartan (COZAAR) 25 MG tablet Take 25 mg by mouth daily.   • metoPROLOL succinate (TOPROL-XL) 50 MG 24 hr tablet Take 50 mg by mouth daily.   • omeprazole (PriLOSEC) 40 MG capsule Take 40 mg by mouth as needed.   • aspirin 81 MG chewable tablet Chew 81 mg by mouth daily.     No current facility-administered medications for this visit.       Allergies:  ALLERGIES:  No Known Allergies     Social History:  Social History     Tobacco Use   • Smoking status: Never   • Smokeless tobacco: Never   Substance Use Topics   • Alcohol use: Never   • Drug use: Never        Review of Systems:  CONSTITUTIONAL: Denies fevers,  chills, unintentional weight loss.  EYES:  Denies visual blurring, double vision, and eye pain.   ENT: Denies sore throat, trouble swallowing  CV:  Denies chest pain, palpitations, syncope   RESPIRATORY: Denies cough, SOB  GI:  Denies nausea/vomiting, appetite changes  : Denies dysuria and hematuria.   MSK: See HPI  SKIN:  Denies rash, lesions or ulcers  NEURO:  See HPI.   PSYCH: Denies sudden mood changes, anxiety/depression.   HEME/LYMPH:  Denies easy bleeding/bruising     Objective:  Visit Vitals  /65   Pulse 64   Temp 96.6 °F (35.9 °C)   Resp 16   Ht 5' 1\" (1.549 m)   Wt 54.9 kg (121 lb)   BMI 22.86 kg/m²        Physical Exam:    Constitutional: Normal appearance, grooming and nutrition.  Head/Face: Normocephalic. Atraumatic  Eyes: Normal eyes on inspection  Ears: Normal ears on inspection.  Respiration: Normal respiratory effort, No labored breathing, lungs clear to auscultation bilaterally  Chest: Symmetrical expansion with respiration, no tenderness with palpation  Cardiac: Regular rate, S1, S2  Abdomen: Soft, nontender, nondistended, bowel sounds present  Gait: Non-antalgic gait. Ambulates without assist device.  Musculoskeletal: No gross deformities  -  Lumbar Spine: SLR is positive bilaterally. Lumbar paraspinals non-tender bilaterally.  Positive facet loading bilaterally. Bilateral SI joints non-tender to palpation.   Neurological: Cranial nerves II-XII grossly intact. DTRs equal and symmetrical bilaterally.  -  Motor: 5/5 muscle strength bilateral upper extremities and bilateral lower extremities.  -  Sensory: Grossly intact to light touch, vibration and temperature in in upper and lower extremities  Skin: Normal, no rashes.  Psychological: Alert, awake and oriented X 3, normal affect      Impression: In my impression, patient has the following diagnosis listed below:    1. Lumbar radiculopathy    2. Degenerative disc disease at L5-S1 level    3. Spondylosis of lumbar region without myelopathy or  radiculopathy        Recommendations:    The following treatment recommendations were discussed in detail with Mya Kraft.      I had a long discussion with the patient with regards to the likely pathophysiology responsible for the current pain symptoms.  We reviewed physical exam findings, imaging, and prior treatments in extensive detail.  Patient has failed to improve significantly since conservative therapy, including over-the-counter analgesics, home exercise program, physical therapy.  Today we will proceed with LESI at L5-S1 and this procedure was discussed with her in extensive detail.    If this fails to provide her significant pain relief, we may consider TFE versus lumbar facet joint medial nerve branch blocks.  Both procedures were also discussed with her in detail today.  She will follow-up with me in clinic in 4 to 6 weeks or sooner if necessary to discuss additional treatment options.    The risks of the epidural injection, including bleeding, infection, post dural puncture headache, transient and or permanent neurologic injury, pain being made worse, possible failure of procedure to provide any significant pain relief, were explained to the patient.  The patient agreed to the procedure.    Aureliano Stubbs,      This dictation was created using Dragon voice recognition software and thus transcription errors or variance may occur.    The Cures Act disclaimer:  Note to patient:     The 21st Century Cures Act requires that medical notes like this to be available to patients in the interest of transparency.  However, be advised this is a medical document.  It is intended as a peer to peer communication.  It is written in the medical language and may contain abbreviations or verbiage that are unfamiliar.  It may appear blunt or direct.  Medical documents are intended to carry relevant information, facts as evident, and the clinical opinion of the practitioner.

## 2023-05-18 DIAGNOSIS — T50.995S ADVERSE EFFECT OF OTHER DRUGS, MEDICAMENTS AND BIOLOGICAL SUBSTANCES, SEQUELA: ICD-10-CM

## 2023-05-22 ENCOUNTER — INFUSION THERAPY VISIT (OUTPATIENT)
Dept: ONCOLOGY | Facility: CLINIC | Age: 73
End: 2023-05-22
Attending: STUDENT IN AN ORGANIZED HEALTH CARE EDUCATION/TRAINING PROGRAM
Payer: MEDICARE

## 2023-05-22 VITALS
DIASTOLIC BLOOD PRESSURE: 80 MMHG | HEART RATE: 82 BPM | RESPIRATION RATE: 16 BRPM | TEMPERATURE: 97.9 F | BODY MASS INDEX: 20.23 KG/M2 | OXYGEN SATURATION: 99 % | SYSTOLIC BLOOD PRESSURE: 159 MMHG | WEIGHT: 113.2 LBS

## 2023-05-22 DIAGNOSIS — C90.00 MULTIPLE MYELOMA NOT HAVING ACHIEVED REMISSION (H): Primary | ICD-10-CM

## 2023-05-22 DIAGNOSIS — T50.995S ADVERSE EFFECT OF OTHER DRUGS, MEDICAMENTS AND BIOLOGICAL SUBSTANCES, SEQUELA: ICD-10-CM

## 2023-05-22 DIAGNOSIS — M81.0 OSTEOPOROSIS, UNSPECIFIED OSTEOPOROSIS TYPE, UNSPECIFIED PATHOLOGICAL FRACTURE PRESENCE: ICD-10-CM

## 2023-05-22 PROCEDURE — 96372 THER/PROPH/DIAG INJ SC/IM: CPT | Performed by: REGISTERED NURSE

## 2023-05-22 PROCEDURE — 250N000011 HC RX IP 250 OP 636: Performed by: REGISTERED NURSE

## 2023-05-22 RX ORDER — DIPHENHYDRAMINE HYDROCHLORIDE 50 MG/ML
50 INJECTION INTRAMUSCULAR; INTRAVENOUS
Status: CANCELLED
Start: 2023-11-18

## 2023-05-22 RX ORDER — ALBUTEROL SULFATE 90 UG/1
1-2 AEROSOL, METERED RESPIRATORY (INHALATION)
Status: CANCELLED
Start: 2023-11-18

## 2023-05-22 RX ORDER — METHYLPREDNISOLONE SODIUM SUCCINATE 125 MG/2ML
125 INJECTION, POWDER, LYOPHILIZED, FOR SOLUTION INTRAMUSCULAR; INTRAVENOUS
Status: CANCELLED
Start: 2023-11-18

## 2023-05-22 RX ORDER — MEPERIDINE HYDROCHLORIDE 25 MG/ML
25 INJECTION INTRAMUSCULAR; INTRAVENOUS; SUBCUTANEOUS EVERY 30 MIN PRN
Status: CANCELLED | OUTPATIENT
Start: 2023-11-18

## 2023-05-22 RX ORDER — EPINEPHRINE 1 MG/ML
0.3 INJECTION, SOLUTION INTRAMUSCULAR; SUBCUTANEOUS EVERY 5 MIN PRN
Status: CANCELLED | OUTPATIENT
Start: 2023-11-18

## 2023-05-22 RX ORDER — ALBUTEROL SULFATE 0.83 MG/ML
2.5 SOLUTION RESPIRATORY (INHALATION)
Status: CANCELLED | OUTPATIENT
Start: 2023-11-18

## 2023-05-22 RX ADMIN — DENOSUMAB 60 MG: 60 INJECTION SUBCUTANEOUS at 10:54

## 2023-05-22 ASSESSMENT — PAIN SCALES - GENERAL: PAINLEVEL: NO PAIN (0)

## 2023-05-22 NOTE — PROGRESS NOTES
Infusion Injection Note:  Shena Hartmann presents today for Prolia injection.    Patient seen by provider today: No   present during visit today: Not Applicable.    Patient declined to speak with an RN today.     Treatment Conditions:  Not Applicable.    Pre and Post Injection:  Prolia injection given to Left Arm without incident.   Patient tolerated procedure well.    Discharge Plan:   Patient declined prescription refills.  Discharge instructions reviewed with: Patient.  Patient and/or family verbalized understanding of discharge instructions and all questions answered.  AVS to patient via [employer].    Patient discharged in stable condition accompanied by: self.  Departure Mode: Ambulatory.  Patient will return 5/30/2023 for next appointment.     Ivon HARRIS on 5/22/2023 at 11:09 AM

## 2023-05-22 NOTE — PROGRESS NOTES
Pt here for Prolia; ordered every 6 months. Pt hypertensive in clinic today, but around her baseline- she monitors her BP at home and recommended she notify care team if her BPs are consistently greater than 160/90.

## 2023-05-30 ENCOUNTER — INFUSION THERAPY VISIT (OUTPATIENT)
Dept: ONCOLOGY | Facility: CLINIC | Age: 73
End: 2023-05-30
Attending: STUDENT IN AN ORGANIZED HEALTH CARE EDUCATION/TRAINING PROGRAM
Payer: MEDICARE

## 2023-05-30 ENCOUNTER — APPOINTMENT (OUTPATIENT)
Dept: LAB | Facility: CLINIC | Age: 73
End: 2023-05-30
Attending: STUDENT IN AN ORGANIZED HEALTH CARE EDUCATION/TRAINING PROGRAM
Payer: MEDICARE

## 2023-05-30 VITALS
OXYGEN SATURATION: 97 % | TEMPERATURE: 98.5 F | RESPIRATION RATE: 16 BRPM | SYSTOLIC BLOOD PRESSURE: 153 MMHG | WEIGHT: 112 LBS | DIASTOLIC BLOOD PRESSURE: 72 MMHG | HEART RATE: 84 BPM | BODY MASS INDEX: 20.02 KG/M2

## 2023-05-30 DIAGNOSIS — C90.00 MULTIPLE MYELOMA NOT HAVING ACHIEVED REMISSION (H): Primary | ICD-10-CM

## 2023-05-30 LAB
ALBUMIN SERPL BCG-MCNC: 4.2 G/DL (ref 3.5–5.2)
ALP SERPL-CCNC: 65 U/L (ref 35–104)
ALT SERPL W P-5'-P-CCNC: 35 U/L (ref 10–35)
ANION GAP SERPL CALCULATED.3IONS-SCNC: 8 MMOL/L (ref 7–15)
AST SERPL W P-5'-P-CCNC: 24 U/L (ref 10–35)
BASOPHILS # BLD AUTO: 0 10E3/UL (ref 0–0.2)
BASOPHILS NFR BLD AUTO: 0 %
BILIRUB SERPL-MCNC: 0.2 MG/DL
BUN SERPL-MCNC: 18.1 MG/DL (ref 8–23)
CALCIUM SERPL-MCNC: 9.3 MG/DL (ref 8.8–10.2)
CHLORIDE SERPL-SCNC: 101 MMOL/L (ref 98–107)
CREAT SERPL-MCNC: 0.73 MG/DL (ref 0.51–0.95)
DEPRECATED HCO3 PLAS-SCNC: 29 MMOL/L (ref 22–29)
EOSINOPHIL # BLD AUTO: 0.1 10E3/UL (ref 0–0.7)
EOSINOPHIL NFR BLD AUTO: 2 %
ERYTHROCYTE [DISTWIDTH] IN BLOOD BY AUTOMATED COUNT: 14.3 % (ref 10–15)
GFR SERPL CREATININE-BSD FRML MDRD: 87 ML/MIN/1.73M2
GLUCOSE SERPL-MCNC: 121 MG/DL (ref 70–99)
HCT VFR BLD AUTO: 28.1 % (ref 35–47)
HGB BLD-MCNC: 9.3 G/DL (ref 11.7–15.7)
IMM GRANULOCYTES # BLD: 0 10E3/UL
IMM GRANULOCYTES NFR BLD: 0 %
LDH SERPL L TO P-CCNC: 229 U/L (ref 0–250)
LYMPHOCYTES # BLD AUTO: 1.9 10E3/UL (ref 0.8–5.3)
LYMPHOCYTES NFR BLD AUTO: 42 %
MCH RBC QN AUTO: 37.1 PG (ref 26.5–33)
MCHC RBC AUTO-ENTMCNC: 33.1 G/DL (ref 31.5–36.5)
MCV RBC AUTO: 112 FL (ref 78–100)
MONOCYTES # BLD AUTO: 0.6 10E3/UL (ref 0–1.3)
MONOCYTES NFR BLD AUTO: 13 %
NEUTROPHILS # BLD AUTO: 2 10E3/UL (ref 1.6–8.3)
NEUTROPHILS NFR BLD AUTO: 43 %
NRBC # BLD AUTO: 0 10E3/UL
NRBC BLD AUTO-RTO: 0 /100
PLATELET # BLD AUTO: 176 10E3/UL (ref 150–450)
POTASSIUM SERPL-SCNC: 3.9 MMOL/L (ref 3.4–5.3)
PROT SERPL-MCNC: 6.4 G/DL (ref 6.4–8.3)
RBC # BLD AUTO: 2.51 10E6/UL (ref 3.8–5.2)
SODIUM SERPL-SCNC: 138 MMOL/L (ref 136–145)
TOTAL PROTEIN SERUM FOR ELP: 6.2 G/DL (ref 6.4–8.3)
WBC # BLD AUTO: 4.6 10E3/UL (ref 4–11)

## 2023-05-30 PROCEDURE — 84155 ASSAY OF PROTEIN SERUM: CPT

## 2023-05-30 PROCEDURE — 84165 PROTEIN E-PHORESIS SERUM: CPT | Mod: TC | Performed by: PATHOLOGY

## 2023-05-30 PROCEDURE — 83615 LACTATE (LD) (LDH) ENZYME: CPT

## 2023-05-30 PROCEDURE — 85025 COMPLETE CBC W/AUTO DIFF WBC: CPT | Performed by: STUDENT IN AN ORGANIZED HEALTH CARE EDUCATION/TRAINING PROGRAM

## 2023-05-30 PROCEDURE — 96375 TX/PRO/DX INJ NEW DRUG ADDON: CPT

## 2023-05-30 PROCEDURE — 36591 DRAW BLOOD OFF VENOUS DEVICE: CPT

## 2023-05-30 PROCEDURE — 80053 COMPREHEN METABOLIC PANEL: CPT

## 2023-05-30 PROCEDURE — 84165 PROTEIN E-PHORESIS SERUM: CPT | Mod: 26

## 2023-05-30 PROCEDURE — 250N000013 HC RX MED GY IP 250 OP 250 PS 637: Performed by: STUDENT IN AN ORGANIZED HEALTH CARE EDUCATION/TRAINING PROGRAM

## 2023-05-30 PROCEDURE — 258N000003 HC RX IP 258 OP 636: Performed by: STUDENT IN AN ORGANIZED HEALTH CARE EDUCATION/TRAINING PROGRAM

## 2023-05-30 PROCEDURE — 96413 CHEMO IV INFUSION 1 HR: CPT

## 2023-05-30 PROCEDURE — 83521 IG LIGHT CHAINS FREE EACH: CPT

## 2023-05-30 PROCEDURE — 96415 CHEMO IV INFUSION ADDL HR: CPT

## 2023-05-30 PROCEDURE — 96417 CHEMO IV INFUS EACH ADDL SEQ: CPT

## 2023-05-30 PROCEDURE — 250N000011 HC RX IP 250 OP 636: Performed by: STUDENT IN AN ORGANIZED HEALTH CARE EDUCATION/TRAINING PROGRAM

## 2023-05-30 RX ORDER — ONDANSETRON 2 MG/ML
8 INJECTION INTRAMUSCULAR; INTRAVENOUS ONCE
Status: COMPLETED | OUTPATIENT
Start: 2023-05-30 | End: 2023-05-30

## 2023-05-30 RX ORDER — HEPARIN SODIUM,PORCINE 10 UNIT/ML
5 VIAL (ML) INTRAVENOUS
Status: DISCONTINUED | OUTPATIENT
Start: 2023-05-30 | End: 2023-05-30 | Stop reason: HOSPADM

## 2023-05-30 RX ORDER — HEPARIN SODIUM (PORCINE) LOCK FLUSH IV SOLN 100 UNIT/ML 100 UNIT/ML
5 SOLUTION INTRAVENOUS
Status: DISCONTINUED | OUTPATIENT
Start: 2023-05-30 | End: 2023-05-30 | Stop reason: HOSPADM

## 2023-05-30 RX ORDER — HEPARIN SODIUM (PORCINE) LOCK FLUSH IV SOLN 100 UNIT/ML 100 UNIT/ML
5 SOLUTION INTRAVENOUS ONCE
Status: COMPLETED | OUTPATIENT
Start: 2023-05-30 | End: 2023-05-30

## 2023-05-30 RX ORDER — ACETAMINOPHEN 325 MG/1
650 TABLET ORAL ONCE
Status: COMPLETED | OUTPATIENT
Start: 2023-05-30 | End: 2023-05-30

## 2023-05-30 RX ADMIN — ACETAMINOPHEN 650 MG: 325 TABLET ORAL at 15:49

## 2023-05-30 RX ADMIN — Medication 5 ML: at 18:39

## 2023-05-30 RX ADMIN — SODIUM CHLORIDE 500 MG: 9 INJECTION, SOLUTION INTRAVENOUS at 16:28

## 2023-05-30 RX ADMIN — FAMOTIDINE 20 MG: 10 INJECTION INTRAVENOUS at 15:50

## 2023-05-30 RX ADMIN — ONDANSETRON 8 MG: 2 INJECTION INTRAMUSCULAR; INTRAVENOUS at 15:52

## 2023-05-30 RX ADMIN — CARFILZOMIB 90 MG: 30 INJECTION, POWDER, LYOPHILIZED, FOR SOLUTION INTRAVENOUS at 18:06

## 2023-05-30 RX ADMIN — Medication 5 ML: at 14:53

## 2023-05-30 RX ADMIN — SODIUM CHLORIDE 250 ML: 9 INJECTION, SOLUTION INTRAVENOUS at 15:42

## 2023-05-30 RX ADMIN — DIPHENHYDRAMINE HYDROCHLORIDE 50 MG: 50 INJECTION INTRAMUSCULAR; INTRAVENOUS at 15:57

## 2023-05-30 RX ADMIN — DEXTROSE MONOHYDRATE 250 ML: 50 INJECTION, SOLUTION INTRAVENOUS at 18:06

## 2023-05-30 ASSESSMENT — PAIN SCALES - GENERAL: PAINLEVEL: NO PAIN (0)

## 2023-05-30 NOTE — PROGRESS NOTES
Infusion Nursing Note:  Shena Hartmann presents today for D15 C4 Sarclisa and Kyprolis    Patient seen by provider today: No   present during visit today: Not Applicable.    Note: Shena has been feeling ok since last treatment.  She continues to get rigors and nausea after treatments.  No fever or SOB and cough due to Lisinopril.         Intravenous Access:  Implanted Port.    Treatment Conditions:  Lab Results   Component Value Date    HGB 9.3 (L) 05/30/2023    WBC 4.6 05/30/2023    ANEU 2.1 07/05/2021    ANEUTAUTO 2.0 05/30/2023     05/30/2023      Lab Results   Component Value Date     05/30/2023    POTASSIUM 3.9 05/30/2023    MAG 1.8 12/12/2014    CR 0.73 05/30/2023    PAULINE 9.3 05/30/2023    BILITOTAL 0.2 05/30/2023    ALBUMIN 4.2 05/30/2023    ALT 35 05/30/2023    AST 24 05/30/2023     Results reviewed, labs MET treatment parameters, ok to proceed with treatment.      Post Infusion Assessment:  Patient tolerated infusion without incident.  Blood return noted pre and post infusion.  Site patent and intact, free from redness, edema or discomfort.  No evidence of extravasations.  Access discontinued per protocol.       Discharge Plan:   Patient declined prescription refills.  Discharge instructions reviewed with: Patient.  Patient and/or family verbalized understanding of discharge instructions and all questions answered.  Copy of AVS reviewed with patient and/or family.  Patient will return 6/13 for next provider and infusion appointment.  Patient discharged in stable condition accompanied by: self and friend.  Departure Mode: Ambulatory.      Laure Bob RN  HPI      ROS      Physical Exam

## 2023-05-30 NOTE — PATIENT INSTRUCTIONS
[facility] Triage and after hours / weekends / holidays:  [phone]    Please call the triage or after hours line if you experience a temperature greater than or equal to 100.4, shaking chills, have uncontrolled nausea, vomiting and/or diarrhea, dizziness, shortness of breath, chest pain, bleeding, unexplained bruising, or if you have any other new/concerning symptoms, questions or concerns.      If you are having any concerning symptoms or wish to speak to a provider before your next infusion visit, please call triage to notify them so we can adequately serve you.     If you need a refill on a narcotic prescription or other medication, please call before your infusion appointment.                May 2023      [place] Monday Tuesday Wednesday Thursday Friday Saturday        1    NEW CARDIO-ONCOLOGY   7:45 AM   (30 min.)   Sadia Price MD   [facility]    LAB PERIPHERAL   8:30 AM   (15 min.)   [facility] DRAW   [facility]    ONC INFUSION 2.5 HR (150 MIN)   9:00 AM   (150 min.)   [facility] ON[facility] INFUSION NURSE   [facility] 2     3     4     5     6       7     8     9     10     11     12     13       14     15     16    LAB PERIPHERAL  12:45 PM   (15 min.)   [facility] DRAW   [facility]    RETURN CCSL   1:00 PM   (45 min.)   Selena Suggs APRN CNP   [facility]    ONC INFUSION 2.5 HR (150 MIN)   3:00 PM   (150 min.)   [facility] ONC INFUSION NURSE   [facility] 17     18     19     20       21     22    ONC INFUSION 0.5 HR (30 MIN)  10:30 AM   (30 min.)   [facility] ONC INFUSION NURSE   [facility] 23     24     25     26     27       28     29     30    LAB PERIPHERAL   2:30 PM   (15 min.)   [facility]    ONC INFUSION 2.5 HR (150 MIN)   3:00 PM   (150 min.)   [facility] ONC INFUSION NURSE    [facility] 31 June 2023 Sunday Monday Tuesday Wednesday Thursday Friday Saturday                       1     2     3       4     5     6     7     8     9     10       11     12     13    LAB PERIPHERAL   3:45 PM   (15 min.)   [facility] LAB DRAW   [facility]    RETURN CCSL   4:15 PM   (45 min.)   Selena Suggs APRN CNP   [facility]    ONC INFUSION 0.5 HR (30 MIN)   4:30 PM   (30 min.)    ONC INFUSION NURSE   [facility] 14     15     16     17       18     19     20     21     22     23     24       25     26     27     28     29     30                            Lab Results:  Recent Results (from the past 12 hour(s))   Lactate Dehydrogenase    Collection Time: 05/30/23  2:51 PM   Result Value Ref Range    Lactate Dehydrogenase 229 0 - 250 U/L   Comprehensive metabolic panel    Collection Time: 05/30/23  2:51 PM   Result Value Ref Range    Sodium 138 136 - 145 mmol/L    Potassium 3.9 3.4 - 5.3 mmol/L    Chloride 101 98 - 107 mmol/L    Carbon Dioxide (CO2) 29 22 - 29 mmol/L    Anion Gap 8 7 - 15 mmol/L    Urea Nitrogen 18.1 8.0 - 23.0 mg/dL    Creatinine 0.73 0.51 - 0.95 mg/dL    Calcium 9.3 8.8 - 10.2 mg/dL    Glucose 121 (H) 70 - 99 mg/dL    Alkaline Phosphatase 65 35 - 104 U/L    AST 24 10 - 35 U/L    ALT 35 10 - 35 U/L    Protein Total 6.4 6.4 - 8.3 g/dL    Albumin 4.2 3.5 - 5.2 g/dL    Bilirubin Total 0.2 <=1.2 mg/dL    GFR Estimate 87 >60 mL/min/1.73m2   CBC with platelets and differential    Collection Time: 05/30/23  2:51 PM   Result Value Ref Range    WBC Count 4.6 4.0 - 11.0 10e3/uL    RBC Count 2.51 (L) 3.80 - 5.20 10e6/uL    Hemoglobin 9.3 (L) 11.7 - 15.7 g/dL    Hematocrit 28.1 (L) 35.0 - 47.0 %     (H) 78 - 100 fL    MCH 37.1 (H) 26.5 - 33.0 pg    MCHC 33.1 31.5 - 36.5 g/dL    RDW 14.3 10.0 - 15.0 %    Platelet Count 176 150 - 450 10e3/uL     % Neutrophils 43 %    % Lymphocytes 42 %    % Monocytes 13 %    % Eosinophils 2 %    % Basophils 0 %    % Immature Granulocytes 0 %    NRBCs per 100 WBC 0 <1 /100    Absolute Neutrophils 2.0 1.6 - 8.3 10e3/uL    Absolute Lymphocytes 1.9 0.8 - 5.3 10e3/uL    Absolute Monocytes 0.6 0.0 - 1.3 10e3/uL    Absolute Eosinophils 0.1 0.0 - 0.7 10e3/uL    Absolute Basophils 0.0 0.0 - 0.2 10e3/uL    Absolute Immature Granulocytes 0.0 <=0.4 10e3/uL    Absolute NRBCs 0.0 10e3/uL

## 2023-05-31 LAB
ALBUMIN SERPL ELPH-MCNC: 4 G/DL (ref 3.7–5.1)
ALPHA1 GLOB SERPL ELPH-MCNC: 0.3 G/DL (ref 0.2–0.4)
ALPHA2 GLOB SERPL ELPH-MCNC: 0.6 G/DL (ref 0.5–0.9)
B-GLOBULIN SERPL ELPH-MCNC: 0.6 G/DL (ref 0.6–1)
GAMMA GLOB SERPL ELPH-MCNC: 0.7 G/DL (ref 0.7–1.6)
KAPPA LC FREE SER-MCNC: 3.52 MG/DL (ref 0.33–1.94)
KAPPA LC FREE/LAMBDA FREE SER NEPH: ABNORMAL {RATIO}
LAMBDA LC FREE SERPL-MCNC: <0.14 MG/DL (ref 0.57–2.63)
M PROTEIN SERPL ELPH-MCNC: 0.3 G/DL
PROT PATTERN SERPL ELPH-IMP: ABNORMAL

## 2023-06-13 ENCOUNTER — ONCOLOGY VISIT (OUTPATIENT)
Dept: ONCOLOGY | Facility: CLINIC | Age: 73
End: 2023-06-13
Attending: STUDENT IN AN ORGANIZED HEALTH CARE EDUCATION/TRAINING PROGRAM
Payer: MEDICARE

## 2023-06-13 ENCOUNTER — APPOINTMENT (OUTPATIENT)
Dept: LAB | Facility: CLINIC | Age: 73
End: 2023-06-13
Attending: STUDENT IN AN ORGANIZED HEALTH CARE EDUCATION/TRAINING PROGRAM
Payer: MEDICARE

## 2023-06-13 VITALS
HEART RATE: 74 BPM | SYSTOLIC BLOOD PRESSURE: 181 MMHG | OXYGEN SATURATION: 98 % | WEIGHT: 113 LBS | BODY MASS INDEX: 20.2 KG/M2 | DIASTOLIC BLOOD PRESSURE: 81 MMHG | RESPIRATION RATE: 16 BRPM | TEMPERATURE: 97.9 F

## 2023-06-13 VITALS — SYSTOLIC BLOOD PRESSURE: 162 MMHG | DIASTOLIC BLOOD PRESSURE: 71 MMHG

## 2023-06-13 DIAGNOSIS — C90.00 MULTIPLE MYELOMA NOT HAVING ACHIEVED REMISSION (H): Primary | ICD-10-CM

## 2023-06-13 DIAGNOSIS — E85.89 OTHER AMYLOIDOSIS (H): ICD-10-CM

## 2023-06-13 DIAGNOSIS — C90.02 MULTIPLE MYELOMA IN RELAPSE (H): ICD-10-CM

## 2023-06-13 DIAGNOSIS — I10 BENIGN ESSENTIAL HYPERTENSION: ICD-10-CM

## 2023-06-13 LAB
ALBUMIN SERPL BCG-MCNC: 4.3 G/DL (ref 3.5–5.2)
ALP SERPL-CCNC: 57 U/L (ref 35–104)
ALT SERPL W P-5'-P-CCNC: 27 U/L (ref 0–50)
ANION GAP SERPL CALCULATED.3IONS-SCNC: 9 MMOL/L (ref 7–15)
AST SERPL W P-5'-P-CCNC: 18 U/L (ref 0–45)
BASOPHILS # BLD AUTO: 0 10E3/UL (ref 0–0.2)
BASOPHILS NFR BLD AUTO: 0 %
BILIRUB SERPL-MCNC: 0.2 MG/DL
BUN SERPL-MCNC: 15.4 MG/DL (ref 8–23)
CALCIUM SERPL-MCNC: 9.5 MG/DL (ref 8.8–10.2)
CHLORIDE SERPL-SCNC: 101 MMOL/L (ref 98–107)
CREAT SERPL-MCNC: 0.69 MG/DL (ref 0.51–0.95)
DEPRECATED HCO3 PLAS-SCNC: 28 MMOL/L (ref 22–29)
EOSINOPHIL # BLD AUTO: 0.1 10E3/UL (ref 0–0.7)
EOSINOPHIL NFR BLD AUTO: 2 %
ERYTHROCYTE [DISTWIDTH] IN BLOOD BY AUTOMATED COUNT: 13 % (ref 10–15)
GFR SERPL CREATININE-BSD FRML MDRD: >90 ML/MIN/1.73M2
GLUCOSE SERPL-MCNC: 105 MG/DL (ref 70–99)
HCT VFR BLD AUTO: 30 % (ref 35–47)
HGB BLD-MCNC: 10.1 G/DL (ref 11.7–15.7)
IMM GRANULOCYTES # BLD: 0 10E3/UL
IMM GRANULOCYTES NFR BLD: 0 %
LYMPHOCYTES # BLD AUTO: 1.9 10E3/UL (ref 0.8–5.3)
LYMPHOCYTES NFR BLD AUTO: 42 %
MCH RBC QN AUTO: 37.5 PG (ref 26.5–33)
MCHC RBC AUTO-ENTMCNC: 33.7 G/DL (ref 31.5–36.5)
MCV RBC AUTO: 112 FL (ref 78–100)
MONOCYTES # BLD AUTO: 0.5 10E3/UL (ref 0–1.3)
MONOCYTES NFR BLD AUTO: 12 %
NEUTROPHILS # BLD AUTO: 2 10E3/UL (ref 1.6–8.3)
NEUTROPHILS NFR BLD AUTO: 44 %
NRBC # BLD AUTO: 0 10E3/UL
NRBC BLD AUTO-RTO: 0 /100
NT-PROBNP SERPL-MCNC: 226 PG/ML (ref 0–900)
PLATELET # BLD AUTO: 173 10E3/UL (ref 150–450)
POTASSIUM SERPL-SCNC: 3.8 MMOL/L (ref 3.4–5.3)
PROT SERPL-MCNC: 6.4 G/DL (ref 6.4–8.3)
RBC # BLD AUTO: 2.69 10E6/UL (ref 3.8–5.2)
SODIUM SERPL-SCNC: 138 MMOL/L (ref 136–145)
WBC # BLD AUTO: 4.5 10E3/UL (ref 4–11)

## 2023-06-13 PROCEDURE — 96375 TX/PRO/DX INJ NEW DRUG ADDON: CPT

## 2023-06-13 PROCEDURE — 99215 OFFICE O/P EST HI 40 MIN: CPT | Performed by: REGISTERED NURSE

## 2023-06-13 PROCEDURE — 250N000011 HC RX IP 250 OP 636: Performed by: REGISTERED NURSE

## 2023-06-13 PROCEDURE — 83880 ASSAY OF NATRIURETIC PEPTIDE: CPT | Performed by: REGISTERED NURSE

## 2023-06-13 PROCEDURE — 250N000013 HC RX MED GY IP 250 OP 250 PS 637: Performed by: STUDENT IN AN ORGANIZED HEALTH CARE EDUCATION/TRAINING PROGRAM

## 2023-06-13 PROCEDURE — 36591 DRAW BLOOD OFF VENOUS DEVICE: CPT | Performed by: STUDENT IN AN ORGANIZED HEALTH CARE EDUCATION/TRAINING PROGRAM

## 2023-06-13 PROCEDURE — 85025 COMPLETE CBC W/AUTO DIFF WBC: CPT | Performed by: STUDENT IN AN ORGANIZED HEALTH CARE EDUCATION/TRAINING PROGRAM

## 2023-06-13 PROCEDURE — G0463 HOSPITAL OUTPT CLINIC VISIT: HCPCS | Mod: 25 | Performed by: REGISTERED NURSE

## 2023-06-13 PROCEDURE — 96417 CHEMO IV INFUS EACH ADDL SEQ: CPT

## 2023-06-13 PROCEDURE — 258N000003 HC RX IP 258 OP 636: Performed by: STUDENT IN AN ORGANIZED HEALTH CARE EDUCATION/TRAINING PROGRAM

## 2023-06-13 PROCEDURE — 80053 COMPREHEN METABOLIC PANEL: CPT | Performed by: STUDENT IN AN ORGANIZED HEALTH CARE EDUCATION/TRAINING PROGRAM

## 2023-06-13 PROCEDURE — 250N000011 HC RX IP 250 OP 636: Performed by: STUDENT IN AN ORGANIZED HEALTH CARE EDUCATION/TRAINING PROGRAM

## 2023-06-13 PROCEDURE — 96413 CHEMO IV INFUSION 1 HR: CPT

## 2023-06-13 RX ORDER — ONDANSETRON 2 MG/ML
8 INJECTION INTRAMUSCULAR; INTRAVENOUS ONCE
Status: COMPLETED | OUTPATIENT
Start: 2023-06-13 | End: 2023-06-13

## 2023-06-13 RX ORDER — HEPARIN SODIUM (PORCINE) LOCK FLUSH IV SOLN 100 UNIT/ML 100 UNIT/ML
5 SOLUTION INTRAVENOUS
Status: DISCONTINUED | OUTPATIENT
Start: 2023-06-13 | End: 2023-06-13 | Stop reason: HOSPADM

## 2023-06-13 RX ORDER — ACETAMINOPHEN 325 MG/1
650 TABLET ORAL ONCE
Status: COMPLETED | OUTPATIENT
Start: 2023-06-13 | End: 2023-06-13

## 2023-06-13 RX ORDER — AMLODIPINE BESYLATE 2.5 MG/1
TABLET ORAL
Qty: 60 TABLET | Refills: 0 | Status: SHIPPED | OUTPATIENT
Start: 2023-06-13 | End: 2023-06-20

## 2023-06-13 RX ORDER — HEPARIN SODIUM (PORCINE) LOCK FLUSH IV SOLN 100 UNIT/ML 100 UNIT/ML
5 SOLUTION INTRAVENOUS ONCE
Status: COMPLETED | OUTPATIENT
Start: 2023-06-13 | End: 2023-06-13

## 2023-06-13 RX ADMIN — ACETAMINOPHEN 650 MG: 325 TABLET ORAL at 14:23

## 2023-06-13 RX ADMIN — DIPHENHYDRAMINE HYDROCHLORIDE 50 MG: 50 INJECTION, SOLUTION INTRAMUSCULAR; INTRAVENOUS at 14:33

## 2023-06-13 RX ADMIN — SODIUM CHLORIDE 500 MG: 9 INJECTION, SOLUTION INTRAVENOUS at 14:54

## 2023-06-13 RX ADMIN — CARFILZOMIB 90 MG: 30 INJECTION, POWDER, LYOPHILIZED, FOR SOLUTION INTRAVENOUS at 16:14

## 2023-06-13 RX ADMIN — DEXTROSE MONOHYDRATE 250 ML: 50 INJECTION, SOLUTION INTRAVENOUS at 16:50

## 2023-06-13 RX ADMIN — Medication 5 ML: at 16:51

## 2023-06-13 RX ADMIN — SODIUM CHLORIDE 250 ML: 9 INJECTION, SOLUTION INTRAVENOUS at 14:23

## 2023-06-13 RX ADMIN — Medication 5 ML: at 12:23

## 2023-06-13 RX ADMIN — FAMOTIDINE 20 MG: 10 INJECTION INTRAVENOUS at 14:28

## 2023-06-13 RX ADMIN — ONDANSETRON 8 MG: 2 INJECTION INTRAMUSCULAR; INTRAVENOUS at 14:25

## 2023-06-13 ASSESSMENT — PAIN SCALES - GENERAL: PAINLEVEL: NO PAIN (1)

## 2023-06-13 NOTE — PATIENT INSTRUCTIONS
[facility] Triage and after hours / weekends / holidays:  [phone]    Please call the triage or after hours line if you experience a temperature greater than or equal to 100.4, shaking chills, have uncontrolled nausea, vomiting and/or diarrhea, dizziness, shortness of breath, chest pain, bleeding, unexplained bruising, or if you have any other new/concerning symptoms, questions or concerns.      If you are having any concerning symptoms or wish to speak to a provider before your next infusion visit, please call your care coordinator or triage to notify them so we can adequately serve you.     If you need a refill on a narcotic prescription or other medication, please call before your infusion appointment.

## 2023-06-13 NOTE — NURSING NOTE
"Oncology Rooming Note    June 13, 2023 12:58 PM   Shena Hartmann is a 72 year old female who presents for:    Chief Complaint   Patient presents with     Port Draw     Port accessed with 20g flat needle by RN, labs collected, line flushed with saline and heparin.  Vitals taken. Pt checked in for appointment(s).      Oncology Clinic Visit     M.M.     Initial Vitals: BP (!) 181/81   Pulse 74   Temp 97.9  F (36.6  C) (Oral)   Resp 16   Wt 51.3 kg (113 lb)   SpO2 98%   BMI 20.20 kg/m   Estimated body mass index is 20.2 kg/m  as calculated from the following:    Height as of 5/1/23: 1.593 m (5' 2.72\").    Weight as of this encounter: 51.3 kg (113 lb). Body surface area is 1.51 meters squared.  No Pain (1) Comment: Data Unavailable   No LMP recorded. Patient is postmenopausal.  Allergies reviewed: Yes  Medications reviewed: Yes    Medications: Pt req refill of Amlodipine.    Pharmacy name entered into [employer]:    [id number] - [place], [place] - [address] AT [facility] [address]  WRITTEN PRESCRIPTION REQUESTED  [facility] - [place], [place] - [address]    Clinical concerns: No new clinical concerns other than reason for visit today.    Francheska Cisneros, EMT  "

## 2023-06-13 NOTE — NURSING NOTE
Chief Complaint   Patient presents with     Port Draw     Port accessed with 20g flat needle by RN, labs collected, line flushed with saline and heparin.  Vitals taken. Pt checked in for appointment(s).      Ching KRAUS RN PHN BSN  BMT/Oncology Lab

## 2023-06-13 NOTE — PROGRESS NOTES
"Malignant Hematology Progress Note    Date: 06/13/2023    Reason for Office Visit: Evaluation prior to cycle 5 Isatuximab + Carfilzomib      Oncologic Hx:  - Breast cancer dx in 1994. Underwent surgery and chemotherapy without reoccurence  - MGUS dx 1999  - T8 pathologic fracture with radiation  - Revlimid and velcade  2013  - Cytoxan IV 9/2013  - D-PACE 11/2013  - Auto 3/27/14  - 5/2014 thalidomide caused rash so switched to pomalidomide   - on kenalog and clobetasol creams for IMID rash  - FLC began to rise so riky added to pom 9/2020  - she has been on xgeva for an unclear amount of time  - Started Teodora-Kd 2/20/2023    Interval Hx:   Shena is on Teodora-Kd and presents to clinic prior to C5D1.    - She had a cardiology consult on 5/1 who recommended increasing Amlodipine dose to 5 mg     Instructed to increase to 5/18/23 increased to BID x 1 day.  - Hydralizine not helping   - BP high, eyes feel \"funny\"    daily and to start Hydralazine 10 mg every 6 hours as needed for SBP > 160.  Patient has noticed her blood pressure will increase after Kyprolis.  She feels blood pressures have been stable the farther she has been from chemotherapy but believes they will elevate again with the Kyrprolis infusion.  Reports BP elevates about 2 days post infusion and lasts about 2 - 3  days (previously 10 days)  - Occipital pressure      - Energy levels have been poor and feels they are worsening with each cycle.  She is able to perform ADLs and make simple meals.  She has hired a  and lawn maintenance.  She reports fatigue is intermittent throughout the days.    - Intermittent diarrhea, when having the diarrhea she will have 4-5 stools per day.  She has not taken anything for the diarrhea and does not want to start a bowel regimen.  She feels appetite has also decreased.  She feels she is still eating but her portions are much smaller and she gets full faster.  Toward the start of her next infusion this improves.    Random " "loose stools, up to 4 a day, watery.      - Has noticed \"brain fog\" since starting treatment.    - Has intermittent dizziness post chemo and feels it is becoming harder for her to bounce back after treatment each time. She reports dizziness is random and not with position changes. Intermittently when she is feeling \"bad\" she will have \"tingling everywhere.\"    - Had had some spontaneous bruising and intermittent nose bleeds that resolve after about a couple hours.  Reports that the noise bleeds are only in the left nostril and are a slow ooze, has not had a nose bleed in the past couple weeks.      - She continues with baseline neuropathy that is stable.Feet and lower leg neuropathies    7 hours after infusion, experiences  - The night after treatment she will have fevers/chills.  This past treatment she spiked a fever of 101.2 on 5/1.  Fevers and chills are just the night after treatment and then resolve.      - Intermittent nausea after treatment, she manages with aromatherapy and zofran.    - Silicone tape over port- incision over port    Pertinent PMH:  HTN, hypothyroidism  Family Hx- prostate ca, pancreatic , GBM- grandfather, paternal aunt breast cancer    Allergies   Allergen Reactions     Blood Transfusion Related (Informational Only) Other (See Comments)     Patient has a history of a clinically significant antibody against RBC antigens.  A delay in compatible RBCs may occur.      Cayenne Pepper [Cayenne]      Corn-Containing Products GI Disturbance     Levaquin Other (See Comments)     Tendon pain     Milk Protein GI Disturbance     Mold      Facial rash/lip swelling     Morphine Sulfate Other (See Comments)     Per pt - see things (hallucination) when she was on Morphine .     Pollen Extract      Environmental allergies      Shrimp Nausea and Vomiting     Tomato      Lip swelling, facial rash     Azithromycin Rash     Keflex [Cephalexin] Rash     Oat Rash     Tape [Adhesive Tape] Itching and Rash     All " adhesives     Wheat Rash     Swelling of lips     Current Outpatient Medications   Medication     acetaminophen (TYLENOL) 325 MG tablet     acyclovir (ZOVIRAX) 400 MG tablet     amLODIPine (NORVASC) 5 MG tablet     Ascorbic Acid (VITAMIN C PO)     aspirin (ASA) 81 MG chewable tablet     CALCIUM-VITAMIN D-VITAMIN K PO     clobetasol (TEMOVATE) 0.05 % external ointment     COENZYME Q-10 PO     Cyanocobalamin 1000 MCG/ML LIQD     desonide (DESOWEN) 0.05 % external ointment     hydrALAZINE (APRESOLINE) 10 MG tablet     levothyroxine (SYNTHROID/LEVOTHROID) 50 MCG tablet     lisinopril (ZESTRIL) 10 MG tablet     magnesium 100 MG CAPS     Multiple Vitamins-Minerals (MULTIVITAMIN OR)     ondansetron (ZOFRAN) 4 MG tablet     order for DME     order for DME     prochlorperazine (COMPAZINE) 5 MG tablet     triamcinolone (KENALOG) 0.1 % external ointment     ZINC PICOLINATE PO       Physical Exam:  BP (!) 181/81   Pulse 74   Temp 97.9  F (36.6  C) (Oral)   Resp 16   Wt 51.3 kg (113 lb)   SpO2 98%   BMI 20.20 kg/m      Wt Readings from Last 5 Encounters:   06/13/23 51.3 kg (113 lb)   05/30/23 50.8 kg (112 lb)   05/22/23 51.3 kg (113 lb 3.2 oz)   05/16/23 51 kg (112 lb 6.4 oz)   05/01/23 52.4 kg (115 lb 8 oz)     Constitutional: Appears stated age, well-groomed.   HEENT: Normocephilic. EOMI, PERRL. Sclerae are anicteric. Oral mucosa is pink and moist with no lesions or thrush; gums normal and good dentition.   Respiratory: No increased work of breathing, good air exchange, clear to auscultation bilaterally, no crackles or wheezing.  Cardiovascular: Normal apical impulse, regular rate and rhythm, normal S1 and S2, and no murmur noted.  GI: No masses or scars. +BS. Soft. No tenderness on palpation.  Lymph/Hematologic: No cervical lymphadenopathy and no supraclavicular lymphadenopathy.  Skin: No bruising or bleeding, normal skin color, texture, turgor, no redness, warmth, or swelling, no rashes, no lesions, no  jaundice.  Extremities:.There is no redness, warmth, or swelling of the joints.  Neurologic: Awake, alert, oriented to name, place and time.    Vascular access: Left chest wall implanted port accessed. Site/covered, c/d/i.    Most Recent 3 CBC's:  Recent Labs   Lab Test 06/13/23  1227 05/30/23  1451 05/16/23  1309   WBC 4.5 4.6 3.7*   HGB 10.1* 9.3* 8.8*   * 112* 111*    176 156   ANEUTAUTO 2.0 2.0 1.9     Most Recent 3 BMP's:  Recent Labs   Lab Test 06/13/23  1227 05/30/23  1451 05/16/23  1309    138 136   POTASSIUM 3.8 3.9 3.7   CHLORIDE 101 101 101   CO2 28 29 26   BUN 15.4 18.1 18.3   CR 0.69 0.73 0.75   ANIONGAP 9 8 9   PAULINE 9.5 9.3 9.2   * 121* 122*   PROTTOTAL 6.4 6.4 6.4   ALBUMIN 4.3 4.2 4.0    Most Recent 3 LFT's:  Recent Labs   Lab Test 06/13/23  1227 05/30/23  1451 05/16/23  1309   AST 18 24 22   ALT 27 35 34   ALKPHOS 57 65 57   BILITOTAL 0.2 0.2 0.2    Most Recent 2 TSH and T4:  Recent Labs   Lab Test 10/03/22  0820 06/13/22  0822 03/21/22  0803   TSH 1.82 9.47*  9.31* 5.79*   T4  --  0.99  0.97 0.83      Latest Reference Range & Units 04/18/23 12:52   Albumin Fraction 3.7 - 5.1 g/dL 3.9   Alpha 1 Fraction 0.2 - 0.4 g/dL 0.3   Alpha 2 Fraction 0.5 - 0.9 g/dL 0.7   Beta Fraction 0.6 - 1.0 g/dL 0.6   ELP Interpretation:  Monoclonal protein (about 1.1 g/dL) seen in the gamma fraction. Previously characterized in our laboratory on 9/21/2020 as a monoclonal IgG immunoglobulin of kappa light chain type. Pathologic significance requires clinical correlation. Sailaja Karger, M.D., Ph.D.   Gamma Fraction 0.7 - 1.6 g/dL 1.2   [place] Free Lt Chain 0.33 - 1.94 mg/dL 12.45 (H)   Kappa Lambda Ratio  See Comment   Lambda Free Lt Chain 0.57 - 2.63 mg/dL <0.14 (L)   Monoclonal Peak <=0.0 g/dL 1.1 (H)   Total Protein Serum for ELP 6.4 - 8.3 g/dL 6.7   (H): Data is abnormally high  (L): Data is abnormally low  I reviewed the above labs today.      Assessment and Plan:     Relapsed Myeloma  Started  "kyprolis/isatuximab/dex on 2/20/23.  Had hypertension and head pressure from the dexamethasone.  Increased llisinopril to 7.5 mg, was previously on 2.5 mg nightly.  - Will hold off on dexamethasone with further cycles  - Continues with quality of life issues with Benita-Kd and is experiencing increasing fatigue and elevated BP.  Although BP improving, remains a concern.   - Discussed with Dr. Chavez who recommended at best response (minimim VGPR) consider keeping kyprolis every other week and Isatuximab monthly versus  consideration of  trial since she has not had a BCMA targeting agent so she may respond well and potentially have a treatment free interval if she achieves significant tumor debulking. Shena to discuss further at next f/u with Dr. Chavez - does not want trial schedule and her physical condition to interfere with a family trip planned for Feb 2024.  - Gets Prolia every 6 months, has had 3 doses, last dose 5/22/23.  Would prefer to have this scheduled on her \"off\" week .       HTN  -Ongoing issues with elevated BP, this is worse 2 days post Kyprolis infusion and lasts about 2 days after infusion.   -  Not using Hydralazine as states not effective  - Remains on Amlodipine 5 mg daily in the am and Lisinopril 10 mg in the PM   - Discussed she should continue to follow with cardiology and follow their recommendations regarding managing BP.  Sent message to Dr. Price today to update her on patient's blood pressures.  - Will continue to monitor     Plan:  - Proceed with Benita-Kd C4D1 today  - Prolia Q6 m.o- next due Nov 2023  - Continue to f/u with cardiology regarding BP management    Refill Amlodipine    Send follow up message to Dr. Price (cardiology)  - Follow-up with labs and WANDA with infusion monthly   - Labs, Kyprolis and Sarclisa infusion every other week.     40 minutes spent on the date of the encounter doing chart review, review of test results, interpretation of tests, patient visit and " documentation     Keira PICHARDO, ANA MARIA, YUNIELP  [address] [address] [phone] (pager)      Patient was seen and examined by me, as noted above. Keira PICHARDO, ANA MARIA, MARIBETH, acted as a scribe. I have reviewed and edited the above note.    MARINO Fuller CNP  [address] [address]  [phone]

## 2023-06-13 NOTE — PROGRESS NOTES
Infusion Nursing Note:  Shena Hartmann presents today for Cycle 5 Day 1 isatuximab-irfc, carfilzomib.    Patient seen by provider today: Yes: Selena Suggs, CNP   present during visit today: Not Applicable.    Note: Shena presents today feeling overall well. Denies pain or nausea/vomiting. Offers no concerns since visit with Selena Suggs prior to infusion.      Intravenous Access:  Implanted Port.    Treatment Conditions:     Latest Reference Range & Units 06/13/23 12:27   Sodium 136 - 145 mmol/L 138   Potassium 3.4 - 5.3 mmol/L 3.8   Chloride 98 - 107 mmol/L 101   Carbon Dioxide (CO2) 22 - 29 mmol/L 28   Urea Nitrogen 8.0 - 23.0 mg/dL 15.4   Creatinine 0.51 - 0.95 mg/dL 0.69   GFR Estimate >60 mL/min/1.73m2 >90   Calcium 8.8 - 10.2 mg/dL 9.5   Anion Gap 7 - 15 mmol/L 9   Albumin 3.5 - 5.2 g/dL 4.3   Protein Total 6.4 - 8.3 g/dL 6.4   Alkaline Phosphatase 35 - 104 U/L 57   ALT 0 - 50 U/L 27   AST 0 - 45 U/L 18   Bilirubin Total <=1.2 mg/dL 0.2   Glucose 70 - 99 mg/dL 105 (H)   N-Terminal Pro Bnp 0 - 900 pg/mL 226   WBC 4.0 - 11.0 10e3/uL 4.5   Hemoglobin 11.7 - 15.7 g/dL 10.1 (L)   Hematocrit 35.0 - 47.0 % 30.0 (L)   Platelet Count 150 - 450 10e3/uL 173   RBC Count 3.80 - 5.20 10e6/uL 2.69 (L)   MCV 78 - 100 fL 112 (H)   MCH 26.5 - 33.0 pg 37.5 (H)   MCHC 31.5 - 36.5 g/dL 33.7   RDW 10.0 - 15.0 % 13.0   % Neutrophils % 44   % Lymphocytes % 42   % Monocytes % 12   % Eosinophils % 2   % Basophils % 0   Absolute Basophils 0.0 - 0.2 10e3/uL 0.0   Absolute Eosinophils 0.0 - 0.7 10e3/uL 0.1   Absolute Immature Granulocytes <=0.4 10e3/uL 0.0   Absolute Lymphocytes 0.8 - 5.3 10e3/uL 1.9   Absolute Monocytes 0.0 - 1.3 10e3/uL 0.5   % Immature Granulocytes % 0   Absolute Neutrophils 1.6 - 8.3 10e3/uL 2.0   Absolute NRBCs 10e3/uL 0.0   NRBCs per 100 WBC <1 /100 0     Results reviewed, labs MET treatment parameters, ok to proceed with treatment.      Post Infusion Assessment:  Patient tolerated infusion without  incident.  Blood return noted pre and post infusion.  Site patent and intact, free from redness, edema or discomfort.  No evidence of extravasations.  Access discontinued per protocol.       Discharge Plan:   Patient declined prescription refills.  Discharge instructions reviewed with: Patient.  Patient and/or family verbalized understanding of discharge instructions and all questions answered.  AVS to patient via [employer].  Patient will return 07/05 for next infusion appointment.   Patient discharged in stable condition accompanied by: .  Departure Mode: Ambulatory.      Gaby Evans RN

## 2023-06-20 DIAGNOSIS — I10 BENIGN ESSENTIAL HYPERTENSION: ICD-10-CM

## 2023-06-20 RX ORDER — AMLODIPINE BESYLATE 2.5 MG/1
TABLET ORAL
Qty: 270 TABLET | Refills: 1 | Status: SHIPPED | OUTPATIENT
Start: 2023-06-20 | End: 2023-11-06

## 2023-06-20 NOTE — PROGRESS NOTES
Date: 6/20/2023    Time of Call: 4:48 PM     Diagnosis:  HTN     [ TORB ] Ordering provider: Dr. Price  Order: Increase Amlodipine dose to 5 mg in Am and 2.5 mg in PM.      Order received by: MELISSA Escoto      Follow-up/additional notes: Pt notified via [employer] message.     Richie Amato RN   Cardiology Nurse Coordinator

## 2023-06-27 ENCOUNTER — MYC MEDICAL ADVICE (OUTPATIENT)
Dept: INTERNAL MEDICINE | Facility: CLINIC | Age: 73
End: 2023-06-27
Payer: MEDICARE

## 2023-06-27 DIAGNOSIS — E03.9 ACQUIRED HYPOTHYROIDISM: ICD-10-CM

## 2023-06-29 RX ORDER — LEVOTHYROXINE SODIUM 50 UG/1
50 TABLET ORAL DAILY
Qty: 90 TABLET | Refills: 3 | Status: SHIPPED | OUTPATIENT
Start: 2023-06-29 | End: 2024-06-22

## 2023-06-29 NOTE — TELEPHONE ENCOUNTER
"Routing refill request to provider for review/approval because:  Drug interaction warning - corn-containing products    Last Written Prescription Date:  7/7/22  Last Fill Quantity: 90,  # refills: 3  Last office visit provider:   2/9/23    Requested Prescriptions   Pending Prescriptions Disp Refills     levothyroxine (SYNTHROID/LEVOTHROID) 50 MCG tablet 90 tablet 3     Sig: Take 1 tablet (50 mcg) by mouth daily       Thyroid Protocol Passed - 6/28/2023  1:12 PM        Passed - Patient is 12 years or older        Passed - Recent (12 mo) or future (30 days) visit within the authorizing provider's specialty     Patient has had an office visit with the authorizing provider or a provider within the authorizing providers department within the previous 12 mos or has a future within next 30 days. See \"Patient Info\" tab in inbasket, or \"Choose Columns\" in Meds & Orders section of the refill encounter.              Passed - Medication is active on med list        Passed - Normal TSH on file in past 12 months     Recent Labs   Lab Test 10/03/22  0820   TSH 1.82              Passed - No active pregnancy on record     If patient is pregnant or has had a positive pregnancy test, please check TSH.          Passed - No positive pregnancy test in past 12 months     If patient is pregnant or has had a positive pregnancy test, please check TSH.               Chen Rivas RN 06/29/23 10:40 AM  "

## 2023-07-02 DIAGNOSIS — C90.00 MULTIPLE MYELOMA NOT HAVING ACHIEVED REMISSION (H): Primary | ICD-10-CM

## 2023-07-02 RX ORDER — LORAZEPAM 2 MG/ML
0.5 INJECTION INTRAMUSCULAR EVERY 4 HOURS PRN
Status: CANCELLED | OUTPATIENT
Start: 2023-07-20

## 2023-07-02 RX ORDER — EPINEPHRINE 1 MG/ML
0.3 INJECTION, SOLUTION INTRAMUSCULAR; SUBCUTANEOUS EVERY 5 MIN PRN
Status: CANCELLED | OUTPATIENT
Start: 2023-09-24

## 2023-07-02 RX ORDER — ONDANSETRON 2 MG/ML
8 INJECTION INTRAMUSCULAR; INTRAVENOUS ONCE
Status: CANCELLED | OUTPATIENT
Start: 2023-07-20 | End: 2023-07-20

## 2023-07-02 RX ORDER — ONDANSETRON 2 MG/ML
8 INJECTION INTRAMUSCULAR; INTRAVENOUS ONCE
Status: CANCELLED | OUTPATIENT
Start: 2023-09-11 | End: 2023-08-17

## 2023-07-02 RX ORDER — ONDANSETRON 2 MG/ML
8 INJECTION INTRAMUSCULAR; INTRAVENOUS ONCE
Status: CANCELLED
Start: 2023-09-24 | End: 2023-08-30

## 2023-07-02 RX ORDER — MEPERIDINE HYDROCHLORIDE 25 MG/ML
25 INJECTION INTRAMUSCULAR; INTRAVENOUS; SUBCUTANEOUS EVERY 30 MIN PRN
Status: CANCELLED | OUTPATIENT
Start: 2023-07-05

## 2023-07-02 RX ORDER — ALBUTEROL SULFATE 90 UG/1
1-2 AEROSOL, METERED RESPIRATORY (INHALATION)
Status: CANCELLED
Start: 2023-09-11

## 2023-07-02 RX ORDER — METHYLPREDNISOLONE SODIUM SUCCINATE 125 MG/2ML
125 INJECTION, POWDER, LYOPHILIZED, FOR SOLUTION INTRAMUSCULAR; INTRAVENOUS
Status: CANCELLED
Start: 2023-07-05

## 2023-07-02 RX ORDER — DIPHENHYDRAMINE HYDROCHLORIDE 50 MG/ML
50 INJECTION INTRAMUSCULAR; INTRAVENOUS
Status: CANCELLED
Start: 2023-07-20

## 2023-07-02 RX ORDER — METHYLPREDNISOLONE SODIUM SUCCINATE 125 MG/2ML
125 INJECTION, POWDER, LYOPHILIZED, FOR SOLUTION INTRAMUSCULAR; INTRAVENOUS
Status: CANCELLED
Start: 2023-09-11

## 2023-07-02 RX ORDER — LORAZEPAM 2 MG/ML
0.5 INJECTION INTRAMUSCULAR EVERY 4 HOURS PRN
Status: CANCELLED | OUTPATIENT
Start: 2023-08-02

## 2023-07-02 RX ORDER — HEPARIN SODIUM (PORCINE) LOCK FLUSH IV SOLN 100 UNIT/ML 100 UNIT/ML
5 SOLUTION INTRAVENOUS
Status: CANCELLED | OUTPATIENT
Start: 2023-09-11

## 2023-07-02 RX ORDER — LORAZEPAM 2 MG/ML
0.5 INJECTION INTRAMUSCULAR EVERY 4 HOURS PRN
Status: CANCELLED | OUTPATIENT
Start: 2023-09-11

## 2023-07-02 RX ORDER — DIPHENHYDRAMINE HYDROCHLORIDE 50 MG/ML
50 INJECTION INTRAMUSCULAR; INTRAVENOUS
Status: CANCELLED
Start: 2023-08-02

## 2023-07-02 RX ORDER — ALBUTEROL SULFATE 90 UG/1
1-2 AEROSOL, METERED RESPIRATORY (INHALATION)
Status: CANCELLED
Start: 2023-07-20

## 2023-07-02 RX ORDER — MEPERIDINE HYDROCHLORIDE 25 MG/ML
25 INJECTION INTRAMUSCULAR; INTRAVENOUS; SUBCUTANEOUS EVERY 30 MIN PRN
Status: CANCELLED | OUTPATIENT
Start: 2023-09-24

## 2023-07-02 RX ORDER — ALBUTEROL SULFATE 0.83 MG/ML
2.5 SOLUTION RESPIRATORY (INHALATION)
Status: CANCELLED | OUTPATIENT
Start: 2023-09-11

## 2023-07-02 RX ORDER — ALBUTEROL SULFATE 0.83 MG/ML
2.5 SOLUTION RESPIRATORY (INHALATION)
Status: CANCELLED | OUTPATIENT
Start: 2023-08-02

## 2023-07-02 RX ORDER — DIPHENHYDRAMINE HYDROCHLORIDE 50 MG/ML
50 INJECTION INTRAMUSCULAR; INTRAVENOUS
Status: CANCELLED
Start: 2023-09-11

## 2023-07-02 RX ORDER — METHYLPREDNISOLONE SODIUM SUCCINATE 125 MG/2ML
125 INJECTION, POWDER, LYOPHILIZED, FOR SOLUTION INTRAMUSCULAR; INTRAVENOUS
Status: CANCELLED
Start: 2023-08-02

## 2023-07-02 RX ORDER — LORAZEPAM 2 MG/ML
0.5 INJECTION INTRAMUSCULAR EVERY 4 HOURS PRN
Status: CANCELLED | OUTPATIENT
Start: 2023-07-05

## 2023-07-02 RX ORDER — HEPARIN SODIUM (PORCINE) LOCK FLUSH IV SOLN 100 UNIT/ML 100 UNIT/ML
5 SOLUTION INTRAVENOUS
Status: CANCELLED | OUTPATIENT
Start: 2023-07-20

## 2023-07-02 RX ORDER — ALBUTEROL SULFATE 0.83 MG/ML
2.5 SOLUTION RESPIRATORY (INHALATION)
Status: CANCELLED | OUTPATIENT
Start: 2023-09-24

## 2023-07-02 RX ORDER — ALBUTEROL SULFATE 90 UG/1
1-2 AEROSOL, METERED RESPIRATORY (INHALATION)
Status: CANCELLED
Start: 2023-09-24

## 2023-07-02 RX ORDER — LORAZEPAM 2 MG/ML
0.5 INJECTION INTRAMUSCULAR EVERY 4 HOURS PRN
Status: CANCELLED | OUTPATIENT
Start: 2023-09-24

## 2023-07-02 RX ORDER — ALBUTEROL SULFATE 0.83 MG/ML
2.5 SOLUTION RESPIRATORY (INHALATION)
Status: CANCELLED | OUTPATIENT
Start: 2023-07-20

## 2023-07-02 RX ORDER — MEPERIDINE HYDROCHLORIDE 25 MG/ML
25 INJECTION INTRAMUSCULAR; INTRAVENOUS; SUBCUTANEOUS EVERY 30 MIN PRN
Status: CANCELLED | OUTPATIENT
Start: 2023-09-11

## 2023-07-02 RX ORDER — HEPARIN SODIUM (PORCINE) LOCK FLUSH IV SOLN 100 UNIT/ML 100 UNIT/ML
5 SOLUTION INTRAVENOUS
Status: CANCELLED | OUTPATIENT
Start: 2023-07-05

## 2023-07-02 RX ORDER — ACETAMINOPHEN 325 MG/1
650 TABLET ORAL ONCE
Status: CANCELLED | OUTPATIENT
Start: 2023-07-20

## 2023-07-02 RX ORDER — HEPARIN SODIUM,PORCINE 10 UNIT/ML
5 VIAL (ML) INTRAVENOUS
Status: CANCELLED | OUTPATIENT
Start: 2023-09-11

## 2023-07-02 RX ORDER — HEPARIN SODIUM (PORCINE) LOCK FLUSH IV SOLN 100 UNIT/ML 100 UNIT/ML
5 SOLUTION INTRAVENOUS
Status: CANCELLED | OUTPATIENT
Start: 2023-08-02

## 2023-07-02 RX ORDER — MEPERIDINE HYDROCHLORIDE 25 MG/ML
25 INJECTION INTRAMUSCULAR; INTRAVENOUS; SUBCUTANEOUS EVERY 30 MIN PRN
Status: CANCELLED | OUTPATIENT
Start: 2023-08-02

## 2023-07-02 RX ORDER — ALBUTEROL SULFATE 0.83 MG/ML
2.5 SOLUTION RESPIRATORY (INHALATION)
Status: CANCELLED | OUTPATIENT
Start: 2023-07-05

## 2023-07-02 RX ORDER — HEPARIN SODIUM,PORCINE 10 UNIT/ML
5 VIAL (ML) INTRAVENOUS
Status: CANCELLED | OUTPATIENT
Start: 2023-07-05

## 2023-07-02 RX ORDER — HEPARIN SODIUM,PORCINE 10 UNIT/ML
5 VIAL (ML) INTRAVENOUS
Status: CANCELLED | OUTPATIENT
Start: 2023-07-20

## 2023-07-02 RX ORDER — ONDANSETRON 2 MG/ML
8 INJECTION INTRAMUSCULAR; INTRAVENOUS ONCE
Status: CANCELLED
Start: 2023-08-02 | End: 2023-08-02

## 2023-07-02 RX ORDER — ACETAMINOPHEN 325 MG/1
650 TABLET ORAL ONCE
Status: CANCELLED | OUTPATIENT
Start: 2023-08-02

## 2023-07-02 RX ORDER — EPINEPHRINE 1 MG/ML
0.3 INJECTION, SOLUTION INTRAMUSCULAR; SUBCUTANEOUS EVERY 5 MIN PRN
Status: CANCELLED | OUTPATIENT
Start: 2023-08-02

## 2023-07-02 RX ORDER — EPINEPHRINE 1 MG/ML
0.3 INJECTION, SOLUTION INTRAMUSCULAR; SUBCUTANEOUS EVERY 5 MIN PRN
Status: CANCELLED | OUTPATIENT
Start: 2023-07-20

## 2023-07-02 RX ORDER — ALBUTEROL SULFATE 90 UG/1
1-2 AEROSOL, METERED RESPIRATORY (INHALATION)
Status: CANCELLED
Start: 2023-07-05

## 2023-07-02 RX ORDER — ONDANSETRON 2 MG/ML
8 INJECTION INTRAMUSCULAR; INTRAVENOUS ONCE
Status: CANCELLED
Start: 2023-07-05 | End: 2023-07-05

## 2023-07-02 RX ORDER — DIPHENHYDRAMINE HYDROCHLORIDE 50 MG/ML
50 INJECTION INTRAMUSCULAR; INTRAVENOUS
Status: CANCELLED
Start: 2023-07-05

## 2023-07-02 RX ORDER — METHYLPREDNISOLONE SODIUM SUCCINATE 125 MG/2ML
125 INJECTION, POWDER, LYOPHILIZED, FOR SOLUTION INTRAMUSCULAR; INTRAVENOUS
Status: CANCELLED
Start: 2023-09-24

## 2023-07-02 RX ORDER — DIPHENHYDRAMINE HYDROCHLORIDE 50 MG/ML
50 INJECTION INTRAMUSCULAR; INTRAVENOUS
Status: CANCELLED
Start: 2023-09-24

## 2023-07-02 RX ORDER — ACETAMINOPHEN 325 MG/1
650 TABLET ORAL ONCE
Status: CANCELLED | OUTPATIENT
Start: 2023-07-05

## 2023-07-02 RX ORDER — METHYLPREDNISOLONE SODIUM SUCCINATE 125 MG/2ML
125 INJECTION, POWDER, LYOPHILIZED, FOR SOLUTION INTRAMUSCULAR; INTRAVENOUS
Status: CANCELLED
Start: 2023-07-20

## 2023-07-02 RX ORDER — EPINEPHRINE 1 MG/ML
0.3 INJECTION, SOLUTION INTRAMUSCULAR; SUBCUTANEOUS EVERY 5 MIN PRN
Status: CANCELLED | OUTPATIENT
Start: 2023-09-11

## 2023-07-02 RX ORDER — ALBUTEROL SULFATE 90 UG/1
1-2 AEROSOL, METERED RESPIRATORY (INHALATION)
Status: CANCELLED
Start: 2023-08-02

## 2023-07-02 RX ORDER — ACETAMINOPHEN 325 MG/1
650 TABLET ORAL ONCE
Status: CANCELLED | OUTPATIENT
Start: 2023-09-11

## 2023-07-02 RX ORDER — EPINEPHRINE 1 MG/ML
0.3 INJECTION, SOLUTION INTRAMUSCULAR; SUBCUTANEOUS EVERY 5 MIN PRN
Status: CANCELLED | OUTPATIENT
Start: 2023-07-05

## 2023-07-02 RX ORDER — HEPARIN SODIUM (PORCINE) LOCK FLUSH IV SOLN 100 UNIT/ML 100 UNIT/ML
5 SOLUTION INTRAVENOUS
Status: CANCELLED | OUTPATIENT
Start: 2023-09-24

## 2023-07-02 RX ORDER — MEPERIDINE HYDROCHLORIDE 25 MG/ML
25 INJECTION INTRAMUSCULAR; INTRAVENOUS; SUBCUTANEOUS EVERY 30 MIN PRN
Status: CANCELLED | OUTPATIENT
Start: 2023-07-20

## 2023-07-02 RX ORDER — HEPARIN SODIUM,PORCINE 10 UNIT/ML
5 VIAL (ML) INTRAVENOUS
Status: CANCELLED | OUTPATIENT
Start: 2023-08-02

## 2023-07-02 RX ORDER — ACETAMINOPHEN 325 MG/1
650 TABLET ORAL ONCE
Status: CANCELLED | OUTPATIENT
Start: 2023-09-24

## 2023-07-02 RX ORDER — HEPARIN SODIUM,PORCINE 10 UNIT/ML
5 VIAL (ML) INTRAVENOUS
Status: CANCELLED | OUTPATIENT
Start: 2023-09-24

## 2023-07-05 ENCOUNTER — INFUSION THERAPY VISIT (OUTPATIENT)
Dept: ONCOLOGY | Facility: CLINIC | Age: 73
End: 2023-07-05
Attending: STUDENT IN AN ORGANIZED HEALTH CARE EDUCATION/TRAINING PROGRAM
Payer: MEDICARE

## 2023-07-05 ENCOUNTER — APPOINTMENT (OUTPATIENT)
Dept: LAB | Facility: CLINIC | Age: 73
End: 2023-07-05
Attending: STUDENT IN AN ORGANIZED HEALTH CARE EDUCATION/TRAINING PROGRAM
Payer: MEDICARE

## 2023-07-05 VITALS
TEMPERATURE: 98.6 F | HEART RATE: 83 BPM | DIASTOLIC BLOOD PRESSURE: 76 MMHG | OXYGEN SATURATION: 98 % | SYSTOLIC BLOOD PRESSURE: 168 MMHG | BODY MASS INDEX: 20.34 KG/M2 | WEIGHT: 113.8 LBS | RESPIRATION RATE: 16 BRPM

## 2023-07-05 DIAGNOSIS — C90.00 MULTIPLE MYELOMA NOT HAVING ACHIEVED REMISSION (H): Primary | ICD-10-CM

## 2023-07-05 DIAGNOSIS — E85.89 OTHER AMYLOIDOSIS (H): ICD-10-CM

## 2023-07-05 DIAGNOSIS — C90.02 MULTIPLE MYELOMA IN RELAPSE (H): ICD-10-CM

## 2023-07-05 LAB
ALBUMIN SERPL BCG-MCNC: 4.4 G/DL (ref 3.5–5.2)
ALP SERPL-CCNC: 56 U/L (ref 35–104)
ALT SERPL W P-5'-P-CCNC: 24 U/L (ref 0–50)
ANION GAP SERPL CALCULATED.3IONS-SCNC: 8 MMOL/L (ref 7–15)
AST SERPL W P-5'-P-CCNC: 20 U/L (ref 0–45)
BASOPHILS # BLD AUTO: 0 10E3/UL (ref 0–0.2)
BASOPHILS NFR BLD AUTO: 0 %
BILIRUB SERPL-MCNC: 0.2 MG/DL
BUN SERPL-MCNC: 14.3 MG/DL (ref 8–23)
CALCIUM SERPL-MCNC: 9.5 MG/DL (ref 8.8–10.2)
CHLORIDE SERPL-SCNC: 101 MMOL/L (ref 98–107)
CREAT SERPL-MCNC: 0.83 MG/DL (ref 0.51–0.95)
DEPRECATED HCO3 PLAS-SCNC: 27 MMOL/L (ref 22–29)
EOSINOPHIL # BLD AUTO: 0.1 10E3/UL (ref 0–0.7)
EOSINOPHIL NFR BLD AUTO: 2 %
ERYTHROCYTE [DISTWIDTH] IN BLOOD BY AUTOMATED COUNT: 12.3 % (ref 10–15)
GFR SERPL CREATININE-BSD FRML MDRD: 74 ML/MIN/1.73M2
GLUCOSE SERPL-MCNC: 117 MG/DL (ref 70–99)
HCT VFR BLD AUTO: 31.4 % (ref 35–47)
HGB BLD-MCNC: 10.4 G/DL (ref 11.7–15.7)
IMM GRANULOCYTES # BLD: 0 10E3/UL
IMM GRANULOCYTES NFR BLD: 0 %
LDH SERPL L TO P-CCNC: 193 U/L (ref 0–250)
LYMPHOCYTES # BLD AUTO: 1.9 10E3/UL (ref 0.8–5.3)
LYMPHOCYTES NFR BLD AUTO: 39 %
MCH RBC QN AUTO: 35.3 PG (ref 26.5–33)
MCHC RBC AUTO-ENTMCNC: 33.1 G/DL (ref 31.5–36.5)
MCV RBC AUTO: 106 FL (ref 78–100)
MONOCYTES # BLD AUTO: 0.6 10E3/UL (ref 0–1.3)
MONOCYTES NFR BLD AUTO: 13 %
NEUTROPHILS # BLD AUTO: 2.2 10E3/UL (ref 1.6–8.3)
NEUTROPHILS NFR BLD AUTO: 46 %
NRBC # BLD AUTO: 0 10E3/UL
NRBC BLD AUTO-RTO: 0 /100
NT-PROBNP SERPL-MCNC: 210 PG/ML (ref 0–900)
PLATELET # BLD AUTO: 173 10E3/UL (ref 150–450)
POTASSIUM SERPL-SCNC: 4.3 MMOL/L (ref 3.4–5.3)
PROT SERPL-MCNC: 6.4 G/DL (ref 6.4–8.3)
RBC # BLD AUTO: 2.95 10E6/UL (ref 3.8–5.2)
SODIUM SERPL-SCNC: 136 MMOL/L (ref 136–145)
TOTAL PROTEIN SERUM FOR ELP: 6.3 G/DL (ref 6.4–8.3)
WBC # BLD AUTO: 4.8 10E3/UL (ref 4–11)

## 2023-07-05 PROCEDURE — 96417 CHEMO IV INFUS EACH ADDL SEQ: CPT

## 2023-07-05 PROCEDURE — 84155 ASSAY OF PROTEIN SERUM: CPT | Mod: 91

## 2023-07-05 PROCEDURE — 96375 TX/PRO/DX INJ NEW DRUG ADDON: CPT

## 2023-07-05 PROCEDURE — 250N000011 HC RX IP 250 OP 636: Mod: JZ | Performed by: STUDENT IN AN ORGANIZED HEALTH CARE EDUCATION/TRAINING PROGRAM

## 2023-07-05 PROCEDURE — 83880 ASSAY OF NATRIURETIC PEPTIDE: CPT

## 2023-07-05 PROCEDURE — 250N000013 HC RX MED GY IP 250 OP 250 PS 637: Performed by: STUDENT IN AN ORGANIZED HEALTH CARE EDUCATION/TRAINING PROGRAM

## 2023-07-05 PROCEDURE — 36591 DRAW BLOOD OFF VENOUS DEVICE: CPT

## 2023-07-05 PROCEDURE — 80053 COMPREHEN METABOLIC PANEL: CPT

## 2023-07-05 PROCEDURE — 84165 PROTEIN E-PHORESIS SERUM: CPT | Mod: 26

## 2023-07-05 PROCEDURE — 84165 PROTEIN E-PHORESIS SERUM: CPT | Mod: TC | Performed by: STUDENT IN AN ORGANIZED HEALTH CARE EDUCATION/TRAINING PROGRAM

## 2023-07-05 PROCEDURE — 83615 LACTATE (LD) (LDH) ENZYME: CPT

## 2023-07-05 PROCEDURE — 96413 CHEMO IV INFUSION 1 HR: CPT

## 2023-07-05 PROCEDURE — 85025 COMPLETE CBC W/AUTO DIFF WBC: CPT

## 2023-07-05 PROCEDURE — 83521 IG LIGHT CHAINS FREE EACH: CPT

## 2023-07-05 PROCEDURE — 258N000003 HC RX IP 258 OP 636: Performed by: STUDENT IN AN ORGANIZED HEALTH CARE EDUCATION/TRAINING PROGRAM

## 2023-07-05 RX ORDER — HEPARIN SODIUM (PORCINE) LOCK FLUSH IV SOLN 100 UNIT/ML 100 UNIT/ML
5 SOLUTION INTRAVENOUS ONCE
Status: COMPLETED | OUTPATIENT
Start: 2023-07-05 | End: 2023-07-05

## 2023-07-05 RX ORDER — ACETAMINOPHEN 325 MG/1
650 TABLET ORAL ONCE
Status: COMPLETED | OUTPATIENT
Start: 2023-07-05 | End: 2023-07-05

## 2023-07-05 RX ORDER — ONDANSETRON 2 MG/ML
8 INJECTION INTRAMUSCULAR; INTRAVENOUS ONCE
Status: COMPLETED | OUTPATIENT
Start: 2023-07-05 | End: 2023-07-05

## 2023-07-05 RX ORDER — HEPARIN SODIUM (PORCINE) LOCK FLUSH IV SOLN 100 UNIT/ML 100 UNIT/ML
5 SOLUTION INTRAVENOUS
Status: DISCONTINUED | OUTPATIENT
Start: 2023-07-05 | End: 2023-07-05 | Stop reason: HOSPADM

## 2023-07-05 RX ADMIN — DIPHENHYDRAMINE HYDROCHLORIDE 50 MG: 50 INJECTION, SOLUTION INTRAMUSCULAR; INTRAVENOUS at 15:09

## 2023-07-05 RX ADMIN — DEXTROSE MONOHYDRATE 250 ML: 50 INJECTION, SOLUTION INTRAVENOUS at 17:20

## 2023-07-05 RX ADMIN — SODIUM CHLORIDE 250 ML: 9 INJECTION, SOLUTION INTRAVENOUS at 15:10

## 2023-07-05 RX ADMIN — CARFILZOMIB 90 MG: 30 INJECTION, POWDER, LYOPHILIZED, FOR SOLUTION INTRAVENOUS at 17:23

## 2023-07-05 RX ADMIN — Medication 5 ML: at 17:25

## 2023-07-05 RX ADMIN — FAMOTIDINE 20 MG: 10 INJECTION INTRAVENOUS at 15:09

## 2023-07-05 RX ADMIN — ACETAMINOPHEN 650 MG: 325 TABLET ORAL at 15:09

## 2023-07-05 RX ADMIN — ONDANSETRON 8 MG: 2 INJECTION INTRAMUSCULAR; INTRAVENOUS at 15:09

## 2023-07-05 RX ADMIN — SODIUM CHLORIDE 500 MG: 9 INJECTION, SOLUTION INTRAVENOUS at 15:44

## 2023-07-05 RX ADMIN — Medication 5 ML: at 14:31

## 2023-07-05 ASSESSMENT — PAIN SCALES - GENERAL: PAINLEVEL: NO PAIN (0)

## 2023-07-05 NOTE — PROGRESS NOTES
Infusion Nursing Note:  Shena Hartmann presents today for Cycle 5 Day 15 Sarclisa and Kyprolis.    Patient seen by provider today: No    Note: Patient presents to the infusion center today feeling well. She denies any new symptoms today. Ongoing asymptomatic HTN, patient states she sends her BP readings to her cardiologist monthly for management. States ongoing fatigue, fevers, chills, and nausea that start the night after her infusions, providers are aware. States the fevers and chills only last one night but the fatigue and nausea will linger for about a week. Currently denies any fevers, chills, chest pain or shortness of breath.     Intravenous Access:  Implanted Port.    Treatment Conditions:   Latest Reference Range & Units 07/05/23 14:38   Sodium 136 - 145 mmol/L 136   Potassium 3.4 - 5.3 mmol/L 4.3   Chloride 98 - 107 mmol/L 101   Carbon Dioxide (CO2) 22 - 29 mmol/L 27   Urea Nitrogen 8.0 - 23.0 mg/dL 14.3   Creatinine 0.51 - 0.95 mg/dL 0.83   GFR Estimate >60 mL/min/1.73m2 74   Calcium 8.8 - 10.2 mg/dL 9.5   Anion Gap 7 - 15 mmol/L 8   Albumin 3.5 - 5.2 g/dL 4.4   Protein Total 6.4 - 8.3 g/dL 6.4   Alkaline Phosphatase 35 - 104 U/L 56   ALT 0 - 50 U/L 24   AST 0 - 45 U/L 20   Bilirubin Total <=1.2 mg/dL 0.2   Glucose 70 - 99 mg/dL 117 (H)   Lactate Dehydrogenase 0 - 250 U/L 193   N-Terminal Pro Bnp 0 - 900 pg/mL 210   WBC 4.0 - 11.0 10e3/uL 4.8   Hemoglobin 11.7 - 15.7 g/dL 10.4 (L)   Hematocrit 35.0 - 47.0 % 31.4 (L)   Platelet Count 150 - 450 10e3/uL 173   RBC Count 3.80 - 5.20 10e6/uL 2.95 (L)   MCV 78 - 100 fL 106 (H)   MCH 26.5 - 33.0 pg 35.3 (H)   MCHC 31.5 - 36.5 g/dL 33.1   RDW 10.0 - 15.0 % 12.3   % Neutrophils % 46   % Lymphocytes % 39   % Monocytes % 13   % Eosinophils % 2   % Basophils % 0   Absolute Basophils 0.0 - 0.2 10e3/uL 0.0   Absolute Eosinophils 0.0 - 0.7 10e3/uL 0.1   Absolute Immature Granulocytes <=0.4 10e3/uL 0.0   Absolute Lymphocytes 0.8 - 5.3 10e3/uL 1.9   Absolute Monocytes 0.0  - 1.3 10e3/uL 0.6   % Immature Granulocytes % 0   Absolute Neutrophils 1.6 - 8.3 10e3/uL 2.2   Absolute NRBCs 10e3/uL 0.0   NRBCs per 100 WBC <1 /100 0     Results reviewed, labs MET treatment parameters, ok to proceed with treatment.    Post Infusion Assessment:  Patient tolerated infusion without incident.  Blood return noted pre and post infusion.  No evidence of extravasations.  Access discontinued per protocol.     Discharge Plan:   Patient declined prescription refills.  Discharge instructions reviewed with: Patient.  Patient and/or family verbalized understanding of discharge instructions and all questions answered.  AVS to patient via [employer].  Patient will return 7/19 for next appointment.   Patient discharged in stable condition accompanied by: self.  Departure Mode: Ambulatory.      Lianne Sanches RN

## 2023-07-05 NOTE — PATIENT INSTRUCTIONS
[facility] Triage and after hours / weekends / holidays:  [phone]    Please call the triage or after hours line if you experience a temperature greater than or equal to 100.4, shaking chills, have uncontrolled nausea, vomiting and/or diarrhea, dizziness, shortness of breath, chest pain, bleeding, unexplained bruising, or if you have any other new/concerning symptoms, questions or concerns.      If you are having any concerning symptoms or wish to speak to a provider before your next infusion visit, please call triage to notify them so we can adequately serve you.     If you need a refill on a narcotic prescription or other medication, please call before your infusion appointment.                July 2023 Sunday Monday Tuesday Wednesday Thursday Friday Saturday                                 1       2     3     4     5    LAB PERIPHERAL   2:00 PM   (15 min.)   [facility] DRAW   [facility]    ONC INFUSION 2.5 HR (150 MIN)   2:30 PM   (150 min.)    ONC INFUSION NURSE   [facility] 6     7     8       9     10     11     12     13     14     15       16     17     18     19    LAB PERIPHERAL   7:30 AM   (15 min.)   [facility]   [facility]    RETURN CCSL   7:45 AM   (45 min.)   Selena Suggs APRN CNP   [facility]    ONC INFUSION 2.5 HR (150 MIN)   9:00 AM   (150 min.)    ONC INFUSION NURSE   [facility] 20     21     22       23     24     25     26     27     28     29       30     31 August 2023 Sunday Monday Tuesday Wednesday Thursday Friday Saturday             1    LAB PERIPHERAL   8:30 AM   (15 min.)   [facility]   [facility]    ONC INFUSION 2.5 HR (150 MIN)   9:00 AM   (150 min.)    ONC INFUSION NURSE   [facility] 2     3      4     5       6     7     8     9     10    OFFICE VISIT   9:40 AM   (30 min.)   Thomas Villalobos MD   [facility] 11     12       13     14     15     16     17    LAB PERIPHERAL   1:30 PM   (15 min.)    [facility]   [facility]    RETURN ACTIVE TREATMENT   1:45 PM   (45 min.)   Nidhi Fink APRN CNP   [facility]    ONC INFUSION 2.5 HR (150 MIN)   3:00 PM   (150 min.)   [facility] INFUSION NURSE   [facility] 18     19       20     21     22     23     24     25     26       27     28     29     30    LAB PERIPHERAL   2:00 PM   (15 min.)    [facility]   [facility]    ONC INFUSION 2.5 HR (150 MIN)   2:30 PM   (150 min.)    ONC INFUSION NURSE   [facility] 31                                  Recent Results (from the past 24 hour(s))   Lactate Dehydrogenase    Collection Time: 07/05/23  2:38 PM   Result Value Ref Range    Lactate Dehydrogenase 193 0 - 250 U/L   Comprehensive metabolic panel    Collection Time: 07/05/23  2:38 PM   Result Value Ref Range    Sodium 136 136 - 145 mmol/L    Potassium 4.3 3.4 - 5.3 mmol/L    Chloride 101 98 - 107 mmol/L    Carbon Dioxide (CO2) 27 22 - 29 mmol/L    Anion Gap 8 7 - 15 mmol/L    Urea Nitrogen 14.3 8.0 - 23.0 mg/dL    Creatinine 0.83 0.51 - 0.95 mg/dL    Calcium 9.5 8.8 - 10.2 mg/dL    Glucose 117 (H) 70 - 99 mg/dL    Alkaline Phosphatase 56 35 - 104 U/L    AST 20 0 - 45 U/L    ALT 24 0 - 50 U/L    Protein Total 6.4 6.4 - 8.3 g/dL    Albumin 4.4 3.5 - 5.2 g/dL    Bilirubin Total 0.2 <=1.2 mg/dL    GFR Estimate 74 >60 mL/min/1.73m2   N terminal pro BNP outpatient    Collection Time: 07/05/23  2:38 PM   Result Value Ref Range    N Terminal Pro BNP Outpatient 210 0 - 900 pg/mL   CBC with platelets and differential    Collection Time: 07/05/23  2:38 PM   Result Value Ref Range    WBC Count 4.8  4.0 - 11.0 10e3/uL    RBC Count 2.95 (L) 3.80 - 5.20 10e6/uL    Hemoglobin 10.4 (L) 11.7 - 15.7 g/dL    Hematocrit 31.4 (L) 35.0 - 47.0 %     (H) 78 - 100 fL    MCH 35.3 (H) 26.5 - 33.0 pg    MCHC 33.1 31.5 - 36.5 g/dL    RDW 12.3 10.0 - 15.0 %    Platelet Count 173 150 - 450 10e3/uL    % Neutrophils 46 %    % Lymphocytes 39 %    % Monocytes 13 %    % Eosinophils 2 %    % Basophils 0 %    % Immature Granulocytes 0 %    NRBCs per 100 WBC 0 <1 /100    Absolute Neutrophils 2.2 1.6 - 8.3 10e3/uL    Absolute Lymphocytes 1.9 0.8 - 5.3 10e3/uL    Absolute Monocytes 0.6 0.0 - 1.3 10e3/uL    Absolute Eosinophils 0.1 0.0 - 0.7 10e3/uL    Absolute Basophils 0.0 0.0 - 0.2 10e3/uL    Absolute Immature Granulocytes 0.0 <=0.4 10e3/uL    Absolute NRBCs 0.0 10e3/uL

## 2023-07-05 NOTE — NURSING NOTE
Chief Complaint   Patient presents with     Port Draw     Labs drawn via port by rn in lab. VS taken.     Port accessed with 20g 3/4 inch gripper needle by RN, labs collected, line flushed with saline and heparin.  Vitals taken. Pt checked in for appointment(s).    Balwinder Andrade, RN

## 2023-07-06 LAB
ALBUMIN SERPL ELPH-MCNC: 4.3 G/DL (ref 3.7–5.1)
ALPHA1 GLOB SERPL ELPH-MCNC: 0.3 G/DL (ref 0.2–0.4)
ALPHA2 GLOB SERPL ELPH-MCNC: 0.6 G/DL (ref 0.5–0.9)
B-GLOBULIN SERPL ELPH-MCNC: 0.6 G/DL (ref 0.6–1)
GAMMA GLOB SERPL ELPH-MCNC: 0.6 G/DL (ref 0.7–1.6)
KAPPA LC FREE SER-MCNC: 2.32 MG/DL (ref 0.33–1.94)
KAPPA LC FREE/LAMBDA FREE SER NEPH: 15.47 {RATIO} (ref 0.26–1.65)
LAMBDA LC FREE SERPL-MCNC: 0.15 MG/DL (ref 0.57–2.63)
M PROTEIN SERPL ELPH-MCNC: 0.3 G/DL
PROT PATTERN SERPL ELPH-IMP: ABNORMAL

## 2023-07-19 ENCOUNTER — ONCOLOGY VISIT (OUTPATIENT)
Dept: ONCOLOGY | Facility: CLINIC | Age: 73
End: 2023-07-19
Payer: MEDICARE

## 2023-07-19 ENCOUNTER — APPOINTMENT (OUTPATIENT)
Dept: LAB | Facility: CLINIC | Age: 73
End: 2023-07-19
Attending: STUDENT IN AN ORGANIZED HEALTH CARE EDUCATION/TRAINING PROGRAM
Payer: MEDICARE

## 2023-07-19 ENCOUNTER — INFUSION THERAPY VISIT (OUTPATIENT)
Dept: ONCOLOGY | Facility: CLINIC | Age: 73
End: 2023-07-19
Attending: STUDENT IN AN ORGANIZED HEALTH CARE EDUCATION/TRAINING PROGRAM
Payer: MEDICARE

## 2023-07-19 VITALS
DIASTOLIC BLOOD PRESSURE: 70 MMHG | OXYGEN SATURATION: 98 % | HEART RATE: 83 BPM | WEIGHT: 114.5 LBS | RESPIRATION RATE: 16 BRPM | TEMPERATURE: 97.6 F | BODY MASS INDEX: 20.47 KG/M2 | SYSTOLIC BLOOD PRESSURE: 138 MMHG

## 2023-07-19 DIAGNOSIS — I10 BENIGN ESSENTIAL HYPERTENSION: ICD-10-CM

## 2023-07-19 DIAGNOSIS — C90.00 MULTIPLE MYELOMA NOT HAVING ACHIEVED REMISSION (H): Primary | ICD-10-CM

## 2023-07-19 LAB
ALBUMIN SERPL BCG-MCNC: 4.3 G/DL (ref 3.5–5.2)
ALP SERPL-CCNC: 46 U/L (ref 35–104)
ALT SERPL W P-5'-P-CCNC: 22 U/L (ref 0–50)
ANION GAP SERPL CALCULATED.3IONS-SCNC: 11 MMOL/L (ref 7–15)
AST SERPL W P-5'-P-CCNC: 19 U/L (ref 0–45)
BASOPHILS # BLD AUTO: 0 10E3/UL (ref 0–0.2)
BASOPHILS NFR BLD AUTO: 0 %
BILIRUB SERPL-MCNC: 0.3 MG/DL
BUN SERPL-MCNC: 14.8 MG/DL (ref 8–23)
CALCIUM SERPL-MCNC: 9.6 MG/DL (ref 8.8–10.2)
CHLORIDE SERPL-SCNC: 100 MMOL/L (ref 98–107)
CREAT SERPL-MCNC: 0.76 MG/DL (ref 0.51–0.95)
DEPRECATED HCO3 PLAS-SCNC: 26 MMOL/L (ref 22–29)
EOSINOPHIL # BLD AUTO: 0.1 10E3/UL (ref 0–0.7)
EOSINOPHIL NFR BLD AUTO: 1 %
ERYTHROCYTE [DISTWIDTH] IN BLOOD BY AUTOMATED COUNT: 12.8 % (ref 10–15)
GFR SERPL CREATININE-BSD FRML MDRD: 83 ML/MIN/1.73M2
GLUCOSE SERPL-MCNC: 101 MG/DL (ref 70–99)
HCT VFR BLD AUTO: 32.9 % (ref 35–47)
HGB BLD-MCNC: 11.1 G/DL (ref 11.7–15.7)
IMM GRANULOCYTES # BLD: 0 10E3/UL
IMM GRANULOCYTES NFR BLD: 0 %
LYMPHOCYTES # BLD AUTO: 1.4 10E3/UL (ref 0.8–5.3)
LYMPHOCYTES NFR BLD AUTO: 24 %
MCH RBC QN AUTO: 35.1 PG (ref 26.5–33)
MCHC RBC AUTO-ENTMCNC: 33.7 G/DL (ref 31.5–36.5)
MCV RBC AUTO: 104 FL (ref 78–100)
MONOCYTES # BLD AUTO: 0.7 10E3/UL (ref 0–1.3)
MONOCYTES NFR BLD AUTO: 12 %
NEUTROPHILS # BLD AUTO: 3.5 10E3/UL (ref 1.6–8.3)
NEUTROPHILS NFR BLD AUTO: 63 %
NRBC # BLD AUTO: 0 10E3/UL
NRBC BLD AUTO-RTO: 0 /100
PLATELET # BLD AUTO: 186 10E3/UL (ref 150–450)
POTASSIUM SERPL-SCNC: 3.7 MMOL/L (ref 3.4–5.3)
PROT SERPL-MCNC: 6.3 G/DL (ref 6.4–8.3)
RBC # BLD AUTO: 3.16 10E6/UL (ref 3.8–5.2)
SODIUM SERPL-SCNC: 137 MMOL/L (ref 136–145)
WBC # BLD AUTO: 5.6 10E3/UL (ref 4–11)

## 2023-07-19 PROCEDURE — 80053 COMPREHEN METABOLIC PANEL: CPT | Performed by: STUDENT IN AN ORGANIZED HEALTH CARE EDUCATION/TRAINING PROGRAM

## 2023-07-19 PROCEDURE — 96375 TX/PRO/DX INJ NEW DRUG ADDON: CPT

## 2023-07-19 PROCEDURE — 96415 CHEMO IV INFUSION ADDL HR: CPT

## 2023-07-19 PROCEDURE — 96413 CHEMO IV INFUSION 1 HR: CPT

## 2023-07-19 PROCEDURE — G0463 HOSPITAL OUTPT CLINIC VISIT: HCPCS | Mod: 25 | Performed by: REGISTERED NURSE

## 2023-07-19 PROCEDURE — 85025 COMPLETE CBC W/AUTO DIFF WBC: CPT | Performed by: STUDENT IN AN ORGANIZED HEALTH CARE EDUCATION/TRAINING PROGRAM

## 2023-07-19 PROCEDURE — 250N000011 HC RX IP 250 OP 636: Mod: JZ | Performed by: STUDENT IN AN ORGANIZED HEALTH CARE EDUCATION/TRAINING PROGRAM

## 2023-07-19 PROCEDURE — 258N000003 HC RX IP 258 OP 636: Performed by: STUDENT IN AN ORGANIZED HEALTH CARE EDUCATION/TRAINING PROGRAM

## 2023-07-19 PROCEDURE — 99215 OFFICE O/P EST HI 40 MIN: CPT | Performed by: REGISTERED NURSE

## 2023-07-19 PROCEDURE — 250N000013 HC RX MED GY IP 250 OP 250 PS 637: Performed by: STUDENT IN AN ORGANIZED HEALTH CARE EDUCATION/TRAINING PROGRAM

## 2023-07-19 PROCEDURE — 250N000011 HC RX IP 250 OP 636: Mod: JZ | Performed by: REGISTERED NURSE

## 2023-07-19 PROCEDURE — 96417 CHEMO IV INFUS EACH ADDL SEQ: CPT

## 2023-07-19 RX ORDER — HEPARIN SODIUM (PORCINE) LOCK FLUSH IV SOLN 100 UNIT/ML 100 UNIT/ML
5 SOLUTION INTRAVENOUS
Status: DISCONTINUED | OUTPATIENT
Start: 2023-07-19 | End: 2023-07-19 | Stop reason: HOSPADM

## 2023-07-19 RX ORDER — ACETAMINOPHEN 325 MG/1
650 TABLET ORAL ONCE
Status: COMPLETED | OUTPATIENT
Start: 2023-07-19 | End: 2023-07-19

## 2023-07-19 RX ORDER — HEPARIN SODIUM (PORCINE) LOCK FLUSH IV SOLN 100 UNIT/ML 100 UNIT/ML
5 SOLUTION INTRAVENOUS ONCE
Status: COMPLETED | OUTPATIENT
Start: 2023-07-19 | End: 2023-07-19

## 2023-07-19 RX ORDER — ONDANSETRON 2 MG/ML
8 INJECTION INTRAMUSCULAR; INTRAVENOUS ONCE
Status: COMPLETED | OUTPATIENT
Start: 2023-07-19 | End: 2023-07-19

## 2023-07-19 RX ADMIN — Medication 5 ML: at 07:55

## 2023-07-19 RX ADMIN — DIPHENHYDRAMINE HYDROCHLORIDE 50 MG: 50 INJECTION, SOLUTION INTRAMUSCULAR; INTRAVENOUS at 09:47

## 2023-07-19 RX ADMIN — DEXTROSE MONOHYDRATE 250 ML: 50 INJECTION, SOLUTION INTRAVENOUS at 09:46

## 2023-07-19 RX ADMIN — ONDANSETRON 8 MG: 2 INJECTION INTRAMUSCULAR; INTRAVENOUS at 09:46

## 2023-07-19 RX ADMIN — FAMOTIDINE 20 MG: 10 INJECTION INTRAVENOUS at 09:47

## 2023-07-19 RX ADMIN — SODIUM CHLORIDE 500 MG: 9 INJECTION, SOLUTION INTRAVENOUS at 10:19

## 2023-07-19 RX ADMIN — Medication 5 ML: at 12:34

## 2023-07-19 RX ADMIN — CARFILZOMIB 90 MG: 30 INJECTION, POWDER, LYOPHILIZED, FOR SOLUTION INTRAVENOUS at 11:54

## 2023-07-19 RX ADMIN — ACETAMINOPHEN 650 MG: 325 TABLET ORAL at 09:47

## 2023-07-19 ASSESSMENT — PAIN SCALES - GENERAL: PAINLEVEL: MODERATE PAIN (4)

## 2023-07-19 NOTE — LETTER
"    7/19/2023         RE: Shena Hartmann  [address] [place] [address]        Dear Colleague,    Thank you for referring your patient, Shena Hartmann, to the [facility]. Please see a copy of my visit note below.    Malignant Hematology Progress Note    Date: 07/19/2023    Reason for Office Visit: Evaluation prior to cycle 6 Isatuximab + Carfilzomib      Oncologic Hx:  - Breast cancer dx in 1994. Underwent surgery and chemotherapy without reoccurence  - MGUS dx 1999  - T8 pathologic fracture with radiation  - Revlimid and velcade  2013  - Cytoxan IV 9/2013  - D-PACE 11/2013  - Auto 3/27/14  - 5/2014 thalidomide caused rash so switched to pomalidomide   - on kenalog and clobetasol creams for IMID rash  - FLC began to rise so riky added to pom 9/2020  - she has been on xgeva for an unclear amount of time  - Started Teodora-Kd 2/20/2023    Interval Hx:   Shena is on Teodora-Kd and presents to clinic prior to C6D1.      Continues to feel a constellation of symptoms that she feels have a significant impact on her quality of life.    GI system is \"very unsettled.\"  Has loose stools 4-5 times a day for several days after her infusions, then \"settles down\" but stools remain soft/mushy throughout the month.    7-8 hours after her infusion she gets shakes and a fever (fever runs anywhere up to 101) which lasts for about 2 hours, then resolves.  Experiences extreme fatigue afterward.    Notices some increased numbness and tingling in her hands in the days following her infusions, improves in the week before the next infusion.    Blood pressures are getting better, recently running 130-140 SBP and 70-80 DBP.    Has generalized body aches that she manages with exercise and Tylenol, although she notes she is also wiped out after exercise, so it makes it hard for her to get anything else done.    She feels strongly that she needs a break from treatment.  She has some specific things happening " in August that she wants to feel well for (she is helping move one of her granddaughters to college, and also has immediate family visiting from [place]).  She would like to get her treatment on August 1st and then take a break from treatment until her September 14th treatment.      Pertinent PMH:  HTN, hypothyroidism  Family Hx- prostate ca, pancreatic , GBM- grandfather, paternal aunt breast cancer    Allergies   Allergen Reactions    Blood Transfusion Related (Informational Only) Other (See Comments)     Patient has a history of a clinically significant antibody against RBC antigens.  A delay in compatible RBCs may occur.     Cayenne Pepper [Cayenne]     Corn-Containing Products GI Disturbance    Levaquin Other (See Comments)     Tendon pain    Milk Protein GI Disturbance    Mold      Facial rash/lip swelling    Morphine Sulfate Other (See Comments)     Per pt - see things (hallucination) when she was on Morphine .    Pollen Extract      Environmental allergies     Shrimp Nausea and Vomiting    Tomato      Lip swelling, facial rash    Azithromycin Rash    Keflex [Cephalexin] Rash    Oat Rash    Tape [Adhesive Tape] Itching and Rash     All adhesives    Wheat Rash     Swelling of lips     Current Outpatient Medications   Medication    acetaminophen (TYLENOL) 325 MG tablet    acyclovir (ZOVIRAX) 400 MG tablet    amLODIPine (NORVASC) 5 MG tablet    Ascorbic Acid (VITAMIN C PO)    aspirin (ASA) 81 MG chewable tablet    CALCIUM-VITAMIN D-VITAMIN K PO    clobetasol (TEMOVATE) 0.05 % external ointment    COENZYME Q-10 PO    Cyanocobalamin 1000 MCG/ML LIQD    desonide (DESOWEN) 0.05 % external ointment    hydrALAZINE (APRESOLINE) 10 MG tablet    levothyroxine (SYNTHROID/LEVOTHROID) 50 MCG tablet    lisinopril (ZESTRIL) 10 MG tablet    magnesium 100 MG CAPS    Multiple Vitamins-Minerals (MULTIVITAMIN OR)    ondansetron (ZOFRAN) 4 MG tablet    order for DME    order for DME    prochlorperazine (COMPAZINE) 5 MG tablet     triamcinolone (KENALOG) 0.1 % external ointment    ZINC PICOLINATE PO       Physical Exam:  /70   Pulse 83   Temp 97.6  F (36.4  C) (Oral)   Resp 16   Wt 51.9 kg (114 lb 8 oz)   SpO2 98%   BMI 20.47 kg/m      Wt Readings from Last 5 Encounters:   07/19/23 51.9 kg (114 lb 8 oz)   07/05/23 51.6 kg (113 lb 12.8 oz)   06/13/23 51.3 kg (113 lb)   05/30/23 50.8 kg (112 lb)   05/22/23 51.3 kg (113 lb 3.2 oz)     Constitutional: Appears stated age, well-groomed.   HEENT: Normocephilic. EOMI, PERRL. Sclerae are anicteric.   Respiratory: No increased work of breathing, good air exchange, clear to auscultation bilaterally, no crackles or wheezing.  Cardiovascular: Normal apical impulse, regular rate and rhythm, normal S1 and S2, and no murmur noted.  Skin: No bruising or bleeding, normal skin color, texture, turgor, no redness, warmth, or swelling, no rashes, no lesions, no jaundice.  Extremities:.There is no redness, warmth, or swelling of the joints.  Neurologic: Awake, alert, oriented to name, place and time.    Vascular access: Left chest wall implanted port accessed. Site/covered, c/d/i.    Most Recent 3 CBC's:  Recent Labs   Lab Test 07/19/23  0802 07/05/23  1438 06/13/23  1227   WBC 5.6 4.8 4.5   HGB 11.1* 10.4* 10.1*   * 106* 112*    173 173   ANEUTAUTO 3.5 2.2 2.0     Most Recent 3 BMP's:  Recent Labs   Lab Test 07/19/23  0802 07/05/23  1438 06/13/23  1227    136 138   POTASSIUM 3.7 4.3 3.8   CHLORIDE 100 101 101   CO2 26 27 28   BUN 14.8 14.3 15.4   CR 0.76 0.83 0.69   ANIONGAP 11 8 9   PAULINE 9.6 9.5 9.5   * 117* 105*   PROTTOTAL 6.3* 6.4 6.4   ALBUMIN 4.3 4.4 4.3    Most Recent 3 LFT's:  Recent Labs   Lab Test 07/19/23  0802 07/05/23  1438 06/13/23  1227   AST 19 20 18   ALT 22 24 27   ALKPHOS 46 56 57   BILITOTAL 0.3 0.2 0.2    Most Recent 2 TSH and T4:  Recent Labs   Lab Test 10/03/22  0820 06/13/22  0822 03/21/22  0803   TSH 1.82 9.47*  9.31* 5.79*   T4  --  0.99  0.97 0.83  "    Assessment and Plan:     Relapsed Myeloma  Started kyprolis/isatuximab/dex on 2/20/23.  Had hypertension and head pressure from the dexamethasone.  Increased llisinopril to 7.5 mg, was previously on 2.5 mg nightly.  - Discontinued dexamethasone after 2 cycles  - Continues with quality of life issues with Teodora-K and is experiencing increasing fatigue and elevated BP.  Although BP improving, remains a concern.   - Discussed with Dr. Chavez who recommended at best response (minimim VGPR) consider keeping kyprolis every other week and Isatuximab monthly versus  consideration of  trial since she has not had a BCMA targeting agent so she may respond well and potentially have a treatment free interval if she achieves significant tumor debulking. Shena to discuss further at next f/u with Dr. Chavez - does not want trial schedule and her physical condition to interfere with a family trip planned for Feb 2024.  - Gets Prolia every 6 months, has had 3 doses, last dose 5/22/23.  Would prefer to have this scheduled on her \"off\" week .   - She would like to take an elective break from treatment between August 1 and September 14. Discussed that this is not the medical recommendation at this time, and any break from treatment increases the risk of her myeloma advancing/relapsing.  Also acknowledged her autonomy to make the decision between quality of life and quantity of life.  After discussion, she remains firm on her request to take a break from treatment in August.  - She would also like to consider doing Teodora-K once monthly when she restarts treatment in September.  We discussed today that this is not the medical recommendation and she risks earlier relapse of her disease due to undertreatment, specifically with the Kyprolis.  She stated understanding of this and again reiterated that she would like to prioritize QOL now over quantity of life/prevention of relapse.      HTN  -BP is starting to improve after addition of " Amlodipine  -  Not using Hydralazine as states not effective  - Remains on Amlodipine 5 mg daily in the am and Lisinopril 10 mg in the PM   - Continue to follow with cardiology and follow their recommendations regarding managing BP.        Plan:  - Proceed with Teodora-K C6D1 today  - Will cancel 8/17 and 8/30 infusions per patient's request.  Will still get labs 8/17 to monitor myeloma labs and also maintain monthly port flush  - She would like to consider doing Teodora-K once monthly when she restarts treatment in September -   - Prolia Q6 m.o- next due Nov 2023  - Continue to f/u with cardiology regarding BP management  - Follow-up with labs and WANDA with infusion monthly   - Labs, Kyprolis and Sarclisa infusion every other week.     50 minutes spent on the date of the encounter doing chart review, review of test results, interpretation of tests, patient visit and documentation     MARINO Fuller [facility]  [address] [place] [address] [phone]

## 2023-07-19 NOTE — NURSING NOTE
"Oncology Rooming Note    July 19, 2023 8:23 AM   Shena Hartmann is a 72 year old female who presents for:    Chief Complaint   Patient presents with     Port Draw     Port accessed with 20g flat needle by RN, labs collected, line flushed with saline and heparin.  Vitals taken. Pt checked in for appointment(s).      Oncology Clinic Visit     Multiple myeloma     Initial Vitals: /70   Pulse 83   Temp 97.6  F (36.4  C) (Oral)   Resp 16   Wt 51.9 kg (114 lb 8 oz)   SpO2 98%   BMI 20.47 kg/m   Estimated body mass index is 20.47 kg/m  as calculated from the following:    Height as of 5/1/23: 1.593 m (5' 2.72\").    Weight as of this encounter: 51.9 kg (114 lb 8 oz). Body surface area is 1.52 meters squared.  Moderate Pain (4) Comment: Data Unavailable   No LMP recorded. Patient is postmenopausal.  Allergies reviewed: Yes  Medications reviewed: Yes    Medications: Medication refills not needed today.  Pharmacy name entered into readeo:    [id number] - [place], [place] - [address] AT [facility] [address]  WRITTEN PRESCRIPTION REQUESTED  [facility] - [place], [place] - [address]    Clinical concerns: none      Farheen Wagner            "

## 2023-07-19 NOTE — PROGRESS NOTES
Infusion Nursing Note:  Shena Hartmann presents today for Cycle 6 Day 1 Sarclisa and Kyprolis.    Patient seen by provider today: Yes: Selena Damon, MARCIANO   present during visit today: Not Applicable.    Note: Shena comes into the clinic today doing well overall and has no concerns to offer following her provider visit. She has generalized aches and pain which she rates 4/10 and declines intervention for. She denies s/s of infection.      Intravenous Access:  Implanted Port.    Treatment Conditions:  Lab Results   Component Value Date    HGB 11.1 (L) 07/19/2023    WBC 5.6 07/19/2023    ANEU 2.1 07/05/2021    ANEUTAUTO 3.5 07/19/2023     07/19/2023      Lab Results   Component Value Date     07/19/2023    POTASSIUM 3.7 07/19/2023    MAG 1.8 12/12/2014    CR 0.76 07/19/2023    PAULINE 9.6 07/19/2023    BILITOTAL 0.3 07/19/2023    ALBUMIN 4.3 07/19/2023    ALT 22 07/19/2023    AST 19 07/19/2023     Results reviewed, labs MET treatment parameters, ok to proceed with treatment.      Post Infusion Assessment:  Patient tolerated infusion without incident.  Blood return noted pre and post infusion.  Site patent and intact, free from redness, edema or discomfort.  No evidence of extravasations.  Access discontinued per protocol.       Discharge Plan:   Patient declined prescription refills.  Discharge instructions reviewed with: Patient.  Patient and/or family verbalized understanding of discharge instructions and all questions answered.  Copy of AVS reviewed with patient and/or family.  Patient will return 8/1 for next appointment.  Patient discharged in stable condition accompanied by: self.  Departure Mode: Ambulatory.      Colleen Edmonds RN

## 2023-07-19 NOTE — PATIENT INSTRUCTIONS
[facility] Triage and after hours / weekends / holidays:  [phone]    Please call the triage or after hours line if you experience a temperature greater than or equal to 100.4, shaking chills, have uncontrolled nausea, vomiting and/or diarrhea, dizziness, shortness of breath, chest pain, bleeding, unexplained bruising, or if you have any other new/concerning symptoms, questions or concerns.      If you are having any concerning symptoms or wish to speak to a provider before your next infusion visit, please call triage to notify them so we can adequately serve you.     If you need a refill on a narcotic prescription or other medication, please call before your infusion appointment.              Latest Reference Range & Units 07/19/23 08:02   Sodium 136 - 145 mmol/L 137   Potassium 3.4 - 5.3 mmol/L 3.7   Chloride 98 - 107 mmol/L 100   Carbon Dioxide (CO2) 22 - 29 mmol/L 26   Urea Nitrogen 8.0 - 23.0 mg/dL 14.8   Creatinine 0.51 - 0.95 mg/dL 0.76   GFR Estimate >60 mL/min/1.73m2 83   Calcium 8.8 - 10.2 mg/dL 9.6   Anion Gap 7 - 15 mmol/L 11   Albumin 3.5 - 5.2 g/dL 4.3   Protein Total 6.4 - 8.3 g/dL 6.3 (L)   Alkaline Phosphatase 35 - 104 U/L 46   ALT 0 - 50 U/L 22   AST 0 - 45 U/L 19   Bilirubin Total <=1.2 mg/dL 0.3   Glucose 70 - 99 mg/dL 101 (H)   WBC 4.0 - 11.0 10e3/uL 5.6   Hemoglobin 11.7 - 15.7 g/dL 11.1 (L)   Hematocrit 35.0 - 47.0 % 32.9 (L)   Platelet Count 150 - 450 10e3/uL 186   RBC Count 3.80 - 5.20 10e6/uL 3.16 (L)   MCV 78 - 100 fL 104 (H)   MCH 26.5 - 33.0 pg 35.1 (H)   MCHC 31.5 - 36.5 g/dL 33.7   RDW 10.0 - 15.0 % 12.8   % Neutrophils % 63   % Lymphocytes % 24   % Monocytes % 12   % Eosinophils % 1   % Basophils % 0   Absolute Basophils 0.0 - 0.2 10e3/uL 0.0   Absolute Eosinophils 0.0 - 0.7 10e3/uL 0.1   Absolute Immature Granulocytes <=0.4 10e3/uL 0.0   Absolute Lymphocytes 0.8 - 5.3 10e3/uL 1.4   Absolute Monocytes 0.0 - 1.3 10e3/uL 0.7   % Immature Granulocytes % 0   Absolute Neutrophils 1.6 -  8.3 10e3/uL 3.5   Absolute NRBCs 10e3/uL 0.0   NRBCs per 100 WBC <1 /100 0

## 2023-07-25 ENCOUNTER — MYC REFILL (OUTPATIENT)
Dept: CARDIOLOGY | Facility: CLINIC | Age: 73
End: 2023-07-25
Payer: MEDICARE

## 2023-07-25 DIAGNOSIS — I10 BENIGN ESSENTIAL HYPERTENSION: ICD-10-CM

## 2023-07-27 ENCOUNTER — MYC MEDICAL ADVICE (OUTPATIENT)
Dept: ONCOLOGY | Facility: CLINIC | Age: 73
End: 2023-07-27
Payer: MEDICARE

## 2023-07-27 RX ORDER — LISINOPRIL 10 MG/1
10 TABLET ORAL DAILY
Qty: 90 TABLET | Refills: 0 | Status: SHIPPED | OUTPATIENT
Start: 2023-07-27 | End: 2023-10-16

## 2023-07-27 NOTE — TELEPHONE ENCOUNTER
lisinopril (ZESTRIL) 10 MG tablet 90 tablet 0 5/2/2023     LVD: 5/1/2023  [facility] Sadia Marsh MD  Cardiovascular Disease     Recommendations:      1. Amlodipine 2.5 mg daily in the morning  2. Continue lisinopril 10 mg at night  3. Hold lopressor for now  4. Send BP readings via [employer]  5. Follow up in 3-4 weeks      7/17/23: update per Mychart> RTC 2 months>10/16/23 appt

## 2023-07-30 DIAGNOSIS — C90.00 MULTIPLE MYELOMA NOT HAVING ACHIEVED REMISSION (H): Primary | ICD-10-CM

## 2023-07-30 RX ORDER — ALBUTEROL SULFATE 90 UG/1
1-2 AEROSOL, METERED RESPIRATORY (INHALATION)
Status: CANCELLED
Start: 2023-10-09

## 2023-07-30 RX ORDER — ALBUTEROL SULFATE 0.83 MG/ML
2.5 SOLUTION RESPIRATORY (INHALATION)
Status: CANCELLED | OUTPATIENT
Start: 2023-10-09

## 2023-07-30 RX ORDER — DIPHENHYDRAMINE HYDROCHLORIDE 50 MG/ML
50 INJECTION INTRAMUSCULAR; INTRAVENOUS
Status: CANCELLED
Start: 2023-10-09

## 2023-07-30 RX ORDER — EPINEPHRINE 1 MG/ML
0.3 INJECTION, SOLUTION INTRAMUSCULAR; SUBCUTANEOUS EVERY 5 MIN PRN
Status: CANCELLED | OUTPATIENT
Start: 2023-10-09

## 2023-07-30 RX ORDER — EPINEPHRINE 1 MG/ML
0.3 INJECTION, SOLUTION INTRAMUSCULAR; SUBCUTANEOUS EVERY 5 MIN PRN
Status: CANCELLED | OUTPATIENT
Start: 2023-10-22

## 2023-07-30 RX ORDER — HEPARIN SODIUM,PORCINE 10 UNIT/ML
5 VIAL (ML) INTRAVENOUS
Status: CANCELLED | OUTPATIENT
Start: 2023-10-22

## 2023-07-30 RX ORDER — ONDANSETRON 2 MG/ML
8 INJECTION INTRAMUSCULAR; INTRAVENOUS ONCE
Status: CANCELLED
Start: 2023-10-22 | End: 2023-09-27

## 2023-07-30 RX ORDER — ALBUTEROL SULFATE 0.83 MG/ML
2.5 SOLUTION RESPIRATORY (INHALATION)
Status: CANCELLED | OUTPATIENT
Start: 2023-10-22

## 2023-07-30 RX ORDER — MEPERIDINE HYDROCHLORIDE 25 MG/ML
25 INJECTION INTRAMUSCULAR; INTRAVENOUS; SUBCUTANEOUS EVERY 30 MIN PRN
Status: CANCELLED | OUTPATIENT
Start: 2023-10-22

## 2023-07-30 RX ORDER — HEPARIN SODIUM (PORCINE) LOCK FLUSH IV SOLN 100 UNIT/ML 100 UNIT/ML
5 SOLUTION INTRAVENOUS
Status: CANCELLED | OUTPATIENT
Start: 2023-10-22

## 2023-07-30 RX ORDER — MEPERIDINE HYDROCHLORIDE 25 MG/ML
25 INJECTION INTRAMUSCULAR; INTRAVENOUS; SUBCUTANEOUS EVERY 30 MIN PRN
Status: CANCELLED | OUTPATIENT
Start: 2023-10-09

## 2023-07-30 RX ORDER — METHYLPREDNISOLONE SODIUM SUCCINATE 125 MG/2ML
125 INJECTION, POWDER, LYOPHILIZED, FOR SOLUTION INTRAMUSCULAR; INTRAVENOUS
Status: CANCELLED
Start: 2023-10-09

## 2023-07-30 RX ORDER — ACETAMINOPHEN 325 MG/1
650 TABLET ORAL ONCE
Status: CANCELLED | OUTPATIENT
Start: 2023-10-22

## 2023-07-30 RX ORDER — METHYLPREDNISOLONE SODIUM SUCCINATE 125 MG/2ML
125 INJECTION, POWDER, LYOPHILIZED, FOR SOLUTION INTRAMUSCULAR; INTRAVENOUS
Status: CANCELLED
Start: 2023-10-22

## 2023-07-30 RX ORDER — LORAZEPAM 2 MG/ML
0.5 INJECTION INTRAMUSCULAR EVERY 4 HOURS PRN
Status: CANCELLED | OUTPATIENT
Start: 2023-10-09

## 2023-07-30 RX ORDER — ACETAMINOPHEN 325 MG/1
650 TABLET ORAL ONCE
Status: CANCELLED | OUTPATIENT
Start: 2023-10-09

## 2023-07-30 RX ORDER — HEPARIN SODIUM (PORCINE) LOCK FLUSH IV SOLN 100 UNIT/ML 100 UNIT/ML
5 SOLUTION INTRAVENOUS
Status: CANCELLED | OUTPATIENT
Start: 2023-10-09

## 2023-07-30 RX ORDER — DIPHENHYDRAMINE HYDROCHLORIDE 50 MG/ML
50 INJECTION INTRAMUSCULAR; INTRAVENOUS
Status: CANCELLED
Start: 2023-10-22

## 2023-07-30 RX ORDER — ALBUTEROL SULFATE 90 UG/1
1-2 AEROSOL, METERED RESPIRATORY (INHALATION)
Status: CANCELLED
Start: 2023-10-22

## 2023-07-30 RX ORDER — LORAZEPAM 2 MG/ML
0.5 INJECTION INTRAMUSCULAR EVERY 4 HOURS PRN
Status: CANCELLED | OUTPATIENT
Start: 2023-10-22

## 2023-07-30 RX ORDER — ONDANSETRON 2 MG/ML
8 INJECTION INTRAMUSCULAR; INTRAVENOUS ONCE
Status: CANCELLED | OUTPATIENT
Start: 2023-10-09 | End: 2023-09-14

## 2023-07-30 RX ORDER — HEPARIN SODIUM,PORCINE 10 UNIT/ML
5 VIAL (ML) INTRAVENOUS
Status: CANCELLED | OUTPATIENT
Start: 2023-10-09

## 2023-08-01 ENCOUNTER — APPOINTMENT (OUTPATIENT)
Dept: LAB | Facility: CLINIC | Age: 73
End: 2023-08-01
Attending: STUDENT IN AN ORGANIZED HEALTH CARE EDUCATION/TRAINING PROGRAM
Payer: MEDICARE

## 2023-08-01 ENCOUNTER — INFUSION THERAPY VISIT (OUTPATIENT)
Dept: ONCOLOGY | Facility: CLINIC | Age: 73
End: 2023-08-01
Attending: STUDENT IN AN ORGANIZED HEALTH CARE EDUCATION/TRAINING PROGRAM
Payer: MEDICARE

## 2023-08-01 VITALS
TEMPERATURE: 97.5 F | BODY MASS INDEX: 20.38 KG/M2 | WEIGHT: 114 LBS | HEART RATE: 79 BPM | RESPIRATION RATE: 16 BRPM | SYSTOLIC BLOOD PRESSURE: 126 MMHG | DIASTOLIC BLOOD PRESSURE: 67 MMHG | OXYGEN SATURATION: 99 %

## 2023-08-01 DIAGNOSIS — C90.00 MULTIPLE MYELOMA NOT HAVING ACHIEVED REMISSION (H): Primary | ICD-10-CM

## 2023-08-01 DIAGNOSIS — C90.02 MULTIPLE MYELOMA IN RELAPSE (H): ICD-10-CM

## 2023-08-01 DIAGNOSIS — E85.89 OTHER AMYLOIDOSIS (H): ICD-10-CM

## 2023-08-01 LAB
ALBUMIN SERPL BCG-MCNC: 4.2 G/DL (ref 3.5–5.2)
ALP SERPL-CCNC: 43 U/L (ref 35–104)
ALT SERPL W P-5'-P-CCNC: 23 U/L (ref 0–50)
ANION GAP SERPL CALCULATED.3IONS-SCNC: 9 MMOL/L (ref 7–15)
AST SERPL W P-5'-P-CCNC: 19 U/L (ref 0–45)
BASOPHILS # BLD AUTO: 0 10E3/UL (ref 0–0.2)
BASOPHILS NFR BLD AUTO: 1 %
BILIRUB SERPL-MCNC: 0.2 MG/DL
BUN SERPL-MCNC: 15.1 MG/DL (ref 8–23)
CALCIUM SERPL-MCNC: 9.3 MG/DL (ref 8.8–10.2)
CHLORIDE SERPL-SCNC: 100 MMOL/L (ref 98–107)
CREAT SERPL-MCNC: 0.83 MG/DL (ref 0.51–0.95)
DEPRECATED HCO3 PLAS-SCNC: 27 MMOL/L (ref 22–29)
EOSINOPHIL # BLD AUTO: 0.1 10E3/UL (ref 0–0.7)
EOSINOPHIL NFR BLD AUTO: 2 %
ERYTHROCYTE [DISTWIDTH] IN BLOOD BY AUTOMATED COUNT: 13.2 % (ref 10–15)
GFR SERPL CREATININE-BSD FRML MDRD: 74 ML/MIN/1.73M2
GLUCOSE SERPL-MCNC: 82 MG/DL (ref 70–99)
HCT VFR BLD AUTO: 31.3 % (ref 35–47)
HGB BLD-MCNC: 10.5 G/DL (ref 11.7–15.7)
IMM GRANULOCYTES # BLD: 0 10E3/UL
IMM GRANULOCYTES NFR BLD: 0 %
LDH SERPL L TO P-CCNC: 169 U/L (ref 0–250)
LYMPHOCYTES # BLD AUTO: 1.1 10E3/UL (ref 0.8–5.3)
LYMPHOCYTES NFR BLD AUTO: 26 %
MCH RBC QN AUTO: 34.4 PG (ref 26.5–33)
MCHC RBC AUTO-ENTMCNC: 33.5 G/DL (ref 31.5–36.5)
MCV RBC AUTO: 103 FL (ref 78–100)
MONOCYTES # BLD AUTO: 0.7 10E3/UL (ref 0–1.3)
MONOCYTES NFR BLD AUTO: 17 %
NEUTROPHILS # BLD AUTO: 2.3 10E3/UL (ref 1.6–8.3)
NEUTROPHILS NFR BLD AUTO: 54 %
NRBC # BLD AUTO: 0 10E3/UL
NRBC BLD AUTO-RTO: 0 /100
NT-PROBNP SERPL-MCNC: 188 PG/ML (ref 0–900)
PLATELET # BLD AUTO: 181 10E3/UL (ref 150–450)
POTASSIUM SERPL-SCNC: 4.2 MMOL/L (ref 3.4–5.3)
PROT SERPL-MCNC: 6 G/DL (ref 6.4–8.3)
RBC # BLD AUTO: 3.05 10E6/UL (ref 3.8–5.2)
SODIUM SERPL-SCNC: 136 MMOL/L (ref 136–145)
TOTAL PROTEIN SERUM FOR ELP: 5.9 G/DL (ref 6.4–8.3)
WBC # BLD AUTO: 4.2 10E3/UL (ref 4–11)

## 2023-08-01 PROCEDURE — 84155 ASSAY OF PROTEIN SERUM: CPT

## 2023-08-01 PROCEDURE — 84165 PROTEIN E-PHORESIS SERUM: CPT | Mod: TC | Performed by: PATHOLOGY

## 2023-08-01 PROCEDURE — 250N000011 HC RX IP 250 OP 636: Performed by: STUDENT IN AN ORGANIZED HEALTH CARE EDUCATION/TRAINING PROGRAM

## 2023-08-01 PROCEDURE — 96415 CHEMO IV INFUSION ADDL HR: CPT

## 2023-08-01 PROCEDURE — 83521 IG LIGHT CHAINS FREE EACH: CPT | Mod: 59

## 2023-08-01 PROCEDURE — 84165 PROTEIN E-PHORESIS SERUM: CPT | Mod: 26

## 2023-08-01 PROCEDURE — 96413 CHEMO IV INFUSION 1 HR: CPT

## 2023-08-01 PROCEDURE — 250N000013 HC RX MED GY IP 250 OP 250 PS 637: Performed by: STUDENT IN AN ORGANIZED HEALTH CARE EDUCATION/TRAINING PROGRAM

## 2023-08-01 PROCEDURE — 36591 DRAW BLOOD OFF VENOUS DEVICE: CPT

## 2023-08-01 PROCEDURE — 96375 TX/PRO/DX INJ NEW DRUG ADDON: CPT

## 2023-08-01 PROCEDURE — 85004 AUTOMATED DIFF WBC COUNT: CPT | Performed by: STUDENT IN AN ORGANIZED HEALTH CARE EDUCATION/TRAINING PROGRAM

## 2023-08-01 PROCEDURE — 96417 CHEMO IV INFUS EACH ADDL SEQ: CPT

## 2023-08-01 PROCEDURE — 80053 COMPREHEN METABOLIC PANEL: CPT

## 2023-08-01 PROCEDURE — 258N000003 HC RX IP 258 OP 636: Performed by: STUDENT IN AN ORGANIZED HEALTH CARE EDUCATION/TRAINING PROGRAM

## 2023-08-01 PROCEDURE — 83615 LACTATE (LD) (LDH) ENZYME: CPT

## 2023-08-01 PROCEDURE — 83880 ASSAY OF NATRIURETIC PEPTIDE: CPT

## 2023-08-01 RX ORDER — HEPARIN SODIUM (PORCINE) LOCK FLUSH IV SOLN 100 UNIT/ML 100 UNIT/ML
5 SOLUTION INTRAVENOUS
Status: DISCONTINUED | OUTPATIENT
Start: 2023-08-01 | End: 2023-08-01 | Stop reason: HOSPADM

## 2023-08-01 RX ORDER — ONDANSETRON 2 MG/ML
8 INJECTION INTRAMUSCULAR; INTRAVENOUS ONCE
Status: COMPLETED | OUTPATIENT
Start: 2023-08-01 | End: 2023-08-01

## 2023-08-01 RX ORDER — LORAZEPAM 2 MG/ML
0.5 INJECTION INTRAMUSCULAR EVERY 4 HOURS PRN
Status: DISCONTINUED | OUTPATIENT
Start: 2023-08-01 | End: 2023-08-01 | Stop reason: HOSPADM

## 2023-08-01 RX ORDER — ACETAMINOPHEN 325 MG/1
650 TABLET ORAL ONCE
Status: COMPLETED | OUTPATIENT
Start: 2023-08-01 | End: 2023-08-01

## 2023-08-01 RX ORDER — HEPARIN SODIUM (PORCINE) LOCK FLUSH IV SOLN 100 UNIT/ML 100 UNIT/ML
500 SOLUTION INTRAVENOUS ONCE
Status: COMPLETED | OUTPATIENT
Start: 2023-08-01 | End: 2023-08-01

## 2023-08-01 RX ADMIN — CARFILZOMIB 90 MG: 30 INJECTION, POWDER, LYOPHILIZED, FOR SOLUTION INTRAVENOUS at 12:12

## 2023-08-01 RX ADMIN — DEXTROSE MONOHYDRATE 250 ML: 50 INJECTION, SOLUTION INTRAVENOUS at 12:08

## 2023-08-01 RX ADMIN — SODIUM CHLORIDE 500 MG: 9 INJECTION, SOLUTION INTRAVENOUS at 10:03

## 2023-08-01 RX ADMIN — Medication 500 UNITS: at 08:48

## 2023-08-01 RX ADMIN — Medication 5 ML: at 12:57

## 2023-08-01 RX ADMIN — SODIUM CHLORIDE 250 ML: 9 INJECTION, SOLUTION INTRAVENOUS at 09:30

## 2023-08-01 RX ADMIN — ONDANSETRON 8 MG: 2 INJECTION INTRAMUSCULAR; INTRAVENOUS at 09:33

## 2023-08-01 RX ADMIN — ACETAMINOPHEN 650 MG: 325 TABLET ORAL at 09:30

## 2023-08-01 RX ADMIN — DIPHENHYDRAMINE HYDROCHLORIDE 37.5 MG: 50 INJECTION INTRAMUSCULAR; INTRAVENOUS at 09:38

## 2023-08-01 ASSESSMENT — PAIN SCALES - GENERAL: PAINLEVEL: NO PAIN (0)

## 2023-08-01 NOTE — NURSING NOTE
"Chief Complaint   Patient presents with    Port Draw     Labs drawn via port by RN in lab.  VS taken       Port accessed with 20 gauge 3/4\" Power needle by RN, labs collected, line flushed with saline and heparin.  Vitals taken. Pt checked in for appointment(s).    Audrey Hammonds RN    "

## 2023-08-01 NOTE — PROGRESS NOTES
Infusion Nursing Note:  Shena Hartmann presents today for Cycle 6, Day 15- Kyprolis and Sarclisa Infusions.    Patient seen by provider today: No   present during visit today: Not Applicable.    Note: Patient reports feeling OK on arrival to infusion suite. Patient denies signs of infection: no fever, cough, chills, rash, chest pain, or shortness of breathe. Reports having some mild body aches overnight, took Tylenol with relief. States she had some queasy feeling overnight, did not take any PRNs. Given scheduled nausea medications with premeds. Continues to have neuropathy in her bilateral feet, unchanged and not interfering with her ADL's. Reports she has low appetite following chemo but it does improve back to normal in the week following chemo. Patient denies any new questions or concerns. Feels prepared for treatment today.       Intravenous Access:  Implanted Port.    Treatment Conditions:  Lab Results   Component Value Date    HGB 10.5 (L) 08/01/2023    WBC 4.2 08/01/2023    ANEU 2.1 07/05/2021    ANEUTAUTO 2.3 08/01/2023     08/01/2023        Lab Results   Component Value Date     08/01/2023    POTASSIUM 4.2 08/01/2023    MAG 1.8 12/12/2014    CR 0.83 08/01/2023    PAULINE 9.3 08/01/2023    BILITOTAL 0.2 08/01/2023    ALBUMIN 4.2 08/01/2023    ALT 23 08/01/2023    AST 19 08/01/2023       Results reviewed, labs MET treatment parameters, ok to proceed with treatment.      Post Infusion Assessment:  Patient tolerated infusion without incident.  Blood return noted pre and post infusion.  Site patent and intact, free from redness, edema or discomfort.  No evidence of extravasations.  Access discontinued per protocol.       Discharge Plan:   Patient declined prescription refills.  Discharge instructions reviewed with: Patient.  Patient and/or family verbalized understanding of discharge instructions and all questions answered.  Copy of AVS reviewed with patient and/or family.    IB sent to  [facility] and scheduling to get patient scheduled for her Cycle 7, Day 1 infusions, which should fall on 8/17/23, patient is not currently on the waitlist-included this in the IB.   Patient will return 9/11/23 for next appointment.  Patient discharged in stable condition accompanied by: self.  Departure Mode: Ambulatory.      Lissette Bermeo RN

## 2023-08-01 NOTE — PATIENT INSTRUCTIONS
[facility] Triage and after hours / weekends / holidays:  [phone]    Please call the triage or after hours line if you experience a temperature greater than or equal to 100.4, shaking chills, have uncontrolled nausea, vomiting and/or diarrhea, dizziness, shortness of breath, chest pain, bleeding, unexplained bruising, or if you have any other new/concerning symptoms, questions or concerns.      If you are having any concerning symptoms or wish to speak to a provider before your next infusion visit, please call triage to notify them so we can adequately serve you.     If you need a refill on a narcotic prescription or other medication, please call before your infusion appointment.                           Recent Results (from the past 24 hour(s))   Lactate Dehydrogenase    Collection Time: 08/01/23  8:44 AM   Result Value Ref Range    Lactate Dehydrogenase 169 0 - 250 U/L   Comprehensive metabolic panel    Collection Time: 08/01/23  8:44 AM   Result Value Ref Range    Sodium 136 136 - 145 mmol/L    Potassium 4.2 3.4 - 5.3 mmol/L    Chloride 100 98 - 107 mmol/L    Carbon Dioxide (CO2) 27 22 - 29 mmol/L    Anion Gap 9 7 - 15 mmol/L    Urea Nitrogen 15.1 8.0 - 23.0 mg/dL    Creatinine 0.83 0.51 - 0.95 mg/dL    Calcium 9.3 8.8 - 10.2 mg/dL    Glucose 82 70 - 99 mg/dL    Alkaline Phosphatase 43 35 - 104 U/L    AST 19 0 - 45 U/L    ALT 23 0 - 50 U/L    Protein Total 6.0 (L) 6.4 - 8.3 g/dL    Albumin 4.2 3.5 - 5.2 g/dL    Bilirubin Total 0.2 <=1.2 mg/dL    GFR Estimate 74 >60 mL/min/1.73m2   N terminal pro BNP outpatient    Collection Time: 08/01/23  8:44 AM   Result Value Ref Range    N Terminal Pro BNP Outpatient 188 0 - 900 pg/mL   Total Protein, Serum for ELP    Collection Time: 08/01/23  8:44 AM   Result Value Ref Range    Total Protein Serum for ELP 5.9 (L) 6.4 - 8.3 g/dL   CBC with platelets and differential    Collection Time: 08/01/23  8:45 AM   Result Value Ref Range    WBC Count 4.2 4.0 - 11.0 10e3/uL    RBC  Count 3.05 (L) 3.80 - 5.20 10e6/uL    Hemoglobin 10.5 (L) 11.7 - 15.7 g/dL    Hematocrit 31.3 (L) 35.0 - 47.0 %     (H) 78 - 100 fL    MCH 34.4 (H) 26.5 - 33.0 pg    MCHC 33.5 31.5 - 36.5 g/dL    RDW 13.2 10.0 - 15.0 %    Platelet Count 181 150 - 450 10e3/uL    % Neutrophils 54 %    % Lymphocytes 26 %    % Monocytes 17 %    % Eosinophils 2 %    % Basophils 1 %    % Immature Granulocytes 0 %    NRBCs per 100 WBC 0 <1 /100    Absolute Neutrophils 2.3 1.6 - 8.3 10e3/uL    Absolute Lymphocytes 1.1 0.8 - 5.3 10e3/uL    Absolute Monocytes 0.7 0.0 - 1.3 10e3/uL    Absolute Eosinophils 0.1 0.0 - 0.7 10e3/uL    Absolute Basophils 0.0 0.0 - 0.2 10e3/uL    Absolute Immature Granulocytes 0.0 <=0.4 10e3/uL    Absolute NRBCs 0.0 10e3/uL

## 2023-08-02 ENCOUNTER — PATIENT OUTREACH (OUTPATIENT)
Dept: ONCOLOGY | Facility: CLINIC | Age: 73
End: 2023-08-02
Payer: MEDICARE

## 2023-08-02 LAB
ALBUMIN SERPL ELPH-MCNC: 4 G/DL (ref 3.7–5.1)
ALPHA1 GLOB SERPL ELPH-MCNC: 0.2 G/DL (ref 0.2–0.4)
ALPHA2 GLOB SERPL ELPH-MCNC: 0.6 G/DL (ref 0.5–0.9)
B-GLOBULIN SERPL ELPH-MCNC: 0.6 G/DL (ref 0.6–1)
GAMMA GLOB SERPL ELPH-MCNC: 0.5 G/DL (ref 0.7–1.6)
KAPPA LC FREE SER-MCNC: 1.85 MG/DL (ref 0.33–1.94)
KAPPA LC FREE/LAMBDA FREE SER NEPH: ABNORMAL {RATIO}
LAMBDA LC FREE SERPL-MCNC: <0.14 MG/DL (ref 0.57–2.63)
M PROTEIN SERPL ELPH-MCNC: 0.3 G/DL
PROT PATTERN SERPL ELPH-IMP: ABNORMAL

## 2023-08-02 NOTE — PROGRESS NOTES
[facility]: Cancer Care                                                                                          Call placed to Shena to discuss her appointments.  She had requested a treatment break in August but an infusion appointment was scheduled for her on 8/17/2023.  Shena confirms she does not want to get treatment in August but will come on 8/17/2023 for labs.  I cancelled her infusion appointment for that day    We reviewed her following appointments in September and she was agreeable with that plan.    She also reports feeling fatigued today due to having treatment yesterday.  She said she will send a MyChart to MARINO Lee CNP with an update of how she is feeling.     Signature:  Shabnam Hall RN

## 2023-08-10 ENCOUNTER — OFFICE VISIT (OUTPATIENT)
Dept: INTERNAL MEDICINE | Facility: CLINIC | Age: 73
End: 2023-08-10
Payer: MEDICARE

## 2023-08-10 VITALS
RESPIRATION RATE: 16 BRPM | BODY MASS INDEX: 20.43 KG/M2 | DIASTOLIC BLOOD PRESSURE: 60 MMHG | TEMPERATURE: 98.5 F | HEART RATE: 80 BPM | HEIGHT: 62 IN | SYSTOLIC BLOOD PRESSURE: 122 MMHG | WEIGHT: 111 LBS | OXYGEN SATURATION: 98 %

## 2023-08-10 DIAGNOSIS — Z94.84 STEM CELLS TRANSPLANT STATUS (H): ICD-10-CM

## 2023-08-10 DIAGNOSIS — C90.00 MULTIPLE MYELOMA NOT HAVING ACHIEVED REMISSION (H): Primary | ICD-10-CM

## 2023-08-10 DIAGNOSIS — E03.9 ACQUIRED HYPOTHYROIDISM: ICD-10-CM

## 2023-08-10 DIAGNOSIS — Z23 NEED FOR SHINGLES VACCINE: ICD-10-CM

## 2023-08-10 PROCEDURE — 99214 OFFICE O/P EST MOD 30 MIN: CPT | Performed by: INTERNAL MEDICINE

## 2023-08-10 NOTE — PROGRESS NOTES
Assessment & Plan     (C90.00) Multiple myeloma not having achieved remission (H)  (primary encounter diagnosis)  Comment: ongoing therapy through [facility], now on pause for a couple of months at her request due to important family events this fall  Plan: per heme/onc team. She is feeling well fortunately and has energy and excitement about these upcoming events.    (Z94.84) Stem cells transplant status (H)  Comment: hx of--related to MM  Plan: remains free of recent illnesses, being quite careful around others, using her N95 much of the time.    (E03.9) Acquired hypothyroidism  Comment: stable by sx  Plan: TSH with free T4 reflex        Due for recheck this fall. Ordered, can be processed at next port draw    Osteoporosis--on Prolia. Recheck DEXA next year    See me for her AWV in 6 months.                 Thomas Villalobos MD  [facility]    [facility]   Shena is a 72 year old, presenting for the following health issues:  Follow Up and Recheck Medication (Pt reports that she is here for a follow up for blood pressure, thyroid.)      8/10/2023     9:57 AM   Additional Questions   Roomed by kurt   Accompanied by alone         8/10/2023     9:57 AM   Patient Reported Additional Medications   Patient reports taking the following new medications none       History of Present Illness       Hypertension: She presents for follow up of hypertension.  She does check blood pressure  regularly outside of the clinic. Outpatient blood pressures have not been over 140/90. She does not follow a low salt diet.     Hypothyroidism:     Since last visit, patient describes the following symptoms::  Fatigue, Loose stools and Weight loss    Weight loss::  6-10 lbs.    Reason for visit:  BP, thyroid, osteoporosis follow up    She eats 4 or more servings of fruits and vegetables daily.She consumes 0 sweetened beverage(s) daily.She exercises with enough effort to increase her heart rate 30 to 60 minutes per  "day.  She exercises with enough effort to increase her heart rate 4 days per week.   She is taking medications regularly.       Pt reports that she is here for a follow up for some health issues.        Review of Systems         Objective    /60 (BP Location: Left arm, Patient Position: Sitting, Cuff Size: Adult Regular)   Pulse 80   Temp 98.5  F (36.9  C) (Tympanic)   Resp 16   Ht 1.575 m (5' 2\")   Wt 50.3 kg (111 lb)   LMP  (LMP Unknown)   SpO2 98%   Breastfeeding No   BMI 20.30 kg/m    Body mass index is 20.3 kg/m .  Physical Exam                         "

## 2023-08-17 ENCOUNTER — LAB (OUTPATIENT)
Dept: LAB | Facility: CLINIC | Age: 73
End: 2023-08-17
Attending: STUDENT IN AN ORGANIZED HEALTH CARE EDUCATION/TRAINING PROGRAM
Payer: MEDICARE

## 2023-08-17 DIAGNOSIS — E03.9 ACQUIRED HYPOTHYROIDISM: ICD-10-CM

## 2023-08-17 DIAGNOSIS — C90.00 MULTIPLE MYELOMA NOT HAVING ACHIEVED REMISSION (H): ICD-10-CM

## 2023-08-17 LAB
ALBUMIN SERPL BCG-MCNC: 4.4 G/DL (ref 3.5–5.2)
ALP SERPL-CCNC: 55 U/L (ref 35–104)
ALT SERPL W P-5'-P-CCNC: 21 U/L (ref 0–50)
ANION GAP SERPL CALCULATED.3IONS-SCNC: 13 MMOL/L (ref 7–15)
AST SERPL W P-5'-P-CCNC: 24 U/L (ref 0–45)
BASOPHILS # BLD AUTO: 0 10E3/UL (ref 0–0.2)
BASOPHILS NFR BLD AUTO: 1 %
BILIRUB SERPL-MCNC: 0.2 MG/DL
BUN SERPL-MCNC: 11.7 MG/DL (ref 8–23)
CALCIUM SERPL-MCNC: 9.3 MG/DL (ref 8.8–10.2)
CHLORIDE SERPL-SCNC: 98 MMOL/L (ref 98–107)
CREAT SERPL-MCNC: 0.73 MG/DL (ref 0.51–0.95)
DEPRECATED HCO3 PLAS-SCNC: 24 MMOL/L (ref 22–29)
EOSINOPHIL # BLD AUTO: 0.1 10E3/UL (ref 0–0.7)
EOSINOPHIL NFR BLD AUTO: 2 %
ERYTHROCYTE [DISTWIDTH] IN BLOOD BY AUTOMATED COUNT: 13.8 % (ref 10–15)
GFR SERPL CREATININE-BSD FRML MDRD: 87 ML/MIN/1.73M2
GLUCOSE SERPL-MCNC: 122 MG/DL (ref 70–99)
HCT VFR BLD AUTO: 31.8 % (ref 35–47)
HGB BLD-MCNC: 10.8 G/DL (ref 11.7–15.7)
IMM GRANULOCYTES # BLD: 0 10E3/UL
IMM GRANULOCYTES NFR BLD: 0 %
LDH SERPL L TO P-CCNC: 219 U/L (ref 0–250)
LYMPHOCYTES # BLD AUTO: 1.3 10E3/UL (ref 0.8–5.3)
LYMPHOCYTES NFR BLD AUTO: 22 %
MCH RBC QN AUTO: 34.8 PG (ref 26.5–33)
MCHC RBC AUTO-ENTMCNC: 34 G/DL (ref 31.5–36.5)
MCV RBC AUTO: 103 FL (ref 78–100)
MONOCYTES # BLD AUTO: 0.7 10E3/UL (ref 0–1.3)
MONOCYTES NFR BLD AUTO: 13 %
NEUTROPHILS # BLD AUTO: 3.6 10E3/UL (ref 1.6–8.3)
NEUTROPHILS NFR BLD AUTO: 62 %
NRBC # BLD AUTO: 0 10E3/UL
NRBC BLD AUTO-RTO: 0 /100
PLATELET # BLD AUTO: 202 10E3/UL (ref 150–450)
POTASSIUM SERPL-SCNC: 4.1 MMOL/L (ref 3.4–5.3)
PROT SERPL-MCNC: 6.2 G/DL (ref 6.4–8.3)
RBC # BLD AUTO: 3.1 10E6/UL (ref 3.8–5.2)
SODIUM SERPL-SCNC: 135 MMOL/L (ref 136–145)
TOTAL PROTEIN SERUM FOR ELP: 6.1 G/DL (ref 6.4–8.3)
TSH SERPL DL<=0.005 MIU/L-ACNC: 2.12 UIU/ML (ref 0.3–4.2)
WBC # BLD AUTO: 5.8 10E3/UL (ref 4–11)

## 2023-08-17 PROCEDURE — 36591 DRAW BLOOD OFF VENOUS DEVICE: CPT

## 2023-08-17 PROCEDURE — 84165 PROTEIN E-PHORESIS SERUM: CPT | Mod: 26

## 2023-08-17 PROCEDURE — 250N000011 HC RX IP 250 OP 636: Mod: JZ | Performed by: STUDENT IN AN ORGANIZED HEALTH CARE EDUCATION/TRAINING PROGRAM

## 2023-08-17 PROCEDURE — 84165 PROTEIN E-PHORESIS SERUM: CPT | Mod: TC | Performed by: PATHOLOGY

## 2023-08-17 PROCEDURE — 83615 LACTATE (LD) (LDH) ENZYME: CPT

## 2023-08-17 PROCEDURE — 80053 COMPREHEN METABOLIC PANEL: CPT

## 2023-08-17 PROCEDURE — 84443 ASSAY THYROID STIM HORMONE: CPT

## 2023-08-17 PROCEDURE — 85025 COMPLETE CBC W/AUTO DIFF WBC: CPT

## 2023-08-17 PROCEDURE — 84155 ASSAY OF PROTEIN SERUM: CPT

## 2023-08-17 PROCEDURE — 83521 IG LIGHT CHAINS FREE EACH: CPT

## 2023-08-17 RX ORDER — HEPARIN SODIUM (PORCINE) LOCK FLUSH IV SOLN 100 UNIT/ML 100 UNIT/ML
5 SOLUTION INTRAVENOUS ONCE
Status: COMPLETED | OUTPATIENT
Start: 2023-08-17 | End: 2023-08-17

## 2023-08-17 RX ADMIN — Medication 5 ML: at 13:51

## 2023-08-17 NOTE — NURSING NOTE
"Chief Complaint   Patient presents with    Port Draw     Labs drawn via port by RN.     Labs Only     Port accessed with 20 gauge, 3/4\" power needle by RN, labs collected, line flushed with saline and heparin, then de-accessed.    Veronica Beal RN     "

## 2023-08-18 LAB
ALBUMIN SERPL ELPH-MCNC: 4.2 G/DL (ref 3.7–5.1)
ALPHA1 GLOB SERPL ELPH-MCNC: 0.3 G/DL (ref 0.2–0.4)
ALPHA2 GLOB SERPL ELPH-MCNC: 0.6 G/DL (ref 0.5–0.9)
B-GLOBULIN SERPL ELPH-MCNC: 0.6 G/DL (ref 0.6–1)
GAMMA GLOB SERPL ELPH-MCNC: 0.4 G/DL (ref 0.7–1.6)
KAPPA LC FREE SER-MCNC: 1.45 MG/DL (ref 0.33–1.94)
KAPPA LC FREE/LAMBDA FREE SER NEPH: ABNORMAL {RATIO}
LAMBDA LC FREE SERPL-MCNC: <0.14 MG/DL (ref 0.57–2.63)
M PROTEIN SERPL ELPH-MCNC: 0.2 G/DL
PROT PATTERN SERPL ELPH-IMP: ABNORMAL

## 2023-08-29 ENCOUNTER — OFFICE VISIT (OUTPATIENT)
Dept: DERMATOLOGY | Facility: CLINIC | Age: 73
End: 2023-08-29
Payer: MEDICARE

## 2023-08-29 DIAGNOSIS — L82.0 BENIGN LICHENOID KERATOSIS: ICD-10-CM

## 2023-08-29 DIAGNOSIS — L81.4 SOLAR LENTIGO: ICD-10-CM

## 2023-08-29 DIAGNOSIS — L30.9 CHRONIC DERMATITIS OF HANDS: Primary | ICD-10-CM

## 2023-08-29 DIAGNOSIS — D18.01 CHERRY ANGIOMA: ICD-10-CM

## 2023-08-29 DIAGNOSIS — L60.1 ONYCHOLYSIS: ICD-10-CM

## 2023-08-29 DIAGNOSIS — D22.9 MULTIPLE BENIGN NEVI: ICD-10-CM

## 2023-08-29 PROCEDURE — 99213 OFFICE O/P EST LOW 20 MIN: CPT | Mod: GC | Performed by: DERMATOLOGY

## 2023-08-29 RX ORDER — CLOBETASOL PROPIONATE 0.5 MG/G
OINTMENT TOPICAL 2 TIMES DAILY PRN
Qty: 45 G | Refills: 0 | Status: SHIPPED | OUTPATIENT
Start: 2023-08-29 | End: 2024-08-28

## 2023-08-29 ASSESSMENT — PAIN SCALES - GENERAL: PAINLEVEL: NO PAIN (0)

## 2023-08-29 NOTE — PROGRESS NOTES
[facility] Dermatology Note  Encounter Date: Aug 29, 2023  Office Visit     Dermatology Problem List:  1. History of stem cell transplant for multiple myeloma  2. Possible phototoxic/photoallergic reaction to thalidomide or pamolidomide, resolved  3. Chronic contact dermatitis to soaps/disinfectant, hands  - Derm Allergy referral ordered 8/29/23, clobetasol ointment BID prn for flares  4. Atypical melanocytic nevus left upper leg s/p biopsy   5. Cherry angiomas  6. Seborrheic keratoses  7. Longitudinal ridging, onycholysis - bilateral 5th fingernails  8. Lesions to monitor - R forearm/R shin (favor BLK's) - measurements takene 8/29/23  ____________________________________________    Assessment & Plan:     # Chronic dermatitis, hands  Concern for allergic contact dermatitis. Recommend proceeding with patch testing given persistent and recurrent nature, and need for high-potency corticosteroids.  -Patch testing referral placed today.  -Refills of clobetasol given while awaiting patch testing    # Benign skin findings - benign lichenoid keratosis (R forearm, R shin), benign nevi, seborrheic keratoses (crown of scalp), solar lentigines, cherry angiomas, milia (behind L ear).   Reassured patient of benign skin findings - there is no need for further treatment. Discussed signs/symptoms of NMSC and melanoma.   - Sun protection: Counseled SPF30+ sunscreen, UPF clothing, sun avoidance, tanning bed avoidance.    # Longitudinal ridging/onycholysis - bilateral 5th digits  Discussed that nail findings are likely related to chemotherapy. Will monitor over time to ensure that a newly evolving onychopapilloma is not occurring.    Procedures Performed:   None    Follow-up: 1 year(s) in-person, or earlier for new or changing lesions    Staff and Resident:     Marj Mendez MD  PGY3 Dermatology Resident    I, Neela Mcknight MD, saw this patient with the resident and agree with the resident s findings and plan  of care as documented in the resident s note.    ____________________________________________    CC: Skin Check (Areas of concern on R arm, behind L ear, and scalp)    HPI:  Ms. Shena Hartmann is a(n) 72 year old female who presents today as a return patient for a FBSE. She was last seen in clinic on 6/8/22 (14 months ago) for a FBSE.    - In February 2023, Shena states that she was started on a chemotherapy protocol (Isatuximab + Carfilzomib) for multiple myeloma - has been off of this for the past 4 weeks. Most of her phototoxic/photoallergic rashes on the face and arms have resolved since being on this new chemotherapy.  - She reports noticing a new line on her L 5th finger and new onset onycholysis on the R 5th finger, both new within the past few months. Nails have been brittle in general, strengthened with a clear polish.  - Additionally, she reports having a new spot on her R forearm, present for the past few months, a bump behind her L ear, present within the past week, and discoloration along the back of her scalp, present for the past week. All asymptomatic, no tenderness or bleeding.  - Patient denies having a personal or known familial history of skin cancer (?maybe father and paternal grandmother in 80's-90's). Has been using sunscreen routinely when outside.  - She reports experiencing persistent itchy rashes on her hands, which flares with hand washing dishes, select hand soaps, and laundry soaps (some products with essential oils as well). This will resolve with 2-3 days of clobetasol ointment. Episodes last a few times a month despite wearing gloves with washing dishes and attempting to avoid specific products.  - Patient is otherwise feeling well, without additional skin concerns.    Labs Reviewed:  N/A    Physical Exam:  Vitals: LMP  (LMP Unknown)   SKIN: Full skin, which includes the head/face, both arms, chest, back, abdomen,both legs, genitalia and/or groin buttocks, digits and/or nails, was  examined.  - There are dome shaped bright red papules on the trunk and extremities.   - Multiple regular brown pigmented macules and papules are identified on the face, trunk, and extremities.   - Scattered brown macules on sun exposed areas.  - There are waxy stuck on tan to brown papules on the face, trunk, and extremities.    - There are two pink 1 cm waxy plaques (with subtle brown peripheral pigment on the R shin) on the R forearm and R shin.   - There is evidence of longitudinal ridging on the L 5th digit, and mild onycholysis centrally on the bilateral 5th digits.  - No other lesions of concern on areas examined.     Medications:  Current Outpatient Medications   Medication    acetaminophen (TYLENOL) 325 MG tablet    acyclovir (ZOVIRAX) 400 MG tablet    amLODIPine (NORVASC) 2.5 MG tablet    Ascorbic Acid (VITAMIN C PO)    aspirin (ASA) 81 MG chewable tablet    CALCIUM-VITAMIN D-VITAMIN K PO    clobetasol (TEMOVATE) 0.05 % external ointment    COENZYME Q-10 PO    Cyanocobalamin 1000 MCG/ML LIQD    desonide (DESOWEN) 0.05 % external ointment    hydrALAZINE (APRESOLINE) 10 MG tablet    levothyroxine (SYNTHROID/LEVOTHROID) 50 MCG tablet    lisinopril (ZESTRIL) 10 MG tablet    magnesium 100 MG CAPS    Multiple Vitamins-Minerals (MULTIVITAMIN OR)    ondansetron (ZOFRAN) 4 MG tablet    order for DME    order for DME    prochlorperazine (COMPAZINE) 5 MG tablet    triamcinolone (KENALOG) 0.1 % external ointment    ZINC PICOLINATE PO     No current facility-administered medications for this visit.     Facility-Administered Medications Ordered in Other Visits   Medication    plerixafor (MOZOBIL) injection SOLN 12.4 mg    potassium chloride 20 mEq in 50 mL IVPB    potassium chloride SA (K-DUR,KLOR-CON M) CR tablet 20 mEq      Past Medical History:   Patient Active Problem List   Diagnosis    Pain in thoracic spine    Multiple myeloma, dx 2002,  s/p ASCBMT 3/26/14    Personal history of malignant neoplasm of breast     Seasonal allergies    Osteoporosis    Agranulocytosis secondary to cancer chemotherapy (CODE) (H)    Primary hypertension    Stem cells transplant status (H)    Adverse effect of other drugs, medicaments and biological substances, sequela    Acquired hypothyroidism     Past Medical History:   Diagnosis Date    Acquired hypothyroidism 8/10/2023    Breast cancer (H) 01/01/1994    right    H/O stem cell transplant (H) 03/01/2014    History of blood transfusion 2013 & 2014    During stem cell tx process    Hypertension Sept. 2021    Runs 140 s systolically, about 20 above my usual    Multiple myeloma, without mention of having achieved remission 11/06/2012     CC No referring provider defined for this encounter. on close of this encounter.

## 2023-08-29 NOTE — LETTER
8/29/2023       RE: Shena Hartmann  [address] [place] [address]     Dear Colleague,    Thank you for referring your patient, Shena Hartmann, to the [facility] [place] at [facility]. Please see a copy of my visit note below.    [facility] Dermatology Note  Encounter Date: Aug 29, 2023  Office Visit     Dermatology Problem List:  1. History of stem cell transplant for multiple myeloma  2. Possible phototoxic/photoallergic reaction to thalidomide or pamolidomide, resolved  3. Chronic contact dermatitis to soaps/disinfectant, hands  - Derm Allergy referral ordered 8/29/23, clobetasol ointment BID prn for flares  4. Atypical melanocytic nevus left upper leg s/p biopsy   5. Cherry angiomas  6. Seborrheic keratoses  7. Longitudinal ridging, onycholysis - bilateral 5th fingernails  8. Lesions to monitor - R forearm/R shin (favor BLK's) - measurements takene 8/29/23  ____________________________________________    Assessment & Plan:     # Chronic dermatitis, hands  Concern for allergic contact dermatitis. Recommend proceeding with patch testing given persistent and recurrent nature, and need for high-potency corticosteroids.  -Patch testing referral placed today.  -Refills of clobetasol given while awaiting patch testing    # Benign skin findings - benign lichenoid keratosis (R forearm, R shin), benign nevi, seborrheic keratoses (crown of scalp), solar lentigines, cherry angiomas, milia (behind L ear).   Reassured patient of benign skin findings - there is no need for further treatment. Discussed signs/symptoms of NMSC and melanoma.   - Sun protection: Counseled SPF30+ sunscreen, UPF clothing, sun avoidance, tanning bed avoidance.    # Longitudinal ridging/onycholysis - bilateral 5th digits  Discussed that nail findings are likely related to chemotherapy. Will monitor over time to ensure that a newly evolving  onychopapilloma is not occurring.    Procedures Performed:   None    Follow-up: 1 year(s) in-person, or earlier for new or changing lesions    Staff and Resident:     Marj Mendez MD  PGY3 Dermatology Resident    I, Neela Mcknight MD, saw this patient with the resident and agree with the resident s findings and plan of care as documented in the resident s note.    ____________________________________________    CC: Skin Check (Areas of concern on R arm, behind L ear, and scalp)    HPI:  Ms. Shena Hartmann is a(n) 72 year old female who presents today as a return patient for a FBSE. She was last seen in clinic on 6/8/22 (14 months ago) for a FBSE.    - In February 2023, Shena states that she was started on a chemotherapy protocol (Isatuximab + Carfilzomib) for multiple myeloma - has been off of this for the past 4 weeks. Most of her phototoxic/photoallergic rashes on the face and arms have resolved since being on this new chemotherapy.  - She reports noticing a new line on her L 5th finger and new onset onycholysis on the R 5th finger, both new within the past few months. Nails have been brittle in general, strengthened with a clear polish.  - Additionally, she reports having a new spot on her R forearm, present for the past few months, a bump behind her L ear, present within the past week, and discoloration along the back of her scalp, present for the past week. All asymptomatic, no tenderness or bleeding.  - Patient denies having a personal or known familial history of skin cancer (?maybe father and paternal grandmother in 80's-90's). Has been using sunscreen routinely when outside.  - She reports experiencing persistent itchy rashes on her hands, which flares with hand washing dishes, select hand soaps, and laundry soaps (some products with essential oils as well). This will resolve with 2-3 days of clobetasol ointment. Episodes last a few times a month despite wearing gloves with washing dishes and  attempting to avoid specific products.  - Patient is otherwise feeling well, without additional skin concerns.    Labs Reviewed:  N/A    Physical Exam:  Vitals: LMP  (LMP Unknown)   SKIN: Full skin, which includes the head/face, both arms, chest, back, abdomen,both legs, genitalia and/or groin buttocks, digits and/or nails, was examined.  - There are dome shaped bright red papules on the trunk and extremities.   - Multiple regular brown pigmented macules and papules are identified on the face, trunk, and extremities.   - Scattered brown macules on sun exposed areas.  - There are waxy stuck on tan to brown papules on the face, trunk, and extremities.    - There are two pink 1 cm waxy plaques (with subtle brown peripheral pigment on the R shin) on the R forearm and R shin.   - There is evidence of longitudinal ridging on the L 5th digit, and mild onycholysis centrally on the bilateral 5th digits.  - No other lesions of concern on areas examined.     Medications:  Current Outpatient Medications   Medication    acetaminophen (TYLENOL) 325 MG tablet    acyclovir (ZOVIRAX) 400 MG tablet    amLODIPine (NORVASC) 2.5 MG tablet    Ascorbic Acid (VITAMIN C PO)    aspirin (ASA) 81 MG chewable tablet    CALCIUM-VITAMIN D-VITAMIN K PO    clobetasol (TEMOVATE) 0.05 % external ointment    COENZYME Q-10 PO    Cyanocobalamin 1000 MCG/ML LIQD    desonide (DESOWEN) 0.05 % external ointment    hydrALAZINE (APRESOLINE) 10 MG tablet    levothyroxine (SYNTHROID/LEVOTHROID) 50 MCG tablet    lisinopril (ZESTRIL) 10 MG tablet    magnesium 100 MG CAPS    Multiple Vitamins-Minerals (MULTIVITAMIN OR)    ondansetron (ZOFRAN) 4 MG tablet    order for DME    order for DME    prochlorperazine (COMPAZINE) 5 MG tablet    triamcinolone (KENALOG) 0.1 % external ointment    ZINC PICOLINATE PO     No current facility-administered medications for this visit.     Facility-Administered Medications Ordered in Other Visits   Medication    plerixafor (MOZOBIL)  injection SOLN 12.4 mg    potassium chloride 20 mEq in 50 mL IVPB    potassium chloride SA (K-DUR,KLOR-CON M) CR tablet 20 mEq      Past Medical History:   Patient Active Problem List   Diagnosis    Pain in thoracic spine    Multiple myeloma, dx 2002,  s/p ASCBMT 3/26/14    Personal history of malignant neoplasm of breast    Seasonal allergies    Osteoporosis    Agranulocytosis secondary to cancer chemotherapy (CODE) (H)    Primary hypertension    Stem cells transplant status (H)    Adverse effect of other drugs, medicaments and biological substances, sequela    Acquired hypothyroidism     Past Medical History:   Diagnosis Date    Acquired hypothyroidism 8/10/2023    Breast cancer (H) 01/01/1994    right    H/O stem cell transplant (H) 03/01/2014    History of blood transfusion 2013 & 2014    During stem cell tx process    Hypertension Sept. 2021    Runs 140 s systolically, about 20 above my usual    Multiple myeloma, without mention of having achieved remission 11/06/2012     CC No referring provider defined for this encounter. on close of this encounter.

## 2023-08-29 NOTE — NURSING NOTE
Chief Complaint   Patient presents with    Skin Check     Areas of concern on R arm, behind L ear, and scalp     Avery Rivas, EMT

## 2023-09-08 NOTE — PROGRESS NOTES
Malignant Hematology Progress Note  Date: 09/11/2023    Reason for Office Visit: Evaluation prior to cycle 6 Isatuximab + Carfilzomib      Oncologic Hx:  - Breast cancer dx in 1994. Underwent surgery and chemotherapy without reoccurence  - MGUS dx 1999  - T8 pathologic fracture with radiation  - Revlimid and velcade  2013  - Cytoxan IV 9/2013  - D-PACE 11/2013  - Auto 3/27/14  - 5/2014 thalidomide caused rash so switched to pomalidomide   - on kenalog and clobetasol creams for IMID rash  - FLC began to rise so riky added to pom 9/2020  - she has been on xgeva for an unclear amount of time  - Started Teodora-Kd 2/20/2023    Interval Hx:   Shena is on Teodora-Kd and presents to clinic prior to C7D1 accompanied by her  Sonny.  She has been feeling well.  Her energy levels are better since being off of chemo the past 6 weeks.  Blood pressure has stabilized since stopping her chemotherapy.  First 10 days after chemotherapy she notices an increase in her blood pressure.  Has a dry cough from her lisinopril. She reports her appetite has been good and her weight has stabilized since being off chemo the past 6 weeks as well.      She does continue to wake up with daily body aches and reports her hair is thinning and nails are weak which she believes is all due to the chemotherapy.      Patient is not interested in doing chemo every 2 weeks. She would like to have her treatment monthly.  Her  is having knee surgery Oct 3rd and she is worried about not being sick and needing to be able to care for her .  It is just her and her  at home so no one else is able to care for him but her.      Denies fevers/chills, drenching night sweats, bleeding issues, N/V/D/C.    Pertinent PMH:  HTN, hypothyroidism  Family Hx- prostate ca, pancreatic , GBM- grandfather, paternal aunt breast cancer    Allergies   Allergen Reactions    Blood Transfusion Related (Informational Only) Other (See Comments)     Patient has a history  of a clinically significant antibody against RBC antigens.  A delay in compatible RBCs may occur.     Cayenne Pepper [Cayenne]     Corn-Containing Products GI Disturbance    Levaquin Other (See Comments)     Tendon pain    Milk Protein GI Disturbance    Mold      Facial rash/lip swelling    Morphine Sulfate Other (See Comments)     Per pt - see things (hallucination) when she was on Morphine .    Pollen Extract      Environmental allergies     Shrimp Nausea and Vomiting    Tomato      Lip swelling, facial rash    Azithromycin Rash    Keflex [Cephalexin] Rash    Oat Rash    Tape [Adhesive Tape] Itching and Rash     All adhesives    Wheat Rash     Swelling of lips     Current Outpatient Medications   Medication    acetaminophen (TYLENOL) 325 MG tablet    acyclovir (ZOVIRAX) 400 MG tablet    amLODIPine (NORVASC) 5 MG tablet    Ascorbic Acid (VITAMIN C PO)    aspirin (ASA) 81 MG chewable tablet    CALCIUM-VITAMIN D-VITAMIN K PO    clobetasol (TEMOVATE) 0.05 % external ointment    COENZYME Q-10 PO    Cyanocobalamin 1000 MCG/ML LIQD    desonide (DESOWEN) 0.05 % external ointment    hydrALAZINE (APRESOLINE) 10 MG tablet    levothyroxine (SYNTHROID/LEVOTHROID) 50 MCG tablet    lisinopril (ZESTRIL) 10 MG tablet    magnesium 100 MG CAPS    Multiple Vitamins-Minerals (MULTIVITAMIN OR)    ondansetron (ZOFRAN) 4 MG tablet    order for DME    order for DME    prochlorperazine (COMPAZINE) 5 MG tablet    triamcinolone (KENALOG) 0.1 % external ointment    ZINC PICOLINATE PO       Physical Exam:  /69 (BP Location: Right arm, Patient Position: Sitting, Cuff Size: Adult Regular)   Pulse 81   Temp 97.7  F (36.5  C) (Oral)   Resp 18   Wt 52 kg (114 lb 9.6 oz)   LMP  (LMP Unknown)   SpO2 98%   BMI 20.96 kg/m      Wt Readings from Last 5 Encounters:   09/11/23 52 kg (114 lb 9.6 oz)   08/10/23 50.3 kg (111 lb)   08/01/23 51.7 kg (114 lb)   07/19/23 51.9 kg (114 lb 8 oz)   07/05/23 51.6 kg (113 lb 12.8 oz)     General: No acute  distress  HEENT: Sclera anicteric. Oral exam deferred  Lymph: No lymphadenopathy in neck, supraclavicular, or axillary areas  Heart: Regular, rate, and rhythm  Lungs: Clear to ascultation bilaterally  Abdomen: Positive bowel sounds.   Extremities: no lower extremity edema  Neuro: Cranial nerves grossly intact  Rash: none  Vascular access: port      Most Recent 3 CBC's:  Recent Labs   Lab Test 09/11/23  0833 08/17/23  1349 08/01/23  0845   WBC 4.7 5.8 4.2   HGB 12.0 10.8* 10.5*   MCV 99 103* 103*    202 181   ANEUTAUTO 2.6 3.6 2.3     Most Recent 3 BMP's:  Recent Labs   Lab Test 08/17/23  1349 08/01/23  0844 07/19/23  0802   * 136 137   POTASSIUM 4.1 4.2 3.7   CHLORIDE 98 100 100   CO2 24 27 26   BUN 11.7 15.1 14.8   CR 0.73 0.83 0.76   ANIONGAP 13 9 11   PAULINE 9.3 9.3 9.6   * 82 101*   PROTTOTAL 6.2* 6.0* 6.3*   ALBUMIN 4.4 4.2 4.3    Most Recent 3 LFT's:  Recent Labs   Lab Test 08/17/23  1349 08/01/23  0844 07/19/23  0802   AST 24 19 19   ALT 21 23 22   ALKPHOS 55 43 46   BILITOTAL 0.2 0.2 0.3    Most Recent 2 TSH and T4:  Recent Labs   Lab Test 08/17/23  1349 10/03/22  0820 06/13/22  0822 03/21/22  0803   TSH 2.12 1.82 9.47*  9.31* 5.79*   T4  --   --  0.99  0.97 0.83     Assessment and Plan:     Relapsed Myeloma  Started kyprolis/isatuximab/dex on 2/20/23.  Had hypertension and head pressure from the dexamethasone.  Increased llisinopril to 7.5 mg, was previously on 2.5 mg nightly.  - Discontinued dexamethasone after 2 cycles  - Continues with quality of life issues with Benita-K and is experiencing increasing fatigue and elevated BP.  Although BP improving, remains a concern.   - Discussed with Dr. Chavez who recommended at best response (minimim VGPR) consider keeping kyprolis every other week and Isatuximab monthly versus  consideration of  trial since she has not had a BCMA targeting agent so she may respond well and potentially have a treatment free interval if she achieves significant  "tumor debulking. Shena to discuss further at next f/u with Dr. Chavez - does not want trial schedule and her physical condition to interfere with a family trip planned for Feb 2024.  - Gets Prolia every 6 months, has had 3 doses, last dose 5/22/23.  Would prefer to have this scheduled on her \"off\" week .   - She took an elective break from treatment 8/1-9/11. She was aware that was not medically recommended and any break from treatment increases the risk of her myeloma advancing/relapsing.    - Proceed with C7D1 Isatuximab + Carfilzomib today (9/11)  - She would also like to consider doing Teodora-K once monthly.  She reports her  is having knee surgery Oct 3rd and she needs to be able to care for her .  Discussed risk of worsening disease with myeloma without treatment and needing to monitor labs closely if we go to monthly treatments.  I will discuss this with Dr. Chavez.      HTN  -BP has been stable with break from treatment  - Continues Lisinopril- has a dry cough from the drug  - Continue to follow with cardiology       Plan:  - She would like to consider doing Teodora-K monthly   - Prolia Q6 m.o- next due Nov 2023  - Continue to f/u with cardiology  - Follow-up with labs and WANDA with infusion monthly   - Proceed with C7 today (9/11)     40 minutes spent on the date of the encounter doing chart review, review of test results, interpretation of tests, patient visit and documentation     MARINO Lopes CNP      "

## 2023-09-11 ENCOUNTER — ONCOLOGY VISIT (OUTPATIENT)
Dept: ONCOLOGY | Facility: CLINIC | Age: 73
End: 2023-09-11
Attending: STUDENT IN AN ORGANIZED HEALTH CARE EDUCATION/TRAINING PROGRAM
Payer: MEDICARE

## 2023-09-11 ENCOUNTER — APPOINTMENT (OUTPATIENT)
Dept: LAB | Facility: CLINIC | Age: 73
End: 2023-09-11
Attending: STUDENT IN AN ORGANIZED HEALTH CARE EDUCATION/TRAINING PROGRAM
Payer: MEDICARE

## 2023-09-11 VITALS
RESPIRATION RATE: 18 BRPM | SYSTOLIC BLOOD PRESSURE: 128 MMHG | TEMPERATURE: 97.7 F | BODY MASS INDEX: 20.96 KG/M2 | WEIGHT: 114.6 LBS | HEART RATE: 81 BPM | OXYGEN SATURATION: 98 % | DIASTOLIC BLOOD PRESSURE: 69 MMHG

## 2023-09-11 DIAGNOSIS — C90.00 MULTIPLE MYELOMA, REMISSION STATUS UNSPECIFIED (H): Primary | ICD-10-CM

## 2023-09-11 DIAGNOSIS — E85.89 OTHER AMYLOIDOSIS (H): ICD-10-CM

## 2023-09-11 DIAGNOSIS — C90.00 MULTIPLE MYELOMA NOT HAVING ACHIEVED REMISSION (H): Primary | ICD-10-CM

## 2023-09-11 DIAGNOSIS — C90.02 MULTIPLE MYELOMA IN RELAPSE (H): ICD-10-CM

## 2023-09-11 DIAGNOSIS — I10 BENIGN ESSENTIAL HYPERTENSION: ICD-10-CM

## 2023-09-11 LAB
ALBUMIN SERPL BCG-MCNC: 4.4 G/DL (ref 3.5–5.2)
ALP SERPL-CCNC: 50 U/L (ref 35–104)
ALT SERPL W P-5'-P-CCNC: 24 U/L (ref 0–50)
ANION GAP SERPL CALCULATED.3IONS-SCNC: 10 MMOL/L (ref 7–15)
AST SERPL W P-5'-P-CCNC: 21 U/L (ref 0–45)
BASOPHILS # BLD AUTO: 0 10E3/UL (ref 0–0.2)
BASOPHILS NFR BLD AUTO: 0 %
BILIRUB SERPL-MCNC: 0.4 MG/DL
BUN SERPL-MCNC: 17.1 MG/DL (ref 8–23)
CALCIUM SERPL-MCNC: 9.4 MG/DL (ref 8.8–10.2)
CHLORIDE SERPL-SCNC: 96 MMOL/L (ref 98–107)
CREAT SERPL-MCNC: 0.76 MG/DL (ref 0.51–0.95)
DEPRECATED HCO3 PLAS-SCNC: 27 MMOL/L (ref 22–29)
EGFRCR SERPLBLD CKD-EPI 2021: 83 ML/MIN/1.73M2
EOSINOPHIL # BLD AUTO: 0.1 10E3/UL (ref 0–0.7)
EOSINOPHIL NFR BLD AUTO: 2 %
ERYTHROCYTE [DISTWIDTH] IN BLOOD BY AUTOMATED COUNT: 14.7 % (ref 10–15)
GLUCOSE SERPL-MCNC: 90 MG/DL (ref 70–99)
HCT VFR BLD AUTO: 34.6 % (ref 35–47)
HGB BLD-MCNC: 12 G/DL (ref 11.7–15.7)
IMM GRANULOCYTES # BLD: 0 10E3/UL
IMM GRANULOCYTES NFR BLD: 0 %
KAPPA LC FREE SER-MCNC: 1.93 MG/DL (ref 0.33–1.94)
KAPPA LC FREE/LAMBDA FREE SER NEPH: ABNORMAL {RATIO}
LAMBDA LC FREE SERPL-MCNC: <0.13 MG/DL (ref 0.57–2.63)
LDH SERPL L TO P-CCNC: 196 U/L (ref 0–250)
LYMPHOCYTES # BLD AUTO: 1.1 10E3/UL (ref 0.8–5.3)
LYMPHOCYTES NFR BLD AUTO: 23 %
MCH RBC QN AUTO: 34.3 PG (ref 26.5–33)
MCHC RBC AUTO-ENTMCNC: 34.7 G/DL (ref 31.5–36.5)
MCV RBC AUTO: 99 FL (ref 78–100)
MONOCYTES # BLD AUTO: 0.9 10E3/UL (ref 0–1.3)
MONOCYTES NFR BLD AUTO: 19 %
NEUTROPHILS # BLD AUTO: 2.6 10E3/UL (ref 1.6–8.3)
NEUTROPHILS NFR BLD AUTO: 56 %
NRBC # BLD AUTO: 0 10E3/UL
NRBC BLD AUTO-RTO: 0 /100
NT-PROBNP SERPL-MCNC: 183 PG/ML (ref 0–900)
PLATELET # BLD AUTO: 204 10E3/UL (ref 150–450)
POTASSIUM SERPL-SCNC: 4.2 MMOL/L (ref 3.4–5.3)
PROT SERPL-MCNC: 6.3 G/DL (ref 6.4–8.3)
RBC # BLD AUTO: 3.5 10E6/UL (ref 3.8–5.2)
SODIUM SERPL-SCNC: 133 MMOL/L (ref 136–145)
TOTAL PROTEIN SERUM FOR ELP: 6.1 G/DL (ref 6.4–8.3)
WBC # BLD AUTO: 4.7 10E3/UL (ref 4–11)

## 2023-09-11 PROCEDURE — 96375 TX/PRO/DX INJ NEW DRUG ADDON: CPT

## 2023-09-11 PROCEDURE — 84155 ASSAY OF PROTEIN SERUM: CPT

## 2023-09-11 PROCEDURE — 250N000011 HC RX IP 250 OP 636: Mod: JZ | Performed by: STUDENT IN AN ORGANIZED HEALTH CARE EDUCATION/TRAINING PROGRAM

## 2023-09-11 PROCEDURE — G0463 HOSPITAL OUTPT CLINIC VISIT: HCPCS | Mod: 25

## 2023-09-11 PROCEDURE — 85025 COMPLETE CBC W/AUTO DIFF WBC: CPT | Performed by: STUDENT IN AN ORGANIZED HEALTH CARE EDUCATION/TRAINING PROGRAM

## 2023-09-11 PROCEDURE — 99215 OFFICE O/P EST HI 40 MIN: CPT

## 2023-09-11 PROCEDURE — 83880 ASSAY OF NATRIURETIC PEPTIDE: CPT

## 2023-09-11 PROCEDURE — 36591 DRAW BLOOD OFF VENOUS DEVICE: CPT

## 2023-09-11 PROCEDURE — 96413 CHEMO IV INFUSION 1 HR: CPT

## 2023-09-11 PROCEDURE — 258N000003 HC RX IP 258 OP 636: Performed by: STUDENT IN AN ORGANIZED HEALTH CARE EDUCATION/TRAINING PROGRAM

## 2023-09-11 PROCEDURE — 250N000013 HC RX MED GY IP 250 OP 250 PS 637: Performed by: STUDENT IN AN ORGANIZED HEALTH CARE EDUCATION/TRAINING PROGRAM

## 2023-09-11 PROCEDURE — 80053 COMPREHEN METABOLIC PANEL: CPT | Performed by: STUDENT IN AN ORGANIZED HEALTH CARE EDUCATION/TRAINING PROGRAM

## 2023-09-11 PROCEDURE — 83615 LACTATE (LD) (LDH) ENZYME: CPT

## 2023-09-11 PROCEDURE — 250N000011 HC RX IP 250 OP 636: Mod: JZ

## 2023-09-11 PROCEDURE — 84165 PROTEIN E-PHORESIS SERUM: CPT | Mod: TC | Performed by: PATHOLOGY

## 2023-09-11 PROCEDURE — 83521 IG LIGHT CHAINS FREE EACH: CPT

## 2023-09-11 PROCEDURE — 96417 CHEMO IV INFUS EACH ADDL SEQ: CPT

## 2023-09-11 RX ORDER — HEPARIN SODIUM (PORCINE) LOCK FLUSH IV SOLN 100 UNIT/ML 100 UNIT/ML
5 SOLUTION INTRAVENOUS
Status: DISCONTINUED | OUTPATIENT
Start: 2023-09-11 | End: 2023-09-11 | Stop reason: HOSPADM

## 2023-09-11 RX ORDER — ACETAMINOPHEN 325 MG/1
650 TABLET ORAL ONCE
Status: COMPLETED | OUTPATIENT
Start: 2023-09-11 | End: 2023-09-11

## 2023-09-11 RX ORDER — ONDANSETRON 2 MG/ML
8 INJECTION INTRAMUSCULAR; INTRAVENOUS ONCE
Status: COMPLETED | OUTPATIENT
Start: 2023-09-11 | End: 2023-09-11

## 2023-09-11 RX ORDER — HEPARIN SODIUM (PORCINE) LOCK FLUSH IV SOLN 100 UNIT/ML 100 UNIT/ML
5 SOLUTION INTRAVENOUS ONCE
Status: COMPLETED | OUTPATIENT
Start: 2023-09-11 | End: 2023-09-11

## 2023-09-11 RX ADMIN — Medication 5 ML: at 08:43

## 2023-09-11 RX ADMIN — SODIUM CHLORIDE 250 ML: 9 INJECTION, SOLUTION INTRAVENOUS at 09:53

## 2023-09-11 RX ADMIN — ACETAMINOPHEN 650 MG: 325 TABLET ORAL at 09:55

## 2023-09-11 RX ADMIN — DIPHENHYDRAMINE HYDROCHLORIDE 37.5 MG: 50 INJECTION, SOLUTION INTRAMUSCULAR; INTRAVENOUS at 09:58

## 2023-09-11 RX ADMIN — ONDANSETRON 8 MG: 2 INJECTION INTRAMUSCULAR; INTRAVENOUS at 09:56

## 2023-09-11 RX ADMIN — CARFILZOMIB 90 MG: 30 INJECTION, POWDER, LYOPHILIZED, FOR SOLUTION INTRAVENOUS at 11:45

## 2023-09-11 RX ADMIN — SODIUM CHLORIDE 500 MG: 9 INJECTION, SOLUTION INTRAVENOUS at 10:22

## 2023-09-11 RX ADMIN — Medication 5 ML: at 12:23

## 2023-09-11 ASSESSMENT — PAIN SCALES - GENERAL: PAINLEVEL: MILD PAIN (3)

## 2023-09-11 NOTE — LETTER
9/11/2023         RE: Shena Hartmann  [address] [place] [address]        Dear Colleague,    Thank you for referring your patient, Shena Hartmann, to the [facility]. Please see a copy of my visit note below.    Malignant Hematology Progress Note  Date: 09/11/2023    Reason for Office Visit: Evaluation prior to cycle 6 Isatuximab + Carfilzomib      Oncologic Hx:  - Breast cancer dx in 1994. Underwent surgery and chemotherapy without reoccurence  - MGUS dx 1999  - T8 pathologic fracture with radiation  - Revlimid and velcade  2013  - Cytoxan IV 9/2013  - D-PACE 11/2013  - Auto 3/27/14  - 5/2014 thalidomide caused rash so switched to pomalidomide   - on kenalog and clobetasol creams for IMID rash  - FLC began to rise so riky added to pom 9/2020  - she has been on xgeva for an unclear amount of time  - Started Teodora-Kd 2/20/2023    Interval Hx:   Shena is on Teodora-Kd and presents to clinic prior to C7D1 accompanied by her  Sonny.  She has been feeling well.  Her energy levels are better since being off of chemo the past 6 weeks.  Blood pressure has stabilized since stopping her chemotherapy.  First 10 days after chemotherapy she notices an increase in her blood pressure.  Has a dry cough from her lisinopril. She reports her appetite has been good and her weight has stabilized since being off chemo the past 6 weeks as well.      She does continue to wake up with daily body aches and reports her hair is thinning and nails are weak which she believes is all due to the chemotherapy.      Patient is not interested in doing chemo every 2 weeks. She would like to have her treatment monthly.  Her  is having knee surgery Oct 3rd and she is worried about not being sick and needing to be able to care for her .  It is just her and her  at home so no one else is able to care for him but her.      Denies fevers/chills, drenching night sweats, bleeding issues,  N/V/D/C.    Pertinent PMH:  HTN, hypothyroidism  Family Hx- prostate ca, pancreatic , GBM- grandfather, paternal aunt breast cancer    Allergies   Allergen Reactions    Blood Transfusion Related (Informational Only) Other (See Comments)     Patient has a history of a clinically significant antibody against RBC antigens.  A delay in compatible RBCs may occur.     Cayenne Pepper [Cayenne]     Corn-Containing Products GI Disturbance    Levaquin Other (See Comments)     Tendon pain    Milk Protein GI Disturbance    Mold      Facial rash/lip swelling    Morphine Sulfate Other (See Comments)     Per pt - see things (hallucination) when she was on Morphine .    Pollen Extract      Environmental allergies     Shrimp Nausea and Vomiting    Tomato      Lip swelling, facial rash    Azithromycin Rash    Keflex [Cephalexin] Rash    Oat Rash    Tape [Adhesive Tape] Itching and Rash     All adhesives    Wheat Rash     Swelling of lips     Current Outpatient Medications   Medication    acetaminophen (TYLENOL) 325 MG tablet    acyclovir (ZOVIRAX) 400 MG tablet    amLODIPine (NORVASC) 5 MG tablet    Ascorbic Acid (VITAMIN C PO)    aspirin (ASA) 81 MG chewable tablet    CALCIUM-VITAMIN D-VITAMIN K PO    clobetasol (TEMOVATE) 0.05 % external ointment    COENZYME Q-10 PO    Cyanocobalamin 1000 MCG/ML LIQD    desonide (DESOWEN) 0.05 % external ointment    hydrALAZINE (APRESOLINE) 10 MG tablet    levothyroxine (SYNTHROID/LEVOTHROID) 50 MCG tablet    lisinopril (ZESTRIL) 10 MG tablet    magnesium 100 MG CAPS    Multiple Vitamins-Minerals (MULTIVITAMIN OR)    ondansetron (ZOFRAN) 4 MG tablet    order for DME    order for DME    prochlorperazine (COMPAZINE) 5 MG tablet    triamcinolone (KENALOG) 0.1 % external ointment    ZINC PICOLINATE PO       Physical Exam:  /69 (BP Location: Right arm, Patient Position: Sitting, Cuff Size: Adult Regular)   Pulse 81   Temp 97.7  F (36.5  C) (Oral)   Resp 18   Wt 52 kg (114 lb 9.6 oz)   LMP   (LMP Unknown)   SpO2 98%   BMI 20.96 kg/m      Wt Readings from Last 5 Encounters:   09/11/23 52 kg (114 lb 9.6 oz)   08/10/23 50.3 kg (111 lb)   08/01/23 51.7 kg (114 lb)   07/19/23 51.9 kg (114 lb 8 oz)   07/05/23 51.6 kg (113 lb 12.8 oz)     General: No acute distress  HEENT: Sclera anicteric. Oral exam deferred  Lymph: No lymphadenopathy in neck, supraclavicular, or axillary areas  Heart: Regular, rate, and rhythm  Lungs: Clear to ascultation bilaterally  Abdomen: Positive bowel sounds.   Extremities: no lower extremity edema  Neuro: Cranial nerves grossly intact  Rash: none  Vascular access: port      Most Recent 3 CBC's:  Recent Labs   Lab Test 09/11/23  0833 08/17/23  1349 08/01/23  0845   WBC 4.7 5.8 4.2   HGB 12.0 10.8* 10.5*   MCV 99 103* 103*    202 181   ANEUTAUTO 2.6 3.6 2.3     Most Recent 3 BMP's:  Recent Labs   Lab Test 08/17/23  1349 08/01/23  0844 07/19/23  0802   * 136 137   POTASSIUM 4.1 4.2 3.7   CHLORIDE 98 100 100   CO2 24 27 26   BUN 11.7 15.1 14.8   CR 0.73 0.83 0.76   ANIONGAP 13 9 11   PAULINE 9.3 9.3 9.6   * 82 101*   PROTTOTAL 6.2* 6.0* 6.3*   ALBUMIN 4.4 4.2 4.3    Most Recent 3 LFT's:  Recent Labs   Lab Test 08/17/23  1349 08/01/23  0844 07/19/23  0802   AST 24 19 19   ALT 21 23 22   ALKPHOS 55 43 46   BILITOTAL 0.2 0.2 0.3    Most Recent 2 TSH and T4:  Recent Labs   Lab Test 08/17/23  1349 10/03/22  0820 06/13/22  0822 03/21/22  0803   TSH 2.12 1.82 9.47*  9.31* 5.79*   T4  --   --  0.99  0.97 0.83     Assessment and Plan:     Relapsed Myeloma  Started kyprolis/isatuximab/dex on 2/20/23.  Had hypertension and head pressure from the dexamethasone.  Increased llisinopril to 7.5 mg, was previously on 2.5 mg nightly.  - Discontinued dexamethasone after 2 cycles  - Continues with quality of life issues with Benita-K and is experiencing increasing fatigue and elevated BP.  Although BP improving, remains a concern.   - Discussed with Dr. Chavez who recommended at best  "response (minimim VGPR) consider keeping kyprolis every other week and Isatuximab monthly versus  consideration of  trial since she has not had a BCMA targeting agent so she may respond well and potentially have a treatment free interval if she achieves significant tumor debulking. Shena to discuss further at next f/u with Dr. Chavez - does not want trial schedule and her physical condition to interfere with a family trip planned for Feb 2024.  - Gets Prolia every 6 months, has had 3 doses, last dose 5/22/23.  Would prefer to have this scheduled on her \"off\" week .   - She took an elective break from treatment 8/1-9/11. She was aware that was not medically recommended and any break from treatment increases the risk of her myeloma advancing/relapsing.    - Proceed with C7D1 Isatuximab + Carfilzomib today (9/11)  - She would also like to consider doing Teodora-K once monthly.  She reports her  is having knee surgery Oct 3rd and she needs to be able to care for her .  Discussed risk of worsening disease with myeloma without treatment and needing to monitor labs closely if we go to monthly treatments.  I will discuss this with Dr. Chavez.      HTN  -BP has been stable with break from treatment  - Continues Lisinopril- has a dry cough from the drug  - Continue to follow with cardiology       Plan:  - She would like to consider doing Teodora-K monthly   - Prolia Q6 m.o- next due Nov 2023  - Continue to f/u with cardiology  - Follow-up with labs and WANDA with infusion monthly   - Proceed with C7 today (9/11)     40 minutes spent on the date of the encounter doing chart review, review of test results, interpretation of tests, patient visit and documentation     MARINO Lopes CNP      "

## 2023-09-11 NOTE — NURSING NOTE
"Chief Complaint   Patient presents with    Port Draw     Labs drawn via port by RN. Port accessed with 20g 3/4\" gripper needle. Vitals taken. Flushed with saline and heparin. Pt tolerated well. Patient checked into next appointment.       Nichole Sanders RN    "

## 2023-09-11 NOTE — PROGRESS NOTES
Infusion Nursing Note:  Shena Hartmann presents today for C7D1 Saclisa/Kyprolis.    Patient seen by provider today: Yes: Nidhi WALLACE   present during visit today: Not Applicable.    Note: Instruction given to patient as per provider. Verbalized understanding.     IV Sarclisa: Subsequent Infusions: Start and maintain at 200 mL/hr.       Intravenous Access:  Implanted Port.    Treatment Conditions:  Lab Results   Component Value Date    HGB 12.0 09/11/2023    WBC 4.7 09/11/2023    ANEU 2.1 07/05/2021    ANEUTAUTO 2.6 09/11/2023     09/11/2023        Lab Results   Component Value Date     (L) 09/11/2023    POTASSIUM 4.2 09/11/2023    MAG 1.8 12/12/2014    CR 0.76 09/11/2023    PAULINE 9.4 09/11/2023    BILITOTAL 0.4 09/11/2023    ALBUMIN 4.4 09/11/2023    ALT 24 09/11/2023    AST 21 09/11/2023       Results reviewed, labs MET treatment parameters, ok to proceed with treatment.    TORB: 9/11/23/Nidhi WALLACE/Vanessa Bautista RN/ Na 133, Decrease plain water consumption the next couple days and drink gatorade or drinks with electrolytes.       Post Infusion Assessment:  Patient tolerated infusion without incident.  Blood return noted pre and post infusion.  Site patent and intact, free from redness, edema or discomfort.  No evidence of extravasations.  Access discontinued per protocol.       Discharge Plan:   Patient declined prescription refills.  Discharge instructions reviewed with: Patient.  Patient and/or family verbalized understanding of discharge instructions and all questions answered.  AVS to patient via [employer].  Patient will return 9/25/23 for next appointment.   Patient discharged in stable condition accompanied by: self.  Departure Mode: Ambulatory.      NIRMAL SOW RN     Class III - visualization of the soft palate and the base of the uvula

## 2023-09-11 NOTE — NURSING NOTE
"Oncology Rooming Note    September 11, 2023 8:51 AM   Shena Hartmann is a 72 year old female who presents for:    Chief Complaint   Patient presents with    Port Draw     Labs drawn via port by RN. Port accessed with 20g 3/4\" gripper needle. Vitals taken. Flushed with saline and heparin. Pt tolerated well. Patient checked into next appointment.      Oncology Clinic Visit     Multiple myeloma not having achieved remission     Initial Vitals: /69 (BP Location: Right arm, Patient Position: Sitting, Cuff Size: Adult Regular)   Pulse 81   Temp 97.7  F (36.5  C) (Oral)   Resp 18   Wt 52 kg (114 lb 9.6 oz)   LMP  (LMP Unknown)   SpO2 98%   BMI 20.96 kg/m   Estimated body mass index is 20.96 kg/m  as calculated from the following:    Height as of 8/10/23: 1.575 m (5' 2\").    Weight as of this encounter: 52 kg (114 lb 9.6 oz). Body surface area is 1.51 meters squared.  Mild Pain (3) Comment: Data Unavailable   No LMP recorded (lmp unknown). Patient is postmenopausal.  Allergies reviewed: Yes  Medications reviewed: Yes    Medications: Medication refills not needed today.  Pharmacy name entered into [employer]:    [id number] - [place], [place] - [address] AT [facility] [address]  WRITTEN PRESCRIPTION REQUESTED  [facility] - [place], [place] - [address]    Clinical concerns: Patient states there are no new concerns to discuss with provider.    Charley Carias              "

## 2023-09-12 ENCOUNTER — ONCOLOGY VISIT (OUTPATIENT)
Dept: ONCOLOGY | Facility: CLINIC | Age: 73
End: 2023-09-12
Attending: STUDENT IN AN ORGANIZED HEALTH CARE EDUCATION/TRAINING PROGRAM
Payer: MEDICARE

## 2023-09-12 ENCOUNTER — PATIENT OUTREACH (OUTPATIENT)
Dept: ONCOLOGY | Facility: CLINIC | Age: 73
End: 2023-09-12
Payer: MEDICARE

## 2023-09-12 VITALS
HEART RATE: 80 BPM | TEMPERATURE: 98.3 F | SYSTOLIC BLOOD PRESSURE: 152 MMHG | DIASTOLIC BLOOD PRESSURE: 71 MMHG | BODY MASS INDEX: 21.36 KG/M2 | RESPIRATION RATE: 16 BRPM | HEIGHT: 62 IN | OXYGEN SATURATION: 96 % | WEIGHT: 116.1 LBS

## 2023-09-12 DIAGNOSIS — C90.00 MULTIPLE MYELOMA NOT HAVING ACHIEVED REMISSION (H): Primary | ICD-10-CM

## 2023-09-12 LAB
ALBUMIN SERPL ELPH-MCNC: 4.3 G/DL (ref 3.7–5.1)
ALPHA1 GLOB SERPL ELPH-MCNC: 0.2 G/DL (ref 0.2–0.4)
ALPHA2 GLOB SERPL ELPH-MCNC: 0.6 G/DL (ref 0.5–0.9)
B-GLOBULIN SERPL ELPH-MCNC: 0.6 G/DL (ref 0.6–1)
GAMMA GLOB SERPL ELPH-MCNC: 0.5 G/DL (ref 0.7–1.6)
M PROTEIN SERPL ELPH-MCNC: 0.1 G/DL
PROT PATTERN SERPL ELPH-IMP: ABNORMAL

## 2023-09-12 PROCEDURE — 99214 OFFICE O/P EST MOD 30 MIN: CPT | Performed by: STUDENT IN AN ORGANIZED HEALTH CARE EDUCATION/TRAINING PROGRAM

## 2023-09-12 PROCEDURE — G0463 HOSPITAL OUTPT CLINIC VISIT: HCPCS | Performed by: STUDENT IN AN ORGANIZED HEALTH CARE EDUCATION/TRAINING PROGRAM

## 2023-09-12 PROCEDURE — 84165 PROTEIN E-PHORESIS SERUM: CPT | Mod: 26

## 2023-09-12 RX ORDER — ONDANSETRON 2 MG/ML
8 INJECTION INTRAMUSCULAR; INTRAVENOUS ONCE
Status: CANCELLED | OUTPATIENT
Start: 2023-11-06 | End: 2023-11-06

## 2023-09-12 RX ORDER — LORAZEPAM 2 MG/ML
0.5 INJECTION INTRAMUSCULAR EVERY 4 HOURS PRN
Status: CANCELLED | OUTPATIENT
Start: 2023-11-06

## 2023-09-12 RX ORDER — ALBUTEROL SULFATE 90 UG/1
1-2 AEROSOL, METERED RESPIRATORY (INHALATION)
Status: CANCELLED
Start: 2023-11-06

## 2023-09-12 RX ORDER — DIPHENHYDRAMINE HYDROCHLORIDE 50 MG/ML
50 INJECTION INTRAMUSCULAR; INTRAVENOUS
Status: CANCELLED
Start: 2023-11-06

## 2023-09-12 RX ORDER — METHYLPREDNISOLONE SODIUM SUCCINATE 125 MG/2ML
125 INJECTION, POWDER, LYOPHILIZED, FOR SOLUTION INTRAMUSCULAR; INTRAVENOUS
Status: CANCELLED
Start: 2023-11-06

## 2023-09-12 RX ORDER — EPINEPHRINE 1 MG/ML
0.3 INJECTION, SOLUTION INTRAMUSCULAR; SUBCUTANEOUS EVERY 5 MIN PRN
Status: CANCELLED | OUTPATIENT
Start: 2023-11-06

## 2023-09-12 RX ORDER — HEPARIN SODIUM,PORCINE 10 UNIT/ML
5 VIAL (ML) INTRAVENOUS
Status: CANCELLED | OUTPATIENT
Start: 2023-11-06

## 2023-09-12 RX ORDER — ACETAMINOPHEN 325 MG/1
650 TABLET ORAL ONCE
Status: CANCELLED | OUTPATIENT
Start: 2023-11-06

## 2023-09-12 RX ORDER — MEPERIDINE HYDROCHLORIDE 25 MG/ML
25 INJECTION INTRAMUSCULAR; INTRAVENOUS; SUBCUTANEOUS EVERY 30 MIN PRN
Status: CANCELLED | OUTPATIENT
Start: 2023-11-06

## 2023-09-12 RX ORDER — HEPARIN SODIUM (PORCINE) LOCK FLUSH IV SOLN 100 UNIT/ML 100 UNIT/ML
5 SOLUTION INTRAVENOUS
Status: CANCELLED | OUTPATIENT
Start: 2023-11-06

## 2023-09-12 RX ORDER — ALBUTEROL SULFATE 0.83 MG/ML
2.5 SOLUTION RESPIRATORY (INHALATION)
Status: CANCELLED | OUTPATIENT
Start: 2023-11-06

## 2023-09-12 ASSESSMENT — PAIN SCALES - GENERAL: PAINLEVEL: MILD PAIN (3)

## 2023-09-12 NOTE — PROGRESS NOTES
Malignant Hematology Progress Note    Oncologic Hx:  - Breast cancer dx in 1994. Underwent surgery and chemotherapy without reoccurence  - MGUS dx 1999  - T8 pathologic fracture with radiation  -revlimid and velcade  2013  Cytoxan IV 9/2013  D-PACE 11/2013  Auto 3/27/14  5/2014 thalidomide caused rash so switched to pomalidomide   - on kenalog and clobetasol creams for IMID rash  - FLC began to rise so riky added to pom 9/2020  - she has been on xgeva for an unclear amount of time  - started Park-Kd 2/20/2023      Interval Hx: Shena's  having knee surgery in Oct. She is doing well and is in remission. No new health issues      Comprehensive ROS neg other than the symptoms noted above in the HPI.      Pertinent PMH:  HTN, hypothyroidism    Family Hx- prostate ca, pancreatic , GBM- grandfather, paternal aunt breast cancer      Physical Exam  Constitutional: NAD  Eyes: no scleral icterus  ENT: no oral lesions  Lymph: no cervical, supraclavicular LAD  Pulm: CTAB  CV: RRR, no m/r/g  GI: bowel sounds present, soft and nontender to palpation  MSK: No edema in the extremities  Neuro: alert, conversant, normal gait  Psych: appropriate mood and affect    Assessment and Plan:   # Relapsed Myeloma  Started kyprolis/isatuximab/dex on 2/20/23.  Now in CR. We discussed kyprolis vs isatuximab maintenance. Since park is every 4 weeks, will try this and drop the kyprolis from her regimen. Continue monthly myeloma labs.     Gets xgeva every 6 months    Nighat Chavez MD PhD

## 2023-09-12 NOTE — PROGRESS NOTES
[facility]: Cancer Care                                                                                           Shena met with MARINO Lopes CNP the day prior and informed her that she does not want to treat every two weeks.  Shena requests to treat only monthly with isatuximab and Carfilzomib    Dr. Chavez was informed of this request and would like to see Shena.   I left a message for Shena and offered her a visit today at 3:30pm    Shena calls back and confirms she will be able to come in today.    Signature:  Shabnam Hall RN

## 2023-09-12 NOTE — LETTER
9/12/2023         RE: Shena Hartmann  [address] [place] [address]        Dear Colleague,    Thank you for referring your patient, Shena Hartmann, to the [facility]. Please see a copy of my visit note below.    Malignant Hematology Progress Note    Oncologic Hx:  - Breast cancer dx in 1994. Underwent surgery and chemotherapy without reoccurence  - MGUS dx 1999  - T8 pathologic fracture with radiation  -revlimid and velcade  2013  Cytoxan IV 9/2013  D-PACE 11/2013  Auto 3/27/14  5/2014 thalidomide caused rash so switched to pomalidomide   - on kenalog and clobetasol creams for IMID rash  - FLC began to rise so riky added to pom 9/2020  - she has been on xgeva for an unclear amount of time  - started Park-Kd 2/20/2023      Interval Hx: Shena's  having knee surgery in Oct. She is doing well and is in remission. No new health issues      Comprehensive ROS neg other than the symptoms noted above in the HPI.      Pertinent PMH:  HTN, hypothyroidism    Family Hx- prostate ca, pancreatic , GBM- grandfather, paternal aunt breast cancer      Physical Exam  Constitutional: NAD  Eyes: no scleral icterus  ENT: no oral lesions  Lymph: no cervical, supraclavicular LAD  Pulm: CTAB  CV: RRR, no m/r/g  GI: bowel sounds present, soft and nontender to palpation  MSK: No edema in the extremities  Neuro: alert, conversant, normal gait  Psych: appropriate mood and affect    Assessment and Plan:   # Relapsed Myeloma  Started kyprolis/isatuximab/dex on 2/20/23.  Now in CR. We discussed kyprolis vs isatuximab maintenance. Since park is every 4 weeks, will try this and drop the kyprolis from her regimen. Continue monthly myeloma labs.     Gets xgeva every 6 months    Nighat Chavez MD PhD

## 2023-09-12 NOTE — NURSING NOTE
"Oncology Rooming Note    September 12, 2023 3:38 PM   Shena Hartmann is a 72 year old female who presents for:    Chief Complaint   Patient presents with    Oncology Clinic Visit     [facility] RETURN - MULTIPLE MYELOMA     Initial Vitals: BP (!) 152/71 (BP Location: Left arm, Patient Position: Chair, Cuff Size: Adult Regular)   Pulse 80   Temp 98.3  F (36.8  C)   Resp 16   Ht 1.575 m (5' 2.01\")   Wt 52.7 kg (116 lb 1.6 oz)   LMP  (LMP Unknown)   SpO2 96%   BMI 21.23 kg/m   Estimated body mass index is 21.23 kg/m  as calculated from the following:    Height as of this encounter: 1.575 m (5' 2.01\").    Weight as of this encounter: 52.7 kg (116 lb 1.6 oz). Body surface area is 1.52 meters squared.  Mild Pain (3) Comment: Data Unavailable   No LMP recorded (lmp unknown). Patient is postmenopausal.  Allergies reviewed: Yes  Medications reviewed: Yes    Medications: Medication refills not needed today.  Pharmacy name entered into [employer]:    [id number] - [place], [place] - [address] AT [facility] [address]  WRITTEN PRESCRIPTION REQUESTED  [facility] - [place], [place] - [address]              "

## 2023-09-17 RX ORDER — ACETAMINOPHEN 325 MG/1
650 TABLET ORAL ONCE
Status: CANCELLED | OUTPATIENT
Start: 2023-12-04

## 2023-09-17 RX ORDER — EPINEPHRINE 1 MG/ML
0.3 INJECTION, SOLUTION INTRAMUSCULAR; SUBCUTANEOUS EVERY 5 MIN PRN
Status: CANCELLED | OUTPATIENT
Start: 2023-12-04

## 2023-09-17 RX ORDER — MEPERIDINE HYDROCHLORIDE 25 MG/ML
25 INJECTION INTRAMUSCULAR; INTRAVENOUS; SUBCUTANEOUS EVERY 30 MIN PRN
Status: CANCELLED | OUTPATIENT
Start: 2023-12-04

## 2023-09-17 RX ORDER — HEPARIN SODIUM (PORCINE) LOCK FLUSH IV SOLN 100 UNIT/ML 100 UNIT/ML
5 SOLUTION INTRAVENOUS
Status: CANCELLED | OUTPATIENT
Start: 2023-12-04

## 2023-09-17 RX ORDER — HEPARIN SODIUM,PORCINE 10 UNIT/ML
5 VIAL (ML) INTRAVENOUS
Status: CANCELLED | OUTPATIENT
Start: 2023-12-04

## 2023-09-17 RX ORDER — DIPHENHYDRAMINE HYDROCHLORIDE 50 MG/ML
50 INJECTION INTRAMUSCULAR; INTRAVENOUS
Status: CANCELLED
Start: 2023-12-04

## 2023-09-17 RX ORDER — ALBUTEROL SULFATE 0.83 MG/ML
2.5 SOLUTION RESPIRATORY (INHALATION)
Status: CANCELLED | OUTPATIENT
Start: 2023-12-04

## 2023-09-17 RX ORDER — METHYLPREDNISOLONE SODIUM SUCCINATE 125 MG/2ML
125 INJECTION, POWDER, LYOPHILIZED, FOR SOLUTION INTRAMUSCULAR; INTRAVENOUS
Status: CANCELLED
Start: 2023-12-04

## 2023-09-17 RX ORDER — ONDANSETRON 2 MG/ML
8 INJECTION INTRAMUSCULAR; INTRAVENOUS ONCE
Status: CANCELLED | OUTPATIENT
Start: 2023-12-04 | End: 2023-12-04

## 2023-09-17 RX ORDER — LORAZEPAM 2 MG/ML
0.5 INJECTION INTRAMUSCULAR EVERY 4 HOURS PRN
Status: CANCELLED | OUTPATIENT
Start: 2023-12-04

## 2023-09-17 RX ORDER — ALBUTEROL SULFATE 90 UG/1
1-2 AEROSOL, METERED RESPIRATORY (INHALATION)
Status: CANCELLED
Start: 2023-12-04

## 2023-10-09 ENCOUNTER — APPOINTMENT (OUTPATIENT)
Dept: LAB | Facility: CLINIC | Age: 73
End: 2023-10-09
Attending: REGISTERED NURSE
Payer: MEDICARE

## 2023-10-09 ENCOUNTER — INFUSION THERAPY VISIT (OUTPATIENT)
Dept: ONCOLOGY | Facility: CLINIC | Age: 73
End: 2023-10-09
Attending: STUDENT IN AN ORGANIZED HEALTH CARE EDUCATION/TRAINING PROGRAM
Payer: MEDICARE

## 2023-10-09 ENCOUNTER — ONCOLOGY VISIT (OUTPATIENT)
Dept: ONCOLOGY | Facility: CLINIC | Age: 73
End: 2023-10-09
Attending: REGISTERED NURSE
Payer: MEDICARE

## 2023-10-09 VITALS
RESPIRATION RATE: 16 BRPM | OXYGEN SATURATION: 97 % | DIASTOLIC BLOOD PRESSURE: 77 MMHG | HEART RATE: 77 BPM | HEIGHT: 62 IN | SYSTOLIC BLOOD PRESSURE: 129 MMHG | WEIGHT: 115.1 LBS | BODY MASS INDEX: 21.18 KG/M2 | TEMPERATURE: 98.8 F

## 2023-10-09 DIAGNOSIS — C90.02 MULTIPLE MYELOMA IN RELAPSE (H): ICD-10-CM

## 2023-10-09 DIAGNOSIS — C90.00 MULTIPLE MYELOMA, REMISSION STATUS UNSPECIFIED (H): Primary | ICD-10-CM

## 2023-10-09 DIAGNOSIS — E03.9 ACQUIRED HYPOTHYROIDISM: ICD-10-CM

## 2023-10-09 DIAGNOSIS — T50.995S ADVERSE EFFECT OF OTHER DRUGS, MEDICAMENTS AND BIOLOGICAL SUBSTANCES, SEQUELA: ICD-10-CM

## 2023-10-09 DIAGNOSIS — C90.00 MULTIPLE MYELOMA NOT HAVING ACHIEVED REMISSION (H): Primary | ICD-10-CM

## 2023-10-09 DIAGNOSIS — M81.0 OSTEOPOROSIS, UNSPECIFIED OSTEOPOROSIS TYPE, UNSPECIFIED PATHOLOGICAL FRACTURE PRESENCE: ICD-10-CM

## 2023-10-09 DIAGNOSIS — E85.89 OTHER AMYLOIDOSIS (H): ICD-10-CM

## 2023-10-09 LAB
ALBUMIN SERPL BCG-MCNC: 4.4 G/DL (ref 3.5–5.2)
ALP SERPL-CCNC: 41 U/L (ref 35–104)
ALT SERPL W P-5'-P-CCNC: 29 U/L (ref 0–50)
ANION GAP SERPL CALCULATED.3IONS-SCNC: 12 MMOL/L (ref 7–15)
AST SERPL W P-5'-P-CCNC: 18 U/L (ref 0–45)
BASO+EOS+MONOS # BLD AUTO: ABNORMAL 10*3/UL
BASO+EOS+MONOS NFR BLD AUTO: ABNORMAL %
BASOPHILS # BLD AUTO: 0 10E3/UL (ref 0–0.2)
BASOPHILS NFR BLD AUTO: 0 %
BILIRUB SERPL-MCNC: 0.5 MG/DL
BUN SERPL-MCNC: 14.8 MG/DL (ref 8–23)
CALCIUM SERPL-MCNC: 9.4 MG/DL (ref 8.8–10.2)
CHLORIDE SERPL-SCNC: 95 MMOL/L (ref 98–107)
CREAT SERPL-MCNC: 0.79 MG/DL (ref 0.51–0.95)
DEPRECATED HCO3 PLAS-SCNC: 26 MMOL/L (ref 22–29)
EGFRCR SERPLBLD CKD-EPI 2021: 79 ML/MIN/1.73M2
EOSINOPHIL # BLD AUTO: 0.2 10E3/UL (ref 0–0.7)
EOSINOPHIL NFR BLD AUTO: 6 %
ERYTHROCYTE [DISTWIDTH] IN BLOOD BY AUTOMATED COUNT: 15.2 % (ref 10–15)
GLUCOSE SERPL-MCNC: 98 MG/DL (ref 70–99)
HCT VFR BLD AUTO: 33.6 % (ref 35–47)
HGB BLD-MCNC: 11.4 G/DL (ref 11.7–15.7)
IMM GRANULOCYTES # BLD: 0 10E3/UL
IMM GRANULOCYTES NFR BLD: 0 %
KAPPA LC FREE SER-MCNC: 1.62 MG/DL (ref 0.33–1.94)
KAPPA LC FREE/LAMBDA FREE SER NEPH: ABNORMAL {RATIO}
LAMBDA LC FREE SERPL-MCNC: <0.13 MG/DL (ref 0.57–2.63)
LDH SERPL L TO P-CCNC: 170 U/L (ref 0–250)
LYMPHOCYTES # BLD AUTO: 1 10E3/UL (ref 0.8–5.3)
LYMPHOCYTES NFR BLD AUTO: 25 %
MCH RBC QN AUTO: 34.1 PG (ref 26.5–33)
MCHC RBC AUTO-ENTMCNC: 33.9 G/DL (ref 31.5–36.5)
MCV RBC AUTO: 101 FL (ref 78–100)
MONOCYTES # BLD AUTO: 0.7 10E3/UL (ref 0–1.3)
MONOCYTES NFR BLD AUTO: 18 %
NEUTROPHILS # BLD AUTO: 1.9 10E3/UL (ref 1.6–8.3)
NEUTROPHILS NFR BLD AUTO: 51 %
NRBC # BLD AUTO: 0 10E3/UL
NRBC BLD AUTO-RTO: 0 /100
NT-PROBNP SERPL-MCNC: 119 PG/ML (ref 0–900)
PLATELET # BLD AUTO: 203 10E3/UL (ref 150–450)
POTASSIUM SERPL-SCNC: 4.1 MMOL/L (ref 3.4–5.3)
PROT SERPL-MCNC: 6.1 G/DL (ref 6.4–8.3)
RBC # BLD AUTO: 3.34 10E6/UL (ref 3.8–5.2)
SODIUM SERPL-SCNC: 133 MMOL/L (ref 135–145)
TOTAL PROTEIN SERUM FOR ELP: 5.9 G/DL (ref 6.4–8.3)
WBC # BLD AUTO: 3.8 10E3/UL (ref 4–11)

## 2023-10-09 PROCEDURE — 258N000003 HC RX IP 258 OP 636: Performed by: STUDENT IN AN ORGANIZED HEALTH CARE EDUCATION/TRAINING PROGRAM

## 2023-10-09 PROCEDURE — 85025 COMPLETE CBC W/AUTO DIFF WBC: CPT | Performed by: STUDENT IN AN ORGANIZED HEALTH CARE EDUCATION/TRAINING PROGRAM

## 2023-10-09 PROCEDURE — 83521 IG LIGHT CHAINS FREE EACH: CPT

## 2023-10-09 PROCEDURE — 84155 ASSAY OF PROTEIN SERUM: CPT | Mod: 91

## 2023-10-09 PROCEDURE — 96375 TX/PRO/DX INJ NEW DRUG ADDON: CPT

## 2023-10-09 PROCEDURE — G0463 HOSPITAL OUTPT CLINIC VISIT: HCPCS | Performed by: NURSE PRACTITIONER

## 2023-10-09 PROCEDURE — 250N000013 HC RX MED GY IP 250 OP 250 PS 637: Performed by: STUDENT IN AN ORGANIZED HEALTH CARE EDUCATION/TRAINING PROGRAM

## 2023-10-09 PROCEDURE — 80053 COMPREHEN METABOLIC PANEL: CPT | Performed by: STUDENT IN AN ORGANIZED HEALTH CARE EDUCATION/TRAINING PROGRAM

## 2023-10-09 PROCEDURE — 99215 OFFICE O/P EST HI 40 MIN: CPT | Performed by: NURSE PRACTITIONER

## 2023-10-09 PROCEDURE — 84165 PROTEIN E-PHORESIS SERUM: CPT | Mod: 26

## 2023-10-09 PROCEDURE — 84165 PROTEIN E-PHORESIS SERUM: CPT | Mod: TC | Performed by: STUDENT IN AN ORGANIZED HEALTH CARE EDUCATION/TRAINING PROGRAM

## 2023-10-09 PROCEDURE — 250N000011 HC RX IP 250 OP 636: Mod: JZ | Performed by: NURSE PRACTITIONER

## 2023-10-09 PROCEDURE — 83615 LACTATE (LD) (LDH) ENZYME: CPT

## 2023-10-09 PROCEDURE — 36591 DRAW BLOOD OFF VENOUS DEVICE: CPT

## 2023-10-09 PROCEDURE — 96413 CHEMO IV INFUSION 1 HR: CPT

## 2023-10-09 PROCEDURE — 250N000011 HC RX IP 250 OP 636: Performed by: STUDENT IN AN ORGANIZED HEALTH CARE EDUCATION/TRAINING PROGRAM

## 2023-10-09 PROCEDURE — 83880 ASSAY OF NATRIURETIC PEPTIDE: CPT | Mod: GZ

## 2023-10-09 RX ORDER — HEPARIN SODIUM (PORCINE) LOCK FLUSH IV SOLN 100 UNIT/ML 100 UNIT/ML
5 SOLUTION INTRAVENOUS
Status: DISCONTINUED | OUTPATIENT
Start: 2023-10-09 | End: 2023-10-09 | Stop reason: HOSPADM

## 2023-10-09 RX ORDER — ACETAMINOPHEN 325 MG/1
650 TABLET ORAL ONCE
Status: COMPLETED | OUTPATIENT
Start: 2023-10-09 | End: 2023-10-09

## 2023-10-09 RX ORDER — ONDANSETRON 2 MG/ML
8 INJECTION INTRAMUSCULAR; INTRAVENOUS ONCE
Status: COMPLETED | OUTPATIENT
Start: 2023-10-09 | End: 2023-10-09

## 2023-10-09 RX ORDER — HEPARIN SODIUM (PORCINE) LOCK FLUSH IV SOLN 100 UNIT/ML 100 UNIT/ML
500 SOLUTION INTRAVENOUS ONCE
Status: COMPLETED | OUTPATIENT
Start: 2023-10-09 | End: 2023-10-09

## 2023-10-09 RX ADMIN — SODIUM CHLORIDE 250 ML: 9 INJECTION, SOLUTION INTRAVENOUS at 09:33

## 2023-10-09 RX ADMIN — SODIUM CHLORIDE 500 MG: 9 INJECTION, SOLUTION INTRAVENOUS at 10:09

## 2023-10-09 RX ADMIN — Medication 5 ML: at 11:29

## 2023-10-09 RX ADMIN — ONDANSETRON 8 MG: 2 INJECTION INTRAMUSCULAR; INTRAVENOUS at 09:33

## 2023-10-09 RX ADMIN — Medication 500 UNITS: at 08:06

## 2023-10-09 RX ADMIN — ACETAMINOPHEN 650 MG: 325 TABLET ORAL at 09:33

## 2023-10-09 RX ADMIN — DIPHENHYDRAMINE HYDROCHLORIDE 37.5 MG: 50 INJECTION, SOLUTION INTRAMUSCULAR; INTRAVENOUS at 09:34

## 2023-10-09 ASSESSMENT — PAIN SCALES - GENERAL: PAINLEVEL: MODERATE PAIN (4)

## 2023-10-09 NOTE — PROGRESS NOTES
[facility]    Hematology/Oncology Clinic Note    October 9, 2023      Reason for Office Visit   Evaluation prior to isatuximab maintenance    Cancer Diagnosis   Multiple Myeloma    Oncology History of Present Illness   Breast cancer dx in 1994. Underwent surgery and chemotherapy without reoccurence  MGUS dx 1999  T8 pathologic fracture with radiation  Revlimid and velcade,   2013  Cytoxan IV 9/2013  D-PACE 11/2013  Auto 3/27/14  5/2014 thalidomide caused rash so switched to pomalidomide   on kenalog and clobetasol creams for IMID rash  FLC began to rise so riky added to pom 9/2020  she has been on xgeva for an unclear amount of time  Teodora-Kd 2/20/2023    Interval History   - Has been a stressful few weeks.  is recovering from a right total knee replacement  - Kitchen flooded and has mold issues.  Currently being renovated;  Wears an N95 mask  - After Carfilzomib, for about 8 hours afterwards, experiences n/v. Takes 2 weeks to recover. By week 3 she is engaging in her normal activities, joan enjoys going to her exercise class.   - With being on Carfilzimab,  experienced HTN  3-6 post chemo up to 140 s-150 s systolically, then back to her baseline of 120 s.She monitors her BP closely and now only takes amlodipine BP elevated.  - From 2/1/24 to 2/17/24 going to [place] for vacation  - Participates in self care with body work, epsom salt baths.  - No new pains or concerns    Past Medical History     Past Medical History:   Diagnosis Date    Acquired hypothyroidism 8/10/2023    Breast cancer (H) 01/01/1994    right    H/O stem cell transplant (H) 03/01/2014    History of blood transfusion 2013 & 2014    During stem cell tx process    Hypertension Sept. 2021    Runs 140 s systolically, about 20 above my usual    Multiple myeloma, without mention of having achieved remission 11/06/2012       Past Surgical History:      Past Surgical History:   Procedure Laterality Date    BIOPSY   10/1994; [id number]    Lt. Breast in ; multiple bone marrow bx's for MM    BREAST SURGERY  10/1994    Lt. Mastectomy w/lymph node resection    COLONOSCOPY  2015    Normal results    EYE SURGERY      Bilateral cataract surgery    INSERT PORT VASCULAR ACCESS Left 2/15/2023    Procedure: INSERTION, VASCULAR ACCESS PORT;  Surgeon: Luc Antunez MD;  Location: [facility] OR    IR CHEST PORT PLACEMENT > 5 YRS OF AGE  2/15/2023    MASTECTOMY      Right, with lymphe node disection      PICC INSERTION  09/10/2013    5fr DL Power PICC, 44cm (1cm external) in the L lateral brachial vein with tip in the low SVC    TRANSPLANT  3/27/2014    Autologous stem cell tx       Social History     Socioeconomic History    Marital status:    Tobacco Use    Smoking status: Never     Passive exposure: Never    Smokeless tobacco: Never   Substance and Sexual Activity    Alcohol use: Yes     Comment: occ    Drug use: No    Sexual activity: Not Currently     Partners: Male     Birth control/protection: Post-menopausal   Other Topics Concern    Parent/sibling w/ CABG, MI or angioplasty before 65F 55M? No     Service No    Blood Transfusions No    Caffeine Concern No    Occupational Exposure No    Hobby Hazards No     Family History     Family History   Problem Relation Age of Onset    Cancer Paternal Grandmother         skin cancer    Hypertension Mother     Cerebrovascular Disease Mother          8-11-15 from strokes    Hypertension Father     Prostate Cancer Father        Allergies:     Allergies   Allergen Reactions    Blood Transfusion Related (Informational Only) Other (See Comments)     Patient has a history of a clinically significant antibody against RBC antigens.  A delay in compatible RBCs may occur.     Cayenne Pepper [Cayenne]     Corn-Containing Products GI Disturbance    Levaquin Other (See Comments)     Tendon pain    Milk Protein GI Disturbance    Mold      Facial rash/lip swelling    Morphine Sulfate Other  "(See Comments)     Per pt - see things (hallucination) when she was on Morphine .    Pollen Extract      Environmental allergies     Shrimp Nausea and Vomiting    Tomato      Lip swelling, facial rash    Azithromycin Rash    Keflex [Cephalexin] Rash    Oat Rash    Tape [Adhesive Tape] Itching and Rash     All adhesives    Wheat Rash     Swelling of lips         Medications:     Current Outpatient Medications   Medication    acyclovir (ZOVIRAX) 400 MG tablet    amLODIPine (NORVASC) 2.5 MG tablet    Ascorbic Acid (VITAMIN C PO)    aspirin (ASA) 81 MG chewable tablet    CALCIUM-VITAMIN D-VITAMIN K PO    clobetasol (TEMOVATE) 0.05 % external ointment    COENZYME Q-10 PO    Cyanocobalamin 1000 MCG/ML LIQD    levothyroxine (SYNTHROID/LEVOTHROID) 50 MCG tablet    lisinopril (ZESTRIL) 10 MG tablet    magnesium 100 MG CAPS    Multiple Vitamins-Minerals (MULTIVITAMIN OR)    order for DME    order for DME    ZINC PICOLINATE PO    acetaminophen (TYLENOL) 325 MG tablet    desonide (DESOWEN) 0.05 % external ointment    hydrALAZINE (APRESOLINE) 10 MG tablet    ondansetron (ZOFRAN) 4 MG tablet    prochlorperazine (COMPAZINE) 5 MG tablet    triamcinolone (KENALOG) 0.1 % external ointment     No current facility-administered medications for this visit.     Facility-Administered Medications Ordered in Other Visits   Medication    plerixafor (MOZOBIL) injection SOLN 12.4 mg    potassium chloride 20 mEq in 50 mL IVPB    potassium chloride SA (K-DUR,KLOR-CON M) CR tablet 20 mEq       Physical Exam     /77   Pulse 77   Temp 98.8  F (37.1  C) (Oral)   Resp 16   Ht 1.575 m (5' 2.01\")   Wt 52.2 kg (115 lb 1.6 oz)   LMP  (LMP Unknown)   SpO2 97%   BMI 21.05 kg/m      Wt Readings from Last 5 Encounters:   10/09/23 52.2 kg (115 lb 1.6 oz)   09/12/23 52.7 kg (116 lb 1.6 oz)   09/11/23 52 kg (114 lb 9.6 oz)   08/10/23 50.3 kg (111 lb)   08/01/23 51.7 kg (114 lb)     Constitutional: Appears stated age, well-groomed.  HEENT: " Normocephilic. EOMI, PERRL. Sclerae are anicteric. Oral mucosa is pink and moist with no lesions or thrush; gums normal and good dentition.   Respiratory: No increased work of breathing, good air exchange, clear to auscultation bilaterally, no crackles or wheezing.  Cardiovascular: Normal apical impulse, regular rate and rhythm, normal S1 and S2, and no murmur noted.  GI: No masses or scars. +BS. Soft. No tenderness on palpation.  Lymph/Hematologic: No cervical lymphadenopathy and no supraclavicular lymphadenopathy.  Skin: No bruising or bleeding, normal skin color, texture, turgor, no redness, warmth, or swelling, no rashes, no lesions, no jaundice.  Extremities: No ;ower extremity emity edema noted bilaterally.There is no redness, warmth, or swelling of the joints.  Neurologic: Awake, alert, oriented to name, place and time.    Vascular access: Left implanted port accessed. Site clean, dry and intact.    Data     Most Recent 3 CBC's:  Recent Labs   Lab Test 10/09/23  0803 09/11/23  0833 08/17/23  1349   WBC 3.8* 4.7 5.8   HGB 11.4* 12.0 10.8*   * 99 103*    204 202   ANEUTAUTO 1.9 2.6 3.6     Most Recent 3 BMP's:  Recent Labs   Lab Test 10/09/23  0803 09/11/23  0833 08/17/23  1349   * 133* 135*   POTASSIUM 4.1 4.2 4.1   CHLORIDE 95* 96* 98   CO2 26 27 24   BUN 14.8 17.1 11.7   CR 0.79 0.76 0.73   ANIONGAP 12 10 13   PAULINE 9.4 9.4 9.3   GLC 98 90 122*   PROTTOTAL 6.1* 6.3* 6.2*   ALBUMIN 4.4 4.4 4.4    Most Recent 3 LFT's:  Recent Labs   Lab Test 10/09/23  0803 09/11/23  0833 08/17/23  1349   AST 18 21 24   ALT 29 24 21   ALKPHOS 41 50 55   BILITOTAL 0.5 0.4 0.2    Most Recent 2 TSH and T4:  Recent Labs   Lab Test 08/17/23  1349 10/03/22  0820 06/13/22  0822 03/21/22  0803   TSH 2.12 1.82 9.47*  9.31* 5.79*   T4  --   --  0.99  0.97 0.83     I reviewed the above labs today myself and with the patient.    Assessment/Plan     Relapsed Multiple Myeloma  Started kyprolis/isatuximab/dex on 2/20/23.   Now in CR.   Isatuximab maintenace 10mg/kg every 4 week starting today.  Monthly myeloma labs  Xgeva every 6 months, last dose 5/22/23. Likes to separate the chemo dosing from this.    HTN  BP has continued fairly stable with the only rises days 3-6 post chemo up to 140 s-150 s systolically. Then back to my baseline of 120 s. However, since 9/22 my BP has been in the low 100 s-110 s. Therefore I have held my PM amlodipine 2.5 mg without any BP problems. (I was concerned it might get too low if I stayed on it.) Hope you agree with this. I continue to monitor my BP twice daily. I can always add the evening amlodipine if I see a rise in BP.     Hypothyroidism  Remains on levothyroxine    ID Prophylaxis   Acyclovir 400 mg BID for HSV prophylaxis      Plan from today's clinical encounter  - Proceed with Isatuximab maintenace 10mg/kg every 4 weeks  - Schedule request for Xgeva  11/13 or 11/14  - Follow up with ERICK Walters monthly for MM evaluations.  - Has follow up established with Dr. Chavez and Dr. Da Silva.    55 minutes spent on the date of the encounter doing chart review, review of test results, interpretation of tests, patient visit, and documentation     Keira PICHARDO, ANP-BC, AOCNP  [facility]  [address] [place] [id number] [phone] (pager)

## 2023-10-09 NOTE — PATIENT INSTRUCTIONS
[facility] Triage and after hours / weekends / holidays:  [phone]    Please call the triage or after hours line if you experience a temperature greater than or equal to 100.4, shaking chills, have uncontrolled nausea, vomiting and/or diarrhea, dizziness, shortness of breath, chest pain, bleeding, unexplained bruising, or if you have any other new/concerning symptoms, questions or concerns.      If you are having any concerning symptoms or wish to speak to a provider before your next infusion visit, please call triage to notify them so we can adequately serve you.     If you need a refill on a narcotic prescription or other medication, please call before your infusion appointment.

## 2023-10-09 NOTE — PROGRESS NOTES
Infusion Nursing Note:  Shena Hartmann presents today for Sarclisa Maintenance dose Day 1.    Patient seen by provider today: Yes: Keira Hood, MARCIANO   present during visit today: Not Applicable.    Note: Shena comes into the clinic today doing well overall and has no concerns to offer following her provider visit. Pain rated 4/10 generalized muscle aches not needing intervention. NO s/s of infection and otherwise feels well.      Intravenous Access:  Implanted Port.    Treatment Conditions:  Lab Results   Component Value Date    HGB 11.4 (L) 10/09/2023    WBC 3.8 (L) 10/09/2023    ANEU 2.1 07/05/2021    ANEUTAUTO 1.9 10/09/2023     10/09/2023        Lab Results   Component Value Date     (L) 10/09/2023    POTASSIUM 4.1 10/09/2023    MAG 1.8 12/12/2014    CR 0.79 10/09/2023    PAULINE 9.4 10/09/2023    BILITOTAL 0.5 10/09/2023    ALBUMIN 4.4 10/09/2023    ALT 29 10/09/2023    AST 18 10/09/2023       Results reviewed, labs MET treatment parameters, ok to proceed with treatment.      Post Infusion Assessment:  Patient tolerated infusion without incident.  Blood return noted pre and post infusion.  Site patent and intact, free from redness, edema or discomfort.  No evidence of extravasations.  Access discontinued per protocol.       Discharge Plan:   Discharge instructions reviewed with: Patient.  Patient and/or family verbalized understanding of discharge instructions and all questions answered.  Copy of AVS reviewed with patient and/or family.  Patient will return 11/6 for next appointment.  Patient discharged in stable condition accompanied by: self and friend.  Departure Mode: Ambulatory.        Colleen Edmonds RN

## 2023-10-09 NOTE — LETTER
10/9/2023         RE: Shena Hartmann  [address] [place] [address]        Dear Colleague,    Thank you for referring your patient, Shena Hartmann, to the [facility]. Please see a copy of my visit note below.        [facility]    Hematology/Oncology Clinic Note    October 9, 2023      Reason for Office Visit   Evaluation prior to isatuximab maintenance    Cancer Diagnosis   Multiple Myeloma    Oncology History of Present Illness   Breast cancer dx in 1994. Underwent surgery and chemotherapy without reoccurence  MGUS dx 1999  T8 pathologic fracture with radiation  Revlimid and velcade,   2013  Cytoxan IV 9/2013  D-PACE 11/2013  Auto 3/27/14  5/2014 thalidomide caused rash so switched to pomalidomide   on kenalog and clobetasol creams for IMID rash  FLC began to rise so riky added to pom 9/2020  she has been on xgeva for an unclear amount of time  Teodora-Kd 2/20/2023    Interval History   - Has been a stressful few weeks.  is recovering from a right total knee replacement  - Kitchen flooded and has mold issues.  Currently being renovated;  Wears an N95 mask  - After Carfilzomib, for about 8 hours afterwards, experiences n/v. Takes 2 weeks to recover. By week 3 she is engaging in her normal activities, joan enjoys going to her exercise class.   - With being on Carfilzimab,  experienced HTN  3-6 post chemo up to 140 s-150 s systolically, then back to her baseline of 120 s.She monitors her BP closely and now only takes amlodipine BP elevated.  - From 2/1/24 to 2/17/24 going to [place] for vacation  - Participates in self care with body work, epsom salt baths.  - No new pains or concerns    Past Medical History     Past Medical History:   Diagnosis Date    Acquired hypothyroidism 8/10/2023    Breast cancer (H) 01/01/1994    right    H/O stem cell transplant (H) 03/01/2014    History of blood transfusion 2013 & 2014    During stem cell  tx process    Hypertension 2021    Runs 140 s systolically, about 20 above my usual    Multiple myeloma, without mention of having achieved remission 2012       Past Surgical History:      Past Surgical History:   Procedure Laterality Date    BIOPSY  10/1994; [id number]    Lt. Breast in ; multiple bone marrow bx's for MM    BREAST SURGERY  10/1994    Lt. Mastectomy w/lymph node resection    COLONOSCOPY  2015    Normal results    EYE SURGERY      Bilateral cataract surgery    INSERT PORT VASCULAR ACCESS Left 2/15/2023    Procedure: INSERTION, VASCULAR ACCESS PORT;  Surgeon: Luc Antunez MD;  Location: [facility] OR    IR CHEST PORT PLACEMENT > 5 YRS OF AGE  2/15/2023    MASTECTOMY      Right, with lymphe node disection      PICC INSERTION  09/10/2013    5fr DL Power PICC, 44cm (1cm external) in the L lateral brachial vein with tip in the low SVC    TRANSPLANT  3/27/2014    Autologous stem cell tx       Social History     Socioeconomic History    Marital status:    Tobacco Use    Smoking status: Never     Passive exposure: Never    Smokeless tobacco: Never   Substance and Sexual Activity    Alcohol use: Yes     Comment: occ    Drug use: No    Sexual activity: Not Currently     Partners: Male     Birth control/protection: Post-menopausal   Other Topics Concern    Parent/sibling w/ CABG, MI or angioplasty before 65F 55M? No     Service No    Blood Transfusions No    Caffeine Concern No    Occupational Exposure No    Hobby Hazards No     Family History     Family History   Problem Relation Age of Onset    Cancer Paternal Grandmother         skin cancer    Hypertension Mother     Cerebrovascular Disease Mother          8-11-15 from strokes    Hypertension Father     Prostate Cancer Father        Allergies:     Allergies   Allergen Reactions    Blood Transfusion Related (Informational Only) Other (See Comments)     Patient has a history of a clinically significant antibody against RBC  "antigens.  A delay in compatible RBCs may occur.     Cayenne Pepper [Cayenne]     Corn-Containing Products GI Disturbance    Levaquin Other (See Comments)     Tendon pain    Milk Protein GI Disturbance    Mold      Facial rash/lip swelling    Morphine Sulfate Other (See Comments)     Per pt - see things (hallucination) when she was on Morphine .    Pollen Extract      Environmental allergies     Shrimp Nausea and Vomiting    Tomato      Lip swelling, facial rash    Azithromycin Rash    Keflex [Cephalexin] Rash    Oat Rash    Tape [Adhesive Tape] Itching and Rash     All adhesives    Wheat Rash     Swelling of lips         Medications:     Current Outpatient Medications   Medication    acyclovir (ZOVIRAX) 400 MG tablet    amLODIPine (NORVASC) 2.5 MG tablet    Ascorbic Acid (VITAMIN C PO)    aspirin (ASA) 81 MG chewable tablet    CALCIUM-VITAMIN D-VITAMIN K PO    clobetasol (TEMOVATE) 0.05 % external ointment    COENZYME Q-10 PO    Cyanocobalamin 1000 MCG/ML LIQD    levothyroxine (SYNTHROID/LEVOTHROID) 50 MCG tablet    lisinopril (ZESTRIL) 10 MG tablet    magnesium 100 MG CAPS    Multiple Vitamins-Minerals (MULTIVITAMIN OR)    order for DME    order for DME    ZINC PICOLINATE PO    acetaminophen (TYLENOL) 325 MG tablet    desonide (DESOWEN) 0.05 % external ointment    hydrALAZINE (APRESOLINE) 10 MG tablet    ondansetron (ZOFRAN) 4 MG tablet    prochlorperazine (COMPAZINE) 5 MG tablet    triamcinolone (KENALOG) 0.1 % external ointment     No current facility-administered medications for this visit.     Facility-Administered Medications Ordered in Other Visits   Medication    plerixafor (MOZOBIL) injection SOLN 12.4 mg    potassium chloride 20 mEq in 50 mL IVPB    potassium chloride SA (K-DUR,KLOR-CON M) CR tablet 20 mEq       Physical Exam     /77   Pulse 77   Temp 98.8  F (37.1  C) (Oral)   Resp 16   Ht 1.575 m (5' 2.01\")   Wt 52.2 kg (115 lb 1.6 oz)   LMP  (LMP Unknown)   SpO2 97%   BMI 21.05 kg/m  "     Wt Readings from Last 5 Encounters:   10/09/23 52.2 kg (115 lb 1.6 oz)   09/12/23 52.7 kg (116 lb 1.6 oz)   09/11/23 52 kg (114 lb 9.6 oz)   08/10/23 50.3 kg (111 lb)   08/01/23 51.7 kg (114 lb)     Constitutional: Appears stated age, well-groomed.  HEENT: Normocephilic. EOMI, PERRL. Sclerae are anicteric. Oral mucosa is pink and moist with no lesions or thrush; gums normal and good dentition.   Respiratory: No increased work of breathing, good air exchange, clear to auscultation bilaterally, no crackles or wheezing.  Cardiovascular: Normal apical impulse, regular rate and rhythm, normal S1 and S2, and no murmur noted.  GI: No masses or scars. +BS. Soft. No tenderness on palpation.  Lymph/Hematologic: No cervical lymphadenopathy and no supraclavicular lymphadenopathy.  Skin: No bruising or bleeding, normal skin color, texture, turgor, no redness, warmth, or swelling, no rashes, no lesions, no jaundice.  Extremities: No ;ower extremity emity edema noted bilaterally.There is no redness, warmth, or swelling of the joints.  Neurologic: Awake, alert, oriented to name, place and time.    Vascular access: Left implanted port accessed. Site clean, dry and intact.    Data     Most Recent 3 CBC's:  Recent Labs   Lab Test 10/09/23  0803 09/11/23  0833 08/17/23  1349   WBC 3.8* 4.7 5.8   HGB 11.4* 12.0 10.8*   * 99 103*    204 202   ANEUTAUTO 1.9 2.6 3.6     Most Recent 3 BMP's:  Recent Labs   Lab Test 10/09/23  0803 09/11/23  0833 08/17/23  1349   * 133* 135*   POTASSIUM 4.1 4.2 4.1   CHLORIDE 95* 96* 98   CO2 26 27 24   BUN 14.8 17.1 11.7   CR 0.79 0.76 0.73   ANIONGAP 12 10 13   PAULINE 9.4 9.4 9.3   GLC 98 90 122*   PROTTOTAL 6.1* 6.3* 6.2*   ALBUMIN 4.4 4.4 4.4    Most Recent 3 LFT's:  Recent Labs   Lab Test 10/09/23  0803 09/11/23  0833 08/17/23  1349   AST 18 21 24   ALT 29 24 21   ALKPHOS 41 50 55   BILITOTAL 0.5 0.4 0.2    Most Recent 2 TSH and T4:  Recent Labs   Lab Test 08/17/23  1349  10/03/22  0820 06/13/22  0822 03/21/22  0803   TSH 2.12 1.82 9.47*  9.31* 5.79*   T4  --   --  0.99  0.97 0.83     I reviewed the above labs today myself and with the patient.    Assessment/Plan     Relapsed Multiple Myeloma  Started kyprolis/isatuximab/dex on 2/20/23.  Now in CR.   Isatuximab maintenace 10mg/kg every 4 week starting today.  Monthly myeloma labs  Xgeva every 6 months, last dose 5/22/23. Likes to separate the chemo dosing from this.    HTN  BP has continued fairly stable with the only rises days 3-6 post chemo up to 140 s-150 s systolically. Then back to my baseline of 120 s. However, since 9/22 my BP has been in the low 100 s-110 s. Therefore I have held my PM amlodipine 2.5 mg without any BP problems. (I was concerned it might get too low if I stayed on it.) Hope you agree with this. I continue to monitor my BP twice daily. I can always add the evening amlodipine if I see a rise in BP.     Hypothyroidism  Remains on levothyroxine    ID Prophylaxis   Acyclovir 400 mg BID for HSV prophylaxis      Plan from today's clinical encounter  - Proceed with Isatuximab maintenace 10mg/kg every 4 weeks  - Schedule request for Xgeva  11/13 or 11/14  - Follow up with ERICK Walters monthly for MM evaluations.  - Has follow up established with Dr. Chavez and Dr. Da Silva.    55 minutes spent on the date of the encounter doing chart review, review of test results, interpretation of tests, patient visit, and documentation     Keira PICHARDO, ANP-BC, AOCNP  [facility]  [address], [place] [id number] [phone] (pager)

## 2023-10-09 NOTE — NURSING NOTE
"Oncology Rooming Note    October 9, 2023 8:35 AM   Shena Hartmann is a 72 year old female who presents for:    Chief Complaint   Patient presents with    Port Draw     Labs drawn via port by RN in lab.  VS taken    Oncology Clinic Visit     [facility] RETURN - MULTIPLE MYELOMA     Initial Vitals: /77   Pulse 77   Temp 98.8  F (37.1  C) (Oral)   Resp 16   Ht 1.575 m (5' 2.01\")   Wt 52.2 kg (115 lb 1.6 oz)   LMP  (LMP Unknown)   SpO2 97%   BMI 21.05 kg/m   Estimated body mass index is 21.05 kg/m  as calculated from the following:    Height as of this encounter: 1.575 m (5' 2.01\").    Weight as of this encounter: 52.2 kg (115 lb 1.6 oz). Body surface area is 1.51 meters squared.  Moderate Pain (4) Comment: Data Unavailable   No LMP recorded (lmp unknown). Patient is postmenopausal.  Allergies reviewed: Yes  Medications reviewed: Yes    Medications: Medication refills not needed today.  Pharmacy name entered into [employer]:    [id number] - [place], [place] - [address] AT [facility] [address]  WRITTEN PRESCRIPTION REQUESTED  [facility] - [place], [place] - [address]              "

## 2023-10-10 LAB
ALBUMIN SERPL ELPH-MCNC: 4.1 G/DL (ref 3.7–5.1)
ALPHA1 GLOB SERPL ELPH-MCNC: 0.2 G/DL (ref 0.2–0.4)
ALPHA2 GLOB SERPL ELPH-MCNC: 0.6 G/DL (ref 0.5–0.9)
B-GLOBULIN SERPL ELPH-MCNC: 0.6 G/DL (ref 0.6–1)
GAMMA GLOB SERPL ELPH-MCNC: 0.4 G/DL (ref 0.7–1.6)
M PROTEIN SERPL ELPH-MCNC: 0.2 G/DL
PROT PATTERN SERPL ELPH-IMP: ABNORMAL

## 2023-10-16 ENCOUNTER — OFFICE VISIT (OUTPATIENT)
Dept: CARDIOLOGY | Facility: CLINIC | Age: 73
End: 2023-10-16
Attending: INTERNAL MEDICINE
Payer: MEDICARE

## 2023-10-16 VITALS
HEIGHT: 62 IN | DIASTOLIC BLOOD PRESSURE: 70 MMHG | SYSTOLIC BLOOD PRESSURE: 108 MMHG | WEIGHT: 114.3 LBS | BODY MASS INDEX: 21.04 KG/M2 | OXYGEN SATURATION: 100 % | HEART RATE: 82 BPM

## 2023-10-16 DIAGNOSIS — I10 BENIGN ESSENTIAL HYPERTENSION: Primary | ICD-10-CM

## 2023-10-16 DIAGNOSIS — Z85.3 PERSONAL HISTORY OF MALIGNANT NEOPLASM OF BREAST: ICD-10-CM

## 2023-10-16 DIAGNOSIS — C90.01 MULTIPLE MYELOMA IN REMISSION (H): ICD-10-CM

## 2023-10-16 PROCEDURE — G0463 HOSPITAL OUTPT CLINIC VISIT: HCPCS | Performed by: INTERNAL MEDICINE

## 2023-10-16 PROCEDURE — 99214 OFFICE O/P EST MOD 30 MIN: CPT | Mod: GC | Performed by: INTERNAL MEDICINE

## 2023-10-16 RX ORDER — LISINOPRIL 2.5 MG/1
2.5 TABLET ORAL DAILY
Qty: 90 TABLET | Refills: 3 | Status: SHIPPED | OUTPATIENT
Start: 2023-10-16 | End: 2024-01-03

## 2023-10-16 ASSESSMENT — PAIN SCALES - GENERAL: PAINLEVEL: NO PAIN (0)

## 2023-10-16 NOTE — PROGRESS NOTES
2023    [facility]     Shena Hartmann is a very pleasant 72 year old woman with     Breast cancer  - post surgery, chemotherapy and is without recurrence  MGUS diagnosed in   Autologous transplantation 2014  Relapsed Myeloma  Started carfilzumab/isatuximab/dex on 23.     Hypertension was initially diagnosed in 2020 and she has had periods of poorly controlled blood pressure. She was started on lisinopril in 2021 and eventually amlodipine was added.     Overall she has been doing well. She was recently told that she is in remission and will continue on monthly isatuximab infusions. She will discontinue the carflizumab and dex. Her blood pressures have been well controlled, ranging between 120-130 systolic. She is currently taking amlodipine 5 mg and lisinopril 10mg. She does have a cough associated with lisinopril.     Denies any chest pain, sob, lightheadedness, dizziness, syncope, edema.     PAST MEDICAL HISTORY:  Past Medical History:   Diagnosis Date    Acquired hypothyroidism 8/10/2023    Breast cancer (H) 1994    right    H/O stem cell transplant (H) 2014    History of blood transfusion  & 2014    During stem cell tx process    Hypertension 2021    Runs 140 s systolically, about 20 above my usual    Multiple myeloma, without mention of having achieved remission 2012       FAMILY HISTORY:  Family History   Problem Relation Age of Onset    Cancer Paternal Grandmother         skin cancer    Hypertension Mother     Cerebrovascular Disease Mother          8-11-15 from strokes    Hypertension Father     Prostate Cancer Father        SOCIAL HISTORY:  Social History     Socioeconomic History    Marital status:    Tobacco Use    Smoking status: Never     Passive exposure: Never    Smokeless tobacco: Never   Substance and Sexual Activity    Alcohol use: Yes     Comment: occ    Drug use: No    Sexual activity:  Not Currently     Partners: Male     Birth control/protection: Post-menopausal   Other Topics Concern    Parent/sibling w/ CABG, MI or angioplasty before 65F 55M? No     Service No    Blood Transfusions No    Caffeine Concern No    Occupational Exposure No    Hobby Hazards No    Sleep Concern No    Stress Concern No    Weight Concern No    Special Diet No    Back Care No    Exercise Yes     Comment: 3-4 x a week.     Bike Helmet Yes    Seat Belt Yes    Self-Exams No     Social Determinants of Health     Interpersonal Safety: Not At Risk (5/16/2023)    Humiliation, Afraid, Rape, and Kick questionnaire     Fear of Current or Ex-Partner: No     Emotionally Abused: No     Physically Abused: No     Sexually Abused: No       CURRENT MEDICATIONS:  Current Outpatient Medications   Medication Sig Dispense Refill    acetaminophen (TYLENOL) 325 MG tablet Take 325-650 mg by mouth every 6 hours as needed for mild pain (Patient not taking: Reported on 10/9/2023)      acyclovir (ZOVIRAX) 400 MG tablet Take 1 tablet (400 mg) by mouth every 12 hours 60 tablet 11    amLODIPine (NORVASC) 2.5 MG tablet Take 5 mg ( two 2.5 mg tablets) in the morning. Take 2.5 mg (one tablet) in the evening. 270 tablet 1    Ascorbic Acid (VITAMIN C PO) Take 1,000 mg by mouth 2 times daily       aspirin (ASA) 81 MG chewable tablet Take 1 tablet (81 mg) by mouth daily 30 tablet 0    CALCIUM-VITAMIN D-VITAMIN K PO Take 2 tablets by mouth daily.      clobetasol (TEMOVATE) 0.05 % external ointment Apply topically 2 times daily as needed (itching and rash) Topically to rash on hands until resolved 45 g 0    COENZYME Q-10 PO Take 100 mg by mouth daily       Cyanocobalamin 1000 MCG/ML LIQD Take 500 mcg by mouth 2 times daily      desonide (DESOWEN) 0.05 % external ointment Apply topically 2 times daily Apply to face as needed for dermatitis (Patient not taking: Reported on 10/9/2023) 15 g 11    hydrALAZINE (APRESOLINE) 10 MG tablet Take 1 tablet (10 mg)  "by mouth every 6 hours as needed (For systolic blood pressure greater then 160) (Patient not taking: Reported on 10/9/2023) 20 tablet 1    levothyroxine (SYNTHROID/LEVOTHROID) 50 MCG tablet Take 1 tablet (50 mcg) by mouth daily 90 tablet 3    lisinopril (ZESTRIL) 10 MG tablet Take 1 tablet (10 mg) by mouth daily 90 tablet 0    magnesium 100 MG CAPS Take 100 mg by mouth 3 times daily      Multiple Vitamins-Minerals (MULTIVITAMIN OR) Take 1 tablet by mouth 2 times daily.      ondansetron (ZOFRAN) 4 MG tablet Take 1 tablet (4 mg) by mouth every 8 hours as needed for nausea (Patient not taking: Reported on 10/9/2023) 60 tablet 11    order for DME 6 mastectomy bras  Length of need: 99 months 6 Device 1    order for DME R mastectomy prosthesis   Length of need:  99 months 1 Device 1    prochlorperazine (COMPAZINE) 5 MG tablet Take 1 tablet (5 mg) by mouth every 6 hours as needed for nausea or vomiting (Patient not taking: Reported on 10/9/2023) 30 tablet 11    triamcinolone (KENALOG) 0.1 % external ointment Apply topically 2 times daily (Patient not taking: Reported on 10/9/2023) 15 g 11    ZINC PICOLINATE PO Take 30 mg by mouth         ROS:   Comprehensive ROS negative except HPI    EXAM:  /70 (BP Location: Right arm, Patient Position: Chair, Cuff Size: Adult Regular)   Pulse 82   Ht 1.58 m (5' 2.21\")   Wt 51.8 kg (114 lb 4.8 oz)   LMP  (LMP Unknown)   SpO2 100%   BMI 20.77 kg/m    In general, the patient is in no apparent distress.        HEENT: Sclerae white, not injected.    Neck: No JVD. No thyromegaly  Heart: regular with normal S1, S2. No murmur. No audible rub or gallop.    Lungs: Clear bilaterally.  No rhonchi, wheezes, rales.   Extremities: No edema.  The pulses are 2+at the radial bilaterally.  Psych: pleasant and conversant    Labs:  Recent Labs   Lab Test 10/09/23  0803 09/11/23  0833   WBC 3.8* 4.7   HGB 11.4* 12.0   HCT 33.6* 34.6*    204     Recent Labs   Lab Test 10/09/23  0803 " 09/11/23  0833   * 133*   POTASSIUM 4.1 4.2   CHLORIDE 95* 96*   CO2 26 27   BUN 14.8 17.1   CR 0.79 0.76   GFRESTIMATED 79 83     Recent Labs   Lab Test 03/23/18  0737   CHOL 234*   HDL 64   *   TRIG 85       Echocardiogram 3/27/2023:  Global and regional left ventricular function is normal with an EF of 60-65%.  Right ventricular function, chamber size, wall motion, and thickness are normal.  No valvular dysfunction of significance.  The inferior vena cava is normal.  No pericardial effusion is present.    Assessment and Plan:   Shena Hartmann is a 72 year old with the following past medical history:    Essential hypertension  Normal biventricular function   multiple myeloma  Personal history of Breast cancer     Shena Hartmann has hypertension likely related to the chemotherapy she received for multiple myeloma.  Thankfully, she has achieved remission per her report and is only going to continue on isatuximab monotherapy. Her blood pressures are well controlled on lisinopril and amlodipine. Her blood pressure is actually on the lower end today (108/70). She is having a cough which could be associated with the higher dose of lisinopril therapy so we will reduce the dose (no cough on lower dose). She will continue to monitor her blood pressures and update us.     Recommendations:  - Continue amlodipine 5mg daily  - Continue lisinopril at 2.5 mg daily  - She will monitor her blood pressure at home and call with any significant changes.      Follow up in 12 months.    The patient was seen and discussed with Dee.    Ric Bejarano MD  Cardiology Fellow  [phone]       ATTENDING ATTESTATION:  This patient has been seen and examined by me October 16, 2023 with Dr. Bejarano, cardiology fellow. I have reviewed the vitals, laboratory and imaging data relevant to this patient's care. I have edited this note to reflect our joint assessment and plan, and discussed the plan with the patient.    Sadia Price,  MD, MS  Professor of Medicine  Cardiovascular Medicine

## 2023-10-16 NOTE — PATIENT INSTRUCTIONS
You were seen in the Cardiology Clinic today by: Dr. Price    Plan:   Medication Changes:   DECREASE Lisinopril dose to 2.5 mg daily.     Labs/Tests Needed:  None.     Follow up Visit:  With Dr. Price in 1 year or sooner if needed.     If you have further questions, please utilize [employer] to contact us.     Your Care Team:    Cardiology   Telephone Number     Nurse Line  Richie Amato RN    [phone]     For scheduling appointments:  [phone]           On-call cardiologist for after hours or on weekends:   [phone], option #4, and ask to speak to the on-call cardiologist.     Cardiovascular Clinic:   [address] [place] [address]. [place] [address]      As always, Thank you for trusting us with your health care needs!

## 2023-10-16 NOTE — LETTER
10/16/2023      RE: Shena Hartmann  [address] [place] [address]       Dear Colleague,    Thank you for the opportunity to participate in the care of your patient, Shena Hartmann, at the [facility] [place] at [facility]. Please see a copy of my visit note below.    2023    [facility]     Shena Hartmann is a very pleasant 72 year old woman with     Breast cancer  - post surgery, chemotherapy and is without recurrence  MGUS diagnosed in   Autologous transplantation 2014  Relapsed Myeloma  Started carfilzumab/isatuximab/dex on 23.     Hypertension was initially diagnosed in 2020 and she has had periods of poorly controlled blood pressure. She was started on lisinopril in 2021 and eventually amlodipine was added.     Overall she has been doing well. She was recently told that she is in remission and will continue on monthly isatuximab infusions. She will discontinue the carflizumab and dex. Her blood pressures have been well controlled, ranging between 120-130 systolic. She is currently taking amlodipine 5 mg and lisinopril 10mg. She does have a cough associated with lisinopril.     Denies any chest pain, sob, lightheadedness, dizziness, syncope, edema.     PAST MEDICAL HISTORY:  Past Medical History:   Diagnosis Date    Acquired hypothyroidism 8/10/2023    Breast cancer (H) 1994    right    H/O stem cell transplant (H) 2014    History of blood transfusion  &     During stem cell tx process    Hypertension 2021    Runs 140 s systolically, about 20 above my usual    Multiple myeloma, without mention of having achieved remission 2012       FAMILY HISTORY:  Family History   Problem Relation Age of Onset    Cancer Paternal Grandmother         skin cancer    Hypertension Mother     Cerebrovascular Disease Mother          8-11-15 from  strokes    Hypertension Father     Prostate Cancer Father        SOCIAL HISTORY:  Social History     Socioeconomic History    Marital status:    Tobacco Use    Smoking status: Never     Passive exposure: Never    Smokeless tobacco: Never   Substance and Sexual Activity    Alcohol use: Yes     Comment: occ    Drug use: No    Sexual activity: Not Currently     Partners: Male     Birth control/protection: Post-menopausal   Other Topics Concern    Parent/sibling w/ CABG, MI or angioplasty before 65F 55M? No     Service No    Blood Transfusions No    Caffeine Concern No    Occupational Exposure No    Hobby Hazards No    Sleep Concern No    Stress Concern No    Weight Concern No    Special Diet No    Back Care No    Exercise Yes     Comment: 3-4 x a week.     Bike Helmet Yes    Seat Belt Yes    Self-Exams No     Social Determinants of Health     Interpersonal Safety: Not At Risk (5/16/2023)    Humiliation, Afraid, Rape, and Kick questionnaire     Fear of Current or Ex-Partner: No     Emotionally Abused: No     Physically Abused: No     Sexually Abused: No       CURRENT MEDICATIONS:  Current Outpatient Medications   Medication Sig Dispense Refill    acetaminophen (TYLENOL) 325 MG tablet Take 325-650 mg by mouth every 6 hours as needed for mild pain (Patient not taking: Reported on 10/9/2023)      acyclovir (ZOVIRAX) 400 MG tablet Take 1 tablet (400 mg) by mouth every 12 hours 60 tablet 11    amLODIPine (NORVASC) 2.5 MG tablet Take 5 mg ( two 2.5 mg tablets) in the morning. Take 2.5 mg (one tablet) in the evening. 270 tablet 1    Ascorbic Acid (VITAMIN C PO) Take 1,000 mg by mouth 2 times daily       aspirin (ASA) 81 MG chewable tablet Take 1 tablet (81 mg) by mouth daily 30 tablet 0    CALCIUM-VITAMIN D-VITAMIN K PO Take 2 tablets by mouth daily.      clobetasol (TEMOVATE) 0.05 % external ointment Apply topically 2 times daily as needed (itching and rash) Topically to rash on hands until resolved 45 g 0     "COENZYME Q-10 PO Take 100 mg by mouth daily       Cyanocobalamin 1000 MCG/ML LIQD Take 500 mcg by mouth 2 times daily      desonide (DESOWEN) 0.05 % external ointment Apply topically 2 times daily Apply to face as needed for dermatitis (Patient not taking: Reported on 10/9/2023) 15 g 11    hydrALAZINE (APRESOLINE) 10 MG tablet Take 1 tablet (10 mg) by mouth every 6 hours as needed (For systolic blood pressure greater then 160) (Patient not taking: Reported on 10/9/2023) 20 tablet 1    levothyroxine (SYNTHROID/LEVOTHROID) 50 MCG tablet Take 1 tablet (50 mcg) by mouth daily 90 tablet 3    lisinopril (ZESTRIL) 10 MG tablet Take 1 tablet (10 mg) by mouth daily 90 tablet 0    magnesium 100 MG CAPS Take 100 mg by mouth 3 times daily      Multiple Vitamins-Minerals (MULTIVITAMIN OR) Take 1 tablet by mouth 2 times daily.      ondansetron (ZOFRAN) 4 MG tablet Take 1 tablet (4 mg) by mouth every 8 hours as needed for nausea (Patient not taking: Reported on 10/9/2023) 60 tablet 11    order for DME 6 mastectomy bras  Length of need: 99 months 6 Device 1    order for DME R mastectomy prosthesis   Length of need:  99 months 1 Device 1    prochlorperazine (COMPAZINE) 5 MG tablet Take 1 tablet (5 mg) by mouth every 6 hours as needed for nausea or vomiting (Patient not taking: Reported on 10/9/2023) 30 tablet 11    triamcinolone (KENALOG) 0.1 % external ointment Apply topically 2 times daily (Patient not taking: Reported on 10/9/2023) 15 g 11    ZINC PICOLINATE PO Take 30 mg by mouth         ROS:   Comprehensive ROS negative except HPI    EXAM:  /70 (BP Location: Right arm, Patient Position: Chair, Cuff Size: Adult Regular)   Pulse 82   Ht 1.58 m (5' 2.21\")   Wt 51.8 kg (114 lb 4.8 oz)   LMP  (LMP Unknown)   SpO2 100%   BMI 20.77 kg/m    In general, the patient is in no apparent distress.        HEENT: Sclerae white, not injected.    Neck: No JVD. No thyromegaly  Heart: regular with normal S1, S2. No murmur. No audible " rub or gallop.    Lungs: Clear bilaterally.  No rhonchi, wheezes, rales.   Extremities: No edema.  The pulses are 2+at the radial bilaterally.  Psych: pleasant and conversant    Labs:  Recent Labs   Lab Test 10/09/23  0803 09/11/23  0833   WBC 3.8* 4.7   HGB 11.4* 12.0   HCT 33.6* 34.6*    204     Recent Labs   Lab Test 10/09/23  0803 09/11/23  0833   * 133*   POTASSIUM 4.1 4.2   CHLORIDE 95* 96*   CO2 26 27   BUN 14.8 17.1   CR 0.79 0.76   GFRESTIMATED 79 83     Recent Labs   Lab Test 03/23/18  0737   CHOL 234*   HDL 64   *   TRIG 85       Echocardiogram 3/27/2023:  Global and regional left ventricular function is normal with an EF of 60-65%.  Right ventricular function, chamber size, wall motion, and thickness are normal.  No valvular dysfunction of significance.  The inferior vena cava is normal.  No pericardial effusion is present.    Assessment and Plan:   Shena Hartmann is a 72 year old with the following past medical history:    Essential hypertension  Normal biventricular function   multiple myeloma  Personal history of Breast cancer     Shena Hartmann has hypertension likely related to the chemotherapy she received for multiple myeloma.  Thankfully, she has achieved remission per her report and is only going to continue on isatuximab monotherapy. Her blood pressures are well controlled on lisinopril and amlodipine. Her blood pressure is actually on the lower end today (108/70). She is having a cough which could be associated with the higher dose of lisinopril therapy so we will reduce the dose (no cough on lower dose). She will continue to monitor her blood pressures and update us.     Recommendations:  - Continue amlodipine 5mg daily  - Continue lisinopril at 2.5 mg daily  - She will monitor her blood pressure at home and call with any significant changes.      Follow up in 12 months.    The patient was seen and discussed with Nabil Bejarano MD  Cardiology  Fellow  [phone]       ATTENDING ATTESTATION:  This patient has been seen and examined by me October 16, 2023 with Dr. Bejarano, cardiology fellow. I have reviewed the vitals, laboratory and imaging data relevant to this patient's care. I have edited this note to reflect our joint assessment and plan, and discussed the plan with the patient.    Sadia Price MD, MS  Professor of Medicine  Cardiovascular Medicine

## 2023-10-16 NOTE — NURSING NOTE
Chief Complaint   Patient presents with    Follow Up     10/16/23 Dr. Price follow-up       Vitals were taken, medications reconciled.    Gerardo Montague, Facilitator   3:26 PM

## 2023-11-02 NOTE — PROGRESS NOTES
[facility]    Hematology/Oncology Clinic Note    Nov 6, 2023    Reason for Office Visit   Evaluation and toxicity check during isatuximab maintenance    Cancer Diagnosis   Multiple Myeloma    Oncology History of Present Illness   Breast cancer dx in 1994. Underwent surgery and chemotherapy without reoccurence  MGUS dx 1999  T8 pathologic fracture with radiation  Revlimid and velcade,   2013  Cytoxan IV 9/2013  D-PACE 11/2013  Auto 3/27/14  5/2014 thalidomide caused rash so switched to pomalidomide   on kenalog and clobetasol creams for IMID rash  FLC began to rise so riky added to pom 9/2020  she has been on xgeva for an unclear amount of time  Teodora-Kd 2/20/2023    Interval History   - Doing well overall.  Has some days following infusion where she doesn't feel well, but symptoms are less severe than when she was on active treatment with Teodora-Kd.   - No new bone pains  - Blood pressure has been more stable; cardiology decreased her lisinopril to 2.5 mg daily, and she only takes amlodipine (5 mg) once daily now.  - From 2/1/24 to 2/17/24 going to [place] for vacation  - No new symptoms or concerns to report.      Past Medical History     Past Medical History:   Diagnosis Date    Acquired hypothyroidism 8/10/2023    Breast cancer (H) 01/01/1994    right    H/O stem cell transplant (H) 03/01/2014    History of blood transfusion 2013 & 2014    During stem cell tx process    Hypertension Sept. 2021    Runs 140 s systolically, about 20 above my usual    Multiple myeloma, without mention of having achieved remission 11/06/2012       Past Surgical History:      Past Surgical History:   Procedure Laterality Date    BIOPSY  10/1994; [id number]    Lt. Breast in '94; multiple bone marrow bx's for MM    BREAST SURGERY  10/1994    Lt. Mastectomy w/lymph node resection    COLONOSCOPY  11/2015    Normal results    EYE SURGERY  2020    Bilateral cataract surgery    INSERT PORT VASCULAR ACCESS Left  2/15/2023    Procedure: INSERTION, VASCULAR ACCESS PORT;  Surgeon: Luc Antunez MD;  Location: [facility] OR    IR CHEST PORT PLACEMENT > 5 YRS OF AGE  2/15/2023    MASTECTOMY      Right, with lymphe node disection      PICC INSERTION  09/10/2013    5fr DL Power PICC, 44cm (1cm external) in the L lateral brachial vein with tip in the low SVC    TRANSPLANT  3/27/2014    Autologous stem cell tx       Social History     Socioeconomic History    Marital status:    Tobacco Use    Smoking status: Never     Passive exposure: Never    Smokeless tobacco: Never   Substance and Sexual Activity    Alcohol use: Yes     Comment: occ    Drug use: No    Sexual activity: Not Currently     Partners: Male     Birth control/protection: Post-menopausal   Other Topics Concern    Parent/sibling w/ CABG, MI or angioplasty before 65F 55M? No     Service No    Blood Transfusions No    Caffeine Concern No    Occupational Exposure No    Hobby Hazards No     Family History     Family History   Problem Relation Age of Onset    Cancer Paternal Grandmother         skin cancer    Hypertension Mother     Cerebrovascular Disease Mother          8-11-15 from strokes    Hypertension Father     Prostate Cancer Father        Allergies:     Allergies   Allergen Reactions    Blood Transfusion Related (Informational Only) Other (See Comments)     Patient has a history of a clinically significant antibody against RBC antigens.  A delay in compatible RBCs may occur.     Cayenne Pepper [Cayenne]     Corn-Containing Products GI Disturbance    Levaquin Other (See Comments)     Tendon pain    Milk Protein GI Disturbance    Mold      Facial rash/lip swelling    Morphine Sulfate Other (See Comments)     Per pt - see things (hallucination) when she was on Morphine .    Pollen Extract      Environmental allergies     Shrimp Nausea and Vomiting    Tomato      Lip swelling, facial rash    Azithromycin Rash    Keflex [Cephalexin] Rash    Oat Rash    Tape  [Adhesive Tape] Itching and Rash     All adhesives    Wheat Rash     Swelling of lips         Medications:     Current Outpatient Medications   Medication    amLODIPine (NORVASC) 2.5 MG tablet    levothyroxine (SYNTHROID/LEVOTHROID) 50 MCG tablet    acetaminophen (TYLENOL) 325 MG tablet    acyclovir (ZOVIRAX) 400 MG tablet    Ascorbic Acid (VITAMIN C PO)    aspirin (ASA) 81 MG chewable tablet    CALCIUM-VITAMIN D-VITAMIN K PO    clobetasol (TEMOVATE) 0.05 % external ointment    COENZYME Q-10 PO    Cyanocobalamin 1000 MCG/ML LIQD    desonide (DESOWEN) 0.05 % external ointment    hydrALAZINE (APRESOLINE) 10 MG tablet    lisinopril (ZESTRIL) 2.5 MG tablet    magnesium 100 MG CAPS    Multiple Vitamins-Minerals (MULTIVITAMIN OR)    ondansetron (ZOFRAN) 4 MG tablet    order for DME    order for DME    prochlorperazine (COMPAZINE) 5 MG tablet    triamcinolone (KENALOG) 0.1 % external ointment    ZINC PICOLINATE PO     No current facility-administered medications for this visit.     Facility-Administered Medications Ordered in Other Visits   Medication    plerixafor (MOZOBIL) injection SOLN 12.4 mg    potassium chloride 20 mEq in 50 mL IVPB    potassium chloride SA (K-DUR,KLOR-CON M) CR tablet 20 mEq       Physical Exam   /72   Pulse 76   Temp 98  F (36.7  C)   Resp 16   Wt 52.7 kg (116 lb 1.6 oz)   LMP  (LMP Unknown)   SpO2 98%   BMI 21.10 kg/m      Wt Readings from Last 5 Encounters:   11/06/23 52.7 kg (116 lb 1.6 oz)   10/16/23 51.8 kg (114 lb 4.8 oz)   10/09/23 52.2 kg (115 lb 1.6 oz)   09/12/23 52.7 kg (116 lb 1.6 oz)   09/11/23 52 kg (114 lb 9.6 oz)     Constitutional: Appears stated age, well-groomed.  HEENT: Normocephilic. EOMI, PERRL. Sclerae are anicteric.   Respiratory: No increased work of breathing, good air exchange, clear to auscultation bilaterally, no crackles or wheezing.  Cardiovascular: Normal apical impulse, regular rate and rhythm, normal S1 and S2, and no murmur noted.  Skin: No bruising  or bleeding, normal skin color, texture, turgor, no redness, warmth, or swelling, no rashes, no lesions, no jaundice.  Extremities: No ;ower extremity emity edema noted bilaterally.There is no redness, warmth, or swelling of the joints.  Neurologic: Awake, alert, oriented to name, place and time.    Vascular access: Left implanted port accessed.     Data     Most Recent 3 CBC's:  Recent Labs   Lab Test 11/06/23  0808 10/09/23  0803 09/11/23  0833   WBC 4.2 3.8* 4.7   HGB 12.5 11.4* 12.0   MCV 99 101* 99    203 204   ANEUTAUTO 2.4 1.9 2.6     Most Recent 3 BMP's:  Recent Labs   Lab Test 11/06/23  0808 10/09/23  0803 09/11/23  0833   * 133* 133*   POTASSIUM 4.4 4.1 4.2   CHLORIDE 96* 95* 96*   CO2 28 26 27   BUN 17.6 14.8 17.1   CR 0.75 0.79 0.76   ANIONGAP 10 12 10   PAULINE 9.5 9.4 9.4   GLC 79 98 90   PROTTOTAL 6.3* 6.1* 6.3*   ALBUMIN 4.4 4.4 4.4    Most Recent 3 LFT's:  Recent Labs   Lab Test 11/06/23  0808 10/09/23  0803 09/11/23  0833   AST 25 18 21   ALT 32 29 24   ALKPHOS 49 41 50   BILITOTAL 0.3 0.5 0.4    Most Recent 2 TSH and T4:  Recent Labs   Lab Test 08/17/23  1349 10/03/22  0820 06/13/22  0822 03/21/22  0803   TSH 2.12 1.82 9.47*  9.31* 5.79*   T4  --   --  0.99  0.97 0.83     I reviewed the above labs today myself and with the patient.    Assessment/Plan     Relapsed Multiple Myeloma  Started kyprolis/isatuximab/dex on 2/20/23.  Now in CR.   Isatuximab maintenace 10mg/kg every 4 week started 10/9/23  Monthly myeloma labs  Xgeva every 6 months, last dose 5/22/23. Likes to separate the chemo dosing from this. Will receive next week (11/13/23)  Electing to space out her treatments over the winter to accommodate a trip to [place] Feb 1-17. Requesting treatment Dec 4, Johnie 15, and Feb 26.  She is aware this is spacing treatment further than recommended which comes with a higher risk of relapse.    HTN  BP has continued to be fairly stable with the only rises days 3-6 post chemo up to 140 s-150 s  systolically. Then back to her baseline of 120 s.   Continue lisinopril 2.5 mg daily and amlodipine 5 mg daily.    Hypothyroidism  Remains on levothyroxine    ID Prophylaxis   Acyclovir 400 mg BID for HSV prophylaxis      Plan from today's clinical encounter  - Continue isatuximab maintenace 10mg/kg every 4 weeks; electing to space out her January and February treatments to 6 weeks to accommodate a trip to [place]  - Xgeva every 6 months, scheduled 11/13  - Follow up with me monthly for MM evaluations.  - Has follow up established with Dr. Chavez and Dr. Da Silva.    32 minutes spent on the date of the encounter doing chart review, review of test results, patient visit, and documentation     MARINO Fuller [facility]  [address] [place] [address] [phone]

## 2023-11-06 ENCOUNTER — APPOINTMENT (OUTPATIENT)
Dept: LAB | Facility: CLINIC | Age: 73
End: 2023-11-06
Attending: STUDENT IN AN ORGANIZED HEALTH CARE EDUCATION/TRAINING PROGRAM
Payer: MEDICARE

## 2023-11-06 ENCOUNTER — ONCOLOGY VISIT (OUTPATIENT)
Dept: ONCOLOGY | Facility: CLINIC | Age: 73
End: 2023-11-06
Attending: STUDENT IN AN ORGANIZED HEALTH CARE EDUCATION/TRAINING PROGRAM
Payer: MEDICARE

## 2023-11-06 VITALS
SYSTOLIC BLOOD PRESSURE: 135 MMHG | HEART RATE: 76 BPM | OXYGEN SATURATION: 98 % | TEMPERATURE: 98 F | WEIGHT: 116.1 LBS | BODY MASS INDEX: 21.1 KG/M2 | RESPIRATION RATE: 16 BRPM | DIASTOLIC BLOOD PRESSURE: 72 MMHG

## 2023-11-06 DIAGNOSIS — C90.00 MULTIPLE MYELOMA NOT HAVING ACHIEVED REMISSION (H): Primary | ICD-10-CM

## 2023-11-06 DIAGNOSIS — E85.89 OTHER AMYLOIDOSIS (H): ICD-10-CM

## 2023-11-06 DIAGNOSIS — I10 BENIGN ESSENTIAL HYPERTENSION: ICD-10-CM

## 2023-11-06 DIAGNOSIS — C90.02 MULTIPLE MYELOMA IN RELAPSE (H): ICD-10-CM

## 2023-11-06 LAB
ALBUMIN SERPL BCG-MCNC: 4.4 G/DL (ref 3.5–5.2)
ALP SERPL-CCNC: 49 U/L (ref 35–104)
ALT SERPL W P-5'-P-CCNC: 32 U/L (ref 0–50)
ANION GAP SERPL CALCULATED.3IONS-SCNC: 10 MMOL/L (ref 7–15)
AST SERPL W P-5'-P-CCNC: 25 U/L (ref 0–45)
BASOPHILS # BLD AUTO: 0 10E3/UL (ref 0–0.2)
BASOPHILS NFR BLD AUTO: 1 %
BILIRUB SERPL-MCNC: 0.3 MG/DL
BUN SERPL-MCNC: 17.6 MG/DL (ref 8–23)
CALCIUM SERPL-MCNC: 9.5 MG/DL (ref 8.8–10.2)
CHLORIDE SERPL-SCNC: 96 MMOL/L (ref 98–107)
CREAT SERPL-MCNC: 0.75 MG/DL (ref 0.51–0.95)
DEPRECATED HCO3 PLAS-SCNC: 28 MMOL/L (ref 22–29)
EGFRCR SERPLBLD CKD-EPI 2021: 84 ML/MIN/1.73M2
EOSINOPHIL # BLD AUTO: 0.1 10E3/UL (ref 0–0.7)
EOSINOPHIL NFR BLD AUTO: 3 %
ERYTHROCYTE [DISTWIDTH] IN BLOOD BY AUTOMATED COUNT: 14.4 % (ref 10–15)
GLUCOSE SERPL-MCNC: 79 MG/DL (ref 70–99)
HCT VFR BLD AUTO: 35.8 % (ref 35–47)
HGB BLD-MCNC: 12.5 G/DL (ref 11.7–15.7)
IMM GRANULOCYTES # BLD: 0 10E3/UL
IMM GRANULOCYTES NFR BLD: 0 %
KAPPA LC FREE SER-MCNC: 2.25 MG/DL (ref 0.33–1.94)
KAPPA LC FREE/LAMBDA FREE SER NEPH: 15 {RATIO} (ref 0.26–1.65)
LAMBDA LC FREE SERPL-MCNC: 0.15 MG/DL (ref 0.57–2.63)
LDH SERPL L TO P-CCNC: 178 U/L (ref 0–250)
LYMPHOCYTES # BLD AUTO: 1.1 10E3/UL (ref 0.8–5.3)
LYMPHOCYTES NFR BLD AUTO: 25 %
MCH RBC QN AUTO: 34.5 PG (ref 26.5–33)
MCHC RBC AUTO-ENTMCNC: 34.9 G/DL (ref 31.5–36.5)
MCV RBC AUTO: 99 FL (ref 78–100)
MONOCYTES # BLD AUTO: 0.7 10E3/UL (ref 0–1.3)
MONOCYTES NFR BLD AUTO: 16 %
NEUTROPHILS # BLD AUTO: 2.4 10E3/UL (ref 1.6–8.3)
NEUTROPHILS NFR BLD AUTO: 55 %
NRBC # BLD AUTO: 0 10E3/UL
NRBC BLD AUTO-RTO: 0 /100
NT-PROBNP SERPL-MCNC: 145 PG/ML (ref 0–900)
PLATELET # BLD AUTO: 210 10E3/UL (ref 150–450)
POTASSIUM SERPL-SCNC: 4.4 MMOL/L (ref 3.4–5.3)
PROT SERPL-MCNC: 6.3 G/DL (ref 6.4–8.3)
RBC # BLD AUTO: 3.62 10E6/UL (ref 3.8–5.2)
SODIUM SERPL-SCNC: 134 MMOL/L (ref 135–145)
TOTAL PROTEIN SERUM FOR ELP: 6.1 G/DL (ref 6.4–8.3)
WBC # BLD AUTO: 4.2 10E3/UL (ref 4–11)

## 2023-11-06 PROCEDURE — 83880 ASSAY OF NATRIURETIC PEPTIDE: CPT | Performed by: REGISTERED NURSE

## 2023-11-06 PROCEDURE — 258N000003 HC RX IP 258 OP 636: Performed by: STUDENT IN AN ORGANIZED HEALTH CARE EDUCATION/TRAINING PROGRAM

## 2023-11-06 PROCEDURE — 83615 LACTATE (LD) (LDH) ENZYME: CPT

## 2023-11-06 PROCEDURE — 83521 IG LIGHT CHAINS FREE EACH: CPT | Mod: 59

## 2023-11-06 PROCEDURE — 250N000011 HC RX IP 250 OP 636: Mod: JZ | Performed by: STUDENT IN AN ORGANIZED HEALTH CARE EDUCATION/TRAINING PROGRAM

## 2023-11-06 PROCEDURE — 250N000013 HC RX MED GY IP 250 OP 250 PS 637: Performed by: STUDENT IN AN ORGANIZED HEALTH CARE EDUCATION/TRAINING PROGRAM

## 2023-11-06 PROCEDURE — 84165 PROTEIN E-PHORESIS SERUM: CPT | Mod: TC | Performed by: PATHOLOGY

## 2023-11-06 PROCEDURE — 84155 ASSAY OF PROTEIN SERUM: CPT | Mod: 91

## 2023-11-06 PROCEDURE — 84165 PROTEIN E-PHORESIS SERUM: CPT | Mod: 26 | Performed by: PATHOLOGY

## 2023-11-06 PROCEDURE — 36591 DRAW BLOOD OFF VENOUS DEVICE: CPT | Performed by: STUDENT IN AN ORGANIZED HEALTH CARE EDUCATION/TRAINING PROGRAM

## 2023-11-06 PROCEDURE — 96375 TX/PRO/DX INJ NEW DRUG ADDON: CPT

## 2023-11-06 PROCEDURE — 85025 COMPLETE CBC W/AUTO DIFF WBC: CPT | Performed by: STUDENT IN AN ORGANIZED HEALTH CARE EDUCATION/TRAINING PROGRAM

## 2023-11-06 PROCEDURE — 99214 OFFICE O/P EST MOD 30 MIN: CPT | Performed by: REGISTERED NURSE

## 2023-11-06 PROCEDURE — G0463 HOSPITAL OUTPT CLINIC VISIT: HCPCS | Performed by: REGISTERED NURSE

## 2023-11-06 PROCEDURE — 250N000011 HC RX IP 250 OP 636: Mod: JZ | Performed by: REGISTERED NURSE

## 2023-11-06 PROCEDURE — 96413 CHEMO IV INFUSION 1 HR: CPT

## 2023-11-06 PROCEDURE — 80053 COMPREHEN METABOLIC PANEL: CPT | Performed by: STUDENT IN AN ORGANIZED HEALTH CARE EDUCATION/TRAINING PROGRAM

## 2023-11-06 RX ORDER — HEPARIN SODIUM (PORCINE) LOCK FLUSH IV SOLN 100 UNIT/ML 100 UNIT/ML
5 SOLUTION INTRAVENOUS ONCE
Status: COMPLETED | OUTPATIENT
Start: 2023-11-06 | End: 2023-11-06

## 2023-11-06 RX ORDER — AMLODIPINE BESYLATE 2.5 MG/1
5 TABLET ORAL DAILY
COMMUNITY
Start: 2023-11-06 | End: 2024-02-21

## 2023-11-06 RX ORDER — ACETAMINOPHEN 325 MG/1
650 TABLET ORAL ONCE
Status: COMPLETED | OUTPATIENT
Start: 2023-11-06 | End: 2023-11-06

## 2023-11-06 RX ORDER — HEPARIN SODIUM (PORCINE) LOCK FLUSH IV SOLN 100 UNIT/ML 100 UNIT/ML
5 SOLUTION INTRAVENOUS
Status: DISCONTINUED | OUTPATIENT
Start: 2023-11-06 | End: 2023-11-06 | Stop reason: HOSPADM

## 2023-11-06 RX ORDER — ONDANSETRON 2 MG/ML
8 INJECTION INTRAMUSCULAR; INTRAVENOUS ONCE
Status: COMPLETED | OUTPATIENT
Start: 2023-11-06 | End: 2023-11-06

## 2023-11-06 RX ADMIN — ONDANSETRON 8 MG: 2 INJECTION INTRAMUSCULAR; INTRAVENOUS at 09:18

## 2023-11-06 RX ADMIN — Medication 5 ML: at 11:25

## 2023-11-06 RX ADMIN — DIPHENHYDRAMINE HYDROCHLORIDE 37.5 MG: 50 INJECTION, SOLUTION INTRAMUSCULAR; INTRAVENOUS at 09:18

## 2023-11-06 RX ADMIN — SODIUM CHLORIDE 250 ML: 9 INJECTION, SOLUTION INTRAVENOUS at 09:18

## 2023-11-06 RX ADMIN — ACETAMINOPHEN 650 MG: 325 TABLET ORAL at 09:18

## 2023-11-06 RX ADMIN — SODIUM CHLORIDE 500 MG: 9 INJECTION, SOLUTION INTRAVENOUS at 09:59

## 2023-11-06 RX ADMIN — Medication 5 ML: at 08:01

## 2023-11-06 ASSESSMENT — PAIN SCALES - GENERAL: PAINLEVEL: MILD PAIN (3)

## 2023-11-06 NOTE — PROGRESS NOTES
Infusion Nursing Note:  Shena Hartmann presents today for Cycle 9, Day 1 isatuximab-irfc (Sarclisa).    Patient seen by provider today: Yes: Selena Suggs   present during visit today: Not Applicable.    Note: Patient arrives to infusion room feeling well, she had an in person clinic visit with the provider this morning and offers no additional concerns which have not been addressed.      Intravenous Access:  Implanted Port.    Treatment Conditions:  Lab Results   Component Value Date    HGB 12.5 11/06/2023    WBC 4.2 11/06/2023    ANEU 2.1 07/05/2021    ANEUTAUTO 2.4 11/06/2023     11/06/2023        Lab Results   Component Value Date     (L) 11/06/2023    POTASSIUM 4.4 11/06/2023    MAG 1.8 12/12/2014    CR 0.75 11/06/2023    PAULINE 9.5 11/06/2023    BILITOTAL 0.3 11/06/2023    ALBUMIN 4.4 11/06/2023    ALT 32 11/06/2023    AST 25 11/06/2023       Results reviewed, labs MET treatment parameters, ok to proceed with treatment.      Post Infusion Assessment:  Patient tolerated infusion without incident.  Blood return noted pre and post infusion.  Site patent and intact, free from redness, edema or discomfort.  No evidence of extravasations.  Access discontinued per protocol.       Discharge Plan:   Patient declined prescription refills.  Copy of AVS reviewed with patient and/or family.  Patient will return 12/4 for next appointment.  Patient discharged in stable condition accompanied by: self.      Alka Us RN

## 2023-11-06 NOTE — PATIENT INSTRUCTIONS
[facility] Triage and after hours / weekends / holidays:  [phone]    Please call the triage or after hours line if you experience a temperature greater than or equal to 100.4, shaking chills, have uncontrolled nausea, vomiting and/or diarrhea, dizziness, shortness of breath, chest pain, bleeding, unexplained bruising, or if you have any other new/concerning symptoms, questions or concerns.      If you are having any concerning symptoms or wish to speak to a provider before your next infusion visit, please call triage to notify them so we can adequately serve you.     If you need a refill on a narcotic prescription or other medication, please call before your infusion appointment.

## 2023-11-06 NOTE — LETTER
11/6/2023         RE: Shena Hartmann  [address] [place] [address]        Dear Colleague,    Thank you for referring your patient, Shena Hartmann, to the [facility]. Please see a copy of my visit note below.        [facility]    Hematology/Oncology Clinic Note    Nov 6, 2023    Reason for Office Visit   Evaluation and toxicity check during isatuximab maintenance    Cancer Diagnosis   Multiple Myeloma    Oncology History of Present Illness   Breast cancer dx in 1994. Underwent surgery and chemotherapy without reoccurence  MGUS dx 1999  T8 pathologic fracture with radiation  Revlimid and velcade,   2013  Cytoxan IV 9/2013  D-PACE 11/2013  Auto 3/27/14  5/2014 thalidomide caused rash so switched to pomalidomide   on kenalog and clobetasol creams for IMID rash  FLC began to rise so riky added to pom 9/2020  she has been on xgeva for an unclear amount of time  Teodora-Kd 2/20/2023    Interval History   - Doing well overall.  Has some days following infusion where she doesn't feel well, but symptoms are less severe than when she was on active treatment with Teodora-Kd.   - No new bone pains  - Blood pressure has been more stable; cardiology decreased her lisinopril to 2.5 mg daily, and she only takes amlodipine (5 mg) once daily now.  - From 2/1/24 to 2/17/24 going to [place] for vacation  - No new symptoms or concerns to report.      Past Medical History     Past Medical History:   Diagnosis Date    Acquired hypothyroidism 8/10/2023    Breast cancer (H) 01/01/1994    right    H/O stem cell transplant (H) 03/01/2014    History of blood transfusion 2013 & 2014    During stem cell tx process    Hypertension Sept. 2021    Runs 140 s systolically, about 20 above my usual    Multiple myeloma, without mention of having achieved remission 11/06/2012       Past Surgical History:      Past Surgical History:   Procedure Laterality Date    BIOPSY  10/1994;  [id number]    Lt. Breast in ; multiple bone marrow bx's for MM    BREAST SURGERY  10/1994    Lt. Mastectomy w/lymph node resection    COLONOSCOPY  2015    Normal results    EYE SURGERY      Bilateral cataract surgery    INSERT PORT VASCULAR ACCESS Left 2/15/2023    Procedure: INSERTION, VASCULAR ACCESS PORT;  Surgeon: Luc Antunez MD;  Location: [facility] OR    IR CHEST PORT PLACEMENT > 5 YRS OF AGE  2/15/2023    MASTECTOMY      Right, with lymphe node disection      PICC INSERTION  09/10/2013    5fr DL Power PICC, 44cm (1cm external) in the L lateral brachial vein with tip in the low SVC    TRANSPLANT  3/27/2014    Autologous stem cell tx       Social History     Socioeconomic History    Marital status:    Tobacco Use    Smoking status: Never     Passive exposure: Never    Smokeless tobacco: Never   Substance and Sexual Activity    Alcohol use: Yes     Comment: occ    Drug use: No    Sexual activity: Not Currently     Partners: Male     Birth control/protection: Post-menopausal   Other Topics Concern    Parent/sibling w/ CABG, MI or angioplasty before 65F 55M? No     Service No    Blood Transfusions No    Caffeine Concern No    Occupational Exposure No    Hobby Hazards No     Family History     Family History   Problem Relation Age of Onset    Cancer Paternal Grandmother         skin cancer    Hypertension Mother     Cerebrovascular Disease Mother          8-11-15 from strokes    Hypertension Father     Prostate Cancer Father        Allergies:     Allergies   Allergen Reactions    Blood Transfusion Related (Informational Only) Other (See Comments)     Patient has a history of a clinically significant antibody against RBC antigens.  A delay in compatible RBCs may occur.     Cayenne Pepper [Cayenne]     Corn-Containing Products GI Disturbance    Levaquin Other (See Comments)     Tendon pain    Milk Protein GI Disturbance    Mold      Facial rash/lip swelling    Morphine Sulfate Other (See  Comments)     Per pt - see things (hallucination) when she was on Morphine .    Pollen Extract      Environmental allergies     Shrimp Nausea and Vomiting    Tomato      Lip swelling, facial rash    Azithromycin Rash    Keflex [Cephalexin] Rash    Oat Rash    Tape [Adhesive Tape] Itching and Rash     All adhesives    Wheat Rash     Swelling of lips         Medications:     Current Outpatient Medications   Medication    amLODIPine (NORVASC) 2.5 MG tablet    levothyroxine (SYNTHROID/LEVOTHROID) 50 MCG tablet    acetaminophen (TYLENOL) 325 MG tablet    acyclovir (ZOVIRAX) 400 MG tablet    Ascorbic Acid (VITAMIN C PO)    aspirin (ASA) 81 MG chewable tablet    CALCIUM-VITAMIN D-VITAMIN K PO    clobetasol (TEMOVATE) 0.05 % external ointment    COENZYME Q-10 PO    Cyanocobalamin 1000 MCG/ML LIQD    desonide (DESOWEN) 0.05 % external ointment    hydrALAZINE (APRESOLINE) 10 MG tablet    lisinopril (ZESTRIL) 2.5 MG tablet    magnesium 100 MG CAPS    Multiple Vitamins-Minerals (MULTIVITAMIN OR)    ondansetron (ZOFRAN) 4 MG tablet    order for DME    order for DME    prochlorperazine (COMPAZINE) 5 MG tablet    triamcinolone (KENALOG) 0.1 % external ointment    ZINC PICOLINATE PO     No current facility-administered medications for this visit.     Facility-Administered Medications Ordered in Other Visits   Medication    plerixafor (MOZOBIL) injection SOLN 12.4 mg    potassium chloride 20 mEq in 50 mL IVPB    potassium chloride SA (K-DUR,KLOR-CON M) CR tablet 20 mEq       Physical Exam   /72   Pulse 76   Temp 98  F (36.7  C)   Resp 16   Wt 52.7 kg (116 lb 1.6 oz)   LMP  (LMP Unknown)   SpO2 98%   BMI 21.10 kg/m      Wt Readings from Last 5 Encounters:   11/06/23 52.7 kg (116 lb 1.6 oz)   10/16/23 51.8 kg (114 lb 4.8 oz)   10/09/23 52.2 kg (115 lb 1.6 oz)   09/12/23 52.7 kg (116 lb 1.6 oz)   09/11/23 52 kg (114 lb 9.6 oz)     Constitutional: Appears stated age, well-groomed.  HEENT: Normocephilic. EOMI, PERRL. Sclerae  are anicteric.   Respiratory: No increased work of breathing, good air exchange, clear to auscultation bilaterally, no crackles or wheezing.  Cardiovascular: Normal apical impulse, regular rate and rhythm, normal S1 and S2, and no murmur noted.  Skin: No bruising or bleeding, normal skin color, texture, turgor, no redness, warmth, or swelling, no rashes, no lesions, no jaundice.  Extremities: No ;ower extremity emity edema noted bilaterally.There is no redness, warmth, or swelling of the joints.  Neurologic: Awake, alert, oriented to name, place and time.    Vascular access: Left implanted port accessed.     Data     Most Recent 3 CBC's:  Recent Labs   Lab Test 11/06/23  0808 10/09/23  0803 09/11/23  0833   WBC 4.2 3.8* 4.7   HGB 12.5 11.4* 12.0   MCV 99 101* 99    203 204   ANEUTAUTO 2.4 1.9 2.6     Most Recent 3 BMP's:  Recent Labs   Lab Test 11/06/23  0808 10/09/23  0803 09/11/23  0833   * 133* 133*   POTASSIUM 4.4 4.1 4.2   CHLORIDE 96* 95* 96*   CO2 28 26 27   BUN 17.6 14.8 17.1   CR 0.75 0.79 0.76   ANIONGAP 10 12 10   PAULINE 9.5 9.4 9.4   GLC 79 98 90   PROTTOTAL 6.3* 6.1* 6.3*   ALBUMIN 4.4 4.4 4.4    Most Recent 3 LFT's:  Recent Labs   Lab Test 11/06/23  0808 10/09/23  0803 09/11/23  0833   AST 25 18 21   ALT 32 29 24   ALKPHOS 49 41 50   BILITOTAL 0.3 0.5 0.4    Most Recent 2 TSH and T4:  Recent Labs   Lab Test 08/17/23  1349 10/03/22  0820 06/13/22  0822 03/21/22  0803   TSH 2.12 1.82 9.47*  9.31* 5.79*   T4  --   --  0.99  0.97 0.83     I reviewed the above labs today myself and with the patient.    Assessment/Plan     Relapsed Multiple Myeloma  Started kyprolis/isatuximab/dex on 2/20/23.  Now in CR.   Isatuximab maintenace 10mg/kg every 4 week started 10/9/23  Monthly myeloma labs  Xgeva every 6 months, last dose 5/22/23. Likes to separate the chemo dosing from this. Will receive next week (11/13/23)  Electing to space out her treatments over the winter to accommodate a trip to [place] Feb  1-17. Requesting treatment Dec 4, Johnie 15, and Feb 26.  She is aware this is spacing treatment further than recommended which comes with a higher risk of relapse.    HTN  BP has continued to be fairly stable with the only rises days 3-6 post chemo up to 140 s-150 s systolically. Then back to her baseline of 120 s.   Continue lisinopril 2.5 mg daily and amlodipine 5 mg daily.    Hypothyroidism  Remains on levothyroxine    ID Prophylaxis   Acyclovir 400 mg BID for HSV prophylaxis      Plan from today's clinical encounter  - Continue isatuximab maintenace 10mg/kg every 4 weeks; electing to space out her January and February treatments to 6 weeks to accommodate a trip to [place]  - Xgeva every 6 months, scheduled 11/13  - Follow up with me monthly for MM evaluations.  - Has follow up established with Dr. Chavez and Dr. Da Silva.    32 minutes spent on the date of the encounter doing chart review, review of test results, patient visit, and documentation     MARINO Fuller [facility]  [address] [place] [id number] [phone]

## 2023-11-06 NOTE — NURSING NOTE
"Oncology Rooming Note    November 6, 2023 8:15 AM   Shena Hartmann is a 73 year old female who presents for:    Chief Complaint   Patient presents with    Port Draw     Labs drawn via port by rn in lab. VS taken.    Oncology Clinic Visit     Multiple Myeloma      Initial Vitals: /72   Pulse 76   Temp 98  F (36.7  C)   Resp 16   Wt 52.7 kg (116 lb 1.6 oz)   LMP  (LMP Unknown)   SpO2 98%   BMI 21.10 kg/m   Estimated body mass index is 21.1 kg/m  as calculated from the following:    Height as of 10/16/23: 1.58 m (5' 2.21\").    Weight as of this encounter: 52.7 kg (116 lb 1.6 oz). Body surface area is 1.52 meters squared.  Mild Pain (3) Comment: Data Unavailable   No LMP recorded (lmp unknown). Patient is postmenopausal.  Allergies reviewed: Yes  Medications reviewed: Yes    Medications: Medication refills not needed today.  Pharmacy name entered into [facility]:    [id number] - [place], [place] - [address] [facility] [address]  WRITTEN PRESCRIPTION REQUESTED  [facility] - [place], [place] - [address]    Clinical concerns: none       Molly Armenta              "

## 2023-11-08 LAB
ALBUMIN SERPL ELPH-MCNC: 4.3 G/DL (ref 3.7–5.1)
ALPHA1 GLOB SERPL ELPH-MCNC: 0.2 G/DL (ref 0.2–0.4)
ALPHA2 GLOB SERPL ELPH-MCNC: 0.6 G/DL (ref 0.5–0.9)
B-GLOBULIN SERPL ELPH-MCNC: 0.6 G/DL (ref 0.6–1)
GAMMA GLOB SERPL ELPH-MCNC: 0.4 G/DL (ref 0.7–1.6)
M PROTEIN SERPL ELPH-MCNC: 0.3 G/DL
PROT PATTERN SERPL ELPH-IMP: ABNORMAL

## 2023-11-13 ENCOUNTER — INFUSION THERAPY VISIT (OUTPATIENT)
Dept: ONCOLOGY | Facility: CLINIC | Age: 73
End: 2023-11-13
Attending: NURSE PRACTITIONER
Payer: MEDICARE

## 2023-11-13 VITALS
OXYGEN SATURATION: 99 % | TEMPERATURE: 97.5 F | DIASTOLIC BLOOD PRESSURE: 71 MMHG | SYSTOLIC BLOOD PRESSURE: 126 MMHG | WEIGHT: 116.3 LBS | HEART RATE: 69 BPM | BODY MASS INDEX: 21.13 KG/M2 | RESPIRATION RATE: 18 BRPM

## 2023-11-13 DIAGNOSIS — C90.00 MULTIPLE MYELOMA NOT HAVING ACHIEVED REMISSION (H): Primary | ICD-10-CM

## 2023-11-13 DIAGNOSIS — T50.995S ADVERSE EFFECT OF OTHER DRUGS, MEDICAMENTS AND BIOLOGICAL SUBSTANCES, SEQUELA: ICD-10-CM

## 2023-11-13 DIAGNOSIS — M81.0 OSTEOPOROSIS, UNSPECIFIED OSTEOPOROSIS TYPE, UNSPECIFIED PATHOLOGICAL FRACTURE PRESENCE: ICD-10-CM

## 2023-11-13 PROCEDURE — 96372 THER/PROPH/DIAG INJ SC/IM: CPT | Performed by: REGISTERED NURSE

## 2023-11-13 PROCEDURE — 250N000011 HC RX IP 250 OP 636: Mod: JZ | Performed by: REGISTERED NURSE

## 2023-11-13 RX ORDER — ALBUTEROL SULFATE 90 UG/1
1-2 AEROSOL, METERED RESPIRATORY (INHALATION)
Status: CANCELLED
Start: 2023-11-18

## 2023-11-13 RX ORDER — EPINEPHRINE 1 MG/ML
0.3 INJECTION, SOLUTION INTRAMUSCULAR; SUBCUTANEOUS EVERY 5 MIN PRN
Status: CANCELLED | OUTPATIENT
Start: 2023-11-18

## 2023-11-13 RX ORDER — DIPHENHYDRAMINE HYDROCHLORIDE 50 MG/ML
50 INJECTION INTRAMUSCULAR; INTRAVENOUS
Status: CANCELLED
Start: 2023-11-18

## 2023-11-13 RX ORDER — METHYLPREDNISOLONE SODIUM SUCCINATE 125 MG/2ML
125 INJECTION, POWDER, LYOPHILIZED, FOR SOLUTION INTRAMUSCULAR; INTRAVENOUS
Status: CANCELLED
Start: 2023-11-18

## 2023-11-13 RX ORDER — ALBUTEROL SULFATE 0.83 MG/ML
2.5 SOLUTION RESPIRATORY (INHALATION)
Status: CANCELLED | OUTPATIENT
Start: 2023-11-18

## 2023-11-13 RX ORDER — MEPERIDINE HYDROCHLORIDE 25 MG/ML
25 INJECTION INTRAMUSCULAR; INTRAVENOUS; SUBCUTANEOUS EVERY 30 MIN PRN
Status: CANCELLED | OUTPATIENT
Start: 2023-11-18

## 2023-11-13 RX ADMIN — DENOSUMAB 60 MG: 60 INJECTION SUBCUTANEOUS at 08:58

## 2023-11-13 ASSESSMENT — PAIN SCALES - GENERAL: PAINLEVEL: MILD PAIN (3)

## 2023-11-13 NOTE — PROGRESS NOTES
Infusion Injection Note:  Shena Hartmann presents today for Prolia injection.    Patient seen by provider today: No, provider visit and lab draw on 11/6/2023   present during visit today: Not Applicable.    Patient stated she has no questions or concerns and declined to speak with an RN today.     Treatment Conditions:  Lab Results   Component Value Date     (L) 11/06/2023    POTASSIUM 4.4 11/06/2023    MAG 1.8 12/12/2014    CR 0.75 11/06/2023    PAULINE 9.5 11/06/2023    BILITOTAL 0.3 11/06/2023    ALBUMIN 4.4 11/06/2023    ALT 32 11/06/2023    AST 25 11/06/2023       Results reviewed, labs MET treatment parameters, ok to proceed with treatment.    Pre and Post Injection:  Prolia injection given to Left Arm per patient request without incident.   Patient tolerated procedure well.    Discharge Plan:  Patient verbalized understanding of discharge instructions and all questions answered.  AVS to patient via [employer].   Patient discharged in stable condition accompanied by: self.  Departure Mode: Ambulatory.  Patient will return 12/4/2023 for next appointment.    Ivon HARRIS on 11/13/2023 at 9:15 AM

## 2023-11-30 NOTE — PROGRESS NOTES
[facility]    Hematology/Oncology Clinic Note    Dec 4, 2023    Reason for Office Visit   Evaluation and toxicity check during isatuximab maintenance    Cancer Diagnosis   Multiple Myeloma    Oncology History of Present Illness   Breast cancer dx in 1994. Underwent surgery and chemotherapy without reoccurence  MGUS dx 1999  T8 pathologic fracture with radiation  Revlimid and velcade,   2013  Cytoxan IV 9/2013  D-PACE 11/2013  Auto 3/27/14  5/2014 thalidomide caused rash so switched to pomalidomide   on kenalog and clobetasol creams for IMID rash  FLC began to rise so riky added to pom 9/2020  she has been on xgeva for an unclear amount of time  Teodora-Kd 2/20/2023    Interval History   - Doing well overall.  Has some days following infusion where she doesn't feel well, but symptoms are less severe than when she was on active treatment with Teodora-Kd.   - No new bone pains  - Dealing with some stress with her kitchen being torn out after a water leak, has been living in a hotel for the past 6 weeks, still waiting for insurance assessments before they can move forward on the work  - She may want to space out her March and April infusions to accommodate a trip to [place] to see her granddaughter   - From 2/1/24 to 2/17/24 going to [place] for vacation  - No new symptoms or concerns to report.      Past Medical History     Past Medical History:   Diagnosis Date    Acquired hypothyroidism 8/10/2023    Breast cancer (H) 01/01/1994    right    H/O stem cell transplant (H) 03/01/2014    History of blood transfusion 2013 & 2014    During stem cell tx process    Hypertension Sept. 2021    Runs 140 s systolically, about 20 above my usual    Multiple myeloma, without mention of having achieved remission 11/06/2012       Past Surgical History:      Past Surgical History:   Procedure Laterality Date    BIOPSY  10/1994; [id number]    Lt. Breast in '94; multiple bone marrow bx's for MM    BREAST  SURGERY  10/1994    Lt. Mastectomy w/lymph node resection    COLONOSCOPY  2015    Normal results    EYE SURGERY      Bilateral cataract surgery    INSERT PORT VASCULAR ACCESS Left 2/15/2023    Procedure: INSERTION, VASCULAR ACCESS PORT;  Surgeon: Luc Antunez MD;  Location: [facility] OR    IR CHEST PORT PLACEMENT > 5 YRS OF AGE  2/15/2023    MASTECTOMY      Right, with lymphe node disection      PICC INSERTION  09/10/2013    5fr DL Power PICC, 44cm (1cm external) in the L lateral brachial vein with tip in the low SVC    TRANSPLANT  3/27/2014    Autologous stem cell tx       Social History     Socioeconomic History    Marital status:    Tobacco Use    Smoking status: Never     Passive exposure: Never    Smokeless tobacco: Never   Substance and Sexual Activity    Alcohol use: Yes     Comment: occ    Drug use: No    Sexual activity: Not Currently     Partners: Male     Birth control/protection: Post-menopausal   Other Topics Concern    Parent/sibling w/ CABG, MI or angioplasty before 65F 55M? No     Service No    Blood Transfusions No    Caffeine Concern No    Occupational Exposure No    Hobby Hazards No     Family History     Family History   Problem Relation Age of Onset    Cancer Paternal Grandmother         skin cancer    Hypertension Mother     Cerebrovascular Disease Mother          8-11-15 from strokes    Hypertension Father     Prostate Cancer Father        Allergies:     Allergies   Allergen Reactions    Blood Transfusion Related (Informational Only) Other (See Comments)     Patient has a history of a clinically significant antibody against RBC antigens.  A delay in compatible RBCs may occur.     Cayenne Pepper [Cayenne]     Corn-Containing Products GI Disturbance    Levaquin Other (See Comments)     Tendon pain    Milk Protein GI Disturbance    Mold      Facial rash/lip swelling    Morphine Sulfate Other (See Comments)     Per pt - see things (hallucination) when she was on Morphine .     Pollen Extract      Environmental allergies     Shrimp Nausea and Vomiting    Tomato      Lip swelling, facial rash    Azithromycin Rash    Keflex [Cephalexin] Rash    Oat Rash    Tape [Adhesive Tape] Itching and Rash     All adhesives    Wheat Rash     Swelling of lips         Medications:     Current Outpatient Medications   Medication    acetaminophen (TYLENOL) 325 MG tablet    acyclovir (ZOVIRAX) 400 MG tablet    amLODIPine (NORVASC) 2.5 MG tablet    Ascorbic Acid (VITAMIN C PO)    aspirin (ASA) 81 MG chewable tablet    CALCIUM-VITAMIN D-VITAMIN K PO    clobetasol (TEMOVATE) 0.05 % external ointment    COENZYME Q-10 PO    Cyanocobalamin 1000 MCG/ML LIQD    desonide (DESOWEN) 0.05 % external ointment    hydrALAZINE (APRESOLINE) 10 MG tablet    levothyroxine (SYNTHROID/LEVOTHROID) 50 MCG tablet    lisinopril (ZESTRIL) 2.5 MG tablet    magnesium 100 MG CAPS    Multiple Vitamins-Minerals (MULTIVITAMIN OR)    ondansetron (ZOFRAN) 4 MG tablet    order for DME    order for DME    prochlorperazine (COMPAZINE) 5 MG tablet    triamcinolone (KENALOG) 0.1 % external ointment    ZINC PICOLINATE PO     No current facility-administered medications for this visit.     Facility-Administered Medications Ordered in Other Visits   Medication    plerixafor (MOZOBIL) injection SOLN 12.4 mg    potassium chloride 20 mEq in 50 mL IVPB    potassium chloride SA (K-DUR,KLOR-CON M) CR tablet 20 mEq       Physical Exam   BP (!) 147/64 (BP Location: Left arm, Patient Position: Sitting, Cuff Size: Adult Small)   Pulse 81   Temp 97.4  F (36.3  C) (Oral)   Resp 16   Wt 53.2 kg (117 lb 3.2 oz)   LMP  (LMP Unknown)   SpO2 97%   BMI 21.30 kg/m      Wt Readings from Last 5 Encounters:   12/04/23 53.2 kg (117 lb 3.2 oz)   11/13/23 52.8 kg (116 lb 4.8 oz)   11/06/23 52.7 kg (116 lb 1.6 oz)   10/16/23 51.8 kg (114 lb 4.8 oz)   10/09/23 52.2 kg (115 lb 1.6 oz)     Constitutional: Appears stated age, well-groomed.  HEENT: Normocephilic. EOMI,  PERRL. Sclerae are anicteric.   Respiratory: No increased work of breathing, good air exchange, clear to auscultation bilaterally, no crackles or wheezing.  Cardiovascular: Normal apical impulse, regular rate and rhythm, normal S1 and S2, and no murmur noted.  Skin: No bruising or bleeding, normal skin color, texture, turgor, no redness, warmth, or swelling, no rashes, no lesions, no jaundice.  Extremities: No lower extremity emity edema noted bilaterally.There is no redness, warmth, or swelling of the joints.  Neurologic: Awake, alert, oriented to name, place and time.    Vascular access: Left implanted port accessed.     Data     Most Recent 3 CBC's:  Recent Labs   Lab Test 12/04/23  0801 11/06/23  0808 10/09/23  0803   WBC 4.3 4.2 3.8*   HGB 12.8 12.5 11.4*   MCV 98 99 101*    210 203   ANEUTAUTO 2.3 2.4 1.9     Most Recent 3 BMP's:  Recent Labs   Lab Test 11/06/23  0808 10/09/23  0803 09/11/23  0833   * 133* 133*   POTASSIUM 4.4 4.1 4.2   CHLORIDE 96* 95* 96*   CO2 28 26 27   BUN 17.6 14.8 17.1   CR 0.75 0.79 0.76   ANIONGAP 10 12 10   PAULINE 9.5 9.4 9.4   GLC 79 98 90   PROTTOTAL 6.3* 6.1* 6.3*   ALBUMIN 4.4 4.4 4.4    Most Recent 3 LFT's:  Recent Labs   Lab Test 11/06/23  0808 10/09/23  0803 09/11/23  0833   AST 25 18 21   ALT 32 29 24   ALKPHOS 49 41 50   BILITOTAL 0.3 0.5 0.4    Most Recent 2 TSH and T4:  Recent Labs   Lab Test 08/17/23  1349 10/03/22  0820 06/13/22  0822 03/21/22  0803   TSH 2.12 1.82 9.47*  9.31* 5.79*   T4  --   --  0.99  0.97 0.83     I reviewed the above labs today myself and with the patient.    Assessment/Plan     Relapsed Multiple Myeloma  Started kyprolis/isatuximab/dex on 2/20/23.  Now in CR.   Isatuximab maintenace 10mg/kg every 4 week started 10/9/23  Monthly myeloma labs  Xgeva every 6 months, last dose 11/13/23. Likes to separate the chemo dosing from this.   Electing to space out her treatments over the winter to accommodate a trip to [place] Feb 1-17. Requesting  treatment Dec 4, Johnie 15, and Feb 26.  She is aware this is spacing treatment further than recommended which comes with a higher risk of relapse.    HTN  BP has continued to be fairly stable with the only rises days 3-6 post chemo up to 140 s-150 s systolically. Then back to her baseline of 120 s.   Continue lisinopril 2.5 mg daily and amlodipine 5 mg daily.  Followed by cardiology    Hypothyroidism  Remains on levothyroxine    ID Prophylaxis   Acyclovir 400 mg BID for HSV prophylaxis  Declined influenza and COVID vaccines      Plan from today's clinical encounter  - Continue isatuximab maintenace 10mg/kg every 4 weeks; electing to space out her January and February treatments to 6 weeks to accommodate a trip to [place]  - Xgeva every 6 months, due May 2024  - Follow up with me monthly for MM evaluations.  - Has follow up established with Dr. Chavez and Dr. Da Silva.    28 minutes spent on the date of the encounter doing chart review, review of test results, patient visit, and documentation     MARINO Fuller [facility]  [address] [place] [address] [id number] [phone]

## 2023-12-04 ENCOUNTER — INFUSION THERAPY VISIT (OUTPATIENT)
Dept: ONCOLOGY | Facility: CLINIC | Age: 73
End: 2023-12-04
Attending: STUDENT IN AN ORGANIZED HEALTH CARE EDUCATION/TRAINING PROGRAM
Payer: MEDICARE

## 2023-12-04 ENCOUNTER — APPOINTMENT (OUTPATIENT)
Dept: LAB | Facility: CLINIC | Age: 73
End: 2023-12-04
Attending: STUDENT IN AN ORGANIZED HEALTH CARE EDUCATION/TRAINING PROGRAM
Payer: MEDICARE

## 2023-12-04 VITALS
WEIGHT: 117.2 LBS | SYSTOLIC BLOOD PRESSURE: 147 MMHG | TEMPERATURE: 97.4 F | HEART RATE: 81 BPM | BODY MASS INDEX: 21.3 KG/M2 | RESPIRATION RATE: 16 BRPM | DIASTOLIC BLOOD PRESSURE: 64 MMHG | OXYGEN SATURATION: 97 %

## 2023-12-04 DIAGNOSIS — C90.00 MULTIPLE MYELOMA NOT HAVING ACHIEVED REMISSION (H): ICD-10-CM

## 2023-12-04 DIAGNOSIS — C90.00 MULTIPLE MYELOMA NOT HAVING ACHIEVED REMISSION (H): Primary | ICD-10-CM

## 2023-12-04 DIAGNOSIS — C90.00 MULTIPLE MYELOMA, REMISSION STATUS UNSPECIFIED (H): ICD-10-CM

## 2023-12-04 LAB
ALBUMIN SERPL BCG-MCNC: 4.2 G/DL (ref 3.5–5.2)
ALP SERPL-CCNC: 45 U/L (ref 40–150)
ALT SERPL W P-5'-P-CCNC: 25 U/L (ref 0–50)
ANION GAP SERPL CALCULATED.3IONS-SCNC: 9 MMOL/L (ref 7–15)
AST SERPL W P-5'-P-CCNC: 23 U/L (ref 0–45)
BASOPHILS # BLD AUTO: 0 10E3/UL (ref 0–0.2)
BASOPHILS NFR BLD AUTO: 1 %
BILIRUB SERPL-MCNC: 0.3 MG/DL
BUN SERPL-MCNC: 13 MG/DL (ref 8–23)
CALCIUM SERPL-MCNC: 9.2 MG/DL (ref 8.8–10.2)
CHLORIDE SERPL-SCNC: 97 MMOL/L (ref 98–107)
CREAT SERPL-MCNC: 0.71 MG/DL (ref 0.51–0.95)
DEPRECATED HCO3 PLAS-SCNC: 27 MMOL/L (ref 22–29)
EGFRCR SERPLBLD CKD-EPI 2021: 89 ML/MIN/1.73M2
EOSINOPHIL # BLD AUTO: 0.2 10E3/UL (ref 0–0.7)
EOSINOPHIL NFR BLD AUTO: 4 %
ERYTHROCYTE [DISTWIDTH] IN BLOOD BY AUTOMATED COUNT: 14.6 % (ref 10–15)
GLUCOSE SERPL-MCNC: 81 MG/DL (ref 70–99)
HCT VFR BLD AUTO: 36.2 % (ref 35–47)
HGB BLD-MCNC: 12.8 G/DL (ref 11.7–15.7)
IMM GRANULOCYTES # BLD: 0 10E3/UL
IMM GRANULOCYTES NFR BLD: 0 %
KAPPA LC FREE SER-MCNC: 2.69 MG/DL (ref 0.33–1.94)
KAPPA LC FREE/LAMBDA FREE SER NEPH: ABNORMAL {RATIO}
LAMBDA LC FREE SERPL-MCNC: <0.14 MG/DL (ref 0.57–2.63)
LDH SERPL L TO P-CCNC: 182 U/L (ref 0–250)
LYMPHOCYTES # BLD AUTO: 1 10E3/UL (ref 0.8–5.3)
LYMPHOCYTES NFR BLD AUTO: 24 %
MCH RBC QN AUTO: 34.6 PG (ref 26.5–33)
MCHC RBC AUTO-ENTMCNC: 35.4 G/DL (ref 31.5–36.5)
MCV RBC AUTO: 98 FL (ref 78–100)
MONOCYTES # BLD AUTO: 0.7 10E3/UL (ref 0–1.3)
MONOCYTES NFR BLD AUTO: 16 %
NEUTROPHILS # BLD AUTO: 2.3 10E3/UL (ref 1.6–8.3)
NEUTROPHILS NFR BLD AUTO: 55 %
NRBC # BLD AUTO: 0 10E3/UL
NRBC BLD AUTO-RTO: 0 /100
PLATELET # BLD AUTO: 208 10E3/UL (ref 150–450)
POTASSIUM SERPL-SCNC: 4.4 MMOL/L (ref 3.4–5.3)
PROT SERPL-MCNC: 6.2 G/DL (ref 6.4–8.3)
RBC # BLD AUTO: 3.7 10E6/UL (ref 3.8–5.2)
SODIUM SERPL-SCNC: 133 MMOL/L (ref 135–145)
TOTAL PROTEIN SERUM FOR ELP: 5.9 G/DL (ref 6.4–8.3)
WBC # BLD AUTO: 4.3 10E3/UL (ref 4–11)

## 2023-12-04 PROCEDURE — 250N000011 HC RX IP 250 OP 636: Mod: JZ | Performed by: REGISTERED NURSE

## 2023-12-04 PROCEDURE — 99214 OFFICE O/P EST MOD 30 MIN: CPT | Performed by: REGISTERED NURSE

## 2023-12-04 PROCEDURE — 85025 COMPLETE CBC W/AUTO DIFF WBC: CPT | Performed by: STUDENT IN AN ORGANIZED HEALTH CARE EDUCATION/TRAINING PROGRAM

## 2023-12-04 PROCEDURE — 250N000013 HC RX MED GY IP 250 OP 250 PS 637: Performed by: STUDENT IN AN ORGANIZED HEALTH CARE EDUCATION/TRAINING PROGRAM

## 2023-12-04 PROCEDURE — 83615 LACTATE (LD) (LDH) ENZYME: CPT | Performed by: REGISTERED NURSE

## 2023-12-04 PROCEDURE — 258N000003 HC RX IP 258 OP 636: Performed by: STUDENT IN AN ORGANIZED HEALTH CARE EDUCATION/TRAINING PROGRAM

## 2023-12-04 PROCEDURE — 84165 PROTEIN E-PHORESIS SERUM: CPT | Mod: 26 | Performed by: PATHOLOGY

## 2023-12-04 PROCEDURE — 250N000011 HC RX IP 250 OP 636: Performed by: STUDENT IN AN ORGANIZED HEALTH CARE EDUCATION/TRAINING PROGRAM

## 2023-12-04 PROCEDURE — 84155 ASSAY OF PROTEIN SERUM: CPT | Mod: 91 | Performed by: REGISTERED NURSE

## 2023-12-04 PROCEDURE — 36591 DRAW BLOOD OFF VENOUS DEVICE: CPT | Performed by: STUDENT IN AN ORGANIZED HEALTH CARE EDUCATION/TRAINING PROGRAM

## 2023-12-04 PROCEDURE — 83521 IG LIGHT CHAINS FREE EACH: CPT | Mod: 59 | Performed by: REGISTERED NURSE

## 2023-12-04 PROCEDURE — 96375 TX/PRO/DX INJ NEW DRUG ADDON: CPT

## 2023-12-04 PROCEDURE — 84165 PROTEIN E-PHORESIS SERUM: CPT | Mod: TC | Performed by: PATHOLOGY

## 2023-12-04 PROCEDURE — 96413 CHEMO IV INFUSION 1 HR: CPT

## 2023-12-04 PROCEDURE — 80053 COMPREHEN METABOLIC PANEL: CPT | Performed by: STUDENT IN AN ORGANIZED HEALTH CARE EDUCATION/TRAINING PROGRAM

## 2023-12-04 PROCEDURE — G0463 HOSPITAL OUTPT CLINIC VISIT: HCPCS | Performed by: REGISTERED NURSE

## 2023-12-04 RX ORDER — HEPARIN SODIUM (PORCINE) LOCK FLUSH IV SOLN 100 UNIT/ML 100 UNIT/ML
5 SOLUTION INTRAVENOUS
Status: DISCONTINUED | OUTPATIENT
Start: 2023-12-04 | End: 2023-12-04 | Stop reason: HOSPADM

## 2023-12-04 RX ORDER — ACETAMINOPHEN 325 MG/1
650 TABLET ORAL ONCE
Status: COMPLETED | OUTPATIENT
Start: 2023-12-04 | End: 2023-12-04

## 2023-12-04 RX ORDER — HEPARIN SODIUM (PORCINE) LOCK FLUSH IV SOLN 100 UNIT/ML 100 UNIT/ML
5 SOLUTION INTRAVENOUS ONCE
Status: COMPLETED | OUTPATIENT
Start: 2023-12-04 | End: 2023-12-04

## 2023-12-04 RX ORDER — ONDANSETRON 2 MG/ML
8 INJECTION INTRAMUSCULAR; INTRAVENOUS ONCE
Status: COMPLETED | OUTPATIENT
Start: 2023-12-04 | End: 2023-12-04

## 2023-12-04 RX ADMIN — Medication 5 ML: at 07:52

## 2023-12-04 RX ADMIN — Medication 5 ML: at 10:41

## 2023-12-04 RX ADMIN — SODIUM CHLORIDE 250 ML: 9 INJECTION, SOLUTION INTRAVENOUS at 08:52

## 2023-12-04 RX ADMIN — ONDANSETRON 8 MG: 2 INJECTION INTRAMUSCULAR; INTRAVENOUS at 08:52

## 2023-12-04 RX ADMIN — ACETAMINOPHEN 650 MG: 325 TABLET ORAL at 08:47

## 2023-12-04 RX ADMIN — SODIUM CHLORIDE 500 MG: 9 INJECTION, SOLUTION INTRAVENOUS at 09:25

## 2023-12-04 RX ADMIN — DIPHENHYDRAMINE HYDROCHLORIDE 37.5 MG: 50 INJECTION, SOLUTION INTRAMUSCULAR; INTRAVENOUS at 08:55

## 2023-12-04 ASSESSMENT — PAIN SCALES - GENERAL: PAINLEVEL: MODERATE PAIN (4)

## 2023-12-04 NOTE — LETTER
12/4/2023         RE: Shena Hartmann  [address] [place] [address]        Dear Colleague,    Thank you for referring your patient, Shena Hartmann, to the [facility]. Please see a copy of my visit note below.        [facility]    Hematology/Oncology Clinic Note    Dec 4, 2023    Reason for Office Visit   Evaluation and toxicity check during isatuximab maintenance    Cancer Diagnosis   Multiple Myeloma    Oncology History of Present Illness   Breast cancer dx in 1994. Underwent surgery and chemotherapy without reoccurence  MGUS dx 1999  T8 pathologic fracture with radiation  Revlimid and velcade,   2013  Cytoxan IV 9/2013  D-PACE 11/2013  Auto 3/27/14  5/2014 thalidomide caused rash so switched to pomalidomide   on kenalog and clobetasol creams for IMID rash  FLC began to rise so riky added to pom 9/2020  she has been on xgeva for an unclear amount of time  Teodora-Kd 2/20/2023    Interval History   - Doing well overall.  Has some days following infusion where she doesn't feel well, but symptoms are less severe than when she was on active treatment with Teodora-Kd.   - No new bone pains  - Dealing with some stress with her kitchen being torn out after a water leak, has been living in a hotel for the past 6 weeks, still waiting for insurance assessments before they can move forward on the work  - She may want to space out her March and April infusions to accommodate a trip to [place] to see her granddaughter   - From 2/1/24 to 2/17/24 going to [place] for vacation  - No new symptoms or concerns to report.      Past Medical History     Past Medical History:   Diagnosis Date    Acquired hypothyroidism 8/10/2023    Breast cancer (H) 01/01/1994    right    H/O stem cell transplant (H) 03/01/2014    History of blood transfusion 2013 & 2014    During stem cell tx process    Hypertension Sept. 2021    Runs 140 s systolically, about 20 above my usual     Multiple myeloma, without mention of having achieved remission 2012       Past Surgical History:      Past Surgical History:   Procedure Laterality Date    BIOPSY  10/1994; [id number]    Lt. Breast in ; multiple bone marrow bx's for MM    BREAST SURGERY  10/1994    Lt. Mastectomy w/lymph node resection    COLONOSCOPY  2015    Normal results    EYE SURGERY      Bilateral cataract surgery    INSERT PORT VASCULAR ACCESS Left 2/15/2023    Procedure: INSERTION, VASCULAR ACCESS PORT;  Surgeon: Luc Antunez MD;  Location: [facility] OR    IR CHEST PORT PLACEMENT > 5 YRS OF AGE  2/15/2023    MASTECTOMY      Right, with lymphe node disection      PICC INSERTION  09/10/2013    5fr DL Power PICC, 44cm (1cm external) in the L lateral brachial vein with tip in the low SVC    TRANSPLANT  3/27/2014    Autologous stem cell tx       Social History     Socioeconomic History    Marital status:    Tobacco Use    Smoking status: Never     Passive exposure: Never    Smokeless tobacco: Never   Substance and Sexual Activity    Alcohol use: Yes     Comment: occ    Drug use: No    Sexual activity: Not Currently     Partners: Male     Birth control/protection: Post-menopausal   Other Topics Concern    Parent/sibling w/ CABG, MI or angioplasty before 65F 55M? No     Service No    Blood Transfusions No    Caffeine Concern No    Occupational Exposure No    Hobby Hazards No     Family History     Family History   Problem Relation Age of Onset    Cancer Paternal Grandmother         skin cancer    Hypertension Mother     Cerebrovascular Disease Mother          8-11-15 from strokes    Hypertension Father     Prostate Cancer Father        Allergies:     Allergies   Allergen Reactions    Blood Transfusion Related (Informational Only) Other (See Comments)     Patient has a history of a clinically significant antibody against RBC antigens.  A delay in compatible RBCs may occur.     Cayenne Pepper [Cayenne]      Corn-Containing Products GI Disturbance    Levaquin Other (See Comments)     Tendon pain    Milk Protein GI Disturbance    Mold      Facial rash/lip swelling    Morphine Sulfate Other (See Comments)     Per pt - see things (hallucination) when she was on Morphine .    Pollen Extract      Environmental allergies     Shrimp Nausea and Vomiting    Tomato      Lip swelling, facial rash    Azithromycin Rash    Keflex [Cephalexin] Rash    Oat Rash    Tape [Adhesive Tape] Itching and Rash     All adhesives    Wheat Rash     Swelling of lips         Medications:     Current Outpatient Medications   Medication    acetaminophen (TYLENOL) 325 MG tablet    acyclovir (ZOVIRAX) 400 MG tablet    amLODIPine (NORVASC) 2.5 MG tablet    Ascorbic Acid (VITAMIN C PO)    aspirin (ASA) 81 MG chewable tablet    CALCIUM-VITAMIN D-VITAMIN K PO    clobetasol (TEMOVATE) 0.05 % external ointment    COENZYME Q-10 PO    Cyanocobalamin 1000 MCG/ML LIQD    desonide (DESOWEN) 0.05 % external ointment    hydrALAZINE (APRESOLINE) 10 MG tablet    levothyroxine (SYNTHROID/LEVOTHROID) 50 MCG tablet    lisinopril (ZESTRIL) 2.5 MG tablet    magnesium 100 MG CAPS    Multiple Vitamins-Minerals (MULTIVITAMIN OR)    ondansetron (ZOFRAN) 4 MG tablet    order for DME    order for DME    prochlorperazine (COMPAZINE) 5 MG tablet    triamcinolone (KENALOG) 0.1 % external ointment    ZINC PICOLINATE PO     No current facility-administered medications for this visit.     Facility-Administered Medications Ordered in Other Visits   Medication    plerixafor (MOZOBIL) injection SOLN 12.4 mg    potassium chloride 20 mEq in 50 mL IVPB    potassium chloride SA (K-DUR,KLOR-CON M) CR tablet 20 mEq       Physical Exam   BP (!) 147/64 (BP Location: Left arm, Patient Position: Sitting, Cuff Size: Adult Small)   Pulse 81   Temp 97.4  F (36.3  C) (Oral)   Resp 16   Wt 53.2 kg (117 lb 3.2 oz)   LMP  (LMP Unknown)   SpO2 97%   BMI 21.30 kg/m      Wt Readings from Last 5  Encounters:   12/04/23 53.2 kg (117 lb 3.2 oz)   11/13/23 52.8 kg (116 lb 4.8 oz)   11/06/23 52.7 kg (116 lb 1.6 oz)   10/16/23 51.8 kg (114 lb 4.8 oz)   10/09/23 52.2 kg (115 lb 1.6 oz)     Constitutional: Appears stated age, well-groomed.  HEENT: Normocephilic. EOMI, PERRL. Sclerae are anicteric.   Respiratory: No increased work of breathing, good air exchange, clear to auscultation bilaterally, no crackles or wheezing.  Cardiovascular: Normal apical impulse, regular rate and rhythm, normal S1 and S2, and no murmur noted.  Skin: No bruising or bleeding, normal skin color, texture, turgor, no redness, warmth, or swelling, no rashes, no lesions, no jaundice.  Extremities: No lower extremity emity edema noted bilaterally.There is no redness, warmth, or swelling of the joints.  Neurologic: Awake, alert, oriented to name, place and time.    Vascular access: Left implanted port accessed.     Data     Most Recent 3 CBC's:  Recent Labs   Lab Test 12/04/23  0801 11/06/23  0808 10/09/23  0803   WBC 4.3 4.2 3.8*   HGB 12.8 12.5 11.4*   MCV 98 99 101*    210 203   ANEUTAUTO 2.3 2.4 1.9     Most Recent 3 BMP's:  Recent Labs   Lab Test 11/06/23  0808 10/09/23  0803 09/11/23  0833   * 133* 133*   POTASSIUM 4.4 4.1 4.2   CHLORIDE 96* 95* 96*   CO2 28 26 27   BUN 17.6 14.8 17.1   CR 0.75 0.79 0.76   ANIONGAP 10 12 10   PAULINE 9.5 9.4 9.4   GLC 79 98 90   PROTTOTAL 6.3* 6.1* 6.3*   ALBUMIN 4.4 4.4 4.4    Most Recent 3 LFT's:  Recent Labs   Lab Test 11/06/23  0808 10/09/23  0803 09/11/23  0833   AST 25 18 21   ALT 32 29 24   ALKPHOS 49 41 50   BILITOTAL 0.3 0.5 0.4    Most Recent 2 TSH and T4:  Recent Labs   Lab Test 08/17/23  1349 10/03/22  0820 06/13/22  0822 03/21/22  0803   TSH 2.12 1.82 9.47*  9.31* 5.79*   T4  --   --  0.99  0.97 0.83     I reviewed the above labs today myself and with the patient.    Assessment/Plan     Relapsed Multiple Myeloma  Started kyprolis/isatuximab/dex on 2/20/23.  Now in CR.   Isatuximab  maintenace 10mg/kg every 4 week started 10/9/23  Monthly myeloma labs  Xgeva every 6 months, last dose 11/13/23. Likes to separate the chemo dosing from this.   Electing to space out her treatments over the winter to accommodate a trip to [place] Feb 1-17. Requesting treatment Dec 4, Johnie 15, and Feb 26.  She is aware this is spacing treatment further than recommended which comes with a higher risk of relapse.    HTN  BP has continued to be fairly stable with the only rises days 3-6 post chemo up to 140 s-150 s systolically. Then back to her baseline of 120 s.   Continue lisinopril 2.5 mg daily and amlodipine 5 mg daily.  Followed by cardiology    Hypothyroidism  Remains on levothyroxine    ID Prophylaxis   Acyclovir 400 mg BID for HSV prophylaxis  Declined influenza and COVID vaccines      Plan from today's clinical encounter  - Continue isatuximab maintenace 10mg/kg every 4 weeks; electing to space out her January and February treatments to 6 weeks to accommodate a trip to [place]  - Xgeva every 6 months, due May 2024  - Follow up with me monthly for MM evaluations.  - Has follow up established with Dr. Chavez and Dr. Da Silva.    28 minutes spent on the date of the encounter doing chart review, review of test results, patient visit, and documentation     MARINO Fuller CNP  [facility]  [address], [place] [id number] [phone]

## 2023-12-04 NOTE — NURSING NOTE
"Oncology Rooming Note    December 4, 2023 8:11 AM   Shena Hartmann is a 73 year old female who presents for:    Chief Complaint   Patient presents with    Port Draw     Labs drawn via port by RN, PRESTON done    Oncology Clinic Visit     Multiple myeloma     Initial Vitals: BP (!) 147/64 (BP Location: Left arm, Patient Position: Sitting, Cuff Size: Adult Small)   Pulse 81   Temp 97.4  F (36.3  C) (Oral)   Resp 16   Wt 53.2 kg (117 lb 3.2 oz)   LMP  (LMP Unknown)   SpO2 97%   BMI 21.30 kg/m   Estimated body mass index is 21.3 kg/m  as calculated from the following:    Height as of 10/16/23: 1.58 m (5' 2.21\").    Weight as of this encounter: 53.2 kg (117 lb 3.2 oz). Body surface area is 1.53 meters squared.  Moderate Pain (4) Comment: Data Unavailable   No LMP recorded (lmp unknown). Patient is postmenopausal.  Allergies reviewed: Yes  Medications reviewed: Yes    Medications: Medication refills not needed today.  Pharmacy name entered into mySBX:    [id number] - [place], [place] - [address] [facility] [address]  WRITTEN PRESCRIPTION REQUESTED  [facility] - [place], [place] - [address]    Clinical concerns:        Nichole Goel              "

## 2023-12-04 NOTE — PROGRESS NOTES
Infusion Nursing Note:  Shena Hartmann presents today for Cycle 10 Day 1 Isatuximab.    Patient seen by provider today: Yes: Selena Suggs CNP   present during visit today: Not Applicable.    Note: Patient denies any signs or symptoms of infection. Patient was seen and assessed by Selena Suggs CNP in clinic prior to infusion. Patient agreeable to treatment plan today.    Intravenous Access:  Implanted Port.    Treatment Conditions:  Lab Results   Component Value Date    HGB 12.8 12/04/2023    WBC 4.3 12/04/2023    ANEU 2.1 07/05/2021    ANEUTAUTO 2.3 12/04/2023     12/04/2023        Lab Results   Component Value Date     (L) 12/04/2023    POTASSIUM 4.4 12/04/2023    MAG 1.8 12/12/2014    CR 0.71 12/04/2023    PAULINE 9.2 12/04/2023    BILITOTAL 0.3 12/04/2023    ALBUMIN 4.2 12/04/2023    ALT 25 12/04/2023    AST 23 12/04/2023     Results reviewed, labs MET treatment parameters, ok to proceed with treatment.    Post Infusion Assessment:  Patient tolerated infusion without incident.  Blood return noted pre and post infusion.  Site patent and intact, free from redness, edema or discomfort.  No evidence of extravasations.  Access discontinued per protocol.     Discharge Plan:   Patient declined prescription refills.  Discharge instructions reviewed with: Patient.  Patient and/or family verbalized understanding of discharge instructions and all questions answered.  Copy of AVS reviewed with patient and/or family.  Patient will return 01/15/24 for next appointment.  Patient discharged in stable condition accompanied by: self.  Departure Mode: Ambulatory.    Niki Bowman RN

## 2023-12-04 NOTE — NURSING NOTE
Chief Complaint   Patient presents with    Port Draw     Labs drawn via port by RN, VS done     Port accessed with gripper needle by RN, labs collected, line flushed with saline and heparin.  Vitals taken. Pt checked in for appointment(s).

## 2023-12-05 LAB
ALBUMIN SERPL ELPH-MCNC: 4.1 G/DL (ref 3.7–5.1)
ALPHA1 GLOB SERPL ELPH-MCNC: 0.2 G/DL (ref 0.2–0.4)
ALPHA2 GLOB SERPL ELPH-MCNC: 0.5 G/DL (ref 0.5–0.9)
B-GLOBULIN SERPL ELPH-MCNC: 0.6 G/DL (ref 0.6–1)
GAMMA GLOB SERPL ELPH-MCNC: 0.4 G/DL (ref 0.7–1.6)
M PROTEIN SERPL ELPH-MCNC: 0.3 G/DL
PROT PATTERN SERPL ELPH-IMP: ABNORMAL
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
## 2024-01-15 ENCOUNTER — INFUSION THERAPY VISIT (OUTPATIENT)
Dept: ONCOLOGY | Facility: CLINIC | Age: 74
End: 2024-01-15
Attending: REGISTERED NURSE
Payer: MEDICARE

## 2024-01-15 ENCOUNTER — APPOINTMENT (OUTPATIENT)
Dept: LAB | Facility: CLINIC | Age: 74
End: 2024-01-15
Attending: REGISTERED NURSE
Payer: MEDICARE

## 2024-01-15 VITALS
DIASTOLIC BLOOD PRESSURE: 81 MMHG | OXYGEN SATURATION: 98 % | BODY MASS INDEX: 20.95 KG/M2 | RESPIRATION RATE: 16 BRPM | TEMPERATURE: 97.4 F | HEART RATE: 81 BPM | SYSTOLIC BLOOD PRESSURE: 162 MMHG | WEIGHT: 115.3 LBS

## 2024-01-15 DIAGNOSIS — R19.7 DIARRHEA, UNSPECIFIED TYPE: ICD-10-CM

## 2024-01-15 DIAGNOSIS — J06.9 UPPER RESPIRATORY TRACT INFECTION, UNSPECIFIED TYPE: ICD-10-CM

## 2024-01-15 DIAGNOSIS — C90.00 MULTIPLE MYELOMA NOT HAVING ACHIEVED REMISSION (H): Primary | ICD-10-CM

## 2024-01-15 DIAGNOSIS — C90.02 MULTIPLE MYELOMA IN RELAPSE (H): Primary | ICD-10-CM

## 2024-01-15 LAB
ALBUMIN SERPL BCG-MCNC: 4 G/DL (ref 3.5–5.2)
ALP SERPL-CCNC: 57 U/L (ref 40–150)
ALT SERPL W P-5'-P-CCNC: 23 U/L (ref 0–50)
ANION GAP SERPL CALCULATED.3IONS-SCNC: 11 MMOL/L (ref 7–15)
AST SERPL W P-5'-P-CCNC: 19 U/L (ref 0–45)
BASOPHILS # BLD AUTO: 0 10E3/UL (ref 0–0.2)
BASOPHILS NFR BLD AUTO: 1 %
BILIRUB SERPL-MCNC: 0.2 MG/DL
BUN SERPL-MCNC: 14.9 MG/DL (ref 8–23)
CALCIUM SERPL-MCNC: 9.7 MG/DL (ref 8.8–10.2)
CHLORIDE SERPL-SCNC: 98 MMOL/L (ref 98–107)
CREAT SERPL-MCNC: 0.75 MG/DL (ref 0.51–0.95)
DEPRECATED HCO3 PLAS-SCNC: 27 MMOL/L (ref 22–29)
EGFRCR SERPLBLD CKD-EPI 2021: 84 ML/MIN/1.73M2
EOSINOPHIL # BLD AUTO: 0.1 10E3/UL (ref 0–0.7)
EOSINOPHIL NFR BLD AUTO: 2 %
ERYTHROCYTE [DISTWIDTH] IN BLOOD BY AUTOMATED COUNT: 14.2 % (ref 10–15)
GLUCOSE SERPL-MCNC: 89 MG/DL (ref 70–99)
HCT VFR BLD AUTO: 36.5 % (ref 35–47)
HGB BLD-MCNC: 12.7 G/DL (ref 11.7–15.7)
IMM GRANULOCYTES # BLD: 0 10E3/UL
IMM GRANULOCYTES NFR BLD: 0 %
KAPPA LC FREE SER-MCNC: 4.47 MG/DL (ref 0.33–1.94)
KAPPA LC FREE/LAMBDA FREE SER NEPH: 3.63 {RATIO} (ref 0.26–1.65)
LAMBDA LC FREE SERPL-MCNC: 1.23 MG/DL (ref 0.57–2.63)
LDH SERPL L TO P-CCNC: 177 U/L (ref 0–250)
LYMPHOCYTES # BLD AUTO: 1 10E3/UL (ref 0.8–5.3)
LYMPHOCYTES NFR BLD AUTO: 17 %
MCH RBC QN AUTO: 33.9 PG (ref 26.5–33)
MCHC RBC AUTO-ENTMCNC: 34.8 G/DL (ref 31.5–36.5)
MCV RBC AUTO: 97 FL (ref 78–100)
MONOCYTES # BLD AUTO: 0.8 10E3/UL (ref 0–1.3)
MONOCYTES NFR BLD AUTO: 13 %
NEUTROPHILS # BLD AUTO: 4.1 10E3/UL (ref 1.6–8.3)
NEUTROPHILS NFR BLD AUTO: 67 %
NRBC # BLD AUTO: 0 10E3/UL
NRBC BLD AUTO-RTO: 0 /100
PLATELET # BLD AUTO: 243 10E3/UL (ref 150–450)
POTASSIUM SERPL-SCNC: 4.2 MMOL/L (ref 3.4–5.3)
PROT SERPL-MCNC: 6.4 G/DL (ref 6.4–8.3)
RBC # BLD AUTO: 3.75 10E6/UL (ref 3.8–5.2)
SODIUM SERPL-SCNC: 136 MMOL/L (ref 135–145)
TOTAL PROTEIN SERUM FOR ELP: 5.6 G/DL (ref 6.4–8.3)
WBC # BLD AUTO: 6.2 10E3/UL (ref 4–11)

## 2024-01-15 PROCEDURE — 258N000003 HC RX IP 258 OP 636: Performed by: REGISTERED NURSE

## 2024-01-15 PROCEDURE — 99214 OFFICE O/P EST MOD 30 MIN: CPT | Performed by: REGISTERED NURSE

## 2024-01-15 PROCEDURE — 96375 TX/PRO/DX INJ NEW DRUG ADDON: CPT

## 2024-01-15 PROCEDURE — 84155 ASSAY OF PROTEIN SERUM: CPT

## 2024-01-15 PROCEDURE — 36591 DRAW BLOOD OFF VENOUS DEVICE: CPT | Performed by: REGISTERED NURSE

## 2024-01-15 PROCEDURE — 84165 PROTEIN E-PHORESIS SERUM: CPT | Mod: TC | Performed by: STUDENT IN AN ORGANIZED HEALTH CARE EDUCATION/TRAINING PROGRAM

## 2024-01-15 PROCEDURE — 82040 ASSAY OF SERUM ALBUMIN: CPT | Performed by: REGISTERED NURSE

## 2024-01-15 PROCEDURE — 96413 CHEMO IV INFUSION 1 HR: CPT

## 2024-01-15 PROCEDURE — G0463 HOSPITAL OUTPT CLINIC VISIT: HCPCS | Mod: 25 | Performed by: REGISTERED NURSE

## 2024-01-15 PROCEDURE — 83615 LACTATE (LD) (LDH) ENZYME: CPT

## 2024-01-15 PROCEDURE — 250N000011 HC RX IP 250 OP 636: Performed by: REGISTERED NURSE

## 2024-01-15 PROCEDURE — 84165 PROTEIN E-PHORESIS SERUM: CPT | Mod: 26 | Performed by: STUDENT IN AN ORGANIZED HEALTH CARE EDUCATION/TRAINING PROGRAM

## 2024-01-15 PROCEDURE — 83521 IG LIGHT CHAINS FREE EACH: CPT

## 2024-01-15 PROCEDURE — 85025 COMPLETE CBC W/AUTO DIFF WBC: CPT | Performed by: REGISTERED NURSE

## 2024-01-15 PROCEDURE — 250N000013 HC RX MED GY IP 250 OP 250 PS 637: Performed by: REGISTERED NURSE

## 2024-01-15 RX ORDER — ACYCLOVIR 400 MG/1
400 TABLET ORAL EVERY 12 HOURS
Qty: 180 TABLET | Refills: 3 | Status: SHIPPED | OUTPATIENT
Start: 2024-01-15 | End: 2024-07-24

## 2024-01-15 RX ORDER — HEPARIN SODIUM,PORCINE 10 UNIT/ML
5 VIAL (ML) INTRAVENOUS
Status: CANCELLED | OUTPATIENT
Start: 2024-01-16

## 2024-01-15 RX ORDER — HEPARIN SODIUM (PORCINE) LOCK FLUSH IV SOLN 100 UNIT/ML 100 UNIT/ML
5 SOLUTION INTRAVENOUS
Status: CANCELLED | OUTPATIENT
Start: 2024-01-16

## 2024-01-15 RX ORDER — ACETAMINOPHEN 325 MG/1
650 TABLET ORAL ONCE
Status: CANCELLED | OUTPATIENT
Start: 2024-01-16

## 2024-01-15 RX ORDER — ACETAMINOPHEN 325 MG/1
650 TABLET ORAL ONCE
Status: COMPLETED | OUTPATIENT
Start: 2024-01-15 | End: 2024-01-15

## 2024-01-15 RX ORDER — METHYLPREDNISOLONE SODIUM SUCCINATE 125 MG/2ML
125 INJECTION, POWDER, LYOPHILIZED, FOR SOLUTION INTRAMUSCULAR; INTRAVENOUS
Status: CANCELLED
Start: 2024-01-16

## 2024-01-15 RX ORDER — ALBUTEROL SULFATE 90 UG/1
1-2 AEROSOL, METERED RESPIRATORY (INHALATION)
Status: CANCELLED
Start: 2024-01-16

## 2024-01-15 RX ORDER — ONDANSETRON 2 MG/ML
8 INJECTION INTRAMUSCULAR; INTRAVENOUS ONCE
Status: CANCELLED | OUTPATIENT
Start: 2024-01-16 | End: 2024-01-16

## 2024-01-15 RX ORDER — LORAZEPAM 2 MG/ML
0.5 INJECTION INTRAMUSCULAR EVERY 4 HOURS PRN
Status: CANCELLED | OUTPATIENT
Start: 2024-01-16

## 2024-01-15 RX ORDER — HEPARIN SODIUM (PORCINE) LOCK FLUSH IV SOLN 100 UNIT/ML 100 UNIT/ML
5 SOLUTION INTRAVENOUS
Status: DISCONTINUED | OUTPATIENT
Start: 2024-01-15 | End: 2024-01-15 | Stop reason: HOSPADM

## 2024-01-15 RX ORDER — MEPERIDINE HYDROCHLORIDE 25 MG/ML
25 INJECTION INTRAMUSCULAR; INTRAVENOUS; SUBCUTANEOUS EVERY 30 MIN PRN
Status: CANCELLED | OUTPATIENT
Start: 2024-01-16

## 2024-01-15 RX ORDER — ONDANSETRON 2 MG/ML
8 INJECTION INTRAMUSCULAR; INTRAVENOUS ONCE
Status: COMPLETED | OUTPATIENT
Start: 2024-01-15 | End: 2024-01-15

## 2024-01-15 RX ORDER — EPINEPHRINE 1 MG/ML
0.3 INJECTION, SOLUTION INTRAMUSCULAR; SUBCUTANEOUS EVERY 5 MIN PRN
Status: CANCELLED | OUTPATIENT
Start: 2024-01-16

## 2024-01-15 RX ORDER — DIPHENHYDRAMINE HYDROCHLORIDE 50 MG/ML
50 INJECTION INTRAMUSCULAR; INTRAVENOUS
Status: CANCELLED
Start: 2024-01-16

## 2024-01-15 RX ORDER — HEPARIN SODIUM (PORCINE) LOCK FLUSH IV SOLN 100 UNIT/ML 100 UNIT/ML
5 SOLUTION INTRAVENOUS EVERY 8 HOURS
Status: DISCONTINUED | OUTPATIENT
Start: 2024-01-15 | End: 2024-01-15 | Stop reason: HOSPADM

## 2024-01-15 RX ORDER — ALBUTEROL SULFATE 0.83 MG/ML
2.5 SOLUTION RESPIRATORY (INHALATION)
Status: CANCELLED | OUTPATIENT
Start: 2024-01-16

## 2024-01-15 RX ADMIN — Medication 5 ML: at 07:46

## 2024-01-15 RX ADMIN — SODIUM CHLORIDE 250 ML: 9 INJECTION, SOLUTION INTRAVENOUS at 08:53

## 2024-01-15 RX ADMIN — Medication 5 ML: at 10:48

## 2024-01-15 RX ADMIN — SODIUM CHLORIDE 500 MG: 9 INJECTION, SOLUTION INTRAVENOUS at 09:34

## 2024-01-15 RX ADMIN — ACETAMINOPHEN 650 MG: 325 TABLET ORAL at 08:53

## 2024-01-15 RX ADMIN — DIPHENHYDRAMINE HYDROCHLORIDE 37.5 MG: 50 INJECTION, SOLUTION INTRAMUSCULAR; INTRAVENOUS at 09:05

## 2024-01-15 RX ADMIN — ONDANSETRON 8 MG: 2 INJECTION INTRAMUSCULAR; INTRAVENOUS at 08:53

## 2024-01-15 ASSESSMENT — PAIN SCALES - GENERAL: PAINLEVEL: MODERATE PAIN (4)

## 2024-01-15 NOTE — NURSING NOTE
"Oncology Rooming Note    January 15, 2024 8:14 AM   Shena Hartmann is a 73 year old female who presents for:    Chief Complaint   Patient presents with    Port Draw     Power needle. Heparin locked,vitals checked    Oncology Clinic Visit     Multiple myeloma not having achieved remission.      Initial Vitals: BP (!) 162/81   Pulse 81   Temp 97.4  F (36.3  C)   Resp 16   Wt 52.3 kg (115 lb 4.8 oz)   LMP  (LMP Unknown)   SpO2 98%   BMI 20.95 kg/m   Estimated body mass index is 20.95 kg/m  as calculated from the following:    Height as of 10/16/23: 1.58 m (5' 2.21\").    Weight as of this encounter: 52.3 kg (115 lb 4.8 oz). Body surface area is 1.52 meters squared.  Moderate Pain (4) Comment: Data Unavailable   No LMP recorded (lmp unknown). Patient is postmenopausal.  Allergies reviewed: No. Pt denied to review allergies today.   Medications reviewed: No. Pt denied to review medications today.     Medications: MEDICATION REFILLS NEEDED TODAY. Provider was NOT notified.  Pharmacy name entered into Enbase:    [id number] - [place], [place] - [address] [facility] [address]  WRITTEN PRESCRIPTION REQUESTED  [facility] - [place], [place] - [address]    Frailty Screening:   Is the patient here for a new oncology consult visit in cancer care? 2. No      Clinical concerns: Acyclovir refill and reschedule her next appointment.       Fuentes Walker"

## 2024-01-15 NOTE — PROGRESS NOTES
Infusion Nursing Note:  Shena Hartmann presents today for Cycle 11 Day 1 Isatuximab  Patient seen by provider today: Yes: Selena Suggs CNP.     present during visit today: Not Applicable.    Note: Pt presents to infusion today feeling well. Pt offers no new concerns following visit with provider today.      Intravenous Access:  Implanted Port.    Treatment Conditions:  Lab Results   Component Value Date    HGB 12.7 01/15/2024    WBC 6.2 01/15/2024    ANEU 2.1 07/05/2021    ANEUTAUTO 4.1 01/15/2024     01/15/2024        Lab Results   Component Value Date     01/15/2024    POTASSIUM 4.2 01/15/2024    MAG 1.8 12/12/2014    CR 0.75 01/15/2024    PAULINE 9.7 01/15/2024    BILITOTAL 0.2 01/15/2024    ALBUMIN 4.0 01/15/2024    ALT 23 01/15/2024    AST 19 01/15/2024       Results reviewed, labs MET treatment parameters, ok to proceed with treatment.      Post Infusion Assessment:  Patient tolerated infusion without incident.  Blood return noted pre and post infusion.  Site patent and intact, free from redness, edema or discomfort.  No evidence of extravasations.  Access discontinued per protocol.       Discharge Plan:   Patient declined prescription refills.  Discharge instructions reviewed with: Patient and Family.  Patient and/or family verbalized understanding of discharge instructions and all questions answered.  Copy of AVS reviewed with patient and/or family.  Patient will return 2/26/24 for next appointment.   Patient discharged in stable condition accompanied by: self and .  Departure Mode: Ambulatory.      Giovanna Taylor RN

## 2024-01-15 NOTE — PATIENT INSTRUCTIONS
[facility] Triage and after hours / weekends / holidays:  [phone]    Please call the triage or after hours line if you experience a temperature greater than or equal to 100.4, shaking chills, have uncontrolled nausea, vomiting and/or diarrhea, dizziness, shortness of breath, chest pain, bleeding, unexplained bruising, or if you have any other new/concerning symptoms, questions or concerns.      If you are having any concerning symptoms or wish to speak to a provider before your next infusion visit, please call triage to notify them so we can adequately serve you.     If you need a refill on a narcotic prescription or other medication, please call before your infusion appointment.                January 2024 Sunday Monday Tuesday Wednesday Thursday Friday Saturday        1     2     3     4     5     6       7     8     9     10     11     12     13       14     15    LAB PERIPHERAL   7:30 AM   (15 min.)   [facility]   [facility]    RETURN CCSL   7:45 AM   (45 min.)   Selena Suggs APRN CNP   [facility]    ONC INFUSION 2 HR (120 MIN)   9:00 AM   (120 min.)    ONC INFUSION NURSE   [facility] 16     17     18     19     20       21     22     23     24     25     26     27       28     29     30     31                                February 2024 Sunday Monday Tuesday Wednesday Thursday Friday Saturday                       1     2     3       4     5     6     7     8     9     10       11     12     13     14     15     16     17       18     19     20     21    OFFICE VISIT  10:40 AM   (30 min.)   Thomas Villalobos MD   [facility] 22     23     24       25     26    LAB PERIPHERAL   7:30 AM   (15 min.)   [facility]   [facility]    RETURN ACTIVE TREATMENT   7:45 AM   (45 min.)   Selena Suggs APRN CNP   [facility]    ONC INFUSION 2 HR (120 MIN)   9:00 AM   (120 min.)   [facility] ONC INFUSION NURSE   [facility] 27     28     29    MA SCREENING DIGITAL BILATERAL   9:45 AM   (15 min.)   [id number]   [facility] [place]    RETURN CCSL  10:15 AM   (30 min.)   Neelima Da Silva MD   [facility]                          Recent Results (from the past 24 hour(s))   Comprehensive metabolic panel    Collection Time: 01/15/24  7:45 AM   Result Value Ref Range    Sodium 136 135 - 145 mmol/L    Potassium 4.2 3.4 - 5.3 mmol/L    Carbon Dioxide (CO2) 27 22 - 29 mmol/L    Anion Gap 11 7 - 15 mmol/L    Urea Nitrogen 14.9 8.0 - 23.0 mg/dL    Creatinine 0.75 0.51 - 0.95 mg/dL    GFR Estimate 84 >60 mL/min/1.73m2    Calcium 9.7 8.8 - 10.2 mg/dL    Chloride 98 98 - 107 mmol/L    Glucose 89 70 - 99 mg/dL    Alkaline Phosphatase 57 40 - 150 U/L    AST 19 0 - 45 U/L    ALT 23 0 - 50 U/L    Protein Total 6.4 6.4 - 8.3 g/dL    Albumin 4.0 3.5 - 5.2 g/dL    Bilirubin Total 0.2 <=1.2 mg/dL   Lactate Dehydrogenase    Collection Time: 01/15/24  7:45 AM   Result Value Ref Range    Lactate Dehydrogenase 177 0 - 250 U/L   CBC with platelets and differential    Collection Time: 01/15/24  7:45 AM   Result Value Ref Range    WBC Count 6.2 4.0 - 11.0 10e3/uL    RBC Count 3.75 (L) 3.80 - 5.20 10e6/uL    Hemoglobin 12.7 11.7 - 15.7 g/dL    Hematocrit 36.5 35.0 - 47.0 %    MCV 97 78 - 100 fL    MCH 33.9 (H) 26.5 - 33.0 pg    MCHC 34.8 31.5 - 36.5 g/dL    RDW 14.2 10.0 - 15.0 %    Platelet Count 243 150 - 450 10e3/uL    % Neutrophils 67 %    % Lymphocytes 17 %    % Monocytes 13 %    % Eosinophils 2 %    % Basophils 1 %    % Immature Granulocytes 0 %    NRBCs per 100 WBC 0 <1 /100    Absolute Neutrophils 4.1 1.6 - 8.3 10e3/uL    Absolute Lymphocytes 1.0 0.8 - 5.3 10e3/uL    Absolute Monocytes 0.8 0.0 - 1.3 10e3/uL    Absolute Eosinophils 0.1 0.0 - 0.7 10e3/uL    Absolute  Basophils 0.0 0.0 - 0.2 10e3/uL    Absolute Immature Granulocytes 0.0 <=0.4 10e3/uL    Absolute NRBCs 0.0 10e3/uL
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
## 2024-01-15 NOTE — NURSING NOTE
Chief Complaint   Patient presents with    Port Draw     Power needle. Heparin locked,vitals checked     Maria Eugenia Murdock RN on 1/15/2024 at 7:48 AM

## 2024-01-17 LAB
ALBUMIN SERPL ELPH-MCNC: 3.5 G/DL (ref 3.7–5.1)
ALPHA1 GLOB SERPL ELPH-MCNC: 0.3 G/DL (ref 0.2–0.4)
ALPHA2 GLOB SERPL ELPH-MCNC: 0.7 G/DL (ref 0.5–0.9)
B-GLOBULIN SERPL ELPH-MCNC: 0.6 G/DL (ref 0.6–1)
GAMMA GLOB SERPL ELPH-MCNC: 0.5 G/DL (ref 0.7–1.6)
M PROTEIN SERPL ELPH-MCNC: 0.3 G/DL
PROT PATTERN SERPL ELPH-IMP: ABNORMAL

## 2024-02-19 ENCOUNTER — ONCOLOGY VISIT (OUTPATIENT)
Dept: ONCOLOGY | Facility: CLINIC | Age: 74
End: 2024-02-19
Attending: STUDENT IN AN ORGANIZED HEALTH CARE EDUCATION/TRAINING PROGRAM
Payer: MEDICARE

## 2024-02-19 ENCOUNTER — APPOINTMENT (OUTPATIENT)
Dept: LAB | Facility: CLINIC | Age: 74
End: 2024-02-19
Attending: STUDENT IN AN ORGANIZED HEALTH CARE EDUCATION/TRAINING PROGRAM
Payer: MEDICARE

## 2024-02-19 VITALS
TEMPERATURE: 98.1 F | BODY MASS INDEX: 21.6 KG/M2 | DIASTOLIC BLOOD PRESSURE: 72 MMHG | RESPIRATION RATE: 16 BRPM | WEIGHT: 118.9 LBS | OXYGEN SATURATION: 96 % | SYSTOLIC BLOOD PRESSURE: 128 MMHG | HEART RATE: 81 BPM

## 2024-02-19 DIAGNOSIS — E03.9 ACQUIRED HYPOTHYROIDISM: ICD-10-CM

## 2024-02-19 DIAGNOSIS — C90.00 MULTIPLE MYELOMA NOT HAVING ACHIEVED REMISSION (H): Primary | ICD-10-CM

## 2024-02-19 DIAGNOSIS — Z13.220 LIPID SCREENING: ICD-10-CM

## 2024-02-19 LAB
ALBUMIN SERPL BCG-MCNC: 4.2 G/DL (ref 3.5–5.2)
ALP SERPL-CCNC: 57 U/L (ref 40–150)
ALT SERPL W P-5'-P-CCNC: 23 U/L (ref 0–50)
ANION GAP SERPL CALCULATED.3IONS-SCNC: 9 MMOL/L (ref 7–15)
AST SERPL W P-5'-P-CCNC: 24 U/L (ref 0–45)
BASOPHILS # BLD AUTO: 0 10E3/UL (ref 0–0.2)
BASOPHILS NFR BLD AUTO: 0 %
BILIRUB SERPL-MCNC: 0.2 MG/DL
BUN SERPL-MCNC: 13.2 MG/DL (ref 8–23)
CALCIUM SERPL-MCNC: 9.3 MG/DL (ref 8.8–10.2)
CHLORIDE SERPL-SCNC: 98 MMOL/L (ref 98–107)
CREAT SERPL-MCNC: 0.78 MG/DL (ref 0.51–0.95)
DEPRECATED HCO3 PLAS-SCNC: 26 MMOL/L (ref 22–29)
EGFRCR SERPLBLD CKD-EPI 2021: 80 ML/MIN/1.73M2
EOSINOPHIL # BLD AUTO: 0.1 10E3/UL (ref 0–0.7)
EOSINOPHIL NFR BLD AUTO: 1 %
ERYTHROCYTE [DISTWIDTH] IN BLOOD BY AUTOMATED COUNT: 15.5 % (ref 10–15)
GLUCOSE SERPL-MCNC: 87 MG/DL (ref 70–99)
HCT VFR BLD AUTO: 34.4 % (ref 35–47)
HGB BLD-MCNC: 12 G/DL (ref 11.7–15.7)
IMM GRANULOCYTES # BLD: 0 10E3/UL
IMM GRANULOCYTES NFR BLD: 0 %
LDH SERPL L TO P-CCNC: 181 U/L (ref 0–250)
LYMPHOCYTES # BLD AUTO: 1.6 10E3/UL (ref 0.8–5.3)
LYMPHOCYTES NFR BLD AUTO: 24 %
MCH RBC QN AUTO: 34 PG (ref 26.5–33)
MCHC RBC AUTO-ENTMCNC: 34.9 G/DL (ref 31.5–36.5)
MCV RBC AUTO: 98 FL (ref 78–100)
MONOCYTES # BLD AUTO: 0.7 10E3/UL (ref 0–1.3)
MONOCYTES NFR BLD AUTO: 11 %
NEUTROPHILS # BLD AUTO: 4.1 10E3/UL (ref 1.6–8.3)
NEUTROPHILS NFR BLD AUTO: 64 %
NRBC # BLD AUTO: 0 10E3/UL
NRBC BLD AUTO-RTO: 0 /100
PLATELET # BLD AUTO: 216 10E3/UL (ref 150–450)
POTASSIUM SERPL-SCNC: 4.3 MMOL/L (ref 3.4–5.3)
PROT SERPL-MCNC: 6.3 G/DL (ref 6.4–8.3)
RBC # BLD AUTO: 3.53 10E6/UL (ref 3.8–5.2)
SODIUM SERPL-SCNC: 133 MMOL/L (ref 135–145)
TOTAL PROTEIN SERUM FOR ELP: 6.1 G/DL (ref 6.4–8.3)
WBC # BLD AUTO: 6.5 10E3/UL (ref 4–11)

## 2024-02-19 PROCEDURE — 250N000011 HC RX IP 250 OP 636: Performed by: REGISTERED NURSE

## 2024-02-19 PROCEDURE — 258N000003 HC RX IP 258 OP 636: Performed by: REGISTERED NURSE

## 2024-02-19 PROCEDURE — 96375 TX/PRO/DX INJ NEW DRUG ADDON: CPT

## 2024-02-19 PROCEDURE — 84165 PROTEIN E-PHORESIS SERUM: CPT | Mod: 26 | Performed by: PATHOLOGY

## 2024-02-19 PROCEDURE — 84155 ASSAY OF PROTEIN SERUM: CPT | Mod: 91

## 2024-02-19 PROCEDURE — 84443 ASSAY THYROID STIM HORMONE: CPT

## 2024-02-19 PROCEDURE — 80061 LIPID PANEL: CPT

## 2024-02-19 PROCEDURE — G0463 HOSPITAL OUTPT CLINIC VISIT: HCPCS | Mod: 25 | Performed by: REGISTERED NURSE

## 2024-02-19 PROCEDURE — 80053 COMPREHEN METABOLIC PANEL: CPT

## 2024-02-19 PROCEDURE — 83615 LACTATE (LD) (LDH) ENZYME: CPT

## 2024-02-19 PROCEDURE — 36591 DRAW BLOOD OFF VENOUS DEVICE: CPT

## 2024-02-19 PROCEDURE — 83521 IG LIGHT CHAINS FREE EACH: CPT | Mod: 59

## 2024-02-19 PROCEDURE — 84165 PROTEIN E-PHORESIS SERUM: CPT | Mod: TC | Performed by: PATHOLOGY

## 2024-02-19 PROCEDURE — 99214 OFFICE O/P EST MOD 30 MIN: CPT | Performed by: REGISTERED NURSE

## 2024-02-19 PROCEDURE — 250N000013 HC RX MED GY IP 250 OP 250 PS 637: Performed by: REGISTERED NURSE

## 2024-02-19 PROCEDURE — 85025 COMPLETE CBC W/AUTO DIFF WBC: CPT

## 2024-02-19 PROCEDURE — 96413 CHEMO IV INFUSION 1 HR: CPT

## 2024-02-19 RX ORDER — ONDANSETRON 2 MG/ML
8 INJECTION INTRAMUSCULAR; INTRAVENOUS ONCE
Status: COMPLETED | OUTPATIENT
Start: 2024-02-19 | End: 2024-02-19

## 2024-02-19 RX ORDER — MEPERIDINE HYDROCHLORIDE 25 MG/ML
25 INJECTION INTRAMUSCULAR; INTRAVENOUS; SUBCUTANEOUS EVERY 30 MIN PRN
Status: CANCELLED | OUTPATIENT
Start: 2024-02-19

## 2024-02-19 RX ORDER — ALBUTEROL SULFATE 0.83 MG/ML
2.5 SOLUTION RESPIRATORY (INHALATION)
Status: CANCELLED | OUTPATIENT
Start: 2024-02-19

## 2024-02-19 RX ORDER — HEPARIN SODIUM,PORCINE 10 UNIT/ML
5 VIAL (ML) INTRAVENOUS
Status: CANCELLED | OUTPATIENT
Start: 2024-02-19

## 2024-02-19 RX ORDER — ALBUTEROL SULFATE 90 UG/1
1-2 AEROSOL, METERED RESPIRATORY (INHALATION)
Status: CANCELLED
Start: 2024-02-19

## 2024-02-19 RX ORDER — ACETAMINOPHEN 325 MG/1
650 TABLET ORAL ONCE
Status: CANCELLED | OUTPATIENT
Start: 2024-02-19

## 2024-02-19 RX ORDER — EPINEPHRINE 1 MG/ML
0.3 INJECTION, SOLUTION INTRAMUSCULAR; SUBCUTANEOUS EVERY 5 MIN PRN
Status: CANCELLED | OUTPATIENT
Start: 2024-02-19

## 2024-02-19 RX ORDER — DIPHENHYDRAMINE HYDROCHLORIDE 50 MG/ML
50 INJECTION INTRAMUSCULAR; INTRAVENOUS
Status: CANCELLED
Start: 2024-02-19

## 2024-02-19 RX ORDER — HEPARIN SODIUM (PORCINE) LOCK FLUSH IV SOLN 100 UNIT/ML 100 UNIT/ML
5 SOLUTION INTRAVENOUS
Status: CANCELLED | OUTPATIENT
Start: 2024-02-19

## 2024-02-19 RX ORDER — METHYLPREDNISOLONE SODIUM SUCCINATE 125 MG/2ML
125 INJECTION, POWDER, LYOPHILIZED, FOR SOLUTION INTRAMUSCULAR; INTRAVENOUS
Status: CANCELLED
Start: 2024-02-19

## 2024-02-19 RX ORDER — ACETAMINOPHEN 325 MG/1
650 TABLET ORAL ONCE
Status: COMPLETED | OUTPATIENT
Start: 2024-02-19 | End: 2024-02-19

## 2024-02-19 RX ORDER — ONDANSETRON 2 MG/ML
8 INJECTION INTRAMUSCULAR; INTRAVENOUS ONCE
Status: CANCELLED | OUTPATIENT
Start: 2024-02-19 | End: 2024-02-19

## 2024-02-19 RX ORDER — HEPARIN SODIUM (PORCINE) LOCK FLUSH IV SOLN 100 UNIT/ML 100 UNIT/ML
5 SOLUTION INTRAVENOUS
Status: DISCONTINUED | OUTPATIENT
Start: 2024-02-19 | End: 2024-02-19 | Stop reason: HOSPADM

## 2024-02-19 RX ORDER — LORAZEPAM 2 MG/ML
0.5 INJECTION INTRAMUSCULAR EVERY 4 HOURS PRN
Status: CANCELLED | OUTPATIENT
Start: 2024-02-19

## 2024-02-19 RX ADMIN — SODIUM CHLORIDE 250 ML: 9 INJECTION, SOLUTION INTRAVENOUS at 14:30

## 2024-02-19 RX ADMIN — SODIUM CHLORIDE 500 MG: 9 INJECTION, SOLUTION INTRAVENOUS at 15:02

## 2024-02-19 RX ADMIN — DIPHENHYDRAMINE HYDROCHLORIDE 37.5 MG: 50 INJECTION, SOLUTION INTRAMUSCULAR; INTRAVENOUS at 14:35

## 2024-02-19 RX ADMIN — Medication 5 ML: at 13:35

## 2024-02-19 RX ADMIN — Medication 5 ML: at 16:15

## 2024-02-19 RX ADMIN — ONDANSETRON 8 MG: 2 INJECTION INTRAMUSCULAR; INTRAVENOUS at 14:30

## 2024-02-19 RX ADMIN — ACETAMINOPHEN 650 MG: 325 TABLET ORAL at 14:30

## 2024-02-19 ASSESSMENT — PAIN SCALES - GENERAL: PAINLEVEL: NO PAIN (0)

## 2024-02-19 NOTE — NURSING NOTE
"Oncology Rooming Note    February 19, 2024 1:42 PM   Shena Hartmann is a 73 year old female who presents for:    Chief Complaint   Patient presents with    Oncology Clinic Visit     Multiple myeloma not having achieved remission     Port Draw     Vitals taken, port accessed, labs drawn, heparin locked, checked into next appt     Initial Vitals: /72 (BP Location: Left arm, Patient Position: Sitting, Cuff Size: Adult Regular)   Pulse 81   Temp 98.1  F (36.7  C) (Oral)   Resp 16   Wt 53.9 kg (118 lb 14.4 oz)   LMP  (LMP Unknown)   SpO2 96%   BMI 21.60 kg/m   Estimated body mass index is 21.6 kg/m  as calculated from the following:    Height as of 10/16/23: 1.58 m (5' 2.21\").    Weight as of this encounter: 53.9 kg (118 lb 14.4 oz). Body surface area is 1.54 meters squared.  No Pain (0) Comment: Data Unavailable   No LMP recorded (lmp unknown). Patient is postmenopausal.  Allergies reviewed: Yes  Medications reviewed: Yes    Medications: Medication refills not needed today.  Pharmacy name entered into Trov:    [id number] - [place], [place] - [address] [facility] [address]  WRITTEN PRESCRIPTION REQUESTED  [facility] - [place], [place] - [address]    Frailty Screening:   Is the patient here for a new oncology consult visit in cancer care? 2. No      Clinical concerns: none.       Familia Steele"

## 2024-02-19 NOTE — NURSING NOTE
Chief Complaint   Patient presents with    Oncology Clinic Visit     Multiple myeloma not having achieved remission     Port Draw     Vitals taken, port accessed, labs drawn, heparin locked, checked into next appt     /72 (BP Location: Left arm, Patient Position: Sitting, Cuff Size: Adult Regular)   Pulse 81   Temp 98.1  F (36.7  C) (Oral)   Resp 16   Wt 53.9 kg (118 lb 14.4 oz)   LMP  (LMP Unknown)   SpO2 96%   BMI 21.60 kg/m    Guerrero Ramos RN on 2/19/2024 at 1:40 PM

## 2024-02-19 NOTE — PATIENT INSTRUCTIONS
[facility] Triage and after hours / weekends / holidays:  [phone]    Please call the triage or after hours line if you experience a temperature greater than or equal to 100.4, shaking chills, have uncontrolled nausea, vomiting and/or diarrhea, dizziness, shortness of breath, chest pain, bleeding, unexplained bruising, or if you have any other new/concerning symptoms, questions or concerns.      If you are having any concerning symptoms or wish to speak to a provider before your next infusion visit, please call triage to notify them so we can adequately serve you.     If you need a refill on a narcotic prescription or other medication, please call before your infusion appointment.              Lab Results:  Recent Results (from the past 12 hour(s))   Comprehensive metabolic panel    Collection Time: 02/19/24  1:33 PM   Result Value Ref Range    Sodium 133 (L) 135 - 145 mmol/L    Potassium 4.3 3.4 - 5.3 mmol/L    Carbon Dioxide (CO2) 26 22 - 29 mmol/L    Anion Gap 9 7 - 15 mmol/L    Urea Nitrogen 13.2 8.0 - 23.0 mg/dL    Creatinine 0.78 0.51 - 0.95 mg/dL    GFR Estimate 80 >60 mL/min/1.73m2    Calcium 9.3 8.8 - 10.2 mg/dL    Chloride 98 98 - 107 mmol/L    Glucose 87 70 - 99 mg/dL    Alkaline Phosphatase 57 40 - 150 U/L    AST 24 0 - 45 U/L    ALT 23 0 - 50 U/L    Protein Total 6.3 (L) 6.4 - 8.3 g/dL    Albumin 4.2 3.5 - 5.2 g/dL    Bilirubin Total 0.2 <=1.2 mg/dL   Lactate Dehydrogenase    Collection Time: 02/19/24  1:33 PM   Result Value Ref Range    Lactate Dehydrogenase 181 0 - 250 U/L   CBC with platelets and differential    Collection Time: 02/19/24  1:33 PM   Result Value Ref Range    WBC Count 6.5 4.0 - 11.0 10e3/uL    RBC Count 3.53 (L) 3.80 - 5.20 10e6/uL    Hemoglobin 12.0 11.7 - 15.7 g/dL    Hematocrit 34.4 (L) 35.0 - 47.0 %    MCV 98 78 - 100 fL    MCH 34.0 (H) 26.5 - 33.0 pg    MCHC 34.9 31.5 - 36.5 g/dL    RDW 15.5 (H) 10.0 - 15.0 %    Platelet Count 216 150 - 450 10e3/uL    % Neutrophils 64 %    %  Lymphocytes 24 %    % Monocytes 11 %    % Eosinophils 1 %    % Basophils 0 %    % Immature Granulocytes 0 %    NRBCs per 100 WBC 0 <1 /100    Absolute Neutrophils 4.1 1.6 - 8.3 10e3/uL    Absolute Lymphocytes 1.6 0.8 - 5.3 10e3/uL    Absolute Monocytes 0.7 0.0 - 1.3 10e3/uL    Absolute Eosinophils 0.1 0.0 - 0.7 10e3/uL    Absolute Basophils 0.0 0.0 - 0.2 10e3/uL    Absolute Immature Granulocytes 0.0 <=0.4 10e3/uL    Absolute NRBCs 0.0 10e3/uL

## 2024-02-19 NOTE — LETTER
"    2/19/2024         RE: Shena Hartmann  [address] [place] [address]        Dear Colleague,    Thank you for referring your patient, Shena Hartmann, to the [facility]. Please see a copy of my visit note below.        [facility]    Hematology/Oncology Clinic Note    Feb 19, 2024    Reason for Office Visit   Evaluation and toxicity check during isatuximab maintenance    Cancer Diagnosis   Multiple Myeloma    Oncology History of Present Illness   Breast cancer dx in 1994. Underwent surgery and chemotherapy without reoccurence  MGUS dx 1999  T8 pathologic fracture with radiation  Revlimid and velcade,   2013  Cytoxan IV 9/2013  D-PACE 11/2013  Auto 3/27/14  5/2014 thalidomide caused rash so switched to pomalidomide   on kenalog and clobetasol creams for IMID rash  FLC began to rise so riky added to pom 9/2020  she has been on xgeva for an unclear amount of time  Teodora-Kd 2/20/2023    Interval History   - Had a relaxing trip to [place], still feels a little \"jet laggy\" since getting home Saturday night and having a busy day yesterday  - Viral cough that she had in January has resolved; now has a lisinopril cough after increasing her dose, not overly bothersome to her at this point  - No new bone pains  - Traveling to [place] 3/23-4/7     ROS: 10 point ROS neg other than the symptoms noted above in the HPI.        Past Medical History     Past Medical History:   Diagnosis Date    Acquired hypothyroidism 8/10/2023    Breast cancer (H) 01/01/1994    right    H/O stem cell transplant (H) 03/01/2014    History of blood transfusion 2013 & 2014    During stem cell tx process    Hypertension Sept. 2021    Runs 140 s systolically, about 20 above my usual    Multiple myeloma, without mention of having achieved remission 11/06/2012       Past Surgical History:      Past Surgical History:   Procedure Laterality Date    BIOPSY  10/1994; [id number]    Lt. " Breast in ; multiple bone marrow bx's for MM    BREAST SURGERY  10/1994    Lt. Mastectomy w/lymph node resection    COLONOSCOPY  2015    Normal results    EYE SURGERY      Bilateral cataract surgery    INSERT PORT VASCULAR ACCESS Left 2/15/2023    Procedure: INSERTION, VASCULAR ACCESS PORT;  Surgeon: Luc Antunez MD;  Location: [facility] OR    IR CHEST PORT PLACEMENT > 5 YRS OF AGE  2/15/2023    MASTECTOMY      Right, with lymphe node disection      PICC INSERTION  09/10/2013    5fr DL Power PICC, 44cm (1cm external) in the L lateral brachial vein with tip in the low SVC    TRANSPLANT  3/27/2014    Autologous stem cell tx       Social History     Socioeconomic History    Marital status:    Tobacco Use    Smoking status: Never     Passive exposure: Never    Smokeless tobacco: Never   Substance and Sexual Activity    Alcohol use: Yes     Comment: occ    Drug use: No    Sexual activity: Not Currently     Partners: Male     Birth control/protection: Post-menopausal   Other Topics Concern    Parent/sibling w/ CABG, MI or angioplasty before 65F 55M? No     Service No    Blood Transfusions No    Caffeine Concern No    Occupational Exposure No    Hobby Hazards No     Family History     Family History   Problem Relation Age of Onset    Cancer Paternal Grandmother         skin cancer    Hypertension Mother     Cerebrovascular Disease Mother          8-11-15 from strokes    Hypertension Father     Prostate Cancer Father        Allergies:     Allergies   Allergen Reactions    Blood Transfusion Related (Informational Only) Other (See Comments)     Patient has a history of a clinically significant antibody against RBC antigens.  A delay in compatible RBCs may occur.     Cayenne Pepper [Cayenne]     Corn-Containing Products GI Disturbance    Levaquin Other (See Comments)     Tendon pain    Milk Protein GI Disturbance    Mold      Facial rash/lip swelling    Morphine Sulfate Other (See Comments)     Per pt  - see things (hallucination) when she was on Morphine .    Pollen Extract      Environmental allergies     Shrimp Nausea and Vomiting    Tomato      Lip swelling, facial rash    Azithromycin Rash    Keflex [Cephalexin] Rash    Oat Rash    Tape [Adhesive Tape] Itching and Rash     All adhesives    Wheat Rash     Swelling of lips         Medications:     Current Outpatient Medications   Medication    acyclovir (ZOVIRAX) 400 MG tablet    amLODIPine (NORVASC) 2.5 MG tablet    Ascorbic Acid (VITAMIN C PO)    aspirin (ASA) 81 MG chewable tablet    CALCIUM-VITAMIN D-VITAMIN K PO    COENZYME Q-10 PO    Cyanocobalamin 1000 MCG/ML LIQD    levothyroxine (SYNTHROID/LEVOTHROID) 50 MCG tablet    lisinopril (ZESTRIL) 2.5 MG tablet    magnesium 100 MG CAPS    Multiple Vitamins-Minerals (MULTIVITAMIN OR)    ZINC PICOLINATE PO    acetaminophen (TYLENOL) 325 MG tablet    clobetasol (TEMOVATE) 0.05 % external ointment    desonide (DESOWEN) 0.05 % external ointment    ondansetron (ZOFRAN) 4 MG tablet    order for DME    order for DME    prochlorperazine (COMPAZINE) 5 MG tablet    triamcinolone (KENALOG) 0.1 % external ointment     Current Facility-Administered Medications   Medication    heparin 100 unit/mL injection 5 mL     Facility-Administered Medications Ordered in Other Visits   Medication    plerixafor (MOZOBIL) injection SOLN 12.4 mg    potassium chloride 20 mEq in 50 mL IVPB    potassium chloride SA (K-DUR,KLOR-CON M) CR tablet 20 mEq       Physical Exam   /72 (BP Location: Left arm, Patient Position: Sitting, Cuff Size: Adult Regular)   Pulse 81   Temp 98.1  F (36.7  C) (Oral)   Resp 16   Wt 53.9 kg (118 lb 14.4 oz)   LMP  (LMP Unknown)   SpO2 96%   BMI 21.60 kg/m      Wt Readings from Last 5 Encounters:   02/19/24 53.9 kg (118 lb 14.4 oz)   01/15/24 52.3 kg (115 lb 4.8 oz)   12/04/23 53.2 kg (117 lb 3.2 oz)   11/13/23 52.8 kg (116 lb 4.8 oz)   11/06/23 52.7 kg (116 lb 1.6 oz)     Constitutional: Appears stated  age, well-groomed.  HEENT: Normocephilic. EOMI, PERRL. Sclerae are anicteric.   Respiratory: No increased work of breathing, good air exchange, clear to auscultation bilaterally with slightly diminished sounds in RLL, no crackles or wheezing.  Cardiovascular: Normal apical impulse, regular rate and rhythm, normal S1 and S2, and no murmur noted.  Skin: No bruising or bleeding, normal skin color, texture, turgor, no redness, warmth, or swelling, no rashes, no lesions, no jaundice.  Extremities: No lower extremity edema noted bilaterally.There is no redness, warmth, or swelling of the joints.  Neurologic: Awake, alert, oriented to name, place and time.    Vascular access: Left implanted port accessed.     Data     Most Recent 3 CBC's:  Recent Labs   Lab Test 02/19/24  1333 01/15/24  0745 12/04/23  0801   WBC 6.5 6.2 4.3   HGB 12.0 12.7 12.8   MCV 98 97 98    243 208   ANEUTAUTO 4.1 4.1 2.3     Most Recent 3 BMP's:  Recent Labs   Lab Test 01/15/24  0745 12/04/23  0801 11/06/23  0808    133* 134*   POTASSIUM 4.2 4.4 4.4   CHLORIDE 98 97* 96*   CO2 27 27 28   BUN 14.9 13.0 17.6   CR 0.75 0.71 0.75   ANIONGAP 11 9 10   PAULINE 9.7 9.2 9.5   GLC 89 81 79   PROTTOTAL 6.4 6.2* 6.3*   ALBUMIN 4.0 4.2 4.4    Most Recent 3 LFT's:  Recent Labs   Lab Test 01/15/24  0745 12/04/23  0801 11/06/23  0808   AST 19 23 25   ALT 23 25 32   ALKPHOS 57 45 49   BILITOTAL 0.2 0.3 0.3    Most Recent 2 TSH and T4:  Recent Labs   Lab Test 08/17/23  1349 10/03/22  0820 06/13/22  0822 03/21/22  0803   TSH 2.12 1.82 9.47*  9.31* 5.79*   T4  --   --  0.99  0.97 0.83     I reviewed the above labs today myself and with the patient.    Assessment/Plan     Relapsed Multiple Myeloma  Started kyprolis/isatuximab/dex on 2/20/23.  Now in CR.   Isatuximab maintenace 10mg/kg every 4 week started 10/9/23  Monthly myeloma labs  Xgeva every 6 months, last dose 11/13/23. Likes to separate the chemo dosing from this.   Electing to space out her  treatments to accommodate trips to [place] Feb 1-17 and [place] 3/23-4/7. She is aware this is spacing treatment further than recommended which comes with a higher risk of relapse.    Has had a slight upward trend of her monoclonal peak (most recently 0.3) and kappa light chains (most recently 4.47).  Discussed that once her travels are complete, we will be eager to do some consistent treatment to help maintain disease control.    HTN  BP has continued to be fairly stable with the only rises days 3-6 post chemo up to 140 s-150 s systolically. Then back to her baseline of 120 s. Recently had some higher blood pressures during her URI, states SBPs have been in the 120s for the past week other than this morning's BP.  Continue lisinopril 10 mg daily and amlodipine 5 mg daily.  Followed by cardiology    Hypothyroidism  Remains on levothyroxine    ID Prophylaxis   Acyclovir 400 mg BID for HSV prophylaxis  Declined influenza and COVID vaccines      Plan from today's clinical encounter  - Continue isatuximab maintenace 10mg/kg every 4 weeks (other than elected delays for travel)  - Xgeva every 6 months, due May 2024  - Follow up with me monthly for MM evaluations.  - Has follow up established with Dr. Chavez and Dr. Da Silva.    21 minutes spent on the date of the encounter doing chart review, review of test results, patient visit, and documentation     MARINO Fuller CNP  [facility]  [address] [place] [address] [phone]

## 2024-02-19 NOTE — PROGRESS NOTES
"    [facility]    Hematology/Oncology Clinic Note    Feb 19, 2024    Reason for Office Visit   Evaluation and toxicity check during isatuximab maintenance    Cancer Diagnosis   Multiple Myeloma    Oncology History of Present Illness   Breast cancer dx in 1994. Underwent surgery and chemotherapy without reoccurence  MGUS dx 1999  T8 pathologic fracture with radiation  Revlimid and velcade,   2013  Cytoxan IV 9/2013  D-PACE 11/2013  Auto 3/27/14  5/2014 thalidomide caused rash so switched to pomalidomide   on kenalog and clobetasol creams for IMID rash  FLC began to rise so riky added to pom 9/2020  she has been on xgeva for an unclear amount of time  Teodora-Kd 2/20/2023    Interval History   - Had a relaxing trip to [place], still feels a little \"jet laggy\" since getting home Saturday night and having a busy day yesterday  - Viral cough that she had in January has resolved; now has a lisinopril cough after increasing her dose, not overly bothersome to her at this point  - No new bone pains  - Traveling to [place] 3/23-4/7     ROS: 10 point ROS neg other than the symptoms noted above in the HPI.        Past Medical History     Past Medical History:   Diagnosis Date    Acquired hypothyroidism 8/10/2023    Breast cancer (H) 01/01/1994    right    H/O stem cell transplant (H) 03/01/2014    History of blood transfusion 2013 & 2014    During stem cell tx process    Hypertension Sept. 2021    Runs 140 s systolically, about 20 above my usual    Multiple myeloma, without mention of having achieved remission 11/06/2012       Past Surgical History:      Past Surgical History:   Procedure Laterality Date    BIOPSY  10/1994; [id number]    Lt. Breast in '94; multiple bone marrow bx's for MM    BREAST SURGERY  10/1994    Lt. Mastectomy w/lymph node resection    COLONOSCOPY  11/2015    Normal results    EYE SURGERY  2020    Bilateral cataract surgery    INSERT PORT VASCULAR ACCESS Left 2/15/2023    " Procedure: INSERTION, VASCULAR ACCESS PORT;  Surgeon: Luc Antunez MD;  Location: [facility] OR    IR CHEST PORT PLACEMENT > 5 YRS OF AGE  2/15/2023    MASTECTOMY      Right, with lymphe node disection      PICC INSERTION  09/10/2013    5fr DL Power PICC, 44cm (1cm external) in the L lateral brachial vein with tip in the low SVC    TRANSPLANT  3/27/2014    Autologous stem cell tx       Social History     Socioeconomic History    Marital status:    Tobacco Use    Smoking status: Never     Passive exposure: Never    Smokeless tobacco: Never   Substance and Sexual Activity    Alcohol use: Yes     Comment: occ    Drug use: No    Sexual activity: Not Currently     Partners: Male     Birth control/protection: Post-menopausal   Other Topics Concern    Parent/sibling w/ CABG, MI or angioplasty before 65F 55M? No     Service No    Blood Transfusions No    Caffeine Concern No    Occupational Exposure No    Hobby Hazards No     Family History     Family History   Problem Relation Age of Onset    Cancer Paternal Grandmother         skin cancer    Hypertension Mother     Cerebrovascular Disease Mother          8-11-15 from strokes    Hypertension Father     Prostate Cancer Father        Allergies:     Allergies   Allergen Reactions    Blood Transfusion Related (Informational Only) Other (See Comments)     Patient has a history of a clinically significant antibody against RBC antigens.  A delay in compatible RBCs may occur.     Cayenne Pepper [Cayenne]     Corn-Containing Products GI Disturbance    Levaquin Other (See Comments)     Tendon pain    Milk Protein GI Disturbance    Mold      Facial rash/lip swelling    Morphine Sulfate Other (See Comments)     Per pt - see things (hallucination) when she was on Morphine .    Pollen Extract      Environmental allergies     Shrimp Nausea and Vomiting    Tomato      Lip swelling, facial rash    Azithromycin Rash    Keflex [Cephalexin] Rash    Oat Rash    Tape [Adhesive  Tape] Itching and Rash     All adhesives    Wheat Rash     Swelling of lips         Medications:     Current Outpatient Medications   Medication    acyclovir (ZOVIRAX) 400 MG tablet    amLODIPine (NORVASC) 2.5 MG tablet    Ascorbic Acid (VITAMIN C PO)    aspirin (ASA) 81 MG chewable tablet    CALCIUM-VITAMIN D-VITAMIN K PO    COENZYME Q-10 PO    Cyanocobalamin 1000 MCG/ML LIQD    levothyroxine (SYNTHROID/LEVOTHROID) 50 MCG tablet    lisinopril (ZESTRIL) 2.5 MG tablet    magnesium 100 MG CAPS    Multiple Vitamins-Minerals (MULTIVITAMIN OR)    ZINC PICOLINATE PO    acetaminophen (TYLENOL) 325 MG tablet    clobetasol (TEMOVATE) 0.05 % external ointment    desonide (DESOWEN) 0.05 % external ointment    ondansetron (ZOFRAN) 4 MG tablet    order for DME    order for DME    prochlorperazine (COMPAZINE) 5 MG tablet    triamcinolone (KENALOG) 0.1 % external ointment     Current Facility-Administered Medications   Medication    heparin 100 unit/mL injection 5 mL     Facility-Administered Medications Ordered in Other Visits   Medication    plerixafor (MOZOBIL) injection SOLN 12.4 mg    potassium chloride 20 mEq in 50 mL IVPB    potassium chloride SA (K-DUR,KLOR-CON M) CR tablet 20 mEq       Physical Exam   /72 (BP Location: Left arm, Patient Position: Sitting, Cuff Size: Adult Regular)   Pulse 81   Temp 98.1  F (36.7  C) (Oral)   Resp 16   Wt 53.9 kg (118 lb 14.4 oz)   LMP  (LMP Unknown)   SpO2 96%   BMI 21.60 kg/m      Wt Readings from Last 5 Encounters:   02/19/24 53.9 kg (118 lb 14.4 oz)   01/15/24 52.3 kg (115 lb 4.8 oz)   12/04/23 53.2 kg (117 lb 3.2 oz)   11/13/23 52.8 kg (116 lb 4.8 oz)   11/06/23 52.7 kg (116 lb 1.6 oz)     Constitutional: Appears stated age, well-groomed.  HEENT: Normocephilic. EOMI, PERRL. Sclerae are anicteric.   Respiratory: No increased work of breathing, good air exchange, clear to auscultation bilaterally with slightly diminished sounds in RLL, no crackles or  wheezing.  Cardiovascular: Normal apical impulse, regular rate and rhythm, normal S1 and S2, and no murmur noted.  Skin: No bruising or bleeding, normal skin color, texture, turgor, no redness, warmth, or swelling, no rashes, no lesions, no jaundice.  Extremities: No lower extremity edema noted bilaterally.There is no redness, warmth, or swelling of the joints.  Neurologic: Awake, alert, oriented to name, place and time.    Vascular access: Left implanted port accessed.     Data     Most Recent 3 CBC's:  Recent Labs   Lab Test 02/19/24  1333 01/15/24  0745 12/04/23  0801   WBC 6.5 6.2 4.3   HGB 12.0 12.7 12.8   MCV 98 97 98    243 208   ANEUTAUTO 4.1 4.1 2.3     Most Recent 3 BMP's:  Recent Labs   Lab Test 01/15/24  0745 12/04/23  0801 11/06/23  0808    133* 134*   POTASSIUM 4.2 4.4 4.4   CHLORIDE 98 97* 96*   CO2 27 27 28   BUN 14.9 13.0 17.6   CR 0.75 0.71 0.75   ANIONGAP 11 9 10   PAULINE 9.7 9.2 9.5   GLC 89 81 79   PROTTOTAL 6.4 6.2* 6.3*   ALBUMIN 4.0 4.2 4.4    Most Recent 3 LFT's:  Recent Labs   Lab Test 01/15/24  0745 12/04/23  0801 11/06/23  0808   AST 19 23 25   ALT 23 25 32   ALKPHOS 57 45 49   BILITOTAL 0.2 0.3 0.3    Most Recent 2 TSH and T4:  Recent Labs   Lab Test 08/17/23  1349 10/03/22  0820 06/13/22  0822 03/21/22  0803   TSH 2.12 1.82 9.47*  9.31* 5.79*   T4  --   --  0.99  0.97 0.83     I reviewed the above labs today myself and with the patient.    Assessment/Plan     Relapsed Multiple Myeloma  Started kyprolis/isatuximab/dex on 2/20/23.  Now in CR.   Isatuximab maintenace 10mg/kg every 4 week started 10/9/23  Monthly myeloma labs  Xgeva every 6 months, last dose 11/13/23. Likes to separate the chemo dosing from this.   Electing to space out her treatments to accommodate trips to [place] Feb 1-17 and [place] 3/23-4/7. She is aware this is spacing treatment further than recommended which comes with a higher risk of relapse.    Has had a slight upward trend of her monoclonal peak (most  recently 0.3) and kappa light chains (most recently 4.47).  Discussed that once her travels are complete, we will be eager to do some consistent treatment to help maintain disease control.    HTN  BP has continued to be fairly stable with the only rises days 3-6 post chemo up to 140 s-150 s systolically. Then back to her baseline of 120 s. Recently had some higher blood pressures during her URI, states SBPs have been in the 120s for the past week other than this morning's BP.  Continue lisinopril 10 mg daily and amlodipine 5 mg daily.  Followed by cardiology    Hypothyroidism  Remains on levothyroxine    ID Prophylaxis   Acyclovir 400 mg BID for HSV prophylaxis  Declined influenza and COVID vaccines      Plan from today's clinical encounter  - Continue isatuximab maintenace 10mg/kg every 4 weeks (other than elected delays for travel)  - Xgeva every 6 months, due May 2024  - Follow up with me monthly for MM evaluations.  - Has follow up established with Dr. Chavez and Dr. Da Silva.    21 minutes spent on the date of the encounter doing chart review, review of test results, patient visit, and documentation     MARINO Fuller CNP  [facility]  [address] [place] [address] [phone]

## 2024-02-19 NOTE — PROGRESS NOTES
Infusion Nursing Note:  Shena Hartmann presents today for Cycle 12 Day 1 Sarcl[place].    Patient seen by provider today: Yes: Selena Suggs NP   present during visit today: Not Applicable.    Note: Pt presents to infusion feeling well. She offers no new concerns following her provider appointment today.    Intravenous Access:  Implanted Port.    Treatment Conditions:  Lab Results   Component Value Date    HGB 12.0 02/19/2024    WBC 6.5 02/19/2024    ANEU 2.1 07/05/2021    ANEUTAUTO 4.1 02/19/2024     02/19/2024        Lab Results   Component Value Date     (L) 02/19/2024    POTASSIUM 4.3 02/19/2024    MAG 1.8 12/12/2014    CR 0.78 02/19/2024    PAULINE 9.3 02/19/2024    BILITOTAL 0.2 02/19/2024    ALBUMIN 4.2 02/19/2024    ALT 23 02/19/2024    AST 24 02/19/2024     Results reviewed, labs MET treatment parameters, ok to proceed with treatment.      Post Infusion Assessment:  Patient tolerated infusion without incident.  Blood return noted pre and post infusion.  Site patent and intact, free from redness, edema or discomfort.  No evidence of extravasations.  Access discontinued per protocol.       Discharge Plan:   Patient declined prescription refills.  Discharge instructions reviewed with: Patient.  Patient and/or family verbalized understanding of discharge instructions and all questions answered.  Copy of AVS reviewed with patient and/or family.  Patient will return 3/12/24 for next appointment.  Patient discharged in stable condition accompanied by: self.  Departure Mode: Ambulatory.      Shanita Piper RN

## 2024-02-20 LAB
KAPPA LC FREE SER-MCNC: 5.32 MG/DL (ref 0.33–1.94)
KAPPA LC FREE/LAMBDA FREE SER NEPH: 17.73 {RATIO} (ref 0.26–1.65)
LAMBDA LC FREE SERPL-MCNC: 0.3 MG/DL (ref 0.57–2.63)

## 2024-02-21 ENCOUNTER — MYC REFILL (OUTPATIENT)
Dept: INTERNAL MEDICINE | Facility: CLINIC | Age: 74
End: 2024-02-21

## 2024-02-21 ENCOUNTER — OFFICE VISIT (OUTPATIENT)
Dept: INTERNAL MEDICINE | Facility: CLINIC | Age: 74
End: 2024-02-21
Payer: MEDICARE

## 2024-02-21 VITALS
TEMPERATURE: 97.1 F | WEIGHT: 117.4 LBS | OXYGEN SATURATION: 97 % | SYSTOLIC BLOOD PRESSURE: 139 MMHG | BODY MASS INDEX: 21.33 KG/M2 | DIASTOLIC BLOOD PRESSURE: 76 MMHG | HEART RATE: 71 BPM | RESPIRATION RATE: 16 BRPM

## 2024-02-21 DIAGNOSIS — Z94.84 STEM CELLS TRANSPLANT STATUS (H): ICD-10-CM

## 2024-02-21 DIAGNOSIS — Z13.220 LIPID SCREENING: ICD-10-CM

## 2024-02-21 DIAGNOSIS — Z00.00 MEDICARE ANNUAL WELLNESS VISIT, SUBSEQUENT: Primary | ICD-10-CM

## 2024-02-21 DIAGNOSIS — Z29.11 NEED FOR VACCINATION AGAINST RESPIRATORY SYNCYTIAL VIRUS: ICD-10-CM

## 2024-02-21 DIAGNOSIS — Z23 NEED FOR SHINGLES VACCINE: ICD-10-CM

## 2024-02-21 DIAGNOSIS — E03.9 ACQUIRED HYPOTHYROIDISM: ICD-10-CM

## 2024-02-21 DIAGNOSIS — I10 BENIGN ESSENTIAL HYPERTENSION: ICD-10-CM

## 2024-02-21 DIAGNOSIS — M81.0 OSTEOPOROSIS, UNSPECIFIED OSTEOPOROSIS TYPE, UNSPECIFIED PATHOLOGICAL FRACTURE PRESENCE: ICD-10-CM

## 2024-02-21 DIAGNOSIS — I10 PRIMARY HYPERTENSION: ICD-10-CM

## 2024-02-21 DIAGNOSIS — C90.00 MULTIPLE MYELOMA NOT HAVING ACHIEVED REMISSION (H): ICD-10-CM

## 2024-02-21 DIAGNOSIS — E85.89 OTHER AMYLOIDOSIS (H): ICD-10-CM

## 2024-02-21 PROBLEM — D70.1 AGRANULOCYTOSIS SECONDARY TO CANCER CHEMOTHERAPY (CODE) (H): Status: RESOLVED | Noted: 2022-09-01 | Resolved: 2024-02-21

## 2024-02-21 PROBLEM — C92.02: Status: ACTIVE | Noted: 2024-02-21

## 2024-02-21 PROBLEM — C92.02: Status: RESOLVED | Noted: 2024-02-21 | Resolved: 2024-02-21

## 2024-02-21 LAB
ALBUMIN SERPL ELPH-MCNC: 4.2 G/DL (ref 3.7–5.1)
ALPHA1 GLOB SERPL ELPH-MCNC: 0.2 G/DL (ref 0.2–0.4)
ALPHA2 GLOB SERPL ELPH-MCNC: 0.6 G/DL (ref 0.5–0.9)
B-GLOBULIN SERPL ELPH-MCNC: 0.6 G/DL (ref 0.6–1)
CHOLEST SERPL-MCNC: 230 MG/DL
GAMMA GLOB SERPL ELPH-MCNC: 0.6 G/DL (ref 0.7–1.6)
HDLC SERPL-MCNC: 83 MG/DL
LDLC SERPL CALC-MCNC: 124 MG/DL
M PROTEIN SERPL ELPH-MCNC: 0.3 G/DL
NONHDLC SERPL-MCNC: 147 MG/DL
PROT PATTERN SERPL ELPH-IMP: ABNORMAL
TRIGL SERPL-MCNC: 116 MG/DL
TSH SERPL DL<=0.005 MIU/L-ACNC: 3.08 UIU/ML (ref 0.3–4.2)

## 2024-02-21 PROCEDURE — 99214 OFFICE O/P EST MOD 30 MIN: CPT | Mod: 25 | Performed by: INTERNAL MEDICINE

## 2024-02-21 PROCEDURE — G0439 PPPS, SUBSEQ VISIT: HCPCS | Performed by: INTERNAL MEDICINE

## 2024-02-21 RX ORDER — RESPIRATORY SYNCYTIAL VIRUS VACCINE 120MCG/0.5
0.5 KIT INTRAMUSCULAR ONCE
Qty: 1 EACH | Refills: 0 | Status: CANCELLED | OUTPATIENT
Start: 2024-02-21 | End: 2024-02-21

## 2024-02-21 SDOH — HEALTH STABILITY: PHYSICAL HEALTH: ON AVERAGE, HOW MANY MINUTES DO YOU ENGAGE IN EXERCISE AT THIS LEVEL?: 60 MIN

## 2024-02-21 SDOH — HEALTH STABILITY: PHYSICAL HEALTH: ON AVERAGE, HOW MANY DAYS PER WEEK DO YOU ENGAGE IN MODERATE TO STRENUOUS EXERCISE (LIKE A BRISK WALK)?: 4 DAYS

## 2024-02-21 ASSESSMENT — SOCIAL DETERMINANTS OF HEALTH (SDOH): HOW OFTEN DO YOU GET TOGETHER WITH FRIENDS OR RELATIVES?: TWICE A WEEK

## 2024-02-21 NOTE — PROGRESS NOTES
Preventive Care Visit  [facility]  Thomas Villalobos MD, Internal Medicine  Feb 21, 2024    Assessment & Plan     (Z00.00) Medicare annual wellness visit, subsequent  (primary encounter diagnosis)  Comment: stable  Plan: Age appropriate cognitive and physical abilities.  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  Now in CR.   Isatuximab maintenace 10mg/kg every 4 week started 10/9/23  Monthly myeloma labs  Xgeva every 6 months, last dose 11/13/23. Likes to separate the chemo dosing from this.   Electing to space out her treatments to accommodate trips to [place] Feb 1-17 and [place] 3/23-4/7. She is aware this is spacing treatment further than recommended which comes with a higher risk of relapse.    Has had a slight upward trend of her monoclonal peak (most recently 0.3) and kappa light chains (most recently 4.47).  Discussed that once her travels are complete, we will be eager to do some consistent treatment to help maintain disease control.    MM--see above.     (Z94.84) Stem cells transplant status (H)  Comment: hx of  Plan: stable    (I10) Primary hypertension  Comment: controlled on medication   Plan: Will plan to continue on previous medication without changes     (E03.9) Acquired hypothyroidism  Comment: stable  Plan: TSH with free T4 reflex        Recheck labs    (M81.0) Osteoporosis, unspecified osteoporosis type, unspecified pathological fracture presence  Comment: on Prolia, tolerating well.  Plan: DX Hip/Pelvis/Spine        Last dexa two years ago. Recheck in the coming month. Likely will stay with Prolia though will await results.    Wishes to hold on immunizations at this time.  Will continue to review on subsequent visits.    See me in a year              Counseling  Appropriate preventive services were discussed with this patient, including applicable screening as appropriate for fall prevention,  nutrition, physical activity, Tobacco-use cessation, weight loss and cognition.  Checklist reviewing preventive services available has been given to the patient.  Reviewed patient's diet, addressing concerns and/or questions.   She is at risk for psychosocial distress and has been provided with information to reduce risk.   Information on urinary incontinence and treatment options given to patient.                 Ramya Chatterjee is a 73 year old, presenting for the following:  Wellness Visit and Recheck Medication        2/21/2024    10:52 AM   Additional Questions   Roomed by Keyanna BROWN CMA         Via the Health Maintenance questionnaire, the patient has reported the following services have been completed -Mammogram, this information has been sent to the abstraction team.  Health Care Directive  Patient has a Health Care Directive on file  Ongoing discussion    History of Present Illness       Hypertension: She presents for follow up of hypertension.  She does check blood pressure  regularly outside of the clinic. Outpatient blood pressures have not been over 140/90. She does not follow a low salt diet.     Hypothyroidism:     Since last visit, patient describes the following symptoms::  Fatigue and Loose stools    She eats 4 or more servings of fruits and vegetables daily.She consumes 0 sweetened beverage(s) daily.She exercises with enough effort to increase her heart rate 60 or more minutes per day.  She exercises with enough effort to increase her heart rate 4 days per week.   She is taking medications regularly.    Here for a wellness              2/21/2024   General Health   How would you rate your overall physical health? Good   Feel stress (tense, anxious, or unable to sleep) Only a little   (!) STRESS CONCERN      2/21/2024   Nutrition   Diet: Regular (no restrictions)         2/21/2024   Exercise   Days per week of moderate/strenous exercise 4 days   Average minutes spent exercising at this level 60 min          2/21/2024   Social Factors   Frequency of gathering with friends or relatives Twice a week   Worry food won't last until get money to buy more No   Food not last or not have enough money for food? No   Do you have housing?  Yes   Are you worried about losing your housing? No   Lack of transportation? No   Unable to get utilities (heat,electricity)? No         2/21/2024   Fall Risk   Fallen 2 or more times in the past year? No   Trouble with walking or balance? No          2/21/2024   Activities of Daily Living- Home Safety   Needs help with the following daily activites None of the above   Safety concerns in the home None of the above         2/21/2024   Dental   Dentist two times every year? Yes         2/21/2024   Hearing Screening   Hearing concerns? None of the above         2/21/2024   Driving Risk Screening   Patient/family members have concerns about driving No         2/21/2024   General Alertness/Fatigue Screening   Have you been more tired than usual lately? No         2/21/2024   Urinary Incontinence Screening   Bothered by leaking urine in past 6 months Yes         2/21/2024   TB Screening   Were you born outside of [place]?  No         Today's PHQ-2 Score:       2/21/2024    10:51 AM   PHQ-2 ( 1999 [employer])   Q1: Little interest or pleasure in doing things 0   Q2: Feeling down, depressed or hopeless 0   PHQ-2 Score 0   Q1: Little interest or pleasure in doing things Not at all   Q2: Feeling down, depressed or hopeless Not at all   PHQ-2 Score 0           2/21/2024   Substance Use   Alcohol more than 3/day or more than 7/wk No   Do you have a current opioid prescription? No   How severe/bad is pain from 1 to 10? 5/10   Do you use any other substances recreationally? No     Social History     Tobacco Use    Smoking status: Never     Passive exposure: Never    Smokeless tobacco: Never   Substance Use Topics    Alcohol use: Yes     Comment: occ    Drug use: No            No data to display                      The ASCVD Risk score (Rose VARGHESE, et al., 2019) failed to calculate for the following reasons:    Cannot find a previous HDL lab    Cannot find a previous total cholesterol lab            Reviewed and updated as needed this visit by Provider                      Current providers sharing in care for this patient include:  Patient Care Team:  Thomas Villalobos MD as PCP - General (Internal Medicine)  Misty Jackson APRN CNP as Referring Physician (Nurse Practitioner)  Kevin Eagle MD as MD (Gastroenterology)  Ruma Rebollar MD as MD (Dermatology)  Sunil Rosario MD as MD (Dermatology)  Iglesia Quick MD as MD (Orthopaedic Surgery)  Neela Mcknight MD as MD (Dermatology)  Neela Mcknight MD as Assigned Surgical Provider  Shabnam Hall RN as Specialty Care Coordinator (Hematology & Oncology)  Nighat Chavez MD as MD (Hematology & Oncology)  Thomas Villalobos MD as Assigned PCP  Hema Solares MD as MD (Cardiovascular Disease)  Sadia Price MD as Assigned Heart and Vascular Provider  Selena Suggs APRN CNP as Assigned Cancer Care Provider  Neela Mcknight MD as MD (Dermatology)  Marj Vasques MD as Resident (Student in organized health care education/training program)    The following health maintenance items are reviewed in Epic and correct as of today:  Health Maintenance   Topic Date Due    COVID-19 Vaccine (1) Never done    ZOSTER IMMUNIZATION (1 of 2) Never done    RSV VACCINE (Pregnancy & 60+) (1 - 1-dose 60+ series) Never done    ANNUAL REVIEW OF HM ORDERS  02/01/2023    LIPID  03/23/2023    INFLUENZA VACCINE (1) 09/01/2023    MEDICARE ANNUAL WELLNESS VISIT  02/09/2024    MAMMO SCREENING  02/27/2024    TSH W/FREE T4 REFLEX  08/17/2024    FALL RISK ASSESSMENT  02/21/2025    COLORECTAL CANCER SCREENING  11/18/2025    DTAP/TDAP/TD IMMUNIZATION (4 - Td or Tdap) 06/01/2026    GLUCOSE  02/19/2027    ADVANCE CARE  "PLANNING  02/09/2028    DEXA  02/08/2037    HEPATITIS C SCREENING  Completed    PHQ-2 (once per calendar year)  Completed    Pneumococcal Vaccine: 65+ Years  Completed    IPV IMMUNIZATION  Completed    HPV IMMUNIZATION  Aged Out    MENINGITIS IMMUNIZATION  Aged Out    RSV MONOCLONAL ANTIBODY  Aged Out            Objective    Exam  LMP  (LMP Unknown)    Estimated body mass index is 21.6 kg/m  as calculated from the following:    Height as of 10/16/23: 1.58 m (5' 2.21\").    Weight as of 2/19/24: 53.9 kg (118 lb 14.4 oz).    Physical Exam  GENERAL: alert and no distress  RESP: lungs clear to auscultation - no rales, rhonchi or wheezes  CV: regular rate and rhythm, normal S1 S2, no S3 or S4, no murmur, click or rub, no peripheral edema  NEURO: Normal strength and tone, mentation intact and speech normal  PSYCH: mentation appears normal, affect normal/bright        2/21/2024   Mini Cog   Clock Draw Score 2 Normal   3 Item Recall 3 objects recalled   Mini Cog Total Score 5            Signed Electronically by: Thomas Villalobos MD    "

## 2024-02-21 NOTE — TELEPHONE ENCOUNTER
Patient was just seen  I believe she was reaching out to her cardiologist (outside of [place])  Please clarify with patient  Thanks    Thomas Villalobos MD on 2/21/2024 at 1:27 PM

## 2024-02-22 RX ORDER — AMLODIPINE BESYLATE 2.5 MG/1
5 TABLET ORAL DAILY
Qty: 180 TABLET | Refills: 1 | Status: SHIPPED | OUTPATIENT
Start: 2024-02-22 | End: 2024-04-09

## 2024-02-22 NOTE — TELEPHONE ENCOUNTER
Spoke with patient who stated that she meant to send refill request to cardiology.     Patient has two doses left of medication. Patient will send message to cardiology. Advised to contact clinic or cardiology tomorrow if not hearing response to prevent gap in medication.     Patient verbalized understanding and had no further questions at time of call.

## 2024-02-28 ENCOUNTER — ANCILLARY PROCEDURE (OUTPATIENT)
Dept: BONE DENSITY | Facility: CLINIC | Age: 74
End: 2024-02-28
Attending: INTERNAL MEDICINE
Payer: MEDICARE

## 2024-02-28 DIAGNOSIS — M81.0 OSTEOPOROSIS, UNSPECIFIED OSTEOPOROSIS TYPE, UNSPECIFIED PATHOLOGICAL FRACTURE PRESENCE: ICD-10-CM

## 2024-02-28 PROCEDURE — 77081 DXA BONE DENSITY APPENDICULR: CPT | Mod: TC | Performed by: PHYSICIAN ASSISTANT

## 2024-02-28 PROCEDURE — 77080 DXA BONE DENSITY AXIAL: CPT | Mod: TC | Performed by: PHYSICIAN ASSISTANT

## 2024-02-28 NOTE — PROGRESS NOTES
"February 27, 2023    CC:  History of breast cancer and multiple myeloma    HPI:  Ms Chatterjee has a history of multiple myeloma being followed by Dr Hills and now Dr Chavez.    Breast cancer history:    Today she is here because she has a past history of Breast cancer which was diagnosed in Oct 1994. It was treated with a right mastectomy and right axillary LN dissection. 8 out of 16 lymph nodes were positive. She did not have reconstruction done. This was followed by 4 cycles of adjuvant Cytoxan, Adriamycin and 5 FU ( CAF ) chemotherapy which finished in Feb 1995. She did not get radiation or hormonal treatment. Note I do not have those records to review and this history is per patient's recall of events.  Since then she has been followed by serial annual mammograms and has been doing well from the breast cancer aspect.    Briefly her myeloma history is summarized below:  She was diagnosed with \"smoldering multiple myeloma\" in 2001, which was watched until 12/2012, when she presented with symptomatic multiple myeloma requiring therapy.   She initially was treated with Revlimid and dexamethasone. However, given poor tolerance and not adequate response to therapy, that was subsequently switched to Velcade and dexamethasone. She received this therapy until 04/2013. However, given minimal response to therapy, Cytoxan was added to the regimen. She received CyBorD until 08/2013, and had a maximal reduction of M-protein to 2.6-2.8 g/dL. A bone marrow biopsy still showed significant disease. Then she received 2 cycles of high-dose Cytoxan with inadequate decline in M-spike. She was then switched D-PACE. She received 2 cycles; one in November 2013, and the last one on 12/05/2013. Despite that she had significant disease burden. She underwent Cytoxan priming on 2/20/14. She received Melphalan conditioning and then received her Auto HCT on 3/27/14.   Since then she has been maintained on pomalidomide maintenance therapy along " with daratumumab.  Her M spike is mildly increased.      She was switched to Isatuximab+Kyprolis+dex since 2/2023 under the care of Dr Chavez. Now in CR. Changed to isatuximab maintenance since 10/2023    Interval History:    In returning today, she has no concerns.  She denies any new lumps or bumps.  She had her bilateral mammogram today.    She does have some hot flashes and vaginal dryness but that has been stable.  No new bone pains or joint pains.    Travelled to [place] mid Feb; she unfortunately developed a cold following this and still getting over it.  Cough resolving.  No fevers.    Plans to travel to [place] end of March -April to see her daughter and granddaughter who live there    She was is being followed by cardiology for HTN and provided recs for better HTN control for mild diastolic dysfunction noted on ECHO last year.    She is currently only Isatuximab maintenance after being in CR with park/Kd tashi.  She is feeling overall reasonably well, more recently has mild bump os M spike and light chains. She has f/up with Dr. Chavez 3/14/24 to discuss further tx.   She denies any loss of appetite or nausea or vomiting.     ROS: 10 point ROS neg other than the symptoms noted above in the HPI.      Current Outpatient Medications   Medication    acetaminophen (TYLENOL) 325 MG tablet    acyclovir (ZOVIRAX) 400 MG tablet    amLODIPine (NORVASC) 2.5 MG tablet    Ascorbic Acid (VITAMIN C PO)    aspirin (ASA) 81 MG chewable tablet    CALCIUM-VITAMIN D-VITAMIN K PO    clobetasol (TEMOVATE) 0.05 % external ointment    COENZYME Q-10 PO    Cyanocobalamin 1000 MCG/ML LIQD    desonide (DESOWEN) 0.05 % external ointment    levothyroxine (SYNTHROID/LEVOTHROID) 50 MCG tablet    lisinopril (ZESTRIL) 2.5 MG tablet    magnesium 100 MG CAPS    Multiple Vitamins-Minerals (MULTIVITAMIN OR)    ondansetron (ZOFRAN) 4 MG tablet    order for DME    order for DME    prochlorperazine (COMPAZINE) 5 MG tablet    triamcinolone (KENALOG)  0.1 % external ointment    ZINC PICOLINATE PO     No current facility-administered medications for this visit.     Facility-Administered Medications Ordered in Other Visits   Medication    plerixafor (MOZOBIL) injection SOLN 12.4 mg    potassium chloride 20 mEq in 50 mL IVPB    potassium chloride SA (K-DUR,KLOR-CON M) CR tablet 20 mEq           LMP  (LMP Unknown)   General: no acute distress  HEENT: PERRLA, no palor or icterus.   NECK: supple - no cervical or supraclavicular LAD  CVS: s1s2 no m r g . No edema  CHEST: clear to auscultation b/l  ABDOMEN: soft non tender no hepatosplenomegaly  NEURO: AAOX3  Grossly non focal neuro exam  SKIN: no obvious rashes  LYMPH NODES: no palpable cervical, supraclavicular, axillary or inguinal LAD  BREAST: right sided mastectomy and well healed surgical scar. No palpable abnormality or lumps noted on either side.      Mammogram today was reviewed by me and with radiology; overall is benign.    Assessment and Recommendations    1. Breast Cancer: She is now more than 20 years out from her diagnosis and completion of the therapy.  She had a left screening mammogram done today which was normal.  We also discussed about the limited utility of yearly mammograms after 75 years of age.  For now recommend continuing yearly mammograms.  I reviewed her imaging with radiology.  Benign.  Return in 1 year.        2. Osteopororis- she has tried pamidronate and did not tolerate it and she was off of therapy for a long time.  Recent dexa scan with her demonstrating osteoporosis with T score -2.5.  Weight bearing exercises.  Calcium and vitamin D.  June 2022 she was started on Prolia and is getting Prolia every 6 months. Last dose 11/13/2023. DEXA done yesterday revealing improvements in her bone health.  She is thrilled.    3. Multiple Myeloma s/p Auto HCT on 3/27/2014- was on Pomalidomide maintenance with daratumumab- she is now being seen by Dr Chavez. She was switched to  Isatuximab+Kyprolis+dex since 2/2023 with CR. Changed to isatuximab maintenance since 10/2023. Recent uptrend of light chains and M spike. Has follow up  with Dr. Chavez 3/14/24 to discuss further treatment.    4. Cardiovascular health: She has had prior anthracyclines totalling an estimation of 240 mg/m2 ; we reviewed the importance of CV risk reduction.  She is on antihypertensives with lisinopril.  We discussed the importance of seeing PCP for cholesterol mgmt, etc.  Based on cardiooncology guidelines, I recommended ECHO done 3/27/2023 normal EF 60-65% with some evidence of concentric remodeling. Following cardio onc Dr Price. Diastolic dysfunction to be treated by better control of HTN (on ACEinh and BB and others), specially while on kyprolis, isatuximab and dexamethasone which can affect BP as well.     Following PCP Dr. Thomas Villalobos (seen 2/2024)    Neelima Da Silva MD

## 2024-02-29 ENCOUNTER — ONCOLOGY VISIT (OUTPATIENT)
Dept: ONCOLOGY | Facility: CLINIC | Age: 74
End: 2024-02-29
Attending: INTERNAL MEDICINE
Payer: MEDICARE

## 2024-02-29 ENCOUNTER — ANCILLARY PROCEDURE (OUTPATIENT)
Dept: MAMMOGRAPHY | Facility: CLINIC | Age: 74
End: 2024-02-29
Attending: INTERNAL MEDICINE
Payer: MEDICARE

## 2024-02-29 VITALS
TEMPERATURE: 97.9 F | DIASTOLIC BLOOD PRESSURE: 76 MMHG | HEIGHT: 62 IN | OXYGEN SATURATION: 98 % | RESPIRATION RATE: 16 BRPM | BODY MASS INDEX: 20.99 KG/M2 | HEART RATE: 77 BPM | SYSTOLIC BLOOD PRESSURE: 152 MMHG | WEIGHT: 114.1 LBS

## 2024-02-29 DIAGNOSIS — Z85.3 PERSONAL HISTORY OF MALIGNANT NEOPLASM OF BREAST: ICD-10-CM

## 2024-02-29 DIAGNOSIS — Z12.31 VISIT FOR SCREENING MAMMOGRAM: ICD-10-CM

## 2024-02-29 DIAGNOSIS — Z12.31 VISIT FOR SCREENING MAMMOGRAM: Primary | ICD-10-CM

## 2024-02-29 DIAGNOSIS — M81.0 OSTEOPOROSIS, UNSPECIFIED OSTEOPOROSIS TYPE, UNSPECIFIED PATHOLOGICAL FRACTURE PRESENCE: ICD-10-CM

## 2024-02-29 DIAGNOSIS — C90.00 MULTIPLE MYELOMA NOT HAVING ACHIEVED REMISSION (H): ICD-10-CM

## 2024-02-29 PROCEDURE — 77063 BREAST TOMOSYNTHESIS BI: CPT | Mod: 52

## 2024-02-29 PROCEDURE — 77067 SCR MAMMO BI INCL CAD: CPT | Mod: 52

## 2024-02-29 PROCEDURE — G0463 HOSPITAL OUTPT CLINIC VISIT: HCPCS | Performed by: INTERNAL MEDICINE

## 2024-02-29 PROCEDURE — 99214 OFFICE O/P EST MOD 30 MIN: CPT | Performed by: INTERNAL MEDICINE

## 2024-02-29 NOTE — NURSING NOTE
"Oncology Rooming Note    February 29, 2024 10:39 AM   Shena Hartmann is a 73 year old female who presents for:    Chief Complaint   Patient presents with    Breast Cancer     Initial Vitals: BP (!) 152/76 (BP Location: Left arm, Patient Position: Sitting, Cuff Size: Adult Small)   Pulse 77   Temp 97.9  F (36.6  C) (Oral)   Resp 16   Ht 1.565 m (5' 1.61\")   Wt 51.8 kg (114 lb 1.6 oz)   LMP  (LMP Unknown)   SpO2 98%   BMI 21.13 kg/m   Estimated body mass index is 21.13 kg/m  as calculated from the following:    Height as of this encounter: 1.565 m (5' 1.61\").    Weight as of this encounter: 51.8 kg (114 lb 1.6 oz). Body surface area is 1.5 meters squared.  Mild Pain (2) Comment: generalized   No LMP recorded (lmp unknown). Patient is postmenopausal.  Allergies reviewed: Yes  Medications reviewed: Yes    Medications: Medication refills not needed today.  Pharmacy name entered into [facility]:    [id number] - [place], [place] - [address] AT [facility] [address]  WRITTEN PRESCRIPTION REQUESTED  [facility] - [place], [place] - [address]    Frailty Screening:   Is the patient here for a new oncology consult visit in cancer care? 2. No      Clinical concerns: none       Molly Armenta"

## 2024-02-29 NOTE — LETTER
"    2/29/2024         RE: Shena Hartmann  [address] [place] [address]        Dear Colleague,    Thank you for referring your patient, Shena Hartmann, to the [facility]. Please see a copy of my visit note below.    February 27, 2023    CC:  History of breast cancer and multiple myeloma    HPI:  Ms Chatterjee has a history of multiple myeloma being followed by Dr Hills and now Dr Chavez.    Breast cancer history:    Today she is here because she has a past history of Breast cancer which was diagnosed in Oct 1994. It was treated with a right mastectomy and right axillary LN dissection. 8 out of 16 lymph nodes were positive. She did not have reconstruction done. This was followed by 4 cycles of adjuvant Cytoxan, Adriamycin and 5 FU ( CAF ) chemotherapy which finished in Feb 1995. She did not get radiation or hormonal treatment. Note I do not have those records to review and this history is per patient's recall of events.  Since then she has been followed by serial annual mammograms and has been doing well from the breast cancer aspect.    Briefly her myeloma history is summarized below:  She was diagnosed with \"smoldering multiple myeloma\" in 2001, which was watched until 12/2012, when she presented with symptomatic multiple myeloma requiring therapy.   She initially was treated with Revlimid and dexamethasone. However, given poor tolerance and not adequate response to therapy, that was subsequently switched to Velcade and dexamethasone. She received this therapy until 04/2013. However, given minimal response to therapy, Cytoxan was added to the regimen. She received CyBorD until 08/2013, and had a maximal reduction of M-protein to 2.6-2.8 g/dL. A bone marrow biopsy still showed significant disease. Then she received 2 cycles of high-dose Cytoxan with inadequate decline in M-spike. She was then switched D-PACE. She received 2 cycles; one in November 2013, and the last one on " 12/05/2013. Despite that she had significant disease burden. She underwent Cytoxan priming on 2/20/14. She received Melphalan conditioning and then received her Auto HCT on 3/27/14.   Since then she has been maintained on pomalidomide maintenance therapy along with daratumumab.  Her M spike is mildly increased.      She was switched to Isatuximab+Kyprolis+dex since 2/2023 under the care of Dr Chavez. Now in CR. Changed to isatuximab maintenance since 10/2023    Interval History:    In returning today, she has no concerns.  She denies any new lumps or bumps.  She had her bilateral mammogram today.    She does have some hot flashes and vaginal dryness but that has been stable.  No new bone pains or joint pains.    Travelled to [place] mid Feb; she unfortunately developed a cold following this and still getting over it.  Cough resolving.  No fevers.    Plans to travel to [place] end of March -April to see her daughter and granddaughter who live there    She was is being followed by cardiology for HTN and provided recs for better HTN control for mild diastolic dysfunction noted on ECHO last year.    She is currently only Isatuximab maintenance after being in CR with park/Kd tashi.  She is feeling overall reasonably well, more recently has mild bump os M spike and light chains. She has f/up with Dr. Chavez 3/14/24 to discuss further tx.   She denies any loss of appetite or nausea or vomiting.     ROS: 10 point ROS neg other than the symptoms noted above in the HPI.      Current Outpatient Medications   Medication     acetaminophen (TYLENOL) 325 MG tablet     acyclovir (ZOVIRAX) 400 MG tablet     amLODIPine (NORVASC) 2.5 MG tablet     Ascorbic Acid (VITAMIN C PO)     aspirin (ASA) 81 MG chewable tablet     CALCIUM-VITAMIN D-VITAMIN K PO     clobetasol (TEMOVATE) 0.05 % external ointment     COENZYME Q-10 PO     Cyanocobalamin 1000 MCG/ML LIQD     desonide (DESOWEN) 0.05 % external ointment     levothyroxine  (SYNTHROID/LEVOTHROID) 50 MCG tablet     lisinopril (ZESTRIL) 2.5 MG tablet     magnesium 100 MG CAPS     Multiple Vitamins-Minerals (MULTIVITAMIN OR)     ondansetron (ZOFRAN) 4 MG tablet     order for DME     order for DME     prochlorperazine (COMPAZINE) 5 MG tablet     triamcinolone (KENALOG) 0.1 % external ointment     ZINC PICOLINATE PO     No current facility-administered medications for this visit.     Facility-Administered Medications Ordered in Other Visits   Medication     plerixafor (MOZOBIL) injection SOLN 12.4 mg     potassium chloride 20 mEq in 50 mL IVPB     potassium chloride SA (K-DUR,KLOR-CON M) CR tablet 20 mEq           LMP  (LMP Unknown)   General: no acute distress  HEENT: PERRLA, no palor or icterus.   NECK: supple - no cervical or supraclavicular LAD  CVS: s1s2 no m r g . No edema  CHEST: clear to auscultation b/l  ABDOMEN: soft non tender no hepatosplenomegaly  NEURO: AAOX3  Grossly non focal neuro exam  SKIN: no obvious rashes  LYMPH NODES: no palpable cervical, supraclavicular, axillary or inguinal LAD  BREAST: right sided mastectomy and well healed surgical scar. No palpable abnormality or lumps noted on either side.      Mammogram today was reviewed by me and with radiology; overall is benign.    Assessment and Recommendations    1. Breast Cancer: She is now more than 20 years out from her diagnosis and completion of the therapy.  She had a left screening mammogram done today which was normal.  We also discussed about the limited utility of yearly mammograms after 75 years of age.  For now recommend continuing yearly mammograms.  I reviewed her imaging with radiology.  Benign.  Return in 1 year.        2. Osteopororis- she has tried pamidronate and did not tolerate it and she was off of therapy for a long time.  Recent dexa scan with her demonstrating osteoporosis with T score -2.5.  Weight bearing exercises.  Calcium and vitamin D.  June 2022 she was started on Prolia and is getting  Prolia every 6 months. Last dose 11/13/2023. DEXA done yesterday revealing improvements in her bone health.  She is thrilled.    3. Multiple Myeloma s/p Auto HCT on 3/27/2014- was on Pomalidomide maintenance with daratumumab- she is now being seen by Dr Chavez. She was switched to Isatuximab+Kyprolis+dex since 2/2023 with CR. Changed to isatuximab maintenance since 10/2023. Recent uptrend of light chains and M spike. Has follow up  with Dr. Chavez 3/14/24 to discuss further treatment.    4. Cardiovascular health: She has had prior anthracyclines totalling an estimation of 240 mg/m2 ; we reviewed the importance of CV risk reduction.  She is on antihypertensives with lisinopril.  We discussed the importance of seeing PCP for cholesterol mgmt, etc.  Based on cardiooncology guidelines, I recommended ECHO done 3/27/2023 normal EF 60-65% with some evidence of concentric remodeling. Following cardio onc Dr Price. Diastolic dysfunction to be treated by better control of HTN (on ACEinh and BB and others), specially while on kyprolis, isatuximab and dexamethasone which can affect BP as well.     Following PCP Dr. Thomas Villalobos (seen 2/2024)    Neelima Da Silva MD

## 2024-03-12 ENCOUNTER — ONCOLOGY VISIT (OUTPATIENT)
Dept: ONCOLOGY | Facility: CLINIC | Age: 74
End: 2024-03-12
Attending: STUDENT IN AN ORGANIZED HEALTH CARE EDUCATION/TRAINING PROGRAM
Payer: MEDICARE

## 2024-03-12 ENCOUNTER — APPOINTMENT (OUTPATIENT)
Dept: LAB | Facility: CLINIC | Age: 74
End: 2024-03-12
Attending: STUDENT IN AN ORGANIZED HEALTH CARE EDUCATION/TRAINING PROGRAM
Payer: MEDICARE

## 2024-03-12 VITALS
WEIGHT: 117.9 LBS | DIASTOLIC BLOOD PRESSURE: 76 MMHG | TEMPERATURE: 97.9 F | OXYGEN SATURATION: 97 % | SYSTOLIC BLOOD PRESSURE: 146 MMHG | BODY MASS INDEX: 21.84 KG/M2 | HEART RATE: 78 BPM | RESPIRATION RATE: 16 BRPM

## 2024-03-12 DIAGNOSIS — C90.00 MULTIPLE MYELOMA NOT HAVING ACHIEVED REMISSION (H): Primary | ICD-10-CM

## 2024-03-12 DIAGNOSIS — C90.02 MULTIPLE MYELOMA IN RELAPSE (H): Primary | ICD-10-CM

## 2024-03-12 LAB
ALBUMIN SERPL BCG-MCNC: 4.2 G/DL (ref 3.5–5.2)
ALP SERPL-CCNC: 51 U/L (ref 40–150)
ALT SERPL W P-5'-P-CCNC: 17 U/L (ref 0–50)
ANION GAP SERPL CALCULATED.3IONS-SCNC: 10 MMOL/L (ref 7–15)
AST SERPL W P-5'-P-CCNC: 20 U/L (ref 0–45)
BASOPHILS # BLD AUTO: 0 10E3/UL (ref 0–0.2)
BASOPHILS NFR BLD AUTO: 1 %
BILIRUB SERPL-MCNC: 0.2 MG/DL
BUN SERPL-MCNC: 15.7 MG/DL (ref 8–23)
CALCIUM SERPL-MCNC: 9.1 MG/DL (ref 8.8–10.2)
CHLORIDE SERPL-SCNC: 100 MMOL/L (ref 98–107)
CREAT SERPL-MCNC: 0.88 MG/DL (ref 0.51–0.95)
DEPRECATED HCO3 PLAS-SCNC: 25 MMOL/L (ref 22–29)
EGFRCR SERPLBLD CKD-EPI 2021: 69 ML/MIN/1.73M2
EOSINOPHIL # BLD AUTO: 0.1 10E3/UL (ref 0–0.7)
EOSINOPHIL NFR BLD AUTO: 2 %
ERYTHROCYTE [DISTWIDTH] IN BLOOD BY AUTOMATED COUNT: 15.4 % (ref 10–15)
GLUCOSE SERPL-MCNC: 119 MG/DL (ref 70–99)
HCT VFR BLD AUTO: 35.3 % (ref 35–47)
HGB BLD-MCNC: 11.9 G/DL (ref 11.7–15.7)
IMM GRANULOCYTES # BLD: 0 10E3/UL
IMM GRANULOCYTES NFR BLD: 0 %
LDH SERPL L TO P-CCNC: 167 U/L (ref 0–250)
LYMPHOCYTES # BLD AUTO: 1.8 10E3/UL (ref 0.8–5.3)
LYMPHOCYTES NFR BLD AUTO: 33 %
MCH RBC QN AUTO: 33.8 PG (ref 26.5–33)
MCHC RBC AUTO-ENTMCNC: 33.7 G/DL (ref 31.5–36.5)
MCV RBC AUTO: 100 FL (ref 78–100)
MONOCYTES # BLD AUTO: 0.6 10E3/UL (ref 0–1.3)
MONOCYTES NFR BLD AUTO: 12 %
NEUTROPHILS # BLD AUTO: 2.8 10E3/UL (ref 1.6–8.3)
NEUTROPHILS NFR BLD AUTO: 52 %
NRBC # BLD AUTO: 0 10E3/UL
NRBC BLD AUTO-RTO: 0 /100
PLATELET # BLD AUTO: 188 10E3/UL (ref 150–450)
POTASSIUM SERPL-SCNC: 4.3 MMOL/L (ref 3.4–5.3)
PROT SERPL-MCNC: 6.3 G/DL (ref 6.4–8.3)
RBC # BLD AUTO: 3.52 10E6/UL (ref 3.8–5.2)
SODIUM SERPL-SCNC: 135 MMOL/L (ref 135–145)
WBC # BLD AUTO: 5.3 10E3/UL (ref 4–11)

## 2024-03-12 PROCEDURE — 84155 ASSAY OF PROTEIN SERUM: CPT

## 2024-03-12 PROCEDURE — 83615 LACTATE (LD) (LDH) ENZYME: CPT

## 2024-03-12 PROCEDURE — 80053 COMPREHEN METABOLIC PANEL: CPT | Performed by: STUDENT IN AN ORGANIZED HEALTH CARE EDUCATION/TRAINING PROGRAM

## 2024-03-12 PROCEDURE — 36591 DRAW BLOOD OFF VENOUS DEVICE: CPT

## 2024-03-12 PROCEDURE — 85004 AUTOMATED DIFF WBC COUNT: CPT | Performed by: STUDENT IN AN ORGANIZED HEALTH CARE EDUCATION/TRAINING PROGRAM

## 2024-03-12 PROCEDURE — G0463 HOSPITAL OUTPT CLINIC VISIT: HCPCS | Performed by: STUDENT IN AN ORGANIZED HEALTH CARE EDUCATION/TRAINING PROGRAM

## 2024-03-12 PROCEDURE — 84165 PROTEIN E-PHORESIS SERUM: CPT | Mod: 26 | Performed by: PATHOLOGY

## 2024-03-12 PROCEDURE — 250N000011 HC RX IP 250 OP 636: Performed by: STUDENT IN AN ORGANIZED HEALTH CARE EDUCATION/TRAINING PROGRAM

## 2024-03-12 PROCEDURE — 84165 PROTEIN E-PHORESIS SERUM: CPT | Mod: TC | Performed by: PATHOLOGY

## 2024-03-12 PROCEDURE — 83521 IG LIGHT CHAINS FREE EACH: CPT

## 2024-03-12 PROCEDURE — 99214 OFFICE O/P EST MOD 30 MIN: CPT | Performed by: STUDENT IN AN ORGANIZED HEALTH CARE EDUCATION/TRAINING PROGRAM

## 2024-03-12 RX ORDER — MEPERIDINE HYDROCHLORIDE 25 MG/ML
25 INJECTION INTRAMUSCULAR; INTRAVENOUS; SUBCUTANEOUS EVERY 30 MIN PRN
Status: CANCELLED | OUTPATIENT
Start: 2024-05-09

## 2024-03-12 RX ORDER — HEPARIN SODIUM,PORCINE 10 UNIT/ML
5 VIAL (ML) INTRAVENOUS
Status: CANCELLED | OUTPATIENT
Start: 2024-05-09

## 2024-03-12 RX ORDER — ACETAMINOPHEN 325 MG/1
650 TABLET ORAL ONCE
Status: DISCONTINUED | OUTPATIENT
Start: 2024-03-12 | End: 2024-03-12 | Stop reason: HOSPADM

## 2024-03-12 RX ORDER — ALBUTEROL SULFATE 0.83 MG/ML
2.5 SOLUTION RESPIRATORY (INHALATION)
Status: CANCELLED | OUTPATIENT
Start: 2024-05-09

## 2024-03-12 RX ORDER — HEPARIN SODIUM (PORCINE) LOCK FLUSH IV SOLN 100 UNIT/ML 100 UNIT/ML
5 SOLUTION INTRAVENOUS
Status: CANCELLED | OUTPATIENT
Start: 2024-05-09

## 2024-03-12 RX ORDER — ONDANSETRON 2 MG/ML
8 INJECTION INTRAMUSCULAR; INTRAVENOUS ONCE
Status: CANCELLED | OUTPATIENT
Start: 2024-04-03 | End: 2024-04-03

## 2024-03-12 RX ORDER — DIPHENHYDRAMINE HYDROCHLORIDE 50 MG/ML
50 INJECTION INTRAMUSCULAR; INTRAVENOUS
Status: CANCELLED
Start: 2024-05-09

## 2024-03-12 RX ORDER — ALBUTEROL SULFATE 90 UG/1
1-2 AEROSOL, METERED RESPIRATORY (INHALATION)
Status: CANCELLED
Start: 2024-05-09

## 2024-03-12 RX ORDER — ACETAMINOPHEN 325 MG/1
650 TABLET ORAL ONCE
Status: CANCELLED | OUTPATIENT
Start: 2024-04-03

## 2024-03-12 RX ORDER — ONDANSETRON 2 MG/ML
8 INJECTION INTRAMUSCULAR; INTRAVENOUS ONCE
Status: DISCONTINUED | OUTPATIENT
Start: 2024-03-12 | End: 2024-03-12 | Stop reason: HOSPADM

## 2024-03-12 RX ORDER — EPINEPHRINE 1 MG/ML
0.3 INJECTION, SOLUTION INTRAMUSCULAR; SUBCUTANEOUS EVERY 5 MIN PRN
Status: CANCELLED | OUTPATIENT
Start: 2024-05-09

## 2024-03-12 RX ORDER — HEPARIN SODIUM (PORCINE) LOCK FLUSH IV SOLN 100 UNIT/ML 100 UNIT/ML
5 SOLUTION INTRAVENOUS ONCE
Status: COMPLETED | OUTPATIENT
Start: 2024-03-12 | End: 2024-03-12

## 2024-03-12 RX ORDER — METHYLPREDNISOLONE SODIUM SUCCINATE 125 MG/2ML
125 INJECTION, POWDER, LYOPHILIZED, FOR SOLUTION INTRAMUSCULAR; INTRAVENOUS
Status: CANCELLED
Start: 2024-05-09

## 2024-03-12 RX ORDER — LORAZEPAM 2 MG/ML
0.5 INJECTION INTRAMUSCULAR EVERY 4 HOURS PRN
Status: CANCELLED | OUTPATIENT
Start: 2024-05-09

## 2024-03-12 RX ADMIN — Medication 5 ML: at 13:51

## 2024-03-12 ASSESSMENT — PAIN SCALES - GENERAL: PAINLEVEL: NO PAIN (0)

## 2024-03-12 NOTE — PROGRESS NOTES
Infusion Nursing Note:  Shena Hartmann presents today for Day 1 Cycle 13 Sarclisa-deferred do to insurance  Patient seen by provider today: Yes: Dr. Chavez   present during visit today: Not Applicable.    Note: pt presents to infusion feeling ok. Pt denies new acute discomfort and states no acute complaints or concerns not addressed by Dr. Chavez.     Pt due for Sarclisa on 3/18/24 per 28 day cycle.  Per written communication. 1526. 3/12/24. Dr. Chavez. Ranjit Jorgensen RN. Ok to give Sarclisa 1 week early.     Per Rubi from pharmacy, pts insurance will only authorize Sarclisa 2 days before due date so today would not be covered. Therefore patient sent home. Pt offered to come back 2/18 (unable to give on wkd per clinic protocol), however pt leaves for vacation next weekend and states she needs at least a week to recover from Sarclisa. Therefore 2/18 appt option refused by patient.  Pt returns 4/7/24 from her trip. Pt states she is ok with missing 1 treatment. IB sent to Dr. Chavez explaining situation.     Intravenous Access:  Implanted Port.    Treatment Conditions:  Lab Results   Component Value Date    HGB 11.9 03/12/2024    WBC 5.3 03/12/2024    ANEU 2.1 07/05/2021    ANEUTAUTO 2.8 03/12/2024     03/12/2024        Lab Results   Component Value Date     03/12/2024    POTASSIUM 4.3 03/12/2024    MAG 1.8 12/12/2014    CR 0.88 03/12/2024    PAULINE 9.1 03/12/2024    BILITOTAL 0.2 03/12/2024    ALBUMIN 4.2 03/12/2024    ALT 17 03/12/2024    AST 20 03/12/2024         Post Infusion Assessment:  Access discontinued per protocol.       Discharge Plan:   Patient declined prescription refills.  Discharge instructions reviewed with: Patient.  Patient and/or family verbalized understanding of discharge instructions and all questions answered.  AVS to patient via [facility] [employer].  Patient will return 4/10 for next appointment.   Patient discharged in stable condition accompanied by: self.  Departure Mode:  Ambulatory.      Ranjit Jorgensen RN

## 2024-03-12 NOTE — NURSING NOTE
"Oncology Rooming Note    March 12, 2024 2:04 PM   Shena Hartmann is a 73 year old female who presents for:    Chief Complaint   Patient presents with    Blood Draw     Port blood draw with heparin flush by lab RN. Vitals taken and appointment arrived    Oncology Clinic Visit     Multiple Myeloma     Initial Vitals: BP (!) 146/76   Pulse 78   Temp 97.9  F (36.6  C) (Oral)   Resp 16   Wt 53.5 kg (117 lb 14.4 oz)   LMP  (LMP Unknown)   SpO2 97%   BMI 21.84 kg/m   Estimated body mass index is 21.84 kg/m  as calculated from the following:    Height as of 2/29/24: 1.565 m (5' 1.61\").    Weight as of this encounter: 53.5 kg (117 lb 14.4 oz). Body surface area is 1.53 meters squared.  No Pain (0) Comment: Data Unavailable   No LMP recorded (lmp unknown). Patient is postmenopausal.  Allergies reviewed: Yes  Medications reviewed: Yes    Medications: Medication refills not needed today.  Pharmacy name entered into [facility]:    [facility][id number] - [place], [place] - [address] AT [facility] [address]  WRITTEN PRESCRIPTION REQUESTED  [facility] - [place], [place] - [address]    Frailty Screening:   Is the patient here for a new oncology consult visit in cancer care? 2. No      Clinical concerns: None       Yamileth Mcknight LPN  3/12/2024                "

## 2024-03-12 NOTE — PROGRESS NOTES
[facility]    Hematology/Oncology Clinic Note    Mar 12, 2024    Reason for Office Visit   Evaluation and toxicity check during isatuximab maintenance    Cancer Diagnosis   Multiple Myeloma    Oncology History of Present Illness   ? Breast cancer dx in 1994. Underwent surgery and chemotherapy without reoccurence  ? MGUS dx 1999  ? T8 pathologic fracture with radiation  ? Revlimid and velcade,   2013  ? Cytoxan IV 9/2013  ? D-PACE 11/2013  ? Auto 3/27/14  ? 5/2014 thalidomide caused rash so switched to pomalidomide   ? on kenalog and clobetasol creams for IMID rash  ? FLC began to rise so riky added to pom 9/2020  ? she has been on xgeva for an unclear amount of time  ? Teodora-Kd 2/20/2023    Interval History     - Traveling to [place] 3/23-4/7 and will visit granddaughter in [place].   - no new bone pains  - tired after infusions and has some 'aches' for which she takes tylenol  - has cough from lisinopril   - had viral URI in Jan and Feb    ROS neg other than the symptoms noted above in the HPI.        Past Medical History     Past Medical History:   Diagnosis Date     Acquired hypothyroidism 8/10/2023     Breast cancer (H) 01/01/1994    right     H/O stem cell transplant (H) 03/01/2014     History of blood transfusion 2013 & 2014    During stem cell tx process     Hypertension Sept. 2021    Runs 140 s systolically, about 20 above my usual     Multiple myeloma, without mention of having achieved remission 11/06/2012       Past Surgical History:      Past Surgical History:   Procedure Laterality Date     BIOPSY  10/1994; [id number]    Lt. Breast in '94; multiple bone marrow bx's for MM     BREAST SURGERY  10/1994    Lt. Mastectomy w/lymph node resection     COLONOSCOPY  11/2015    Normal results     EYE SURGERY  2020    Bilateral cataract surgery     INSERT PORT VASCULAR ACCESS Left 2/15/2023    Procedure: INSERTION, VASCULAR ACCESS PORT;  Surgeon: Luc Antunez MD;  [place] [facility] [place]     IR CHEST PORT PLACEMENT > 5 YRS OF AGE  2/15/2023     MASTECTOMY      Right, with lymphe node disection       PICC INSERTION  09/10/2013    5fr DL Power PICC, 44cm (1cm external) in the L lateral brachial vein with tip in the low SVC     TRANSPLANT  3/27/2014    Autologous stem cell tx       Social History     Socioeconomic History     Marital status:    Tobacco Use     Smoking status: Never     Passive exposure: Never     Smokeless tobacco: Never   Substance and Sexual Activity     Alcohol use: Yes     Comment: occ     Drug use: No     Sexual activity: Not Currently     Partners: Male     Birth control/protection: Post-menopausal   Other Topics Concern     Parent/sibling w/ CABG, MI or angioplasty before 65F 55M? No      Service No     Blood Transfusions No     Caffeine Concern No     Occupational Exposure No     Hobby Hazards No     Family History     Family History   Problem Relation Age of Onset     Cancer Paternal Grandmother         skin cancer     Hypertension Mother      Cerebrovascular Disease Mother          8-11-15 from strokes     Hypertension Father      Prostate Cancer Father        Allergies:     Allergies   Allergen Reactions     Blood Transfusion Related (Informational Only) Other (See Comments)     Patient has a history of a clinically significant antibody against RBC antigens.  A delay in compatible RBCs may occur.      Cayenne Pepper [Cayenne]      Corn-Containing Products GI Disturbance     Levaquin Other (See Comments)     Tendon pain     Milk Protein GI Disturbance     Mold      Facial rash/lip swelling     Morphine Sulfate Other (See Comments)     Per pt - see things (hallucination) when she was on Morphine .     Pollen Extract      Environmental allergies      Shrimp Nausea and Vomiting     Tomato      Lip swelling, facial rash     Azithromycin Rash     Keflex [Cephalexin] Rash     Oat Rash     Tape [Adhesive Tape] Itching and Rash     All adhesives     Wheat Rash      Swelling of lips         Medications:     Current Outpatient Medications   Medication     acetaminophen (TYLENOL) 325 MG tablet     acyclovir (ZOVIRAX) 400 MG tablet     amLODIPine (NORVASC) 2.5 MG tablet     Ascorbic Acid (VITAMIN C PO)     aspirin (ASA) 81 MG chewable tablet     CALCIUM-VITAMIN D-VITAMIN K PO     clobetasol (TEMOVATE) 0.05 % external ointment     COENZYME Q-10 PO     Cyanocobalamin 1000 MCG/ML LIQD     desonide (DESOWEN) 0.05 % external ointment     levothyroxine (SYNTHROID/LEVOTHROID) 50 MCG tablet     lisinopril (ZESTRIL) 2.5 MG tablet     magnesium 100 MG CAPS     Multiple Vitamins-Minerals (MULTIVITAMIN OR)     ondansetron (ZOFRAN) 4 MG tablet     order for DME     order for DME     prochlorperazine (COMPAZINE) 5 MG tablet     triamcinolone (KENALOG) 0.1 % external ointment     ZINC PICOLINATE PO     No current facility-administered medications for this visit.     Facility-Administered Medications Ordered in Other Visits   Medication     plerixafor (MOZOBIL) injection SOLN 12.4 mg     potassium chloride 20 mEq in 50 mL IVPB     potassium chloride SA (K-DUR,KLOR-CON M) CR tablet 20 mEq       Physical Exam   BP (!) 146/76   Pulse 78   Temp 97.9  F (36.6  C) (Oral)   Resp 16   Wt 53.5 kg (117 lb 14.4 oz)   LMP  (LMP Unknown)   SpO2 97%   BMI 21.84 kg/m      Wt Readings from Last 5 Encounters:   03/12/24 53.5 kg (117 lb 14.4 oz)   02/29/24 51.8 kg (114 lb 1.6 oz)   02/21/24 53.3 kg (117 lb 6.4 oz)   02/19/24 53.9 kg (118 lb 14.4 oz)   01/15/24 52.3 kg (115 lb 4.8 oz)     Constitutional: NAD  Eyes: no scleral icterus  ENT: no oral lesions  Lymph: no cervical, supraclavicular  LAD  Pulm: CTAB  CV: RRR, no m/r/g  GI: bowel sounds present, soft and nontender to palpation  MSK: No edema in the extremities  Neuro: alert, conversant, normal gait  Psych: appropriate mood and affect      Data     Most Recent 3 CBC's:  Recent Labs   Lab Test 03/12/24  1349 02/19/24  1333 01/15/24  0745   WBC 5.3  6.5 6.2   HGB 11.9 12.0 12.7    98 97    216 243   ANEUTAUTO 2.8 4.1 4.1     Most Recent 3 BMP's:  Recent Labs   Lab Test 02/19/24  1333 01/15/24  0745 12/04/23  0801   * 136 133*   POTASSIUM 4.3 4.2 4.4   CHLORIDE 98 98 97*   CO2 26 27 27   BUN 13.2 14.9 13.0   CR 0.78 0.75 0.71   ANIONGAP 9 11 9   PAULINE 9.3 9.7 9.2   GLC 87 89 81   PROTTOTAL 6.3* 6.4 6.2*   ALBUMIN 4.2 4.0 4.2    Most Recent 3 LFT's:  Recent Labs   Lab Test 02/19/24  1333 01/15/24  0745 12/04/23  0801   AST 24 19 23   ALT 23 23 25   ALKPHOS 57 57 45   BILITOTAL 0.2 0.2 0.3    Most Recent 2 TSH and T4:  Recent Labs   Lab Test 02/19/24  1333 08/17/23  1349 10/03/22  0820 06/13/22  0822 03/21/22  0803   TSH 3.08 2.12   < > 9.47*  9.31* 5.79*   T4  --   --   --  0.99  0.97 0.83    < > = values in this interval not displayed.     I reviewed the above labs today myself and with the patient.    Assessment/Plan     Relapsed Multiple Myeloma  ? Started kyprolis/isatuximab/dex on 2/20/23.    ? Isatuximab maintenace 10mg/kg every 4 week started 10/9/23  ? Monthly myeloma labs  ? Xgeva every 6 months, last dose 11/13/23. Likes to separate the chemo dosing from this.   ? She is having low level relapse possibly from missed infusions, Emphasized the importance of regular dosing to prevent relapse. Continue to follow myeloma labs.      HTN  ? Continue lisinopril 5 mg daily and amlodipine 5 mg daily.  ? Followed by cardiology    Hypothyroidism  ? Remains on levothyroxine    ID Prophylaxis     Acyclovir 400 mg BID for HSV prophylaxis    Nighat Chavez MD PhD

## 2024-03-12 NOTE — LETTER
3/12/2024         RE: Shena Hartmann  [address] [place] [address]        Dear Colleague,    Thank you for referring your patient, Shena Hartmann, to the [facility]. Please see a copy of my visit note below.        [facility]    Hematology/Oncology Clinic Note    Mar 12, 2024    Reason for Office Visit   Evaluation and toxicity check during isatuximab maintenance    Cancer Diagnosis   Multiple Myeloma    Oncology History of Present Illness   Breast cancer dx in 1994. Underwent surgery and chemotherapy without reoccurence  MGUS dx 1999  T8 pathologic fracture with radiation  Revlimid and velcade,   2013  Cytoxan IV 9/2013  D-PACE 11/2013  Auto 3/27/14  5/2014 thalidomide caused rash so switched to pomalidomide   on kenalog and clobetasol creams for IMID rash  FLC began to rise so riky added to pom 9/2020  she has been on xgeva for an unclear amount of time  Teodora-Kd 2/20/2023    Interval History     - Traveling to [place] 3/23-4/7 and will visit granddaughter in [place].   - no new bone pains  - tired after infusions and has some 'aches' for which she takes tylenol  - has cough from lisinopril   - had viral URI in Jan and Feb    ROS neg other than the symptoms noted above in the HPI.        Past Medical History     Past Medical History:   Diagnosis Date    Acquired hypothyroidism 8/10/2023    Breast cancer (H) 01/01/1994    right    H/O stem cell transplant (H) 03/01/2014    History of blood transfusion 2013 & 2014    During stem cell tx process    Hypertension Sept. 2021    Runs 140 s systolically, about 20 above my usual    Multiple myeloma, without mention of having achieved remission 11/06/2012       Past Surgical History:      Past Surgical History:   Procedure Laterality Date    BIOPSY  10/1994; [id number]    Lt. Breast in '94; multiple bone marrow bx's for MM    BREAST SURGERY  10/1994    Lt. Mastectomy w/lymph node resection     COLONOSCOPY  2015    Normal results    EYE SURGERY      Bilateral cataract surgery    INSERT PORT VASCULAR ACCESS Left 2/15/2023    Procedure: INSERTION, VASCULAR ACCESS PORT;  Surgeon: Luc Antunez MD;  Location: [facility] OR    IR CHEST PORT PLACEMENT > 5 YRS OF AGE  2/15/2023    MASTECTOMY      Right, with lymphe node disection      PICC INSERTION  09/10/2013    5fr DL Power PICC, 44cm (1cm external) in the L lateral brachial vein with tip in the low SVC    TRANSPLANT  3/27/2014    Autologous stem cell tx       Social History     Socioeconomic History    Marital status:    Tobacco Use    Smoking status: Never     Passive exposure: Never    Smokeless tobacco: Never   Substance and Sexual Activity    Alcohol use: Yes     Comment: occ    Drug use: No    Sexual activity: Not Currently     Partners: Male     Birth control/protection: Post-menopausal   Other Topics Concern    Parent/sibling w/ CABG, MI or angioplasty before 65F 55M? No     Service No    Blood Transfusions No    Caffeine Concern No    Occupational Exposure No    Hobby Hazards No     Family History     Family History   Problem Relation Age of Onset    Cancer Paternal Grandmother         skin cancer    Hypertension Mother     Cerebrovascular Disease Mother          8-11-15 from strokes    Hypertension Father     Prostate Cancer Father        Allergies:     Allergies   Allergen Reactions    Blood Transfusion Related (Informational Only) Other (See Comments)     Patient has a history of a clinically significant antibody against RBC antigens.  A delay in compatible RBCs may occur.     Cayenne Pepper [Cayenne]     Corn-Containing Products GI Disturbance    Levaquin Other (See Comments)     Tendon pain    Milk Protein GI Disturbance    Mold      Facial rash/lip swelling    Morphine Sulfate Other (See Comments)     Per pt - see things (hallucination) when she was on Morphine .    Pollen Extract      Environmental allergies     Shrimp  Nausea and Vomiting    Tomato      Lip swelling, facial rash    Azithromycin Rash    Keflex [Cephalexin] Rash    Oat Rash    Tape [Adhesive Tape] Itching and Rash     All adhesives    Wheat Rash     Swelling of lips         Medications:     Current Outpatient Medications   Medication    acetaminophen (TYLENOL) 325 MG tablet    acyclovir (ZOVIRAX) 400 MG tablet    amLODIPine (NORVASC) 2.5 MG tablet    Ascorbic Acid (VITAMIN C PO)    aspirin (ASA) 81 MG chewable tablet    CALCIUM-VITAMIN D-VITAMIN K PO    clobetasol (TEMOVATE) 0.05 % external ointment    COENZYME Q-10 PO    Cyanocobalamin 1000 MCG/ML LIQD    desonide (DESOWEN) 0.05 % external ointment    levothyroxine (SYNTHROID/LEVOTHROID) 50 MCG tablet    lisinopril (ZESTRIL) 2.5 MG tablet    magnesium 100 MG CAPS    Multiple Vitamins-Minerals (MULTIVITAMIN OR)    ondansetron (ZOFRAN) 4 MG tablet    order for DME    order for DME    prochlorperazine (COMPAZINE) 5 MG tablet    triamcinolone (KENALOG) 0.1 % external ointment    ZINC PICOLINATE PO     No current facility-administered medications for this visit.     Facility-Administered Medications Ordered in Other Visits   Medication    plerixafor (MOZOBIL) injection SOLN 12.4 mg    potassium chloride 20 mEq in 50 mL IVPB    potassium chloride SA (K-DUR,KLOR-CON M) CR tablet 20 mEq       Physical Exam   BP (!) 146/76   Pulse 78   Temp 97.9  F (36.6  C) (Oral)   Resp 16   Wt 53.5 kg (117 lb 14.4 oz)   LMP  (LMP Unknown)   SpO2 97%   BMI 21.84 kg/m      Wt Readings from Last 5 Encounters:   03/12/24 53.5 kg (117 lb 14.4 oz)   02/29/24 51.8 kg (114 lb 1.6 oz)   02/21/24 53.3 kg (117 lb 6.4 oz)   02/19/24 53.9 kg (118 lb 14.4 oz)   01/15/24 52.3 kg (115 lb 4.8 oz)     Constitutional: NAD  Eyes: no scleral icterus  ENT: no oral lesions  Lymph: no cervical, supraclavicular  LAD  Pulm: CTAB  CV: RRR, no m/r/g  GI: bowel sounds present, soft and nontender to palpation  MSK: No edema in the extremities  Neuro: alert,  conversant, normal gait  Psych: appropriate mood and affect      Data     Most Recent 3 CBC's:  Recent Labs   Lab Test 03/12/24  1349 02/19/24  1333 01/15/24  0745   WBC 5.3 6.5 6.2   HGB 11.9 12.0 12.7    98 97    216 243   ANEUTAUTO 2.8 4.1 4.1     Most Recent 3 BMP's:  Recent Labs   Lab Test 02/19/24  1333 01/15/24  0745 12/04/23  0801   * 136 133*   POTASSIUM 4.3 4.2 4.4   CHLORIDE 98 98 97*   CO2 26 27 27   BUN 13.2 14.9 13.0   CR 0.78 0.75 0.71   ANIONGAP 9 11 9   PAULINE 9.3 9.7 9.2   GLC 87 89 81   PROTTOTAL 6.3* 6.4 6.2*   ALBUMIN 4.2 4.0 4.2    Most Recent 3 LFT's:  Recent Labs   Lab Test 02/19/24  1333 01/15/24  0745 12/04/23  0801   AST 24 19 23   ALT 23 23 25   ALKPHOS 57 57 45   BILITOTAL 0.2 0.2 0.3    Most Recent 2 TSH and T4:  Recent Labs   Lab Test 02/19/24  1333 08/17/23  1349 10/03/22  0820 06/13/22  0822 03/21/22  0803   TSH 3.08 2.12   < > 9.47*  9.31* 5.79*   T4  --   --   --  0.99  0.97 0.83    < > = values in this interval not displayed.     I reviewed the above labs today myself and with the patient.    Assessment/Plan     Relapsed Multiple Myeloma  Started kyprolis/isatuximab/dex on 2/20/23.    Isatuximab maintenace 10mg/kg every 4 week started 10/9/23  Monthly myeloma labs  Xgeva every 6 months, last dose 11/13/23. Likes to separate the chemo dosing from this.   She is having low level relapse possibly from missed infusions, Emphasized the importance of regular dosing to prevent relapse. Continue to follow myeloma labs.      HTN  Continue lisinopril 5 mg daily and amlodipine 5 mg daily.  Followed by cardiology    Hypothyroidism  Remains on levothyroxine    ID Prophylaxis   Acyclovir 400 mg BID for HSV prophylaxis    Nighat Chavez MD PhD

## 2024-03-13 ENCOUNTER — MYC MEDICAL ADVICE (OUTPATIENT)
Dept: INTERNAL MEDICINE | Facility: CLINIC | Age: 74
End: 2024-03-13
Payer: MEDICARE

## 2024-03-13 LAB
ALBUMIN SERPL ELPH-MCNC: 4.2 G/DL (ref 3.7–5.1)
ALPHA1 GLOB SERPL ELPH-MCNC: 0.2 G/DL (ref 0.2–0.4)
ALPHA2 GLOB SERPL ELPH-MCNC: 0.6 G/DL (ref 0.5–0.9)
B-GLOBULIN SERPL ELPH-MCNC: 0.6 G/DL (ref 0.6–1)
GAMMA GLOB SERPL ELPH-MCNC: 0.6 G/DL (ref 0.7–1.6)
KAPPA LC FREE SER-MCNC: 6.84 MG/DL (ref 0.33–1.94)
KAPPA LC FREE/LAMBDA FREE SER NEPH: 36 {RATIO} (ref 0.26–1.65)
LAMBDA LC FREE SERPL-MCNC: 0.19 MG/DL (ref 0.57–2.63)
LOCATION OF TASK: ABNORMAL
M PROTEIN SERPL ELPH-MCNC: 0.3 G/DL
PROT PATTERN SERPL ELPH-IMP: ABNORMAL
TOTAL PROTEIN SERUM FOR ELP: 6.2 G/DL (ref 6.4–8.3)

## 2024-03-15 DIAGNOSIS — Z85.3 PERSONAL HISTORY OF MALIGNANT NEOPLASM OF BREAST: Primary | ICD-10-CM

## 2024-03-19 DIAGNOSIS — E03.9 ACQUIRED HYPOTHYROIDISM: ICD-10-CM

## 2024-03-19 RX ORDER — LEVOTHYROXINE SODIUM 50 UG/1
50 TABLET ORAL DAILY
Qty: 90 TABLET | Refills: 3 | OUTPATIENT
Start: 2024-03-19

## 2024-03-26 ENCOUNTER — PRE VISIT (OUTPATIENT)
Dept: ALLERGY | Facility: CLINIC | Age: 74
End: 2024-03-26

## 2024-03-31 DIAGNOSIS — I10 BENIGN ESSENTIAL HYPERTENSION: ICD-10-CM

## 2024-04-05 RX ORDER — LISINOPRIL 2.5 MG/1
TABLET ORAL
Qty: 180 TABLET | Refills: 0 | Status: SHIPPED | OUTPATIENT
Start: 2024-04-05 | End: 2024-04-09

## 2024-04-05 NOTE — TELEPHONE ENCOUNTER
lisinopril (ZESTRIL) 2.5 MG tablet   180 tablet 0 1/3/2024     Last Office Visit : 10-  Future Office visit:  none    ACE Inhibitors (Including Combos) Protocol [id number]/31/2024 03:35 AM   Protocol Details Blood pressure under 140/90 in past 12 months- Clinicial or Patient Reported     BP Readings from Last 3 Encounters:   03/12/24 (!) 146/76   02/29/24 (!) 152/76   02/21/24 139/76     Jeanna refill      Recommendations:  - Continue amlodipine 5mg daily  - Continue lisinopril at 2.5 mg daily  - She will monitor her blood pressure at home and call with any significant changes.       Follow up in 12 months.

## 2024-04-08 ENCOUNTER — MYC MEDICAL ADVICE (OUTPATIENT)
Dept: CARDIOLOGY | Facility: CLINIC | Age: 74
End: 2024-04-08
Payer: MEDICARE

## 2024-04-08 DIAGNOSIS — I10 BENIGN ESSENTIAL HYPERTENSION: ICD-10-CM

## 2024-04-09 RX ORDER — AMLODIPINE BESYLATE 2.5 MG/1
7.5 TABLET ORAL DAILY
Status: SHIPPED
Start: 2024-04-09 | End: 2024-04-09

## 2024-04-09 RX ORDER — AMLODIPINE BESYLATE 2.5 MG/1
7.5 TABLET ORAL DAILY
Qty: 270 TABLET | Refills: 1 | Status: SHIPPED | OUTPATIENT
Start: 2024-04-09 | End: 2024-05-13

## 2024-04-09 NOTE — TELEPHONE ENCOUNTER
Date: 4/9/2024    Time of Call: 8:34 AM     Diagnosis:  HTN     [ VORB ] Ordering provider: Dr. Sadia Price  Order: Increase amlodipine dose to 7.5 mg once daily. Stop Lisinopril.      Order received by: MELISSA Escoto      Follow-up/additional notes: Pt notified via [employer] message.     Richie Amato RN   Cardiology Nurse Coordinator

## 2024-04-10 ENCOUNTER — INFUSION THERAPY VISIT (OUTPATIENT)
Dept: ONCOLOGY | Facility: CLINIC | Age: 74
End: 2024-04-10
Attending: REGISTERED NURSE
Payer: MEDICARE

## 2024-04-10 ENCOUNTER — APPOINTMENT (OUTPATIENT)
Dept: LAB | Facility: CLINIC | Age: 74
End: 2024-04-10
Attending: STUDENT IN AN ORGANIZED HEALTH CARE EDUCATION/TRAINING PROGRAM
Payer: MEDICARE

## 2024-04-10 VITALS
TEMPERATURE: 97.7 F | SYSTOLIC BLOOD PRESSURE: 152 MMHG | DIASTOLIC BLOOD PRESSURE: 80 MMHG | RESPIRATION RATE: 16 BRPM | BODY MASS INDEX: 22.08 KG/M2 | WEIGHT: 119.2 LBS | OXYGEN SATURATION: 96 % | HEART RATE: 78 BPM

## 2024-04-10 DIAGNOSIS — M81.0 OSTEOPOROSIS, UNSPECIFIED OSTEOPOROSIS TYPE, UNSPECIFIED PATHOLOGICAL FRACTURE PRESENCE: ICD-10-CM

## 2024-04-10 DIAGNOSIS — T50.995S ADVERSE EFFECT OF OTHER DRUGS, MEDICAMENTS AND BIOLOGICAL SUBSTANCES, SEQUELA: ICD-10-CM

## 2024-04-10 DIAGNOSIS — C90.00 MULTIPLE MYELOMA NOT HAVING ACHIEVED REMISSION (H): Primary | ICD-10-CM

## 2024-04-10 LAB
ALBUMIN SERPL BCG-MCNC: 4.3 G/DL (ref 3.5–5.2)
ALP SERPL-CCNC: 47 U/L (ref 40–150)
ALT SERPL W P-5'-P-CCNC: 23 U/L (ref 0–50)
ANION GAP SERPL CALCULATED.3IONS-SCNC: 11 MMOL/L (ref 7–15)
AST SERPL W P-5'-P-CCNC: 21 U/L (ref 0–45)
BASOPHILS # BLD AUTO: 0.1 10E3/UL (ref 0–0.2)
BASOPHILS NFR BLD AUTO: 1 %
BILIRUB SERPL-MCNC: 0.3 MG/DL
BUN SERPL-MCNC: 12.7 MG/DL (ref 8–23)
CALCIUM SERPL-MCNC: 9.3 MG/DL (ref 8.8–10.2)
CHLORIDE SERPL-SCNC: 99 MMOL/L (ref 98–107)
CREAT SERPL-MCNC: 0.78 MG/DL (ref 0.51–0.95)
DEPRECATED HCO3 PLAS-SCNC: 26 MMOL/L (ref 22–29)
EGFRCR SERPLBLD CKD-EPI 2021: 80 ML/MIN/1.73M2
EOSINOPHIL # BLD AUTO: 0.2 10E3/UL (ref 0–0.7)
EOSINOPHIL NFR BLD AUTO: 3 %
ERYTHROCYTE [DISTWIDTH] IN BLOOD BY AUTOMATED COUNT: 15.2 % (ref 10–15)
GLUCOSE SERPL-MCNC: 103 MG/DL (ref 70–99)
HCT VFR BLD AUTO: 36.7 % (ref 35–47)
HGB BLD-MCNC: 12.9 G/DL (ref 11.7–15.7)
IMM GRANULOCYTES # BLD: 0 10E3/UL
IMM GRANULOCYTES NFR BLD: 0 %
KAPPA LC FREE SER-MCNC: 9.32 MG/DL (ref 0.33–1.94)
KAPPA LC FREE/LAMBDA FREE SER NEPH: 44.38 {RATIO} (ref 0.26–1.65)
LAMBDA LC FREE SERPL-MCNC: 0.21 MG/DL (ref 0.57–2.63)
LDH SERPL L TO P-CCNC: 172 U/L (ref 0–250)
LYMPHOCYTES # BLD AUTO: 1.5 10E3/UL (ref 0.8–5.3)
LYMPHOCYTES NFR BLD AUTO: 25 %
MCH RBC QN AUTO: 34.9 PG (ref 26.5–33)
MCHC RBC AUTO-ENTMCNC: 35.1 G/DL (ref 31.5–36.5)
MCV RBC AUTO: 99 FL (ref 78–100)
MONOCYTES # BLD AUTO: 0.6 10E3/UL (ref 0–1.3)
MONOCYTES NFR BLD AUTO: 11 %
NEUTROPHILS # BLD AUTO: 3.5 10E3/UL (ref 1.6–8.3)
NEUTROPHILS NFR BLD AUTO: 60 %
NRBC # BLD AUTO: 0 10E3/UL
NRBC BLD AUTO-RTO: 0 /100
PLATELET # BLD AUTO: 199 10E3/UL (ref 150–450)
POTASSIUM SERPL-SCNC: 4.3 MMOL/L (ref 3.4–5.3)
PROT SERPL-MCNC: 6.7 G/DL (ref 6.4–8.3)
RBC # BLD AUTO: 3.7 10E6/UL (ref 3.8–5.2)
SODIUM SERPL-SCNC: 136 MMOL/L (ref 135–145)
TOTAL PROTEIN SERUM FOR ELP: 6.2 G/DL (ref 6.4–8.3)
WBC # BLD AUTO: 5.8 10E3/UL (ref 4–11)

## 2024-04-10 PROCEDURE — 250N000011 HC RX IP 250 OP 636: Performed by: REGISTERED NURSE

## 2024-04-10 PROCEDURE — 83615 LACTATE (LD) (LDH) ENZYME: CPT

## 2024-04-10 PROCEDURE — 258N000003 HC RX IP 258 OP 636: Performed by: REGISTERED NURSE

## 2024-04-10 PROCEDURE — 36591 DRAW BLOOD OFF VENOUS DEVICE: CPT

## 2024-04-10 PROCEDURE — 85025 COMPLETE CBC W/AUTO DIFF WBC: CPT | Performed by: REGISTERED NURSE

## 2024-04-10 PROCEDURE — 84165 PROTEIN E-PHORESIS SERUM: CPT | Mod: 26 | Performed by: PATHOLOGY

## 2024-04-10 PROCEDURE — 83521 IG LIGHT CHAINS FREE EACH: CPT

## 2024-04-10 PROCEDURE — 96367 TX/PROPH/DG ADDL SEQ IV INF: CPT

## 2024-04-10 PROCEDURE — 80053 COMPREHEN METABOLIC PANEL: CPT | Performed by: REGISTERED NURSE

## 2024-04-10 PROCEDURE — 250N000013 HC RX MED GY IP 250 OP 250 PS 637: Performed by: REGISTERED NURSE

## 2024-04-10 PROCEDURE — 84165 PROTEIN E-PHORESIS SERUM: CPT | Mod: TC | Performed by: PATHOLOGY

## 2024-04-10 PROCEDURE — 96375 TX/PRO/DX INJ NEW DRUG ADDON: CPT

## 2024-04-10 PROCEDURE — 84155 ASSAY OF PROTEIN SERUM: CPT

## 2024-04-10 PROCEDURE — 96413 CHEMO IV INFUSION 1 HR: CPT

## 2024-04-10 RX ORDER — HEPARIN SODIUM (PORCINE) LOCK FLUSH IV SOLN 100 UNIT/ML 100 UNIT/ML
5 SOLUTION INTRAVENOUS
Status: DISCONTINUED | OUTPATIENT
Start: 2024-04-10 | End: 2024-04-10 | Stop reason: HOSPADM

## 2024-04-10 RX ORDER — HEPARIN SODIUM (PORCINE) LOCK FLUSH IV SOLN 100 UNIT/ML 100 UNIT/ML
5 SOLUTION INTRAVENOUS DAILY PRN
Status: DISCONTINUED | OUTPATIENT
Start: 2024-04-10 | End: 2024-04-10 | Stop reason: HOSPADM

## 2024-04-10 RX ORDER — ONDANSETRON 2 MG/ML
8 INJECTION INTRAMUSCULAR; INTRAVENOUS ONCE
Status: COMPLETED | OUTPATIENT
Start: 2024-04-10 | End: 2024-04-10

## 2024-04-10 RX ORDER — ACETAMINOPHEN 325 MG/1
650 TABLET ORAL ONCE
Status: COMPLETED | OUTPATIENT
Start: 2024-04-10 | End: 2024-04-10

## 2024-04-10 RX ADMIN — SODIUM CHLORIDE 250 ML: 9 INJECTION, SOLUTION INTRAVENOUS at 09:46

## 2024-04-10 RX ADMIN — SODIUM CHLORIDE 500 MG: 9 INJECTION, SOLUTION INTRAVENOUS at 10:24

## 2024-04-10 RX ADMIN — DIPHENHYDRAMINE HYDROCHLORIDE 37.5 MG: 50 INJECTION, SOLUTION INTRAMUSCULAR; INTRAVENOUS at 09:53

## 2024-04-10 RX ADMIN — ONDANSETRON 8 MG: 2 INJECTION INTRAMUSCULAR; INTRAVENOUS at 09:46

## 2024-04-10 RX ADMIN — ACETAMINOPHEN 650 MG: 325 TABLET ORAL at 09:16

## 2024-04-10 RX ADMIN — Medication 5 ML: at 08:11

## 2024-04-10 RX ADMIN — Medication 5 ML: at 11:45

## 2024-04-10 ASSESSMENT — PAIN SCALES - GENERAL: PAINLEVEL: MILD PAIN (3)

## 2024-04-10 NOTE — PROGRESS NOTES
Infusion Nursing Note:  Shena Hartmann presents today for Day 1 Cycle 13 [place]   Patient seen by provider today: No   present during visit today: Not Applicable.    Note: Patient presents to infusion feeling ok.  Patient denies acute discomfort and states no acute complaints or concerns needing to be addressed today. Specifically, pt denies s/s of infection such as fever, sore throat, cough, chest pain, shortness of breath, body aches, chills, headache, increased nasal congestion, or changes in taste/smell. Pt has been working with Cardiology for blood pressure management. Pt confirms she's been taking 7.5mg of Norvasc since yesterday and has stopped Lisinopril since 4/1/24.      Intravenous Access:  Implanted Port.    Treatment Conditions:  Lab Results   Component Value Date    HGB 12.9 04/10/2024    WBC 5.8 04/10/2024    ANEU 2.1 07/05/2021    ANEUTAUTO 3.5 04/10/2024     04/10/2024        Lab Results   Component Value Date     04/10/2024    POTASSIUM 4.3 04/10/2024    MAG 1.8 12/12/2014    CR 0.78 04/10/2024    PAULINE 9.3 04/10/2024    BILITOTAL 0.3 04/10/2024    ALBUMIN 4.3 04/10/2024    ALT 23 04/10/2024    AST 21 04/10/2024       Results reviewed, labs MET treatment parameters, ok to proceed with treatment.      Post Infusion Assessment:  Patient tolerated infusion without incident.  Blood return noted pre and post infusion.  Site patent and intact, free from redness, edema or discomfort.  No evidence of extravasations.  Access discontinued per protocol.       Discharge Plan:   Patient declined prescription refills.  Discharge instructions reviewed with: Patient.  Patient and/or family verbalized understanding of discharge instructions and all questions answered.  Copy of AVS reviewed with patient and/or family.  Patient will return 5/6 for next appointment.  Patient discharged in stable condition accompanied by: .  Departure Mode: Ambulatory.      Ranjit Jorgensen RN

## 2024-04-10 NOTE — PATIENT INSTRUCTIONS
[facility] Triage and after hours / weekends / holidays:  [phone]    Please call the triage or after hours line if you experience a temperature greater than or equal to 100.4, shaking chills, have uncontrolled nausea, vomiting and/or diarrhea, dizziness, shortness of breath, chest pain, bleeding, unexplained bruising, or if you have any other new/concerning symptoms, questions or concerns.      If you are having any concerning symptoms or wish to speak to a provider before your next infusion visit, please call triage to notify them so we can adequately serve you.     If you need a refill on a narcotic prescription or other medication, please call before your infusion appointment.                 April 2024 Sunday Monday Tuesday Wednesday Thursday Friday Saturday        1     2     3     4     5     6       7     8     9     10    LAB CENTRAL   8:00 AM   (15 min.)   [facility]   [facility]    ONC INFUSION 2 HR (120 MIN)   9:00 AM   (120 min.)    ONC INFUSION NURSE   [facility] 11     12     13       14     15     16     17     18     19     20       21     22     23     24     25     26     27       28     29     30                                     May 2024      [place] Monday Tuesday Wednesday Thursday Friday Saturday                  1     2     3     4       5     6    LAB PERIPHERAL   7:30 AM   (15 min.)   [facility]   [facility]    RETURN ACTIVE TREATMENT   7:45 AM   (45 min.)   Selena Suggs APRN CNP   [facility]    ONC INFUSION 2 HR (120 MIN)   9:00 AM   (120 min.)    ONC INFUSION NURSE   [facility] 7     8     9     10     11       12     13     14     15     16     17     18       19     20    LAB PERIPHERAL   8:00 AM   (15 min.)   [facility]   [facility]    ONC INFUSION 0.5 HR (30 MIN)   8:30  AM   (30 min.)    ONC INFUSION NURSE   [facility] 21     22     23     24     25       26     27     28     29     30     31                          Lab Results:  Recent Results (from the past 12 hour(s))   Comprehensive metabolic panel    Collection Time: 04/10/24  8:17 AM   Result Value Ref Range    Sodium 136 135 - 145 mmol/L    Potassium 4.3 3.4 - 5.3 mmol/L    Carbon Dioxide (CO2) 26 22 - 29 mmol/L    Anion Gap 11 7 - 15 mmol/L    Urea Nitrogen 12.7 8.0 - 23.0 mg/dL    Creatinine 0.78 0.51 - 0.95 mg/dL    GFR Estimate 80 >60 mL/min/1.73m2    Calcium 9.3 8.8 - 10.2 mg/dL    Chloride 99 98 - 107 mmol/L    Glucose 103 (H) 70 - 99 mg/dL    Alkaline Phosphatase 47 40 - 150 U/L    AST 21 0 - 45 U/L    ALT 23 0 - 50 U/L    Protein Total 6.7 6.4 - 8.3 g/dL    Albumin 4.3 3.5 - 5.2 g/dL    Bilirubin Total 0.3 <=1.2 mg/dL   Lactate Dehydrogenase    Collection Time: 04/10/24  8:17 AM   Result Value Ref Range    Lactate Dehydrogenase 172 0 - 250 U/L   CBC with platelets and differential    Collection Time: 04/10/24  8:17 AM   Result Value Ref Range    WBC Count 5.8 4.0 - 11.0 10e3/uL    RBC Count 3.70 (L) 3.80 - 5.20 10e6/uL    Hemoglobin 12.9 11.7 - 15.7 g/dL    Hematocrit 36.7 35.0 - 47.0 %    MCV 99 78 - 100 fL    MCH 34.9 (H) 26.5 - 33.0 pg    MCHC 35.1 31.5 - 36.5 g/dL    RDW 15.2 (H) 10.0 - 15.0 %    Platelet Count 199 150 - 450 10e3/uL    % Neutrophils 60 %    % Lymphocytes 25 %    % Monocytes 11 %    % Eosinophils 3 %    % Basophils 1 %    % Immature Granulocytes 0 %    NRBCs per 100 WBC 0 <1 /100    Absolute Neutrophils 3.5 1.6 - 8.3 10e3/uL    Absolute Lymphocytes 1.5 0.8 - 5.3 10e3/uL    Absolute Monocytes 0.6 0.0 - 1.3 10e3/uL    Absolute Eosinophils 0.2 0.0 - 0.7 10e3/uL    Absolute Basophils 0.1 0.0 - 0.2 10e3/uL    Absolute Immature Granulocytes 0.0 <=0.4 10e3/uL    Absolute NRBCs 0.0 10e3/uL

## 2024-04-10 NOTE — PROGRESS NOTES
Spoke with patient on Tuesday 4/9/24 to discuss rise in kappa light chains and kappa:lambda ratio.  Lab results show a slow biochemical relapse requiring a change in therapy. Dr. Chavez would like Shena to think about/consider CAR-T vs NK trial vs Talvey.  I briefly discussed risks and benefits of CAR-T therapy and Talvey with Shena and I will have the RNCC send her some information in writing (Shena is jet-lagged from her recent international travel and was concerned she would not retain the details of our discussion). She will read about these therapies and I will schedule a follow-up appointment with Dr. Chavez next week to decide on a therapy and make a plan.  In the meantime, we should continue her isatuximab maintenance to prevent a faster relapse while waiting for the next line of therapy to be arranged.      MARINO Fuller [facility]  [address] [place] [address] [id number] [phone]

## 2024-04-11 LAB
ALBUMIN SERPL ELPH-MCNC: 4.1 G/DL (ref 3.7–5.1)
ALPHA1 GLOB SERPL ELPH-MCNC: 0.2 G/DL (ref 0.2–0.4)
ALPHA2 GLOB SERPL ELPH-MCNC: 0.6 G/DL (ref 0.5–0.9)
B-GLOBULIN SERPL ELPH-MCNC: 0.6 G/DL (ref 0.6–1)
GAMMA GLOB SERPL ELPH-MCNC: 0.7 G/DL (ref 0.7–1.6)
M PROTEIN SERPL ELPH-MCNC: 0.2 G/DL
PROT PATTERN SERPL ELPH-IMP: ABNORMAL

## 2024-04-12 ENCOUNTER — TELEPHONE (OUTPATIENT)
Dept: TRANSPLANT | Facility: CLINIC | Age: 74
End: 2024-04-12
Payer: MEDICARE

## 2024-04-12 DIAGNOSIS — C90.00 MULTIPLE MYELOMA NOT HAVING ACHIEVED REMISSION (H): Primary | ICD-10-CM

## 2024-04-25 ENCOUNTER — CARE COORDINATION (OUTPATIENT)
Dept: TRANSPLANT | Facility: CLINIC | Age: 74
End: 2024-04-25
Payer: MEDICARE

## 2024-04-25 NOTE — PROGRESS NOTES
[facility] BMT and Cell Therapy Program  RN Coordinator Pre-Visit Documentation      Shena Hartmann is a 73 year old female who has been referred to the [facility] BMT and Cell Therapy Program for hematopoietic cell transplant or immune effector cell therapy.      Referring MD Name: Dr. Nighat Chavez      Reason for referral: MM    Link to BMT & CT Program Algorithms          For CAR-T Candidates Only:    Orders placed for virologies: Yes      All relevant clinical notes, labs, imaging, and pathology may be reviewed in Epic Bookmarks under name: Sary Yang      Patient Care Team         Relationship Specialty Notifications Start End    Thomas Villalobos MD PCP - General Internal Medicine  3/2/22     Phone: [phone] Pager: [phone] Fax: [phone]        [address] [place] [address]    Misty Jackson APRN CNP Referring Physician Nurse Practitioner  9/17/15     [facility]    Phone: [phone] Fax: [phone]         [address] [facility] [address] [facility] [address]    Kevin Eagle MD MD Gastroenterology  10/27/15     Phone: [phone] Fax: [phone]         [address] [place] [address]    Ruma Rebollar MD MD Dermatology  10/27/17     Phone: [phone] Fax: [phone]         [facility]  [facility] [address]    Sunil Rosario MD MD Dermatology  10/5/18     Phone: [phone] Fax: [phone]         [address] [facility] [address]    Iglesia Quick MD MD Orthopaedic Surgery  12/12/18     Phone: [phone] Fax: [phone]         [facility] [address] [address]    Neela Mcknight MD MD Dermatology  1/25/20     Phone: [phone] Fax: [phone]         [address] [facility] [address] [facility] [address]    Neela Mcknight MD Assigned Surgical Provider   6/18/22     Phone: [phone] Fax: [phone] [address] [facility] [address] [facility] [address]    Shabnam Hall, RN  Specialty Care Coordinator Hematology & Oncology Admissions 1/31/23     Nighat Chavez MD MD Hematology & Oncology Admissions 2/16/23     Phone: [phone] Pager: [phone] Fax: [phone]        [address] [place] [address]    Thomas Villalobos MD Assigned PCP   3/4/23     Phone: [phone] Pager: [phone] Fax: [phone]        [address] [place] [address]    Hema Solares MD MD Cardiovascular Disease  4/6/23     Phone: [phone] Fax: [phone]         [address] [place] [address]    Sadia Price MD Assigned Heart and Vascular Provider   5/6/23     Phone: [phone] Fax: [phone]         [address] [place] [address]    Selena Suggs APRN CNP Assigned Cancer Care Provider   7/1/23     Phone: [phone] Fax: [phone]         [address] [place] [address]    Neela Mcknight MD MD Dermatology  8/29/23     Referring to [facility]    Phone: [phone] Fax: [phone]         [address] [place] [address]    Marj Vasques MD Resident Student in organized health care education/training program  8/29/23     Phone: [phone] Fax: [phone]         [address] [place] [address]              Sary Yang RN

## 2024-04-26 ENCOUNTER — ALLIED HEALTH/NURSE VISIT (OUTPATIENT)
Dept: TRANSPLANT | Facility: CLINIC | Age: 74
End: 2024-04-26
Attending: STUDENT IN AN ORGANIZED HEALTH CARE EDUCATION/TRAINING PROGRAM
Payer: MEDICARE

## 2024-04-26 ENCOUNTER — LAB (OUTPATIENT)
Dept: LAB | Facility: CLINIC | Age: 74
End: 2024-04-26
Attending: STUDENT IN AN ORGANIZED HEALTH CARE EDUCATION/TRAINING PROGRAM
Payer: MEDICARE

## 2024-04-26 VITALS
OXYGEN SATURATION: 97 % | SYSTOLIC BLOOD PRESSURE: 167 MMHG | HEIGHT: 61 IN | RESPIRATION RATE: 16 BRPM | TEMPERATURE: 97.6 F | WEIGHT: 117.7 LBS | HEART RATE: 83 BPM | BODY MASS INDEX: 22.22 KG/M2 | DIASTOLIC BLOOD PRESSURE: 85 MMHG

## 2024-04-26 DIAGNOSIS — Z71.9 VISIT FOR COUNSELING: Primary | ICD-10-CM

## 2024-04-26 DIAGNOSIS — Z11.4 ENCOUNTER FOR SCREENING FOR HUMAN IMMUNODEFICIENCY VIRUS (HIV): ICD-10-CM

## 2024-04-26 DIAGNOSIS — C90.00 MULTIPLE MYELOMA NOT HAVING ACHIEVED REMISSION (H): Primary | ICD-10-CM

## 2024-04-26 DIAGNOSIS — C90.00 MULTIPLE MYELOMA NOT HAVING ACHIEVED REMISSION (H): ICD-10-CM

## 2024-04-26 DIAGNOSIS — Z11.59 ENCOUNTER FOR SCREENING FOR OTHER VIRAL DISEASES: ICD-10-CM

## 2024-04-26 PROCEDURE — 99417 PROLNG OP E/M EACH 15 MIN: CPT

## 2024-04-26 PROCEDURE — 87389 HIV-1 AG W/HIV-1&-2 AB AG IA: CPT

## 2024-04-26 PROCEDURE — 86706 HEP B SURFACE ANTIBODY: CPT

## 2024-04-26 PROCEDURE — 36591 DRAW BLOOD OFF VENOUS DEVICE: CPT

## 2024-04-26 PROCEDURE — 87340 HEPATITIS B SURFACE AG IA: CPT

## 2024-04-26 PROCEDURE — 86644 CMV ANTIBODY: CPT

## 2024-04-26 PROCEDURE — 86803 HEPATITIS C AB TEST: CPT

## 2024-04-26 PROCEDURE — G0463 HOSPITAL OUTPT CLINIC VISIT: HCPCS

## 2024-04-26 PROCEDURE — 250N000011 HC RX IP 250 OP 636: Performed by: STUDENT IN AN ORGANIZED HEALTH CARE EDUCATION/TRAINING PROGRAM

## 2024-04-26 PROCEDURE — 86704 HEP B CORE ANTIBODY TOTAL: CPT

## 2024-04-26 PROCEDURE — 99215 OFFICE O/P EST HI 40 MIN: CPT

## 2024-04-26 RX ORDER — HEPARIN SODIUM (PORCINE) LOCK FLUSH IV SOLN 100 UNIT/ML 100 UNIT/ML
5 SOLUTION INTRAVENOUS EVERY 8 HOURS
Status: DISCONTINUED | OUTPATIENT
Start: 2024-04-26 | End: 2024-05-02 | Stop reason: HOSPADM

## 2024-04-26 RX ADMIN — Medication 5 ML: at 14:30

## 2024-04-26 ASSESSMENT — PAIN SCALES - GENERAL: PAINLEVEL: MODERATE PAIN (4)

## 2024-04-26 NOTE — LETTER
4/26/2024         RE: Shena Hartmann  [address] [place] [address]        Dear Colleague,    Thank you for referring your patient, Shena Hartmann, to the [facility] BLOOD AND MARROW TRANSPLANT PROGRAM [place]. Please see a copy of my visit note below.           BMT / Cell Therapy Consultation      Shena Hartmann is a 73 year old female referred by me for cell therapy.      Disease presentation and baseline characteristics:  Breast cancer dx in 1994. Underwent surgery and chemotherapy without reoccurence  MGUS dx 1999  T8 pathologic fracture with radiation  Revlimid and velcade,   2013  Cytoxan IV 9/2013  D-PACE 11/2013  Auto 3/27/14  5/2014 thalidomide caused rash so switched to pomalidomide   on kenalog and clobetasol creams for IMID rash  FLC began to rise so riky added to pom 9/2020  she has been on xgeva for an unclear amount of time  Teodora-Kd 2/20/2023  Kyprolis held 10/23 due to side effects (N/V). Continue Teodora/dex           HPI:  Please see my entry above for hematologic history.  Shena is here due to relapsing myeloma on Teodora/dex. Kyprolis was held due to side effects of nausea and fatigue. Her M spike is increasing. No new bone pains, swollen glands, or fever/chills      ASSESSMENT AND PLAN:  Shena is a good candidate for Indapta, BCMA CAR- NK (doesn't currently meet parameters but likely will soon, CAR-T with Carvykti, or BiTE therapy. Given her travel restrictions and hesitancy about cell therapy, I recommended she get SOV Carvykti. She is weighing her options. We discussed the pros and cons of each option including CRS, ICANs, cytopenias, inpatient stay, and known or unknown efficacy of each option. She would like to go to the [facility] in June. For now I will add pomalidomide to her Isatuxumab to temporize while she determines what she would like to do. She is already on asa 81 mg which will help prevent DVT    We spent 90 minutes in consultation reviewing the prognosis of  chemotherapy refractory multiple myeloma and treatment options.    The therapeutic options are limited and given the failure of prior therapy, CAR-T is indicated; I recommended therapy  using commercial CAR-T product Carvykti or clinical trial using   or Indapta product pending the patient's preferences and slot availability         The process consists of non-mobilized apheresis followed by 3 weeks period of waiting while autologous CAR19-T cells are reprogrammed and manufactured ex vivo.  The treatment phase includes lympho-depleting chemotherapy with fludarabine and Cytoxan x3 days.  The patient will receive chemotherapy outpatient.    CAR-T therapy will be administered in  inpatientsetting.   recommend the outpatient CAR-T administration and confirm that the following requirements are all met:                                 ECOG 0-1     No recent coronary artery disease event, uncontrolled atrial fibrillation, CHF or pulmonary edema      No bulky disease AND low to moderate tumor burden        I recommend the following bridging therapy after T-cell apheresis to control the disease: Teodora + pom    We reviewed the side effects associated with LD chemotherapy and CAR-T infusion such as low blood counts, risk of infection and bleeding. We focused on expected side effects of cytokine release syndrome and the neurotoxicity. Symptoms may vary from mild to severe, and potentially life-threatening and can include the need for ICU care.  Some patients can get ill, but the usual course is transient and reversible.  The patient will have to stay within 30 minutes from clinic for 28 days.     CAR-T cell products available commercially are standard of care therapy and our center is certified to administer CAR-Ts, the research aspects are limited to collection of data to research database and could include blood and stool samples collection.     Patient will be handed the CAR-T therapy wallet card which needs to presented  to health facilities (ED) at the time of visit.     Patient and the family asked many questions and had a good understanding of CAR-T treatment and its potential complications. Patient verbalized the wish to proceed with CAR-T therapy as recommended. I request the BMT office to pursuit the following plan:    1. pursuit insurance approval for CAR-T therapy with Carvykti cell product.   2. schedule a work-up for apheresis once patient confirms her preference  3. arrange virology testing prior to apheresis  4. I will manage bridging therapy with Teodora + pom          Summary:     I spent 90 minutes in the care of this patient today, which included time necessary for preparation for the visit, obtaining history, ordering medications/tests/procedures as medically indicated, review of pertinent medical literature, counseling of the patient, communication of recommendations to the care team, and documentation time.    Nighat Chavez MD    Securely message me with the Vocera Web Console ([email or web address]/) or Microsoft Teams        ROS:    10 point ROS neg other than the symptoms noted above in the HPI.        Past Medical History:   Diagnosis Date    Acquired hypothyroidism 8/10/2023    Breast cancer (H) 01/01/1994    right    H/O stem cell transplant (H) 03/01/2014    History of blood transfusion 2013 & 2014    During stem cell tx process    Hypertension Sept. 2021    Runs 140 s systolically, about 20 above my usual    Multiple myeloma, without mention of having achieved remission 11/06/2012       Past Surgical History:   Procedure Laterality Date    BIOPSY  10/1994; 0850-1654    Lt. Breast in '94; multiple bone marrow bx's for MM    BREAST SURGERY  10/1994    Lt. Mastectomy w/lymph node resection    COLONOSCOPY  11/2015    Normal results    EYE SURGERY  2020    Bilateral cataract surgery    INSERT PORT VASCULAR ACCESS Left 2/15/2023    Procedure: INSERTION, VASCULAR ACCESS PORT;  Surgeon: Harmony  MD Luc;  Location: [facility] OR    IR CHEST PORT PLACEMENT > 5 YRS OF AGE  2/15/2023    MASTECTOMY      Right, with lymphe node disection      PICC INSERTION  09/10/2013    5fr DL Power PICC, 44cm (1cm external) in the L lateral brachial vein with tip in the low SVC    TRANSPLANT  3/27/2014    Autologous stem cell tx       Family History   Problem Relation Age of Onset    Cancer Paternal Grandmother         skin cancer    Hypertension Mother     Cerebrovascular Disease Mother          8-11-15 from strokes    Hypertension Father     Prostate Cancer Father        Social History     Tobacco Use    Smoking status: Never     Passive exposure: Never    Smokeless tobacco: Never   Substance Use Topics    Alcohol use: Yes     Comment: occ    Drug use: No         Allergies   Allergen Reactions    Blood Transfusion Related (Informational Only) Other (See Comments)     Patient has a history of a clinically significant antibody against RBC antigens.  A delay in compatible RBCs may occur.     Cayenne Pepper [Cayenne]     Corn-Containing Products GI Disturbance    Levaquin Other (See Comments)     Tendon pain    Milk Protein GI Disturbance    Mold      Facial rash/lip swelling    Morphine Sulfate Other (See Comments)     Per pt - see things (hallucination) when she was on Morphine .    Pollen Extract      Environmental allergies     Shrimp Nausea and Vomiting    Tomato      Lip swelling, facial rash    Azithromycin Rash    Keflex [Cephalexin] Rash    Oat Rash    Tape [Adhesive Tape] Itching and Rash     All adhesives    Wheat Rash     Swelling of lips        Current Outpatient Medications   Medication Sig Dispense Refill    acetaminophen (TYLENOL) 325 MG tablet Take 325-650 mg by mouth every 6 hours as needed for mild pain      acyclovir (ZOVIRAX) 400 MG tablet Take 1 tablet (400 mg) by mouth every 12 hours 180 tablet 3    amLODIPine (NORVASC) 2.5 MG tablet Take 3 tablets (7.5 mg) by mouth daily 270 tablet 1    Ascorbic Acid  "(VITAMIN C PO) Take 1,000 mg by mouth 2 times daily       aspirin (ASA) 81 MG chewable tablet Take 1 tablet (81 mg) by mouth daily 30 tablet 0    CALCIUM-VITAMIN D-VITAMIN K PO Take 2 tablets by mouth daily.      clobetasol (TEMOVATE) 0.05 % external ointment Apply topically 2 times daily as needed (itching and rash) Topically to rash on hands until resolved 45 g 0    COENZYME Q-10 PO Take 100 mg by mouth daily       Cyanocobalamin 1000 MCG/ML LIQD Take 500 mcg by mouth 2 times daily      desonide (DESOWEN) 0.05 % external ointment Apply topically 2 times daily Apply to face as needed for dermatitis 15 g 11    levothyroxine (SYNTHROID/LEVOTHROID) 50 MCG tablet Take 1 tablet (50 mcg) by mouth daily 90 tablet 3    magnesium 100 MG CAPS Take 100 mg by mouth 3 times daily      Multiple Vitamins-Minerals (MULTIVITAMIN OR) Take 1 tablet by mouth 2 times daily.      ondansetron (ZOFRAN) 4 MG tablet Take 1 tablet (4 mg) by mouth every 8 hours as needed for nausea 60 tablet 11    order for DME 6 mastectomy bras  Length of need: 99 months 6 Device 1    order for DME R mastectomy prosthesis   Length of need:  99 months 1 Device 1    prochlorperazine (COMPAZINE) 5 MG tablet Take 1 tablet (5 mg) by mouth every 6 hours as needed for nausea or vomiting 30 tablet 11    triamcinolone (KENALOG) 0.1 % external ointment Apply topically 2 times daily 15 g 11    ZINC PICOLINATE PO Take 30 mg by mouth daily           Physical Exam:     Vital Signs: BP (!) 167/85 (BP Location: Left arm, Patient Position: Sitting, Cuff Size: Adult Small)   Pulse 83   Temp 97.6  F (36.4  C) (Oral)   Resp 16   Ht 1.558 m (5' 1.32\")   Wt 53.4 kg (117 lb 11.2 oz)   LMP  (LMP Unknown)   SpO2 97%   BMI 22.01 kg/m          KPS:  100    General Appearance: healthy, alert, and no distress  Eyes: conjunctiva and lids normal, sclera nonicteric  Ears/Nose/M/Throat: Oral mucosa and posterior oropharynx normal, moist mucous membranes  Neck supple, non-tender, " free range of motion, no adenopathy  Cardio/Vascular:regular rate and rhythm, normal S1 and S2, no murmur  Resp Effort And Auscultation: Normal - Clear to auscultation without rales, rhonchi, or wheezing.  GI: soft, nontender, bowel sounds present in all four quadrants  Lymphatics:no significant enlargement of cervical lymph nodes   Musculoskeletal: Musculoskeletal normal  Edema: none  Skin: Skin color, texture, turgor normal. No rashes or lesions.  Neurologic: Gait normal.    Psych/Affect: Mood and affect are appropriate.            Blood Counts       Recent Labs   Lab Test 04/10/24  0817 03/12/24  1349 02/19/24  1333   HGB 12.9 11.9 12.0   HCT 36.7 35.3 34.4*   WBC 5.8 5.3 6.5   ANEUTAUTO 3.5 2.8 4.1   ALYMPAUTO 1.5 1.8 1.6   AMONOAUTO 0.6 0.6 0.7   AEOSAUTO 0.2 0.1 0.1   ABSBASO 0.1 0.0 0.0   NRBCMAN 0.0 0.0 0.0    188 216       ABO/RH    Recent Labs   Lab Test 02/20/23  0713   ABORH O POS         Chemistries     Basic Panel  Recent Labs   Lab Test 04/10/24  0817 03/12/24  1349 02/19/24  1333    135 133*   POTASSIUM 4.3 4.3 4.3   CHLORIDE 99 100 98   CO2 26 25 26   BUN 12.7 15.7 13.2   CR 0.78 0.88 0.78   * 119* 87        Calcium, Magnesium, Phosphorus  Recent Labs   Lab Test 04/10/24  0817 03/12/24  1349 02/19/24  1333   PAULINE 9.3 9.1 9.3        LFTs  Recent Labs   Lab Test 04/10/24  0817 03/12/24  1349 02/19/24  1333   BILITOTAL 0.3 0.2 0.2   ALKPHOS 47 51 57   AST 21 20 24   ALT 23 17 23   ALBUMIN 4.3 4.2 4.2       LDH  Recent Labs   Lab Test 04/10/24  0817 03/12/24  1349 02/19/24  1333    167 181       B2-Microglobulin  Recent Labs   Lab Test 01/23/23  0731 11/28/22  0828 10/31/22  0813 10/03/22  0821 09/06/22  0845 08/08/22  0818   [id number] 3.4* 2.5* 3.2* 3.2* 3.1* 3.3*           Immunoglobulins     Recent Labs   Lab Test 09/21/20  0841 09/14/20  0806   IGG 2,404* 2,416*       Recent Labs   Lab Test 09/21/20  0841 09/14/20  0806   IGA 5* 6*       Recent Labs   Lab Test  20  0841 20  0806   IGM <10* <10*         Monocloncal Protein Studies     M spike    Recent Labs   Lab Test 04/10/24  0817 24  1349 24  1333 01/15/24  0745 23  0801 23  0808   ELPM 0.2* 0.3* 0.3* 0.3* 0.3* 0.3*       [place] FLC    Recent Labs   Lab Test 04/10/24  0817 24  1349 24  1333 01/15/24  0745 23  0801 23  0808   KFLCA 9.32* 6.84* 5.32* 4.47* 2.69* 2.25*       Lambda FLC    Recent Labs   Lab Test 04/10/24  0817 24  1349 24  1333 01/15/24  0745 23  0801 23  0808   LFLCA 0.21* 0.19* 0.30* 1.23 <0.14* 0.15*       FLC Ratio    Recent Labs   Lab Test 04/10/24  0817 24  1349 24  1333 01/15/24  0745 23  0808 23  1438   KLRA 44.38* 36.00* 17.73* 3.63* 15.00* 15.47*       Infectious Disease Markers     [facility] IDM    No lab results found.      Hepatitis and HIV    No lab results found.      CMV  No lab results found.      EBV    No lab results found.    Bone Marrow Biopsy       Morphology    No results found. However, due to the size of the patient record, not all encounters were searched. Please check Results Review for a complete set of results.          Chest CT without Contrast       No results found for this or any previous visit.            Results for orders placed in visit on 23    ECHOCARDIOGRAM COMPLETE    Status: Normal 3/27/2023    Narrative  [id number]  [id number]  [id number]  [id number]^LAURA^FLAVIO^[facility]  Diagnostic and Treatment-3rd Floor  [address] [place] [address]    Name: STEPHANY GARCIA  MRN: [id number]  : 1950  Study Date: 2023 06:08 AM  Age: 72 yrs  Gender: Female  Patient [facility]: [facility]  Reason For Study: Malignant neoplasm of right female breast, unspecified  estrogen  Ordering Physician: FLAVIO SANCHEZ  Referring Physician: FLAVIO SANCHEZ  Performed By: Radha Sanchez,  RDCS    BSA: 1.5 m2  Height: 61 in  Weight: 113 lb  BP: 159/83 mmHg  ______________________________________________________________________________  Procedure  Echocardiogram with two-dimensional, color and spectral Doppler performed.  ______________________________________________________________________________  Interpretation Summary  Global and regional left ventricular function is normal with an EF of 60-65%.  Right ventricular function, chamber size, wall motion, and thickness are  normal.  The inferior vena cava is normal.  No pericardial effusion is present.  ______________________________________________________________________________  Left Ventricle  Global and regional left ventricular function is normal with an EF of 60-65%.  Left ventricular size is normal. Relative wall thickness is increased  consistent with concentric remodeling. Grade I or early diastolic dysfunction.  No regional wall motion abnormalities are seen.    Right Ventricle  Right ventricular function, chamber size, wall motion, and thickness are  normal.    Atria  Both atria appear normal. The atrial septum is intact as assessed by color  Doppler .    Mitral Valve  The mitral valve is normal. Trace mitral insufficiency is present.    Aortic Valve  Aortic valve is normal in structure and function.    Tricuspid Valve  The tricuspid valve is normal. Trace tricuspid insufficiency is present. The  right ventricular systolic pressure is approximated at 29.7 mmHg plus the  right atrial pressure. Pulmonary artery systolic pressure is normal.    Pulmonic Valve  The pulmonic valve is normal.    Vessels  The aorta root is normal. The pulmonary artery and bifurcation cannot be  assessed. The inferior vena cava is normal.    Pericardium  No pericardial effusion is present.    ______________________________________________________________________________  MMode/2D Measurements & Calculations  IVSd: 1.1 cm  LVIDd: 4.2 cm  LVIDs: 2.7 cm  LVPWd: 1.1  cm  FS: 36.2 %    LV mass(C)d: 157.3 grams  LV mass(C)dI: 106.2 grams/m2  Ao root diam: 3.0 cm  asc Aorta Diam: 3.3 cm  LVOT diam: 2.0 cm  LVOT area: 3.1 cm2  LA Volume (BP): 26.7 ml  LA Volume Index (BP): 18.0 ml/m2  RV Base: 2.3 cm  RWT: 0.51  TAPSE: 2.2 cm    Doppler Measurements & Calculations  MV E max marycarmen: 69.8 cm/sec  MV A max marycarmen: 86.5 cm/sec  MV E/A: 0.81  MV dec time: 0.26 sec  PA acc time: 0.14 sec  TR max marycarmen: 272.6 cm/sec  TR max P.7 mmHg  E/E' av.8  Lateral E/e': 9.7  Medial E/e': 15.9    ______________________________________________________________________________  Report approved by: Leonor Quinonez 2023 08:47 AM        PET Scan       Results for orders placed in visit on 21    PET ONCOLOGY WHOLE BODY    Status: Normal 2021    Narrative  Combined Report of:    PET and CT on  2021 10:13 AM :    1. PET of the neck, chest, abdomen, and pelvis.  2. PET CT Fusion for Attenuation Correction and Anatomical  Localization:  3. 3D MIP and PET-CT fused images were processed on an independent  workstation and archived to PACS and reviewed by a radiologist.    Technique:    1. PET: The patient received 13.54 mCi of F-18-FDG; the serum glucose  was 105 prior to administration, body weight was 56 kg. Images were  evaluated in the axial, sagittal, and coronal planes as well as the  rotational whole body MIP. Images were acquired from the Vertex to the  Feet.    UPTAKE WAS MEASURED AT 60 MINUTES.    BACKGROUND:  Liver SUV max= 3.21,   Aorta Blood SUV Max: 2.36.    2. CT: CT only obtained for attenuation correction and not diagnostic  purposes.    INDICATION: Multiple myeloma, initial workup; Multiple myeloma in  relapse (H)    ADDITIONAL INFORMATION OBTAINED FROM EMR: Approximately 7 years s/p  autologous peripheral blood stem cell transplant.  She has never had a  complete response, and has been on various medications since her  transplant, including pomalidomide, decadron and, most  recently, Pom +  daratumamab.  Remote history of breast cancer status post right  mastectomy with lymph node dissection.    COMPARISON: None.    FINDINGS:    HEAD/NECK:  There is no suspicious FDG uptake in the neck.    CHEST:  There is no suspicious FDG uptake in the chest.    ABDOMEN AND PELVIS:  There is no suspicious FDG uptake in the abdomen or pelvis.    LOWER EXTREMITIES:  No abnormal masses or hypermetabolic lesions.    BONES:  There is no abnormal FDG uptake in the skeleton. Permeative pattern to  the bones consistent with history of multiple myeloma. Scoliosis. 1211  Mild FDG uptake in the right C4-5 articular facet without a  corresponding lesion and degenerative changes noted. Mild FDG in a  calcific focus adjacent to the right ischial tuberosity, likely  calcified enthesopathic changes. Multiple compression deformities  within the spine.    Impression  IMPRESSION: This patient with a history of multiple myeloma status  post autologous stem cell transplant and chemotherapy: No active  disease.  1. No suspicious FDG uptake visualized within the head/neck neck,  chest, abdomen or pelvis.  2. Permeative appearance of the spine consistent with treated myeloma.    I have personally reviewed the examination and initial interpretation  and I agree with the findings.    BRADEN RODRIGUEZ MD         MRI Brain       No results found for this or any previous visit.         CSF Studies       No lab results found.    No lab results found.    No lab results found.    Shena understood the above assessment and recommendations.  Multiple questions answered.  No barriers to learning identified.       Known issues that I take into account for medical decisions, with salient changes to the plan considering these complexities noted above.    Patient Active Problem List   Diagnosis    Pain in thoracic spine    Multiple myeloma, dx 2002,  s/p ASCBMT 3/26/14    Personal history of malignant neoplasm of breast    Seasonal  allergies    Osteoporosis    Primary hypertension    Stem cells transplant status (H)    Adverse effect of other drugs, medicaments and biological substances, sequela    Acquired hypothyroidism    Other amyloidosis (H)       ------------------------------------------------------------------------------------------------------------------------------------------------    Patient Care Team         Relationship Specialty Notifications Start End    Thomas Villalobos MD PCP - General Internal Medicine  3/2/22     Phone: [phone] Pager: [phone] Fax: [phone]        [facility] [place] [address]    Misty Jackson, MARINO CNP Referring Physician Nurse Practitioner  9/17/15     [facility]    Phone: [phone] Fax: [phone]         [address] [place] [address]    Kevin Eagle MD MD Gastroenterology  10/27/15     Phone: [phone] Fax: [phone]         [address] [place] [address]    Ruma Rebollar MD MD Dermatology  10/27/17     Phone: [phone] Fax: [phone]         [facility]  [facility] [place] [address]    Sunil Rosario MD MD Dermatology  10/5/18     Phone: [phone] Fax: [phone]         [address] [facility] [address]    Iglesia Quick MD MD Orthopaedic Surgery  12/12/18     Phone: [phone] Fax: [phone] [address] [address]    Neela Mcknight MD MD Dermatology  1/25/20     Phone: [phone] Fax: [phone]         [address] [facility] [address] [facility] [address]    Neela Mcknight MD Assigned Surgical Provider   6/18/22     Phone: [phone] Fax: [phone]         [address] [address]    Shabnam Hall RN Specialty Care Coordinator Hematology & Oncology Admissions 1/31/23     Nighat Chavez MD MD Hematology & Oncology Admissions 2/16/23     Phone: [phone] Pager: [phone] Fax: [phone]  [facility] [address] [facility] [address]    Thomas Villalobos MD Assigned PCP   3/4/23     Phone: [phone] Pager: [phone] Fax: [phone]        [facility] [place] [address]    Hema Solares MD MD Cardiovascular Disease  4/6/23     Phone: [phone] Fax: [phone]         [address] [facility] [address]    Sadia Price MD Assigned Heart and Vascular Provider   5/6/23     Phone: [phone] Fax: [phone]         [address] [facility] [address]    Selena Suggs APRN CNP Assigned Cancer Care Provider   7/1/23     Phone: [phone] Fax: [phone]         [address] [place] [address]    Neela Mcknight MD MD Dermatology  8/29/23     Referring to DERM    Phone: [phone] Fax: [phone]         [address] [place] [address] [address] [facility] [address]    Marj Vasques MD Resident Student in organized health care education/training program  8/29/23     Phone: [phone] Fax: [phone]         [address] [facility] [address]           BMT Auto Cell Therapy

## 2024-04-26 NOTE — PROGRESS NOTES
Blood and Marrow Transplant - New Evaluation Appointment    Spoke with Shena and Sonny, patient's spouse, following visit with Dr. Chavez. I explained the role of the nurse coordinator throughout the process, as well as general time line and expectations for next steps. We discussed the necessity of a caregiver and the program's proximity requirements. All questions were answered.     Plan: Cellular Therapy, Carvykti ,     Timeline Notes: No set timeline, patient would like to take time to think about if CAR-T is something she would like to pursue. Will continue to have conversations with Dr. Chavez who follows her in [facility].    Contact information provided for :  yes      CAR-T:  Virologies drawn:  Yes      Phase Status updated: yes

## 2024-04-26 NOTE — PROGRESS NOTES
Blood and Marrow Transplant   New Transplant Visit with   Clinical     Assessment completed on 2024 in the BMT clinic of living situation, support system, financial status, functional status, coping, stressors, need for resources and social work intervention provided as needed. Information for this assessment was provided by pt and pt's spouse Sonny's report, consultation with medical team, and medical chart review.      Present:  Patient: Shena Hartmann  Spouse: Sonny Hartmann  : JOANNE Temple, [facility]    Medical Team   Nurse Coordinator: Sary Yang RN  BMT Physician: Nighat Chavez MD  Referring Physician: Nighat Chavez MD    Diagnosis: Multiple Myeloma (MM)  Diagnosis Date: 2022    Presenting Information:  Pt is a 73 year old female diagnosed with Multiple Myeloma . Pt was diagnosed on 2022. Pt presents for CAR-T stem cell transplant discussion.    Contact Information:  Cell Phone: [phone]  Home Phone: [phone]    Special Needs:   No needs identified at this time.     Relocation Requirement:   Pt [place], [place] [facility] the required distance of the hospital. Pt does not need to relocate.     Living Situation:   Pt lives in [place], [place] with spouse Sonny.    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 No insurance concerns identified at this time. SW provided information regarding the insurance authorization process and  the role of the [employer] . SW provided contact info for the [employer]  and referred pt to them for future insurance questions.     Finances:   Pt's source of income is Social Security and other jail benefits. No financial concerns identified at this time.     Caregiver:   SW discussed with the pt and spouse the caregiver role and expectation at length. Pt is agreeable to having a full time caregiver for the minimum of 30 days until cleared by the BMT Physician. Pt's identified caregivers are spouse Sonny, RAHUL Ricardo, and Daughters. Caregiver education and information provided. No caregiver concerns identified.     Healthcare Directive:  Yes. Pt has a copy already in chart.    Resources Provided:  -CAR-T Information Book  -CAR-T/BMT Resources Packet  -Healthcare Directive  -Honoring Choices - Your Rights: Making Your Own Health Care Treatment Decisions  -Caregiver Contract/Description  -Transplant Unit Description and Information   -Lodging Resources    Identified Concerns:  No concerns identified at this time.     Summary:  Pt presents to [facility] regarding an CAR-T stem cell transplant. Pt and pt's spouse asked good/appropriate questions regarding psychosocial factors related to BMT; all questions were addressed. Pt presented as pleasant. Pt's affect was appropriate. Family's affect was appropriate.     Plan:   SW provided contact information and encouraged pt to contact SW with any additional questions, concerns, resources and/or for support. SW will continue to follow pt to provide support and guidance with resources as needed.     JOANNE Temple, [facility]  Adult Blood & Marrow Transplant   Phone: [phone]  Pager: [phone]  VOCERA: BMT PROSPER #4  Securely message with Vocera   Support Groups at [facility]: Social Work [facility] for Cancer Patients ([email or web address])

## 2024-04-26 NOTE — NURSING NOTE
"Oncology Rooming Note    April 26, 2024 12:17 PM   Shena Hartmann is a 73 year old female who presents for:    Chief Complaint   Patient presents with    Oncology Clinic Visit     Multiple myeloma     Initial Vitals: BP (!) 167/85 (BP Location: Left arm, Patient Position: Sitting, Cuff Size: Adult Small)   Pulse 83   Temp 97.6  F (36.4  C) (Oral)   Resp 16   Ht 1.558 m (5' 1.32\")   Wt 53.4 kg (117 lb 11.2 oz)   LMP  (LMP Unknown)   SpO2 97%   BMI 22.01 kg/m   Estimated body mass index is 22.01 kg/m  as calculated from the following:    Height as of this encounter: 1.558 m (5' 1.32\").    Weight as of this encounter: 53.4 kg (117 lb 11.2 oz). Body surface area is 1.52 meters squared.  Moderate Pain (4) Comment: generalized aches and pain   No LMP recorded (lmp unknown). Patient is postmenopausal.  Allergies reviewed: Yes  Medications reviewed: Yes    Medications: Medication refills not needed today.  Pharmacy name entered into [employer] [facility]:    [id number] - [place], [place] - [address] AT [facility] [address]  WRITTEN PRESCRIPTION REQUESTED  [facility] - [place], [place] - [address]    Frailty Screening:   Is the patient here for a new oncology consult visit in cancer care? 2. No      Clinical concerns: none       Molly Armenta              "

## 2024-04-26 NOTE — PROGRESS NOTES
BMT / Cell Therapy Consultation      Shena Hartmann is a 73 year old female referred by me for cell therapy.      Disease presentation and baseline characteristics:  Breast cancer dx in 1994. Underwent surgery and chemotherapy without reoccurence  MGUS dx 1999  T8 pathologic fracture with radiation  Revlimid and velcade,   2013  Cytoxan IV 9/2013  D-PACE 11/2013  Auto 3/27/14  5/2014 thalidomide caused rash so switched to pomalidomide   on kenalog and clobetasol creams for IMID rash  FLC began to rise so riky added to pom 9/2020  she has been on xgeva for an unclear amount of time  Teodora-Kd 2/20/2023  Kyprolis held 10/23 due to side effects (N/V). Continue Teodora/dex           HPI:  Please see my entry above for hematologic history.  Shena is here due to relapsing myeloma on Teodora/dex. Kyprolis was held due to side effects of nausea and fatigue. Her M spike is increasing. No new bone pains, swollen glands, or fever/chills      ASSESSMENT AND PLAN:  Shena is a good candidate for Indapta, BCMA CAR- NK (doesn't currently meet parameters but likely will soon, CAR-T with Carvykti, or BiTE therapy. Given her travel restrictions and hesitancy about cell therapy, I recommended she get SOV Carvykti. She is weighing her options. We discussed the pros and cons of each option including CRS, ICANs, cytopenias, inpatient stay, and known or unknown efficacy of each option. She would like to go to the [facility] in June. For now I will add pomalidomide to her Isatuxumab to temporize while she determines what she would like to do. She is already on asa 81 mg which will help prevent DVT    We spent 90 minutes in consultation reviewing the prognosis of chemotherapy refractory multiple myeloma and treatment options.    The therapeutic options are limited and given the failure of prior therapy, CAR-T is indicated; I recommended therapy  using commercial CAR-T product Carvykti or clinical trial using   or Indapta product pending  the patient's preferences and slot availability         The process consists of non-mobilized apheresis followed by 3 weeks period of waiting while autologous CAR19-T cells are reprogrammed and manufactured ex vivo.  The treatment phase includes lympho-depleting chemotherapy with fludarabine and Cytoxan x3 days.  The patient will receive chemotherapy outpatient.    CAR-T therapy will be administered in  inpatientsetting.   recommend the outpatient CAR-T administration and confirm that the following requirements are all met:                                 ECOG 0-1     No recent coronary artery disease event, uncontrolled atrial fibrillation, CHF or pulmonary edema      No bulky disease AND low to moderate tumor burden        I recommend the following bridging therapy after T-cell apheresis to control the disease: Teodora + pom    We reviewed the side effects associated with LD chemotherapy and CAR-T infusion such as low blood counts, risk of infection and bleeding. We focused on expected side effects of cytokine release syndrome and the neurotoxicity. Symptoms may vary from mild to severe, and potentially life-threatening and can include the need for ICU care.  Some patients can get ill, but the usual course is transient and reversible.  The patient will have to stay within 30 minutes from clinic for 28 days.     CAR-T cell products available commercially are standard of care therapy and our center is certified to administer CAR-Ts, the research aspects are limited to collection of data to research database and could include blood and stool samples collection.     Patient will be handed the CAR-T therapy wallet card which needs to presented to health facilities (ED) at the time of visit.     Patient and the family asked many questions and had a good understanding of CAR-T treatment and its potential complications. Patient verbalized the wish to proceed with CAR-T therapy as recommended. I request the BMT office to  pursuit the following plan:    1. pursuit insurance approval for CAR-T therapy with Carvykti cell product.   2. schedule a work-up for apheresis once patient confirms her preference  3. arrange virology testing prior to apheresis  4. I will manage bridging therapy with Teodora + pom          Summary:     I spent 90 minutes in the care of this patient today, which included time necessary for preparation for the visit, obtaining history, ordering medications/tests/procedures as medically indicated, review of pertinent medical literature, counseling of the patient, communication of recommendations to the care team, and documentation time.    Nighat Chavez MD    Securely message me with the Vocera Web Console ([email or web address]/) or Microsoft Teams        ROS:    10 point ROS neg other than the symptoms noted above in the HPI.        Past Medical History:   Diagnosis Date    Acquired hypothyroidism 8/10/2023    Breast cancer (H) 01/01/1994    right    H/O stem cell transplant (H) 03/01/2014    History of blood transfusion 2013 & 2014    During stem cell tx process    Hypertension Sept. 2021    Runs 140 s systolically, about 20 above my usual    Multiple myeloma, without mention of having achieved remission 11/06/2012       Past Surgical History:   Procedure Laterality Date    BIOPSY  10/1994; [id number]    Lt. Breast in '94; multiple bone marrow bx's for MM    BREAST SURGERY  10/1994    Lt. Mastectomy w/lymph node resection    COLONOSCOPY  11/2015    Normal results    EYE SURGERY  2020    Bilateral cataract surgery    INSERT PORT VASCULAR ACCESS Left 2/15/2023    Procedure: INSERTION, VASCULAR ACCESS PORT;  Surgeon: Luc Antunez MD;  Location: [facility] OR    IR CHEST PORT PLACEMENT > 5 YRS OF AGE  2/15/2023    MASTECTOMY      Right, with lymphe node disection      PICC INSERTION  09/10/2013    5fr DL Power PICC, 44cm (1cm external) in the L lateral brachial vein with tip in the low SVC    TRANSPLANT   3/27/2014    Autologous stem cell tx       Family History   Problem Relation Age of Onset    Cancer Paternal Grandmother         skin cancer    Hypertension Mother     Cerebrovascular Disease Mother          15 from strokes    Hypertension Father     Prostate Cancer Father        Social History     Tobacco Use    Smoking status: Never     Passive exposure: Never    Smokeless tobacco: Never   Substance Use Topics    Alcohol use: Yes     Comment: occ    Drug use: No         Allergies   Allergen Reactions    Blood Transfusion Related (Informational Only) Other (See Comments)     Patient has a history of a clinically significant antibody against RBC antigens.  A delay in compatible RBCs may occur.     Cayenne Pepper [Cayenne]     Corn-Containing Products GI Disturbance    Levaquin Other (See Comments)     Tendon pain    Milk Protein GI Disturbance    Mold      Facial rash/lip swelling    Morphine Sulfate Other (See Comments)     Per pt - see things (hallucination) when she was on Morphine .    Pollen Extract      Environmental allergies     Shrimp Nausea and Vomiting    Tomato      Lip swelling, facial rash    Azithromycin Rash    Keflex [Cephalexin] Rash    Oat Rash    Tape [Adhesive Tape] Itching and Rash     All adhesives    Wheat Rash     Swelling of lips        Current Outpatient Medications   Medication Sig Dispense Refill    acetaminophen (TYLENOL) 325 MG tablet Take 325-650 mg by mouth every 6 hours as needed for mild pain      acyclovir (ZOVIRAX) 400 MG tablet Take 1 tablet (400 mg) by mouth every 12 hours 180 tablet 3    amLODIPine (NORVASC) 2.5 MG tablet Take 3 tablets (7.5 mg) by mouth daily 270 tablet 1    Ascorbic Acid (VITAMIN C PO) Take 1,000 mg by mouth 2 times daily       aspirin (ASA) 81 MG chewable tablet Take 1 tablet (81 mg) by mouth daily 30 tablet 0    CALCIUM-VITAMIN D-VITAMIN K PO Take 2 tablets by mouth daily.      clobetasol (TEMOVATE) 0.05 % external ointment Apply topically 2 times  "daily as needed (itching and rash) Topically to rash on hands until resolved 45 g 0    COENZYME Q-10 PO Take 100 mg by mouth daily       Cyanocobalamin 1000 MCG/ML LIQD Take 500 mcg by mouth 2 times daily      desonide (DESOWEN) 0.05 % external ointment Apply topically 2 times daily Apply to face as needed for dermatitis 15 g 11    levothyroxine (SYNTHROID/LEVOTHROID) 50 MCG tablet Take 1 tablet (50 mcg) by mouth daily 90 tablet 3    magnesium 100 MG CAPS Take 100 mg by mouth 3 times daily      Multiple Vitamins-Minerals (MULTIVITAMIN OR) Take 1 tablet by mouth 2 times daily.      ondansetron (ZOFRAN) 4 MG tablet Take 1 tablet (4 mg) by mouth every 8 hours as needed for nausea 60 tablet 11    order for DME 6 mastectomy bras  Length of need: 99 months 6 Device 1    order for DME R mastectomy prosthesis   Length of need:  99 months 1 Device 1    prochlorperazine (COMPAZINE) 5 MG tablet Take 1 tablet (5 mg) by mouth every 6 hours as needed for nausea or vomiting 30 tablet 11    triamcinolone (KENALOG) 0.1 % external ointment Apply topically 2 times daily 15 g 11    ZINC PICOLINATE PO Take 30 mg by mouth daily           Physical Exam:     Vital Signs: BP (!) 167/85 (BP Location: Left arm, Patient Position: Sitting, Cuff Size: Adult Small)   Pulse 83   Temp 97.6  F (36.4  C) (Oral)   Resp 16   Ht 1.558 m (5' 1.32\")   Wt 53.4 kg (117 lb 11.2 oz)   LMP  (LMP Unknown)   SpO2 97%   BMI 22.01 kg/m          KPS:  100    General Appearance: healthy, alert, and no distress  Eyes: conjunctiva and lids normal, sclera nonicteric  Ears/Nose/M/Throat: Oral mucosa and posterior oropharynx normal, moist mucous membranes  Neck supple, non-tender, free range of motion, no adenopathy  Cardio/Vascular:regular rate and rhythm, normal S1 and S2, no murmur  Resp Effort And Auscultation: Normal - Clear to auscultation without rales, rhonchi, or wheezing.  GI: soft, nontender, bowel sounds present in all four quadrants  Lymphatics:no " significant enlargement of cervical lymph nodes   Musculoskeletal: Musculoskeletal normal  Edema: none  Skin: Skin color, texture, turgor normal. No rashes or lesions.  Neurologic: Gait normal.    Psych/Affect: Mood and affect are appropriate.            Blood Counts       Recent Labs   Lab Test 04/10/24  0817 03/12/24  1349 02/19/24  1333   HGB 12.9 11.9 12.0   HCT 36.7 35.3 34.4*   WBC 5.8 5.3 6.5   ANEUTAUTO 3.5 2.8 4.1   ALYMPAUTO 1.5 1.8 1.6   AMONOAUTO 0.6 0.6 0.7   AEOSAUTO 0.2 0.1 0.1   ABSBASO 0.1 0.0 0.0   NRBCMAN 0.0 0.0 0.0    188 216       ABO/RH    Recent Labs   Lab Test 02/20/23  0713   ABORH O POS         Chemistries     Basic Panel  Recent Labs   Lab Test 04/10/24  0817 03/12/24  1349 02/19/24  1333    135 133*   POTASSIUM 4.3 4.3 4.3   CHLORIDE 99 100 98   CO2 26 25 26   BUN 12.7 15.7 13.2   CR 0.78 0.88 0.78   * 119* 87        Calcium, Magnesium, Phosphorus  Recent Labs   Lab Test 04/10/24  0817 03/12/24  1349 02/19/24  1333   PAULINE 9.3 9.1 9.3        LFTs  Recent Labs   Lab Test 04/10/24  0817 03/12/24  1349 02/19/24  1333   BILITOTAL 0.3 0.2 0.2   ALKPHOS 47 51 57   AST 21 20 24   ALT 23 17 23   ALBUMIN 4.3 4.2 4.2       LDH  Recent Labs   Lab Test 04/10/24  0817 03/12/24  1349 02/19/24  1333    167 181       B2-Microglobulin  Recent Labs   Lab Test 01/23/23  0731 11/28/22  0828 10/31/22  0813 10/03/22  0821 09/06/22  0845 08/08/22  0818   WXTS2FRZE 3.4* 2.5* 3.2* 3.2* 3.1* 3.3*           Immunoglobulins     Recent Labs   Lab Test 09/21/20  0841 09/14/20  0806   IGG 2,404* 2,416*       Recent Labs   Lab Test 09/21/20  0841 09/14/20  0806   IGA 5* 6*       Recent Labs   Lab Test 09/21/20  0841 09/14/20  0806   IGM <10* <10*         Monocloncal Protein Studies     M spike    Recent Labs   Lab Test 04/10/24  0817 03/12/24  1349 02/19/24  1333 01/15/24  0745 12/04/23  0801 11/06/23  0808   ELPM 0.2* 0.3* 0.3* 0.3* 0.3* 0.3*       [place] FLC    Recent Labs   Lab Test  04/10/24  0817 24  1349 24  1333 01/15/24  0745 23  0801 23  0808   KFLCA 9.32* 6.84* 5.32* 4.47* 2.69* 2.25*       [place] FLC    Recent Labs   Lab Test 04/10/24  0817 24  1349 24  1333 01/15/24  0745 23  0801 23  0808   LFLCA 0.21* 0.19* 0.30* 1.23 <0.14* 0.15*       FLC Ratio    Recent Labs   Lab Test 04/10/24  0817 24  1349 24  1333 01/15/24  0745 23  0808 23  1438   KLRA 44.38* 36.00* 17.73* 3.63* 15.00* 15.47*       Infectious Disease Markers     [facility] IDM    No lab results found.      Hepatitis and HIV    No lab results found.      CMV  No lab results found.      EBV    No lab results found.    Bone Marrow Biopsy       Morphology    No results found. However, due to the size of the patient record, not all encounters were searched. Please check Results Review for a complete set of results.          Chest CT without Contrast       No results found for this or any previous visit.            Results for orders placed in visit on 23    ECHOCARDIOGRAM COMPLETE    Status: Normal 3/27/2023    Narrative  [id number]  [id number]  [id number]  [id number]^LAURA^FLAVIO^MARTIN    [facility]  Diagnostic and Treatment-3rd Floor  [address] [place] [address]    Name: STEPHANY GARCIA  MRN: [id number]  : 1950  Study Date: 2023 06:08 AM  Age: 72 yrs  Gender: Female  Patient Location: [facility]  Reason For Study: Malignant neoplasm of right female breast, unspecified  estrogen  Ordering Physician: FLAVIO SANCHEZ  Referring Physician: FLAVIO SANCHEZ  Performed By: Radha Sanchez [facility]    BSA: 1.5 m2  Height: 61 in  Weight: 113 lb  BP: 159/83 mmHg  ______________________________________________________________________________  Procedure  Echocardiogram with two-dimensional, color and spectral Doppler  performed.  ______________________________________________________________________________  Interpretation Summary  Global and regional left ventricular function is normal with an EF of 60-65%.  Right ventricular function, chamber size, wall motion, and thickness are  normal.  The inferior vena cava is normal.  No pericardial effusion is present.  ______________________________________________________________________________  Left Ventricle  Global and regional left ventricular function is normal with an EF of 60-65%.  Left ventricular size is normal. Relative wall thickness is increased  consistent with concentric remodeling. Grade I or early diastolic dysfunction.  No regional wall motion abnormalities are seen.    Right Ventricle  Right ventricular function, chamber size, wall motion, and thickness are  normal.    Atria  Both atria appear normal. The atrial septum is intact as assessed by color  Doppler .    Mitral Valve  The mitral valve is normal. Trace mitral insufficiency is present.    Aortic Valve  Aortic valve is normal in structure and function.    Tricuspid Valve  The tricuspid valve is normal. Trace tricuspid insufficiency is present. The  right ventricular systolic pressure is approximated at 29.7 mmHg plus the  right atrial pressure. Pulmonary artery systolic pressure is normal.    Pulmonic Valve  The pulmonic valve is normal.    Vessels  The aorta root is normal. The pulmonary artery and bifurcation cannot be  assessed. The inferior vena cava is normal.    Pericardium  No pericardial effusion is present.    ______________________________________________________________________________  MMode/2D Measurements & Calculations  IVSd: 1.1 cm  LVIDd: 4.2 cm  LVIDs: 2.7 cm  LVPWd: 1.1 cm  FS: 36.2 %    LV mass(C)d: 157.3 grams  LV mass(C)dI: 106.2 grams/m2  Ao root diam: 3.0 cm  asc Aorta Diam: 3.3 cm  LVOT diam: 2.0 cm  LVOT area: 3.1 cm2  LA Volume (BP): 26.7 ml  LA Volume Index (BP): 18.0 ml/m2  RV Base:  2.3 cm  RWT: 0.51  TAPSE: 2.2 cm    Doppler Measurements & Calculations  MV E max marycarmen: 69.8 cm/sec  MV A max marycarmen: 86.5 cm/sec  MV E/A: 0.81  MV dec time: 0.26 sec  PA acc time: 0.14 sec  TR max marycarmen: 272.6 cm/sec  TR max P.7 mmHg  E/E' av.8  Lateral E/e': 9.7  Medial E/e': 15.9    ______________________________________________________________________________  Report approved by: Leonor Quinonez 2023 08:47 AM        PET Scan       Results for orders placed in visit on 21    PET ONCOLOGY WHOLE BODY    Status: Normal 2021    Narrative  Combined Report of:    PET and CT on  2021 10:13 AM :    1. PET of the neck, chest, abdomen, and pelvis.  2. PET CT Fusion for Attenuation Correction and Anatomical  Localization:  3. 3D MIP and PET-CT fused images were processed on an independent  workstation and archived to PACS and reviewed by a radiologist.    Technique:    1. PET: The patient received 13.54 mCi of F-18-FDG; the serum glucose  was 105 prior to administration, body weight was 56 kg. Images were  evaluated in the axial, sagittal, and coronal planes as well as the  rotational whole body MIP. Images were acquired from the Vertex to the  Feet.    UPTAKE WAS MEASURED AT 60 MINUTES.    BACKGROUND:  Liver SUV max= 3.21,   Aorta Blood SUV Max: 2.36.    2. CT: CT only obtained for attenuation correction and not diagnostic  purposes.    INDICATION: Multiple myeloma, initial workup; Multiple myeloma in  relapse (H)    ADDITIONAL INFORMATION OBTAINED FROM EMR: Approximately 7 years s/p  autologous peripheral blood stem cell transplant.  She has never had a  complete response, and has been on various medications since her  transplant, including pomalidomide, decadron and, most recently, Pom +  daratumamab.  Remote history of breast cancer status post right  mastectomy with lymph node dissection.    COMPARISON: None.    FINDINGS:    HEAD/NECK:  There is no suspicious FDG uptake in the  neck.    CHEST:  There is no suspicious FDG uptake in the chest.    ABDOMEN AND PELVIS:  There is no suspicious FDG uptake in the abdomen or pelvis.    LOWER EXTREMITIES:  No abnormal masses or hypermetabolic lesions.    BONES:  There is no abnormal FDG uptake in the skeleton. Permeative pattern to  the bones consistent with history of multiple myeloma. Scoliosis. 1211  Mild FDG uptake in the right C4-5 articular facet without a  corresponding lesion and degenerative changes noted. Mild FDG in a  calcific focus adjacent to the right ischial tuberosity, likely  calcified enthesopathic changes. Multiple compression deformities  within the spine.    Impression  IMPRESSION: This patient with a history of multiple myeloma status  post autologous stem cell transplant and chemotherapy: No active  disease.  1. No suspicious FDG uptake visualized within the head/neck neck,  chest, abdomen or pelvis.  2. Permeative appearance of the spine consistent with treated myeloma.    I have personally reviewed the examination and initial interpretation  and I agree with the findings.    BRADEN RODRIGUEZ MD         MRI Brain       No results found for this or any previous visit.         CSF Studies       No lab results found.    No lab results found.    No lab results found.    Shena understood the above assessment and recommendations.  Multiple questions answered.  No barriers to learning identified.       Known issues that I take into account for medical decisions, with salient changes to the plan considering these complexities noted above.    Patient Active Problem List   Diagnosis    Pain in thoracic spine    Multiple myeloma, dx 2002,  s/p ASCBMT 3/26/14    Personal history of malignant neoplasm of breast    Seasonal allergies    Osteoporosis    Primary hypertension    Stem cells transplant status (H)    Adverse effect of other drugs, medicaments and biological substances, sequela    Acquired hypothyroidism    Other amyloidosis  (H)       ------------------------------------------------------------------------------------------------------------------------------------------------    Patient Care Team         Relationship Specialty Notifications Start End    Thomas Villalobos MD PCP - General Internal Medicine  3/2/22     Phone: [phone] Pager: [phone] Fax: [phone] [address] [place] [address]    Misty Jackson APRN CNP Referring Physician Nurse Practitioner  9/17/15     [facility]    Phone: [phone] Fax: [phone]         [address] [place] [address] [address] [facility] [address]    Kevin Eagle MD MD Gastroenterology  10/27/15     Phone: [phone] Fax: [phone]         [address] [place] [address]    Ruma Rebollar MD MD Dermatology  10/27/17     Phone: [phone] Fax: [phone]         [facility] [address] [facility] [address]    Sunil Rosario MD MD Dermatology  10/5/18     Phone: [phone] Fax: [phone]         [address] [place] [address]    Iglesia Quick MD MD Orthopaedic Surgery  12/12/18     Phone: [phone] Fax: [phone]         [facility] [address] [address]    Neela Mcknight MD MD Dermatology  1/25/20     Phone: [phone] Fax: [phone]         [address] [place] [address] [address] [facility] [address]    Neela Mcknight MD Assigned Surgical Provider   6/18/22     Phone: [phone] Fax: [phone]         [address] [place] [address] [address] [facility] [address]    Shabnam Hall, RN Specialty Care Coordinator Hematology & Oncology Admissions 1/31/23     Nighat Chavez MD MD Hematology & Oncology Admissions 2/16/23     Phone: [phone] Pager: [phone] Fax: [phone]        [address] [place] [address] [address] [facility] [address]    Thomas Villalobos MD Assigned PCP   3/4/23     Phone: [phone] Pager: [phone] Fax: [phone] [address] [place] [address]     Hema Solares MD MD Cardiovascular Disease  4/6/23     Phone: [phone] Fax: [phone]         [address] [place] [address]    Sadia Price MD Assigned Heart and Vascular Provider   5/6/23     Phone: [phone] Fax: [phone]         [address] [place] [address]    Selena Suggs APRN CNP Assigned Cancer Care Provider   7/1/23     Phone: [phone] Fax: [phone]         [address] [place] [address]    Neela Mcknight MD MD Dermatology  8/29/23     Referring to DERM    Phone: [phone] Fax: [phone]         [address] [place] [address]    Marj Vasques MD Resident Student in organized health care education/training program  8/29/23     Phone: [phone] Fax: [phone]         [address] [place] [address]

## 2024-04-26 NOTE — NURSING NOTE
Chief Complaint   Patient presents with    Port Draw     Gripper needle, heparin locked     Maria Eugenia Murdock RN on 4/26/2024 at 2:31 PM

## 2024-04-27 LAB
HBV CORE AB SERPL QL IA: NONREACTIVE
HBV SURFACE AB SERPL IA-ACNC: <3.5 M[IU]/ML
HBV SURFACE AB SERPL IA-ACNC: NONREACTIVE M[IU]/ML
HBV SURFACE AG SERPL QL IA: NONREACTIVE
HCV AB SERPL QL IA: NONREACTIVE
HIV 1+2 AB+HIV1 P24 AG SERPL QL IA: NONREACTIVE

## 2024-04-29 LAB
CMV IGG SERPL IA-ACNC: 5.4 U/ML
CMV IGG SERPL IA-ACNC: ABNORMAL

## 2024-04-30 ENCOUNTER — TELEPHONE (OUTPATIENT)
Dept: ONCOLOGY | Facility: CLINIC | Age: 74
End: 2024-04-30
Payer: MEDICARE

## 2024-04-30 ENCOUNTER — DOCUMENTATION ONLY (OUTPATIENT)
Dept: ONCOLOGY | Facility: CLINIC | Age: 74
End: 2024-04-30
Payer: MEDICARE

## 2024-04-30 ENCOUNTER — MYC MEDICAL ADVICE (OUTPATIENT)
Dept: ONCOLOGY | Facility: CLINIC | Age: 74
End: 2024-04-30
Payer: MEDICARE

## 2024-04-30 DIAGNOSIS — Z79.899 ENCOUNTER FOR LONG-TERM (CURRENT) USE OF MEDICATIONS: ICD-10-CM

## 2024-04-30 DIAGNOSIS — C90.00 MULTIPLE MYELOMA NOT HAVING ACHIEVED REMISSION (H): Primary | ICD-10-CM

## 2024-04-30 RX ORDER — ONDANSETRON 2 MG/ML
8 INJECTION INTRAMUSCULAR; INTRAVENOUS ONCE
Status: CANCELLED | OUTPATIENT
Start: 2024-05-06 | End: 2024-06-26

## 2024-04-30 RX ORDER — ALBUTEROL SULFATE 90 UG/1
1-2 AEROSOL, METERED RESPIRATORY (INHALATION)
Status: CANCELLED
Start: 2024-05-06

## 2024-04-30 RX ORDER — MEPERIDINE HYDROCHLORIDE 25 MG/ML
25 INJECTION INTRAMUSCULAR; INTRAVENOUS; SUBCUTANEOUS EVERY 30 MIN PRN
Status: CANCELLED | OUTPATIENT
Start: 2024-05-06

## 2024-04-30 RX ORDER — ALBUTEROL SULFATE 0.83 MG/ML
2.5 SOLUTION RESPIRATORY (INHALATION)
Status: CANCELLED | OUTPATIENT
Start: 2024-05-06

## 2024-04-30 RX ORDER — METHYLPREDNISOLONE SODIUM SUCCINATE 125 MG/2ML
125 INJECTION, POWDER, LYOPHILIZED, FOR SOLUTION INTRAMUSCULAR; INTRAVENOUS
Status: CANCELLED
Start: 2024-05-06

## 2024-04-30 RX ORDER — ACETAMINOPHEN 325 MG/1
650 TABLET ORAL ONCE
Status: CANCELLED | OUTPATIENT
Start: 2024-05-06

## 2024-04-30 RX ORDER — EPINEPHRINE 1 MG/ML
0.3 INJECTION, SOLUTION INTRAMUSCULAR; SUBCUTANEOUS EVERY 5 MIN PRN
Status: CANCELLED | OUTPATIENT
Start: 2024-05-06

## 2024-04-30 RX ORDER — DIPHENHYDRAMINE HYDROCHLORIDE 50 MG/ML
50 INJECTION INTRAMUSCULAR; INTRAVENOUS
Status: CANCELLED
Start: 2024-05-06

## 2024-04-30 RX ORDER — HEPARIN SODIUM (PORCINE) LOCK FLUSH IV SOLN 100 UNIT/ML 100 UNIT/ML
5 SOLUTION INTRAVENOUS
Status: CANCELLED | OUTPATIENT
Start: 2024-05-06

## 2024-04-30 RX ORDER — LORAZEPAM 2 MG/ML
0.5 INJECTION INTRAMUSCULAR EVERY 4 HOURS PRN
Status: CANCELLED | OUTPATIENT
Start: 2024-05-06

## 2024-04-30 RX ORDER — HEPARIN SODIUM,PORCINE 10 UNIT/ML
5 VIAL (ML) INTRAVENOUS
Status: CANCELLED | OUTPATIENT
Start: 2024-05-06

## 2024-04-30 NOTE — TELEPHONE ENCOUNTER
NANI APPROVED    Medication: POMALYST 3 MG PO CAPS  Amount: $ 7,500  Foundation Name: [facility] Effective Date: 11/2/2023  Foundation Expiration Date: 4/30/2025  Additional Information:   Patient Notified: yes

## 2024-04-30 NOTE — ORAL ONC MGMT
Oral Chemotherapy Monitoring Program    [facility] provider: Dr Chavez  Drug: pomalidomide (Pomalyst)    Lab Monitoring Plan    Subjective/Objective:  Shena Hartmann is a 73 year old female contacted by phone for an initial visit for oral chemotherapy education.        Assessment:  Patient is appropriate to start therapy. Plan to start 5/6/24 with next Sarclisa infusion.    Plan:  Basic chemotherapy teaching was reviewed with the patient including indication, start date of therapy, dose, administration, adverse effects, missed doses, food and drug interactions, monitoring, side effect management, office contact information, and safe handling. Written materials were provided and all questions answered.    Follow-Up:  1 week following initial start of therapy    Kiara North PharmD  Oral Chemotherapy Monitoring Program  [facility]  [phone]  April 30, 2024            10/27/2022     8:00 AM 11/25/2022    10:00 AM 12/23/2022    12:00 PM 1/20/2023     3:00 PM 2/20/2023    10:00 AM 2/21/2023    12:00 PM 4/30/2024    11:00 AM   ORAL CHEMOTHERAPY   Assessment Type Refill Refill Refill Refill Other Discontinuation Initial Work up   Diagnosis Code Multiple Myeloma Multiple Myeloma Multiple Myeloma Multiple Myeloma Multiple Myeloma Multiple Myeloma Multiple Myeloma   Providers Dr. Reinier Chavez   [facility]   Drug Name Pomalyst (pomalidomide) Pomalyst (pomalidomide) Pomalyst (pomalidomide) Pomalyst (pomalidomide) Pomalyst (pomalidomide) Pomalyst (pomalidomide) Pomalyst (pomalidomide)   Dose       3 mg   Current Schedule Daily Daily Daily Daily   Daily   Cycle Details 3 weeks on, 1 week off 3 weeks on, 1 week off 3 weeks on, 1 week off 3 weeks on, 1 week off   3 weeks on, 1 week off   Any new drug interactions?       No       Last PHQ-2 Score on record:       2/21/2024    10:51 AM  "2/20/2024    11:41 AM   PHQ-2 ( 1999 [employer])   Q1: Little interest or pleasure in doing things 0 0   Q2: Feeling down, depressed or hopeless 0 0   PHQ-2 Score 0 0   Q1: Little interest or pleasure in doing things Not at all    Q2: Feeling down, depressed or hopeless Not at all    PHQ-2 Score 0        Vitals:  BP:   BP Readings from Last 1 Encounters:   04/26/24 (!) 167/85     Wt Readings from Last 1 Encounters:   04/26/24 53.4 kg (117 lb 11.2 oz)     Estimated body surface area is 1.52 meters squared as calculated from the following:    Height as of 4/26/24: 1.558 m (5' 1.32\").    Weight as of 4/26/24: 53.4 kg (117 lb 11.2 oz).    Labs:  _  Result Component Current Result Ref Range   Sodium 136 (4/10/2024) 135 - 145 mmol/L     _  Result Component Current Result Ref Range   Potassium 4.3 (4/10/2024) 3.4 - 5.3 mmol/L     _  Result Component Current Result Ref Range   Calcium 9.3 (4/10/2024) 8.8 - 10.2 mg/dL     No results found for Mag within last 30 days.     No results found for Phos within last 30 days.     _  Result Component Current Result Ref Range   Albumin 4.3 (4/10/2024) 3.5 - 5.2 g/dL     _  Result Component Current Result Ref Range   Urea Nitrogen 12.7 (4/10/2024) 8.0 - 23.0 mg/dL     _  Result Component Current Result Ref Range   Creatinine 0.78 (4/10/2024) 0.51 - 0.95 mg/dL     _  Result Component Current Result Ref Range   AST 21 (4/10/2024) 0 - 45 U/L     _  Result Component Current Result Ref Range   ALT 23 (4/10/2024) 0 - 50 U/L     _  Result Component Current Result Ref Range   Bilirubin Total 0.3 (4/10/2024) <=1.2 mg/dL     _  Result Component Current Result Ref Range   WBC Count 5.8 (4/10/2024) 4.0 - 11.0 10e3/uL     _  Result Component Current Result Ref Range   Hemoglobin 12.9 (4/10/2024) 11.7 - 15.7 g/dL     _  Result Component Current Result Ref Range   Platelet Count 199 (4/10/2024) 150 - 450 10e3/uL     No results found for ANC within last 30 days.       "

## 2024-05-01 ENCOUNTER — TELEPHONE (OUTPATIENT)
Dept: ONCOLOGY | Facility: CLINIC | Age: 74
End: 2024-05-01
Payer: MEDICARE

## 2024-05-01 NOTE — TELEPHONE ENCOUNTER
Oral Chemotherapy Monitoring Program    Medication: Pomalyst  Rx:  3mg PO daily 21/28 ds    Auth #: [id number]  Risk Category: Adult female NOT of reproductive capacity    Routine survey questions reviewed. yes

## 2024-05-06 ENCOUNTER — INFUSION THERAPY VISIT (OUTPATIENT)
Dept: ONCOLOGY | Facility: CLINIC | Age: 74
End: 2024-05-06
Attending: STUDENT IN AN ORGANIZED HEALTH CARE EDUCATION/TRAINING PROGRAM
Payer: MEDICARE

## 2024-05-06 ENCOUNTER — LAB (OUTPATIENT)
Dept: LAB | Facility: CLINIC | Age: 74
End: 2024-05-06
Attending: INTERNAL MEDICINE
Payer: MEDICARE

## 2024-05-06 VITALS
TEMPERATURE: 97.7 F | DIASTOLIC BLOOD PRESSURE: 71 MMHG | SYSTOLIC BLOOD PRESSURE: 159 MMHG | RESPIRATION RATE: 16 BRPM | HEART RATE: 69 BPM

## 2024-05-06 VITALS
BODY MASS INDEX: 22.23 KG/M2 | SYSTOLIC BLOOD PRESSURE: 159 MMHG | HEART RATE: 69 BPM | RESPIRATION RATE: 16 BRPM | TEMPERATURE: 97.7 F | WEIGHT: 118.9 LBS | OXYGEN SATURATION: 97 % | DIASTOLIC BLOOD PRESSURE: 71 MMHG

## 2024-05-06 DIAGNOSIS — Z86.2 PERSONAL HISTORY OF DISEASES OF BLOOD AND BLOOD-FORMING ORGANS: ICD-10-CM

## 2024-05-06 DIAGNOSIS — C90.00 MULTIPLE MYELOMA NOT HAVING ACHIEVED REMISSION (H): Primary | ICD-10-CM

## 2024-05-06 DIAGNOSIS — C90.00 MULTIPLE MYELOMA NOT HAVING ACHIEVED REMISSION (H): ICD-10-CM

## 2024-05-06 DIAGNOSIS — Z01.818 EXAMINATION PRIOR TO CHEMOTHERAPY: Primary | ICD-10-CM

## 2024-05-06 LAB
ALBUMIN SERPL BCG-MCNC: 4.3 G/DL (ref 3.5–5.2)
ALP SERPL-CCNC: 45 U/L (ref 40–150)
ALT SERPL W P-5'-P-CCNC: 19 U/L (ref 0–50)
ANION GAP SERPL CALCULATED.3IONS-SCNC: 11 MMOL/L (ref 7–15)
AST SERPL W P-5'-P-CCNC: 26 U/L (ref 0–45)
BASOPHILS # BLD AUTO: 0 10E3/UL (ref 0–0.2)
BASOPHILS NFR BLD AUTO: 1 %
BILIRUB SERPL-MCNC: 0.3 MG/DL
BUN SERPL-MCNC: 15.2 MG/DL (ref 8–23)
CALCIUM SERPL-MCNC: 9.4 MG/DL (ref 8.8–10.2)
CHLORIDE SERPL-SCNC: 100 MMOL/L (ref 98–107)
CREAT SERPL-MCNC: 0.88 MG/DL (ref 0.51–0.95)
DEPRECATED HCO3 PLAS-SCNC: 27 MMOL/L (ref 22–29)
EGFRCR SERPLBLD CKD-EPI 2021: 69 ML/MIN/1.73M2
EOSINOPHIL # BLD AUTO: 0.1 10E3/UL (ref 0–0.7)
EOSINOPHIL NFR BLD AUTO: 3 %
ERYTHROCYTE [DISTWIDTH] IN BLOOD BY AUTOMATED COUNT: 14.5 % (ref 10–15)
GLUCOSE SERPL-MCNC: 61 MG/DL (ref 70–99)
HCT VFR BLD AUTO: 39.3 % (ref 35–47)
HGB BLD-MCNC: 13.3 G/DL (ref 11.7–15.7)
IMM GRANULOCYTES # BLD: 0 10E3/UL
IMM GRANULOCYTES NFR BLD: 0 %
KAPPA LC FREE SER-MCNC: 11.48 MG/DL (ref 0.33–1.94)
KAPPA LC FREE/LAMBDA FREE SER NEPH: 63.78 {RATIO} (ref 0.26–1.65)
LAMBDA LC FREE SERPL-MCNC: 0.18 MG/DL (ref 0.57–2.63)
LDH SERPL L TO P-CCNC: 201 U/L (ref 0–250)
LYMPHOCYTES # BLD AUTO: 1.1 10E3/UL (ref 0.8–5.3)
LYMPHOCYTES NFR BLD AUTO: 28 %
MCH RBC QN AUTO: 33.9 PG (ref 26.5–33)
MCHC RBC AUTO-ENTMCNC: 33.8 G/DL (ref 31.5–36.5)
MCV RBC AUTO: 100 FL (ref 78–100)
MONOCYTES # BLD AUTO: 0.5 10E3/UL (ref 0–1.3)
MONOCYTES NFR BLD AUTO: 13 %
NEUTROPHILS # BLD AUTO: 2.2 10E3/UL (ref 1.6–8.3)
NEUTROPHILS NFR BLD AUTO: 55 %
NRBC # BLD AUTO: 0 10E3/UL
NRBC BLD AUTO-RTO: 0 /100
PLATELET # BLD AUTO: 212 10E3/UL (ref 150–450)
POTASSIUM SERPL-SCNC: 4.1 MMOL/L (ref 3.4–5.3)
PROT SERPL-MCNC: 6.7 G/DL (ref 6.4–8.3)
RBC # BLD AUTO: 3.92 10E6/UL (ref 3.8–5.2)
SODIUM SERPL-SCNC: 138 MMOL/L (ref 135–145)
TOTAL PROTEIN SERUM FOR ELP: 6.6 G/DL (ref 6.4–8.3)
WBC # BLD AUTO: 4.1 10E3/UL (ref 4–11)

## 2024-05-06 PROCEDURE — 250N000011 HC RX IP 250 OP 636: Mod: JZ | Performed by: STUDENT IN AN ORGANIZED HEALTH CARE EDUCATION/TRAINING PROGRAM

## 2024-05-06 PROCEDURE — 96413 CHEMO IV INFUSION 1 HR: CPT

## 2024-05-06 PROCEDURE — 96375 TX/PRO/DX INJ NEW DRUG ADDON: CPT

## 2024-05-06 PROCEDURE — 85004 AUTOMATED DIFF WBC COUNT: CPT | Performed by: STUDENT IN AN ORGANIZED HEALTH CARE EDUCATION/TRAINING PROGRAM

## 2024-05-06 PROCEDURE — 84165 PROTEIN E-PHORESIS SERUM: CPT | Mod: TC | Performed by: STUDENT IN AN ORGANIZED HEALTH CARE EDUCATION/TRAINING PROGRAM

## 2024-05-06 PROCEDURE — 250N000011 HC RX IP 250 OP 636: Performed by: INTERNAL MEDICINE

## 2024-05-06 PROCEDURE — 250N000011 HC RX IP 250 OP 636: Mod: JZ | Performed by: INTERNAL MEDICINE

## 2024-05-06 PROCEDURE — G2211 COMPLEX E/M VISIT ADD ON: HCPCS | Performed by: REGISTERED NURSE

## 2024-05-06 PROCEDURE — 36415 COLL VENOUS BLD VENIPUNCTURE: CPT | Performed by: STUDENT IN AN ORGANIZED HEALTH CARE EDUCATION/TRAINING PROGRAM

## 2024-05-06 PROCEDURE — 84165 PROTEIN E-PHORESIS SERUM: CPT | Mod: 26 | Performed by: STUDENT IN AN ORGANIZED HEALTH CARE EDUCATION/TRAINING PROGRAM

## 2024-05-06 PROCEDURE — 84155 ASSAY OF PROTEIN SERUM: CPT

## 2024-05-06 PROCEDURE — 250N000013 HC RX MED GY IP 250 OP 250 PS 637: Performed by: STUDENT IN AN ORGANIZED HEALTH CARE EDUCATION/TRAINING PROGRAM

## 2024-05-06 PROCEDURE — 83521 IG LIGHT CHAINS FREE EACH: CPT | Mod: 59

## 2024-05-06 PROCEDURE — 99215 OFFICE O/P EST HI 40 MIN: CPT | Performed by: REGISTERED NURSE

## 2024-05-06 PROCEDURE — 258N000003 HC RX IP 258 OP 636: Performed by: STUDENT IN AN ORGANIZED HEALTH CARE EDUCATION/TRAINING PROGRAM

## 2024-05-06 PROCEDURE — 80053 COMPREHEN METABOLIC PANEL: CPT | Performed by: STUDENT IN AN ORGANIZED HEALTH CARE EDUCATION/TRAINING PROGRAM

## 2024-05-06 PROCEDURE — 36593 DECLOT VASCULAR DEVICE: CPT

## 2024-05-06 PROCEDURE — 83615 LACTATE (LD) (LDH) ENZYME: CPT

## 2024-05-06 PROCEDURE — 96367 TX/PROPH/DG ADDL SEQ IV INF: CPT

## 2024-05-06 PROCEDURE — G0463 HOSPITAL OUTPT CLINIC VISIT: HCPCS | Mod: 25 | Performed by: REGISTERED NURSE

## 2024-05-06 RX ORDER — HEPARIN SODIUM (PORCINE) LOCK FLUSH IV SOLN 100 UNIT/ML 100 UNIT/ML
5 SOLUTION INTRAVENOUS
Status: DISCONTINUED | OUTPATIENT
Start: 2024-05-06 | End: 2024-05-06 | Stop reason: HOSPADM

## 2024-05-06 RX ORDER — ONDANSETRON 2 MG/ML
8 INJECTION INTRAMUSCULAR; INTRAVENOUS ONCE
Status: COMPLETED | OUTPATIENT
Start: 2024-05-06 | End: 2024-05-06

## 2024-05-06 RX ORDER — ACETAMINOPHEN 325 MG/1
650 TABLET ORAL ONCE
Status: COMPLETED | OUTPATIENT
Start: 2024-05-06 | End: 2024-05-06

## 2024-05-06 RX ORDER — HEPARIN SODIUM (PORCINE) LOCK FLUSH IV SOLN 100 UNIT/ML 100 UNIT/ML
5 SOLUTION INTRAVENOUS
Status: COMPLETED | OUTPATIENT
Start: 2024-05-06 | End: 2024-05-06

## 2024-05-06 RX ADMIN — DIPHENHYDRAMINE HYDROCHLORIDE 37.5 MG: 50 INJECTION, SOLUTION INTRAMUSCULAR; INTRAVENOUS at 11:31

## 2024-05-06 RX ADMIN — SODIUM CHLORIDE 500 MG: 9 INJECTION, SOLUTION INTRAVENOUS at 11:48

## 2024-05-06 RX ADMIN — Medication 5 ML: at 13:14

## 2024-05-06 RX ADMIN — Medication 5 ML: at 08:16

## 2024-05-06 RX ADMIN — ALTEPLASE 2 MG: 2.2 INJECTION, POWDER, LYOPHILIZED, FOR SOLUTION INTRAVENOUS at 09:24

## 2024-05-06 RX ADMIN — ONDANSETRON 8 MG: 2 INJECTION INTRAMUSCULAR; INTRAVENOUS at 11:25

## 2024-05-06 RX ADMIN — ACETAMINOPHEN 650 MG: 325 TABLET ORAL at 11:25

## 2024-05-06 RX ADMIN — ALTEPLASE 2 MG: 2.2 INJECTION, POWDER, LYOPHILIZED, FOR SOLUTION INTRAVENOUS at 10:57

## 2024-05-06 RX ADMIN — SODIUM CHLORIDE 250 ML: 9 INJECTION, SOLUTION INTRAVENOUS at 11:25

## 2024-05-06 ASSESSMENT — PAIN SCALES - GENERAL
PAINLEVEL: MILD PAIN (2)
PAINLEVEL: MILD PAIN (2)

## 2024-05-06 NOTE — LETTER
5/6/2024         RE: Shena Hartmann  [address] [place] [address]        Dear Colleague,    Thank you for referring your patient, Shena Hartmann, to the [facility]. Please see a copy of my visit note below.        [facility]    Hematology/Oncology Clinic Note    May 6, 2024    Reason for Office Visit   Evaluation and toxicity check during isatuximab maintenance, pending addition of Pomalyst while awaiting decision about CAR-T    Cancer Diagnosis   Multiple Myeloma    Oncology History of Present Illness   Breast cancer dx in 1994. Underwent surgery and chemotherapy without reoccurence  MGUS dx 1999  T8 pathologic fracture with radiation  Revlimid and velcade,   2013  Cytoxan IV 9/2013  D-PACE 11/2013  Auto 3/27/14  5/2014 thalidomide caused rash so switched to pomalidomide   on kenalog and clobetasol creams for IMID rash  FLC began to rise so riky added to pom 9/2020  she has been on xgeva for an unclear amount of time  Teodora-Kd 2/20/2023    Interval History   Having some trepidation about starting Pomalyst - experienced a lot of muscle aches and worsening of neuropathy last time she took it, although she notes she is stronger now and is hopeful that she will tolerate it better  Denies new pain  They were able to move back into their house about 1 week ago which they are very thankful for  Continues to have occasional watery stools, this has become a baseline for her.  When she has them, has 1-2 in a day.  She doesn't take anything for them, stools return to normal on their own  Cough has resolved after discontinuing lisinopril, but BP has become more labile; will be scheduling follow-up with Dr. Price next Mon, 5/13  She has decided to proceed with CAR-T.  Traveling to the [place] 6/3-6/7, wondering if apheresis can happen before she leaves    [facility] other than the symptoms noted above in the HPI.        Past Medical History      Past Medical History:   Diagnosis Date    Acquired hypothyroidism 8/10/2023    Breast cancer (H) 1994    right    H/O stem cell transplant (H) 2014    History of blood transfusion  &     During stem cell tx process    Hypertension 2021    Runs 140 s systolically, about 20 above my usual    Multiple myeloma, without mention of having achieved remission 2012       Past Surgical History:      Past Surgical History:   Procedure Laterality Date    BIOPSY  10/1994; [id number]    Lt. Breast in ; multiple bone marrow bx's for MM    BREAST SURGERY  10/1994    Lt. Mastectomy w/lymph node resection    COLONOSCOPY  2015    Normal results    EYE SURGERY      Bilateral cataract surgery    INSERT PORT VASCULAR ACCESS Left 2/15/2023    Procedure: INSERTION, VASCULAR ACCESS PORT;  Surgeon: Luc Antunez MD;  Location: [facility] OR    IR CHEST PORT PLACEMENT > 5 YRS OF AGE  2/15/2023    MASTECTOMY      Right, with lymphe node disection      PICC INSERTION  09/10/2013    5fr DL Power PICC, 44cm (1cm external) in the L lateral brachial vein with tip in the low SVC    TRANSPLANT  3/27/2014    Autologous stem cell tx       Social History     Socioeconomic History    Marital status:    Tobacco Use    Smoking status: Never     Passive exposure: Never    Smokeless tobacco: Never   Substance and Sexual Activity    Alcohol use: Yes     Comment: occ    Drug use: No    Sexual activity: Not Currently     Partners: Male     Birth control/protection: Post-menopausal   Other Topics Concern    Parent/sibling w/ CABG, MI or angioplasty before 65F 55M? No     Service No    Blood Transfusions No    Caffeine Concern No    Occupational Exposure No    Hobby Hazards No     Family History     Family History   Problem Relation Age of Onset    Cancer Paternal Grandmother         skin cancer    Hypertension Mother     Cerebrovascular Disease Mother          8-11-15 from strokes    Hypertension Father      Prostate Cancer Father        Allergies:     Allergies   Allergen Reactions    Blood Transfusion Related (Informational Only) Other (See Comments)     Patient has a history of a clinically significant antibody against RBC antigens.  A delay in compatible RBCs may occur.     Cayenne Pepper [Cayenne]     Corn-Containing Products GI Disturbance    Levaquin Other (See Comments)     Tendon pain    Milk Protein GI Disturbance    Mold      Facial rash/lip swelling    Morphine Sulfate Other (See Comments)     Per pt - see things (hallucination) when she was on Morphine .    Pollen Extract      Environmental allergies     Shrimp Nausea and Vomiting    Tomato      Lip swelling, facial rash    Azithromycin Rash    Keflex [Cephalexin] Rash    Oat Rash    Tape [Adhesive Tape] Itching and Rash     All adhesives    Wheat Rash     Swelling of lips         Medications:     Current Outpatient Medications   Medication Sig Dispense Refill    acetaminophen (TYLENOL) 325 MG tablet Take 325-650 mg by mouth every 6 hours as needed for mild pain      acyclovir (ZOVIRAX) 400 MG tablet Take 1 tablet (400 mg) by mouth every 12 hours 180 tablet 3    amLODIPine (NORVASC) 2.5 MG tablet Take 3 tablets (7.5 mg) by mouth daily 270 tablet 1    Ascorbic Acid (VITAMIN C PO) Take 1,000 mg by mouth 2 times daily       aspirin (ASA) 81 MG chewable tablet Take 1 tablet (81 mg) by mouth daily 30 tablet 0    CALCIUM-VITAMIN D-VITAMIN K PO Take 2 tablets by mouth daily.      clobetasol (TEMOVATE) 0.05 % external ointment Apply topically 2 times daily as needed (itching and rash) Topically to rash on hands until resolved 45 g 0    COENZYME Q-10 PO Take 100 mg by mouth daily       Cyanocobalamin 1000 MCG/ML LIQD Take 500 mcg by mouth 2 times daily      desonide (DESOWEN) 0.05 % external ointment Apply topically 2 times daily Apply to face as needed for dermatitis 15 g 11    levothyroxine (SYNTHROID/LEVOTHROID) 50 MCG tablet Take 1 tablet (50 mcg) by mouth  daily 90 tablet 3    magnesium 100 MG CAPS Take 100 mg by mouth 3 times daily      Multiple Vitamins-Minerals (MULTIVITAMIN OR) Take 1 tablet by mouth 2 times daily.      ondansetron (ZOFRAN) 4 MG tablet Take 1 tablet (4 mg) by mouth every 8 hours as needed for nausea 60 tablet 11    order for DME 6 mastectomy bras  Length of need: 99 months 6 Device 1    order for DME R mastectomy prosthesis   Length of need:  99 months 1 Device 1    pomalidomide (POMALYST) 3 MG capsule Take 1 capsule (3 mg) by mouth daily Swallow whole, do NOT break, crush, chew or open capsule. Take on days 1-21 of repeated 28 day cycles. 21 capsule 0    prochlorperazine (COMPAZINE) 5 MG tablet Take 1 tablet (5 mg) by mouth every 6 hours as needed for nausea or vomiting 30 tablet 11    triamcinolone (KENALOG) 0.1 % external ointment Apply topically 2 times daily 15 g 11    ZINC PICOLINATE PO Take 30 mg by mouth daily       No current facility-administered medications for this visit.     Facility-Administered Medications Ordered in Other Visits   Medication Dose Route Frequency Provider Last Rate Last Admin    alteplase (CATHFLO ACTIVASE) injection 2 mg  2 mg Intravenous Once Neelima Da Silva MD        plerixafor (MOZOBIL) injection SOLN 12.4 mg  0.24 mg/kg Subcutaneous Daily Mae Llamas APRN CNP   12.4 mg at 03/18/14 1550    potassium chloride 20 mEq in 50 mL IVPB  20 mEq Intravenous Once Mae Kay PA-C        potassium chloride SA (K-DUR,KLOR-CON M) CR tablet 20 mEq  20 mEq Oral Once Mae Kay PA-C           Physical Exam   BP (!) 159/71 (BP Location: Left arm, Patient Position: Sitting, Cuff Size: Adult Regular)   Pulse 69   Temp 97.7  F (36.5  C) (Oral)   Resp 16   LMP  (LMP Unknown)     Wt Readings from Last 5 Encounters:   05/06/24 53.9 kg (118 lb 14.4 oz)   04/26/24 53.4 kg (117 lb 11.2 oz)   04/10/24 54.1 kg (119 lb 3.2 oz)   03/12/24 53.5 kg (117 lb 14.4 oz)   02/29/24 51.8 kg (114 lb 1.6 oz)      Constitutional: NAD  Eyes: no scleral icterus  Pulm: CTAB  CV: RRR, no m/r/g  MSK: No edema in the extremities  Neuro: alert, conversant, normal gait  Psych: appropriate mood and affect      Data     Most Recent 3 CBC's:  Recent Labs   Lab Test 05/06/24  0827 04/10/24  0817 03/12/24  1349   WBC 4.1 5.8 5.3   HGB 13.3 12.9 11.9    99 100    199 188   ANEUTAUTO 2.2 3.5 2.8     Most Recent 3 BMP's:  Recent Labs   Lab Test 05/06/24  0827 04/10/24  0817 03/12/24  1349    136 135   POTASSIUM 4.1 4.3 4.3   CHLORIDE 100 99 100   CO2 27 26 25   BUN 15.2 12.7 15.7   CR 0.88 0.78 0.88   ANIONGAP 11 11 10   PAULINE 9.4 9.3 9.1   GLC 61* 103* 119*   PROTTOTAL 6.7 6.7 6.3*   ALBUMIN 4.3 4.3 4.2    Most Recent 3 LFT's:  Recent Labs   Lab Test 05/06/24  0827 04/10/24  0817 03/12/24  1349   AST 26 21 20   ALT 19 23 17   ALKPHOS 45 47 51   BILITOTAL 0.3 0.3 0.2    Most Recent 2 TSH and T4:  Recent Labs   Lab Test 02/19/24  1333 08/17/23  1349 10/03/22  0820 06/13/22  0822 03/21/22  0803   TSH 3.08 2.12   < > 9.47*  9.31* 5.79*   T4  --   --   --  0.99  0.97 0.83    < > = values in this interval not displayed.     I reviewed the above labs today myself and with the patient.    Assessment/Plan     Relapsed Multiple Myeloma  Started kyprolis/isatuximab/dex on 2/20/23.    Changed to Isatuximab maintenace 10mg/kg every 4 week starting 10/9/23  Rising light chains noted April 2024; cell therapy consult 4/26/24 with multiple options presented.  Pomalidomide 3 mg 21/28 days added to monthly isatuximab therapy to temporize things while Shena considers her cell therapy options, due to start Pomalyst tonight  She reports today she has decided to proceed with CAR-T.  I will send a message to the cellular therapy team to see if there is an apheresis slot available prior to Shena's trip on 6/3.  If apheresis gets scheduled prior to the trip, she will hold off on starting Pomalyst to allow for 2 weeks off therapy prior to  apheresis.  I will get back to her later today about the Pomalyst start once I hear back from cellular therapy regarding apheresis timing.  Monthly myeloma labs  Weekly labs x 8 after starting Pomalyst  Xgeva every 6 months, last dose 11/13/23, next scheduled 5/20. Likes to separate the chemo dosing from this.     ADDENDUM: Cellular therapy team plans to bring Shena for work-up on 5/15 with apheresis planned for 5/22.  Sent a message to Shena instructing her to NOT start her Pomalyst tonight.  Will cancel weekly labs since she is not starting Pomalyst (will keep labs on 5/20 prior to Xgeva).  I have requested labs, visit with me, and isatuximab appointment on 6/10 after she returns home from the [place]. Will plan to start Pomalyst at that time as part of her bridging therapy.    HTN  Continue amlodipine 7.5 mg daily.  Followed by cardiology - follow-up with Dr. Price 5/13 regarding labile BP after discontinuing lisinopril    Hypothyroidism  Remains on levothyroxine    ID Prophylaxis   Acyclovir 400 mg BID for HSV prophylaxis    48 minutes spent on the date of the encounter doing chart review, review of test results, interpretation of tests, patient visit, and documentation     The longitudinal plan of care for the diagnosis(es)/condition(s) as documented were addressed during this visit. Due to the added complexity in care, I will continue to support Shena in the subsequent management and with ongoing continuity of care.    MARINO Fuller CNP  [facility]  [address] [place] [address]  [phone]

## 2024-05-06 NOTE — PROGRESS NOTES
[facility]    Hematology/Oncology Clinic Note    May 6, 2024    Reason for Office Visit   Evaluation and toxicity check during isatuximab maintenance, pending addition of Pomalyst while awaiting decision about CAR-T    Cancer Diagnosis   Multiple Myeloma    Oncology History of Present Illness   Breast cancer dx in 1994. Underwent surgery and chemotherapy without reoccurence  MGUS dx 1999  T8 pathologic fracture with radiation  Revlimid and velcade,   2013  Cytoxan IV 9/2013  D-PACE 11/2013  Auto 3/27/14  5/2014 thalidomide caused rash so switched to pomalidomide   on kenalog and clobetasol creams for IMID rash  FLC began to rise so riky added to pom 9/2020  she has been on xgeva for an unclear amount of time  Teodora-Kd 2/20/2023    Interval History   Having some trepidation about starting Pomalyst - experienced a lot of muscle aches and worsening of neuropathy last time she took it, although she notes she is stronger now and is hopeful that she will tolerate it better  Denies new pain  They were able to move back into their house about 1 week ago which they are very thankful for  Continues to have occasional watery stools, this has become a baseline for her.  When she has them, has 1-2 in a day.  She doesn't take anything for them, stools return to normal on their own  Cough has resolved after discontinuing lisinopril, but BP has become more labile; will be scheduling follow-up with Dr. Price next Mon, 5/13  She has decided to proceed with CAR-T.  Traveling to the [place] 6/3-6/7, wondering if apheresis can happen before she leaves    [facility] other than the symptoms noted above in the HPI.        Past Medical History     Past Medical History:   Diagnosis Date    Acquired hypothyroidism 8/10/2023    Breast cancer (H) 01/01/1994    right    H/O stem cell transplant (H) 03/01/2014    History of blood transfusion 2013 & 2014    During stem cell tx process    Hypertension Sept.      Runs 140 s systolically, about 20 above my usual    Multiple myeloma, without mention of having achieved remission 2012       Past Surgical History:      Past Surgical History:   Procedure Laterality Date    BIOPSY  10/1994; [id number]    Lt. Breast in ; multiple bone marrow bx's for MM    BREAST SURGERY  10/1994    Lt. Mastectomy w/lymph node resection    COLONOSCOPY  2015    Normal results    EYE SURGERY      Bilateral cataract surgery    INSERT PORT VASCULAR ACCESS Left 2/15/2023    Procedure: INSERTION, VASCULAR ACCESS PORT;  Surgeon: Luc Antunez MD;  Location: [facility] OR    IR CHEST PORT PLACEMENT > 5 YRS OF AGE  2/15/2023    MASTECTOMY      Right, with lymphe node disection      PICC INSERTION  09/10/2013    5fr DL Power PICC, 44cm (1cm external) in the L lateral brachial vein with tip in the low SVC    TRANSPLANT  3/27/2014    Autologous stem cell tx       Social History     Socioeconomic History    Marital status:    Tobacco Use    Smoking status: Never     Passive exposure: Never    Smokeless tobacco: Never   Substance and Sexual Activity    Alcohol use: Yes     Comment: occ    Drug use: No    Sexual activity: Not Currently     Partners: Male     Birth control/protection: Post-menopausal   Other Topics Concern    Parent/sibling w/ CABG, MI or angioplasty before 65F 55M? No     Service No    Blood Transfusions No    Caffeine Concern No    Occupational Exposure No    Hobby Hazards No     Family History     Family History   Problem Relation Age of Onset    Cancer Paternal Grandmother         skin cancer    Hypertension Mother     Cerebrovascular Disease Mother          8-11-15 from strokes    Hypertension Father     Prostate Cancer Father        Allergies:     Allergies   Allergen Reactions    Blood Transfusion Related (Informational Only) Other (See Comments)     Patient has a history of a clinically significant antibody against RBC antigens.  A delay in compatible  RBCs may occur.     Cayenne Pepper [Cayenne]     Corn-Containing Products GI Disturbance    Levaquin Other (See Comments)     Tendon pain    Milk Protein GI Disturbance    Mold      Facial rash/lip swelling    Morphine Sulfate Other (See Comments)     Per pt - see things (hallucination) when she was on Morphine .    Pollen Extract      Environmental allergies     Shrimp Nausea and Vomiting    Tomato      Lip swelling, facial rash    Azithromycin Rash    Keflex [Cephalexin] Rash    Oat Rash    Tape [Adhesive Tape] Itching and Rash     All adhesives    Wheat Rash     Swelling of lips         Medications:     Current Outpatient Medications   Medication Sig Dispense Refill    acetaminophen (TYLENOL) 325 MG tablet Take 325-650 mg by mouth every 6 hours as needed for mild pain      acyclovir (ZOVIRAX) 400 MG tablet Take 1 tablet (400 mg) by mouth every 12 hours 180 tablet 3    amLODIPine (NORVASC) 2.5 MG tablet Take 3 tablets (7.5 mg) by mouth daily 270 tablet 1    Ascorbic Acid (VITAMIN C PO) Take 1,000 mg by mouth 2 times daily       aspirin (ASA) 81 MG chewable tablet Take 1 tablet (81 mg) by mouth daily 30 tablet 0    CALCIUM-VITAMIN D-VITAMIN K PO Take 2 tablets by mouth daily.      clobetasol (TEMOVATE) 0.05 % external ointment Apply topically 2 times daily as needed (itching and rash) Topically to rash on hands until resolved 45 g 0    COENZYME Q-10 PO Take 100 mg by mouth daily       Cyanocobalamin 1000 MCG/ML LIQD Take 500 mcg by mouth 2 times daily      desonide (DESOWEN) 0.05 % external ointment Apply topically 2 times daily Apply to face as needed for dermatitis 15 g 11    levothyroxine (SYNTHROID/LEVOTHROID) 50 MCG tablet Take 1 tablet (50 mcg) by mouth daily 90 tablet 3    magnesium 100 MG CAPS Take 100 mg by mouth 3 times daily      Multiple Vitamins-Minerals (MULTIVITAMIN OR) Take 1 tablet by mouth 2 times daily.      ondansetron (ZOFRAN) 4 MG tablet Take 1 tablet (4 mg) by mouth every 8 hours as needed  for nausea 60 tablet 11    order for DME 6 mastectomy bras  Length of need: 99 months 6 Device 1    order for DME R mastectomy prosthesis   Length of need:  99 months 1 Device 1    pomalidomide (POMALYST) 3 MG capsule Take 1 capsule (3 mg) by mouth daily Swallow whole, do NOT break, crush, chew or open capsule. Take on days 1-21 of repeated 28 day cycles. 21 capsule 0    prochlorperazine (COMPAZINE) 5 MG tablet Take 1 tablet (5 mg) by mouth every 6 hours as needed for nausea or vomiting 30 tablet 11    triamcinolone (KENALOG) 0.1 % external ointment Apply topically 2 times daily 15 g 11    ZINC PICOLINATE PO Take 30 mg by mouth daily       No current facility-administered medications for this visit.     Facility-Administered Medications Ordered in Other Visits   Medication Dose Route Frequency Provider Last Rate Last Admin    alteplase (CATHFLO ACTIVASE) injection 2 mg  2 mg Intravenous Once Neelima Da Silva MD        plerixafor (MOZOBIL) injection SOLN 12.4 mg  0.24 mg/kg Subcutaneous Daily Mae Llamas APRN CNP   12.4 mg at 03/18/14 1550    potassium chloride 20 mEq in 50 mL IVPB  20 mEq Intravenous Once Mae Kay PA-C        potassium chloride SA (K-DUR,KLOR-CON M) CR tablet 20 mEq  20 mEq Oral Once Mae Kay PA-C           Physical Exam   BP (!) 159/71 (BP Location: Left arm, Patient Position: Sitting, Cuff Size: Adult Regular)   Pulse 69   Temp 97.7  F (36.5  C) (Oral)   Resp 16   LMP  (LMP Unknown)     Wt Readings from Last 5 Encounters:   05/06/24 53.9 kg (118 lb 14.4 oz)   04/26/24 53.4 kg (117 lb 11.2 oz)   04/10/24 54.1 kg (119 lb 3.2 oz)   03/12/24 53.5 kg (117 lb 14.4 oz)   02/29/24 51.8 kg (114 lb 1.6 oz)     Constitutional: NAD  Eyes: no scleral icterus  Pulm: CTAB  CV: RRR, no m/r/g  MSK: No edema in the extremities  Neuro: alert, conversant, normal gait  Psych: appropriate mood and affect      Data     Most Recent 3 CBC's:  Recent Labs   Lab Test  05/06/24  0827 04/10/24  0817 03/12/24  1349   WBC 4.1 5.8 5.3   HGB 13.3 12.9 11.9    99 100    199 188   ANEUTAUTO 2.2 3.5 2.8     Most Recent 3 BMP's:  Recent Labs   Lab Test 05/06/24  0827 04/10/24  0817 03/12/24  1349    136 135   POTASSIUM 4.1 4.3 4.3   CHLORIDE 100 99 100   CO2 27 26 25   BUN 15.2 12.7 15.7   CR 0.88 0.78 0.88   ANIONGAP 11 11 10   PAULINE 9.4 9.3 9.1   GLC 61* 103* 119*   PROTTOTAL 6.7 6.7 6.3*   ALBUMIN 4.3 4.3 4.2    Most Recent 3 LFT's:  Recent Labs   Lab Test 05/06/24  0827 04/10/24  0817 03/12/24  1349   AST 26 21 20   ALT 19 23 17   ALKPHOS 45 47 51   BILITOTAL 0.3 0.3 0.2    Most Recent 2 TSH and T4:  Recent Labs   Lab Test 02/19/24  1333 08/17/23  1349 10/03/22  0820 06/13/22  0822 03/21/22  0803   TSH 3.08 2.12   < > 9.47*  9.31* 5.79*   T4  --   --   --  0.99  0.97 0.83    < > = values in this interval not displayed.     I reviewed the above labs today myself and with the patient.    Assessment/Plan     Relapsed Multiple Myeloma  Started kyprolis/isatuximab/dex on 2/20/23.    Changed to Isatuximab maintenace 10mg/kg every 4 week starting 10/9/23  Rising light chains noted April 2024; cell therapy consult 4/26/24 with multiple options presented.  Pomalidomide 3 mg 21/28 days added to monthly isatuximab therapy to temporize things while Shnea considers her cell therapy options, due to start Pomalyst tonight  She reports today she has decided to proceed with CAR-T.  I will send a message to the cellular therapy team to see if there is an apheresis slot available prior to Shena's trip on 6/3.  If apheresis gets scheduled prior to the trip, she will hold off on starting Pomalyst to allow for 2 weeks off therapy prior to apheresis.  I will get back to her later today about the Pomalyst start once I hear back from cellular therapy regarding apheresis timing.  Monthly myeloma labs  Weekly labs x 8 after starting Pomalyst  Xgeva every 6 months, last dose 11/13/23, next  scheduled 5/20. Likes to separate the chemo dosing from this.     ADDENDUM: Cellular therapy team plans to bring Shena for work-up on 5/15 with apheresis planned for 5/22.  Sent a message to Shena instructing her to NOT start her Pomalyst tonight.  Will cancel weekly labs since she is not starting Pomalyst (will keep labs on 5/20 prior to Xgeva).  I have requested labs, visit with me, and isatuximab appointment on 6/10 after she returns home from the [place]. Will plan to start Pomalyst at that time as part of her bridging therapy.    HTN  Continue amlodipine 7.5 mg daily.  Followed by cardiology - follow-up with Dr. Price 5/13 regarding labile BP after discontinuing lisinopril    Hypothyroidism  Remains on levothyroxine    ID Prophylaxis   Acyclovir 400 mg BID for HSV prophylaxis    48 minutes spent on the date of the encounter doing chart review, review of test results, interpretation of tests, patient visit, and documentation     The longitudinal plan of care for the diagnosis(es)/condition(s) as documented were addressed during this visit. Due to the added complexity in care, I will continue to support Shena in the subsequent management and with ongoing continuity of care.    MARINO [facility]  [address] [place] [address] [phone]

## 2024-05-06 NOTE — NURSING NOTE
"Oncology Rooming Note    May 6, 2024 8:35 AM   Shena Hartmann is a 73 year old female who presents for:    Chief Complaint   Patient presents with    Oncology Clinic Visit     Multiple myeloma not having achieved remission     Initial Vitals: BP (!) 159/71 (BP Location: Left arm, Patient Position: Sitting, Cuff Size: Adult Regular)   Pulse 69   Temp 97.7  F (36.5  C) (Oral)   Resp 16   LMP  (LMP Unknown)  Estimated body mass index is 22.23 kg/m  as calculated from the following:    Height as of 4/26/24: 1.558 m (5' 1.32\").    Weight as of an earlier encounter on 5/6/24: 53.9 kg (118 lb 14.4 oz). There is no height or weight on file to calculate BSA.  Mild Pain (2) Comment: muscle aches   No LMP recorded (lmp unknown). Patient is postmenopausal.  Allergies reviewed: Yes  Medications reviewed: Yes    Medications: Medication refills not needed today.  Pharmacy name entered into [facility]:    [id number] - [place], [place] - [address] [facility] [address]  WRITTEN PRESCRIPTION REQUESTED  [facility] - [place], [place] - [address]    Frailty Screening:   Is the patient here for a new oncology consult visit in cancer care? 2. No      Clinical concerns: none       Molly Armenta              "

## 2024-05-06 NOTE — PROGRESS NOTES
Infusion Nursing Note:  Shena Hartmann presents today for C14D1 Isatuximab-irfc.    Patient seen by provider today: Yes: Selena Suggs   present during visit today: Not Applicable.    Note: Patient arrives in infusion post provider visit. No new complaints or concerns. Patient has mild muscle pain 2/10. Warm blankets given to help with muscle pain.    No blood return noted from port TPAx2 infused. Blood return noted after 95 minutes.       Intravenous Access:  Implanted Port.    Treatment Conditions:  Lab Results   Component Value Date    HGB 13.3 05/06/2024    WBC 4.1 05/06/2024    ANEU 2.1 07/05/2021    ANEUTAUTO 2.2 05/06/2024     05/06/2024        Lab Results   Component Value Date     05/06/2024    POTASSIUM 4.1 05/06/2024    MAG 1.8 12/12/2014    CR 0.88 05/06/2024    PAULINE 9.4 05/06/2024    BILITOTAL 0.3 05/06/2024    ALBUMIN 4.3 05/06/2024    ALT 19 05/06/2024    AST 26 05/06/2024       Results reviewed, labs MET treatment parameters, ok to proceed with treatment.      Post Infusion Assessment:  Patient tolerated infusion without incident.  Blood return noted pre and post infusion.  Site patent and intact, free from redness, edema or discomfort.  No evidence of extravasations.  Access discontinued per protocol.       Discharge Plan:   Patient declined prescription refills.  Discharge instructions reviewed with: Patient and Family.  Patient and/or family verbalized understanding of discharge instructions and all questions answered.  Copy of AVS reviewed with patient and/or family.  Patient will return 5/20/2024 for next appointment.  Patient discharged in stable condition accompanied by: self and .  Departure Mode: Ambulatory.      Balwinder Andrade RN

## 2024-05-06 NOTE — NURSING NOTE
"Chief Complaint   Patient presents with    Port Draw     No blood return from port.  Labs drawn with  by rn.  VS taken.     Port accessed with 20 gauge 3/4\" gripper needle but no blood return obtained.  Port flushed with NS and heparin; alteplase ordered, provider notified.    Labs drawn with  by rn.  Pt tolerated well.  VS taken.  Pt checked in for next appt.      "

## 2024-05-07 ENCOUNTER — TELEPHONE (OUTPATIENT)
Dept: CARDIOLOGY | Facility: CLINIC | Age: 74
End: 2024-05-07
Payer: MEDICARE

## 2024-05-07 ENCOUNTER — TELEPHONE (OUTPATIENT)
Dept: TRANSPLANT | Facility: CLINIC | Age: 74
End: 2024-05-07
Payer: MEDICARE

## 2024-05-07 LAB
ALBUMIN SERPL ELPH-MCNC: 4.3 G/DL (ref 3.7–5.1)
ALPHA1 GLOB SERPL ELPH-MCNC: 0.2 G/DL (ref 0.2–0.4)
ALPHA2 GLOB SERPL ELPH-MCNC: 0.6 G/DL (ref 0.5–0.9)
B-GLOBULIN SERPL ELPH-MCNC: 0.6 G/DL (ref 0.6–1)
GAMMA GLOB SERPL ELPH-MCNC: 0.8 G/DL (ref 0.7–1.6)
M PROTEIN SERPL ELPH-MCNC: 0.6 G/DL
PATH REPORT.COMMENTS IMP SPEC: ABNORMAL
PROT PATTERN SERPL ELPH-IMP: ABNORMAL

## 2024-05-07 NOTE — TELEPHONE ENCOUNTER
5/7 Patient confirmed scheduled appointment:  Date: 5/9/2024  Time: 8:00 am  Visit type: Echo Complete  Provider: Dee  Location: [facility]  Testing/imaging: n/a  Additional notes: n/a

## 2024-05-08 ENCOUNTER — MEDICAL CORRESPONDENCE (OUTPATIENT)
Dept: TRANSPLANT | Facility: CLINIC | Age: 74
End: 2024-05-08
Payer: MEDICARE

## 2024-05-08 DIAGNOSIS — T50.995S ADVERSE EFFECT OF OTHER DRUGS, MEDICAMENTS AND BIOLOGICAL SUBSTANCES, SEQUELA: ICD-10-CM

## 2024-05-08 DIAGNOSIS — C90.00 MULTIPLE MYELOMA NOT HAVING ACHIEVED REMISSION (H): Primary | ICD-10-CM

## 2024-05-08 DIAGNOSIS — M81.0 OSTEOPOROSIS, UNSPECIFIED OSTEOPOROSIS TYPE, UNSPECIFIED PATHOLOGICAL FRACTURE PRESENCE: ICD-10-CM

## 2024-05-09 ENCOUNTER — HOSPITAL ENCOUNTER (OUTPATIENT)
Dept: CARDIOLOGY | Facility: CLINIC | Age: 74
Discharge: HOME | End: 2024-05-09
Attending: STUDENT IN AN ORGANIZED HEALTH CARE EDUCATION/TRAINING PROGRAM | Admitting: STUDENT IN AN ORGANIZED HEALTH CARE EDUCATION/TRAINING PROGRAM
Payer: MEDICARE

## 2024-05-09 DIAGNOSIS — Z01.818 EXAMINATION PRIOR TO CHEMOTHERAPY: ICD-10-CM

## 2024-05-09 DIAGNOSIS — Z86.2 PERSONAL HISTORY OF DISEASES OF BLOOD AND BLOOD-FORMING ORGANS: ICD-10-CM

## 2024-05-09 DIAGNOSIS — C90.00 MULTIPLE MYELOMA NOT HAVING ACHIEVED REMISSION (H): ICD-10-CM

## 2024-05-09 LAB
BI-PLANE LVEF ECHO: NORMAL
LVEF ECHO: NORMAL

## 2024-05-09 PROCEDURE — 93306 TTE W/DOPPLER COMPLETE: CPT

## 2024-05-09 PROCEDURE — 93306 TTE W/DOPPLER COMPLETE: CPT | Mod: 26 | Performed by: INTERNAL MEDICINE

## 2024-05-13 ENCOUNTER — OFFICE VISIT (OUTPATIENT)
Dept: CARDIOLOGY | Facility: CLINIC | Age: 74
End: 2024-05-13
Attending: INTERNAL MEDICINE
Payer: MEDICARE

## 2024-05-13 VITALS
DIASTOLIC BLOOD PRESSURE: 77 MMHG | OXYGEN SATURATION: 95 % | WEIGHT: 117 LBS | HEART RATE: 77 BPM | SYSTOLIC BLOOD PRESSURE: 135 MMHG | BODY MASS INDEX: 21.88 KG/M2

## 2024-05-13 DIAGNOSIS — I10 BENIGN ESSENTIAL HYPERTENSION: Primary | ICD-10-CM

## 2024-05-13 DIAGNOSIS — C90.00 MULTIPLE MYELOMA NOT HAVING ACHIEVED REMISSION (H): ICD-10-CM

## 2024-05-13 PROCEDURE — 93010 ELECTROCARDIOGRAM REPORT: CPT | Performed by: INTERNAL MEDICINE

## 2024-05-13 PROCEDURE — 93005 ELECTROCARDIOGRAM TRACING: CPT

## 2024-05-13 PROCEDURE — 99214 OFFICE O/P EST MOD 30 MIN: CPT | Performed by: INTERNAL MEDICINE

## 2024-05-13 PROCEDURE — G0463 HOSPITAL OUTPT CLINIC VISIT: HCPCS | Performed by: INTERNAL MEDICINE

## 2024-05-13 RX ORDER — AMLODIPINE BESYLATE 2.5 MG/1
TABLET ORAL
Status: ON HOLD
Start: 2024-05-13 | End: 2024-08-05

## 2024-05-13 ASSESSMENT — PAIN SCALES - GENERAL: PAINLEVEL: NO PAIN (0)

## 2024-05-13 NOTE — PATIENT INSTRUCTIONS
You were seen in the Cardiology Clinic today by: Dr. Price    Plan:   Medication Changes:   CHANGE Amlodipine dose to 5 mg in the morning and 2.5 mg in the evening. If your blood pressures remain elevated in the evening we can increase this dose to 5 mg.     Follow up Visit:  With Dr. Price as scheduled in October     If you have further questions, please utilize [employer] to contact us.     Your Care Team:    Cardiology   Telephone Number     Nurse Line  Richie Amato RN    [phone]     For scheduling appointments:  [phone]           On-call cardiologist for after hours or on weekends:   [phone], option #4, and ask to speak to the on-call cardiologist.     Cardiovascular Clinic:   [address] [place] [address]. [place] [address]      As always, Thank you for trusting us with your health care needs!      Please notify Brandon   Her calcium and parathyroid hormone remain slightly high.  Will recheck when she comes back in May, follow up as planned with endocrine in July

## 2024-05-13 NOTE — NURSING NOTE
Chief Complaint   Patient presents with    Follow Up      Dr. Price: Return cardio/oncology     Vitals were taken, medications reconciled, and EKG was performed.    Tiffanie Slater CNA  3:22 PM

## 2024-05-13 NOTE — PROGRESS NOTES
HISTORY:    Shena Hartmann is a very pleasant 73 year old woman with     Breast cancer  - post surgery, chemotherapy and is without recurrence  MGUS diagnosed in   Autologous transplantation 2014  Relapsed Myeloma    Hypertension was initially diagnosed in 2020 and she has had periods of poorly controlled blood pressure. She was started on lisinopril in 2021 and eventually amlodipine was added. She developed a persistent cough and the lisinopril was stopped.     Overall she has been doing well. Her evening BP is slightly higher. She is on isatuximab maintenance and may start pomalidomide in preparation for CAR-T cell therapy.    Denies any chest pain, sob, lightheadedness, dizziness, syncope, edema.     PAST MEDICAL HISTORY:  Past Medical History:   Diagnosis Date    Acquired hypothyroidism 8/10/2023    Breast cancer (H) 1994    right    H/O stem cell transplant (H) 2014    History of blood transfusion  &     During stem cell tx process    Hypertension 2021    Runs 140 s systolically, about 20 above my usual    Multiple myeloma, without mention of having achieved remission 2012       FAMILY HISTORY:  Family History   Problem Relation Age of Onset    Cancer Paternal Grandmother         skin cancer    Hypertension Mother     Cerebrovascular Disease Mother          8-11-15 from strokes    Hypertension Father     Prostate Cancer Father        SOCIAL HISTORY:  Non smoker    CURRENT MEDICATIONS:  Current Outpatient Medications   Medication Sig Dispense Refill    acetaminophen (TYLENOL) 325 MG tablet Take 325-650 mg by mouth every 6 hours as needed for mild pain      acyclovir (ZOVIRAX) 400 MG tablet Take 1 tablet (400 mg) by mouth every 12 hours 180 tablet 3    amLODIPine (NORVASC) 2.5 MG tablet Take 3 tablets (7.5 mg) by mouth daily 270 tablet 1    Ascorbic Acid (VITAMIN C PO) Take 1,000 mg by mouth 2 times daily       aspirin (ASA) 81 MG chewable tablet Take 1  tablet (81 mg) by mouth daily 30 tablet 0    CALCIUM-VITAMIN D-VITAMIN K PO Take 2 tablets by mouth daily.      clobetasol (TEMOVATE) 0.05 % external ointment Apply topically 2 times daily as needed (itching and rash) Topically to rash on hands until resolved 45 g 0    COENZYME Q-10 PO Take 100 mg by mouth daily       Cyanocobalamin 1000 MCG/ML LIQD Take 500 mcg by mouth 2 times daily      desonide (DESOWEN) 0.05 % external ointment Apply topically 2 times daily Apply to face as needed for dermatitis 15 g 11    levothyroxine (SYNTHROID/LEVOTHROID) 50 MCG tablet Take 1 tablet (50 mcg) by mouth daily 90 tablet 3    magnesium 100 MG CAPS Take 100 mg by mouth 3 times daily      Multiple Vitamins-Minerals (MULTIVITAMIN OR) Take 1 tablet by mouth 2 times daily.      ondansetron (ZOFRAN) 4 MG tablet Take 1 tablet (4 mg) by mouth every 8 hours as needed for nausea 60 tablet 11    order for DME 6 mastectomy bras  Length of need: 99 months 6 Device 1    order for DME R mastectomy prosthesis   Length of need:  99 months 1 Device 1    prochlorperazine (COMPAZINE) 5 MG tablet Take 1 tablet (5 mg) by mouth every 6 hours as needed for nausea or vomiting 30 tablet 11    triamcinolone (KENALOG) 0.1 % external ointment Apply topically 2 times daily 15 g 11    ZINC PICOLINATE PO Take 30 mg by mouth daily      pomalidomide (POMALYST) 3 MG capsule Take 1 capsule (3 mg) by mouth daily Swallow whole, do NOT break, crush, chew or open capsule. Take on days 1-21 of repeated 28 day cycles. (Patient not taking: Reported on 5/13/2024) 21 capsule 0       ROS:   Comprehensive ROS negative except HPI    EXAM:  /77 (BP Location: Left arm, Patient Position: Sitting, Cuff Size: Adult Regular)   Pulse 77   Wt 53.1 kg (117 lb)   LMP  (LMP Unknown)   SpO2 95%   BMI 21.88 kg/m    In general, the patient is in no apparent distress.        HEENT: Sclerae white, not injected.    Neck: No JVD. No thyromegaly  Heart: regular with normal S1, S2. No  "murmur. No audible rub or gallop.    Lungs: Clear bilaterally.  No rhonchi, wheezes, rales.   Extremities: No edema.  The pulses are 2+at the radial bilaterally.    Labs:    I have independently reviewed this patient's relevant laboratory and cardiac data :    Recent Labs   Lab Test 02/19/24  1333 03/23/18  0737   CHOL 230* 234*   HDL 83 64   * 153*   TRIG 116 85      Recent Labs   Lab Test 05/06/24  0827 04/10/24  0817   AST 26 21     Recent Labs   Lab Test 05/06/24  0827 04/10/24  0817   ALT 19 23     Recent Labs   Lab Test 05/06/24  0827 04/10/24  0817    136   POTASSIUM 4.1 4.3   CHLORIDE 100 99   CO2 27 26   ANIONGAP 11 11   BUN 15.2 12.7   CR 0.88 0.78     Recent Labs   Lab Test 05/06/24  0827 04/10/24  0817   WBC 4.1 5.8   RBC 3.92 3.70*   HGB 13.3 12.9    99    199     No results for input(s): \"A1C\" in the last 98413 hours.  Recent Labs   Lab Test 02/19/24  1333 08/17/23  1349   TSH 3.08 2.12     ECG today      Echocardiogram 5/9/24:  Global and regional left ventricular function is normal with an EF of 60-65%.  Right ventricular function, chamber size, wall motion, and thickness are normal.  No valvular dysfunction of significance.  The inferior vena cava is normal.  No pericardial effusion is present.    Assessment and Plan:   Shena Hartmann is a 73 year old with     Essential hypertension  Normal biventricular function   Multiple myeloma  Personal history of Breast cancer     Shena Hartmann has hypertension as a result of chemotherapy she received for multiple myeloma.  She is on isatuximab monotherapy and she is no longer in remission and is being considered for CAR-T cell therapy. Her blood pressures is fairly well controlled on amlodipine with slightly higher readings in the evening. I would recommend trying a split dose regimen of of the current amlodipine dose before we increase the dose to 5 mg twice daily. She will continue to monitor her blood pressures and update us. "     Her ECG today shows a bifascicular block (RBBB and left anterior fascicular block) and she is asymptomatic. Prior ECG showed RBBB (1 year ago). Progression to complete heart block appears to be infrequent among asymptomatic patients.  Symptoms such as presyncope or syncope should trigger further workup for heart block.  I would recommend avoiding AV node slowing agents like beta blockers and non-dihydropyridines like verapamil and diltiazem. No further diagnostic evaluation or therapy is required at this point.     Recommendations:  - Continue amlodipine 5-0-2.5 mg daily  - She will monitor her blood pressure at home and call with any significant changes.    -Will check to see if we can provide LVAD dressing assistance to her sister-in-law when she visits her during CAR-T cell therapy    Follow up in October (scheduled).      Sadia Price MD, MS  Professor of Medicine  Cardiovascular Medicine

## 2024-05-13 NOTE — LETTER
2024      RE: Shena Hartmann  [address] [place] [address]       Dear Colleague,    Thank you for the opportunity to participate in the care of your patient, Shena Hartmann, at the [facility] [place] at [facility]. Please see a copy of my visit note below.    HISTORY:    Shena Hartmann is a very pleasant 73 year old woman with     Breast cancer  - post surgery, chemotherapy and is without recurrence  MGUS diagnosed in   Autologous transplantation 2014  Relapsed Myeloma    Hypertension was initially diagnosed in 2020 and she has had periods of poorly controlled blood pressure. She was started on lisinopril in 2021 and eventually amlodipine was added. She developed a persistent cough and the lisinopril was stopped.     Overall she has been doing well. Her evening BP is slightly higher. She is on isatuximab maintenance and may start pomalidomide in preparation for CAR-T cell therapy.    Denies any chest pain, sob, lightheadedness, dizziness, syncope, edema.     PAST MEDICAL HISTORY:  Past Medical History:   Diagnosis Date     Acquired hypothyroidism 8/10/2023     Breast cancer (H) 1994    right     H/O stem cell transplant (H) 2014     History of blood transfusion  &     During stem cell tx process     Hypertension 2021    Runs 140 s systolically, about 20 above my usual     Multiple myeloma, without mention of having achieved remission 2012       FAMILY HISTORY:  Family History   Problem Relation Age of Onset     Cancer Paternal Grandmother         skin cancer     Hypertension Mother      Cerebrovascular Disease Mother          8-11-15 from strokes     Hypertension Father      Prostate Cancer Father        SOCIAL HISTORY:  Non smoker    CURRENT MEDICATIONS:  Current Outpatient Medications   Medication Sig Dispense Refill     acetaminophen (TYLENOL) 325 MG tablet Take  325-650 mg by mouth every 6 hours as needed for mild pain       acyclovir (ZOVIRAX) 400 MG tablet Take 1 tablet (400 mg) by mouth every 12 hours 180 tablet 3     amLODIPine (NORVASC) 2.5 MG tablet Take 3 tablets (7.5 mg) by mouth daily 270 tablet 1     Ascorbic Acid (VITAMIN C PO) Take 1,000 mg by mouth 2 times daily        aspirin (ASA) 81 MG chewable tablet Take 1 tablet (81 mg) by mouth daily 30 tablet 0     CALCIUM-VITAMIN D-VITAMIN K PO Take 2 tablets by mouth daily.       clobetasol (TEMOVATE) 0.05 % external ointment Apply topically 2 times daily as needed (itching and rash) Topically to rash on hands until resolved 45 g 0     COENZYME Q-10 PO Take 100 mg by mouth daily        Cyanocobalamin 1000 MCG/ML LIQD Take 500 mcg by mouth 2 times daily       desonide (DESOWEN) 0.05 % external ointment Apply topically 2 times daily Apply to face as needed for dermatitis 15 g 11     levothyroxine (SYNTHROID/LEVOTHROID) 50 MCG tablet Take 1 tablet (50 mcg) by mouth daily 90 tablet 3     magnesium 100 MG CAPS Take 100 mg by mouth 3 times daily       Multiple Vitamins-Minerals (MULTIVITAMIN OR) Take 1 tablet by mouth 2 times daily.       ondansetron (ZOFRAN) 4 MG tablet Take 1 tablet (4 mg) by mouth every 8 hours as needed for nausea 60 tablet 11     order for DME 6 mastectomy bras  Length of need: 99 months 6 Device 1     order for DME R mastectomy prosthesis   Length of need:  99 months 1 Device 1     prochlorperazine (COMPAZINE) 5 MG tablet Take 1 tablet (5 mg) by mouth every 6 hours as needed for nausea or vomiting 30 tablet 11     triamcinolone (KENALOG) 0.1 % external ointment Apply topically 2 times daily 15 g 11     ZINC PICOLINATE PO Take 30 mg by mouth daily       pomalidomide (POMALYST) 3 MG capsule Take 1 capsule (3 mg) by mouth daily Swallow whole, do NOT break, crush, chew or open capsule. Take on days 1-21 of repeated 28 day cycles. (Patient not taking: Reported on 5/13/2024) 21 capsule 0       ROS:  "  Comprehensive ROS negative except HPI    EXAM:  /77 (BP Location: Left arm, Patient Position: Sitting, Cuff Size: Adult Regular)   Pulse 77   Wt 53.1 kg (117 lb)   LMP  (LMP Unknown)   SpO2 95%   BMI 21.88 kg/m    In general, the patient is in no apparent distress.        HEENT: Sclerae white, not injected.    Neck: No JVD. No thyromegaly  Heart: regular with normal S1, S2. No murmur. No audible rub or gallop.    Lungs: Clear bilaterally.  No rhonchi, wheezes, rales.   Extremities: No edema.  The pulses are 2+at the radial bilaterally.    Labs:    I have independently reviewed this patient's relevant laboratory and cardiac data :    Recent Labs   Lab Test 02/19/24  1333 03/23/18  0737   CHOL 230* 234*   HDL 83 64   * 153*   TRIG 116 85      Recent Labs   Lab Test 05/06/24  0827 04/10/24  0817   AST 26 21     Recent Labs   Lab Test 05/06/24  0827 04/10/24  0817   ALT 19 23     Recent Labs   Lab Test 05/06/24  0827 04/10/24  0817    136   POTASSIUM 4.1 4.3   CHLORIDE 100 99   CO2 27 26   ANIONGAP 11 11   BUN 15.2 12.7   CR 0.88 0.78     Recent Labs   Lab Test 05/06/24  0827 04/10/24  0817   WBC 4.1 5.8   RBC 3.92 3.70*   HGB 13.3 12.9    99    199     No results for input(s): \"A1C\" in the last 45088 hours.  Recent Labs   Lab Test 02/19/24  1333 08/17/23  1349   TSH 3.08 2.12     ECG today      Echocardiogram 5/9/24:  Global and regional left ventricular function is normal with an EF of 60-65%.  Right ventricular function, chamber size, wall motion, and thickness are normal.  No valvular dysfunction of significance.  The inferior vena cava is normal.  No pericardial effusion is present.    Assessment and Plan:   Shena Hartmann is a 73 year old with     Essential hypertension  Normal biventricular function   Multiple myeloma  Personal history of Breast cancer     Shena Hartmann has hypertension as a result of chemotherapy she received for multiple myeloma.  She is on isatuximab " monotherapy and she is no longer in remission and is being considered for CAR-T cell therapy. Her blood pressures is fairly well controlled on amlodipine with slightly higher readings in the evening. I would recommend trying a split dose regimen of of the current amlodipine dose before we increase the dose to 5 mg twice daily. She will continue to monitor her blood pressures and update us.     Her ECG today shows a bifascicular block (RBBB and left anterior fascicular block) and she is asymptomatic. Prior ECG showed RBBB (1 year ago). Progression to complete heart block appears to be infrequent among asymptomatic patients.  Symptoms such as presyncope or syncope should trigger further workup for heart block.  I would recommend avoiding AV node slowing agents like beta blockers and non-dihydropyridines like verapamil and diltiazem. No further diagnostic evaluation or therapy is required at this point.     Recommendations:  - Continue amlodipine 5-0-2.5 mg daily  - She will monitor her blood pressure at home and call with any significant changes.    -Will check to see if we can provide LVAD dressing assistance to her sister-in-law when she visits her during CAR-T cell therapy    Follow up in October (scheduled).      Sadia Price MD, MS  Professor of Medicine  Cardiovascular Medicine

## 2024-05-14 LAB
ABO/RH(D): ABNORMAL
ANTIBODY SCREEN: POSITIVE
ATRIAL RATE - MUSE: 74 BPM
DIASTOLIC BLOOD PRESSURE - MUSE: NORMAL MMHG
INTERPRETATION ECG - MUSE: NORMAL
P AXIS - MUSE: 77 DEGREES
PR INTERVAL - MUSE: 154 MS
QRS DURATION - MUSE: 124 MS
QT - MUSE: 426 MS
QTC - MUSE: 472 MS
R AXIS - MUSE: -68 DEGREES
SPECIMEN EXPIRATION DATE: ABNORMAL
SYSTOLIC BLOOD PRESSURE - MUSE: NORMAL MMHG
T AXIS - MUSE: 87 DEGREES
VENTRICULAR RATE- MUSE: 74 BPM

## 2024-05-15 ENCOUNTER — HOSPITAL ENCOUNTER (OUTPATIENT)
Dept: LAB | Facility: CLINIC | Age: 74
Discharge: HOME | End: 2024-05-15
Attending: STUDENT IN AN ORGANIZED HEALTH CARE EDUCATION/TRAINING PROGRAM
Payer: MEDICARE

## 2024-05-15 ENCOUNTER — LAB (OUTPATIENT)
Dept: LAB | Facility: CLINIC | Age: 74
End: 2024-05-15
Attending: STUDENT IN AN ORGANIZED HEALTH CARE EDUCATION/TRAINING PROGRAM
Payer: MEDICARE

## 2024-05-15 ENCOUNTER — OFFICE VISIT (OUTPATIENT)
Dept: TRANSPLANT | Facility: CLINIC | Age: 74
End: 2024-05-15
Attending: STUDENT IN AN ORGANIZED HEALTH CARE EDUCATION/TRAINING PROGRAM
Payer: MEDICARE

## 2024-05-15 ENCOUNTER — TELEPHONE (OUTPATIENT)
Dept: TRANSPLANT | Facility: CLINIC | Age: 74
End: 2024-05-15

## 2024-05-15 VITALS
SYSTOLIC BLOOD PRESSURE: 130 MMHG | DIASTOLIC BLOOD PRESSURE: 76 MMHG | TEMPERATURE: 97.8 F | RESPIRATION RATE: 16 BRPM | HEIGHT: 61 IN | BODY MASS INDEX: 22.19 KG/M2 | HEART RATE: 75 BPM | WEIGHT: 117.5 LBS

## 2024-05-15 DIAGNOSIS — Z79.899 ENCOUNTER FOR LONG-TERM (CURRENT) USE OF MEDICATIONS: ICD-10-CM

## 2024-05-15 DIAGNOSIS — Z86.2 PERSONAL HISTORY OF DISEASES OF BLOOD AND BLOOD-FORMING ORGANS: ICD-10-CM

## 2024-05-15 DIAGNOSIS — C90.00 MULTIPLE MYELOMA NOT HAVING ACHIEVED REMISSION (H): Primary | ICD-10-CM

## 2024-05-15 DIAGNOSIS — C90.00 MULTIPLE MYELOMA NOT HAVING ACHIEVED REMISSION (H): ICD-10-CM

## 2024-05-15 DIAGNOSIS — Z01.818 EXAMINATION PRIOR TO CHEMOTHERAPY: ICD-10-CM

## 2024-05-15 LAB
ALBUMIN SERPL BCG-MCNC: 4.3 G/DL (ref 3.5–5.2)
ALP SERPL-CCNC: 46 U/L (ref 40–150)
ALT SERPL W P-5'-P-CCNC: 23 U/L (ref 0–50)
ANION GAP SERPL CALCULATED.3IONS-SCNC: 10 MMOL/L (ref 7–15)
ANTIBODY SCREEN, TUBE: NORMAL
AST SERPL W P-5'-P-CCNC: 23 U/L (ref 0–45)
BASOPHILS # BLD AUTO: 0 10E3/UL (ref 0–0.2)
BASOPHILS NFR BLD AUTO: 0 %
BILIRUB SERPL-MCNC: 0.2 MG/DL
BUN SERPL-MCNC: 17.9 MG/DL (ref 8–23)
CALCIUM SERPL-MCNC: 9.3 MG/DL (ref 8.8–10.2)
CD19 CELLS # BLD: 20 CELLS/UL (ref 107–698)
CD19 CELLS NFR BLD: 1 % (ref 6–27)
CD3 CELLS # BLD: 1514 CELLS/UL (ref 603–2990)
CD3 CELLS NFR BLD: 97 % (ref 49–84)
CD3+CD4+ CELLS # BLD: 326 CELLS/UL (ref 441–2156)
CD3+CD4+ CELLS NFR BLD: 21 % (ref 28–63)
CD3+CD4+ CELLS/CD3+CD8+ CLL BLD: 0.29 % (ref 1.4–2.6)
CD3+CD8+ CELLS # BLD: 1135 CELLS/UL (ref 125–1312)
CD3+CD8+ CELLS NFR BLD: 72 % (ref 10–40)
CD3-CD16+CD56+ CELLS # BLD: 33 CELLS/UL (ref 95–640)
CD3-CD16+CD56+ CELLS NFR BLD: 2 % (ref 4–25)
CHLORIDE SERPL-SCNC: 101 MMOL/L (ref 98–107)
CREAT SERPL-MCNC: 0.75 MG/DL (ref 0.51–0.95)
CRP SERPL-MCNC: <3 MG/L
D DIMER PPP FEU-MCNC: <0.27 UG/ML FEU (ref 0–0.5)
DEPRECATED HCO3 PLAS-SCNC: 26 MMOL/L (ref 22–29)
EGFRCR SERPLBLD CKD-EPI 2021: 84 ML/MIN/1.73M2
EOSINOPHIL # BLD AUTO: 0.1 10E3/UL (ref 0–0.7)
EOSINOPHIL NFR BLD AUTO: 2 %
ERYTHROCYTE [DISTWIDTH] IN BLOOD BY AUTOMATED COUNT: 14.3 % (ref 10–15)
FERRITIN SERPL-MCNC: 27 NG/ML (ref 11–328)
FIBRINOGEN PPP-MCNC: NORMAL MG/DL
GLUCOSE SERPL-MCNC: 78 MG/DL (ref 70–99)
HBV CORE AB SERPL QL IA: NONREACTIVE
HBV SURFACE AG SERPL QL IA: NONREACTIVE
HCT VFR BLD AUTO: 37.3 % (ref 35–47)
HCV AB SERPL QL IA: NONREACTIVE
HGB BLD-MCNC: 12.7 G/DL (ref 11.7–15.7)
HIV 1+2 AB+HIV1 P24 AG SERPL QL IA: NONREACTIVE
IMM GRANULOCYTES # BLD: 0 10E3/UL
IMM GRANULOCYTES NFR BLD: 0 %
INR PPP: NORMAL
LDH SERPL L TO P-CCNC: 186 U/L (ref 0–250)
LYMPHOCYTES # BLD AUTO: 1.6 10E3/UL (ref 0.8–5.3)
LYMPHOCYTES NFR BLD AUTO: 34 %
MCH RBC QN AUTO: 34.2 PG (ref 26.5–33)
MCHC RBC AUTO-ENTMCNC: 34 G/DL (ref 31.5–36.5)
MCV RBC AUTO: 101 FL (ref 78–100)
MONOCYTES # BLD AUTO: 0.6 10E3/UL (ref 0–1.3)
MONOCYTES NFR BLD AUTO: 13 %
NEUTROPHILS # BLD AUTO: 2.4 10E3/UL (ref 1.6–8.3)
NEUTROPHILS NFR BLD AUTO: 51 %
NRBC # BLD AUTO: 0 10E3/UL
NRBC BLD AUTO-RTO: 0 /100
PLATELET # BLD AUTO: 202 10E3/UL (ref 150–450)
POTASSIUM SERPL-SCNC: 4 MMOL/L (ref 3.4–5.3)
PROT SERPL-MCNC: 6.8 G/DL (ref 6.4–8.3)
RBC # BLD AUTO: 3.71 10E6/UL (ref 3.8–5.2)
SODIUM SERPL-SCNC: 137 MMOL/L (ref 135–145)
SPECIMEN EXPIRATION DATE: NORMAL
T CELL EXTENDED COMMENT: ABNORMAL
TOTAL PROTEIN SERUM FOR ELP: 6.7 G/DL (ref 6.4–8.3)
URATE SERPL-MCNC: 3.7 MG/DL (ref 2.4–5.7)
WBC # BLD AUTO: 4.7 10E3/UL (ref 4–11)

## 2024-05-15 PROCEDURE — 87340 HEPATITIS B SURFACE AG IA: CPT

## 2024-05-15 PROCEDURE — 84550 ASSAY OF BLOOD/URIC ACID: CPT

## 2024-05-15 PROCEDURE — 86357 NK CELLS TOTAL COUNT: CPT

## 2024-05-15 PROCEDURE — 250N000011 HC RX IP 250 OP 636: Performed by: STUDENT IN AN ORGANIZED HEALTH CARE EDUCATION/TRAINING PROGRAM

## 2024-05-15 PROCEDURE — 86334 IMMUNOFIX E-PHORESIS SERUM: CPT | Mod: 26 | Performed by: PATHOLOGY

## 2024-05-15 PROCEDURE — 83521 IG LIGHT CHAINS FREE EACH: CPT

## 2024-05-15 PROCEDURE — 86704 HEP B CORE ANTIBODY TOTAL: CPT | Mod: 59

## 2024-05-15 PROCEDURE — 86900 BLOOD TYPING SEROLOGIC ABO: CPT

## 2024-05-15 PROCEDURE — 86777 TOXOPLASMA ANTIBODY: CPT

## 2024-05-15 PROCEDURE — 87516 HEPATITIS B DNA AMP PROBE: CPT

## 2024-05-15 PROCEDURE — 86334 IMMUNOFIX E-PHORESIS SERUM: CPT | Performed by: PATHOLOGY

## 2024-05-15 PROCEDURE — 86335 IMMUNFIX E-PHORSIS/URINE/CSF: CPT | Mod: 26 | Performed by: PATHOLOGY

## 2024-05-15 PROCEDURE — 86140 C-REACTIVE PROTEIN: CPT

## 2024-05-15 PROCEDURE — 82232 ASSAY OF BETA-2 PROTEIN: CPT

## 2024-05-15 PROCEDURE — 86803 HEPATITIS C AB TEST: CPT

## 2024-05-15 PROCEDURE — 85379 FIBRIN DEGRADATION QUANT: CPT

## 2024-05-15 PROCEDURE — 85025 COMPLETE CBC W/AUTO DIFF WBC: CPT

## 2024-05-15 PROCEDURE — 85610 PROTHROMBIN TIME: CPT

## 2024-05-15 PROCEDURE — 82728 ASSAY OF FERRITIN: CPT

## 2024-05-15 PROCEDURE — 86665 EPSTEIN-BARR CAPSID VCA: CPT

## 2024-05-15 PROCEDURE — 84165 PROTEIN E-PHORESIS SERUM: CPT | Mod: 26 | Performed by: PATHOLOGY

## 2024-05-15 PROCEDURE — G0463 HOSPITAL OUTPT CLINIC VISIT: HCPCS

## 2024-05-15 PROCEDURE — 93010 ELECTROCARDIOGRAM REPORT: CPT | Performed by: INTERNAL MEDICINE

## 2024-05-15 PROCEDURE — 83615 LACTATE (LD) (LDH) ENZYME: CPT

## 2024-05-15 PROCEDURE — 84165 PROTEIN E-PHORESIS SERUM: CPT | Mod: TC | Performed by: PATHOLOGY

## 2024-05-15 PROCEDURE — 36591 DRAW BLOOD OFF VENOUS DEVICE: CPT

## 2024-05-15 PROCEDURE — 87389 HIV-1 AG W/HIV-1&-2 AB AG IA: CPT

## 2024-05-15 PROCEDURE — 86335 IMMUNFIX E-PHORSIS/URINE/CSF: CPT | Performed by: PATHOLOGY

## 2024-05-15 PROCEDURE — 86695 HERPES SIMPLEX TYPE 1 TEST: CPT

## 2024-05-15 PROCEDURE — 85384 FIBRINOGEN ACTIVITY: CPT

## 2024-05-15 PROCEDURE — 86753 PROTOZOA ANTIBODY NOS: CPT

## 2024-05-15 PROCEDURE — 82040 ASSAY OF SERUM ALBUMIN: CPT

## 2024-05-15 PROCEDURE — 84155 ASSAY OF PROTEIN SERUM: CPT

## 2024-05-15 PROCEDURE — 82784 ASSAY IGA/IGD/IGG/IGM EACH: CPT

## 2024-05-15 RX ORDER — HEPARIN SODIUM (PORCINE) LOCK FLUSH IV SOLN 100 UNIT/ML 100 UNIT/ML
5 SOLUTION INTRAVENOUS ONCE
Status: COMPLETED | OUTPATIENT
Start: 2024-05-15 | End: 2024-05-15

## 2024-05-15 RX ADMIN — Medication 5 ML: at 11:46

## 2024-05-15 NOTE — CONSULTS
Transfusion Medicine Consultation    Shena Hartmann [id number]   YOB: 1950 Age: 73 year old   Date of Consult: 5/15/2024     Reason for consult: Autologous Mononuclear Cell (MNC)  Collection           Assessment and Plan:   73 year old female presents for consultation for autologous MNC collection for CAR-T therapy for multiple myeloma. The plan is to collect for 1 to 3 days or until the target goal is met. The patient does not have adequate veins and will require line placement.          Chief Complaint:   Transfusion medicine consultation.         History of Present Illness:   73 year old female presents for consultation for autologous  MNC  collection.  Her past medical history includes multiple myeloma and breast cancer treated with surgery and without recurrence. She is currently feeling well.  The patient denies any back pain that would prevent her from tolerating the procedure. The patient reports no recent travel outside of the [place] and has no other identifiable risk factors for infectious disease. The procedure, risks/benefits were discussed with the patient and all of her questions were addressed at this time. Consent was obtained.             Past Medical History:     Past Medical History:   Diagnosis Date    Acquired hypothyroidism 8/10/2023    Breast cancer (H) 01/01/1994    right    H/O stem cell transplant (H) 03/01/2014    History of blood transfusion 2013 & 2014    During stem cell tx process    Hypertension Sept. 2021    Runs 140 s systolically, about 20 above my usual    Multiple myeloma, without mention of having achieved remission 11/06/2012             Past Surgical History:     Past Surgical History:   Procedure Laterality Date    BIOPSY  10/1994; [id number]    Lt. Breast in '94; multiple bone marrow bx's for MM    BREAST SURGERY  10/1994    Lt. Mastectomy w/lymph node resection    COLONOSCOPY  11/2015    Normal results    EYE SURGERY  2020    Bilateral cataract surgery     INSERT PORT VASCULAR ACCESS Left 2/15/2023    Procedure: INSERTION, VASCULAR ACCESS PORT;  Surgeon: Luc Antunez MD;  Location: [facility] OR    IR CHEST PORT PLACEMENT > 5 YRS OF AGE  2/15/2023    MASTECTOMY      Right, with lymphe node disection      PICC INSERTION  09/10/2013    5fr DL Power PICC, 44cm (1cm external) in the L lateral brachial vein with tip in the low SVC    TRANSPLANT  3/27/2014    Autologous stem cell tx              Social History:     Social History     Tobacco Use    Smoking status: Never     Passive exposure: Never    Smokeless tobacco: Never   Substance Use Topics    Alcohol use: Yes     Comment: occ             Family History:     Family History   Problem Relation Age of Onset    Cancer Paternal Grandmother         skin cancer    Hypertension Mother     Cerebrovascular Disease Mother          8-11-15 from strokes    Hypertension Father     Prostate Cancer Father              Immunizations:     Immunization History   Administered Date(s) Administered    DTaP/HepB/IPV 2016    HIB (PRP-T) 2015, 2015, 2016    HepB 2015, 2015    Hepatitis A (ADULT 19+) 1997, 1998    Influenza (IIV3) PF 11/10/2012    Influenza Vaccine >6 months,quad, PF 10/31/2013, 10/16/2014, 10/22/2015    Pneumo Conj 13-V (2010&after) 10/31/2003, 2015, 2015, 2016    Pneumococcal 23 valent 2022    Poliovirus, inactivated (IPV) 2015, 2015    TDAP Vaccine (Boostrix) 2015, 06/10/2015             Allergies:     Allergies   Allergen Reactions    Blood Transfusion Related (Informational Only) Other (See Comments)     Patient has a history of a clinically significant antibody against RBC antigens.  A delay in compatible RBCs may occur.     Cayenne Pepper [Cayenne]     Corn-Containing Products GI Disturbance    Levaquin Other (See Comments)     Tendon pain    Milk Protein GI Disturbance    Mold      Facial rash/lip swelling    Morphine Sulfate  Other (See Comments)     Per pt - see things (hallucination) when she was on Morphine .    Pollen Extract      Environmental allergies     Shrimp Nausea and Vomiting    Tomato      Lip swelling, facial rash    Azithromycin Rash    Keflex [Cephalexin] Rash    Oat Rash    Tape [Adhesive Tape] Itching and Rash     All adhesives    Wheat Rash     Swelling of lips             Medications:     Current Outpatient Medications   Medication Sig Dispense Refill    acetaminophen (TYLENOL) 325 MG tablet Take 325-650 mg by mouth every 6 hours as needed for mild pain      acyclovir (ZOVIRAX) 400 MG tablet Take 1 tablet (400 mg) by mouth every 12 hours 180 tablet 3    amLODIPine (NORVASC) 2.5 MG tablet Take 5 mg (2 tablets) in the morning. Take 2.5 mg (1 tablet) in the evening.      Ascorbic Acid (VITAMIN C PO) Take 1,000 mg by mouth 2 times daily       aspirin (ASA) 81 MG chewable tablet Take 1 tablet (81 mg) by mouth daily 30 tablet 0    CALCIUM-VITAMIN D-VITAMIN K PO Take 2 tablets by mouth daily.      clobetasol (TEMOVATE) 0.05 % external ointment Apply topically 2 times daily as needed (itching and rash) Topically to rash on hands until resolved 45 g 0    COENZYME Q-10 PO Take 100 mg by mouth daily       Cyanocobalamin 1000 MCG/ML LIQD Take 500 mcg by mouth 2 times daily      desonide (DESOWEN) 0.05 % external ointment Apply topically 2 times daily Apply to face as needed for dermatitis 15 g 11    levothyroxine (SYNTHROID/LEVOTHROID) 50 MCG tablet Take 1 tablet (50 mcg) by mouth daily 90 tablet 3    magnesium 100 MG CAPS Take 100 mg by mouth 3 times daily      Multiple Vitamins-Minerals (MULTIVITAMIN OR) Take 1 tablet by mouth 2 times daily.      ondansetron (ZOFRAN) 4 MG tablet Take 1 tablet (4 mg) by mouth every 8 hours as needed for nausea 60 tablet 11    order for DME 6 mastectomy bras  Length of need: 99 months 6 Device 1    order for DME R mastectomy prosthesis   Length of need:  99 months 1 Device 1    pomalidomide  "(POMALYST) 3 MG capsule Take 1 capsule (3 mg) by mouth daily Swallow whole, do NOT break, crush, chew or open capsule. Take on days 1-21 of repeated 28 day cycles. (Patient not taking: Reported on 5/13/2024) 21 capsule 0    prochlorperazine (COMPAZINE) 5 MG tablet Take 1 tablet (5 mg) by mouth every 6 hours as needed for nausea or vomiting 30 tablet 11    triamcinolone (KENALOG) 0.1 % external ointment Apply topically 2 times daily 15 g 11    ZINC PICOLINATE PO Take 30 mg by mouth daily       No current facility-administered medications for this encounter.     Facility-Administered Medications Ordered in Other Encounters   Medication Dose Route Frequency Provider Last Rate Last Admin    plerixafor (MOZOBIL) injection SOLN 12.4 mg  0.24 mg/kg Subcutaneous Daily Mae Llamas, MARINO CNP   12.4 mg at 03/18/14 1550    potassium chloride 20 mEq in 50 mL IVPB  20 mEq Intravenous Once Mae Kay PA-C        potassium chloride SA (K-DUR,KLOR-CON M) CR tablet 20 mEq  20 mEq Oral Once Mae Kay PA-C                 Review of Systems:     CONSTITUTIONAL: NEGATIVE for fever, chills, change in weight  ENT/MOUTH: NEGATIVE for ear, mouth and throat problems  RESP: NEGATIVE for significant cough or SOB  CV: NEGATIVE for chest pain, palpitations or peripheral edema           Vital Signs:   /76   Pulse 75   Temp 97.8  F (36.6  C) (Oral)   Resp 16   Ht 1.55 m (5' 1.02\")   Wt 53.3 kg (117 lb 8.1 oz)   LMP  (LMP Unknown)   BMI 22.18 kg/m              Data:     CBC  Recent Labs   Lab 05/15/24  1159   WBC 4.7   RBC 3.71*   HGB 12.7   HCT 37.3   *   MCH 34.2*   MCHC 34.0   RDW 14.3            ABO/RH(D)   Date Value Ref Range Status   02/20/2023 O POS  Final     Antibody Screen   Date Value Ref Range Status   02/20/2023 Positive (A) Negative Final   04/04/2014 Neg  Final           Parth Escobar MD  Transfusion Medicine Fellow  T: [phone]  P: [phone]        Attestation:  " I, Fabien Carias MD, saw and evaluated this patient during their visit with the transfusion medicine fellow, Parth Escobar MD. I have reviewed the patient's records and data, this includes all of the above testing results.  I agree with the fellow's  findings and plan of care as documented in their note.  Proceed with mononuclear cell collection by apheresis as part of the CAR-T cell therapy for treatment of her multiple myeloma.  30 minutes spent on the date of the encounter doing chart review, history and exam, documentation and review of test results.    Fabien Carias MD  Transfusion Medicine Attending  Laboratory Medicine and Pathology

## 2024-05-15 NOTE — PROGRESS NOTES
"APHERESIS INITIAL CONSULT CHECKLIST    Current Encounter Information  Current Encounter Information: Reason for Visit, Allergies and Current Meds  Procedure Requested: MNC/PBSC Collection (CARVYKTI CAR-T)  History of: (Reason for Apheresis): Multiple Myeloma    Access Assessment  Access Assessment  Vein Assessment:  Veins are adequate: No  Needs a catheter placed for Apheresis?: Yes, transfusion medicine physician informed. Line placement/removal scheduled for collection day (5/22/24)    Vital Signs  Vital Signs  BP: 130/76  Pulse: 75  Temp: 97.8  F (36.6  C)  Temp src: Oral  Resp: 16  Height: 155 cm (5' 1.02\")  Weight: 53.3 kg (117 lb 8.1 oz)    Reviewed   Review With Patient  Have you read the brochure Getting ready for Apheresis?: Yes  Have you had any invasive procedures, surgery, biopsy, bleeding in the last month?: No  Review medications and allergies: Yes  Have you ever been transfused?: Yes  Do you require pre-medication for blood products?: No  Patient given tour of the unit: Yes    Additional Information  Notes, needs and time spent with patient  Explain procedure, side effects or reactions, instructions: Yes  Patient has special need?: No  Time spent: 30min face to face  Procedure explained to patient, all questions answered. Low fat diet and hydration encouraged. Patient will need line, line placement and removal are scheduled for same day of collection (5/22/24). Patient met with Dr. Escobar for consent.        "

## 2024-05-15 NOTE — TELEPHONE ENCOUNTER
Called patient regarding missed in person research visit due to rescheduling for study [id number]. Patient expressed interest in study and will be consented at close visit on 5/20.    5/15/24 1400 AT

## 2024-05-15 NOTE — NURSING NOTE
EKG was performed today per order written by Nighat Chavez.  Name and  verified with patient. Patient tolerated well without incident. File transmitted to ruth.    Santana Carias on 5/15/2024 at 12:28 PM

## 2024-05-15 NOTE — NURSING NOTE
Chief Complaint   Patient presents with    Port Draw     Labs drawn via port by RN in lab.      Port accessed with 20g gripper needle by RN, labs collected, line flushed with saline and heparin.  Vitals taken. Pt checked in for appointment(s).     Ching KRAUS RN PHN BSN  BMT/Oncology Lab

## 2024-05-15 NOTE — LETTER
5/15/2024         RE: Shena Hartmann  [address] [place] [address]        Dear Colleague,    Thank you for referring your patient, Shena Hartmann, to the [facility] BLOOD AND MARROW TRANSPLANT PROGRAM [place]. Please see a copy of my visit note below.    BMT Teaching Flowsheet    Shena Hartmann is a 73 year old female  The encounter diagnosis was Multiple myeloma not having achieved remission (H).    Teaching Topic: CAR-T Apheresis Teach    Person(s) involved in teaching: Patient and Spouse    Motivation Level  Asks Questions: Yes  Eager to Learn: Yes  Cooperative: Yes  Receptive (willing/able to accept information): Yes  Any cultural factors/Buddhism beliefs that may influence understanding or compliance? No    Patient and Family demonstrates understanding of the following:   Reason for the appointment, diagnosis and treatment plan: Yes  Knowledge of proper use of medications and conditions for which they are ordered (with special attention to potential side effects or drug interactions): NA  Which situations necessitate calling provider and whom to contact: Yes  Proper use and care of (medical equipment, care aids, etc.) NA  Pain management techniques: Yes  How and/when to access community resources: Yes    Teaching/ learning concerns addressed: Reviewed CAR-T apheresis folder and calendar.    Infection Control:  Patient and Family instructed on hand hygiene: Yes  Signs and symptoms of infection taught: Yes    Instructional Materials Used/Given:   Apheresis Folder Materials.    For CAR-T patient: Patient instructed to remain within close proximity of facility (within 30-40 minutes per program standard) for at least four weeks post infusion; Must remain within 2 hours of facility until 8 weeks post-infusion. CAR-T patient is instructed not to operate motorized vehicle or heavy machinery for a period of 8 weeks.    Time spent with patient: 60 minutes.  Specific Concerns: None  identified. Patient asked appropriate questions throughout visit.        Delisa Pagan RN

## 2024-05-15 NOTE — PROGRESS NOTES
BMT Teaching Flowsheet    Shena Hartmann is a 73 year old female  The encounter diagnosis was Multiple myeloma not having achieved remission (H).    Teaching Topic: CAR-T Apheresis Teach    Person(s) involved in teaching: Patient and Spouse    Motivation Level  Asks Questions: Yes  Eager to Learn: Yes  Cooperative: Yes  Receptive (willing/able to accept information): Yes  Any cultural factors/Nondenominational beliefs that may influence understanding or compliance? No    Patient and Family demonstrates understanding of the following:   Reason for the appointment, diagnosis and treatment plan: Yes  Knowledge of proper use of medications and conditions for which they are ordered (with special attention to potential side effects or drug interactions): NA  Which situations necessitate calling provider and whom to contact: Yes  Proper use and care of (medical equipment, care aids, etc.) NA  Pain management techniques: Yes  How and/when to access community resources: Yes    Teaching/ learning concerns addressed: Reviewed CAR-T apheresis folder and calendar.    Infection Control:  Patient and Family instructed on hand hygiene: Yes  Signs and symptoms of infection taught: Yes    Instructional Materials Used/Given:   Apheresis Folder Materials.    For CAR-T patient: Patient instructed to remain within close proximity of facility (within 30-40 minutes per program standard) for at least four weeks post infusion; Must remain within 2 hours of facility until 8 weeks post-infusion. CAR-T patient is instructed not to operate motorized vehicle or heavy machinery for a period of 8 weeks.    Time spent with patient: 60 minutes.  Specific Concerns: None identified. Patient asked appropriate questions throughout visit.

## 2024-05-16 LAB
B2 MICROGLOB TUMOR MARKER SER-MCNC: 1.9 MG/L
EBV VCA IGG SER IA-ACNC: 54.8 U/ML
EBV VCA IGG SER IA-ACNC: POSITIVE
HSV1 IGG SERPL QL IA: 4.15 INDEX
HSV1 IGG SERPL QL IA: ABNORMAL
HSV2 IGG SERPL QL IA: 0.03 INDEX
HSV2 IGG SERPL QL IA: ABNORMAL
IGA SERPL-MCNC: 5 MG/DL (ref 84–499)
IGG SERPL-MCNC: 887 MG/DL (ref 610–1616)
IGM SERPL-MCNC: <10 MG/DL (ref 35–242)
KAPPA LC FREE SER-MCNC: 11.94 MG/DL (ref 0.33–1.94)
KAPPA LC FREE/LAMBDA FREE SER NEPH: 70.24 {RATIO} (ref 0.26–1.65)
LAMBDA LC FREE SERPL-MCNC: 0.17 MG/DL (ref 0.57–2.63)

## 2024-05-17 DIAGNOSIS — C90.00 MULTIPLE MYELOMA NOT HAVING ACHIEVED REMISSION (H): Primary | ICD-10-CM

## 2024-05-17 LAB
ALBUMIN SERPL ELPH-MCNC: 4.3 G/DL (ref 3.7–5.1)
ALPHA1 GLOB SERPL ELPH-MCNC: 0.3 G/DL (ref 0.2–0.4)
ALPHA2 GLOB SERPL ELPH-MCNC: 0.7 G/DL (ref 0.5–0.9)
B-GLOBULIN SERPL ELPH-MCNC: 0.6 G/DL (ref 0.6–1)
DONOR CYTOMEGALOVIRUS ABY: ABNORMAL
DONOR HEP B CORE ABY: ABNORMAL
DONOR HEP B SURF AGN: ABNORMAL
DONOR HEPATITIS C ABY: ABNORMAL
DONOR HTLV 1&2 ANTIBODY: ABNORMAL
DONOR TREPONEMA PAL ABY: ABNORMAL
GAMMA GLOB SERPL ELPH-MCNC: 0.9 G/DL (ref 0.7–1.6)
HBV DNA SERPL QL NAA+PROBE: NORMAL
HCV RNA SERPL QL NAA+PROBE: NORMAL
HIV1+2 AB SERPL QL IA: ABNORMAL
HIV1+2 RNA SERPL QL NAA+PROBE: NORMAL
M PROTEIN SERPL ELPH-MCNC: 0.6 G/DL
PROT ELPH PNL UR ELPH: NORMAL
PROT PATTERN SERPL ELPH-IMP: ABNORMAL
PROT PATTERN SERPL IFE-IMP: NORMAL
T GONDII IGG SER-ACNC: <3 IU/ML
T GONDII IGM SER-ACNC: <3 AU/ML
TRYPANOSOMA CRUZI: ABNORMAL
WNV RNA SERPL DONR QL NAA+PROBE: NORMAL

## 2024-05-17 RX ORDER — ALBUTEROL SULFATE 90 UG/1
1-2 AEROSOL, METERED RESPIRATORY (INHALATION)
Status: CANCELLED
Start: 2024-10-07

## 2024-05-17 RX ORDER — DIPHENHYDRAMINE HYDROCHLORIDE 50 MG/ML
50 INJECTION INTRAMUSCULAR; INTRAVENOUS
Status: CANCELLED
Start: 2024-10-07

## 2024-05-17 RX ORDER — MEPERIDINE HYDROCHLORIDE 25 MG/ML
25 INJECTION INTRAMUSCULAR; INTRAVENOUS; SUBCUTANEOUS EVERY 30 MIN PRN
Status: CANCELLED | OUTPATIENT
Start: 2024-10-07

## 2024-05-17 RX ORDER — METHYLPREDNISOLONE SODIUM SUCCINATE 125 MG/2ML
125 INJECTION, POWDER, LYOPHILIZED, FOR SOLUTION INTRAMUSCULAR; INTRAVENOUS
Status: CANCELLED
Start: 2024-10-07

## 2024-05-17 RX ORDER — ALBUTEROL SULFATE 0.83 MG/ML
2.5 SOLUTION RESPIRATORY (INHALATION)
Status: CANCELLED | OUTPATIENT
Start: 2024-10-07

## 2024-05-17 RX ORDER — EPINEPHRINE 1 MG/ML
0.3 INJECTION, SOLUTION INTRAMUSCULAR; SUBCUTANEOUS EVERY 5 MIN PRN
Status: CANCELLED | OUTPATIENT
Start: 2024-10-07

## 2024-05-20 ENCOUNTER — INFUSION THERAPY VISIT (OUTPATIENT)
Dept: ONCOLOGY | Facility: CLINIC | Age: 74
End: 2024-05-20
Attending: STUDENT IN AN ORGANIZED HEALTH CARE EDUCATION/TRAINING PROGRAM
Payer: MEDICARE

## 2024-05-20 ENCOUNTER — APPOINTMENT (OUTPATIENT)
Dept: LAB | Facility: CLINIC | Age: 74
End: 2024-05-20
Attending: INTERNAL MEDICINE
Payer: MEDICARE

## 2024-05-20 ENCOUNTER — OFFICE VISIT (OUTPATIENT)
Dept: TRANSPLANT | Facility: CLINIC | Age: 74
End: 2024-05-20
Attending: STUDENT IN AN ORGANIZED HEALTH CARE EDUCATION/TRAINING PROGRAM
Payer: MEDICARE

## 2024-05-20 VITALS
RESPIRATION RATE: 16 BRPM | SYSTOLIC BLOOD PRESSURE: 128 MMHG | HEART RATE: 74 BPM | OXYGEN SATURATION: 97 % | WEIGHT: 117 LBS | TEMPERATURE: 97.6 F | DIASTOLIC BLOOD PRESSURE: 71 MMHG | BODY MASS INDEX: 22.09 KG/M2

## 2024-05-20 DIAGNOSIS — Z01.818 EXAMINATION PRIOR TO CHEMOTHERAPY: ICD-10-CM

## 2024-05-20 DIAGNOSIS — M81.0 OSTEOPOROSIS, UNSPECIFIED OSTEOPOROSIS TYPE, UNSPECIFIED PATHOLOGICAL FRACTURE PRESENCE: ICD-10-CM

## 2024-05-20 DIAGNOSIS — C90.00 MULTIPLE MYELOMA NOT HAVING ACHIEVED REMISSION (H): Primary | ICD-10-CM

## 2024-05-20 DIAGNOSIS — T50.995S ADVERSE EFFECT OF OTHER DRUGS, MEDICAMENTS AND BIOLOGICAL SUBSTANCES, SEQUELA: ICD-10-CM

## 2024-05-20 DIAGNOSIS — C90.00 MULTIPLE MYELOMA NOT HAVING ACHIEVED REMISSION (H): ICD-10-CM

## 2024-05-20 DIAGNOSIS — Z79.899 ENCOUNTER FOR LONG-TERM (CURRENT) USE OF MEDICATIONS: ICD-10-CM

## 2024-05-20 DIAGNOSIS — Z86.2 PERSONAL HISTORY OF DISEASES OF BLOOD AND BLOOD-FORMING ORGANS: ICD-10-CM

## 2024-05-20 LAB
ALBUMIN SERPL BCG-MCNC: 4.1 G/DL (ref 3.5–5.2)
ALP SERPL-CCNC: 47 U/L (ref 40–150)
ALT SERPL W P-5'-P-CCNC: 22 U/L (ref 0–50)
ANION GAP SERPL CALCULATED.3IONS-SCNC: 9 MMOL/L (ref 7–15)
APTT PPP: 27 SECONDS (ref 22–38)
APTT PPP: >240 SECONDS (ref 22–38)
AST SERPL W P-5'-P-CCNC: 21 U/L (ref 0–45)
ATRIAL RATE - MUSE: 65 BPM
BASOPHILS # BLD AUTO: 0 10E3/UL (ref 0–0.2)
BASOPHILS NFR BLD AUTO: 1 %
BILIRUB SERPL-MCNC: 0.2 MG/DL
BUN SERPL-MCNC: 20.3 MG/DL (ref 8–23)
CALCIUM SERPL-MCNC: 9.3 MG/DL (ref 8.8–10.2)
CALCIUM SERPL-MCNC: 9.3 MG/DL (ref 8.8–10.2)
CHLORIDE SERPL-SCNC: 101 MMOL/L (ref 98–107)
CREAT SERPL-MCNC: 0.85 MG/DL (ref 0.51–0.95)
DEPRECATED HCO3 PLAS-SCNC: 28 MMOL/L (ref 22–29)
DIASTOLIC BLOOD PRESSURE - MUSE: NORMAL MMHG
EGFRCR SERPLBLD CKD-EPI 2021: 72 ML/MIN/1.73M2
EOSINOPHIL # BLD AUTO: 0.1 10E3/UL (ref 0–0.7)
EOSINOPHIL NFR BLD AUTO: 2 %
ERYTHROCYTE [DISTWIDTH] IN BLOOD BY AUTOMATED COUNT: 14.3 % (ref 10–15)
GLUCOSE SERPL-MCNC: 80 MG/DL (ref 70–99)
HCT VFR BLD AUTO: 35.8 % (ref 35–47)
HGB BLD-MCNC: 12.2 G/DL (ref 11.7–15.7)
IMM GRANULOCYTES # BLD: 0 10E3/UL
IMM GRANULOCYTES NFR BLD: 0 %
INR PPP: 1.11 (ref 0.85–1.15)
INTERPRETATION ECG - MUSE: NORMAL
LYMPHOCYTES # BLD AUTO: 1 10E3/UL (ref 0.8–5.3)
LYMPHOCYTES NFR BLD AUTO: 25 %
MCH RBC QN AUTO: 34.1 PG (ref 26.5–33)
MCHC RBC AUTO-ENTMCNC: 34.1 G/DL (ref 31.5–36.5)
MCV RBC AUTO: 100 FL (ref 78–100)
MONOCYTES # BLD AUTO: 0.6 10E3/UL (ref 0–1.3)
MONOCYTES NFR BLD AUTO: 14 %
NEUTROPHILS # BLD AUTO: 2.4 10E3/UL (ref 1.6–8.3)
NEUTROPHILS NFR BLD AUTO: 58 %
NRBC # BLD AUTO: 0 10E3/UL
NRBC BLD AUTO-RTO: 0 /100
P AXIS - MUSE: 43 DEGREES
PLATELET # BLD AUTO: 206 10E3/UL (ref 150–450)
POTASSIUM SERPL-SCNC: 4.1 MMOL/L (ref 3.4–5.3)
PR INTERVAL - MUSE: 150 MS
PROT SERPL-MCNC: 6.5 G/DL (ref 6.4–8.3)
QRS DURATION - MUSE: 126 MS
QT - MUSE: 448 MS
QTC - MUSE: 465 MS
R AXIS - MUSE: -52 DEGREES
RBC # BLD AUTO: 3.58 10E6/UL (ref 3.8–5.2)
SODIUM SERPL-SCNC: 138 MMOL/L (ref 135–145)
SYSTOLIC BLOOD PRESSURE - MUSE: NORMAL MMHG
T AXIS - MUSE: 72 DEGREES
VENTRICULAR RATE- MUSE: 65 BPM
WBC # BLD AUTO: 4.2 10E3/UL (ref 4–11)

## 2024-05-20 PROCEDURE — 250N000011 HC RX IP 250 OP 636: Performed by: STUDENT IN AN ORGANIZED HEALTH CARE EDUCATION/TRAINING PROGRAM

## 2024-05-20 PROCEDURE — 85730 THROMBOPLASTIN TIME PARTIAL: CPT | Performed by: STUDENT IN AN ORGANIZED HEALTH CARE EDUCATION/TRAINING PROGRAM

## 2024-05-20 PROCEDURE — 85610 PROTHROMBIN TIME: CPT

## 2024-05-20 PROCEDURE — 99211 OFF/OP EST MAY X REQ PHY/QHP: CPT

## 2024-05-20 PROCEDURE — G0463 HOSPITAL OUTPT CLINIC VISIT: HCPCS

## 2024-05-20 PROCEDURE — 36415 COLL VENOUS BLD VENIPUNCTURE: CPT | Performed by: STUDENT IN AN ORGANIZED HEALTH CARE EDUCATION/TRAINING PROGRAM

## 2024-05-20 PROCEDURE — 85730 THROMBOPLASTIN TIME PARTIAL: CPT

## 2024-05-20 PROCEDURE — 82040 ASSAY OF SERUM ALBUMIN: CPT

## 2024-05-20 PROCEDURE — 36591 DRAW BLOOD OFF VENOUS DEVICE: CPT

## 2024-05-20 PROCEDURE — 82310 ASSAY OF CALCIUM: CPT | Performed by: REGISTERED NURSE

## 2024-05-20 PROCEDURE — 99215 OFFICE O/P EST HI 40 MIN: CPT

## 2024-05-20 PROCEDURE — 99417 PROLNG OP E/M EACH 15 MIN: CPT

## 2024-05-20 PROCEDURE — 85025 COMPLETE CBC W/AUTO DIFF WBC: CPT

## 2024-05-20 PROCEDURE — 36415 COLL VENOUS BLD VENIPUNCTURE: CPT

## 2024-05-20 RX ORDER — HEPARIN SODIUM (PORCINE) LOCK FLUSH IV SOLN 100 UNIT/ML 100 UNIT/ML
5 SOLUTION INTRAVENOUS ONCE
Status: COMPLETED | OUTPATIENT
Start: 2024-05-20 | End: 2024-05-20

## 2024-05-20 RX ADMIN — Medication 5 ML: at 08:43

## 2024-05-20 ASSESSMENT — PAIN SCALES - GENERAL: PAINLEVEL: NO PAIN (0)

## 2024-05-20 NOTE — LETTER
5/20/2024         RE: Shena Hartmann  [address] [place] [address]        Dear Colleague,    Thank you for referring your patient, Shena Hartmann, to the [facility] BLOOD AND MARROW TRANSPLANT PROGRAM [place]. Please see a copy of my visit note below.         BMT / Cell Therapy Consultation      Shena Hartmann is a 73 year old female who is being evaluated for cell therapy for relapsed/ refractory multiple myeloma.    Disease presentation and baseline characteristics:  Breast cancer dx in 1994. Underwent surgery and chemotherapy without reoccurence  MGUS dx 1999  T8 pathologic fracture with radiation  Revlimid and velcade, 2013  Cytoxan IV 9/2013  D-PACE 11/2013  Auto 3/27/14  5/2014 thalidomide caused rash so switched to pomalidomide   on kenalog and clobetasol creams for IMID rash  FLC began to rise so riky added to pom 9/2020  she has been on xgeva for an unclear amount of time  Teodora-Kd 2/20/2023  Kyprolis held 10/23 due to side effects (N/V). Continue Teodora/dex  Bridging therapy Teodora/Pom      HPI:    See above for hematological details. Doing fairly well today. Does not offer any new complaints. She is aware of the plan for apheresis.     ASSESSMENT AND PLAN:    Planned to undergo apheresis for carvikti. She will receive teodora/ pom as bridging. Pomalidomide and densosumab can be started after aphresis.     CAR-T therapy will be administered inpatient.     We reviewed the side effects associated with LD chemotherapy and CAR-T infusion such as low blood counts, risk of infection and bleeding. We focused on expected side effects of cytokine release syndrome and the neurotoxicity. Symptoms may vary from mild to severe, and potentially life-threatening and can include the need for ICU care.  Some patients can get ill, but the usual course is transient and reversible.  The patient will have to stay within 30 minutes from clinic for 28 days.     CAR-T cell products available commercially are  "standard of care therapy and our center is certified to administer CAR-Ts, the research aspects are limited to collection of data to research database and could include blood and stool samples collection.     Patient will be handed the CAR-T therapy wallet card which needs to presented to health facilities (ED) at the time of visit.     CV: Cardiology recommendations from 5/13/24.   \"Shena Hartmann has hypertension as a result of chemotherapy she received for multiple myeloma.  She is on isatuximab monotherapy and she is no longer in remission and is being considered for CAR-T cell therapy. Her blood pressures is fairly well controlled on amlodipine with slightly higher readings in the evening. I would recommend trying a split dose regimen of of the current amlodipine dose before we increase the dose to 5 mg twice daily. She will continue to monitor her blood pressures and update us.      Her ECG today shows a bifascicular block (RBBB and left anterior fascicular block) and she is asymptomatic. Prior ECG showed RBBB (1 year ago). Progression to complete heart block appears to be infrequent among asymptomatic patients.  Symptoms such as presyncope or syncope should trigger further workup for heart block.  I would recommend avoiding AV node slowing agents like beta blockers and non-dihydropyridines like verapamil and diltiazem. No further diagnostic evaluation or therapy is required at this point.     Recommendations:  - Continue amlodipine 5-0-2.5 mg daily  - She will monitor her blood pressure at home and call with any significant changes.\"      ROS:    10 point ROS neg other than the symptoms noted above in the HPI.        Past Medical History:   Diagnosis Date    Acquired hypothyroidism 8/10/2023    Breast cancer (H) 01/01/1994    right    H/O stem cell transplant (H) 03/01/2014    History of blood transfusion 2013 & 2014    During stem cell tx process    Hypertension Sept. 2021    Runs 140 s systolically, about 20 " above my usual    Multiple myeloma, without mention of having achieved remission 2012       Past Surgical History:   Procedure Laterality Date    BIOPSY  10/1994; [id number]    Lt. Breast in '; multiple bone marrow bx's for MM    BREAST SURGERY  10/1994    Lt. Mastectomy w/lymph node resection    COLONOSCOPY  2015    Normal results    EYE SURGERY      Bilateral cataract surgery    INSERT PORT VASCULAR ACCESS Left 2/15/2023    Procedure: INSERTION, VASCULAR ACCESS PORT;  Surgeon: Luc Antunez MD;  Location: [facility] OR    IR CHEST PORT PLACEMENT > 5 YRS OF AGE  2/15/2023    MASTECTOMY      Right, with lymphe node disection      PICC INSERTION  09/10/2013    5fr DL Power PICC, 44cm (1cm external) in the L lateral brachial vein with tip in the low SVC    TRANSPLANT  3/27/2014    Autologous stem cell tx       Family History   Problem Relation Age of Onset    Cancer Paternal Grandmother         skin cancer    Hypertension Mother     Cerebrovascular Disease Mother          8-11-15 from strokes    Hypertension Father     Prostate Cancer Father        Social History     Tobacco Use    Smoking status: Never     Passive exposure: Never    Smokeless tobacco: Never   Substance Use Topics    Alcohol use: Yes     Comment: occ    Drug use: No         Allergies   Allergen Reactions    Blood Transfusion Related (Informational Only) Other (See Comments)     Patient has a history of a clinically significant antibody against RBC antigens.  A delay in compatible RBCs may occur.     Cayenne Pepper [Cayenne]     Corn-Containing Products GI Disturbance    Levaquin Other (See Comments)     Tendon pain    Milk Protein GI Disturbance    Mold      Facial rash/lip swelling    Morphine Sulfate Other (See Comments)     Per pt - see things (hallucination) when she was on Morphine .    Pollen Extract      Environmental allergies     Shrimp Nausea and Vomiting    Tomato      Lip swelling, facial rash    Azithromycin Rash    Keflex  [Cephalexin] Rash    Oat Rash    Tape [Adhesive Tape] Itching and Rash     All adhesives    Wheat Rash     Swelling of lips        Current Outpatient Medications   Medication Sig Dispense Refill    acetaminophen (TYLENOL) 325 MG tablet Take 325-650 mg by mouth every 6 hours as needed for mild pain      acyclovir (ZOVIRAX) 400 MG tablet Take 1 tablet (400 mg) by mouth every 12 hours 180 tablet 3    amLODIPine (NORVASC) 2.5 MG tablet Take 5 mg (2 tablets) in the morning. Take 2.5 mg (1 tablet) in the evening.      Ascorbic Acid (VITAMIN C PO) Take 1,000 mg by mouth 2 times daily       aspirin (ASA) 81 MG chewable tablet Take 1 tablet (81 mg) by mouth daily 30 tablet 0    CALCIUM-VITAMIN D-VITAMIN K PO Take 2 tablets by mouth daily.      clobetasol (TEMOVATE) 0.05 % external ointment Apply topically 2 times daily as needed (itching and rash) Topically to rash on hands until resolved 45 g 0    COENZYME Q-10 PO Take 100 mg by mouth daily       Cyanocobalamin 1000 MCG/ML LIQD Take 500 mcg by mouth 2 times daily      desonide (DESOWEN) 0.05 % external ointment Apply topically 2 times daily Apply to face as needed for dermatitis 15 g 11    levothyroxine (SYNTHROID/LEVOTHROID) 50 MCG tablet Take 1 tablet (50 mcg) by mouth daily 90 tablet 3    magnesium 100 MG CAPS Take 100 mg by mouth 3 times daily      Multiple Vitamins-Minerals (MULTIVITAMIN OR) Take 1 tablet by mouth 2 times daily.      ondansetron (ZOFRAN) 4 MG tablet Take 1 tablet (4 mg) by mouth every 8 hours as needed for nausea 60 tablet 11    order for DME 6 mastectomy bras  Length of need: 99 months 6 Device 1    order for DME R mastectomy prosthesis   Length of need:  99 months 1 Device 1    pomalidomide (POMALYST) 3 MG capsule Take 1 capsule (3 mg) by mouth daily Swallow whole, do NOT break, crush, chew or open capsule. Take on days 1-21 of repeated 28 day cycles. (Patient not taking: Reported on 5/13/2024) 21 capsule 0    prochlorperazine (COMPAZINE) 5 MG tablet  Take 1 tablet (5 mg) by mouth every 6 hours as needed for nausea or vomiting 30 tablet 11    triamcinolone (KENALOG) 0.1 % external ointment Apply topically 2 times daily 15 g 11    ZINC PICOLINATE PO Take 30 mg by mouth daily           Physical Exam:     Vital Signs: /71 (BP Location: Left arm, Patient Position: Sitting, Cuff Size: Adult Regular)   Pulse 74   Temp 97.6  F (36.4  C) (Oral)   Resp 16   Wt 53.1 kg (117 lb)   LMP  (LMP Unknown)   SpO2 97%   BMI 22.09 kg/m      KPS:  100    General Appearance: healthy, alert, and no distress  Eyes: conjunctiva normal   Cardio/Vascular: no pedal edema   Resp Effort normal  GI: non distended   Skin: Skin color, texture, turgor normal. No rashes or lesions.  Neurologic: Gait normal.    Psych/Affect: Mood and affect are appropriate.    Blood Counts       Recent Labs   Lab Test 05/20/24  0834 05/15/24  1159 05/06/24  0827   HGB 12.2 12.7 13.3   HCT 35.8 37.3 39.3   WBC 4.2 4.7 4.1   ANEUTAUTO 2.4 2.4 2.2   ALYMPAUTO 1.0 1.6 1.1   AMONOAUTO 0.6 0.6 0.5   AEOSAUTO 0.1 0.1 0.1   ABSBASO 0.0 0.0 0.0   NRBCMAN 0.0 0.0 0.0    202 212       ABO/RH    Recent Labs   Lab Test 05/15/24  1159   ABORH O POS     Chemistries     Basic Panel  Recent Labs   Lab Test 05/20/24  0834 05/15/24  1159 05/06/24  0827    137 138   POTASSIUM 4.1 4.0 4.1   CHLORIDE 101 101 100   CO2 28 26 27   BUN 20.3 17.9 15.2   CR 0.85 0.75 0.88   GLC 80 78 61*        Calcium, Magnesium, Phosphorus  Recent Labs   Lab Test 05/20/24  0834 05/15/24  1159 05/06/24  0827   PAULINE 9.3  9.3 9.3 9.4        LFTs  Recent Labs   Lab Test 05/20/24  0834 05/15/24  1159 05/06/24  0827   BILITOTAL 0.2 0.2 0.3   ALKPHOS 47 46 45   AST 21 23 26   ALT 22 23 19   ALBUMIN 4.1 4.3 4.3       LDH  Recent Labs   Lab Test 05/15/24  1159 05/06/24  0827 04/10/24  0817    201 172       B2-Microglobulin  Recent Labs   Lab Test 05/15/24  1159 01/23/23  0731 11/28/22  0828 10/31/22  0813 10/03/22  0821  09/06/22  0845   XLMY7KXGN 1.9 3.4* 2.5* 3.2* 3.2* 3.1*     Immunoglobulins     Recent Labs   Lab Test 05/15/24  1159 09/21/20  0841 09/14/20  0806    2,404* 2,416*       Recent Labs   Lab Test 05/15/24  1159 09/21/20  0841 09/14/20  0806   IGA 5* 5* 6*       Recent Labs   Lab Test 05/15/24  1159 09/21/20  0841 09/14/20  0806   IGM <10* <10* <10*     Monocloncal Protein Studies     M spike    Recent Labs   Lab Test 05/15/24  1159 05/06/24  0827 04/10/24  0817 03/12/24  1349 02/19/24  1333 01/15/24  0745   ELPM 0.6* 0.6* 0.2* 0.3* 0.3* 0.3*       [place] FLC    Recent Labs   Lab Test 05/15/24  1159 05/06/24  0827 04/10/24  0817 03/12/24  1349 02/19/24  1333 01/15/24  0745   KFLCA 11.94* 11.48* 9.32* 6.84* 5.32* 4.47*       Lambda FLC    Recent Labs   Lab Test 05/15/24  1159 05/06/24  0827 04/10/24  0817 03/12/24  1349 02/19/24  1333 01/15/24  0745   LFLCA 0.17* 0.18* 0.21* 0.19* 0.30* 1.23       FLC Ratio    Recent Labs   Lab Test 05/15/24  1159 05/06/24  0827 04/10/24  0817 03/12/24  1349 02/19/24  1333 01/15/24  0745   KLRA 70.24* 63.78* 44.38* 36.00* 17.73* 3.63*       Infectious Disease Markers     [facility] IDM    Recent Labs   Lab Test 05/15/24  1159   DCMIG Pos*   DHBSAG Non-reactive   DHBCAB Non-reactive   DHIVAB Non-reactive   DHCVAB Non-reactive   DHTLVA Non-reactive   TCRUZI Non-reactive   DTRPAB Non-reactive         Hepatitis and HIV    Recent Labs   Lab Test 05/15/24  1159 04/26/24  1429   HEPBANG Nonreactive Nonreactive   HBCAB Nonreactive Nonreactive   AUSAB  --  <3.50   HCVAB Nonreactive Nonreactive   HIAGAB Nonreactive Nonreactive         CMV  Recent Labs   Lab Test 04/26/24  1429   CMVIGG Positive, suggests recent or past exposure.*         EBV    Recent Labs   Lab Test 05/15/24  1159   EBVCAG Positive*     ECHO       Results for orders placed during the hospital encounter of 05/09/24    ECHOCARDIOGRAM COMPLETE    Status: Normal  2024    [facility]  [id number]  [id number]  [id number]  [id number]^[id number]^JUANJOSE    [facility]  Echocardiography Laboratory  [address] [place] [id number]    Name: STEPHANY GARCIA  MRN: [id number]  : 1950  Study Date: 2024 07:59 AM  Age: 73 yrs  Gender: Female  Patient Location: [facility]  Reason For Study: Examination prior to chemotherapy, Multiple myeloma not  having a  Ordering Physician: QUANG SHINE  Referring Physician: QUANG SHINE  Performed By: Maria Del Carmen Campos RDCS    BSA: 1.6 m2  Height: 61 in  Weight: 132 lb  HR: 67  BP: 143/64 mmHg  ______________________________________________________________________________  Procedure  Echocardiogram with two-dimensional, color and spectral Doppler performed.  ______________________________________________________________________________  Interpretation Summary  Left ventricular size, wall motion and function are normal. Biplane LVEF is  61%.  Global peak LV longitudinal strain is averaged at -20.2%. This is within  reported normal limits (normal <-18%).  Right ventricular function, chamber size, wall motion, and thickness are  normal.  No pericardial effusion.    This study was compared with the study from 3/27/23. No significant changes  noted.  ______________________________________________________________________________  Left Ventricle  Left ventricular size, wall motion and function are normal. The ejection  fraction is 60-65%. Biplane LVEF is 61%. Left ventricular diastolic function  is normal. Global peak LV longitudinal strain is averaged at -20.2%. This is  within reported normal limits (normal <-18%).    Right Ventricle  Right ventricular function, chamber size, wall motion, and thickness are  normal.    Atria  Both atria appear normal.    Mitral Valve  The mitral valve is normal. Mild mitral insufficiency is present.    Aortic Valve  Aortic valve is normal in structure and function.  The aortic valve is  tricuspid.    Tricuspid Valve  The tricuspid valve is normal. Mild tricuspid insufficiency is present.  Estimated pulmonary artery systolic pressure is 26 mmHg plus right atrial  pressure. Pulmonary artery systolic pressure is normal.    Pulmonic Valve  The pulmonic valve is normal.    Vessels  The aorta root is normal. The thoracic aorta is normal. The inferior vena cava  is normal.    Pericardium  No pericardial effusion is present.    Compared to Previous Study  This study was compared with the study from 3/27/23 . No significant changes  noted.  ______________________________________________________________________________  MMode/2D Measurements & Calculations  IVSd: 0.75 cm    LVIDd: 3.5 cm  LVIDs: 2.4 cm  LVPWd: 0.95 cm  FS: 32.1 %  LV mass(C)d: 81.6 grams  LV mass(C)dI: 51.5 grams/m2  Ao root diam: 3.1 cm  asc Aorta Diam: 3.3 cm  LVOT diam: 2.0 cm  LVOT area: 3.1 cm2  Ao root diam index Ht(cm/m): 2.0  Ao root diam index BSA (cm/m2): 1.9  Asc Ao diam index BSA (cm/m2): 2.1  Asc Ao diam index Ht(cm/m): 2.1  EF Biplane: 60.7 %  RV Base: 3.5 cm  RWT: 0.55  TAPSE: 2.2 cm    Doppler Measurements & Calculations  MV E max j luis: 71.0 cm/sec  MV A max j luis: 89.1 cm/sec  MV E/A: 0.80  MV dec time: 0.21 sec  PA acc time: 0.11 sec  TR max j luis: 256.7 cm/sec  TR max P.4 mmHg  E/E' av.9  Lateral E/e': 9.5  Medial E/e': 14.3  RV S J Luis: 13.1 cm/sec    ______________________________________________________________________________  Report approved by: Leonor Peterson 2024 09:07 AM    I spent 60 minutes on the patient unit personally reviewing medical records and medications, reviewing vital signs, labs, and imaging results as summarized above, discussing the patient's case on rounds with the WANDA, intensively monitoring treatments with high risk of toxicity, coordinating care, and documenting in the electronic medical record.     Giuliana Dowling  Department of Hematology, Oncology and  Transplantation  Pager 1300/Text via [email or web address]

## 2024-05-20 NOTE — PROGRESS NOTES
BMT / Cell Therapy Consultation      Shena Hartmann is a 73 year old female who is being evaluated for cell therapy for relapsed/ refractory multiple myeloma.    Disease presentation and baseline characteristics:  Breast cancer dx in 1994. Underwent surgery and chemotherapy without reoccurence  MGUS dx 1999  T8 pathologic fracture with radiation  Revlimid and velcade, 2013  Cytoxan IV 9/2013  D-PACE 11/2013  Auto 3/27/14  5/2014 thalidomide caused rash so switched to pomalidomide   on kenalog and clobetasol creams for IMID rash  FLC began to rise so riky added to pom 9/2020  she has been on xgeva for an unclear amount of time  Teodora-Kd 2/20/2023  Kyprolis held 10/23 due to side effects (N/V). Continue Teodora/dex  Bridging therapy Teodora/Pom      HPI:    See above for hematological details. Doing fairly well today. Does not offer any new complaints. She is aware of the plan for apheresis.     ASSESSMENT AND PLAN:    Planned to undergo apheresis for carvikti. She will receive teodora/ pom as bridging. Pomalidomide and densosumab can be started after aphresis.     CAR-T therapy will be administered inpatient.     We reviewed the side effects associated with LD chemotherapy and CAR-T infusion such as low blood counts, risk of infection and bleeding. We focused on expected side effects of cytokine release syndrome and the neurotoxicity. Symptoms may vary from mild to severe, and potentially life-threatening and can include the need for ICU care.  Some patients can get ill, but the usual course is transient and reversible.  The patient will have to stay within 30 minutes from clinic for 28 days.     CAR-T cell products available commercially are standard of care therapy and our center is certified to administer CAR-Ts, the research aspects are limited to collection of data to research database and could include blood and stool samples collection.     Patient will be handed the CAR-T therapy wallet card which needs to presented  "to health facilities (ED) at the time of visit.     CV: Cardiology recommendations from 5/13/24.   \"Shena Hartmann has hypertension as a result of chemotherapy she received for multiple myeloma.  She is on isatuximab monotherapy and she is no longer in remission and is being considered for CAR-T cell therapy. Her blood pressures is fairly well controlled on amlodipine with slightly higher readings in the evening. I would recommend trying a split dose regimen of of the current amlodipine dose before we increase the dose to 5 mg twice daily. She will continue to monitor her blood pressures and update us.      Her ECG today shows a bifascicular block (RBBB and left anterior fascicular block) and she is asymptomatic. Prior ECG showed RBBB (1 year ago). Progression to complete heart block appears to be infrequent among asymptomatic patients.  Symptoms such as presyncope or syncope should trigger further workup for heart block.  I would recommend avoiding AV node slowing agents like beta blockers and non-dihydropyridines like verapamil and diltiazem. No further diagnostic evaluation or therapy is required at this point.     Recommendations:  - Continue amlodipine 5-0-2.5 mg daily  - She will monitor her blood pressure at home and call with any significant changes.\"      ROS:    10 point ROS neg other than the symptoms noted above in the HPI.        Past Medical History:   Diagnosis Date    Acquired hypothyroidism 8/10/2023    Breast cancer (H) 01/01/1994    right    H/O stem cell transplant (H) 03/01/2014    History of blood transfusion 2013 & 2014    During stem cell tx process    Hypertension Sept. 2021    Runs 140 s systolically, about 20 above my usual    Multiple myeloma, without mention of having achieved remission 11/06/2012       Past Surgical History:   Procedure Laterality Date    BIOPSY  10/1994; [id number]    Lt. Breast in '94; multiple bone marrow bx's for MM    BREAST SURGERY  10/1994    Lt. Mastectomy w/lymph " node resection    COLONOSCOPY  2015    Normal results    EYE SURGERY      Bilateral cataract surgery    INSERT PORT VASCULAR ACCESS Left 2/15/2023    Procedure: INSERTION, VASCULAR ACCESS PORT;  Surgeon: Luc Antunez MD;  Location: [facility] OR    IR CHEST PORT PLACEMENT > 5 YRS OF AGE  2/15/2023    MASTECTOMY      Right, with lymphe node disection      PICC INSERTION  09/10/2013    5fr DL Power PICC, 44cm (1cm external) in the L lateral brachial vein with tip in the low SVC    TRANSPLANT  3/27/2014    Autologous stem cell tx       Family History   Problem Relation Age of Onset    Cancer Paternal Grandmother         skin cancer    Hypertension Mother     Cerebrovascular Disease Mother          8-11-15 from strokes    Hypertension Father     Prostate Cancer Father        Social History     Tobacco Use    Smoking status: Never     Passive exposure: Never    Smokeless tobacco: Never   Substance Use Topics    Alcohol use: Yes     Comment: occ    Drug use: No         Allergies   Allergen Reactions    Blood Transfusion Related (Informational Only) Other (See Comments)     Patient has a history of a clinically significant antibody against RBC antigens.  A delay in compatible RBCs may occur.     Cayenne Pepper [Cayenne]     Corn-Containing Products GI Disturbance    Levaquin Other (See Comments)     Tendon pain    Milk Protein GI Disturbance    Mold      Facial rash/lip swelling    Morphine Sulfate Other (See Comments)     Per pt - see things (hallucination) when she was on Morphine .    Pollen Extract      Environmental allergies     Shrimp Nausea and Vomiting    Tomato      Lip swelling, facial rash    Azithromycin Rash    Keflex [Cephalexin] Rash    Oat Rash    Tape [Adhesive Tape] Itching and Rash     All adhesives    Wheat Rash     Swelling of lips        Current Outpatient Medications   Medication Sig Dispense Refill    acetaminophen (TYLENOL) 325 MG tablet Take 325-650 mg by mouth every 6 hours as needed for  mild pain      acyclovir (ZOVIRAX) 400 MG tablet Take 1 tablet (400 mg) by mouth every 12 hours 180 tablet 3    amLODIPine (NORVASC) 2.5 MG tablet Take 5 mg (2 tablets) in the morning. Take 2.5 mg (1 tablet) in the evening.      Ascorbic Acid (VITAMIN C PO) Take 1,000 mg by mouth 2 times daily       aspirin (ASA) 81 MG chewable tablet Take 1 tablet (81 mg) by mouth daily 30 tablet 0    CALCIUM-VITAMIN D-VITAMIN K PO Take 2 tablets by mouth daily.      clobetasol (TEMOVATE) 0.05 % external ointment Apply topically 2 times daily as needed (itching and rash) Topically to rash on hands until resolved 45 g 0    COENZYME Q-10 PO Take 100 mg by mouth daily       Cyanocobalamin 1000 MCG/ML LIQD Take 500 mcg by mouth 2 times daily      desonide (DESOWEN) 0.05 % external ointment Apply topically 2 times daily Apply to face as needed for dermatitis 15 g 11    levothyroxine (SYNTHROID/LEVOTHROID) 50 MCG tablet Take 1 tablet (50 mcg) by mouth daily 90 tablet 3    magnesium 100 MG CAPS Take 100 mg by mouth 3 times daily      Multiple Vitamins-Minerals (MULTIVITAMIN OR) Take 1 tablet by mouth 2 times daily.      ondansetron (ZOFRAN) 4 MG tablet Take 1 tablet (4 mg) by mouth every 8 hours as needed for nausea 60 tablet 11    order for DME 6 mastectomy bras  Length of need: 99 months 6 Device 1    order for DME R mastectomy prosthesis   Length of need:  99 months 1 Device 1    pomalidomide (POMALYST) 3 MG capsule Take 1 capsule (3 mg) by mouth daily Swallow whole, do NOT break, crush, chew or open capsule. Take on days 1-21 of repeated 28 day cycles. (Patient not taking: Reported on 5/13/2024) 21 capsule 0    prochlorperazine (COMPAZINE) 5 MG tablet Take 1 tablet (5 mg) by mouth every 6 hours as needed for nausea or vomiting 30 tablet 11    triamcinolone (KENALOG) 0.1 % external ointment Apply topically 2 times daily 15 g 11    ZINC PICOLINATE PO Take 30 mg by mouth daily           Physical Exam:     Vital Signs: /71 (BP  Location: Left arm, Patient Position: Sitting, Cuff Size: Adult Regular)   Pulse 74   Temp 97.6  F (36.4  C) (Oral)   Resp 16   Wt 53.1 kg (117 lb)   LMP  (LMP Unknown)   SpO2 97%   BMI 22.09 kg/m      KPS:  100    General Appearance: healthy, alert, and no distress  Eyes: conjunctiva normal   Cardio/Vascular: no pedal edema   Resp Effort normal  GI: non distended   Skin: Skin color, texture, turgor normal. No rashes or lesions.  Neurologic: Gait normal.    Psych/Affect: Mood and affect are appropriate.    Blood Counts       Recent Labs   Lab Test 05/20/24  0834 05/15/24  1159 05/06/24  0827   HGB 12.2 12.7 13.3   HCT 35.8 37.3 39.3   WBC 4.2 4.7 4.1   ANEUTAUTO 2.4 2.4 2.2   ALYMPAUTO 1.0 1.6 1.1   AMONOAUTO 0.6 0.6 0.5   AEOSAUTO 0.1 0.1 0.1   ABSBASO 0.0 0.0 0.0   NRBCMAN 0.0 0.0 0.0    202 212       ABO/RH    Recent Labs   Lab Test 05/15/24  1159   ABORH O POS     Chemistries     Basic Panel  Recent Labs   Lab Test 05/20/24  0834 05/15/24  1159 05/06/24  0827    137 138   POTASSIUM 4.1 4.0 4.1   CHLORIDE 101 101 100   CO2 28 26 27   BUN 20.3 17.9 15.2   CR 0.85 0.75 0.88   GLC 80 78 61*        Calcium, Magnesium, Phosphorus  Recent Labs   Lab Test 05/20/24  0834 05/15/24  1159 05/06/24  0827   PAULINE 9.3  9.3 9.3 9.4        LFTs  Recent Labs   Lab Test 05/20/24  0834 05/15/24  1159 05/06/24  0827   BILITOTAL 0.2 0.2 0.3   ALKPHOS 47 46 45   AST 21 23 26   ALT 22 23 19   ALBUMIN 4.1 4.3 4.3       LDH  Recent Labs   Lab Test 05/15/24  1159 05/06/24  0827 04/10/24  0817    201 172       B2-Microglobulin  Recent Labs   Lab Test 05/15/24  1159 01/23/23  0731 11/28/22  0828 10/31/22  0813 10/03/22  0821 09/06/22  0845   VIWV0JHCT 1.9 3.4* 2.5* 3.2* 3.2* 3.1*     Immunoglobulins     Recent Labs   Lab Test 05/15/24  1159 09/21/20  0841 09/14/20  0806    2,404* 2,416*       Recent Labs   Lab Test 05/15/24  1159 09/21/20  0841 09/14/20  0806   IGA 5* 5* 6*       Recent Labs   Lab Test  05/15/24  11520  0841 20  0806   IGM <10* <10* <10*     Monocloncal Protein Studies     M spike    Recent Labs   Lab Test 05/15/24  1159 24  0827 04/10/24  0817 24  1349 24  1333 01/15/24  0745   ELPM 0.6* 0.6* 0.2* 0.3* 0.3* 0.3*       [place] FLC    Recent Labs   Lab Test 05/15/24  1159 24  0827 04/10/24  0817 24  1349 24  1333 01/15/24  0745   KFLCA 11.94* 11.48* 9.32* 6.84* 5.32* 4.47*       Lambda FLC    Recent Labs   Lab Test 05/15/24  1159 24  0827 04/10/24  0817 24  1349 24  1333 01/15/24  0745   LFLCA 0.17* 0.18* 0.21* 0.19* 0.30* 1.23       FLC Ratio    Recent Labs   Lab Test 05/15/24  11524  0827 04/10/24  0817 24  1349 24  1333 01/15/24  0745   KLRA 70.24* 63.78* 44.38* 36.00* 17.73* 3.63*       Infectious Disease Markers     [facility] IDM    Recent Labs   Lab Test 05/15/24  1159   DCMIG Pos*   DHBSAG Non-reactive   DHBCAB Non-reactive   DHIVAB Non-reactive   DHCVAB Non-reactive   DHTLVA Non-reactive   TCRUZI Non-reactive   DTRPAB Non-reactive         Hepatitis and HIV    Recent Labs   Lab Test 05/15/24  1159 24  1429   HEPBANG Nonreactive Nonreactive   HBCAB Nonreactive Nonreactive   AUSAB  --  <3.50   HCVAB Nonreactive Nonreactive   HIAGAB Nonreactive Nonreactive         CMV  Recent Labs   Lab Test 24  1429   CMVIGG Positive, suggests recent or past exposure.*         EBV    Recent Labs   Lab Test 05/15/24  1159   EBVCAG Positive*     ECHO       Results for orders placed during the hospital encounter of 24    ECHOCARDIOGRAM COMPLETE    Status: Normal 2024    Narrative  [id number]  [id number]  [id number]  [id number]^FÁTIMA^QUANG^JUANJOSE    [facility],[place]  Echocardiography Laboratory  [address] [place] [id number]    Name: STEPHANY GARCIA  MRN: [id number]  : 1950  Study Date: 2024 07:59 AM  Age: 73 yrs  Gender: Female  Patient Location:  [facility]  Reason For Study: Examination prior to chemotherapy, Multiple myeloma not  having a  Ordering Physician: QUANG SHINE  Referring Physician: QUANG SHINE  Performed By: Maria Del Carmen Campos RDCS    BSA: 1.6 m2  Height: 61 in  Weight: 132 lb  HR: 67  BP: 143/64 mmHg  ______________________________________________________________________________  Procedure  Echocardiogram with two-dimensional, color and spectral Doppler performed.  ______________________________________________________________________________  Interpretation Summary  Left ventricular size, wall motion and function are normal. Biplane LVEF is  61%.  Global peak LV longitudinal strain is averaged at -20.2%. This is within  reported normal limits (normal <-18%).  Right ventricular function, chamber size, wall motion, and thickness are  normal.  No pericardial effusion.    This study was compared with the study from 3/27/23. No significant changes  noted.  ______________________________________________________________________________  Left Ventricle  Left ventricular size, wall motion and function are normal. The ejection  fraction is 60-65%. Biplane LVEF is 61%. Left ventricular diastolic function  is normal. Global peak LV longitudinal strain is averaged at -20.2%. This is  within reported normal limits (normal <-18%).    Right Ventricle  Right ventricular function, chamber size, wall motion, and thickness are  normal.    Atria  Both atria appear normal.    Mitral Valve  The mitral valve is normal. Mild mitral insufficiency is present.    Aortic Valve  Aortic valve is normal in structure and function. The aortic valve is  tricuspid.    Tricuspid Valve  The tricuspid valve is normal. Mild tricuspid insufficiency is present.  Estimated pulmonary artery systolic pressure is 26 mmHg plus right atrial  pressure. Pulmonary artery systolic pressure is normal.    Pulmonic Valve  The pulmonic valve is normal.    Vessels  The aorta root is  normal. The thoracic aorta is normal. The inferior vena cava  is normal.    Pericardium  No pericardial effusion is present.    Compared to Previous Study  This study was compared with the study from 3/27/23 . No significant changes  noted.  ______________________________________________________________________________  MMode/2D Measurements & Calculations  IVSd: 0.75 cm    LVIDd: 3.5 cm  LVIDs: 2.4 cm  LVPWd: 0.95 cm  FS: 32.1 %  LV mass(C)d: 81.6 grams  LV mass(C)dI: 51.5 grams/m2  Ao root diam: 3.1 cm  asc Aorta Diam: 3.3 cm  LVOT diam: 2.0 cm  LVOT area: 3.1 cm2  Ao root diam index Ht(cm/m): 2.0  Ao root diam index BSA (cm/m2): 1.9  Asc Ao diam index BSA (cm/m2): 2.1  Asc Ao diam index Ht(cm/m): 2.1  EF Biplane: 60.7 %  RV Base: 3.5 cm  RWT: 0.55  TAPSE: 2.2 cm    Doppler Measurements & Calculations  MV E max j luis: 71.0 cm/sec  MV A max j luis: 89.1 cm/sec  MV E/A: 0.80  MV dec time: 0.21 sec  PA acc time: 0.11 sec  TR max j luis: 256.7 cm/sec  TR max P.4 mmHg  E/E' av.9  Lateral E/e': 9.5  Medial E/e': 14.3  RV S J Luis: 13.1 cm/sec    ______________________________________________________________________________  Report approved by: Leonor Peterson 2024 09:07 AM    I spent 60 minutes on the patient unit personally reviewing medical records and medications, reviewing vital signs, labs, and imaging results as summarized above, discussing the patient's case on rounds with the WANDA, intensively monitoring treatments with high risk of toxicity, coordinating care, and documenting in the electronic medical record.     Giuliana Dowling  Department of Hematology, Oncology and Transplantation  Pager 1300/Text via [employer]

## 2024-05-20 NOTE — NURSING NOTE
DATE/TIME OF CALL RECEIVED FROM LAB:  05/20/24 at 11:20 AM   LAB TEST:  PTT  LAB VALUE:  >240  PROVIDER NOTIFIED?: Yes  PROVIDER NAME: Dr. Giuliana Dowling  DATE/TIME LAB VALUE REPORTED TO PROVIDER: 5/20/24 11:22  MECHANISM OF PROVIDER NOTIFICATION:  [employer]  PROVIDER RESPONSE: Provider requested re-draw. Patient is no longer in building; will RTC this afternoon, new order placed.    Yusra Brush RN

## 2024-05-20 NOTE — NURSING NOTE
"Chief Complaint   Patient presents with    Port Draw     Labs drawn via port by RN. VS taken.     Port accessed with 20 gauge, 3/4\" power needle by RN, labs collected, line flushed with saline and heparin, then de-accessed.  Vitals taken. Pt checked in for appointment(s).     Veronica Beal, RN    "

## 2024-05-20 NOTE — PROGRESS NOTES
Per BMT pharmacy and care team, plan to HOLD Prolia injection today. Plan for patient to receive injection following apheresis.

## 2024-05-22 ENCOUNTER — MEDICAL CORRESPONDENCE (OUTPATIENT)
Dept: HEALTH INFORMATION MANAGEMENT | Facility: CLINIC | Age: 74
End: 2024-05-22

## 2024-05-22 ENCOUNTER — ANCILLARY PROCEDURE (OUTPATIENT)
Dept: RADIOLOGY | Facility: AMBULATORY SURGERY CENTER | Age: 74
End: 2024-05-22
Attending: STUDENT IN AN ORGANIZED HEALTH CARE EDUCATION/TRAINING PROGRAM
Payer: MEDICARE

## 2024-05-22 ENCOUNTER — ANCILLARY PROCEDURE (OUTPATIENT)
Dept: INTERVENTIONAL RADIOLOGY/VASCULAR | Facility: CLINIC | Age: 74
End: 2024-05-22
Attending: STUDENT IN AN ORGANIZED HEALTH CARE EDUCATION/TRAINING PROGRAM
Payer: MEDICARE

## 2024-05-22 ENCOUNTER — HOSPITAL ENCOUNTER (OUTPATIENT)
Dept: LAB | Facility: CLINIC | Age: 74
Discharge: HOME | End: 2024-05-22
Attending: STUDENT IN AN ORGANIZED HEALTH CARE EDUCATION/TRAINING PROGRAM | Admitting: STUDENT IN AN ORGANIZED HEALTH CARE EDUCATION/TRAINING PROGRAM
Payer: MEDICARE

## 2024-05-22 ENCOUNTER — HOSPITAL ENCOUNTER (OUTPATIENT)
Facility: AMBULATORY SURGERY CENTER | Age: 74
Discharge: HOME | End: 2024-05-22
Attending: PHYSICIAN ASSISTANT | Admitting: PHYSICIAN ASSISTANT
Payer: MEDICARE

## 2024-05-22 VITALS
HEART RATE: 78 BPM | DIASTOLIC BLOOD PRESSURE: 68 MMHG | WEIGHT: 117.06 LBS | BODY MASS INDEX: 22.1 KG/M2 | SYSTOLIC BLOOD PRESSURE: 145 MMHG | RESPIRATION RATE: 16 BRPM | TEMPERATURE: 98.3 F | HEIGHT: 61 IN

## 2024-05-22 VITALS
OXYGEN SATURATION: 97 % | HEART RATE: 65 BPM | RESPIRATION RATE: 16 BRPM | TEMPERATURE: 97.3 F | DIASTOLIC BLOOD PRESSURE: 76 MMHG | BODY MASS INDEX: 21.71 KG/M2 | SYSTOLIC BLOOD PRESSURE: 149 MMHG | WEIGHT: 115 LBS | HEIGHT: 61 IN

## 2024-05-22 DIAGNOSIS — Z01.818 EXAMINATION PRIOR TO CHEMOTHERAPY: ICD-10-CM

## 2024-05-22 DIAGNOSIS — C90.00 MULTIPLE MYELOMA NOT HAVING ACHIEVED REMISSION (H): ICD-10-CM

## 2024-05-22 DIAGNOSIS — Z00.6 RESEARCH STUDY PATIENT: Primary | ICD-10-CM

## 2024-05-22 DIAGNOSIS — Z86.2 PERSONAL HISTORY OF DISEASES OF BLOOD AND BLOOD-FORMING ORGANS: ICD-10-CM

## 2024-05-22 LAB
BASOPHILS # BLD AUTO: 0 10E3/UL (ref 0–0.2)
BASOPHILS NFR BLD AUTO: 0 %
EOSINOPHIL # BLD AUTO: 0.1 10E3/UL (ref 0–0.7)
EOSINOPHIL NFR BLD AUTO: 1 %
ERYTHROCYTE [DISTWIDTH] IN BLOOD BY AUTOMATED COUNT: 14.4 % (ref 10–15)
HCT VFR BLD AUTO: 35.7 % (ref 35–47)
HGB BLD-MCNC: 12.1 G/DL (ref 11.7–15.7)
IMM GRANULOCYTES # BLD: 0 10E3/UL
IMM GRANULOCYTES NFR BLD: 0 %
LYMPHOCYTES # BLD AUTO: 1.3 10E3/UL (ref 0.8–5.3)
LYMPHOCYTES NFR BLD AUTO: 29 %
MCH RBC QN AUTO: 34.4 PG (ref 26.5–33)
MCHC RBC AUTO-ENTMCNC: 33.9 G/DL (ref 31.5–36.5)
MCV RBC AUTO: 101 FL (ref 78–100)
MONOCYTES # BLD AUTO: 0.5 10E3/UL (ref 0–1.3)
MONOCYTES NFR BLD AUTO: 12 %
NEUTROPHILS # BLD AUTO: 2.6 10E3/UL (ref 1.6–8.3)
NEUTROPHILS NFR BLD AUTO: 58 %
NRBC # BLD AUTO: 0 10E3/UL
NRBC BLD AUTO-RTO: 0 /100
PLATELET # BLD AUTO: 199 10E3/UL (ref 150–450)
RBC # BLD AUTO: 3.52 10E6/UL (ref 3.8–5.2)
WBC # BLD AUTO: 4.5 10E3/UL (ref 4–11)

## 2024-05-22 PROCEDURE — 36556 INSERT NON-TUNNEL CV CATH: CPT | Performed by: PHYSICIAN ASSISTANT

## 2024-05-22 PROCEDURE — 36592 COLLECT BLOOD FROM PICC: CPT | Performed by: PATHOLOGY

## 2024-05-22 PROCEDURE — 250N000009 HC RX 250: Performed by: PATHOLOGY

## 2024-05-22 PROCEDURE — 250N000013 HC RX MED GY IP 250 OP 250 PS 637: Performed by: PATHOLOGY

## 2024-05-22 PROCEDURE — 250N000011 HC RX IP 250 OP 636: Performed by: PATHOLOGY

## 2024-05-22 PROCEDURE — 85025 COMPLETE CBC W/AUTO DIFF WBC: CPT | Performed by: PATHOLOGY

## 2024-05-22 PROCEDURE — 99024 POSTOP FOLLOW-UP VISIT: CPT | Performed by: PHYSICIAN ASSISTANT

## 2024-05-22 PROCEDURE — 36556 INSERT NON-TUNNEL CV CATH: CPT

## 2024-05-22 PROCEDURE — 0537T HC CAR-T THERAPY, HARVEST BLD DRV T LYMPCYT, PER DAY, ADULT: CPT

## 2024-05-22 PROCEDURE — 76937 US GUIDE VASCULAR ACCESS: CPT | Mod: 26 | Performed by: PHYSICIAN ASSISTANT

## 2024-05-22 PROCEDURE — 77001 FLUOROGUIDE FOR VEIN DEVICE: CPT | Mod: 26 | Performed by: PHYSICIAN ASSISTANT

## 2024-05-22 RX ORDER — HEPARIN SODIUM (PORCINE) LOCK FLUSH IV SOLN 100 UNIT/ML 100 UNIT/ML
3 SOLUTION INTRAVENOUS
Status: CANCELLED | OUTPATIENT
Start: 2024-05-22

## 2024-05-22 RX ORDER — LIDOCAINE HYDROCHLORIDE 10 MG/ML
INJECTION, SOLUTION EPIDURAL; INFILTRATION; INTRACAUDAL; PERINEURAL DAILY PRN
Status: DISCONTINUED | OUTPATIENT
Start: 2024-05-22 | End: 2024-05-22 | Stop reason: HOSPADM

## 2024-05-22 RX ORDER — HEPARIN SODIUM (PORCINE) LOCK FLUSH IV SOLN 100 UNIT/ML 100 UNIT/ML
3 SOLUTION INTRAVENOUS ONCE
Status: COMPLETED | OUTPATIENT
Start: 2024-05-22 | End: 2024-05-22

## 2024-05-22 RX ORDER — ACETAMINOPHEN 325 MG/1
650 TABLET ORAL EVERY 4 HOURS PRN
Status: DISCONTINUED | OUTPATIENT
Start: 2024-05-22 | End: 2024-05-23 | Stop reason: HOSPADM

## 2024-05-22 RX ORDER — HEPARIN SODIUM (PORCINE) LOCK FLUSH IV SOLN 100 UNIT/ML 100 UNIT/ML
SOLUTION INTRAVENOUS DAILY PRN
Status: DISCONTINUED | OUTPATIENT
Start: 2024-05-22 | End: 2024-05-22 | Stop reason: HOSPADM

## 2024-05-22 RX ORDER — HEPARIN SODIUM (PORCINE) LOCK FLUSH IV SOLN 100 UNIT/ML 100 UNIT/ML
3 SOLUTION INTRAVENOUS EVERY 24 HOURS
Status: CANCELLED | OUTPATIENT
Start: 2024-05-22

## 2024-05-22 RX ADMIN — Medication 3 ML: at 13:30

## 2024-05-22 RX ADMIN — CALCIUM GLUCONATE 1062 MG/HR: 98 INJECTION, SOLUTION INTRAVENOUS at 08:45

## 2024-05-22 RX ADMIN — ACETAMINOPHEN 650 MG: 325 TABLET ORAL at 10:11

## 2024-05-22 RX ADMIN — ANTICOAGULANT CITRATE DEXTROSE SOLUTION FORMULA A 1192 ML: 12.25; 11; 3.65 SOLUTION INTRAVENOUS at 08:45

## 2024-05-22 NOTE — PROGRESS NOTES
Interventional Radiology Brief Post Procedure Note    Procedure: Non-tunneled CVC removal.    Proceduralist: Ric Castaneda [facility], ERIS    Assistant: None    Time Out: Prior to the start of the procedure and with procedural staff participation, I verbally confirmed the patient s identity using two indicators, relevant allergies, that the procedure was appropriate and matched the consent or emergent situation, and that the correct equipment/implants were available. Immediately prior to starting the procedure I conducted the Time Out with the procedural staff and re-confirmed the patient s name, procedure, and site/side. (The Joint Commission universal protocol was followed.)  Yes    Medications   Medication Event Details Admin User Admin Time       Sedation:  None.    Findings: Existing left internal jugular 14 Fr., 20 cm dual lumen non-tunneled CVC removed intact and without difficulty. Removal performed under fluoroscopy, with existing left port catheter position undisturbed.    Estimated Blood Loss: Minimal    Fluoroscopy Time:  Less than 1 minute(s)    SPECIMENS: None    Complications: 1. None     Condition: Stable    Plan: Follow up per primary team. Upright for 2 hours, no strenuous activity for 3 days.    Comments: See dictated procedure note for full details.    Ric Castaneda PA-C

## 2024-05-22 NOTE — DISCHARGE INSTRUCTIONS
Autologous HPC/MNC Collection:   CAR-T is one day collection only. Sometimes following the procedure, your blood platelet count may be low.  If you are told your platelet count is low, you need to avoid taking aspirin/aspirin containing products and avoid heavy physical activity and activities that may result in bruising or traumatic injury.  To contact the BMT fellow or attending physician after 5 p.m. call [phone].

## 2024-05-22 NOTE — DISCHARGE INSTRUCTIONS
[facility]  Home Care Following Your Procedure  Call a doctor if you have signs of infection (fever, growing tenderness at the surgery site, a large amount of drainage or bleeding, severe pain, foul-smelling drainage, redness, swelling).             Tylenol/Acetaminophen Consumption    If you feel your pain relief is insufficient, you may take Tylenol/Acetaminophen in addition to your narcotic pain medication.   Be careful not to exceed 4,000 mg of Tylenol/Acetaminophen in a 24 hour period from all sources.  If you are taking extra strength Tylenol/acetaminophen (500 mg), the maximum dose is 8 tablets in 24 hours.  If you are taking regular strength acetaminophen (325 mg), the maximum dose is 12 tablets in 24 hours.    For any questions or concerns:  Dial [phone] and ask for the resident on call for:  Interventional Radiology  For emergency care, call the:  [place]:  [phone] (TTY for hearing impaired: [phone])

## 2024-05-22 NOTE — BRIEF OP NOTE
Interventional Radiology Brief Post Procedure Note    Procedure: Non-tunneled central venous catheter placement.    [facility]    Assistant: None    Time Out: Prior to the start of the procedure and with procedural staff participation, I verbally confirmed the patient s identity using two indicators, relevant allergies, that the procedure was appropriate and matched the consent or emergent situation, and that the correct equipment/implants were available. Immediately prior to starting the procedure I conducted the Time Out with the procedural staff and re-confirmed the patient s name, procedure, and site/side. (The Joint Commission universal protocol was followed.)  Yes        Sedation: None. Local Anesthestic used    Findings: Image guided placement of left internal jugular, 14 Fr., 20 cm., dual lumen non-tunneled central venous catheter. Catheter is ready for use.    Estimated Blood Loss: Minimal    Fluoroscopy Time:  Less than 1 minute(s)    SPECIMENS: None    Complications: 1. None     Condition: Stable    Plan: Follow up per primary team.    Comments: See dictated procedure note for full details.    Ric Castaneda PA-C

## 2024-05-22 NOTE — PROCEDURES
Transfusion Medicine/Apheresis Procedure    Shena Hartmann [id number]   YOB: 1950 Age: 73 year old     Reason for consult: Autologous Mononuclear Cell (MNC) Collection           Assessment and Plan:   The patient is a 73 year old female who presents for autologous MNC collection for CAR-T therapy for multiple myeloma. The plan is to collect for 1 day. The patient l required line placement for the collection.  She is doing well aside from some numbness/tingling of lips and face due to citrate-related hypocalcemia.  The calcium gluconate dose was increased, rate slowed, and the patient improved.  Continue with plan as per BMT.          Chief Complaint:   Tingling lips/face.         History of Present Illness:   73 year old female presents for autologous MNC collection.  Her past medical history includes multiple myeloma (status-post auto HPC transplant) and breast cancer treated with surgery and without recurrence. She will undergo CAR-T for her myeloma.               Past Medical History:     Past Medical History:   Diagnosis Date    Acquired hypothyroidism 8/10/2023    Breast cancer (H) 01/01/1994    right    H/O stem cell transplant (H) 03/01/2014    History of blood transfusion 2013 & 2014    During stem cell tx process    Hypertension Sept. 2021    Runs 140 s systolically, about 20 above my usual    Multiple myeloma, without mention of having achieved remission 11/06/2012          Past Surgical History:     Past Surgical History:   Procedure Laterality Date    BIOPSY  10/1994; [id number]    Lt. Breast in '94; multiple bone marrow bx's for MM    BREAST SURGERY  10/1994    Lt. Mastectomy w/lymph node resection    COLONOSCOPY  11/2015    Normal results    EYE SURGERY  2020    Bilateral cataract surgery    INSERT PORT VASCULAR ACCESS Left 2/15/2023    Procedure: INSERTION, VASCULAR ACCESS PORT;  Surgeon: Luc Antunez MD;  Location: [facility] [place] OR    IR CHEST PORT PLACEMENT > 5 YRS OF AGE  2/15/2023     MASTECTOMY      Right, with lymphe node disection      PICC INSERTION  09/10/2013    5fr DL Power PICC, 44cm (1cm external) in the L lateral brachial vein with tip in the low SVC    TRANSPLANT  3/27/2014    Autologous stem cell tx              Social History:     Social History     Tobacco Use    Smoking status: Never     Passive exposure: Never    Smokeless tobacco: Never   Substance Use Topics    Alcohol use: Yes     Comment: occ             Family History:     Family History   Problem Relation Age of Onset    Cancer Paternal Grandmother         skin cancer    Hypertension Mother     Cerebrovascular Disease Mother          8-11-15 from strokes    Hypertension Father     Prostate Cancer Father              Immunizations:     Immunization History   Administered Date(s) Administered    DTaP/HepB/IPV 2016    HIB (PRP-T) 2015, 2015, 2016    HepB 2015, 2015    Hepatitis A (ADULT 19+) 1997, 1998    Influenza (IIV3) PF 11/10/2012    Influenza Vaccine >6 months,quad, PF 10/31/2013, 10/16/2014, 10/22/2015    Pneumo Conj 13-V (2010&after) 10/31/2003, 2015, 2015, 2016    Pneumococcal 23 valent 2022    Poliovirus, inactivated (IPV) 2015, 2015    TDAP Vaccine (Boostrix) 2015, 06/10/2015             Allergies:     Allergies   Allergen Reactions    Blood Transfusion Related (Informational Only) Other (See Comments)     Patient has a history of a clinically significant antibody against RBC antigens.  A delay in compatible RBCs may occur.     Cayenne Pepper [Cayenne]     Corn-Containing Products GI Disturbance    Levaquin Other (See Comments)     Tendon pain    Milk Protein GI Disturbance    Mold      Facial rash/lip swelling    Morphine Sulfate Other (See Comments)     Per pt - see things (hallucination) when she was on Morphine .    Pollen Extract      Environmental allergies     Shrimp Nausea and Vomiting    Tomato      Lip swelling,  facial rash    Azithromycin Rash    Keflex [Cephalexin] Rash    Oat Rash    Tape [Adhesive Tape] Itching and Rash     All adhesives    Wheat Rash     Swelling of lips             Medications:     Current Outpatient Medications   Medication Sig Dispense Refill    acetaminophen (TYLENOL) 325 MG tablet Take 325-650 mg by mouth every 6 hours as needed for mild pain      acyclovir (ZOVIRAX) 400 MG tablet Take 1 tablet (400 mg) by mouth every 12 hours 180 tablet 3    amLODIPine (NORVASC) 2.5 MG tablet Take 5 mg (2 tablets) in the morning. Take 2.5 mg (1 tablet) in the evening.      Ascorbic Acid (VITAMIN C PO) Take 1,000 mg by mouth 2 times daily       aspirin (ASA) 81 MG chewable tablet Take 1 tablet (81 mg) by mouth daily 30 tablet 0    CALCIUM-VITAMIN D-VITAMIN K PO Take 2 tablets by mouth daily.      clobetasol (TEMOVATE) 0.05 % external ointment Apply topically 2 times daily as needed (itching and rash) Topically to rash on hands until resolved 45 g 0    COENZYME Q-10 PO Take 100 mg by mouth daily       Cyanocobalamin 1000 MCG/ML LIQD Take 500 mcg by mouth 2 times daily      desonide (DESOWEN) 0.05 % external ointment Apply topically 2 times daily Apply to face as needed for dermatitis 15 g 11    levothyroxine (SYNTHROID/LEVOTHROID) 50 MCG tablet Take 1 tablet (50 mcg) by mouth daily 90 tablet 3    magnesium 100 MG CAPS Take 100 mg by mouth 3 times daily      Multiple Vitamins-Minerals (MULTIVITAMIN OR) Take 1 tablet by mouth 2 times daily.      ondansetron (ZOFRAN) 4 MG tablet Take 1 tablet (4 mg) by mouth every 8 hours as needed for nausea 60 tablet 11    pomalidomide (POMALYST) 3 MG capsule Take 1 capsule (3 mg) by mouth daily Swallow whole, do NOT break, crush, chew or open capsule. Take on days 1-21 of repeated 28 day cycles. 21 capsule 0    prochlorperazine (COMPAZINE) 5 MG tablet Take 1 tablet (5 mg) by mouth every 6 hours as needed for nausea or vomiting 30 tablet 11    triamcinolone (KENALOG) 0.1 % external  "ointment Apply topically 2 times daily 15 g 11    ZINC PICOLINATE PO Take 30 mg by mouth daily      order for DME 6 mastectomy bras  Length of need: 99 months 6 Device 1    order for DME R mastectomy prosthesis   Length of need:  99 months 1 Device 1     Current Facility-Administered Medications   Medication Dose Route Frequency Provider Last Rate Last Admin    acetaminophen (TYLENOL) tablet 650 mg  650 mg Oral Q4H PRN Parth Escobar MD   650 mg at 05/22/24 1011     Facility-Administered Medications Ordered in Other Encounters   Medication Dose Route Frequency Provider Last Rate Last Admin    plerixafor (MOZOBIL) injection SOLN 12.4 mg  0.24 mg/kg Subcutaneous Daily Mae Llamas, APRCHARLIE CNP   12.4 mg at 03/18/14 1550    potassium chloride 20 mEq in 50 mL IVPB  20 mEq Intravenous Once Mae Kay PA-C        potassium chloride SA (K-DUR,KLOR-CON M) CR tablet 20 mEq  20 mEq Oral Once Mae Kay PA-C        sodium chloride 0.9 % bag TABLE SOLN  1 Bag TABLE SOLN Q5 Min PRN Ric Castaneda PA-C   1 Bag at 05/22/24 0731             Review of Systems:   See above.         Vital Signs:     Vitals:    05/22/24 0800 05/22/24 0835   BP:  (!) 147/78   Pulse:  68   Resp:  16   Temp:  97.6  F (36.4  C)   TempSrc:  Oral   Weight: 53.1 kg (117 lb 1 oz)    Height: 1.55 m (5' 1.02\")            Data:     CBC  Recent Labs   Lab 05/22/24  0850 05/20/24  0834 05/15/24  1159   WBC 4.5 4.2 4.7   RBC 3.52* 3.58* 3.71*   HGB 12.1 12.2 12.7   HCT 35.7 35.8 37.3   * 100 101*   MCH 34.4* 34.1* 34.2*   MCHC 33.9 34.1 34.0   RDW 14.4 14.3 14.3    206 202         ABO/RH(D)   Date Value Ref Range Status   05/15/2024 O POS  Final     Antibody Screen   Date Value Ref Range Status   05/15/2024 Positive (A) Negative Final   04/04/2014 Neg  Final           Attestation: During the procedure the patient was directly seen and evaluated by me, Aurelio Gordillo MD.  I have reviewed the chart and pertinent " laboratory findings, and I have discussed the patient and the current procedure with the Apheresis nursing staff.    Aurelio Gordillo MD  Transfusion Medicine Attending  Laboratory Medicine & Pathology

## 2024-05-23 ENCOUNTER — TELEPHONE (OUTPATIENT)
Dept: ONCOLOGY | Facility: CLINIC | Age: 74
End: 2024-05-23
Payer: MEDICARE

## 2024-05-23 ENCOUNTER — PATIENT OUTREACH (OUTPATIENT)
Dept: ONCOLOGY | Facility: CLINIC | Age: 74
End: 2024-05-23
Payer: MEDICARE

## 2024-05-23 NOTE — PROGRESS NOTES
[facility]: Cancer Care                                                                                          Message left for shena to call back so we can coordinate her Prolia injection    We spoke on 5/24/2024 and Shena would like to get the injection on 5/30/2024 since she is already in clinic on that day.  She notes that she does not want to get it the same time as her scheduled chemo on 6/10/2024 and will be out of town the week of 6/3/2024 - 6/7/2024    Scheduling was notified of this information.    Signature:  Shabnam Hall RN

## 2024-05-23 NOTE — ORAL ONC MGMT
Oral Chemotherapy Monitoring Program     Placed call to patient to instruct her to start pomalidomide now per Dr. Chavez. Left message requesting call back. No drug names were mentioned. Will update when response received.     Tyra Moreno, BillieD, BCPS  Hematology/Oncology Clinical Pharmacist  Oral Chemotherapy Monitoring Program  [facility]  [phone]      ADDENDUM  Shena called back - discussed plan to start pomalidomide now. She plans to start tonight (5/23).    Tyra Moreno PharmD, BCPS  5/23/2024 3:45 PM

## 2024-05-28 ENCOUNTER — MYC MEDICAL ADVICE (OUTPATIENT)
Dept: ONCOLOGY | Facility: CLINIC | Age: 74
End: 2024-05-28
Payer: MEDICARE

## 2024-05-28 DIAGNOSIS — M81.0 OSTEOPOROSIS, UNSPECIFIED OSTEOPOROSIS TYPE, UNSPECIFIED PATHOLOGICAL FRACTURE PRESENCE: ICD-10-CM

## 2024-05-28 DIAGNOSIS — C90.00 MULTIPLE MYELOMA NOT HAVING ACHIEVED REMISSION (H): Primary | ICD-10-CM

## 2024-05-28 DIAGNOSIS — T50.995S ADVERSE EFFECT OF OTHER DRUGS, MEDICAMENTS AND BIOLOGICAL SUBSTANCES, SEQUELA: ICD-10-CM

## 2024-05-30 ENCOUNTER — TELEPHONE (OUTPATIENT)
Dept: ONCOLOGY | Facility: CLINIC | Age: 74
End: 2024-05-30
Payer: MEDICARE

## 2024-05-30 DIAGNOSIS — C90.00 MULTIPLE MYELOMA NOT HAVING ACHIEVED REMISSION (H): Primary | ICD-10-CM

## 2024-05-30 DIAGNOSIS — R21 RASH: Primary | ICD-10-CM

## 2024-05-30 RX ORDER — TRIAMCINOLONE ACETONIDE 1 MG/G
CREAM TOPICAL 2 TIMES DAILY
Qty: 30 G | Refills: 2 | Status: ON HOLD | OUTPATIENT
Start: 2024-05-30 | End: 2024-08-05

## 2024-05-30 NOTE — ORAL ONC MGMT
Oral Chemotherapy Monitoring Program    Subjective/Objective:  Shena Hartmann is a 73 year old female contacted by phone for a follow-up visit for oral chemotherapy. Followed up with Shena after she had reached out to the clinic a couple days ago to let us know about rash she has been experiencing on her scalp, face, and back. Selena assessed and spoke with Dr. Chavez who had Shena start hydrocortisone for her face and triamcinolone to other areas. Also will plan to decrease her dose to 2 mg for the remainder of cycle 1, I updated the Rx to qty 14 and hopefully SP will be able to get it out to her late today or tomorrow.    Shena confirms starting on 5/23 in the evening. She held her dose on 5/25 due to the rash as described above, then resumed again on 5/26. Other than her rash, she notices some general fatigue and arthralgias, which she feels is manageable. No other new medications and no other concerns at this time.        1/20/2023     3:00 PM 2/20/2023    10:00 AM 2/21/2023    12:00 PM 4/30/2024    11:00 AM 4/30/2024     3:00 PM 5/23/2024     3:00 PM 5/30/2024     8:00 AM   ORAL CHEMOTHERAPY   Assessment Type Refill Other Discontinuation Initial Work up New Teach Left Voicemail Initial Follow up;Refill   Diagnosis Code Multiple Myeloma Multiple Myeloma Multiple Myeloma Multiple Myeloma Multiple Myeloma Multiple Myeloma Multiple Myeloma   Providers Dr. Reinier Chavez   [facility]   Drug Name Pomalyst (pomalidomide) Pomalyst (pomalidomide) Pomalyst (pomalidomide) Pomalyst (pomalidomide) Pomalyst (pomalidomide) Pomalyst (pomalidomide) Pomalyst (pomalidomide)   Dose    3 mg 3 mg 3 mg 3 mg   Current Schedule Daily   Daily Daily Daily Daily   Cycle Details 3 weeks on, 1 week off   3 weeks on, 1 week off 3 weeks on, 1 week off 3 weeks on, 1 week off 3 weeks on, 1 week off   Start Date of Last  "Cycle     5/6/2024 5/23/2024   Doses missed in last 2 weeks       1   Adherence Assessment       Adherent   Adverse Effects       Rash   Rash       Grade 2   Pharmacist Intervention(Rash)       Yes   Intervention(s)       Dose decreased (chemo)   Any new drug interactions?    No No  No   Is the dose as ordered appropriate for the patient?       Yes       Last PHQ-2 Score on record:       2/21/2024    10:51 AM 2/20/2024    11:41 AM   PHQ-2 ( 1999 [employer])   Q1: Little interest or pleasure in doing things 0 0   Q2: Feeling down, depressed or hopeless 0 0   PHQ-2 Score 0 0   Q1: Little interest or pleasure in doing things Not at all    Q2: Feeling down, depressed or hopeless Not at all    PHQ-2 Score 0        Vitals:  BP:   BP Readings from Last 1 Encounters:   05/22/24 (!) 145/68     Wt Readings from Last 1 Encounters:   05/22/24 53.1 kg (117 lb 1 oz)     Estimated body surface area is 1.51 meters squared as calculated from the following:    Height as of 5/22/24: 1.55 m (5' 1.02\").    Weight as of 5/22/24: 53.1 kg (117 lb 1 oz).    Labs:  _  Result Component Current Result Ref Range   Sodium 138 (5/20/2024) 135 - 145 mmol/L     _  Result Component Current Result Ref Range   Potassium 4.1 (5/20/2024) 3.4 - 5.3 mmol/L     _  Result Component Current Result Ref Range   Calcium 9.3 (5/20/2024) 8.8 - 10.2 mg/dL    9.3 (5/20/2024) 8.8 - 10.2 mg/dL     No results found for Mag within last 30 days.     No results found for Phos within last 30 days.     _  Result Component Current Result Ref Range   Albumin 4.1 (5/20/2024) 3.5 - 5.2 g/dL     _  Result Component Current Result Ref Range   Urea Nitrogen 20.3 (5/20/2024) 8.0 - 23.0 mg/dL     _  Result Component Current Result Ref Range   Creatinine 0.85 (5/20/2024) 0.51 - 0.95 mg/dL     _  Result Component Current Result Ref Range   AST 21 (5/20/2024) 0 - 45 U/L     _  Result Component Current Result Ref Range   ALT 22 (5/20/2024) 0 - 50 U/L     _  Result Component Current Result " Ref Range   Bilirubin Total 0.2 (5/20/2024) <=1.2 mg/dL     _  Result Component Current Result Ref Range   WBC Count 4.5 (5/22/2024) 4.0 - 11.0 10e3/uL     _  Result Component Current Result Ref Range   Hemoglobin 12.1 (5/22/2024) 11.7 - 15.7 g/dL     _  Result Component Current Result Ref Range   Platelet Count 199 (5/22/2024) 150 - 450 10e3/uL     No results found for ANC within last 30 days.     _  Result Component Current Result Ref Range   Absolute Neutrophils 2.6 (5/22/2024) 1.6 - 8.3 10e3/uL      Assessment/Plan:  Reduce pomalyst to 2 mg for remainder of cycle 1 per Dr. Chvaez.     Follow-Up:  New Rx to [facility]  5/31 labs  6/10 labs/Selena visit + infusion    Refill Due:  6/13 for 6/20    Berny Diaz, PharmD, BCPS, BCOP  Hematology/Oncology Clinical Pharmacist  Oral Chemotherapy Monitoring Program  [facility]  [phone]

## 2024-05-30 NOTE — TELEPHONE ENCOUNTER
Oral Chemotherapy Monitoring Program    Medication: Pomalyst  Rx:  2mg PO daily14/21 ds    Auth #: [id number]  Risk Category: Adult female NOT of reproductive capacity        Routine survey questions reviewed.  yes

## 2024-05-31 ENCOUNTER — APPOINTMENT (OUTPATIENT)
Dept: LAB | Facility: CLINIC | Age: 74
End: 2024-05-31
Attending: STUDENT IN AN ORGANIZED HEALTH CARE EDUCATION/TRAINING PROGRAM
Payer: MEDICARE

## 2024-05-31 ENCOUNTER — DOCUMENTATION ONLY (OUTPATIENT)
Dept: ONCOLOGY | Facility: CLINIC | Age: 74
End: 2024-05-31

## 2024-05-31 ENCOUNTER — INFUSION THERAPY VISIT (OUTPATIENT)
Dept: ONCOLOGY | Facility: CLINIC | Age: 74
End: 2024-05-31
Attending: STUDENT IN AN ORGANIZED HEALTH CARE EDUCATION/TRAINING PROGRAM
Payer: MEDICARE

## 2024-05-31 VITALS
TEMPERATURE: 98 F | HEART RATE: 76 BPM | WEIGHT: 120 LBS | SYSTOLIC BLOOD PRESSURE: 144 MMHG | OXYGEN SATURATION: 98 % | DIASTOLIC BLOOD PRESSURE: 73 MMHG | RESPIRATION RATE: 16 BRPM | BODY MASS INDEX: 22.66 KG/M2

## 2024-05-31 DIAGNOSIS — T50.995S ADVERSE EFFECT OF OTHER DRUGS, MEDICAMENTS AND BIOLOGICAL SUBSTANCES, SEQUELA: ICD-10-CM

## 2024-05-31 DIAGNOSIS — M81.0 OSTEOPOROSIS, UNSPECIFIED OSTEOPOROSIS TYPE, UNSPECIFIED PATHOLOGICAL FRACTURE PRESENCE: Primary | ICD-10-CM

## 2024-05-31 DIAGNOSIS — C90.00 MULTIPLE MYELOMA NOT HAVING ACHIEVED REMISSION (H): ICD-10-CM

## 2024-05-31 DIAGNOSIS — Z79.899 ENCOUNTER FOR LONG-TERM (CURRENT) USE OF MEDICATIONS: ICD-10-CM

## 2024-05-31 LAB
ALBUMIN SERPL BCG-MCNC: 4.1 G/DL (ref 3.5–5.2)
ALP SERPL-CCNC: 49 U/L (ref 40–150)
ALT SERPL W P-5'-P-CCNC: 24 U/L (ref 0–50)
ANION GAP SERPL CALCULATED.3IONS-SCNC: 10 MMOL/L (ref 7–15)
AST SERPL W P-5'-P-CCNC: 22 U/L (ref 0–45)
BASOPHILS # BLD AUTO: 0 10E3/UL (ref 0–0.2)
BASOPHILS NFR BLD AUTO: 1 %
BILIRUB SERPL-MCNC: 0.3 MG/DL
BUN SERPL-MCNC: 22.5 MG/DL (ref 8–23)
CALCIUM SERPL-MCNC: 9.1 MG/DL (ref 8.8–10.2)
CHLORIDE SERPL-SCNC: 100 MMOL/L (ref 98–107)
CREAT SERPL-MCNC: 0.9 MG/DL (ref 0.51–0.95)
DEPRECATED HCO3 PLAS-SCNC: 27 MMOL/L (ref 22–29)
EGFRCR SERPLBLD CKD-EPI 2021: 67 ML/MIN/1.73M2
EOSINOPHIL # BLD AUTO: 0.1 10E3/UL (ref 0–0.7)
EOSINOPHIL NFR BLD AUTO: 3 %
ERYTHROCYTE [DISTWIDTH] IN BLOOD BY AUTOMATED COUNT: 14.5 % (ref 10–15)
GLUCOSE SERPL-MCNC: 95 MG/DL (ref 70–99)
HCT VFR BLD AUTO: 33.6 % (ref 35–47)
HGB BLD-MCNC: 11.6 G/DL (ref 11.7–15.7)
IMM GRANULOCYTES # BLD: 0 10E3/UL
IMM GRANULOCYTES NFR BLD: 0 %
LYMPHOCYTES # BLD AUTO: 0.7 10E3/UL (ref 0.8–5.3)
LYMPHOCYTES NFR BLD AUTO: 18 %
MCH RBC QN AUTO: 34.6 PG (ref 26.5–33)
MCHC RBC AUTO-ENTMCNC: 34.5 G/DL (ref 31.5–36.5)
MCV RBC AUTO: 100 FL (ref 78–100)
MONOCYTES # BLD AUTO: 0.4 10E3/UL (ref 0–1.3)
MONOCYTES NFR BLD AUTO: 9 %
NEUTROPHILS # BLD AUTO: 2.6 10E3/UL (ref 1.6–8.3)
NEUTROPHILS NFR BLD AUTO: 69 %
NRBC # BLD AUTO: 0 10E3/UL
NRBC BLD AUTO-RTO: 0 /100
PLATELET # BLD AUTO: 186 10E3/UL (ref 150–450)
POTASSIUM SERPL-SCNC: 4.2 MMOL/L (ref 3.4–5.3)
PROT SERPL-MCNC: 6.4 G/DL (ref 6.4–8.3)
RBC # BLD AUTO: 3.35 10E6/UL (ref 3.8–5.2)
SODIUM SERPL-SCNC: 137 MMOL/L (ref 135–145)
WBC # BLD AUTO: 3.7 10E3/UL (ref 4–11)

## 2024-05-31 PROCEDURE — 80053 COMPREHEN METABOLIC PANEL: CPT

## 2024-05-31 PROCEDURE — 96372 THER/PROPH/DIAG INJ SC/IM: CPT | Performed by: REGISTERED NURSE

## 2024-05-31 PROCEDURE — 36591 DRAW BLOOD OFF VENOUS DEVICE: CPT

## 2024-05-31 PROCEDURE — 85025 COMPLETE CBC W/AUTO DIFF WBC: CPT

## 2024-05-31 PROCEDURE — 250N000011 HC RX IP 250 OP 636: Performed by: STUDENT IN AN ORGANIZED HEALTH CARE EDUCATION/TRAINING PROGRAM

## 2024-05-31 PROCEDURE — 250N000011 HC RX IP 250 OP 636: Mod: JZ | Performed by: REGISTERED NURSE

## 2024-05-31 RX ORDER — METHYLPREDNISOLONE SODIUM SUCCINATE 125 MG/2ML
125 INJECTION, POWDER, LYOPHILIZED, FOR SOLUTION INTRAMUSCULAR; INTRAVENOUS
Start: 2024-11-12

## 2024-05-31 RX ORDER — HEPARIN SODIUM (PORCINE) LOCK FLUSH IV SOLN 100 UNIT/ML 100 UNIT/ML
5 SOLUTION INTRAVENOUS ONCE
Status: COMPLETED | OUTPATIENT
Start: 2024-05-31 | End: 2024-05-31

## 2024-05-31 RX ORDER — MEPERIDINE HYDROCHLORIDE 25 MG/ML
25 INJECTION INTRAMUSCULAR; INTRAVENOUS; SUBCUTANEOUS EVERY 30 MIN PRN
OUTPATIENT
Start: 2024-11-12

## 2024-05-31 RX ORDER — EPINEPHRINE 1 MG/ML
0.3 INJECTION, SOLUTION, CONCENTRATE INTRAVENOUS EVERY 5 MIN PRN
OUTPATIENT
Start: 2024-11-12

## 2024-05-31 RX ORDER — ALBUTEROL SULFATE 90 UG/1
1-2 AEROSOL, METERED RESPIRATORY (INHALATION)
Start: 2024-11-12

## 2024-05-31 RX ORDER — ALBUTEROL SULFATE 0.83 MG/ML
2.5 SOLUTION RESPIRATORY (INHALATION)
OUTPATIENT
Start: 2024-11-12

## 2024-05-31 RX ORDER — DIPHENHYDRAMINE HYDROCHLORIDE 50 MG/ML
50 INJECTION INTRAMUSCULAR; INTRAVENOUS
Start: 2024-11-12

## 2024-05-31 RX ADMIN — DENOSUMAB 60 MG: 60 INJECTION SUBCUTANEOUS at 11:54

## 2024-05-31 RX ADMIN — Medication 5 ML: at 11:04

## 2024-05-31 ASSESSMENT — PAIN SCALES - GENERAL: PAINLEVEL: NO PAIN (0)

## 2024-05-31 NOTE — PROGRESS NOTES
Infusion Injection Note:  Shean Hartmann presents today for Prolia injection.    Patient declined to speak with an RN today; denies any new questions or concerns.    Treatment Conditions:  Lab Results   Component Value Date    HGB 11.6 (L) 05/31/2024    WBC 3.7 (L) 05/31/2024    ANEU 2.1 07/05/2021    ANEUTAUTO 2.6 05/31/2024     05/31/2024        Lab Results   Component Value Date     05/31/2024    POTASSIUM 4.2 05/31/2024    MAG 1.8 12/12/2014    CR 0.90 05/31/2024    PAULINE 9.1 05/31/2024    BILITOTAL 0.3 05/31/2024    ALBUMIN 4.1 05/31/2024    ALT 24 05/31/2024    AST 22 05/31/2024     Results reviewed, labs MET treatment parameters, ok to proceed with treatment.    Pre and Post Injection:  Prolia injection given to Left Arm without incident.   Patient tolerated procedure well.    Discharge Plan:  Patient verbalized understanding of discharge instructions and all questions answered.  AVS to patient via [employer].   Patient discharged in stable condition accompanied by: self.  Departure Mode: Ambulatory.  Patient will return 6/10/2024 for next appointment.    Ivon HARRIS on 5/31/2024 at 1:28 PM

## 2024-05-31 NOTE — PROGRESS NOTES
Oral Chemotherapy Monitoring Program  Lab Follow Up    Reviewed lab results from 5/31/24.        2/20/2023    10:00 AM 2/21/2023    12:00 PM 4/30/2024    11:00 AM 4/30/2024     3:00 PM 5/23/2024     3:00 PM 5/30/2024     8:00 AM 5/31/2024    12:00 PM   ORAL CHEMOTHERAPY   Assessment Type Other Discontinuation Initial Work up New Teach Left Voicemail Initial Follow up;Refill Lab Monitoring   Diagnosis Code Multiple Myeloma Multiple Myeloma Multiple Myeloma Multiple Myeloma Multiple Myeloma Multiple Myeloma Multiple Myeloma   Providers Dr. Reinier Chavez   [facility]   Drug Name Pomalyst (pomalidomide) Pomalyst (pomalidomide) Pomalyst (pomalidomide) Pomalyst (pomalidomide) Pomalyst (pomalidomide) Pomalyst (pomalidomide) Pomalyst (pomalidomide)   Dose   3 mg 3 mg 3 mg 2 mg 2 mg   Current Schedule   Daily Daily Daily Daily Daily   Cycle Details   3 weeks on, 1 week off 3 weeks on, 1 week off 3 weeks on, 1 week off 3 weeks on, 1 week off 3 weeks on, 1 week off   Start Date of Last Cycle    5/6/2024 5/23/2024    Doses missed in last 2 weeks      1    Adherence Assessment      Adherent    Adverse Effects      Rash    Rash      Grade 2    Pharmacist Intervention(Rash)      Yes    Intervention(s)      Dose decreased (chemo)    Any new drug interactions?   No No  No    Is the dose as ordered appropriate for the patient?      Yes        Labs:  _  Result Component Current Result Ref Range   Sodium 137 (5/31/2024) 135 - 145 mmol/L     _  Result Component Current Result Ref Range   Potassium 4.2 (5/31/2024) 3.4 - 5.3 mmol/L     _  Result Component Current Result Ref Range   Calcium 9.1 (5/31/2024) 8.8 - 10.2 mg/dL          No results found for Mag within last 30 days.     No results found for Phos within last 30 days.     _  Result Component Current Result Ref Range   Albumin 4.1 (5/31/2024) 3.5 - 5.2 g/dL      _  Result Component Current Result Ref Range   Urea Nitrogen 22.5 (5/31/2024) 8.0 - 23.0 mg/dL     _  Result Component Current Result Ref Range   Creatinine 0.90 (5/31/2024) 0.51 - 0.95 mg/dL     _  Result Component Current Result Ref Range   AST 22 (5/31/2024) 0 - 45 U/L     _  Result Component Current Result Ref Range   ALT 24 (5/31/2024) 0 - 50 U/L     _  Result Component Current Result Ref Range   Bilirubin Total 0.3 (5/31/2024) <=1.2 mg/dL     _  Result Component Current Result Ref Range   WBC Count 3.7 (L) (5/31/2024) 4.0 - 11.0 10e3/uL     _  Result Component Current Result Ref Range   Hemoglobin 11.6 (L) (5/31/2024) 11.7 - 15.7 g/dL     _  Result Component Current Result Ref Range   Platelet Count 186 (5/31/2024) 150 - 450 10e3/uL     No results found for ANC within last 30 days.     _  Result Component Current Result Ref Range   Absolute Neutrophils 2.6 (5/31/2024) 1.6 - 8.3 10e3/uL        Assessment & Plan:  No concerning abnormalities. Continue pomalyst as directed. Confirmed [facility] should have delivered reduced dose last evening. Shena was not contacted as she was seen in infusion.    Follow-Up:  6/10 Selena visit + labs    Berny Diaz, PharmD, BCPS, BCOP  Hematology/Oncology Clinical Pharmacist  Oral Chemotherapy Monitoring Program  [facility]  [phone]

## 2024-05-31 NOTE — NURSING NOTE
Chief Complaint   Patient presents with    Port Draw     Labs collected from port by RN. Vitals taken. Checked in for appointment(s).       Port accessed with 20 gauge 3/4 inch flat needle by RN, labs collected, line flushed with saline and heparin.  Vitals taken. Pt checked in for appointment(s).     Rosamaria García RN

## 2024-06-01 DIAGNOSIS — C90.00 MULTIPLE MYELOMA NOT HAVING ACHIEVED REMISSION (H): Primary | ICD-10-CM

## 2024-06-01 RX ORDER — LORAZEPAM 2 MG/ML
0.5 INJECTION INTRAMUSCULAR EVERY 4 HOURS PRN
Status: CANCELLED | OUTPATIENT
Start: 2024-06-03

## 2024-06-01 RX ORDER — HEPARIN SODIUM (PORCINE) LOCK FLUSH IV SOLN 100 UNIT/ML 100 UNIT/ML
5 SOLUTION INTRAVENOUS
Status: CANCELLED | OUTPATIENT
Start: 2024-06-03

## 2024-06-01 RX ORDER — MEPERIDINE HYDROCHLORIDE 25 MG/ML
25 INJECTION INTRAMUSCULAR; INTRAVENOUS; SUBCUTANEOUS EVERY 30 MIN PRN
Status: CANCELLED | OUTPATIENT
Start: 2024-06-03

## 2024-06-01 RX ORDER — EPINEPHRINE 1 MG/ML
0.3 INJECTION, SOLUTION INTRAMUSCULAR; SUBCUTANEOUS EVERY 5 MIN PRN
Status: CANCELLED | OUTPATIENT
Start: 2024-06-03

## 2024-06-01 RX ORDER — ONDANSETRON 2 MG/ML
8 INJECTION INTRAMUSCULAR; INTRAVENOUS ONCE
Status: CANCELLED | OUTPATIENT
Start: 2024-06-03 | End: 2024-06-03

## 2024-06-01 RX ORDER — HEPARIN SODIUM,PORCINE 10 UNIT/ML
5 VIAL (ML) INTRAVENOUS
Status: CANCELLED | OUTPATIENT
Start: 2024-06-03

## 2024-06-01 RX ORDER — METHYLPREDNISOLONE SODIUM SUCCINATE 125 MG/2ML
125 INJECTION, POWDER, LYOPHILIZED, FOR SOLUTION INTRAMUSCULAR; INTRAVENOUS
Status: CANCELLED
Start: 2024-06-03

## 2024-06-01 RX ORDER — DIPHENHYDRAMINE HYDROCHLORIDE 50 MG/ML
50 INJECTION INTRAMUSCULAR; INTRAVENOUS
Status: CANCELLED
Start: 2024-06-03

## 2024-06-01 RX ORDER — ACETAMINOPHEN 325 MG/1
650 TABLET ORAL ONCE
Status: CANCELLED | OUTPATIENT
Start: 2024-06-03

## 2024-06-01 RX ORDER — ALBUTEROL SULFATE 0.83 MG/ML
2.5 SOLUTION RESPIRATORY (INHALATION)
Status: CANCELLED | OUTPATIENT
Start: 2024-06-03

## 2024-06-01 RX ORDER — ALBUTEROL SULFATE 90 UG/1
1-2 AEROSOL, METERED RESPIRATORY (INHALATION)
Status: CANCELLED
Start: 2024-06-03

## 2024-06-10 ENCOUNTER — INFUSION THERAPY VISIT (OUTPATIENT)
Dept: ONCOLOGY | Facility: CLINIC | Age: 74
End: 2024-06-10
Attending: REGISTERED NURSE
Payer: MEDICARE

## 2024-06-10 ENCOUNTER — APPOINTMENT (OUTPATIENT)
Dept: LAB | Facility: CLINIC | Age: 74
End: 2024-06-10
Attending: INTERNAL MEDICINE
Payer: MEDICARE

## 2024-06-10 VITALS
OXYGEN SATURATION: 98 % | BODY MASS INDEX: 22.98 KG/M2 | HEART RATE: 72 BPM | WEIGHT: 121.7 LBS | SYSTOLIC BLOOD PRESSURE: 132 MMHG | DIASTOLIC BLOOD PRESSURE: 73 MMHG | TEMPERATURE: 97.3 F | RESPIRATION RATE: 18 BRPM

## 2024-06-10 DIAGNOSIS — C90.00 MULTIPLE MYELOMA NOT HAVING ACHIEVED REMISSION (H): Primary | ICD-10-CM

## 2024-06-10 DIAGNOSIS — R21 RASH: ICD-10-CM

## 2024-06-10 LAB
ACANTHOCYTES BLD QL SMEAR: NORMAL
ALBUMIN SERPL BCG-MCNC: 4 G/DL (ref 3.5–5.2)
ALP SERPL-CCNC: 46 U/L (ref 40–150)
ALT SERPL W P-5'-P-CCNC: 21 U/L (ref 0–50)
ANION GAP SERPL CALCULATED.3IONS-SCNC: 9 MMOL/L (ref 7–15)
AST SERPL W P-5'-P-CCNC: 23 U/L (ref 0–45)
AUER BODIES BLD QL SMEAR: NORMAL
BASO STIPL BLD QL SMEAR: NORMAL
BASOPHILS # BLD AUTO: 0 10E3/UL (ref 0–0.2)
BASOPHILS NFR BLD AUTO: 1 %
BILIRUB SERPL-MCNC: 0.4 MG/DL
BITE CELLS BLD QL SMEAR: NORMAL
BLISTER CELLS BLD QL SMEAR: NORMAL
BUN SERPL-MCNC: 15.4 MG/DL (ref 8–23)
BURR CELLS BLD QL SMEAR: NORMAL
CALCIUM SERPL-MCNC: 9.2 MG/DL (ref 8.8–10.2)
CHLORIDE SERPL-SCNC: 102 MMOL/L (ref 98–107)
CREAT SERPL-MCNC: 0.85 MG/DL (ref 0.51–0.95)
DACRYOCYTES BLD QL SMEAR: NORMAL
DEPRECATED HCO3 PLAS-SCNC: 26 MMOL/L (ref 22–29)
EGFRCR SERPLBLD CKD-EPI 2021: 72 ML/MIN/1.73M2
ELLIPTOCYTES BLD QL SMEAR: NORMAL
EOSINOPHIL # BLD AUTO: 0.2 10E3/UL (ref 0–0.7)
EOSINOPHIL NFR BLD AUTO: 6 %
ERYTHROCYTE [DISTWIDTH] IN BLOOD BY AUTOMATED COUNT: 14.6 % (ref 10–15)
FRAGMENTS BLD QL SMEAR: NORMAL
GLUCOSE SERPL-MCNC: 77 MG/DL (ref 70–99)
HCT VFR BLD AUTO: 33.8 % (ref 35–47)
HGB BLD-MCNC: 11.3 G/DL (ref 11.7–15.7)
HGB C CRYSTALS: NORMAL
HOWELL-JOLLY BOD BLD QL SMEAR: NORMAL
IMM GRANULOCYTES # BLD: 0 10E3/UL
IMM GRANULOCYTES NFR BLD: 0 %
KAPPA LC FREE SER-MCNC: 10.58 MG/DL (ref 0.33–1.94)
KAPPA LC FREE/LAMBDA FREE SER NEPH: 28.59 {RATIO} (ref 0.26–1.65)
LAMBDA LC FREE SERPL-MCNC: 0.37 MG/DL (ref 0.57–2.63)
LDH SERPL L TO P-CCNC: 159 U/L (ref 0–250)
LYMPHOCYTES # BLD AUTO: 1 10E3/UL (ref 0.8–5.3)
LYMPHOCYTES NFR BLD AUTO: 28 %
MCH RBC QN AUTO: 34.1 PG (ref 26.5–33)
MCHC RBC AUTO-ENTMCNC: 33.4 G/DL (ref 31.5–36.5)
MCV RBC AUTO: 102 FL (ref 78–100)
MONOCYTES # BLD AUTO: 0.8 10E3/UL (ref 0–1.3)
MONOCYTES NFR BLD AUTO: 21 %
NEUTROPHILS # BLD AUTO: 1.6 10E3/UL (ref 1.6–8.3)
NEUTROPHILS NFR BLD AUTO: 44 %
NEUTS HYPERSEG BLD QL SMEAR: NORMAL
NRBC # BLD AUTO: 0 10E3/UL
NRBC BLD AUTO-RTO: 0 /100
PLAT MORPH BLD: NORMAL
PLATELET # BLD AUTO: 131 10E3/UL (ref 150–450)
POLYCHROMASIA BLD QL SMEAR: NORMAL
POTASSIUM SERPL-SCNC: 4.2 MMOL/L (ref 3.4–5.3)
PROT SERPL-MCNC: 6.2 G/DL (ref 6.4–8.3)
RBC # BLD AUTO: 3.31 10E6/UL (ref 3.8–5.2)
RBC AGGLUT BLD QL: NORMAL
RBC MORPH BLD: NORMAL
ROULEAUX BLD QL SMEAR: NORMAL
SICKLE CELLS BLD QL SMEAR: NORMAL
SMUDGE CELLS BLD QL SMEAR: NORMAL
SODIUM SERPL-SCNC: 137 MMOL/L (ref 135–145)
SPHEROCYTES BLD QL SMEAR: NORMAL
STOMATOCYTES BLD QL SMEAR: NORMAL
TARGETS BLD QL SMEAR: NORMAL
TOTAL PROTEIN SERUM FOR ELP: 5.9 G/DL (ref 6.4–8.3)
TOXIC GRANULES BLD QL SMEAR: NORMAL
VARIANT LYMPHS BLD QL SMEAR: NORMAL
WBC # BLD AUTO: 3.6 10E3/UL (ref 4–11)

## 2024-06-10 PROCEDURE — 96375 TX/PRO/DX INJ NEW DRUG ADDON: CPT

## 2024-06-10 PROCEDURE — G0463 HOSPITAL OUTPT CLINIC VISIT: HCPCS | Mod: 25 | Performed by: REGISTERED NURSE

## 2024-06-10 PROCEDURE — 250N000013 HC RX MED GY IP 250 OP 250 PS 637: Performed by: STUDENT IN AN ORGANIZED HEALTH CARE EDUCATION/TRAINING PROGRAM

## 2024-06-10 PROCEDURE — 258N000003 HC RX IP 258 OP 636: Mod: JZ | Performed by: STUDENT IN AN ORGANIZED HEALTH CARE EDUCATION/TRAINING PROGRAM

## 2024-06-10 PROCEDURE — 84155 ASSAY OF PROTEIN SERUM: CPT

## 2024-06-10 PROCEDURE — 250N000011 HC RX IP 250 OP 636: Mod: JZ | Performed by: REGISTERED NURSE

## 2024-06-10 PROCEDURE — 84165 PROTEIN E-PHORESIS SERUM: CPT | Mod: TC | Performed by: PATHOLOGY

## 2024-06-10 PROCEDURE — 80053 COMPREHEN METABOLIC PANEL: CPT | Performed by: STUDENT IN AN ORGANIZED HEALTH CARE EDUCATION/TRAINING PROGRAM

## 2024-06-10 PROCEDURE — 96413 CHEMO IV INFUSION 1 HR: CPT

## 2024-06-10 PROCEDURE — 250N000011 HC RX IP 250 OP 636: Mod: JZ | Performed by: STUDENT IN AN ORGANIZED HEALTH CARE EDUCATION/TRAINING PROGRAM

## 2024-06-10 PROCEDURE — G2211 COMPLEX E/M VISIT ADD ON: HCPCS | Performed by: REGISTERED NURSE

## 2024-06-10 PROCEDURE — 85004 AUTOMATED DIFF WBC COUNT: CPT | Performed by: STUDENT IN AN ORGANIZED HEALTH CARE EDUCATION/TRAINING PROGRAM

## 2024-06-10 PROCEDURE — 99214 OFFICE O/P EST MOD 30 MIN: CPT | Performed by: REGISTERED NURSE

## 2024-06-10 PROCEDURE — 36591 DRAW BLOOD OFF VENOUS DEVICE: CPT

## 2024-06-10 PROCEDURE — 84165 PROTEIN E-PHORESIS SERUM: CPT | Mod: 26 | Performed by: PATHOLOGY

## 2024-06-10 PROCEDURE — 83615 LACTATE (LD) (LDH) ENZYME: CPT

## 2024-06-10 PROCEDURE — 83521 IG LIGHT CHAINS FREE EACH: CPT

## 2024-06-10 RX ORDER — HEPARIN SODIUM (PORCINE) LOCK FLUSH IV SOLN 100 UNIT/ML 100 UNIT/ML
5 SOLUTION INTRAVENOUS ONCE
Status: COMPLETED | OUTPATIENT
Start: 2024-06-10 | End: 2024-06-10

## 2024-06-10 RX ORDER — ACETAMINOPHEN 325 MG/1
650 TABLET ORAL ONCE
Status: COMPLETED | OUTPATIENT
Start: 2024-06-10 | End: 2024-06-10

## 2024-06-10 RX ORDER — HEPARIN SODIUM (PORCINE) LOCK FLUSH IV SOLN 100 UNIT/ML 100 UNIT/ML
5 SOLUTION INTRAVENOUS
Status: DISCONTINUED | OUTPATIENT
Start: 2024-06-10 | End: 2024-06-10 | Stop reason: HOSPADM

## 2024-06-10 RX ORDER — ONDANSETRON 2 MG/ML
8 INJECTION INTRAMUSCULAR; INTRAVENOUS ONCE
Status: COMPLETED | OUTPATIENT
Start: 2024-06-10 | End: 2024-06-10

## 2024-06-10 RX ADMIN — ACETAMINOPHEN 650 MG: 325 TABLET ORAL at 09:20

## 2024-06-10 RX ADMIN — ONDANSETRON 8 MG: 2 INJECTION INTRAMUSCULAR; INTRAVENOUS at 09:20

## 2024-06-10 RX ADMIN — SODIUM CHLORIDE 500 MG: 9 INJECTION, SOLUTION INTRAVENOUS at 09:47

## 2024-06-10 RX ADMIN — DIPHENHYDRAMINE HYDROCHLORIDE 37.5 MG: 50 INJECTION, SOLUTION INTRAMUSCULAR; INTRAVENOUS at 09:20

## 2024-06-10 RX ADMIN — SODIUM CHLORIDE 250 ML: 9 INJECTION, SOLUTION INTRAVENOUS at 09:19

## 2024-06-10 RX ADMIN — Medication 5 ML: at 11:10

## 2024-06-10 RX ADMIN — Medication 5 ML: at 07:59

## 2024-06-10 ASSESSMENT — PAIN SCALES - GENERAL: PAINLEVEL: SEVERE PAIN (6)

## 2024-06-10 NOTE — LETTER
6/10/2024      Shena Hartmann  [address] [place] [address]      Dear Colleague,    Thank you for referring your patient, Shena Hartmann, to the [facility]. Please see a copy of my visit note below.        [facility]    Hematology/Oncology Clinic Note    Angel 10, 2024    Reason for Office Visit   Visit during bridging therapy with iastuximab + Pomalyst while awaiting CAR-T manufacturing        Cancer Diagnosis   Multiple Myeloma    Oncology History of Present Illness   Breast cancer dx in 1994. Underwent surgery and chemotherapy without reoccurence  MGUS dx 1999  T8 pathologic fracture with radiation  Revlimid and velcade,   2013  Cytoxan IV 9/2013  D-PACE 11/2013  Auto 3/27/14  5/2014 thalidomide caused rash so switched to pomalidomide   on kenalog and clobetasol creams for IMID rash  FLC began to rise so riky added to pom 9/2020  she has been on xgeva for an unclear amount of time  Teodora-Kd 2/20/2023  Teodora maintenance Nov 2023  Spring 2024 FLC began to rise again  5/22/24 Underwent apheresis for CAR-T    Interval History   Started Pomalyst on 5/23; started 3 mg, got a skin rash (this was an issue when on Pom 3 mg many years ago, too), reduced dose to 2 mg and she is tolerating that okay with a variety of side effects  Having muscle, joint, and bone aches; feels stiff when getting up in the morning (pain is 6/10 this morning) and then tends to improve throughout the day as she moves.  Manages with hot shower, epsom salt bath, muscle rubs, exercise, stretching, meditation, sometimes Tylenol in the morning  Muscle cramps in her calves  Increasing neuropathy in her feet, sometimes in her fingers  Feels bloated and has increased gas  Denies new focal pain  Stools have been more formed since starting Pomalyst  Lips feel slightly swollen and gums are a little more tender; denies mouth sores      ROS neg other than the symptoms noted above in  the HPI.        Past Medical History     Past Medical History:   Diagnosis Date    Acquired hypothyroidism 8/10/2023    Breast cancer (H) 01/01/1994    right    H/O stem cell transplant (H) 03/01/2014    History of blood transfusion 2013 & 2014    During stem cell tx process    Hypertension Sept. 2021    Runs 140 s systolically, about 20 above my usual    Multiple myeloma, without mention of having achieved remission 11/06/2012       Past Surgical History:      Past Surgical History:   Procedure Laterality Date    BIOPSY  10/1994; [id number]    Lt. Breast in '94; multiple bone marrow bx's for MM    BREAST SURGERY  10/1994    Lt. Mastectomy w/lymph node resection    COLONOSCOPY  11/2015    Normal results    EYE SURGERY  2020    Bilateral cataract surgery    INSERT CATHETER VASCULAR ACCESS Left 5/22/2024    Procedure: central venous catheter non tunneled insertion, vascular access;  Surgeon: Ric Castaneda PA-C;  Location: [facility] OR    INSERT PORT VASCULAR ACCESS Left 2/15/2023    Procedure: INSERTION, VASCULAR ACCESS PORT;  Surgeon: Luc Antunez MD;  Location: [facility] OR    IR CHEST PORT PLACEMENT > 5 YRS OF AGE  2/15/2023    IR CVC NON TUNNEL LINE REMOVAL  5/22/2024    IR CVC NON TUNNEL PLACEMENT > 5 YRS  5/22/2024    MASTECTOMY      Right, with lymphe node disection      PICC INSERTION  09/10/2013    5fr DL Power PICC, 44cm (1cm external) in the L lateral brachial vein with tip in the low SVC    TRANSPLANT  3/27/2014    Autologous stem cell tx       Social History     Socioeconomic History    Marital status:    Tobacco Use    Smoking status: Never     Passive exposure: Never    Smokeless tobacco: Never   Substance and Sexual Activity    Alcohol use: Yes     Comment: occ    Drug use: No    Sexual activity: Not Currently     Partners: Male     Birth control/protection: Post-menopausal   Other Topics Concern    Parent/sibling w/ CABG, MI or angioplasty before 65F 55M? No     Service No    Blood  Transfusions No    Caffeine Concern No    Occupational Exposure No    Hobby Hazards No     Family History     Family History   Problem Relation Age of Onset    Cancer Paternal Grandmother         skin cancer    Hypertension Mother     Cerebrovascular Disease Mother          8-11-15 from strokes    Hypertension Father     Prostate Cancer Father        Allergies:     Allergies   Allergen Reactions    Blood Transfusion Related (Informational Only) Other (See Comments)     Patient has a history of a clinically significant antibody against RBC antigens.  A delay in compatible RBCs may occur.     Cayenne Pepper [Cayenne]     Corn-Containing Products GI Disturbance    Levaquin Other (See Comments)     Tendon pain    Milk Protein GI Disturbance    Mold      Facial rash/lip swelling    Morphine Sulfate Other (See Comments)     Per pt - see things (hallucination) when she was on Morphine .    Pollen Extract      Environmental allergies     Shrimp Nausea and Vomiting    Tomato      Lip swelling, facial rash    Azithromycin Rash    Keflex [Cephalexin] Rash    Oat Rash    Tape [Adhesive Tape] Itching and Rash     All adhesives    Wheat Rash     Swelling of lips         Medications:     Current Outpatient Medications   Medication Sig Dispense Refill    acetaminophen (TYLENOL) 325 MG tablet Take 325-650 mg by mouth every 6 hours as needed for mild pain      acyclovir (ZOVIRAX) 400 MG tablet Take 1 tablet (400 mg) by mouth every 12 hours 180 tablet 3    amLODIPine (NORVASC) 2.5 MG tablet Take 5 mg (2 tablets) in the morning. Take 2.5 mg (1 tablet) in the evening.      Ascorbic Acid (VITAMIN C PO) Take 1,000 mg by mouth 2 times daily       aspirin (ASA) 81 MG chewable tablet Take 1 tablet (81 mg) by mouth daily 30 tablet 0    CALCIUM-VITAMIN D-VITAMIN K PO Take 2 tablets by mouth daily.      clobetasol (TEMOVATE) 0.05 % external ointment Apply topically 2 times daily as needed (itching and rash) Topically to rash on hands until  resolved 45 g 0    COENZYME Q-10 PO Take 100 mg by mouth daily       Cyanocobalamin 1000 MCG/ML LIQD Take 500 mcg by mouth 2 times daily      desonide (DESOWEN) 0.05 % external ointment Apply topically 2 times daily Apply to face as needed for dermatitis 15 g 11    levothyroxine (SYNTHROID/LEVOTHROID) 50 MCG tablet Take 1 tablet (50 mcg) by mouth daily 90 tablet 3    magnesium 100 MG CAPS Take 100 mg by mouth 3 times daily      Multiple Vitamins-Minerals (MULTIVITAMIN OR) Take 1 tablet by mouth 2 times daily.      order for DME 6 mastectomy bras  Length of need: 99 months 6 Device 1    order for DME R mastectomy prosthesis   Length of need:  99 months 1 Device 1    pomalidomide (POMALYST) 2 MG capsule Take 1 capsule (2 mg) by mouth daily Swallow whole, do NOT break, crush, chew or open capsule. Take on days 1-21 of repeated 28 day cycles. 14 capsule 0    triamcinolone (KENALOG) 0.1 % external cream Apply topically 2 times daily 30 g 2    triamcinolone (KENALOG) 0.1 % external ointment Apply topically 2 times daily 15 g 11    ZINC PICOLINATE PO Take 30 mg by mouth daily      ondansetron (ZOFRAN) 4 MG tablet Take 1 tablet (4 mg) by mouth every 8 hours as needed for nausea (Patient not taking: Reported on 6/10/2024) 60 tablet 11    pomalidomide (POMALYST) 3 MG capsule Take 1 capsule (3 mg) by mouth daily Swallow whole, do NOT break, crush, chew or open capsule. Take on days 1-21 of repeated 28 day cycles. 21 capsule 0    prochlorperazine (COMPAZINE) 5 MG tablet Take 1 tablet (5 mg) by mouth every 6 hours as needed for nausea or vomiting (Patient not taking: Reported on 6/10/2024) 30 tablet 11     No current facility-administered medications for this visit.     Facility-Administered Medications Ordered in Other Visits   Medication Dose Route Frequency Provider Last Rate Last Admin    plerixafor (MOZOBIL) injection SOLN 12.4 mg  0.24 mg/kg Subcutaneous Daily Mae Llamas APRN CNP   12.4 mg at 03/18/14 1550     potassium chloride 20 mEq in 50 mL IVPB  20 mEq Intravenous Once Mae Kay PA-C        potassium chloride SA (K-DUR,KLOR-CON M) CR tablet 20 mEq  20 mEq Oral Once Mae Kay PA-C           Physical Exam   /73 (BP Location: Right arm, Patient Position: Sitting, Cuff Size: Adult Regular)   Pulse 72   Temp 97.3  F (36.3  C) (Oral)   Resp 18   Wt 55.2 kg (121 lb 11.2 oz)   LMP  (LMP Unknown)   SpO2 98%   BMI 22.98 kg/m      Wt Readings from Last 5 Encounters:   06/10/24 55.2 kg (121 lb 11.2 oz)   05/31/24 54.4 kg (120 lb)   05/22/24 53.1 kg (117 lb 1 oz)   05/22/24 52.2 kg (115 lb)   05/20/24 53.1 kg (117 lb)     Gen: alert, pleasant and conversational, NAD  HEENT: NC/AT,EOMI w/ PERRL, anicteric sclera. OP clear. MMM.   CV: normal S1,S2 with RRR no m/r/g  Resp: lungs CTA bilaterally with adequate air movement to bases. No wheezes or crackles  Abd: soft NTND no organomegaly or masses. BS normoactive.   Ext: warm and well perfused, no edema or cyanosis  Skin: no concerning lesions or rashes  Neuro: A&Ox4, no lateralizing sx. Grossly nonfocal.  Psych: appropriate, reactive      Data     Most Recent 3 CBC's:  Recent Labs   Lab Test 06/10/24  0805 05/31/24  1103 05/22/24  0850 05/20/24  0834   WBC 3.6* 3.7* 4.5 4.2   HGB 11.3* 11.6* 12.1 12.2   * 100 101* 100   * 186 199 206   ANEUTAUTO  --  2.6 2.6 2.4     Most Recent 3 BMP's:  Recent Labs   Lab Test 06/10/24  0805 05/31/24  1103 05/20/24  0834    137 138   POTASSIUM 4.2 4.2 4.1   CHLORIDE 102 100 101   CO2 26 27 28   BUN 15.4 22.5 20.3   CR 0.85 0.90 0.85   ANIONGAP 9 10 9   PAULINE 9.2 9.1 9.3  9.3   GLC 77 95 80   PROTTOTAL 6.2* 6.4 6.5   ALBUMIN 4.0 4.1 4.1    Most Recent 3 LFT's:  Recent Labs   Lab Test 06/10/24  0805 05/31/24  1103 05/20/24  0834   AST 23 22 21   ALT 21 24 22   ALKPHOS 46 49 47   BILITOTAL 0.4 0.3 0.2    Most Recent 2 TSH and T4:  Recent Labs   Lab Test 02/19/24  1333 08/17/23  1349  10/03/22  0820 06/13/22  0822 03/21/22  0803   TSH 3.08 2.12   < > 9.47*  9.31* 5.79*   T4  --   --   --  0.99  0.97 0.83    < > = values in this interval not displayed.     I reviewed the above labs today myself and with the patient.    Assessment/Plan     Relapsed Multiple Myeloma  Started kyprolis/isatuximab/dex on 2/20/23.    Changed to Isatuximab maintenace 10mg/kg every 4 week starting 10/9/23  Rising light chains noted April 2024; cell therapy consult 4/26/24 with multiple options presented.    Decided to proceed with CAR-T.  Underwent apheresis on 5/22/24, cells are due to return 7/8/24  Started Pomalyst portion of bridging therapy 5/23/24; initially started 3 mg but developed a rash; dose was reduced to 2 mg and rash resolved  Weekly labs x 8 after starting Pomalyst, then monthly  Will get monthly isatuximab today 06/10/24   Xgeva every 6 months, last dose 5/31/24, next due Nov 2024; Likes to separate the chemo dosing from this.   Plan to do one more cycle of Pomalyst 2 mg x 21 days starting 6/19.  No further doses of isatuximab planned.  CAR-T cells due to return 7/8/24.  I will send a message to cellular therapy team and ask them to let her know if they want her to stop this cycle of Pomalyst early.     HTN  Continue amlodipine 7.5 mg daily.  Followed by cardiology     Hypothyroidism  Remains on levothyroxine    ID Prophylaxis   Acyclovir 400 mg BID for HSV prophylaxis    33 minutes spent on the date of the encounter doing chart review, review of test results, interpretation of tests, patient visit, and documentation     The longitudinal plan of care for the diagnosis(es)/condition(s) as documented were addressed during this visit. Due to the added complexity in care, I will continue to support Shena in the subsequent management and with ongoing continuity of care.    MARINO Fuller CNP  [facility] [id number] [place] [address]  [phone]

## 2024-06-10 NOTE — NURSING NOTE
"Oncology Rooming Note    Teresa 10, 2024 8:14 AM   Shena Hartmann is a 73 year old female who presents for:    Chief Complaint   Patient presents with    Port Draw     Labs drawn via port by RN. Port accessed with 20g 3/4\" power needle and vitals WNL. Flushed with saline and heparin. Pt tolerated well. Patient checked into next appointment.      Oncology Clinic Visit     Multiple Myeloma     Initial Vitals: /73 (BP Location: Right arm, Patient Position: Sitting, Cuff Size: Adult Regular)   Pulse 72   Temp 97.3  F (36.3  C) (Oral)   Resp 18   Wt 55.2 kg (121 lb 11.2 oz)   LMP  (LMP Unknown)   SpO2 98%   BMI 22.98 kg/m   Estimated body mass index is 22.98 kg/m  as calculated from the following:    Height as of 5/22/24: 1.55 m (5' 1.02\").    Weight as of this encounter: 55.2 kg (121 lb 11.2 oz). Body surface area is 1.54 meters squared.  Severe Pain (6) Comment: Data Unavailable   No LMP recorded (lmp unknown). Patient is postmenopausal.  Allergies reviewed: Yes  Medications reviewed: Yes    Medications: Medication refills not needed today.  Pharmacy name entered into [employer]:    [id number] - [place], [place] - [address] AT [facility] [address]  WRITTEN PRESCRIPTION REQUESTED  [facility] - [place], [place] - [address]    Frailty Screening:   Is the patient here for a new oncology consult visit in cancer care? 2. No      Clinical concerns:        Kelsi Beckford CMA              "

## 2024-06-10 NOTE — PATIENT INSTRUCTIONS
[facility] Triage and after hours / weekends / holidays:  [phone]    Please call the triage or after hours line if you experience a temperature greater than or equal to 100.4, shaking chills, have uncontrolled nausea, vomiting and/or diarrhea, dizziness, shortness of breath, chest pain, bleeding, unexplained bruising, or if you have any other new/concerning symptoms, questions or concerns.      If you are having any concerning symptoms or wish to speak to a provider before your next infusion visit, please call triage to notify them so we can adequately serve you.     If you need a refill on a narcotic prescription or other medication, please call before your infusion appointment.                June 2024 Sunday Monday Tuesday Wednesday Thursday Friday Saturday                                 1       2     3     4     5     6     7     8       9     10    LAB CENTRAL   7:30 AM   (15 min.)   [facility]   [facility]    RETURN ACTIVE TREATMENT   7:45 AM   (45 min.)   Selena Suggs APRN CNP   [facility]    ONC INFUSION 2 HR (120 MIN)   9:00 AM   (120 min.)    ONC INFUSION NURSE   [facility] 11     12     13     14     15       16     17    LAB CENTRAL   7:30 AM   (15 min.)    [facility]   [facility] 18     19     20     21     22       23     24    LAB CENTRAL   7:30 AM   (15 min.)   [facility]   [facility] 25     26     27     28     29       30                                               July 2024 Sunday Monday Tuesday Wednesday Thursday Friday Saturday        1    LAB CENTRAL   7:45 AM   (15 min.)    [facility]   [facility] 2     3     4     5     6       7     8    LAB CENTRAL   7:45 AM   (15 min.)   [facility]   [facility] 9     10     11     12      13       14     15    LAB CENTRAL   7:45 AM   (15 min.)    [facility]   [facility] 16     17     18     19     20       21     22     23     24     25     26    NEW ALLERGY   6:45 AM   (30 min.)   Sunil Rosario MD   [facility] [place] 27       28     29     30     31                                    Lab Results:  Recent Results (from the past 12 hour(s))   Comprehensive metabolic panel    Collection Time: 06/10/24  8:05 AM   Result Value Ref Range    Sodium 137 135 - 145 mmol/L    Potassium 4.2 3.4 - 5.3 mmol/L    Carbon Dioxide (CO2) 26 22 - 29 mmol/L    Anion Gap 9 7 - 15 mmol/L    Urea Nitrogen 15.4 8.0 - 23.0 mg/dL    Creatinine 0.85 0.51 - 0.95 mg/dL    GFR Estimate 72 >60 mL/min/1.73m2    Calcium 9.2 8.8 - 10.2 mg/dL    Chloride 102 98 - 107 mmol/L    Glucose 77 70 - 99 mg/dL    Alkaline Phosphatase 46 40 - 150 U/L    AST 23 0 - 45 U/L    ALT 21 0 - 50 U/L    Protein Total 6.2 (L) 6.4 - 8.3 g/dL    Albumin 4.0 3.5 - 5.2 g/dL    Bilirubin Total 0.4 <=1.2 mg/dL   Lactate Dehydrogenase    Collection Time: 06/10/24  8:05 AM   Result Value Ref Range    Lactate Dehydrogenase 159 0 - 250 U/L   CBC with platelets and differential    Collection Time: 06/10/24  8:05 AM   Result Value Ref Range    WBC Count 3.6 (L) 4.0 - 11.0 10e3/uL    RBC Count 3.31 (L) 3.80 - 5.20 10e6/uL    Hemoglobin 11.3 (L) 11.7 - 15.7 g/dL    Hematocrit 33.8 (L) 35.0 - 47.0 %     (H) 78 - 100 fL    MCH 34.1 (H) 26.5 - 33.0 pg    MCHC 33.4 31.5 - 36.5 g/dL    RDW 14.6 10.0 - 15.0 %    Platelet Count 131 (L) 150 - 450 10e3/uL    % Neutrophils 44 %    % Lymphocytes 28 %    % Monocytes 21 %    % Eosinophils 6 %    % Basophils 1 %    % Immature Granulocytes 0 %    NRBCs per 100 WBC 0 <1 /100    Absolute Neutrophils 1.6 1.6 - 8.3 10e3/uL    Absolute Lymphocytes 1.0 0.8 - 5.3 10e3/uL    Absolute Monocytes 0.8 0.0 - 1.3 10e3/uL    Absolute Eosinophils 0.2 0.0 - 0.7 10e3/uL     Absolute Basophils 0.0 0.0 - 0.2 10e3/uL    Absolute Immature Granulocytes 0.0 <=0.4 10e3/uL    Absolute NRBCs 0.0 10e3/uL   RBC and Platelet Morphology    Collection Time: 06/10/24  8:05 AM   Result Value Ref Range    Platelet Assessment  Automated Count Confirmed. Platelet morphology is normal.     Automated Count Confirmed. Platelet morphology is normal.    Acanthocytes      Davonte Rods      Basophilic Stippling      Bite Cells      Blister Cells      [place] Cells      Elliptocytes      Hgb C Crystals      B[place]-Jolly Bodies      Hypersegmented Neutrophils      Polychromasia      RBC agglutination      RBC Fragments      Reactive Lymphocytes      Rouleaux      Sickle Cells      Smudge Cells      Spherocytes      Stomatocytes      Target Cells      Teardrop Cells      Toxic Neutrophils      RBC Morphology Confirmed RBC Indices

## 2024-06-10 NOTE — PROGRESS NOTES
[facility]    Hematology/Oncology Clinic Note    Angel 10, 2024    Reason for Office Visit   Visit during bridging therapy with iastuximab + Pomalyst while awaiting CAR-T manufacturing        Cancer Diagnosis   Multiple Myeloma    Oncology History of Present Illness   Breast cancer dx in 1994. Underwent surgery and chemotherapy without reoccurence  MGUS dx 1999  T8 pathologic fracture with radiation  Revlimid and velcade,   2013  Cytoxan IV 9/2013  D-PACE 11/2013  Auto 3/27/14  5/2014 thalidomide caused rash so switched to pomalidomide   on kenalog and clobetasol creams for IMID rash  FLC began to rise so riky added to pom 9/2020  she has been on xgeva for an unclear amount of time  Teodora-Kd 2/20/2023  Teodora maintenance Nov 2023  Spring 2024 FLC began to rise again  5/22/24 Underwent apheresis for CAR-T    Interval History   Started Pomalyst on 5/23; started 3 mg, got a skin rash (this was an issue when on Pom 3 mg many years ago, too), reduced dose to 2 mg and she is tolerating that okay with a variety of side effects  Having muscle, joint, and bone aches; feels stiff when getting up in the morning (pain is 6/10 this morning) and then tends to improve throughout the day as she moves.  Manages with hot shower, epsom salt bath, muscle rubs, exercise, stretching, meditation, sometimes Tylenol in the morning  Muscle cramps in her calves  Increasing neuropathy in her feet, sometimes in her fingers  Feels bloated and has increased gas  Denies new focal pain  Stools have been more formed since starting Pomalyst  Lips feel slightly swollen and gums are a little more tender; denies mouth sores      ROS neg other than the symptoms noted above in the HPI.        Past Medical History     Past Medical History:   Diagnosis Date    Acquired hypothyroidism 8/10/2023    Breast cancer (H) 01/01/1994    right    H/O stem cell transplant (H) 03/01/2014    History of blood transfusion 2013 & 2014     During stem cell tx process    Hypertension Sept. 2021    Runs 140 s systolically, about 20 above my usual    Multiple myeloma, without mention of having achieved remission 11/06/2012       Past Surgical History:      Past Surgical History:   Procedure Laterality Date    BIOPSY  10/1994; [id number]    Lt. Breast in '94; multiple bone marrow bx's for MM    BREAST SURGERY  10/1994    Lt. Mastectomy w/lymph node resection    COLONOSCOPY  11/2015    Normal results    EYE SURGERY  2020    Bilateral cataract surgery    INSERT CATHETER VASCULAR ACCESS Left 5/22/2024    Procedure: central venous catheter non tunneled insertion, vascular access;  Surgeon: Ric Castaneda PA-C;  Location: [facility] OR    INSERT PORT VASCULAR ACCESS Left 2/15/2023    Procedure: INSERTION, VASCULAR ACCESS PORT;  Surgeon: Luc Antunez MD;  Location: [facility] OR    IR CHEST PORT PLACEMENT > 5 YRS OF AGE  2/15/2023    IR CVC NON TUNNEL LINE REMOVAL  5/22/2024    IR CVC NON TUNNEL PLACEMENT > 5 YRS  5/22/2024    MASTECTOMY      Right, with lymphe node disection      PICC INSERTION  09/10/2013    5fr DL Power PICC, 44cm (1cm external) in the L lateral brachial vein with tip in the low SVC    TRANSPLANT  3/27/2014    Autologous stem cell tx       Social History     Socioeconomic History    Marital status:    Tobacco Use    Smoking status: Never     Passive exposure: Never    Smokeless tobacco: Never   Substance and Sexual Activity    Alcohol use: Yes     Comment: occ    Drug use: No    Sexual activity: Not Currently     Partners: Male     Birth control/protection: Post-menopausal   Other Topics Concern    Parent/sibling w/ CABG, MI or angioplasty before 65F 55M? No     Service No    Blood Transfusions No    Caffeine Concern No    Occupational Exposure No    Hobby Hazards No     Family History     Family History   Problem Relation Age of Onset    Cancer Paternal Grandmother         skin cancer    Hypertension Mother     Cerebrovascular  Disease Mother          8-11-15 from strokes    Hypertension Father     Prostate Cancer Father        Allergies:     Allergies   Allergen Reactions    Blood Transfusion Related (Informational Only) Other (See Comments)     Patient has a history of a clinically significant antibody against RBC antigens.  A delay in compatible RBCs may occur.     Cayenne Pepper [Cayenne]     Corn-Containing Products GI Disturbance    Levaquin Other (See Comments)     Tendon pain    Milk Protein GI Disturbance    Mold      Facial rash/lip swelling    Morphine Sulfate Other (See Comments)     Per pt - see things (hallucination) when she was on Morphine .    Pollen Extract      Environmental allergies     Shrimp Nausea and Vomiting    Tomato      Lip swelling, facial rash    Azithromycin Rash    Keflex [Cephalexin] Rash    Oat Rash    Tape [Adhesive Tape] Itching and Rash     All adhesives    Wheat Rash     Swelling of lips         Medications:     Current Outpatient Medications   Medication Sig Dispense Refill    acetaminophen (TYLENOL) 325 MG tablet Take 325-650 mg by mouth every 6 hours as needed for mild pain      acyclovir (ZOVIRAX) 400 MG tablet Take 1 tablet (400 mg) by mouth every 12 hours 180 tablet 3    amLODIPine (NORVASC) 2.5 MG tablet Take 5 mg (2 tablets) in the morning. Take 2.5 mg (1 tablet) in the evening.      Ascorbic Acid (VITAMIN C PO) Take 1,000 mg by mouth 2 times daily       aspirin (ASA) 81 MG chewable tablet Take 1 tablet (81 mg) by mouth daily 30 tablet 0    CALCIUM-VITAMIN D-VITAMIN K PO Take 2 tablets by mouth daily.      clobetasol (TEMOVATE) 0.05 % external ointment Apply topically 2 times daily as needed (itching and rash) Topically to rash on hands until resolved 45 g 0    COENZYME Q-10 PO Take 100 mg by mouth daily       Cyanocobalamin 1000 MCG/ML LIQD Take 500 mcg by mouth 2 times daily      desonide (DESOWEN) 0.05 % external ointment Apply topically 2 times daily Apply to face as needed for  dermatitis 15 g 11    levothyroxine (SYNTHROID/LEVOTHROID) 50 MCG tablet Take 1 tablet (50 mcg) by mouth daily 90 tablet 3    magnesium 100 MG CAPS Take 100 mg by mouth 3 times daily      Multiple Vitamins-Minerals (MULTIVITAMIN OR) Take 1 tablet by mouth 2 times daily.      order for DME 6 mastectomy bras  Length of need: 99 months 6 Device 1    order for DME R mastectomy prosthesis   Length of need:  99 months 1 Device 1    pomalidomide (POMALYST) 2 MG capsule Take 1 capsule (2 mg) by mouth daily Swallow whole, do NOT break, crush, chew or open capsule. Take on days 1-21 of repeated 28 day cycles. 14 capsule 0    triamcinolone (KENALOG) 0.1 % external cream Apply topically 2 times daily 30 g 2    triamcinolone (KENALOG) 0.1 % external ointment Apply topically 2 times daily 15 g 11    ZINC PICOLINATE PO Take 30 mg by mouth daily      ondansetron (ZOFRAN) 4 MG tablet Take 1 tablet (4 mg) by mouth every 8 hours as needed for nausea (Patient not taking: Reported on 6/10/2024) 60 tablet 11    pomalidomide (POMALYST) 3 MG capsule Take 1 capsule (3 mg) by mouth daily Swallow whole, do NOT break, crush, chew or open capsule. Take on days 1-21 of repeated 28 day cycles. 21 capsule 0    prochlorperazine (COMPAZINE) 5 MG tablet Take 1 tablet (5 mg) by mouth every 6 hours as needed for nausea or vomiting (Patient not taking: Reported on 6/10/2024) 30 tablet 11     No current facility-administered medications for this visit.     Facility-Administered Medications Ordered in Other Visits   Medication Dose Route Frequency Provider Last Rate Last Admin    plerixafor (MOZOBIL) injection SOLN 12.4 mg  0.24 mg/kg Subcutaneous Daily Mae Llamas APRN CNP   12.4 mg at 03/18/14 1550    potassium chloride 20 mEq in 50 mL IVPB  20 mEq Intravenous Once Mae Kay PA-C        potassium chloride SA (K-DUR,KLOR-CON M) CR tablet 20 mEq  20 mEq Oral Once Mae Kay PA-C           Physical Exam   /73 (BP  Location: Right arm, Patient Position: Sitting, Cuff Size: Adult Regular)   Pulse 72   Temp 97.3  F (36.3  C) (Oral)   Resp 18   Wt 55.2 kg (121 lb 11.2 oz)   LMP  (LMP Unknown)   SpO2 98%   BMI 22.98 kg/m      Wt Readings from Last 5 Encounters:   06/10/24 55.2 kg (121 lb 11.2 oz)   05/31/24 54.4 kg (120 lb)   05/22/24 53.1 kg (117 lb 1 oz)   05/22/24 52.2 kg (115 lb)   05/20/24 53.1 kg (117 lb)     Gen: alert, pleasant and conversational, NAD  HEENT: NC/AT,EOMI w/ PERRL, anicteric sclera. OP clear. MMM.   CV: normal S1,S2 with RRR no m/r/g  Resp: lungs CTA bilaterally with adequate air movement to bases. No wheezes or crackles  Abd: soft NTND no organomegaly or masses. BS normoactive.   Ext: warm and well perfused, no edema or cyanosis  Skin: no concerning lesions or rashes  Neuro: A&Ox4, no lateralizing sx. Grossly nonfocal.  Psych: appropriate, reactive      Data     Most Recent 3 CBC's:  Recent Labs   Lab Test 06/10/24  0805 05/31/24  1103 05/22/24  0850 05/20/24  0834   WBC 3.6* 3.7* 4.5 4.2   HGB 11.3* 11.6* 12.1 12.2   * 100 101* 100   * 186 199 206   ANEUTAUTO  --  2.6 2.6 2.4     Most Recent 3 BMP's:  Recent Labs   Lab Test 06/10/24  0805 05/31/24  1103 05/20/24  0834    137 138   POTASSIUM 4.2 4.2 4.1   CHLORIDE 102 100 101   CO2 26 27 28   BUN 15.4 22.5 20.3   CR 0.85 0.90 0.85   ANIONGAP 9 10 9   PAULINE 9.2 9.1 9.3  9.3   GLC 77 95 80   PROTTOTAL 6.2* 6.4 6.5   ALBUMIN 4.0 4.1 4.1    Most Recent 3 LFT's:  Recent Labs   Lab Test 06/10/24  0805 05/31/24  1103 05/20/24  0834   AST 23 22 21   ALT 21 24 22   ALKPHOS 46 49 47   BILITOTAL 0.4 0.3 0.2    Most Recent 2 TSH and T4:  Recent Labs   Lab Test 02/19/24  1333 08/17/23  1349 10/03/22  0820 06/13/22  0822 03/21/22  0803   TSH 3.08 2.12   < > 9.47*  9.31* 5.79*   T4  --   --   --  0.99  0.97 0.83    < > = values in this interval not displayed.     I reviewed the above labs today myself and with the patient.    Assessment/Plan      Relapsed Multiple Myeloma  Started kyprolis/isatuximab/dex on 2/20/23.    Changed to Isatuximab maintenace 10mg/kg every 4 week starting 10/9/23  Rising light chains noted April 2024; cell therapy consult 4/26/24 with multiple options presented.    Decided to proceed with CAR-T.  Underwent apheresis on 5/22/24, cells are due to return 7/8/24  Started Pomalyst portion of bridging therapy 5/23/24; initially started 3 mg but developed a rash; dose was reduced to 2 mg and rash resolved  Weekly labs x 8 after starting Pomalyst, then monthly  Will get monthly isatuximab today 06/10/24   Xgeva every 6 months, last dose 5/31/24, next due Nov 2024; Likes to separate the chemo dosing from this.   Plan to do one more cycle of Pomalyst 2 mg x 21 days starting 6/19.  No further doses of isatuximab planned.  CAR-T cells due to return 7/8/24.  I will send a message to cellular therapy team and ask them to let her know if they want her to stop this cycle of Pomalyst early.     HTN  Continue amlodipine 7.5 mg daily.  Followed by cardiology     Hypothyroidism  Remains on levothyroxine    ID Prophylaxis   Acyclovir 400 mg BID for HSV prophylaxis    33 minutes spent on the date of the encounter doing chart review, review of test results, interpretation of tests, patient visit, and documentation     The longitudinal plan of care for the diagnosis(es)/condition(s) as documented were addressed during this visit. Due to the added complexity in care, I will continue to support Shena in the subsequent management and with ongoing continuity of care.    MARINO Fuller [facility]  [address] [place] [id number]  [phone]

## 2024-06-10 NOTE — PROGRESS NOTES
Infusion Nursing Note:  Shena Hartmann presents today for isatuximab-irfc.    Patient seen by provider today: Yes: Selena Suggs CNP   present during visit today: Not Applicable.    Note: Pt presents today generally feeling well. She was seen in clinic by Selena Suggs CNP. Pt wishes to proceed with planned treatment.    Intravenous Access:  Implanted Port.    Treatment Conditions:  Lab Results   Component Value Date    HGB 11.3 (L) 06/10/2024    WBC 3.6 (L) 06/10/2024    ANEU 2.1 07/05/2021    ANEUTAUTO 1.6 06/10/2024     (L) 06/10/2024     Lab Results   Component Value Date     06/10/2024    POTASSIUM 4.2 06/10/2024    MAG 1.8 12/12/2014    CR 0.85 06/10/2024    PAULINE 9.2 06/10/2024    BILITOTAL 0.4 06/10/2024    ALBUMIN 4.0 06/10/2024    ALT 21 06/10/2024    AST 23 06/10/2024     Results reviewed, labs MET treatment parameters, ok to proceed with treatment.    Post Infusion Assessment:  Patient tolerated infusion without incident.  Blood return noted pre and post infusion.  Site patent and intact, free from redness, edema or discomfort.  No evidence of extravasations.  Access discontinued per protocol.     Discharge Plan:   Patient declined prescription refills.  Discharge instructions reviewed with: Patient.  Patient and/or family verbalized understanding of discharge instructions and all questions answered.  AVS to patient via [employer].  Patient will return 06/17/24 for next appointment.   Patient discharged in stable condition accompanied by: self.  Departure Mode: Ambulatory.      Emily Meese, RN

## 2024-06-10 NOTE — NURSING NOTE
"Chief Complaint   Patient presents with    Port Draw     Labs drawn via port by RN. Port accessed with 20g 3/4\" power needle and vitals WNL. Flushed with saline and heparin. Pt tolerated well. Patient checked into next appointment.       Nichole Sanders RN    "

## 2024-06-11 LAB
ALBUMIN SERPL ELPH-MCNC: 3.7 G/DL (ref 3.7–5.1)
ALPHA1 GLOB SERPL ELPH-MCNC: 0.2 G/DL (ref 0.2–0.4)
ALPHA2 GLOB SERPL ELPH-MCNC: 0.6 G/DL (ref 0.5–0.9)
B-GLOBULIN SERPL ELPH-MCNC: 0.6 G/DL (ref 0.6–1)
GAMMA GLOB SERPL ELPH-MCNC: 0.7 G/DL (ref 0.7–1.6)
M PROTEIN SERPL ELPH-MCNC: 0.6 G/DL
PROT PATTERN SERPL ELPH-IMP: ABNORMAL

## 2024-06-13 DIAGNOSIS — C90.00 MULTIPLE MYELOMA NOT HAVING ACHIEVED REMISSION (H): Primary | ICD-10-CM

## 2024-06-17 ENCOUNTER — LAB (OUTPATIENT)
Dept: LAB | Facility: CLINIC | Age: 74
End: 2024-06-17
Attending: INTERNAL MEDICINE
Payer: MEDICARE

## 2024-06-17 ENCOUNTER — TELEPHONE (OUTPATIENT)
Dept: ONCOLOGY | Facility: CLINIC | Age: 74
End: 2024-06-17

## 2024-06-17 DIAGNOSIS — C90.00 MULTIPLE MYELOMA NOT HAVING ACHIEVED REMISSION (H): ICD-10-CM

## 2024-06-17 DIAGNOSIS — Z79.899 ENCOUNTER FOR LONG-TERM (CURRENT) USE OF MEDICATIONS: ICD-10-CM

## 2024-06-17 LAB
ACANTHOCYTES BLD QL SMEAR: NORMAL
ALBUMIN SERPL BCG-MCNC: 4.1 G/DL (ref 3.5–5.2)
ALP SERPL-CCNC: 50 U/L (ref 40–150)
ALT SERPL W P-5'-P-CCNC: 22 U/L (ref 0–50)
ANION GAP SERPL CALCULATED.3IONS-SCNC: 11 MMOL/L (ref 7–15)
AST SERPL W P-5'-P-CCNC: 19 U/L (ref 0–45)
AUER BODIES BLD QL SMEAR: NORMAL
BASO STIPL BLD QL SMEAR: NORMAL
BASOPHILS # BLD AUTO: 0 10E3/UL (ref 0–0.2)
BASOPHILS NFR BLD AUTO: 1 %
BILIRUB SERPL-MCNC: 0.3 MG/DL
BITE CELLS BLD QL SMEAR: NORMAL
BLISTER CELLS BLD QL SMEAR: NORMAL
BUN SERPL-MCNC: 14.3 MG/DL (ref 8–23)
BURR CELLS BLD QL SMEAR: NORMAL
CALCIUM SERPL-MCNC: 9 MG/DL (ref 8.8–10.2)
CHLORIDE SERPL-SCNC: 101 MMOL/L (ref 98–107)
CREAT SERPL-MCNC: 0.84 MG/DL (ref 0.51–0.95)
DACRYOCYTES BLD QL SMEAR: NORMAL
DEPRECATED HCO3 PLAS-SCNC: 25 MMOL/L (ref 22–29)
EGFRCR SERPLBLD CKD-EPI 2021: 73 ML/MIN/1.73M2
ELLIPTOCYTES BLD QL SMEAR: NORMAL
EOSINOPHIL # BLD AUTO: 0.1 10E3/UL (ref 0–0.7)
EOSINOPHIL NFR BLD AUTO: 4 %
ERYTHROCYTE [DISTWIDTH] IN BLOOD BY AUTOMATED COUNT: 14.4 % (ref 10–15)
FRAGMENTS BLD QL SMEAR: NORMAL
GLUCOSE SERPL-MCNC: 74 MG/DL (ref 70–99)
HCT VFR BLD AUTO: 33.7 % (ref 35–47)
HGB BLD-MCNC: 11.3 G/DL (ref 11.7–15.7)
HGB C CRYSTALS: NORMAL
HOWELL-JOLLY BOD BLD QL SMEAR: NORMAL
IMM GRANULOCYTES # BLD: 0 10E3/UL
IMM GRANULOCYTES NFR BLD: 0 %
LYMPHOCYTES # BLD AUTO: 1.2 10E3/UL (ref 0.8–5.3)
LYMPHOCYTES NFR BLD AUTO: 39 %
MCH RBC QN AUTO: 34.1 PG (ref 26.5–33)
MCHC RBC AUTO-ENTMCNC: 33.5 G/DL (ref 31.5–36.5)
MCV RBC AUTO: 102 FL (ref 78–100)
MONOCYTES # BLD AUTO: 0.6 10E3/UL (ref 0–1.3)
MONOCYTES NFR BLD AUTO: 21 %
NEUTROPHILS # BLD AUTO: 1 10E3/UL (ref 1.6–8.3)
NEUTROPHILS NFR BLD AUTO: 35 %
NEUTS HYPERSEG BLD QL SMEAR: NORMAL
NRBC # BLD AUTO: 0 10E3/UL
NRBC BLD AUTO-RTO: 0 /100
PLAT MORPH BLD: NORMAL
PLATELET # BLD AUTO: 154 10E3/UL (ref 150–450)
POLYCHROMASIA BLD QL SMEAR: NORMAL
POTASSIUM SERPL-SCNC: 4.4 MMOL/L (ref 3.4–5.3)
PROT SERPL-MCNC: 6.4 G/DL (ref 6.4–8.3)
RBC # BLD AUTO: 3.31 10E6/UL (ref 3.8–5.2)
RBC AGGLUT BLD QL: NORMAL
RBC MORPH BLD: NORMAL
ROULEAUX BLD QL SMEAR: NORMAL
SICKLE CELLS BLD QL SMEAR: NORMAL
SMUDGE CELLS BLD QL SMEAR: NORMAL
SODIUM SERPL-SCNC: 137 MMOL/L (ref 135–145)
SPHEROCYTES BLD QL SMEAR: NORMAL
STOMATOCYTES BLD QL SMEAR: NORMAL
TARGETS BLD QL SMEAR: NORMAL
TOXIC GRANULES BLD QL SMEAR: NORMAL
VARIANT LYMPHS BLD QL SMEAR: NORMAL
WBC # BLD AUTO: 3 10E3/UL (ref 4–11)

## 2024-06-17 PROCEDURE — 250N000011 HC RX IP 250 OP 636: Mod: JZ | Performed by: INTERNAL MEDICINE

## 2024-06-17 PROCEDURE — 80053 COMPREHEN METABOLIC PANEL: CPT

## 2024-06-17 PROCEDURE — 85025 COMPLETE CBC W/AUTO DIFF WBC: CPT

## 2024-06-17 PROCEDURE — 36591 DRAW BLOOD OFF VENOUS DEVICE: CPT

## 2024-06-17 RX ORDER — HEPARIN SODIUM (PORCINE) LOCK FLUSH IV SOLN 100 UNIT/ML 100 UNIT/ML
5 SOLUTION INTRAVENOUS ONCE
Status: COMPLETED | OUTPATIENT
Start: 2024-06-17 | End: 2024-06-17

## 2024-06-17 RX ADMIN — HEPARIN 5 ML: 100 SYRINGE at 08:11

## 2024-06-17 NOTE — TELEPHONE ENCOUNTER
Oral Chemotherapy Monitoring Program    Medication: Pomalyst  Rx:  2mg PO daily 21/28 ds    Auth #: [id number]  Risk Category: Adult female NOT of reproductive capacity      Routine survey questions reviewed.  yes

## 2024-06-17 NOTE — NURSING NOTE
Chief Complaint   Patient presents with    Labs Only    Port Draw     Labs drawn via port by RN in lab.      Labs drawn via port by RN. Port accessed with 20g flat needle. Flushed with saline and heparin. De-accessed. Pt tolerated well.     Germaine Fuentes RN

## 2024-06-18 ENCOUNTER — DOCUMENTATION ONLY (OUTPATIENT)
Dept: ONCOLOGY | Facility: CLINIC | Age: 74
End: 2024-06-18
Payer: MEDICARE

## 2024-06-18 NOTE — PROGRESS NOTES
Oral Chemotherapy Monitoring Program  Lab Follow Up    Reviewed lab results from 6/17/24.        4/30/2024    11:00 AM 4/30/2024     3:00 PM 5/23/2024     3:00 PM 5/30/2024     8:00 AM 5/31/2024    12:00 PM 6/13/2024     8:00 AM 6/18/2024     9:00 AM   ORAL CHEMOTHERAPY   Assessment Type Initial Work up New Teach Left Voicemail Initial Follow up;Refill Lab Monitoring Refill Lab Monitoring   Diagnosis Code Multiple Myeloma Multiple Myeloma Multiple Myeloma Multiple Myeloma Multiple Myeloma Multiple Myeloma Multiple Myeloma   Providers Dr. Scott Chavez   [facility]   Drug Name Pomalyst (pomalidomide) Pomalyst (pomalidomide) Pomalyst (pomalidomide) Pomalyst (pomalidomide) Pomalyst (pomalidomide) Pomalyst (pomalidomide) Pomalyst (pomalidomide)   Dose 3 mg 3 mg 3 mg 2 mg 2 mg 2 mg 2 mg   Current Schedule Daily Daily Daily Daily Daily Daily Daily   Cycle Details 3 weeks on, 1 week off 3 weeks on, 1 week off 3 weeks on, 1 week off 3 weeks on, 1 week off 3 weeks on, 1 week off 3 weeks on, 1 week off 3 weeks on, 1 week off   Start Date of Last Cycle  5/6/2024 5/23/2024 6/19/2024    Doses missed in last 2 weeks    1      Adherence Assessment    Adherent      Adverse Effects    Rash   Thrombocytopenia   Rash    Grade 2      Pharmacist Intervention(Rash)    Yes      Intervention(s)    Dose decreased (chemo)      Any new drug interactions? No No  No      Is the dose as ordered appropriate for the patient?    Yes          Labs:  _  Result Component Current Result Ref Range   Sodium 137 (6/17/2024) 135 - 145 mmol/L     _  Result Component Current Result Ref Range   Potassium 4.4 (6/17/2024) 3.4 - 5.3 mmol/L     _  Result Component Current Result Ref Range   Calcium 9.0 (6/17/2024) 8.8 - 10.2 mg/dL     No results found for Mag within last 30 days.     No results found for Phos within last 30 days.      _  Result Component Current Result Ref Range   Albumin 4.1 (6/17/2024) 3.5 - 5.2 g/dL     _  Result Component Current Result Ref Range   Urea Nitrogen 14.3 (6/17/2024) 8.0 - 23.0 mg/dL     _  Result Component Current Result Ref Range   Creatinine 0.84 (6/17/2024) 0.51 - 0.95 mg/dL     _  Result Component Current Result Ref Range   AST 19 (6/17/2024) 0 - 45 U/L     _  Result Component Current Result Ref Range   ALT 22 (6/17/2024) 0 - 50 U/L     _  Result Component Current Result Ref Range   Bilirubin Total 0.3 (6/17/2024) <=1.2 mg/dL     _  Result Component Current Result Ref Range   WBC Count 3.0 (L) (6/17/2024) 4.0 - 11.0 10e3/uL     _  Result Component Current Result Ref Range   Hemoglobin 11.3 (L) (6/17/2024) 11.7 - 15.7 g/dL     _  Result Component Current Result Ref Range   Platelet Count 154 (6/17/2024) 150 - 450 10e3/uL     No results found for ANC within last 30 days.     _  Result Component Current Result Ref Range   Absolute Neutrophils 1.0 (L) (6/17/2024) 1.6 - 8.3 10e3/uL        Assessment & Plan:  No new concerning abnormalities, results consistent with previus numbers.    Patient was contacted via [employer].    Follow-Up:    Lab draw 6/24.    Aida Pearson  Pharmacy Intern  Oral Chemotherapy Monitoring Program  [facility]  [phone]

## 2024-06-22 ENCOUNTER — MYC REFILL (OUTPATIENT)
Dept: INTERNAL MEDICINE | Facility: CLINIC | Age: 74
End: 2024-06-22
Payer: MEDICARE

## 2024-06-22 DIAGNOSIS — E03.9 ACQUIRED HYPOTHYROIDISM: ICD-10-CM

## 2024-06-24 ENCOUNTER — LAB (OUTPATIENT)
Dept: LAB | Facility: CLINIC | Age: 74
End: 2024-06-24
Attending: INTERNAL MEDICINE
Payer: MEDICARE

## 2024-06-24 DIAGNOSIS — C90.00 MULTIPLE MYELOMA NOT HAVING ACHIEVED REMISSION (H): ICD-10-CM

## 2024-06-24 DIAGNOSIS — Z79.899 ENCOUNTER FOR LONG-TERM (CURRENT) USE OF MEDICATIONS: ICD-10-CM

## 2024-06-24 LAB
ACANTHOCYTES BLD QL SMEAR: NORMAL
AUER BODIES BLD QL SMEAR: NORMAL
BASO STIPL BLD QL SMEAR: NORMAL
BASOPHILS # BLD AUTO: 0.1 10E3/UL (ref 0–0.2)
BASOPHILS NFR BLD AUTO: 2 %
BITE CELLS BLD QL SMEAR: NORMAL
BLISTER CELLS BLD QL SMEAR: NORMAL
BURR CELLS BLD QL SMEAR: NORMAL
DACRYOCYTES BLD QL SMEAR: NORMAL
ELLIPTOCYTES BLD QL SMEAR: NORMAL
EOSINOPHIL # BLD AUTO: 0.2 10E3/UL (ref 0–0.7)
EOSINOPHIL NFR BLD AUTO: 5 %
ERYTHROCYTE [DISTWIDTH] IN BLOOD BY AUTOMATED COUNT: 14.6 % (ref 10–15)
FRAGMENTS BLD QL SMEAR: NORMAL
HCT VFR BLD AUTO: 35.4 % (ref 35–47)
HGB BLD-MCNC: 11.7 G/DL (ref 11.7–15.7)
HGB C CRYSTALS: NORMAL
HOWELL-JOLLY BOD BLD QL SMEAR: NORMAL
IMM GRANULOCYTES # BLD: 0 10E3/UL
IMM GRANULOCYTES NFR BLD: 0 %
LYMPHOCYTES # BLD AUTO: 1.2 10E3/UL (ref 0.8–5.3)
LYMPHOCYTES NFR BLD AUTO: 37 %
MCH RBC QN AUTO: 33.7 PG (ref 26.5–33)
MCHC RBC AUTO-ENTMCNC: 33.1 G/DL (ref 31.5–36.5)
MCV RBC AUTO: 102 FL (ref 78–100)
MONOCYTES # BLD AUTO: 0.8 10E3/UL (ref 0–1.3)
MONOCYTES NFR BLD AUTO: 25 %
NEUTROPHILS # BLD AUTO: 1 10E3/UL (ref 1.6–8.3)
NEUTROPHILS NFR BLD AUTO: 31 %
NEUTS HYPERSEG BLD QL SMEAR: NORMAL
NRBC # BLD AUTO: 0 10E3/UL
NRBC BLD AUTO-RTO: 0 /100
PLAT MORPH BLD: NORMAL
PLATELET # BLD AUTO: 223 10E3/UL (ref 150–450)
POLYCHROMASIA BLD QL SMEAR: NORMAL
RBC # BLD AUTO: 3.47 10E6/UL (ref 3.8–5.2)
RBC AGGLUT BLD QL: NORMAL
RBC MORPH BLD: NORMAL
ROULEAUX BLD QL SMEAR: NORMAL
SICKLE CELLS BLD QL SMEAR: NORMAL
SMUDGE CELLS BLD QL SMEAR: NORMAL
SPHEROCYTES BLD QL SMEAR: NORMAL
STOMATOCYTES BLD QL SMEAR: NORMAL
TARGETS BLD QL SMEAR: NORMAL
TOXIC GRANULES BLD QL SMEAR: NORMAL
VARIANT LYMPHS BLD QL SMEAR: NORMAL
WBC # BLD AUTO: 3.2 10E3/UL (ref 4–11)

## 2024-06-24 PROCEDURE — 85025 COMPLETE CBC W/AUTO DIFF WBC: CPT

## 2024-06-24 PROCEDURE — 250N000011 HC RX IP 250 OP 636: Mod: JZ | Performed by: INTERNAL MEDICINE

## 2024-06-24 PROCEDURE — 36591 DRAW BLOOD OFF VENOUS DEVICE: CPT

## 2024-06-24 RX ORDER — HEPARIN SODIUM (PORCINE) LOCK FLUSH IV SOLN 100 UNIT/ML 100 UNIT/ML
5 SOLUTION INTRAVENOUS EVERY 8 HOURS
Status: DISCONTINUED | OUTPATIENT
Start: 2024-06-24 | End: 2024-06-30 | Stop reason: HOSPADM

## 2024-06-24 RX ORDER — LEVOTHYROXINE SODIUM 50 UG/1
50 TABLET ORAL DAILY
Qty: 90 TABLET | Refills: 0 | Status: SHIPPED | OUTPATIENT
Start: 2024-06-24 | End: 2024-09-20

## 2024-06-24 RX ORDER — LEVOTHYROXINE SODIUM 50 UG/1
50 TABLET ORAL DAILY
Qty: 90 TABLET | Refills: 3 | OUTPATIENT
Start: 2024-06-24

## 2024-06-24 RX ADMIN — Medication 5 ML: at 07:38

## 2024-06-24 NOTE — NURSING NOTE
Chief Complaint   Patient presents with    Port Draw     Gripper needle. Heparin locked     Maria Eugenia Murdock RN on 6/24/2024 at 7:41 AM

## 2024-06-25 ENCOUNTER — MYC MEDICAL ADVICE (OUTPATIENT)
Dept: ONCOLOGY | Facility: CLINIC | Age: 74
End: 2024-06-25
Payer: MEDICARE

## 2024-06-25 NOTE — TELEPHONE ENCOUNTER
Oral Chemotherapy Monitoring Program  Lab Follow Up    Reviewed lab results from 6/24/24.        4/30/2024     3:00 PM 5/23/2024     3:00 PM 5/30/2024     8:00 AM 5/31/2024    12:00 PM 6/13/2024     8:00 AM 6/18/2024     9:00 AM 6/25/2024    12:00 PM   ORAL CHEMOTHERAPY   Assessment Type New Teach Left Voicemail Initial Follow up;Refill Lab Monitoring Refill Lab Monitoring Lab Monitoring   Diagnosis Code Multiple Myeloma Multiple Myeloma Multiple Myeloma Multiple Myeloma Multiple Myeloma Multiple Myeloma Multiple Myeloma   Providers Dr. Scott Chavez   [facility]   Drug Name Pomalyst (pomalidomide) Pomalyst (pomalidomide) Pomalyst (pomalidomide) Pomalyst (pomalidomide) Pomalyst (pomalidomide) Pomalyst (pomalidomide) Pomalyst (pomalidomide)   Dose 3 mg 3 mg 2 mg 2 mg 2 mg 2 mg 2 mg   Current Schedule Daily Daily Daily Daily Daily Daily Daily   Cycle Details 3 weeks on, 1 week off 3 weeks on, 1 week off 3 weeks on, 1 week off 3 weeks on, 1 week off 3 weeks on, 1 week off 3 weeks on, 1 week off 3 weeks on, 1 week off   Start Date of Last Cycle 5/6/2024 5/23/2024 6/19/2024     Doses missed in last 2 weeks   1       Adherence Assessment   Adherent       Adverse Effects   Rash   Thrombocytopenia    Rash   Grade 2       Pharmacist Intervention(Rash)   Yes       Intervention(s)   Dose decreased (chemo)       Any new drug interactions? No  No       Is the dose as ordered appropriate for the patient?   Yes           Labs:  _  Result Component Current Result Ref Range   Sodium 137 (6/17/2024) 135 - 145 mmol/L     _  Result Component Current Result Ref Range   Potassium 4.4 (6/17/2024) 3.4 - 5.3 mmol/L     _  Result Component Current Result Ref Range   Calcium 9.0 (6/17/2024) 8.8 - 10.2 mg/dL     No results found for Mag within last 30 days.     No results found for Phos within last 30 days.      _  Result Component Current Result Ref Range   Albumin 4.1 (6/17/2024) 3.5 - 5.2 g/dL     _  Result Component Current Result Ref Range   Urea Nitrogen 14.3 (6/17/2024) 8.0 - 23.0 mg/dL     _  Result Component Current Result Ref Range   Creatinine 0.84 (6/17/2024) 0.51 - 0.95 mg/dL     _  Result Component Current Result Ref Range   AST 19 (6/17/2024) 0 - 45 U/L     _  Result Component Current Result Ref Range   ALT 22 (6/17/2024) 0 - 50 U/L     _  Result Component Current Result Ref Range   Bilirubin Total 0.3 (6/17/2024) <=1.2 mg/dL     _  Result Component Current Result Ref Range   WBC Count 3.2 (L) (6/24/2024) 4.0 - 11.0 10e3/uL     _  Result Component Current Result Ref Range   Hemoglobin 11.7 (6/24/2024) 11.7 - 15.7 g/dL     _  Result Component Current Result Ref Range   Platelet Count 223 (6/24/2024) 150 - 450 10e3/uL     No results found for ANC within last 30 days.     _  Result Component Current Result Ref Range   Absolute Neutrophils 1.0 (L) (6/24/2024) 1.6 - 8.3 10e3/uL        Assessment & Plan:  No new concerning lab abnormalities.    No changes with Pomalyst needed at this time.    [employer] message sent to patient.    Sabrina Sneed, PharmD, BCPS, BCOP  Oncology Clinical Pharmacy Specialist  [facility]/ [facility]  [phone]

## 2024-07-01 ENCOUNTER — LAB (OUTPATIENT)
Dept: LAB | Facility: CLINIC | Age: 74
End: 2024-07-01
Attending: STUDENT IN AN ORGANIZED HEALTH CARE EDUCATION/TRAINING PROGRAM
Payer: MEDICARE

## 2024-07-01 ENCOUNTER — DOCUMENTATION ONLY (OUTPATIENT)
Dept: ONCOLOGY | Facility: CLINIC | Age: 74
End: 2024-07-01

## 2024-07-01 DIAGNOSIS — Z79.899 ENCOUNTER FOR LONG-TERM (CURRENT) USE OF MEDICATIONS: ICD-10-CM

## 2024-07-01 DIAGNOSIS — C90.00 MULTIPLE MYELOMA NOT HAVING ACHIEVED REMISSION (H): ICD-10-CM

## 2024-07-01 DIAGNOSIS — Z01.818 EXAMINATION PRIOR TO CHEMOTHERAPY: Primary | ICD-10-CM

## 2024-07-01 DIAGNOSIS — Z86.2 PERSONAL HISTORY OF DISEASES OF BLOOD AND BLOOD-FORMING ORGANS: ICD-10-CM

## 2024-07-01 LAB
ALBUMIN SERPL BCG-MCNC: 4.1 G/DL (ref 3.5–5.2)
ALP SERPL-CCNC: 60 U/L (ref 40–150)
ALT SERPL W P-5'-P-CCNC: 23 U/L (ref 0–50)
ANION GAP SERPL CALCULATED.3IONS-SCNC: 8 MMOL/L (ref 7–15)
AST SERPL W P-5'-P-CCNC: 19 U/L (ref 0–45)
BASOPHILS # BLD AUTO: 0.1 10E3/UL (ref 0–0.2)
BASOPHILS NFR BLD AUTO: 2 %
BILIRUB SERPL-MCNC: 0.2 MG/DL
BUN SERPL-MCNC: 18.7 MG/DL (ref 8–23)
CALCIUM SERPL-MCNC: 9.1 MG/DL (ref 8.8–10.2)
CHLORIDE SERPL-SCNC: 102 MMOL/L (ref 98–107)
CREAT SERPL-MCNC: 0.89 MG/DL (ref 0.51–0.95)
DEPRECATED HCO3 PLAS-SCNC: 27 MMOL/L (ref 22–29)
EGFRCR SERPLBLD CKD-EPI 2021: 68 ML/MIN/1.73M2
EOSINOPHIL # BLD AUTO: 0.2 10E3/UL (ref 0–0.7)
EOSINOPHIL NFR BLD AUTO: 4 %
ERYTHROCYTE [DISTWIDTH] IN BLOOD BY AUTOMATED COUNT: 14.4 % (ref 10–15)
GLUCOSE SERPL-MCNC: 80 MG/DL (ref 70–99)
HCT VFR BLD AUTO: 34.3 % (ref 35–47)
HGB BLD-MCNC: 11.5 G/DL (ref 11.7–15.7)
IMM GRANULOCYTES # BLD: 0 10E3/UL
IMM GRANULOCYTES NFR BLD: 0 %
KAPPA LC FREE SER-MCNC: 10.78 MG/DL (ref 0.33–1.94)
KAPPA LC FREE/LAMBDA FREE SER NEPH: 28.37 {RATIO} (ref 0.26–1.65)
LAMBDA LC FREE SERPL-MCNC: 0.38 MG/DL (ref 0.57–2.63)
LDH SERPL L TO P-CCNC: 158 U/L (ref 0–250)
LYMPHOCYTES # BLD AUTO: 1.1 10E3/UL (ref 0.8–5.3)
LYMPHOCYTES NFR BLD AUTO: 31 %
MCH RBC QN AUTO: 33.7 PG (ref 26.5–33)
MCHC RBC AUTO-ENTMCNC: 33.5 G/DL (ref 31.5–36.5)
MCV RBC AUTO: 101 FL (ref 78–100)
MONOCYTES # BLD AUTO: 0.7 10E3/UL (ref 0–1.3)
MONOCYTES NFR BLD AUTO: 18 %
NEUTROPHILS # BLD AUTO: 1.7 10E3/UL (ref 1.6–8.3)
NEUTROPHILS NFR BLD AUTO: 45 %
NRBC # BLD AUTO: 0 10E3/UL
NRBC BLD AUTO-RTO: 0 /100
PLATELET # BLD AUTO: 195 10E3/UL (ref 150–450)
POTASSIUM SERPL-SCNC: 4.2 MMOL/L (ref 3.4–5.3)
PROT SERPL-MCNC: 6.4 G/DL (ref 6.4–8.3)
RBC # BLD AUTO: 3.41 10E6/UL (ref 3.8–5.2)
SODIUM SERPL-SCNC: 137 MMOL/L (ref 135–145)
TOTAL PROTEIN SERUM FOR ELP: 6.2 G/DL (ref 6.4–8.3)
WBC # BLD AUTO: 3.7 10E3/UL (ref 4–11)

## 2024-07-01 PROCEDURE — 83615 LACTATE (LD) (LDH) ENZYME: CPT

## 2024-07-01 PROCEDURE — 36591 DRAW BLOOD OFF VENOUS DEVICE: CPT

## 2024-07-01 PROCEDURE — 85025 COMPLETE CBC W/AUTO DIFF WBC: CPT

## 2024-07-01 PROCEDURE — 83521 IG LIGHT CHAINS FREE EACH: CPT | Mod: 59

## 2024-07-01 PROCEDURE — 250N000011 HC RX IP 250 OP 636: Performed by: STUDENT IN AN ORGANIZED HEALTH CARE EDUCATION/TRAINING PROGRAM

## 2024-07-01 PROCEDURE — 84155 ASSAY OF PROTEIN SERUM: CPT | Mod: 91

## 2024-07-01 PROCEDURE — 84165 PROTEIN E-PHORESIS SERUM: CPT | Mod: 26 | Performed by: STUDENT IN AN ORGANIZED HEALTH CARE EDUCATION/TRAINING PROGRAM

## 2024-07-01 PROCEDURE — 80053 COMPREHEN METABOLIC PANEL: CPT

## 2024-07-01 PROCEDURE — 84165 PROTEIN E-PHORESIS SERUM: CPT | Mod: TC | Performed by: STUDENT IN AN ORGANIZED HEALTH CARE EDUCATION/TRAINING PROGRAM

## 2024-07-01 RX ORDER — HEPARIN SODIUM (PORCINE) LOCK FLUSH IV SOLN 100 UNIT/ML 100 UNIT/ML
5 SOLUTION INTRAVENOUS ONCE
Status: COMPLETED | OUTPATIENT
Start: 2024-07-01 | End: 2024-07-01

## 2024-07-01 RX ADMIN — Medication 5 ML: at 07:52

## 2024-07-01 NOTE — PROGRESS NOTES
Oral Chemotherapy Monitoring Program  Lab Follow Up    Reviewed lab results from 7/1/24.        5/23/2024     3:00 PM 5/30/2024     8:00 AM 5/31/2024    12:00 PM 6/13/2024     8:00 AM 6/18/2024     9:00 AM 6/25/2024    12:00 PM 7/1/2024     1:00 PM   ORAL CHEMOTHERAPY   Assessment Type Left Voicemail Initial Follow up;Refill Lab Monitoring Refill Lab Monitoring Lab Monitoring Lab Monitoring   Diagnosis Code Multiple Myeloma Multiple Myeloma Multiple Myeloma Multiple Myeloma Multiple Myeloma Multiple Myeloma Multiple Myeloma   Providers Dr. Scott Chavez   [facility]   Drug Name Pomalyst (pomalidomide) Pomalyst (pomalidomide) Pomalyst (pomalidomide) Pomalyst (pomalidomide) Pomalyst (pomalidomide) Pomalyst (pomalidomide) Pomalyst (pomalidomide)   Dose 3 mg 2 mg 2 mg 2 mg 2 mg 2 mg 2 mg   Current Schedule Daily Daily Daily Daily Daily Daily Daily   Cycle Details 3 weeks on, 1 week off 3 weeks on, 1 week off 3 weeks on, 1 week off 3 weeks on, 1 week off 3 weeks on, 1 week off 3 weeks on, 1 week off 3 weeks on, 1 week off   Start Date of Last Cycle  5/23/2024 6/19/2024      Doses missed in last 2 weeks  1        Adherence Assessment  Adherent        Adverse Effects  Rash   Thrombocytopenia     Rash  Grade 2        Pharmacist Intervention(Rash)  Yes        Intervention(s)  Dose decreased (chemo)        Any new drug interactions?  No        Is the dose as ordered appropriate for the patient?  Yes            Labs:  _  Result Component Current Result Ref Range   Sodium 137 (7/1/2024) 135 - 145 mmol/L     _  Result Component Current Result Ref Range   Potassium 4.2 (7/1/2024) 3.4 - 5.3 mmol/L     _  Result Component Current Result Ref Range   Calcium 9.1 (7/1/2024) 8.8 - 10.2 mg/dL     No results found for Mag within last 30 days.     No results found for Phos within last 30 days.     _  Result  Component Current Result Ref Range   Albumin 4.1 (7/1/2024) 3.5 - 5.2 g/dL     _  Result Component Current Result Ref Range   Urea Nitrogen 18.7 (7/1/2024) 8.0 - 23.0 mg/dL     _  Result Component Current Result Ref Range   Creatinine 0.89 (7/1/2024) 0.51 - 0.95 mg/dL     _  Result Component Current Result Ref Range   AST 19 (7/1/2024) 0 - 45 U/L     _  Result Component Current Result Ref Range   ALT 23 (7/1/2024) 0 - 50 U/L     _  Result Component Current Result Ref Range   Bilirubin Total 0.2 (7/1/2024) <=1.2 mg/dL     _  Result Component Current Result Ref Range   WBC Count 3.7 (L) (7/1/2024) 4.0 - 11.0 10e3/uL     _  Result Component Current Result Ref Range   Hemoglobin 11.5 (L) (7/1/2024) 11.7 - 15.7 g/dL     _  Result Component Current Result Ref Range   Platelet Count 195 (7/1/2024) 150 - 450 10e3/uL     No results found for ANC within last 30 days.     _  Result Component Current Result Ref Range   Absolute Neutrophils 1.7 (7/1/2024) 1.6 - 8.3 10e3/uL        Assessment & Plan:  No concerning abnormalities, continue regimen as directed. Sent Shena a [employer] message today, also responding to her message to us from last week.    Follow-Up:  7/8/24 labs    Berny Diaz, PharmD, BCPS, BCOP  Hematology/Oncology Clinical Pharmacist  [facility] Chem[facility]  [facility]  [phone]

## 2024-07-01 NOTE — NURSING NOTE
"Chief Complaint   Patient presents with    Port Draw     Labs drawn via port by RN.      Labs drawn via port by RN. Port accessed with 20G 3/4\" power needle. Flushed with NS and heparin. De-accessed. Pt tolerated well.     Lizzy Gonzalez RN  "

## 2024-07-02 ENCOUNTER — MEDICAL CORRESPONDENCE (OUTPATIENT)
Dept: TRANSPLANT | Facility: CLINIC | Age: 74
End: 2024-07-02
Payer: MEDICARE

## 2024-07-02 LAB
ALBUMIN SERPL ELPH-MCNC: 4 G/DL (ref 3.7–5.1)
ALPHA1 GLOB SERPL ELPH-MCNC: 0.2 G/DL (ref 0.2–0.4)
ALPHA2 GLOB SERPL ELPH-MCNC: 0.6 G/DL (ref 0.5–0.9)
B-GLOBULIN SERPL ELPH-MCNC: 0.6 G/DL (ref 0.6–1)
GAMMA GLOB SERPL ELPH-MCNC: 0.7 G/DL (ref 0.7–1.6)
M PROTEIN SERPL ELPH-MCNC: 0.5 G/DL
PATH REPORT.COMMENTS IMP SPEC: ABNORMAL
PROT PATTERN SERPL ELPH-IMP: ABNORMAL

## 2024-07-03 ENCOUNTER — TELEPHONE (OUTPATIENT)
Dept: TRANSPLANT | Facility: CLINIC | Age: 74
End: 2024-07-03
Payer: MEDICARE

## 2024-07-07 LAB
ABO/RH(D): ABNORMAL
ANTIBODY SCREEN: POSITIVE
SPECIMEN EXPIRATION DATE: ABNORMAL

## 2024-07-08 ENCOUNTER — LAB (OUTPATIENT)
Dept: LAB | Facility: CLINIC | Age: 74
End: 2024-07-08
Attending: STUDENT IN AN ORGANIZED HEALTH CARE EDUCATION/TRAINING PROGRAM
Payer: MEDICARE

## 2024-07-08 ENCOUNTER — DOCUMENTATION ONLY (OUTPATIENT)
Dept: TRANSPLANT | Facility: CLINIC | Age: 74
End: 2024-07-08

## 2024-07-08 ENCOUNTER — HOME INFUSION (PRE-WILLOW HOME INFUSION) (OUTPATIENT)
Dept: PHARMACY | Facility: CLINIC | Age: 74
End: 2024-07-08

## 2024-07-08 ENCOUNTER — HOSPITAL ENCOUNTER (OUTPATIENT)
Facility: AMBULATORY SURGERY CENTER | Age: 74
End: 2024-07-08
Attending: PHYSICIAN ASSISTANT
Payer: MEDICARE

## 2024-07-08 ENCOUNTER — APPOINTMENT (OUTPATIENT)
Dept: TRANSPLANT | Facility: CLINIC | Age: 74
End: 2024-07-08
Attending: STUDENT IN AN ORGANIZED HEALTH CARE EDUCATION/TRAINING PROGRAM
Payer: MEDICARE

## 2024-07-08 DIAGNOSIS — R82.71 BACTERIURIA: ICD-10-CM

## 2024-07-08 DIAGNOSIS — C90.00 MULTIPLE MYELOMA NOT HAVING ACHIEVED REMISSION (H): ICD-10-CM

## 2024-07-08 DIAGNOSIS — C90.00 MULTIPLE MYELOMA NOT HAVING ACHIEVED REMISSION (H): Primary | ICD-10-CM

## 2024-07-08 DIAGNOSIS — Z86.2 PERSONAL HISTORY OF DISEASES OF BLOOD AND BLOOD-FORMING ORGANS: ICD-10-CM

## 2024-07-08 DIAGNOSIS — Z01.818 EXAMINATION PRIOR TO CHEMOTHERAPY: ICD-10-CM

## 2024-07-08 DIAGNOSIS — Z01.818 EXAMINATION PRIOR TO CHEMOTHERAPY: Primary | ICD-10-CM

## 2024-07-08 LAB
ALBUMIN SERPL BCG-MCNC: 4.1 G/DL (ref 3.5–5.2)
ALBUMIN SERPL BCG-MCNC: 4.1 G/DL (ref 3.5–5.2)
ALBUMIN UR-MCNC: NEGATIVE MG/DL
ALP SERPL-CCNC: 27 U/L (ref 40–150)
ALP SERPL-CCNC: 59 U/L (ref 40–150)
ALT SERPL W P-5'-P-CCNC: 25 U/L (ref 0–50)
ALT SERPL W P-5'-P-CCNC: 27 U/L (ref 0–50)
ANION GAP SERPL CALCULATED.3IONS-SCNC: 10 MMOL/L (ref 7–15)
ANION GAP SERPL CALCULATED.3IONS-SCNC: 16 MMOL/L (ref 7–15)
ANTIBODY SCREEN, TUBE: NORMAL
APPEARANCE UR: CLEAR
APTT PPP: 42 SECONDS (ref 22–38)
AST SERPL W P-5'-P-CCNC: 22 U/L (ref 0–45)
AST SERPL W P-5'-P-CCNC: 24 U/L (ref 0–45)
B2 MICROGLOB TUMOR MARKER SER-MCNC: 2.2 MG/L
BACTERIA #/AREA URNS HPF: ABNORMAL /HPF
BASOPHILS # BLD AUTO: 0.1 10E3/UL (ref 0–0.2)
BASOPHILS NFR BLD AUTO: 2 %
BILIRUB SERPL-MCNC: 0.2 MG/DL
BILIRUB SERPL-MCNC: 0.3 MG/DL
BILIRUB UR QL STRIP: NEGATIVE
BUN SERPL-MCNC: 16.1 MG/DL (ref 8–23)
BUN SERPL-MCNC: 16.5 MG/DL (ref 8–23)
CALCIUM SERPL-MCNC: 8.8 MG/DL (ref 8.8–10.2)
CALCIUM SERPL-MCNC: ABNORMAL MG/DL
CHLORIDE SERPL-SCNC: 100 MMOL/L (ref 98–107)
CHLORIDE SERPL-SCNC: 97 MMOL/L (ref 98–107)
COLOR UR AUTO: YELLOW
CREAT SERPL-MCNC: 0.78 MG/DL (ref 0.51–0.95)
CREAT SERPL-MCNC: 0.79 MG/DL (ref 0.51–0.95)
CRP SERPL-MCNC: <3 MG/L
DEPRECATED HCO3 PLAS-SCNC: 24 MMOL/L (ref 22–29)
DEPRECATED HCO3 PLAS-SCNC: 25 MMOL/L (ref 22–29)
EBV VCA IGG SER IA-ACNC: 102 U/ML
EBV VCA IGG SER IA-ACNC: POSITIVE
EGFRCR SERPLBLD CKD-EPI 2021: 79 ML/MIN/1.73M2
EGFRCR SERPLBLD CKD-EPI 2021: 80 ML/MIN/1.73M2
EOSINOPHIL # BLD AUTO: 0.3 10E3/UL (ref 0–0.7)
EOSINOPHIL NFR BLD AUTO: 9 %
ERYTHROCYTE [DISTWIDTH] IN BLOOD BY AUTOMATED COUNT: 14.4 % (ref 10–15)
FERRITIN SERPL-MCNC: 15 NG/ML (ref 11–328)
FIBRINOGEN PPP-MCNC: 338 MG/DL (ref 170–490)
GLUCOSE SERPL-MCNC: 102 MG/DL (ref 70–99)
GLUCOSE SERPL-MCNC: 88 MG/DL (ref 70–99)
GLUCOSE UR STRIP-MCNC: NEGATIVE MG/DL
HCT VFR BLD AUTO: 33.8 % (ref 35–47)
HGB BLD-MCNC: 11.4 G/DL (ref 11.7–15.7)
HGB UR QL STRIP: NEGATIVE
HSV1 IGG SERPL QL IA: 4.28 INDEX
HSV1 IGG SERPL QL IA: ABNORMAL
HSV2 IGG SERPL QL IA: 0.03 INDEX
HSV2 IGG SERPL QL IA: ABNORMAL
IGG SERPL-MCNC: 757 MG/DL (ref 610–1616)
IMM GRANULOCYTES # BLD: 0 10E3/UL
IMM GRANULOCYTES NFR BLD: 0 %
INR PPP: 1.08 (ref 0.85–1.15)
KAPPA LC FREE SER-MCNC: 10.54 MG/DL (ref 0.33–1.94)
KAPPA LC FREE/LAMBDA FREE SER NEPH: 24.51 {RATIO} (ref 0.26–1.65)
KETONES UR STRIP-MCNC: NEGATIVE MG/DL
LAMBDA LC FREE SERPL-MCNC: 0.43 MG/DL (ref 0.57–2.63)
LDH SERPL L TO P-CCNC: 154 U/L (ref 0–250)
LEUKOCYTE ESTERASE UR QL STRIP: ABNORMAL
LYMPHOCYTES # BLD AUTO: 1 10E3/UL (ref 0.8–5.3)
LYMPHOCYTES NFR BLD AUTO: 31 %
MAGNESIUM SERPL-MCNC: 2 MG/DL (ref 1.7–2.3)
MAGNESIUM SERPL-MCNC: <0.2 MG/DL (ref 1.7–2.3)
MCH RBC QN AUTO: 33.3 PG (ref 26.5–33)
MCHC RBC AUTO-ENTMCNC: 33.7 G/DL (ref 31.5–36.5)
MCV RBC AUTO: 99 FL (ref 78–100)
MONOCYTES # BLD AUTO: 0.6 10E3/UL (ref 0–1.3)
MONOCYTES NFR BLD AUTO: 19 %
MUCOUS THREADS #/AREA URNS LPF: PRESENT /LPF
NEUTROPHILS # BLD AUTO: 1.2 10E3/UL (ref 1.6–8.3)
NEUTROPHILS NFR BLD AUTO: 39 %
NITRATE UR QL: NEGATIVE
NRBC # BLD AUTO: 0 10E3/UL
NRBC BLD AUTO-RTO: 0 /100
PH UR STRIP: 5.5 [PH] (ref 5–7)
PHOSPHATE SERPL-MCNC: 3.6 MG/DL (ref 2.5–4.5)
PLATELET # BLD AUTO: 159 10E3/UL (ref 150–450)
POTASSIUM SERPL-SCNC: 4.2 MMOL/L (ref 3.4–5.3)
POTASSIUM SERPL-SCNC: ABNORMAL MMOL/L
PROT SERPL-MCNC: 6.3 G/DL (ref 6.4–8.3)
PROT SERPL-MCNC: 6.3 G/DL (ref 6.4–8.3)
RBC # BLD AUTO: 3.42 10E6/UL (ref 3.8–5.2)
RBC URINE: 1 /HPF
SODIUM SERPL-SCNC: 135 MMOL/L (ref 135–145)
SODIUM SERPL-SCNC: 137 MMOL/L (ref 135–145)
SP GR UR STRIP: 1.01 (ref 1–1.03)
SPECIMEN EXPIRATION DATE: NORMAL
TOTAL PROTEIN SERUM FOR ELP: 6.2 G/DL (ref 6.4–8.3)
URATE SERPL-MCNC: 3.1 MG/DL (ref 2.4–5.7)
UROBILINOGEN UR STRIP-MCNC: NORMAL MG/DL
WBC # BLD AUTO: 3 10E3/UL (ref 4–11)
WBC URINE: 5 /HPF

## 2024-07-08 PROCEDURE — 86777 TOXOPLASMA ANTIBODY: CPT

## 2024-07-08 PROCEDURE — 84155 ASSAY OF PROTEIN SERUM: CPT

## 2024-07-08 PROCEDURE — 83020 HEMOGLOBIN ELECTROPHORESIS: CPT

## 2024-07-08 PROCEDURE — 36591 DRAW BLOOD OFF VENOUS DEVICE: CPT

## 2024-07-08 PROCEDURE — 86695 HERPES SIMPLEX TYPE 1 TEST: CPT

## 2024-07-08 PROCEDURE — 250N000011 HC RX IP 250 OP 636: Performed by: STUDENT IN AN ORGANIZED HEALTH CARE EDUCATION/TRAINING PROGRAM

## 2024-07-08 PROCEDURE — 84550 ASSAY OF BLOOD/URIC ACID: CPT

## 2024-07-08 PROCEDURE — 84100 ASSAY OF PHOSPHORUS: CPT

## 2024-07-08 PROCEDURE — 82784 ASSAY IGA/IGD/IGG/IGM EACH: CPT

## 2024-07-08 PROCEDURE — 82040 ASSAY OF SERUM ALBUMIN: CPT

## 2024-07-08 PROCEDURE — 85610 PROTHROMBIN TIME: CPT

## 2024-07-08 PROCEDURE — 87516 HEPATITIS B DNA AMP PROBE: CPT

## 2024-07-08 PROCEDURE — 85730 THROMBOPLASTIN TIME PARTIAL: CPT

## 2024-07-08 PROCEDURE — 86140 C-REACTIVE PROTEIN: CPT

## 2024-07-08 PROCEDURE — 83735 ASSAY OF MAGNESIUM: CPT

## 2024-07-08 PROCEDURE — 84165 PROTEIN E-PHORESIS SERUM: CPT | Mod: TC | Performed by: STUDENT IN AN ORGANIZED HEALTH CARE EDUCATION/TRAINING PROGRAM

## 2024-07-08 PROCEDURE — 84165 PROTEIN E-PHORESIS SERUM: CPT | Mod: 26 | Performed by: STUDENT IN AN ORGANIZED HEALTH CARE EDUCATION/TRAINING PROGRAM

## 2024-07-08 PROCEDURE — 83615 LACTATE (LD) (LDH) ENZYME: CPT

## 2024-07-08 PROCEDURE — 86334 IMMUNOFIX E-PHORESIS SERUM: CPT | Performed by: STUDENT IN AN ORGANIZED HEALTH CARE EDUCATION/TRAINING PROGRAM

## 2024-07-08 PROCEDURE — 82232 ASSAY OF BETA-2 PROTEIN: CPT

## 2024-07-08 PROCEDURE — 87086 URINE CULTURE/COLONY COUNT: CPT

## 2024-07-08 PROCEDURE — 83735 ASSAY OF MAGNESIUM: CPT | Mod: 91

## 2024-07-08 PROCEDURE — 84450 TRANSFERASE (AST) (SGOT): CPT

## 2024-07-08 PROCEDURE — 86665 EPSTEIN-BARR CAPSID VCA: CPT

## 2024-07-08 PROCEDURE — 86900 BLOOD TYPING SEROLOGIC ABO: CPT

## 2024-07-08 PROCEDURE — 86753 PROTOZOA ANTIBODY NOS: CPT

## 2024-07-08 PROCEDURE — 84155 ASSAY OF PROTEIN SERUM: CPT | Mod: 91

## 2024-07-08 PROCEDURE — 83521 IG LIGHT CHAINS FREE EACH: CPT | Mod: 59

## 2024-07-08 PROCEDURE — 81001 URINALYSIS AUTO W/SCOPE: CPT

## 2024-07-08 PROCEDURE — 84075 ASSAY ALKALINE PHOSPHATASE: CPT

## 2024-07-08 PROCEDURE — 85025 COMPLETE CBC W/AUTO DIFF WBC: CPT

## 2024-07-08 PROCEDURE — 82728 ASSAY OF FERRITIN: CPT

## 2024-07-08 PROCEDURE — 85384 FIBRINOGEN ACTIVITY: CPT

## 2024-07-08 PROCEDURE — 86334 IMMUNOFIX E-PHORESIS SERUM: CPT | Mod: 26 | Performed by: STUDENT IN AN ORGANIZED HEALTH CARE EDUCATION/TRAINING PROGRAM

## 2024-07-08 RX ORDER — HEPARIN SODIUM (PORCINE) LOCK FLUSH IV SOLN 100 UNIT/ML 100 UNIT/ML
5 SOLUTION INTRAVENOUS ONCE
Status: COMPLETED | OUTPATIENT
Start: 2024-07-08 | End: 2024-07-08

## 2024-07-08 RX ORDER — HEPARIN SODIUM (PORCINE) LOCK FLUSH IV SOLN 100 UNIT/ML 100 UNIT/ML
500 SOLUTION INTRAVENOUS ONCE
Status: COMPLETED | OUTPATIENT
Start: 2024-07-08 | End: 2024-07-08

## 2024-07-08 RX ADMIN — Medication 500 UNITS: at 08:18

## 2024-07-08 RX ADMIN — Medication 5 ML: at 15:02

## 2024-07-08 NOTE — NURSING NOTE
"Chief Complaint   Patient presents with    Port Draw     Labs drawn via port by RN in lab       Port accessed with 20 gauge 3/4\" Power needle by RN, labs collected, line flushed with saline and heparin, then de-accessed.      Audrey Hammonds RN    "

## 2024-07-08 NOTE — PROGRESS NOTES
Notified of numerous critical labs by lab staff. Upon review, labs are extremely out of range from previous labs, suspect contamination vs lab draw error. Spoke with RNCC who will have the labs reordered and redrawn this afternoon.    Judi Hills, CNP  Vocera or Pager [phone]

## 2024-07-08 NOTE — PROGRESS NOTES
BMT Teaching Flowsheet    Shena Hartmann is a 73 year old female  There were no encounter diagnoses.    Teaching Topic: [id number]    Person(s) involved in teaching: Patient, Spouse    Motivation Level  Asks Questions: Yes  Eager to Learn: Yes  Cooperative: Yes  Receptive (willing/able to accept information): Yes  Any cultural factors/Jain beliefs that may influence understanding or compliance? No    Patient demonstrates understanding of the following:   Reason for the appointment, diagnosis and treatment plan: Yes  Knowledge of proper use of medications and conditions for which they are ordered (with special attention to potential side effects or drug interactions): Yes  Which situations necessitate calling provider and whom to contact: Yes  Proper use and care of (medical equipment, care aids, etc.) Yes  Pain management techniques: Yes  How and/when to access community resources: Yes    Teaching/ learning concerns addressed: None    Infection Control:  Patient instructed on hand hygiene: Yes  Signs and symptoms of infection taught: Yes    Instructional Materials Used/Given:   Patient was given and reviewed BMT Teaching Binder, including medication pamphlets, sample treatment calendars, consents, contact phone numbers, hospital and discharge guidelines.  Patient verbalizes understanding of the material and was encouraged to call with any additional questions.    For CAR-T patient: Product specific wallet card was given. Patient instructed to remain within close proximity of facility (within 30-40 minutes per program standard) for at least four weeks post infusion; Must remain within 2 hours of facility until 8 weeks post-infusion. CAR-T patient is instructed not to operate motorized vehicle or heavy machinery for a period of 8 weeks.    Time spent with patient: 60 minutes.  Specific Concerns: NA

## 2024-07-08 NOTE — PROGRESS NOTES
Therapy: [facility]  Insurance: Medicare + BCBS Supplement     This Patient does not have Line Care Coverage in the home. Pt would have coverage for short term TCU or IC. Pt must agree to self-pay to continue with [facility]. Drugs must go to part D and Pt will be responsible for any copays. (TPA is NOT covered by part D. Pt must go to IC for coverage.) Per [employer] supplies will be Selfpay at $30.00 perr day.. If pt is NOT homebound, I can do nursing for $90.00 per visit.     In Reference to BMT Clinic to check Line[facility] Coverage.   Please contact Intake with any questions, [phone] or In Basket pool, [facility] ([phone]).

## 2024-07-08 NOTE — LETTER
7/8/2024      Shena Hartmann  [address] [place] [address]      Dear Colleague,    Thank you for referring your patient, Shena Hartmann, to the [facility] BLOOD AND MARROW TRANSPLANT PROGRAM [place]. Please see a copy of my visit note below.    BMT Teaching Flowsheet    Shena Hartmann is a 73 year old female  There were no encounter diagnoses.    Teaching Topic: [id number]    Person(s) involved in teaching: Patient, Spouse    Motivation Level  Asks Questions: Yes  Eager to Learn: Yes  Cooperative: Yes  Receptive (willing/able to accept information): Yes  Any cultural factors/Uatsdin beliefs that may influence understanding or compliance? No    Patient demonstrates understanding of the following:   Reason for the appointment, diagnosis and treatment plan: Yes  Knowledge of proper use of medications and conditions for which they are ordered (with special attention to potential side effects or drug interactions): Yes  Which situations necessitate calling provider and whom to contact: Yes  Proper use and care of (medical equipment, care aids, etc.) Yes  Pain management techniques: Yes  How and/when to access community resources: Yes    Teaching/ learning concerns addressed: None    Infection Control:  Patient instructed on hand hygiene: Yes  Signs and symptoms of infection taught: Yes    Instructional Materials Used/Given:   Patient was given and reviewed BMT Teaching Binder, including medication pamphlets, sample treatment calendars, consents, contact phone numbers, hospital and discharge guidelines.  Patient verbalizes understanding of the material and was encouraged to call with any additional questions.    For CAR-T patient: Product specific wallet card was given. Patient instructed to remain within close proximity of facility (within 30-40 minutes per program standard) for at least four weeks post infusion; Must remain within 2 hours of facility until 8 weeks post-infusion. CAR-T  patient is instructed not to operate motorized vehicle or heavy machinery for a period of 8 weeks.    Time spent with patient: 60 minutes.  Specific Concerns: NA      Again, thank you for allowing me to participate in the care of your patient.        Sincerely,        Ct Velasquez RN

## 2024-07-08 NOTE — NURSING NOTE
Chief Complaint   Patient presents with    Blood Draw     Port blood draw with heparin flush by lab RN     Port blood draw with heparin flush by lab RN. Appointments arrived.    Nadia Thomas RN

## 2024-07-09 ENCOUNTER — ALLIED HEALTH/NURSE VISIT (OUTPATIENT)
Dept: TRANSPLANT | Facility: CLINIC | Age: 74
End: 2024-07-09
Payer: MEDICARE

## 2024-07-09 ENCOUNTER — OFFICE VISIT (OUTPATIENT)
Dept: TRANSPLANT | Facility: CLINIC | Age: 74
End: 2024-07-09
Payer: MEDICARE

## 2024-07-09 ENCOUNTER — DOCUMENTATION ONLY (OUTPATIENT)
Dept: TRANSPLANT | Facility: CLINIC | Age: 74
End: 2024-07-09

## 2024-07-09 ENCOUNTER — MYC MEDICAL ADVICE (OUTPATIENT)
Dept: ONCOLOGY | Facility: CLINIC | Age: 74
End: 2024-07-09
Payer: MEDICARE

## 2024-07-09 ENCOUNTER — LAB (OUTPATIENT)
Dept: LAB | Facility: CLINIC | Age: 74
End: 2024-07-09
Payer: MEDICARE

## 2024-07-09 DIAGNOSIS — Z71.9 VISIT FOR COUNSELING: Primary | ICD-10-CM

## 2024-07-09 DIAGNOSIS — C90.00 MULTIPLE MYELOMA NOT HAVING ACHIEVED REMISSION (H): Primary | ICD-10-CM

## 2024-07-09 DIAGNOSIS — Z01.818 EXAMINATION PRIOR TO CHEMOTHERAPY: ICD-10-CM

## 2024-07-09 DIAGNOSIS — Z94.84 STEM CELLS TRANSPLANT STATUS (H): ICD-10-CM

## 2024-07-09 DIAGNOSIS — Z86.2 PERSONAL HISTORY OF DISEASES OF BLOOD AND BLOOD-FORMING ORGANS: Primary | ICD-10-CM

## 2024-07-09 DIAGNOSIS — C90.00 MULTIPLE MYELOMA NOT HAVING ACHIEVED REMISSION (H): ICD-10-CM

## 2024-07-09 DIAGNOSIS — Z86.2 PERSONAL HISTORY OF DISEASES OF BLOOD AND BLOOD-FORMING ORGANS: ICD-10-CM

## 2024-07-09 LAB
ALBUMIN SERPL ELPH-MCNC: 4 G/DL (ref 3.7–5.1)
ALPHA1 GLOB SERPL ELPH-MCNC: 0.2 G/DL (ref 0.2–0.4)
ALPHA2 GLOB SERPL ELPH-MCNC: 0.6 G/DL (ref 0.5–0.9)
B-GLOBULIN SERPL ELPH-MCNC: 0.6 G/DL (ref 0.6–1)
BACTERIA UR CULT: NORMAL
DONOR CYTOMEGALOVIRUS ABY: POSITIVE
DONOR HEP B CORE ABY: ABNORMAL
DONOR HEP B SURF AGN: ABNORMAL
DONOR HEPATITIS C ABY: ABNORMAL
DONOR HTLV 1&2 ANTIBODY: ABNORMAL
DONOR TREPONEMA PAL ABY: ABNORMAL
GAMMA GLOB SERPL ELPH-MCNC: 0.8 G/DL (ref 0.7–1.6)
HBV DNA SERPL QL NAA+PROBE: NORMAL
HCV RNA SERPL QL NAA+PROBE: NORMAL
HGB S BLD QL: NEGATIVE
HIV1+2 AB SERPL QL IA: ABNORMAL
HIV1+2 RNA SERPL QL NAA+PROBE: NORMAL
M PROTEIN SERPL ELPH-MCNC: 0.5 G/DL
PATH REPORT.COMMENTS IMP SPEC: ABNORMAL
PATH REPORT.COMMENTS IMP SPEC: NORMAL
PROT PATTERN SERPL ELPH-IMP: ABNORMAL
PROT PATTERN SERPL IFE-IMP: NORMAL
T GONDII IGG SER-ACNC: <3 IU/ML
T GONDII IGM SER-ACNC: <3 AU/ML
TRYPANOSOMA CRUZI: ABNORMAL
WNV RNA SERPL DONR QL NAA+PROBE: NORMAL

## 2024-07-09 PROCEDURE — 99215 OFFICE O/P EST HI 40 MIN: CPT

## 2024-07-09 PROCEDURE — 93005 ELECTROCARDIOGRAM TRACING: CPT

## 2024-07-09 PROCEDURE — 93010 ELECTROCARDIOGRAM REPORT: CPT | Mod: GC | Performed by: INTERNAL MEDICINE

## 2024-07-09 RX ORDER — MILK THISTLE 150 MG
500 CAPSULE ORAL DAILY
COMMUNITY

## 2024-07-09 RX ORDER — SACCHAROMYCES BOULARDII 250 MG
250 CAPSULE ORAL DAILY
Status: ON HOLD | COMMUNITY
End: 2024-08-05

## 2024-07-09 ASSESSMENT — ANXIETY QUESTIONNAIRES
GAD7 TOTAL SCORE: 0
6. BECOMING EASILY ANNOYED OR IRRITABLE: NOT AT ALL
3. WORRYING TOO MUCH ABOUT DIFFERENT THINGS: NOT AT ALL
1. FEELING NERVOUS, ANXIOUS, OR ON EDGE: NOT AT ALL
5. BEING SO RESTLESS THAT IT IS HARD TO SIT STILL: NOT AT ALL
7. FEELING AFRAID AS IF SOMETHING AWFUL MIGHT HAPPEN: NOT AT ALL
2. NOT BEING ABLE TO STOP OR CONTROL WORRYING: NOT AT ALL
GAD7 TOTAL SCORE: 0

## 2024-07-09 ASSESSMENT — PATIENT HEALTH QUESTIONNAIRE - PHQ9
SUM OF ALL RESPONSES TO PHQ QUESTIONS 1-9: 1
5. POOR APPETITE OR OVEREATING: NOT AT ALL

## 2024-07-09 NOTE — NURSING NOTE
[facility] HADDAD Frailty assessment completed with patient in clinic. Patient had no questions and showed understanding of what assessment was being done. Paperwork given to provider.    Familia Steele on 7/9/2024 at 10:56 AM

## 2024-07-09 NOTE — PROGRESS NOTES
"Pharmacy Assessment - Pre-Stem Cell Transplant    Assessments & Recommendations:  1) Pharmacy will reserve 2 patient-specific doses of tocilizumab   2) Please follow-up results of 24 hour urine collection. Current CrCL of 56 mL/min is within the threshold for a 20% dose reduction of fludarabine.   3) Patient takes numerous natural products, homeopathic products, and supplements. Unclear how they would interact with her LD chemotherapy and CAR-T. She will discuss continuing vs holding this products at her closing visit. Please see her printed sheet for the most detailed and up to date list of these products.   4) Hold aspirin for PLTs <50. Per patient, started aspirin with her Pomalyst. Could re-assess if aspirin therapy is necessary if patient has completed Pomalyst.   5) Numerous medication allergies. Tendonitis with levofloxacin. Allergies listed to azithromycin (rash) and cephalexin (rash) as well. Per patient and chart review, has tolerated penicillin V in the in past. Has received doses of cefepime in the past as well. Consider cefpodoxime or penicillin V as alternatives for antibacterial prophylaxis.     If this patient is admitted under observation, the patient may bring in their own supply of the following medication for use in the hospital:  1) Clobetasol ointment, desonide ointment, triamcinolone cream   -Per \"Medications Not Supplied by Pharmacy\" policy (available on Mevvy)    History of Present Illness:  Shena Hartmann is a 73 year old female diagnosed with multiple myeloma. She has been treated with numerous prior lines of therapy including bortezomib, HD cyclophosphamide, D-PACE, Auto BMT, daratumumab, and isatuximab with pomalidomide.  She is now being worked up for Carvykti CAR-T therapy on protocol 2017-45G SOC Arm F, which utilizes fludarabine and cyclophosphamide as LD chemotherapy.    Pertinent labs/tests:  Viral Testing:  HSV (+/-) / EBV (+)  Ejection Fraction: ____% (scheduled for " 7/15)  QTc: 439 msec w/ RBBB (7/9)    Weights:   Wt Readings from Last 3 Encounters:   06/10/24 55.2 kg (121 lb 11.2 oz)   05/31/24 54.4 kg (120 lb)   05/22/24 53.1 kg (117 lb 1 oz)   Ideal body weight: 48.5 kg (107 lb)  Adjusted ideal body weight: 51.2 kg (112 lb 14.1 oz)  % IBW:  114%  There is no height or weight on file to calculate BMI.    Primary BMT Physician: Dr. Chavez  BMT RN Coordinator:  Ct Velasquez     Past Medical History:  Past Medical History:   Diagnosis Date    Acquired hypothyroidism 8/10/2023    Breast cancer (H) 01/01/1994    right    H/O stem cell transplant (H) 03/01/2014    History of blood transfusion 2013 & 2014    During stem cell tx process    Hypertension Sept. 2021    Runs 140 s systolically, about 20 above my usual    Multiple myeloma, without mention of having achieved remission 11/06/2012       Medication Allergies:  Allergies   Allergen Reactions    Blood Transfusion Related (Informational Only) Other (See Comments)     Patient has a history of a clinically significant antibody against RBC antigens.  A delay in compatible RBCs may occur.     Cayenne Pepper [Cayenne]     Corn-Containing Products GI Disturbance    Levaquin Other (See Comments)     Tendon pain    Milk Protein GI Disturbance    Mold      Facial rash/lip swelling    Morphine Sulfate Other (See Comments)     Per pt - see things (hallucination) when she was on Morphine .    Pollen Extract      Environmental allergies     Shrimp Nausea and Vomiting    Tomato      Lip swelling, facial rash    Azithromycin Rash    Keflex [Cephalexin] Rash    Oat Rash    Tape [Adhesive Tape] Itching and Rash     All adhesives    Wheat Rash     Swelling of lips     Current Medications (pre-admit):  Current Outpatient Medications   Medication Sig Dispense Refill    MAGNESIUM PO Take 235 mg by mouth 3 times daily      Probiotic Product (PROBIOTIC PO) Take 1 capsule by mouth daily Theralac Pro Probiotic      Quercetin 500 MG CAPS Take 500 mg  by mouth daily      saccharomyces boulardii (SACCHAROMYCIN DF) 250 MG capsule Take 250 mg by mouth daily      acetaminophen (TYLENOL) 325 MG tablet Take 325-650 mg by mouth every 6 hours as needed for mild pain      acyclovir (ZOVIRAX) 400 MG tablet Take 1 tablet (400 mg) by mouth every 12 hours 180 tablet 3    amLODIPine (NORVASC) 2.5 MG tablet Take 5 mg (2 tablets) in the morning. Take 2.5 mg (1 tablet) in the evening.      Ascorbic Acid (VITAMIN C PO) Take 1,000 mg by mouth 2 times daily       aspirin (ASA) 81 MG chewable tablet Take 1 tablet (81 mg) by mouth daily 30 tablet 0    CALCIUM-VITAMIN D-VITAMIN K PO Take 2 tablets by mouth daily.      clobetasol (TEMOVATE) 0.05 % external ointment Apply topically 2 times daily as needed (itching and rash) Topically to rash on hands until resolved 45 g 0    COENZYME Q-10 PO Take 100 mg by mouth daily       Cyanocobalamin 1000 MCG/ML LIQD Take 500 mcg by mouth 2 times daily      desonide (DESOWEN) 0.05 % external ointment Apply topically 2 times daily Apply to face as needed for dermatitis 15 g 11    levothyroxine (SYNTHROID/LEVOTHROID) 50 MCG tablet Take 1 tablet (50 mcg) by mouth daily 90 tablet 0    magnesium 100 MG CAPS Take 100 mg by mouth 3 times daily      Multiple Vitamins-Minerals (MULTIVITAMIN OR) Take 1 tablet by mouth 2 times daily.      ondansetron (ZOFRAN) 4 MG tablet Take 1 tablet (4 mg) by mouth every 8 hours as needed for nausea (Patient not taking: Reported on 6/10/2024) 60 tablet 11    order for DME 6 mastectomy bras  Length of need: 99 months 6 Device 1    order for DME R mastectomy prosthesis   Length of need:  99 months 1 Device 1    pomalidomide (POMALYST) 2 MG capsule Take 1 capsule (2 mg) by mouth daily Swallow whole, do NOT break, crush, chew or open capsule. Take on days 1-21 of repeated 28 day cycles. 21 capsule 0    prochlorperazine (COMPAZINE) 5 MG tablet Take 1 tablet (5 mg) by mouth every 6 hours as needed for nausea or vomiting (Patient  not taking: Reported on 6/10/2024) 30 tablet 11    triamcinolone (KENALOG) 0.1 % external cream Apply topically 2 times daily 30 g 2    triamcinolone (KENALOG) 0.1 % external ointment Apply topically 2 times daily 15 g 11    ZINC PICOLINATE PO Take 30 mg by mouth daily       Herbal Medication/Nutritional Supplements: Numerous. See patient's printed list for further details.     Smoking/Past Drug Use: Did not address     Nausea/Vomiting, Pain, or other issues: Found ondansetron more effective than prochlorperazine     Summary:  I met with Shena Hartmann for approximately 30 minutes.  We reviewed and updated her medication allergies and home medication list. We discussed lymphodepleting regimen (fludarabine/cyclophosphamide), anti-infective prophylaxis (acyclovir, antibacterial ppx, fluconazole, pentamidine), CRS/iCANS, tocilizumab, steroid avoidance, supportive medications (allopurinol, antiemetics), pharmacy expectations.     Fabien Pichardo, PharmD  Heme/Onc/BMT

## 2024-07-09 NOTE — LETTER
7/9/2024      Shena Hartmann  [address] [place] [address]      Dear Colleague,    Thank you for referring your patient, Shena Hartmann, to the [facility] BLOOD AND MARROW TRANSPLANT PROGRAM [place]. Please see a copy of my visit note below.    [facility]  BMTCT OPEN VISIT    July 9, 2024      Shena Hartmann is a 73 year old female undergoing evaluation prior to hematopoietic cell transplant or immune effector cell therapy.    Reason for BMTCT: Carvykti CAR-T    Recent chemotherapy: Bridging chemo using iastuximab and pomalyst    Recent infections: none    Blood thinner use? If yes, why? Yes, baby aspirin    Treatment for diabetes? No    Today, the patient notes the following symptoms:  Review Of Systems  Skin: positive for rash in small areas over body due to pomalyst  Eyes: negative  Ears/Nose/Throat: negative, slight increase in congestion  Respiratory: No shortness of breath, dyspnea on exertion, cough, or hemoptysis  Cardiovascular: negative  Gastrointestinal: bloating with pomalyst- stools are firm- old hemorrhoids, not bleeding  Genitourinary: negative  Musculoskeletal: joint pain, muscular weakness, and muscle cramping in legs- she relates this to pomalyst  Neurologic: numbness or tingling of hands, and numbness or tingling of feet/calves pomalyst  Psychiatric: negative  Hematologic/Lymphatic/Immunologic: hay fever  Endocrine: negative      Shena Hartmann's History    Past Medical History:   Diagnosis Date    Acquired hypothyroidism 8/10/2023    Breast cancer (H) 01/01/1994    right    H/O stem cell transplant (H) 03/01/2014    History of blood transfusion 2013 & 2014    During stem cell tx process    Hypertension Sept. 2021    Runs 140 s systolically, about 20 above my usual    Multiple myeloma, without mention of having achieved remission 11/06/2012       Past Surgical History:   Procedure Laterality Date    BIOPSY  10/1994; [id number]    Lt. Breast in '94; multiple bone  marrow bx's for MM    BREAST SURGERY  10/1994    Lt. Mastectomy w/lymph node resection    COLONOSCOPY  2015    Normal results    EYE SURGERY      Bilateral cataract surgery    INSERT CATHETER VASCULAR ACCESS Left 2024    Procedure: central venous catheter non tunneled insertion, vascular access;  Surgeon: Ric Castaneda PA-C;  Location: [facility] OR    INSERT PORT VASCULAR ACCESS Left 2/15/2023    Procedure: INSERTION, VASCULAR ACCESS PORT;  Surgeon: Luc Antunez MD;  Location: [facility] OR    IR CHEST PORT PLACEMENT > 5 YRS OF AGE  2/15/2023    IR CVC NON TUNNEL LINE REMOVAL  2024    IR CVC NON TUNNEL PLACEMENT > 5 YRS  2024    MASTECTOMY      Right, with lymphe node disection      PICC INSERTION  09/10/2013    5fr DL Power PICC, 44cm (1cm external) in the L lateral brachial vein with tip in the low SVC    TRANSPLANT  3/27/2014    Autologous stem cell tx       Family History   Problem Relation Age of Onset    Cancer Paternal Grandmother         skin cancer    Hypertension Mother     Cerebrovascular Disease Mother          8-11-15 from strokes    Hypertension Father     Prostate Cancer Father        Social History     Tobacco Use    Smoking status: Never     Passive exposure: Never    Smokeless tobacco: Never   Substance Use Topics    Alcohol use: Yes     Comment: occ      fell and hurt his back getting out of bed. Increase in dizziness- working with PT. Daughter is coming from [place] to help with care giving.        Shena Hartmann's Medications and Allergies    Current Outpatient Medications   Medication Sig Dispense Refill    acetaminophen (TYLENOL) 325 MG tablet Take 325-650 mg by mouth every 6 hours as needed for mild pain      acyclovir (ZOVIRAX) 400 MG tablet Take 1 tablet (400 mg) by mouth every 12 hours 180 tablet 3    amLODIPine (NORVASC) 2.5 MG tablet Take 5 mg (2 tablets) in the morning. Take 2.5 mg (1 tablet) in the evening.      Ascorbic Acid (VITAMIN C PO) Take 1,000 mg  by mouth 2 times daily       aspirin (ASA) 81 MG chewable tablet Take 1 tablet (81 mg) by mouth daily 30 tablet 0    CALCIUM-VITAMIN D-VITAMIN K PO Take 2 tablets by mouth daily.      clobetasol (TEMOVATE) 0.05 % external ointment Apply topically 2 times daily as needed (itching and rash) Topically to rash on hands until resolved 45 g 0    COENZYME Q-10 PO Take 100 mg by mouth daily       Cyanocobalamin 1000 MCG/ML LIQD Take 500 mcg by mouth 2 times daily      desonide (DESOWEN) 0.05 % external ointment Apply topically 2 times daily Apply to face as needed for dermatitis 15 g 11    levothyroxine (SYNTHROID/LEVOTHROID) 50 MCG tablet Take 1 tablet (50 mcg) by mouth daily 90 tablet 0    magnesium 100 MG CAPS Take 100 mg by mouth 3 times daily      Multiple Vitamins-Minerals (MULTIVITAMIN OR) Take 1 tablet by mouth 2 times daily.      ondansetron (ZOFRAN) 4 MG tablet Take 1 tablet (4 mg) by mouth every 8 hours as needed for nausea (Patient not taking: Reported on 6/10/2024) 60 tablet 11    order for DME 6 mastectomy bras  Length of need: 99 months 6 Device 1    order for DME R mastectomy prosthesis   Length of need:  99 months 1 Device 1    pomalidomide (POMALYST) 2 MG capsule Take 1 capsule (2 mg) by mouth daily Swallow whole, do NOT break, crush, chew or open capsule. Take on days 1-21 of repeated 28 day cycles. 21 capsule 0    prochlorperazine (COMPAZINE) 5 MG tablet Take 1 tablet (5 mg) by mouth every 6 hours as needed for nausea or vomiting (Patient not taking: Reported on 6/10/2024) 30 tablet 11    triamcinolone (KENALOG) 0.1 % external cream Apply topically 2 times daily 30 g 2    triamcinolone (KENALOG) 0.1 % external ointment Apply topically 2 times daily 15 g 11    ZINC PICOLINATE PO Take 30 mg by mouth daily       No current facility-administered medications for this visit.     Facility-Administered Medications Ordered in Other Visits   Medication Dose Route Frequency Provider Last Rate Last Admin     plerixafor (MOZOBIL) injection SOLN 12.4 mg  0.24 mg/kg Subcutaneous Daily FarhadMae MARINO Lau CNP   12.4 mg at 03/18/14 1550    potassium chloride 20 mEq in 50 mL IVPB  20 mEq Intravenous Once Mae Kay PA-C        potassium chloride SA (K-DUR,KLOR-CON M) CR tablet 20 mEq  20 mEq Oral Once Mae Kay PA-C         Many homeopathic remedies for gut, neuropathy.      Allergies   Allergen Reactions    Blood Transfusion Related (Informational Only) Other (See Comments)     Patient has a history of a clinically significant antibody against RBC antigens.  A delay in compatible RBCs may occur.     Cayenne Pepper [Cayenne]     Corn-Containing Products GI Disturbance    Levaquin Other (See Comments)     Tendon pain    Milk Protein GI Disturbance    Mold      Facial rash/lip swelling    Morphine Sulfate Other (See Comments)     Per pt - see things (hallucination) when she was on Morphine .    Pollen Extract      Environmental allergies     Shrimp Nausea and Vomiting    Tomato      Lip swelling, facial rash    Azithromycin Rash    Keflex [Cephalexin] Rash    Oat Rash    Tape [Adhesive Tape] Itching and Rash     All adhesives    Wheat Rash     Swelling of lips       Physical Examination    LMP  (LMP Unknown)     Exam:  Constitutional: healthy, alert, and no distress  Head: Normocephalic. No masses, lesions, tenderness or abnormalities  ENT: ENT exam normal, no neck nodes or sinus tenderness  Cardiovascular: negative No lifts, heaves, or thrills. RRR. No murmurs, clicks gallops or rub  Respiratory: negative. Good diaphragmatic excursion. Lungs clear  Gastrointestinal: Abdomen soft, non-tender. BS normal. No masses, organomegaly  : Deferred  Musculoskeletal: extremities normal- no gross deformities noted, gait normal, and normal muscle tone  Skin: no suspicious lesions or rashes  Neurologic: Gait normal. Sensation grossly WNL.  Psychiatric: mentation appears normal and affect  normal/bright  Hematologic/Lymphatic/Immunologic: Normal cervical lymph nodes      Frailty Screening  BMT Fried Frailty          7/9/2024    10:05 4/26/2024    14:02   Fried Frailty   Lost>10 pounds unintenionally last year N N   Exhaustion Score 0 0   Slowness Score 0 0   Weakness/ Strength Score 0 1   Low Activity Level Score 0 0   Final Score Not Frail Not Frail   Final Score Number 0 1   Sit Stand Assessment   Patient able to perform 5 chair stands Y Y   Chair Stands in seconds 7 9   Patient is able to perform stand with Feet Side by Side? Y Y   First attempt (in seconds): 10 10   Patient is able to perform Semi-Tandem Stand? Y Y   First attemp (in seconds): 10 10   Patient is able to perform Tandem Stand? Y Y   First attemp (in seconds): 10 10         Overall Assessment    I have reviewed the diagnostic data, medications, frailty screening, and general processes prior to BMTCT.  I have notified the Primary BMT Physician/and or Attending Physician in the clinic of any issues. We also discussed in detail the database and biorepository research for which Shena Hartmann is eligible. We discussed the potential risks and potential benefits of each of these protocols individually. We explained potential alternatives to the protocols discussed. We explained to the patient that participation is voluntary and that consent may be withdrawn at any time.       Consents Signed:   CIBMTR database  N BMTCT Database    Consents not available at time of frailty- will need to be reviewed at close if necessary:   Blood transfusion consent form  Ethnicity form      Present during the discussion was Shena Hartmann. Copies of the signed consent forms will be provided to the patient on admission. No procedures specific to any studies were performed prior to the patient signing the consent form.    Shena Hartmann had the opportunity to ask questions, and I answered all of the questions to the best of my ability.    I spent 50  minutes in the care of this patient today, which included time necessary for preparation for the visit, obtaining history, ordering medications/tests/procedures as medically indicated, review of pertinent medical literature, counseling of the patient, communication of recommendations to the care team, and documentation time.    Judi Hills NP

## 2024-07-09 NOTE — PROGRESS NOTES
"CLINICAL SOCIAL WORK   PSYCHOSOCIAL ASSESSMENT  BLOOD AND MARROW TRANSPLANT SERVICE      Assessment completed on July 9, 2024  of living situation, support system, financial status, functional status, coping, stressors, need for resources and social work intervention provided as needed.  Information for this assessment was provided by Pt and spouse report in addition to medical chart review and consultation with medical team.     Present at assessment: Patient, Shena \"Chun\" Shahbaz  and Sonny Hartmann were present for this assessment conducted by ARCELIA Mondragon .     Diagnosis: Multiple Myeloma (MM)    Date of Diagnosis: 1/2002    Transplant History: Autologous 3/27/2014    Transplant type: CAR-T Carvykti    Donor: Autologous     Physician: Lily Chavez MD    Nurse Coordinator: Ct Velasquez RN    Work-up Nurse Coordinator: Ct Velasquez RN    : JOANNE Mondragon, [facility]     Permanent Address:   [address] [place] [address]    Contact Information:  Pt Cell Phone: [phone]  Pt Email: [email or web address]  Pt's  Sonny Phone: [phone]    Presenting Information:  Shena is a 73 year old female diagnosed with MM who presents for evaluation for an CAR-T cell infusion at the [facility] ([facility]).  Pt was accompanied to today's visit by her  Sonny.     Decision Making:   Self     Health Care Directive:   Copy in Chart     Relationship Status:    to her  Sonny, they shared they will celebrate their 54th anniversary in Aug. Both indicate their relationship as stable and supportive.    Special Lodging Needs: Local Lodging Needed. Shena lives in [place], [place] and does not need to relocate.    Family/Support System: Pt endorsed a good support system including family and close friends who will be available to support Pt throughout transplant process.     Spouse: Sonny Hartmann    Children: Tavia Chang and Radha " Shahbaz (lives in [place], but will be in [place] during this process)    Grandchildren: Saud, Xena, Cynthia (in [place])    Parents:     Siblings: n/a    Friends: some close friends who are supportive    Caregiver: SW discussed with pt the caregiver role and expectation at length. Pt is agreeable to having a full time caregiver for a minimum of 30 days until cleared by the BMT physician. Pt's identified caregivers are his  and daughter Radha. Pt signed the caregiver contract which will be scanned into the EMR. Caregiver education and resources provided. No caregiver concerns identified. Pt and Pt's  confirmed understanding caregiving requirement, including driving restrictions, as discussed during psychosocial assessment.     Name & Numbers  Sonny Hartmann  [phone]  Tavia Hernandez [phone]    Transportation Mode:  Private Car . Pt is aware of driving restrictions post-BMT and the need for the caregiver is to drive until cleared to drive by the  BMT physician. SW provided information on parking info and monthly parking pass options. Pt will utilize the hospital security shuttle for transportation to and from the [facility] and BMT Clinic/Hospital.    Insurance:  No Insurance issues identified.  Pt denied specific insurance concerns at this time. SW reiterated information about the BMT Financial  should specific insurance questions arise as Pt moves through transplant process.     Sources of Income:  No income concerns identified  Shena is supported by savings and [employer] . Pt denied anticipation of financial hardship related to BMT at this time.  SW encouraged Pt to contact this SW for additional potential resources should financial situation change.     Employment:   Employer: [employer]- Retired  Position: RN     Spouse's Employment:  Employer:  [employer]   Position: PROSPER professor    Mental Health: No mental health issues identified       PHQ-9 assessment, score was 1  ",which indicates no current signs of depression.  GAD7 assessment, score was 0, which indicates no current signs of anxiety.    Shena shared that she has no history of anxiety or depression. She shared that she is someone that really focuses on the positive. She has a strong support in meditation and uses this often when she feels she needs to center herself.      We talked about how some patients may see an increase in feelings of anxiety or depression while hospitalized for extended periods along with isolation. Encouraged Shena and Sonny to let us know if they are noticing an increase in symptoms. We talked about the variety of modalities available to use as coping mechanisms (including but not limited to guided imagery, relaxation techniques, progressive muscle relaxation, counseling/talk therapy and medication).    Chemical Use: No issues identified.  Shena denies the use of alcohol, marijuana, tobacco or other drugs.   Based on the information provided, there appear to be no specific risks or concerns identified at this time.     Trauma/Loss/Abuse History: Multiple losses associated with cancer diagnosis and treatment, including health, employment, changes to physical appearance, etc.     Spirituality:  Patient does not identify with [place] community. She shared that she is non-Rastafari and will reach out if she wants to see a olivia.     Coping: Pt noted that she is currently feeling \"positive, prepared, and ready to begin\".  Pt shared that her main coping mechanisms are talking with friends and family, spiritual practice, and reading books.  Pt noted that she also hector by exercise, positive self-talk, meditation/guided imagery, gathering educational information and working on her hobbies. SW and Pt discussed additional positive coping mechanisms that Pt can utilize while in the hospital.     Caregiver Coping: Pt's  Sonny noted that he is feeling \"positive, hopeful and ready to begin\" at this " time.  Sonny noted that he hector by talking with friends and family, spiritual practice, reading books, exercise and meditation.     Education Provided: Transplant process expectations, Caregiver requirements, Caregiver self-care, Financial issues related to transplant, Financial resources/grants available, Common psychosocial stressors pre/post transplant, Support group(s) available, Tour/layout of the inpatient unit/non-use of cell phones, Hospital resources available, Web site information, Resources for transplant patients and their families as well as the Clinical Social Work role.     Interventions Provided: Supportive counseling and education     Recreation/Leisure Activities:  Shena shared that she enjoys being with friends and family, being out in nature, reading, gardening, [place], being [place], cooking and playing cards    Plans for Hospital Stay Leisure:  Shena shared she plans to read, watch movies, maintain her holistic therapy and exercise    Assessment and Recommendations for Team:  Pt is a 73 year old female diagnosed with MM who is here undergoing preparation for a planned CAR-T cell infusion.     Pt is a pleasant and articulate female who feels comfortable communicating with the medical team. Pt is pleasant, calm and able to articulate concerns/coping mechanisms in an appropriate manner. Shena was alert, interactive, and affect was full, they displayed appropriate eye contact, memory and thought processes. Pt has a strong supportive network of family and friends who are involved.     Pt will benefit from ongoing psychosocial support in regards to coping with the adjustment to the BMT process. CSW has discussed  psychosocial support options in regards to coping with the adjustment to the BMT process and support groups opportunities.      Pt has a good support system and a good caregiver plan. Pt verbalizes understanding of the transplant process and wanting to proceed. SW  provided contact information and encouraged Pt to contact SW with questions, concerns, resources and for support. Per this assessment, I did not identify any barriers to this patient moving forward with transplant.        Important Information:   - Shena shared she has lots of dietary restrictions and will likely bring in a lot of her own food. Would benefit from a dietician visit to help support food choices while IP.   - Shena likes to meditate daily.    Follow up Planned:   Psychosocial support    JOANNE Mondragon, LICSW  [facility]  Phone: [phone]  Vocera- BMT SW 3

## 2024-07-09 NOTE — NURSING NOTE
EKG was performed today per order written by Nighat Chavez.  Name and  verified with patient. Patient tolerated well without incident. File transmitted to chart.    Familia Steele on 2024 at 10:56 AM

## 2024-07-09 NOTE — LETTER
"7/9/2024      Shena Hartmann  [address] [place] [address]      Dear Colleague,    Thank you for referring your patient, Shena Hartmann, to the [facility] BLOOD AND MARROW TRANSPLANT PROGRAM [place]. Please see a copy of my visit note below.    Pharmacy Assessment - Pre-Stem Cell Transplant    Assessments & Recommendations:  1) Pharmacy will reserve 2 patient-specific doses of tocilizumab   2) Please follow-up results of 24 hour urine collection. Current CrCL of 56 mL/min is within the threshold for a 20% dose reduction of fludarabine.   3) Patient takes numerous natural products, homeopathic products, and supplements. Unclear how they would interact with her LD chemotherapy and CAR-T. She will discuss continuing vs holding this products at her closing visit. Please see her printed sheet for the most detailed and up to date list of these products.   4) Hold aspirin for PLTs <50. Per patient, started aspirin with her Pomalyst. Could re-assess if aspirin therapy is necessary if patient has completed Pomalyst.   5) Numerous medication allergies. Tendonitis with levofloxacin. Allergies listed to azithromycin (rash) and cephalexin (rash) as well. Per patient and chart review, has tolerated penicillin V in the in past. Has received doses of cefepime in the past as well. Consider cefpodoxime or penicillin V as alternatives for antibacterial prophylaxis.     If this patient is admitted under observation, the patient may bring in their own supply of the following medication for use in the hospital:  1) Clobetasol ointment, desonide ointment, triamcinolone cream   -Per \"Medications Not Supplied by Pharmacy\" policy (available on [employer])    History of Present Illness:  Shena Hartmann is a 73 year old female diagnosed with multiple myeloma. She has been treated with numerous prior lines of therapy including bortezomib, HD cyclophosphamide, D-PACE, Auto BMT, daratumumab, and isatuximab with " pomalidomide.  She is now being worked up for Carvykti CAR-T therapy on protocol 2017-45G SOC Arm F, which utilizes fludarabine and cyclophosphamide as LD chemotherapy.    Pertinent labs/tests:  Viral Testing:  HSV (+/-) / EBV (+)  Ejection Fraction: ____% (scheduled for 7/15)  QTc: 439 msec w/ RBBB (7/9)    Weights:   Wt Readings from Last 3 Encounters:   06/10/24 55.2 kg (121 lb 11.2 oz)   05/31/24 54.4 kg (120 lb)   05/22/24 53.1 kg (117 lb 1 oz)   Ideal body weight: 48.5 kg (107 lb)  Adjusted ideal body weight: 51.2 kg (112 lb 14.1 oz)  % IBW:  114%  There is no height or weight on file to calculate BMI.    Primary BMT Physician: Dr. Chavez  BMT RN Coordinator:  Ct Velasquez     Past Medical History:  Past Medical History:   Diagnosis Date    Acquired hypothyroidism 8/10/2023    Breast cancer (H) 01/01/1994    right    H/O stem cell transplant (H) 03/01/2014    History of blood transfusion 2013 & 2014    During stem cell tx process    Hypertension Sept. 2021    Runs 140 s systolically, about 20 above my usual    Multiple myeloma, without mention of having achieved remission 11/06/2012       Medication Allergies:  Allergies   Allergen Reactions    Blood Transfusion Related (Informational Only) Other (See Comments)     Patient has a history of a clinically significant antibody against RBC antigens.  A delay in compatible RBCs may occur.     Cayenne Pepper [Cayenne]     Corn-Containing Products GI Disturbance    Levaquin Other (See Comments)     Tendon pain    Milk Protein GI Disturbance    Mold      Facial rash/lip swelling    Morphine Sulfate Other (See Comments)     Per pt - see things (hallucination) when she was on Morphine .    Pollen Extract      Environmental allergies     Shrimp Nausea and Vomiting    Tomato      Lip swelling, facial rash    Azithromycin Rash    Keflex [Cephalexin] Rash    Oat Rash    Tape [Adhesive Tape] Itching and Rash     All adhesives    Wheat Rash     Swelling of lips     Current  Medications (pre-admit):  Current Outpatient Medications   Medication Sig Dispense Refill    MAGNESIUM PO Take 235 mg by mouth 3 times daily      Probiotic Product (PROBIOTIC PO) Take 1 capsule by mouth daily Theralac Pro Probiotic      Quercetin 500 MG CAPS Take 500 mg by mouth daily      saccharomyces boulardii (SACCHAROMYCIN DF) 250 MG capsule Take 250 mg by mouth daily      acetaminophen (TYLENOL) 325 MG tablet Take 325-650 mg by mouth every 6 hours as needed for mild pain      acyclovir (ZOVIRAX) 400 MG tablet Take 1 tablet (400 mg) by mouth every 12 hours 180 tablet 3    amLODIPine (NORVASC) 2.5 MG tablet Take 5 mg (2 tablets) in the morning. Take 2.5 mg (1 tablet) in the evening.      Ascorbic Acid (VITAMIN C PO) Take 1,000 mg by mouth 2 times daily       aspirin (ASA) 81 MG chewable tablet Take 1 tablet (81 mg) by mouth daily 30 tablet 0    CALCIUM-VITAMIN D-VITAMIN K PO Take 2 tablets by mouth daily.      clobetasol (TEMOVATE) 0.05 % external ointment Apply topically 2 times daily as needed (itching and rash) Topically to rash on hands until resolved 45 g 0    COENZYME Q-10 PO Take 100 mg by mouth daily       Cyanocobalamin 1000 MCG/ML LIQD Take 500 mcg by mouth 2 times daily      desonide (DESOWEN) 0.05 % external ointment Apply topically 2 times daily Apply to face as needed for dermatitis 15 g 11    levothyroxine (SYNTHROID/LEVOTHROID) 50 MCG tablet Take 1 tablet (50 mcg) by mouth daily 90 tablet 0    magnesium 100 MG CAPS Take 100 mg by mouth 3 times daily      Multiple Vitamins-Minerals (MULTIVITAMIN OR) Take 1 tablet by mouth 2 times daily.      ondansetron (ZOFRAN) 4 MG tablet Take 1 tablet (4 mg) by mouth every 8 hours as needed for nausea (Patient not taking: Reported on 6/10/2024) 60 tablet 11    order for DME 6 mastectomy bras  Length of need: 99 months 6 Device 1    order for DME R mastectomy prosthesis   Length of need:  99 months 1 Device 1    pomalidomide (POMALYST) 2 MG capsule Take 1  capsule (2 mg) by mouth daily Swallow whole, do NOT break, crush, chew or open capsule. Take on days 1-21 of repeated 28 day cycles. 21 capsule 0    prochlorperazine (COMPAZINE) 5 MG tablet Take 1 tablet (5 mg) by mouth every 6 hours as needed for nausea or vomiting (Patient not taking: Reported on 6/10/2024) 30 tablet 11    triamcinolone (KENALOG) 0.1 % external cream Apply topically 2 times daily 30 g 2    triamcinolone (KENALOG) 0.1 % external ointment Apply topically 2 times daily 15 g 11    ZINC PICOLINATE PO Take 30 mg by mouth daily       Herbal Medication/Nutritional Supplements: Numerous. See patient's printed list for further details.     Smoking/Past Drug Use: Did not address     Nausea/Vomiting, Pain, or other issues: Found ondansetron more effective than prochlorperazine     Summary:  I met with Shena Hartmann for approximately 30 minutes.  We reviewed and updated her medication allergies and home medication list. We discussed lymphodepleting regimen (fludarabine/cyclophosphamide), anti-infective prophylaxis (acyclovir, antibacterial ppx, fluconazole, pentamidine), CRS/iCANS, tocilizumab, steroid avoidance, supportive medications (allopurinol, antiemetics), pharmacy expectations.     Fabien Pichardo, PharmD  Heme/Onc/BMT

## 2024-07-09 NOTE — ORAL ONC MGMT
Oral Chemotherapy Monitoring Program  Lab Follow Up    Reviewed lab results from 7/8/24.        5/30/2024     8:00 AM 5/31/2024    12:00 PM 6/13/2024     8:00 AM 6/18/2024     9:00 AM 6/25/2024    12:00 PM 7/1/2024     1:00 PM 7/9/2024    10:00 AM   ORAL CHEMOTHERAPY   Assessment Type Initial Follow up;Refill Lab Monitoring Refill Lab Monitoring Lab Monitoring Lab Monitoring Lab Monitoring   Diagnosis Code Multiple Myeloma Multiple Myeloma Multiple Myeloma Multiple Myeloma Multiple Myeloma Multiple Myeloma Multiple Myeloma   Providers Dr. Scott Chavez   [facility]   Is this patient followed by the [facility] OC team?       No   Drug Name Pomalyst (pomalidomide) Pomalyst (pomalidomide) Pomalyst (pomalidomide) Pomalyst (pomalidomide) Pomalyst (pomalidomide) Pomalyst (pomalidomide) Pomalyst (pomalidomide)   Dose 2 mg 2 mg 2 mg 2 mg 2 mg 2 mg 2 mg   Current Schedule Daily Daily Daily Daily Daily Daily Daily   Cycle Details 3 weeks on, 1 week off 3 weeks on, 1 week off 3 weeks on, 1 week off 3 weeks on, 1 week off 3 weeks on, 1 week off 3 weeks on, 1 week off 3 weeks on, 1 week off   Start Date of Last Cycle 5/23/2024 6/19/2024       Doses missed in last 2 weeks 1         Adherence Assessment Adherent         Adverse Effects Rash   Thrombocytopenia      Rash Grade 2         Pharmacist Intervention(Rash) Yes         Intervention(s) Dose decreased (chemo)         Any new drug interactions? No         Is the dose as ordered appropriate for the patient? Yes             Labs:  _  Result Component Current Result Ref Range   Sodium 135 (7/8/2024) 135 - 145 mmol/L     _  Result Component Current Result Ref Range   Potassium 4.2 (7/8/2024) 3.4 - 5.3 mmol/L     _  Result Component Current Result Ref Range   Calcium 8.8 (7/8/2024) 8.8 - 10.2 mg/dL     _  Result Component Current Result Ref Range   Magnesium 2.0  (7/8/2024) 1.7 - 2.3 mg/dL     _  Result Component Current Result Ref Range   Phosphorus 3.6 (7/8/2024) 2.5 - 4.5 mg/dL     _  Result Component Current Result Ref Range   Albumin 4.1 (7/8/2024) 3.5 - 5.2 g/dL     _  Result Component Current Result Ref Range   Urea Nitrogen 16.5 (7/8/2024) 8.0 - 23.0 mg/dL     _  Result Component Current Result Ref Range   Creatinine 0.78 (7/8/2024) 0.51 - 0.95 mg/dL     _  Result Component Current Result Ref Range   AST 22 (7/8/2024) 0 - 45 U/L     _  Result Component Current Result Ref Range   ALT 27 (7/8/2024) 0 - 50 U/L     _  Result Component Current Result Ref Range   Bilirubin Total 0.2 (7/8/2024) <=1.2 mg/dL     _  Result Component Current Result Ref Range   WBC Count 3.0 (L) (7/8/2024) 4.0 - 11.0 10e3/uL     _  Result Component Current Result Ref Range   Hemoglobin 11.4 (L) (7/8/2024) 11.7 - 15.7 g/dL     _  Result Component Current Result Ref Range   Platelet Count 159 (7/8/2024) 150 - 450 10e3/uL     No results found for ANC within last 30 days.     _  Result Component Current Result Ref Range   Absolute Neutrophils 1.2 (L) (7/8/2024) 1.6 - 8.3 10e3/uL        Assessment & Plan:  No concerning abnormalities. BarkBox message sent to patient.    Follow-Up:  CAR-T work up next week.    Tyra Moreno, PharmD, BCPS  Hematology/Oncology Clinical Pharmacist  Oral Chemotherapy Monitoring Program  [facility]  [phone]

## 2024-07-09 NOTE — PROGRESS NOTES
[facility]  BMTCT OPEN VISIT    July 9, 2024      Shena Hartmann is a 73 year old female undergoing evaluation prior to hematopoietic cell transplant or immune effector cell therapy.    Reason for BMTCT: Carvykti CAR-T    Recent chemotherapy: Bridging chemo using iastuximab and pomalyst    Recent infections: none    Blood thinner use? If yes, why? Yes, baby aspirin    Treatment for diabetes? No    Today, the patient notes the following symptoms:  Review Of Systems  Skin: positive for rash in small areas over body due to pomalyst  Eyes: negative  Ears/Nose/Throat: negative, slight increase in congestion  Respiratory: No shortness of breath, dyspnea on exertion, cough, or hemoptysis  Cardiovascular: negative  Gastrointestinal: bloating with pomalyst- stools are firm- old hemorrhoids, not bleeding  Genitourinary: negative  Musculoskeletal: joint pain, muscular weakness, and muscle cramping in legs- she relates this to pomalyst  Neurologic: numbness or tingling of hands, and numbness or tingling of feet/calves pomalyst  Psychiatric: negative  Hematologic/Lymphatic/Immunologic: hay fever  Endocrine: negative      Shena Hartmann's History    Past Medical History:   Diagnosis Date    Acquired hypothyroidism 8/10/2023    Breast cancer (H) 01/01/1994    right    H/O stem cell transplant (H) 03/01/2014    History of blood transfusion 2013 & 2014    During stem cell tx process    Hypertension Sept. 2021    Runs 140 s systolically, about 20 above my usual    Multiple myeloma, without mention of having achieved remission 11/06/2012       Past Surgical History:   Procedure Laterality Date    BIOPSY  10/1994; [id number]    Lt. Breast in '94; multiple bone marrow bx's for MM    BREAST SURGERY  10/1994    Lt. Mastectomy w/lymph node resection    COLONOSCOPY  11/2015    Normal results    EYE SURGERY  2020    Bilateral cataract surgery    INSERT CATHETER VASCULAR ACCESS Left 5/22/2024    Procedure: central venous catheter non  tunneled insertion, vascular access;  Surgeon: Ric Castaneda PA-C;  Location: [facility] OR    INSERT PORT VASCULAR ACCESS Left 2/15/2023    Procedure: INSERTION, VASCULAR ACCESS PORT;  Surgeon: Luc Antunez MD;  Location: [facility] OR    IR CHEST PORT PLACEMENT > 5 YRS OF AGE  2/15/2023    IR CVC NON TUNNEL LINE REMOVAL  2024    IR CVC NON TUNNEL PLACEMENT > 5 YRS  2024    MASTECTOMY      Right, with lymphe node disection      PICC INSERTION  09/10/2013    5fr DL Power PICC, 44cm (1cm external) in the L lateral brachial vein with tip in the low SVC    TRANSPLANT  3/27/2014    Autologous stem cell tx       Family History   Problem Relation Age of Onset    Cancer Paternal Grandmother         skin cancer    Hypertension Mother     Cerebrovascular Disease Mother          8-11-15 from strokes    Hypertension Father     Prostate Cancer Father        Social History     Tobacco Use    Smoking status: Never     Passive exposure: Never    Smokeless tobacco: Never   Substance Use Topics    Alcohol use: Yes     Comment: occ      fell and hurt his back getting out of bed. Increase in dizziness- working with PT. Daughter is coming from [place] to help with care giving.        Shena Hartmann's Medications and Allergies    Current Outpatient Medications   Medication Sig Dispense Refill    acetaminophen (TYLENOL) 325 MG tablet Take 325-650 mg by mouth every 6 hours as needed for mild pain      acyclovir (ZOVIRAX) 400 MG tablet Take 1 tablet (400 mg) by mouth every 12 hours 180 tablet 3    amLODIPine (NORVASC) 2.5 MG tablet Take 5 mg (2 tablets) in the morning. Take 2.5 mg (1 tablet) in the evening.      Ascorbic Acid (VITAMIN C PO) Take 1,000 mg by mouth 2 times daily       aspirin (ASA) 81 MG chewable tablet Take 1 tablet (81 mg) by mouth daily 30 tablet 0    CALCIUM-VITAMIN D-VITAMIN K PO Take 2 tablets by mouth daily.      clobetasol (TEMOVATE) 0.05 % external ointment Apply topically 2 times daily as needed  (itching and rash) Topically to rash on hands until resolved 45 g 0    COENZYME Q-10 PO Take 100 mg by mouth daily       Cyanocobalamin 1000 MCG/ML LIQD Take 500 mcg by mouth 2 times daily      desonide (DESOWEN) 0.05 % external ointment Apply topically 2 times daily Apply to face as needed for dermatitis 15 g 11    levothyroxine (SYNTHROID/LEVOTHROID) 50 MCG tablet Take 1 tablet (50 mcg) by mouth daily 90 tablet 0    magnesium 100 MG CAPS Take 100 mg by mouth 3 times daily      Multiple Vitamins-Minerals (MULTIVITAMIN OR) Take 1 tablet by mouth 2 times daily.      ondansetron (ZOFRAN) 4 MG tablet Take 1 tablet (4 mg) by mouth every 8 hours as needed for nausea (Patient not taking: Reported on 6/10/2024) 60 tablet 11    order for DME 6 mastectomy bras  Length of need: 99 months 6 Device 1    order for DME R mastectomy prosthesis   Length of need:  99 months 1 Device 1    pomalidomide (POMALYST) 2 MG capsule Take 1 capsule (2 mg) by mouth daily Swallow whole, do NOT break, crush, chew or open capsule. Take on days 1-21 of repeated 28 day cycles. 21 capsule 0    prochlorperazine (COMPAZINE) 5 MG tablet Take 1 tablet (5 mg) by mouth every 6 hours as needed for nausea or vomiting (Patient not taking: Reported on 6/10/2024) 30 tablet 11    triamcinolone (KENALOG) 0.1 % external cream Apply topically 2 times daily 30 g 2    triamcinolone (KENALOG) 0.1 % external ointment Apply topically 2 times daily 15 g 11    ZINC PICOLINATE PO Take 30 mg by mouth daily       No current facility-administered medications for this visit.     Facility-Administered Medications Ordered in Other Visits   Medication Dose Route Frequency Provider Last Rate Last Admin    plerixafor (MOZOBIL) injection SOLN 12.4 mg  0.24 mg/kg Subcutaneous Daily Mae Llamas APRN CNP   12.4 mg at 03/18/14 1550    potassium chloride 20 mEq in 50 mL IVPB  20 mEq Intravenous Once Mae Kay PA-C        potassium chloride SA (K-DUR,KLOR-CON M)  CR tablet 20 mEq  20 mEq Oral Once Mae Kay PA-C         Many homeopathic remedies for gut, neuropathy.      Allergies   Allergen Reactions    Blood Transfusion Related (Informational Only) Other (See Comments)     Patient has a history of a clinically significant antibody against RBC antigens.  A delay in compatible RBCs may occur.     Cayenne Pepper [Cayenne]     Corn-Containing Products GI Disturbance    Levaquin Other (See Comments)     Tendon pain    Milk Protein GI Disturbance    Mold      Facial rash/lip swelling    Morphine Sulfate Other (See Comments)     Per pt - see things (hallucination) when she was on Morphine .    Pollen Extract      Environmental allergies     Shrimp Nausea and Vomiting    Tomato      Lip swelling, facial rash    Azithromycin Rash    Keflex [Cephalexin] Rash    Oat Rash    Tape [Adhesive Tape] Itching and Rash     All adhesives    Wheat Rash     Swelling of lips       Physical Examination    LMP  (LMP Unknown)     Exam:  Constitutional: healthy, alert, and no distress  Head: Normocephalic. No masses, lesions, tenderness or abnormalities  ENT: ENT exam normal, no neck nodes or sinus tenderness  Cardiovascular: negative No lifts, heaves, or thrills. RRR. No murmurs, clicks gallops or rub  Respiratory: negative. Good diaphragmatic excursion. Lungs clear  Gastrointestinal: Abdomen soft, non-tender. BS normal. No masses, organomegaly  : Deferred  Musculoskeletal: extremities normal- no gross deformities noted, gait normal, and normal muscle tone  Skin: no suspicious lesions or rashes  Neurologic: Gait normal. Sensation grossly WNL.  Psychiatric: mentation appears normal and affect normal/bright  Hematologic/Lymphatic/Immunologic: Normal cervical lymph nodes      Frailty Screening  BMT Fried Frailty          7/9/2024    10:05 4/26/2024    14:02   Fried Frailty   Lost>10 pounds unintenionally last year N N   Exhaustion Score 0 0   Slowness Score 0 0   Weakness/ Strength  Score 0 1   Low Activity Level Score 0 0   Final Score Not Frail Not Frail   Final Score Number 0 1   Sit Stand Assessment   Patient able to perform 5 chair stands Y Y   Chair Stands in seconds 7 9   Patient is able to perform stand with Feet Side by Side? Y Y   First attempt (in seconds): 10 10   Patient is able to perform Semi-Tandem Stand? Y Y   First attemp (in seconds): 10 10   Patient is able to perform Tandem Stand? Y Y   First attemp (in seconds): 10 10         Overall Assessment    I have reviewed the diagnostic data, medications, frailty screening, and general processes prior to BMTCT.  I have notified the Primary BMT Physician/and or Attending Physician in the clinic of any issues. We also discussed in detail the database and biorepository research for which Shena Hartmann is eligible. We discussed the potential risks and potential benefits of each of these protocols individually. We explained potential alternatives to the protocols discussed. We explained to the patient that participation is voluntary and that consent may be withdrawn at any time.       Consents Signed:   [facility]  N [facility] Database    Consents not available at time of frailty- will need to be reviewed at close if necessary:   Blood transfusion consent form  Ethnicity form      Present during the discussion was Shena Hartmann. Copies of the signed consent forms will be provided to the patient on admission. No procedures specific to any studies were performed prior to the patient signing the consent form.    Shena Hartmann had the opportunity to ask questions, and I answered all of the questions to the best of my ability.    I spent 50 minutes in the care of this patient today, which included time necessary for preparation for the visit, obtaining history, ordering medications/tests/procedures as medically indicated, review of pertinent medical literature, counseling of the patient, communication of recommendations to the care  team, and documentation time.    Judi Hills NP

## 2024-07-10 LAB
ATRIAL RATE - MUSE: 58 BPM
DIASTOLIC BLOOD PRESSURE - MUSE: NORMAL MMHG
INTERPRETATION ECG - MUSE: NORMAL
P AXIS - MUSE: 41 DEGREES
PR INTERVAL - MUSE: 146 MS
QRS DURATION - MUSE: 118 MS
QT - MUSE: 448 MS
QTC - MUSE: 439 MS
R AXIS - MUSE: 21 DEGREES
SYSTOLIC BLOOD PRESSURE - MUSE: NORMAL MMHG
T AXIS - MUSE: 68 DEGREES
VENTRICULAR RATE- MUSE: 58 BPM

## 2024-07-15 ENCOUNTER — OFFICE VISIT (OUTPATIENT)
Dept: TRANSPLANT | Facility: CLINIC | Age: 74
End: 2024-07-15
Payer: MEDICARE

## 2024-07-15 ENCOUNTER — ANCILLARY PROCEDURE (OUTPATIENT)
Dept: CARDIOLOGY | Facility: CLINIC | Age: 74
End: 2024-07-15
Attending: STUDENT IN AN ORGANIZED HEALTH CARE EDUCATION/TRAINING PROGRAM
Payer: MEDICARE

## 2024-07-15 ENCOUNTER — APPOINTMENT (OUTPATIENT)
Dept: LAB | Facility: CLINIC | Age: 74
End: 2024-07-15
Payer: MEDICARE

## 2024-07-15 VITALS
OXYGEN SATURATION: 99 % | RESPIRATION RATE: 16 BRPM | WEIGHT: 122.7 LBS | HEART RATE: 67 BPM | SYSTOLIC BLOOD PRESSURE: 154 MMHG | BODY MASS INDEX: 23.17 KG/M2 | DIASTOLIC BLOOD PRESSURE: 79 MMHG | TEMPERATURE: 98.1 F

## 2024-07-15 DIAGNOSIS — C90.00 MULTIPLE MYELOMA NOT HAVING ACHIEVED REMISSION (H): ICD-10-CM

## 2024-07-15 DIAGNOSIS — Z01.818 EXAMINATION PRIOR TO CHEMOTHERAPY: ICD-10-CM

## 2024-07-15 DIAGNOSIS — Z79.899 ENCOUNTER FOR LONG-TERM (CURRENT) USE OF MEDICATIONS: ICD-10-CM

## 2024-07-15 DIAGNOSIS — Z86.2 PERSONAL HISTORY OF DISEASES OF BLOOD AND BLOOD-FORMING ORGANS: ICD-10-CM

## 2024-07-15 LAB
ACANTHOCYTES BLD QL SMEAR: NORMAL
ALBUMIN SERPL BCG-MCNC: 4.3 G/DL (ref 3.5–5.2)
ALP SERPL-CCNC: 50 U/L (ref 40–150)
ALT SERPL W P-5'-P-CCNC: 24 U/L (ref 0–50)
ANION GAP SERPL CALCULATED.3IONS-SCNC: 9 MMOL/L (ref 7–15)
AST SERPL W P-5'-P-CCNC: 20 U/L (ref 0–45)
AUER BODIES BLD QL SMEAR: NORMAL
BASO STIPL BLD QL SMEAR: NORMAL
BASOPHILS # BLD AUTO: 0 10E3/UL (ref 0–0.2)
BASOPHILS NFR BLD AUTO: 1 %
BILIRUB SERPL-MCNC: 0.3 MG/DL
BITE CELLS BLD QL SMEAR: NORMAL
BLISTER CELLS BLD QL SMEAR: NORMAL
BUN SERPL-MCNC: 16.9 MG/DL (ref 8–23)
BURR CELLS BLD QL SMEAR: NORMAL
CALCIUM SERPL-MCNC: 9.2 MG/DL (ref 8.8–10.2)
CHLORIDE SERPL-SCNC: 99 MMOL/L (ref 98–107)
CREAT SERPL-MCNC: 0.74 MG/DL (ref 0.51–0.95)
DACRYOCYTES BLD QL SMEAR: NORMAL
DEPRECATED HCO3 PLAS-SCNC: 28 MMOL/L (ref 22–29)
EGFRCR SERPLBLD CKD-EPI 2021: 85 ML/MIN/1.73M2
ELLIPTOCYTES BLD QL SMEAR: NORMAL
EOSINOPHIL # BLD AUTO: 0.1 10E3/UL (ref 0–0.7)
EOSINOPHIL NFR BLD AUTO: 2 %
ERYTHROCYTE [DISTWIDTH] IN BLOOD BY AUTOMATED COUNT: 14.6 % (ref 10–15)
FRAGMENTS BLD QL SMEAR: NORMAL
GLUCOSE SERPL-MCNC: 98 MG/DL (ref 70–99)
HCT VFR BLD AUTO: 34.4 % (ref 35–47)
HGB BLD-MCNC: 11.8 G/DL (ref 11.7–15.7)
HGB C CRYSTALS: NORMAL
HOWELL-JOLLY BOD BLD QL SMEAR: NORMAL
IMM GRANULOCYTES # BLD: 0 10E3/UL
IMM GRANULOCYTES NFR BLD: 1 %
LVEF ECHO: NORMAL
LYMPHOCYTES # BLD AUTO: 1.5 10E3/UL (ref 0.8–5.3)
LYMPHOCYTES NFR BLD AUTO: 38 %
MCH RBC QN AUTO: 33.7 PG (ref 26.5–33)
MCHC RBC AUTO-ENTMCNC: 34.3 G/DL (ref 31.5–36.5)
MCV RBC AUTO: 98 FL (ref 78–100)
MONOCYTES # BLD AUTO: 0.8 10E3/UL (ref 0–1.3)
MONOCYTES NFR BLD AUTO: 22 %
NEUTROPHILS # BLD AUTO: 1.4 10E3/UL (ref 1.6–8.3)
NEUTROPHILS NFR BLD AUTO: 36 %
NEUTS HYPERSEG BLD QL SMEAR: NORMAL
NRBC # BLD AUTO: 0 10E3/UL
NRBC BLD AUTO-RTO: 0 /100
PLAT MORPH BLD: NORMAL
PLATELET # BLD AUTO: 187 10E3/UL (ref 150–450)
POLYCHROMASIA BLD QL SMEAR: NORMAL
POTASSIUM SERPL-SCNC: 3.9 MMOL/L (ref 3.4–5.3)
PROT SERPL-MCNC: 6.6 G/DL (ref 6.4–8.3)
RBC # BLD AUTO: 3.5 10E6/UL (ref 3.8–5.2)
RBC AGGLUT BLD QL: NORMAL
RBC MORPH BLD: NORMAL
ROULEAUX BLD QL SMEAR: NORMAL
SICKLE CELLS BLD QL SMEAR: NORMAL
SMUDGE CELLS BLD QL SMEAR: NORMAL
SODIUM SERPL-SCNC: 136 MMOL/L (ref 135–145)
SPHEROCYTES BLD QL SMEAR: NORMAL
STOMATOCYTES BLD QL SMEAR: NORMAL
TARGETS BLD QL SMEAR: NORMAL
TOXIC GRANULES BLD QL SMEAR: NORMAL
VARIANT LYMPHS BLD QL SMEAR: NORMAL
WBC # BLD AUTO: 3.8 10E3/UL (ref 4–11)

## 2024-07-15 PROCEDURE — 38221 DX BONE MARROW BIOPSIES: CPT

## 2024-07-15 PROCEDURE — 82040 ASSAY OF SERUM ALBUMIN: CPT

## 2024-07-15 PROCEDURE — 36591 DRAW BLOOD OFF VENOUS DEVICE: CPT

## 2024-07-15 PROCEDURE — 93356 MYOCRD STRAIN IMG SPCKL TRCK: CPT | Performed by: INTERNAL MEDICINE

## 2024-07-15 PROCEDURE — 85004 AUTOMATED DIFF WBC COUNT: CPT

## 2024-07-15 PROCEDURE — 88342 IMHCHEM/IMCYTCHM 1ST ANTB: CPT | Mod: TC

## 2024-07-15 PROCEDURE — 38222 DX BONE MARROW BX & ASPIR: CPT | Mod: LT

## 2024-07-15 PROCEDURE — 93306 TTE W/DOPPLER COMPLETE: CPT | Performed by: INTERNAL MEDICINE

## 2024-07-15 PROCEDURE — 86335 IMMUNFIX E-PHORSIS/URINE/CSF: CPT | Performed by: PATHOLOGY

## 2024-07-15 PROCEDURE — 81050 URINALYSIS VOLUME MEASURE: CPT | Performed by: PATHOLOGY

## 2024-07-15 PROCEDURE — 88237 TISSUE CULTURE BONE MARROW: CPT

## 2024-07-15 PROCEDURE — 88275 CYTOGENETICS 100-300: CPT

## 2024-07-15 PROCEDURE — 88368 INSITU HYBRIDIZATION MANUAL: CPT | Mod: 26 | Performed by: MEDICAL GENETICS

## 2024-07-15 PROCEDURE — 88188 FLOWCYTOMETRY/READ 9-15: CPT | Performed by: STUDENT IN AN ORGANIZED HEALTH CARE EDUCATION/TRAINING PROGRAM

## 2024-07-15 PROCEDURE — 88305 TISSUE EXAM BY PATHOLOGIST: CPT | Mod: 26 | Performed by: PATHOLOGY

## 2024-07-15 PROCEDURE — 84166 PROTEIN E-PHORESIS/URINE/CSF: CPT | Performed by: PATHOLOGY

## 2024-07-15 PROCEDURE — 88311 DECALCIFY TISSUE: CPT | Mod: TC

## 2024-07-15 PROCEDURE — 88311 DECALCIFY TISSUE: CPT | Mod: 26 | Performed by: PATHOLOGY

## 2024-07-15 PROCEDURE — 99000 SPECIMEN HANDLING OFFICE-LAB: CPT | Performed by: PATHOLOGY

## 2024-07-15 PROCEDURE — 85097 BONE MARROW INTERPRETATION: CPT | Performed by: PATHOLOGY

## 2024-07-15 PROCEDURE — 88341 IMHCHEM/IMCYTCHM EA ADD ANTB: CPT | Mod: 26 | Performed by: PATHOLOGY

## 2024-07-15 PROCEDURE — 38222 DX BONE MARROW BX & ASPIR: CPT

## 2024-07-15 PROCEDURE — 88271 CYTOGENETICS DNA PROBE: CPT

## 2024-07-15 PROCEDURE — 88185 FLOWCYTOMETRY/TC ADD-ON: CPT

## 2024-07-15 PROCEDURE — 88342 IMHCHEM/IMCYTCHM 1ST ANTB: CPT | Mod: 26 | Performed by: PATHOLOGY

## 2024-07-15 ASSESSMENT — PAIN SCALES - GENERAL: PAINLEVEL: MODERATE PAIN (4)

## 2024-07-15 NOTE — LETTER
7/15/2024      Shena Hartmann  [address] [place] [address]      Dear Colleague,    Thank you for referring your patient, Shena Hartmann, to the [facility] BLOOD AND MARROW TRANSPLANT PROGRAM [place]. Please see a copy of my visit note below.    BMT ONC Adult Bone Marrow Biopsy Procedure Note  July 15, 2024  BP (!) 154/79 (BP Location: Left arm, Patient Position: Sitting, Cuff Size: Adult Regular)   Pulse 67   Temp 98.1  F (36.7  C) (Oral)   Resp 16   Wt 55.7 kg (122 lb 11.2 oz)   LMP  (LMP Unknown)   SpO2 99%   BMI 23.17 kg/m       Learning needs assessment complete within 12 months? YES    DIAGNOSIS: MM workup for carvykti     PROCEDURE: Unilateral Bone Marrow Biopsy and Unilateral Aspirate    [place] [facility] [place]    Patient s identification was positively verified by verbal identification and invasive procedure safety checklist was completed. Informed consent was obtained. Following the administration of  nothing per patient request  as pre-medication, patient was placed in the prone position and prepped and draped in a sterile manner. Approximately 15 cc of 1% Lidocaine was used over the left posterior iliac spine. Following this a 3 mm incision was made. Trephine bone marrow core(s) was (were) obtained from the [facility]. Bone marrow aspirates were obtained from the [facility]. Aspirates were sent for morphology, immunophenotyping, cytogenetics, and research studies. A total of approximately 35 ml of marrow was aspirated. Following this procedure a sterile dressing was applied to the bone marrow biopsy site(s). The patient was placed in the supine position to maintain pressure on the biopsy site. Post-procedure wound care instructions were given.     Complications: YES, soft bones required 8g trephine    Pre-procedural pain: 0 out of 10 on the numeric pain rating scale.     Procedural pain: 8 out of 10 on the numeric pain rating scale- with aspirate pull, brief pain.      Post-procedural pain assessment: 0 out of 10 on the numeric pain rating scale.     Interventions: 8g trephine as above    Length of procedure:21 minutes to 45 minutes    Procedure performed by: Judi Hills CNP

## 2024-07-15 NOTE — PROGRESS NOTES
BMT ONC Adult Bone Marrow Biopsy Procedure Note  July 15, 2024  BP (!) 154/79 (BP Location: Left arm, Patient Position: Sitting, Cuff Size: Adult Regular)   Pulse 67   Temp 98.1  F (36.7  C) (Oral)   Resp 16   Wt 55.7 kg (122 lb 11.2 oz)   LMP  (LMP Unknown)   SpO2 99%   BMI 23.17 kg/m       Learning needs assessment complete within 12 months? YES    DIAGNOSIS: MM workup for carvykti     PROCEDURE: Unilateral Bone Marrow Biopsy and Unilateral Aspirate    LOCATION: [facility] [place] [place] 2nd [facility]    Patient s identification was positively verified by verbal identification and invasive procedure safety checklist was completed. Informed consent was obtained. Following the administration of  nothing per patient request  as pre-medication, patient was placed in the prone position and prepped and draped in a sterile manner. Approximately 15 cc of 1% Lidocaine was used over the left posterior iliac spine. Following this a 3 mm incision was made. Trephine bone marrow core(s) was (were) obtained from the [facility]. Bone marrow aspirates were obtained from the [facility]. Aspirates were sent for morphology, immunophenotyping, cytogenetics, and research studies. A total of approximately 35 ml of marrow was aspirated. Following this procedure a sterile dressing was applied to the bone marrow biopsy site(s). The patient was placed in the supine position to maintain pressure on the biopsy site. Post-procedure wound care instructions were given.     Complications: YES, soft bones required 8g trephine    Pre-procedural pain: 0 out of 10 on the numeric pain rating scale.     Procedural pain: 8 out of 10 on the numeric pain rating scale- with aspirate pull, brief pain.     Post-procedural pain assessment: 0 out of 10 on the numeric pain rating scale.     Interventions: 8g trephine as above    Length of procedure:21 minutes to 45 minutes    Procedure performed by: Judi Hills CNP

## 2024-07-15 NOTE — NURSING NOTE
"Oncology Rooming Note    July 15, 2024 10:34 AM   Shena Hartmann is a 73 year old female who presents for:    Chief Complaint   Patient presents with    Bone Marrow Biopsy     HADDAD BMBx, hx MM     Initial Vitals: BP (!) 154/79 (BP Location: Left arm, Patient Position: Sitting, Cuff Size: Adult Regular)   Pulse 67   Temp 98.1  F (36.7  C) (Oral)   Resp 16   Wt 55.7 kg (122 lb 11.2 oz)   LMP  (LMP Unknown)   SpO2 99%   BMI 23.17 kg/m   Estimated body mass index is 23.17 kg/m  as calculated from the following:    Height as of 5/22/24: 1.55 m (5' 1.02\").    Weight as of this encounter: 55.7 kg (122 lb 11.2 oz). Body surface area is 1.55 meters squared.  Moderate Pain (4) Comment: muscle cramps   No LMP recorded (lmp unknown). Patient is postmenopausal.  Allergies reviewed: Yes  Medications reviewed: Yes    Medications: Medication refills not needed today.  Pharmacy name entered into [facility]:    [id number] - [place], [place] - [address] [facility] [address]  WRITTEN PRESCRIPTION REQUESTED  [facility] - [place], [place] - [address]    Frailty Screening:   Is the patient here for a new oncology consult visit in cancer care? 2. No          Angela Cohen RN              "

## 2024-07-15 NOTE — NURSING NOTE
BMBX Teaching and Assessment       Teaching concerns addressed: Bone marrow biopsy and infection prevention.     Person(s) involved in teaching: Patient  Motivation Level  Asks Questions: Yes  Eager to Learn: Yes  Cooperative: Yes  Receptive (willing/able to accept information): Yes    Patient demonstrates understanding of the following:     Reason for the appointment, diagnosis and treatment plan: Yes  Knowledge of proper use of medications and conditions for which they are ordered (with special attention to potential side effects or drug interactions): Yes  Which situations necessitate calling provider and whom to contact: Yes    Teaching concerns addressed:   Reviewed activity restrictions if received premeds, potential for bleeding and actions to take if develops any of the issues below    Pain management techniques: Yes  Patient instructed on hand hygiene: Yes  How and/when to access community resources: Yes    Infection Control:  Patient demonstrates understanding of the following:   Bone marrow procedure site care taught: Yes  Signs and symptoms of infection taught: Yes       Instructional Materials Used/Given: Pt instructed to keep bmbx site clean and dry for 24hrs. Pt educated to monitor site for signs of infection such as redness, rash, oozing, puss, bleeding, pain, and elevated temp. Pt instructed to go to call the [facility] triage line or go to the ER if any signs of infection should occur. Pt educated to not operate machinery if receiving versed. Pt and  verbalize understanding.     Pre-procedure labs drawn via port. Post procedure: Patient vital signs stable, ambulating, site is clean, dry and intact prior to discharge and line removed. Pt discharged with  as .     Angela Cohen RN

## 2024-07-16 LAB
PATH REPORT.COMMENTS IMP SPEC: ABNORMAL
PATH REPORT.COMMENTS IMP SPEC: YES
PATH REPORT.COMMENTS IMP SPEC: YES
PATH REPORT.FINAL DX SPEC: ABNORMAL
PATH REPORT.FINAL DX SPEC: ABNORMAL
PATH REPORT.GROSS SPEC: ABNORMAL
PATH REPORT.MICROSCOPIC SPEC OTHER STN: ABNORMAL
PATH REPORT.RELEVANT HX SPEC: ABNORMAL
PATH REPORT.RELEVANT HX SPEC: ABNORMAL

## 2024-07-17 ENCOUNTER — HOSPITAL ENCOUNTER (OUTPATIENT)
Dept: PET IMAGING | Facility: CLINIC | Age: 74
Discharge: HOME | End: 2024-07-17
Attending: STUDENT IN AN ORGANIZED HEALTH CARE EDUCATION/TRAINING PROGRAM | Admitting: STUDENT IN AN ORGANIZED HEALTH CARE EDUCATION/TRAINING PROGRAM
Payer: MEDICARE

## 2024-07-17 DIAGNOSIS — Z01.818 EXAMINATION PRIOR TO CHEMOTHERAPY: ICD-10-CM

## 2024-07-17 DIAGNOSIS — C90.00 MULTIPLE MYELOMA NOT HAVING ACHIEVED REMISSION (H): ICD-10-CM

## 2024-07-17 DIAGNOSIS — Z86.2 PERSONAL HISTORY OF DISEASES OF BLOOD AND BLOOD-FORMING ORGANS: ICD-10-CM

## 2024-07-17 LAB
ALBUMIN MFR UR ELPH: 23.9 %
ALPHA1 GLOB MFR UR ELPH: 8.4 %
ALPHA2 GLOB MFR UR ELPH: 7.3 %
B-GLOBULIN MFR UR ELPH: 11.4 %
GAMMA GLOB MFR UR ELPH: 49 %
M PROTEIN MFR UR ELPH: 31.9 %
PROT ELPH PNL UR ELPH: NORMAL
PROT PATTERN UR ELPH-IMP: ABNORMAL

## 2024-07-17 PROCEDURE — 78816 PET IMAGE W/CT FULL BODY: CPT | Mod: MG,PS

## 2024-07-17 PROCEDURE — G1010 CDSM STANSON: HCPCS | Performed by: STUDENT IN AN ORGANIZED HEALTH CARE EDUCATION/TRAINING PROGRAM

## 2024-07-17 PROCEDURE — 78816 PET IMAGE W/CT FULL BODY: CPT | Mod: 26 | Performed by: STUDENT IN AN ORGANIZED HEALTH CARE EDUCATION/TRAINING PROGRAM

## 2024-07-17 PROCEDURE — A9552 F18 FDG: HCPCS | Performed by: STUDENT IN AN ORGANIZED HEALTH CARE EDUCATION/TRAINING PROGRAM

## 2024-07-17 PROCEDURE — 343N000001 HC RX 343: Performed by: STUDENT IN AN ORGANIZED HEALTH CARE EDUCATION/TRAINING PROGRAM

## 2024-07-17 RX ORDER — FLUDEOXYGLUCOSE F 18 200 MCI/ML
10-18 INJECTION, SOLUTION INTRAVENOUS ONCE
Status: COMPLETED | OUTPATIENT
Start: 2024-07-17 | End: 2024-07-17

## 2024-07-17 RX ADMIN — FLUDEOXYGLUCOSE F 18 10.04 MILLICURIE: 200 INJECTION, SOLUTION INTRAVENOUS at 14:06

## 2024-07-18 DIAGNOSIS — C90.00 MULTIPLE MYELOMA NOT HAVING ACHIEVED REMISSION (H): Primary | ICD-10-CM

## 2024-07-18 DIAGNOSIS — Z94.84 STEM CELLS TRANSPLANT STATUS (H): ICD-10-CM

## 2024-07-19 ENCOUNTER — OFFICE VISIT (OUTPATIENT)
Dept: TRANSPLANT | Facility: CLINIC | Age: 74
End: 2024-07-19
Attending: STUDENT IN AN ORGANIZED HEALTH CARE EDUCATION/TRAINING PROGRAM
Payer: MEDICARE

## 2024-07-19 VITALS
SYSTOLIC BLOOD PRESSURE: 152 MMHG | WEIGHT: 122.5 LBS | OXYGEN SATURATION: 97 % | DIASTOLIC BLOOD PRESSURE: 81 MMHG | BODY MASS INDEX: 23.13 KG/M2 | RESPIRATION RATE: 16 BRPM | TEMPERATURE: 98.1 F | HEART RATE: 78 BPM

## 2024-07-19 DIAGNOSIS — C90.02 MULTIPLE MYELOMA IN RELAPSE (H): Primary | ICD-10-CM

## 2024-07-19 PROCEDURE — G0463 HOSPITAL OUTPT CLINIC VISIT: HCPCS

## 2024-07-19 PROCEDURE — 99417 PROLNG OP E/M EACH 15 MIN: CPT

## 2024-07-19 PROCEDURE — 99215 OFFICE O/P EST HI 40 MIN: CPT

## 2024-07-19 ASSESSMENT — PAIN SCALES - GENERAL: PAINLEVEL: MODERATE PAIN (5)

## 2024-07-19 NOTE — NURSING NOTE
"Oncology Rooming Note    July 19, 2024 2:38 PM   Shena Hartmann is a 73 year old female who presents for:    Chief Complaint   Patient presents with    Oncology Clinic Visit     Multiple Myeloma      Initial Vitals: BP (!) 152/81 (BP Location: Left arm, Patient Position: Sitting, Cuff Size: Adult Regular)   Pulse 78   Temp 98.1  F (36.7  C) (Oral)   Resp 16   Wt 55.6 kg (122 lb 8 oz)   LMP  (LMP Unknown)   SpO2 97%   BMI 23.13 kg/m   Estimated body mass index is 23.13 kg/m  as calculated from the following:    Height as of 5/22/24: 1.55 m (5' 1.02\").    Weight as of this encounter: 55.6 kg (122 lb 8 oz). Body surface area is 1.55 meters squared.  Moderate Pain (5) Comment: Data Unavailable   No LMP recorded (lmp unknown). Patient is postmenopausal.  Allergies reviewed: Yes  Medications reviewed: Yes    Medications: Medication refills not needed today.  Pharmacy name entered into [email or web address]:    [id number] - [place], [place] - [address] [facility] [address]  WRITTEN PRESCRIPTION REQUESTED  [facility] - [place], [place] - [address]    Frailty Screening:   Is the patient here for a new oncology consult visit in cancer care? 2. No      Clinical concerns:  none      Alison Cervantes              "

## 2024-07-19 NOTE — PROGRESS NOTES
BMT/Cell Therapy Consultation      Shena Hartmann is a 73 year old female for Carvykti      Diagnosis and Treatment Summary     Disease presentation and baseline characteristics:  Breast cancer dx in 1994. Underwent surgery and chemotherapy without reoccurence  MGUS dx 1999  T8 pathologic fracture with radiation  Revlimid and velcade,   2013  Cytoxan IV 9/2013  D-PACE 11/2013  Auto 3/27/14  5/2014 thalidomide caused rash so switched to pomalidomide   on kenalog and clobetasol creams for IMID rash  FLC began to rise so riky added to pom 9/2020  she has been on xgeva for an unclear amount of time  Teodora-Kd 2/20/2023  Kyprolis held 10/23 due to side effects (N/V). Continue Teodora/dex         Treatment Plan: OP ONC Multiple Myeloma Bortezomib (Subcutaneous)  Chemotherapy: bortezomib (VELCADE) 2 mg CHEMOTHERAPY - SUBCUTANEOUS INJECTION  Administration: 2 mg (2/28/2013), 2 mg (3/7/2013), 2 mg (3/14/2013), 2 mg (3/21/2013), 2 mg (3/28/2013), 2 mg (4/4/2013), 2 mg (4/11/2013), 2 mg (4/18/2013), 2 mg (4/25/2013), 2 mg (5/2/2013), 2 mg (5/9/2013), 2 mg (5/16/2013), 2 mg (5/23/2013), 2 mg (5/30/2013), 2 mg (6/6/2013), 2 mg (6/13/2013), 2 mg (6/20/2013), 2 mg (6/27/2013), 2 mg (7/3/2013), 2 mg (7/11/2013), 2 mg (7/18/2013), 2 mg (7/25/2013), 2 mg (8/1/2013), 2 mg (8/8/2013), 2 mg (8/15/2013), 2 mg (8/22/2013), 2 mg (8/29/2013), 2 mg (9/5/2013)    Treatment Plan Start Date: 2/28/2013  Treatment Plan Last Day: 9/5/2013      Treatment Plan: IP High-Dose Cyclophosphamide for Multiple Myeloma  Chemotherapy: mesna (MESNEX) 910 mg in NaCl 0.9% 64 mL infusion  Administration: 910 mg (9/10/2013), 910 mg (9/10/2013), 910 mg (9/11/2013), 910 mg (10/7/2013), 910 mg (10/7/2013), 910 mg (10/8/2013)    cyclophosphamide (CYTOXAN) 4,560 mg in NaCl 0.9% 500 mL CHEMOTHERAPY  Administration: 4,560 mg (9/10/2013), 4,560 mg (10/7/2013)    Treatment Plan Start Date: 9/10/2013  Treatment Plan Last Day:  (Planned)      Treatment Plan: IP High-Dose  Cyclophosphamide for Multiple Myeloma  Chemotherapy: [No matching medication found in this treatment plan]  Treatment Plan Start Date:   Treatment Plan Last Day:       Treatment Plan: IP ONC Multiple Myeloma - D-PACE (CISplatin / Cyclophosphamide / Etoposide / DOXOrubicin)   Chemotherapy: CISplatin (PLATINOL) 15 mg, cyclophosphamide (CYTOXAN) 600 mg, etoposide (TOPOSAR) 60 mg in NaCl 0.9% 1,098 mL CHEMOTHERAPY  Administration: 15 mg (11/4/2013), 15 mg (11/5/2013), 15 mg (11/6/2013), 15 mg (11/7/2013), 15 mg (12/5/2013), 15 mg (12/6/2013), 15 mg (12/7/2013), 15 mg (12/8/2013)    DOXOrubicin (ADRIAMYCIN) 15 mg in NaCl 0.9% 558 mL CHEMOTHERAPY  Administration: 15 mg (11/4/2013), 15 mg (11/5/2013), 15 mg (11/6/2013), 15 mg (11/7/2013), 15 mg (12/5/2013), 15 mg (12/6/2013), 15 mg (12/7/2013), 15 mg (12/8/2013)    Treatment Plan Start Date: 11/4/2013  Treatment Plan Last Day: 12/11/2013      Treatment Plan: IP BMT 2003-13 Priming Multiple Myeloma - ADULT (Version: 03/11/10)  Chemotherapy: mesna (MESNEX) 1,220 mg in NaCl 0.9% 67 mL infusion  Administration: 1,220 mg (2/20/2014), 1,220 mg (2/20/2014), 1,220 mg (2/20/2014), 1,220 mg (2/21/2014), 1,220 mg (2/21/2014)    cyclophosphamide (CYTOXAN) 6,000 mg in NaCl 0.9% 500 mL CHEMOTHERAPY  Administration: 6,000 mg (2/20/2014)    Treatment Plan Start Date: 2/20/2014  Treatment Plan Last Day: 2/20/2014      Treatment Plan: OP BMT Disease Specific Multiple Myeloma and Plasma Cell Leukemia  Chemotherapy: [No matching medication found in this treatment plan]  Treatment Plan Start Date: 4/7/2014  Treatment Plan Last Day: 2/17/2016      Treatment Plan: IP BMT Infection Prophylaxis AUTOLOGUS - Adult  Chemotherapy: [No matching medication found in this treatment plan]  Treatment Plan Start Date: 3/21/2014  Treatment Plan Last Day: 3/21/2014      Treatment Plan: OP BMT 2009-22R Immune Reconstitution - ADULT & PEDS (Version: 6/6/2012)  Chemotherapy: [No matching medication found in this  treatment plan]  Treatment Plan Start Date: 3/25/2014  Treatment Plan Last Day: 3/15/2016      Treatment Plan: IP - OP BMT 2003-13 Auto Multiple Myeloma - ADULT (Version: 03/11/10)  Chemotherapy: MELPHalan (ALKERAN) 296 mg in NaCl 0.9% 148 mL CHEMOTHERAPY  Administration: 296 mg (3/25/2014)    Treatment Plan Start Date: 3/24/2014  Treatment Plan Last Day: 8/21/2015      Treatment Plan: OP ONC Pomalidomide MONTHLY  Chemotherapy: pomalidomide (POMALYST) 1 MG capsule    Treatment Plan Start Date: 6/19/2020  Treatment Plan Last Day: 3/22/2023 (Planned)      Treatment Plan: OP ONC Multiple Myeloma - Daratumumab Hyaluronidase (Darzalex Faspro)  Chemotherapy: daratumumab-hyaluronidase-fihj (DARZALEX FASPRO) SUBCUTANEOUS injection 1,800 mg  Administration: 1,800 mg (9/1/2020), 1,800 mg (9/8/2020), 1,800 mg (9/14/2020), 1,800 mg (9/21/2020), 1,800 mg (9/28/2020), 1,800 mg (11/2/2020), 1,800 mg (3/15/2021), 1,800 mg (10/5/2020), 1,800 mg (10/12/2020), 1,800 mg (10/19/2020), 1,800 mg (11/16/2020), 1,800 mg (11/30/2020), 1,800 mg (12/14/2020), 1,800 mg (12/28/2020), 1,800 mg (1/11/2021), 1,800 mg (1/25/2021), 1,800 mg (2/8/2021), 1,800 mg (4/12/2021), 1,800 mg (5/10/2021), 1,800 mg (6/7/2021), 1,800 mg (7/5/2021), 1,800 mg (8/2/2021), 1,800 mg (8/30/2021), 1,800 mg (9/27/2021), 1,800 mg (10/25/2021), 1,800 mg (11/22/2021), 1,800 mg (12/20/2021), 1,800 mg (1/24/2022), 1,800 mg (2/21/2022), 1,800 mg (3/21/2022), 1,800 mg (4/19/2022), 1,800 mg (5/16/2022), 1,800 mg (6/13/2022), 1,800 mg (7/11/2022), 1,800 mg (8/8/2022), 1,800 mg (9/6/2022), 1,800 mg (10/3/2022), 1,800 mg (10/31/2022), 1,800 mg (11/28/2022), 1,800 mg (1/23/2023)    Treatment Plan Start Date: 9/1/2020  Treatment Plan Last Day: 1/23/2023      Treatment Plan: OP ONC Multiple Myeloma - Isatuximab Maintenance  Chemotherapy: pomalidomide (POMALYST) 3 MG capsule    carfilzomib (KYPROLIS) 30 mg in D5W 70 mL infusion  Administration: 30 mg (2/20/2023), 90 mg (2/27/2023), 90  mg (3/7/2023), 90 mg (3/20/2023), 90 mg (3/27/2023), 90 mg (4/4/2023), 90 mg (4/18/2023), 90 mg (4/24/2023), 90 mg (5/1/2023), 90 mg (5/16/2023), 90 mg (5/30/2023), 90 mg (6/13/2023), 90 mg (7/5/2023), 90 mg (7/19/2023), 90 mg (8/1/2023), 90 mg (9/11/2023)    isatuximab-irfc (SARCLISA) 500 mg in sodium chloride 0.9 % 250 mL infusion  Administration: 500 mg (2/20/2023), 500 mg (2/27/2023), 500 mg (3/7/2023), 500 mg (3/14/2023), 500 mg (3/20/2023), 500 mg (4/4/2023), 500 mg (4/18/2023), 500 mg (5/1/2023), 500 mg (5/16/2023), 500 mg (5/30/2023), 500 mg (6/13/2023), 500 mg (7/5/2023), 500 mg (7/19/2023), 500 mg (8/1/2023), 500 mg (9/11/2023), 500 mg (10/9/2023), 500 mg (11/6/2023), 500 mg (12/4/2023), 500 mg (1/15/2024), 500 mg (2/19/2024), 500 mg (5/6/2024), 500 mg (6/10/2024), 500 mg (4/10/2024)    Treatment Plan Start Date: 2/20/2023  Treatment Plan Last Day: 6/10/2024      Treatment Plan: OP BMT 2017-45G - Carvykti for MM - Cyclophosphamide / Fludarabine - Adult ARM F (Version: 9/20/23)  Chemotherapy: FLUdarabine (FLUDARA) 46 mg in sodium chloride 0.9 % 101.84 mL infusion    cycloPHOSphamide (CYTOXAN) 455 mg in sodium chloride 0.9 % 272.75 mL infusion    Treatment Plan Start Date: 7/24/2024 (Planned)  Treatment Plan Last Day: 7/26/2024 (Planned)            HPI:  Please see my entry above for disease and treatment history.  Shena is here for close visit for Carvykti.Last pomalyst dose was 7/11. She feels well. She is taking the supplements listed below daily. No new bone pains. She has a very small T12 lesion on PET that has no clinical symptoms. We reviewed the images and I can not see it.          Supplements she is currently taking    Prono cruz BID  Magnesium 235 g TID  Vit K2  Ubiquinol 100 mg every day  Zinc picolinate 30 mg every day  Vit B12 500 mcg bd  Quercitin 500mg with Vit C  Liposoma.l vitmain c   Theralac pro  Saccromyces boulardi  Adapten-all  Glutathione reduced  Arabinogalactin larch tree  Biolymph  phase  D103  Neuro 1  ChemTox  Magnesium gel  [place] seal  Josh tincture  Cell salts             ROS:    10 point ROS neg other than the symptoms noted above in the HPI.        Past Medical History:   Diagnosis Date    Acquired hypothyroidism 8/10/2023    Breast cancer (H) 1994    right    H/O stem cell transplant (H) 2014    History of blood transfusion  &     During stem cell tx process    Hypertension 2021    Runs 140 s systolically, about 20 above my usual    Multiple myeloma, without mention of having achieved remission 2012       Past Surgical History:   Procedure Laterality Date    BIOPSY  10/1994; [id number]    Lt. Breast in ; multiple bone marrow bx's for MM    BREAST SURGERY  10/1994    Lt. Mastectomy w/lymph node resection    COLONOSCOPY  2015    Normal results    EYE SURGERY      Bilateral cataract surgery    INSERT CATHETER VASCULAR ACCESS Left 2024    Procedure: central venous catheter non tunneled insertion, vascular access;  Surgeon: Ric Castaneda PA-C;  Location: [facility] OR    INSERT PORT VASCULAR ACCESS Left 2/15/2023    Procedure: INSERTION, VASCULAR ACCESS PORT;  Surgeon: Luc Antunez MD;  Location: [facility] OR    IR CHEST PORT PLACEMENT > 5 YRS OF AGE  2/15/2023    IR CVC NON TUNNEL LINE REMOVAL  2024    IR CVC NON TUNNEL PLACEMENT > 5 YRS  2024    MASTECTOMY      Right, with lymphe node disection      PICC INSERTION  09/10/2013    5fr DL Power PICC, 44cm (1cm external) in the L lateral brachial vein with tip in the low SVC    TRANSPLANT  3/27/2014    Autologous stem cell tx       Family History   Problem Relation Age of Onset    Cancer Paternal Grandmother         skin cancer    Hypertension Mother     Cerebrovascular Disease Mother          8-11-15 from strokes    Hypertension Father     Prostate Cancer Father        Social History     Tobacco Use    Smoking status: Never     Passive exposure: Never    Smokeless tobacco: Never    Substance Use Topics    Alcohol use: Yes     Comment: occ    Drug use: No         Allergies   Allergen Reactions    Blood Transfusion Related (Informational Only) Other (See Comments)     Patient has a history of a clinically significant antibody against RBC antigens.  A delay in compatible RBCs may occur.     Cayenne Pepper [Cayenne]     Corn-Containing Products GI Disturbance    Levaquin Other (See Comments)     Tendon pain    Milk Protein GI Disturbance    Mold      Facial rash/lip swelling    Morphine Sulfate Other (See Comments)     Per pt - see things (hallucination) when she was on Morphine .    Pollen Extract      Environmental allergies     Shrimp Nausea and Vomiting    Tomato      Lip swelling, facial rash    Azithromycin Rash    Keflex [Cephalexin] Rash    Oat Rash    Tape [Adhesive Tape] Itching and Rash     All adhesives    Wheat Rash     Swelling of lips        Current Outpatient Medications   Medication Sig Dispense Refill    acetaminophen (TYLENOL) 325 MG tablet Take 325-650 mg by mouth every 6 hours as needed for mild pain      acyclovir (ZOVIRAX) 400 MG tablet Take 1 tablet (400 mg) by mouth every 12 hours 180 tablet 3    amLODIPine (NORVASC) 2.5 MG tablet Take 5 mg (2 tablets) in the morning. Take 2.5 mg (1 tablet) in the evening.      Ascorbic Acid (VITAMIN C PO) Take 1,000 mg by mouth 2 times daily       aspirin (ASA) 81 MG chewable tablet Take 1 tablet (81 mg) by mouth daily 30 tablet 0    CALCIUM-VITAMIN D-VITAMIN K PO Take 2 tablets by mouth daily.      clobetasol (TEMOVATE) 0.05 % external ointment Apply topically 2 times daily as needed (itching and rash) Topically to rash on hands until resolved 45 g 0    COENZYME Q-10 PO Take 100 mg by mouth daily       Cyanocobalamin 1000 MCG/ML LIQD Take 500 mcg by mouth 2 times daily      desonide (DESOWEN) 0.05 % external ointment Apply topically 2 times daily Apply to face as needed for dermatitis 15 g 11    levothyroxine (SYNTHROID/LEVOTHROID) 50  MCG tablet Take 1 tablet (50 mcg) by mouth daily 90 tablet 0    magnesium 100 MG CAPS Take 100 mg by mouth 3 times daily      MAGNESIUM PO Take 235 mg by mouth 3 times daily      Multiple Vitamins-Minerals (MULTIVITAMIN OR) Take 1 tablet by mouth 2 times daily.      order for DME 6 mastectomy bras  Length of need: 99 months 6 Device 1    order for DME R mastectomy prosthesis   Length of need:  99 months 1 Device 1    Probiotic Product (PROBIOTIC PO) Take 1 capsule by mouth daily Theralac Pro Probiotic      Quercetin 500 MG CAPS Take 500 mg by mouth daily      saccharomyces boulardii (SACCHAROMYCIN DF) 250 MG capsule Take 250 mg by mouth daily      triamcinolone (KENALOG) 0.1 % external cream Apply topically 2 times daily 30 g 2    triamcinolone (KENALOG) 0.1 % external ointment Apply topically 2 times daily 15 g 11    ZINC PICOLINATE PO Take 30 mg by mouth daily      ondansetron (ZOFRAN) 4 MG tablet Take 1 tablet (4 mg) by mouth every 8 hours as needed for nausea (Patient not taking: Reported on 6/10/2024) 60 tablet 11    pomalidomide (POMALYST) 2 MG capsule Take 1 capsule (2 mg) by mouth daily Swallow whole, do NOT break, crush, chew or open capsule. Take on days 1-21 of repeated 28 day cycles. (Patient not taking: Reported on 7/15/2024) 21 capsule 0    prochlorperazine (COMPAZINE) 5 MG tablet Take 1 tablet (5 mg) by mouth every 6 hours as needed for nausea or vomiting (Patient not taking: Reported on 6/10/2024) 30 tablet 11         Physical Exam:     Vital Signs: BP (!) 152/81 (BP Location: Left arm, Patient Position: Sitting, Cuff Size: Adult Regular)   Pulse 78   Temp 98.1  F (36.7  C) (Oral)   Resp 16   Wt 55.6 kg (122 lb 8 oz)   LMP  (LMP Unknown)   SpO2 97%   BMI 23.13 kg/m      Constitutional: NAD  Eyes: no scleral icterus  ENT: no oral lesions  Lymph: no cervical, supraclavicular, axillary LAD  Pulm: CTAB  CV: RRR, no m/r/g  GI: bowel sounds present, soft and nontender to palpation  MSK: No edema in  the extremities  Neuro: alert, conversant, normal gait  Psych: appropriate mood and affect        BMT and Cell Therapy Informed Consent Discussion     In today's visit, we discussed in detail the research for which Shena Hartmann is eligible. We discussed the potential risks and potential benefits of each protocol individually. We explained potential alternatives to the protocols discussed. We explained to the patient that participation is voluntary and that consent may be withdrawn at any time.     The patient completed the last round of treatment on 7/11/2024.    HCT-CI score: 0. We counseled the patient about the impact of this on the risk of treatment related and overall mortality. The score fit within treatment protocol eligibility criteria.    Karnofsky performance score: 100     ECOG (required for CAR-T, see KPS to ECOG conversion chart): 0    Active infections:  none.      Reproductive status: What methods of birth control does the patient plan to use during the treatment period beginning with conditioning and ending with the discontinuation of immune suppression (indicate with an X all that apply):  _x_ The patient is confirmed to be sterile or post-menopausal  __ Sexual abstinence  __ Condoms  __ Implants  __ Injectables  __ Oral contraceptives  __ Intrauterine devices (IUD)  __ Other (describe)    The patient received appropriate reproductive counseling and agreed with the need for effective contraception during the treatment procedures.    Dental health suitable to proceed: Yes    We spent 60 min signing consents, counseling the patient on the day of the visit. Proceed with L/D for inpatient Carvykti       Known issues that I take into account for medical decisions, with salient changes to the plan considering these complexities noted above.    Patient Active Problem List   Diagnosis    Pain in thoracic spine    Multiple myeloma, dx 2002,  s/p ASCBMT 3/26/14    Personal history of malignant neoplasm of  breast    Seasonal allergies    Osteoporosis    Primary hypertension    Stem cells transplant status (H)    Adverse effect of other drugs, medicaments and biological substances, sequela    Acquired hypothyroidism    Other amyloidosis (H)         I spent 60 minutes in the care of this patient today, which included time necessary for preparation for the visit, obtaining history, ordering medications/tests/procedures as medically indicated, review of pertinent medical literature, counseling of the patient, communication of recommendations to the care team, and documentation time.    Nighat Chavez MD    Communicate with me securely using [employer]    ____________________________________________________________________          BMT/Cell Therapy Workup Summary    Workup Nurse Coordinator: Ct  Primary BMT Physician: Scott    Date of Summary:  07/19/2024        Patient Demographics       Patient ID:  Shena Hartmann   Age:  73 year old   Sex:  female        Donor Characteristics       Self     Donor-Specific Antibodies:  No lab results found.      Virtual Crossmatch:    No lab results found.      Blood Counts       Recent Labs   Lab Test 07/15/24  0943 07/08/24  0812 07/01/24  0751   HGB 11.8 11.4* 11.5*   HCT 34.4* 33.8* 34.3*   WBC 3.8* 3.0* 3.7*   ANEUTAUTO 1.4* 1.2* 1.7   ALYMPAUTO 1.5 1.0 1.1   AMONOAUTO 0.8 0.6 0.7   AEOSAUTO 0.1 0.3 0.2   ABSBASO 0.0 0.1 0.1   NRBCMAN 0.0 0.0 0.0    159 195         Recent Labs   Lab Test 07/08/24  0812   ABORH O POS         No lab results found.      Chemistries     Basic Panel  Recent Labs   Lab Test 07/15/24  0943 07/08/24  1455 07/08/24  0812 07/01/24  0751    135 137 137   POTASSIUM 3.9 4.2  --  4.2   CHLORIDE 99 100 97* 102   CO2 28 25 24 27   BUN 16.9 16.5 16.1 18.7   CR 0.74 0.78 0.79 0.89   GLC 98 102* 88 80        Calcium, Magnesium, Phosphorus  Recent Labs   Lab Test 07/15/24  0943 07/08/24  1455 07/08/24  0812 07/01/24  0751   PAULINE 9.2 8.8  --  9.1    MAG  --  2.0 <0.2*  --    PHOS  --   --  3.6  --         LFTs  Recent Labs   Lab Test 07/15/24  0943 07/08/24  1455 07/08/24  0812   BILITOTAL 0.3 0.2 0.3   ALKPHOS 50 59 27*   AST 20 22 24   ALT 24 27 25   ALBUMIN 4.3 4.1 4.1       LDH  Recent Labs   Lab Test 07/08/24  0812 07/01/24  0751 06/10/24  0805    158 159       B2-Microglobulin  Recent Labs   Lab Test 07/08/24  0812 05/15/24  1159 01/23/23  0731 11/28/22  0828 10/31/22  0813 10/03/22  0821   [id number] 2.2 1.9 3.4* 2.5* 3.2* 3.2*       Vitamin D  Recent Labs   Lab Test 06/13/22  0822   VITDT 89*         Urine Studies       Recent Labs   Lab Test 07/08/24  1435   COLOR Yellow   APPEARANCE Clear   URINEGLC Negative   URINEBILI Negative   URINEKETONE Negative   SG 1.010   UBLD Negative   URINEPH 5.5   PROTEIN Negative   UUROI Normal   NITRITE Negative   LEUKEST Small*   MUCUS Present*   RBCU 1   WBCU 5       Creatinine Clearance    No lab results found.      Infectious Disease Markers     [facility] IDM    Recent Labs   Lab Test 07/08/24  0812   DCMIG Positive*   DHBSAG Non-reactive   DHBCAB Non-reactive   DHIVAB Non-reactive   DHCVAB Non-reactive   DHTLVA Non-reactive   TCRUZI Non-reactive   DTRPAB Non-reactive       CMV  Recent Labs   Lab Test 04/26/24  1429   CMVIGG Positive, suggests recent or past exposure.*         EBV    Recent Labs   Lab Test 07/08/24  0812   EBVCAG Positive*       HSV 1/2    Recent Labs   Lab Test 07/08/24  0812   R1TVQGM 4.28*   H1IGG Positive.  IgG antibody to HSV-1 detected.*   [id number] 0.03   H2IGG No HSV-2 IgG antibodies detected.         VZV    No lab results found.      HTLV    Recent Labs   Lab Test 07/08/24  0812   DHTLVA Non-reactive         Toxoplasma  (not routinely checked)      COVID    No lab results found.      Immunoglobulins     Recent Labs   Lab Test 07/08/24  0812 05/15/24  1159 09/21/20  0841 09/14/20  0806    887 2,404* 2,416*       Recent Labs   Lab Test 05/15/24  1159 09/21/20  0841  09/14/20  0806   IGA 5* 5* 6*       Recent Labs   Lab Test 05/15/24  1159 09/21/20  0841 09/14/20  0806   IGM <10* <10* <10*         Monocloncal Protein Studies     M spike    Recent Labs   Lab Test 07/08/24  0812 07/01/24  0751 06/10/24  0805 05/15/24  1159 05/06/24  0827 04/10/24  0817   ELPM 0.5* 0.5* 0.6* 0.6* 0.6* 0.2*       Kappa FLC    Recent Labs   Lab Test 07/08/24  0812 07/01/24  0751 06/10/24  0805 05/15/24  1159 05/06/24  0827 04/10/24  0817   KFLCA 10.54* 10.78* 10.58* 11.94* 11.48* 9.32*       Lambda FLC    Recent Labs   Lab Test 07/08/24  0812 07/01/24  0751 06/10/24  0805 05/15/24  1159 05/06/24  0827 04/10/24  0817   LFLCA 0.43* 0.38* 0.37* 0.17* 0.18* 0.21*       FLC Ratio    Recent Labs   Lab Test 07/08/24  0812 07/01/24  0751 06/10/24  0805 05/15/24  1159 05/06/24  0827 04/10/24  0817   KLRA 24.51* 28.37* 28.59* 70.24* 63.78* 44.38*               Chest X-Ray - 2 view     Results for orders placed in visit on 12/19/22    XR CHEST 2 VIEWS    Status: Normal 12/19/2022    Narrative  EXAM: XR CHEST 2 VIEWS  LOCATION: [facility]  DATE/TIME: 12/19/2022 11:18 AM    INDICATION:  Acute cough.  COMPARISON: None.    Impression  IMPRESSION: Lungs are hyperexpanded, correlation with underlying emphysema necessary. No pleural effusion, pneumothorax, or abnormal area of consolidation. Moderate to severe scoliotic curvature, apex right, at the thoracolumbar junction.        Chest CT without Contrast       No results found for this or any previous visit.        PFTs     FVC%  No lab results found.    FEV1%  No lab results found.    DLCO%  No lab results found.          EKG       ECG results from 07/09/24   EKG 12-lead Adult     Value    Systolic Blood Pressure     Diastolic Blood Pressure     Ventricular Rate 58    Atrial Rate 58    KS Interval 146    QRS Duration 118        QTc 439    P Axis 41    R AXIS 21    T Axis 68    Interpretation ECG      Sinus bradycardia  Incomplete right  bundle branch block  Borderline ECG  When compared with ECG of 15-MAY-2024 12:21,  Left anterior fascicular block is no longer Present  Minimal criteria for Septal infarct are no longer Present  Confirmed by fellow Jerad Lema ([id number]) on 7/10/2024 9:39:24 AM  Confirmed by MD GUTIERREZ DAVID () on 7/10/2024 11:06:41 AM       *Note: Due to a large number of results and/or encounters for the requested time period, some results have not been displayed. A complete set of results can be found in Results Review.         ECHOCARDIOGRAM       Results for orders placed in visit on 07/15/24    ECHOCARDIOGRAM COMPLETE    Status: Normal 7/15/2024    Narrative  [id number]  [id number]  [id number]  [id number]^FÁTIMA^QUANG^JUANJOSE    [facility]  Diagnostic and Treatment-3rd Floor  [address] [place] [address]    Name: STEPHANY GARCIA  MRN: [id number]  : 1950  Study Date: 07/15/2024 07:54 AM  Age: 73 yrs  Gender: Female  Patient Location: [facility]  Reason For Study: Examination prior to chemotherapy, Multiple myeloma not  having a  Ordering Physician: QUANG SHINE  Referring Physician: QUANG SHINE  Performed By: eLvi Robles    BSA: 1.5 m2  Height: 61 in  Weight: 121 lb  HR: 65  BP: 146/76 mmHg  ______________________________________________________________________________  Procedure  Echocardiogram with two-dimensional, color and spectral Doppler performed.  ______________________________________________________________________________  Interpretation Summary  Left ventricular size, wall motion and function are normal. The ejection  fraction is 60-65%. Global peak LV longitudinal strain is averaged at -27%.  This is within reported normal limits (normal <-18%).  Right ventricular function, chamber size, wall motion, and thickness are  normal.  Mild tricuspid insufficiency is present. The aorta root is normal. The  inferior vena cava was normal in size  with preserved respiratory variability.  No pericardial effusion is present.    No significant changes noted.  ______________________________________________________________________________  Left Ventricle  Left ventricular size, wall motion and function are normal. The ejection  fraction is 60-65%. Global peak LV longitudinal strain is averaged at -27%.  This is within reported normal limits (normal <-18%). No regional wall motion  abnormalities are seen.    Right Ventricle  Right ventricular function, chamber size, wall motion, and thickness are  normal.    Atria  Both atria appear normal.    Mitral Valve  The mitral valve is normal. Trace mitral insufficiency is present.    Aortic Valve  Aortic valve is normal in structure and function. The aortic valve is  tricuspid. No aortic regurgitation is present.    Tricuspid Valve  The tricuspid valve is normal. Mild tricuspid insufficiency is present. The  right ventricular systolic pressure is approximated at 25.6 mmHg plus the  right atrial pressure.    Pulmonic Valve  The pulmonic valve is normal. Trace pulmonic insufficiency is present.    Vessels  The aorta root is normal. The inferior vena cava was normal in size with  preserved respiratory variability.    Pericardium  No pericardial effusion is present.    Compared to Previous Study  No significant changes noted.  ______________________________________________________________________________  MMode/2D Measurements & Calculations  IVSd: 0.89 cm    LVIDd: 3.8 cm  LVIDs: 2.1 cm  LVPWd: 1.0 cm  FS: 43.6 %  LV mass(C)d: 108.0 grams  LV mass(C)dI: 70.8 grams/m2  Ao root diam: 3.2 cm  LVOT diam: 1.8 cm  LVOT area: 2.7 cm2  Ao root diam index Ht(cm/m): 2.1  Ao root diam index BSA (cm/m2): 2.1  EF Biplane: 71.4 %  LA Volume (BP): 46.0 ml  LA Volume Index (BP): 30.1 ml/m2    RWT: 0.53  TAPSE: 2.4 cm    Doppler Measurements & Calculations  MV E max marycarmen: 77.7 cm/sec  MV A max marycarmen: 88.1 cm/sec  MV E/A: 0.88  MV dec slope:  348.5 cm/sec2  MV dec time: 0.22 sec  Ao V2 max: 125.4 cm/sec  Ao max P.3 mmHg  Ao V2 mean: 102.7 cm/sec  Ao mean P.4 mmHg  Ao V2 VTI: 30.1 cm  NALINI(I,D): 2.3 cm2  NALINI(V,D): 2.5 cm2  LV V1 max P.5 mmHg  LV V1 max: 117.1 cm/sec  LV V1 VTI: 25.4 cm  SV(LVOT): 67.7 ml  SI(LVOT): 44.4 ml/m2  TR max j luis: 253.1 cm/sec  TR max P.6 mmHg  AV J Luis Ratio (DI): 0.93  NALINI Index (cm2/m2): 1.5  E/E' av.9  Lateral E/e': 14.3  Medial E/e': 13.5  RV S J Luis: 13.4 cm/sec    ______________________________________________________________________________  Report approved by: Leonor BREWER 07/15/2024 10:26 AM        PET Scan       Results for orders placed during the hospital encounter of 24    PET ONCOLOGY WHOLE BODY    Status: Normal 2024    Narrative  Combined Report of: PET and CT on  2024 3:21 PM:    1. PET of the neck, chest, abdomen, and pelvis.  2. PET CT Fusion for Attenuation Correction and Anatomical  Localization:  3. 3D MIP and PET-CT fused images were processed on an independent  workstation and archived to PACS and reviewed by a radiologist.    Technique:    1. PET: The patient received 10.04 mCi of F-[id number]; the serum glucose  was 86 mg/dL prior to administration, body weight was 55.7 kg. Images  were evaluated in the axial, sagittal, and coronal planes as well as  the rotational whole body MIP. Images were acquired from the Vertex to  the Feet.    UPTAKE WAS MEASURED AT 60 MINUTES.    2. CT: CT only obtained for attenuation correction and not diagnostic  purposes.    INDICATION: Examination prior to chemotherapy; Multiple myeloma not  having achieved remission (H); Personal history of diseases of blood  and blood-forming organs    ADDITIONAL INFORMATION OBTAINED FROM EMR: Patient with history of  relapsed multiple myeloma s/p autologous stem cell transplant 3/27/14  on iastuximab + Pomalyst awaiting Carvykt CAR-T. History of breast  cancer  s/p surgery and chemotherapy. Left  posterior iliac crest  bone marrow biopsy 7/15/24 demonstrated plasma cell myeloma.    COMPARISON: PET/CT 4/23/2021.    FINDINGS:  BACKGROUND: Liver SUV max = 2.9, Aorta Blood SUV max = 2.5.    HEAD/NECK:  No abnormal uptake.    CHEST:  No abnormal uptake.    Left chest wall Port-a-Cath terminates in the SVC. Right mastectomy.  Biapical scarring. Similar scattered subcentimeter solid pulmonary  nodules, including 0.4 cm left lower lobe nodule (4/173) and 0.3 cm  right upper lobe nodule (4/112).    ABDOMEN AND PELVIS:  No abnormal uptake.    Aortobiiliac atherosclerotic calcifications.    LOWER EXTREMITIES:  No abnormal uptake.    BONES AND SOFT TISSUES:  New mild focal uptake associated with T12 left inferoposterior  endplate associated with 0.4 cm lytic lesion (4/174) with SUV max 2.8,  which appears more prominent than PET/CT 4/23/21. Increased adjacent  Schmorl's node deformity and degenerative changes.    Mild diffuse uptake along the left posterior iliac crest, likely  postprocedural inflammation secondary to recent bone marrow biopsy.    Scoliosis with similar compression fracture deformities and bilateral  rib fracture deformities.    Impression  IMPRESSION:    New mildly FDG avid left T12 lytic lesion, concerning for active  myelomatous disease with adjacent progressed degenerative changes.    LINDA MUSE MD      SYSTEM ID:  [id number]         MRI Brain       No results found for this or any previous visit.         CSF Studies       No lab results found.    No lab results found.    No lab results found.

## 2024-07-19 NOTE — LETTER
7/19/2024      Shena Hartmann  [address] [place] [address]      Dear Colleague,    Thank you for referring your patient, Shena Hartmann, to the [facility] [place]. Please see a copy of my visit note below.    BMT/Cell Therapy Consultation      Shena Hartmann is a 73 year old female for Carvykti      Diagnosis and Treatment Summary     Disease presentation and baseline characteristics:  Breast cancer dx in 1994. Underwent surgery and chemotherapy without reoccurence  MGUS dx 1999  T8 pathologic fracture with radiation  Revlimid and velcade,   2013  Cytoxan IV 9/2013  D-PACE 11/2013  Auto 3/27/14  5/2014 thalidomide caused rash so switched to pomalidomide   on kenalog and clobetasol creams for IMID rash  FLC began to rise so riky added to pom 9/2020  she has been on xgeva for an unclear amount of time  Teodora-Kd 2/20/2023  Kyprolis held 10/23 due to side effects (N/V). Continue Teodora/dex         Treatment Plan: OP ONC Multiple Myeloma Bortezomib (Subcutaneous)  Chemotherapy: bortezomib (VELCADE) 2 mg CHEMOTHERAPY - SUBCUTANEOUS INJECTION  Administration: 2 mg (2/28/2013), 2 mg (3/7/2013), 2 mg (3/14/2013), 2 mg (3/21/2013), 2 mg (3/28/2013), 2 mg (4/4/2013), 2 mg (4/11/2013), 2 mg (4/18/2013), 2 mg (4/25/2013), 2 mg (5/2/2013), 2 mg (5/9/2013), 2 mg (5/16/2013), 2 mg (5/23/2013), 2 mg (5/30/2013), 2 mg (6/6/2013), 2 mg (6/13/2013), 2 mg (6/20/2013), 2 mg (6/27/2013), 2 mg (7/3/2013), 2 mg (7/11/2013), 2 mg (7/18/2013), 2 mg (7/25/2013), 2 mg (8/1/2013), 2 mg (8/8/2013), 2 mg (8/15/2013), 2 mg (8/22/2013), 2 mg (8/29/2013), 2 mg (9/5/2013)    Treatment Plan Start Date: 2/28/2013  Treatment Plan Last Day: 9/5/2013      Treatment Plan: IP High-Dose Cyclophosphamide for Multiple Myeloma  Chemotherapy: mesna (MESNEX) 910 mg in NaCl 0.9% 64 mL infusion  Administration: 910 mg (9/10/2013), 910 mg (9/10/2013), 910 mg (9/11/2013), 910 mg (10/7/2013), 910 mg  (10/7/2013), 910 mg (10/8/2013)    cyclophosphamide (CYTOXAN) 4,560 mg in NaCl 0.9% 500 mL CHEMOTHERAPY  Administration: 4,560 mg (9/10/2013), 4,560 mg (10/7/2013)    Treatment Plan Start Date: 9/10/2013  Treatment Plan Last Day:  (Planned)      Treatment Plan: IP High-Dose Cyclophosphamide for Multiple Myeloma  Chemotherapy: [No matching medication found in this treatment plan]  Treatment Plan Start Date:   Treatment Plan Last Day:       Treatment Plan: IP ONC Multiple Myeloma - D-PACE (CISplatin / Cyclophosphamide / Etoposide / DOXOrubicin)   Chemotherapy: CISplatin (PLATINOL) 15 mg, cyclophosphamide (CYTOXAN) 600 mg, etoposide (TOPOSAR) 60 mg in NaCl 0.9% 1,098 mL CHEMOTHERAPY  Administration: 15 mg (11/4/2013), 15 mg (11/5/2013), 15 mg (11/6/2013), 15 mg (11/7/2013), 15 mg (12/5/2013), 15 mg (12/6/2013), 15 mg (12/7/2013), 15 mg (12/8/2013)    DOXOrubicin (ADRIAMYCIN) 15 mg in NaCl 0.9% 558 mL CHEMOTHERAPY  Administration: 15 mg (11/4/2013), 15 mg (11/5/2013), 15 mg (11/6/2013), 15 mg (11/7/2013), 15 mg (12/5/2013), 15 mg (12/6/2013), 15 mg (12/7/2013), 15 mg (12/8/2013)    Treatment Plan Start Date: 11/4/2013  Treatment Plan Last Day: 12/11/2013      Treatment Plan: IP BMT 2003-13 Priming Multiple Myeloma - ADULT (Version: 03/11/10)  Chemotherapy: mesna (MESNEX) 1,220 mg in NaCl 0.9% 67 mL infusion  Administration: 1,220 mg (2/20/2014), 1,220 mg (2/20/2014), 1,220 mg (2/20/2014), 1,220 mg (2/21/2014), 1,220 mg (2/21/2014)    cyclophosphamide (CYTOXAN) 6,000 mg in NaCl 0.9% 500 mL CHEMOTHERAPY  Administration: 6,000 mg (2/20/2014)    Treatment Plan Start Date: 2/20/2014  Treatment Plan Last Day: 2/20/2014      Treatment Plan: OP BMT Disease Specific Multiple Myeloma and Plasma Cell Leukemia  Chemotherapy: [No matching medication found in this treatment plan]  Treatment Plan Start Date: 4/7/2014  Treatment Plan Last Day: 2/17/2016      Treatment Plan: IP BMT Infection Prophylaxis AUTOLOGUS - Adult  Chemotherapy:  [No matching medication found in this treatment plan]  Treatment Plan Start Date: 3/21/2014  Treatment Plan Last Day: 3/21/2014      Treatment Plan: OP BMT 2009-22R Immune Reconstitution - ADULT & PEDS (Version: 6/6/2012)  Chemotherapy: [No matching medication found in this treatment plan]  Treatment Plan Start Date: 3/25/2014  Treatment Plan Last Day: 3/15/2016      Treatment Plan: IP - OP BMT 2003-13 Auto Multiple Myeloma - ADULT (Version: 03/11/10)  Chemotherapy: MELPHalan (ALKERAN) 296 mg in NaCl 0.9% 148 mL CHEMOTHERAPY  Administration: 296 mg (3/25/2014)    Treatment Plan Start Date: 3/24/2014  Treatment Plan Last Day: 8/21/2015      Treatment Plan: OP ONC Pomalidomide MONTHLY  Chemotherapy: pomalidomide (POMALYST) 1 MG capsule    Treatment Plan Start Date: 6/19/2020  Treatment Plan Last Day: 3/22/2023 (Planned)      Treatment Plan: OP ONC Multiple Myeloma - Daratumumab Hyaluronidase (Darzalex Faspro)  Chemotherapy: daratumumab-hyaluronidase-fihj (DARZALEX FASPRO) SUBCUTANEOUS injection 1,800 mg  Administration: 1,800 mg (9/1/2020), 1,800 mg (9/8/2020), 1,800 mg (9/14/2020), 1,800 mg (9/21/2020), 1,800 mg (9/28/2020), 1,800 mg (11/2/2020), 1,800 mg (3/15/2021), 1,800 mg (10/5/2020), 1,800 mg (10/12/2020), 1,800 mg (10/19/2020), 1,800 mg (11/16/2020), 1,800 mg (11/30/2020), 1,800 mg (12/14/2020), 1,800 mg (12/28/2020), 1,800 mg (1/11/2021), 1,800 mg (1/25/2021), 1,800 mg (2/8/2021), 1,800 mg (4/12/2021), 1,800 mg (5/10/2021), 1,800 mg (6/7/2021), 1,800 mg (7/5/2021), 1,800 mg (8/2/2021), 1,800 mg (8/30/2021), 1,800 mg (9/27/2021), 1,800 mg (10/25/2021), 1,800 mg (11/22/2021), 1,800 mg (12/20/2021), 1,800 mg (1/24/2022), 1,800 mg (2/21/2022), 1,800 mg (3/21/2022), 1,800 mg (4/19/2022), 1,800 mg (5/16/2022), 1,800 mg (6/13/2022), 1,800 mg (7/11/2022), 1,800 mg (8/8/2022), 1,800 mg (9/6/2022), 1,800 mg (10/3/2022), 1,800 mg (10/31/2022), 1,800 mg (11/28/2022), 1,800 mg (1/23/2023)    Treatment Plan Start Date:  9/1/2020  Treatment Plan Last Day: 1/23/2023      Treatment Plan: OP ONC Multiple Myeloma - Isatuximab Maintenance  Chemotherapy: pomalidomide (POMALYST) 3 MG capsule    carfilzomib (KYPROLIS) 30 mg in D5W 70 mL infusion  Administration: 30 mg (2/20/2023), 90 mg (2/27/2023), 90 mg (3/7/2023), 90 mg (3/20/2023), 90 mg (3/27/2023), 90 mg (4/4/2023), 90 mg (4/18/2023), 90 mg (4/24/2023), 90 mg (5/1/2023), 90 mg (5/16/2023), 90 mg (5/30/2023), 90 mg (6/13/2023), 90 mg (7/5/2023), 90 mg (7/19/2023), 90 mg (8/1/2023), 90 mg (9/11/2023)    isatuximab-irfc (SARCLISA) 500 mg in sodium chloride 0.9 % 250 mL infusion  Administration: 500 mg (2/20/2023), 500 mg (2/27/2023), 500 mg (3/7/2023), 500 mg (3/14/2023), 500 mg (3/20/2023), 500 mg (4/4/2023), 500 mg (4/18/2023), 500 mg (5/1/2023), 500 mg (5/16/2023), 500 mg (5/30/2023), 500 mg (6/13/2023), 500 mg (7/5/2023), 500 mg (7/19/2023), 500 mg (8/1/2023), 500 mg (9/11/2023), 500 mg (10/9/2023), 500 mg (11/6/2023), 500 mg (12/4/2023), 500 mg (1/15/2024), 500 mg (2/19/2024), 500 mg (5/6/2024), 500 mg (6/10/2024), 500 mg (4/10/2024)    Treatment Plan Start Date: 2/20/2023  Treatment Plan Last Day: 6/10/2024      Treatment Plan: OP BMT 2017-45G - Carvykti for MM - Cyclophosphamide / Fludarabine - Adult ARM F (Version: 9/20/23)  Chemotherapy: FLUdarabine (FLUDARA) 46 mg in sodium chloride 0.9 % 101.84 mL infusion    cycloPHOSphamide (CYTOXAN) 455 mg in sodium chloride 0.9 % 272.75 mL infusion    Treatment Plan Start Date: 7/24/2024 (Planned)  Treatment Plan Last Day: 7/26/2024 (Planned)            HPI:  Please see my entry above for disease and treatment history.  Shena is here for close visit for Carvykti.Last pomalyst dose was 7/11. She feels well. She is taking the supplements listed below daily. No new bone pains. She has a very small T12 lesion on PET that has no clinical symptoms. We reviewed the images and I can not see it.          Supplements she is currently taking    Prono  cruz BID  Magnesium 235 g TID  Vit K2  Ubiquinol 100 mg every day  Zinc picolinate 30 mg every day  Vit B12 500 mcg bd  Quercitin 500mg with Vit C  Liposoma.l vitmain c   Theralac pro  Saccromyces boulardi  Adapten-all  Glutathione reduced  Arabinogalactin larch tree  Biolymph phase  D103  Neuro 1  ChemTox  Magnesium gel  Canadian seal  Josh tincture  Cell salts             ROS:    10 point ROS neg other than the symptoms noted above in the HPI.        Past Medical History:   Diagnosis Date    Acquired hypothyroidism 8/10/2023    Breast cancer (H) 01/01/1994    right    H/O stem cell transplant (H) 03/01/2014    History of blood transfusion 2013 & 2014    During stem cell tx process    Hypertension Sept. 2021    Runs 140 s systolically, about 20 above my usual    Multiple myeloma, without mention of having achieved remission 11/06/2012       Past Surgical History:   Procedure Laterality Date    BIOPSY  10/1994; [id number]    Lt. Breast in '94; multiple bone marrow bx's for MM    BREAST SURGERY  10/1994    Lt. Mastectomy w/lymph node resection    COLONOSCOPY  11/2015    Normal results    EYE SURGERY  2020    Bilateral cataract surgery    INSERT CATHETER VASCULAR ACCESS Left 5/22/2024    Procedure: central venous catheter non tunneled insertion, vascular access;  Surgeon: Ric Castaneda PA-C;  Location: [facility] OR    INSERT PORT VASCULAR ACCESS Left 2/15/2023    Procedure: INSERTION, VASCULAR ACCESS PORT;  Surgeon: Luc Antunez MD;  Location: [facility] OR    IR CHEST PORT PLACEMENT > 5 YRS OF AGE  2/15/2023    IR CVC NON TUNNEL LINE REMOVAL  5/22/2024    IR CVC NON TUNNEL PLACEMENT > 5 YRS  5/22/2024    MASTECTOMY      Right, with lymphe node disection      PICC INSERTION  09/10/2013    5fr DL Power PICC, 44cm (1cm external) in the L lateral brachial vein with tip in the low SVC    TRANSPLANT  3/27/2014    Autologous stem cell tx       Family History   Problem Relation Age of Onset    Cancer Paternal Grandmother          skin cancer    Hypertension Mother     Cerebrovascular Disease Mother          8-11-15 from strokes    Hypertension Father     Prostate Cancer Father        Social History     Tobacco Use    Smoking status: Never     Passive exposure: Never    Smokeless tobacco: Never   Substance Use Topics    Alcohol use: Yes     Comment: occ    Drug use: No         Allergies   Allergen Reactions    Blood Transfusion Related (Informational Only) Other (See Comments)     Patient has a history of a clinically significant antibody against RBC antigens.  A delay in compatible RBCs may occur.     Cayenne Pepper [Cayenne]     Corn-Containing Products GI Disturbance    Levaquin Other (See Comments)     Tendon pain    Milk Protein GI Disturbance    Mold      Facial rash/lip swelling    Morphine Sulfate Other (See Comments)     Per pt - see things (hallucination) when she was on Morphine .    Pollen Extract      Environmental allergies     Shrimp Nausea and Vomiting    Tomato      Lip swelling, facial rash    Azithromycin Rash    Keflex [Cephalexin] Rash    Oat Rash    Tape [Adhesive Tape] Itching and Rash     All adhesives    Wheat Rash     Swelling of lips        Current Outpatient Medications   Medication Sig Dispense Refill    acetaminophen (TYLENOL) 325 MG tablet Take 325-650 mg by mouth every 6 hours as needed for mild pain      acyclovir (ZOVIRAX) 400 MG tablet Take 1 tablet (400 mg) by mouth every 12 hours 180 tablet 3    amLODIPine (NORVASC) 2.5 MG tablet Take 5 mg (2 tablets) in the morning. Take 2.5 mg (1 tablet) in the evening.      Ascorbic Acid (VITAMIN C PO) Take 1,000 mg by mouth 2 times daily       aspirin (ASA) 81 MG chewable tablet Take 1 tablet (81 mg) by mouth daily 30 tablet 0    CALCIUM-VITAMIN D-VITAMIN K PO Take 2 tablets by mouth daily.      clobetasol (TEMOVATE) 0.05 % external ointment Apply topically 2 times daily as needed (itching and rash) Topically to rash on hands until resolved 45 g 0    COENZYME  Q-10 PO Take 100 mg by mouth daily       Cyanocobalamin 1000 MCG/ML LIQD Take 500 mcg by mouth 2 times daily      desonide (DESOWEN) 0.05 % external ointment Apply topically 2 times daily Apply to face as needed for dermatitis 15 g 11    levothyroxine (SYNTHROID/LEVOTHROID) 50 MCG tablet Take 1 tablet (50 mcg) by mouth daily 90 tablet 0    magnesium 100 MG CAPS Take 100 mg by mouth 3 times daily      MAGNESIUM PO Take 235 mg by mouth 3 times daily      Multiple Vitamins-Minerals (MULTIVITAMIN OR) Take 1 tablet by mouth 2 times daily.      order for DME 6 mastectomy bras  Length of need: 99 months 6 Device 1    order for DME R mastectomy prosthesis   Length of need:  99 months 1 Device 1    Probiotic Product (PROBIOTIC PO) Take 1 capsule by mouth daily Theralac Pro Probiotic      Quercetin 500 MG CAPS Take 500 mg by mouth daily      saccharomyces boulardii (SACCHAROMYCIN DF) 250 MG capsule Take 250 mg by mouth daily      triamcinolone (KENALOG) 0.1 % external cream Apply topically 2 times daily 30 g 2    triamcinolone (KENALOG) 0.1 % external ointment Apply topically 2 times daily 15 g 11    ZINC PICOLINATE PO Take 30 mg by mouth daily      ondansetron (ZOFRAN) 4 MG tablet Take 1 tablet (4 mg) by mouth every 8 hours as needed for nausea (Patient not taking: Reported on 6/10/2024) 60 tablet 11    pomalidomide (POMALYST) 2 MG capsule Take 1 capsule (2 mg) by mouth daily Swallow whole, do NOT break, crush, chew or open capsule. Take on days 1-21 of repeated 28 day cycles. (Patient not taking: Reported on 7/15/2024) 21 capsule 0    prochlorperazine (COMPAZINE) 5 MG tablet Take 1 tablet (5 mg) by mouth every 6 hours as needed for nausea or vomiting (Patient not taking: Reported on 6/10/2024) 30 tablet 11         Physical Exam:     Vital Signs: BP (!) 152/81 (BP Location: Left arm, Patient Position: Sitting, Cuff Size: Adult Regular)   Pulse 78   Temp 98.1  F (36.7  C) (Oral)   Resp 16   Wt 55.6 kg (122 lb 8 oz)   LMP   (LMP Unknown)   SpO2 97%   BMI 23.13 kg/m      Constitutional: NAD  Eyes: no scleral icterus  ENT: no oral lesions  Lymph: no cervical, supraclavicular, axillary LAD  Pulm: CTAB  CV: RRR, no m/r/g  GI: bowel sounds present, soft and nontender to palpation  MSK: No edema in the extremities  Neuro: alert, conversant, normal gait  Psych: appropriate mood and affect        BMT and Cell Therapy Informed Consent Discussion     In today's visit, we discussed in detail the research for which Shena Hartmann is eligible. We discussed the potential risks and potential benefits of each protocol individually. We explained potential alternatives to the protocols discussed. We explained to the patient that participation is voluntary and that consent may be withdrawn at any time.     The patient completed the last round of treatment on 7/11/2024.    HCT-CI score: 0. We counseled the patient about the impact of this on the risk of treatment related and overall mortality. The score fit within treatment protocol eligibility criteria.    Karnofsky performance score: 100     ECOG (required for CAR-T, see KPS to ECOG conversion chart): 0    Active infections:  none.      Reproductive status: What methods of birth control does the patient plan to use during the treatment period beginning with conditioning and ending with the discontinuation of immune suppression (indicate with an X all that apply):  _x_ The patient is confirmed to be sterile or post-menopausal  __ Sexual abstinence  __ Condoms  __ Implants  __ Injectables  __ Oral contraceptives  __ Intrauterine devices (IUD)  __ Other (describe)    The patient received appropriate reproductive counseling and agreed with the need for effective contraception during the treatment procedures.    Dental health suitable to proceed: Yes    We spent 60 min signing consents, counseling the patient on the day of the visit. Proceed with L/D for inpatient Carvykti       Known issues that I take  into account for medical decisions, with salient changes to the plan considering these complexities noted above.    Patient Active Problem List   Diagnosis    Pain in thoracic spine    Multiple myeloma, dx 2002,  s/p ASCBMT 3/26/14    Personal history of malignant neoplasm of breast    Seasonal allergies    Osteoporosis    Primary hypertension    Stem cells transplant status (H)    Adverse effect of other drugs, medicaments and biological substances, sequela    Acquired hypothyroidism    Other amyloidosis (H)         I spent 60 minutes in the care of this patient today, which included time necessary for preparation for the visit, obtaining history, ordering medications/tests/procedures as medically indicated, review of pertinent medical literature, counseling of the patient, communication of recommendations to the care team, and documentation time.    Nighat Chavez MD    Communicate with me securely using [employer]    ____________________________________________________________________          BMT/Cell Therapy Workup Summary    Workup Nurse Coordinator: Ct  Primary BMT Physician: Scott    Date of Summary:  07/19/2024        Patient Demographics       Patient ID:  Shena Hartmann   Age:  73 year old   Sex:  female        Donor Characteristics       Self     Donor-Specific Antibodies:  No lab results found.      Virtual Crossmatch:    No lab results found.      Blood Counts       Recent Labs   Lab Test 07/15/24  0943 07/08/24  0812 07/01/24  0751   HGB 11.8 11.4* 11.5*   HCT 34.4* 33.8* 34.3*   WBC 3.8* 3.0* 3.7*   ANEUTAUTO 1.4* 1.2* 1.7   ALYMPAUTO 1.5 1.0 1.1   AMONOAUTO 0.8 0.6 0.7   AEOSAUTO 0.1 0.3 0.2   ABSBASO 0.0 0.1 0.1   NRBCMAN 0.0 0.0 0.0    159 195         Recent Labs   Lab Test 07/08/24  0812   ABORH O POS         No lab results found.      Chemistries     Basic Panel  Recent Labs   Lab Test 07/15/24  0943 07/08/24  1455 07/08/24  0812 07/01/24  0751    135 137 137   POTASSIUM 3.9  4.2  --  4.2   CHLORIDE 99 100 97* 102   CO2 28 25 24 27   BUN 16.9 16.5 16.1 18.7   CR 0.74 0.78 0.79 0.89   GLC 98 102* 88 80        Calcium, Magnesium, Phosphorus  Recent Labs   Lab Test 07/15/24  0943 07/08/24  1455 07/08/24  0812 07/01/24  0751   PAULINE 9.2 8.8  --  9.1   MAG  --  2.0 <0.2*  --    PHOS  --   --  3.6  --         LFTs  Recent Labs   Lab Test 07/15/24  0943 07/08/24  1455 07/08/24  0812   BILITOTAL 0.3 0.2 0.3   ALKPHOS 50 59 27*   AST 20 22 24   ALT 24 27 25   ALBUMIN 4.3 4.1 4.1       LDH  Recent Labs   Lab Test 07/08/24  0812 07/01/24  0751 06/10/24  0805    158 159       B2-Microglobulin  Recent Labs   Lab Test 07/08/24  0812 05/15/24  1159 01/23/23  0731 11/28/22  0828 10/31/22  0813 10/03/22  0821   [id number] 2.2 1.9 3.4* 2.5* 3.2* 3.2*       Vitamin D  Recent Labs   Lab Test 06/13/22  0822   VITDT 89*         Urine Studies       Recent Labs   Lab Test 07/08/24  1435   COLOR Yellow   APPEARANCE Clear   URINEGLC Negative   URINEBILI Negative   URINEKETONE Negative   SG 1.010   UBLD Negative   URINEPH 5.5   PROTEIN Negative   UUROI Normal   NITRITE Negative   LEUKEST Small*   MUCUS Present*   RBCU 1   WBCU 5       Creatinine Clearance    No lab results found.      Infectious Disease Markers     [facility] Blood [place] IDM    Recent Labs   Lab Test 07/08/24  0812   DCMIG Positive*   DHBSAG Non-reactive   DHBCAB Non-reactive   DHIVAB Non-reactive   DHCVAB Non-reactive   DHTLVA Non-reactive   TCRUZI Non-reactive   DTRPAB Non-reactive       CMV  Recent Labs   Lab Test 04/26/24  1429   CMVIGG Positive, suggests recent or past exposure.*         EBV    Recent Labs   Lab Test 07/08/24  0812   EBVCAG Positive*       HSV 1/2    Recent Labs   Lab Test 07/08/24  0812   Q0HAAKZ 4.28*   H1IGG Positive.  IgG antibody to HSV-1 detected.*   B7BLYMW 0.03   H2IGG No HSV-2 IgG antibodies detected.         VZV    No lab results found.      HTLV    Recent Labs   Lab Test 07/08/24  0812   DHTLVA Non-reactive          Toxoplasma  (not routinely checked)      COVID    No lab results found.      Immunoglobulins     Recent Labs   Lab Test 07/08/24  0812 05/15/24  1159 09/21/20  0841 09/14/20  0806    887 2,404* 2,416*       Recent Labs   Lab Test 05/15/24  1159 09/21/20  0841 09/14/20  0806   IGA 5* 5* 6*       Recent Labs   Lab Test 05/15/24  1159 09/21/20  0841 09/14/20  0806   IGM <10* <10* <10*         Monocloncal Protein Studies     M spike    Recent Labs   Lab Test 07/08/24  0812 07/01/24  0751 06/10/24  0805 05/15/24  1159 05/06/24  0827 04/10/24  0817   ELPM 0.5* 0.5* 0.6* 0.6* 0.6* 0.2*       Kappa FLC    Recent Labs   Lab Test 07/08/24  0812 07/01/24  0751 06/10/24  0805 05/15/24  1159 05/06/24  0827 04/10/24  0817   KFLCA 10.54* 10.78* 10.58* 11.94* 11.48* 9.32*       Lambda FLC    Recent Labs   Lab Test 07/08/24  0812 07/01/24  0751 06/10/24  0805 05/15/24  1159 05/06/24  0827 04/10/24  0817   LFLCA 0.43* 0.38* 0.37* 0.17* 0.18* 0.21*       FLC Ratio    Recent Labs   Lab Test 07/08/24  0812 07/01/24  0751 06/10/24  0805 05/15/24  1159 05/06/24  0827 04/10/24  0817   KLRA 24.51* 28.37* 28.59* 70.24* 63.78* 44.38*               Chest X-Ray - 2 view     Results for orders placed in visit on 12/19/22    XR CHEST 2 VIEWS    Status: Normal 12/19/2022    Narrative  EXAM: XR CHEST 2 VIEWS  LOCATION: [facility]  DATE/TIME: 12/19/2022 11:18 AM    INDICATION:  Acute cough.  COMPARISON: None.    Impression  IMPRESSION: Lungs are hyperexpanded, correlation with underlying emphysema necessary. No pleural effusion, pneumothorax, or abnormal area of consolidation. Moderate to severe scoliotic curvature, apex right, at the thoracolumbar junction.        Chest CT without Contrast       No results found for this or any previous visit.        PFTs     FVC%  No lab results found.    FEV1%  No lab results found.    DLCO%  No lab results found.          EKG       ECG results from 07/09/24   EKG 12-lead Adult      Value    Systolic Blood Pressure     Diastolic Blood Pressure     Ventricular Rate 58    Atrial Rate 58    UT Interval 146    QRS Duration 118        QTc 439    P Axis 41    R AXIS 21    T Axis 68    Interpretation ECG      Sinus bradycardia  Incomplete right bundle branch block  Borderline ECG  When compared with ECG of 15-MAY-2024 12:21,  Left anterior fascicular block is no longer Present  Minimal criteria for Septal infarct are no longer Present  Confirmed by fellow Jerad Lema ([id number]) on 7/10/2024 9:39:24 AM  Confirmed by MD GUTIERREZ DAVID () on 7/10/2024 11:06:41 AM       *Note: Due to a large number of results and/or encounters for the requested time period, some results have not been displayed. A complete set of results can be found in Results Review.         ECHOCARDIOGRAM       Results for orders placed in visit on 07/15/24    ECHOCARDIOGRAM COMPLETE    Status: Normal 7/15/2024    Narrative  [id number]  [id number]  [id number]  [id number]^QUANG^JUANJOSE    [facility]  Diagnostic and Treatment-3rd Floor  [address] [place] [address]    Name: STEPHANY GARCIA  MRN: [id number]  : 1950  Study Date: 07/15/2024 07:54 AM  Age: 73 yrs  Gender: Female  Patient Location: [facility]  Reason For Study: Examination prior to chemotherapy, Multiple myeloma not  having a  Ordering Physician: QUANG SHINE  Referring Physician: QUANG SHINE  Performed By: Levi Robles    BSA: 1.5 m2  Height: 61 in  Weight: 121 lb  HR: 65  BP: 146/76 mmHg  ______________________________________________________________________________  Procedure  Echocardiogram with two-dimensional, color and spectral Doppler performed.  ______________________________________________________________________________  Interpretation Summary  Left ventricular size, wall motion and function are normal. The ejection  fraction is 60-65%. Global peak LV longitudinal strain  is averaged at -27%.  This is within reported normal limits (normal <-18%).  Right ventricular function, chamber size, wall motion, and thickness are  normal.  Mild tricuspid insufficiency is present. The aorta root is normal. The  inferior vena cava was normal in size with preserved respiratory variability.  No pericardial effusion is present.    No significant changes noted.  ______________________________________________________________________________  Left Ventricle  Left ventricular size, wall motion and function are normal. The ejection  fraction is 60-65%. Global peak LV longitudinal strain is averaged at -27%.  This is within reported normal limits (normal <-18%). No regional wall motion  abnormalities are seen.    Right Ventricle  Right ventricular function, chamber size, wall motion, and thickness are  normal.    Atria  Both atria appear normal.    Mitral Valve  The mitral valve is normal. Trace mitral insufficiency is present.    Aortic Valve  Aortic valve is normal in structure and function. The aortic valve is  tricuspid. No aortic regurgitation is present.    Tricuspid Valve  The tricuspid valve is normal. Mild tricuspid insufficiency is present. The  right ventricular systolic pressure is approximated at 25.6 mmHg plus the  right atrial pressure.    Pulmonic Valve  The pulmonic valve is normal. Trace pulmonic insufficiency is present.    Vessels  The aorta root is normal. The inferior vena cava was normal in size with  preserved respiratory variability.    Pericardium  No pericardial effusion is present.    Compared to Previous Study  No significant changes noted.  ______________________________________________________________________________  MMode/2D Measurements & Calculations  IVSd: 0.89 cm    LVIDd: 3.8 cm  LVIDs: 2.1 cm  LVPWd: 1.0 cm  FS: 43.6 %  LV mass(C)d: 108.0 grams  LV mass(C)dI: 70.8 grams/m2  Ao root diam: 3.2 cm  LVOT diam: 1.8 cm  LVOT area: 2.7 cm2  Ao root diam index Ht(cm/m):  2.1  Ao root diam index BSA (cm/m2): 2.1  EF Biplane: 71.4 %  LA Volume (BP): 46.0 ml  LA Volume Index (BP): 30.1 ml/m2    RWT: 0.53  TAPSE: 2.4 cm    Doppler Measurements & Calculations  MV E max j luis: 77.7 cm/sec  MV A max j luis: 88.1 cm/sec  MV E/A: 0.88  MV dec slope: 348.5 cm/sec2  MV dec time: 0.22 sec  Ao V2 max: 125.4 cm/sec  Ao max P.3 mmHg  Ao V2 mean: 102.7 cm/sec  Ao mean P.4 mmHg  Ao V2 VTI: 30.1 cm  NALINI(I,D): 2.3 cm2  NALINI(V,D): 2.5 cm2  LV V1 max P.5 mmHg  LV V1 max: 117.1 cm/sec  LV V1 VTI: 25.4 cm  SV(LVOT): 67.7 ml  SI(LVOT): 44.4 ml/m2  TR max j luis: 253.1 cm/sec  TR max P.6 mmHg  AV J Luis Ratio (DI): 0.93  NALINI Index (cm2/m2): 1.5  E/E' av.9  Lateral E/e': 14.3  Medial E/e': 13.5  RV S J Luis: 13.4 cm/sec    ______________________________________________________________________________  Report approved by: Leonor BREWER 07/15/2024 10:26 AM        PET Scan       Results for orders placed during the hospital encounter of 24    PET ONCOLOGY WHOLE BODY    Status: Normal 2024    Narrative  Combined Report of: PET and CT on  2024 3:21 PM:    1. PET of the neck, chest, abdomen, and pelvis.  2. PET CT Fusion for Attenuation Correction and Anatomical  Localization:  3. 3D MIP and PET-CT fused images were processed on an independent  workstation and archived to PACS and reviewed by a radiologist.    Technique:    1. PET: The patient received 10.04 mCi of F-18-FDG; the serum glucose  was 86 mg/dL prior to administration, body weight was 55.7 kg. Images  were evaluated in the axial, sagittal, and coronal planes as well as  the rotational whole body MIP. Images were acquired from the Vertex to  the Feet.    UPTAKE WAS MEASURED AT 60 MINUTES.    2. CT: CT only obtained for attenuation correction and not diagnostic  purposes.    INDICATION: Examination prior to chemotherapy; Multiple myeloma not  having achieved remission (H); Personal history of diseases of blood  and  blood-forming organs    ADDITIONAL INFORMATION OBTAINED FROM EMR: Patient with history of  relapsed multiple myeloma s/p autologous stem cell transplant 3/27/14  on iastuximab + Pomalyst awaiting Carvykt CAR-T. History of breast  cancer 1994 s/p surgery and chemotherapy. Left posterior iliac crest  bone marrow biopsy 7/15/24 demonstrated plasma cell myeloma.    COMPARISON: PET/CT 4/23/2021.    FINDINGS:  BACKGROUND: Liver SUV max = 2.9, Aorta Blood SUV max = 2.5.    HEAD/NECK:  No abnormal uptake.    CHEST:  No abnormal uptake.    Left chest wall Port-a-Cath terminates in the SVC. Right mastectomy.  Biapical scarring. Similar scattered subcentimeter solid pulmonary  nodules, including 0.4 cm left lower lobe nodule (4/173) and 0.3 cm  right upper lobe nodule (4/112).    ABDOMEN AND PELVIS:  No abnormal uptake.    Aortobiiliac atherosclerotic calcifications.    LOWER EXTREMITIES:  No abnormal uptake.    BONES AND SOFT TISSUES:  New mild focal uptake associated with T12 left inferoposterior  endplate associated with 0.4 cm lytic lesion (4/174) with SUV max 2.8,  which appears more prominent than PET/CT 4/23/21. Increased adjacent  Schmorl's node deformity and degenerative changes.    Mild diffuse uptake along the left posterior iliac crest, likely  postprocedural inflammation secondary to recent bone marrow biopsy.    Scoliosis with similar compression fracture deformities and bilateral  rib fracture deformities.    Impression  IMPRESSION:    New mildly FDG avid left T12 lytic lesion, concerning for active  myelomatous disease with adjacent progressed degenerative changes.    LINDA MUSE MD      SYSTEM ID:  [id number]         MRI Brain       No results found for this or any previous visit.         CSF Studies       No lab results found.    No lab results found.    No lab results found.          BMT Auto Cell Therapy

## 2024-07-20 LAB
ABC CLONOSEQ B-CELL CLONALITY (ID) RESULT: NORMAL
ABC DOMINANT SEQUENCES (B-CELL): 2
ABC TOTAL NUCLEATED CELLS (B-CELL): NORMAL

## 2024-07-21 LAB
ABC 95% CONFIDENCE INTERVAL (B-CELL): NORMAL
ABC CLONOSEQ B-CELL TRACKING (MRD) RESULT: NORMAL
ABC DOMINANT SEQUENCES (B-CELL): 2
ABC RESIDUAL CLONAL CELLS/ MILL NUCLEATED CELLS BCELL: NORMAL PER MILLION NUCLEATED CELLS

## 2024-07-23 RX ORDER — ALBUTEROL SULFATE 90 UG/1
1-2 AEROSOL, METERED RESPIRATORY (INHALATION)
Status: CANCELLED
Start: 2024-07-24

## 2024-07-23 RX ORDER — SODIUM CHLORIDE 9 MG/ML
INJECTION, SOLUTION INTRAVENOUS CONTINUOUS
Status: CANCELLED | OUTPATIENT
Start: 2024-07-26

## 2024-07-23 RX ORDER — DIPHENHYDRAMINE HYDROCHLORIDE 50 MG/ML
50 INJECTION INTRAMUSCULAR; INTRAVENOUS
Status: CANCELLED
Start: 2024-07-26

## 2024-07-23 RX ORDER — MEPERIDINE HYDROCHLORIDE 25 MG/ML
25 INJECTION INTRAMUSCULAR; INTRAVENOUS; SUBCUTANEOUS EVERY 30 MIN PRN
Status: CANCELLED | OUTPATIENT
Start: 2024-07-26

## 2024-07-23 RX ORDER — ALBUTEROL SULFATE 90 UG/1
2 AEROSOL, METERED RESPIRATORY (INHALATION) ONCE
Status: CANCELLED
Start: 2024-07-24 | End: 2024-07-24

## 2024-07-23 RX ORDER — SODIUM CHLORIDE 9 MG/ML
INJECTION, SOLUTION INTRAVENOUS CONTINUOUS
Status: CANCELLED | OUTPATIENT
Start: 2024-07-24

## 2024-07-23 RX ORDER — SODIUM CHLORIDE 9 MG/ML
INJECTION, SOLUTION INTRAVENOUS CONTINUOUS
Status: CANCELLED | OUTPATIENT
Start: 2024-07-25

## 2024-07-23 RX ORDER — LORAZEPAM 2 MG/ML
0.5 INJECTION INTRAMUSCULAR EVERY 4 HOURS PRN
Status: CANCELLED | OUTPATIENT
Start: 2024-07-25

## 2024-07-23 RX ORDER — ONDANSETRON 2 MG/ML
8 INJECTION INTRAMUSCULAR; INTRAVENOUS ONCE
Status: CANCELLED | OUTPATIENT
Start: 2024-07-24

## 2024-07-23 RX ORDER — ONDANSETRON 2 MG/ML
8 INJECTION INTRAMUSCULAR; INTRAVENOUS ONCE
Status: CANCELLED | OUTPATIENT
Start: 2024-07-26

## 2024-07-23 RX ORDER — METHYLPREDNISOLONE SODIUM SUCCINATE 125 MG/2ML
125 INJECTION, POWDER, LYOPHILIZED, FOR SOLUTION INTRAMUSCULAR; INTRAVENOUS
Status: CANCELLED
Start: 2024-07-24

## 2024-07-23 RX ORDER — METHYLPREDNISOLONE SODIUM SUCCINATE 125 MG/2ML
125 INJECTION, POWDER, LYOPHILIZED, FOR SOLUTION INTRAMUSCULAR; INTRAVENOUS
Status: CANCELLED
Start: 2024-07-25

## 2024-07-23 RX ORDER — MEPERIDINE HYDROCHLORIDE 25 MG/ML
25 INJECTION INTRAMUSCULAR; INTRAVENOUS; SUBCUTANEOUS EVERY 30 MIN PRN
Status: CANCELLED | OUTPATIENT
Start: 2024-07-25

## 2024-07-23 RX ORDER — ALBUTEROL SULFATE 90 UG/1
1-2 AEROSOL, METERED RESPIRATORY (INHALATION)
Status: CANCELLED
Start: 2024-07-26

## 2024-07-23 RX ORDER — ALBUTEROL SULFATE 0.83 MG/ML
2.5 SOLUTION RESPIRATORY (INHALATION)
Status: CANCELLED | OUTPATIENT
Start: 2024-07-25

## 2024-07-23 RX ORDER — METHYLPREDNISOLONE SODIUM SUCCINATE 125 MG/2ML
125 INJECTION, POWDER, LYOPHILIZED, FOR SOLUTION INTRAMUSCULAR; INTRAVENOUS
Status: CANCELLED
Start: 2024-07-26

## 2024-07-23 RX ORDER — PENTAMIDINE ISETHIONATE 300 MG/300MG
300 INHALANT RESPIRATORY (INHALATION) ONCE
Status: CANCELLED | OUTPATIENT
Start: 2024-07-24 | End: 2024-07-24

## 2024-07-23 RX ORDER — DIPHENHYDRAMINE HYDROCHLORIDE 50 MG/ML
50 INJECTION INTRAMUSCULAR; INTRAVENOUS
Status: CANCELLED
Start: 2024-07-25

## 2024-07-23 RX ORDER — MEPERIDINE HYDROCHLORIDE 25 MG/ML
25 INJECTION INTRAMUSCULAR; INTRAVENOUS; SUBCUTANEOUS EVERY 30 MIN PRN
Status: CANCELLED | OUTPATIENT
Start: 2024-07-24

## 2024-07-23 RX ORDER — EPINEPHRINE 1 MG/ML
0.3 INJECTION, SOLUTION INTRAMUSCULAR; SUBCUTANEOUS EVERY 5 MIN PRN
Status: CANCELLED | OUTPATIENT
Start: 2024-07-26

## 2024-07-23 RX ORDER — HEPARIN SODIUM,PORCINE 10 UNIT/ML
5-20 VIAL (ML) INTRAVENOUS DAILY PRN
Status: CANCELLED | OUTPATIENT
Start: 2024-07-24

## 2024-07-23 RX ORDER — EPINEPHRINE 1 MG/ML
0.3 INJECTION, SOLUTION INTRAMUSCULAR; SUBCUTANEOUS EVERY 5 MIN PRN
Status: CANCELLED | OUTPATIENT
Start: 2024-07-24

## 2024-07-23 RX ORDER — EPINEPHRINE 1 MG/ML
0.3 INJECTION, SOLUTION INTRAMUSCULAR; SUBCUTANEOUS EVERY 5 MIN PRN
Status: CANCELLED | OUTPATIENT
Start: 2024-07-25

## 2024-07-23 RX ORDER — HEPARIN SODIUM (PORCINE) LOCK FLUSH IV SOLN 100 UNIT/ML 100 UNIT/ML
5 SOLUTION INTRAVENOUS
Status: CANCELLED | OUTPATIENT
Start: 2024-07-26

## 2024-07-23 RX ORDER — ALBUTEROL SULFATE 0.83 MG/ML
2.5 SOLUTION RESPIRATORY (INHALATION)
Status: CANCELLED | OUTPATIENT
Start: 2024-07-26

## 2024-07-23 RX ORDER — ALBUTEROL SULFATE 90 UG/1
1-2 AEROSOL, METERED RESPIRATORY (INHALATION)
Status: CANCELLED
Start: 2024-07-25

## 2024-07-23 RX ORDER — HEPARIN SODIUM,PORCINE 10 UNIT/ML
5-20 VIAL (ML) INTRAVENOUS DAILY PRN
Status: CANCELLED | OUTPATIENT
Start: 2024-07-25

## 2024-07-23 RX ORDER — HEPARIN SODIUM (PORCINE) LOCK FLUSH IV SOLN 100 UNIT/ML 100 UNIT/ML
5 SOLUTION INTRAVENOUS
Status: CANCELLED | OUTPATIENT
Start: 2024-07-24

## 2024-07-23 RX ORDER — LORAZEPAM 2 MG/ML
0.5 INJECTION INTRAMUSCULAR EVERY 4 HOURS PRN
Status: CANCELLED | OUTPATIENT
Start: 2024-07-24

## 2024-07-23 RX ORDER — LORAZEPAM 2 MG/ML
0.5 INJECTION INTRAMUSCULAR EVERY 4 HOURS PRN
Status: CANCELLED | OUTPATIENT
Start: 2024-07-26

## 2024-07-23 RX ORDER — ALBUTEROL SULFATE 0.83 MG/ML
2.5 SOLUTION RESPIRATORY (INHALATION)
Status: CANCELLED | OUTPATIENT
Start: 2024-07-24

## 2024-07-23 RX ORDER — ONDANSETRON 2 MG/ML
8 INJECTION INTRAMUSCULAR; INTRAVENOUS ONCE
Status: CANCELLED | OUTPATIENT
Start: 2024-07-25

## 2024-07-23 RX ORDER — HEPARIN SODIUM,PORCINE 10 UNIT/ML
5-20 VIAL (ML) INTRAVENOUS DAILY PRN
Status: CANCELLED | OUTPATIENT
Start: 2024-07-26

## 2024-07-23 RX ORDER — DIPHENHYDRAMINE HYDROCHLORIDE 50 MG/ML
50 INJECTION INTRAMUSCULAR; INTRAVENOUS
Status: CANCELLED
Start: 2024-07-24

## 2024-07-23 RX ORDER — HEPARIN SODIUM (PORCINE) LOCK FLUSH IV SOLN 100 UNIT/ML 100 UNIT/ML
5 SOLUTION INTRAVENOUS
Status: CANCELLED | OUTPATIENT
Start: 2024-07-25

## 2024-07-24 ENCOUNTER — INFUSION THERAPY VISIT (OUTPATIENT)
Dept: TRANSPLANT | Facility: CLINIC | Age: 74
End: 2024-07-24
Attending: STUDENT IN AN ORGANIZED HEALTH CARE EDUCATION/TRAINING PROGRAM
Payer: MEDICARE

## 2024-07-24 ENCOUNTER — APPOINTMENT (OUTPATIENT)
Dept: LAB | Facility: CLINIC | Age: 74
End: 2024-07-24
Attending: STUDENT IN AN ORGANIZED HEALTH CARE EDUCATION/TRAINING PROGRAM
Payer: MEDICARE

## 2024-07-24 VITALS
OXYGEN SATURATION: 100 % | RESPIRATION RATE: 16 BRPM | TEMPERATURE: 97.9 F | BODY MASS INDEX: 23.13 KG/M2 | SYSTOLIC BLOOD PRESSURE: 123 MMHG | DIASTOLIC BLOOD PRESSURE: 68 MMHG | HEART RATE: 79 BPM | WEIGHT: 122.5 LBS

## 2024-07-24 DIAGNOSIS — C90.00 MULTIPLE MYELOMA NOT HAVING ACHIEVED REMISSION (H): Primary | ICD-10-CM

## 2024-07-24 DIAGNOSIS — C90.02 MULTIPLE MYELOMA IN RELAPSE (H): ICD-10-CM

## 2024-07-24 DIAGNOSIS — Z94.84 STEM CELLS TRANSPLANT STATUS (H): ICD-10-CM

## 2024-07-24 LAB
ALBUMIN SERPL BCG-MCNC: 4.3 G/DL (ref 3.5–5.2)
ALP SERPL-CCNC: 50 U/L (ref 40–150)
ALT SERPL W P-5'-P-CCNC: 24 U/L (ref 0–50)
ANION GAP SERPL CALCULATED.3IONS-SCNC: 10 MMOL/L (ref 7–15)
AST SERPL W P-5'-P-CCNC: 22 U/L (ref 0–45)
BASOPHILS # BLD AUTO: 0.1 10E3/UL (ref 0–0.2)
BASOPHILS NFR BLD AUTO: 2 %
BILIRUB SERPL-MCNC: 0.3 MG/DL
BUN SERPL-MCNC: 16 MG/DL (ref 8–23)
CALCIUM SERPL-MCNC: 9.7 MG/DL (ref 8.8–10.4)
CHLORIDE SERPL-SCNC: 102 MMOL/L (ref 98–107)
CREAT SERPL-MCNC: 0.85 MG/DL (ref 0.51–0.95)
CRP SERPL-MCNC: <3 MG/L
EGFRCR SERPLBLD CKD-EPI 2021: 72 ML/MIN/1.73M2
EOSINOPHIL # BLD AUTO: 0.1 10E3/UL (ref 0–0.7)
EOSINOPHIL NFR BLD AUTO: 2 %
ERYTHROCYTE [DISTWIDTH] IN BLOOD BY AUTOMATED COUNT: 14.6 % (ref 10–15)
FERRITIN SERPL-MCNC: 14 NG/ML (ref 11–328)
GLUCOSE SERPL-MCNC: 80 MG/DL (ref 70–99)
HCO3 SERPL-SCNC: 26 MMOL/L (ref 22–29)
HCT VFR BLD AUTO: 35.9 % (ref 35–47)
HGB BLD-MCNC: 12 G/DL (ref 11.7–15.7)
IMM GRANULOCYTES # BLD: 0 10E3/UL
IMM GRANULOCYTES NFR BLD: 0 %
LDH SERPL L TO P-CCNC: 174 U/L (ref 0–250)
LYMPHOCYTES # BLD AUTO: 1.2 10E3/UL (ref 0.8–5.3)
LYMPHOCYTES NFR BLD AUTO: 35 %
MAGNESIUM SERPL-MCNC: 1.9 MG/DL (ref 1.7–2.3)
MCH RBC QN AUTO: 33 PG (ref 26.5–33)
MCHC RBC AUTO-ENTMCNC: 33.4 G/DL (ref 31.5–36.5)
MCV RBC AUTO: 99 FL (ref 78–100)
MONOCYTES # BLD AUTO: 0.6 10E3/UL (ref 0–1.3)
MONOCYTES NFR BLD AUTO: 18 %
NEUTROPHILS # BLD AUTO: 1.5 10E3/UL (ref 1.6–8.3)
NEUTROPHILS NFR BLD AUTO: 43 %
NRBC # BLD AUTO: 0 10E3/UL
NRBC BLD AUTO-RTO: 0 /100
PHOSPHATE SERPL-MCNC: 4.2 MG/DL (ref 2.5–4.5)
PLATELET # BLD AUTO: 263 10E3/UL (ref 150–450)
POTASSIUM SERPL-SCNC: 4 MMOL/L (ref 3.4–5.3)
PROT SERPL-MCNC: 6.9 G/DL (ref 6.4–8.3)
RBC # BLD AUTO: 3.64 10E6/UL (ref 3.8–5.2)
SODIUM SERPL-SCNC: 138 MMOL/L (ref 135–145)
URATE SERPL-MCNC: 4.2 MG/DL (ref 2.4–5.7)
WBC # BLD AUTO: 3.5 10E3/UL (ref 4–11)

## 2024-07-24 PROCEDURE — 36591 DRAW BLOOD OFF VENOUS DEVICE: CPT | Performed by: STUDENT IN AN ORGANIZED HEALTH CARE EDUCATION/TRAINING PROGRAM

## 2024-07-24 PROCEDURE — 84100 ASSAY OF PHOSPHORUS: CPT | Performed by: STUDENT IN AN ORGANIZED HEALTH CARE EDUCATION/TRAINING PROGRAM

## 2024-07-24 PROCEDURE — 99215 OFFICE O/P EST HI 40 MIN: CPT

## 2024-07-24 PROCEDURE — G2211 COMPLEX E/M VISIT ADD ON: HCPCS

## 2024-07-24 PROCEDURE — 86140 C-REACTIVE PROTEIN: CPT | Performed by: STUDENT IN AN ORGANIZED HEALTH CARE EDUCATION/TRAINING PROGRAM

## 2024-07-24 PROCEDURE — 80053 COMPREHEN METABOLIC PANEL: CPT | Performed by: STUDENT IN AN ORGANIZED HEALTH CARE EDUCATION/TRAINING PROGRAM

## 2024-07-24 PROCEDURE — 96417 CHEMO IV INFUS EACH ADDL SEQ: CPT

## 2024-07-24 PROCEDURE — 84550 ASSAY OF BLOOD/URIC ACID: CPT | Performed by: STUDENT IN AN ORGANIZED HEALTH CARE EDUCATION/TRAINING PROGRAM

## 2024-07-24 PROCEDURE — 82728 ASSAY OF FERRITIN: CPT | Performed by: STUDENT IN AN ORGANIZED HEALTH CARE EDUCATION/TRAINING PROGRAM

## 2024-07-24 PROCEDURE — 96375 TX/PRO/DX INJ NEW DRUG ADDON: CPT

## 2024-07-24 PROCEDURE — 85025 COMPLETE CBC W/AUTO DIFF WBC: CPT | Performed by: STUDENT IN AN ORGANIZED HEALTH CARE EDUCATION/TRAINING PROGRAM

## 2024-07-24 PROCEDURE — 250N000011 HC RX IP 250 OP 636: Performed by: STUDENT IN AN ORGANIZED HEALTH CARE EDUCATION/TRAINING PROGRAM

## 2024-07-24 PROCEDURE — 96413 CHEMO IV INFUSION 1 HR: CPT

## 2024-07-24 PROCEDURE — 258N000003 HC RX IP 258 OP 636: Performed by: STUDENT IN AN ORGANIZED HEALTH CARE EDUCATION/TRAINING PROGRAM

## 2024-07-24 PROCEDURE — 94640 AIRWAY INHALATION TREATMENT: CPT | Performed by: STUDENT IN AN ORGANIZED HEALTH CARE EDUCATION/TRAINING PROGRAM

## 2024-07-24 PROCEDURE — G0463 HOSPITAL OUTPT CLINIC VISIT: HCPCS

## 2024-07-24 PROCEDURE — 94642 AEROSOL INHALATION TREATMENT: CPT | Performed by: STUDENT IN AN ORGANIZED HEALTH CARE EDUCATION/TRAINING PROGRAM

## 2024-07-24 PROCEDURE — 83615 LACTATE (LD) (LDH) ENZYME: CPT | Performed by: STUDENT IN AN ORGANIZED HEALTH CARE EDUCATION/TRAINING PROGRAM

## 2024-07-24 PROCEDURE — 83735 ASSAY OF MAGNESIUM: CPT | Performed by: STUDENT IN AN ORGANIZED HEALTH CARE EDUCATION/TRAINING PROGRAM

## 2024-07-24 PROCEDURE — 250N000011 HC RX IP 250 OP 636: Performed by: NURSE PRACTITIONER

## 2024-07-24 PROCEDURE — 96361 HYDRATE IV INFUSION ADD-ON: CPT

## 2024-07-24 RX ORDER — ALLOPURINOL 300 MG/1
300 TABLET ORAL DAILY
Qty: 14 TABLET | Refills: 0 | Status: ON HOLD | OUTPATIENT
Start: 2024-07-24 | End: 2024-07-29

## 2024-07-24 RX ORDER — ONDANSETRON 2 MG/ML
8 INJECTION INTRAMUSCULAR; INTRAVENOUS ONCE
Status: COMPLETED | OUTPATIENT
Start: 2024-07-24 | End: 2024-07-24

## 2024-07-24 RX ORDER — PENICILLIN V POTASSIUM 500 MG/1
500 TABLET, FILM COATED ORAL 2 TIMES DAILY
Qty: 60 TABLET | Refills: 1 | Status: ON HOLD | OUTPATIENT
Start: 2024-07-24 | End: 2024-08-05

## 2024-07-24 RX ORDER — ACYCLOVIR 800 MG/1
800 TABLET ORAL EVERY 12 HOURS
Qty: 60 TABLET | Refills: 0 | Status: ON HOLD | OUTPATIENT
Start: 2024-07-24 | End: 2024-07-29

## 2024-07-24 RX ORDER — HEPARIN SODIUM (PORCINE) LOCK FLUSH IV SOLN 100 UNIT/ML 100 UNIT/ML
5 SOLUTION INTRAVENOUS
Status: DISCONTINUED | OUTPATIENT
Start: 2024-07-24 | End: 2024-07-24 | Stop reason: HOSPADM

## 2024-07-24 RX ORDER — ACYCLOVIR 400 MG/1
800 TABLET ORAL EVERY 12 HOURS
COMMUNITY
Start: 2024-07-24 | End: 2024-09-05

## 2024-07-24 RX ORDER — FLUCONAZOLE 100 MG/1
100 TABLET ORAL DAILY
Qty: 30 TABLET | Refills: 0 | Status: ON HOLD | OUTPATIENT
Start: 2024-07-24 | End: 2024-07-29

## 2024-07-24 RX ORDER — SODIUM CHLORIDE 9 MG/ML
INJECTION, SOLUTION INTRAVENOUS CONTINUOUS
Status: DISCONTINUED | OUTPATIENT
Start: 2024-07-24 | End: 2024-07-24 | Stop reason: HOSPADM

## 2024-07-24 RX ORDER — PENTAMIDINE ISETHIONATE 300 MG/300MG
300 INHALANT RESPIRATORY (INHALATION) ONCE
Status: COMPLETED | OUTPATIENT
Start: 2024-07-24 | End: 2024-07-24

## 2024-07-24 RX ADMIN — Medication 5 ML: at 08:43

## 2024-07-24 RX ADMIN — CYCLOPHOSPHAMIDE 455 MG: 500 INJECTION, POWDER, FOR SOLUTION INTRAVENOUS; ORAL at 09:54

## 2024-07-24 RX ADMIN — ONDANSETRON 8 MG: 2 INJECTION INTRAMUSCULAR; INTRAVENOUS at 09:23

## 2024-07-24 RX ADMIN — SODIUM CHLORIDE: 9 INJECTION, SOLUTION INTRAVENOUS at 08:46

## 2024-07-24 RX ADMIN — PENTAMIDINE ISETHIONATE 300 MG: 300 INHALANT RESPIRATORY (INHALATION) at 13:01

## 2024-07-24 RX ADMIN — FLUDARABINE PHOSPHATE 46 MG: 25 INJECTION, SOLUTION INTRAVENOUS at 11:09

## 2024-07-24 ASSESSMENT — PAIN SCALES - GENERAL: PAINLEVEL: MODERATE PAIN (4)

## 2024-07-24 NOTE — LETTER
7/24/2024      Shena Hartmann  [address] [place] [address]      Dear Colleague,    Thank you for referring your patient, Shena Hartmann, to the [facility] [place]. Please see a copy of my visit note below.    BMT/Cell Therapy       Shena Hartmann is a 73 year old female for Carvykti, here to start LD chemo. No new complaints      Diagnosis and Treatment Summary     Disease presentation and baseline characteristics:  Breast cancer dx in 1994. Underwent surgery and chemotherapy without reoccurence  MGUS dx 1999  T8 pathologic fracture with radiation  Revlimid and velcade,   2013  Cytoxan IV 9/2013  D-PACE 11/2013  Auto 3/27/14  5/2014 thalidomide caused rash so switched to pomalidomide   on kenalog and clobetasol creams for IMID rash  FLC began to rise so riky added to pom 9/2020  she has been on xgeva for an unclear amount of time  Teodora-Kd 2/20/2023  Kyprolis held 10/23 due to side effects (N/V). Continue Teodora/dex         Treatment Plan: OP ONC Multiple Myeloma Bortezomib (Subcutaneous)  Chemotherapy: bortezomib (VELCADE) 2 mg CHEMOTHERAPY - SUBCUTANEOUS INJECTION  Administration: 2 mg (2/28/2013), 2 mg (3/7/2013), 2 mg (3/14/2013), 2 mg (3/21/2013), 2 mg (3/28/2013), 2 mg (4/4/2013), 2 mg (4/11/2013), 2 mg (4/18/2013), 2 mg (4/25/2013), 2 mg (5/2/2013), 2 mg (5/9/2013), 2 mg (5/16/2013), 2 mg (5/23/2013), 2 mg (5/30/2013), 2 mg (6/6/2013), 2 mg (6/13/2013), 2 mg (6/20/2013), 2 mg (6/27/2013), 2 mg (7/3/2013), 2 mg (7/11/2013), 2 mg (7/18/2013), 2 mg (7/25/2013), 2 mg (8/1/2013), 2 mg (8/8/2013), 2 mg (8/15/2013), 2 mg (8/22/2013), 2 mg (8/29/2013), 2 mg (9/5/2013)    Treatment Plan Start Date: 2/28/2013  Treatment Plan Last Day: 9/5/2013      Treatment Plan: IP High-Dose Cyclophosphamide for Multiple Myeloma  Chemotherapy: mesna (MESNEX) 910 mg in NaCl 0.9% 64 mL infusion  Administration: 910 mg (9/10/2013), 910 mg (9/10/2013), 910 mg (9/11/2013), 910  mg (10/7/2013), 910 mg (10/7/2013), 910 mg (10/8/2013)    cyclophosphamide (CYTOXAN) 4,560 mg in NaCl 0.9% 500 mL CHEMOTHERAPY  Administration: 4,560 mg (9/10/2013), 4,560 mg (10/7/2013)    Treatment Plan Start Date: 9/10/2013  Treatment Plan Last Day:  (Planned)      Treatment Plan: IP High-Dose Cyclophosphamide for Multiple Myeloma  Chemotherapy: [No matching medication found in this treatment plan]  Treatment Plan Start Date:   Treatment Plan Last Day:       Treatment Plan: IP ONC Multiple Myeloma - D-PACE (CISplatin / Cyclophosphamide / Etoposide / DOXOrubicin)   Chemotherapy: CISplatin (PLATINOL) 15 mg, cyclophosphamide (CYTOXAN) 600 mg, etoposide (TOPOSAR) 60 mg in NaCl 0.9% 1,098 mL CHEMOTHERAPY  Administration: 15 mg (11/4/2013), 15 mg (11/5/2013), 15 mg (11/6/2013), 15 mg (11/7/2013), 15 mg (12/5/2013), 15 mg (12/6/2013), 15 mg (12/7/2013), 15 mg (12/8/2013)    DOXOrubicin (ADRIAMYCIN) 15 mg in NaCl 0.9% 558 mL CHEMOTHERAPY  Administration: 15 mg (11/4/2013), 15 mg (11/5/2013), 15 mg (11/6/2013), 15 mg (11/7/2013), 15 mg (12/5/2013), 15 mg (12/6/2013), 15 mg (12/7/2013), 15 mg (12/8/2013)    Treatment Plan Start Date: 11/4/2013  Treatment Plan Last Day: 12/11/2013      Treatment Plan: IP BMT 2003-13 Priming Multiple Myeloma - ADULT (Version: 03/11/10)  Chemotherapy: mesna (MESNEX) 1,220 mg in NaCl 0.9% 67 mL infusion  Administration: 1,220 mg (2/20/2014), 1,220 mg (2/20/2014), 1,220 mg (2/20/2014), 1,220 mg (2/21/2014), 1,220 mg (2/21/2014)    cyclophosphamide (CYTOXAN) 6,000 mg in NaCl 0.9% 500 mL CHEMOTHERAPY  Administration: 6,000 mg (2/20/2014)    Treatment Plan Start Date: 2/20/2014  Treatment Plan Last Day: 2/20/2014      Treatment Plan: OP BMT Disease Specific Multiple Myeloma and Plasma Cell Leukemia  Chemotherapy: [No matching medication found in this treatment plan]  Treatment Plan Start Date: 4/7/2014  Treatment Plan Last Day: 2/17/2016      Treatment Plan: IP BMT Infection Prophylaxis AUTOLOGUS  - Adult  Chemotherapy: [No matching medication found in this treatment plan]  Treatment Plan Start Date: 3/21/2014  Treatment Plan Last Day: 3/21/2014      Treatment Plan: OP BMT 2009-22R Immune Reconstitution - ADULT & PEDS (Version: 6/6/2012)  Chemotherapy: [No matching medication found in this treatment plan]  Treatment Plan Start Date: 3/25/2014  Treatment Plan Last Day: 3/15/2016      Treatment Plan: IP - OP BMT 2003-13 Auto Multiple Myeloma - ADULT (Version: 03/11/10)  Chemotherapy: MELPHalan (ALKERAN) 296 mg in NaCl 0.9% 148 mL CHEMOTHERAPY  Administration: 296 mg (3/25/2014)    Treatment Plan Start Date: 3/24/2014  Treatment Plan Last Day: 8/21/2015      Treatment Plan: OP ONC Pomalidomide MONTHLY  Chemotherapy: pomalidomide (POMALYST) 1 MG capsule    Treatment Plan Start Date: 6/19/2020  Treatment Plan Last Day: 3/22/2023 (Planned)      Treatment Plan: OP ONC Multiple Myeloma - Daratumumab Hyaluronidase (Darzalex Faspro)  Chemotherapy: daratumumab-hyaluronidase-fihj (DARZALEX FASPRO) SUBCUTANEOUS injection 1,800 mg  Administration: 1,800 mg (9/1/2020), 1,800 mg (9/8/2020), 1,800 mg (9/14/2020), 1,800 mg (9/21/2020), 1,800 mg (9/28/2020), 1,800 mg (11/2/2020), 1,800 mg (3/15/2021), 1,800 mg (10/5/2020), 1,800 mg (10/12/2020), 1,800 mg (10/19/2020), 1,800 mg (11/16/2020), 1,800 mg (11/30/2020), 1,800 mg (12/14/2020), 1,800 mg (12/28/2020), 1,800 mg (1/11/2021), 1,800 mg (1/25/2021), 1,800 mg (2/8/2021), 1,800 mg (4/12/2021), 1,800 mg (5/10/2021), 1,800 mg (6/7/2021), 1,800 mg (7/5/2021), 1,800 mg (8/2/2021), 1,800 mg (8/30/2021), 1,800 mg (9/27/2021), 1,800 mg (10/25/2021), 1,800 mg (11/22/2021), 1,800 mg (12/20/2021), 1,800 mg (1/24/2022), 1,800 mg (2/21/2022), 1,800 mg (3/21/2022), 1,800 mg (4/19/2022), 1,800 mg (5/16/2022), 1,800 mg (6/13/2022), 1,800 mg (7/11/2022), 1,800 mg (8/8/2022), 1,800 mg (9/6/2022), 1,800 mg (10/3/2022), 1,800 mg (10/31/2022), 1,800 mg (11/28/2022), 1,800 mg  (1/23/2023)    Treatment Plan Start Date: 9/1/2020  Treatment Plan Last Day: 1/23/2023      Treatment Plan: OP ONC Multiple Myeloma - Isatuximab Maintenance  Chemotherapy: pomalidomide (POMALYST) 3 MG capsule    carfilzomib (KYPROLIS) 30 mg in D5W 70 mL infusion  Administration: 30 mg (2/20/2023), 90 mg (2/27/2023), 90 mg (3/7/2023), 90 mg (3/20/2023), 90 mg (3/27/2023), 90 mg (4/4/2023), 90 mg (4/18/2023), 90 mg (4/24/2023), 90 mg (5/1/2023), 90 mg (5/16/2023), 90 mg (5/30/2023), 90 mg (6/13/2023), 90 mg (7/5/2023), 90 mg (7/19/2023), 90 mg (8/1/2023), 90 mg (9/11/2023)    isatuximab-irfc (SARCLISA) 500 mg in sodium chloride 0.9 % 250 mL infusion  Administration: 500 mg (2/20/2023), 500 mg (2/27/2023), 500 mg (3/7/2023), 500 mg (3/14/2023), 500 mg (3/20/2023), 500 mg (4/4/2023), 500 mg (4/18/2023), 500 mg (5/1/2023), 500 mg (5/16/2023), 500 mg (5/30/2023), 500 mg (6/13/2023), 500 mg (7/5/2023), 500 mg (7/19/2023), 500 mg (8/1/2023), 500 mg (9/11/2023), 500 mg (10/9/2023), 500 mg (11/6/2023), 500 mg (12/4/2023), 500 mg (1/15/2024), 500 mg (2/19/2024), 500 mg (5/6/2024), 500 mg (6/10/2024), 500 mg (4/10/2024)    Treatment Plan Start Date: 2/20/2023  Treatment Plan Last Day: 6/10/2024      Treatment Plan: OP BMT 2017-45G - Carvykti for MM - Cyclophosphamide / Fludarabine - Adult ARM F (Version: 9/20/23)  Chemotherapy: FLUdarabine (FLUDARA) 46 mg in sodium chloride 0.9 % 101.84 mL infusion    cycloPHOSphamide (CYTOXAN) 455 mg in sodium chloride 0.9 % 272.75 mL infusion    Treatment Plan Start Date: 7/24/2024 (Planned)  Treatment Plan Last Day: 7/26/2024 (Planned)        HPI:  Seen in infusion prior to LD chemo. No new complaints today. Ready to proceed.         ROS:    10 point ROS neg other than the symptoms noted above in the HPI.      Physical Exam:     Vital Signs: /68 (BP Location: Left arm, Patient Position: Sitting, Cuff Size: Adult Regular)   Pulse 79   Temp 97.9  F (36.6  C) (Oral)   Resp 16   Wt  55.6 kg (122 lb 8 oz)   LMP  (LMP Unknown)   SpO2 100%   BMI 23.13 kg/m      Constitutional: NAD  Eyes: no scleral icterus  ENT: masked  Pulm: CTAB  CV: RRR, no m/r/g  GI: abdomen flat  MSK: No edema in the extremities  Neuro: alert, conversant   Psych: appropriate mood and affect      Plan: Carvykti for MM    BMT:  D-5 Cytoxan/fludarabine  D-4 Cytoxan/fludarabine  D-3 Cytoxan/fludarabine    Allopurinol for TLS x14d startigng 7/24    ID: Prophy released: pentamidine, Pen VK (tendonitis with quinolones), fluc, acyclovir    FEN: IVF NS at 250 1hr pre and post cytoxan today.           I spent 40 minutes in the care of this patient today, which included time necessary for preparation for the visit, obtaining history, ordering medications/tests/procedures as medically indicated, review of pertinent medical literature, counseling of the patient, communication of recommendations to the care team, and documentation time.    The longitudinal plan of care for the diagnosis(es)/condition(s) as documented were addressed during this visit. Due to the added complexity in care, I will continue to support Shena in the subsequent management and with ongoing continuity of care.      MARINO Khalil CNP          Blood Counts

## 2024-07-24 NOTE — NURSING NOTE
This writer notified pharmacist Fabien Pichardo that this patient is starting lymphodepleting chemotherapy in preparation for Carvykti and will need to have a reserve of 2 doses of tocilizumab available for the next 4 weeks.     This writer has released the nursing communication orders in the treatment plan in addition to this note to verify this information was relayed to pharmacy staff.     Yusra Nava RN

## 2024-07-24 NOTE — NURSING NOTE
"Oncology Rooming Note    July 24, 2024 8:58 AM   Shena Hartmann is a 73 year old female who presents for:    Chief Complaint   Patient presents with    Port Draw     Vitals taken, port accessed labs drawn, heparin locked, checked into next appt    Oncology Clinic Visit     Return; hx MM     Initial Vitals: /68 (BP Location: Left arm, Patient Position: Sitting, Cuff Size: Adult Regular)   Pulse 79   Temp 97.9  F (36.6  C) (Oral)   Resp 16   Wt 55.6 kg (122 lb 8 oz)   LMP  (LMP Unknown)   SpO2 100%   BMI 23.13 kg/m   Estimated body mass index is 23.13 kg/m  as calculated from the following:    Height as of 5/22/24: 1.55 m (5' 1.02\").    Weight as of this encounter: 55.6 kg (122 lb 8 oz). Body surface area is 1.55 meters squared.  Moderate Pain (4) Comment: all over muscles   No LMP recorded (lmp unknown). Patient is postmenopausal.  Allergies reviewed: Yes  Medications reviewed: Yes    Medications: Medication refills not needed today.  Pharmacy name entered into Itaro:    [id number] - [place], [place] - [address] AT [facility] [address]  WRITTEN PRESCRIPTION REQUESTED  [facility] - [place], [place] - [address]    Frailty Screening:   Is the patient here for a new oncology consult visit in cancer care? 2. No      Clinical concerns: None.       Caitie Piper RN              "

## 2024-07-24 NOTE — PHARMACY-CONSULT NOTE
Shena Hartmann is a 73 year old year old female that will be undergoing CAR-T cell therapy for the treatment of multiple myeloma.    CAR-T cell product:  Carvykti  Anticipated date of infusion:  7/29    CAR-T cell therapy is associated with a high incidence of cytokine release syndrome (CRS), which can be very severe.  Tocilizumab is an IL-6 receptor antagonist that is commonly utilized to treat CRS.  I have verified that a minimum of 2 patient specific doses are on site in the inpatient pharmacy and available for use for Shena Hartmann for the treatment of CAR-T cell associated CRS.  If a dose of tocilizumab is needed in the outpatient clinic, it is available as a standard stock item.    Patient Weight: 55.6 kg  Tocilizumab dose (8 mg/kg): 480 mg   Tocilizumab supply:  use commercially available supply    Tocilizumab should be ordered by a provider that is familiar with the treatment of CAR-T cell therapy associated CRS.  Appropriate treatment guidelines should be followed to ensure appropriate treatment of CRS.    Pharmacy will continue to monitor the use of tocilizumab for this patient and procure additional supply as needed.    Thank you,  Fabien Pichardo, PharmD

## 2024-07-24 NOTE — PROGRESS NOTES
BMT/Cell Therapy       Shena Hartmann is a 73 year old female for Sravan, here to start LD chemo. No new complaints      Diagnosis and Treatment Summary     Disease presentation and baseline characteristics:  Breast cancer dx in 1994. Underwent surgery and chemotherapy without reoccurence  MGUS dx 1999  T8 pathologic fracture with radiation  Revlimid and velcade,   2013  Cytoxan IV 9/2013  D-PACE 11/2013  Auto 3/27/14  5/2014 thalidomide caused rash so switched to pomalidomide   on kenalog and clobetasol creams for IMID rash  FLC began to rise so riky added to pom 9/2020  she has been on xgeva for an unclear amount of time  Teodora-Kd 2/20/2023  Kyprolis held 10/23 due to side effects (N/V). Continue Teodora/dex         Treatment Plan: OP ONC Multiple Myeloma Bortezomib (Subcutaneous)  Chemotherapy: bortezomib (VELCADE) 2 mg CHEMOTHERAPY - SUBCUTANEOUS INJECTION  Administration: 2 mg (2/28/2013), 2 mg (3/7/2013), 2 mg (3/14/2013), 2 mg (3/21/2013), 2 mg (3/28/2013), 2 mg (4/4/2013), 2 mg (4/11/2013), 2 mg (4/18/2013), 2 mg (4/25/2013), 2 mg (5/2/2013), 2 mg (5/9/2013), 2 mg (5/16/2013), 2 mg (5/23/2013), 2 mg (5/30/2013), 2 mg (6/6/2013), 2 mg (6/13/2013), 2 mg (6/20/2013), 2 mg (6/27/2013), 2 mg (7/3/2013), 2 mg (7/11/2013), 2 mg (7/18/2013), 2 mg (7/25/2013), 2 mg (8/1/2013), 2 mg (8/8/2013), 2 mg (8/15/2013), 2 mg (8/22/2013), 2 mg (8/29/2013), 2 mg (9/5/2013)    Treatment Plan Start Date: 2/28/2013  Treatment Plan Last Day: 9/5/2013      Treatment Plan: IP High-Dose Cyclophosphamide for Multiple Myeloma  Chemotherapy: mesna (MESNEX) 910 mg in NaCl 0.9% 64 mL infusion  Administration: 910 mg (9/10/2013), 910 mg (9/10/2013), 910 mg (9/11/2013), 910 mg (10/7/2013), 910 mg (10/7/2013), 910 mg (10/8/2013)    cyclophosphamide (CYTOXAN) 4,560 mg in NaCl 0.9% 500 mL CHEMOTHERAPY  Administration: 4,560 mg (9/10/2013), 4,560 mg (10/7/2013)    Treatment Plan Start Date: 9/10/2013  Treatment Plan Last Day:   (Planned)      Treatment Plan: IP High-Dose Cyclophosphamide for Multiple Myeloma  Chemotherapy: [No matching medication found in this treatment plan]  Treatment Plan Start Date:   Treatment Plan Last Day:       Treatment Plan: IP ONC Multiple Myeloma - D-PACE (CISplatin / Cyclophosphamide / Etoposide / DOXOrubicin)   Chemotherapy: CISplatin (PLATINOL) 15 mg, cyclophosphamide (CYTOXAN) 600 mg, etoposide (TOPOSAR) 60 mg in NaCl 0.9% 1,098 mL CHEMOTHERAPY  Administration: 15 mg (11/4/2013), 15 mg (11/5/2013), 15 mg (11/6/2013), 15 mg (11/7/2013), 15 mg (12/5/2013), 15 mg (12/6/2013), 15 mg (12/7/2013), 15 mg (12/8/2013)    DOXOrubicin (ADRIAMYCIN) 15 mg in NaCl 0.9% 558 mL CHEMOTHERAPY  Administration: 15 mg (11/4/2013), 15 mg (11/5/2013), 15 mg (11/6/2013), 15 mg (11/7/2013), 15 mg (12/5/2013), 15 mg (12/6/2013), 15 mg (12/7/2013), 15 mg (12/8/2013)    Treatment Plan Start Date: 11/4/2013  Treatment Plan Last Day: 12/11/2013      Treatment Plan: IP BMT 2003-13 Priming Multiple Myeloma - ADULT (Version: 03/11/10)  Chemotherapy: mesna (MESNEX) 1,220 mg in NaCl 0.9% 67 mL infusion  Administration: 1,220 mg (2/20/2014), 1,220 mg (2/20/2014), 1,220 mg (2/20/2014), 1,220 mg (2/21/2014), 1,220 mg (2/21/2014)    cyclophosphamide (CYTOXAN) 6,000 mg in NaCl 0.9% 500 mL CHEMOTHERAPY  Administration: 6,000 mg (2/20/2014)    Treatment Plan Start Date: 2/20/2014  Treatment Plan Last Day: 2/20/2014      Treatment Plan: OP BMT Disease Specific Multiple Myeloma and Plasma Cell Leukemia  Chemotherapy: [No matching medication found in this treatment plan]  Treatment Plan Start Date: 4/7/2014  Treatment Plan Last Day: 2/17/2016      Treatment Plan: IP BMT Infection Prophylaxis AUTOLOGUS - Adult  Chemotherapy: [No matching medication found in this treatment plan]  Treatment Plan Start Date: 3/21/2014  Treatment Plan Last Day: 3/21/2014      Treatment Plan: OP BMT 2009-22R Immune Reconstitution - ADULT & PEDS (Version:  6/6/2012)  Chemotherapy: [No matching medication found in this treatment plan]  Treatment Plan Start Date: 3/25/2014  Treatment Plan Last Day: 3/15/2016      Treatment Plan: IP - OP BMT 2003-13 Auto Multiple Myeloma - ADULT (Version: 03/11/10)  Chemotherapy: MELPHalan (ALKERAN) 296 mg in NaCl 0.9% 148 mL CHEMOTHERAPY  Administration: 296 mg (3/25/2014)    Treatment Plan Start Date: 3/24/2014  Treatment Plan Last Day: 8/21/2015      Treatment Plan: OP ONC Pomalidomide MONTHLY  Chemotherapy: pomalidomide (POMALYST) 1 MG capsule    Treatment Plan Start Date: 6/19/2020  Treatment Plan Last Day: 3/22/2023 (Planned)      Treatment Plan: OP ONC Multiple Myeloma - Daratumumab Hyaluronidase (Darzalex Faspro)  Chemotherapy: daratumumab-hyaluronidase-fihj (DARZALEX FASPRO) SUBCUTANEOUS injection 1,800 mg  Administration: 1,800 mg (9/1/2020), 1,800 mg (9/8/2020), 1,800 mg (9/14/2020), 1,800 mg (9/21/2020), 1,800 mg (9/28/2020), 1,800 mg (11/2/2020), 1,800 mg (3/15/2021), 1,800 mg (10/5/2020), 1,800 mg (10/12/2020), 1,800 mg (10/19/2020), 1,800 mg (11/16/2020), 1,800 mg (11/30/2020), 1,800 mg (12/14/2020), 1,800 mg (12/28/2020), 1,800 mg (1/11/2021), 1,800 mg (1/25/2021), 1,800 mg (2/8/2021), 1,800 mg (4/12/2021), 1,800 mg (5/10/2021), 1,800 mg (6/7/2021), 1,800 mg (7/5/2021), 1,800 mg (8/2/2021), 1,800 mg (8/30/2021), 1,800 mg (9/27/2021), 1,800 mg (10/25/2021), 1,800 mg (11/22/2021), 1,800 mg (12/20/2021), 1,800 mg (1/24/2022), 1,800 mg (2/21/2022), 1,800 mg (3/21/2022), 1,800 mg (4/19/2022), 1,800 mg (5/16/2022), 1,800 mg (6/13/2022), 1,800 mg (7/11/2022), 1,800 mg (8/8/2022), 1,800 mg (9/6/2022), 1,800 mg (10/3/2022), 1,800 mg (10/31/2022), 1,800 mg (11/28/2022), 1,800 mg (1/23/2023)    Treatment Plan Start Date: 9/1/2020  Treatment Plan Last Day: 1/23/2023      Treatment Plan: OP ONC Multiple Myeloma - Isatuximab Maintenance  Chemotherapy: pomalidomide (POMALYST) 3 MG capsule    carfilzomib (KYPROLIS) 30 mg in D5W 70 mL  infusion  Administration: 30 mg (2/20/2023), 90 mg (2/27/2023), 90 mg (3/7/2023), 90 mg (3/20/2023), 90 mg (3/27/2023), 90 mg (4/4/2023), 90 mg (4/18/2023), 90 mg (4/24/2023), 90 mg (5/1/2023), 90 mg (5/16/2023), 90 mg (5/30/2023), 90 mg (6/13/2023), 90 mg (7/5/2023), 90 mg (7/19/2023), 90 mg (8/1/2023), 90 mg (9/11/2023)    isatuximab-irfc (SARCLISA) 500 mg in sodium chloride 0.9 % 250 mL infusion  Administration: 500 mg (2/20/2023), 500 mg (2/27/2023), 500 mg (3/7/2023), 500 mg (3/14/2023), 500 mg (3/20/2023), 500 mg (4/4/2023), 500 mg (4/18/2023), 500 mg (5/1/2023), 500 mg (5/16/2023), 500 mg (5/30/2023), 500 mg (6/13/2023), 500 mg (7/5/2023), 500 mg (7/19/2023), 500 mg (8/1/2023), 500 mg (9/11/2023), 500 mg (10/9/2023), 500 mg (11/6/2023), 500 mg (12/4/2023), 500 mg (1/15/2024), 500 mg (2/19/2024), 500 mg (5/6/2024), 500 mg (6/10/2024), 500 mg (4/10/2024)    Treatment Plan Start Date: 2/20/2023  Treatment Plan Last Day: 6/10/2024      Treatment Plan: OP BMT 2017-45G - Carvykti for MM - Cyclophosphamide / Fludarabine - Adult ARM F (Version: 9/20/23)  Chemotherapy: FLUdarabine (FLUDARA) 46 mg in sodium chloride 0.9 % 101.84 mL infusion    cycloPHOSphamide (CYTOXAN) 455 mg in sodium chloride 0.9 % 272.75 mL infusion    Treatment Plan Start Date: 7/24/2024 (Planned)  Treatment Plan Last Day: 7/26/2024 (Planned)        HPI:  Seen in infusion prior to LD chemo. No new complaints today. Ready to proceed.         ROS:    10 point ROS neg other than the symptoms noted above in the HPI.      Physical Exam:     Vital Signs: /68 (BP Location: Left arm, Patient Position: Sitting, Cuff Size: Adult Regular)   Pulse 79   Temp 97.9  F (36.6  C) (Oral)   Resp 16   Wt 55.6 kg (122 lb 8 oz)   LMP  (LMP Unknown)   SpO2 100%   BMI 23.13 kg/m      Constitutional: NAD  Eyes: no scleral icterus  ENT: masked  Pulm: CTAB  CV: RRR, no m/r/g  GI: abdomen flat  MSK: No edema in the extremities  Neuro: alert, conversant   Psych:  appropriate mood and affect      Plan: Carvykti for MM    BMT:  D-5 Cytoxan/fludarabine  D-4 Cytoxan/fludarabine  D-3 Cytoxan/fludarabine    Allopurinol for TLS x14d startigng 7/24    ID: Prophy released: pentamidine, Pen VK (tendonitis with quinolones), fluc, acyclovir    FEN: IVF NS at 250 1hr pre and post cytoxan today.           I spent 40 minutes in the care of this patient today, which included time necessary for preparation for the visit, obtaining history, ordering medications/tests/procedures as medically indicated, review of pertinent medical literature, counseling of the patient, communication of recommendations to the care team, and documentation time.    The longitudinal plan of care for the diagnosis(es)/condition(s) as documented were addressed during this visit. Due to the added complexity in care, I will continue to support Shena in the subsequent management and with ongoing continuity of care.      Mae Llamas APRN CNP          Blood Counts

## 2024-07-24 NOTE — PROGRESS NOTES
Infusion Nursing Note:  Shena Hartmann presents today for Day -5 LD chemo.    Patient seen by provider today: Yes: Kayy Llamas CNP   present during visit today: Not Applicable.    Note: VSS. Meds and allergies reviewed. Pt assessed by provider prior to receiving chemo. Pt received 0.9% NS at 250 mL/hr starting 1 hour prior to chemo and continued for 1 hour post Cytoxan. Pt premedicated with 8 mg Zofran IVP. Pt received 455 mg Cytoxan IV followed by 46 mg Fludarabine IV. Infusions completed without incident. Labs monitored; no additional infusion needs identified.      Intravenous Access:  Implanted Port.    Treatment Conditions:  Not Applicable.      Post Infusion Assessment:  Patient tolerated infusion without incident.  Blood return noted pre and post infusion.  Site patent and intact, free from redness, edema or discomfort.  No evidence of extravasations.  Access discontinued per protocol.       Discharge Plan:   Patient discharged in stable condition accompanied by: .  Departure Mode: Ambulatory.      Caitie Piper RN

## 2024-07-24 NOTE — PROGRESS NOTES
Shena Hartmann was seen today for a Pentamidine nebulizer tx ordered by Dr. Nighat Chavez.    Patient was first given 2.5 mg of albuterol nebulizer, after which Pentamidine 300 mg (Lot # [id number]) mixed with 6cc Sterile H20 was administered through a filtered nebulizer.    Pre-treatment: SpO2 = 97%   HR = 74   BBS = Clear   Post-treatment: SpO2 = 97%  HR = 83  BBS = Clear    No adverse side effects noted by the patient.    This service today was provided under the direct supervision of Dr. Cantu, who was available if needed.     Procedure was completed by Vijay Whittaker.

## 2024-07-24 NOTE — NURSING NOTE
Chief Complaint   Patient presents with    Port Draw     Vitals taken, port accessed labs drawn, heparin locked, checked into next appt     /68 (BP Location: Left arm, Patient Position: Sitting, Cuff Size: Adult Regular)   Pulse 79   Temp 97.9  F (36.6  C) (Oral)   Resp 16   Wt 55.6 kg (122 lb 8 oz)   LMP  (LMP Unknown)   SpO2 100%   BMI 23.13 kg/m    Guerrero Ramos RN on 7/24/2024 at 8:45 AM

## 2024-07-25 ENCOUNTER — TELEPHONE (OUTPATIENT)
Dept: INTERVENTIONAL RADIOLOGY/VASCULAR | Facility: CLINIC | Age: 74
End: 2024-07-25

## 2024-07-25 ENCOUNTER — INFUSION THERAPY VISIT (OUTPATIENT)
Dept: TRANSPLANT | Facility: CLINIC | Age: 74
End: 2024-07-25
Attending: STUDENT IN AN ORGANIZED HEALTH CARE EDUCATION/TRAINING PROGRAM
Payer: MEDICARE

## 2024-07-25 ENCOUNTER — APPOINTMENT (OUTPATIENT)
Dept: LAB | Facility: CLINIC | Age: 74
End: 2024-07-25
Attending: STUDENT IN AN ORGANIZED HEALTH CARE EDUCATION/TRAINING PROGRAM
Payer: MEDICARE

## 2024-07-25 VITALS
HEART RATE: 71 BPM | TEMPERATURE: 97.8 F | RESPIRATION RATE: 16 BRPM | BODY MASS INDEX: 23.52 KG/M2 | OXYGEN SATURATION: 98 % | SYSTOLIC BLOOD PRESSURE: 144 MMHG | DIASTOLIC BLOOD PRESSURE: 65 MMHG | WEIGHT: 124.6 LBS

## 2024-07-25 DIAGNOSIS — Z00.6 EXAMINATION OF PARTICIPANT IN CLINICAL TRIAL: Primary | ICD-10-CM

## 2024-07-25 DIAGNOSIS — Z94.84 STEM CELLS TRANSPLANT STATUS (H): ICD-10-CM

## 2024-07-25 DIAGNOSIS — C90.00 MULTIPLE MYELOMA NOT HAVING ACHIEVED REMISSION (H): Primary | ICD-10-CM

## 2024-07-25 LAB
ALBUMIN SERPL BCG-MCNC: 4 G/DL (ref 3.5–5.2)
ALP SERPL-CCNC: 45 U/L (ref 40–150)
ALT SERPL W P-5'-P-CCNC: 20 U/L (ref 0–50)
ANION GAP SERPL CALCULATED.3IONS-SCNC: 11 MMOL/L (ref 7–15)
AST SERPL W P-5'-P-CCNC: 22 U/L (ref 0–45)
BASOPHILS # BLD AUTO: 0 10E3/UL (ref 0–0.2)
BASOPHILS NFR BLD AUTO: 1 %
BILIRUB SERPL-MCNC: 0.3 MG/DL
BUN SERPL-MCNC: 11 MG/DL (ref 8–23)
CALCIUM SERPL-MCNC: 9 MG/DL (ref 8.8–10.4)
CHLORIDE SERPL-SCNC: 98 MMOL/L (ref 98–107)
CREAT SERPL-MCNC: 0.81 MG/DL (ref 0.51–0.95)
EGFRCR SERPLBLD CKD-EPI 2021: 76 ML/MIN/1.73M2
EOSINOPHIL # BLD AUTO: 0.1 10E3/UL (ref 0–0.7)
EOSINOPHIL NFR BLD AUTO: 3 %
ERYTHROCYTE [DISTWIDTH] IN BLOOD BY AUTOMATED COUNT: 14.5 % (ref 10–15)
GLUCOSE SERPL-MCNC: 81 MG/DL (ref 70–99)
HCO3 SERPL-SCNC: 24 MMOL/L (ref 22–29)
HCT VFR BLD AUTO: 32.7 % (ref 35–47)
HGB BLD-MCNC: 10.9 G/DL (ref 11.7–15.7)
IMM GRANULOCYTES # BLD: 0 10E3/UL
IMM GRANULOCYTES NFR BLD: 0 %
LYMPHOCYTES # BLD AUTO: 0.6 10E3/UL (ref 0.8–5.3)
LYMPHOCYTES NFR BLD AUTO: 22 %
MAGNESIUM SERPL-MCNC: 1.9 MG/DL (ref 1.7–2.3)
MCH RBC QN AUTO: 32.9 PG (ref 26.5–33)
MCHC RBC AUTO-ENTMCNC: 33.3 G/DL (ref 31.5–36.5)
MCV RBC AUTO: 99 FL (ref 78–100)
MONOCYTES # BLD AUTO: 0.3 10E3/UL (ref 0–1.3)
MONOCYTES NFR BLD AUTO: 12 %
NEUTROPHILS # BLD AUTO: 1.7 10E3/UL (ref 1.6–8.3)
NEUTROPHILS NFR BLD AUTO: 62 %
NRBC # BLD AUTO: 0 10E3/UL
NRBC BLD AUTO-RTO: 0 /100
PHOSPHATE SERPL-MCNC: 3.4 MG/DL (ref 2.5–4.5)
PLATELET # BLD AUTO: 227 10E3/UL (ref 150–450)
POTASSIUM SERPL-SCNC: 4.3 MMOL/L (ref 3.4–5.3)
PROT SERPL-MCNC: 6.3 G/DL (ref 6.4–8.3)
RBC # BLD AUTO: 3.31 10E6/UL (ref 3.8–5.2)
SODIUM SERPL-SCNC: 133 MMOL/L (ref 135–145)
URATE SERPL-MCNC: 2.9 MG/DL (ref 2.4–5.7)
WBC # BLD AUTO: 2.8 10E3/UL (ref 4–11)

## 2024-07-25 PROCEDURE — 96417 CHEMO IV INFUS EACH ADDL SEQ: CPT

## 2024-07-25 PROCEDURE — 96361 HYDRATE IV INFUSION ADD-ON: CPT

## 2024-07-25 PROCEDURE — 36591 DRAW BLOOD OFF VENOUS DEVICE: CPT | Performed by: STUDENT IN AN ORGANIZED HEALTH CARE EDUCATION/TRAINING PROGRAM

## 2024-07-25 PROCEDURE — 85025 COMPLETE CBC W/AUTO DIFF WBC: CPT | Performed by: STUDENT IN AN ORGANIZED HEALTH CARE EDUCATION/TRAINING PROGRAM

## 2024-07-25 PROCEDURE — 250N000011 HC RX IP 250 OP 636: Performed by: STUDENT IN AN ORGANIZED HEALTH CARE EDUCATION/TRAINING PROGRAM

## 2024-07-25 PROCEDURE — 96413 CHEMO IV INFUSION 1 HR: CPT

## 2024-07-25 PROCEDURE — 84550 ASSAY OF BLOOD/URIC ACID: CPT | Performed by: STUDENT IN AN ORGANIZED HEALTH CARE EDUCATION/TRAINING PROGRAM

## 2024-07-25 PROCEDURE — 82374 ASSAY BLOOD CARBON DIOXIDE: CPT | Performed by: STUDENT IN AN ORGANIZED HEALTH CARE EDUCATION/TRAINING PROGRAM

## 2024-07-25 PROCEDURE — 96375 TX/PRO/DX INJ NEW DRUG ADDON: CPT

## 2024-07-25 PROCEDURE — 82040 ASSAY OF SERUM ALBUMIN: CPT | Performed by: STUDENT IN AN ORGANIZED HEALTH CARE EDUCATION/TRAINING PROGRAM

## 2024-07-25 PROCEDURE — 258N000003 HC RX IP 258 OP 636: Performed by: STUDENT IN AN ORGANIZED HEALTH CARE EDUCATION/TRAINING PROGRAM

## 2024-07-25 PROCEDURE — 84100 ASSAY OF PHOSPHORUS: CPT | Performed by: STUDENT IN AN ORGANIZED HEALTH CARE EDUCATION/TRAINING PROGRAM

## 2024-07-25 PROCEDURE — 83735 ASSAY OF MAGNESIUM: CPT | Performed by: STUDENT IN AN ORGANIZED HEALTH CARE EDUCATION/TRAINING PROGRAM

## 2024-07-25 RX ORDER — HEPARIN SODIUM,PORCINE 10 UNIT/ML
5 VIAL (ML) INTRAVENOUS DAILY
Qty: 70 ML | Refills: 2 | Status: ON HOLD | OUTPATIENT
Start: 2024-07-25 | End: 2024-08-05

## 2024-07-25 RX ORDER — ONDANSETRON 2 MG/ML
8 INJECTION INTRAMUSCULAR; INTRAVENOUS ONCE
Status: COMPLETED | OUTPATIENT
Start: 2024-07-25 | End: 2024-07-25

## 2024-07-25 RX ORDER — SODIUM CHLORIDE 9 MG/ML
INJECTION, SOLUTION INTRAVENOUS CONTINUOUS
Status: DISCONTINUED | OUTPATIENT
Start: 2024-07-25 | End: 2024-07-25 | Stop reason: HOSPADM

## 2024-07-25 RX ADMIN — ONDANSETRON 8 MG: 2 INJECTION INTRAMUSCULAR; INTRAVENOUS at 09:03

## 2024-07-25 RX ADMIN — SODIUM CHLORIDE: 9 INJECTION, SOLUTION INTRAVENOUS at 09:03

## 2024-07-25 RX ADMIN — FLUDARABINE PHOSPHATE 46 MG: 25 INJECTION, SOLUTION INTRAVENOUS at 11:20

## 2024-07-25 RX ADMIN — CYCLOPHOSPHAMIDE 455 MG: 500 INJECTION, POWDER, FOR SOLUTION INTRAVENOUS; ORAL at 10:06

## 2024-07-25 ASSESSMENT — PAIN SCALES - GENERAL: PAINLEVEL: SEVERE PAIN (6)

## 2024-07-25 NOTE — PROGRESS NOTES
Infusion Nursing Note:  Shena Hartmann presents today for day -4 flu/cy.    Patient seen by provider today: No   present during visit today: Not Applicable.    Note: Shena here feeling well, despite some fatigue and GI upset. Received 250ml/hr continuous NS flush, 8mg IV zofran as premed, followed by cytoxan and fludarabine. Tolerated infusion without difficulty.      Intravenous Access:  Implanted Port.    Treatment Conditions:  Lab Results   Component Value Date    HGB 10.9 (L) 07/25/2024    WBC 2.8 (L) 07/25/2024    ANEU 2.1 07/05/2021    ANEUTAUTO 1.7 07/25/2024     07/25/2024        Lab Results   Component Value Date     (L) 07/25/2024    POTASSIUM 4.3 07/25/2024    MAG 1.9 07/25/2024    CR 0.81 07/25/2024    PAULINE 9.0 07/25/2024    BILITOTAL 0.3 07/25/2024    ALBUMIN 4.0 07/25/2024    ALT 20 07/25/2024    AST 22 07/25/2024         Post Infusion Assessment:  Patient tolerated infusion without incident.  Blood return noted pre and post infusion.  Site patent and intact, free from redness, edema or discomfort.  No evidence of extravasations.  Access discontinued per protocol.       Discharge Plan:   Patient discharged in stable condition accompanied by: .  Departure Mode: Ambulatory.      Yusra Nava RN

## 2024-07-25 NOTE — NURSING NOTE
Maria Eugenia Murdokc RN on 7/25/2024 at 8:52 AM  Chief Complaint   Patient presents with    Port Draw     Power needle, heparin locked, vitals checked

## 2024-07-26 ENCOUNTER — MEDICAL CORRESPONDENCE (OUTPATIENT)
Dept: TRANSPLANT | Facility: CLINIC | Age: 74
End: 2024-07-26

## 2024-07-26 ENCOUNTER — APPOINTMENT (OUTPATIENT)
Dept: LAB | Facility: CLINIC | Age: 74
End: 2024-07-26
Attending: STUDENT IN AN ORGANIZED HEALTH CARE EDUCATION/TRAINING PROGRAM
Payer: MEDICARE

## 2024-07-26 ENCOUNTER — INFUSION THERAPY VISIT (OUTPATIENT)
Dept: TRANSPLANT | Facility: CLINIC | Age: 74
End: 2024-07-26
Attending: STUDENT IN AN ORGANIZED HEALTH CARE EDUCATION/TRAINING PROGRAM
Payer: MEDICARE

## 2024-07-26 ENCOUNTER — HOSPITAL ENCOUNTER (OUTPATIENT)
Facility: CLINIC | Age: 74
Discharge: HOME | End: 2024-07-26
Attending: STUDENT IN AN ORGANIZED HEALTH CARE EDUCATION/TRAINING PROGRAM | Admitting: STUDENT IN AN ORGANIZED HEALTH CARE EDUCATION/TRAINING PROGRAM
Payer: MEDICARE

## 2024-07-26 ENCOUNTER — TELEPHONE (OUTPATIENT)
Dept: TRANSPLANT | Facility: CLINIC | Age: 74
End: 2024-07-26

## 2024-07-26 ENCOUNTER — DOCUMENTATION ONLY (OUTPATIENT)
Dept: ONCOLOGY | Facility: CLINIC | Age: 74
End: 2024-07-26

## 2024-07-26 ENCOUNTER — APPOINTMENT (OUTPATIENT)
Dept: INTERVENTIONAL RADIOLOGY/VASCULAR | Facility: CLINIC | Age: 74
End: 2024-07-26
Attending: STUDENT IN AN ORGANIZED HEALTH CARE EDUCATION/TRAINING PROGRAM
Payer: MEDICARE

## 2024-07-26 VITALS
SYSTOLIC BLOOD PRESSURE: 132 MMHG | DIASTOLIC BLOOD PRESSURE: 72 MMHG | HEART RATE: 79 BPM | RESPIRATION RATE: 16 BRPM | BODY MASS INDEX: 23.01 KG/M2 | OXYGEN SATURATION: 96 % | WEIGHT: 121.9 LBS | TEMPERATURE: 98.1 F

## 2024-07-26 VITALS
HEART RATE: 68 BPM | RESPIRATION RATE: 13 BRPM | OXYGEN SATURATION: 97 % | DIASTOLIC BLOOD PRESSURE: 69 MMHG | SYSTOLIC BLOOD PRESSURE: 164 MMHG

## 2024-07-26 DIAGNOSIS — Z01.818 EXAMINATION PRIOR TO CHEMOTHERAPY: ICD-10-CM

## 2024-07-26 DIAGNOSIS — Z94.84 STEM CELLS TRANSPLANT STATUS (H): ICD-10-CM

## 2024-07-26 DIAGNOSIS — Z86.2 PERSONAL HISTORY OF DISEASES OF BLOOD AND BLOOD-FORMING ORGANS: ICD-10-CM

## 2024-07-26 DIAGNOSIS — C90.00 MULTIPLE MYELOMA NOT HAVING ACHIEVED REMISSION (H): Primary | ICD-10-CM

## 2024-07-26 DIAGNOSIS — Z79.899 ENCOUNTER FOR LONG-TERM (CURRENT) USE OF MEDICATIONS: ICD-10-CM

## 2024-07-26 DIAGNOSIS — C90.00 MULTIPLE MYELOMA NOT HAVING ACHIEVED REMISSION (H): ICD-10-CM

## 2024-07-26 LAB
ALBUMIN SERPL BCG-MCNC: 4.3 G/DL (ref 3.5–5.2)
ALP SERPL-CCNC: 50 U/L (ref 40–150)
ALT SERPL W P-5'-P-CCNC: 21 U/L (ref 0–50)
ANION GAP SERPL CALCULATED.3IONS-SCNC: 11 MMOL/L (ref 7–15)
AST SERPL W P-5'-P-CCNC: 21 U/L (ref 0–45)
BASOPHILS # BLD AUTO: 0 10E3/UL (ref 0–0.2)
BASOPHILS NFR BLD AUTO: 1 %
BILIRUB SERPL-MCNC: 0.3 MG/DL
BUN SERPL-MCNC: 13.2 MG/DL (ref 8–23)
CALCIUM SERPL-MCNC: 9.3 MG/DL (ref 8.8–10.4)
CHLORIDE SERPL-SCNC: 98 MMOL/L (ref 98–107)
CREAT SERPL-MCNC: 0.83 MG/DL (ref 0.51–0.95)
EGFRCR SERPLBLD CKD-EPI 2021: 74 ML/MIN/1.73M2
EOSINOPHIL # BLD AUTO: 0.1 10E3/UL (ref 0–0.7)
EOSINOPHIL NFR BLD AUTO: 2 %
ERYTHROCYTE [DISTWIDTH] IN BLOOD BY AUTOMATED COUNT: 14.3 % (ref 10–15)
GLUCOSE SERPL-MCNC: 91 MG/DL (ref 70–99)
HCO3 SERPL-SCNC: 25 MMOL/L (ref 22–29)
HCT VFR BLD AUTO: 33 % (ref 35–47)
HGB BLD-MCNC: 11.2 G/DL (ref 11.7–15.7)
IMM GRANULOCYTES # BLD: 0 10E3/UL
IMM GRANULOCYTES NFR BLD: 0 %
LDH SERPL L TO P-CCNC: 172 U/L (ref 0–250)
LYMPHOCYTES # BLD AUTO: 0.1 10E3/UL (ref 0.8–5.3)
LYMPHOCYTES NFR BLD AUTO: 3 %
MAGNESIUM SERPL-MCNC: 2 MG/DL (ref 1.7–2.3)
MCH RBC QN AUTO: 33.1 PG (ref 26.5–33)
MCHC RBC AUTO-ENTMCNC: 33.9 G/DL (ref 31.5–36.5)
MCV RBC AUTO: 98 FL (ref 78–100)
MONOCYTES # BLD AUTO: 0.1 10E3/UL (ref 0–1.3)
MONOCYTES NFR BLD AUTO: 4 %
NEUTROPHILS # BLD AUTO: 2.6 10E3/UL (ref 1.6–8.3)
NEUTROPHILS NFR BLD AUTO: 90 %
NRBC # BLD AUTO: 0 10E3/UL
NRBC BLD AUTO-RTO: 0 /100
PHOSPHATE SERPL-MCNC: 3.1 MG/DL (ref 2.5–4.5)
PLATELET # BLD AUTO: 245 10E3/UL (ref 150–450)
POTASSIUM SERPL-SCNC: 3.8 MMOL/L (ref 3.4–5.3)
PROT SERPL-MCNC: 6.7 G/DL (ref 6.4–8.3)
RBC # BLD AUTO: 3.38 10E6/UL (ref 3.8–5.2)
SODIUM SERPL-SCNC: 134 MMOL/L (ref 135–145)
URATE SERPL-MCNC: 2.6 MG/DL (ref 2.4–5.7)
WBC # BLD AUTO: 2.9 10E3/UL (ref 4–11)

## 2024-07-26 PROCEDURE — 84550 ASSAY OF BLOOD/URIC ACID: CPT | Performed by: STUDENT IN AN ORGANIZED HEALTH CARE EDUCATION/TRAINING PROGRAM

## 2024-07-26 PROCEDURE — 85025 COMPLETE CBC W/AUTO DIFF WBC: CPT | Performed by: STUDENT IN AN ORGANIZED HEALTH CARE EDUCATION/TRAINING PROGRAM

## 2024-07-26 PROCEDURE — 36591 DRAW BLOOD OFF VENOUS DEVICE: CPT | Performed by: STUDENT IN AN ORGANIZED HEALTH CARE EDUCATION/TRAINING PROGRAM

## 2024-07-26 PROCEDURE — 84155 ASSAY OF PROTEIN SERUM: CPT | Performed by: STUDENT IN AN ORGANIZED HEALTH CARE EDUCATION/TRAINING PROGRAM

## 2024-07-26 PROCEDURE — 96361 HYDRATE IV INFUSION ADD-ON: CPT

## 2024-07-26 PROCEDURE — 84100 ASSAY OF PHOSPHORUS: CPT | Performed by: STUDENT IN AN ORGANIZED HEALTH CARE EDUCATION/TRAINING PROGRAM

## 2024-07-26 PROCEDURE — C1751 CATH, INF, PER/CENT/MIDLINE: HCPCS

## 2024-07-26 PROCEDURE — 83735 ASSAY OF MAGNESIUM: CPT | Performed by: STUDENT IN AN ORGANIZED HEALTH CARE EDUCATION/TRAINING PROGRAM

## 2024-07-26 PROCEDURE — 96375 TX/PRO/DX INJ NEW DRUG ADDON: CPT | Mod: 25

## 2024-07-26 PROCEDURE — 83615 LACTATE (LD) (LDH) ENZYME: CPT

## 2024-07-26 PROCEDURE — 250N000011 HC RX IP 250 OP 636: Performed by: STUDENT IN AN ORGANIZED HEALTH CARE EDUCATION/TRAINING PROGRAM

## 2024-07-26 PROCEDURE — 36573 INSJ PICC RS&I 5 YR+: CPT

## 2024-07-26 PROCEDURE — 272N000504 HC NEEDLE CR4

## 2024-07-26 PROCEDURE — 250N000009 HC RX 250: Performed by: STUDENT IN AN ORGANIZED HEALTH CARE EDUCATION/TRAINING PROGRAM

## 2024-07-26 PROCEDURE — 36573 INSJ PICC RS&I 5 YR+: CPT | Mod: LT | Performed by: STUDENT IN AN ORGANIZED HEALTH CARE EDUCATION/TRAINING PROGRAM

## 2024-07-26 PROCEDURE — 84460 ALANINE AMINO (ALT) (SGPT): CPT | Performed by: STUDENT IN AN ORGANIZED HEALTH CARE EDUCATION/TRAINING PROGRAM

## 2024-07-26 PROCEDURE — 96417 CHEMO IV INFUS EACH ADDL SEQ: CPT

## 2024-07-26 PROCEDURE — 258N000003 HC RX IP 258 OP 636: Performed by: STUDENT IN AN ORGANIZED HEALTH CARE EDUCATION/TRAINING PROGRAM

## 2024-07-26 PROCEDURE — 96413 CHEMO IV INFUSION 1 HR: CPT

## 2024-07-26 RX ORDER — LIDOCAINE HYDROCHLORIDE 10 MG/ML
1-30 INJECTION, SOLUTION EPIDURAL; INFILTRATION; INTRACAUDAL; PERINEURAL
Status: COMPLETED | OUTPATIENT
Start: 2024-07-26 | End: 2024-07-26

## 2024-07-26 RX ORDER — HEPARIN SODIUM,PORCINE 10 UNIT/ML
5 VIAL (ML) INTRAVENOUS
Status: COMPLETED | OUTPATIENT
Start: 2024-07-26 | End: 2024-07-26

## 2024-07-26 RX ORDER — HEPARIN SODIUM,PORCINE 10 UNIT/ML
5-20 VIAL (ML) INTRAVENOUS EVERY 24 HOURS
Status: CANCELLED | OUTPATIENT
Start: 2024-07-26

## 2024-07-26 RX ORDER — HEPARIN SODIUM (PORCINE) LOCK FLUSH IV SOLN 100 UNIT/ML 100 UNIT/ML
5 SOLUTION INTRAVENOUS ONCE
Status: COMPLETED | OUTPATIENT
Start: 2024-07-26 | End: 2024-07-26

## 2024-07-26 RX ORDER — SODIUM CHLORIDE 9 MG/ML
INJECTION, SOLUTION INTRAVENOUS CONTINUOUS
Status: DISCONTINUED | OUTPATIENT
Start: 2024-07-26 | End: 2024-07-26 | Stop reason: HOSPADM

## 2024-07-26 RX ORDER — ONDANSETRON 2 MG/ML
8 INJECTION INTRAMUSCULAR; INTRAVENOUS ONCE
Status: COMPLETED | OUTPATIENT
Start: 2024-07-26 | End: 2024-07-26

## 2024-07-26 RX ORDER — HEPARIN SODIUM,PORCINE 10 UNIT/ML
5-20 VIAL (ML) INTRAVENOUS
Status: CANCELLED | OUTPATIENT
Start: 2024-07-26

## 2024-07-26 RX ADMIN — Medication 5 ML: at 10:35

## 2024-07-26 RX ADMIN — LIDOCAINE HYDROCHLORIDE 3 ML: 10 INJECTION, SOLUTION EPIDURAL; INFILTRATION; INTRACAUDAL; PERINEURAL at 08:34

## 2024-07-26 RX ADMIN — ONDANSETRON 8 MG: 2 INJECTION INTRAMUSCULAR; INTRAVENOUS at 10:37

## 2024-07-26 RX ADMIN — SODIUM CHLORIDE: 9 INJECTION, SOLUTION INTRAVENOUS at 10:36

## 2024-07-26 RX ADMIN — CYCLOPHOSPHAMIDE 455 MG: 500 INJECTION, POWDER, FOR SOLUTION INTRAVENOUS; ORAL at 11:55

## 2024-07-26 RX ADMIN — Medication 2 ML: at 09:02

## 2024-07-26 RX ADMIN — FLUDARABINE PHOSPHATE 46 MG: 25 INJECTION, SOLUTION INTRAVENOUS at 13:11

## 2024-07-26 ASSESSMENT — ACTIVITIES OF DAILY LIVING (ADL)
ADLS_ACUITY_SCORE: 37
ADLS_ACUITY_SCORE: 37

## 2024-07-26 ASSESSMENT — PAIN SCALES - GENERAL: PAINLEVEL: MILD PAIN (3)

## 2024-07-26 NOTE — PROCEDURES
[facility]    Procedure: IR Procedure Note    Date/Time: 7/26/2024 9:25 AM    Performed by: Elvin Bowen MD  Authorized by: Elvin Bowen MD  IR Fellow Physician: Elvin Bowen  Other(s) attending procedure: Jim Kim      UNIVERSAL PROTOCOL   Site Marked: NA  Prior Images Obtained and Reviewed:  Yes  Required items: Required blood products, implants, devices and special equipment available    Patient identity confirmed:  Verbally with patient, arm band, provided demographic data and hospital-assigned identification number  Patient was reevaluated immediately before administering moderate or deep sedation or anesthesia  Confirmation Checklist:  Patient's identity using two indicators, relevant allergies, procedure was appropriate and matched the consent or emergent situation and correct equipment/implants were available  Time out: Immediately prior to the procedure a time out was called    Universal Protocol: the Joint Commission Universal Protocol was followed    Preparation: Patient was prepped and draped in usual sterile fashion       ANESTHESIA    Anesthesia:  Local infiltration  Local Anesthetic:  Lidocaine 1% without epinephrine      SEDATION    Patient Sedated: No    See dictated procedure note for full details.  Findings: Successful picc placement cut to 47 cm    Specimens: none    Complications: None    Condition: Stable    Plan: Picc okay to use      PROCEDURE  Describe Procedure: Successful picc placement cut to 47 cm  Patient Tolerance:  Patient tolerated the procedure well with no immediate complications  Length of time physician/provider present for 1:1 monitoring during sedation: 0

## 2024-07-26 NOTE — IR NOTE
Patient Name: Shena Hartmann  Medical Record Number: [id number]  Today's Date: 7/26/2024    Procedure: Peripherally inserted central venous catheter placement   Proceduralist: Dr. Andrés Kelsey  Pathology present: N/A    Procedure Start: 0832  Procedure end: 0921  Sedation medications administered: local only     : N/A    Other Notes: Pt arrived to IR room 2 from [facility] Waiting Room. Consent reviewed. Pt denies any questions or concerns regarding procedure. Pt positioned supine and monitored per protocol.     5 Fr PICC placed on left. Catheter tip placement verified with imaging and ready for immediate use.  Red & purple lumens each flushed with 1 mL heparin (10 units/mL).    Pt tolerated procedure without any noted complications. Pt discharged back to [facility] Waiting Room.

## 2024-07-26 NOTE — NURSING NOTE
"Chief Complaint   Patient presents with    Port Draw     Labs drawn via port by RN. VS taken.     Port accessed with 20 gauge, 3/4\" power needle by RN, labs collected, line flushed with saline and heparin.  Vitals taken. Pt checked in for appointment(s).    1 research kit collected and sent down to 1st floor lab. FYI message sent to infusion to recheck BP.    Veronica Beal RN    "

## 2024-07-26 NOTE — PRE-PROCEDURE
GENERAL PRE-PROCEDURE:   Procedure:  Picc    Risks and benefits: Risks, benefits and alternatives were discussed    Consent given by:  Patient  Patient states understanding of procedure being performed: Yes    Patient's understanding of procedure matches consent: Yes    Procedure consent matches procedure scheduled: Yes    : local only.  Appropriately NPO:  Yes  History & Physical reviewed:  History and physical reviewed and no updates needed  Statement of review:  I have reviewed the lab findings, diagnostic data, medications, and the plan for sedation

## 2024-07-26 NOTE — TELEPHONE ENCOUNTER
Inpatient Admission Information:      Admit Date:  7/29/2024   Diagnosis:  MM   Transplant Type:  CAR-T   Protocol:  [id number]   Sedated bmbx needed?  No   NMDP lab (lab 7033) needed?  No   Notes:  -       New Eval Work-Up   MD Scott Fofana         Consult Type Date   1 - -   2 - -   3 - -         Long Term Follow-Up   MD Scott Okeefe [facility] updated? Yes  Care team updated? Yes

## 2024-07-26 NOTE — PROGRESS NOTES
Infusion Nursing Note:  Shena Hartmann presents today for day -3 flu/cy.    Patient seen by provider today: No   present during visit today: Not Applicable.    Note: Shena here feeling okay despite ongoing GI upset. Labs monitored, no additional infusion needs identified. Pt received 250ml/hr continuous NS flush, 8mg IV zofran as premed, followed by cytoxan and fludarabine. Pt tolerated infusions without difficulty      Intravenous Access:  Implanted Port.  PICC.    Treatment Conditions:  Lab Results   Component Value Date    HGB 11.2 (L) 07/26/2024    WBC 2.9 (L) 07/26/2024    ANEU 2.1 07/05/2021    ANEUTAUTO 2.6 07/26/2024     07/26/2024        Lab Results   Component Value Date     (L) 07/26/2024    POTASSIUM 3.8 07/26/2024    MAG 2.0 07/26/2024    CR 0.83 07/26/2024    PAULINE 9.3 07/26/2024    BILITOTAL 0.3 07/26/2024    ALBUMIN 4.3 07/26/2024    ALT 21 07/26/2024    AST 21 07/26/2024         Post Infusion Assessment:  Patient tolerated infusion without incident.  Blood return noted pre and post infusion.  Site patent and intact, free from redness, edema or discomfort.  No evidence of extravasations.  Access discontinued per protocol.       Discharge Plan:   Patient discharged in stable condition accompanied by: .  Departure Mode: Ambulatory.      Yusra Nava RN

## 2024-07-26 NOTE — PROGRESS NOTES
[facility] Oral Chemotherapy Monitoring Program    Thank you for the opportunity to be a part in the care of this patient's oral chemotherapy. The oncology pharmacy will no longer be following this patient for oral chemotherapy. If there are any questions or the plan changes, feel free to contact us.        5/31/2024    12:00 PM 6/13/2024     8:00 AM 6/18/2024     9:00 AM 6/25/2024    12:00 PM 7/1/2024     1:00 PM 7/9/2024    10:00 AM 7/26/2024    11:00 AM   ORAL CHEMOTHERAPY   Assessment Type Lab Monitoring Refill Lab Monitoring Lab Monitoring Lab Monitoring Lab Monitoring Discontinuation   Stop Date       7/7/2024   Reason for Discontinuation       Other/unknown reason   Other:       CAR-T   Diagnosis Code Multiple Myeloma Multiple Myeloma Multiple Myeloma Multiple Myeloma Multiple Myeloma Multiple Myeloma Multiple Myeloma   Providers Dr. Scott Chavez   [facility]   Is this patient followed by the [facility] OC team?      No No   Drug Name Pomalyst (pomalidomide) Pomalyst (pomalidomide) Pomalyst (pomalidomide) Pomalyst (pomalidomide) Pomalyst (pomalidomide) Pomalyst (pomalidomide) Pomalyst (pomalidomide)   Dose 2 mg 2 mg 2 mg 2 mg 2 mg 2 mg    Current Schedule Daily Daily Daily Daily Daily Daily    Cycle Details 3 weeks on, 1 week off 3 weeks on, 1 week off 3 weeks on, 1 week off 3 weeks on, 1 week off 3 weeks on, 1 week off 3 weeks on, 1 week off    Start Date of Last Cycle  6/19/2024        Adverse Effects   Thrombocytopenia           Tyra Moreno, PharmD, BCPS  Hematology/Oncology Clinical Pharmacist  Oral Chemotherapy Monitoring Program  [facility]  [phone]

## 2024-07-26 NOTE — TELEPHONE ENCOUNTER
Notified patient of admission to  for upcoming CAR-T infusion on Monday 7/29. Shena understands to check-in to hospital admissions at 6:30am. She is feeling well and has no further questions or concerns at this time. Shena will  heparin flushes to flush her PICC line over the weekend and will review the video handout given at her [place] teaching appointment.      Jose Franklin RN Care Coordinator - BMT  Phone: [phone]  Pager: [phone]

## 2024-07-28 DIAGNOSIS — C90.00 MULTIPLE MYELOMA NOT HAVING ACHIEVED REMISSION (H): Primary | ICD-10-CM

## 2024-07-29 ENCOUNTER — HOSPITAL ENCOUNTER (INPATIENT)
Facility: CLINIC | Age: 74
LOS: 8 days | Discharge: HOME | DRG: 018 | End: 2024-08-06
Payer: MEDICARE

## 2024-07-29 DIAGNOSIS — Z94.84 STEM CELLS TRANSPLANT STATUS (H): ICD-10-CM

## 2024-07-29 DIAGNOSIS — C90.00 MULTIPLE MYELOMA NOT HAVING ACHIEVED REMISSION (H): Primary | ICD-10-CM

## 2024-07-29 DIAGNOSIS — C90.02 MULTIPLE MYELOMA IN RELAPSE (H): ICD-10-CM

## 2024-07-29 DIAGNOSIS — Z00.6 EXAMINATION OF PARTICIPANT IN CLINICAL TRIAL: ICD-10-CM

## 2024-07-29 DIAGNOSIS — I10 BENIGN ESSENTIAL HYPERTENSION: ICD-10-CM

## 2024-07-29 DIAGNOSIS — R21 RASH: ICD-10-CM

## 2024-07-29 LAB
ABO/RH(D): ABNORMAL
ABO/RH(D): ABNORMAL
ALBUMIN SERPL BCG-MCNC: 4.3 G/DL (ref 3.5–5.2)
ALP SERPL-CCNC: 50 U/L (ref 40–150)
ALT SERPL W P-5'-P-CCNC: 33 U/L (ref 0–50)
ANION GAP SERPL CALCULATED.3IONS-SCNC: 11 MMOL/L (ref 7–15)
ANTIBODY SCREEN, TUBE: NORMAL
ANTIBODY SCREEN: POSITIVE
ANTIBODY SCREEN: POSITIVE
APTT PPP: 25 SECONDS (ref 22–38)
AST SERPL W P-5'-P-CCNC: 38 U/L (ref 0–45)
BASOPHILS # BLD AUTO: 0 10E3/UL (ref 0–0.2)
BASOPHILS NFR BLD AUTO: 1 %
BILIRUB SERPL-MCNC: 0.3 MG/DL
BILL ONLY STEM CELL INFUSION: NORMAL
BLOOD BANK CHART COMMENT: NORMAL
BUN SERPL-MCNC: 16.8 MG/DL (ref 8–23)
C DIFF TOX B STL QL: NEGATIVE
CALCIUM SERPL-MCNC: 9.3 MG/DL (ref 8.8–10.4)
CHLORIDE SERPL-SCNC: 97 MMOL/L (ref 98–107)
CREAT SERPL-MCNC: 0.86 MG/DL (ref 0.51–0.95)
CRP SERPL-MCNC: <3 MG/L
D DIMER PPP FEU-MCNC: 0.54 UG/ML FEU (ref 0–0.5)
EGFRCR SERPLBLD CKD-EPI 2021: 71 ML/MIN/1.73M2
EOSINOPHIL # BLD AUTO: 0 10E3/UL (ref 0–0.7)
EOSINOPHIL NFR BLD AUTO: 1 %
ERYTHROCYTE [DISTWIDTH] IN BLOOD BY AUTOMATED COUNT: 14.5 % (ref 10–15)
FERRITIN SERPL-MCNC: 46 NG/ML (ref 11–328)
FIBRINOGEN PPP-MCNC: 315 MG/DL (ref 170–490)
GLUCOSE SERPL-MCNC: 145 MG/DL (ref 70–99)
HCO3 SERPL-SCNC: 26 MMOL/L (ref 22–29)
HCT VFR BLD AUTO: 32.2 % (ref 35–47)
HGB BLD-MCNC: 10.8 G/DL (ref 11.7–15.7)
IMM GRANULOCYTES # BLD: 0 10E3/UL
IMM GRANULOCYTES NFR BLD: 1 %
INR PPP: 0.97 (ref 0.85–1.15)
LDH SERPL L TO P-CCNC: 210 U/L (ref 0–250)
LYMPHOCYTES # BLD AUTO: 0 10E3/UL (ref 0.8–5.3)
LYMPHOCYTES NFR BLD AUTO: 1 %
MAGNESIUM SERPL-MCNC: 2.1 MG/DL (ref 1.7–2.3)
MCH RBC QN AUTO: 33.4 PG (ref 26.5–33)
MCHC RBC AUTO-ENTMCNC: 33.5 G/DL (ref 31.5–36.5)
MCV RBC AUTO: 100 FL (ref 78–100)
MONOCYTES # BLD AUTO: 0.1 10E3/UL (ref 0–1.3)
MONOCYTES NFR BLD AUTO: 4 %
NEUTROPHILS # BLD AUTO: 2 10E3/UL (ref 1.6–8.3)
NEUTROPHILS NFR BLD AUTO: 92 %
NRBC # BLD AUTO: 0 10E3/UL
NRBC BLD AUTO-RTO: 0 /100
PHOSPHATE SERPL-MCNC: 3.2 MG/DL (ref 2.5–4.5)
PLATELET # BLD AUTO: 235 10E3/UL (ref 150–450)
POTASSIUM SERPL-SCNC: 3.3 MMOL/L (ref 3.4–5.3)
POTASSIUM SERPL-SCNC: 4.5 MMOL/L (ref 3.4–5.3)
PROT SERPL-MCNC: 6.7 G/DL (ref 6.4–8.3)
RBC # BLD AUTO: 3.23 10E6/UL (ref 3.8–5.2)
SODIUM SERPL-SCNC: 134 MMOL/L (ref 135–145)
SPECIMEN EXPIRATION DATE: ABNORMAL
SPECIMEN EXPIRATION DATE: ABNORMAL
SPECIMEN EXPIRATION DATE: NORMAL
SPECIMEN EXPIRATION DATE: NORMAL
T CELL EXTENDED COMMENT: NORMAL
URATE SERPL-MCNC: 3.1 MG/DL (ref 2.4–5.7)
WBC # BLD AUTO: 2.1 10E3/UL (ref 4–11)

## 2024-07-29 PROCEDURE — XW043A7 INTRODUCTION OF CILTACABTAGENE AUTOLEUCEL INTO CENTRAL VEIN, PERCUTANEOUS APPROACH, NEW TECHNOLOGY GROUP 7: ICD-10-PCS

## 2024-07-29 PROCEDURE — 84132 ASSAY OF SERUM POTASSIUM: CPT | Performed by: PHYSICIAN ASSISTANT

## 2024-07-29 PROCEDURE — 83615 LACTATE (LD) (LDH) ENZYME: CPT | Performed by: PHYSICIAN ASSISTANT

## 2024-07-29 PROCEDURE — 80053 COMPREHEN METABOLIC PANEL: CPT | Performed by: PHYSICIAN ASSISTANT

## 2024-07-29 PROCEDURE — 86140 C-REACTIVE PROTEIN: CPT | Performed by: PHYSICIAN ASSISTANT

## 2024-07-29 PROCEDURE — 250N000013 HC RX MED GY IP 250 OP 250 PS 637: Performed by: PHYSICIAN ASSISTANT

## 2024-07-29 PROCEDURE — 250N000011 HC RX IP 250 OP 636

## 2024-07-29 PROCEDURE — 84550 ASSAY OF BLOOD/URIC ACID: CPT | Performed by: PHYSICIAN ASSISTANT

## 2024-07-29 PROCEDURE — 38208 THAW PRESERVED STEM CELLS: CPT

## 2024-07-29 PROCEDURE — 85379 FIBRIN DEGRADATION QUANT: CPT | Performed by: PHYSICIAN ASSISTANT

## 2024-07-29 PROCEDURE — 84100 ASSAY OF PHOSPHORUS: CPT | Performed by: PHYSICIAN ASSISTANT

## 2024-07-29 PROCEDURE — 82728 ASSAY OF FERRITIN: CPT | Performed by: PHYSICIAN ASSISTANT

## 2024-07-29 PROCEDURE — 85730 THROMBOPLASTIN TIME PARTIAL: CPT | Performed by: PHYSICIAN ASSISTANT

## 2024-07-29 PROCEDURE — 258N000002 HC RX IP 258 OP 250: Performed by: PHYSICIAN ASSISTANT

## 2024-07-29 PROCEDURE — 891N000001 HC RX 891 FDA APPROVED CELL THERAPY

## 2024-07-29 PROCEDURE — 87493 C DIFF AMPLIFIED PROBE: CPT | Performed by: PHYSICIAN ASSISTANT

## 2024-07-29 PROCEDURE — 206N000001 HC R&B BMT UMMC

## 2024-07-29 PROCEDURE — 87081 CULTURE SCREEN ONLY: CPT | Performed by: PHYSICIAN ASSISTANT

## 2024-07-29 PROCEDURE — 250N000011 HC RX IP 250 OP 636: Performed by: PHYSICIAN ASSISTANT

## 2024-07-29 PROCEDURE — 85384 FIBRINOGEN ACTIVITY: CPT | Performed by: PHYSICIAN ASSISTANT

## 2024-07-29 PROCEDURE — 86360 T CELL ABSOLUTE COUNT/RATIO: CPT | Performed by: PHYSICIAN ASSISTANT

## 2024-07-29 PROCEDURE — 86900 BLOOD TYPING SEROLOGIC ABO: CPT | Performed by: PHYSICIAN ASSISTANT

## 2024-07-29 PROCEDURE — 85048 AUTOMATED LEUKOCYTE COUNT: CPT | Performed by: PHYSICIAN ASSISTANT

## 2024-07-29 PROCEDURE — 85610 PROTHROMBIN TIME: CPT | Performed by: PHYSICIAN ASSISTANT

## 2024-07-29 PROCEDURE — 83735 ASSAY OF MAGNESIUM: CPT | Performed by: PHYSICIAN ASSISTANT

## 2024-07-29 PROCEDURE — 86359 T CELLS TOTAL COUNT: CPT | Performed by: PHYSICIAN ASSISTANT

## 2024-07-29 RX ORDER — PANTOPRAZOLE SODIUM 40 MG/1
40 TABLET, DELAYED RELEASE ORAL DAILY
Status: DISCONTINUED | OUTPATIENT
Start: 2024-07-29 | End: 2024-08-06 | Stop reason: HOSPADM

## 2024-07-29 RX ORDER — LORAZEPAM 2 MG/ML
.5-1 INJECTION INTRAMUSCULAR EVERY 4 HOURS PRN
Status: DISCONTINUED | OUTPATIENT
Start: 2024-07-29 | End: 2024-08-06 | Stop reason: HOSPADM

## 2024-07-29 RX ORDER — EPINEPHRINE 1 MG/ML
0.3 INJECTION, SOLUTION, CONCENTRATE INTRAVENOUS EVERY 5 MIN PRN
Status: DISCONTINUED | OUTPATIENT
Start: 2024-07-29 | End: 2024-07-29

## 2024-07-29 RX ORDER — DIPHENHYDRAMINE HCL 25 MG
50 CAPSULE ORAL ONCE
Status: DISCONTINUED | OUTPATIENT
Start: 2024-07-29 | End: 2024-07-29

## 2024-07-29 RX ORDER — ACETAMINOPHEN 325 MG/1
650 TABLET ORAL ONCE
Status: COMPLETED | OUTPATIENT
Start: 2024-07-29 | End: 2024-07-29

## 2024-07-29 RX ORDER — HEPARIN SODIUM,PORCINE 10 UNIT/ML
3 VIAL (ML) INTRAVENOUS
Status: DISCONTINUED | OUTPATIENT
Start: 2024-07-29 | End: 2024-08-06 | Stop reason: HOSPADM

## 2024-07-29 RX ORDER — ALBUTEROL SULFATE 90 UG/1
1-2 AEROSOL, METERED RESPIRATORY (INHALATION)
Status: DISCONTINUED | OUTPATIENT
Start: 2024-07-29 | End: 2024-07-29

## 2024-07-29 RX ORDER — LORAZEPAM 0.5 MG/1
.5-1 TABLET ORAL EVERY 4 HOURS PRN
Status: DISCONTINUED | OUTPATIENT
Start: 2024-07-29 | End: 2024-08-06 | Stop reason: HOSPADM

## 2024-07-29 RX ORDER — MEPERIDINE HYDROCHLORIDE 25 MG/ML
25 INJECTION INTRAMUSCULAR; INTRAVENOUS; SUBCUTANEOUS EVERY 30 MIN PRN
Status: DISCONTINUED | OUTPATIENT
Start: 2024-07-29 | End: 2024-07-29

## 2024-07-29 RX ORDER — HEPARIN SODIUM,PORCINE 10 UNIT/ML
5-20 VIAL (ML) INTRAVENOUS DAILY PRN
Status: DISCONTINUED | OUTPATIENT
Start: 2024-07-29 | End: 2024-07-29

## 2024-07-29 RX ORDER — ACYCLOVIR 800 MG/1
800 TABLET ORAL 2 TIMES DAILY
Status: DISCONTINUED | OUTPATIENT
Start: 2024-07-29 | End: 2024-08-06 | Stop reason: HOSPADM

## 2024-07-29 RX ORDER — ALBUTEROL SULFATE 0.83 MG/ML
2.5 SOLUTION RESPIRATORY (INHALATION)
Status: DISCONTINUED | OUTPATIENT
Start: 2024-07-29 | End: 2024-07-29

## 2024-07-29 RX ORDER — DIPHENHYDRAMINE HYDROCHLORIDE 50 MG/ML
37.5 INJECTION INTRAMUSCULAR; INTRAVENOUS ONCE
Status: COMPLETED | OUTPATIENT
Start: 2024-07-29 | End: 2024-07-29

## 2024-07-29 RX ORDER — DIPHENHYDRAMINE HYDROCHLORIDE 50 MG/ML
50 INJECTION INTRAMUSCULAR; INTRAVENOUS
Status: DISCONTINUED | OUTPATIENT
Start: 2024-07-29 | End: 2024-07-29

## 2024-07-29 RX ORDER — LORAZEPAM 2 MG/ML
0.5 INJECTION INTRAMUSCULAR EVERY 4 HOURS PRN
Status: DISCONTINUED | OUTPATIENT
Start: 2024-07-29 | End: 2024-07-29

## 2024-07-29 RX ORDER — ACETAMINOPHEN 325 MG/1
325-650 TABLET ORAL EVERY 4 HOURS PRN
Status: DISCONTINUED | OUTPATIENT
Start: 2024-07-29 | End: 2024-08-06 | Stop reason: HOSPADM

## 2024-07-29 RX ORDER — UBIDECARENONE 75 MG
100 CAPSULE ORAL DAILY
Status: DISCONTINUED | OUTPATIENT
Start: 2024-07-29 | End: 2024-08-06 | Stop reason: HOSPADM

## 2024-07-29 RX ORDER — POTASSIUM CHLORIDE 29.8 MG/ML
20 INJECTION INTRAVENOUS
Status: COMPLETED | OUTPATIENT
Start: 2024-07-29 | End: 2024-07-29

## 2024-07-29 RX ORDER — CLOBETASOL PROPIONATE 0.5 MG/G
OINTMENT TOPICAL 2 TIMES DAILY PRN
Status: DISCONTINUED | OUTPATIENT
Start: 2024-07-29 | End: 2024-08-06 | Stop reason: HOSPADM

## 2024-07-29 RX ORDER — SODIUM CHLORIDE 450 MG/100ML
INJECTION, SOLUTION INTRAVENOUS CONTINUOUS
Status: DISPENSED | OUTPATIENT
Start: 2024-07-29 | End: 2024-07-29

## 2024-07-29 RX ORDER — METHYLPREDNISOLONE SODIUM SUCCINATE 125 MG/2ML
125 INJECTION, POWDER, LYOPHILIZED, FOR SOLUTION INTRAMUSCULAR; INTRAVENOUS
Status: DISCONTINUED | OUTPATIENT
Start: 2024-07-29 | End: 2024-07-29

## 2024-07-29 RX ORDER — AMLODIPINE BESYLATE 5 MG/1
5 TABLET ORAL DAILY
Status: DISCONTINUED | OUTPATIENT
Start: 2024-07-30 | End: 2024-08-06 | Stop reason: HOSPADM

## 2024-07-29 RX ORDER — ONDANSETRON 8 MG/1
8 TABLET, FILM COATED ORAL EVERY 8 HOURS PRN
Status: DISCONTINUED | OUTPATIENT
Start: 2024-07-29 | End: 2024-08-06 | Stop reason: HOSPADM

## 2024-07-29 RX ORDER — PROCHLORPERAZINE MALEATE 5 MG
5 TABLET ORAL EVERY 6 HOURS PRN
Status: DISCONTINUED | OUTPATIENT
Start: 2024-07-29 | End: 2024-08-06 | Stop reason: HOSPADM

## 2024-07-29 RX ORDER — VANCOMYCIN HYDROCHLORIDE 1 G/200ML
20 INJECTION, SOLUTION INTRAVENOUS
Status: DISCONTINUED | OUTPATIENT
Start: 2024-07-29 | End: 2024-08-06 | Stop reason: HOSPADM

## 2024-07-29 RX ORDER — LEVOTHYROXINE SODIUM 50 UG/1
50 TABLET ORAL EVERY 24 HOURS
Status: DISCONTINUED | OUTPATIENT
Start: 2024-07-30 | End: 2024-08-06 | Stop reason: HOSPADM

## 2024-07-29 RX ORDER — HEPARIN SODIUM (PORCINE) LOCK FLUSH IV SOLN 100 UNIT/ML 100 UNIT/ML
5 SOLUTION INTRAVENOUS
Status: DISCONTINUED | OUTPATIENT
Start: 2024-07-29 | End: 2024-07-29

## 2024-07-29 RX ORDER — VITAMIN B COMPLEX
50 TABLET ORAL DAILY
Status: DISCONTINUED | OUTPATIENT
Start: 2024-07-29 | End: 2024-08-06 | Stop reason: HOSPADM

## 2024-07-29 RX ORDER — AZTREONAM 2 G/1
2 INJECTION, POWDER, LYOPHILIZED, FOR SOLUTION INTRAMUSCULAR; INTRAVENOUS
Status: DISCONTINUED | OUTPATIENT
Start: 2024-07-29 | End: 2024-08-06 | Stop reason: HOSPADM

## 2024-07-29 RX ADMIN — SODIUM CHLORIDE: 4.5 INJECTION, SOLUTION INTRAVENOUS at 09:26

## 2024-07-29 RX ADMIN — Medication 50 MCG: at 13:42

## 2024-07-29 RX ADMIN — Medication 3 ML: at 18:36

## 2024-07-29 RX ADMIN — POTASSIUM CHLORIDE 20 MEQ: 29.8 INJECTION, SOLUTION INTRAVENOUS at 13:39

## 2024-07-29 RX ADMIN — Medication 3 ML: at 15:51

## 2024-07-29 RX ADMIN — CILTACABTAGENE AUTOLEUCEL 1 DOSE: 100000000 INJECTION, SUSPENSION INTRAVENOUS at 10:58

## 2024-07-29 RX ADMIN — POTASSIUM CHLORIDE 20 MEQ: 29.8 INJECTION, SOLUTION INTRAVENOUS at 15:51

## 2024-07-29 RX ADMIN — PANTOPRAZOLE SODIUM 40 MG: 40 TABLET, DELAYED RELEASE ORAL at 10:52

## 2024-07-29 RX ADMIN — VITAM B12 100 MCG: 100 TAB at 14:44

## 2024-07-29 RX ADMIN — DIPHENHYDRAMINE HYDROCHLORIDE 37.5 MG: 50 INJECTION, SOLUTION INTRAMUSCULAR; INTRAVENOUS at 10:07

## 2024-07-29 RX ADMIN — ACETAMINOPHEN 650 MG: 325 TABLET, FILM COATED ORAL at 10:07

## 2024-07-29 RX ADMIN — ACYCLOVIR 800 MG: 800 TABLET ORAL at 21:02

## 2024-07-29 ASSESSMENT — ACTIVITIES OF DAILY LIVING (ADL)
ADLS_ACUITY_SCORE: 22
CONCENTRATING,_REMEMBERING_OR_MAKING_DECISIONS_DIFFICULTY: NO
ADLS_ACUITY_SCORE: 22
DOING_ERRANDS_INDEPENDENTLY_DIFFICULTY: NO
ADLS_ACUITY_SCORE: 22
DIFFICULTY_EATING/SWALLOWING: NO
WALKING_OR_CLIMBING_STAIRS_DIFFICULTY: NO
FALL_HISTORY_WITHIN_LAST_SIX_MONTHS: NO
WEAR_GLASSES_OR_BLIND: YES
ADLS_ACUITY_SCORE: 22
DRESSING/BATHING_DIFFICULTY: NO
ADLS_ACUITY_SCORE: 37
ADLS_ACUITY_SCORE: 22
CHANGE_IN_FUNCTIONAL_STATUS_SINCE_ONSET_OF_CURRENT_ILLNESS/INJURY: NO
ADLS_ACUITY_SCORE: 22
HEARING_DIFFICULTY_OR_DEAF: NO
ADLS_ACUITY_SCORE: 22
ADLS_ACUITY_SCORE: 22
DIFFICULTY_COMMUNICATING: NO
ADLS_ACUITY_SCORE: 37
ADLS_ACUITY_SCORE: 22

## 2024-07-29 ASSESSMENT — ENCOUNTER SYMPTOMS
NAUSEA: 1
COUGH: 0
FEVER: 0
ADENOPATHY: 0
CONSTIPATION: 0
DIARRHEA: 1
HEADACHES: 0
VOMITING: 0

## 2024-07-29 NOTE — PLAN OF CARE
"  Problem: Adult Inpatient Plan of Care  Goal: Patient-Specific Goal (Individualized)  Description: You can add care plan individualizations to a care plan. Examples of Individualization might be:  \"Parent requests to be called daily at 9am for status\", \"I have a hard time hearing out of my right ear\", or \"Do not touch me to wake me up as it startles  me\".  Outcome: Progressing   Goal Outcome Evaluation:  [facility] V[facility]. Received the t cells and did well. Complained of some chest tightness and discomfort. Nausea and using aromatherapy. Ate bone broth, rice, rice crackers, 1/2 walnut croissant, rice chex and banana. K+ 3.3 and replaced. Double skin care check was done. Stool x 3 and sent for c-diff culture. Independent.                       "

## 2024-07-29 NOTE — PROGRESS NOTES
BMT 5C Admission Care Coordination Note    Shena Hartmann is a 73 year old female being admitted today for  2017-45 Carvykti*.     Past Medical History:   Diagnosis Date    Acquired hypothyroidism 8/10/2023    Breast cancer (H) 01/01/1994    right    H/O stem cell transplant (H) 03/01/2014    History of blood transfusion 2013 & 2014    During stem cell tx process    Hypertension Sept. 2021    Runs 140 s systolically, about 20 above my usual    Multiple myeloma, without mention of having achieved remission 11/06/2012   .    Last bmbx in clinic    Diagnosis:  multiple myeloma    Protocol:  2017-45*    Donor Source: CAR-T Carvykti      Clinical Notes: n/a    Caregiver:   Sonny Hartmann  [phone]  Tavia Hernandez [phone]    Long-term BMT MD/NC/SW: Scott/Sray (Ct in the interim)/Yasmin    Discharge location: Home    [  ] Home Infusion Company: will send [facility] referral    [ x ] Pharmacy needs: [facility] (home vs clinic)     Sirolimus: n/a     MMF: n/a     Prevymis: n/a    [ x ] Can fill meds at [facility] pharmacy (BMT benefits soft check): Yes   If not, preferred pharmacy at discharge: n/a    [  ] PT/OT recommendations: PT/OT consult placed    [ x ] 1st time discharge? Yes    [  ] Discharge teach    [ x ] [facility] teach- n/a    [  ] Weekly Lab Day Preference?    [ x ] D0 BAN scheduling notification    [ x ] clonoSEQ testing needed? yes    [  ] Car-T wallet card    I will continue to follow patient for discharge planning.    Elizabeth Tobias  BMT Nurse Coordinator  Phone: [phone]  Pager: [phone]

## 2024-07-29 NOTE — PROGRESS NOTES
..Type of transplant: Donor: Autologous  Product:   BMT INFUSION DOCUMENTATION (Last 48 Hours)       BMT/Cellular Product Infusion       Row Name 07/29/24 0900                [REMOVED] Product 07/29/24 0949 T Cells, Apheresis    Product Details Product Release Date: 07/29/24  -CV Product Release Time: 0949  -MS Product Type: T Cells, Apheresis  -CV DIN: [id number]  -CV Product Description Code: [id number]  -CV Volume Dispensed (mL): 70 mL  -CV Completion Date (RN to complete): 07/29/24  -HG Completion Time (RN to complete): 1139  -HG    Checked by (Patient RN) Farheen Cavanaugh RN  -HG       Checked by (Witness) Petra Catalan RN  -       Product Volume Infused (mL) 70 mL  -HG       Flush Volume (mL) 30 mL  -HG       Volume Dispensed (mL) --                 User Key  (r) = Recorded By, (t) = Taken By, (c) = Cosigned By      Initials Name Effective Dates    CV Luisana, Ruma 04/16/23 -     MS EscobarElayne 01/08/24 -     HG Farheen Cavanaugh, RN 12/29/23 -     Briana Schmid RN 01/08/24 -                   Preparation: RN Documentation  Patient was premedicated as ordered: yes  Line Type: central line, left  Patient Stable Prior to Infusion: yes  Time Infusion Started: 1112  Teaching: side effects/monitoring  Tolerated/Reaction: Patient tolerance of product infusion  Immediate suspected transfusion reaction to the product: none  Did patient have prior history of similar signs/symptoms during this hospitalization?: no  Symptoms during/after infusion: other (comment) (chest tightness)  Did the patient tolerate the infusion well: yes  Medications and treatment for symptoms: n/a  Did the symptoms resolve?: no (n/a)  Enter comments if clots, leaks, broken bag, infusion delays, other issues with bag/infusion: n/a  Flush until:13:39  Plan:continue to monitor the patient.

## 2024-07-29 NOTE — PHARMACY-ADMISSION MEDICATION HISTORY
Pharmacist Admission Medication History    Admission medication history is complete. The information provided in this note is only as accurate as the sources available at the time of the update.    Information Source(s): Clinic records via N/A    Pertinent Information: Based on med history from 7/9/24 in BMT clinic, and BMT clinic notes.    Changes made to PTA medication list:  Added: None  Deleted: None  Changed: None    Allergies reviewed with patient and updates made in EHR: no    Medication History Completed By: Andy J. Kurtzweil, RPH 7/29/2024 1:33 PM    PTA Med List   Medication Sig Note Last Dose    Ascorbic Acid (VITAMIN C PO) Take 1,000 mg by mouth 2 times daily   7/29/2024 at 0600    aspirin (ASA) 81 MG chewable tablet Take 1 tablet (81 mg) by mouth daily  7/29/2024 at 0600    CALCIUM-VITAMIN D-VITAMIN K PO Take 2 tablets by mouth daily.  7/29/2024 at 0600    COENZYME Q-10 PO Take 100 mg by mouth daily   7/29/2024 at 0600    Cyanocobalamin 1000 MCG/ML LIQD Take 500 mcg by mouth 2 times daily  7/29/2024 at 0600    magnesium 100 MG CAPS Take 100 mg by mouth 3 times daily  7/29/2024 at 0600    MAGNESIUM PO Take 235 mg by mouth 3 times daily  7/29/2024 at 0600    Multiple Vitamins-Minerals (MULTIVITAMIN OR) Take 1 tablet by mouth 2 times daily.  7/29/2024 at 0600    ondansetron (ZOFRAN) 4 MG tablet Take 1 tablet (4 mg) by mouth every 8 hours as needed for nausea  7/28/2024 at 0800    Probiotic Product (PROBIOTIC PO) Take 1 capsule by mouth daily Theralac Pro Probiotic  7/29/2024 at 0600    prochlorperazine (COMPAZINE) 5 MG tablet Take 1 tablet (5 mg) by mouth every 6 hours as needed for nausea or vomiting 7/29/2024: A year ago  7/31/2023    Quercetin 500 MG CAPS Take 500 mg by mouth daily  7/29/2024 at 0600    saccharomyces boulardii (SACCHAROMYCIN DF) 250 MG capsule Take 250 mg by mouth daily  7/29/2024 at 0600    triamcinolone (KENALOG) 0.1 % external ointment Apply topically 2 times daily  7/29/2024 at 0600     ZINC PICOLINATE PO Take 30 mg by mouth daily  7/29/2024 at 0600    acyclovir (ZOVIRAX) 400 MG tablet Take 2 tablets (800 mg) by mouth every 12 hours  7/29/2024 at 0600    amLODIPine (NORVASC) 2.5 MG tablet Take 5 mg (2 tablets) in the morning. Take 2.5 mg (1 tablet) in the evening.  7/29/2024 at 0600    clobetasol (TEMOVATE) 0.05 % external ointment Apply topically 2 times daily as needed (itching and rash) Topically to rash on hands until resolved  Past Week    Heparin Sod, Pork, Lock Flush 10 UNIT/ML SOLN Inject 5 mLs into the vein daily  7/29/2024 at 0700    levothyroxine (SYNTHROID/LEVOTHROID) 50 MCG tablet Take 1 tablet (50 mcg) by mouth daily  7/29/2024 at 0500    penicillin V (VEETID) 500 MG tablet Take 1 tablet (500 mg) by mouth 2 times daily  7/29/2024 at 0600    pomalidomide (POMALYST) 2 MG capsule Take 1 capsule (2 mg) by mouth daily Swallow whole, do NOT break, crush, chew or open capsule. Take on days 1-21 of repeated 28 day cycles.  7/10/2024    triamcinolone (KENALOG) 0.1 % external cream Apply topically 2 times daily 7/29/2024: Has not started taking the cream yet  Unknown at 0600

## 2024-07-29 NOTE — PROGRESS NOTES
BMT/Cellular Autologous Product Infusion         Patient Vitals for the past 24 hrs:   Temp Temp src Pulse Resp BP   07/29/24 0700 -- -- 87 16 132/68   07/29/24 0744 97.8  F (36.6  C) Oral -- -- --      BMT INFUSION DOCUMENTATION (Last 48 Hours)       BMT/Cellular Product Infusion       Row Name                  Product 07/29/24 0949 T Cells, Apheresis    Product Details Product Release Date: 07/29/24  -CV Product Release Time: 0949  -MS Product Type: T Cells, Apheresis  -CV DIN: [id number]  -CV Product Description Code: [id number]  -CV Volume Dispensed (mL): 70 mL  -CV    Checked by (Patient RN) --       Checked by (Witness) --       Product Volume Infused (mL) --       Flush Volume (mL) --       Volume Dispensed (mL) --                 User Key  (r) = Recorded By, (t) = Taken By, (c) = Cosigned By      Initials Name Effective Dates    CV Ruma Lieberman 04/16/23 -     MS Elayne Escobar 01/08/24 -                   Allogeneic Donor Eligibility Determination and Summary of Records: N/A - Autologous        Type of Infusion: Autologous      Baseline Pre-Infusion Evaluation (to be completed by Provider):   Dyspnea: Grade 0 - none  Hypoxia: Grade 0 - not present  Fever: Grade 0 - afebrile  Chills: Grade 0 - none  Febrile Neutropenia: Grade 0 - not present  Sinus Bradycardia: Grade 0 - none  Hypertension: Grade 0 - none  Hypotension: Grade 0 - none  Chest Pain: Grade 0 - none  Bronchospasm: Grade 0 - none  Pain: Grade 0 - none  Rash: Grade 0 - None  Neurologic Specify: none    If adverse reactions, events or complications occur (fever greater than 2 degrees fahrenheit increase, and severe reactions of the following types: chills, dyspnea, bronchospasm, hyper/hypotension, hypoxia, bradycardia, chest pain, back/flank pain, hypoxia, and any other reaction deemed severe or life threatening; any instance of product bag breakage or unusual product appearance)    Any other events that are >= grade 3, then immediately contact the  BMT Attending physician, the Cell Therapy Laboratory Medical Director (pager [phone]) and the [facility] Laboratory ([phone]).  After midnight, holidays & weekends contact the [facility] on the appropriate campus ([facility] [place]: [phone]; [facility] [place]: [phone]).    Analy West PA-C

## 2024-07-29 NOTE — PROGRESS NOTES
BMT Post Infusion Documentation    Data   Patient Vitals for the past 72 hrs:   Temp Temp src Pulse Resp BP   07/29/24 0700 -- -- 87 16 132/68   07/29/24 0744 97.8  F (36.6  C) Oral -- -- --     BMT INFUSION DOCUMENTATION (Last 24 Hours)       BMT/Cellular Product Infusion       Row Name 07/29/24 0900                Cell Therapy Documentation    Product Release Date 07/29/24  -CV       Recipient Study ID N/A  -CV       Donor Autologous  -CV       Donor MRN/ID [id number]  -CV       Donor ABO/Rh O pos  -CV       Allogeneic Donor Eligibility Determination and Summary of Records N/A - Autologous  -CV       Type of Infusion Autologous  -CV       Total Volume Dispensed (mL) 70  -CV       Total NC Dose N/A  -CV       Total CD34 Dose N/A  -CV       Total CD3 dose N/A  -CV       Total NC Dose Left in Storage N/A  -CV       Comments for Product Issues CARVYKTI CAR-T; DOSE=6.0E+05 CAR-POS T cells/kg; LOT#: [id number]  -CV       Donor ABO/Rh --       Product Types --       Product Numbers --       Product Types and Numbers --       Volume --       ABO Mismatch --       ZZTotal NC Dose --       ZZTotal CD34 Dose --       ZZTotal NC Dose Left in Storage --          Product 07/29/24 0949 T Cells, Apheresis    Product Details Product Release Date: 07/29/24  -CV Product Release Time: 0949  -MS Product Type: T Cells, Apheresis  -CV DIN: [id number]  -CV Product Description Code: [id number]  -CV Volume Dispensed (mL): 70 mL  -CV    Checked by (Patient RN) --       Checked by (Witness) --       Product Volume Infused (mL) --       Flush Volume (mL) --       Volume Dispensed (mL) --          RN Documentation    Patient was premedicated as ordered --       Line Type --       Patient Stable Prior to Infusion --       Time Infusion Started --       Checked by (Patient RN) --       Checked by (RN 2) --       Broken Bag? --       Immediate suspected transfusion reaction to the product --       Time Infusion Stopped --       Total Flush  Volume (mL) --       Checked by (Witness) --       Date Infusion Started --       Date Infusion Stopped --       Volume Infused (mL) --       Total Volume Infused (cc) --          Patient tolerance of product infusion    Immediate suspected transfusion reaction to the product --       Did patient have prior history of similar signs/symptoms during this hospitalization? --       Symptoms during/after infusion --       Did the patient tolerate the infusion well --       Medications and treatment for symptoms --       Did the symptoms resolve? --       Enter comments if clots, leaks, broken bag, infusion delays, other issues with bag/infusion --       Describe symptoms --                 User Key  (r) = Recorded By, (t) = Taken By, (c) = Cosigned By      Initials Name Effective Dates    JAVIER MorenoRuma byrne 04/16/23 -     MS Escobar Elayne 01/08/24 -                       Post-Infusion Evaluation:   Infusion Related Reaction: Grade 0 - none  Dyspnea: Grade 0 - none  Hypoxia: Grade 0 - not present  Fever: Grade 0 - afebrile  Chills: Grade 0 - none  Febrile Neutropenia: Grade 0 - not present  Sinus Bradycardia: Grade 0 - none  Hypertension: Grade 0 - none  Hypotension: Grade 0 - none  Chest Pain: Grade 0 - none  Bronchospasm: Grade 0 - none  Pain: Grade 0 - none  Rash: Grade 0 - None  Neurologic Specify: none    If this was a cord blood transplant, was more than one cord blood unit infused? no    Analy West PA-C

## 2024-07-29 NOTE — H&P
Cellular Therapy History & Physical     Patient Demographics   Patient ID:  Shena Hartmann   Age:  73 year old   Sex:  female  Reason for Admission/CC: multiple myeloma    Date:  7/29/2024  Service: Cellular Therapy   Informant:  Patient and Chart  Resuscitation Status: Full Code  Cellular Therapy MD and RNCC: Scott/Eva Vasquez co-enrollment: [facility] and [facility]t [facility]    Patient ID:  Shena Hartmann is a 73 year old female, currently day 0 of Car-t therapy, utilizing Carvykti.     HPI: Shena is feeling good on admission.  She had some diarrhea with chemotherapy, which is now improving; however, she has had 3 stools already this morning (as of 1pm).  She had some slight nausea, but it passed and she is now eating some gluten free crackers.   She is overall doing quite well.     Diagnosis and Treatment Summary     Disease presentation and baseline characteristics:  Breast cancer dx in 1994. Underwent surgery and chemotherapy without reoccurence  MGUS dx 1999  T8 pathologic fracture with radiation  Revlimid and velcade,   2013  Cytoxan IV 9/2013  D-PACE 11/2013  Auto 3/27/14  5/2014 thalidomide caused rash so switched to pomalidomide   on kenalog and clobetasol creams for IMID rash  FLC began to rise so riky added to pom 9/2020  she has been on xgeva for an unclear amount of time  Teodora-Kd 2/20/2023  Kyprolis held 10/23 due to side effects (N/V). Continue Teodora/dex         Cell Therapy Work Up Results     Blood Counts Recent Labs   Lab Test 07/29/24  0742 07/26/24  1023 07/25/24  0843   HGB 10.8* 11.2* 10.9*   HCT 32.2* 33.0* 32.7*   WBC 2.1* 2.9* 2.8*   ANEUTAUTO 2.0 2.6 1.7   ALYMPAUTO 0.0* 0.1* 0.6*   AMONOAUTO 0.1 0.1 0.3   AEOSAUTO 0.0 0.1 0.1   ABSBASO 0.0 0.0 0.0   NRBCMAN 0.0 0.0 0.0    245 227      Bone Marrow Bone marrow, posterior iliac crest, left decalcified trephine biopsy and touch imprint; left aspirate clot, direct aspirate smear, concentrate aspirate smear, and  peripheral blood smear:     1. Plasma cell myeloma      a. Bone marrow involvement: 10-15%       b. Bone marrow cellularity: Mild normocellular bone marrow for age (30% cellular) with trilineage hematopoiesis  2. Peripheral blood showing normochromic, normocytic anemia with moderate neutropenia    Flow:  - Kappa-monotypic plasma cells (0.02%)  - Polytypic B cells       Chemistries Recent Labs   Lab Test 07/29/24  0742 07/26/24  1023 07/25/24  0843   * 134* 133*   POTASSIUM 3.3* 3.8 4.3   CHLORIDE 97* 98 98   CO2 26 25 24   BUN 16.8 13.2 11.0   CR 0.86 0.83 0.81   * 91 81       Liver Tests Recent Labs   Lab Test 07/29/24  0742 07/26/24  1023 07/25/24  0843   BILITOTAL 0.3 0.3 0.3   ALKPHOS 50 50 45   AST 38 21 22   ALT 33 21 20   ALBUMIN 4.3 4.3 4.0      PET/CT: New mildly FDG avid left T12 lytic lesion, concerning for active  myelomatous disease with adjacent progressed degenerative changes.   Chest X-Ray: Results for orders placed in visit on 12/19/22    XR CHEST 2 VIEWS    Status: Normal 12/19/2022    Narrative  EXAM: XR CHEST 2 VIEWS  LOCATION: [facility]  DATE/TIME: 12/19/2022 11:18 AM    INDICATION:  Acute cough.  COMPARISON: None.    Impression  IMPRESSION: Lungs are hyperexpanded, correlation with underlying emphysema necessary. No pleural effusion, pneumothorax, or abnormal area of consolidation. Moderate to severe scoliotic curvature, apex right, at the thoracolumbar junction.       ECHO or MUGA:   Left ventricular size, wall motion and function are normal. The ejection  fraction is 60-65%. Global peak LV longitudinal strain is averaged at -27%.  This is within reported normal limits (normal <-18%).  Right ventricular function, chamber size, wall motion, and thickness are  normal.  Mild tricuspid insufficiency is present. The aorta root is normal. The  inferior vena cava was normal in size with preserved respiratory variability.  No pericardial effusion is present.     No  significant changes noted.     EKG ECG results from 24   EKG 12-lead Adult     Value    Systolic Blood Pressure     Diastolic Blood Pressure     Ventricular Rate 58    Atrial Rate 58    MT Interval 146    QRS Duration 118        QTc 439    P Axis 41    R AXIS 21    T Axis 68    Interpretation ECG      Sinus bradycardia  Incomplete right bundle branch block  Borderline ECG  When compared with ECG of 15-MAY-2024 12:21,  Left anterior fascicular block is no longer Present  Minimal criteria for Septal infarct are no longer Present  Confirmed by fellow Jerad Lema ([id number]) on 7/10/2024 9:39:24 AM  Confirmed by MD GUTIERREZ DAVID () on 7/10/2024 11:06:41 AM       *Note: Due to a large number of results and/or encounters for the requested time period, some results have not been displayed. A complete set of results can be found in Results Review.                    I have assessed all abnormal lab values for their clinical significance and any values considered clinically significant have been addressed in the assessment and plan    Family History:   Family History   Problem Relation Age of Onset    Cancer Paternal Grandmother         skin cancer    Hypertension Mother     Cerebrovascular Disease Mother          8-11-15 from strokes    Hypertension Father     Prostate Cancer Father        Social History:   Social History     Socioeconomic History    Marital status:      Spouse name: Not on file    Number of children: Not on file    Years of education: Not on file    Highest education level: Not on file   Occupational History    Not on file   Tobacco Use    Smoking status: Never     Passive exposure: Never    Smokeless tobacco: Never   Substance and Sexual Activity    Alcohol use: Yes     Comment: occ    Drug use: No    Sexual activity: Not Currently     Partners: Male     Birth control/protection: Post-menopausal   Other Topics Concern    Parent/sibling w/ CABG, MI or angioplasty before 65F 55M?  No     Service No    Blood Transfusions No    Caffeine Concern No    Occupational Exposure No    Hobby Hazards No    Sleep Concern No    Stress Concern No    Weight Concern No    Special Diet No    Back Care No    Exercise Yes     Comment: 3-4 x a week.     Bike Helmet Yes    Seat Belt Yes    Self-Exams No   Social History Narrative    Not on file     Social Determinants of Health     Financial Resource Strain: Low Risk  (2/21/2024)    Financial Resource Strain     Within the past 12 months, have you or your family members you live with been unable to get utilities (heat, electricity) when it was really needed?: No   Food Insecurity: Low Risk  (2/21/2024)    Food Insecurity     Within the past 12 months, did you worry that your food would run out before you got money to buy more?: No     Within the past 12 months, did the food you bought just not last and you didn t have money to get more?: No   Transportation Needs: Low Risk  (2/21/2024)    Transportation Needs     Within the past 12 months, has lack of transportation kept you from medical appointments, getting your medicines, non-medical meetings or appointments, work, or from getting things that you need?: No   Physical Activity: Sufficiently Active (2/21/2024)    Exercise Vital Sign     Days of Exercise per Week: 4 days     Minutes of Exercise per Session: 60 min   Stress: No Stress Concern Present (2/21/2024)    [employer] - Occupational Stress Questionnaire     Feeling of Stress : Only a little   Social Connections: Unknown (2/21/2024)    Social Connection and Isolation Panel [NHANES]     Frequency of Communication with Friends and Family: Not on file     Frequency of Social Gatherings with Friends and Family: Twice a week     Attends Yarsani Services: Not on file     Active Member of Clubs or Organizations: Not on file     Attends Club or Organization Meetings: Not on file     Marital Status: Not on file   Interpersonal  Safety: Low Risk  (2/21/2024)    Interpersonal Safety     Do you feel physically and emotionally safe where you currently live?: Yes     Within the past 12 months, have you been hit, slapped, kicked or otherwise physically hurt by someone?: No     Within the past 12 months, have you been humiliated or emotionally abused in other ways by your partner or ex-partner?: No   Housing Stability: Low Risk  (2/21/2024)    Housing Stability     Do you have housing? : Yes     Are you worried about losing your housing?: No       Past Medical History:   Past Medical History:   Diagnosis Date    Acquired hypothyroidism 8/10/2023    Breast cancer (H) 01/01/1994    right    H/O stem cell transplant (H) 03/01/2014    History of blood transfusion 2013 & 2014    During stem cell tx process    Hypertension Sept. 2021    Runs 140 s systolically, about 20 above my usual    Multiple myeloma, without mention of having achieved remission 11/06/2012        Past Surgical History:   Past Surgical History:   Procedure Laterality Date    BIOPSY  10/1994; [id number]    Lt. Breast in '94; multiple bone marrow bx's for MM    BREAST SURGERY  10/1994    Lt. Mastectomy w/lymph node resection    COLONOSCOPY  11/2015    Normal results    EYE SURGERY  2020    Bilateral cataract surgery    INSERT CATHETER VASCULAR ACCESS Left 5/22/2024    Procedure: central venous catheter non tunneled insertion, vascular access;  Surgeon: Ric Castaneda PA-C;  Location: [facility] OR    INSERT PORT VASCULAR ACCESS Left 2/15/2023    Procedure: INSERTION, VASCULAR ACCESS PORT;  Surgeon: Luc Antunez MD;  Location: [facility] OR    IR CHEST PORT PLACEMENT > 5 YRS OF AGE  2/15/2023    IR CVC NON TUNNEL LINE REMOVAL  5/22/2024    IR CVC NON TUNNEL PLACEMENT > 5 YRS  5/22/2024    IR PICC PLACEMENT > 5 YRS OF AGE  7/26/2024    MASTECTOMY      Right, with lymphe node disection      PICC INSERTION  09/10/2013    5fr DL Power PICC, 44cm (1cm external) in the L lateral brachial vein  with tip in the low SVC    TRANSPLANT  3/27/2014    Autologous stem cell tx       Allergies:   Allergies   Allergen Reactions    Blood Transfusion Related (Informational Only) Other (See Comments)     Patient has a history of a clinically significant antibody against RBC antigens.  A delay in compatible RBCs may occur.     Cayenne Pepper [Cayenne]     Corn-Containing Products GI Disturbance    Levaquin Other (See Comments)     Tendon pain    Milk Protein GI Disturbance    Mold      Facial rash/lip swelling    Morphine Sulfate Other (See Comments)     Per pt - see things (hallucination) when she was on Morphine .    Pollen Extract      Environmental allergies     Shrimp Nausea and Vomiting    Tomato      Lip swelling, facial rash    Azithromycin Rash    Keflex [Cephalexin] Rash    Oat Rash    Tape [Adhesive Tape] Itching and Rash     All adhesives    Wheat Rash     Swelling of lips       Home Medications      Prior to Admission medications    Medication Sig Start Date End Date Taking? Authorizing Provider   acyclovir (ZOVIRAX) 400 MG tablet Take 2 tablets (800 mg) by mouth every 12 hours 7/24/24  Yes Mae Llamas APRN CNP   amLODIPine (NORVASC) 2.5 MG tablet Take 5 mg (2 tablets) in the morning. Take 2.5 mg (1 tablet) in the evening. 5/13/24  Yes Sadia Price MD   Ascorbic Acid (VITAMIN C PO) Take 1,000 mg by mouth 2 times daily    Yes Reported, Patient   aspirin (ASA) 81 MG chewable tablet Take 1 tablet (81 mg) by mouth daily 6/8/20  Yes Lui Hills MD   CALCIUM-VITAMIN D-VITAMIN K PO Take 2 tablets by mouth daily.   Yes Reported, Patient   clobetasol (TEMOVATE) 0.05 % external ointment Apply topically 2 times daily as needed (itching and rash) Topically to rash on hands until resolved 8/29/23  Yes Neela Mcknight MD   COENZYME Q-10 PO Take 100 mg by mouth daily    Yes Reported, Patient   Cyanocobalamin 1000 MCG/ML LIQD Take 500 mcg by mouth 2 times daily   Yes Reported, Patient    Heparin Sod, Pork, Lock Flush 10 UNIT/ML SOLN Inject 5 mLs into the vein daily 7/25/24  Yes Nighat Chavez MD   levothyroxine (SYNTHROID/LEVOTHROID) 50 MCG tablet Take 1 tablet (50 mcg) by mouth daily 6/24/24  Yes Thomas Villalobos MD   magnesium 100 MG CAPS Take 100 mg by mouth 3 times daily   Yes Reported, Patient   MAGNESIUM PO Take 235 mg by mouth 3 times daily   Yes Unknown, Entered By History   Multiple Vitamins-Minerals (MULTIVITAMIN OR) Take 1 tablet by mouth 2 times daily.   Yes Reported, Patient   ondansetron (ZOFRAN) 4 MG tablet Take 1 tablet (4 mg) by mouth every 8 hours as needed for nausea 5/6/23  Yes Nighat Chavez MD   penicillin V (VEETID) 500 MG tablet Take 1 tablet (500 mg) by mouth 2 times daily 7/24/24  Yes Mae Llamas APRN CNP   pomalidomide (POMALYST) 2 MG capsule Take 1 capsule (2 mg) by mouth daily Swallow whole, do NOT break, crush, chew or open capsule. Take on days 1-21 of repeated 28 day cycles. 6/13/24  Yes Nighat Chavez MD   Probiotic Product (PROBIOTIC PO) Take 1 capsule by mouth daily Theralac Pro Probiotic   Yes Unknown, Entered By History   prochlorperazine (COMPAZINE) 5 MG tablet Take 1 tablet (5 mg) by mouth every 6 hours as needed for nausea or vomiting 2/16/23  Yes Nighat Chavez MD   Quercetin 500 MG CAPS Take 500 mg by mouth daily   Yes Unknown, Entered By History   saccharomyces boulardii (SACCHAROMYCIN DF) 250 MG capsule Take 250 mg by mouth daily   Yes Unknown, Entered By History   triamcinolone (KENALOG) 0.1 % external cream Apply topically 2 times daily 5/30/24  Yes Selena Suggs APRN CNP   triamcinolone (KENALOG) 0.1 % external ointment Apply topically 2 times daily 6/8/22  Yes Neela Mcknight MD   ZINC PICOLINATE PO Take 30 mg by mouth daily   Yes Reported, Patient   acetaminophen (TYLENOL) 325 MG tablet Take 325-650 mg by mouth every 6 hours as needed for mild pain    Reported, Patient   desonide (DESOWEN) 0.05 %  "external ointment Apply topically 2 times daily Apply to face as needed for dermatitis 6/8/22   Neela Mcknight MD   order for DME 6 mastectomy bras  Length of need: 99 months  Patient not taking: Reported on 7/24/2024 4/17/17   Neelima Da Silva MD   order for DME R mastectomy prosthesis   Length of need:  99 months  Patient not taking: Reported on 7/24/2024 4/17/17   Neelima Da Silva MD       Review of Systems    Review of Systems   Constitutional:  Negative for fever.   Respiratory:  Negative for cough.    Cardiovascular:  Negative for chest pain.   Gastrointestinal:  Positive for diarrhea and nausea. Negative for constipation and vomiting.   Musculoskeletal:         Rib and vertebral involvement with MM   Skin:  Negative for rash.   Neurological:  Negative for headaches.   Hematological:  Negative for adenopathy.       PHYSICAL EXAM      Weight     Wt Readings from Last 3 Encounters:   07/29/24 54.2 kg (119 lb 6.4 oz)   07/26/24 55.3 kg (121 lb 14.4 oz)   07/25/24 56.5 kg (124 lb 9.6 oz)        KPS: 80  Admit ICE score: 10/10    BP (!) 143/65 (BP Location: Right leg)   Pulse 87   Temp 98  F (36.7  C)   Resp 16   Ht 1.57 m (5' 1.81\")   Wt 54.2 kg (119 lb 6.4 oz)   LMP  (LMP Unknown)   SpO2 98%   BMI 21.97 kg/m     General: NAD   Eyes: KYAW, sclera anicteric   Lungs: CTA bilaterally  Cardiovascular: RRR, no M/R/G   Abdominal/Rectal: +BS, soft, NT, ND, No HSM   Lymphatics: No edema  Skin: No rashes or petechaie  Neuro: A&O   Additional Findings: PICC       LABS AND IMAGING: I have assessed all abnormal lab values for their clinical significance and any values considered clinically significant have been addressed in the assessment and plan.      Lab Results   Component Value Date    WBC 2.1 (L) 07/29/2024    ANEUTAUTO 2.0 07/29/2024    HGB 10.8 (L) 07/29/2024    HCT 32.2 (L) 07/29/2024     07/29/2024     (L) 07/29/2024    POTASSIUM 3.3 (L) 07/29/2024    CHLORIDE 97 (L) 07/29/2024    " CO2 26 07/29/2024     (H) 07/29/2024    BUN 16.8 07/29/2024    CR 0.86 07/29/2024    MAG 2.1 07/29/2024    INR 0.97 07/29/2024       SYSTEMS-BASED ASSESSMENT AND PLAN   Shena Hartmann is a 73 year old female, currently day 0 of Car-t therapy, utilizing Carvykti.     ONC  - BMT doc/Coordinator: Scott/Sieve  - Cell Therapy Product: carvykti. This product has a median onset of CRS at day7; and a median onset of ICANS at day 8.  - Disease: multiple myeloma  - package insert: https://www.fda.gov/media/626697/download      HEME/COAG  - pancytopenia secondary to malignancy    Pertinent assessments pre Car-T therapy are as below.  Hematotoxicity Assessment   Baseline Feature 0 points 1 point 2 points   Platelet Count > 175,000/ l  75,000 - 175,000/ l  < 75,000/ l    Absolute Neutrophils > 1200/ l  <1200/ l  -   Hemoglobin > 9.0 g/dL <9.0 g/dL -   C-reactive protein < 3.0 mg/dL > 3.0 mg/dL -   Ferritin < 650 ng/mL 650-2000 ng/mL > 2000 ng/mL   https://ashpublications.org/blood/article/138/24/2499/283460/CAR-HEMATOTOX-a-model-for-CAR-T-cell-related    Hematotoxicity Score: 0  If hematotoxicity score is >/= 2, plan to start GCSF on day +14 if neutropenic. Continue until ANC > 2500 x 2 consecutive days.     Will this patient need GCSF to start on day +14? No     IgG   IGG   Date Value Ref Range Status   09/21/2020 2,404 (H) 610 - 1,616 mg/dL Final     Immunoglobulin G   Date Value Ref Range Status   07/08/2024 757 610 - 1,616 mg/dL Final     Ferritin   Ferritin   Date Value Ref Range Status   07/29/2024 46 11 - 328 ng/mL Final      CRP   CRP Inflammation   Date Value Ref Range Status   07/29/2024 <3.00 <5.00 mg/L Final       ID  - Infection prophylaxis: acyclovir. Pentamidine was administered on 7/24/2024.   Patient reports tendon pain with levaquin, so will plan to use cefpodoxime prophylaxis when neutropenic.    Fluconazole when neutropenic.     GI  - zofran, compazine prn n/v  - monitor loose stools; sending  admission c diff sample today.  Suspect diarrhea is secondary to chemotherapy.     RENAL/  # hyponatremia: mild, monitor  # hypokalemia: likely secondary to diarrhea over the weekend; monitor and replete per sliding scale  # sliding scale mag/phos/K+ scales ordered    CARDIAC    PULMONARY    ENDOCRINE  # hypothyroidism:  levothyroxine 50 mcg daily    SUPPORTIVE CARE  - PT/OT to evaluate on admission and follow as indicated.   - Transfusion to keep hgb >7g/dL and plts >10,000  - Antiemetics prn  - Ulcer ppx: protonix  - DVT ppx:  no pharmacologic prophylaxis given anticipated thrombocytopenia  - patient takes various homeopathic remedies.  Reviewed with pharmacy and given that homeopathics contain just trace amounts of an active ingredient, they are unlikely to hurt her.  She can take them from her own supply on her schedule.  - reviewed patient's supplement powder, which contains skullcap; due to potential [id number] interactions with mushrooms, we have asked her not to take this powder during her acute car-t period.   - okay to continue B12 and vitamin D    Clinically Significant Risk Factors Present on Admission        # Hypokalemia: Lowest K = 3.3 mmol/L in last 2 days, will replace as needed        # Drug Induced Coagulation Defect: home medication list includes an anticoagulant medication  # Drug Induced Platelet Defect: home medication list includes an antiplatelet medication   # Hypertension: Noted on problem list    # Anemia: based on hgb <11                   Medically Ready for Discharge: Anticipated in 5+ Days      I spent 45 minutes in the care of this patient today, which included time necessary for preparation for the visit, obtaining history, ordering medications/tests/procedures as medically indicated, review of pertinent medical literature, counseling of the patient, communication of recommendations to the care team, and documentation time.     Patient was given a copy of consents in printed format on  admission.     Analy West PA-C  7/29/2024

## 2024-07-29 NOTE — PROGRESS NOTES
..Patient admitted to:unit 5c   Admitted from:home   Arrived by:litter   Reason for admission:CAR-T cells   Patient accompanied by:daughter   Belongings:with patient in the room   Teaching:orientation to room and nursing routines   Skin double check completed by: Ct HOFF RN. Skin looks great.

## 2024-07-30 ENCOUNTER — APPOINTMENT (OUTPATIENT)
Dept: OCCUPATIONAL THERAPY | Facility: CLINIC | Age: 74
DRG: 018 | End: 2024-07-30
Attending: PHYSICIAN ASSISTANT
Payer: MEDICARE

## 2024-07-30 LAB
ANION GAP SERPL CALCULATED.3IONS-SCNC: 6 MMOL/L (ref 7–15)
APTT PPP: 25 SECONDS (ref 22–38)
BASOPHILS # BLD AUTO: 0 10E3/UL (ref 0–0.2)
BASOPHILS NFR BLD AUTO: 1 %
BUN SERPL-MCNC: 10.8 MG/DL (ref 8–23)
CALCIUM SERPL-MCNC: 8.1 MG/DL (ref 8.8–10.4)
CHLORIDE SERPL-SCNC: 105 MMOL/L (ref 98–107)
CMV DNA SPEC NAA+PROBE-ACNC: NOT DETECTED IU/ML
CREAT SERPL-MCNC: 0.69 MG/DL (ref 0.51–0.95)
EGFRCR SERPLBLD CKD-EPI 2021: >90 ML/MIN/1.73M2
EOSINOPHIL # BLD AUTO: 0.1 10E3/UL (ref 0–0.7)
EOSINOPHIL NFR BLD AUTO: 4 %
ERYTHROCYTE [DISTWIDTH] IN BLOOD BY AUTOMATED COUNT: 14.4 % (ref 10–15)
GLUCOSE SERPL-MCNC: 89 MG/DL (ref 70–99)
HCO3 SERPL-SCNC: 26 MMOL/L (ref 22–29)
HCT VFR BLD AUTO: 28.8 % (ref 35–47)
HGB BLD-MCNC: 9.6 G/DL (ref 11.7–15.7)
IMM GRANULOCYTES # BLD: 0 10E3/UL
IMM GRANULOCYTES NFR BLD: 0 %
INR PPP: 1.02 (ref 0.85–1.15)
LYMPHOCYTES # BLD AUTO: 0.1 10E3/UL (ref 0.8–5.3)
LYMPHOCYTES NFR BLD AUTO: 3 %
MAGNESIUM SERPL-MCNC: 2.1 MG/DL (ref 1.7–2.3)
MCH RBC QN AUTO: 33 PG (ref 26.5–33)
MCHC RBC AUTO-ENTMCNC: 33.3 G/DL (ref 31.5–36.5)
MCV RBC AUTO: 99 FL (ref 78–100)
MONOCYTES # BLD AUTO: 0.1 10E3/UL (ref 0–1.3)
MONOCYTES NFR BLD AUTO: 6 %
NEUTROPHILS # BLD AUTO: 1.3 10E3/UL (ref 1.6–8.3)
NEUTROPHILS NFR BLD AUTO: 86 %
NRBC # BLD AUTO: 0 10E3/UL
NRBC BLD AUTO-RTO: 0 /100
PHOSPHATE SERPL-MCNC: 2.6 MG/DL (ref 2.5–4.5)
PLATELET # BLD AUTO: 196 10E3/UL (ref 150–450)
POTASSIUM SERPL-SCNC: 3.8 MMOL/L (ref 3.4–5.3)
RBC # BLD AUTO: 2.91 10E6/UL (ref 3.8–5.2)
SODIUM SERPL-SCNC: 137 MMOL/L (ref 135–145)
WBC # BLD AUTO: 1.5 10E3/UL (ref 4–11)

## 2024-07-30 PROCEDURE — 250N000009 HC RX 250

## 2024-07-30 PROCEDURE — 250N000011 HC RX IP 250 OP 636: Performed by: PHYSICIAN ASSISTANT

## 2024-07-30 PROCEDURE — 97530 THERAPEUTIC ACTIVITIES: CPT | Mod: GO

## 2024-07-30 PROCEDURE — 250N000011 HC RX IP 250 OP 636

## 2024-07-30 PROCEDURE — 999N000147 HC STATISTIC PT IP EVAL DEFER

## 2024-07-30 PROCEDURE — 85730 THROMBOPLASTIN TIME PARTIAL: CPT | Performed by: PHYSICIAN ASSISTANT

## 2024-07-30 PROCEDURE — 85610 PROTHROMBIN TIME: CPT | Performed by: PHYSICIAN ASSISTANT

## 2024-07-30 PROCEDURE — 250N000013 HC RX MED GY IP 250 OP 250 PS 637: Performed by: PHYSICIAN ASSISTANT

## 2024-07-30 PROCEDURE — 97165 OT EVAL LOW COMPLEX 30 MIN: CPT | Mod: GO

## 2024-07-30 PROCEDURE — 84100 ASSAY OF PHOSPHORUS: CPT

## 2024-07-30 PROCEDURE — 206N000001 HC R&B BMT UMMC

## 2024-07-30 PROCEDURE — 99233 SBSQ HOSP IP/OBS HIGH 50: CPT | Mod: FS | Performed by: PHYSICIAN ASSISTANT

## 2024-07-30 PROCEDURE — 85025 COMPLETE CBC W/AUTO DIFF WBC: CPT | Performed by: PHYSICIAN ASSISTANT

## 2024-07-30 PROCEDURE — 83735 ASSAY OF MAGNESIUM: CPT

## 2024-07-30 PROCEDURE — 80048 BASIC METABOLIC PNL TOTAL CA: CPT | Performed by: PHYSICIAN ASSISTANT

## 2024-07-30 PROCEDURE — 258N000003 HC RX IP 258 OP 636

## 2024-07-30 RX ORDER — FLUCONAZOLE 200 MG/1
200 TABLET ORAL DAILY
Status: DISCONTINUED | OUTPATIENT
Start: 2024-07-30 | End: 2024-08-06 | Stop reason: HOSPADM

## 2024-07-30 RX ORDER — PENICILLIN V POTASSIUM 250 MG/1
250 TABLET, FILM COATED ORAL 2 TIMES DAILY
Status: DISCONTINUED | OUTPATIENT
Start: 2024-07-30 | End: 2024-08-06 | Stop reason: HOSPADM

## 2024-07-30 RX ORDER — POTASSIUM CHLORIDE 29.8 MG/ML
20 INJECTION INTRAVENOUS ONCE
Status: COMPLETED | OUTPATIENT
Start: 2024-07-30 | End: 2024-07-30

## 2024-07-30 RX ADMIN — LEVOTHYROXINE SODIUM 50 MCG: 0.05 TABLET ORAL at 05:00

## 2024-07-30 RX ADMIN — VITAM B12 100 MCG: 100 TAB at 07:53

## 2024-07-30 RX ADMIN — ACYCLOVIR 800 MG: 800 TABLET ORAL at 20:07

## 2024-07-30 RX ADMIN — ACYCLOVIR 800 MG: 800 TABLET ORAL at 07:56

## 2024-07-30 RX ADMIN — Medication 3 ML: at 11:00

## 2024-07-30 RX ADMIN — Medication 50 MCG: at 07:53

## 2024-07-30 RX ADMIN — FLUCONAZOLE 200 MG: 200 TABLET ORAL at 11:00

## 2024-07-30 RX ADMIN — POTASSIUM CHLORIDE 20 MEQ: 29.8 INJECTION, SOLUTION INTRAVENOUS at 04:56

## 2024-07-30 RX ADMIN — Medication 3 ML: at 03:30

## 2024-07-30 RX ADMIN — POTASSIUM PHOSPHATE, MONOBASIC POTASSIUM PHOSPHATE, DIBASIC 9 MMOL: 224; 236 INJECTION, SOLUTION, CONCENTRATE INTRAVENOUS at 06:33

## 2024-07-30 RX ADMIN — PANTOPRAZOLE SODIUM 40 MG: 40 TABLET, DELAYED RELEASE ORAL at 07:53

## 2024-07-30 RX ADMIN — AMLODIPINE BESYLATE 5 MG: 5 TABLET ORAL at 07:53

## 2024-07-30 RX ADMIN — PENICILLIN V POTASSIUM 250 MG: 250 TABLET, FILM COATED ORAL at 20:07

## 2024-07-30 ASSESSMENT — ACTIVITIES OF DAILY LIVING (ADL)
ADLS_ACUITY_SCORE: 22
PREVIOUS_RESPONSIBILITIES: MEAL PREP;LAUNDRY;SHOPPING;MEDICATION MANAGEMENT;FINANCES;DRIVING
ADLS_ACUITY_SCORE: 22

## 2024-07-30 NOTE — PLAN OF CARE
"VSS except BP slightly elevated above parameters (performed on right leg and doing orthostatics BID starting today).  MD paged and updated.  No orders received.  Eating and drinking well.  Pt informed to avoid all supplements and homeopathics at this time.  Both protein powders pt presented today were approved for use.  Multiple soft/formed BMs today-states improvement from previously.  Ambulating in room and in halls.  Uses mobility and aqua-K pad for low back pain.  Showered and CHG wipes done.  Will continue with POC.      Problem: Adult Inpatient Plan of Care  Goal: Plan of Care Review  Description: The Plan of Care Review/Shift note should be completed every shift.  The Outcome Evaluation is a brief statement about your assessment that the patient is improving, declining, or no change.  This information will be displayed automatically on your shift  note.  Outcome: Progressing  Goal: Patient-Specific Goal (Individualized)  Description: You can add care plan individualizations to a care plan. Examples of Individualization might be:  \"Parent requests to be called daily at 9am for status\", \"I have a hard time hearing out of my right ear\", or \"Do not touch me to wake me up as it startles  me\".  Outcome: Progressing  Goal: Absence of Hospital-Acquired Illness or Injury  Outcome: Progressing  Intervention: Identify and Manage Fall Risk  Recent Flowsheet Documentation  Taken 7/30/2024 1500 by Anayeli Dotson RN  Safety Promotion/Fall Prevention: safety round/check completed  Taken 7/30/2024 1434 by Anayeli Dotson RN  Safety Promotion/Fall Prevention: safety round/check completed  Taken 7/30/2024 1300 by Anayeli Dotson RN  Safety Promotion/Fall Prevention: safety round/check completed  Taken 7/30/2024 1200 by Anayeli Dotson RN  Safety Promotion/Fall Prevention: safety round/check completed  Taken 7/30/2024 1100 by Anayeli Dotson RN  Safety Promotion/Fall Prevention: safety round/check completed  Taken 7/30/2024 " 1000 by Anayeli Dotson RN  Safety Promotion/Fall Prevention: safety round/check completed  Taken 7/30/2024 0900 by Anayeli Dotson RN  Safety Promotion/Fall Prevention: safety round/check completed  Taken 7/30/2024 0800 by Anayeli Dotson RN  Safety Promotion/Fall Prevention:   assistive device/personal items within reach   clutter free environment maintained   increased rounding and observation   increase visualization of patient   lighting adjusted   nonskid shoes/slippers when out of bed   patient and family education   room near nurse's station   room organization consistent   safety round/check completed   treat reversible contributory factors  Intervention: Prevent Skin Injury  Recent Flowsheet Documentation  Taken 7/30/2024 1500 by Anayeli Dotson RN  Body Position: position changed independently  Device Skin Pressure Protection:   tubing/devices free from skin contact   adhesive use limited  Taken 7/30/2024 0800 by Anayeli Dotson RN  Body Position: position changed independently  Device Skin Pressure Protection:   tubing/devices free from skin contact   adhesive use limited  Intervention: Prevent Infection  Recent Flowsheet Documentation  Taken 7/30/2024 1500 by Anayeli Dotson RN  Infection Prevention:   environmental surveillance performed   hand hygiene promoted   cohorting utilized   personal protective equipment utilized   rest/sleep promoted   single patient room provided   visitors restricted/screened  Taken 7/30/2024 1100 by Anayeli Dotson RN  Infection Prevention:   cohorting utilized   environmental surveillance performed   hand hygiene promoted   personal protective equipment utilized   rest/sleep promoted   single patient room provided   visitors restricted/screened  Taken 7/30/2024 0800 by Aanyeli Dotson RN  Infection Prevention:   cohorting utilized   environmental surveillance performed   hand hygiene promoted   personal protective equipment utilized   rest/sleep promoted   single patient  room provided   visitors restricted/screened  Goal: Optimal Comfort and Wellbeing  Outcome: Progressing  Goal: Readiness for Transition of Care  Outcome: Progressing     Problem: Risk for Delirium  Goal: Optimal Coping  Outcome: Progressing  Intervention: Optimize Psychosocial Adjustment to Delirium  Recent Flowsheet Documentation  Taken 7/30/2024 1500 by Anayeli Dotson RN  Supportive Measures:   active listening utilized   decision-making supported   positive reinforcement provided   relaxation techniques promoted   self-care encouraged   self-reflection promoted   self-responsibility promoted   verbalization of feelings encouraged  Goal: Improved Sleep  Outcome: Progressing     Problem: Stem Cell/Bone Marrow Transplant  Goal: Optimal Coping with Transplant  Outcome: Progressing  Intervention: Optimize Patient/Family Adjustment to Transplant  Recent Flowsheet Documentation  Taken 7/30/2024 1500 by Anayeli Dotson RN  Supportive Measures:   active listening utilized   decision-making supported   positive reinforcement provided   relaxation techniques promoted   self-care encouraged   self-reflection promoted   self-responsibility promoted   verbalization of feelings encouraged  Goal: Diarrhea Symptom Control  Outcome: Progressing  Goal: Improved Activity Tolerance  Outcome: Progressing  Intervention: Promote Improved Energy  Recent Flowsheet Documentation  Taken 7/30/2024 1500 by Anayeli Dotson RN  Fatigue Management:   paced activity encouraged   frequent rest breaks encouraged   activity schedule adjusted  Activity Management:   activity adjusted per tolerance   ambulated outside room   ambulated in room   ambulated to bathroom  Environmental Support:   calm environment promoted   rest periods encouraged   distractions minimized   environmental consistency promoted  Taken 7/30/2024 0800 by Anayeli Dotson RN  Activity Management:   activity adjusted per tolerance   ambulated outside room   ambulated in room    ambulated to bathroom   sitting, edge of bed  Goal: Blood Counts Within Acceptable Range  Outcome: Progressing  Intervention: Monitor and Manage Hematologic Symptoms  Recent Flowsheet Documentation  Taken 7/30/2024 1500 by Anayeli Dotson RN  Bleeding Precautions:   blood pressure closely monitored   foot protection facilitated   gentle oral care promoted   monitored for signs of bleeding  Medication Review/Management:   medications reviewed   high-risk medications identified  Taken 7/30/2024 1100 by Anayeli Dotson RN  Bleeding Precautions:   blood pressure closely monitored   foot protection facilitated   gentle oral care promoted   monitored for signs of bleeding  Medication Review/Management: medications reviewed  Taken 7/30/2024 0800 by Anayeli Dotson RN  Bleeding Precautions:   blood pressure closely monitored   foot protection facilitated   gentle oral care promoted   monitored for signs of bleeding  Medication Review/Management:   medications reviewed   high-risk medications identified  Goal: Absence of Infection  Outcome: Progressing  Intervention: Prevent and Manage Infection  Recent Flowsheet Documentation  Taken 7/30/2024 1500 by Anayeli Dotson RN  Infection Prevention:   environmental surveillance performed   hand hygiene promoted   cohorting utilized   personal protective equipment utilized   rest/sleep promoted   single patient room provided   visitors restricted/screened  Isolation Precautions: protective environment maintained  Taken 7/30/2024 1100 by Anayeli Dotson RN  Infection Prevention:   cohorting utilized   environmental surveillance performed   hand hygiene promoted   personal protective equipment utilized   rest/sleep promoted   single patient room provided   visitors restricted/screened  Infection Management: aseptic technique maintained  Isolation Precautions: protective environment maintained  Taken 7/30/2024 0800 by Anayeli Dotson RN  Infection Prevention:   cohorting utilized    environmental surveillance performed   hand hygiene promoted   personal protective equipment utilized   rest/sleep promoted   single patient room provided   visitors restricted/screened  Infection Management: aseptic technique maintained  Isolation Precautions: protective environment maintained  Goal: Improved Oral Mucous Membrane Health and Integrity  Outcome: Progressing  Intervention: Promote Oral Comfort and Health  Recent Flowsheet Documentation  Taken 7/30/2024 0800 by Anayeli Dotson RN  Oral Mucous Membrane Protection: (oral rinse provided)   lip/mouth moisturizer applied   other (see comments)  Goal: Nausea and Vomiting Symptom Relief  Outcome: Progressing  Goal: Optimal Nutrition Intake  Outcome: Progressing     Problem: Skin Injury Risk Increased  Goal: Skin Health and Integrity  Outcome: Progressing  Intervention: Optimize Skin Protection  Recent Flowsheet Documentation  Taken 7/30/2024 1500 by Anayeli Dotson RN  Activity Management:   activity adjusted per tolerance   ambulated outside room   ambulated in room   ambulated to bathroom  Taken 7/30/2024 0800 by Anayeli Dotson RN  Activity Management:   activity adjusted per tolerance   ambulated outside room   ambulated in room   ambulated to bathroom   sitting, edge of bed     Problem: Risk for Delirium  Goal: Improved Behavioral Control  Outcome: Adequate for Care Transition  Intervention: Prevent and Manage Agitation  Recent Flowsheet Documentation  Taken 7/30/2024 1500 by Anayeli Dotson RN  Complementary Therapy: aromatherapy utilized  Intervention: Minimize Safety Risk  Recent Flowsheet Documentation  Taken 7/30/2024 1500 by Anayeli Dotson RN  Enhanced Safety Measures:   patient/family teach back on injury risk   review medications for side effects with activity  Taken 7/30/2024 1100 by Anayeli Dotson RN  Enhanced Safety Measures:   pain management   patient/family teach back on injury risk   review medications for side effects with  activity  Taken 7/30/2024 0800 by Anayeli Dotson, RN  Enhanced Safety Measures:   pain management   review medications for side effects with activity   patient/family teach back on injury risk  Goal: Improved Attention and Thought Clarity  Outcome: Adequate for Care Transition     Problem: Stem Cell/Bone Marrow Transplant  Goal: Symptom-Free Urinary Elimination  Outcome: Adequate for Care Transition  Goal: Absence of Hypersensitivity Reaction  Outcome: Adequate for Care Transition   Goal Outcome Evaluation:

## 2024-07-30 NOTE — PLAN OF CARE
VSS on room air. Chronic pain in lower  back, heat applied and effective. Patient up independently in the room. No c/o nausea or vomiting, using aromatherapy. No BM this shift. Voiding without issues. Dressing changed. Replacing potassium x1 and phosphorus x1 this shift. Rechecks will be tomorrow morning, Patient rested well between cares.     Problem: Risk for Delirium  Goal: Improved Behavioral Control  Outcome: Progressing  Intervention: Minimize Safety Risk  Recent Flowsheet Documentation  Taken 7/29/2024 2300 by Carmen Clay, RN  Enhanced Safety Measures: review medications for side effects with activity  Taken 7/29/2024 2000 by Carmen Clay, RN  Communication Enhancement Strategies: call light answered in person  Enhanced Safety Measures: review medications for side effects with activity

## 2024-07-30 NOTE — PROGRESS NOTES
"BMT/Cell Therapy Daily Progress Note   07/30/2024    Patient ID:  Shena Hartmann is a 73 year old female, currently day 1 of carvykti car-t for multiple myeloma.    Admission date: 7/29/2024    INTERVAL  HISTORY   Doing well,  eating and drinking protein smoothies.      Review of Systems: ROS negative except as noted above.      PHYSICAL EXAM     Weight In/Out     Wt Readings from Last 3 Encounters:   07/30/24 54 kg (119 lb 1.6 oz)   07/26/24 55.3 kg (121 lb 14.4 oz)   07/25/24 56.5 kg (124 lb 9.6 oz)      I/O last 3 completed shifts:  In: 2730 [P.O.:1210; I.V.:1350; Blood:70; Other:100]  Out: 4310 [Urine:4310]     ICE: 10/10 Datestamp: 7/30/2024     BP (!) 157/70 (BP Location: Right leg)   Pulse 73   Temp 97.5  F (36.4  C) (Oral)   Resp 16   Ht 1.57 m (5' 1.81\")   Wt 54 kg (119 lb 1.6 oz)   LMP  (LMP Unknown)   SpO2 98%   BMI 21.92 kg/m       General: NAD   Eyes: : KYAW, sclera anicteric   Lungs: breathing comfortably on room air  Lymphatics: no edema  Skin: no rashes or petechiae  Neuro: A&O   Additional Findings: PICC      LABS AND IMAGING: I have assessed all abnormal lab values for their clinical significance and any values considered clinically significant have been addressed in the assessment and plan.        Lab Results   Component Value Date    WBC 1.5 (L) 07/30/2024    ANEU 2.1 07/05/2021    HGB 9.6 (L) 07/30/2024    HCT 28.8 (L) 07/30/2024     07/30/2024     07/30/2024    POTASSIUM 3.8 07/30/2024    CHLORIDE 105 07/30/2024    CO2 26 07/30/2024    GLC 89 07/30/2024    BUN 10.8 07/30/2024    CR 0.69 07/30/2024    MAG 2.1 07/30/2024    INR 1.02 07/30/2024           SYSTEMS-BASED ASSESSMENT AND PLAN   Shena Hartmann is a 73 year old female, currently day 1  of Car-t therapy, utilizing Carvykti.      ONC  - BMT doc/Coordinator: Anastasiya  - Cell Therapy Product: carvykti. This product has a median onset of CRS at day7; and a median onset of ICANS at day 8.  - Disease: multiple " myeloma  - package insert: https://www.fda.gov/media/559211/download        HEME/COAG  - pancytopenia secondary to malignancy  - hematologic toxicity score of 0: Will this patient need GCSF to start on day +14? No      ID  - Infection prophylaxis: acyclovir. Pentamidine was administered on 7/24/2024.   Patient reports tendon pain with levaquin, so will plan to use pen vk prophylaxis.  Start pen vk and fluconazole 7/30.    GI  - zofran, compazine prn n/v     RENAL/  # hyponatremia: mild, monitor  # hypokalemia: likely secondary to diarrhea over the weekend; monitor and replete per sliding scale  # sliding scale mag/phos/K+ scales ordered     ENDOCRINE  # hypothyroidism:  levothyroxine 50 mcg daily     SUPPORTIVE CARE  - PT/OT to evaluate on admission and follow as indicated.   - Transfusion to keep hgb >7g/dL and plts >10,000  - Antiemetics prn  - Ulcer ppx: protonix  - DVT ppx:  no pharmacologic prophylaxis given anticipated thrombocytopenia  - patient takes various homeopathic remedies.  Per Dr. Morgan, patient is not to take them until 2 weeks post car-t.   - reviewed patient's supplement powder, which contains skullcap; due to potential FON427 interactions with mushrooms, we have asked her not to take this powder during her acute car-t period.  - patient has 2 other protein powders that were reviewed by dietitian and they are okay to take.    - okay to continue B12 and vitamin D    Medically Ready for Discharge: Anticipated in 5+ Days  Clinically Significant Risk Factors        # Hypokalemia: Lowest K = 3.3 mmol/L in last 2 days, will replace as needed   # Hypocalcemia: Lowest Ca = 8.1 mg/dL in last 2 days, will monitor and replace as appropriate         # Hypertension: Noted on problem list                     I spent 30 minutes in the care of this patient today, which included time necessary for preparation for the visit, obtaining history, ordering medications/tests/procedures as medically indicated,  review of pertinent medical literature, counseling of the patient, communication of recommendations to the care team, and documentation time.    Analy West PA-C

## 2024-07-30 NOTE — PROGRESS NOTES
"   Occupational Therapy Evaluation: 07/30/24 1316   Appointment Info   Signing Clinician's Name / Credentials (OT) Sherrie Du, OTD, OTR/L   Rehab Comments (OT) OT Only   Living Environment   People in Home spouse   Current Living Arrangements house   Home Accessibility stairs to enter home;stairs within home   Number of Stairs, Main Entrance 8   Stair Railings, Main Entrance railing on right side (ascending)   Number of Stairs, Within Home, Primary greater than 10 stairs   Stair Railings, Within Home, Primary railing on right side (ascending)   Transportation Anticipated car, drives self;family or friend will provide   Living Environment Comments Pt reports living in a house with her . Laundry and workroomo in basement, bedroom and bathroom on upstairs level. There is a 1/2 bath on main floor. Upstairs bathroom with tub shower.   Self-Care   Usual Activity Tolerance excellent   Current Activity Tolerance excellent   Regular Exercise Yes   Activity/Exercise Type other (see comments)  (\"Defy Aging\" at [employer])   Exercise Amount/Frequency 3-5 times/wk   Equipment Currently Used at Home grab bar, tub/shower   Fall history within last six months no   Activity/Exercise/Self-Care Comment Pt reports was ind with ADLS at baseline.   Instrumental Activities of Daily Living (IADL)   Previous Responsibilities meal prep;laundry;shopping;medication management;finances;driving   IADL Comments Pt reports ind with IADLs at baseline, has a  every 2 wks, and has assist with mowing lawn.   General Information   Onset of Illness/Injury or Date of Surgery 07/29/24   Referring Physician Analy West PA-C   Patient/Family Therapy Goal Statement (OT) Maintain strength   Additional Occupational Profile Info/Pertinent History of Current Problem \"Shena Hartmann is a 73 year old female, currently day 1 of Car-t therapy, utilizing Carvykti.\"   Existing Precautions/Restrictions immunosuppressed   Left Upper " Extremity (Weight-bearing Status) full weight-bearing (FWB)   Right Upper Extremity (Weight-bearing Status) full weight-bearing (FWB)   Left Lower Extremity (Weight-bearing Status) full weight-bearing (FWB)   Right Lower Extremity (Weight-bearing Status) full weight-bearing (FWB)   General Observations and Info Reason for consult: BMT admission   Cognitive Status Examination   Orientation Status orientation to person, place and time   Visual Perception   Visual Impairment/Limitations corrective lenses full-time   Sensory   Sensory Comments Pt reports numbness in feet due to neuropathy.   Pain Assessment   Patient Currently in Pain Yes, see Vital Sign flowsheet  (back stiffness)   Posture   Posture not impaired   Range of Motion Comprehensive   General Range of Motion no range of motion deficits identified   Strength Comprehensive (MMT)   Comment, General Manual Muscle Testing (MMT) Assessment Pt seems to have 5/5 strength BUE, however at risk for deconditioning with BMT admission.   Coordination   Upper Extremity Coordination No deficits were identified   Bed Mobility   Bed Mobility supine-sit   Comment (Bed Mobility) Ind   Transfers   Transfers toilet transfer   Transfer Comments Ind per clinical judgement   Activities of Daily Living   BADL Assessment/Intervention no deficits identified   Clinical Impression   Criteria for Skilled Therapeutic Interventions Met (OT) Yes, treatment indicated   OT Diagnosis Pt at risk for deconditioning while IP for BMT.   OT Problem List-Impairments impacting ADL other (see comments)  (Pt at risk for deconditioning with IP stay for BMT.)   Assessment of Occupational Performance 1-3 Performance Deficits   Identified Performance Deficits At risk for deconditioning.   Planned Therapy Interventions (OT) home program guidelines;progressive activity/exercise;risk factor education;strengthening;stretching   Clinical Decision Making Complexity (OT) problem focused assessment/low complexity    Risk & Benefits of therapy have been explained evaluation/treatment results reviewed;care plan/treatment goals reviewed;risks/benefits reviewed;current/potential barriers reviewed;participants voiced agreement with care plan;participants included;patient;spouse/significant other;daughter   Clinical Impression Comments Pt would benefit from skilled IP OT services to maintain strength and endurance while IP for BMT.   OT Total Evaluation Time   OT Eval, Low Complexity Minutes (43788) 10   OT Goals   Therapy Frequency (OT) 3 times/week   OT Predicted Duration/Target Date for Goal Attainment 08/20/24   OT Goals Aerobic Activity;OT Goal 1;OT Goal 2;OT Goal 3   OT: Perform aerobic activity with stable cardiovascular response continuous activity;30 minutes;ambulation;NuStep   OT: Goal 1 Pt will ind complete at least 10 stairs with R railing prior to discharge for safe household mobility.   OT: Goal 2 Pt will ind state pertinent lab values for engagement in activity while IP for BMT.   OT: Goal 3 Pt will ind complete resistive band HEP prior to discharge for increased UE strength.   Interventions   Interventions Quick Adds Therapeutic Activity   Therapeutic Activities   Therapeutic Activity Minutes (86279) 38   Symptoms noted during/after treatment none   Treatment Detail/Skilled Intervention Evaluation completed, treatment indicated. Pt educated on lab values-platelets, hemoglobin and white blood cells. Pt educated on the role of lab values in the body, normal lab value parameters and implications of low values on exercise and activity. Instructed on hand hygiene, hallway privileges and appropriate use of mask with low white blood cells. Educated on fatigue and SOB in setting of low hemoglobin. Educated on avoiding straining and modifying resistance levels to minimize risk of bleeding with low platelet levels. Encouraged tracking values and monitoring trends to modify exercise and activity. Pt verbalized understanding.  Provided pt with handout on aerobic exercise: emphasis on benefits, OMNI scale education, and monitoring for signs/symptoms of activity intolerance. Pt reporting no questions at this time. OT provided pt with red/yellow/green therabands and resistive band exercise handout for pt to complete ind in room. Pt highly motivated to maintain and increase strength while IP, no questions noted on theraband exercise handout. OT also providing pt with 3lb dowel to utilize for stretches and exercises in room. Pt amb ind in [place], OT showing pt and pt family therapy gym. At baseline, pt is highly active, has a goal of maintaining and increasing strength while IP undergoing BMT prior to returning home. Session ended with pt ind in room, pt spouse and daughter present. Family highly supportive.   OT Discharge Planning   OT Plan Hallway ambulation, stairs, review theraband ex   OT Discharge Recommendation (DC Rec) home with assist   OT Rationale for DC Rec Anticipate when pt medically stable post BMT, safe to discharge home with assist as needed from daughter and spouse. Pt daughter will be staying with her for 30 days post transplant.   OT Brief overview of current status Ind   Total Session Time   Timed Code Treatment Minutes 38   Total Session Time (sum of timed and untimed services) 48

## 2024-07-30 NOTE — PLAN OF CARE
Physical Therapy: Orders received. Chart reviewed and discussed with care team.? Physical Therapy not indicated due to per discussion with OT, pt mobilizing well IND with no acute PT needs; OT will follow during admission. Please place re-consult orders should PT needs arise.? Defer discharge recommendations to OT and care team.? Will complete orders.

## 2024-07-30 NOTE — PROGRESS NOTES
Evaluation completed, treatment indicated. Pt educated on lab values-platelets, hemoglobin and white blood cells. Pt educated on the role of lab values in the body, normal lab value parameters and implications of low values on exercise and activity. Instructed on hand hygiene, hallway privileges and appropriate use of mask with low white blood cells. Educated on fatigue and SOB in setting of low hemoglobin. Educated on avoiding straining and modifying resistance levels to minimize risk of bleeding with low platelet levels. Encouraged tracking values and monitoring trends to modify exercise and activity. Pt verbalized understanding. Provided pt with handout on aerobic exercise: emphasis on benefits, OMNI scale education, and monitoring for signs/symptoms of activity intolerance. Pt reporting no questions at this time. OT provided pt with red/yellow/green therabands and resistive band exercise handout for pt to complete ind in room. Pt highly motivated to maintain and increase strength while IP, no questions noted on theraband exercise handout. OT also providing pt with 3lb dowel to utilize for stretches and exercises in room. Pt amb ind in castaonn, OT showing pt and pt family therapy gym. At baseline, pt is highly active, has a goal of maintaining and increasing strength while IP undergoing BMT prior to returning home. Session ended with pt ind in room, pt spouse and daughter present. Family highly supportive.

## 2024-07-30 NOTE — CONSULTS
"Assessment completed in BMT clinic on 7/9/2024, please see info below for patient review.    JOANNE Sherwood, [facility]  Adult Blood & Marrow Transplant   Phone: [phone]  ALEXA Searchable at BMT SW 1    -----------    CLINICAL SOCIAL WORK   PSYCHOSOCIAL ASSESSMENT  BLOOD AND MARROW TRANSPLANT SERVICE        Assessment completed on July 9, 2024  of living situation, support system, financial status, functional status, coping, stressors, need for resources and social work intervention provided as needed.  Information for this assessment was provided by Pt and spouse report in addition to medical chart review and consultation with medical team.      Present at assessment: Patient, Shena \"Chun\" Umbreit  and Sonny Shahbaz were present for this assessment conducted by Bhumika Fuchs [facility] .      Diagnosis: Multiple Myeloma (MM)     Date of Diagnosis: 1/2002     Transplant History: Autologous 3/27/2014     Transplant type: CAR-T Carvykti     Donor: Autologous      Physician: Lily Chavez MD     Nurse Coordinator: Ct Velasquez RN     Work-up Nurse Coordinator: Ct Velasquez RN     : JOANNE Mondragon, [facility]      Permanent Address:   [address] [place] [address]     Contact Information:  Pt Cell Phone: [phone]  Pt Email: [email or web address]  Pt's  Sonny Phone: [phone]     Presenting Information:  Shena is a 73 year old female diagnosed with MM who presents for evaluation for an CAR-T cell infusion at the [facility] ([facility]  Pt was accompanied to today's visit by her  Sonny.      Decision Making:   Self      Health Care Directive:   Copy in Chart      Relationship Status:    to her  Sonny, they shared they will celebrate their 54th anniversary in Aug. Both indicate their relationship as stable and supportive.     Special Lodging Needs: Local Lodging Needed. Shena lives in [place], [place] and does " not need to relocate.     Family/Support System: Pt endorsed a good support system including family and close friends who will be available to support Pt throughout transplant process.      Spouse: Sonny Hartmann     Children: Tavia Chang and Radha Hartmann (lives in [place], but will be in [place] during this process)     Grandchildren: Xena Villavicencio Sophie (in [place])     Parents:      Siblings: n/a     Friends: some close friends who are supportive     Caregiver: SW discussed with pt the caregiver role and expectation at length. Pt is agreeable to having a full time caregiver for a minimum of 30 days until cleared by the BMT physician. Pt's identified caregivers are his  and daughter Radha. Pt signed the caregiver contract which will be scanned into the EMR. Caregiver education and resources provided. No caregiver concerns identified. Pt and Pt's  confirmed understanding caregiving requirement, including driving restrictions, as discussed during psychosocial assessment.      Name & Numbers  Sonny Hartmann  [phone]  Tavia Hernandez [phone]     Transportation Mode:  Private Car . Pt is aware of driving restrictions post-BMT and the need for the caregiver is to drive until cleared to drive by the  BMT physician. SW provided information on parking info and monthly parking pass options. Pt will utilize the hospital security shuttle for transportation to and from the [facility] and BMT Clinic/Hospital.     Insurance:  No Insurance issues identified.  Pt denied specific insurance concerns at this time. SW reiterated information about the BMT Financial  should specific insurance questions arise as Pt moves through transplant process.      Sources of Income:  No income concerns identified  Shena is supported by savings and assisted . Pt denied anticipation of financial hardship related to BMT at this time.  SW encouraged Pt to contact this SW for additional potential resources  "should financial situation change.      Employment:   Employer: [employer]- Retired  Position: RN     Spouse's Employment:  Employer:  [employer]   Position: PROSPER professor     Mental Health: No mental health issues identified        PHQ-9 assessment, score was 1 ,which indicates no current signs of depression.  GAD7 assessment, score was 0, which indicates no current signs of anxiety.     Shena shared that she has no history of anxiety or depression. She shared that she is someone that really focuses on the positive. She has a strong support in meditation and uses this often when she feels she needs to center herself.       We talked about how some patients may see an increase in feelings of anxiety or depression while hospitalized for extended periods along with isolation. Encouraged Shena and Sonny to let us know if they are noticing an increase in symptoms. We talked about the variety of modalities available to use as coping mechanisms (including but not limited to guided imagery, relaxation techniques, progressive muscle relaxation, counseling/talk therapy and medication).     Chemical Use: No issues identified.  Shena denies the use of alcohol, marijuana, tobacco or other drugs.   Based on the information provided, there appear to be no specific risks or concerns identified at this time.      Trauma/Loss/Abuse History: Multiple losses associated with cancer diagnosis and treatment, including health, employment, changes to physical appearance, etc.      Spirituality:  Patient does not identify with [place] community. She shared that she is non-Baptist and will reach out if she wants to see a olivia.      Coping: Pt noted that she is currently feeling \"positive, prepared, and ready to begin\".  Pt shared that her main coping mechanisms are talking with friends and family, spiritual practice, and reading books.  Pt noted that she also hector by exercise, positive self-talk, meditation/guided imagery, " "gathering educational information and working on her hobbies. SW and Pt discussed additional positive coping mechanisms that Pt can utilize while in the hospital.      Caregiver Coping: Pt's  Sonny noted that he is feeling \"positive, hopeful and ready to begin\" at this time.  Sonny noted that he hector by talking with friends and family, spiritual practice, reading books, exercise and meditation.      Education Provided: Transplant process expectations, Caregiver requirements, Caregiver self-care, Financial issues related to transplant, Financial resources/grants available, Common psychosocial stressors pre/post transplant, Support group(s) available, Tour/layout of the inpatient unit/non-use of cell phones, Hospital resources available, Web site information, Resources for transplant patients and their families as well as the Clinical Social Work role.      Interventions Provided: Supportive counseling and education      Recreation/Leisure Activities:  Shena shared that she enjoys being with friends and family, being out in nature, reading, gardening, [place], being at [place], cooking and playing cards     Plans for Hospital Stay Leisure:  Shena shared she plans to read, watch movies, maintain her holistic therapy and exercise     Assessment and Recommendations for Team:  Pt is a 73 year old female diagnosed with MM who is here undergoing preparation for a planned CAR-T cell infusion.      Pt is a pleasant and articulate female who feels comfortable communicating with the medical team. Pt is pleasant, calm and able to articulate concerns/coping mechanisms in an appropriate manner. Shena was alert, interactive, and affect was full, they displayed appropriate eye contact, memory and thought processes. Pt has a strong supportive network of family and friends who are involved.      Pt will benefit from ongoing psychosocial support in regards to coping with the adjustment to the BMT process. CSW " has discussed  psychosocial support options in regards to coping with the adjustment to the BMT process and support groups opportunities.       Pt has a good support system and a good caregiver plan. Pt verbalizes understanding of the transplant process and wanting to proceed. SW provided contact information and encouraged Pt to contact SW with questions, concerns, resources and for support. Per this assessment, I did not identify any barriers to this patient moving forward with transplant.          Important Information:   - Shena shared she has lots of dietary restrictions and will likely bring in a lot of her own food. Would benefit from a dietician visit to help support food choices while IP.   - Shena likes to meditate daily.     Follow up Planned:   Psychosocial support     JOANNE Mondragon, MEHDISW  [facility]  Phone: [phone]  Vocera- BMT SW 3

## 2024-07-30 NOTE — PLAN OF CARE
Goal Outcome Evaluation:      Plan of Care Reviewed With: patient    Overall Patient Progress: improvingOverall Patient Progress: improving       Yasmin JOANNE Figueroa, LGSW  Adult Blood & Marrow Transplant   Phone: [phone]  VOCERA Searchable at BMT SW 1

## 2024-07-31 ENCOUNTER — APPOINTMENT (OUTPATIENT)
Dept: OCCUPATIONAL THERAPY | Facility: CLINIC | Age: 74
DRG: 018 | End: 2024-07-31
Payer: MEDICARE

## 2024-07-31 LAB
ALBUMIN SERPL BCG-MCNC: 4.2 G/DL (ref 3.5–5.2)
ALP SERPL-CCNC: 56 U/L (ref 40–150)
ALT SERPL W P-5'-P-CCNC: 26 U/L (ref 0–50)
ANION GAP SERPL CALCULATED.3IONS-SCNC: 8 MMOL/L (ref 7–15)
ANION GAP SERPL CALCULATED.3IONS-SCNC: 9 MMOL/L (ref 7–15)
APTT PPP: 25 SECONDS (ref 22–38)
AST SERPL W P-5'-P-CCNC: 24 U/L (ref 0–45)
BACTERIA SPEC CULT: NORMAL
BASOPHILS # BLD AUTO: 0 10E3/UL (ref 0–0.2)
BASOPHILS # BLD AUTO: 0 10E3/UL (ref 0–0.2)
BASOPHILS NFR BLD AUTO: 0 %
BASOPHILS NFR BLD AUTO: 0 %
BILIRUB SERPL-MCNC: 0.2 MG/DL
BUN SERPL-MCNC: 7.6 MG/DL (ref 8–23)
BUN SERPL-MCNC: 9.1 MG/DL (ref 8–23)
CA-I BLD-MCNC: 4.4 MG/DL (ref 4.4–5.2)
CALCIUM SERPL-MCNC: 8.2 MG/DL (ref 8.8–10.4)
CALCIUM SERPL-MCNC: 8.5 MG/DL (ref 8.8–10.4)
CHLORIDE SERPL-SCNC: 101 MMOL/L (ref 98–107)
CHLORIDE SERPL-SCNC: 103 MMOL/L (ref 98–107)
CREAT SERPL-MCNC: 0.59 MG/DL (ref 0.51–0.95)
CREAT SERPL-MCNC: 0.66 MG/DL (ref 0.51–0.95)
CRP SERPL-MCNC: <3 MG/L
D DIMER PPP FEU-MCNC: 0.65 UG/ML FEU (ref 0–0.5)
EGFRCR SERPLBLD CKD-EPI 2021: >90 ML/MIN/1.73M2
EGFRCR SERPLBLD CKD-EPI 2021: >90 ML/MIN/1.73M2
EOSINOPHIL # BLD AUTO: 0.1 10E3/UL (ref 0–0.7)
EOSINOPHIL # BLD AUTO: 0.2 10E3/UL (ref 0–0.7)
EOSINOPHIL NFR BLD AUTO: 7 %
EOSINOPHIL NFR BLD AUTO: 7 %
ERYTHROCYTE [DISTWIDTH] IN BLOOD BY AUTOMATED COUNT: 14.2 % (ref 10–15)
ERYTHROCYTE [DISTWIDTH] IN BLOOD BY AUTOMATED COUNT: 14.3 % (ref 10–15)
FERRITIN SERPL-MCNC: 37 NG/ML (ref 11–328)
FIBRINOGEN PPP-MCNC: 301 MG/DL (ref 170–510)
GLUCOSE SERPL-MCNC: 104 MG/DL (ref 70–99)
GLUCOSE SERPL-MCNC: 92 MG/DL (ref 70–99)
HCO3 SERPL-SCNC: 23 MMOL/L (ref 22–29)
HCO3 SERPL-SCNC: 25 MMOL/L (ref 22–29)
HCT VFR BLD AUTO: 29.2 % (ref 35–47)
HCT VFR BLD AUTO: 31.6 % (ref 35–47)
HGB BLD-MCNC: 10.7 G/DL (ref 11.7–15.7)
HGB BLD-MCNC: 9.5 G/DL (ref 11.7–15.7)
IMM GRANULOCYTES # BLD: 0 10E3/UL
IMM GRANULOCYTES # BLD: 0 10E3/UL
IMM GRANULOCYTES NFR BLD: 1 %
IMM GRANULOCYTES NFR BLD: 1 %
INR PPP: 1 (ref 0.85–1.15)
LDH SERPL L TO P-CCNC: 196 U/L (ref 0–250)
LYMPHOCYTES # BLD AUTO: 0 10E3/UL (ref 0.8–5.3)
LYMPHOCYTES # BLD AUTO: 0.1 10E3/UL (ref 0.8–5.3)
LYMPHOCYTES NFR BLD AUTO: 1 %
LYMPHOCYTES NFR BLD AUTO: 3 %
MAGNESIUM SERPL-MCNC: 2.1 MG/DL (ref 1.7–2.3)
MAGNESIUM SERPL-MCNC: 2.2 MG/DL (ref 1.7–2.3)
MCH RBC QN AUTO: 32.5 PG (ref 26.5–33)
MCH RBC QN AUTO: 33.9 PG (ref 26.5–33)
MCHC RBC AUTO-ENTMCNC: 32.5 G/DL (ref 31.5–36.5)
MCHC RBC AUTO-ENTMCNC: 33.9 G/DL (ref 31.5–36.5)
MCV RBC AUTO: 100 FL (ref 78–100)
MCV RBC AUTO: 100 FL (ref 78–100)
MONOCYTES # BLD AUTO: 0.1 10E3/UL (ref 0–1.3)
MONOCYTES # BLD AUTO: 0.2 10E3/UL (ref 0–1.3)
MONOCYTES NFR BLD AUTO: 8 %
MONOCYTES NFR BLD AUTO: 8 %
NEUTROPHILS # BLD AUTO: 1.4 10E3/UL (ref 1.6–8.3)
NEUTROPHILS # BLD AUTO: 1.9 10E3/UL (ref 1.6–8.3)
NEUTROPHILS NFR BLD AUTO: 81 %
NEUTROPHILS NFR BLD AUTO: 83 %
NRBC # BLD AUTO: 0 10E3/UL
NRBC # BLD AUTO: 0 10E3/UL
NRBC BLD AUTO-RTO: 0 /100
NRBC BLD AUTO-RTO: 1 /100
PHOSPHATE SERPL-MCNC: 2.5 MG/DL (ref 2.5–4.5)
PHOSPHATE SERPL-MCNC: 2.7 MG/DL (ref 2.5–4.5)
PLATELET # BLD AUTO: 187 10E3/UL (ref 150–450)
PLATELET # BLD AUTO: 207 10E3/UL (ref 150–450)
POTASSIUM SERPL-SCNC: 3.8 MMOL/L (ref 3.4–5.3)
POTASSIUM SERPL-SCNC: 4.8 MMOL/L (ref 3.4–5.3)
PROT SERPL-MCNC: 6.6 G/DL (ref 6.4–8.3)
RBC # BLD AUTO: 2.92 10E6/UL (ref 3.8–5.2)
RBC # BLD AUTO: 3.16 10E6/UL (ref 3.8–5.2)
SODIUM SERPL-SCNC: 133 MMOL/L (ref 135–145)
SODIUM SERPL-SCNC: 136 MMOL/L (ref 135–145)
URATE SERPL-MCNC: 2.5 MG/DL (ref 2.4–5.7)
WBC # BLD AUTO: 1.7 10E3/UL (ref 4–11)
WBC # BLD AUTO: 2.2 10E3/UL (ref 4–11)

## 2024-07-31 PROCEDURE — 99233 SBSQ HOSP IP/OBS HIGH 50: CPT | Mod: FS | Performed by: PHYSICIAN ASSISTANT

## 2024-07-31 PROCEDURE — 84100 ASSAY OF PHOSPHORUS: CPT

## 2024-07-31 PROCEDURE — 85730 THROMBOPLASTIN TIME PARTIAL: CPT | Performed by: PHYSICIAN ASSISTANT

## 2024-07-31 PROCEDURE — 206N000001 HC R&B BMT UMMC

## 2024-07-31 PROCEDURE — 250N000009 HC RX 250

## 2024-07-31 PROCEDURE — 86140 C-REACTIVE PROTEIN: CPT | Performed by: PHYSICIAN ASSISTANT

## 2024-07-31 PROCEDURE — 250N000011 HC RX IP 250 OP 636

## 2024-07-31 PROCEDURE — 82330 ASSAY OF CALCIUM: CPT | Performed by: PHYSICIAN ASSISTANT

## 2024-07-31 PROCEDURE — 83735 ASSAY OF MAGNESIUM: CPT | Performed by: PHYSICIAN ASSISTANT

## 2024-07-31 PROCEDURE — 85384 FIBRINOGEN ACTIVITY: CPT | Performed by: PHYSICIAN ASSISTANT

## 2024-07-31 PROCEDURE — 85610 PROTHROMBIN TIME: CPT | Performed by: PHYSICIAN ASSISTANT

## 2024-07-31 PROCEDURE — 85379 FIBRIN DEGRADATION QUANT: CPT | Performed by: PHYSICIAN ASSISTANT

## 2024-07-31 PROCEDURE — 82728 ASSAY OF FERRITIN: CPT | Performed by: PHYSICIAN ASSISTANT

## 2024-07-31 PROCEDURE — 85041 AUTOMATED RBC COUNT: CPT | Performed by: PHYSICIAN ASSISTANT

## 2024-07-31 PROCEDURE — 80048 BASIC METABOLIC PNL TOTAL CA: CPT | Performed by: PHYSICIAN ASSISTANT

## 2024-07-31 PROCEDURE — 80053 COMPREHEN METABOLIC PANEL: CPT | Performed by: PHYSICIAN ASSISTANT

## 2024-07-31 PROCEDURE — 84550 ASSAY OF BLOOD/URIC ACID: CPT | Performed by: PHYSICIAN ASSISTANT

## 2024-07-31 PROCEDURE — 97110 THERAPEUTIC EXERCISES: CPT | Mod: GO

## 2024-07-31 PROCEDURE — 250N000013 HC RX MED GY IP 250 OP 250 PS 637: Performed by: PHYSICIAN ASSISTANT

## 2024-07-31 PROCEDURE — 83615 LACTATE (LD) (LDH) ENZYME: CPT | Performed by: PHYSICIAN ASSISTANT

## 2024-07-31 PROCEDURE — 97530 THERAPEUTIC ACTIVITIES: CPT | Mod: GO

## 2024-07-31 PROCEDURE — 84100 ASSAY OF PHOSPHORUS: CPT | Performed by: PHYSICIAN ASSISTANT

## 2024-07-31 PROCEDURE — 258N000003 HC RX IP 258 OP 636

## 2024-07-31 RX ORDER — POTASSIUM CHLORIDE 29.8 MG/ML
20 INJECTION INTRAVENOUS ONCE
Status: COMPLETED | OUTPATIENT
Start: 2024-07-31 | End: 2024-07-31

## 2024-07-31 RX ADMIN — Medication 3 ML: at 11:20

## 2024-07-31 RX ADMIN — LEVOTHYROXINE SODIUM 50 MCG: 0.05 TABLET ORAL at 05:12

## 2024-07-31 RX ADMIN — FLUCONAZOLE 200 MG: 200 TABLET ORAL at 07:48

## 2024-07-31 RX ADMIN — PENICILLIN V POTASSIUM 250 MG: 250 TABLET, FILM COATED ORAL at 20:20

## 2024-07-31 RX ADMIN — ACYCLOVIR 800 MG: 800 TABLET ORAL at 20:20

## 2024-07-31 RX ADMIN — Medication 50 MCG: at 07:47

## 2024-07-31 RX ADMIN — POTASSIUM PHOSPHATE, MONOBASIC POTASSIUM PHOSPHATE, DIBASIC 9 MMOL: 224; 236 INJECTION, SOLUTION, CONCENTRATE INTRAVENOUS at 07:02

## 2024-07-31 RX ADMIN — VITAM B12 100 MCG: 100 TAB at 07:47

## 2024-07-31 RX ADMIN — Medication 3 ML: at 03:22

## 2024-07-31 RX ADMIN — PANTOPRAZOLE SODIUM 40 MG: 40 TABLET, DELAYED RELEASE ORAL at 07:47

## 2024-07-31 RX ADMIN — POTASSIUM CHLORIDE 20 MEQ: 29.8 INJECTION, SOLUTION INTRAVENOUS at 05:12

## 2024-07-31 RX ADMIN — PENICILLIN V POTASSIUM 250 MG: 250 TABLET, FILM COATED ORAL at 07:48

## 2024-07-31 RX ADMIN — ACYCLOVIR 800 MG: 800 TABLET ORAL at 07:47

## 2024-07-31 RX ADMIN — Medication 3 ML: at 12:08

## 2024-07-31 RX ADMIN — AMLODIPINE BESYLATE 5 MG: 5 TABLET ORAL at 07:48

## 2024-07-31 ASSESSMENT — ACTIVITIES OF DAILY LIVING (ADL)
ADLS_ACUITY_SCORE: 22

## 2024-07-31 NOTE — PLAN OF CARE
"/68 (BP Location: Right arm)   Pulse 71   Temp 97.6  F (36.4  C) (Oral)   Resp 16   Ht 1.57 m (5' 1.81\")   Wt 53.4 kg (117 lb 12.8 oz)   LMP  (LMP Unknown)   SpO2 100%   BMI 21.68 kg/m        Hours of care: 3957-5079     Vitals: Afebrile, VSS.     Neuro: A&Ox4. Neuros intact.   Cardiac: Patient denies chest pain.           Respiratory: RA, lung sounds clear, denies SOB  GI/: Voiding spontaneously.   Diet/appetite: Tolerating high calorie/ high protein diet . Denies nausea.   Activity: Up independently     Pain: Patient reports aching back pain 3/10 and is utilizing aqua K pad.   Skin: No new deficits noted.  Lines: PICC heparin locked.  Patient declined CVC dressing change. CVC dressing last changed on 07/29.  Drains: none  Replacements: No further replacements indicated this shift.     Plan: Continue with current POC. Report changes to primary team.        Goal Outcome Evaluation:      Plan of Care Reviewed With: patient    Overall Patient Progress: improving           "

## 2024-07-31 NOTE — PROGRESS NOTES
"BMT/Cell Therapy Daily Progress Note   07/31/2024    Patient ID:  Shena Hartmann is a 73 year old female, currently day 2 of carvykti car-t for multiple myeloma.    Admission date: 7/29/2024    INTERVAL  HISTORY   Doing well, no complaints.      Review of Systems: ROS negative except as noted above.      PHYSICAL EXAM     Weight In/Out     Wt Readings from Last 3 Encounters:   07/31/24 53.4 kg (117 lb 12.8 oz)   07/26/24 55.3 kg (121 lb 14.4 oz)   07/25/24 56.5 kg (124 lb 9.6 oz)      I/O last 3 completed shifts:  In: 2476 [P.O.:2426; I.V.:50]  Out: 3560 [Urine:3560]     ICE: 10/10 Datestamp: 7/30/2024     /76 (BP Location: Right leg)   Pulse 72   Temp 97.9  F (36.6  C) (Oral)   Resp 16   Ht 1.57 m (5' 1.81\")   Wt 53.4 kg (117 lb 12.8 oz)   LMP  (LMP Unknown)   SpO2 98%   BMI 21.68 kg/m       General: NAD   Eyes: : KYAW, sclera anicteric   Lungs: breathing comfortably on room air and lungs sound clear  Heart: rrr  Lymphatics: no edema  Skin: no rashes or petechiae  Neuro: A&O   Additional Findings: PICC      LABS AND IMAGING: I have assessed all abnormal lab values for their clinical significance and any values considered clinically significant have been addressed in the assessment and plan.        Lab Results   Component Value Date    WBC 2.2 (L) 07/31/2024    ANEU 2.1 07/05/2021    HGB 10.7 (L) 07/31/2024    HCT 31.6 (L) 07/31/2024     07/31/2024     (L) 07/31/2024    POTASSIUM 4.8 07/31/2024    CHLORIDE 101 07/31/2024    CO2 23 07/31/2024     (H) 07/31/2024    BUN 7.6 (L) 07/31/2024    CR 0.59 07/31/2024    MAG 2.2 07/31/2024    INR 1.00 07/31/2024           SYSTEMS-BASED ASSESSMENT AND PLAN   Shena Hartmann is a 73 year old female, currently day 2  of Car-t therapy, utilizing Carvykti.      ONC  - BMT doc/Coordinator: Anastasiya  - Cell Therapy Product: carvykti. This product has a median onset of CRS at day7; and a median onset of ICANS at day 8.  - Disease: multiple " myeloma  - package insert: https://www.fda.gov/media/144714/download     HEME/COAG  - pancytopenia secondary to malignancy  - hematologic toxicity score of 0: Will this patient need GCSF to start on day +14? No      ID  - Infection prophylaxis: acyclovir. Pentamidine was administered on 7/24/2024.   Patient reports tendon pain with levaquin, so will plan to use pen vk prophylaxis.  Start pen vk and fluconazole 7/30.      GI  - zofran, compazine prn n/v  - diarrhea resolved     RENAL/  # hyponatremia: mild, monitor  # hypokalemia: resolved (and was likely secondary to diarrhea)  # sliding scale mag/phos/K+ scales ordered     ENDOCRINE  # hypothyroidism:  levothyroxine 50 mcg daily     SUPPORTIVE CARE  - PT/OT to evaluate on admission and follow as indicated.   - Transfusion to keep hgb >7g/dL and plts >10,000  - Antiemetics prn  - Ulcer ppx: protonix  - DVT ppx:  no pharmacologic prophylaxis given anticipated thrombocytopenia    - patient takes various homeopathic remedies.  Per Dr. Morgan, patient is not to take them until 2 weeks post car-t.   - reviewed patient's supplement powder, which contains skullcap; due to potential SUH209 interactions with mushrooms, we have asked her not to take this powder during her acute car-t period.  - patient has 2 other protein powders that were reviewed by dietitian and they are okay to take.    - okay to continue B12 and vitamin D    Medically Ready for Discharge: Anticipated in 5+ Days  Clinically Significant Risk Factors          # Hypocalcemia: Lowest Ca = 8.1 mg/dL in last 2 days, will monitor and replace as appropriate         # Hypertension: Noted on problem list                     I spent 30 minutes in the care of this patient today, which included time necessary for preparation for the visit, obtaining history, ordering medications/tests/procedures as medically indicated, review of pertinent medical literature, counseling of the patient, communication of  recommendations to the care team, and documentation time.    Analy West PA-C

## 2024-07-31 NOTE — PLAN OF CARE
"  Problem: Adult Inpatient Plan of Care  Goal: Patient-Specific Goal (Individualized)  Description: You can add care plan individualizations to a care plan. Examples of Individualization might be:  \"Parent requests to be called daily at 9am for status\", \"I have a hard time hearing out of my right ear\", or \"Do not touch me to wake me up as it startles  me\".  Outcome: Progressing   Goal Outcome Evaluation:  CARVYKI CAR-T cells day +2. Ice score 10/10. Neuro's are intact. Sentence log looks good. Vss. Achy back pain 5/10 and using the aqua K pad. Declined any other intervention. No nausea. Ate a good breakfast. Heparin locked. ICA 4.4. Na 133. K+4.8. Mg 2.23. Phos 2.7. WBC 2.2. Hgb 10.7 and Plt 207. Stool x 2. Showered. Independent.                       "

## 2024-07-31 NOTE — PLAN OF CARE
"Pt afebrile, other VSS, neuro's intact, continuous back pain managed with aqua-k pad, denies N/V, K 3.8- gave 1 bag Kcl, phos 2.5, 9mmol kphos infusing. Recheck scheduled for 8/1 Am. Good appetite, ind, urine adequate, continue to monitor.          Problem: Adult Inpatient Plan of Care  Goal: Plan of Care Review  Description: The Plan of Care Review/Shift note should be completed every shift.  The Outcome Evaluation is a brief statement about your assessment that the patient is improving, declining, or no change.  This information will be displayed automatically on your shift  note.  Outcome: Progressing  Flowsheets (Taken 7/31/2024 0560)  Plan of Care Reviewed With: patient  Overall Patient Progress: improving  Goal: Patient-Specific Goal (Individualized)  Description: You can add care plan individualizations to a care plan. Examples of Individualization might be:  \"Parent requests to be called daily at 9am for status\", \"I have a hard time hearing out of my right ear\", or \"Do not touch me to wake me up as it startles  me\".  Outcome: Progressing  Goal: Absence of Hospital-Acquired Illness or Injury  Outcome: Progressing  Intervention: Identify and Manage Fall Risk  Recent Flowsheet Documentation  Taken 7/31/2024 0500 by Melanie Dobson RN  Safety Promotion/Fall Prevention:   safety round/check completed   assistive device/personal items within reach  Taken 7/31/2024 0100 by Melanie Dobson RN  Safety Promotion/Fall Prevention:   assistive device/personal items within reach   safety round/check completed  Taken 7/30/2024 2309 by Melanie Dobson RN  Safety Promotion/Fall Prevention:   assistive device/personal items within reach   safety round/check completed  Goal: Optimal Comfort and Wellbeing  Outcome: Progressing  Intervention: Monitor Pain and Promote Comfort  Recent Flowsheet Documentation  Taken 7/30/2024 2309 by Melanie Dobson RN  Pain Management Interventions: heat applied  Goal: Readiness for Transition of " Care  Outcome: Progressing     Problem: Risk for Delirium  Goal: Optimal Coping  Outcome: Progressing  Goal: Improved Sleep  Outcome: Progressing     Problem: Stem Cell/Bone Marrow Transplant  Goal: Optimal Coping with Transplant  Outcome: Progressing  Goal: Diarrhea Symptom Control  Outcome: Progressing  Goal: Improved Activity Tolerance  Outcome: Progressing  Goal: Blood Counts Within Acceptable Range  Outcome: Progressing  Intervention: Monitor and Manage Hematologic Symptoms  Recent Flowsheet Documentation  Taken 7/30/2024 0716 by Melanie Dobson RN  Medication Review/Management: medications reviewed  Goal: Absence of Infection  Outcome: Progressing  Goal: Improved Oral Mucous Membrane Health and Integrity  Outcome: Progressing  Goal: Nausea and Vomiting Symptom Relief  Outcome: Progressing  Goal: Optimal Nutrition Intake  Outcome: Progressing     Problem: Skin Injury Risk Increased  Goal: Skin Health and Integrity  Outcome: Progressing   Goal Outcome Evaluation:      Plan of Care Reviewed With: patient    Overall Patient Progress: improvingOverall Patient Progress: improving

## 2024-08-01 LAB
ABO/RH(D): ABNORMAL
ANION GAP SERPL CALCULATED.3IONS-SCNC: 9 MMOL/L (ref 7–15)
ANTIBODY SCREEN, TUBE: NORMAL
ANTIBODY SCREEN: POSITIVE
BASOPHILS # BLD AUTO: 0 10E3/UL (ref 0–0.2)
BASOPHILS NFR BLD AUTO: 0 %
BUN SERPL-MCNC: 11.1 MG/DL (ref 8–23)
CALCIUM SERPL-MCNC: 8.3 MG/DL (ref 8.8–10.4)
CHLORIDE SERPL-SCNC: 103 MMOL/L (ref 98–107)
CREAT SERPL-MCNC: 0.63 MG/DL (ref 0.51–0.95)
EGFRCR SERPLBLD CKD-EPI 2021: >90 ML/MIN/1.73M2
EOSINOPHIL # BLD AUTO: 0.2 10E3/UL (ref 0–0.7)
EOSINOPHIL NFR BLD AUTO: 10 %
ERYTHROCYTE [DISTWIDTH] IN BLOOD BY AUTOMATED COUNT: 14.2 % (ref 10–15)
GLUCOSE SERPL-MCNC: 92 MG/DL (ref 70–99)
HCO3 SERPL-SCNC: 24 MMOL/L (ref 22–29)
HCT VFR BLD AUTO: 28 % (ref 35–47)
HGB BLD-MCNC: 9.7 G/DL (ref 11.7–15.7)
IMM GRANULOCYTES # BLD: 0 10E3/UL
IMM GRANULOCYTES NFR BLD: 1 %
LYMPHOCYTES # BLD AUTO: 0 10E3/UL (ref 0.8–5.3)
LYMPHOCYTES NFR BLD AUTO: 3 %
MAGNESIUM SERPL-MCNC: 2.1 MG/DL (ref 1.7–2.3)
MCH RBC QN AUTO: 33.9 PG (ref 26.5–33)
MCHC RBC AUTO-ENTMCNC: 34.6 G/DL (ref 31.5–36.5)
MCV RBC AUTO: 98 FL (ref 78–100)
MONOCYTES # BLD AUTO: 0.2 10E3/UL (ref 0–1.3)
MONOCYTES NFR BLD AUTO: 11 %
NEUTROPHILS # BLD AUTO: 1.2 10E3/UL (ref 1.6–8.3)
NEUTROPHILS NFR BLD AUTO: 75 %
NRBC # BLD AUTO: 0 10E3/UL
NRBC BLD AUTO-RTO: 1 /100
PHOSPHATE SERPL-MCNC: 2.9 MG/DL (ref 2.5–4.5)
PLATELET # BLD AUTO: 175 10E3/UL (ref 150–450)
POTASSIUM SERPL-SCNC: 3.7 MMOL/L (ref 3.4–5.3)
RBC # BLD AUTO: 2.86 10E6/UL (ref 3.8–5.2)
SODIUM SERPL-SCNC: 136 MMOL/L (ref 135–145)
SPECIMEN EXPIRATION DATE: ABNORMAL
SPECIMEN EXPIRATION DATE: NORMAL
WBC # BLD AUTO: 1.6 10E3/UL (ref 4–11)

## 2024-08-01 PROCEDURE — 250N000011 HC RX IP 250 OP 636

## 2024-08-01 PROCEDURE — 250N000013 HC RX MED GY IP 250 OP 250 PS 637: Performed by: PHYSICIAN ASSISTANT

## 2024-08-01 PROCEDURE — 206N000001 HC R&B BMT UMMC

## 2024-08-01 PROCEDURE — 85004 AUTOMATED DIFF WBC COUNT: CPT | Performed by: PHYSICIAN ASSISTANT

## 2024-08-01 PROCEDURE — 83735 ASSAY OF MAGNESIUM: CPT

## 2024-08-01 PROCEDURE — 250N000011 HC RX IP 250 OP 636: Performed by: STUDENT IN AN ORGANIZED HEALTH CARE EDUCATION/TRAINING PROGRAM

## 2024-08-01 PROCEDURE — 99233 SBSQ HOSP IP/OBS HIGH 50: CPT | Mod: FS | Performed by: PHYSICIAN ASSISTANT

## 2024-08-01 PROCEDURE — 86900 BLOOD TYPING SEROLOGIC ABO: CPT | Performed by: PHYSICIAN ASSISTANT

## 2024-08-01 PROCEDURE — 250N000011 HC RX IP 250 OP 636: Performed by: PHYSICIAN ASSISTANT

## 2024-08-01 PROCEDURE — 84100 ASSAY OF PHOSPHORUS: CPT

## 2024-08-01 PROCEDURE — 80048 BASIC METABOLIC PNL TOTAL CA: CPT | Performed by: PHYSICIAN ASSISTANT

## 2024-08-01 RX ORDER — POTASSIUM CHLORIDE 29.8 MG/ML
20 INJECTION INTRAVENOUS ONCE
Status: COMPLETED | OUTPATIENT
Start: 2024-08-01 | End: 2024-08-01

## 2024-08-01 RX ADMIN — ACYCLOVIR 800 MG: 800 TABLET ORAL at 08:44

## 2024-08-01 RX ADMIN — FLUCONAZOLE 200 MG: 200 TABLET ORAL at 08:45

## 2024-08-01 RX ADMIN — PENICILLIN V POTASSIUM 250 MG: 250 TABLET, FILM COATED ORAL at 19:26

## 2024-08-01 RX ADMIN — PANTOPRAZOLE SODIUM 40 MG: 40 TABLET, DELAYED RELEASE ORAL at 08:45

## 2024-08-01 RX ADMIN — AMLODIPINE BESYLATE 5 MG: 5 TABLET ORAL at 08:45

## 2024-08-01 RX ADMIN — LEVOTHYROXINE SODIUM 50 MCG: 0.05 TABLET ORAL at 05:18

## 2024-08-01 RX ADMIN — PENICILLIN V POTASSIUM 250 MG: 250 TABLET, FILM COATED ORAL at 08:44

## 2024-08-01 RX ADMIN — ACYCLOVIR 800 MG: 800 TABLET ORAL at 19:26

## 2024-08-01 RX ADMIN — Medication 3 ML: at 08:46

## 2024-08-01 RX ADMIN — POTASSIUM CHLORIDE 20 MEQ: 29.8 INJECTION, SOLUTION INTRAVENOUS at 05:17

## 2024-08-01 RX ADMIN — Medication 3 ML: at 03:53

## 2024-08-01 RX ADMIN — Medication 50 MCG: at 08:44

## 2024-08-01 RX ADMIN — VITAM B12 100 MCG: 100 TAB at 08:45

## 2024-08-01 RX ADMIN — ACETAMINOPHEN 650 MG: 325 TABLET, FILM COATED ORAL at 20:58

## 2024-08-01 ASSESSMENT — ACTIVITIES OF DAILY LIVING (ADL)
ADLS_ACUITY_SCORE: 22

## 2024-08-01 NOTE — PROGRESS NOTES
"BMT/Cell Therapy Daily Progress Note   08/01/2024    Patient ID:  Shena Hartmann is a 73 year old female, currently day 3 of carvykti car-t for multiple myeloma.    Admission date: 7/29/2024    INTERVAL  HISTORY   Doing well, no complaints other than her chronic back pain and the possible start of cold sores on her upper and lower lips.  She is using topical lysine for these.     Review of Systems: ROS negative except as noted above.      PHYSICAL EXAM     Weight In/Out     Wt Readings from Last 3 Encounters:   08/01/24 52.5 kg (115 lb 11.2 oz)   07/26/24 55.3 kg (121 lb 14.4 oz)   07/25/24 56.5 kg (124 lb 9.6 oz)      I/O last 3 completed shifts:  In: 2560 [P.O.:2260; I.V.:300]  Out: 3375 [Urine:3375]     ICE: 10/10 Datestamp: 8/1/2024     /65 (BP Location: Right leg)   Pulse 73   Temp 97.9  F (36.6  C) (Oral)   Resp 16   Ht 1.57 m (5' 1.81\")   Wt 52.5 kg (115 lb 11.2 oz)   LMP  (LMP Unknown)   SpO2 98%   BMI 21.29 kg/m       General: NAD   Eyes: : KYAW, sclera anicteric   Mouth: small areas of swelling and erythema on upper and lower lips; no blistering/ulceration or open areas  Lungs: breathing comfortably on room air and lungs sound clear  Heart: rrr  Lymphatics: no edema  Skin: no rashes or petechiae  Neuro: A&O   Additional Findings: PICC      LABS AND IMAGING: I have assessed all abnormal lab values for their clinical significance and any values considered clinically significant have been addressed in the assessment and plan.        Lab Results   Component Value Date    WBC 1.6 (L) 08/01/2024    ANEU 2.1 07/05/2021    HGB 9.7 (L) 08/01/2024    HCT 28.0 (L) 08/01/2024     08/01/2024     08/01/2024    POTASSIUM 3.7 08/01/2024    CHLORIDE 103 08/01/2024    CO2 24 08/01/2024    GLC 92 08/01/2024    BUN 11.1 08/01/2024    CR 0.63 08/01/2024    MAG 2.1 08/01/2024    INR 1.00 07/31/2024           SYSTEMS-BASED ASSESSMENT AND PLAN   Shena Hartmann is a 73 year old female, currently day 3  " of Car-t therapy, utilizing Carvykti.      ONC  - BMT doc/Coordinator: Scott/Sieve  - Cell Therapy Product: carvykti. This product has a median onset of CRS at day7; and a median onset of ICANS at day 8.  - Disease: multiple myeloma  - package insert: https://www.fda.gov/media/420024/download     HEME/COAG  - pancytopenia secondary to malignancy  - hematologic toxicity score of 0: Will this patient need GCSF to start on day +14? No      ID  - Infection prophylaxis: acyclovir, pen vk, fluconazole. Pentamidine was administered on 7/24/2024.     GI  - zofran, compazine prn n/v  - diarrhea resolved     RENAL/  # hyponatremia: mild, monitor  # hypokalemia: resolved (and was likely secondary to diarrhea)  # sliding scale mag/phos/K+ scales ordered     ENDOCRINE  # hypothyroidism:  levothyroxine 50 mcg daily     SUPPORTIVE CARE  - PT/OT to evaluate on admission and follow as indicated.   - Transfusion to keep hgb >7g/dL and plts >10,000  - Antiemetics prn  - Ulcer ppx: protonix  - DVT ppx:  no pharmacologic prophylaxis given anticipated thrombocytopenia    - patient takes various homeopathic remedies.  Per Dr. Morgan, patient is not to take them until 2 weeks post car-t.   - reviewed patient's supplement powder, which contains skullcap; due to potential [id number] interactions with mushrooms, we have asked her not to take this powder during her acute car-t period.  - patient has 2 other protein powders that were reviewed by dietitian and they are okay to take.    - okay to continue B12 and vitamin D    Medically Ready for Discharge: Anticipated in 5+ Days  Clinically Significant Risk Factors          # Hypocalcemia: Lowest Ca = 8.2 mg/dL in last 2 days, will monitor and replace as appropriate         # Hypertension: Noted on problem list                     I spent 30 minutes in the care of this patient today, which included time necessary for preparation for the visit, obtaining history, ordering  medications/tests/procedures as medically indicated, review of pertinent medical literature, counseling of the patient, communication of recommendations to the care team, and documentation time.    Analy West PA-C

## 2024-08-01 NOTE — PLAN OF CARE
"VSS.  AF.  Denies nausea.  Eating and drinking well.  Continues with low back discomfort that is managed with heat and activity.  Mouth slightly sore and has a small reddened spot on bottom lip.  Using lysine lip balm.  Showered.  Will do CHG before bed.  Will continue with POC.      Problem: Adult Inpatient Plan of Care  Goal: Plan of Care Review  Description: The Plan of Care Review/Shift note should be completed every shift.  The Outcome Evaluation is a brief statement about your assessment that the patient is improving, declining, or no change.  This information will be displayed automatically on your shift  note.  Outcome: Progressing  Goal: Patient-Specific Goal (Individualized)  Description: You can add care plan individualizations to a care plan. Examples of Individualization might be:  \"Parent requests to be called daily at 9am for status\", \"I have a hard time hearing out of my right ear\", or \"Do not touch me to wake me up as it startles  me\".  Outcome: Progressing  Goal: Absence of Hospital-Acquired Illness or Injury  Outcome: Progressing  Intervention: Identify and Manage Fall Risk  Recent Flowsheet Documentation  Taken 8/1/2024 1300 by Anayeli Dotson, RN  Safety Promotion/Fall Prevention: safety round/check completed  Taken 8/1/2024 1200 by Anayeli Dotson RN  Safety Promotion/Fall Prevention: safety round/check completed  Taken 8/1/2024 1100 by Anayeli Dotson, RN  Safety Promotion/Fall Prevention:   clutter free environment maintained   increased rounding and observation   increase visualization of patient   lighting adjusted   nonskid shoes/slippers when out of bed   patient and family education   room near nurse's station   room organization consistent   safety round/check completed   treat reversible contributory factors   treat underlying cause  Taken 8/1/2024 0800 by Anayeli Dotson, RN  Safety Promotion/Fall Prevention:   assistive device/personal items within reach   check orthostatic blood " pressure   clutter free environment maintained   increased rounding and observation   increase visualization of patient   lighting adjusted   nonskid shoes/slippers when out of bed   patient and family education   room organization consistent   safety round/check completed   treat reversible contributory factors   treat underlying cause  Intervention: Prevent Skin Injury  Recent Flowsheet Documentation  Taken 8/1/2024 0800 by Anayeli Dotson RN  Body Position: position changed independently  Device Skin Pressure Protection:   adhesive use limited   tubing/devices free from skin contact  Intervention: Prevent Infection  Recent Flowsheet Documentation  Taken 8/1/2024 1100 by Anayeli Dotson RN  Infection Prevention:   cohorting utilized   equipment surfaces disinfected   hand hygiene promoted   personal protective equipment utilized   rest/sleep promoted   single patient room provided   visitors restricted/screened  Taken 8/1/2024 0800 by Anayeli Dotson RN  Infection Prevention:   cohorting utilized   environmental surveillance performed   hand hygiene promoted   personal protective equipment utilized   single patient room provided   rest/sleep promoted   visitors restricted/screened  Goal: Optimal Comfort and Wellbeing  Outcome: Progressing  Intervention: Monitor Pain and Promote Comfort  Recent Flowsheet Documentation  Taken 8/1/2024 0817 by Anayeli Dotson RN  Pain Management Interventions:   prescribed exercises encouraged   heat applied  Goal: Readiness for Transition of Care  Outcome: Progressing     Problem: Risk for Delirium  Goal: Optimal Coping  Outcome: Progressing  Goal: Improved Sleep  Outcome: Progressing     Problem: Stem Cell/Bone Marrow Transplant  Goal: Optimal Coping with Transplant  Outcome: Progressing  Goal: Diarrhea Symptom Control  Outcome: Progressing  Goal: Improved Activity Tolerance  Outcome: Progressing  Intervention: Promote Improved Energy  Recent Flowsheet Documentation  Taken 8/1/2024  1100 by Anayeli Dotson RN  Activity Management:   activity adjusted per tolerance   activity encouraged   ambulated to bathroom   ambulated in room   ambulated outside room  Taken 8/1/2024 0800 by Anayeli Dotson RN  Fatigue Management:   fatigue-related activity identified   frequent rest breaks encouraged   paced activity encouraged  Activity Management:   activity adjusted per tolerance   ambulated in room   ambulated to bathroom   ambulated outside room  Goal: Blood Counts Within Acceptable Range  Outcome: Progressing  Intervention: Monitor and Manage Hematologic Symptoms  Recent Flowsheet Documentation  Taken 8/1/2024 1100 by Anayeli Dotson RN  Bleeding Precautions:   blood pressure closely monitored   foot protection facilitated   gentle oral care promoted   monitored for signs of bleeding  Medication Review/Management:   medications reviewed   high-risk medications identified  Taken 8/1/2024 0800 by Anayeli Dotson RN  Bleeding Precautions:   blood pressure closely monitored   foot protection facilitated   gentle oral care promoted   monitored for signs of bleeding  Medication Review/Management:   medications reviewed   high-risk medications identified  Goal: Absence of Infection  Outcome: Progressing  Intervention: Prevent and Manage Infection  Recent Flowsheet Documentation  Taken 8/1/2024 1100 by Anayeli Dotson RN  Infection Prevention:   cohorting utilized   equipment surfaces disinfected   hand hygiene promoted   personal protective equipment utilized   rest/sleep promoted   single patient room provided   visitors restricted/screened  Infection Management: aseptic technique maintained  Isolation Precautions: protective environment maintained  Taken 8/1/2024 0800 by Anayeli Dotson RN  Infection Prevention:   cohorting utilized   environmental surveillance performed   hand hygiene promoted   personal protective equipment utilized   single patient room provided   rest/sleep promoted   visitors  restricted/screened  Isolation Precautions: protective environment maintained  Goal: Improved Oral Mucous Membrane Health and Integrity  Outcome: Progressing  Intervention: Promote Oral Comfort and Health  Recent Flowsheet Documentation  Taken 8/1/2024 0800 by Anayeli Dotson RN  Oral Mucous Membrane Protection: lip/mouth moisturizer applied  Oral Care:   lip/mouth moisturizer applied   mouth wash rinse  Goal: Nausea and Vomiting Symptom Relief  Outcome: Progressing  Goal: Optimal Nutrition Intake  Outcome: Progressing     Problem: Skin Injury Risk Increased  Goal: Skin Health and Integrity  Outcome: Progressing  Intervention: Optimize Skin Protection  Recent Flowsheet Documentation  Taken 8/1/2024 1100 by Anayeli Dotson RN  Activity Management:   activity adjusted per tolerance   activity encouraged   ambulated to bathroom   ambulated in room   ambulated outside room  Taken 8/1/2024 0800 by Anayeil Dotson RN  Activity Management:   activity adjusted per tolerance   ambulated in room   ambulated to bathroom   ambulated outside room     Problem: Risk for Delirium  Goal: Improved Behavioral Control  Intervention: Minimize Safety Risk  Recent Flowsheet Documentation  Taken 8/1/2024 1100 by Anayeli Dotson RN  Enhanced Safety Measures:   patient/family teach back on injury risk   review medications for side effects with activity  Taken 8/1/2024 0800 by Anayeli Dotson RN  Enhanced Safety Measures:   review medications for side effects with activity   patient/family teach back on injury risk     Problem: Stem Cell/Bone Marrow Transplant  Goal: Symptom-Free Urinary Elimination  Intervention: Prevent or Manage Bladder Irritation  Recent Flowsheet Documentation  Taken 8/1/2024 0817 by Anayeli Dotson RN  Pain Management Interventions:   prescribed exercises encouraged   heat applied   Goal Outcome Evaluation:

## 2024-08-01 NOTE — PLAN OF CARE
"Pt afebrile, VSS, neuro's intact. Pt states pain as 3/10 on lower back but manageable with heat pad.,pt also c/o mouth feeling more sensitive/sore,saline flushes provided.Pt AM K 3.7, pt received replacement., recheck K 8/2 AM . NO other replacements needed. Research blood drawn and sent to lab, still need research stool, pt aware. Good appetite, voiding well no stools overnight.Pt independent with activity.     Melanie Dobson RN on 8/1/2024 at 6:44 AM      Problem: Adult Inpatient Plan of Care  Goal: Plan of Care Review  Description: The Plan of Care Review/Shift note should be completed every shift.  The Outcome Evaluation is a brief statement about your assessment that the patient is improving, declining, or no change.  This information will be displayed automatically on your shift  note.  Outcome: Progressing  Goal: Patient-Specific Goal (Individualized)  Description: You can add care plan individualizations to a care plan. Examples of Individualization might be:  \"Parent requests to be called daily at 9am for status\", \"I have a hard time hearing out of my right ear\", or \"Do not touch me to wake me up as it startles  me\".  Outcome: Progressing  Goal: Absence of Hospital-Acquired Illness or Injury  Outcome: Progressing  Intervention: Identify and Manage Fall Risk  Recent Flowsheet Documentation  Taken 8/1/2024 0527 by Melanie Dobson RN  Safety Promotion/Fall Prevention: safety round/check completed  Taken 8/1/2024 0200 by Melanie Dobson RN  Safety Promotion/Fall Prevention: safety round/check completed  Taken 8/1/2024 0000 by Melanie Dobson RN  Safety Promotion/Fall Prevention: safety round/check completed  Taken 7/31/2024 2200 by Melanie Dobson RN  Safety Promotion/Fall Prevention:   safety round/check completed   check orthostatic blood pressure  Taken 7/31/2024 2000 by Melanie Dobson RN  Safety Promotion/Fall Prevention:   safety round/check completed   check orthostatic blood pressure  Intervention: Prevent " Skin Injury  Recent Flowsheet Documentation  Taken 7/31/2024 2000 by Melanie Dobson RN  Body Position: position changed independently  Device Skin Pressure Protection: adhesive use limited  Intervention: Prevent Infection  Recent Flowsheet Documentation  Taken 8/1/2024 0000 by Melanie Dobson RN  Infection Prevention:   rest/sleep promoted   equipment surfaces disinfected   hand hygiene promoted   single patient room provided   visitors restricted/screened  Taken 7/31/2024 2000 by Melanie Dobson RN  Infection Prevention:   rest/sleep promoted   equipment surfaces disinfected   hand hygiene promoted   single patient room provided   visitors restricted/screened  Goal: Optimal Comfort and Wellbeing  Outcome: Progressing  Intervention: Monitor Pain and Promote Comfort  Recent Flowsheet Documentation  Taken 8/1/2024 0332 by Melanie Dobson RN  Pain Management Interventions: heat applied  Taken 8/1/2024 0010 by Melanie Dobson RN  Pain Management Interventions: heat applied  Taken 7/31/2024 2007 by Melanie Dobson RN  Pain Management Interventions: heat applied  Goal: Readiness for Transition of Care  Outcome: Progressing     Problem: Risk for Delirium  Goal: Optimal Coping  Outcome: Progressing  Intervention: Optimize Psychosocial Adjustment to Delirium  Recent Flowsheet Documentation  Taken 7/31/2024 2000 by Melanie Dobson RN  Supportive Measures:   active listening utilized   positive reinforcement provided  Goal: Improved Sleep  Outcome: Progressing  Intervention: Promote Sleep  Recent Flowsheet Documentation  Taken 7/31/2024 2000 by Melanie Dobson RN  Sleep/Rest Enhancement:   awakenings minimized   consistent schedule promoted   reading promoted     Problem: Stem Cell/Bone Marrow Transplant  Goal: Optimal Coping with Transplant  Outcome: Progressing  Intervention: Optimize Patient/Family Adjustment to Transplant  Recent Flowsheet Documentation  Taken 7/31/2024 2000 by Melanie Dobson RN  Supportive Measures:   active  listening utilized   positive reinforcement provided  Goal: Diarrhea Symptom Control  Outcome: Progressing  Intervention: Manage Diarrhea  Recent Flowsheet Documentation  Taken 7/31/2024 2000 by Melanie Dobson RN  Fluid/Electrolyte Management: fluids provided  Goal: Improved Activity Tolerance  Outcome: Progressing  Intervention: Promote Improved Energy  Recent Flowsheet Documentation  Taken 7/31/2024 2000 by Melanie Dobson RN  Sleep/Rest Enhancement:   awakenings minimized   consistent schedule promoted   reading promoted  Activity Management:   activity adjusted per tolerance   up ad dorian  Environmental Support:   calm environment promoted   distractions minimized   personal routine supported  Goal: Blood Counts Within Acceptable Range  Outcome: Progressing  Intervention: Monitor and Manage Hematologic Symptoms  Recent Flowsheet Documentation  Taken 7/31/2024 2000 by Melanie Dobson RN  Sleep/Rest Enhancement:   awakenings minimized   consistent schedule promoted   reading promoted  Bleeding Precautions: monitored for signs of bleeding  Medication Review/Management: medications reviewed  Goal: Absence of Infection  Outcome: Progressing  Intervention: Prevent and Manage Infection  Recent Flowsheet Documentation  Taken 8/1/2024 0000 by Melanie Dobson RN  Infection Prevention:   rest/sleep promoted   equipment surfaces disinfected   hand hygiene promoted   single patient room provided   visitors restricted/screened  Isolation Precautions: protective environment maintained  Taken 7/31/2024 2000 by Melanie Dobson RN  Infection Prevention:   rest/sleep promoted   equipment surfaces disinfected   hand hygiene promoted   single patient room provided   visitors restricted/screened  Isolation Precautions: protective environment maintained  Goal: Improved Oral Mucous Membrane Health and Integrity  Outcome: Progressing  Intervention: Promote Oral Comfort and Health  Recent Flowsheet Documentation  Taken 7/31/2024 2000 by  Melanie Dobson, RN  Oral Mucous Membrane Protection: (saline rinse)   lip/mouth moisturizer applied   other (see comments)  Oral Care: oral rinse provided  Goal: Nausea and Vomiting Symptom Relief  Outcome: Progressing  Goal: Optimal Nutrition Intake  Outcome: Progressing     Problem: Skin Injury Risk Increased  Goal: Skin Health and Integrity  Outcome: Progressing  Intervention: Optimize Skin Protection  Recent Flowsheet Documentation  Taken 7/31/2024 2000 by Melanie Dobson, RN  Activity Management:   activity adjusted per tolerance   up ad dorian  Head of Bed (HOB) Positioning: HOB at 20-30 degrees   Goal Outcome Evaluation:      Plan of Care Reviewed With: patient    Overall Patient Progress: improvingOverall Patient Progress: improving

## 2024-08-02 ENCOUNTER — TRANSFERRED RECORDS (OUTPATIENT)
Dept: HEALTH INFORMATION MANAGEMENT | Facility: CLINIC | Age: 74
End: 2024-08-02
Payer: MEDICARE

## 2024-08-02 ENCOUNTER — APPOINTMENT (OUTPATIENT)
Dept: OCCUPATIONAL THERAPY | Facility: CLINIC | Age: 74
DRG: 018 | End: 2024-08-02
Payer: MEDICARE

## 2024-08-02 ENCOUNTER — TELEPHONE (OUTPATIENT)
Dept: TRANSPLANT | Facility: CLINIC | Age: 74
End: 2024-08-02
Payer: MEDICARE

## 2024-08-02 LAB
ANION GAP SERPL CALCULATED.3IONS-SCNC: 8 MMOL/L (ref 7–15)
BASOPHILS # BLD AUTO: 0 10E3/UL (ref 0–0.2)
BASOPHILS NFR BLD AUTO: 0 %
BUN SERPL-MCNC: 12.2 MG/DL (ref 8–23)
CALCIUM SERPL-MCNC: 8.4 MG/DL (ref 8.8–10.4)
CHLORIDE SERPL-SCNC: 101 MMOL/L (ref 98–107)
CREAT SERPL-MCNC: 0.67 MG/DL (ref 0.51–0.95)
EGFRCR SERPLBLD CKD-EPI 2021: >90 ML/MIN/1.73M2
EOSINOPHIL # BLD AUTO: 0.2 10E3/UL (ref 0–0.7)
EOSINOPHIL NFR BLD AUTO: 10 %
ERYTHROCYTE [DISTWIDTH] IN BLOOD BY AUTOMATED COUNT: 14.2 % (ref 10–15)
GLUCOSE SERPL-MCNC: 96 MG/DL (ref 70–99)
HCO3 SERPL-SCNC: 24 MMOL/L (ref 22–29)
HCT VFR BLD AUTO: 29.1 % (ref 35–47)
HGB BLD-MCNC: 9.8 G/DL (ref 11.7–15.7)
IMM GRANULOCYTES # BLD: 0.1 10E3/UL
IMM GRANULOCYTES NFR BLD: 3 %
LYMPHOCYTES # BLD AUTO: 0.1 10E3/UL (ref 0.8–5.3)
LYMPHOCYTES NFR BLD AUTO: 5 %
MAGNESIUM SERPL-MCNC: 2 MG/DL (ref 1.7–2.3)
MCH RBC QN AUTO: 33.4 PG (ref 26.5–33)
MCHC RBC AUTO-ENTMCNC: 33.7 G/DL (ref 31.5–36.5)
MCV RBC AUTO: 99 FL (ref 78–100)
MONOCYTES # BLD AUTO: 0.2 10E3/UL (ref 0–1.3)
MONOCYTES NFR BLD AUTO: 16 %
NEUTROPHILS # BLD AUTO: 1 10E3/UL (ref 1.6–8.3)
NEUTROPHILS NFR BLD AUTO: 66 %
NRBC # BLD AUTO: 0 10E3/UL
NRBC BLD AUTO-RTO: 0 /100
PHOSPHATE SERPL-MCNC: 3.1 MG/DL (ref 2.5–4.5)
PLATELET # BLD AUTO: 185 10E3/UL (ref 150–450)
POTASSIUM SERPL-SCNC: 3.9 MMOL/L (ref 3.4–5.3)
RBC # BLD AUTO: 2.93 10E6/UL (ref 3.8–5.2)
SODIUM SERPL-SCNC: 133 MMOL/L (ref 135–145)
WBC # BLD AUTO: 1.5 10E3/UL (ref 4–11)

## 2024-08-02 PROCEDURE — 85025 COMPLETE CBC W/AUTO DIFF WBC: CPT | Performed by: PHYSICIAN ASSISTANT

## 2024-08-02 PROCEDURE — 206N000001 HC R&B BMT UMMC

## 2024-08-02 PROCEDURE — 250N000013 HC RX MED GY IP 250 OP 250 PS 637: Performed by: PHYSICIAN ASSISTANT

## 2024-08-02 PROCEDURE — 84100 ASSAY OF PHOSPHORUS: CPT | Performed by: STUDENT IN AN ORGANIZED HEALTH CARE EDUCATION/TRAINING PROGRAM

## 2024-08-02 PROCEDURE — 80048 BASIC METABOLIC PNL TOTAL CA: CPT | Performed by: PHYSICIAN ASSISTANT

## 2024-08-02 PROCEDURE — 97110 THERAPEUTIC EXERCISES: CPT | Mod: GO

## 2024-08-02 PROCEDURE — 83735 ASSAY OF MAGNESIUM: CPT | Performed by: PHYSICIAN ASSISTANT

## 2024-08-02 PROCEDURE — 99233 SBSQ HOSP IP/OBS HIGH 50: CPT | Mod: FS | Performed by: PHYSICIAN ASSISTANT

## 2024-08-02 PROCEDURE — 250N000011 HC RX IP 250 OP 636: Performed by: STUDENT IN AN ORGANIZED HEALTH CARE EDUCATION/TRAINING PROGRAM

## 2024-08-02 RX ORDER — MAGNESIUM SULFATE HEPTAHYDRATE 40 MG/ML
2 INJECTION, SOLUTION INTRAVENOUS ONCE
Status: COMPLETED | OUTPATIENT
Start: 2024-08-02 | End: 2024-08-02

## 2024-08-02 RX ADMIN — FLUCONAZOLE 200 MG: 200 TABLET ORAL at 07:54

## 2024-08-02 RX ADMIN — ACETAMINOPHEN 650 MG: 325 TABLET, FILM COATED ORAL at 21:11

## 2024-08-02 RX ADMIN — LEVOTHYROXINE SODIUM 50 MCG: 0.05 TABLET ORAL at 07:52

## 2024-08-02 RX ADMIN — Medication 50 MCG: at 07:55

## 2024-08-02 RX ADMIN — ACYCLOVIR 800 MG: 800 TABLET ORAL at 07:55

## 2024-08-02 RX ADMIN — PENICILLIN V POTASSIUM 250 MG: 250 TABLET, FILM COATED ORAL at 07:55

## 2024-08-02 RX ADMIN — AMLODIPINE BESYLATE 5 MG: 5 TABLET ORAL at 07:55

## 2024-08-02 RX ADMIN — ACYCLOVIR 800 MG: 800 TABLET ORAL at 19:39

## 2024-08-02 RX ADMIN — MAGNESIUM SULFATE HEPTAHYDRATE 2 G: 2 INJECTION, SOLUTION INTRAVENOUS at 05:39

## 2024-08-02 RX ADMIN — VITAM B12 100 MCG: 100 TAB at 07:54

## 2024-08-02 RX ADMIN — PENICILLIN V POTASSIUM 250 MG: 250 TABLET, FILM COATED ORAL at 19:39

## 2024-08-02 RX ADMIN — PANTOPRAZOLE SODIUM 40 MG: 40 TABLET, DELAYED RELEASE ORAL at 07:54

## 2024-08-02 ASSESSMENT — ACTIVITIES OF DAILY LIVING (ADL)
ADLS_ACUITY_SCORE: 22

## 2024-08-02 NOTE — PLAN OF CARE
"  Problem: Adult Inpatient Plan of Care  Goal: Patient-Specific Goal (Individualized)  Description: You can add care plan individualizations to a care plan. Examples of Individualization might be:  \"Parent requests to be called daily at 9am for status\", \"I have a hard time hearing out of my right ear\", or \"Do not touch me to wake me up as it startles  me\".  Outcome: Progressing   Goal Outcome Evaluation:  CARVYKTI CAR-T cell day +4. Sentence log looks good. Neuro's are intact. Mentioned feeling \"weird\" last night but ok today. 3/10 achy back pain and using the aqua K pad. Declined any pain meds. Cold sore on lip is healing and not very visible. Rash on back from the chg wipes. Ate a decent amount of food. Showered. Walked the halls four times. Small stool x 3. Independent. Daughter from [place] at bedside and is very supportive.                       "

## 2024-08-02 NOTE — PLAN OF CARE
Goal Outcome Evaluation:      Plan of Care Reviewed With: patient, child    Overall Patient Progress: no changeOverall Patient Progress: no change     Day 3 CAR-T. Alert and oriented. ICE score is 10 out of 10. Up ad dorian in halls. VSS. No signs of CRS or neurotoxicity. Chronic back pain controlled with heat and prn Tylenol. Small red lesion on bottom lip unchanged. Red rash noted on upper back after patient reported a burning sensation after CHG wipes this evening. Burning sensation decreased after applying a warm washcloth.  Continue Plan of Care.

## 2024-08-02 NOTE — PROGRESS NOTES
"BMT/Cell Therapy Daily Progress Note   08/02/2024    Patient ID:  Shena Hartmann is a 73 year old female, currently day 4 of carvykti car-t for multiple myeloma.    Admission date: 7/29/2024    INTERVAL  HISTORY   Shena is doing well, though notes she felt like she was \"living in a different reality\" yesterday evening.  She was still able to communicate normally, etc., but definitely felt spaced out.    She is eating a very healthy diet.    She again has very soft stools with 7 bowel movements, yesterday.  She declines any imodium for this.    Review of Systems: ROS negative except as noted above.      PHYSICAL EXAM     Weight In/Out     Wt Readings from Last 3 Encounters:   08/02/24 53.4 kg (117 lb 12.8 oz)   07/26/24 55.3 kg (121 lb 14.4 oz)   07/25/24 56.5 kg (124 lb 9.6 oz)      I/O last 3 completed shifts:  In: 1510 [P.O.:1510]  Out: 2450 [Urine:2450]     ICE: 10/10 Datestamp: 8/2/2024     /69 (BP Location: Right arm)   Pulse 71   Temp 98.4  F (36.9  C) (Oral)   Resp 16   Ht 1.57 m (5' 1.81\")   Wt 53.4 kg (117 lb 12.8 oz)   LMP  (LMP Unknown)   SpO2 100%   BMI 21.68 kg/m       General: NAD   Eyes: : KYAW, sclera anicteric   Mouth: red/swollen areas on upper and lower lips have resolved.  Lungs: breathing comfortably on room air and lungs sound clear  Heart: rrr  Lymphatics: no edema  Skin: no rashes or petechiae  Neuro: A&O   Additional Findings: PICC      LABS AND IMAGING: I have assessed all abnormal lab values for their clinical significance and any values considered clinically significant have been addressed in the assessment and plan.        Lab Results   Component Value Date    WBC 1.5 (L) 08/02/2024    ANEU 2.1 07/05/2021    HGB 9.8 (L) 08/02/2024    HCT 29.1 (L) 08/02/2024     08/02/2024     (L) 08/02/2024    POTASSIUM 3.9 08/02/2024    CHLORIDE 101 08/02/2024    CO2 24 08/02/2024    GLC 96 08/02/2024    BUN 12.2 08/02/2024    CR 0.67 08/02/2024    MAG 2.0 08/02/2024    INR " 1.00 07/31/2024           SYSTEMS-BASED ASSESSMENT AND PLAN   Shena Hartmann is a 73 year old female, currently day 4  of Car-t therapy, utilizing Carvykti.      ONC  - BMT doc/Coordinator: [facility]  - Cell Therapy Product: carvykti. This product has a median onset of CRS at day7; and a median onset of ICANS at day 8.  - Disease: multiple myeloma  - package insert: https://www.fda.gov/media/021023/download     HEME/COAG  - pancytopenia secondary to malignancy  - hematologic toxicity score of 0: Will this patient need GCSF to start on day +14? No      ID  - Infection prophylaxis: acyclovir, pen vk, fluconazole. Pentamidine was administered on 7/24/2024.     GI  - zofran, compazine prn n/v  - diarrhea has recurred; monitor.  She declines imodium.  She may elect to eat fewer vegetables to see if that helps.      RENAL/  # hyponatremia: (resolved)  # hypokalemia: (resolved)    # sliding scale mag/phos/K+ scales ordered     ENDOCRINE  # hypothyroidism:  levothyroxine 50 mcg daily     SUPPORTIVE CARE  - PT/OT to evaluate on admission and follow as indicated.   - Transfusion to keep hgb >7g/dL and plts >10,000  - Antiemetics prn  - Ulcer ppx: protonix  - DVT ppx:  no pharmacologic prophylaxis given anticipated thrombocytopenia  - avoid homeopathics for 2 weeks after Car-t  - okay to continue B12 and vitamin D  - avoid skullcap containing supplement powder    Medically Ready for Discharge: Anticipated in 5+ Days  Clinically Significant Risk Factors                  # Hypertension: Noted on problem list                     I spent 30 minutes in the care of this patient today, which included time necessary for preparation for the visit, obtaining history, ordering medications/tests/procedures as medically indicated, review of pertinent medical literature, counseling of the patient, communication of recommendations to the care team, and documentation time.    Analy West PA-C

## 2024-08-02 NOTE — PROGRESS NOTES
RN neurotox assessment    Orientation-year, month, city, hospital 4pts: 4    Naming-ability to name 3 objects 3pts: 3    Following commands-ability to follow commands 1pt: 1    Writing-ability to write sentence 1pt: 1    Attention-ct back from 100 by 10 1pt: 1    Total: 10

## 2024-08-02 NOTE — PLAN OF CARE
"Goal Outcome Evaluation:  /69 (BP Location: Right leg)   Pulse 65   Temp 97.6  F (36.4  C) (Oral)   Resp 16   Ht 1.57 m (5' 1.81\")   Wt 52.5 kg (115 lb 11.2 oz)   LMP  (LMP Unknown)   SpO2 99%   BMI 21.29 kg/m          AVSS. Ind. A&Ox4. Chronic back pain 3/10, managed with tylenol and heat. Slight skin rash on back from CHG wipes but improving. No prns needed. Mag replaced. Continue POC.      Problem: Adult Inpatient Plan of Care  Goal: Plan of Care Review  Description: The Plan of Care Review/Shift note should be completed every shift.  The Outcome Evaluation is a brief statement about your assessment that the patient is improving, declining, or no change.  This information will be displayed automatically on your shift  note.  Outcome: Progressing  Goal: Patient-Specific Goal (Individualized)  Description: You can add care plan individualizations to a care plan. Examples of Individualization might be:  \"Parent requests to be called daily at 9am for status\", \"I have a hard time hearing out of my right ear\", or \"Do not touch me to wake me up as it startles  me\".  Outcome: Progressing  Goal: Absence of Hospital-Acquired Illness or Injury  Outcome: Progressing  Intervention: Identify and Manage Fall Risk  Recent Flowsheet Documentation  Taken 8/1/2024 2326 by Ceci Barajas, RN  Safety Promotion/Fall Prevention: safety round/check completed  Intervention: Prevent Skin Injury  Recent Flowsheet Documentation  Taken 8/1/2024 2326 by Ceci Barajas, RN  Body Position: position changed independently  Skin Protection: adhesive use limited  Device Skin Pressure Protection: adhesive use limited  Intervention: Prevent Infection  Recent Flowsheet Documentation  Taken 8/1/2024 2326 by Ceci Barajas, RN  Infection Prevention:   hand hygiene promoted   personal protective equipment utilized   rest/sleep promoted   single patient room provided  Goal: Optimal Comfort and Wellbeing  Outcome: Progressing  Goal: " Readiness for Transition of Care  Outcome: Progressing     Problem: Risk for Delirium  Goal: Optimal Coping  Outcome: Progressing  Intervention: Optimize Psychosocial Adjustment to Delirium  Recent Flowsheet Documentation  Taken 8/1/2024 2326 by Ceci Barajas RN  Supportive Measures:   active listening utilized   positive reinforcement provided  Goal: Improved Sleep  Outcome: Progressing  Intervention: Promote Sleep  Recent Flowsheet Documentation  Taken 8/1/2024 2326 by Ceci Barajas RN  Sleep/Rest Enhancement:   awakenings minimized   consistent schedule promoted   reading promoted     Problem: Stem Cell/Bone Marrow Transplant  Goal: Optimal Coping with Transplant  Outcome: Progressing  Intervention: Optimize Patient/Family Adjustment to Transplant  Recent Flowsheet Documentation  Taken 8/1/2024 2326 by Ceci Barajas RN  Supportive Measures:   active listening utilized   positive reinforcement provided  Goal: Diarrhea Symptom Control  Outcome: Progressing  Intervention: Manage Diarrhea  Recent Flowsheet Documentation  Taken 8/1/2024 2326 by Ceci Barajas RN  Skin Protection: adhesive use limited  Fluid/Electrolyte Management: fluids provided  Perineal Care: perineal hygiene encouraged  Goal: Improved Activity Tolerance  Outcome: Progressing  Intervention: Promote Improved Energy  Recent Flowsheet Documentation  Taken 8/1/2024 2326 by Ceci Barajas RN  Fatigue Management:   activity schedule adjusted   frequent rest breaks encouraged  Sleep/Rest Enhancement:   awakenings minimized   consistent schedule promoted   reading promoted  Activity Management: activity adjusted per tolerance  Environmental Support: calm environment promoted  Goal: Blood Counts Within Acceptable Range  Outcome: Progressing  Intervention: Monitor and Manage Hematologic Symptoms  Recent Flowsheet Documentation  Taken 8/1/2024 2326 by Ceci Barajas RN  Sleep/Rest Enhancement:   awakenings minimized   consistent  schedule promoted   reading promoted  Bleeding Precautions:   blood pressure closely monitored   foot protection facilitated   gentle oral care promoted   monitored for signs of bleeding  Medication Review/Management:   medications reviewed   high-risk medications identified  Goal: Absence of Infection  Outcome: Progressing  Intervention: Prevent and Manage Infection  Recent Flowsheet Documentation  Taken 8/1/2024 2326 by Ceci Barajas RN  Infection Prevention:   hand hygiene promoted   personal protective equipment utilized   rest/sleep promoted   single patient room provided  Infection Management: aseptic technique maintained  Isolation Precautions: protective environment maintained  Goal: Improved Oral Mucous Membrane Health and Integrity  Outcome: Progressing  Intervention: Promote Oral Comfort and Health  Recent Flowsheet Documentation  Taken 8/1/2024 2326 by Ceci Barajas RN  Oral Mucous Membrane Protection: lip/mouth moisturizer applied  Oral Care: oral rinse provided  Goal: Nausea and Vomiting Symptom Relief  Outcome: Progressing  Intervention: Prevent and Manage Nausea and Vomiting  Recent Flowsheet Documentation  Taken 8/1/2024 2326 by Ceci Barajas RN  Nausea/Vomiting Interventions: (denies) --  Goal: Optimal Nutrition Intake  Outcome: Progressing  Intervention: Minimize and Manage Barriers to Oral Intake  Recent Flowsheet Documentation  Taken 8/1/2024 2326 by Ceci Barajas RN  Oral Nutrition Promotion: adaptive equipment use encouraged     Problem: Skin Injury Risk Increased  Goal: Skin Health and Integrity  Outcome: Progressing  Intervention: Optimize Skin Protection  Recent Flowsheet Documentation  Taken 8/1/2024 2326 by Ceci Barajas RN  Skin Protection: adhesive use limited  Activity Management: activity adjusted per tolerance  Head of Bed (HOB) Positioning: HOB flat  Intervention: Promote and Optimize Oral Intake  Recent Flowsheet Documentation  Taken 8/1/2024 2326 by  Ceci Barajas, RN  Oral Nutrition Promotion: adaptive equipment use encouraged

## 2024-08-03 LAB
ANION GAP SERPL CALCULATED.3IONS-SCNC: 9 MMOL/L (ref 7–15)
BASOPHILS # BLD AUTO: ABNORMAL 10*3/UL
BASOPHILS # BLD MANUAL: 0 10E3/UL (ref 0–0.2)
BASOPHILS NFR BLD AUTO: ABNORMAL %
BASOPHILS NFR BLD MANUAL: 1 %
BUN SERPL-MCNC: 11.3 MG/DL (ref 8–23)
CALCIUM SERPL-MCNC: 8.7 MG/DL (ref 8.8–10.4)
CHLORIDE SERPL-SCNC: 99 MMOL/L (ref 98–107)
CREAT SERPL-MCNC: 0.68 MG/DL (ref 0.51–0.95)
EGFRCR SERPLBLD CKD-EPI 2021: >90 ML/MIN/1.73M2
EOSINOPHIL # BLD AUTO: ABNORMAL 10*3/UL
EOSINOPHIL # BLD MANUAL: 0.1 10E3/UL (ref 0–0.7)
EOSINOPHIL NFR BLD AUTO: ABNORMAL %
EOSINOPHIL NFR BLD MANUAL: 10 %
ERYTHROCYTE [DISTWIDTH] IN BLOOD BY AUTOMATED COUNT: 14.5 % (ref 10–15)
GLUCOSE SERPL-MCNC: 92 MG/DL (ref 70–99)
HCO3 SERPL-SCNC: 25 MMOL/L (ref 22–29)
HCT VFR BLD AUTO: 30 % (ref 35–47)
HGB BLD-MCNC: 10 G/DL (ref 11.7–15.7)
IMM GRANULOCYTES # BLD: ABNORMAL 10*3/UL
IMM GRANULOCYTES NFR BLD: ABNORMAL %
LYMPHOCYTES # BLD AUTO: ABNORMAL 10*3/UL
LYMPHOCYTES # BLD MANUAL: 0.1 10E3/UL (ref 0.8–5.3)
LYMPHOCYTES NFR BLD AUTO: ABNORMAL %
LYMPHOCYTES NFR BLD MANUAL: 8 %
MAGNESIUM SERPL-MCNC: 2 MG/DL (ref 1.7–2.3)
MCH RBC QN AUTO: 33.2 PG (ref 26.5–33)
MCHC RBC AUTO-ENTMCNC: 33.3 G/DL (ref 31.5–36.5)
MCV RBC AUTO: 100 FL (ref 78–100)
MONOCYTES # BLD AUTO: ABNORMAL 10*3/UL
MONOCYTES # BLD MANUAL: 0.3 10E3/UL (ref 0–1.3)
MONOCYTES NFR BLD AUTO: ABNORMAL %
MONOCYTES NFR BLD MANUAL: 28 %
NEUTROPHILS # BLD AUTO: ABNORMAL 10*3/UL
NEUTROPHILS # BLD MANUAL: 0.5 10E3/UL (ref 1.6–8.3)
NEUTROPHILS NFR BLD AUTO: ABNORMAL %
NEUTROPHILS NFR BLD MANUAL: 53 %
NRBC # BLD AUTO: 0 10E3/UL
NRBC # BLD AUTO: 0 10E3/UL
NRBC BLD AUTO-RTO: 0 /100
NRBC BLD MANUAL-RTO: 1 %
PHOSPHATE SERPL-MCNC: 3.3 MG/DL (ref 2.5–4.5)
PLAT MORPH BLD: ABNORMAL
PLATELET # BLD AUTO: 196 10E3/UL (ref 150–450)
POTASSIUM SERPL-SCNC: 4 MMOL/L (ref 3.4–5.3)
RBC # BLD AUTO: 3.01 10E6/UL (ref 3.8–5.2)
RBC MORPH BLD: ABNORMAL
SODIUM SERPL-SCNC: 133 MMOL/L (ref 135–145)
TARGETS BLD QL SMEAR: SLIGHT
WBC # BLD AUTO: 1 10E3/UL (ref 4–11)

## 2024-08-03 PROCEDURE — 85027 COMPLETE CBC AUTOMATED: CPT | Performed by: PHYSICIAN ASSISTANT

## 2024-08-03 PROCEDURE — 99232 SBSQ HOSP IP/OBS MODERATE 35: CPT | Mod: GC | Performed by: STUDENT IN AN ORGANIZED HEALTH CARE EDUCATION/TRAINING PROGRAM

## 2024-08-03 PROCEDURE — 83735 ASSAY OF MAGNESIUM: CPT | Performed by: STUDENT IN AN ORGANIZED HEALTH CARE EDUCATION/TRAINING PROGRAM

## 2024-08-03 PROCEDURE — 250N000011 HC RX IP 250 OP 636: Performed by: PHYSICIAN ASSISTANT

## 2024-08-03 PROCEDURE — 84100 ASSAY OF PHOSPHORUS: CPT | Performed by: STUDENT IN AN ORGANIZED HEALTH CARE EDUCATION/TRAINING PROGRAM

## 2024-08-03 PROCEDURE — 206N000001 HC R&B BMT UMMC

## 2024-08-03 PROCEDURE — 250N000011 HC RX IP 250 OP 636

## 2024-08-03 PROCEDURE — 80048 BASIC METABOLIC PNL TOTAL CA: CPT | Performed by: PHYSICIAN ASSISTANT

## 2024-08-03 PROCEDURE — 250N000011 HC RX IP 250 OP 636: Performed by: STUDENT IN AN ORGANIZED HEALTH CARE EDUCATION/TRAINING PROGRAM

## 2024-08-03 PROCEDURE — 85007 BL SMEAR W/DIFF WBC COUNT: CPT | Performed by: PHYSICIAN ASSISTANT

## 2024-08-03 PROCEDURE — 250N000013 HC RX MED GY IP 250 OP 250 PS 637: Performed by: PHYSICIAN ASSISTANT

## 2024-08-03 RX ORDER — MAGNESIUM SULFATE HEPTAHYDRATE 40 MG/ML
2 INJECTION, SOLUTION INTRAVENOUS ONCE
Status: COMPLETED | OUTPATIENT
Start: 2024-08-03 | End: 2024-08-03

## 2024-08-03 RX ORDER — MAGNESIUM OXIDE 400 MG/1
400 TABLET ORAL EVERY 4 HOURS
Status: DISCONTINUED | OUTPATIENT
Start: 2024-08-03 | End: 2024-08-03

## 2024-08-03 RX ADMIN — LEVOTHYROXINE SODIUM 50 MCG: 0.05 TABLET ORAL at 06:51

## 2024-08-03 RX ADMIN — ACYCLOVIR 800 MG: 800 TABLET ORAL at 07:47

## 2024-08-03 RX ADMIN — MAGNESIUM SULFATE HEPTAHYDRATE 2 G: 2 INJECTION, SOLUTION INTRAVENOUS at 06:47

## 2024-08-03 RX ADMIN — FLUCONAZOLE 200 MG: 200 TABLET ORAL at 07:47

## 2024-08-03 RX ADMIN — VITAM B12 100 MCG: 100 TAB at 07:47

## 2024-08-03 RX ADMIN — ACYCLOVIR 800 MG: 800 TABLET ORAL at 20:34

## 2024-08-03 RX ADMIN — AMLODIPINE BESYLATE 5 MG: 5 TABLET ORAL at 07:47

## 2024-08-03 RX ADMIN — Medication 3 ML: at 04:19

## 2024-08-03 RX ADMIN — PENICILLIN V POTASSIUM 250 MG: 250 TABLET, FILM COATED ORAL at 07:47

## 2024-08-03 RX ADMIN — PANTOPRAZOLE SODIUM 40 MG: 40 TABLET, DELAYED RELEASE ORAL at 07:47

## 2024-08-03 RX ADMIN — Medication 50 MCG: at 07:47

## 2024-08-03 RX ADMIN — PENICILLIN V POTASSIUM 250 MG: 250 TABLET, FILM COATED ORAL at 20:34

## 2024-08-03 RX ADMIN — ACETAMINOPHEN 650 MG: 325 TABLET, FILM COATED ORAL at 22:14

## 2024-08-03 RX ADMIN — Medication 3 ML: at 08:02

## 2024-08-03 ASSESSMENT — ACTIVITIES OF DAILY LIVING (ADL)
ADLS_ACUITY_SCORE: 22

## 2024-08-03 NOTE — PROGRESS NOTES
Blood and Marrow Transplant Discharge Teach    RNCC met with patient and Spouse and Caregiver to discuss upcoming discharge. Reviewed plan for line care supplies, PT/OT recommendations and upcoming clinic visits.    Patient, Family, and Caregiver demonstrates understanding of the following:  Which situations necessitate calling provider and whom to contact: Yes  Proper use and care of (medical equipment, care aids, etc.) Yes  Reviewed Post CAR-T guidelines and patient verbalizes understanding    Infection Control:  Patient, Family, and Caregiver instructed on hand hygiene: Yes  Signs and symptoms of infection taught: Yes    For CAR-T patient: Verified that patient has product specific wallet card. Patient instructed to remain within close proximity of facility (within 30-40 minutes per program standard) for at least four weeks post infusion. CAR-T patient is instructed not to operate motorized vehicle or heavy machinery for a period of 8 weeks.    Time spent with patient: 20 minutes.  Specific Concerns: No, explain: No concerns noted at this time.    Last bmbx in clinic     Diagnosis:  multiple myeloma                         Protocol:  2017-45*     Donor Source: CAR-T Carvykti       Caregiver:   Sonny Shahbaz  [phone]  Tavia Hernandez [phone]     Long-term BMT MD/NC/SW: Scott/Sary (Ct in the interim)/Yasmin     Discharge location: Home     [ x ] Home Infusion Company: heparin script     [ x ] Can fill meds at [facility] pharmacy (BMT benefits soft check): Yes              If not, preferred pharmacy at discharge: n/a     [ x ] PT/OT recommendations: home with assist     [ x ] 1st time discharge? Yes     [ x ] Weekly Lab Day Preference? No day preference, prefers appointments between 8am-10am.     [ x ] clonoSEQ testing needed? yes     [ x ] Car-T wallet card - given during discharge teach     Falguni Hills, RN, MSN  BMT & Cellular Therapy Nurse Coordinator  [facility] Blood and Marrow Transplant Program  Phone:  [phone]  Fax: [phone]

## 2024-08-03 NOTE — PROGRESS NOTES
"BMT/Cell Therapy Daily Progress Note   08/03/2024    Patient ID:  Shena Hartmann is a 73 year old female, currently day 5 of carvykti car-t for multiple myeloma.    Admission date: 7/29/2024    INTERVAL  HISTORY   Feeling well recently. Her rash yesterday has improved. She had 4 loose bowel movements yesterday, but notes that it has been getting more firm and and she is still not interested in any anti-diarrheal medications. Eating and drinking well and has been ambulating without difficulty. Denies any episodes of confusion.     Denies fever, chills, HA, SOB, chest pain, abdominal pain, or dysuria.     Review of Systems: ROS negative except as noted above.      PHYSICAL EXAM     Weight In/Out     Wt Readings from Last 3 Encounters:   08/03/24 53.4 kg (117 lb 11.2 oz)   07/26/24 55.3 kg (121 lb 14.4 oz)   07/25/24 56.5 kg (124 lb 9.6 oz)      I/O last 3 completed shifts:  In: 2140 [P.O.:2140]  Out: 2375 [Urine:2375]     ICE: 10/10 Datestamp: 8/3/2024     /58 (BP Location: Right arm)   Pulse 115   Temp 98.9  F (37.2  C) (Oral)   Resp 16   Ht 1.57 m (5' 1.81\")   Wt 53.4 kg (117 lb 11.2 oz)   LMP  (LMP Unknown)   SpO2 100%   BMI 21.66 kg/m       General: NAD, standing comfortably    Eyes: : PERRL, sclera anicteric   Mouth: MMM   Lungs: breathing comfortably on room air and lungs sound clear  Heart: RRR  Lymphatics: no edema  Skin: no rashes or petechiae  Neuro: A&O, ambulates independently    Additional Findings: PICC      LABS AND IMAGING: I have assessed all abnormal lab values for their clinical significance and any values considered clinically significant have been addressed in the assessment and plan.        Lab Results   Component Value Date    WBC 1.0 (L) 08/03/2024    ANEU 0.5 (L) 08/03/2024    HGB 10.0 (L) 08/03/2024    HCT 30.0 (L) 08/03/2024     08/03/2024     (L) 08/03/2024    POTASSIUM 4.0 08/03/2024    CHLORIDE 99 08/03/2024    CO2 25 08/03/2024    GLC 92 08/03/2024    BUN 11.3 " 08/03/2024    CR 0.68 08/03/2024    MAG 2.0 08/03/2024    INR 1.00 07/31/2024       SYSTEMS-BASED ASSESSMENT AND PLAN   Shena Hartmann is a 73 year old female, currently day 5  of Car-t therapy, utilizing Carvykti.      ONC  - BMT doc/Coordinator: Chavez/Sieve  - Cell Therapy Product: carvykti. This product has a median onset of CRS at day7; and a median onset of ICANS at day 8.  - Disease: multiple myeloma  - package insert: https://www.fda.gov/media/681905/download     HEME/COAG  - pancytopenia secondary to malignancy  - hematologic toxicity score of 0: Will this patient need GCSF to start on day +14? No      ID  - Infection prophylaxis: acyclovir, pen vk, fluconazole. Pentamidine was administered on 7/24/2024.     GI  - zofran, compazine prn n/v  - diarrhea has recurred; monitor.  She declines imodium.  She is more interested in adjusting her diet to see if that affects her bowel movements.      RENAL/  # hyponatremia: (resolved)  # hypokalemia: (resolved)    # sliding scale mag/phos/K+ scales ordered     ENDOCRINE  # hypothyroidism:  levothyroxine 50 mcg daily     SUPPORTIVE CARE  - PT/OT to evaluate on admission and follow as indicated.   - Transfusion to keep hgb >7g/dL and plts >10,000  - Antiemetics prn  - Ulcer ppx: protonix  - DVT ppx:  no pharmacologic prophylaxis given anticipated thrombocytopenia  - avoid homeopathics for 2 weeks after Car-t  - okay to continue B12 and vitamin D  - avoid skullcap containing supplement powder    Medically Ready for Discharge: Anticipated in 5+ Days  Clinically Significant Risk Factors                  # Hypertension: Noted on problem list                     Pt was staffed with Dr. Gail Santa MD   Hematology/Oncology Fellow PGY6  Pager: [phone]

## 2024-08-03 NOTE — PLAN OF CARE
"  Problem: Adult Inpatient Plan of Care  Goal: Patient-Specific Goal (Individualized)  Description: You can add care plan individualizations to a care plan. Examples of Individualization might be:  \"Parent requests to be called daily at 9am for status\", \"I have a hard time hearing out of my right ear\", or \"Do not touch me to wake me up as it startles  me\".  Outcome: Progressing   Goal Outcome Evaluation:  CARVYKTI day + 5. Neuro's intact. Sentence log looks good. Temp max 98.9. 3/10 achy upper back and neck pain and using the aqua k pad. Rash on upper back is improving.  Ate good. No nausea. Stool x 3. Stool sent for research. Showered. Walked the unit 4 times. Independent.                       " Detail Level: Zone Patient Specific Otc Recommendations (Will Not Stick From Patient To Patient): T Gel or Nizoral

## 2024-08-03 NOTE — PLAN OF CARE
"/62 (BP Location: Right leg)   Pulse 77   Temp 97.6  F (36.4  C) (Oral)   Resp 16   Ht 1.57 m (5' 1.81\")   Wt 53.4 kg (117 lb 12.8 oz)   LMP  (LMP Unknown)   SpO2 98%   BMI 21.68 kg/m      Patient afebrile, hypertensive but within parameters, other vital signs stable. Patient received acetaminophen x1 for mild back pain. No reports of  nausea. Patient currently receiving mag. Patient assist level independent. Continue with plan of care.    Goal Outcome Evaluation:  Problem: Adult Inpatient Plan of Care  Goal: Optimal Comfort and Wellbeing  Outcome: Progressing     Problem: Risk for Delirium  Goal: Optimal Coping  Outcome: Progressing  Goal: Improved Sleep  Outcome: Progressing     Problem: Stem Cell/Bone Marrow Transplant  Goal: Optimal Coping with Transplant  Outcome: Progressing  Goal: Diarrhea Symptom Control  Outcome: Progressing  Goal: Improved Activity Tolerance  Outcome: Progressing  Intervention: Promote Improved Energy  Recent Flowsheet Documentation  Taken 8/3/2024 0400 by Jhon Rodriguez, RN  Activity Management: activity adjusted per tolerance  Taken 8/3/2024 0000 by Jhon Rodriguez, RN  Activity Management: activity adjusted per tolerance  Taken 8/2/2024 2000 by Jhon Rodriguez, RN  Activity Management: activity adjusted per tolerance  Goal: Blood Counts Within Acceptable Range  Outcome: Progressing  Goal: Absence of Infection  Outcome: Progressing  Goal: Improved Oral Mucous Membrane Health and Integrity  Outcome: Progressing  Goal: Nausea and Vomiting Symptom Relief  Outcome: Progressing  Goal: Optimal Nutrition Intake  Outcome: Progressing     Problem: Skin Injury Risk Increased  Goal: Skin Health and Integrity  Outcome: Progressing  Intervention: Optimize Skin Protection  Recent Flowsheet Documentation  Taken 8/3/2024 0400 by Jhon Rodriguez, RN  Activity Management: activity adjusted per tolerance  Head of Bed (HOB) Positioning: HOB at 20 degrees  Taken 8/3/2024 0000 by " Jhon Rodriguez, RN  Activity Management: activity adjusted per tolerance  Head of Bed (HOB) Positioning: HOB at 20 degrees  Taken 8/2/2024 2000 by Jhon Rodriguez, RN  Activity Management: activity adjusted per tolerance  Head of Bed (HOB) Positioning: HOB at 20 degrees

## 2024-08-04 LAB
ABO/RH(D): ABNORMAL
ANION GAP SERPL CALCULATED.3IONS-SCNC: 8 MMOL/L (ref 7–15)
ANTIBODY SCREEN, TUBE: NORMAL
ANTIBODY SCREEN: POSITIVE
BASOPHILS # BLD AUTO: ABNORMAL 10*3/UL
BASOPHILS # BLD MANUAL: 0 10E3/UL (ref 0–0.2)
BASOPHILS NFR BLD AUTO: ABNORMAL %
BASOPHILS NFR BLD MANUAL: 1 %
BUN SERPL-MCNC: 10.3 MG/DL (ref 8–23)
CALCIUM SERPL-MCNC: 8.6 MG/DL (ref 8.8–10.4)
CHLORIDE SERPL-SCNC: 100 MMOL/L (ref 98–107)
CREAT SERPL-MCNC: 0.7 MG/DL (ref 0.51–0.95)
EGFRCR SERPLBLD CKD-EPI 2021: >90 ML/MIN/1.73M2
EOSINOPHIL # BLD AUTO: ABNORMAL 10*3/UL
EOSINOPHIL # BLD MANUAL: 0.2 10E3/UL (ref 0–0.7)
EOSINOPHIL NFR BLD AUTO: ABNORMAL %
EOSINOPHIL NFR BLD MANUAL: 16 %
ERYTHROCYTE [DISTWIDTH] IN BLOOD BY AUTOMATED COUNT: 14.5 % (ref 10–15)
GLUCOSE SERPL-MCNC: 90 MG/DL (ref 70–99)
HCO3 SERPL-SCNC: 25 MMOL/L (ref 22–29)
HCT VFR BLD AUTO: 29.9 % (ref 35–47)
HGB BLD-MCNC: 9.9 G/DL (ref 11.7–15.7)
IMM GRANULOCYTES # BLD: ABNORMAL 10*3/UL
IMM GRANULOCYTES NFR BLD: ABNORMAL %
LYMPHOCYTES # BLD AUTO: ABNORMAL 10*3/UL
LYMPHOCYTES # BLD MANUAL: 0.1 10E3/UL (ref 0.8–5.3)
LYMPHOCYTES NFR BLD AUTO: ABNORMAL %
LYMPHOCYTES NFR BLD MANUAL: 9 %
MAGNESIUM SERPL-MCNC: 2 MG/DL (ref 1.7–2.3)
MCH RBC QN AUTO: 33 PG (ref 26.5–33)
MCHC RBC AUTO-ENTMCNC: 33.1 G/DL (ref 31.5–36.5)
MCV RBC AUTO: 100 FL (ref 78–100)
MONOCYTES # BLD AUTO: ABNORMAL 10*3/UL
MONOCYTES # BLD MANUAL: 0.4 10E3/UL (ref 0–1.3)
MONOCYTES NFR BLD AUTO: ABNORMAL %
MONOCYTES NFR BLD MANUAL: 39 %
NEUTROPHILS # BLD AUTO: ABNORMAL 10*3/UL
NEUTROPHILS # BLD MANUAL: 0.3 10E3/UL (ref 1.6–8.3)
NEUTROPHILS NFR BLD AUTO: ABNORMAL %
NEUTROPHILS NFR BLD MANUAL: 34 %
NRBC # BLD AUTO: 0 10E3/UL
NRBC BLD AUTO-RTO: 0 /100
PHOSPHATE SERPL-MCNC: 3.3 MG/DL (ref 2.5–4.5)
PLAT MORPH BLD: ABNORMAL
PLATELET # BLD AUTO: 180 10E3/UL (ref 150–450)
POTASSIUM SERPL-SCNC: 3.9 MMOL/L (ref 3.4–5.3)
PROMYELOCYTES # BLD MANUAL: 0 10E3/UL
PROMYELOCYTES NFR BLD MANUAL: 1 %
RBC # BLD AUTO: 3 10E6/UL (ref 3.8–5.2)
RBC MORPH BLD: ABNORMAL
SODIUM SERPL-SCNC: 133 MMOL/L (ref 135–145)
SPECIMEN EXPIRATION DATE: ABNORMAL
SPECIMEN EXPIRATION DATE: NORMAL
TARGETS BLD QL SMEAR: SLIGHT
WBC # BLD AUTO: 1 10E3/UL (ref 4–11)

## 2024-08-04 PROCEDURE — 80048 BASIC METABOLIC PNL TOTAL CA: CPT | Performed by: PHYSICIAN ASSISTANT

## 2024-08-04 PROCEDURE — 86900 BLOOD TYPING SEROLOGIC ABO: CPT | Performed by: PHYSICIAN ASSISTANT

## 2024-08-04 PROCEDURE — 99232 SBSQ HOSP IP/OBS MODERATE 35: CPT | Mod: GC | Performed by: STUDENT IN AN ORGANIZED HEALTH CARE EDUCATION/TRAINING PROGRAM

## 2024-08-04 PROCEDURE — 85027 COMPLETE CBC AUTOMATED: CPT | Performed by: PHYSICIAN ASSISTANT

## 2024-08-04 PROCEDURE — 250N000011 HC RX IP 250 OP 636: Performed by: STUDENT IN AN ORGANIZED HEALTH CARE EDUCATION/TRAINING PROGRAM

## 2024-08-04 PROCEDURE — 83735 ASSAY OF MAGNESIUM: CPT | Performed by: STUDENT IN AN ORGANIZED HEALTH CARE EDUCATION/TRAINING PROGRAM

## 2024-08-04 PROCEDURE — 206N000001 HC R&B BMT UMMC

## 2024-08-04 PROCEDURE — 250N000011 HC RX IP 250 OP 636

## 2024-08-04 PROCEDURE — 84100 ASSAY OF PHOSPHORUS: CPT | Performed by: STUDENT IN AN ORGANIZED HEALTH CARE EDUCATION/TRAINING PROGRAM

## 2024-08-04 PROCEDURE — 85007 BL SMEAR W/DIFF WBC COUNT: CPT | Performed by: PHYSICIAN ASSISTANT

## 2024-08-04 PROCEDURE — 250N000013 HC RX MED GY IP 250 OP 250 PS 637: Performed by: PHYSICIAN ASSISTANT

## 2024-08-04 RX ORDER — MAGNESIUM SULFATE HEPTAHYDRATE 40 MG/ML
2 INJECTION, SOLUTION INTRAVENOUS ONCE
Status: COMPLETED | OUTPATIENT
Start: 2024-08-04 | End: 2024-08-04

## 2024-08-04 RX ADMIN — ACYCLOVIR 800 MG: 800 TABLET ORAL at 07:39

## 2024-08-04 RX ADMIN — PENICILLIN V POTASSIUM 250 MG: 250 TABLET, FILM COATED ORAL at 19:26

## 2024-08-04 RX ADMIN — ACYCLOVIR 800 MG: 800 TABLET ORAL at 19:26

## 2024-08-04 RX ADMIN — Medication 3 ML: at 08:35

## 2024-08-04 RX ADMIN — LEVOTHYROXINE SODIUM 50 MCG: 0.05 TABLET ORAL at 05:52

## 2024-08-04 RX ADMIN — MAGNESIUM SULFATE HEPTAHYDRATE 2 G: 2 INJECTION, SOLUTION INTRAVENOUS at 05:55

## 2024-08-04 RX ADMIN — PENICILLIN V POTASSIUM 250 MG: 250 TABLET, FILM COATED ORAL at 07:39

## 2024-08-04 RX ADMIN — PANTOPRAZOLE SODIUM 40 MG: 40 TABLET, DELAYED RELEASE ORAL at 07:39

## 2024-08-04 RX ADMIN — AMLODIPINE BESYLATE 5 MG: 5 TABLET ORAL at 07:39

## 2024-08-04 RX ADMIN — Medication 50 MCG: at 07:39

## 2024-08-04 RX ADMIN — FLUCONAZOLE 200 MG: 200 TABLET ORAL at 07:39

## 2024-08-04 RX ADMIN — VITAM B12 100 MCG: 100 TAB at 07:39

## 2024-08-04 ASSESSMENT — ACTIVITIES OF DAILY LIVING (ADL)
ADLS_ACUITY_SCORE: 22

## 2024-08-04 NOTE — PLAN OF CARE
Hours of care: 9012-3430    Neuro: A&Ox4.   Cardiac: Afebrile, VSS.   Respiratory: RA, lung sounds clear, denies SOB  GI/: Voiding spontaneously. Small formed BM this shift.  Diet/appetite: Tolerating high calorie/ high protein diet . Denies nausea.   Activity: Up independently     Pain: Chronic Neck/back pain reported 3/10, being managed with Heating pad and Tylenol at Bedtime.  Skin: No new deficits noted.  Lines: L PICC Purple Infusing TKO  Drains: none  Replacements: Magnesium Replaced    Plan: Continue with current POC. Report changes to primary team.  Goal Outcome Evaluation:      Plan of Care Reviewed With: patient    Overall Patient Progress: no changeOverall Patient Progress: no change

## 2024-08-04 NOTE — PROGRESS NOTES
"BMT/Cell Therapy Daily Progress Note   08/04/2024    Patient ID:  Shena Hartmann is a 73 year old female, currently day 6 of carvykti car-t for multiple myeloma.    Admission date: 7/29/2024    INTERVAL  HISTORY   Had mild, chronic neck and back pain, which was responsive to tylenol and heating pads. Feel well this morning. Eating and drinking well, had 4 bowel movements yesterday, which have been soft but getting more solid. She is not interested in anti-diarrheal medications. Ambulated in the hallway well. Rash on her back has resolved.     Denies fever, chills, HA, SOB, chest pain, abdominal pain, or rashes.     Review of Systems: ROS negative except as noted above.    PHYSICAL EXAM     Weight In/Out     Wt Readings from Last 3 Encounters:   08/04/24 53.6 kg (118 lb 1.6 oz)   07/26/24 55.3 kg (121 lb 14.4 oz)   07/25/24 56.5 kg (124 lb 9.6 oz)      I/O last 3 completed shifts:  In: 1630 [P.O.:1630]  Out: 2700 [Urine:2700]     ICE: 10/10 Datestamp: 8/4/2024     BP (!) 150/68 (BP Location: Right arm)   Pulse 75   Temp 97.6  F (36.4  C) (Oral)   Resp 16   Ht 1.57 m (5' 1.81\")   Wt 53.6 kg (118 lb 1.6 oz)   LMP  (LMP Unknown)   SpO2 100%   BMI 21.73 kg/m       General: NAD, standing comfortably    HEENT: NC/AT, sclera anicteric, MMM    Lungs: breathing comfortably on room air and lungs sound clear  Heart: RRR  Lymphatics: no edema  Skin: no rashes or petechiae  Neuro: A&Ox4, ambulates independently    Vascular: Port, left PICC    LABS AND IMAGING: I have assessed all abnormal lab values for their clinical significance and any values considered clinically significant have been addressed in the assessment and plan.        Lab Results   Component Value Date    WBC 1.0 (L) 08/04/2024    ANEU 0.3 (LL) 08/04/2024    HGB 9.9 (L) 08/04/2024    HCT 29.9 (L) 08/04/2024     08/04/2024     (L) 08/04/2024    POTASSIUM 3.9 08/04/2024    CHLORIDE 100 08/04/2024    CO2 25 08/04/2024    GLC 90 08/04/2024    BUN " 10.3 08/04/2024    CR 0.70 08/04/2024    MAG 2.0 08/04/2024    INR 1.00 07/31/2024       SYSTEMS-BASED ASSESSMENT AND PLAN   Shena Hartmann is a 73 year old female, currently day 6  of Car-t therapy, utilizing Carvykti.      ONC  - BMT doc/Coordinator: Chavez/Sieve  - Cell Therapy Product: carvykti. This product has a median onset of CRS at day7; and a median onset of ICANS at day 8.  - Disease: multiple myeloma  - package insert: https://www.fda.gov/media/719185/download     HEME/COAG  - pancytopenia secondary to malignancy  - hematologic toxicity score of 0: Will this patient need GCSF to start on day +14? No      ID  - Infection prophylaxis: acyclovir, penicillin V (had joint pain with levaquin), fluconazole. Pentamidine was administered on 7/24/2024.   -Screening ferritin ordered (8/5)     GI  - zofran, compazine prn n/v  - Intermittent diarrhea with loose stools; monitor.  She declines imodium. She is more interested in adjusting her diet to see if that affects her bowel movements.      RENAL/  # hyponatremia: Mildly low but stable over the past few days   # hypokalemia: (resolved)    # sliding scale mag/phos/K+ scales ordered     ENDOCRINE  # hypothyroidism:  levothyroxine 50 mcg daily     SUPPORTIVE CARE  - PT/OT to evaluate on admission and follow as indicated.   - Transfusion to keep hgb >7g/dL and plts >10,000  - Antiemetics prn  - Ulcer ppx: protonix  - DVT ppx:  no pharmacologic prophylaxis given anticipated thrombocytopenia  - avoid homeopathics for 2 weeks after Car-t. Avoid probiotics at least until neutrophils recover  - okay to continue B12 and vitamin D  - avoid skullcap containing supplement powder    Medically Ready for Discharge: Anticipated in 2-4 Days  Clinically Significant Risk Factors                  # Hypertension: Noted on problem list                     Pt was staffed with Dr. Filemon Santa MD   Hematology/Oncology Fellow PGY6  Pager:  [phone]      ______________________________________________      BMT ATTENDING NOTE    Shena Hartmann is a 73 year old female, admitted on 7/29/2024, who remains hospitalized pending monitoring for CRS/ICANS after Carvykti infusion.  She is currently Day 6 post infusion and doing well overall.  Appetite is good.  No nausea or vomiting.  Up and walking halls without issues.  No other complaints .  ICE 10/10, ICANS Grade 0, CRS Grade 0.      I have seen and personally evaluated the patient today.  I reviewed vitals, medications, laboratory results, and I viewed pertinent imaging studies.  After doing so, I formulated a plan with Dr. Santa as documented in this note.  I have seen and personally evaluated the patient today.  I spent 35 minutes in the care of Shena today, including an independent face-to-face assessment, review of vitals, medications, laboratory results, and independent review of imaging studies.     Key management decisions made by me and carried out under my direction include:   -Chemotherapy induced pancytopenia management.   -ICANS/CRS management.     Counseling and/or coordination of care performed by me:    -Dispo planning.    -Activity and nutrition     Ranges for vital signs:    Temp:  [97.5  F (36.4  C)-98  F (36.7  C)] 97.6  F (36.4  C)  Pulse:  [] 75  Resp:  [16] 16  BP: (124-151)/(64-69) 150/68  SpO2:  [98 %-100 %] 100 %    Infusions:  Current Facility-Administered Medications   Medication Dose Route Frequency Provider Last Rate Last Admin       Scheduled Medications:  Current Facility-Administered Medications   Medication Dose Route Frequency Provider Last Rate Last Admin    acyclovir (ZOVIRAX) tablet 800 mg  800 mg Oral BID Analy West PA-C   800 mg at 08/04/24 0739    amLODIPine (NORVASC) tablet 5 mg  5 mg Oral Daily Analy West PA-C   5 mg at 08/04/24 0739    cyanocobalamin (VITAMIN B-12) tablet 100 mcg  100 mcg Oral Daily Analy West PA-C   100 mcg at  08/04/24 0739    fluconazole (DIFLUCAN) tablet 200 mg  200 mg Oral Daily Carrier, Analy PETERSON PA-C   200 mg at 08/04/24 0739    levothyroxine (SYNTHROID/LEVOTHROID) tablet 50 mcg  50 mcg Oral Q24H Carrier, Analy PETERSON PA-C   50 mcg at 08/04/24 0552    pantoprazole (PROTONIX) EC tablet 40 mg  40 mg Oral Daily Carrier, Analy PEETRSON PA-C   40 mg at 08/04/24 0739    penicillin V (VEETID) tablet 250 mg  250 mg Oral BID Carrier, Analy PETERSON PA-C   250 mg at 08/04/24 0739    Vitamin D3 (CHOLECALCIFEROL) tablet 50 mcg  50 mcg Oral Daily Carrier, Analy PETERSON PA-C   50 mcg at 08/04/24 0739         Ulices Colbert MD, PhD  BMT/Cellular Therapy/ Oncology       Securely message with the Vocera Web Console

## 2024-08-04 NOTE — PLAN OF CARE
"  Problem: Adult Inpatient Plan of Care  Goal: Patient-Specific Goal (Individualized)  Description: You can add care plan individualizations to a care plan. Examples of Individualization might be:  \"Parent requests to be called daily at 9am for status\", \"I have a hard time hearing out of my right ear\", or \"Do not touch me to wake me up as it startles  me\".  Outcome: Progressing   Goal Outcome Evaluation:  Carvykti CAR-T cells day +6. Feeling fatigued and cold today. Neuro's are intact. Sentence log looks good. Ice score 10/10. Complained of tightness in back and rated it a 2-3/10 and using the aqua k pad. No nausea. Ate good. Heparin locked. Walked around the unit 5 times. Soft stool x 3. Voided 1100 ml. Daughter at bedside and is very supportive.                       "

## 2024-08-05 ENCOUNTER — LAB REQUISITION (OUTPATIENT)
Dept: LAB | Facility: CLINIC | Age: 74
End: 2024-08-05
Payer: MEDICARE

## 2024-08-05 ENCOUNTER — APPOINTMENT (OUTPATIENT)
Dept: OCCUPATIONAL THERAPY | Facility: CLINIC | Age: 74
DRG: 018 | End: 2024-08-05
Payer: MEDICARE

## 2024-08-05 LAB
ALBUMIN SERPL BCG-MCNC: 4 G/DL (ref 3.5–5.2)
ALP SERPL-CCNC: 50 U/L (ref 40–150)
ALT SERPL W P-5'-P-CCNC: 19 U/L (ref 0–50)
ANION GAP SERPL CALCULATED.3IONS-SCNC: 8 MMOL/L (ref 7–15)
APTT PPP: 26 SECONDS (ref 22–38)
AST SERPL W P-5'-P-CCNC: 16 U/L (ref 0–45)
BASOPHILS # BLD AUTO: ABNORMAL 10*3/UL
BASOPHILS # BLD MANUAL: 0 10E3/UL (ref 0–0.2)
BASOPHILS NFR BLD AUTO: ABNORMAL %
BASOPHILS NFR BLD MANUAL: 0 %
BILIRUB DIRECT SERPL-MCNC: <0.2 MG/DL (ref 0–0.3)
BILIRUB SERPL-MCNC: 0.2 MG/DL
BUN SERPL-MCNC: 9.9 MG/DL (ref 8–23)
CALCIUM SERPL-MCNC: 8.3 MG/DL (ref 8.8–10.4)
CHLORIDE SERPL-SCNC: 101 MMOL/L (ref 98–107)
CMV DNA SPEC NAA+PROBE-ACNC: NOT DETECTED IU/ML
CREAT SERPL-MCNC: 0.71 MG/DL (ref 0.51–0.95)
CRP SERPL-MCNC: <3 MG/L
D DIMER PPP FEU-MCNC: 0.58 UG/ML FEU (ref 0–0.5)
EGFRCR SERPLBLD CKD-EPI 2021: 89 ML/MIN/1.73M2
EOSINOPHIL # BLD AUTO: ABNORMAL 10*3/UL
EOSINOPHIL # BLD MANUAL: 0.1 10E3/UL (ref 0–0.7)
EOSINOPHIL NFR BLD AUTO: ABNORMAL %
EOSINOPHIL NFR BLD MANUAL: 9 %
ERYTHROCYTE [DISTWIDTH] IN BLOOD BY AUTOMATED COUNT: 14.6 % (ref 10–15)
FERRITIN SERPL-MCNC: 27 NG/ML (ref 11–328)
FIBRINOGEN PPP-MCNC: 256 MG/DL (ref 170–510)
GLUCOSE SERPL-MCNC: 96 MG/DL (ref 70–99)
HCO3 SERPL-SCNC: 25 MMOL/L (ref 22–29)
HCT VFR BLD AUTO: 30.8 % (ref 35–47)
HGB BLD-MCNC: 10.4 G/DL (ref 11.7–15.7)
IMM GRANULOCYTES # BLD: ABNORMAL 10*3/UL
IMM GRANULOCYTES NFR BLD: ABNORMAL %
INR PPP: 1.09 (ref 0.85–1.15)
LDH SERPL L TO P-CCNC: 179 U/L (ref 0–250)
LYMPHOCYTES # BLD AUTO: ABNORMAL 10*3/UL
LYMPHOCYTES # BLD MANUAL: 0.1 10E3/UL (ref 0.8–5.3)
LYMPHOCYTES NFR BLD AUTO: ABNORMAL %
LYMPHOCYTES NFR BLD MANUAL: 5 %
MAGNESIUM SERPL-MCNC: 2.3 MG/DL (ref 1.7–2.3)
MCH RBC QN AUTO: 33.8 PG (ref 26.5–33)
MCHC RBC AUTO-ENTMCNC: 33.8 G/DL (ref 31.5–36.5)
MCV RBC AUTO: 100 FL (ref 78–100)
MONOCYTES # BLD AUTO: ABNORMAL 10*3/UL
MONOCYTES # BLD MANUAL: 0.4 10E3/UL (ref 0–1.3)
MONOCYTES NFR BLD AUTO: ABNORMAL %
MONOCYTES NFR BLD MANUAL: 36 %
NEUTROPHILS # BLD AUTO: ABNORMAL 10*3/UL
NEUTROPHILS # BLD MANUAL: 0.6 10E3/UL (ref 1.6–8.3)
NEUTROPHILS NFR BLD AUTO: ABNORMAL %
NEUTROPHILS NFR BLD MANUAL: 50 %
NRBC # BLD AUTO: 0 10E3/UL
NRBC BLD AUTO-RTO: 0 /100
PHOSPHATE SERPL-MCNC: 3.1 MG/DL (ref 2.5–4.5)
PLAT MORPH BLD: ABNORMAL
PLATELET # BLD AUTO: 171 10E3/UL (ref 150–450)
POTASSIUM SERPL-SCNC: 4.2 MMOL/L (ref 3.4–5.3)
PROT SERPL-MCNC: 6.1 G/DL (ref 6.4–8.3)
RBC # BLD AUTO: 3.08 10E6/UL (ref 3.8–5.2)
RBC MORPH BLD: ABNORMAL
SODIUM SERPL-SCNC: 134 MMOL/L (ref 135–145)
URATE SERPL-MCNC: 3.6 MG/DL (ref 2.4–5.7)
WBC # BLD AUTO: 1.2 10E3/UL (ref 4–11)

## 2024-08-05 PROCEDURE — 83615 LACTATE (LD) (LDH) ENZYME: CPT | Performed by: PHYSICIAN ASSISTANT

## 2024-08-05 PROCEDURE — 82248 BILIRUBIN DIRECT: CPT | Performed by: PHYSICIAN ASSISTANT

## 2024-08-05 PROCEDURE — 82374 ASSAY BLOOD CARBON DIOXIDE: CPT | Performed by: PHYSICIAN ASSISTANT

## 2024-08-05 PROCEDURE — 82728 ASSAY OF FERRITIN: CPT | Performed by: INTERNAL MEDICINE

## 2024-08-05 PROCEDURE — 97110 THERAPEUTIC EXERCISES: CPT | Mod: GO

## 2024-08-05 PROCEDURE — 86140 C-REACTIVE PROTEIN: CPT | Performed by: PHYSICIAN ASSISTANT

## 2024-08-05 PROCEDURE — 99233 SBSQ HOSP IP/OBS HIGH 50: CPT | Mod: FS | Performed by: PHYSICIAN ASSISTANT

## 2024-08-05 PROCEDURE — 99418 PROLNG IP/OBS E/M EA 15 MIN: CPT | Performed by: PHYSICIAN ASSISTANT

## 2024-08-05 PROCEDURE — 206N000001 HC R&B BMT UMMC

## 2024-08-05 PROCEDURE — 84100 ASSAY OF PHOSPHORUS: CPT | Performed by: PHYSICIAN ASSISTANT

## 2024-08-05 PROCEDURE — 83735 ASSAY OF MAGNESIUM: CPT | Performed by: PHYSICIAN ASSISTANT

## 2024-08-05 PROCEDURE — 250N000013 HC RX MED GY IP 250 OP 250 PS 637: Performed by: PHYSICIAN ASSISTANT

## 2024-08-05 PROCEDURE — 85610 PROTHROMBIN TIME: CPT | Performed by: PHYSICIAN ASSISTANT

## 2024-08-05 PROCEDURE — 84550 ASSAY OF BLOOD/URIC ACID: CPT | Performed by: PHYSICIAN ASSISTANT

## 2024-08-05 PROCEDURE — 85027 COMPLETE CBC AUTOMATED: CPT | Performed by: PHYSICIAN ASSISTANT

## 2024-08-05 PROCEDURE — 85379 FIBRIN DEGRADATION QUANT: CPT | Performed by: PHYSICIAN ASSISTANT

## 2024-08-05 PROCEDURE — 85730 THROMBOPLASTIN TIME PARTIAL: CPT | Performed by: PHYSICIAN ASSISTANT

## 2024-08-05 PROCEDURE — 85007 BL SMEAR W/DIFF WBC COUNT: CPT | Performed by: PHYSICIAN ASSISTANT

## 2024-08-05 PROCEDURE — 85384 FIBRINOGEN ACTIVITY: CPT | Performed by: PHYSICIAN ASSISTANT

## 2024-08-05 PROCEDURE — 250N000011 HC RX IP 250 OP 636: Performed by: PHYSICIAN ASSISTANT

## 2024-08-05 RX ORDER — AMLODIPINE BESYLATE 2.5 MG/1
5 TABLET ORAL DAILY
COMMUNITY
Start: 2024-08-05 | End: 2024-08-14

## 2024-08-05 RX ORDER — SACCHAROMYCES BOULARDII 250 MG
250 CAPSULE ORAL DAILY
COMMUNITY
Start: 2024-08-05

## 2024-08-05 RX ORDER — ONDANSETRON 4 MG/1
4 TABLET, FILM COATED ORAL EVERY 8 HOURS PRN
Qty: 30 TABLET | Refills: 0 | Status: SHIPPED | OUTPATIENT
Start: 2024-08-05

## 2024-08-05 RX ORDER — PENICILLIN V POTASSIUM 250 MG/1
250 TABLET, FILM COATED ORAL 2 TIMES DAILY
Qty: 30 TABLET | Refills: 0 | Status: SHIPPED | OUTPATIENT
Start: 2024-08-05 | End: 2024-08-19

## 2024-08-05 RX ORDER — TRIAMCINOLONE ACETONIDE 1 MG/G
CREAM TOPICAL 2 TIMES DAILY PRN
COMMUNITY
Start: 2024-08-05 | End: 2024-08-28

## 2024-08-05 RX ORDER — FLUCONAZOLE 200 MG/1
200 TABLET ORAL DAILY
Qty: 21 TABLET | Refills: 0 | Status: SHIPPED | OUTPATIENT
Start: 2024-08-06 | End: 2024-08-19

## 2024-08-05 RX ADMIN — Medication 50 MCG: at 08:14

## 2024-08-05 RX ADMIN — FLUCONAZOLE 200 MG: 200 TABLET ORAL at 08:14

## 2024-08-05 RX ADMIN — LEVOTHYROXINE SODIUM 50 MCG: 0.05 TABLET ORAL at 06:02

## 2024-08-05 RX ADMIN — AMLODIPINE BESYLATE 5 MG: 5 TABLET ORAL at 08:14

## 2024-08-05 RX ADMIN — ACYCLOVIR 800 MG: 800 TABLET ORAL at 08:33

## 2024-08-05 RX ADMIN — PENICILLIN V POTASSIUM 250 MG: 250 TABLET, FILM COATED ORAL at 20:16

## 2024-08-05 RX ADMIN — ACYCLOVIR 800 MG: 800 TABLET ORAL at 20:16

## 2024-08-05 RX ADMIN — PENICILLIN V POTASSIUM 250 MG: 250 TABLET, FILM COATED ORAL at 08:14

## 2024-08-05 RX ADMIN — PANTOPRAZOLE SODIUM 40 MG: 40 TABLET, DELAYED RELEASE ORAL at 08:14

## 2024-08-05 RX ADMIN — VITAM B12 100 MCG: 100 TAB at 08:14

## 2024-08-05 RX ADMIN — Medication 3 ML: at 19:25

## 2024-08-05 ASSESSMENT — ACTIVITIES OF DAILY LIVING (ADL)
ADLS_ACUITY_SCORE: 22

## 2024-08-05 NOTE — PROGRESS NOTES
"BMT/Cell Therapy Daily Progress Note   08/05/2024    Patient ID:  Shena Hartmann is a 73 year old female, currently day 7 of carvykti car-t for multiple myeloma.    Admission date: 7/29/2024    INTERVAL  HISTORY   Shena is doing very well.  She has no fevers and no new symptoms.  Stools are soft/formed.  She is eating food from home that her daughter makes.  She has stable back pain, feels a bit stiff this morning. No neurotoxicity and no fevers.     Review of Systems: ROS negative except as noted above.    PHYSICAL EXAM     Weight In/Out     Wt Readings from Last 3 Encounters:   08/04/24 53.6 kg (118 lb 1.6 oz)   07/26/24 55.3 kg (121 lb 14.4 oz)   07/25/24 56.5 kg (124 lb 9.6 oz)      I/O last 3 completed shifts:  In: 1730 [P.O.:1730]  Out: 2175 [Urine:2175]     ICE: 10/10 Datestamp: 8/5/2024      /63 (BP Location: Right leg)   Pulse 75   Temp 97.6  F (36.4  C) (Oral)   Resp 16   Ht 1.57 m (5' 1.81\")   Wt 53.6 kg (118 lb 1.6 oz)   LMP  (LMP Unknown)   SpO2 98%   BMI 21.73 kg/m       General: NAD   HEENT: NC/AT, sclera anicteric, MMM    Lungs: breathing comfortably on room air and lungs sound clear  Abd: hyperactive bowels sounds; soft abdomen, not tender  Heart: RRR  Lymphatics: no edema  Skin: no rashes or petechiae  Neuro: A&Ox4, ambulates independently    Vascular: Port, left PICC    LABS AND IMAGING: I have assessed all abnormal lab values for their clinical significance and any values considered clinically significant have been addressed in the assessment and plan.        Lab Results   Component Value Date    WBC 1.2 (L) 08/05/2024    ANEU 0.6 (L) 08/05/2024    HGB 10.4 (L) 08/05/2024    HCT 30.8 (L) 08/05/2024     08/05/2024     (L) 08/05/2024    POTASSIUM 4.2 08/05/2024    CHLORIDE 101 08/05/2024    CO2 25 08/05/2024    GLC 96 08/05/2024    BUN 9.9 08/05/2024    CR 0.71 08/05/2024    MAG 2.3 08/05/2024    INR 1.09 08/05/2024       SYSTEMS-BASED ASSESSMENT AND PLAN   Shena HERNANDEZ" Shahbaz is a 73 year old female, currently day 7  of Car-t therapy, utilizing Carvykti.      ONC  - BMT doc/Coordinator: Scott/Sieve  - Cell Therapy Product: carvykti. This product has a median onset of CRS at day7; and a median onset of ICANS at day 8.  - Disease: multiple myeloma  - package insert: https://www.fda.gov/media/020573/download     CRS: none to date  ICANS: none to date    HEME/COAG  - pancytopenia secondary to malignancy  - hematologic toxicity score of 0: Will this patient need GCSF to start on day +14? No      ID  - Infection prophylaxis: acyclovir, penicillin V (had joint pain with levaquin), fluconazole. Pentamidine was administered on 7/24/2024.   -Screening ferritin 8/5 is just 27    GI  - zofran, compazine prn n/v     RENAL/  # hyponatremia: Mildly low but stable over the past few days   # hypokalemia: (resolved)    # sliding scale mag/phos/K+ scales ordered     ENDOCRINE  # hypothyroidism:  levothyroxine 50 mcg daily     SUPPORTIVE CARE  - PT/OT    - Transfusion to keep hgb >7g/dL and plts >10,000  - Antiemetics prn  - Ulcer ppx: protonix  - DVT ppx:  no pharmacologic prophylaxis given anticipated thrombocytopenia  - avoid homeopathics for 2 weeks after Car-t. Avoid probiotics at least until neutrophils recover  - okay to continue B12 and vitamin D  - avoid skullcap containing supplement powder    Medically Ready for Discharge: Anticipated Tomorrow  Clinically Significant Risk Factors          # Hypocalcemia: Lowest Ca = 8.3 mg/dL in last 2 days, will monitor and replace as appropriate         # Hypertension: Noted on problem list                     I spent 30 minutes in the care of this patient today, which included time necessary for preparation for the visit, obtaining history, ordering medications/tests/procedures as medically indicated, review of pertinent medical literature, counseling of the patient, communication of recommendations to the care team, and documentation  time.    Analy West PA-C    ______________________________________________      BMT ATTENDING NOTE    I saw and evaluated Shena Hartmann as part of a shared APRN/PA visit.       Shena Hartmann is a 73 year old female, admitted on 7/29/2024, who remains hospitalized pending monitoring for CRS/ICANS after Carvykti infusion.  She is currently Day 7 post infusion and doing well overall.  Appetite is good.  No nausea or vomiting.  Up and walking halls without issues.  No other complaints .  ICE 10/10, ICANS Grade 0, CRS Grade 0.  Plan for discharge tomorrow morning if she remains asymptomatic from CRS/ICANS perspective.      I personally reviewed the vital signs, medications, and labs.    Key management decisions made by me and carried out under my direction include:   -Chemotherapy induced pancytopenia management.   -ICANS/CRS management.     Counseling and/or coordination of care performed by me:    -Dispo planning.    -Activity and nutrition     35 MINUTES SPENT BY ME on the date of service doing chart review, history, exam, documentation & further activities per the note.    Ulices Colbert MD  Date of Service (when I saw the patient): 08/05/24    Ranges for vital signs:    Temp:  [97.6  F (36.4  C)-98  F (36.7  C)] 97.6  F (36.4  C)  Pulse:  [75] 75  Resp:  [16] 16  BP: (100-150)/(50-91) 134/63  SpO2:  [97 %-100 %] 98 %    Infusions:  Current Facility-Administered Medications   Medication Dose Route Frequency Provider Last Rate Last Admin       Scheduled Medications:  Current Facility-Administered Medications   Medication Dose Route Frequency Provider Last Rate Last Admin    acyclovir (ZOVIRAX) tablet 800 mg  800 mg Oral BID Analy West PA-C   800 mg at 08/04/24 1926    amLODIPine (NORVASC) tablet 5 mg  5 mg Oral Daily Analy West PA-C   5 mg at 08/04/24 0739    cyanocobalamin (VITAMIN B-12) tablet 100 mcg  100 mcg Oral Daily Analy West PA-C   100 mcg at 08/04/24 0739     fluconazole (DIFLUCAN) tablet 200 mg  200 mg Oral Daily Carrier, Analy PETERSON PA-C   200 mg at 08/04/24 0739    levothyroxine (SYNTHROID/LEVOTHROID) tablet 50 mcg  50 mcg Oral Q24H Carrier, Analy PETERSON PA-C   50 mcg at 08/05/24 0602    pantoprazole (PROTONIX) EC tablet 40 mg  40 mg Oral Daily Carrier, Analy PETERSON PA-C   40 mg at 08/04/24 0739    penicillin V (VEETID) tablet 250 mg  250 mg Oral BID Carrier, Analy PETERSON PA-C   250 mg at 08/04/24 1926    Vitamin D3 (CHOLECALCIFEROL) tablet 50 mcg  50 mcg Oral Daily Carrier, Analy PETERSON PA-C   50 mcg at 08/04/24 0739         Ulices Colbert MD, PhD  BMT/Cellular Therapy/ Oncology       Securely message with the Vocera Web Console

## 2024-08-05 NOTE — PLAN OF CARE
"  Problem: Adult Inpatient Plan of Care  Goal: Patient-Specific Goal (Individualized)  Description: You can add care plan individualizations to a care plan. Examples of Individualization might be:  \"Parent requests to be called daily at 9am for status\", \"I have a hard time hearing out of my right ear\", or \"Do not touch me to wake me up as it startles  me\".  Outcome: Progressing   Goal Outcome Evaluation:  CARVYKTI CAR-T day +7. Vss. 3/10 stiff back and using the aqua k pad. No nausea. Ate a fair amount. Worked with therapy. Walked around the unit three times. One medium stool. Showered. Independent. Plan is to discharge tomorrow. Family is here and very supportive.                       "

## 2024-08-05 NOTE — PLAN OF CARE
Hours of care: [id number]    Neuro: A&Ox4. Patient slept between cares.   Cardiac: Afebrile, VSS.   Respiratory: RA, lung sounds clear, denies SOB  GI/: Voiding spontaneously. Loose BM x 1 this morning, Research stool sample collected.  Diet/appetite: Tolerating high calorie/ high protein diet . Denies nausea.   Activity: Up independently     Pain: Chronic back pain improved per patient, she is managing with heating pad a massage from daughter before bed.   Skin: No new deficits noted.  Lines: L PICC Hep Locked  Drains: none  Replacements: None per protocols today.      Plan: Continue with current POC. Report changes to primary team.  Goal Outcome Evaluation:      Plan of Care Reviewed With: patient    Overall Patient Progress: no changeOverall Patient Progress: no change

## 2024-08-06 VITALS
DIASTOLIC BLOOD PRESSURE: 61 MMHG | RESPIRATION RATE: 16 BRPM | HEART RATE: 70 BPM | OXYGEN SATURATION: 100 % | WEIGHT: 118.6 LBS | SYSTOLIC BLOOD PRESSURE: 143 MMHG | BODY MASS INDEX: 21.83 KG/M2 | TEMPERATURE: 97.6 F | HEIGHT: 62 IN

## 2024-08-06 LAB
ANION GAP SERPL CALCULATED.3IONS-SCNC: 7 MMOL/L (ref 7–15)
BASOPHILS # BLD AUTO: ABNORMAL 10*3/UL
BASOPHILS # BLD MANUAL: 0 10E3/UL (ref 0–0.2)
BASOPHILS NFR BLD AUTO: ABNORMAL %
BASOPHILS NFR BLD MANUAL: 2 %
BUN SERPL-MCNC: 11.9 MG/DL (ref 8–23)
CALCIUM SERPL-MCNC: 8.2 MG/DL (ref 8.8–10.4)
CHLORIDE SERPL-SCNC: 102 MMOL/L (ref 98–107)
CREAT SERPL-MCNC: 0.68 MG/DL (ref 0.51–0.95)
EGFRCR SERPLBLD CKD-EPI 2021: >90 ML/MIN/1.73M2
EOSINOPHIL # BLD AUTO: ABNORMAL 10*3/UL
EOSINOPHIL # BLD MANUAL: 0.2 10E3/UL (ref 0–0.7)
EOSINOPHIL NFR BLD AUTO: ABNORMAL %
EOSINOPHIL NFR BLD MANUAL: 11 %
ERYTHROCYTE [DISTWIDTH] IN BLOOD BY AUTOMATED COUNT: 14.6 % (ref 10–15)
GLUCOSE SERPL-MCNC: 91 MG/DL (ref 70–99)
HCO3 SERPL-SCNC: 25 MMOL/L (ref 22–29)
HCT VFR BLD AUTO: 29.4 % (ref 35–47)
HGB BLD-MCNC: 9.7 G/DL (ref 11.7–15.7)
IMM GRANULOCYTES # BLD: ABNORMAL 10*3/UL
IMM GRANULOCYTES NFR BLD: ABNORMAL %
LYMPHOCYTES # BLD AUTO: ABNORMAL 10*3/UL
LYMPHOCYTES # BLD MANUAL: 0.2 10E3/UL (ref 0.8–5.3)
LYMPHOCYTES NFR BLD AUTO: ABNORMAL %
LYMPHOCYTES NFR BLD MANUAL: 14 %
MAGNESIUM SERPL-MCNC: 2.2 MG/DL (ref 1.7–2.3)
MCH RBC QN AUTO: 33.4 PG (ref 26.5–33)
MCHC RBC AUTO-ENTMCNC: 33 G/DL (ref 31.5–36.5)
MCV RBC AUTO: 101 FL (ref 78–100)
MONOCYTES # BLD AUTO: ABNORMAL 10*3/UL
MONOCYTES # BLD MANUAL: 0.4 10E3/UL (ref 0–1.3)
MONOCYTES NFR BLD AUTO: ABNORMAL %
MONOCYTES NFR BLD MANUAL: 27 %
NEUTROPHILS # BLD AUTO: ABNORMAL 10*3/UL
NEUTROPHILS # BLD MANUAL: 0.6 10E3/UL (ref 1.6–8.3)
NEUTROPHILS NFR BLD AUTO: ABNORMAL %
NEUTROPHILS NFR BLD MANUAL: 46 %
NRBC # BLD AUTO: 0 10E3/UL
NRBC BLD AUTO-RTO: 0 /100
PHOSPHATE SERPL-MCNC: 2.6 MG/DL (ref 2.5–4.5)
PLAT MORPH BLD: ABNORMAL
PLATELET # BLD AUTO: 164 10E3/UL (ref 150–450)
POTASSIUM SERPL-SCNC: 4 MMOL/L (ref 3.4–5.3)
RBC # BLD AUTO: 2.9 10E6/UL (ref 3.8–5.2)
RBC MORPH BLD: ABNORMAL
SODIUM SERPL-SCNC: 134 MMOL/L (ref 135–145)
TARGETS BLD QL SMEAR: SLIGHT
WBC # BLD AUTO: 1.4 10E3/UL (ref 4–11)

## 2024-08-06 PROCEDURE — 85007 BL SMEAR W/DIFF WBC COUNT: CPT | Performed by: PHYSICIAN ASSISTANT

## 2024-08-06 PROCEDURE — 84100 ASSAY OF PHOSPHORUS: CPT | Performed by: STUDENT IN AN ORGANIZED HEALTH CARE EDUCATION/TRAINING PROGRAM

## 2024-08-06 PROCEDURE — 250N000009 HC RX 250: Performed by: HOSPITALIST

## 2024-08-06 PROCEDURE — 80048 BASIC METABOLIC PNL TOTAL CA: CPT | Performed by: PHYSICIAN ASSISTANT

## 2024-08-06 PROCEDURE — 83735 ASSAY OF MAGNESIUM: CPT | Performed by: STUDENT IN AN ORGANIZED HEALTH CARE EDUCATION/TRAINING PROGRAM

## 2024-08-06 PROCEDURE — 250N000011 HC RX IP 250 OP 636: Performed by: PHYSICIAN ASSISTANT

## 2024-08-06 PROCEDURE — 99239 HOSP IP/OBS DSCHRG MGMT >30: CPT | Mod: GC | Performed by: STUDENT IN AN ORGANIZED HEALTH CARE EDUCATION/TRAINING PROGRAM

## 2024-08-06 PROCEDURE — 250N000013 HC RX MED GY IP 250 OP 250 PS 637: Performed by: PHYSICIAN ASSISTANT

## 2024-08-06 PROCEDURE — 258N000003 HC RX IP 258 OP 636: Performed by: HOSPITALIST

## 2024-08-06 PROCEDURE — 85027 COMPLETE CBC AUTOMATED: CPT | Performed by: PHYSICIAN ASSISTANT

## 2024-08-06 PROCEDURE — 250N000011 HC RX IP 250 OP 636

## 2024-08-06 RX ADMIN — POTASSIUM PHOSPHATE, MONOBASIC POTASSIUM PHOSPHATE, DIBASIC 9 MMOL: 224; 236 INJECTION, SOLUTION, CONCENTRATE INTRAVENOUS at 06:02

## 2024-08-06 RX ADMIN — PANTOPRAZOLE SODIUM 40 MG: 40 TABLET, DELAYED RELEASE ORAL at 08:12

## 2024-08-06 RX ADMIN — Medication 3 ML: at 04:39

## 2024-08-06 RX ADMIN — ACYCLOVIR 800 MG: 800 TABLET ORAL at 08:12

## 2024-08-06 RX ADMIN — VITAM B12 100 MCG: 100 TAB at 08:12

## 2024-08-06 RX ADMIN — PENICILLIN V POTASSIUM 250 MG: 250 TABLET, FILM COATED ORAL at 08:11

## 2024-08-06 RX ADMIN — LEVOTHYROXINE SODIUM 50 MCG: 0.05 TABLET ORAL at 06:03

## 2024-08-06 RX ADMIN — AMLODIPINE BESYLATE 5 MG: 5 TABLET ORAL at 08:12

## 2024-08-06 RX ADMIN — FLUCONAZOLE 200 MG: 200 TABLET ORAL at 08:11

## 2024-08-06 RX ADMIN — Medication 50 MCG: at 08:12

## 2024-08-06 ASSESSMENT — ACTIVITIES OF DAILY LIVING (ADL)
ADLS_ACUITY_SCORE: 22

## 2024-08-06 NOTE — DISCHARGE INSTRUCTIONS
BMT Contact Information  For issues including fevers 100.5 or more:  Please call during the week: Monday through Friday between hours of 8:00 am and 4:30 pm- Call BMT office- [phone]  After hours/Weekends: Please call [facility]  and ask for BMT physician on call and the  will have the BMT Fellow Physician call you back: [phone]     Advice for Patients on COVID19:  a. Avoid contact with individuals:   i. Who are sick or have recently been sick  ii. Have traveled to high risk areas (per CDC guidelines) or have been on a cruise in the last 14 days  iii. Who were or could have been exposed to COVID-19   b. If experiencing symptoms such as: Fever, cough or shortness of breath contact BMT at [phone] Mon-Fri 8am-4:30pm or After Hours at [phone] (ask to speak to a BMT Fellow) for guidance on need for clinical assessment  c. Avoid all non- essential travel at this time; if traveling is necessary use mask (N-95)   d. Wear a mask when in public areas  d. Avoid crowded places, if possible  f. Follow CDC advice https://www.cdc.gov/coronavirus/2019-ncov/index.html and travel guidelines https://www.cdc.gov/coronavirus/2019-ncov/travelers/index.html

## 2024-08-06 NOTE — PLAN OF CARE
VSS    Afebrile  Up ad dorian, steady gait and balance.  Alert and Oriented x4.  Using call light appropriately.  Discharge today (08/06/2024)  Mild rash noted in upper back, no CHG    No blood products needed this AM.  Phosphorus replacement started at 0600; re-check value tomorrow AM.    Problem: Risk for Delirium  Goal: Improved Sleep  Intervention: Promote Sleep  Recent Flowsheet Documentation  Taken 8/5/2024 2015 by Lew Cotto, RN  Sleep/Rest Enhancement:   awakenings minimized   consistent schedule promoted   family presence promoted   natural light exposure provided   noise level reduced   regular sleep/rest pattern promoted   room darkened     Problem: Adult Inpatient Plan of Care  Goal: Absence of Hospital-Acquired Illness or Injury  Intervention: Identify and Manage Fall Risk  Recent Flowsheet Documentation  Taken 8/5/2024 2015 by Lew Cotto, RN  Safety Promotion/Fall Prevention:   activity supervised   clutter free environment maintained   increased rounding and observation   increase visualization of patient   lighting adjusted   mobility aid in reach   nonskid shoes/slippers when out of bed   room near nurse's station   room organization consistent   safety round/check completed     Problem: Adult Inpatient Plan of Care  Goal: Absence of Hospital-Acquired Illness or Injury  Intervention: Prevent Infection  Recent Flowsheet Documentation  Taken 8/5/2024 2015 by Lew Cotto, RN  Infection Prevention:   cohorting utilized   environmental surveillance performed   equipment surfaces disinfected   hand hygiene promoted   personal protective equipment utilized   rest/sleep promoted   single patient room provided   visitors restricted/screened

## 2024-08-06 NOTE — PROGRESS NOTES
Pt discharged to: Home  Via: car  Time: 12:45pm  Reason not before 11am or 2 hrs after order written:  Phos infusing & pt wanted to shower after infusion, before leaving.   Accompanied by:  and daughter  Belongings: with patient  Teaching: completed per unit routine  Cap and line flushed with caregiver: N/A- PICC removed, pt has a port from 2023  Central line teach back questions complete: N/A- PICC removed, pt has a port from 2023  Pill box filled: N/A- pt left with only 3 meds from pharmacy, stated she has many pill boxes at home from prior transplant.   Clinic appointment: Per WANDA, BMT clinic scheduler to call pt later today for tomorrow's appt.

## 2024-08-06 NOTE — DISCHARGE SUMMARY
[facility] Discharge Summary   Shena Hartmann MRN# [id number]   Age: 73 year old  YOB: 1950   Date of Admission: 7/29/2024  Date of Discharge: 8/6/2024  Admitting Physician: Marsha Morgan MD  Discharge Physician: Ulices Colbert MD   Discharge Diagnoses:    Multiple myeloma s/p carvykti CAR-T   Discharge Medications:         Medication List        Started      fluconazole 200 MG tablet  Commonly known as: DIFLUCAN  200 mg, Oral, DAILY            Modified      amLODIPine 2.5 MG tablet  Commonly known as: NORVASC  What changed:   how much to take  how to take this  when to take this  additional instructions     penicillin V 250 MG tablet  Commonly known as: VEETID  250 mg, Oral, 2 TIMES DAILY  What changed:   medication strength  how much to take     Saccharomycin  MG capsule  Generic drug: saccharomyces boulardii  What changed: additional instructions     triamcinolone 0.1 % external cream  Commonly known as: KENALOG  What changed:   when to take this  reasons to take this  Another medication with the same name was removed. Continue taking this medication, and follow the directions you see here.            Discontinued      aspirin 81 MG chewable tablet  Commonly known as: ASA     desonide 0.05 % external ointment  Commonly known as: DESOWEN     Heparin Sod (Pork) Lock Flush 10 UNIT/ML Soln     magnesium 100 MG Caps     pomalidomide 2 MG capsule  Commonly known as: POMALYST     prochlorperazine 5 MG tablet  Commonly known as: COMPAZINE            Brief History of Illness:    Adopted from H&P  Ms. Hartmann is a 73 year old woman with a history of breast cancer (dx 1994) and multiple myeloma who was admitted for CAR-T with Carvykti. She had tolerated chemotherapy well, other than some diarrhea. She also had slight nausea, but continued to eat and drink well. She underwent CAR-T on (7/29/24).  Physical Exam:    BP (!) 143/61 (BP Location: Right leg)   Pulse 70   Temp 97.6  F (36.4  " C) (Oral)   Resp 16   Ht 1.57 m (5' 1.81\")   Wt 53.8 kg (118 lb 9.6 oz)   LMP  (LMP Unknown)   SpO2 100%   BMI 21.83 kg/m    General Appearance: well appearing, NAD.   HEENT: NC/AT, sclera anicteric, MMM  CV: RRR, no murmur   RESP: CTAB; no rales or wheezes.  GI: +BS, soft, nontender  EXT: no edema catalina LE's  SKIN:  No rash on exposed skin.  NEURO: A&O; CN II-XII grossly intact, ambulates independently   PSYCH: Mood and affect normal   VASCULAR ACCESS: PORT, left picc will be removed     Hospital Course:    Shena Hartmann is a 73 year old woman with MM currently day +8 of Car-t therapy, utilizing Carvykti.   She tolerated CAR-T very well without evidence of CRS of ICANS so far. She did have a brief rash with CHG wipes. She also did have some loose stools, but did not require or want anti-diarrheals.      ONC  - BMT doc/Coordinator: Chavez/Sieve  - Cell Therapy Product: carvykti. This product has a median onset of CRS at day7; and a median onset of ICANS at day 8.  - Disease: multiple myeloma  - package insert: https://www.fda.gov/media/123791/download     CRS: none to date  ICANS: none to date     HEME/COAG  - pancytopenia secondary to malignancy  - hematologic toxicity score of 0: Will this patient need GCSF to start on day +14? No      ID  - Infection prophylaxis: acyclovir, penicillin V (had joint pain with levaquin), fluconazole. Pentamidine was administered on 7/24/2024.      GI  - zofran, compazine prn n/v     RENAL/  # hyponatremia: Mildly low but stable during admission      ENDOCRINE  # hypothyroidism: levothyroxine 50 mcg daily     SUPPORTIVE CARE  - Transfusion to keep hgb >7g/dL and plts >10,000  - Ulcer ppx: protonix  - DVT ppx: no pharmacologic prophylaxis given anticipated thrombocytopenia  - avoid homeopathics for 2 weeks after Car-t. Avoid probiotics at least until neutrophils recover  - okay to continue B12 and vitamin D    CODE STATUS:   Discharge Instructions and Follow-Up:    Discharge " diet: Regular diet as tolerated  Discharge activity: Activity as tolerated   Discharge follow-up: Follow up with [facility] as follows: Daily follow-up in clinic until approximately day 14-18    Discharge Disposition:    Discharged to home.    Yonathan Santa MD   Hematology/Oncology Fellow PGY6  Pager: [phone]

## 2024-08-07 ENCOUNTER — APPOINTMENT (OUTPATIENT)
Dept: LAB | Facility: CLINIC | Age: 74
End: 2024-08-07
Attending: STUDENT IN AN ORGANIZED HEALTH CARE EDUCATION/TRAINING PROGRAM
Payer: MEDICARE

## 2024-08-07 ENCOUNTER — ONCOLOGY VISIT (OUTPATIENT)
Dept: TRANSPLANT | Facility: CLINIC | Age: 74
End: 2024-08-07
Attending: STUDENT IN AN ORGANIZED HEALTH CARE EDUCATION/TRAINING PROGRAM
Payer: MEDICARE

## 2024-08-07 VITALS
SYSTOLIC BLOOD PRESSURE: 146 MMHG | WEIGHT: 117.7 LBS | RESPIRATION RATE: 16 BRPM | OXYGEN SATURATION: 99 % | HEART RATE: 81 BPM | DIASTOLIC BLOOD PRESSURE: 69 MMHG | TEMPERATURE: 98.4 F | BODY MASS INDEX: 21.66 KG/M2

## 2024-08-07 DIAGNOSIS — Z92.859 HISTORY OF CELLULAR THERAPY: ICD-10-CM

## 2024-08-07 DIAGNOSIS — C90.00 MULTIPLE MYELOMA NOT HAVING ACHIEVED REMISSION (H): Primary | ICD-10-CM

## 2024-08-07 DIAGNOSIS — C90.02 MULTIPLE MYELOMA IN RELAPSE (H): ICD-10-CM

## 2024-08-07 LAB
ANION GAP SERPL CALCULATED.3IONS-SCNC: 10 MMOL/L (ref 7–15)
BASOPHILS # BLD AUTO: ABNORMAL 10*3/UL
BASOPHILS # BLD MANUAL: 0 10E3/UL (ref 0–0.2)
BASOPHILS NFR BLD AUTO: ABNORMAL %
BASOPHILS NFR BLD MANUAL: 0 %
BUN SERPL-MCNC: 13.1 MG/DL (ref 8–23)
CALCIUM SERPL-MCNC: 9.2 MG/DL (ref 8.8–10.4)
CHLORIDE SERPL-SCNC: 98 MMOL/L (ref 98–107)
CREAT SERPL-MCNC: 0.97 MG/DL (ref 0.51–0.95)
CULTURE HARVEST COMPLETE DATE: NORMAL
CULTURE HARVEST COMPLETE DATE: NORMAL
EGFRCR SERPLBLD CKD-EPI 2021: 61 ML/MIN/1.73M2
EOSINOPHIL # BLD AUTO: ABNORMAL 10*3/UL
EOSINOPHIL # BLD MANUAL: 0.2 10E3/UL (ref 0–0.7)
EOSINOPHIL NFR BLD AUTO: ABNORMAL %
EOSINOPHIL NFR BLD MANUAL: 11 %
ERYTHROCYTE [DISTWIDTH] IN BLOOD BY AUTOMATED COUNT: 14.8 % (ref 10–15)
GLUCOSE SERPL-MCNC: 108 MG/DL (ref 70–99)
HCO3 SERPL-SCNC: 24 MMOL/L (ref 22–29)
HCT VFR BLD AUTO: 31.2 % (ref 35–47)
HGB BLD-MCNC: 10.3 G/DL (ref 11.7–15.7)
IMM GRANULOCYTES # BLD: ABNORMAL 10*3/UL
IMM GRANULOCYTES NFR BLD: ABNORMAL %
INTERPRETATION: NORMAL
LYMPHOCYTES # BLD AUTO: ABNORMAL 10*3/UL
LYMPHOCYTES # BLD MANUAL: 0.1 10E3/UL (ref 0.8–5.3)
LYMPHOCYTES NFR BLD AUTO: ABNORMAL %
LYMPHOCYTES NFR BLD MANUAL: 5 %
MCH RBC QN AUTO: 32.7 PG (ref 26.5–33)
MCHC RBC AUTO-ENTMCNC: 33 G/DL (ref 31.5–36.5)
MCV RBC AUTO: 99 FL (ref 78–100)
MONOCYTES # BLD AUTO: ABNORMAL 10*3/UL
MONOCYTES # BLD MANUAL: 0.5 10E3/UL (ref 0–1.3)
MONOCYTES NFR BLD AUTO: ABNORMAL %
MONOCYTES NFR BLD MANUAL: 21 %
NEUTROPHILS # BLD AUTO: ABNORMAL 10*3/UL
NEUTROPHILS # BLD MANUAL: 1.4 10E3/UL (ref 1.6–8.3)
NEUTROPHILS NFR BLD AUTO: ABNORMAL %
NEUTROPHILS NFR BLD MANUAL: 63 %
NRBC # BLD AUTO: 0 10E3/UL
NRBC BLD AUTO-RTO: 0 /100
PLAT MORPH BLD: ABNORMAL
PLATELET # BLD AUTO: 177 10E3/UL (ref 150–450)
POTASSIUM SERPL-SCNC: 4.6 MMOL/L (ref 3.4–5.3)
RBC # BLD AUTO: 3.15 10E6/UL (ref 3.8–5.2)
RBC MORPH BLD: ABNORMAL
SODIUM SERPL-SCNC: 132 MMOL/L (ref 135–145)
WBC # BLD AUTO: 2.2 10E3/UL (ref 4–11)

## 2024-08-07 PROCEDURE — 36591 DRAW BLOOD OFF VENOUS DEVICE: CPT | Performed by: STUDENT IN AN ORGANIZED HEALTH CARE EDUCATION/TRAINING PROGRAM

## 2024-08-07 PROCEDURE — 85041 AUTOMATED RBC COUNT: CPT | Performed by: STUDENT IN AN ORGANIZED HEALTH CARE EDUCATION/TRAINING PROGRAM

## 2024-08-07 PROCEDURE — 250N000011 HC RX IP 250 OP 636: Performed by: STUDENT IN AN ORGANIZED HEALTH CARE EDUCATION/TRAINING PROGRAM

## 2024-08-07 PROCEDURE — 82374 ASSAY BLOOD CARBON DIOXIDE: CPT | Performed by: STUDENT IN AN ORGANIZED HEALTH CARE EDUCATION/TRAINING PROGRAM

## 2024-08-07 PROCEDURE — G0463 HOSPITAL OUTPT CLINIC VISIT: HCPCS | Performed by: STUDENT IN AN ORGANIZED HEALTH CARE EDUCATION/TRAINING PROGRAM

## 2024-08-07 PROCEDURE — 85007 BL SMEAR W/DIFF WBC COUNT: CPT | Performed by: STUDENT IN AN ORGANIZED HEALTH CARE EDUCATION/TRAINING PROGRAM

## 2024-08-07 PROCEDURE — 99214 OFFICE O/P EST MOD 30 MIN: CPT | Performed by: STUDENT IN AN ORGANIZED HEALTH CARE EDUCATION/TRAINING PROGRAM

## 2024-08-07 RX ORDER — HEPARIN SODIUM (PORCINE) LOCK FLUSH IV SOLN 100 UNIT/ML 100 UNIT/ML
500 SOLUTION INTRAVENOUS ONCE
Status: COMPLETED | OUTPATIENT
Start: 2024-08-07 | End: 2024-08-07

## 2024-08-07 RX ADMIN — HEPARIN 500 UNITS: 100 SYRINGE at 14:01

## 2024-08-07 ASSESSMENT — PAIN SCALES - GENERAL: PAINLEVEL: MILD PAIN (3)

## 2024-08-07 NOTE — PLAN OF CARE
Occupational Therapy Discharge Summary    Reason for therapy discharge:    Discharged to home.    Progress towards therapy goal(s). See goals on Care Plan in [facility] electronic health record for goal details.  Goals partially met.  Barriers to achieving goals:   discharge from facility.    Therapy recommendation(s):    No further therapy is recommended.Anticipate when pt medically stable post BMT, safe to discharge home with assist as needed from daughter and spouse. Pt daughter will be staying with her for 30 days post transplant.

## 2024-08-07 NOTE — NURSING NOTE
"Oncology Rooming Note    August 7, 2024 2:10 PM   Shena Hartmann is a 73 year old female who presents for:    Chief Complaint   Patient presents with    Port Draw     Labs drawn via port by RN in lab.  VS taken    Oncology Clinic Visit     Multiple myeloma     Initial Vitals: BP (!) 146/69   Pulse 81   Temp 98.4  F (36.9  C) (Oral)   Resp 16   Wt 53.4 kg (117 lb 11.2 oz)   LMP  (LMP Unknown)   SpO2 99%   BMI 21.66 kg/m   Estimated body mass index is 21.66 kg/m  as calculated from the following:    Height as of 7/29/24: 1.57 m (5' 1.81\").    Weight as of this encounter: 53.4 kg (117 lb 11.2 oz). Body surface area is 1.53 meters squared.  Mild Pain (3) Comment: Data Unavailable   No LMP recorded (lmp unknown). Patient is postmenopausal.  Allergies reviewed: Yes  Medications reviewed: Yes    Medications: Medication refills not needed today.  Pharmacy name entered into Yobble:    [id number] - [place], [place] - [address] [facility] [address]  WRITTEN PRESCRIPTION REQUESTED  [facility] - [place], [place] - [address]    Frailty Screening:   Is the patient here for a new oncology consult visit in cancer care? 2. No      Clinical concerns: If pt does not need electrolytes today, please have port de-accessed.      Farheen Wagner, EMT  8/7/2024            "

## 2024-08-07 NOTE — PROGRESS NOTES
"BMT/Cell Therapy Daily Progress Note   08/07/2024    Patient ID:  Shena Hartmann is a 73 year old female, currently day +9 of her therapy (Carvykti). She was discharged from the hospital on 8/6/2024, and is here for post-discharge follow-up.    INTERVAL  HISTORY     Shena Hartmann is doing well and she denies any complaints today. No fevers, chills, weakness, dizziness, nausea or vomiting. Her appetite is good. She denies confusion, word finding difficulty or \"brain fog\"    Review of Systems: 10 point ROS negative except as noted above.      PHYSICAL EXAM     Vitals:    08/07/24 1344   BP: (!) 146/69   Pulse: 81   Resp: 16   Temp: 98.4  F (36.9  C)   TempSrc: Oral   SpO2: 99%   Weight: 53.4 kg (117 lb 11.2 oz)     ICE score: 10/10    General: NAD   Eyes: : KYAW, sclera anicteric   Nose/Mouth/Throat: OP clear, buccal mucosa moist, no ulcerations   Lungs: CTA bilaterally  Cardiovascular: RRR, no M/R/G   Abdominal/Rectal: +BS, soft, NT, ND, No HSM   Lymphatics: no edema  Skin: no rashes or petechiae  Neuro: A&O x3, no focal deficits  Musculoskeletal: normal tone, no weakness or stiffness  Additional Findings: Cantu site NT, no drainage.    LABS AND IMAGING: I have assessed all abnormal lab values for their clinical significance and any values considered clinically significant have been addressed in the assessment and plan.        Lab Results   Component Value Date    WBC 2.2 (L) 08/07/2024    ANEU 0.6 (L) 08/06/2024    HGB 10.3 (L) 08/07/2024    HCT 31.2 (L) 08/07/2024     08/07/2024     (L) 08/06/2024    POTASSIUM 4.0 08/06/2024    CHLORIDE 102 08/06/2024    CO2 25 08/06/2024    GLC 91 08/06/2024    BUN 11.9 08/06/2024    CR 0.68 08/06/2024    MAG 2.2 08/06/2024    INR 1.09 08/05/2024         SYSTEMS-BASED ASSESSMENT AND PLAN     Shena W Umbreit is a 73 year old female, currently day +9 of CAR-T therapy with Carvykti. Day 0=7/29/2024     ONC  - BMT doc/Coordinator: Scott/Danaeve  - Cell Therapy " Product: Carvykti. This product has a median onset of CRS at day +7; and a median onset of ICANS at day +8.  - Disease: multiple myeloma  - package insert: https://www.fda.gov/media/935598/download     CRS: none to date  ICANS: none to date     HEME/COAG  - pancytopenia secondary to malignancy  - hematologic toxicity score of 0: Will this patient need GCSF to start on day +14? No      ID  - Infection prophylaxis: acyclovir, penicillin V (had joint pain with levaquin), fluconazole. Pentamidine was administered on 7/24/2024.   - Screening ferritin 8/5 is just 27     GI  - zofran, compazine PRN for nausea and vomiting     RENAL/  # hyponatremia: Mildly low but stable over the past few days   # hypokalemia: (resolved)       ENDOCRINE  # hypothyroidism:  levothyroxine 50 mcg daily     Today's summary: Continue daily follow-up and labs, close monitoring for CRS and ICANS. Patient educated about signs and symptoms of developing toxicity.    Known issues that I take into account for medical decisions, with salient changes to the plan considering these complexities noted above.    Patient Active Problem List   Diagnosis    Pain in thoracic spine    Multiple myeloma, dx 2002,  s/p ASCBMT 3/26/14    Personal history of malignant neoplasm of breast    Seasonal allergies    Osteoporosis    Primary hypertension    Stem cells transplant status (H)    Adverse effect of other drugs, medicaments and biological substances, sequela    Acquired hypothyroidism    Other amyloidosis (H)    Examination of participant in clinical trial    Multiple myeloma not having achieved remission (H)     I spent 30 minutes face-to-face or coordinating care of Shena DAVID Hartmann. Over 50% of our time on the unit was spent counseling the patient and/or coordinating care regarding post-CAR-T care.    Laquita Esteves MD

## 2024-08-07 NOTE — LETTER
"8/7/2024      Shena Hartmann  [address] [place] [address]      Dear Colleague,    Thank you for referring your patient, Shena Hartmann, to the [facility] BLOOD AND MARROW TRANSPLANT PROGRAM [place]. Please see a copy of my visit note below.    BMT/Cell Therapy Daily Progress Note   08/07/2024    Patient ID:  Shena Hartmann is a 73 year old female, currently day +9 of her therapy (Carvykti). She was discharged from the hospital on 8/6/2024, and is here for post-discharge follow-up.    INTERVAL  HISTORY     Shena Hartmann is doing well and she denies any complaints today. No fevers, chills, weakness, dizziness, nausea or vomiting. Her appetite is good. She denies confusion, word finding difficulty or \"brain fog\"    Review of Systems: 10 point ROS negative except as noted above.      PHYSICAL EXAM     Vitals:    08/07/24 1344   BP: (!) 146/69   Pulse: 81   Resp: 16   Temp: 98.4  F (36.9  C)   TempSrc: Oral   SpO2: 99%   Weight: 53.4 kg (117 lb 11.2 oz)     ICE score: 10/10    General: NAD   Eyes: : KYAW, sclera anicteric   Nose/Mouth/Throat: OP clear, buccal mucosa moist, no ulcerations   Lungs: CTA bilaterally  Cardiovascular: RRR, no M/R/G   Abdominal/Rectal: +BS, soft, NT, ND, No HSM   Lymphatics: no edema  Skin: no rashes or petechiae  Neuro: A&O x3, no focal deficits  Musculoskeletal: normal tone, no weakness or stiffness  Additional Findings: Cantu site NT, no drainage.    LABS AND IMAGING: I have assessed all abnormal lab values for their clinical significance and any values considered clinically significant have been addressed in the assessment and plan.        Lab Results   Component Value Date    WBC 2.2 (L) 08/07/2024    ANEU 0.6 (L) 08/06/2024    HGB 10.3 (L) 08/07/2024    HCT 31.2 (L) 08/07/2024     08/07/2024     (L) 08/06/2024    POTASSIUM 4.0 08/06/2024    CHLORIDE 102 08/06/2024    CO2 25 08/06/2024    GLC 91 08/06/2024    BUN 11.9 08/06/2024    CR 0.68 " 08/06/2024    MAG 2.2 08/06/2024    INR 1.09 08/05/2024         SYSTEMS-BASED ASSESSMENT AND PLAN     Shena Hartmann is a 73 year old female, currently day +9 of CAR-T therapy with Carvykti. Day 0=7/29/2024     ONC  - BMT doc/Coordinator: Anastasiya  - Cell Therapy Product: Carvykti. This product has a median onset of CRS at day +7; and a median onset of ICANS at day +8.  - Disease: multiple myeloma  - package insert: https://www.fda.gov/media/647431/download     CRS: none to date  ICANS: none to date     HEME/COAG  - pancytopenia secondary to malignancy  - hematologic toxicity score of 0: Will this patient need GCSF to start on day +14? No      ID  - Infection prophylaxis: acyclovir, penicillin V (had joint pain with levaquin), fluconazole. Pentamidine was administered on 7/24/2024.   - Screening ferritin 8/5 is just 27     GI  - zofran, compazine PRN for nausea and vomiting     RENAL/  # hyponatremia: Mildly low but stable over the past few days   # hypokalemia: (resolved)       ENDOCRINE  # hypothyroidism:  levothyroxine 50 mcg daily     Today's summary: Continue daily follow-up and labs, close monitoring for CRS and ICANS. Patient educated about signs and symptoms of developing toxicity.    Known issues that I take into account for medical decisions, with salient changes to the plan considering these complexities noted above.    Patient Active Problem List   Diagnosis     Pain in thoracic spine     Multiple myeloma, dx 2002,  s/p ASCBMT 3/26/14     Personal history of malignant neoplasm of breast     Seasonal allergies     Osteoporosis     Primary hypertension     Stem cells transplant status (H)     Adverse effect of other drugs, medicaments and biological substances, sequela     Acquired hypothyroidism     Other amyloidosis (H)     Examination of participant in clinical trial     Multiple myeloma not having achieved remission (H)     I spent 30 minutes face-to-face or coordinating care of Shena Hartmann.  Over 50% of our time on the unit was spent counseling the patient and/or coordinating care regarding post-CAR-T care.    Laquita Esteves MD       Again, thank you for allowing me to participate in the care of your patient.        Sincerely,        Laquita Esteves MD

## 2024-08-08 ENCOUNTER — APPOINTMENT (OUTPATIENT)
Dept: LAB | Facility: CLINIC | Age: 74
End: 2024-08-08
Payer: MEDICARE

## 2024-08-08 ENCOUNTER — ONCOLOGY VISIT (OUTPATIENT)
Dept: TRANSPLANT | Facility: CLINIC | Age: 74
End: 2024-08-08
Payer: MEDICARE

## 2024-08-08 VITALS
HEART RATE: 77 BPM | SYSTOLIC BLOOD PRESSURE: 131 MMHG | WEIGHT: 116.9 LBS | RESPIRATION RATE: 16 BRPM | DIASTOLIC BLOOD PRESSURE: 73 MMHG | OXYGEN SATURATION: 98 % | BODY MASS INDEX: 21.51 KG/M2 | TEMPERATURE: 98.2 F

## 2024-08-08 DIAGNOSIS — Z79.899 ENCOUNTER FOR LONG-TERM (CURRENT) USE OF MEDICATIONS: ICD-10-CM

## 2024-08-08 DIAGNOSIS — C90.00 MULTIPLE MYELOMA NOT HAVING ACHIEVED REMISSION (H): ICD-10-CM

## 2024-08-08 LAB
ACANTHOCYTES BLD QL SMEAR: NORMAL
ALBUMIN SERPL BCG-MCNC: 4.1 G/DL (ref 3.5–5.2)
ALP SERPL-CCNC: 54 U/L (ref 40–150)
ALT SERPL W P-5'-P-CCNC: 16 U/L (ref 0–50)
ANION GAP SERPL CALCULATED.3IONS-SCNC: 9 MMOL/L (ref 7–15)
AST SERPL W P-5'-P-CCNC: 15 U/L (ref 0–45)
AUER BODIES BLD QL SMEAR: NORMAL
BASO STIPL BLD QL SMEAR: NORMAL
BASOPHILS # BLD AUTO: 0 10E3/UL (ref 0–0.2)
BASOPHILS NFR BLD AUTO: 1 %
BILIRUB SERPL-MCNC: 0.2 MG/DL
BITE CELLS BLD QL SMEAR: NORMAL
BLISTER CELLS BLD QL SMEAR: NORMAL
BUN SERPL-MCNC: 12.1 MG/DL (ref 8–23)
BURR CELLS BLD QL SMEAR: NORMAL
CALCIUM SERPL-MCNC: 9.8 MG/DL (ref 8.8–10.4)
CHLORIDE SERPL-SCNC: 98 MMOL/L (ref 98–107)
CREAT SERPL-MCNC: 0.73 MG/DL (ref 0.51–0.95)
DACRYOCYTES BLD QL SMEAR: NORMAL
EGFRCR SERPLBLD CKD-EPI 2021: 86 ML/MIN/1.73M2
ELLIPTOCYTES BLD QL SMEAR: NORMAL
EOSINOPHIL # BLD AUTO: 0.2 10E3/UL (ref 0–0.7)
EOSINOPHIL NFR BLD AUTO: 8 %
ERYTHROCYTE [DISTWIDTH] IN BLOOD BY AUTOMATED COUNT: 14.9 % (ref 10–15)
FRAGMENTS BLD QL SMEAR: NORMAL
GLUCOSE SERPL-MCNC: 102 MG/DL (ref 70–99)
HCO3 SERPL-SCNC: 27 MMOL/L (ref 22–29)
HCT VFR BLD AUTO: 31.2 % (ref 35–47)
HGB BLD-MCNC: 10.7 G/DL (ref 11.7–15.7)
HGB C CRYSTALS: NORMAL
HOWELL-JOLLY BOD BLD QL SMEAR: NORMAL
IMM GRANULOCYTES # BLD: 0 10E3/UL
IMM GRANULOCYTES NFR BLD: 0 %
INTERPRETATION: NORMAL
LDH SERPL L TO P-CCNC: 159 U/L (ref 0–250)
LYMPHOCYTES # BLD AUTO: 0.3 10E3/UL (ref 0.8–5.3)
LYMPHOCYTES NFR BLD AUTO: 13 %
MCH RBC QN AUTO: 33.5 PG (ref 26.5–33)
MCHC RBC AUTO-ENTMCNC: 34.3 G/DL (ref 31.5–36.5)
MCV RBC AUTO: 98 FL (ref 78–100)
MONOCYTES # BLD AUTO: 0.4 10E3/UL (ref 0–1.3)
MONOCYTES NFR BLD AUTO: 22 %
NEUTROPHILS # BLD AUTO: 1.1 10E3/UL (ref 1.6–8.3)
NEUTROPHILS NFR BLD AUTO: 56 %
NEUTS HYPERSEG BLD QL SMEAR: NORMAL
NRBC # BLD AUTO: 0 10E3/UL
NRBC BLD AUTO-RTO: 0 /100
PLAT MORPH BLD: NORMAL
PLATELET # BLD AUTO: 168 10E3/UL (ref 150–450)
POLYCHROMASIA BLD QL SMEAR: NORMAL
POTASSIUM SERPL-SCNC: 4.1 MMOL/L (ref 3.4–5.3)
PROT SERPL-MCNC: 6.4 G/DL (ref 6.4–8.3)
RBC # BLD AUTO: 3.19 10E6/UL (ref 3.8–5.2)
RBC AGGLUT BLD QL: NORMAL
RBC MORPH BLD: NORMAL
ROULEAUX BLD QL SMEAR: NORMAL
SICKLE CELLS BLD QL SMEAR: NORMAL
SMUDGE CELLS BLD QL SMEAR: NORMAL
SODIUM SERPL-SCNC: 134 MMOL/L (ref 135–145)
SPHEROCYTES BLD QL SMEAR: NORMAL
STOMATOCYTES BLD QL SMEAR: NORMAL
TARGETS BLD QL SMEAR: NORMAL
TOTAL PROTEIN SERUM FOR ELP: 6.1 G/DL (ref 6.4–8.3)
TOXIC GRANULES BLD QL SMEAR: NORMAL
VARIANT LYMPHS BLD QL SMEAR: NORMAL
WBC # BLD AUTO: 2 10E3/UL (ref 4–11)

## 2024-08-08 PROCEDURE — 84165 PROTEIN E-PHORESIS SERUM: CPT | Mod: 26 | Performed by: PATHOLOGY

## 2024-08-08 PROCEDURE — 83521 IG LIGHT CHAINS FREE EACH: CPT | Mod: 59

## 2024-08-08 PROCEDURE — 85025 COMPLETE CBC W/AUTO DIFF WBC: CPT

## 2024-08-08 PROCEDURE — 84165 PROTEIN E-PHORESIS SERUM: CPT | Mod: TC | Performed by: PATHOLOGY

## 2024-08-08 PROCEDURE — 99213 OFFICE O/P EST LOW 20 MIN: CPT

## 2024-08-08 PROCEDURE — 80053 COMPREHEN METABOLIC PANEL: CPT

## 2024-08-08 PROCEDURE — 84155 ASSAY OF PROTEIN SERUM: CPT | Mod: 91

## 2024-08-08 PROCEDURE — G0463 HOSPITAL OUTPT CLINIC VISIT: HCPCS

## 2024-08-08 PROCEDURE — 36591 DRAW BLOOD OFF VENOUS DEVICE: CPT

## 2024-08-08 PROCEDURE — 250N000011 HC RX IP 250 OP 636

## 2024-08-08 PROCEDURE — 83615 LACTATE (LD) (LDH) ENZYME: CPT

## 2024-08-08 RX ORDER — HEPARIN SODIUM (PORCINE) LOCK FLUSH IV SOLN 100 UNIT/ML 100 UNIT/ML
5 SOLUTION INTRAVENOUS ONCE
Status: COMPLETED | OUTPATIENT
Start: 2024-08-08 | End: 2024-08-08

## 2024-08-08 RX ADMIN — Medication 5 ML: at 12:20

## 2024-08-08 ASSESSMENT — PAIN SCALES - GENERAL: PAINLEVEL: MILD PAIN (2)

## 2024-08-08 NOTE — NURSING NOTE
"Oncology Rooming Note    August 8, 2024 12:25 PM   Shena Hartmann is a 73 year old female who presents for:    Chief Complaint   Patient presents with    Port Draw     Labs drawn via port by RN. VS taken.    Oncology Clinic Visit     Multiple myeloma not having achieved remission     Initial Vitals: /73 (BP Location: Left arm, Patient Position: Sitting, Cuff Size: Adult Regular)   Pulse 77   Temp 98.2  F (36.8  C) (Oral)   Resp 16   Wt 53 kg (116 lb 14.4 oz)   LMP  (LMP Unknown)   SpO2 98%   BMI 21.51 kg/m   Estimated body mass index is 21.51 kg/m  as calculated from the following:    Height as of 7/29/24: 1.57 m (5' 1.81\").    Weight as of this encounter: 53 kg (116 lb 14.4 oz). Body surface area is 1.52 meters squared.  Mild Pain (2) Comment: Data Unavailable   No LMP recorded (lmp unknown). Patient is postmenopausal.  Allergies reviewed: Yes  Medications reviewed: Yes    Medications: Medication refills not needed today.  Pharmacy name entered into [employer]:    [facility] #[id number] - [place], [place] - [address] AT [facility] [address]  WRITTEN PRESCRIPTION REQUESTED  [facility] - [place], [place] - [address]    Frailty Screening:   Is the patient here for a new oncology consult visit in cancer care? 2. No      Clinical concerns: needs to write note for neurotech.       Familia Steele              "

## 2024-08-08 NOTE — LETTER
8/8/2024      Shena Hartmann  [address] [place] [address]      Dear Colleague,    Thank you for referring your patient, Shena Hartmann, to the [facility] BLOOD AND MARROW TRANSPLANT PROGRAM [place]. Please see a copy of my visit note below.    BMT/Cell Therapy Daily Progress Note   08/12/2024    Patient ID:  Shena Hartmann is a 73 year old female, currently day +9 of her therapy (Carvykti). She was discharged from the hospital on 8/6/2024, and is here for post-discharge follow-up.    INTERVAL  HISTORY     Shena Hartmann is doing well , no new issues.     Review of Systems: 10 point ROS negative except as noted above.      PHYSICAL EXAM     Vitals:    08/08/24 1201   BP: 131/73   BP Location: Left arm   Patient Position: Sitting   Cuff Size: Adult Regular   Pulse: 77   Resp: 16   Temp: 98.2  F (36.8  C)   TempSrc: Oral   SpO2: 98%   Weight: 53 kg (116 lb 14.4 oz)     ICE score: 10/10    General: NAD   Eyes: : KYAW, sclera anicteric   Nose/Mouth/Throat: OP clear, buccal mucosa moist, no ulcerations   Lungs: CTA bilaterally  Cardiovascular: RRR, no M/R/G   Abdominal/Rectal: +BS, soft, NT, ND, No HSM   Lymphatics: no edema  Skin: no rashes or petechiae  Neuro: A&O x3, no focal deficits  Musculoskeletal: normal tone, no weakness or stiffness  Additional Findings: Cantu site NT, no drainage.    LABS AND IMAGING: I have assessed all abnormal lab values for their clinical significance and any values considered clinically significant have been addressed in the assessment and plan.        Lab Results   Component Value Date    WBC 2.5 (L) 08/12/2024    ANEU 1.5 (L) 08/10/2024    HGB 10.1 (L) 08/12/2024    HCT 29.7 (L) 08/12/2024     (L) 08/12/2024     (L) 08/12/2024    POTASSIUM 3.8 08/12/2024    CHLORIDE 96 (L) 08/12/2024    CO2 26 08/12/2024     (H) 08/12/2024    BUN 12.4 08/12/2024    CR 0.76 08/12/2024    MAG 1.8 08/12/2024    INR 1.01 08/12/2024         SYSTEMS-BASED  ASSESSMENT AND PLAN     Shena Hartmann is a 73 year old female, currently day +9 of CAR-T therapy with Carvykti. Day 0=7/29/2024     ONC  - BMT doc/Coordinator: Anastasiya  - Cell Therapy Product: Carvykti. This product has a median onset of CRS at day +7; and a median onset of ICANS at day +8.  - Disease: multiple myeloma  - package insert: https://www.fda.gov/media/643607/download     CRS: none to date  ICANS: none to date     HEME/COAG  - pancytopenia secondary to malignancy  - hematologic toxicity score of 0: Will this patient need GCSF to start on day +14? No      ID  - Infection prophylaxis: acyclovir, penicillin V (had joint pain with levaquin), fluconazole. Pentamidine was administered on 7/24/2024.   - Screening ferritin 8/5 is just 27     GI  - zofran, compazine PRN for nausea and vomiting     RENAL/  # hyponatremia: Mildly low but stable over the past few days   # hypokalemia: (resolved)       ENDOCRINE  # hypothyroidism:  levothyroxine 50 mcg daily     Today's summary: Continue daily follow-up and labs, close monitoring for CRS and ICANS. Patient educated about signs and symptoms of developing toxicity.    Known issues that I take into account for medical decisions, with salient changes to the plan considering these complexities noted above.    Patient Active Problem List   Diagnosis     Pain in thoracic spine     Multiple myeloma, dx 2002,  s/p ASCBMT 3/26/14     Personal history of malignant neoplasm of breast     Seasonal allergies     Osteoporosis     Primary hypertension     Stem cells transplant status (H)     Adverse effect of other drugs, medicaments and biological substances, sequela     Acquired hypothyroidism     Other amyloidosis (H)     Examination of participant in clinical trial     Multiple myeloma not having achieved remission (H)     I spent 30 minutes face-to-face or coordinating care of Shena Hartmann. Over 50% of our time on the unit was spent counseling the patient and/or  coordinating care regarding post-CAR-T care.    Marsha Morgan MD       Again, thank you for allowing me to participate in the care of your patient.        Sincerely,        Marsha Morgan MD

## 2024-08-09 ENCOUNTER — OFFICE VISIT (OUTPATIENT)
Dept: TRANSPLANT | Facility: CLINIC | Age: 74
End: 2024-08-09
Attending: NURSE PRACTITIONER
Payer: MEDICARE

## 2024-08-09 ENCOUNTER — LAB (OUTPATIENT)
Dept: LAB | Facility: CLINIC | Age: 74
End: 2024-08-09
Attending: NURSE PRACTITIONER
Payer: MEDICARE

## 2024-08-09 VITALS
WEIGHT: 118.5 LBS | OXYGEN SATURATION: 98 % | DIASTOLIC BLOOD PRESSURE: 76 MMHG | HEART RATE: 89 BPM | RESPIRATION RATE: 16 BRPM | TEMPERATURE: 98.1 F | BODY MASS INDEX: 21.81 KG/M2 | SYSTOLIC BLOOD PRESSURE: 129 MMHG

## 2024-08-09 DIAGNOSIS — C90.00 MULTIPLE MYELOMA NOT HAVING ACHIEVED REMISSION (H): Primary | ICD-10-CM

## 2024-08-09 DIAGNOSIS — Z94.84 STEM CELLS TRANSPLANT STATUS (H): ICD-10-CM

## 2024-08-09 LAB
ALBUMIN SERPL BCG-MCNC: 4.1 G/DL (ref 3.5–5.2)
ALBUMIN SERPL ELPH-MCNC: 3.9 G/DL (ref 3.7–5.1)
ALP SERPL-CCNC: 57 U/L (ref 40–150)
ALPHA1 GLOB SERPL ELPH-MCNC: 0.3 G/DL (ref 0.2–0.4)
ALPHA2 GLOB SERPL ELPH-MCNC: 0.7 G/DL (ref 0.5–0.9)
ALT SERPL W P-5'-P-CCNC: 17 U/L (ref 0–50)
ANION GAP SERPL CALCULATED.3IONS-SCNC: 12 MMOL/L (ref 7–15)
APTT PPP: 40 SECONDS (ref 22–38)
AST SERPL W P-5'-P-CCNC: 15 U/L (ref 0–45)
B-GLOBULIN SERPL ELPH-MCNC: 0.6 G/DL (ref 0.6–1)
BASOPHILS # BLD AUTO: ABNORMAL 10*3/UL
BASOPHILS # BLD MANUAL: 0 10E3/UL (ref 0–0.2)
BASOPHILS NFR BLD AUTO: ABNORMAL %
BASOPHILS NFR BLD MANUAL: 0 %
BILIRUB SERPL-MCNC: <0.2 MG/DL
BUN SERPL-MCNC: 13.4 MG/DL (ref 8–23)
CALCIUM SERPL-MCNC: 9.8 MG/DL (ref 8.8–10.4)
CHLORIDE SERPL-SCNC: 96 MMOL/L (ref 98–107)
CREAT SERPL-MCNC: 0.81 MG/DL (ref 0.51–0.95)
CRP SERPL-MCNC: <3 MG/L
D DIMER PPP FEU-MCNC: 0.53 UG/ML FEU (ref 0–0.5)
DACRYOCYTES BLD QL SMEAR: SLIGHT
EGFRCR SERPLBLD CKD-EPI 2021: 76 ML/MIN/1.73M2
EOSINOPHIL # BLD AUTO: ABNORMAL 10*3/UL
EOSINOPHIL # BLD MANUAL: 0.1 10E3/UL (ref 0–0.7)
EOSINOPHIL NFR BLD AUTO: ABNORMAL %
EOSINOPHIL NFR BLD MANUAL: 4 %
ERYTHROCYTE [DISTWIDTH] IN BLOOD BY AUTOMATED COUNT: 14.7 % (ref 10–15)
FERRITIN SERPL-MCNC: 28 NG/ML (ref 11–328)
FIBRINOGEN PPP-MCNC: 311 MG/DL (ref 170–510)
GAMMA GLOB SERPL ELPH-MCNC: 0.6 G/DL (ref 0.7–1.6)
GLUCOSE SERPL-MCNC: 92 MG/DL (ref 70–99)
HCO3 SERPL-SCNC: 26 MMOL/L (ref 22–29)
HCT VFR BLD AUTO: 31.7 % (ref 35–47)
HGB BLD-MCNC: 10.5 G/DL (ref 11.7–15.7)
IMM GRANULOCYTES # BLD: ABNORMAL 10*3/UL
IMM GRANULOCYTES NFR BLD: ABNORMAL %
INR PPP: 1.05 (ref 0.85–1.15)
KAPPA LC FREE SER-MCNC: 1.43 MG/DL (ref 0.33–1.94)
KAPPA LC FREE/LAMBDA FREE SER NEPH: 6.81 {RATIO} (ref 0.26–1.65)
LAMBDA LC FREE SERPL-MCNC: 0.21 MG/DL (ref 0.57–2.63)
LDH SERPL L TO P-CCNC: 155 U/L (ref 0–250)
LYMPHOCYTES # BLD AUTO: ABNORMAL 10*3/UL
LYMPHOCYTES # BLD MANUAL: 0.6 10E3/UL (ref 0.8–5.3)
LYMPHOCYTES NFR BLD AUTO: ABNORMAL %
LYMPHOCYTES NFR BLD MANUAL: 24 %
M PROTEIN SERPL ELPH-MCNC: 0.4 G/DL
MAGNESIUM SERPL-MCNC: 1.8 MG/DL (ref 1.7–2.3)
MCH RBC QN AUTO: 32.7 PG (ref 26.5–33)
MCHC RBC AUTO-ENTMCNC: 33.1 G/DL (ref 31.5–36.5)
MCV RBC AUTO: 99 FL (ref 78–100)
MONOCYTES # BLD AUTO: ABNORMAL 10*3/UL
MONOCYTES # BLD MANUAL: 0.6 10E3/UL (ref 0–1.3)
MONOCYTES NFR BLD AUTO: ABNORMAL %
MONOCYTES NFR BLD MANUAL: 28 %
NEUTROPHILS # BLD AUTO: ABNORMAL 10*3/UL
NEUTROPHILS # BLD MANUAL: 1 10E3/UL (ref 1.6–8.3)
NEUTROPHILS NFR BLD AUTO: ABNORMAL %
NEUTROPHILS NFR BLD MANUAL: 44 %
NRBC # BLD AUTO: 0 10E3/UL
NRBC BLD AUTO-RTO: 0 /100
PHOSPHATE SERPL-MCNC: 4.5 MG/DL (ref 2.5–4.5)
PLAT MORPH BLD: ABNORMAL
PLATELET # BLD AUTO: 167 10E3/UL (ref 150–450)
POLYCHROMASIA BLD QL SMEAR: SLIGHT
POTASSIUM SERPL-SCNC: 4.2 MMOL/L (ref 3.4–5.3)
PROT PATTERN SERPL ELPH-IMP: ABNORMAL
PROT SERPL-MCNC: 6.5 G/DL (ref 6.4–8.3)
RBC # BLD AUTO: 3.21 10E6/UL (ref 3.8–5.2)
RBC MORPH BLD: ABNORMAL
SODIUM SERPL-SCNC: 134 MMOL/L (ref 135–145)
WBC # BLD AUTO: 2.3 10E3/UL (ref 4–11)

## 2024-08-09 PROCEDURE — 85610 PROTHROMBIN TIME: CPT | Performed by: PHYSICIAN ASSISTANT

## 2024-08-09 PROCEDURE — 83615 LACTATE (LD) (LDH) ENZYME: CPT | Performed by: PHYSICIAN ASSISTANT

## 2024-08-09 PROCEDURE — 85379 FIBRIN DEGRADATION QUANT: CPT | Performed by: PHYSICIAN ASSISTANT

## 2024-08-09 PROCEDURE — 85730 THROMBOPLASTIN TIME PARTIAL: CPT | Performed by: PHYSICIAN ASSISTANT

## 2024-08-09 PROCEDURE — 82728 ASSAY OF FERRITIN: CPT | Performed by: PHYSICIAN ASSISTANT

## 2024-08-09 PROCEDURE — 86140 C-REACTIVE PROTEIN: CPT | Performed by: PHYSICIAN ASSISTANT

## 2024-08-09 PROCEDURE — 99213 OFFICE O/P EST LOW 20 MIN: CPT

## 2024-08-09 PROCEDURE — 84100 ASSAY OF PHOSPHORUS: CPT | Performed by: PHYSICIAN ASSISTANT

## 2024-08-09 PROCEDURE — G0463 HOSPITAL OUTPT CLINIC VISIT: HCPCS

## 2024-08-09 PROCEDURE — 83735 ASSAY OF MAGNESIUM: CPT | Performed by: PHYSICIAN ASSISTANT

## 2024-08-09 PROCEDURE — 36591 DRAW BLOOD OFF VENOUS DEVICE: CPT | Performed by: PHYSICIAN ASSISTANT

## 2024-08-09 PROCEDURE — 85007 BL SMEAR W/DIFF WBC COUNT: CPT | Performed by: PHYSICIAN ASSISTANT

## 2024-08-09 PROCEDURE — 250N000011 HC RX IP 250 OP 636: Performed by: NURSE PRACTITIONER

## 2024-08-09 PROCEDURE — 80053 COMPREHEN METABOLIC PANEL: CPT | Performed by: PHYSICIAN ASSISTANT

## 2024-08-09 PROCEDURE — 85384 FIBRINOGEN ACTIVITY: CPT | Performed by: PHYSICIAN ASSISTANT

## 2024-08-09 PROCEDURE — 85041 AUTOMATED RBC COUNT: CPT | Performed by: PHYSICIAN ASSISTANT

## 2024-08-09 RX ORDER — HEPARIN SODIUM (PORCINE) LOCK FLUSH IV SOLN 100 UNIT/ML 100 UNIT/ML
5 SOLUTION INTRAVENOUS EVERY 8 HOURS
Status: DISCONTINUED | OUTPATIENT
Start: 2024-08-09 | End: 2024-08-09 | Stop reason: HOSPADM

## 2024-08-09 RX ADMIN — Medication 5 ML: at 08:56

## 2024-08-09 ASSESSMENT — PAIN SCALES - GENERAL: PAINLEVEL: NO PAIN (0)

## 2024-08-09 NOTE — NURSING NOTE
Chief Complaint   Patient presents with    Port Draw     Gripper needle. Heparin locked, vitals checked     Maria Eugenia Murdock RN on 8/9/2024 at 8:58 AM

## 2024-08-09 NOTE — LETTER
8/9/2024      Shena Hartmann  [address] [place] [address]      Dear Colleague,    Thank you for referring your patient, Shena Hartmann, to the [facility] BLOOD AND MARROW TRANSPLANT PROGRAM [place]. Please see a copy of my visit note below.    BMT/Cell Therapy Daily Progress Note   08/09/2024    Patient ID:  Shena Hartmann is a 73 year old female, currently day +11 of her therapy (Carvykti). She was discharged from the hospital on 8/6/2024, and is here for post-discharge follow-up.    INTERVAL  HISTORY     Shena Hartmann is doing well and she denies any complaints today.   2 looser stools yesterday and 1 the day before. Today stools are formed. Eating pretty well. Still with fatigue.    Review of Systems: 10 point ROS negative except as noted above.      PHYSICAL EXAM     Vitals:    08/09/24 0843   BP: 129/76   Pulse: 89   Resp: 16   Temp: 98.1  F (36.7  C)   SpO2: 98%   Weight: 53.8 kg (118 lb 8 oz)     ICE score: 10/10    General: NAD   Eyes: : KYAW, sclera anicteric   Nose/Mouth/Throat: masked  Lungs: breathing comfortably on RA  Cardiovascular: well perfused  Abdominal/Rectal: flat  Lymphatics: no edema  Skin: no rashes or petechiae  Neuro: A&O x3, no focal deficits  Musculoskeletal: normal tone, no weakness or stiffness  Additional Findings: Cantu site NT, no drainage.    LABS AND IMAGING: I have assessed all abnormal lab values for their clinical significance and any values considered clinically significant have been addressed in the assessment and plan.        Lab Results   Component Value Date    WBC 2.3 (L) 08/09/2024    ANEU 1.4 (L) 08/07/2024    HGB 10.5 (L) 08/09/2024    HCT 31.7 (L) 08/09/2024     08/09/2024     (L) 08/09/2024    POTASSIUM 4.2 08/09/2024    CHLORIDE 96 (L) 08/09/2024    CO2 26 08/09/2024    GLC 92 08/09/2024    BUN 13.4 08/09/2024    CR 0.81 08/09/2024    MAG 1.8 08/09/2024    INR 1.05 08/09/2024         SYSTEMS-BASED ASSESSMENT AND PLAN      Shena Hartmann is a 73 year old female, currently day +11 of CAR-T therapy with Carvykti. Day 0=7/29/2024     ONC  - BMT doc/Coordinator: Anastasiya  - Cell Therapy Product: Carvykti. This product has a median onset of CRS at day +7; and a median onset of ICANS at day +8.  - Disease: multiple myeloma  - package insert: https://www.fda.gov/media/975838/download     CRS: none to date  ICANS: none to date     HEME/COAG  - pancytopenia secondary to malignancy  - hematologic toxicity score of 0: Will this patient need GCSF to start on day +14? No      ID  - Infection prophylaxis: acyclovir, penicillin V (had joint pain with levaquin), fluconazole. Pentamidine was administered on 7/24/2024.   - Screening ferritin 8/5 is just 27     GI  - zofran, compazine PRN for nausea and vomiting     RENAL/  # hyponatremia: Mildly low but stable over the past few days   # hypokalemia: (resolved)       ENDOCRINE  # hypothyroidism:  levothyroxine 50 mcg daily     Today's summary: Continue daily follow-up and labs, close monitoring for CRS and ICANS. Patient educated about signs and symptoms of developing toxicity.    RETURN TO CLINIC:  Daily through day 14        Known issues that I take into account for medical decisions, with salient changes to the plan considering these complexities noted above.    I spent 20 minutes face-to-face or coordinating care of Shena Hartmann. Over 50% of our time on the unit was spent counseling the patient and/or coordinating care regarding post-CAR-T care.    Mae Llamas, MARINO CNP       Again, thank you for allowing me to participate in the care of your patient.        Sincerely,        BMT Advanced Practice Provider

## 2024-08-09 NOTE — NURSING NOTE
"Oncology Rooming Note    August 9, 2024 9:00 AM   Shena Hartmann is a 73 year old female who presents for:    Chief Complaint   Patient presents with    Port Draw     Gripper needle. Heparin locked, vitals checked    Oncology Clinic Visit     Multiple myeloma not having achieved remission      Initial Vitals: /76   Pulse 89   Temp 98.1  F (36.7  C)   Resp 16   Wt 53.8 kg (118 lb 8 oz)   LMP  (LMP Unknown)   SpO2 98%   BMI 21.81 kg/m   Estimated body mass index is 21.81 kg/m  as calculated from the following:    Height as of 7/29/24: 1.57 m (5' 1.81\").    Weight as of this encounter: 53.8 kg (118 lb 8 oz). Body surface area is 1.53 meters squared.  No Pain (0) Comment: Data Unavailable   No LMP recorded (lmp unknown). Patient is postmenopausal.  Allergies reviewed: Yes  Medications reviewed: Yes    Medications: Medication refills not needed today.  Pharmacy name entered into [facility]:    [id number] - [place], [place] - [address] AT [facility] [address]  WRITTEN PRESCRIPTION REQUESTED  [facility] - [place], [place] - [address]    Frailty Screening:   Is the patient here for a new oncology consult visit in cancer care? 2. No      Clinical concerns: no other complaints      Fuentes Walker"

## 2024-08-09 NOTE — PROGRESS NOTES
BMT/Cell Therapy Daily Progress Note   08/09/2024    Patient ID:  Shena Hartmann is a 73 year old female, currently day +11 of her therapy (Carvykti). She was discharged from the hospital on 8/6/2024, and is here for post-discharge follow-up.    INTERVAL  HISTORY     Shena Hartmann is doing well and she denies any complaints today.   2 looser stools yesterday and 1 the day before. Today stools are formed. Eating pretty well. Still with fatigue.    Review of Systems: 10 point ROS negative except as noted above.      PHYSICAL EXAM     Vitals:    08/09/24 0843   BP: 129/76   Pulse: 89   Resp: 16   Temp: 98.1  F (36.7  C)   SpO2: 98%   Weight: 53.8 kg (118 lb 8 oz)     ICE score: 10/10    General: NAD   Eyes: : KYAW, sclera anicteric   Nose/Mouth/Throat: masked  Lungs: breathing comfortably on RA  Cardiovascular: well perfused  Abdominal/Rectal: flat  Lymphatics: no edema  Skin: no rashes or petechiae  Neuro: A&O x3, no focal deficits  Musculoskeletal: normal tone, no weakness or stiffness  Additional Findings: Cantu site NT, no drainage.    LABS AND IMAGING: I have assessed all abnormal lab values for their clinical significance and any values considered clinically significant have been addressed in the assessment and plan.        Lab Results   Component Value Date    WBC 2.3 (L) 08/09/2024    ANEU 1.4 (L) 08/07/2024    HGB 10.5 (L) 08/09/2024    HCT 31.7 (L) 08/09/2024     08/09/2024     (L) 08/09/2024    POTASSIUM 4.2 08/09/2024    CHLORIDE 96 (L) 08/09/2024    CO2 26 08/09/2024    GLC 92 08/09/2024    BUN 13.4 08/09/2024    CR 0.81 08/09/2024    MAG 1.8 08/09/2024    INR 1.05 08/09/2024         SYSTEMS-BASED ASSESSMENT AND PLAN     Shena Hartmann is a 73 year old female, currently day +11 of CAR-T therapy with Carvykti. Day 0=7/29/2024     ONC  - BMT doc/Coordinator: Scott/Sieve  - Cell Therapy Product: Carvykti. This product has a median onset of CRS at day +7; and a median onset of ICANS at day  +8.  - Disease: multiple myeloma  - package insert: https://www.fda.gov/media/824662/download     CRS: none to date  ICANS: none to date     HEME/COAG  - pancytopenia secondary to malignancy  - hematologic toxicity score of 0: Will this patient need GCSF to start on day +14? No      ID  - Infection prophylaxis: acyclovir, penicillin V (had joint pain with levaquin), fluconazole. Pentamidine was administered on 7/24/2024.   - Screening ferritin 8/5 is just 27     GI  - zofran, compazine PRN for nausea and vomiting     RENAL/  # hyponatremia: Mildly low but stable over the past few days   # hypokalemia: (resolved)       ENDOCRINE  # hypothyroidism:  levothyroxine 50 mcg daily     Today's summary: Continue daily follow-up and labs, close monitoring for CRS and ICANS. Patient educated about signs and symptoms of developing toxicity.    RETURN TO CLINIC:  Daily through day 14        Known issues that I take into account for medical decisions, with salient changes to the plan considering these complexities noted above.    I spent 20 minutes face-to-face or coordinating care of Shena Hartmann. Over 50% of our time on the unit was spent counseling the patient and/or coordinating care regarding post-CAR-T care.    MARINO Khalil CNP

## 2024-08-10 ENCOUNTER — APPOINTMENT (OUTPATIENT)
Dept: LAB | Facility: CLINIC | Age: 74
End: 2024-08-10
Attending: STUDENT IN AN ORGANIZED HEALTH CARE EDUCATION/TRAINING PROGRAM
Payer: MEDICARE

## 2024-08-10 ENCOUNTER — ONCOLOGY VISIT (OUTPATIENT)
Dept: TRANSPLANT | Facility: CLINIC | Age: 74
End: 2024-08-10
Attending: STUDENT IN AN ORGANIZED HEALTH CARE EDUCATION/TRAINING PROGRAM
Payer: MEDICARE

## 2024-08-10 VITALS
HEART RATE: 84 BPM | WEIGHT: 118 LBS | SYSTOLIC BLOOD PRESSURE: 124 MMHG | RESPIRATION RATE: 18 BRPM | BODY MASS INDEX: 21.71 KG/M2 | DIASTOLIC BLOOD PRESSURE: 74 MMHG | OXYGEN SATURATION: 99 % | TEMPERATURE: 97.7 F

## 2024-08-10 DIAGNOSIS — Z92.850 STATUS POST CHIMERIC ANTIGEN RECEPTOR T-CELL THERAPY: ICD-10-CM

## 2024-08-10 DIAGNOSIS — C90.00 MULTIPLE MYELOMA NOT HAVING ACHIEVED REMISSION (H): Primary | ICD-10-CM

## 2024-08-10 LAB
ANION GAP SERPL CALCULATED.3IONS-SCNC: 12 MMOL/L (ref 7–15)
BASOPHILS # BLD AUTO: ABNORMAL 10*3/UL
BASOPHILS # BLD MANUAL: 0.1 10E3/UL (ref 0–0.2)
BASOPHILS NFR BLD AUTO: ABNORMAL %
BASOPHILS NFR BLD MANUAL: 3 %
BUN SERPL-MCNC: 14 MG/DL (ref 8–23)
CALCIUM SERPL-MCNC: 10.2 MG/DL (ref 8.8–10.4)
CHLORIDE SERPL-SCNC: 98 MMOL/L (ref 98–107)
CREAT SERPL-MCNC: 0.87 MG/DL (ref 0.51–0.95)
EGFRCR SERPLBLD CKD-EPI 2021: 70 ML/MIN/1.73M2
EOSINOPHIL # BLD AUTO: ABNORMAL 10*3/UL
EOSINOPHIL # BLD MANUAL: 0 10E3/UL (ref 0–0.7)
EOSINOPHIL NFR BLD AUTO: ABNORMAL %
EOSINOPHIL NFR BLD MANUAL: 1 %
ERYTHROCYTE [DISTWIDTH] IN BLOOD BY AUTOMATED COUNT: 14.7 % (ref 10–15)
GLUCOSE SERPL-MCNC: 90 MG/DL (ref 70–99)
HCO3 SERPL-SCNC: 26 MMOL/L (ref 22–29)
HCT VFR BLD AUTO: 31.4 % (ref 35–47)
HGB BLD-MCNC: 10.8 G/DL (ref 11.7–15.7)
IMM GRANULOCYTES # BLD: ABNORMAL 10*3/UL
IMM GRANULOCYTES NFR BLD: ABNORMAL %
LYMPHOCYTES # BLD AUTO: ABNORMAL 10*3/UL
LYMPHOCYTES # BLD MANUAL: 1 10E3/UL (ref 0.8–5.3)
LYMPHOCYTES NFR BLD AUTO: ABNORMAL %
LYMPHOCYTES NFR BLD MANUAL: 30 %
MCH RBC QN AUTO: 33.8 PG (ref 26.5–33)
MCHC RBC AUTO-ENTMCNC: 34.4 G/DL (ref 31.5–36.5)
MCV RBC AUTO: 98 FL (ref 78–100)
MONOCYTES # BLD AUTO: ABNORMAL 10*3/UL
MONOCYTES # BLD MANUAL: 0.7 10E3/UL (ref 0–1.3)
MONOCYTES NFR BLD AUTO: ABNORMAL %
MONOCYTES NFR BLD MANUAL: 22 %
NEUTROPHILS # BLD AUTO: ABNORMAL 10*3/UL
NEUTROPHILS # BLD MANUAL: 1.5 10E3/UL (ref 1.6–8.3)
NEUTROPHILS NFR BLD AUTO: ABNORMAL %
NEUTROPHILS NFR BLD MANUAL: 44 %
NRBC # BLD AUTO: 0 10E3/UL
NRBC BLD AUTO-RTO: 0 /100
PLAT MORPH BLD: ABNORMAL
PLATELET # BLD AUTO: 160 10E3/UL (ref 150–450)
POTASSIUM SERPL-SCNC: 4.3 MMOL/L (ref 3.4–5.3)
RBC # BLD AUTO: 3.2 10E6/UL (ref 3.8–5.2)
RBC MORPH BLD: ABNORMAL
SODIUM SERPL-SCNC: 136 MMOL/L (ref 135–145)
WBC # BLD AUTO: 3.4 10E3/UL (ref 4–11)

## 2024-08-10 PROCEDURE — G2211 COMPLEX E/M VISIT ADD ON: HCPCS

## 2024-08-10 PROCEDURE — 80048 BASIC METABOLIC PNL TOTAL CA: CPT

## 2024-08-10 PROCEDURE — 36591 DRAW BLOOD OFF VENOUS DEVICE: CPT

## 2024-08-10 PROCEDURE — 85027 COMPLETE CBC AUTOMATED: CPT

## 2024-08-10 PROCEDURE — 250N000011 HC RX IP 250 OP 636: Performed by: STUDENT IN AN ORGANIZED HEALTH CARE EDUCATION/TRAINING PROGRAM

## 2024-08-10 PROCEDURE — G0463 HOSPITAL OUTPT CLINIC VISIT: HCPCS

## 2024-08-10 PROCEDURE — 99213 OFFICE O/P EST LOW 20 MIN: CPT

## 2024-08-10 PROCEDURE — 85007 BL SMEAR W/DIFF WBC COUNT: CPT

## 2024-08-10 RX ORDER — HEPARIN SODIUM (PORCINE) LOCK FLUSH IV SOLN 100 UNIT/ML 100 UNIT/ML
5 SOLUTION INTRAVENOUS ONCE
Status: COMPLETED | OUTPATIENT
Start: 2024-08-10 | End: 2024-08-10

## 2024-08-10 RX ADMIN — Medication 5 ML: at 07:58

## 2024-08-10 ASSESSMENT — PAIN SCALES - GENERAL: PAINLEVEL: MILD PAIN (2)

## 2024-08-10 NOTE — PROGRESS NOTES
BMT/Cell Therapy Daily Progress Note   08/10/2024    Patient ID:  Shena Hartmann is a 73 year old female, currently day +12 of her therapy (Carvykti).     INTERVAL  HISTORY     Shena is here today with her daughter. Doing well overall. Still a bit fatigued but otherwise no fevers, dizziness, SOB, N/V, bleeding. Stools on loose side but only 2x/day at most.     Review of Systems: 10 point ROS negative except as noted above.    PHYSICAL EXAM     Vitals:    08/10/24 0751   BP: 124/74   Pulse: 84   Resp: 18   Temp: 97.7  F (36.5  C)   TempSrc: Oral   SpO2: 99%   Weight: 53.5 kg (118 lb)     ICE score: 10/10    General: NAD   Eyes: : KYAW, sclera anicteric   Nose/Mouth/Throat: masked  Lungs: breathing comfortably on RA  Cardiovascular: well perfused  Abdominal/Rectal: flat  Lymphatics: no edema  Skin: no rashes or petechiae  Neuro: A&O x3, no focal deficits  Musculoskeletal: normal tone, no weakness or stiffness  Additional Findings: Cantu site NT, no drainage.    LABS AND IMAGING: I have assessed all abnormal lab values for their clinical significance and any values considered clinically significant have been addressed in the assessment and plan.      Lab Results   Component Value Date    WBC 3.4 (L) 08/10/2024    ANEU 1.0 (L) 08/09/2024    HGB 10.8 (L) 08/10/2024    HCT 31.4 (L) 08/10/2024     08/10/2024     08/10/2024    POTASSIUM 4.3 08/10/2024    CHLORIDE 98 08/10/2024    CO2 26 08/10/2024    GLC 90 08/10/2024    BUN 14.0 08/10/2024    CR 0.87 08/10/2024    MAG 1.8 08/09/2024    INR 1.05 08/09/2024       SYSTEMS-BASED ASSESSMENT AND PLAN     Shena Hartmann is a 73 year old female, currently day +12 of CAR-T therapy with Carvykti. Day 0=7/29/2024     ONC  - BMT doc/Coordinator: Chavez/Sieve  - Cell Therapy Product: Carvykti. This product has a median onset of CRS at day +7; and a median onset of ICANS at day +8.  - Disease: multiple myeloma  - Package insert:  https://www.fda.gov/media/702880/download     CRS: none to date  ICANS: none to date     HEME/COAG  - Pancytopenia secondary to malignancy: counts now improving.   - Hematologic toxicity score of 0: Will this patient need GCSF to start on day +14? No      ID  - Infection prophylaxis: acyclovir, penicillin V (had joint pain with levaquin), fluconazole. Pentamidine was administered on 7/24/2024.   - Screening ferritin 8/5 is just 27     GI  - Zofran, compazine PRN for nausea and vomiting     RENAL/  # Hyponatremia: Mildly low but stable over the past few days. Improving.   # Hypokalemia: (resolved)       ENDOCRINE  # Hypothyroidism:  levothyroxine 50 mcg daily       Today's summary:   - Continue daily follow-up and labs through day 14    Total of 20 minutes on patient visit, reviewing records, interpreting test results, placing orders, and documentation on the day of service.    The longitudinal plan of care for the diagnosis(es)/condition(s) as documented were addressed during this visit. Due to the added complexity in care, I will continue to support Shena in the subsequent management and with ongoing continuity of care.     Nilda Phan MD  Attending Physician, [facility]

## 2024-08-10 NOTE — NURSING NOTE
"Oncology Rooming Note    August 10, 2024 8:00 AM   Shena Hartmann is a 73 year old female who presents for:    Chief Complaint   Patient presents with    Oncology Clinic Visit     Post CAR T for MM here for labs and md visit     Initial Vitals: /74   Pulse 84   Temp 97.7  F (36.5  C) (Oral)   Resp 18   Wt 53.5 kg (118 lb)   LMP  (LMP Unknown)   SpO2 99%   BMI 21.71 kg/m   Estimated body mass index is 21.71 kg/m  as calculated from the following:    Height as of 7/29/24: 1.57 m (5' 1.81\").    Weight as of this encounter: 53.5 kg (118 lb). Body surface area is 1.53 meters squared.  Mild Pain (2) Comment: Data Unavailable   No LMP recorded (lmp unknown). Patient is postmenopausal.  Allergies reviewed: Yes  Medications reviewed: Yes    Medications: Medication refills not needed today.  Pharmacy name entered into [employer]:    [facility][id number] - [place], [place] - [address] AT [facility] [address]  WRITTEN PRESCRIPTION REQUESTED  [facility] - [place], [place] - [address]    Frailty Screening:   Is the patient here for a new oncology consult visit in cancer care? 2. No      Clinical concerns: pt reports on going fatigue and random loose stools       Maria Eugenia Murodck RN              "

## 2024-08-10 NOTE — LETTER
8/10/2024      Shena Hartmann  [address] [place] [address]      Dear Colleague,    Thank you for referring your patient, Shena Hartmann, to the [facility] BLOOD AND MARROW TRANSPLANT PROGRAM [place]. Please see a copy of my visit note below.    BMT/Cell Therapy Daily Progress Note   08/10/2024    Patient ID:  Shena Hartmann is a 73 year old female, currently day +12 of her therapy (Carvykti).     INTERVAL  HISTORY     Shena is here today with her daughter. Doing well overall. Still a bit fatigued but otherwise no fevers, dizziness, SOB, N/V, bleeding. Stools on loose side but only 2x/day at most.     Review of Systems: 10 point ROS negative except as noted above.    PHYSICAL EXAM     Vitals:    08/10/24 0751   BP: 124/74   Pulse: 84   Resp: 18   Temp: 97.7  F (36.5  C)   TempSrc: Oral   SpO2: 99%   Weight: 53.5 kg (118 lb)     ICE score: 10/10    General: NAD   Eyes: : KYAW, sclera anicteric   Nose/Mouth/Throat: masked  Lungs: breathing comfortably on RA  Cardiovascular: well perfused  Abdominal/Rectal: flat  Lymphatics: no edema  Skin: no rashes or petechiae  Neuro: A&O x3, no focal deficits  Musculoskeletal: normal tone, no weakness or stiffness  Additional Findings: Cantu site NT, no drainage.    LABS AND IMAGING: I have assessed all abnormal lab values for their clinical significance and any values considered clinically significant have been addressed in the assessment and plan.      Lab Results   Component Value Date    WBC 3.4 (L) 08/10/2024    ANEU 1.0 (L) 08/09/2024    HGB 10.8 (L) 08/10/2024    HCT 31.4 (L) 08/10/2024     08/10/2024     08/10/2024    POTASSIUM 4.3 08/10/2024    CHLORIDE 98 08/10/2024    CO2 26 08/10/2024    GLC 90 08/10/2024    BUN 14.0 08/10/2024    CR 0.87 08/10/2024    MAG 1.8 08/09/2024    INR 1.05 08/09/2024       SYSTEMS-BASED ASSESSMENT AND PLAN     Shena Hartmann is a 73 year old female, currently day +12 of CAR-T therapy with Carvykti.  Day 0=7/29/2024     ONC  - BMT doc/Coordinator: Scott/Eva  - Cell Therapy Product: Carvykti. This product has a median onset of CRS at day +7; and a median onset of ICANS at day +8.  - Disease: multiple myeloma  - Package insert: https://www.fda.gov/media/610231/download     CRS: none to date  ICANS: none to date     HEME/COAG  - Pancytopenia secondary to malignancy: counts now improving.   - Hematologic toxicity score of 0: Will this patient need GCSF to start on day +14? No      ID  - Infection prophylaxis: acyclovir, penicillin V (had joint pain with levaquin), fluconazole. Pentamidine was administered on 7/24/2024.   - Screening ferritin 8/5 is just 27     GI  - Zofran, compazine PRN for nausea and vomiting     RENAL/  # Hyponatremia: Mildly low but stable over the past few days. Improving.   # Hypokalemia: (resolved)       ENDOCRINE  # Hypothyroidism:  levothyroxine 50 mcg daily       Today's summary:   - Continue daily follow-up and labs through day 14    Total of 20 minutes on patient visit, reviewing records, interpreting test results, placing orders, and documentation on the day of service.    The longitudinal plan of care for the diagnosis(es)/condition(s) as documented were addressed during this visit. Due to the added complexity in care, I will continue to support Shena in the subsequent management and with ongoing continuity of care.     Nilda Phan MD  Attending Physician, [facility]       Again, thank you for allowing me to participate in the care of your patient.        Sincerely,         BMT Auto Cell Therapy

## 2024-08-11 ENCOUNTER — ONCOLOGY VISIT (OUTPATIENT)
Dept: TRANSPLANT | Facility: CLINIC | Age: 74
End: 2024-08-11
Attending: STUDENT IN AN ORGANIZED HEALTH CARE EDUCATION/TRAINING PROGRAM
Payer: MEDICARE

## 2024-08-11 ENCOUNTER — APPOINTMENT (OUTPATIENT)
Dept: LAB | Facility: CLINIC | Age: 74
End: 2024-08-11
Attending: STUDENT IN AN ORGANIZED HEALTH CARE EDUCATION/TRAINING PROGRAM
Payer: MEDICARE

## 2024-08-11 VITALS
SYSTOLIC BLOOD PRESSURE: 118 MMHG | RESPIRATION RATE: 18 BRPM | TEMPERATURE: 97.9 F | OXYGEN SATURATION: 97 % | DIASTOLIC BLOOD PRESSURE: 70 MMHG | WEIGHT: 118 LBS | BODY MASS INDEX: 21.71 KG/M2 | HEART RATE: 84 BPM

## 2024-08-11 DIAGNOSIS — Z94.84 STEM CELLS TRANSPLANT STATUS (H): ICD-10-CM

## 2024-08-11 DIAGNOSIS — C90.00 MULTIPLE MYELOMA NOT HAVING ACHIEVED REMISSION (H): Primary | ICD-10-CM

## 2024-08-11 DIAGNOSIS — Z79.899 ENCOUNTER FOR LONG-TERM (CURRENT) USE OF MEDICATIONS: ICD-10-CM

## 2024-08-11 LAB
ACANTHOCYTES BLD QL SMEAR: NORMAL
ALBUMIN SERPL BCG-MCNC: 4 G/DL (ref 3.5–5.2)
ALP SERPL-CCNC: 50 U/L (ref 40–150)
ALT SERPL W P-5'-P-CCNC: 15 U/L (ref 0–50)
ANION GAP SERPL CALCULATED.3IONS-SCNC: 10 MMOL/L (ref 7–15)
AST SERPL W P-5'-P-CCNC: 15 U/L (ref 0–45)
AUER BODIES BLD QL SMEAR: NORMAL
BASO STIPL BLD QL SMEAR: NORMAL
BASOPHILS # BLD AUTO: 0 10E3/UL (ref 0–0.2)
BASOPHILS NFR BLD AUTO: 0 %
BILIRUB SERPL-MCNC: 0.2 MG/DL
BITE CELLS BLD QL SMEAR: NORMAL
BLISTER CELLS BLD QL SMEAR: NORMAL
BUN SERPL-MCNC: 15.9 MG/DL (ref 8–23)
BURR CELLS BLD QL SMEAR: NORMAL
CALCIUM SERPL-MCNC: 9.7 MG/DL (ref 8.8–10.4)
CHLORIDE SERPL-SCNC: 97 MMOL/L (ref 98–107)
CREAT SERPL-MCNC: 0.85 MG/DL (ref 0.51–0.95)
DACRYOCYTES BLD QL SMEAR: NORMAL
EGFRCR SERPLBLD CKD-EPI 2021: 72 ML/MIN/1.73M2
ELLIPTOCYTES BLD QL SMEAR: NORMAL
EOSINOPHIL # BLD AUTO: 0 10E3/UL (ref 0–0.7)
EOSINOPHIL NFR BLD AUTO: 1 %
ERYTHROCYTE [DISTWIDTH] IN BLOOD BY AUTOMATED COUNT: 14.7 % (ref 10–15)
FRAGMENTS BLD QL SMEAR: NORMAL
GLUCOSE SERPL-MCNC: 115 MG/DL (ref 70–99)
HCO3 SERPL-SCNC: 27 MMOL/L (ref 22–29)
HCT VFR BLD AUTO: 30.1 % (ref 35–47)
HGB BLD-MCNC: 10 G/DL (ref 11.7–15.7)
HGB C CRYSTALS: NORMAL
HOWELL-JOLLY BOD BLD QL SMEAR: NORMAL
IMM GRANULOCYTES # BLD: 0 10E3/UL
IMM GRANULOCYTES NFR BLD: 0 %
LYMPHOCYTES # BLD AUTO: 1.3 10E3/UL (ref 0.8–5.3)
LYMPHOCYTES NFR BLD AUTO: 38 %
MCH RBC QN AUTO: 32.4 PG (ref 26.5–33)
MCHC RBC AUTO-ENTMCNC: 33.2 G/DL (ref 31.5–36.5)
MCV RBC AUTO: 97 FL (ref 78–100)
MONOCYTES # BLD AUTO: 0.7 10E3/UL (ref 0–1.3)
MONOCYTES NFR BLD AUTO: 21 %
NEUTROPHILS # BLD AUTO: 1.4 10E3/UL (ref 1.6–8.3)
NEUTROPHILS NFR BLD AUTO: 40 %
NEUTS HYPERSEG BLD QL SMEAR: NORMAL
NRBC # BLD AUTO: 0 10E3/UL
NRBC BLD AUTO-RTO: 0 /100
PLAT MORPH BLD: NORMAL
PLATELET # BLD AUTO: 154 10E3/UL (ref 150–450)
POLYCHROMASIA BLD QL SMEAR: NORMAL
POTASSIUM SERPL-SCNC: 3.9 MMOL/L (ref 3.4–5.3)
PROT SERPL-MCNC: 6.2 G/DL (ref 6.4–8.3)
RBC # BLD AUTO: 3.09 10E6/UL (ref 3.8–5.2)
RBC AGGLUT BLD QL: NORMAL
RBC MORPH BLD: NORMAL
ROULEAUX BLD QL SMEAR: NORMAL
SICKLE CELLS BLD QL SMEAR: NORMAL
SMUDGE CELLS BLD QL SMEAR: NORMAL
SODIUM SERPL-SCNC: 134 MMOL/L (ref 135–145)
SPHEROCYTES BLD QL SMEAR: NORMAL
STOMATOCYTES BLD QL SMEAR: NORMAL
TARGETS BLD QL SMEAR: NORMAL
TOXIC GRANULES BLD QL SMEAR: NORMAL
VARIANT LYMPHS BLD QL SMEAR: NORMAL
WBC # BLD AUTO: 3.5 10E3/UL (ref 4–11)

## 2024-08-11 PROCEDURE — 36591 DRAW BLOOD OFF VENOUS DEVICE: CPT

## 2024-08-11 PROCEDURE — 85025 COMPLETE CBC W/AUTO DIFF WBC: CPT

## 2024-08-11 PROCEDURE — G2211 COMPLEX E/M VISIT ADD ON: HCPCS

## 2024-08-11 PROCEDURE — 250N000011 HC RX IP 250 OP 636: Performed by: STUDENT IN AN ORGANIZED HEALTH CARE EDUCATION/TRAINING PROGRAM

## 2024-08-11 PROCEDURE — 84155 ASSAY OF PROTEIN SERUM: CPT

## 2024-08-11 PROCEDURE — G0463 HOSPITAL OUTPT CLINIC VISIT: HCPCS

## 2024-08-11 PROCEDURE — 99213 OFFICE O/P EST LOW 20 MIN: CPT

## 2024-08-11 RX ORDER — HEPARIN SODIUM (PORCINE) LOCK FLUSH IV SOLN 100 UNIT/ML 100 UNIT/ML
5 SOLUTION INTRAVENOUS ONCE
Status: COMPLETED | OUTPATIENT
Start: 2024-08-11 | End: 2024-08-11

## 2024-08-11 RX ADMIN — HEPARIN 5 ML: 100 SYRINGE at 08:04

## 2024-08-11 ASSESSMENT — PAIN SCALES - GENERAL: PAINLEVEL: NO PAIN (0)

## 2024-08-11 NOTE — NURSING NOTE
"Oncology Rooming Note    August 11, 2024 8:05 AM   Shena Hartmann is a 73 year old female who presents for:    Chief Complaint   Patient presents with    Oncology Clinic Visit     Return; hx MM s/p BMT     Initial Vitals: /70   Pulse 84   Temp 97.9  F (36.6  C) (Oral)   Resp 18   Wt 53.5 kg (118 lb)   LMP  (LMP Unknown)   SpO2 97%   BMI 21.71 kg/m   Estimated body mass index is 21.71 kg/m  as calculated from the following:    Height as of 7/29/24: 1.57 m (5' 1.81\").    Weight as of this encounter: 53.5 kg (118 lb). Body surface area is 1.53 meters squared.  No Pain (0) Comment: Data Unavailable   No LMP recorded (lmp unknown). Patient is postmenopausal.  Allergies reviewed: Yes  Medications reviewed: Yes    Medications: Medication refills not needed today.  Pharmacy name entered into [employer]:    [id number] - [place], [place] - [address] AT [facility] [address]  WRITTEN PRESCRIPTION REQUESTED  [facility] - [place], [place] - [address]    Frailty Screening:   Is the patient here for a new oncology consult visit in cancer care? 2. No      Clinical concerns: Ongoing fatigue \"like I'm getting over the flu\".   Dr. Phan was notified.      Caitie Piper RN              "

## 2024-08-11 NOTE — LETTER
8/11/2024      Shena Hartmann  [address] [place] [address]      Dear Colleague,    Thank you for referring your patient, Shena Hartmann, to the [facility] BLOOD AND MARROW TRANSPLANT PROGRAM [place]. Please see a copy of my visit note below.    BMT/Cell Therapy Daily Progress Note   08/11/2024    Patient ID:  Shena Hartmann is a 73 year old female, currently day +13 of her therapy (Carvykti).     INTERVAL  HISTORY     Shena is here today with her daughter. Doing well overall other than some fatigue still. No further loose stools. Otherwise no fevers, dizziness, SOB, N/V, bleeding. Appetite improving, ate some lamb yesterday.     Review of Systems: 10 point ROS negative except as noted above.    PHYSICAL EXAM     Vitals:    08/11/24 0746   BP: 118/70   Pulse: 84   Resp: 18   Temp: 97.9  F (36.6  C)   TempSrc: Oral   SpO2: 97%   Weight: 53.5 kg (118 lb)     ICE score: 10/10    General: NAD   Eyes: : KYAW, sclera anicteric   Nose/Mouth/Throat: masked  Lungs: breathing comfortably on RA  Cardiovascular: well perfused  Abdominal/Rectal: soft, ND  Lymphatics: no edema  Skin: no rashes or petechiae  Neuro: A&O x3, no focal deficits    LABS AND IMAGING: I have assessed all abnormal lab values for their clinical significance and any values considered clinically significant have been addressed in the assessment and plan.      Lab Results   Component Value Date    WBC 3.5 (L) 08/11/2024    ANEU 1.5 (L) 08/10/2024    HGB 10.0 (L) 08/11/2024    HCT 30.1 (L) 08/11/2024     08/11/2024     (L) 08/11/2024    POTASSIUM 3.9 08/11/2024    CHLORIDE 97 (L) 08/11/2024    CO2 27 08/11/2024     (H) 08/11/2024    BUN 15.9 08/11/2024    CR 0.85 08/11/2024    MAG 1.8 08/09/2024    INR 1.05 08/09/2024       SYSTEMS-BASED ASSESSMENT AND PLAN     Shena Hartmann is a 73 year old female, currently day +13 of CAR-T therapy with Carvykti. Day 0=7/29/2024     ONC  - BMT doc/Coordinator:  Chavez/Sieve  - Cell Therapy Product: Carvykti. This product has a median onset of CRS at day +7; and a median onset of ICANS at day +8.  - Disease: multiple myeloma  - Package insert: https://www.fda.gov/media/983332/download     CRS: none to date  ICANS: none to date     HEME/COAG  - Pancytopenia secondary to malignancy: counts now improving.   - Hematologic toxicity score of 0: Will this patient need GCSF to start on day +14? No      ID  - Infection prophylaxis: continue acyclovir. Okay to stop penicillin V (had joint pain with levaquin) and fluconazole now that ANC stably >1.0.  Pentamidine was administered on 7/24/2024.      GI  - Zofran, compazine PRN for nausea and vomiting     RENAL/  # Hyponatremia: Mildly low but stable over the past few days. Improving.   # Hypokalemia: (resolved)       ENDOCRINE  # Hypothyroidism:  levothyroxine 50 mcg daily       Today's summary:   - Stop penicillin and fluconazole  - Continue daily follow-up and labs through day 14    Total of 20 minutes on patient visit, reviewing records, interpreting test results, placing orders, and documentation on the day of service.    The longitudinal plan of care for the diagnosis(es)/condition(s) as documented were addressed during this visit. Due to the added complexity in care, I will continue to support Shena in the subsequent management and with ongoing continuity of care.     Nilda Phan MD  Attending Physician, [facility]       Again, thank you for allowing me to participate in the care of your patient.        Sincerely,         BMT Auto Cell Therapy

## 2024-08-11 NOTE — NURSING NOTE
Chief Complaint   Patient presents with    Oncology Clinic Visit     Return; hx MM s/p BMT    Port Draw     Labs drawn via port access by lab RN       Port blood draw with heparin flush by lab RN. Vitals taken and appointment arrived. Research kit drawn on 8/11/24 instead of 8/12. IB sent to provider.     Mely Pinto RN

## 2024-08-11 NOTE — PROGRESS NOTES
BMT/Cell Therapy Daily Progress Note   08/11/2024    Patient ID:  Shena Hartmann is a 73 year old female, currently day +13 of her therapy (Carvykti).     INTERVAL  HISTORY     Shena is here today with her daughter. Doing well overall other than some fatigue still. No further loose stools. Otherwise no fevers, dizziness, SOB, N/V, bleeding. Appetite improving, ate some lamb yesterday.     Review of Systems: 10 point ROS negative except as noted above.    PHYSICAL EXAM     Vitals:    08/11/24 0746   BP: 118/70   Pulse: 84   Resp: 18   Temp: 97.9  F (36.6  C)   TempSrc: Oral   SpO2: 97%   Weight: 53.5 kg (118 lb)     ICE score: 10/10    General: NAD   Eyes: : KYAW, sclera anicteric   Nose/Mouth/Throat: masked  Lungs: breathing comfortably on RA  Cardiovascular: well perfused  Abdominal/Rectal: soft, ND  Lymphatics: no edema  Skin: no rashes or petechiae  Neuro: A&O x3, no focal deficits    LABS AND IMAGING: I have assessed all abnormal lab values for their clinical significance and any values considered clinically significant have been addressed in the assessment and plan.      Lab Results   Component Value Date    WBC 3.5 (L) 08/11/2024    ANEU 1.5 (L) 08/10/2024    HGB 10.0 (L) 08/11/2024    HCT 30.1 (L) 08/11/2024     08/11/2024     (L) 08/11/2024    POTASSIUM 3.9 08/11/2024    CHLORIDE 97 (L) 08/11/2024    CO2 27 08/11/2024     (H) 08/11/2024    BUN 15.9 08/11/2024    CR 0.85 08/11/2024    MAG 1.8 08/09/2024    INR 1.05 08/09/2024       SYSTEMS-BASED ASSESSMENT AND PLAN     Shena Hartmann is a 73 year old female, currently day +13 of CAR-T therapy with Carvykti. Day 0=7/29/2024     ONC  - BMT doc/Coordinator: Chavez/Sieve  - Cell Therapy Product: Carvykti. This product has a median onset of CRS at day +7; and a median onset of ICANS at day +8.  - Disease: multiple myeloma  - Package insert: https://www.fda.gov/media/413411/download     CRS: none to date  ICANS: none to date     HEME/COAG  -  Pancytopenia secondary to malignancy: counts now improving.   - Hematologic toxicity score of 0: Will this patient need GCSF to start on day +14? No      ID  - Infection prophylaxis: continue acyclovir. Okay to stop penicillin V (had joint pain with levaquin) and fluconazole now that ANC stably >1.0.  Pentamidine was administered on 7/24/2024.      GI  - Zofran, compazine PRN for nausea and vomiting     RENAL/  # Hyponatremia: Mildly low but stable over the past few days. Improving.   # Hypokalemia: (resolved)       ENDOCRINE  # Hypothyroidism:  levothyroxine 50 mcg daily       Today's summary:   - Stop penicillin and fluconazole  - Continue daily follow-up and labs through day 14    Total of 20 minutes on patient visit, reviewing records, interpreting test results, placing orders, and documentation on the day of service.    The longitudinal plan of care for the diagnosis(es)/condition(s) as documented were addressed during this visit. Due to the added complexity in care, I will continue to support Shena in the subsequent management and with ongoing continuity of care.     Nilda Phan MD  Attending Physician, [facility]

## 2024-08-12 ENCOUNTER — LAB (OUTPATIENT)
Dept: LAB | Facility: CLINIC | Age: 74
End: 2024-08-12
Attending: NURSE PRACTITIONER
Payer: MEDICARE

## 2024-08-12 ENCOUNTER — OFFICE VISIT (OUTPATIENT)
Dept: TRANSPLANT | Facility: CLINIC | Age: 74
End: 2024-08-12
Attending: NURSE PRACTITIONER
Payer: MEDICARE

## 2024-08-12 VITALS
DIASTOLIC BLOOD PRESSURE: 65 MMHG | TEMPERATURE: 97.6 F | RESPIRATION RATE: 16 BRPM | SYSTOLIC BLOOD PRESSURE: 127 MMHG | HEART RATE: 67 BPM | WEIGHT: 116.8 LBS | OXYGEN SATURATION: 97 % | BODY MASS INDEX: 21.49 KG/M2

## 2024-08-12 DIAGNOSIS — R79.1 ABNORMAL COAGULATION PROFILE: ICD-10-CM

## 2024-08-12 DIAGNOSIS — C90.00 MULTIPLE MYELOMA NOT HAVING ACHIEVED REMISSION (H): Primary | ICD-10-CM

## 2024-08-12 DIAGNOSIS — D89.839 CYTOKINE RELEASE SYNDROME, GRADE UNSPECIFIED: ICD-10-CM

## 2024-08-12 LAB
ACANTHOCYTES BLD QL SMEAR: NORMAL
ALBUMIN SERPL BCG-MCNC: 4 G/DL (ref 3.5–5.2)
ALP SERPL-CCNC: 52 U/L (ref 40–150)
ALT SERPL W P-5'-P-CCNC: 16 U/L (ref 0–50)
ANION GAP SERPL CALCULATED.3IONS-SCNC: 11 MMOL/L (ref 7–15)
APTT PPP: 40 SECONDS (ref 22–38)
AST SERPL W P-5'-P-CCNC: 17 U/L (ref 0–45)
AUER BODIES BLD QL SMEAR: NORMAL
BASO STIPL BLD QL SMEAR: NORMAL
BASOPHILS # BLD AUTO: 0 10E3/UL (ref 0–0.2)
BASOPHILS NFR BLD AUTO: 0 %
BILIRUB SERPL-MCNC: 0.2 MG/DL
BITE CELLS BLD QL SMEAR: NORMAL
BLISTER CELLS BLD QL SMEAR: NORMAL
BUN SERPL-MCNC: 12.4 MG/DL (ref 8–23)
BURR CELLS BLD QL SMEAR: NORMAL
CALCIUM SERPL-MCNC: 9.2 MG/DL (ref 8.8–10.4)
CHLORIDE SERPL-SCNC: 96 MMOL/L (ref 98–107)
CMV DNA SPEC NAA+PROBE-ACNC: NOT DETECTED IU/ML
CREAT SERPL-MCNC: 0.76 MG/DL (ref 0.51–0.95)
CRP SERPL-MCNC: <3 MG/L
D DIMER PPP FEU-MCNC: 0.35 UG/ML FEU (ref 0–0.5)
DACRYOCYTES BLD QL SMEAR: NORMAL
EGFRCR SERPLBLD CKD-EPI 2021: 82 ML/MIN/1.73M2
ELLIPTOCYTES BLD QL SMEAR: NORMAL
EOSINOPHIL # BLD AUTO: 0 10E3/UL (ref 0–0.7)
EOSINOPHIL NFR BLD AUTO: 1 %
ERYTHROCYTE [DISTWIDTH] IN BLOOD BY AUTOMATED COUNT: 14.5 % (ref 10–15)
FERRITIN SERPL-MCNC: 18 NG/ML (ref 11–328)
FIBRINOGEN PPP-MCNC: 278 MG/DL (ref 170–510)
FRAGMENTS BLD QL SMEAR: NORMAL
GLUCOSE SERPL-MCNC: 102 MG/DL (ref 70–99)
HCO3 SERPL-SCNC: 26 MMOL/L (ref 22–29)
HCT VFR BLD AUTO: 29.7 % (ref 35–47)
HGB BLD-MCNC: 10.1 G/DL (ref 11.7–15.7)
HGB C CRYSTALS: NORMAL
HOWELL-JOLLY BOD BLD QL SMEAR: NORMAL
IMM GRANULOCYTES # BLD: 0 10E3/UL
IMM GRANULOCYTES NFR BLD: 0 %
INR PPP: 1.01 (ref 0.85–1.15)
LDH SERPL L TO P-CCNC: 155 U/L (ref 0–250)
LYMPHOCYTES # BLD AUTO: 0.8 10E3/UL (ref 0.8–5.3)
LYMPHOCYTES NFR BLD AUTO: 32 %
MAGNESIUM SERPL-MCNC: 1.8 MG/DL (ref 1.7–2.3)
MCH RBC QN AUTO: 33.3 PG (ref 26.5–33)
MCHC RBC AUTO-ENTMCNC: 34 G/DL (ref 31.5–36.5)
MCV RBC AUTO: 98 FL (ref 78–100)
MONOCYTES # BLD AUTO: 0.7 10E3/UL (ref 0–1.3)
MONOCYTES NFR BLD AUTO: 29 %
NEUTROPHILS # BLD AUTO: 1 10E3/UL (ref 1.6–8.3)
NEUTROPHILS NFR BLD AUTO: 38 %
NEUTS HYPERSEG BLD QL SMEAR: NORMAL
NRBC # BLD AUTO: 0 10E3/UL
NRBC BLD AUTO-RTO: 0 /100
PHOSPHATE SERPL-MCNC: 3.3 MG/DL (ref 2.5–4.5)
PLAT MORPH BLD: NORMAL
PLATELET # BLD AUTO: 134 10E3/UL (ref 150–450)
POLYCHROMASIA BLD QL SMEAR: NORMAL
POTASSIUM SERPL-SCNC: 3.8 MMOL/L (ref 3.4–5.3)
PROT SERPL-MCNC: 6.1 G/DL (ref 6.4–8.3)
RBC # BLD AUTO: 3.03 10E6/UL (ref 3.8–5.2)
RBC AGGLUT BLD QL: NORMAL
RBC MORPH BLD: NORMAL
ROULEAUX BLD QL SMEAR: NORMAL
SICKLE CELLS BLD QL SMEAR: NORMAL
SMUDGE CELLS BLD QL SMEAR: NORMAL
SODIUM SERPL-SCNC: 133 MMOL/L (ref 135–145)
SPHEROCYTES BLD QL SMEAR: NORMAL
STOMATOCYTES BLD QL SMEAR: NORMAL
TARGETS BLD QL SMEAR: NORMAL
TOXIC GRANULES BLD QL SMEAR: NORMAL
VARIANT LYMPHS BLD QL SMEAR: NORMAL
WBC # BLD AUTO: 2.5 10E3/UL (ref 4–11)

## 2024-08-12 PROCEDURE — 99214 OFFICE O/P EST MOD 30 MIN: CPT

## 2024-08-12 PROCEDURE — 82728 ASSAY OF FERRITIN: CPT

## 2024-08-12 PROCEDURE — G0463 HOSPITAL OUTPT CLINIC VISIT: HCPCS

## 2024-08-12 PROCEDURE — 36592 COLLECT BLOOD FROM PICC: CPT

## 2024-08-12 PROCEDURE — 84100 ASSAY OF PHOSPHORUS: CPT

## 2024-08-12 PROCEDURE — 83615 LACTATE (LD) (LDH) ENZYME: CPT

## 2024-08-12 PROCEDURE — 86140 C-REACTIVE PROTEIN: CPT

## 2024-08-12 PROCEDURE — 80053 COMPREHEN METABOLIC PANEL: CPT

## 2024-08-12 PROCEDURE — 85610 PROTHROMBIN TIME: CPT

## 2024-08-12 PROCEDURE — G2211 COMPLEX E/M VISIT ADD ON: HCPCS

## 2024-08-12 PROCEDURE — 85379 FIBRIN DEGRADATION QUANT: CPT

## 2024-08-12 PROCEDURE — 83735 ASSAY OF MAGNESIUM: CPT

## 2024-08-12 PROCEDURE — 85730 THROMBOPLASTIN TIME PARTIAL: CPT

## 2024-08-12 PROCEDURE — 85384 FIBRINOGEN ACTIVITY: CPT

## 2024-08-12 PROCEDURE — 36591 DRAW BLOOD OFF VENOUS DEVICE: CPT

## 2024-08-12 PROCEDURE — 85025 COMPLETE CBC W/AUTO DIFF WBC: CPT

## 2024-08-12 ASSESSMENT — PAIN SCALES - GENERAL: PAINLEVEL: NO PAIN (0)

## 2024-08-12 NOTE — PROGRESS NOTES
BMT/Cell Therapy Daily Progress Note   08/12/2024    Patient ID:  Shena Hartmann is a 73 year old female, currently day +14 of her therapy (Carvykti).     INTERVAL  HISTORY     Shena is here today with her daughter. Doing well overall other than some fatigue. Discussed drinking more fluids with solute as she mostly drinks water and tea. No n/v/d. Daughter is here and plans to buy some different drink options.     Review of Systems: 10 point ROS negative except as noted above.    PHYSICAL EXAM     Vitals:    08/12/24 0721   BP: 127/65   Pulse: 67   Resp: 16   Temp: 97.6  F (36.4  C)   SpO2: 97%   Weight: 53 kg (116 lb 12.8 oz)     ICE score: 10/10    General: NAD   Eyes: : KYAW, sclera anicteric   Nose/Mouth/Throat: masked  Lungs: breathing comfortably on RA  Cardiovascular: well perfused  Abdominal/Rectal: soft, ND  Lymphatics: no edema  Skin: no rashes or petechiae  Neuro: A&O x3, no focal deficits    LABS AND IMAGING: I have assessed all abnormal lab values for their clinical significance and any values considered clinically significant have been addressed in the assessment and plan.      Lab Results   Component Value Date    WBC 2.5 (L) 08/12/2024    ANEU 1.5 (L) 08/10/2024    HGB 10.1 (L) 08/12/2024    HCT 29.7 (L) 08/12/2024     (L) 08/12/2024     (L) 08/12/2024    POTASSIUM 3.8 08/12/2024    CHLORIDE 96 (L) 08/12/2024    CO2 26 08/12/2024     (H) 08/12/2024    BUN 12.4 08/12/2024    CR 0.76 08/12/2024    MAG 1.8 08/12/2024    INR 1.01 08/12/2024       SYSTEMS-BASED ASSESSMENT AND PLAN     Shena Hartmann is a 73 year old female, currently day +14 of CAR-T therapy with Carvykti. Day 0=7/29/2024     ONC  - BMT doc/Coordinator: Chavez/Sieve  - Cell Therapy Product: Carvykti. This product has a median onset of CRS at day +7; and a median onset of ICANS at day +8.  - Disease: multiple myeloma  - Package insert: https://www.fda.gov/media/683538/download     CRS: none to date  ICANS: none to  date     HEME/COAG  - Pancytopenia secondary to malignancy: counts now improving.   - Hematologic toxicity score of 0: Will this patient need GCSF to start on day +14? No      ID  - Infection prophylaxis: continue acyclovir.  Pentamidine was administered on 7/24/2024. Requested for 8/24  -8/12 ANC 1.0, add back penicillin     GI  - Zofran, compazine PRN for nausea and vomiting     RENAL/  # Hyponatremia: Mildly low but stable over the past few days. Will drink less water, more solute  # Hypokalemia: (resolved)       ENDOCRINE  # Hypothyroidism:  levothyroxine 50 mcg daily       Today's summary:   - add back penicillin prophy  - follow up 8/15 as scheduled  - drink more solute  - request pentamidine ~8/24    Total of 30 minutes on patient visit, reviewing records, interpreting test results, placing orders, and documentation on the day of service.    The longitudinal plan of care for the diagnosis(es)/condition(s) as documented were addressed during this visit. Due to the added complexity in care, I will continue to support Shena in the subsequent management and with ongoing continuity of care.     Kayy Llamas NP

## 2024-08-12 NOTE — NURSING NOTE
Chief Complaint   Patient presents with    Port Draw     Gripper needle. Heparin locked, vitals checked     Maria Eugenia Murdock RN on 8/12/2024 at 7:37 AM

## 2024-08-12 NOTE — NURSING NOTE
"Oncology Rooming Note    August 12, 2024 7:47 AM   Shena Hartmann is a 73 year old female who presents for:    Chief Complaint   Patient presents with    Port Draw     Gripper needle. Heparin locked, vitals checked    Oncology Clinic Visit     RTN for S/P BMT for MM     Initial Vitals: /65   Pulse 67   Temp 97.6  F (36.4  C)   Resp 16   Wt 53 kg (116 lb 12.8 oz)   LMP  (LMP Unknown)   SpO2 97%   BMI 21.49 kg/m   Estimated body mass index is 21.49 kg/m  as calculated from the following:    Height as of 7/29/24: 1.57 m (5' 1.81\").    Weight as of this encounter: 53 kg (116 lb 12.8 oz). Body surface area is 1.52 meters squared.  No Pain (0) Comment: Data Unavailable   No LMP recorded (lmp unknown). Patient is postmenopausal.  Allergies reviewed: Yes  Medications reviewed: Yes    Medications: Medication refills not needed today.  Pharmacy name entered into [facility]:    [id number] - [place], [place] - [address] AT [facility] [address]  WRITTEN PRESCRIPTION REQUESTED  [facility] - [place], [place] - [address]    Frailty Screening:   Is the patient here for a new oncology consult visit in cancer care? 2. No      Clinical concerns: none       Dacia Lei MA             "

## 2024-08-12 NOTE — LETTER
8/12/2024      Shena Hartmann  [address] [place] [address]      Dear Colleague,    Thank you for referring your patient, Shena Hartmann, to the [facility] BLOOD AND MARROW TRANSPLANT PROGRAM [place]. Please see a copy of my visit note below.    BMT/Cell Therapy Daily Progress Note   08/12/2024    Patient ID:  Shena Hartmann is a 73 year old female, currently day +14 of her therapy (Carvykti).     INTERVAL  HISTORY     Shena is here today with her daughter. Doing well overall other than some fatigue. Discussed drinking more fluids with solute as she mostly drinks water and tea. No n/v/d. Daughter is here and plans to buy some different drink options.     Review of Systems: 10 point ROS negative except as noted above.    PHYSICAL EXAM     Vitals:    08/12/24 0721   BP: 127/65   Pulse: 67   Resp: 16   Temp: 97.6  F (36.4  C)   SpO2: 97%   Weight: 53 kg (116 lb 12.8 oz)     ICE score: 10/10    General: NAD   Eyes: : KYAW, sclera anicteric   Nose/Mouth/Throat: masked  Lungs: breathing comfortably on RA  Cardiovascular: well perfused  Abdominal/Rectal: soft, ND  Lymphatics: no edema  Skin: no rashes or petechiae  Neuro: A&O x3, no focal deficits    LABS AND IMAGING: I have assessed all abnormal lab values for their clinical significance and any values considered clinically significant have been addressed in the assessment and plan.      Lab Results   Component Value Date    WBC 2.5 (L) 08/12/2024    ANEU 1.5 (L) 08/10/2024    HGB 10.1 (L) 08/12/2024    HCT 29.7 (L) 08/12/2024     (L) 08/12/2024     (L) 08/12/2024    POTASSIUM 3.8 08/12/2024    CHLORIDE 96 (L) 08/12/2024    CO2 26 08/12/2024     (H) 08/12/2024    BUN 12.4 08/12/2024    CR 0.76 08/12/2024    MAG 1.8 08/12/2024    INR 1.01 08/12/2024       SYSTEMS-BASED ASSESSMENT AND PLAN     Shena Hartmann is a 73 year old female, currently day +14 of CAR-T therapy with Carvykti. Day 0=7/29/2024     ONC  - BMT  doc/Coordinator: Scott/Sieve  - Cell Therapy Product: Carvykti. This product has a median onset of CRS at day +7; and a median onset of ICANS at day +8.  - Disease: multiple myeloma  - Package insert: https://www.fda.gov/media/374838/download     CRS: none to date  ICANS: none to date     HEME/COAG  - Pancytopenia secondary to malignancy: counts now improving.   - Hematologic toxicity score of 0: Will this patient need GCSF to start on day +14? No      ID  - Infection prophylaxis: continue acyclovir.  Pentamidine was administered on 7/24/2024. Requested for 8/24  -8/12 ANC 1.0, add back penicillin     GI  - Zofran, compazine PRN for nausea and vomiting     RENAL/  # Hyponatremia: Mildly low but stable over the past few days. Will drink less water, more solute  # Hypokalemia: (resolved)       ENDOCRINE  # Hypothyroidism:  levothyroxine 50 mcg daily       Today's summary:   - add back penicillin prophy  - follow up 8/15 as scheduled  - drink more solute  - request pentamidine ~8/24    Total of 30 minutes on patient visit, reviewing records, interpreting test results, placing orders, and documentation on the day of service.    The longitudinal plan of care for the diagnosis(es)/condition(s) as documented were addressed during this visit. Due to the added complexity in care, I will continue to support Shena in the subsequent management and with ongoing continuity of care.     Kayy Llamas NP         Again, thank you for allowing me to participate in the care of your patient.        Sincerely,        BMT Advanced Practice Provider

## 2024-08-13 RX ORDER — HEPARIN SODIUM (PORCINE) LOCK FLUSH IV SOLN 100 UNIT/ML 100 UNIT/ML
5 SOLUTION INTRAVENOUS
Status: CANCELLED | OUTPATIENT
Start: 2024-08-13

## 2024-08-13 RX ORDER — HEPARIN SODIUM,PORCINE 10 UNIT/ML
5-20 VIAL (ML) INTRAVENOUS DAILY PRN
Status: CANCELLED | OUTPATIENT
Start: 2024-08-13

## 2024-08-13 RX ORDER — ALBUTEROL SULFATE 0.83 MG/ML
2.5 SOLUTION RESPIRATORY (INHALATION)
Status: CANCELLED | OUTPATIENT
Start: 2024-08-13 | End: 2024-08-13

## 2024-08-13 RX ORDER — PENTAMIDINE ISETHIONATE 300 MG/300MG
300 INHALANT RESPIRATORY (INHALATION)
Status: CANCELLED | OUTPATIENT
Start: 2024-08-13

## 2024-08-13 NOTE — PROGRESS NOTES
BMT/Cell Therapy Daily Progress Note   08/12/2024    Patient ID:  Shena Hartmann is a 73 year old female, currently day +9 of her therapy (Carvykti). She was discharged from the hospital on 8/6/2024, and is here for post-discharge follow-up.    INTERVAL  HISTORY     Shena Hartmann is doing well , no new issues.     Review of Systems: 10 point ROS negative except as noted above.      PHYSICAL EXAM     Vitals:    08/08/24 1201   BP: 131/73   BP Location: Left arm   Patient Position: Sitting   Cuff Size: Adult Regular   Pulse: 77   Resp: 16   Temp: 98.2  F (36.8  C)   TempSrc: Oral   SpO2: 98%   Weight: 53 kg (116 lb 14.4 oz)     ICE score: 10/10    General: NAD   Eyes: : KYAW, sclera anicteric   Nose/Mouth/Throat: OP clear, buccal mucosa moist, no ulcerations   Lungs: CTA bilaterally  Cardiovascular: RRR, no M/R/G   Abdominal/Rectal: +BS, soft, NT, ND, No HSM   Lymphatics: no edema  Skin: no rashes or petechiae  Neuro: A&O x3, no focal deficits  Musculoskeletal: normal tone, no weakness or stiffness  Additional Findings: Cantu site NT, no drainage.    LABS AND IMAGING: I have assessed all abnormal lab values for their clinical significance and any values considered clinically significant have been addressed in the assessment and plan.        Lab Results   Component Value Date    WBC 2.5 (L) 08/12/2024    ANEU 1.5 (L) 08/10/2024    HGB 10.1 (L) 08/12/2024    HCT 29.7 (L) 08/12/2024     (L) 08/12/2024     (L) 08/12/2024    POTASSIUM 3.8 08/12/2024    CHLORIDE 96 (L) 08/12/2024    CO2 26 08/12/2024     (H) 08/12/2024    BUN 12.4 08/12/2024    CR 0.76 08/12/2024    MAG 1.8 08/12/2024    INR 1.01 08/12/2024         SYSTEMS-BASED ASSESSMENT AND PLAN     Shena Hartmann is a 73 year old female, currently day +9 of CAR-T therapy with Carvykti. Day 0=7/29/2024     ONC  - BMT doc/Coordinator: Scott/Danaeve  - Cell Therapy Product: Carvykti. This product has a median onset of CRS at day +7; and a median  onset of ICANS at day +8.  - Disease: multiple myeloma  - package insert: https://www.fda.gov/media/243625/download     CRS: none to date  ICANS: none to date     HEME/COAG  - pancytopenia secondary to malignancy  - hematologic toxicity score of 0: Will this patient need GCSF to start on day +14? No      ID  - Infection prophylaxis: acyclovir, penicillin V (had joint pain with levaquin), fluconazole. Pentamidine was administered on 7/24/2024.   - Screening ferritin 8/5 is just 27     GI  - zofran, compazine PRN for nausea and vomiting     RENAL/  # hyponatremia: Mildly low but stable over the past few days   # hypokalemia: (resolved)       ENDOCRINE  # hypothyroidism:  levothyroxine 50 mcg daily     Today's summary: Continue daily follow-up and labs, close monitoring for CRS and ICANS. Patient educated about signs and symptoms of developing toxicity.    Known issues that I take into account for medical decisions, with salient changes to the plan considering these complexities noted above.    Patient Active Problem List   Diagnosis    Pain in thoracic spine    Multiple myeloma, dx 2002,  s/p ASCBMT 3/26/14    Personal history of malignant neoplasm of breast    Seasonal allergies    Osteoporosis    Primary hypertension    Stem cells transplant status (H)    Adverse effect of other drugs, medicaments and biological substances, sequela    Acquired hypothyroidism    Other amyloidosis (H)    Examination of participant in clinical trial    Multiple myeloma not having achieved remission (H)     I spent 30 minutes face-to-face or coordinating care of Shena Hartmann. Over 50% of our time on the unit was spent counseling the patient and/or coordinating care regarding post-CAR-T care.    Marsha Morgan MD

## 2024-08-15 ENCOUNTER — LAB (OUTPATIENT)
Dept: LAB | Facility: CLINIC | Age: 74
End: 2024-08-15
Attending: PHYSICIAN ASSISTANT
Payer: MEDICARE

## 2024-08-15 ENCOUNTER — OFFICE VISIT (OUTPATIENT)
Dept: TRANSPLANT | Facility: CLINIC | Age: 74
End: 2024-08-15
Attending: PHYSICIAN ASSISTANT
Payer: MEDICARE

## 2024-08-15 VITALS
BODY MASS INDEX: 21.97 KG/M2 | WEIGHT: 119.4 LBS | DIASTOLIC BLOOD PRESSURE: 64 MMHG | SYSTOLIC BLOOD PRESSURE: 124 MMHG | HEART RATE: 76 BPM | OXYGEN SATURATION: 100 % | TEMPERATURE: 97.5 F | RESPIRATION RATE: 16 BRPM

## 2024-08-15 DIAGNOSIS — Z94.84 STEM CELLS TRANSPLANT STATUS (H): ICD-10-CM

## 2024-08-15 DIAGNOSIS — C90.00 MULTIPLE MYELOMA NOT HAVING ACHIEVED REMISSION (H): Primary | ICD-10-CM

## 2024-08-15 LAB
ALBUMIN SERPL BCG-MCNC: 4 G/DL (ref 3.5–5.2)
ALP SERPL-CCNC: 49 U/L (ref 40–150)
ALT SERPL W P-5'-P-CCNC: 16 U/L (ref 0–50)
ANION GAP SERPL CALCULATED.3IONS-SCNC: 11 MMOL/L (ref 7–15)
APTT PPP: 41 SECONDS (ref 22–38)
AST SERPL W P-5'-P-CCNC: 15 U/L (ref 0–45)
BASOPHILS # BLD AUTO: ABNORMAL 10*3/UL
BASOPHILS # BLD MANUAL: 0 10E3/UL (ref 0–0.2)
BASOPHILS NFR BLD AUTO: ABNORMAL %
BASOPHILS NFR BLD MANUAL: 1 %
BILIRUB SERPL-MCNC: 0.2 MG/DL
BUN SERPL-MCNC: 14.2 MG/DL (ref 8–23)
CALCIUM SERPL-MCNC: 9.1 MG/DL (ref 8.8–10.4)
CHLORIDE SERPL-SCNC: 100 MMOL/L (ref 98–107)
CREAT SERPL-MCNC: 0.73 MG/DL (ref 0.51–0.95)
D DIMER PPP FEU-MCNC: <0.27 UG/ML FEU (ref 0–0.5)
EGFRCR SERPLBLD CKD-EPI 2021: 86 ML/MIN/1.73M2
EOSINOPHIL # BLD AUTO: ABNORMAL 10*3/UL
EOSINOPHIL # BLD MANUAL: 0 10E3/UL (ref 0–0.7)
EOSINOPHIL NFR BLD AUTO: ABNORMAL %
EOSINOPHIL NFR BLD MANUAL: 0 %
ERYTHROCYTE [DISTWIDTH] IN BLOOD BY AUTOMATED COUNT: 14.6 % (ref 10–15)
FIBRINOGEN PPP-MCNC: 290 MG/DL (ref 170–510)
GLUCOSE SERPL-MCNC: 93 MG/DL (ref 70–99)
HCO3 SERPL-SCNC: 26 MMOL/L (ref 22–29)
HCT VFR BLD AUTO: 29.5 % (ref 35–47)
HGB BLD-MCNC: 10.1 G/DL (ref 11.7–15.7)
IMM GRANULOCYTES # BLD: ABNORMAL 10*3/UL
IMM GRANULOCYTES NFR BLD: ABNORMAL %
INR PPP: 1.01 (ref 0.85–1.15)
LYMPHOCYTES # BLD AUTO: ABNORMAL 10*3/UL
LYMPHOCYTES # BLD MANUAL: 0.6 10E3/UL (ref 0.8–5.3)
LYMPHOCYTES NFR BLD AUTO: ABNORMAL %
LYMPHOCYTES NFR BLD MANUAL: 23 %
MAGNESIUM SERPL-MCNC: 2.1 MG/DL (ref 1.7–2.3)
MCH RBC QN AUTO: 33.6 PG (ref 26.5–33)
MCHC RBC AUTO-ENTMCNC: 34.2 G/DL (ref 31.5–36.5)
MCV RBC AUTO: 98 FL (ref 78–100)
MONOCYTES # BLD AUTO: ABNORMAL 10*3/UL
MONOCYTES # BLD MANUAL: 0.6 10E3/UL (ref 0–1.3)
MONOCYTES NFR BLD AUTO: ABNORMAL %
MONOCYTES NFR BLD MANUAL: 24 %
NEUTROPHILS # BLD AUTO: ABNORMAL 10*3/UL
NEUTROPHILS # BLD MANUAL: 1.3 10E3/UL (ref 1.6–8.3)
NEUTROPHILS NFR BLD AUTO: ABNORMAL %
NEUTROPHILS NFR BLD MANUAL: 52 %
NRBC # BLD AUTO: 0 10E3/UL
NRBC BLD AUTO-RTO: 0 /100
PHOSPHATE SERPL-MCNC: 3.2 MG/DL (ref 2.5–4.5)
PLAT MORPH BLD: ABNORMAL
PLATELET # BLD AUTO: 124 10E3/UL (ref 150–450)
POTASSIUM SERPL-SCNC: 3.8 MMOL/L (ref 3.4–5.3)
PROT SERPL-MCNC: 6.1 G/DL (ref 6.4–8.3)
RBC # BLD AUTO: 3.01 10E6/UL (ref 3.8–5.2)
RBC MORPH BLD: ABNORMAL
SODIUM SERPL-SCNC: 137 MMOL/L (ref 135–145)
WBC # BLD AUTO: 2.5 10E3/UL (ref 4–11)

## 2024-08-15 PROCEDURE — 84100 ASSAY OF PHOSPHORUS: CPT | Performed by: PHYSICIAN ASSISTANT

## 2024-08-15 PROCEDURE — 80053 COMPREHEN METABOLIC PANEL: CPT | Performed by: PHYSICIAN ASSISTANT

## 2024-08-15 PROCEDURE — 99213 OFFICE O/P EST LOW 20 MIN: CPT

## 2024-08-15 PROCEDURE — 250N000011 HC RX IP 250 OP 636: Performed by: PHYSICIAN ASSISTANT

## 2024-08-15 PROCEDURE — 83735 ASSAY OF MAGNESIUM: CPT | Performed by: PHYSICIAN ASSISTANT

## 2024-08-15 PROCEDURE — 85007 BL SMEAR W/DIFF WBC COUNT: CPT | Performed by: PHYSICIAN ASSISTANT

## 2024-08-15 PROCEDURE — 85384 FIBRINOGEN ACTIVITY: CPT | Performed by: PHYSICIAN ASSISTANT

## 2024-08-15 PROCEDURE — 36591 DRAW BLOOD OFF VENOUS DEVICE: CPT | Performed by: PHYSICIAN ASSISTANT

## 2024-08-15 PROCEDURE — G2211 COMPLEX E/M VISIT ADD ON: HCPCS

## 2024-08-15 PROCEDURE — 85379 FIBRIN DEGRADATION QUANT: CPT | Performed by: PHYSICIAN ASSISTANT

## 2024-08-15 PROCEDURE — 85027 COMPLETE CBC AUTOMATED: CPT | Performed by: PHYSICIAN ASSISTANT

## 2024-08-15 PROCEDURE — 85610 PROTHROMBIN TIME: CPT | Performed by: PHYSICIAN ASSISTANT

## 2024-08-15 PROCEDURE — 99214 OFFICE O/P EST MOD 30 MIN: CPT

## 2024-08-15 PROCEDURE — 85730 THROMBOPLASTIN TIME PARTIAL: CPT | Performed by: PHYSICIAN ASSISTANT

## 2024-08-15 PROCEDURE — G0463 HOSPITAL OUTPT CLINIC VISIT: HCPCS

## 2024-08-15 RX ORDER — HEPARIN SODIUM (PORCINE) LOCK FLUSH IV SOLN 100 UNIT/ML 100 UNIT/ML
5 SOLUTION INTRAVENOUS
Status: DISCONTINUED | OUTPATIENT
Start: 2024-08-15 | End: 2024-08-15 | Stop reason: HOSPADM

## 2024-08-15 RX ADMIN — Medication 5 ML: at 07:56

## 2024-08-15 ASSESSMENT — PAIN SCALES - GENERAL: PAINLEVEL: NO PAIN (0)

## 2024-08-15 NOTE — PROGRESS NOTES
BMT/Cell Therapy Progress Note   08/15/2024    Patient ID:  Shena Hartmann is a 73 year old female, currently day +17 of her therapy (Carvykti).     INTERVAL  HISTORY      No acute medical complaints.     Review of Systems: 10 point ROS negative except as noted above.    PHYSICAL EXAM     Vitals:    08/15/24 0743   BP: 124/64   BP Location: Left arm   Patient Position: Sitting   Cuff Size: Adult Regular   Pulse: 76   Resp: 16   Temp: 97.5  F (36.4  C)   TempSrc: Oral   SpO2: 100%   Weight: 54.2 kg (119 lb 6.4 oz)     General: NAD   Eyes:  KYAW, sclera anicteric   Nose/Mouth/Throat: masked  Lungs: breathing comfortably on RA  Cardiovascular: well perfused  Lymphatics: no edema  Skin: no rashes or petechiae  Neuro: A&O, no focal deficits    LABS AND IMAGING: I have assessed all abnormal lab values for their clinical significance and any values considered clinically significant have been addressed in the assessment and plan.      Lab Results   Component Value Date    WBC 2.5 (L) 08/12/2024    ANEU 1.5 (L) 08/10/2024    HGB 10.1 (L) 08/12/2024    HCT 29.7 (L) 08/12/2024     (L) 08/12/2024     (L) 08/12/2024    POTASSIUM 3.8 08/12/2024    CHLORIDE 96 (L) 08/12/2024    CO2 26 08/12/2024     (H) 08/12/2024    BUN 12.4 08/12/2024    CR 0.76 08/12/2024    MAG 1.8 08/12/2024    INR 1.01 08/12/2024       SYSTEMS-BASED ASSESSMENT AND PLAN     Shena Hartmann is a 73 year old female, currently day +17 of CAR-T therapy with Carvykti. Day 0=7/29/2024     ONC  - BMT doc/Coordinator: Scott/Eva  - Cell Therapy Product: Carvykti.   - Disease: multiple myeloma  - Package insert: https://www.fda.gov/media/108400/download  - restage per protocol.      CRS: none to date  ICANS: none to date     HEME/COAG  - Pancytopenia: appears having bimodal drop. Confirmed no ASA or ibuprofen use. Not on a blood thinner    - Hematologic toxicity score of 0: Will this patient need GCSF to start on day +14? No      ID  - Infection  prophylaxis: continue acyclovir, fluc, and pen VK until next visit as has been right at neutrophils of 1K. If ANC continues uptrending could stop flu and PEN VK next visit. Pentamidine was administered on 7/24/2024. Next dose scheduled 8/26.      GI  - Zofran, compazine PRN for nausea and vomiting     RENAL/  - Monitor cr/lytes      ENDOCRINE  # Hypothyroidism:  levothyroxine 50 mcg daily       RTC: day 21 visit. Labs ordered in tx plan.     Total of 30 minutes on patient visit, reviewing records, interpreting test results, placing orders, and documentation on the day of service.    The longitudinal plan of care for the diagnosis(es)/condition(s) as documented were addressed during this visit. Due to the added complexity in care, I will continue to support Shena in the subsequent management and with ongoing continuity of care.     Prema Finn PA-C  [phone]

## 2024-08-15 NOTE — NURSING NOTE
"Oncology Rooming Note    August 15, 2024 8:08 AM   Shena Hartmann is a 73 year old female who presents for:    Chief Complaint   Patient presents with    Port Draw     Vitals taken, port accessed, labs drawn, heparin locked, deaccessed, checked into next appt    Oncology Clinic Visit     Post CAR T for MM here for labs and md visit     Initial Vitals: /64 (BP Location: Left arm, Patient Position: Sitting, Cuff Size: Adult Regular)   Pulse 76   Temp 97.5  F (36.4  C) (Oral)   Resp 16   Wt 54.2 kg (119 lb 6.4 oz)   LMP  (LMP Unknown)   SpO2 100%   BMI 21.97 kg/m   Estimated body mass index is 21.97 kg/m  as calculated from the following:    Height as of 7/29/24: 1.57 m (5' 1.81\").    Weight as of this encounter: 54.2 kg (119 lb 6.4 oz). Body surface area is 1.54 meters squared.  No Pain (0) Comment: Data Unavailable   No LMP recorded (lmp unknown). Patient is postmenopausal.  Allergies reviewed: Yes  Medications reviewed: Yes    Medications: Medication refills not needed today.  Pharmacy name entered into [employer]:    [facility][id number] - [place], [place] - [address] [facility] [place] [facility] [address]  WRITTEN PRESCRIPTION REQUESTED  [facility] - [place], [place] - [address]    Frailty Screening:   Is the patient here for a new oncology consult visit in cancer care? 2. No      Clinical concerns: feels a tiny bit better       Maria Eugenia Murdock RN              "

## 2024-08-15 NOTE — NURSING NOTE
Chief Complaint   Patient presents with    Port Draw     Vitals taken, port accessed, labs drawn, heparin locked, deaccessed, checked into next appt     /64 (BP Location: Left arm, Patient Position: Sitting, Cuff Size: Adult Regular)   Pulse 76   Temp 97.5  F (36.4  C) (Oral)   Resp 16   Wt 54.2 kg (119 lb 6.4 oz)   LMP  (LMP Unknown)   SpO2 100%   BMI 21.97 kg/m    Guerrero Ramos RN on 8/15/2024 at 8:00 AM

## 2024-08-15 NOTE — LETTER
8/15/2024      Shena Hartmann  [address] [place] [address]      Dear Colleague,    Thank you for referring your patient, Shena Hartmann, to the [facility] BLOOD AND MARROW TRANSPLANT PROGRAM [place]. Please see a copy of my visit note below.    BMT/Cell Therapy Progress Note   08/15/2024    Patient ID:  Shena Hartmann is a 73 year old female, currently day +17 of her therapy (Carvykti).     INTERVAL  HISTORY      No acute medical complaints.     Review of Systems: 10 point ROS negative except as noted above.    PHYSICAL EXAM     Vitals:    08/15/24 0743   BP: 124/64   BP Location: Left arm   Patient Position: Sitting   Cuff Size: Adult Regular   Pulse: 76   Resp: 16   Temp: 97.5  F (36.4  C)   TempSrc: Oral   SpO2: 100%   Weight: 54.2 kg (119 lb 6.4 oz)     General: NAD   Eyes:  KYAW, sclera anicteric   Nose/Mouth/Throat: masked  Lungs: breathing comfortably on RA  Cardiovascular: well perfused  Lymphatics: no edema  Skin: no rashes or petechiae  Neuro: A&O, no focal deficits    LABS AND IMAGING: I have assessed all abnormal lab values for their clinical significance and any values considered clinically significant have been addressed in the assessment and plan.      Lab Results   Component Value Date    WBC 2.5 (L) 08/12/2024    ANEU 1.5 (L) 08/10/2024    HGB 10.1 (L) 08/12/2024    HCT 29.7 (L) 08/12/2024     (L) 08/12/2024     (L) 08/12/2024    POTASSIUM 3.8 08/12/2024    CHLORIDE 96 (L) 08/12/2024    CO2 26 08/12/2024     (H) 08/12/2024    BUN 12.4 08/12/2024    CR 0.76 08/12/2024    MAG 1.8 08/12/2024    INR 1.01 08/12/2024       SYSTEMS-BASED ASSESSMENT AND PLAN     Shena Hartmann is a 73 year old female, currently day +17 of CAR-T therapy with Carvykti. Day 0=7/29/2024     ONC  - BMT doc/Coordinator: Anastasiya  - Cell Therapy Product: Carvykti.   - Disease: multiple myeloma  - Package insert: https://www.fda.gov/media/551064/download  - restage per  protocol.      CRS: none to date  ICANS: none to date     HEME/COAG  - Pancytopenia: appears having bimodal drop. Confirmed no ASA or ibuprofen use. Not on a blood thinner    - Hematologic toxicity score of 0: Will this patient need GCSF to start on day +14? No      ID  - Infection prophylaxis: continue acyclovir, fluc, and pen VK until next visit as has been right at neutrophils of 1K. If ANC continues uptrending could stop flu and PEN VK next visit. Pentamidine was administered on 7/24/2024. Next dose scheduled 8/26.      GI  - Zofran, compazine PRN for nausea and vomiting     RENAL/  - Monitor cr/lytes      ENDOCRINE  # Hypothyroidism:  levothyroxine 50 mcg daily       RTC: day 21 visit. Labs ordered in tx plan.     Total of 30 minutes on patient visit, reviewing records, interpreting test results, placing orders, and documentation on the day of service.    The longitudinal plan of care for the diagnosis(es)/condition(s) as documented were addressed during this visit. Due to the added complexity in care, I will continue to support Shena in the subsequent management and with ongoing continuity of care.     Prema Finn PA-C  [phone]       Again, thank you for allowing me to participate in the care of your patient.        Sincerely,        BMT Advanced Practice Provider

## 2024-08-19 ENCOUNTER — LAB (OUTPATIENT)
Dept: LAB | Facility: CLINIC | Age: 74
End: 2024-08-19
Attending: PHYSICIAN ASSISTANT
Payer: MEDICARE

## 2024-08-19 ENCOUNTER — OFFICE VISIT (OUTPATIENT)
Dept: TRANSPLANT | Facility: CLINIC | Age: 74
End: 2024-08-19
Attending: PHYSICIAN ASSISTANT
Payer: MEDICARE

## 2024-08-19 VITALS
TEMPERATURE: 97.7 F | OXYGEN SATURATION: 98 % | BODY MASS INDEX: 21.92 KG/M2 | SYSTOLIC BLOOD PRESSURE: 116 MMHG | HEART RATE: 74 BPM | DIASTOLIC BLOOD PRESSURE: 67 MMHG | WEIGHT: 119.1 LBS | RESPIRATION RATE: 16 BRPM

## 2024-08-19 DIAGNOSIS — C90.00 MULTIPLE MYELOMA NOT HAVING ACHIEVED REMISSION (H): Primary | ICD-10-CM

## 2024-08-19 DIAGNOSIS — Z94.84 STEM CELLS TRANSPLANT STATUS (H): ICD-10-CM

## 2024-08-19 LAB
ALBUMIN SERPL BCG-MCNC: 3.9 G/DL (ref 3.5–5.2)
ALP SERPL-CCNC: 45 U/L (ref 40–150)
ALT SERPL W P-5'-P-CCNC: 15 U/L (ref 0–50)
ANION GAP SERPL CALCULATED.3IONS-SCNC: 10 MMOL/L (ref 7–15)
APTT PPP: 42 SECONDS (ref 22–38)
AST SERPL W P-5'-P-CCNC: 16 U/L (ref 0–45)
BASOPHILS # BLD AUTO: ABNORMAL 10*3/UL
BASOPHILS # BLD MANUAL: 0 10E3/UL (ref 0–0.2)
BASOPHILS NFR BLD AUTO: ABNORMAL %
BASOPHILS NFR BLD MANUAL: 0 %
BILIRUB SERPL-MCNC: 0.2 MG/DL
BUN SERPL-MCNC: 17.1 MG/DL (ref 8–23)
CALCIUM SERPL-MCNC: 9.1 MG/DL (ref 8.8–10.4)
CHLORIDE SERPL-SCNC: 100 MMOL/L (ref 98–107)
CREAT SERPL-MCNC: 0.79 MG/DL (ref 0.51–0.95)
D DIMER PPP FEU-MCNC: <0.27 UG/ML FEU (ref 0–0.5)
EGFRCR SERPLBLD CKD-EPI 2021: 79 ML/MIN/1.73M2
EOSINOPHIL # BLD AUTO: ABNORMAL 10*3/UL
EOSINOPHIL # BLD MANUAL: 0.1 10E3/UL (ref 0–0.7)
EOSINOPHIL NFR BLD AUTO: ABNORMAL %
EOSINOPHIL NFR BLD MANUAL: 2 %
ERYTHROCYTE [DISTWIDTH] IN BLOOD BY AUTOMATED COUNT: 15 % (ref 10–15)
FIBRINOGEN PPP-MCNC: 283 MG/DL (ref 170–510)
GLUCOSE SERPL-MCNC: 95 MG/DL (ref 70–99)
HCO3 SERPL-SCNC: 26 MMOL/L (ref 22–29)
HCT VFR BLD AUTO: 29.6 % (ref 35–47)
HGB BLD-MCNC: 10 G/DL (ref 11.7–15.7)
IMM GRANULOCYTES # BLD: ABNORMAL 10*3/UL
IMM GRANULOCYTES NFR BLD: ABNORMAL %
INR PPP: 1.1 (ref 0.85–1.15)
LYMPHOCYTES # BLD AUTO: ABNORMAL 10*3/UL
LYMPHOCYTES # BLD MANUAL: 0.4 10E3/UL (ref 0.8–5.3)
LYMPHOCYTES NFR BLD AUTO: ABNORMAL %
LYMPHOCYTES NFR BLD MANUAL: 12 %
MAGNESIUM SERPL-MCNC: 2 MG/DL (ref 1.7–2.3)
MCH RBC QN AUTO: 32.8 PG (ref 26.5–33)
MCHC RBC AUTO-ENTMCNC: 33.8 G/DL (ref 31.5–36.5)
MCV RBC AUTO: 97 FL (ref 78–100)
MONOCYTES # BLD AUTO: ABNORMAL 10*3/UL
MONOCYTES # BLD MANUAL: 0.6 10E3/UL (ref 0–1.3)
MONOCYTES NFR BLD AUTO: ABNORMAL %
MONOCYTES NFR BLD MANUAL: 18 %
NEUTROPHILS # BLD AUTO: ABNORMAL 10*3/UL
NEUTROPHILS # BLD MANUAL: 2.2 10E3/UL (ref 1.6–8.3)
NEUTROPHILS NFR BLD AUTO: ABNORMAL %
NEUTROPHILS NFR BLD MANUAL: 68 %
NRBC # BLD AUTO: 0 10E3/UL
NRBC BLD AUTO-RTO: 0 /100
PHOSPHATE SERPL-MCNC: 3.2 MG/DL (ref 2.5–4.5)
PLAT MORPH BLD: ABNORMAL
PLATELET # BLD AUTO: 89 10E3/UL (ref 150–450)
POTASSIUM SERPL-SCNC: 4.1 MMOL/L (ref 3.4–5.3)
PROT SERPL-MCNC: 6 G/DL (ref 6.4–8.3)
RBC # BLD AUTO: 3.05 10E6/UL (ref 3.8–5.2)
RBC MORPH BLD: ABNORMAL
SODIUM SERPL-SCNC: 136 MMOL/L (ref 135–145)
WBC # BLD AUTO: 3.2 10E3/UL (ref 4–11)

## 2024-08-19 PROCEDURE — 85384 FIBRINOGEN ACTIVITY: CPT | Performed by: PHYSICIAN ASSISTANT

## 2024-08-19 PROCEDURE — 36591 DRAW BLOOD OFF VENOUS DEVICE: CPT | Performed by: PHYSICIAN ASSISTANT

## 2024-08-19 PROCEDURE — 85027 COMPLETE CBC AUTOMATED: CPT | Performed by: PHYSICIAN ASSISTANT

## 2024-08-19 PROCEDURE — 85730 THROMBOPLASTIN TIME PARTIAL: CPT | Performed by: PHYSICIAN ASSISTANT

## 2024-08-19 PROCEDURE — 84100 ASSAY OF PHOSPHORUS: CPT | Performed by: PHYSICIAN ASSISTANT

## 2024-08-19 PROCEDURE — G2211 COMPLEX E/M VISIT ADD ON: HCPCS

## 2024-08-19 PROCEDURE — 85007 BL SMEAR W/DIFF WBC COUNT: CPT | Performed by: PHYSICIAN ASSISTANT

## 2024-08-19 PROCEDURE — G0463 HOSPITAL OUTPT CLINIC VISIT: HCPCS

## 2024-08-19 PROCEDURE — 85610 PROTHROMBIN TIME: CPT | Performed by: PHYSICIAN ASSISTANT

## 2024-08-19 PROCEDURE — 250N000011 HC RX IP 250 OP 636: Performed by: PHYSICIAN ASSISTANT

## 2024-08-19 PROCEDURE — 85379 FIBRIN DEGRADATION QUANT: CPT | Performed by: PHYSICIAN ASSISTANT

## 2024-08-19 PROCEDURE — 83735 ASSAY OF MAGNESIUM: CPT | Performed by: PHYSICIAN ASSISTANT

## 2024-08-19 PROCEDURE — 99214 OFFICE O/P EST MOD 30 MIN: CPT

## 2024-08-19 PROCEDURE — 80053 COMPREHEN METABOLIC PANEL: CPT | Performed by: PHYSICIAN ASSISTANT

## 2024-08-19 RX ORDER — HEPARIN SODIUM (PORCINE) LOCK FLUSH IV SOLN 100 UNIT/ML 100 UNIT/ML
5 SOLUTION INTRAVENOUS ONCE
Status: COMPLETED | OUTPATIENT
Start: 2024-08-19 | End: 2024-08-19

## 2024-08-19 RX ADMIN — HEPARIN 5 ML: 100 SYRINGE at 07:54

## 2024-08-19 ASSESSMENT — PAIN SCALES - GENERAL: PAINLEVEL: MILD PAIN (2)

## 2024-08-19 NOTE — NURSING NOTE
"Oncology Rooming Note    August 19, 2024 8:19 AM   Shena Hartmann is a 73 year old female who presents for:    Chief Complaint   Patient presents with    Port Draw     Labs drawn via port access by lab RN    Oncology Clinic Visit     Multiple Myeloma     Initial Vitals: /67   Pulse 74   Temp 97.7  F (36.5  C) (Oral)   Resp 16   Wt 54 kg (119 lb 1.6 oz)   LMP  (LMP Unknown)   SpO2 98%   BMI 21.92 kg/m   Estimated body mass index is 21.92 kg/m  as calculated from the following:    Height as of 7/29/24: 1.57 m (5' 1.81\").    Weight as of this encounter: 54 kg (119 lb 1.6 oz). Body surface area is 1.53 meters squared.  Mild Pain (2) Comment: Data Unavailable   No LMP recorded (lmp unknown). Patient is postmenopausal.  Allergies reviewed: Yes  Medications reviewed: Yes    Medications: Pt will discuss refills with provider  Pharmacy name entered into [facility]:    [facility][id number] - [place], [place] - [address] [facility] [address]  WRITTEN PRESCRIPTION REQUESTED  [facility] - [place], [place] - [address]    Frailty Screening:   Is the patient here for a new oncology consult visit in cancer care? 2. No      Clinical concerns: Medication questions       Yamlieth Mcknight LPN  8/19/2024              "

## 2024-08-19 NOTE — NURSING NOTE
Chief Complaint   Patient presents with    Port Draw     Labs drawn via port access by lab RN       Port blood draw with heparin flush by lab RN. Vitals taken and appointment arrived.    Mely Pinto RN

## 2024-08-19 NOTE — LETTER
8/19/2024      Shena Hartmann  [address] [place] [address]      Dear Colleague,    Thank you for referring your patient, Shena Hartmann, to the [facility] BLOOD AND MARROW TRANSPLANT PROGRAM [place]. Please see a copy of my visit note below.    BMT/Cell Therapy Progress Note   08/19/2024    Patient ID:  Shena Hartmann is a 73 year old female, currently day +21 of her therapy (Carvykti).     INTERVAL  HISTORY      Overall doing well.  Occasional loose stools. Not consistent. No belly pain or fever. Will have normal BMs as well.   No new issues.     Review of Systems: 10 point ROS negative except as noted above.    PHYSICAL EXAM     Vitals:    08/19/24 0732   BP: 116/67   Pulse: 74   Resp: 16   Temp: 97.7  F (36.5  C)   TempSrc: Oral   SpO2: 98%   Weight: 54 kg (119 lb 1.6 oz)     General: NAD   Eyes:  KYAW, sclera anicteric   Lungs: CTA anteriorly   Cardiovascular: RRR.   Lymphatics: no edema  Skin: no rashes or petechiae  Neuro: A&O, no focal deficits  Port-a-cath not accessed     LABS AND IMAGING: I have assessed all abnormal lab values for their clinical significance and any values considered clinically significant have been addressed in the assessment and plan.      Lab Results   Component Value Date    WBC 2.5 (L) 08/15/2024    ANEU 1.3 (L) 08/15/2024    HGB 10.1 (L) 08/15/2024    HCT 29.5 (L) 08/15/2024     (L) 08/15/2024     08/15/2024    POTASSIUM 3.8 08/15/2024    CHLORIDE 100 08/15/2024    CO2 26 08/15/2024    GLC 93 08/15/2024    BUN 14.2 08/15/2024    CR 0.73 08/15/2024    MAG 2.1 08/15/2024    INR 1.01 08/15/2024       SYSTEMS-BASED ASSESSMENT AND PLAN     Shena Hartmann is a 73 year old female, currently day +21 of CAR-T therapy with Carvykti. Day 0=7/29/2024     ONC  - BMT doc/Coordinator: Anastasiya  - Cell Therapy Product: Carvykti.   - Disease: multiple myeloma  - Package insert: https://www.fda.gov/media/834325/download  - restage per protocol.       CRS: none to date  ICANS: none to date     HEME/COAG  - Pancytopenia: appears having bimodal drop. Confirmed no ASA or ibuprofen use. Not on a blood thinner    - Hematologic toxicity score of 0: Will this patient need GCSF to start on day +14? No      ID  - Infection prophylaxis: continue acyclovir. Can stop fluc and pen VK as ANC remains >1000. Pentamidine was administered on 7/24/2024. Next dose scheduled 8/26. Could switch to bactrim after that if counts improving.       GI  - Zofran, compazine PRN for nausea and vomiting     RENAL/  - Monitor cr/lytes      ENDOCRINE  # Hypothyroidism:  levothyroxine 50 mcg daily       RTC: day 28 visit. Labs ordered in tx plan.     Total of 30 minutes on patient visit, reviewing records, interpreting test results, placing orders, and documentation on the day of service.    The longitudinal plan of care for the diagnosis(es)/condition(s) as documented were addressed during this visit. Due to the added complexity in care, I will continue to support Shena in the subsequent management and with ongoing continuity of care.     Prema Finn PA-C  [phone]       Again, thank you for allowing me to participate in the care of your patient.        Sincerely,        BMT Advanced Practice Provider

## 2024-08-19 NOTE — PROGRESS NOTES
BMT/Cell Therapy Progress Note   08/19/2024    Patient ID:  Shena Hartmann is a 73 year old female, currently day +21 of her therapy (Carvykti).     INTERVAL  HISTORY      Overall doing well.  Occasional loose stools. Not consistent. No belly pain or fever. Will have normal BMs as well.   No new issues.     Review of Systems: 10 point ROS negative except as noted above.    PHYSICAL EXAM     Vitals:    08/19/24 0732   BP: 116/67   Pulse: 74   Resp: 16   Temp: 97.7  F (36.5  C)   TempSrc: Oral   SpO2: 98%   Weight: 54 kg (119 lb 1.6 oz)     General: NAD   Eyes:  KYAW, sclera anicteric   Lungs: CTA anteriorly   Cardiovascular: RRR.   Lymphatics: no edema  Skin: no rashes or petechiae  Neuro: A&O, no focal deficits  Port-a-cath not accessed     LABS AND IMAGING: I have assessed all abnormal lab values for their clinical significance and any values considered clinically significant have been addressed in the assessment and plan.      Lab Results   Component Value Date    WBC 2.5 (L) 08/15/2024    ANEU 1.3 (L) 08/15/2024    HGB 10.1 (L) 08/15/2024    HCT 29.5 (L) 08/15/2024     (L) 08/15/2024     08/15/2024    POTASSIUM 3.8 08/15/2024    CHLORIDE 100 08/15/2024    CO2 26 08/15/2024    GLC 93 08/15/2024    BUN 14.2 08/15/2024    CR 0.73 08/15/2024    MAG 2.1 08/15/2024    INR 1.01 08/15/2024       SYSTEMS-BASED ASSESSMENT AND PLAN     Shena Hartmann is a 73 year old female, currently day +21 of CAR-T therapy with Carvykti. Day 0=7/29/2024     ONC  - BMT doc/Coordinator: Scott/Eva  - Cell Therapy Product: Carvykti.   - Disease: multiple myeloma  - Package insert: https://www.fda.gov/media/074829/download  - restage per protocol.      CRS: none to date  ICANS: none to date     HEME/COAG  - Pancytopenia: appears having bimodal drop. Confirmed no ASA or ibuprofen use. Not on a blood thinner    - Hematologic toxicity score of 0: Will this patient need GCSF to start on day +14? No      ID  - Infection  prophylaxis: continue acyclovir. Can stop fluc and pen VK as ANC remains >1000. Pentamidine was administered on 7/24/2024. Next dose scheduled 8/26. Could switch to bactrim after that if counts improving.       GI  - Zofran, compazine PRN for nausea and vomiting     RENAL/  - Monitor cr/lytes      ENDOCRINE  # Hypothyroidism:  levothyroxine 50 mcg daily       RTC: day 28 visit. Labs ordered in tx plan.     Total of 30 minutes on patient visit, reviewing records, interpreting test results, placing orders, and documentation on the day of service.    The longitudinal plan of care for the diagnosis(es)/condition(s) as documented were addressed during this visit. Due to the added complexity in care, I will continue to support Shena in the subsequent management and with ongoing continuity of care.     Prema Finn PA-C  [phone]

## 2024-08-26 ENCOUNTER — LAB (OUTPATIENT)
Dept: LAB | Facility: CLINIC | Age: 74
End: 2024-08-26
Payer: MEDICARE

## 2024-08-26 ENCOUNTER — OFFICE VISIT (OUTPATIENT)
Dept: TRANSPLANT | Facility: CLINIC | Age: 74
End: 2024-08-26
Payer: MEDICARE

## 2024-08-26 VITALS
OXYGEN SATURATION: 97 % | BODY MASS INDEX: 21.97 KG/M2 | TEMPERATURE: 98.5 F | DIASTOLIC BLOOD PRESSURE: 64 MMHG | RESPIRATION RATE: 19 BRPM | HEART RATE: 90 BPM | SYSTOLIC BLOOD PRESSURE: 134 MMHG | WEIGHT: 119.4 LBS

## 2024-08-26 DIAGNOSIS — C90.00 MULTIPLE MYELOMA, REMISSION STATUS UNSPECIFIED (H): Primary | ICD-10-CM

## 2024-08-26 DIAGNOSIS — C90.00 MULTIPLE MYELOMA NOT HAVING ACHIEVED REMISSION (H): Primary | ICD-10-CM

## 2024-08-26 DIAGNOSIS — Z92.850 STATUS POST CHIMERIC ANTIGEN RECEPTOR T-CELL THERAPY: ICD-10-CM

## 2024-08-26 DIAGNOSIS — Z94.84 STEM CELLS TRANSPLANT STATUS (H): ICD-10-CM

## 2024-08-26 LAB
ALBUMIN SERPL BCG-MCNC: 4.1 G/DL (ref 3.5–5.2)
ALP SERPL-CCNC: 57 U/L (ref 40–150)
ALT SERPL W P-5'-P-CCNC: 18 U/L (ref 0–50)
ANION GAP SERPL CALCULATED.3IONS-SCNC: 11 MMOL/L (ref 7–15)
APTT PPP: 37 SECONDS (ref 22–38)
AST SERPL W P-5'-P-CCNC: 22 U/L (ref 0–45)
BASOPHILS # BLD AUTO: 0 10E3/UL (ref 0–0.2)
BASOPHILS NFR BLD AUTO: 0 %
BILIRUB SERPL-MCNC: 0.2 MG/DL
BUN SERPL-MCNC: 11 MG/DL (ref 8–23)
CALCIUM SERPL-MCNC: 9.4 MG/DL (ref 8.8–10.4)
CD19 CELLS # BLD: <1 CELLS/UL (ref 107–698)
CD19 CELLS NFR BLD: <1 % (ref 6–27)
CD3 CELLS # BLD: 546 CELLS/UL (ref 603–2990)
CD3 CELLS NFR BLD: 85 % (ref 49–84)
CD3+CD4+ CELLS # BLD: 183 CELLS/UL (ref 441–2156)
CD3+CD4+ CELLS NFR BLD: 29 % (ref 28–63)
CD3+CD4+ CELLS/CD3+CD8+ CLL BLD: 0.51 % (ref 1.4–2.6)
CD3+CD8+ CELLS # BLD: 356 CELLS/UL (ref 125–1312)
CD3+CD8+ CELLS NFR BLD: 56 % (ref 10–40)
CD3-CD16+CD56+ CELLS # BLD: 92 CELLS/UL (ref 95–640)
CD3-CD16+CD56+ CELLS NFR BLD: 14 % (ref 4–25)
CHLORIDE SERPL-SCNC: 98 MMOL/L (ref 98–107)
CREAT SERPL-MCNC: 0.73 MG/DL (ref 0.51–0.95)
CRP SERPL-MCNC: 3.48 MG/L
D DIMER PPP FEU-MCNC: 0.38 UG/ML FEU (ref 0–0.5)
EGFRCR SERPLBLD CKD-EPI 2021: 86 ML/MIN/1.73M2
EOSINOPHIL # BLD AUTO: 0.1 10E3/UL (ref 0–0.7)
EOSINOPHIL NFR BLD AUTO: 2 %
ERYTHROCYTE [DISTWIDTH] IN BLOOD BY AUTOMATED COUNT: 16.1 % (ref 10–15)
FERRITIN SERPL-MCNC: 16 NG/ML (ref 11–328)
FIBRINOGEN PPP-MCNC: 332 MG/DL (ref 170–510)
GLUCOSE SERPL-MCNC: 120 MG/DL (ref 70–99)
HCO3 SERPL-SCNC: 28 MMOL/L (ref 22–29)
HCT VFR BLD AUTO: 30.5 % (ref 35–47)
HGB BLD-MCNC: 10.2 G/DL (ref 11.7–15.7)
IMM GRANULOCYTES # BLD: 0 10E3/UL
IMM GRANULOCYTES NFR BLD: 0 %
INR PPP: 1.02 (ref 0.85–1.15)
LDH SERPL L TO P-CCNC: 171 U/L (ref 0–250)
LYMPHOCYTES # BLD AUTO: 0.6 10E3/UL (ref 0.8–5.3)
LYMPHOCYTES NFR BLD AUTO: 18 %
MAGNESIUM SERPL-MCNC: 1.9 MG/DL (ref 1.7–2.3)
MCH RBC QN AUTO: 32.7 PG (ref 26.5–33)
MCHC RBC AUTO-ENTMCNC: 33.4 G/DL (ref 31.5–36.5)
MCV RBC AUTO: 98 FL (ref 78–100)
MONOCYTES # BLD AUTO: 0.7 10E3/UL (ref 0–1.3)
MONOCYTES NFR BLD AUTO: 19 %
NEUTROPHILS # BLD AUTO: 2.2 10E3/UL (ref 1.6–8.3)
NEUTROPHILS NFR BLD AUTO: 61 %
NRBC # BLD AUTO: 0 10E3/UL
NRBC BLD AUTO-RTO: 0 /100
PHOSPHATE SERPL-MCNC: 3.6 MG/DL (ref 2.5–4.5)
PLATELET # BLD AUTO: 47 10E3/UL (ref 150–450)
POTASSIUM SERPL-SCNC: 3.9 MMOL/L (ref 3.4–5.3)
PROT SERPL-MCNC: 6.2 G/DL (ref 6.4–8.3)
RBC # BLD AUTO: 3.12 10E6/UL (ref 3.8–5.2)
SODIUM SERPL-SCNC: 137 MMOL/L (ref 135–145)
T CELL EXTENDED COMMENT: ABNORMAL
TOTAL PROTEIN SERUM FOR ELP: 6 G/DL (ref 6.4–8.3)
WBC # BLD AUTO: 3.5 10E3/UL (ref 4–11)

## 2024-08-26 PROCEDURE — 86140 C-REACTIVE PROTEIN: CPT | Performed by: PHYSICIAN ASSISTANT

## 2024-08-26 PROCEDURE — G2211 COMPLEX E/M VISIT ADD ON: HCPCS

## 2024-08-26 PROCEDURE — 84165 PROTEIN E-PHORESIS SERUM: CPT | Mod: TC | Performed by: PATHOLOGY

## 2024-08-26 PROCEDURE — 82232 ASSAY OF BETA-2 PROTEIN: CPT | Performed by: PHYSICIAN ASSISTANT

## 2024-08-26 PROCEDURE — 84100 ASSAY OF PHOSPHORUS: CPT | Performed by: PHYSICIAN ASSISTANT

## 2024-08-26 PROCEDURE — 83615 LACTATE (LD) (LDH) ENZYME: CPT | Performed by: PHYSICIAN ASSISTANT

## 2024-08-26 PROCEDURE — 80053 COMPREHEN METABOLIC PANEL: CPT | Performed by: PHYSICIAN ASSISTANT

## 2024-08-26 PROCEDURE — 99214 OFFICE O/P EST MOD 30 MIN: CPT

## 2024-08-26 PROCEDURE — 85025 COMPLETE CBC W/AUTO DIFF WBC: CPT | Performed by: PHYSICIAN ASSISTANT

## 2024-08-26 PROCEDURE — 36591 DRAW BLOOD OFF VENOUS DEVICE: CPT | Performed by: PHYSICIAN ASSISTANT

## 2024-08-26 PROCEDURE — 250N000011 HC RX IP 250 OP 636

## 2024-08-26 PROCEDURE — 85730 THROMBOPLASTIN TIME PARTIAL: CPT | Performed by: PHYSICIAN ASSISTANT

## 2024-08-26 PROCEDURE — 94640 AIRWAY INHALATION TREATMENT: CPT | Performed by: INTERNAL MEDICINE

## 2024-08-26 PROCEDURE — 86334 IMMUNOFIX E-PHORESIS SERUM: CPT | Performed by: PATHOLOGY

## 2024-08-26 PROCEDURE — 82784 ASSAY IGA/IGD/IGG/IGM EACH: CPT | Performed by: PHYSICIAN ASSISTANT

## 2024-08-26 PROCEDURE — 85379 FIBRIN DEGRADATION QUANT: CPT | Performed by: PHYSICIAN ASSISTANT

## 2024-08-26 PROCEDURE — 99213 OFFICE O/P EST LOW 20 MIN: CPT

## 2024-08-26 PROCEDURE — 83521 IG LIGHT CHAINS FREE EACH: CPT | Performed by: PHYSICIAN ASSISTANT

## 2024-08-26 PROCEDURE — 86357 NK CELLS TOTAL COUNT: CPT | Performed by: PHYSICIAN ASSISTANT

## 2024-08-26 PROCEDURE — 85384 FIBRINOGEN ACTIVITY: CPT | Performed by: PHYSICIAN ASSISTANT

## 2024-08-26 PROCEDURE — 86334 IMMUNOFIX E-PHORESIS SERUM: CPT | Mod: 26 | Performed by: PATHOLOGY

## 2024-08-26 PROCEDURE — 84155 ASSAY OF PROTEIN SERUM: CPT | Performed by: PHYSICIAN ASSISTANT

## 2024-08-26 PROCEDURE — 94642 AEROSOL INHALATION TREATMENT: CPT | Performed by: INTERNAL MEDICINE

## 2024-08-26 PROCEDURE — 83735 ASSAY OF MAGNESIUM: CPT | Performed by: PHYSICIAN ASSISTANT

## 2024-08-26 PROCEDURE — 82728 ASSAY OF FERRITIN: CPT | Performed by: PHYSICIAN ASSISTANT

## 2024-08-26 PROCEDURE — 85610 PROTHROMBIN TIME: CPT | Performed by: PHYSICIAN ASSISTANT

## 2024-08-26 PROCEDURE — 84165 PROTEIN E-PHORESIS SERUM: CPT | Mod: 26 | Performed by: PATHOLOGY

## 2024-08-26 RX ORDER — HEPARIN SODIUM,PORCINE 10 UNIT/ML
5-20 VIAL (ML) INTRAVENOUS DAILY PRN
Status: CANCELLED | OUTPATIENT
Start: 2024-08-26

## 2024-08-26 RX ORDER — HEPARIN SODIUM (PORCINE) LOCK FLUSH IV SOLN 100 UNIT/ML 100 UNIT/ML
5 SOLUTION INTRAVENOUS
Status: CANCELLED | OUTPATIENT
Start: 2024-08-26

## 2024-08-26 RX ORDER — ALBUTEROL SULFATE 0.83 MG/ML
2.5 SOLUTION RESPIRATORY (INHALATION)
Status: COMPLETED | OUTPATIENT
Start: 2024-08-26 | End: 2024-08-26

## 2024-08-26 RX ORDER — PENTAMIDINE ISETHIONATE 300 MG/300MG
300 INHALANT RESPIRATORY (INHALATION)
Status: DISCONTINUED | OUTPATIENT
Start: 2024-08-26 | End: 2024-08-26 | Stop reason: HOSPADM

## 2024-08-26 RX ORDER — HEPARIN SODIUM (PORCINE) LOCK FLUSH IV SOLN 100 UNIT/ML 100 UNIT/ML
5 SOLUTION INTRAVENOUS ONCE
Status: COMPLETED | OUTPATIENT
Start: 2024-08-26 | End: 2024-08-26

## 2024-08-26 RX ORDER — ALBUTEROL SULFATE 0.83 MG/ML
2.5 SOLUTION RESPIRATORY (INHALATION)
Status: CANCELLED | OUTPATIENT
Start: 2024-08-26 | End: 2024-08-26

## 2024-08-26 RX ORDER — PENTAMIDINE ISETHIONATE 300 MG/300MG
300 INHALANT RESPIRATORY (INHALATION)
Status: CANCELLED | OUTPATIENT
Start: 2024-08-26

## 2024-08-26 RX ADMIN — PENTAMIDINE ISETHIONATE 300 MG: 300 INHALANT RESPIRATORY (INHALATION) at 14:49

## 2024-08-26 RX ADMIN — ALBUTEROL SULFATE 2.5 MG: 0.83 SOLUTION RESPIRATORY (INHALATION) at 14:48

## 2024-08-26 RX ADMIN — Medication 5 ML: at 13:26

## 2024-08-26 ASSESSMENT — PAIN SCALES - GENERAL: PAINLEVEL: NO PAIN (0)

## 2024-08-26 NOTE — PROGRESS NOTES
Shena Hartmann was seen today for a Pentamidine nebulizer tx ordered by Dr. Nighat Chavez.     Patient was first given 2.5 mg of albuterol nebulizer, after which Pentamidine 300 mg (Lot # [id number]) mixed with 6cc Sterile H20 was administered through a filtered nebulizer.     Pre-treatment: SpO2 = 98%   HR = 77   BBS = Clear   Post-treatment: SpO2 = 98%  HR = 81  BBS = Clear     No adverse side effects noted by the patient.     This service today was provided under the direct supervision of Sanjay Carter, who was available if needed.      Procedure was completed by Yamileth Hartley RRT

## 2024-08-26 NOTE — NURSING NOTE
"Oncology Rooming Note    August 26, 2024 1:58 PM   Shena Hartmann is a 73 year old female who presents for:    Chief Complaint   Patient presents with    Port Draw     Labs drawn via port by RN.     Oncology Clinic Visit     Multiple Myeloma     Initial Vitals: /64   Pulse 90   Temp 98.5  F (36.9  C) (Oral)   Resp 19   Wt 54.2 kg (119 lb 6.4 oz)   LMP  (LMP Unknown)   SpO2 97%   BMI 21.97 kg/m   Estimated body mass index is 21.97 kg/m  as calculated from the following:    Height as of 7/29/24: 1.57 m (5' 1.81\").    Weight as of this encounter: 54.2 kg (119 lb 6.4 oz). Body surface area is 1.54 meters squared.  No Pain (0) Comment: Data Unavailable   No LMP recorded (lmp unknown). Patient is postmenopausal.  Allergies reviewed: Yes  Medications reviewed: Yes    Medications: Medication refills not needed today.  Pharmacy name entered into [facility]:    [facility][id number] - [place], [place] - [address] [facility] [address]  WRITTEN PRESCRIPTION REQUESTED  [facility] - [place], [place] - [address]    Frailty Screening:   Is the patient here for a new oncology consult visit in cancer care? 2. No      Clinical concerns: None       Yamileth Mcknight LPN  8/26/2024              "

## 2024-08-26 NOTE — PROGRESS NOTES
BMT/Cell Therapy Progress Note   08/26/2024    Patient ID:  Shena Hartmann is a 73 year old female, currently day +28 of her therapy (Carvykti).     INTERVAL  HISTORY      Overall doing well.less tired, recovering, no infectious or new sy's.  Still occasional diarrhea.    No belly pain or fever. Wt is stable, eating ok.   No new issues.     Review of Systems: 10 point ROS negative except as noted above.    PHYSICAL EXAM     Vitals:    08/26/24 1326   BP: 134/64   Pulse: 90   Resp: 19   Temp: 98.5  F (36.9  C)   TempSrc: Oral   SpO2: 97%   Weight: 54.2 kg (119 lb 6.4 oz)   ECOG 0  General: NAD   Eyes:  KYAW, sclera anicteric   Lungs: CTA anteriorly   Cardiovascular: RRR.   Lymphatics: no edema  Skin: no rashes or petechiae  Neuro: A&O, no focal deficits  Port-a-cath not accessed     LABS AND IMAGING: I have assessed all abnormal lab values for their clinical significance and any values considered clinically significant have been addressed in the assessment and plan.      Lab Results   Component Value Date    WBC 3.5 (L) 08/26/2024    ANEU 2.2 08/19/2024    HGB 10.2 (L) 08/26/2024    HCT 30.5 (L) 08/26/2024    PLT 47 (LL) 08/26/2024     08/26/2024    POTASSIUM 3.9 08/26/2024    CHLORIDE 98 08/26/2024    CO2 28 08/26/2024     (H) 08/26/2024    BUN 11.0 08/26/2024    CR 0.73 08/26/2024    MAG 1.9 08/26/2024    INR 1.02 08/26/2024   ANC 2.2     Latest Reference Range & Units 08/08/24 12:16   Albumin Fraction 3.7 - 5.1 g/dL 3.9   Alpha 1 Fraction 0.2 - 0.4 g/dL 0.3   Alpha 2 Fraction 0.5 - 0.9 g/dL 0.7   Beta Fraction 0.6 - 1.0 g/dL 0.6   ELP Interpretation:  Monoclonal protein (0.4 g/dL) seen in the gamma fraction. 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 Slight hypogammaglobulinemia. Pathologic significance requires clinical correlation. LIZZIE Broderick M.D., Ph.D., Pathologist.   Gamma Fraction 0.7 - 1.6 g/dL 0.6 (L)   Kappa Free Lt  Chain 0.33 - 1.94 mg/dL 1.43   Kappa Lambda Ratio 0.26 - 1.65  6.81 (H)   Lambda Free Lt Chain 0.57 - 2.63 mg/dL 0.21 (L)   Monoclonal Peak <=0.0 g/dL 0.4 (H)   Total Protein Serum for ELP 6.4 - 8.3 g/dL 6.1 (L)   (L): Data is abnormally low  (H): Data is abnormally high    SYSTEMS-BASED ASSESSMENT AND PLAN     Shena Hartmann is a 73 year old female, currently day +28 of CAR-T therapy with Carvykti. Day 0=7/29/2024     ONC - R/R MM  - BMT doc/Coordinator: Scott/Danaeve  - Cell Therapy Product: Carvykti.   - Disease: multiple myeloma  - Package insert: https://www.fda.gov/media/460589/download  - day 28 restaging - [place]     CRS: none to date  ICANS: none to date     HEME/COAG  - Pancytopenia: appears having bimodal drop. Confirmed no ASA or ibuprofen use. Not on a blood thinner    Plt 46K - monitor weekly   ANC normal      ID  - Infection prophylaxis: continue acyclovir.   Pentamidine was administered on 7/24/2024. Next dose scheduled 8/26 and monthly given low plts.        GI  - Zofran, compazine PRN for nausea and vomiting     RENAL/  - Monitor cr/lytes      ENDOCRINE  # Hypothyroidism:  levothyroxine 50 mcg daily       RTC: transition to [facility]  Weekly cbc for now.   WANDA visit q 2 weeks   Day 100 anniversary     Total of 30 minutes on patient visit, reviewing records, interpreting test results, placing orders, and documentation on the day of service.    The longitudinal plan of care for the diagnosis(es)/condition(s) as documented were addressed during this visit. Due to the added complexity in care, I will continue to support Shena in the subsequent management and with ongoing continuity of care.     Cellular Therapy Anniversary Visit    Shena Hartmann is a(n) 73 year old patient with a history of Multiple Myeloma, who is here for a Day +28 anniversary visit post cellular therapy with Carvykti as second line therapy.    The patient's restaging included SPEP and Free Light Chains    The patient's disease status  "is currently partial remission    Patients are at risk post-CarT therapy for ongoing cytopenias, hypogammaglobulinemia, and infections.    Pertinent labs:  Lab Results   Component Value Date    WBC 3.5 08/26/2024    WBC 4.7 07/05/2021     Lab Results   Component Value Date    RBC 3.12 08/26/2024    RBC 3.90 07/05/2021     Lab Results   Component Value Date    HGB 10.2 08/26/2024    HGB 13.0 07/05/2021     Lab Results   Component Value Date    HCT 30.5 08/26/2024    HCT 38.1 07/05/2021     No components found for: \"MCT\"  Lab Results   Component Value Date    MCV 98 08/26/2024    MCV 98 07/05/2021     Lab Results   Component Value Date    MCH 32.7 08/26/2024    MCH 33.3 07/05/2021     Lab Results   Component Value Date    MCHC 33.4 08/26/2024    MCHC 34.1 07/05/2021     Lab Results   Component Value Date    RDW 16.1 08/26/2024    RDW 15.3 07/05/2021     Lab Results   Component Value Date    PLT 47 08/26/2024     07/05/2021     IGG   Date Value Ref Range Status   09/21/2020 2,404 (H) 610 - 1,616 mg/dL Final     Immunoglobulin G   Date Value Ref Range Status   07/08/2024 757 610 - 1,616 mg/dL Final     Absolute CD4, [place] T Cells   Date Value Ref Range Status   05/15/2024 326 (L) 441 - 2,156 cells/uL Final       Management recommendations:    CBC weekly at [facility].     Marsha Morgan MD    "

## 2024-08-26 NOTE — LETTER
8/26/2024      Shena Hartmann  [address] [place] [address]      Dear Colleague,    Thank you for referring your patient, Shena Hartmann, to the [facility] [place]. Please see a copy of my visit note below.    BMT/Cell Therapy Progress Note   08/26/2024    Patient ID:  Shena Hartmann is a 73 year old female, currently day +28 of her therapy (Carvykti).     INTERVAL  HISTORY      Overall doing well.less tired, recovering, no infectious or new sy's.  Still occasional diarrhea.    No belly pain or fever. Wt is stable, eating ok.   No new issues.     Review of Systems: 10 point ROS negative except as noted above.    PHYSICAL EXAM     Vitals:    08/26/24 1326   BP: 134/64   Pulse: 90   Resp: 19   Temp: 98.5  F (36.9  C)   TempSrc: Oral   SpO2: 97%   Weight: 54.2 kg (119 lb 6.4 oz)   ECOG 0  General: NAD   Eyes:  KYAW, sclera anicteric   Lungs: CTA anteriorly   Cardiovascular: RRR.   Lymphatics: no edema  Skin: no rashes or petechiae  Neuro: A&O, no focal deficits  Port-a-cath not accessed     LABS AND IMAGING: I have assessed all abnormal lab values for their clinical significance and any values considered clinically significant have been addressed in the assessment and plan.      Lab Results   Component Value Date    WBC 3.5 (L) 08/26/2024    ANEU 2.2 08/19/2024    HGB 10.2 (L) 08/26/2024    HCT 30.5 (L) 08/26/2024    PLT 47 (LL) 08/26/2024     08/26/2024    POTASSIUM 3.9 08/26/2024    CHLORIDE 98 08/26/2024    CO2 28 08/26/2024     (H) 08/26/2024    BUN 11.0 08/26/2024    CR 0.73 08/26/2024    MAG 1.9 08/26/2024    INR 1.02 08/26/2024   ANC 2.2     Latest Reference Range & Units 08/08/24 12:16   Albumin Fraction 3.7 - 5.1 g/dL 3.9   Alpha 1 Fraction 0.2 - 0.4 g/dL 0.3   Alpha 2 Fraction 0.5 - 0.9 g/dL 0.7   Beta Fraction 0.6 - 1.0 g/dL 0.6   ELP Interpretation:  Monoclonal protein (0.4 g/dL) seen in the gamma fraction. 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 Slight hypogammaglobulinemia. Pathologic significance requires clinical correlation. LIZZIE Broderick M.D., Ph.D., Pathologist.   Gamma Fraction 0.7 - 1.6 g/dL 0.6 (L)   [place] Free Lt Chain 0.33 - 1.94 mg/dL 1.43   Kappa Lambda Ratio 0.26 - 1.65  6.81 (H)   Lambda Free Lt Chain 0.57 - 2.63 mg/dL 0.21 (L)   Monoclonal Peak <=0.0 g/dL 0.4 (H)   Total Protein Serum for ELP 6.4 - 8.3 g/dL 6.1 (L)   (L): Data is abnormally low  (H): Data is abnormally high    SYSTEMS-BASED ASSESSMENT AND PLAN     Shena Hartmann is a 73 year old female, currently day +28 of CAR-T therapy with Carvykti. Day 0=7/29/2024     ONC - R/R MM  - BMT doc/Coordinator: Scott/Eva  - Cell Therapy Product: Carvykti.   - Disease: multiple myeloma  - Package insert: https://www.fda.gov/media/642629/download  - day 28 restaging - IN     CRS: none to date  ICANS: none to date     HEME/COAG  - Pancytopenia: appears having bimodal drop. Confirmed no ASA or ibuprofen use. Not on a blood thinner    Plt 46K - monitor weekly   ANC normal      ID  - Infection prophylaxis: continue acyclovir.   Pentamidine was administered on 7/24/2024. Next dose scheduled 8/26 and monthly given low plts.        GI  - Zofran, compazine PRN for nausea and vomiting     RENAL/  - Monitor cr/lytes      ENDOCRINE  # Hypothyroidism:  levothyroxine 50 mcg daily       RTC: transition to [facility]  Weekly cbc for now.   WANDA visit q 2 weeks   Day 100 anniversary     Total of 30 minutes on patient visit, reviewing records, interpreting test results, placing orders, and documentation on the day of service.    The longitudinal plan of care for the diagnosis(es)/condition(s) as documented were addressed during this visit. Due to the added complexity in care, I will continue to support Shena in the subsequent management and with ongoing continuity of care.     Cellular Therapy Anniversary  "Visit    Shena Hartmann is a(n) 73 year old patient with a history of Multiple Myeloma, who is here for a Day +28 anniversary visit post cellular therapy with Carvykti as second line therapy.    The patient's restaging included SPEP and Free Light Chains    The patient's disease status is currently partial remission    Patients are at risk post-CarT therapy for ongoing cytopenias, hypogammaglobulinemia, and infections.    Pertinent labs:  Lab Results   Component Value Date    WBC 3.5 08/26/2024    WBC 4.7 07/05/2021     Lab Results   Component Value Date    RBC 3.12 08/26/2024    RBC 3.90 07/05/2021     Lab Results   Component Value Date    HGB 10.2 08/26/2024    HGB 13.0 07/05/2021     Lab Results   Component Value Date    HCT 30.5 08/26/2024    HCT 38.1 07/05/2021     No components found for: \"MCT\"  Lab Results   Component Value Date    MCV 98 08/26/2024    MCV 98 07/05/2021     Lab Results   Component Value Date    MCH 32.7 08/26/2024    MCH 33.3 07/05/2021     Lab Results   Component Value Date    MCHC 33.4 08/26/2024    MCHC 34.1 07/05/2021     Lab Results   Component Value Date    RDW 16.1 08/26/2024    RDW 15.3 07/05/2021     Lab Results   Component Value Date    PLT 47 08/26/2024     07/05/2021     IGG   Date Value Ref Range Status   09/21/2020 2,404 (H) 610 - 1,616 mg/dL Final     Immunoglobulin G   Date Value Ref Range Status   07/08/2024 757 610 - 1,616 mg/dL Final     Absolute CD4, [place] T Cells   Date Value Ref Range Status   05/15/2024 326 (L) 441 - 2,156 cells/uL Final       Management recommendations:    CBC weekly at [facility].     Marsha Morgan MD      Again, thank you for allowing me to participate in the care of your patient.        Sincerely,         BMT Auto Cell Therapy  "

## 2024-08-26 NOTE — NURSING NOTE
"Chief Complaint   Patient presents with    Port Draw     Labs drawn via port by RN.      Labs drawn via port by RN. Port accessed with 20G 3/4\" power needle. Flushed with NS and heparin. De-accessed. Pt tolerated well. Vitals taken. Pt checked in for next appointment.    Lizzy Gonzalez, RN  "

## 2024-08-27 LAB
ALBUMIN SERPL ELPH-MCNC: 4.1 G/DL (ref 3.7–5.1)
ALPHA1 GLOB SERPL ELPH-MCNC: 0.3 G/DL (ref 0.2–0.4)
ALPHA2 GLOB SERPL ELPH-MCNC: 0.6 G/DL (ref 0.5–0.9)
B-GLOBULIN SERPL ELPH-MCNC: 0.6 G/DL (ref 0.6–1)
B2 MICROGLOB TUMOR MARKER SER-MCNC: 1.9 MG/L
CMV DNA SPEC NAA+PROBE-ACNC: NOT DETECTED IU/ML
GAMMA GLOB SERPL ELPH-MCNC: 0.4 G/DL (ref 0.7–1.6)
IGG SERPL-MCNC: 502 MG/DL (ref 610–1616)
KAPPA LC FREE SER-MCNC: <0.06 MG/DL (ref 0.33–1.94)
KAPPA LC FREE/LAMBDA FREE SER NEPH: ABNORMAL {RATIO}
LAMBDA LC FREE SERPL-MCNC: <0.13 MG/DL (ref 0.57–2.63)
LOCATION OF TASK: ABNORMAL
LOCATION OF TASK: NORMAL
M PROTEIN SERPL ELPH-MCNC: 0.4 G/DL
PROT PATTERN SERPL ELPH-IMP: ABNORMAL
PROT PATTERN SERPL IFE-IMP: NORMAL

## 2024-08-28 ENCOUNTER — OFFICE VISIT (OUTPATIENT)
Dept: DERMATOLOGY | Facility: CLINIC | Age: 74
End: 2024-08-28
Payer: MEDICARE

## 2024-08-28 DIAGNOSIS — L81.4 SOLAR LENTIGO: ICD-10-CM

## 2024-08-28 DIAGNOSIS — D18.01 CHERRY ANGIOMA: ICD-10-CM

## 2024-08-28 DIAGNOSIS — L82.1 SK (SEBORRHEIC KERATOSIS): ICD-10-CM

## 2024-08-28 DIAGNOSIS — L30.9 CHRONIC DERMATITIS OF HANDS: ICD-10-CM

## 2024-08-28 DIAGNOSIS — R21 RASH: ICD-10-CM

## 2024-08-28 DIAGNOSIS — D22.9 MULTIPLE BENIGN NEVI: Primary | ICD-10-CM

## 2024-08-28 PROCEDURE — 99213 OFFICE O/P EST LOW 20 MIN: CPT | Performed by: DERMATOLOGY

## 2024-08-28 RX ORDER — TRIAMCINOLONE ACETONIDE 1 MG/G
CREAM TOPICAL 2 TIMES DAILY PRN
Qty: 15 G | Refills: 11 | Status: SHIPPED | OUTPATIENT
Start: 2024-08-28

## 2024-08-28 RX ORDER — CLOBETASOL PROPIONATE 0.5 MG/G
OINTMENT TOPICAL 2 TIMES DAILY PRN
Qty: 45 G | Refills: 0 | Status: SHIPPED | OUTPATIENT
Start: 2024-08-28

## 2024-08-28 ASSESSMENT — PAIN SCALES - GENERAL: PAINLEVEL: NO PAIN (0)

## 2024-08-28 NOTE — NURSING NOTE
Dermatology Rooming Note    Shena Hartmann's goals for this visit include:   Chief Complaint   Patient presents with    Derm Problem     FBSE- no certain spots of concern. Nail fungus.      KIMBERLY Bryant

## 2024-08-28 NOTE — LETTER
8/28/2024       RE: Shena Hartmann  [address] [place] [address]     Dear Colleague,    Thank you for referring your patient, Shena Hartmann, to the [facility] [place] at [facility]. Please see a copy of my visit note below.    [facility] Dermatology Note  Encounter Date: Aug 28, 2024  Office Visit     Dermatology Problem List:  1. History of stem cell transplant for multiple myeloma on CAR- T cell therapy at present  2. Possible phototoxic/photoallergic reaction to thalidomide or pamolidomide, resolved  3. Chronic contact dermatitis to soaps/disinfectant, hands  - Derm Allergy referral ordered 8/29/23, clobetasol ointment BID prn for flares- appt in 2025  4. Atypical melanocytic nevus left upper leg s/p biopsy   5. Cherry angiomas  6. Seborrheic keratoses  7. Longitudinal ridging, onycholysis - bilateral 5th fingernails  8. Lesions to monitor - R forearm/R shin (favor BLK's) -     ____________________________________________    Assessment & Plan:     # Dermatitis- both contact and drug induced- not currently active but when flares, patient finds the clobetasol and triamcinolone helpful.  Had to reschedule patient testing due to family and health issues.  Planned for Spring 2025  - refilled topical sterioids.  Patient is undergoing CAR- T cell therapy at present.  Will review with Onc before starting creams if needed due to red flag warning with steroids and therapy    # Benign skin findings:  nevi, seborrheic keratoses, cherry angiomas, and lentigo  - Waxy pink patch right forearm- still looks like BLK  - Waxy slightly pigmented patch right shin ( no atypia on dermoscopy)- still suspect BLK  - discussed bothersome SK but would wait to treat until therapy complete and blood counts improved.     # Toenail fungus- potential for treatment after completes therapy.       Follow-up: 6 month(s) in-person, or  earlier for new or changing lesions    Staff:     Neela Mcknight MD  ____________________________________________    CC: Derm Problem (FBSE- no certain spots of concern. Nail fungus. )    HPI:  Ms. Shena Hartmann is a(n) 73 year old female who presents today as a return patient for a skin check.  Currently undergoing CAR-T cell therapy with low blood counts.  Notes bothersome spots on back.  No tender or bleeding lesions.  Toenail fungus that she covers with polish.     Patient is otherwise feeling well, without additional skin concerns.     Labs Reviewed:  N/A    Physical Exam:  Vitals: LMP  (LMP Unknown)   SKIN: Total skin excluding the undergarment areas but including buttocs was performed. The exam included the head/face, neck, both arms, chest, back, abdomen, both legs, digits and/or nails.   - waxy stuck on papules  - cherry angiomas  - nevi with no atypia on dermoscopy  - lentigo  - waxy pink papule right forearm- no atypia  - waxy slightly pigmented patch right shin- no atypia on   dermoscopy  - painted toenails   - No other lesions of concern on areas examined.     Medications:  Current Outpatient Medications   Medication Sig Dispense Refill     acetaminophen (TYLENOL) 325 MG tablet Take 325-650 mg by mouth every 6 hours as needed for mild pain       acyclovir (ZOVIRAX) 400 MG tablet Take 2 tablets (800 mg) by mouth every 12 hours       amLODIPine (NORVASC) 2.5 MG tablet Take 2 tablets (5 mg) by mouth daily Take 5 mg (2 tablets) in the morning. 180 tablet 3     Ascorbic Acid (VITAMIN C PO) Take 1,000 mg by mouth 2 times daily        CALCIUM-VITAMIN D-VITAMIN K PO Take 2 tablets by mouth daily.       clobetasol (TEMOVATE) 0.05 % external ointment Apply topically 2 times daily as needed (itching and rash) Topically to rash on hands until resolved 45 g 0     COENZYME Q-10 PO Take 100 mg by mouth daily        Cyanocobalamin 1000 MCG/ML LIQD Take 500 mcg by mouth 2 times daily       levothyroxine  (SYNTHROID/LEVOTHROID) 50 MCG tablet Take 1 tablet (50 mcg) by mouth daily 90 tablet 0     MAGNESIUM PO Take 235 mg by mouth 3 times daily       Multiple Vitamins-Minerals (MULTIVITAMIN OR) Take 1 tablet by mouth 2 times daily.       ondansetron (ZOFRAN) 4 MG tablet Take 1 tablet (4 mg) by mouth every 8 hours as needed for nausea 30 tablet 0     order for DME 6 mastectomy bras  Length of need: 99 months 6 Device 1     order for DME R mastectomy prosthesis   Length of need:  99 months 1 Device 1     Probiotic Product (PROBIOTIC PO) Take 1 capsule by mouth daily. Theralac Pro Probiotic       Quercetin 500 MG CAPS Take 500 mg by mouth daily       saccharomyces boulardii (SACCHAROMYCIN DF) 250 MG capsule Take 250 mg by mouth daily. Hold until after day +14 and ANC > 1.0       triamcinolone (KENALOG) 0.1 % external cream Apply topically 2 times daily as needed for irritation.       ZINC PICOLINATE PO Take 30 mg by mouth daily       No current facility-administered medications for this visit.     Facility-Administered Medications Ordered in Other Visits   Medication Dose Route Frequency Provider Last Rate Last Admin     potassium chloride 20 mEq in 50 mL IVPB  20 mEq Intravenous Once Mae Kay PA-C         potassium chloride SA (K-DUR,KLOR-CON M) CR tablet 20 mEq  20 mEq Oral Once Mae Kay PA-C          Past Medical History:   Patient Active Problem List   Diagnosis     Pain in thoracic spine     Multiple myeloma, dx 2002,  s/p ASCBMT 3/26/14     Personal history of malignant neoplasm of breast     Seasonal allergies     Osteoporosis     Primary hypertension     Stem cells transplant status (H)     Adverse effect of other drugs, medicaments and biological substances, sequela     Acquired hypothyroidism     Other amyloidosis (H)     Examination of participant in clinical trial     Multiple myeloma not having achieved remission (H)     Past Medical History:   Diagnosis Date     Acquired  hypothyroidism 8/10/2023     Breast cancer (H) 01/01/1994    right     H/O stem cell transplant (H) 03/01/2014     History of blood transfusion 2013 & 2014    During stem cell tx process     Hypertension Sept. 2021    Runs 140 s systolically, about 20 above my usual     Multiple myeloma, without mention of having achieved remission 11/06/2012       CC Referred Self, MD  No address on file on close of this encounter.      Again, thank you for allowing me to participate in the care of your patient.      Sincerely,    Neela Mcknight MD

## 2024-08-28 NOTE — PROGRESS NOTES
[facility] Dermatology Note  Encounter Date: Aug 28, 2024  Office Visit     Dermatology Problem List:  1. History of stem cell transplant for multiple myeloma on CAR- T cell therapy at present  2. Possible phototoxic/photoallergic reaction to thalidomide or pamolidomide, resolved  3. Chronic contact dermatitis to soaps/disinfectant, hands  - Derm Allergy referral ordered 8/29/23, clobetasol ointment BID prn for flares- appt in 2025  4. Atypical melanocytic nevus left upper leg s/p biopsy   5. Cherry angiomas  6. Seborrheic keratoses  7. Longitudinal ridging, onycholysis - bilateral 5th fingernails  8. Lesions to monitor - R forearm/R shin (favor BLK's) -     ____________________________________________    Assessment & Plan:     # Dermatitis- both contact and drug induced- not currently active but when flares, patient finds the clobetasol and triamcinolone helpful.  Had to reschedule patient testing due to family and health issues.  Planned for Spring 2025  - refilled topical sterioids.  Patient is undergoing CAR- T cell therapy at present.  Will review with Onc before starting creams if needed due to red flag warning with steroids and therapy    # Benign skin findings:  nevi, seborrheic keratoses, cherry angiomas, and lentigo  - Waxy pink patch right forearm- still looks like BLK  - Waxy slightly pigmented patch right shin ( no atypia on dermoscopy)- still suspect BLK  - discussed bothersome SK but would wait to treat until therapy complete and blood counts improved.     # Toenail fungus- potential for treatment after completes therapy.       Follow-up: 6 month(s) in-person, or earlier for new or changing lesions    Staff:     Neela Mcknight MD  ____________________________________________    CC: Derm Problem (FBSE- no certain spots of concern. Nail fungus. )    HPI:  Ms. Shena Hartmann is a(n) 73 year old female who presents today as a return patient for a skin check.  Currently undergoing  CAR-T cell therapy with low blood counts.  Notes bothersome spots on back.  No tender or bleeding lesions.  Toenail fungus that she covers with polish.     Patient is otherwise feeling well, without additional skin concerns.     Labs Reviewed:  N/A    Physical Exam:  Vitals: LMP  (LMP Unknown)   SKIN: Total skin excluding the undergarment areas but including buttocs was performed. The exam included the head/face, neck, both arms, chest, back, abdomen, both legs, digits and/or nails.   - waxy stuck on papules  - cherry angiomas  - nevi with no atypia on dermoscopy  - lentigo  - waxy pink papule right forearm- no atypia  - waxy slightly pigmented patch right shin- no atypia on   dermoscopy  - painted toenails   - No other lesions of concern on areas examined.     Medications:  Current Outpatient Medications   Medication Sig Dispense Refill    acetaminophen (TYLENOL) 325 MG tablet Take 325-650 mg by mouth every 6 hours as needed for mild pain      acyclovir (ZOVIRAX) 400 MG tablet Take 2 tablets (800 mg) by mouth every 12 hours      amLODIPine (NORVASC) 2.5 MG tablet Take 2 tablets (5 mg) by mouth daily Take 5 mg (2 tablets) in the morning. 180 tablet 3    Ascorbic Acid (VITAMIN C PO) Take 1,000 mg by mouth 2 times daily       CALCIUM-VITAMIN D-VITAMIN K PO Take 2 tablets by mouth daily.      clobetasol (TEMOVATE) 0.05 % external ointment Apply topically 2 times daily as needed (itching and rash) Topically to rash on hands until resolved 45 g 0    COENZYME Q-10 PO Take 100 mg by mouth daily       Cyanocobalamin 1000 MCG/ML LIQD Take 500 mcg by mouth 2 times daily      levothyroxine (SYNTHROID/LEVOTHROID) 50 MCG tablet Take 1 tablet (50 mcg) by mouth daily 90 tablet 0    MAGNESIUM PO Take 235 mg by mouth 3 times daily      Multiple Vitamins-Minerals (MULTIVITAMIN OR) Take 1 tablet by mouth 2 times daily.      ondansetron (ZOFRAN) 4 MG tablet Take 1 tablet (4 mg) by mouth every 8 hours as needed for nausea 30 tablet 0     order for DME 6 mastectomy bras  Length of need: 99 months 6 Device 1    order for DME R mastectomy prosthesis   Length of need:  99 months 1 Device 1    Probiotic Product (PROBIOTIC PO) Take 1 capsule by mouth daily. Theralac Pro Probiotic      Quercetin 500 MG CAPS Take 500 mg by mouth daily      saccharomyces boulardii (SACCHAROMYCIN DF) 250 MG capsule Take 250 mg by mouth daily. Hold until after day +14 and ANC > 1.0      triamcinolone (KENALOG) 0.1 % external cream Apply topically 2 times daily as needed for irritation.      ZINC PICOLINATE PO Take 30 mg by mouth daily       No current facility-administered medications for this visit.     Facility-Administered Medications Ordered in Other Visits   Medication Dose Route Frequency Provider Last Rate Last Admin    potassium chloride 20 mEq in 50 mL IVPB  20 mEq Intravenous Once Mae Kay PA-C        potassium chloride SA (K-DUR,KLOR-CON M) CR tablet 20 mEq  20 mEq Oral Once Mae Kay PA-C          Past Medical History:   Patient Active Problem List   Diagnosis    Pain in thoracic spine    Multiple myeloma, dx 2002,  s/p ASCBMT 3/26/14    Personal history of malignant neoplasm of breast    Seasonal allergies    Osteoporosis    Primary hypertension    Stem cells transplant status (H)    Adverse effect of other drugs, medicaments and biological substances, sequela    Acquired hypothyroidism    Other amyloidosis (H)    Examination of participant in clinical trial    Multiple myeloma not having achieved remission (H)     Past Medical History:   Diagnosis Date    Acquired hypothyroidism 8/10/2023    Breast cancer (H) 01/01/1994    right    H/O stem cell transplant (H) 03/01/2014    History of blood transfusion 2013 & 2014    During stem cell tx process    Hypertension Sept. 2021    Runs 140 s systolically, about 20 above my usual    Multiple myeloma, without mention of having achieved remission 11/06/2012       CC Referred Self, MD  No  address on file on close of this encounter.

## 2024-09-04 NOTE — PROGRESS NOTES
[facility]    Hematology/Oncology Clinic Note    Sep 5, 2024    Reason for Office Visit   Transition care back to [facility] following CAR-T therapy      Cancer Diagnosis   Multiple Myeloma    Oncology History of Present Illness   Breast cancer dx in 1994. Underwent surgery and chemotherapy without reoccurence  MGUS dx 1999  T8 pathologic fracture with radiation  Revlimid and velcade,   2013  Cytoxan IV 9/2013  D-PACE 11/2013  Auto 3/27/14  5/2014 thalidomide caused rash so switched to pomalidomide   on kenalog and clobetasol creams for IMID rash  FLC began to rise so riky added to pom 9/2020  she has been on xgeva for an unclear amount of time  Teodora-Kd 2/20/2023  Teodora maintenance Nov 2023  Spring 2024 FLC began to rise again  5/22/24 Underwent apheresis for CAR-T  7/29/24 Carvykti (CAR-T)    Interval History   Has added some supplements back in - adaptinol, glutathione twice daily, arabinogalactan larch tree extract  Occasional loose stools -decreased magnesium down to twice daily (down from three times daily)  Fatigue persists  Denies recent illness, headaches, rash, fevers, cough, sinus congestion, SOB at rest, CP      ROS neg other than the symptoms noted above in the HPI.        Past Medical History     Past Medical History:   Diagnosis Date    Acquired hypothyroidism 8/10/2023    Breast cancer (H) 01/01/1994    right    H/O stem cell transplant (H) 03/01/2014    History of blood transfusion 2013 & 2014    During stem cell tx process    Hypertension Sept. 2021    Runs 140 s systolically, about 20 above my usual    Multiple myeloma, without mention of having achieved remission 11/06/2012       Past Surgical History:      Past Surgical History:   Procedure Laterality Date    BIOPSY  10/1994; [id number]    Lt. Breast in '94; multiple bone marrow bx's for MM    BREAST SURGERY  10/1994    Lt. Mastectomy w/lymph node resection    COLONOSCOPY  11/2015    Normal results     EYE SURGERY      Bilateral cataract surgery    INSERT CATHETER VASCULAR ACCESS Left 2024    Procedure: central venous catheter non tunneled insertion, vascular access;  Surgeon: Ric Castaneda PA-C;  Location: [facility] OR    INSERT PORT VASCULAR ACCESS Left 2/15/2023    Procedure: INSERTION, VASCULAR ACCESS PORT;  Surgeon: Luc Antunez MD;  Location: [facility] OR    IR CHEST PORT PLACEMENT > 5 YRS OF AGE  2/15/2023    IR CVC NON TUNNEL LINE REMOVAL  2024    IR CVC NON TUNNEL PLACEMENT > 5 YRS  2024    IR PICC PLACEMENT > 5 YRS OF AGE  2024    MASTECTOMY      Right, with lymphe node disection      PICC INSERTION  09/10/2013    5fr DL Power PICC, 44cm (1cm external) in the L lateral brachial vein with tip in the low SVC    TRANSPLANT  3/27/2014    Autologous stem cell tx       Social History     Socioeconomic History    Marital status:    Tobacco Use    Smoking status: Never     Passive exposure: Never    Smokeless tobacco: Never   Substance and Sexual Activity    Alcohol use: Yes     Comment: occ    Drug use: No    Sexual activity: Not Currently     Partners: Male     Birth control/protection: Post-menopausal   Other Topics Concern    Parent/sibling w/ CABG, MI or angioplasty before 65F 55M? No     Service No    Blood Transfusions No    Caffeine Concern No    Occupational Exposure No    Hobby Hazards No     Family History     Family History   Problem Relation Age of Onset    Hypertension Mother     Cerebrovascular Disease Mother          8-11-15 from strokes    Hypertension Father     Prostate Cancer Father     Skin Cancer Paternal Grandmother     Cancer Paternal Grandmother         skin cancer       Allergies:     Allergies   Allergen Reactions    Blood Transfusion Related (Informational Only) Other (See Comments)     Patient has a history of a clinically significant antibody against RBC antigens.  A delay in compatible RBCs may occur.     Cayenne Pepper [Cayenne]      Corn-Containing Products GI Disturbance    Levaquin Other (See Comments)     Tendon pain    Milk Protein GI Disturbance    Mold      Facial rash/lip swelling    Morphine Sulfate Other (See Comments)     Per pt - see things (hallucination) when she was on Morphine .    Pollen Extract      Environmental allergies     Shrimp Nausea and Vomiting    Tomato      Lip swelling, facial rash    Azithromycin Rash    Keflex [Cephalexin] Rash    Oat Rash    Tape [Adhesive Tape] Itching and Rash     All adhesives    Wheat Rash     Swelling of lips         Medications:     Current Outpatient Medications   Medication Sig Dispense Refill    acetaminophen (TYLENOL) 325 MG tablet Take 325-650 mg by mouth every 6 hours as needed for mild pain      acyclovir (ZOVIRAX) 400 MG tablet Take 2 tablets (800 mg) by mouth every 12 hours      amLODIPine (NORVASC) 2.5 MG tablet Take 2 tablets (5 mg) by mouth daily Take 5 mg (2 tablets) in the morning. 180 tablet 3    Ascorbic Acid (VITAMIN C PO) Take 1,000 mg by mouth 2 times daily       CALCIUM-VITAMIN D-VITAMIN K PO Take 2 tablets by mouth daily.      clobetasol (TEMOVATE) 0.05 % external ointment Apply topically 2 times daily as needed (itching and rash). Topically to rash on hands until resolved 45 g 0    COENZYME Q-10 PO Take 100 mg by mouth daily       Cyanocobalamin 1000 MCG/ML LIQD Take 500 mcg by mouth 2 times daily      levothyroxine (SYNTHROID/LEVOTHROID) 50 MCG tablet Take 1 tablet (50 mcg) by mouth daily 90 tablet 0    MAGNESIUM PO Take 235 mg by mouth 3 times daily      Multiple Vitamins-Minerals (MULTIVITAMIN OR) Take 1 tablet by mouth 2 times daily.      ondansetron (ZOFRAN) 4 MG tablet Take 1 tablet (4 mg) by mouth every 8 hours as needed for nausea 30 tablet 0    order for DME 6 mastectomy bras  Length of need: 99 months 6 Device 1    order for DME R mastectomy prosthesis   Length of need:  99 months 1 Device 1    Probiotic Product (PROBIOTIC PO) Take 1 capsule by mouth daily.  Theralac Pro Probiotic      Quercetin 500 MG CAPS Take 500 mg by mouth daily      saccharomyces boulardii (SACCHAROMYCIN DF) 250 MG capsule Take 250 mg by mouth daily. Hold until after day +14 and ANC > 1.0      triamcinolone (KENALOG) 0.1 % external cream Apply topically 2 times daily as needed for irritation. 15 g 11    ZINC PICOLINATE PO Take 30 mg by mouth daily       No current facility-administered medications for this visit.     Facility-Administered Medications Ordered in Other Visits   Medication Dose Route Frequency Provider Last Rate Last Admin    potassium chloride 20 mEq in 50 mL IVPB  20 mEq Intravenous Once Mae Kay PA-C        potassium chloride SA (K-DUR,KLOR-CON M) CR tablet 20 mEq  20 mEq Oral Once Mae Kay PA-C           Physical Exam   BP (!) 144/76   Pulse 89   Temp 98.1  F (36.7  C) (Oral)   Resp 16   Wt 53.9 kg (118 lb 14.4 oz)   LMP  (LMP Unknown)   SpO2 98%   BMI 21.88 kg/m      Wt Readings from Last 5 Encounters:   09/05/24 53.9 kg (118 lb 14.4 oz)   08/26/24 54.2 kg (119 lb 6.4 oz)   08/19/24 54 kg (119 lb 1.6 oz)   08/15/24 54.2 kg (119 lb 6.4 oz)   08/12/24 53 kg (116 lb 12.8 oz)     Gen: alert, pleasant and conversational, NAD  HEENT: NC/AT,EOMI w/ PERRL, anicteric sclera.   CV: normal S1,S2 with RRR no m/r/g  Resp: lungs CTA bilaterally with adequate air movement to bases. No wheezes or crackles  Ext: warm and well perfused, no edema or cyanosis  Skin: no concerning lesions or rashes  Neuro: A&Ox4, no lateralizing sx. Grossly nonfocal.  Psych: appropriate, reactive      Data     Most Recent 3 CBC's:  Recent Labs   Lab Test 08/26/24  1334 08/19/24  0754 08/15/24  0754 08/12/24  0733 08/11/24  0804   WBC 3.5* 3.2* 2.5* 2.5* 3.5*   HGB 10.2* 10.0* 10.1* 10.1* 10.0*   MCV 98 97 98 98 97   PLT 47* 89* 124* 134* 154   ANEUTAUTO 2.2  --   --  1.0* 1.4*     Most Recent 3 BMP's:  Recent Labs   Lab Test 08/26/24  1334 08/19/24  0754 08/15/24  0754     136 137   POTASSIUM 3.9 4.1 3.8   CHLORIDE 98 100 100   CO2 28 26 26   BUN 11.0 17.1 14.2   CR 0.73 0.79 0.73   ANIONGAP 11 10 11   PAULINE 9.4 9.1 9.1   * 95 93   PROTTOTAL 6.2* 6.0* 6.1*   ALBUMIN 4.1 3.9 4.0    Most Recent 3 LFT's:  Recent Labs   Lab Test 08/26/24  1334 08/19/24  0754 08/15/24  0754   AST 22 16 15   ALT 18 15 16   ALKPHOS 57 45 49   BILITOTAL 0.2 0.2 0.2    Most Recent 2 TSH and T4:  Recent Labs   Lab Test 02/19/24  1333 08/17/23  1349 10/03/22  0820 06/13/22  0822 03/21/22  0803   TSH 3.08 2.12   < > 9.47*  9.31* 5.79*   T4  --   --   --  0.99  0.97 0.83    < > = values in this interval not displayed.     I reviewed the above labs today myself and with the patient.    Assessment/Plan     Relapsed Multiple Myeloma  Started kyprolis/isatuximab/dex on 2/20/23.    Changed to Isatuximab maintenace 10mg/kg every 4 week starting 10/9/23  Rising light chains noted April 2024; cell therapy consult 4/26/24 with multiple options presented.    CAR-T therapy 7/29/24 with Carvykti  Xgeva every 6 months, last dose 5/31/24, next due Nov 2024; Likes to separate the chemo dosing from this.   Weekly labs for now to monitor platelets (36K today, no signs of bleeding)  WANDA visit monthly (but will see MD the next 2 months for 60 day and 100 day CAR-T visits)      HTN  Continue amlodipine 5 mg daily.  Followed by cardiology     Hypothyroidism  Remains on levothyroxine    ID Prophylaxis   Acyclovir 800 mg BID for HSV prophylaxis  Pentamidine monthly for 1 year following CAR-T - may consider changing to Bactrim once platelets recover    20 minutes spent on the date of the encounter doing chart review, review of test results, interpretation of tests, patient visit, and documentation     The longitudinal plan of care for the diagnosis(es)/condition(s) as documented were addressed during this visit. Due to the added complexity in care, I will continue to support Shena in the subsequent management and with ongoing  continuity of care.    MARINO Fuller CNP  [facility]  [address] [place] [address] [phone]

## 2024-09-05 ENCOUNTER — APPOINTMENT (OUTPATIENT)
Dept: LAB | Facility: CLINIC | Age: 74
End: 2024-09-05
Attending: STUDENT IN AN ORGANIZED HEALTH CARE EDUCATION/TRAINING PROGRAM
Payer: MEDICARE

## 2024-09-05 ENCOUNTER — ONCOLOGY VISIT (OUTPATIENT)
Dept: ONCOLOGY | Facility: CLINIC | Age: 74
End: 2024-09-05
Attending: STUDENT IN AN ORGANIZED HEALTH CARE EDUCATION/TRAINING PROGRAM
Payer: MEDICARE

## 2024-09-05 VITALS
RESPIRATION RATE: 16 BRPM | DIASTOLIC BLOOD PRESSURE: 76 MMHG | TEMPERATURE: 98.1 F | BODY MASS INDEX: 21.88 KG/M2 | OXYGEN SATURATION: 98 % | WEIGHT: 118.9 LBS | SYSTOLIC BLOOD PRESSURE: 144 MMHG | HEART RATE: 89 BPM

## 2024-09-05 DIAGNOSIS — Z79.899 ENCOUNTER FOR LONG-TERM (CURRENT) USE OF MEDICATIONS: ICD-10-CM

## 2024-09-05 DIAGNOSIS — C90.00 MULTIPLE MYELOMA NOT HAVING ACHIEVED REMISSION (H): ICD-10-CM

## 2024-09-05 DIAGNOSIS — C90.02 MULTIPLE MYELOMA IN RELAPSE (H): Primary | ICD-10-CM

## 2024-09-05 DIAGNOSIS — D69.6 THROMBOCYTOPENIA (H): ICD-10-CM

## 2024-09-05 LAB
BASOPHILS # BLD AUTO: ABNORMAL 10*3/UL
BASOPHILS # BLD MANUAL: 0 10E3/UL (ref 0–0.2)
BASOPHILS NFR BLD AUTO: ABNORMAL %
BASOPHILS NFR BLD MANUAL: 0 %
EOSINOPHIL # BLD AUTO: ABNORMAL 10*3/UL
EOSINOPHIL # BLD MANUAL: 0.1 10E3/UL (ref 0–0.7)
EOSINOPHIL NFR BLD AUTO: ABNORMAL %
EOSINOPHIL NFR BLD MANUAL: 2 %
ERYTHROCYTE [DISTWIDTH] IN BLOOD BY AUTOMATED COUNT: 17.4 % (ref 10–15)
HCT VFR BLD AUTO: 31.6 % (ref 35–47)
HGB BLD-MCNC: 10.6 G/DL (ref 11.7–15.7)
IMM GRANULOCYTES # BLD: ABNORMAL 10*3/UL
IMM GRANULOCYTES NFR BLD: ABNORMAL %
LYMPHOCYTES # BLD AUTO: ABNORMAL 10*3/UL
LYMPHOCYTES # BLD MANUAL: 1 10E3/UL (ref 0.8–5.3)
LYMPHOCYTES NFR BLD AUTO: ABNORMAL %
LYMPHOCYTES NFR BLD MANUAL: 36 %
MCH RBC QN AUTO: 32.9 PG (ref 26.5–33)
MCHC RBC AUTO-ENTMCNC: 33.5 G/DL (ref 31.5–36.5)
MCV RBC AUTO: 98 FL (ref 78–100)
MONOCYTES # BLD AUTO: ABNORMAL 10*3/UL
MONOCYTES # BLD MANUAL: 0.2 10E3/UL (ref 0–1.3)
MONOCYTES NFR BLD AUTO: ABNORMAL %
MONOCYTES NFR BLD MANUAL: 8 %
NEUTROPHILS # BLD AUTO: ABNORMAL 10*3/UL
NEUTROPHILS # BLD MANUAL: 1.5 10E3/UL (ref 1.6–8.3)
NEUTROPHILS NFR BLD AUTO: ABNORMAL %
NEUTROPHILS NFR BLD MANUAL: 54 %
NRBC # BLD AUTO: 0 10E3/UL
NRBC BLD AUTO-RTO: 0 /100
PLAT MORPH BLD: ABNORMAL
PLATELET # BLD AUTO: 36 10E3/UL (ref 150–450)
RBC # BLD AUTO: 3.22 10E6/UL (ref 3.8–5.2)
RBC MORPH BLD: ABNORMAL
WBC # BLD AUTO: 2.8 10E3/UL (ref 4–11)

## 2024-09-05 PROCEDURE — G0463 HOSPITAL OUTPT CLINIC VISIT: HCPCS | Performed by: REGISTERED NURSE

## 2024-09-05 PROCEDURE — 36591 DRAW BLOOD OFF VENOUS DEVICE: CPT | Performed by: REGISTERED NURSE

## 2024-09-05 PROCEDURE — 85007 BL SMEAR W/DIFF WBC COUNT: CPT | Performed by: REGISTERED NURSE

## 2024-09-05 PROCEDURE — 99214 OFFICE O/P EST MOD 30 MIN: CPT | Performed by: REGISTERED NURSE

## 2024-09-05 PROCEDURE — G2211 COMPLEX E/M VISIT ADD ON: HCPCS | Performed by: REGISTERED NURSE

## 2024-09-05 PROCEDURE — 85027 COMPLETE CBC AUTOMATED: CPT | Performed by: REGISTERED NURSE

## 2024-09-05 PROCEDURE — 250N000011 HC RX IP 250 OP 636: Performed by: REGISTERED NURSE

## 2024-09-05 RX ORDER — HEPARIN SODIUM (PORCINE) LOCK FLUSH IV SOLN 100 UNIT/ML 100 UNIT/ML
5 SOLUTION INTRAVENOUS ONCE
Status: COMPLETED | OUTPATIENT
Start: 2024-09-05 | End: 2024-09-05

## 2024-09-05 RX ORDER — ACYCLOVIR 800 MG/1
800 TABLET ORAL EVERY 12 HOURS
Qty: 180 TABLET | Refills: 3 | Status: SHIPPED | OUTPATIENT
Start: 2024-09-05

## 2024-09-05 RX ADMIN — Medication 5 ML: at 15:20

## 2024-09-05 ASSESSMENT — PAIN SCALES - GENERAL: PAINLEVEL: NO PAIN (0)

## 2024-09-05 NOTE — NURSING NOTE
Chief Complaint   Patient presents with    Blood Draw     Port blood draw by lab RN       Port accessed with blood draw and heparin flush by lab RN. Vitals taken and next appointment arrived.    Nadia Thomas RN

## 2024-09-05 NOTE — NURSING NOTE
"Oncology Rooming Note    September 5, 2024 3:24 PM   Shena Hartmann is a 73 year old female who presents for:    Chief Complaint   Patient presents with    Blood Draw     Port blood draw by lab RN    Oncology Clinic Visit     Multiple myeloma      Initial Vitals: BP (!) 144/76   Pulse 89   Temp 98.1  F (36.7  C) (Oral)   Resp 16   Wt 53.9 kg (118 lb 14.4 oz)   LMP  (LMP Unknown)   SpO2 98%   BMI 21.88 kg/m   Estimated body mass index is 21.88 kg/m  as calculated from the following:    Height as of 7/29/24: 1.57 m (5' 1.81\").    Weight as of this encounter: 53.9 kg (118 lb 14.4 oz). Body surface area is 1.53 meters squared.  No Pain (0) Comment: Data Unavailable   No LMP recorded (lmp unknown). Patient is postmenopausal.  Allergies reviewed: Yes  Medications reviewed: Yes    Medications: MEDICATION REFILLS NEEDED TODAY. Provider was notified.  Pharmacy name entered into [employer]:    [id number] - [place], [place] - [address] AT [facility] [address]  WRITTEN PRESCRIPTION REQUESTED  [facility] - [place], [place] - [address]    Frailty Screening:   Is the patient here for a new oncology consult visit in cancer care? 2. No      Clinical concerns: possible refill Acyclovir.   Selena  was notified.      Nichole Goel"

## 2024-09-05 NOTE — LETTER
9/5/2024      Shena Hartmann  [address] [place] [address]      Dear Colleague,    Thank you for referring your patient, Shena Hartmann, to the [facility]. Please see a copy of my visit note below.        [facility]    Hematology/Oncology Clinic Note    Sep 5, 2024    Reason for Office Visit   Transition care back to [facility] following CAR-T therapy      Cancer Diagnosis   Multiple Myeloma    Oncology History of Present Illness   Breast cancer dx in 1994. Underwent surgery and chemotherapy without reoccurence  MGUS dx 1999  T8 pathologic fracture with radiation  Revlimid and velcade,   2013  Cytoxan IV 9/2013  D-PACE 11/2013  Auto 3/27/14  5/2014 thalidomide caused rash so switched to pomalidomide   on kenalog and clobetasol creams for IMID rash  FLC began to rise so riky added to pom 9/2020  she has been on xgeva for an unclear amount of time  Teodora-Kd 2/20/2023  Teodora maintenance Nov 2023  Spring 2024 FLC began to rise again  5/22/24 Underwent apheresis for CAR-T  7/29/24 Carvykti (CAR-T)    Interval History   Has added some supplements back in - adaptinol, glutathione twice daily, arabinogalactan larch tree extract  Occasional loose stools -decreased magnesium down to twice daily (down from three times daily)  Fatigue persists  Denies recent illness, headaches, rash, fevers, cough, sinus congestion, SOB at rest, CP      ROS neg other than the symptoms noted above in the HPI.        Past Medical History     Past Medical History:   Diagnosis Date     Acquired hypothyroidism 8/10/2023     Breast cancer (H) 01/01/1994    right     H/O stem cell transplant (H) 03/01/2014     History of blood transfusion 2013 & 2014    During stem cell tx process     Hypertension Sept. 2021    Runs 140 s systolically, about 20 above my usual     Multiple myeloma, without mention of having achieved remission 11/06/2012       Past Surgical History:       Past Surgical History:   Procedure Laterality Date     BIOPSY  10/1994; [id number]    Lt. Breast in ; multiple bone marrow bx's for MM     BREAST SURGERY  10/1994    Lt. Mastectomy w/lymph node resection     COLONOSCOPY  2015    Normal results     EYE SURGERY      Bilateral cataract surgery     INSERT CATHETER VASCULAR ACCESS Left 2024    Procedure: central venous catheter non tunneled insertion, vascular access;  Surgeon: Ric Castaneda PA-C;  Location: [facility] OR     INSERT PORT VASCULAR ACCESS Left 2/15/2023    Procedure: INSERTION, VASCULAR ACCESS PORT;  Surgeon: Luc Antunez MD;  Location: [facility] OR     IR CHEST PORT PLACEMENT > 5 YRS OF AGE  2/15/2023     IR CVC NON TUNNEL LINE REMOVAL  2024     IR CVC NON TUNNEL PLACEMENT > 5 YRS  2024     IR PICC PLACEMENT > 5 YRS OF AGE  2024     MASTECTOMY      Right, with lymphe node disection       PICC INSERTION  09/10/2013    5fr DL Power PICC, 44cm (1cm external) in the L lateral brachial vein with tip in the low SVC     TRANSPLANT  3/27/2014    Autologous stem cell tx       Social History     Socioeconomic History     Marital status:    Tobacco Use     Smoking status: Never     Passive exposure: Never     Smokeless tobacco: Never   Substance and Sexual Activity     Alcohol use: Yes     Comment: occ     Drug use: No     Sexual activity: Not Currently     Partners: Male     Birth control/protection: Post-menopausal   Other Topics Concern     Parent/sibling w/ CABG, MI or angioplasty before 65F 55M? No      Service No     Blood Transfusions No     Caffeine Concern No     Occupational Exposure No     Hobby Hazards No     Family History     Family History   Problem Relation Age of Onset     Hypertension Mother      Cerebrovascular Disease Mother          8-11-15 from strokes     Hypertension Father      Prostate Cancer Father      Skin Cancer Paternal Grandmother      Cancer Paternal Grandmother         skin cancer        Allergies:     Allergies   Allergen Reactions     Blood Transfusion Related (Informational Only) Other (See Comments)     Patient has a history of a clinically significant antibody against RBC antigens.  A delay in compatible RBCs may occur.      Cayenne Pepper [Cayenne]      Corn-Containing Products GI Disturbance     Levaquin Other (See Comments)     Tendon pain     Milk Protein GI Disturbance     Mold      Facial rash/lip swelling     Morphine Sulfate Other (See Comments)     Per pt - see things (hallucination) when she was on Morphine .     Pollen Extract      Environmental allergies      Shrimp Nausea and Vomiting     Tomato      Lip swelling, facial rash     Azithromycin Rash     Keflex [Cephalexin] Rash     Oat Rash     Tape [Adhesive Tape] Itching and Rash     All adhesives     Wheat Rash     Swelling of lips         Medications:     Current Outpatient Medications   Medication Sig Dispense Refill     acetaminophen (TYLENOL) 325 MG tablet Take 325-650 mg by mouth every 6 hours as needed for mild pain       acyclovir (ZOVIRAX) 400 MG tablet Take 2 tablets (800 mg) by mouth every 12 hours       amLODIPine (NORVASC) 2.5 MG tablet Take 2 tablets (5 mg) by mouth daily Take 5 mg (2 tablets) in the morning. 180 tablet 3     Ascorbic Acid (VITAMIN C PO) Take 1,000 mg by mouth 2 times daily        CALCIUM-VITAMIN D-VITAMIN K PO Take 2 tablets by mouth daily.       clobetasol (TEMOVATE) 0.05 % external ointment Apply topically 2 times daily as needed (itching and rash). Topically to rash on hands until resolved 45 g 0     COENZYME Q-10 PO Take 100 mg by mouth daily        Cyanocobalamin 1000 MCG/ML LIQD Take 500 mcg by mouth 2 times daily       levothyroxine (SYNTHROID/LEVOTHROID) 50 MCG tablet Take 1 tablet (50 mcg) by mouth daily 90 tablet 0     MAGNESIUM PO Take 235 mg by mouth 3 times daily       Multiple Vitamins-Minerals (MULTIVITAMIN OR) Take 1 tablet by mouth 2 times daily.       ondansetron (ZOFRAN) 4 MG  tablet Take 1 tablet (4 mg) by mouth every 8 hours as needed for nausea 30 tablet 0     order for DME 6 mastectomy bras  Length of need: 99 months 6 Device 1     order for DME R mastectomy prosthesis   Length of need:  99 months 1 Device 1     Probiotic Product (PROBIOTIC PO) Take 1 capsule by mouth daily. Theralac Pro Probiotic       Quercetin 500 MG CAPS Take 500 mg by mouth daily       saccharomyces boulardii (SACCHAROMYCIN DF) 250 MG capsule Take 250 mg by mouth daily. Hold until after day +14 and ANC > 1.0       triamcinolone (KENALOG) 0.1 % external cream Apply topically 2 times daily as needed for irritation. 15 g 11     ZINC PICOLINATE PO Take 30 mg by mouth daily       No current facility-administered medications for this visit.     Facility-Administered Medications Ordered in Other Visits   Medication Dose Route Frequency Provider Last Rate Last Admin     potassium chloride 20 mEq in 50 mL IVPB  20 mEq Intravenous Once Mae Kay PA-C         potassium chloride SA (K-DUR,KLOR-CON M) CR tablet 20 mEq  20 mEq Oral Once Mae Kay PA-C           Physical Exam   BP (!) 144/76   Pulse 89   Temp 98.1  F (36.7  C) (Oral)   Resp 16   Wt 53.9 kg (118 lb 14.4 oz)   LMP  (LMP Unknown)   SpO2 98%   BMI 21.88 kg/m      Wt Readings from Last 5 Encounters:   09/05/24 53.9 kg (118 lb 14.4 oz)   08/26/24 54.2 kg (119 lb 6.4 oz)   08/19/24 54 kg (119 lb 1.6 oz)   08/15/24 54.2 kg (119 lb 6.4 oz)   08/12/24 53 kg (116 lb 12.8 oz)     Gen: alert, pleasant and conversational, NAD  HEENT: NC/AT,EOMI w/ PERRL, anicteric sclera.   CV: normal S1,S2 with RRR no m/r/g  Resp: lungs CTA bilaterally with adequate air movement to bases. No wheezes or crackles  Ext: warm and well perfused, no edema or cyanosis  Skin: no concerning lesions or rashes  Neuro: A&Ox4, no lateralizing sx. Grossly nonfocal.  Psych: appropriate, reactive      Data     Most Recent 3 CBC's:  Recent Labs   Lab Test 08/26/24  1334  08/19/24  0754 08/15/24  0754 08/12/24  0733 08/11/24  0804   WBC 3.5* 3.2* 2.5* 2.5* 3.5*   HGB 10.2* 10.0* 10.1* 10.1* 10.0*   MCV 98 97 98 98 97   PLT 47* 89* 124* 134* 154   ANEUTAUTO 2.2  --   --  1.0* 1.4*     Most Recent 3 BMP's:  Recent Labs   Lab Test 08/26/24  1334 08/19/24  0754 08/15/24  0754    136 137   POTASSIUM 3.9 4.1 3.8   CHLORIDE 98 100 100   CO2 28 26 26   BUN 11.0 17.1 14.2   CR 0.73 0.79 0.73   ANIONGAP 11 10 11   PAULINE 9.4 9.1 9.1   * 95 93   PROTTOTAL 6.2* 6.0* 6.1*   ALBUMIN 4.1 3.9 4.0    Most Recent 3 LFT's:  Recent Labs   Lab Test 08/26/24  1334 08/19/24  0754 08/15/24  0754   AST 22 16 15   ALT 18 15 16   ALKPHOS 57 45 49   BILITOTAL 0.2 0.2 0.2    Most Recent 2 TSH and T4:  Recent Labs   Lab Test 02/19/24  1333 08/17/23  1349 10/03/22  0820 06/13/22  0822 03/21/22  0803   TSH 3.08 2.12   < > 9.47*  9.31* 5.79*   T4  --   --   --  0.99  0.97 0.83    < > = values in this interval not displayed.     I reviewed the above labs today myself and with the patient.    Assessment/Plan     Relapsed Multiple Myeloma  Started kyprolis/isatuximab/dex on 2/20/23.    Changed to Isatuximab maintenace 10mg/kg every 4 week starting 10/9/23  Rising light chains noted April 2024; cell therapy consult 4/26/24 with multiple options presented.    CAR-T therapy 7/29/24 with Carvykti  Xgeva every 6 months, last dose 5/31/24, next due Nov 2024; Likes to separate the chemo dosing from this.   Weekly labs for now to monitor platelets (36K today, no signs of bleeding)  WANDA visit monthly (but will see MD the next 2 months for 60 day and 100 day CAR-T visits)      HTN  Continue amlodipine 5 mg daily.  Followed by cardiology     Hypothyroidism  Remains on levothyroxine    ID Prophylaxis   Acyclovir 800 mg BID for HSV prophylaxis  Pentamidine monthly for 1 year following CAR-T - may consider changing to Bactrim once platelets recover    20 minutes spent on the date of the encounter doing chart review,  review of test results, interpretation of tests, patient visit, and documentation     The longitudinal plan of care for the diagnosis(es)/condition(s) as documented were addressed during this visit. Due to the added complexity in care, I will continue to support Shena in the subsequent management and with ongoing continuity of care.    MARINO Fuller CNP  [facility] [address] [address]  [phone]          Again, thank you for allowing me to participate in the care of your patient.        Sincerely,        MARINO Lee CNP

## 2024-09-06 RX ORDER — PENTAMIDINE ISETHIONATE 300 MG/300MG
300 INHALANT RESPIRATORY (INHALATION) ONCE
Status: CANCELLED
Start: 2024-10-01

## 2024-09-06 RX ORDER — ALBUTEROL SULFATE 5 MG/ML
2.5 SOLUTION RESPIRATORY (INHALATION) ONCE
Status: CANCELLED
Start: 2024-10-01 | End: 2024-10-01

## 2024-09-06 RX ORDER — ALBUTEROL SULFATE 5 MG/ML
2.5 SOLUTION RESPIRATORY (INHALATION) ONCE
Status: CANCELLED
Start: 2024-09-06 | End: 2024-09-06

## 2024-09-06 RX ORDER — PENTAMIDINE ISETHIONATE 300 MG/300MG
300 INHALANT RESPIRATORY (INHALATION) ONCE
Status: CANCELLED
Start: 2024-09-06

## 2024-09-12 ENCOUNTER — LAB (OUTPATIENT)
Dept: LAB | Facility: CLINIC | Age: 74
End: 2024-09-12
Attending: STUDENT IN AN ORGANIZED HEALTH CARE EDUCATION/TRAINING PROGRAM
Payer: MEDICARE

## 2024-09-12 DIAGNOSIS — Z79.899 ENCOUNTER FOR LONG-TERM (CURRENT) USE OF MEDICATIONS: ICD-10-CM

## 2024-09-12 DIAGNOSIS — C90.00 MULTIPLE MYELOMA NOT HAVING ACHIEVED REMISSION (H): ICD-10-CM

## 2024-09-12 LAB
ALBUMIN SERPL BCG-MCNC: 4.2 G/DL (ref 3.5–5.2)
ALP SERPL-CCNC: 56 U/L (ref 40–150)
ALT SERPL W P-5'-P-CCNC: 21 U/L (ref 0–50)
ANION GAP SERPL CALCULATED.3IONS-SCNC: 10 MMOL/L (ref 7–15)
AST SERPL W P-5'-P-CCNC: 22 U/L (ref 0–45)
BASOPHILS # BLD AUTO: 0 10E3/UL (ref 0–0.2)
BASOPHILS NFR BLD AUTO: 0 %
BILIRUB SERPL-MCNC: 0.2 MG/DL
BUN SERPL-MCNC: 12.6 MG/DL (ref 8–23)
CALCIUM SERPL-MCNC: 9.1 MG/DL (ref 8.8–10.4)
CHLORIDE SERPL-SCNC: 100 MMOL/L (ref 98–107)
CREAT SERPL-MCNC: 0.74 MG/DL (ref 0.51–0.95)
EGFRCR SERPLBLD CKD-EPI 2021: 85 ML/MIN/1.73M2
EOSINOPHIL # BLD AUTO: 0 10E3/UL (ref 0–0.7)
EOSINOPHIL NFR BLD AUTO: 1 %
ERYTHROCYTE [DISTWIDTH] IN BLOOD BY AUTOMATED COUNT: 18.1 % (ref 10–15)
GLUCOSE SERPL-MCNC: 94 MG/DL (ref 70–99)
HCO3 SERPL-SCNC: 26 MMOL/L (ref 22–29)
HCT VFR BLD AUTO: 31.1 % (ref 35–47)
HGB BLD-MCNC: 10.5 G/DL (ref 11.7–15.7)
IMM GRANULOCYTES # BLD: 0 10E3/UL
IMM GRANULOCYTES NFR BLD: 0 %
LYMPHOCYTES # BLD AUTO: 1 10E3/UL (ref 0.8–5.3)
LYMPHOCYTES NFR BLD AUTO: 37 %
MCH RBC QN AUTO: 33.2 PG (ref 26.5–33)
MCHC RBC AUTO-ENTMCNC: 33.8 G/DL (ref 31.5–36.5)
MCV RBC AUTO: 98 FL (ref 78–100)
MONOCYTES # BLD AUTO: 0.6 10E3/UL (ref 0–1.3)
MONOCYTES NFR BLD AUTO: 20 %
NEUTROPHILS # BLD AUTO: 1.2 10E3/UL (ref 1.6–8.3)
NEUTROPHILS NFR BLD AUTO: 42 %
NRBC # BLD AUTO: 0 10E3/UL
NRBC BLD AUTO-RTO: 0 /100
PLATELET # BLD AUTO: 35 10E3/UL (ref 150–450)
POTASSIUM SERPL-SCNC: 3.8 MMOL/L (ref 3.4–5.3)
PROT SERPL-MCNC: 6.2 G/DL (ref 6.4–8.3)
RBC # BLD AUTO: 3.16 10E6/UL (ref 3.8–5.2)
SODIUM SERPL-SCNC: 136 MMOL/L (ref 135–145)
WBC # BLD AUTO: 2.8 10E3/UL (ref 4–11)

## 2024-09-12 PROCEDURE — 250N000011 HC RX IP 250 OP 636: Performed by: STUDENT IN AN ORGANIZED HEALTH CARE EDUCATION/TRAINING PROGRAM

## 2024-09-12 PROCEDURE — 85025 COMPLETE CBC W/AUTO DIFF WBC: CPT

## 2024-09-12 PROCEDURE — 36591 DRAW BLOOD OFF VENOUS DEVICE: CPT

## 2024-09-12 PROCEDURE — 84155 ASSAY OF PROTEIN SERUM: CPT

## 2024-09-12 PROCEDURE — 84075 ASSAY ALKALINE PHOSPHATASE: CPT

## 2024-09-12 RX ORDER — HEPARIN SODIUM (PORCINE) LOCK FLUSH IV SOLN 100 UNIT/ML 100 UNIT/ML
5 SOLUTION INTRAVENOUS EVERY 8 HOURS
Status: DISCONTINUED | OUTPATIENT
Start: 2024-09-12 | End: 2024-09-18 | Stop reason: HOSPADM

## 2024-09-12 RX ADMIN — Medication 5 ML: at 15:30

## 2024-09-12 NOTE — NURSING NOTE
Chief Complaint   Patient presents with    Labs Only     Labs drawn via port by RN in lab.      Labs drawn via port by RN. Port accessed with 20g, 3/4in, power needle. Flushed with saline and heparin. Pt tolerated well.    Gianna Hammonds RN

## 2024-09-19 ENCOUNTER — LAB (OUTPATIENT)
Dept: LAB | Facility: CLINIC | Age: 74
End: 2024-09-19
Attending: STUDENT IN AN ORGANIZED HEALTH CARE EDUCATION/TRAINING PROGRAM
Payer: MEDICARE

## 2024-09-19 DIAGNOSIS — C90.00 MULTIPLE MYELOMA NOT HAVING ACHIEVED REMISSION (H): ICD-10-CM

## 2024-09-19 DIAGNOSIS — Z79.899 ENCOUNTER FOR LONG-TERM (CURRENT) USE OF MEDICATIONS: ICD-10-CM

## 2024-09-19 LAB
ALBUMIN SERPL BCG-MCNC: 4.1 G/DL (ref 3.5–5.2)
ALP SERPL-CCNC: 59 U/L (ref 40–150)
ALT SERPL W P-5'-P-CCNC: 23 U/L (ref 0–50)
ANION GAP SERPL CALCULATED.3IONS-SCNC: 10 MMOL/L (ref 7–15)
AST SERPL W P-5'-P-CCNC: 21 U/L (ref 0–45)
BASOPHILS # BLD AUTO: 0 10E3/UL (ref 0–0.2)
BASOPHILS NFR BLD AUTO: 0 %
BILIRUB SERPL-MCNC: <0.2 MG/DL
BUN SERPL-MCNC: 21 MG/DL (ref 8–23)
CALCIUM SERPL-MCNC: 9.3 MG/DL (ref 8.8–10.4)
CHLORIDE SERPL-SCNC: 101 MMOL/L (ref 98–107)
CREAT SERPL-MCNC: 0.86 MG/DL (ref 0.51–0.95)
EGFRCR SERPLBLD CKD-EPI 2021: 71 ML/MIN/1.73M2
EOSINOPHIL # BLD AUTO: 0 10E3/UL (ref 0–0.7)
EOSINOPHIL NFR BLD AUTO: 1 %
ERYTHROCYTE [DISTWIDTH] IN BLOOD BY AUTOMATED COUNT: 18.8 % (ref 10–15)
GLUCOSE SERPL-MCNC: 119 MG/DL (ref 70–99)
HCO3 SERPL-SCNC: 27 MMOL/L (ref 22–29)
HCT VFR BLD AUTO: 30.4 % (ref 35–47)
HGB BLD-MCNC: 10.2 G/DL (ref 11.7–15.7)
IMM GRANULOCYTES # BLD: 0 10E3/UL
IMM GRANULOCYTES NFR BLD: 0 %
LYMPHOCYTES # BLD AUTO: 1.3 10E3/UL (ref 0.8–5.3)
LYMPHOCYTES NFR BLD AUTO: 44 %
MCH RBC QN AUTO: 32.9 PG (ref 26.5–33)
MCHC RBC AUTO-ENTMCNC: 33.6 G/DL (ref 31.5–36.5)
MCV RBC AUTO: 98 FL (ref 78–100)
MONOCYTES # BLD AUTO: 0.6 10E3/UL (ref 0–1.3)
MONOCYTES NFR BLD AUTO: 18 %
NEUTROPHILS # BLD AUTO: 1.1 10E3/UL (ref 1.6–8.3)
NEUTROPHILS NFR BLD AUTO: 36 %
NRBC # BLD AUTO: 0 10E3/UL
NRBC BLD AUTO-RTO: 0 /100
PLAT MORPH BLD: NORMAL
PLATELET # BLD AUTO: 39 10E3/UL (ref 150–450)
POTASSIUM SERPL-SCNC: 4.1 MMOL/L (ref 3.4–5.3)
PROT SERPL-MCNC: 6 G/DL (ref 6.4–8.3)
RBC # BLD AUTO: 3.1 10E6/UL (ref 3.8–5.2)
RBC MORPH BLD: NORMAL
SODIUM SERPL-SCNC: 138 MMOL/L (ref 135–145)
WBC # BLD AUTO: 3 10E3/UL (ref 4–11)

## 2024-09-19 PROCEDURE — 82247 BILIRUBIN TOTAL: CPT

## 2024-09-19 PROCEDURE — 250N000011 HC RX IP 250 OP 636: Performed by: STUDENT IN AN ORGANIZED HEALTH CARE EDUCATION/TRAINING PROGRAM

## 2024-09-19 PROCEDURE — 85025 COMPLETE CBC W/AUTO DIFF WBC: CPT

## 2024-09-19 PROCEDURE — 36591 DRAW BLOOD OFF VENOUS DEVICE: CPT

## 2024-09-19 RX ORDER — HEPARIN SODIUM (PORCINE) LOCK FLUSH IV SOLN 100 UNIT/ML 100 UNIT/ML
5 SOLUTION INTRAVENOUS ONCE
Status: COMPLETED | OUTPATIENT
Start: 2024-09-19 | End: 2024-09-19

## 2024-09-19 RX ADMIN — Medication 5 ML: at 15:26

## 2024-09-19 NOTE — NURSING NOTE
Chief Complaint   Patient presents with    Port Draw     Labs drawn via port     Port accessed with 20g gripper needle by RN, labs collected, line flushed with saline and heparin.  Vitals taken. Pt checked in for appointment(s).     Ching KRAUS RN PHN BSN  BMT/Oncology Lab

## 2024-09-20 DIAGNOSIS — E03.9 ACQUIRED HYPOTHYROIDISM: ICD-10-CM

## 2024-09-20 RX ORDER — LEVOTHYROXINE SODIUM 50 UG/1
50 TABLET ORAL DAILY
Qty: 90 TABLET | Refills: 0 | Status: SHIPPED | OUTPATIENT
Start: 2024-09-20

## 2024-09-25 DIAGNOSIS — E03.9 ACQUIRED HYPOTHYROIDISM: ICD-10-CM

## 2024-09-25 RX ORDER — LEVOTHYROXINE SODIUM 50 UG/1
50 TABLET ORAL DAILY
Qty: 90 TABLET | Refills: 0 | OUTPATIENT
Start: 2024-09-25

## 2024-09-26 ENCOUNTER — LAB (OUTPATIENT)
Dept: LAB | Facility: CLINIC | Age: 74
End: 2024-09-26
Attending: STUDENT IN AN ORGANIZED HEALTH CARE EDUCATION/TRAINING PROGRAM
Payer: MEDICARE

## 2024-09-26 DIAGNOSIS — C90.00 MULTIPLE MYELOMA, REMISSION STATUS UNSPECIFIED (H): Primary | ICD-10-CM

## 2024-09-26 DIAGNOSIS — C90.00 MULTIPLE MYELOMA NOT HAVING ACHIEVED REMISSION (H): ICD-10-CM

## 2024-09-26 LAB
ALBUMIN SERPL BCG-MCNC: 4.2 G/DL (ref 3.5–5.2)
ALP SERPL-CCNC: 65 U/L (ref 40–150)
ALT SERPL W P-5'-P-CCNC: 28 U/L (ref 0–50)
ANION GAP SERPL CALCULATED.3IONS-SCNC: 10 MMOL/L (ref 7–15)
AST SERPL W P-5'-P-CCNC: 27 U/L (ref 0–45)
BASOPHILS # BLD AUTO: 0 10E3/UL (ref 0–0.2)
BASOPHILS NFR BLD AUTO: 0 %
BILIRUB SERPL-MCNC: <0.2 MG/DL
BUN SERPL-MCNC: 19.3 MG/DL (ref 8–23)
CALCIUM SERPL-MCNC: 9.3 MG/DL (ref 8.8–10.4)
CHLORIDE SERPL-SCNC: 101 MMOL/L (ref 98–107)
CREAT SERPL-MCNC: 0.83 MG/DL (ref 0.51–0.95)
EGFRCR SERPLBLD CKD-EPI 2021: 74 ML/MIN/1.73M2
EOSINOPHIL # BLD AUTO: 0 10E3/UL (ref 0–0.7)
EOSINOPHIL NFR BLD AUTO: 1 %
ERYTHROCYTE [DISTWIDTH] IN BLOOD BY AUTOMATED COUNT: 19.5 % (ref 10–15)
GLUCOSE SERPL-MCNC: 121 MG/DL (ref 70–99)
HCO3 SERPL-SCNC: 27 MMOL/L (ref 22–29)
HCT VFR BLD AUTO: 31.1 % (ref 35–47)
HGB BLD-MCNC: 10.3 G/DL (ref 11.7–15.7)
IMM GRANULOCYTES # BLD: 0 10E3/UL
IMM GRANULOCYTES NFR BLD: 0 %
LYMPHOCYTES # BLD AUTO: 1.6 10E3/UL (ref 0.8–5.3)
LYMPHOCYTES NFR BLD AUTO: 53 %
MCH RBC QN AUTO: 32.7 PG (ref 26.5–33)
MCHC RBC AUTO-ENTMCNC: 33.1 G/DL (ref 31.5–36.5)
MCV RBC AUTO: 99 FL (ref 78–100)
MONOCYTES # BLD AUTO: 0.6 10E3/UL (ref 0–1.3)
MONOCYTES NFR BLD AUTO: 20 %
NEUTROPHILS # BLD AUTO: 0.8 10E3/UL (ref 1.6–8.3)
NEUTROPHILS NFR BLD AUTO: 27 %
NRBC # BLD AUTO: 0 10E3/UL
NRBC BLD AUTO-RTO: 0 /100
PLAT MORPH BLD: NORMAL
PLATELET # BLD AUTO: 44 10E3/UL (ref 150–450)
POTASSIUM SERPL-SCNC: 4.3 MMOL/L (ref 3.4–5.3)
PROT SERPL-MCNC: 6.1 G/DL (ref 6.4–8.3)
RBC # BLD AUTO: 3.15 10E6/UL (ref 3.8–5.2)
RBC MORPH BLD: NORMAL
SODIUM SERPL-SCNC: 138 MMOL/L (ref 135–145)
TOTAL PROTEIN SERUM FOR ELP: 6 G/DL (ref 6.4–8.3)
WBC # BLD AUTO: 3 10E3/UL (ref 4–11)

## 2024-09-26 PROCEDURE — 94642 AEROSOL INHALATION TREATMENT: CPT | Performed by: INTERNAL MEDICINE

## 2024-09-26 PROCEDURE — 85004 AUTOMATED DIFF WBC COUNT: CPT

## 2024-09-26 PROCEDURE — 84165 PROTEIN E-PHORESIS SERUM: CPT | Mod: 26 | Performed by: PATHOLOGY

## 2024-09-26 PROCEDURE — 84165 PROTEIN E-PHORESIS SERUM: CPT | Mod: TC | Performed by: PATHOLOGY

## 2024-09-26 PROCEDURE — 83521 IG LIGHT CHAINS FREE EACH: CPT

## 2024-09-26 PROCEDURE — 94640 AIRWAY INHALATION TREATMENT: CPT | Performed by: INTERNAL MEDICINE

## 2024-09-26 PROCEDURE — 250N000011 HC RX IP 250 OP 636: Performed by: STUDENT IN AN ORGANIZED HEALTH CARE EDUCATION/TRAINING PROGRAM

## 2024-09-26 PROCEDURE — 85014 HEMATOCRIT: CPT

## 2024-09-26 PROCEDURE — 84155 ASSAY OF PROTEIN SERUM: CPT

## 2024-09-26 PROCEDURE — 80053 COMPREHEN METABOLIC PANEL: CPT

## 2024-09-26 PROCEDURE — 36591 DRAW BLOOD OFF VENOUS DEVICE: CPT

## 2024-09-26 RX ORDER — ALBUTEROL SULFATE 5 MG/ML
2.5 SOLUTION RESPIRATORY (INHALATION) ONCE
Start: 2024-10-01 | End: 2024-10-01

## 2024-09-26 RX ORDER — PENTAMIDINE ISETHIONATE 300 MG/300MG
300 INHALANT RESPIRATORY (INHALATION) ONCE
Status: COMPLETED | OUTPATIENT
Start: 2024-09-26 | End: 2024-09-26

## 2024-09-26 RX ORDER — HEPARIN SODIUM (PORCINE) LOCK FLUSH IV SOLN 100 UNIT/ML 100 UNIT/ML
5 SOLUTION INTRAVENOUS ONCE
Status: COMPLETED | OUTPATIENT
Start: 2024-09-26 | End: 2024-09-26

## 2024-09-26 RX ORDER — HEPARIN SODIUM,PORCINE 10 UNIT/ML
5-20 VIAL (ML) INTRAVENOUS DAILY PRN
OUTPATIENT
Start: 2024-10-01

## 2024-09-26 RX ORDER — PENTAMIDINE ISETHIONATE 300 MG/300MG
300 INHALANT RESPIRATORY (INHALATION) ONCE
Start: 2024-10-01 | End: 2024-10-01

## 2024-09-26 RX ORDER — ALBUTEROL SULFATE 5 MG/ML
2.5 SOLUTION RESPIRATORY (INHALATION) ONCE
Status: COMPLETED | OUTPATIENT
Start: 2024-09-26 | End: 2024-09-26

## 2024-09-26 RX ORDER — HEPARIN SODIUM (PORCINE) LOCK FLUSH IV SOLN 100 UNIT/ML 100 UNIT/ML
5 SOLUTION INTRAVENOUS
Status: CANCELLED | OUTPATIENT
Start: 2024-10-01

## 2024-09-26 RX ADMIN — Medication 5 ML: at 15:18

## 2024-09-26 RX ADMIN — PENTAMIDINE ISETHIONATE 300 MG: 300 INHALANT RESPIRATORY (INHALATION) at 15:36

## 2024-09-26 NOTE — NURSING NOTE
Chief Complaint   Patient presents with    Port Draw     Labs collected from port by RN.      Port accessed with 20 gauge 3/4 inch flat needle by RN, labs collected, line flushed with saline and heparin.      Rosamaria García RN

## 2024-09-26 NOTE — PROGRESS NOTES
Shena Hartmann was seen today for a Pentamidine nebulizer tx ordered by Dr. Llamas.    Patient was first given 2.5 mg of albuterol nebulizer (NDC 96609-212-92, Lot # [id number], Exp 11/30/2025), after which Pentamidine 300 mg (Lot # [id number]) mixed with 6cc Sterile H20 was administered through a filtered nebulizer.    Pre-treatment: SpO2 = 98%   HR = 81   BBS = Clear   Post-treatment: SpO2 = 98%  HR = 94  BBS = Clear    No adverse side effects noted by the patient.    This service today was provided under Dr. Luna, who was available if needed.     Procedure was completed by Vijay Whittaker.

## 2024-09-27 LAB
ALBUMIN SERPL ELPH-MCNC: 4.1 G/DL (ref 3.7–5.1)
ALPHA1 GLOB SERPL ELPH-MCNC: 0.3 G/DL (ref 0.2–0.4)
ALPHA2 GLOB SERPL ELPH-MCNC: 0.7 G/DL (ref 0.5–0.9)
B-GLOBULIN SERPL ELPH-MCNC: 0.6 G/DL (ref 0.6–1)
GAMMA GLOB SERPL ELPH-MCNC: 0.3 G/DL (ref 0.7–1.6)
KAPPA LC FREE SER-MCNC: <0.06 MG/DL (ref 0.33–1.94)
KAPPA LC FREE/LAMBDA FREE SER NEPH: ABNORMAL {RATIO}
LAMBDA LC FREE SERPL-MCNC: <0.13 MG/DL (ref 0.57–2.63)
LOCATION OF TASK: ABNORMAL
M PROTEIN SERPL ELPH-MCNC: 0.2 G/DL
PROT PATTERN SERPL ELPH-IMP: ABNORMAL

## 2024-10-01 ENCOUNTER — LAB (OUTPATIENT)
Dept: LAB | Facility: CLINIC | Age: 74
End: 2024-10-01
Attending: STUDENT IN AN ORGANIZED HEALTH CARE EDUCATION/TRAINING PROGRAM
Payer: MEDICARE

## 2024-10-01 ENCOUNTER — OFFICE VISIT (OUTPATIENT)
Dept: ONCOLOGY | Facility: CLINIC | Age: 74
End: 2024-10-01
Attending: STUDENT IN AN ORGANIZED HEALTH CARE EDUCATION/TRAINING PROGRAM
Payer: MEDICARE

## 2024-10-01 VITALS
WEIGHT: 120.6 LBS | TEMPERATURE: 98 F | DIASTOLIC BLOOD PRESSURE: 80 MMHG | HEART RATE: 82 BPM | BODY MASS INDEX: 22.19 KG/M2 | SYSTOLIC BLOOD PRESSURE: 150 MMHG | RESPIRATION RATE: 18 BRPM | OXYGEN SATURATION: 98 %

## 2024-10-01 DIAGNOSIS — Z94.84 STEM CELLS TRANSPLANT STATUS (H): ICD-10-CM

## 2024-10-01 DIAGNOSIS — C90.00 MULTIPLE MYELOMA NOT HAVING ACHIEVED REMISSION (H): Primary | ICD-10-CM

## 2024-10-01 DIAGNOSIS — C90.00 MULTIPLE MYELOMA, REMISSION STATUS UNSPECIFIED (H): Primary | ICD-10-CM

## 2024-10-01 LAB
ALBUMIN SERPL BCG-MCNC: 4.2 G/DL (ref 3.5–5.2)
ALP SERPL-CCNC: 57 U/L (ref 40–150)
ALT SERPL W P-5'-P-CCNC: 31 U/L (ref 0–50)
ANION GAP SERPL CALCULATED.3IONS-SCNC: 10 MMOL/L (ref 7–15)
AST SERPL W P-5'-P-CCNC: 28 U/L (ref 0–45)
BASOPHILS # BLD AUTO: 0 10E3/UL (ref 0–0.2)
BASOPHILS NFR BLD AUTO: 0 %
BILIRUB SERPL-MCNC: 0.2 MG/DL
BUN SERPL-MCNC: 17.7 MG/DL (ref 8–23)
CALCIUM SERPL-MCNC: 9.4 MG/DL (ref 8.8–10.4)
CHLORIDE SERPL-SCNC: 102 MMOL/L (ref 98–107)
CREAT SERPL-MCNC: 0.9 MG/DL (ref 0.51–0.95)
EGFRCR SERPLBLD CKD-EPI 2021: 67 ML/MIN/1.73M2
EOSINOPHIL # BLD AUTO: 0 10E3/UL (ref 0–0.7)
EOSINOPHIL NFR BLD AUTO: 0 %
ERYTHROCYTE [DISTWIDTH] IN BLOOD BY AUTOMATED COUNT: 20.2 % (ref 10–15)
GLUCOSE SERPL-MCNC: 114 MG/DL (ref 70–99)
HCO3 SERPL-SCNC: 27 MMOL/L (ref 22–29)
HCT VFR BLD AUTO: 31.3 % (ref 35–47)
HGB BLD-MCNC: 10.7 G/DL (ref 11.7–15.7)
HOLD SPECIMEN: NORMAL
IMM GRANULOCYTES # BLD: 0 10E3/UL
IMM GRANULOCYTES NFR BLD: 1 %
LDH SERPL L TO P-CCNC: 228 U/L (ref 0–250)
LYMPHOCYTES # BLD AUTO: 1.3 10E3/UL (ref 0.8–5.3)
LYMPHOCYTES NFR BLD AUTO: 47 %
MAGNESIUM SERPL-MCNC: 2.1 MG/DL (ref 1.7–2.3)
MCH RBC QN AUTO: 33.8 PG (ref 26.5–33)
MCHC RBC AUTO-ENTMCNC: 34.2 G/DL (ref 31.5–36.5)
MCV RBC AUTO: 99 FL (ref 78–100)
MONOCYTES # BLD AUTO: 0.7 10E3/UL (ref 0–1.3)
MONOCYTES NFR BLD AUTO: 23 %
NEUTROPHILS # BLD AUTO: 0.8 10E3/UL (ref 1.6–8.3)
NEUTROPHILS NFR BLD AUTO: 29 %
NRBC # BLD AUTO: 0 10E3/UL
NRBC BLD AUTO-RTO: 0 /100
PHOSPHATE SERPL-MCNC: 3.6 MG/DL (ref 2.5–4.5)
PLATELET # BLD AUTO: 51 10E3/UL (ref 150–450)
POTASSIUM SERPL-SCNC: 4 MMOL/L (ref 3.4–5.3)
PROT SERPL-MCNC: 6.1 G/DL (ref 6.4–8.3)
RBC # BLD AUTO: 3.17 10E6/UL (ref 3.8–5.2)
SODIUM SERPL-SCNC: 139 MMOL/L (ref 135–145)
WBC # BLD AUTO: 2.8 10E3/UL (ref 4–11)

## 2024-10-01 PROCEDURE — 84165 PROTEIN E-PHORESIS SERUM: CPT | Mod: 26 | Performed by: PATHOLOGY

## 2024-10-01 PROCEDURE — 84100 ASSAY OF PHOSPHORUS: CPT | Performed by: PHYSICIAN ASSISTANT

## 2024-10-01 PROCEDURE — 36591 DRAW BLOOD OFF VENOUS DEVICE: CPT | Performed by: PHYSICIAN ASSISTANT

## 2024-10-01 PROCEDURE — 36415 COLL VENOUS BLD VENIPUNCTURE: CPT | Performed by: PATHOLOGY

## 2024-10-01 PROCEDURE — G2211 COMPLEX E/M VISIT ADD ON: HCPCS | Performed by: STUDENT IN AN ORGANIZED HEALTH CARE EDUCATION/TRAINING PROGRAM

## 2024-10-01 PROCEDURE — 82565 ASSAY OF CREATININE: CPT | Performed by: PATHOLOGY

## 2024-10-01 PROCEDURE — 85004 AUTOMATED DIFF WBC COUNT: CPT | Performed by: PHYSICIAN ASSISTANT

## 2024-10-01 PROCEDURE — 82784 ASSAY IGA/IGD/IGG/IGM EACH: CPT | Performed by: PHYSICIAN ASSISTANT

## 2024-10-01 PROCEDURE — 84165 PROTEIN E-PHORESIS SERUM: CPT | Mod: TC | Performed by: PATHOLOGY

## 2024-10-01 PROCEDURE — 84155 ASSAY OF PROTEIN SERUM: CPT | Performed by: PHYSICIAN ASSISTANT

## 2024-10-01 PROCEDURE — 82232 ASSAY OF BETA-2 PROTEIN: CPT | Performed by: PHYSICIAN ASSISTANT

## 2024-10-01 PROCEDURE — 86334 IMMUNOFIX E-PHORESIS SERUM: CPT | Performed by: PATHOLOGY

## 2024-10-01 PROCEDURE — 83615 LACTATE (LD) (LDH) ENZYME: CPT | Performed by: PHYSICIAN ASSISTANT

## 2024-10-01 PROCEDURE — 83735 ASSAY OF MAGNESIUM: CPT | Performed by: PHYSICIAN ASSISTANT

## 2024-10-01 PROCEDURE — 99214 OFFICE O/P EST MOD 30 MIN: CPT | Performed by: STUDENT IN AN ORGANIZED HEALTH CARE EDUCATION/TRAINING PROGRAM

## 2024-10-01 PROCEDURE — 86334 IMMUNOFIX E-PHORESIS SERUM: CPT | Mod: 26 | Performed by: PATHOLOGY

## 2024-10-01 PROCEDURE — G0463 HOSPITAL OUTPT CLINIC VISIT: HCPCS | Mod: 25 | Performed by: STUDENT IN AN ORGANIZED HEALTH CARE EDUCATION/TRAINING PROGRAM

## 2024-10-01 PROCEDURE — 250N000011 HC RX IP 250 OP 636: Performed by: STUDENT IN AN ORGANIZED HEALTH CARE EDUCATION/TRAINING PROGRAM

## 2024-10-01 RX ORDER — HEPARIN SODIUM (PORCINE) LOCK FLUSH IV SOLN 100 UNIT/ML 100 UNIT/ML
5 SOLUTION INTRAVENOUS
Status: CANCELLED | OUTPATIENT
Start: 2024-11-01

## 2024-10-01 RX ORDER — ALBUTEROL SULFATE 5 MG/ML
2.5 SOLUTION RESPIRATORY (INHALATION) ONCE
Status: CANCELLED
Start: 2024-11-01 | End: 2024-11-01

## 2024-10-01 RX ORDER — HEPARIN SODIUM,PORCINE 10 UNIT/ML
5-20 VIAL (ML) INTRAVENOUS DAILY PRN
Status: CANCELLED | OUTPATIENT
Start: 2024-11-01

## 2024-10-01 RX ORDER — HEPARIN SODIUM (PORCINE) LOCK FLUSH IV SOLN 100 UNIT/ML 100 UNIT/ML
5 SOLUTION INTRAVENOUS
Status: DISCONTINUED | OUTPATIENT
Start: 2024-10-01 | End: 2024-10-05 | Stop reason: HOSPADM

## 2024-10-01 RX ORDER — PENTAMIDINE ISETHIONATE 300 MG/300MG
300 INHALANT RESPIRATORY (INHALATION) ONCE
Status: CANCELLED
Start: 2024-11-01 | End: 2024-11-01

## 2024-10-01 RX ORDER — PENICILLIN V POTASSIUM 250 MG/1
250 TABLET, FILM COATED ORAL 2 TIMES DAILY
Qty: 60 TABLET | Refills: 2 | Status: SHIPPED | OUTPATIENT
Start: 2024-10-01

## 2024-10-01 RX ADMIN — Medication 5 ML: at 13:23

## 2024-10-01 ASSESSMENT — PAIN SCALES - GENERAL: PAINLEVEL: MODERATE PAIN (5)

## 2024-10-01 NOTE — NURSING NOTE
"Chief Complaint   Patient presents with    Oncology Clinic Visit     Multiple Myeloma    Port Draw     Port accessed and labs drawn by rn in lab. Vital signs taken.     Port accessed by RN in lab with 20g 3/4\" power needle, labs drawn, port flushed with saline and heparin, port de-accessed.  vitals checked, pt checked in for next appointment.    Smitha Ding RN    "

## 2024-10-01 NOTE — PROGRESS NOTES
[facility]    Hematology/Oncology Clinic Note    Oct 1, 2024    Reason for Office Visit   Transition care back to [facility] following CAR-T therapy      Cancer Diagnosis   Multiple Myeloma    Oncology History of Present Illness   Breast cancer dx in 1994. Underwent surgery and chemotherapy without reoccurence  MGUS dx 1999  T8 pathologic fracture with radiation  Revlimid and velcade,   2013  Cytoxan IV 9/2013  D-PACE 11/2013  Auto 3/27/14  5/2014 thalidomide caused rash so switched to pomalidomide   on kenalog and clobetasol creams for IMID rash  FLC began to rise so riky added to pom 9/2020  she has been on xgeva for an unclear amount of time  Teodora-Kd 2/20/2023  Teodora maintenance Nov 2023  Spring 2024 FLC began to rise again  5/22/24 Underwent apheresis for CAR-T  7/29/24 Carvykti (CAR-T)    Interval History   Still having some fatigue but slowly recovering post CAR-T. Neutropenic today but no evidence of infection.     ROS neg other than the symptoms noted above in the HPI.        Past Medical History     Past Medical History:   Diagnosis Date    Acquired hypothyroidism 8/10/2023    Breast cancer (H) 01/01/1994    right    H/O stem cell transplant (H) 03/01/2014    History of blood transfusion 2013 & 2014    During stem cell tx process    Hypertension Sept. 2021    Runs 140 s systolically, about 20 above my usual    Multiple myeloma, without mention of having achieved remission 11/06/2012       Past Surgical History:      Past Surgical History:   Procedure Laterality Date    BIOPSY  10/1994; [id number]    Lt. Breast in '94; multiple bone marrow bx's for MM    BREAST SURGERY  10/1994    Lt. Mastectomy w/lymph node resection    COLONOSCOPY  11/2015    Normal results    EYE SURGERY  2020    Bilateral cataract surgery    INSERT CATHETER VASCULAR ACCESS Left 5/22/2024    Procedure: central venous catheter non tunneled insertion, vascular access;  Surgeon: Ric Castaneda  ERIS Sharp;  Location: [facility] OR    INSERT PORT VASCULAR ACCESS Left 2/15/2023    Procedure: INSERTION, VASCULAR ACCESS PORT;  Surgeon: Luc Antunez MD;  Location: [facility] OR    IR CHEST PORT PLACEMENT > 5 YRS OF AGE  2/15/2023    IR CVC NON TUNNEL LINE REMOVAL  2024    IR CVC NON TUNNEL PLACEMENT > 5 YRS  2024    IR PICC PLACEMENT > 5 YRS OF AGE  2024    MASTECTOMY      Right, with lymphe node disection      PICC INSERTION  09/10/2013    5fr DL Power PICC, 44cm (1cm external) in the L lateral brachial vein with tip in the low SVC    TRANSPLANT  3/27/2014    Autologous stem cell tx       Social History     Socioeconomic History    Marital status:    Tobacco Use    Smoking status: Never     Passive exposure: Never    Smokeless tobacco: Never   Substance and Sexual Activity    Alcohol use: Yes     Comment: occ    Drug use: No    Sexual activity: Not Currently     Partners: Male     Birth control/protection: Post-menopausal   Other Topics Concern    Parent/sibling w/ CABG, MI or angioplasty before 65F 55M? No     Service No    Blood Transfusions No    Caffeine Concern No    Occupational Exposure No    Hobby Hazards No     Family History     Family History   Problem Relation Age of Onset    Hypertension Mother     Cerebrovascular Disease Mother          8-11-15 from strokes    Hypertension Father     Prostate Cancer Father     Skin Cancer Paternal Grandmother     Cancer Paternal Grandmother         skin cancer       Allergies:     Allergies   Allergen Reactions    Blood Transfusion Related (Informational Only) Other (See Comments)     Patient has a history of a clinically significant antibody against RBC antigens.  A delay in compatible RBCs may occur.     Cayenne Pepper [Cayenne]     Corn-Containing Products GI Disturbance    Levaquin Other (See Comments)     Tendon pain    Milk Protein GI Disturbance    Mold      Facial rash/lip swelling    Morphine Sulfate Other (See Comments)     Per  pt - see things (hallucination) when she was on Morphine .    Pollen Extract      Environmental allergies     Shrimp Nausea and Vomiting    Tomato      Lip swelling, facial rash    Azithromycin Rash    Keflex [Cephalexin] Rash    Oat Rash    Tape [Adhesive Tape] Itching and Rash     All adhesives    Wheat Rash     Swelling of lips         Medications:     Current Outpatient Medications   Medication Sig Dispense Refill    penicillin V (VEETID) 250 MG tablet Take 1 tablet (250 mg) by mouth 2 times daily. 60 tablet 2    acetaminophen (TYLENOL) 325 MG tablet Take 325-650 mg by mouth every 6 hours as needed for mild pain      acyclovir (ZOVIRAX) 800 MG tablet Take 1 tablet (800 mg) by mouth every 12 hours. 180 tablet 3    amLODIPine (NORVASC) 2.5 MG tablet Take 2 tablets (5 mg) by mouth daily Take 5 mg (2 tablets) in the morning. 180 tablet 3    Ascorbic Acid (VITAMIN C PO) Take 1,000 mg by mouth 2 times daily       CALCIUM-VITAMIN D-VITAMIN K PO Take 2 tablets by mouth daily.      clobetasol (TEMOVATE) 0.05 % external ointment Apply topically 2 times daily as needed (itching and rash). Topically to rash on hands until resolved 45 g 0    COENZYME Q-10 PO Take 100 mg by mouth daily       Cyanocobalamin 1000 MCG/ML LIQD Take 500 mcg by mouth 2 times daily      levothyroxine (SYNTHROID/LEVOTHROID) 50 MCG tablet TAKE 1 TABLET(50 MCG) BY MOUTH DAILY 90 tablet 0    MAGNESIUM PO Take 235 mg by mouth 2 times daily.      Multiple Vitamins-Minerals (MULTIVITAMIN OR) Take 1 tablet by mouth 2 times daily.      ondansetron (ZOFRAN) 4 MG tablet Take 1 tablet (4 mg) by mouth every 8 hours as needed for nausea 30 tablet 0    order for DME 6 mastectomy bras  Length of need: 99 months 6 Device 1    order for DME R mastectomy prosthesis   Length of need:  99 months 1 Device 1    Probiotic Product (PROBIOTIC PO) Take 1 capsule by mouth daily. Theralac Pro Probiotic      Quercetin 500 MG CAPS Take 500 mg by mouth daily      saccharomyces  boulardii (SACCHAROMYCIN DF) 250 MG capsule Take 250 mg by mouth daily. Hold until after day +14 and ANC > 1.0      triamcinolone (KENALOG) 0.1 % external cream Apply topically 2 times daily as needed for irritation. 15 g 11    ZINC PICOLINATE PO Take 30 mg by mouth daily       Current Facility-Administered Medications   Medication Dose Route Frequency Provider Last Rate Last Admin    heparin lock flush 100 unit/mL injection 5 mL  5 mL Intracatheter Once PRN Nighat Chavez MD   5 mL at 10/01/24 1323     Facility-Administered Medications Ordered in Other Visits   Medication Dose Route Frequency Provider Last Rate Last Admin    potassium chloride 20 mEq in 50 mL IVPB  20 mEq Intravenous Once Mae Kay PA-C        potassium chloride SA (K-DUR,KLOR-CON M) CR tablet 20 mEq  20 mEq Oral Once Mae Kay PA-C           Physical Exam   BP (!) 150/80 (BP Location: Left arm, Patient Position: Sitting, Cuff Size: Adult Regular)   Pulse 82   Temp 98  F (36.7  C) (Oral)   Resp 18   Wt 54.7 kg (120 lb 9.6 oz)   LMP  (LMP Unknown)   SpO2 98%   BMI 22.19 kg/m      Wt Readings from Last 5 Encounters:   10/01/24 54.7 kg (120 lb 9.6 oz)   09/05/24 53.9 kg (118 lb 14.4 oz)   08/26/24 54.2 kg (119 lb 6.4 oz)   08/19/24 54 kg (119 lb 1.6 oz)   08/15/24 54.2 kg (119 lb 6.4 oz)     Gen: alert, pleasant and conversational, NAD  HEENT: NC/AT,EOMI w/ PERRL, anicteric sclera.   CV: normal S1,S2 with RRR no m/r/g  Resp: lungs CTA bilaterally with adequate air movement to bases. No wheezes or crackles  Ext: warm and well perfused, no edema or cyanosis  Skin: no concerning lesions or rashes  Neuro: A&Ox4, no lateralizing sx. Grossly nonfocal.  Psych: appropriate, reactive      Data     Most Recent 3 CBC's:  Recent Labs   Lab Test 10/01/24  1333 09/26/24  1518 09/19/24  1525   WBC 2.8* 3.0* 3.0*   HGB 10.7* 10.3* 10.2*   MCV 99 99 98   PLT 51* 44* 39*   ANEUTAUTO 0.8* 0.8* 1.1*     Most Recent 3  BMP's:  Recent Labs   Lab Test 10/03/24  0800 10/01/24  1333 09/26/24  1518 09/19/24  1525   NA  --  139 138 138   POTASSIUM  --  4.0 4.3 4.1   CHLORIDE  --  102 101 101   CO2  --  27 27 27   BUN  --  17.7 19.3 21.0   CR 0.90 0.90  0.90 0.83 0.86   ANIONGAP  --  10 10 10   PAULINE  --  9.4 9.3 9.3   GLC  --  114* 121* 119*   PROTTOTAL  --  6.1* 6.1* 6.0*   ALBUMIN  --  4.2 4.2 4.1    Most Recent 3 LFT's:  Recent Labs   Lab Test 10/01/24  1333 09/26/24  1518 09/19/24  1525   AST 28 27 21   ALT 31 28 23   ALKPHOS 57 65 59   BILITOTAL 0.2 <0.2 <0.2    Most Recent 2 TSH and T4:  Recent Labs   Lab Test 02/19/24  1333 08/17/23  1349 10/03/22  0820 06/13/22  0822 03/21/22  0803   TSH 3.08 2.12   < > 9.47*  9.31* 5.79*   T4  --   --   --  0.99  0.97 0.83    < > = values in this interval not displayed.     I reviewed the above labs today myself and with the patient.    Assessment/Plan     Relapsed Multiple Myeloma  Started kyprolis/isatuximab/dex on 2/20/23.    Changed to Isatuximab maintenace 10mg/kg every 4 week starting 10/9/23  Rising light chains noted April 2024; cell therapy consult 4/26/24 with multiple options presented.    CAR-T therapy 7/29/24 with Carvykti  Xgeva every 6 months, last dose 5/31/24, next due Nov 2024;  Start Pen V while neutropenic. Recheck labs on 10/14 and stop Pen V if ANC >1.0      HTN  Continue amlodipine 5 mg daily.  Followed by cardiology     Hypothyroidism  Remains on levothyroxine    ID Prophylaxis   Acyclovir 800 mg BID for HSV prophylaxis  Pentamidine monthly for 1 year following CAR-T - may consider changing to Bactrim once platelets recover    30 minutes spent on the date of the encounter doing chart review, review of test results, interpretation of tests, patient visit, and documentation     The longitudinal plan of care for the diagnosis(es)/condition(s) as documented were addressed during this visit. Due to the added complexity in care, I will continue to support Shena in the  subsequent management and with ongoing continuity of care.    Nighat Chavez MD PhD

## 2024-10-01 NOTE — LETTER
10/1/2024      Shena Hartmann  [address] [place] [address]      Dear Colleague,    Thank you for referring your patient, Shena Hartmann, to the [facility]. Please see a copy of my visit note below.        [facility]    Hematology/Oncology Clinic Note    Oct 1, 2024    Reason for Office Visit   Transition care back to [facility] following CAR-T therapy      Cancer Diagnosis   Multiple Myeloma    Oncology History of Present Illness   Breast cancer dx in 1994. Underwent surgery and chemotherapy without reoccurence  MGUS dx 1999  T8 pathologic fracture with radiation  Revlimid and velcade,   2013  Cytoxan IV 9/2013  D-PACE 11/2013  Auto 3/27/14  5/2014 thalidomide caused rash so switched to pomalidomide   on kenalog and clobetasol creams for IMID rash  FLC began to rise so riky added to pom 9/2020  she has been on xgeva for an unclear amount of time  Teodora-Kd 2/20/2023  Teodora maintenance Nov 2023  Spring 2024 FLC began to rise again  5/22/24 Underwent apheresis for CAR-T  7/29/24 Carvykti (CAR-T)    Interval History   Still having some fatigue but slowly recovering post CAR-T. Neutropenic today but no evidence of infection.     ROS neg other than the symptoms noted above in the HPI.        Past Medical History     Past Medical History:   Diagnosis Date     Acquired hypothyroidism 8/10/2023     Breast cancer (H) 01/01/1994    right     H/O stem cell transplant (H) 03/01/2014     History of blood transfusion 2013 & 2014    During stem cell tx process     Hypertension Sept. 2021    Runs 140 s systolically, about 20 above my usual     Multiple myeloma, without mention of having achieved remission 11/06/2012       Past Surgical History:      Past Surgical History:   Procedure Laterality Date     BIOPSY  10/1994; [id number]    Lt. Breast in '94; multiple bone marrow bx's for MM     BREAST SURGERY  10/1994    Lt. Mastectomy w/lymph node  resection     COLONOSCOPY  2015    Normal results     EYE SURGERY      Bilateral cataract surgery     INSERT CATHETER VASCULAR ACCESS Left 2024    Procedure: central venous catheter non tunneled insertion, vascular access;  Surgeon: Ric Castaneda PA-C;  Location: [facility] OR     INSERT PORT VASCULAR ACCESS Left 2/15/2023    Procedure: INSERTION, VASCULAR ACCESS PORT;  Surgeon: Luc Antunez MD;  Location: [facility] OR     IR CHEST PORT PLACEMENT > 5 YRS OF AGE  2/15/2023     IR CVC NON TUNNEL LINE REMOVAL  2024     IR CVC NON TUNNEL PLACEMENT > 5 YRS  2024     IR PICC PLACEMENT > 5 YRS OF AGE  2024     MASTECTOMY      Right, with lymphe node disection       PICC INSERTION  09/10/2013    5fr DL Power PICC, 44cm (1cm external) in the L lateral brachial vein with tip in the low SVC     TRANSPLANT  3/27/2014    Autologous stem cell tx       Social History     Socioeconomic History     Marital status:    Tobacco Use     Smoking status: Never     Passive exposure: Never     Smokeless tobacco: Never   Substance and Sexual Activity     Alcohol use: Yes     Comment: occ     Drug use: No     Sexual activity: Not Currently     Partners: Male     Birth control/protection: Post-menopausal   Other Topics Concern     Parent/sibling w/ CABG, MI or angioplasty before 65F 55M? No      Service No     Blood Transfusions No     Caffeine Concern No     Occupational Exposure No     Hobby Hazards No     Family History     Family History   Problem Relation Age of Onset     Hypertension Mother      Cerebrovascular Disease Mother          8-11-15 from strokes     Hypertension Father      Prostate Cancer Father      Skin Cancer Paternal Grandmother      Cancer Paternal Grandmother         skin cancer       Allergies:     Allergies   Allergen Reactions     Blood Transfusion Related (Informational Only) Other (See Comments)     Patient has a history of a clinically significant antibody against RBC  antigens.  A delay in compatible RBCs may occur.      Cayenne Pepper [Cayenne]      Corn-Containing Products GI Disturbance     Levaquin Other (See Comments)     Tendon pain     Milk Protein GI Disturbance     Mold      Facial rash/lip swelling     Morphine Sulfate Other (See Comments)     Per pt - see things (hallucination) when she was on Morphine .     Pollen Extract      Environmental allergies      Shrimp Nausea and Vomiting     Tomato      Lip swelling, facial rash     Azithromycin Rash     Keflex [Cephalexin] Rash     Oat Rash     Tape [Adhesive Tape] Itching and Rash     All adhesives     Wheat Rash     Swelling of lips         Medications:     Current Outpatient Medications   Medication Sig Dispense Refill     penicillin V (VEETID) 250 MG tablet Take 1 tablet (250 mg) by mouth 2 times daily. 60 tablet 2     acetaminophen (TYLENOL) 325 MG tablet Take 325-650 mg by mouth every 6 hours as needed for mild pain       acyclovir (ZOVIRAX) 800 MG tablet Take 1 tablet (800 mg) by mouth every 12 hours. 180 tablet 3     amLODIPine (NORVASC) 2.5 MG tablet Take 2 tablets (5 mg) by mouth daily Take 5 mg (2 tablets) in the morning. 180 tablet 3     Ascorbic Acid (VITAMIN C PO) Take 1,000 mg by mouth 2 times daily        CALCIUM-VITAMIN D-VITAMIN K PO Take 2 tablets by mouth daily.       clobetasol (TEMOVATE) 0.05 % external ointment Apply topically 2 times daily as needed (itching and rash). Topically to rash on hands until resolved 45 g 0     COENZYME Q-10 PO Take 100 mg by mouth daily        Cyanocobalamin 1000 MCG/ML LIQD Take 500 mcg by mouth 2 times daily       levothyroxine (SYNTHROID/LEVOTHROID) 50 MCG tablet TAKE 1 TABLET(50 MCG) BY MOUTH DAILY 90 tablet 0     MAGNESIUM PO Take 235 mg by mouth 2 times daily.       Multiple Vitamins-Minerals (MULTIVITAMIN OR) Take 1 tablet by mouth 2 times daily.       ondansetron (ZOFRAN) 4 MG tablet Take 1 tablet (4 mg) by mouth every 8 hours as needed for nausea 30 tablet 0      order for DME 6 mastectomy bras  Length of need: 99 months 6 Device 1     order for DME R mastectomy prosthesis   Length of need:  99 months 1 Device 1     Probiotic Product (PROBIOTIC PO) Take 1 capsule by mouth daily. Theralac Pro Probiotic       Quercetin 500 MG CAPS Take 500 mg by mouth daily       saccharomyces boulardii (SACCHAROMYCIN DF) 250 MG capsule Take 250 mg by mouth daily. Hold until after day +14 and ANC > 1.0       triamcinolone (KENALOG) 0.1 % external cream Apply topically 2 times daily as needed for irritation. 15 g 11     ZINC PICOLINATE PO Take 30 mg by mouth daily       Current Facility-Administered Medications   Medication Dose Route Frequency Provider Last Rate Last Admin     heparin lock flush 100 unit/mL injection 5 mL  5 mL Intracatheter Once PRN Nighat Chavez MD   5 mL at 10/01/24 1323     Facility-Administered Medications Ordered in Other Visits   Medication Dose Route Frequency Provider Last Rate Last Admin     potassium chloride 20 mEq in 50 mL IVPB  20 mEq Intravenous Once Mae Kay PA-C         potassium chloride SA (K-DUR,KLOR-CON M) CR tablet 20 mEq  20 mEq Oral Once Mae Kay PA-C           Physical Exam   BP (!) 150/80 (BP Location: Left arm, Patient Position: Sitting, Cuff Size: Adult Regular)   Pulse 82   Temp 98  F (36.7  C) (Oral)   Resp 18   Wt 54.7 kg (120 lb 9.6 oz)   LMP  (LMP Unknown)   SpO2 98%   BMI 22.19 kg/m      Wt Readings from Last 5 Encounters:   10/01/24 54.7 kg (120 lb 9.6 oz)   09/05/24 53.9 kg (118 lb 14.4 oz)   08/26/24 54.2 kg (119 lb 6.4 oz)   08/19/24 54 kg (119 lb 1.6 oz)   08/15/24 54.2 kg (119 lb 6.4 oz)     Gen: alert, pleasant and conversational, NAD  HEENT: NC/AT,EOMI w/ PERRL, anicteric sclera.   CV: normal S1,S2 with RRR no m/r/g  Resp: lungs CTA bilaterally with adequate air movement to bases. No wheezes or crackles  Ext: warm and well perfused, no edema or cyanosis  Skin: no concerning lesions or  rashes  Neuro: A&Ox4, no lateralizing sx. Grossly nonfocal.  Psych: appropriate, reactive      Data     Most Recent 3 CBC's:  Recent Labs   Lab Test 10/01/24  1333 09/26/24  1518 09/19/24  1525   WBC 2.8* 3.0* 3.0*   HGB 10.7* 10.3* 10.2*   MCV 99 99 98   PLT 51* 44* 39*   ANEUTAUTO 0.8* 0.8* 1.1*     Most Recent 3 BMP's:  Recent Labs   Lab Test 10/03/24  0800 10/01/24  1333 09/26/24  1518 09/19/24  1525   NA  --  139 138 138   POTASSIUM  --  4.0 4.3 4.1   CHLORIDE  --  102 101 101   CO2  --  27 27 27   BUN  --  17.7 19.3 21.0   CR 0.90 0.90  0.90 0.83 0.86   ANIONGAP  --  10 10 10   PAULINE  --  9.4 9.3 9.3   GLC  --  114* 121* 119*   PROTTOTAL  --  6.1* 6.1* 6.0*   ALBUMIN  --  4.2 4.2 4.1    Most Recent 3 LFT's:  Recent Labs   Lab Test 10/01/24  1333 09/26/24  1518 09/19/24  1525   AST 28 27 21   ALT 31 28 23   ALKPHOS 57 65 59   BILITOTAL 0.2 <0.2 <0.2    Most Recent 2 TSH and T4:  Recent Labs   Lab Test 02/19/24  1333 08/17/23  1349 10/03/22  0820 06/13/22  0822 03/21/22  0803   TSH 3.08 2.12   < > 9.47*  9.31* 5.79*   T4  --   --   --  0.99  0.97 0.83    < > = values in this interval not displayed.     I reviewed the above labs today myself and with the patient.    Assessment/Plan     Relapsed Multiple Myeloma  Started kyprolis/isatuximab/dex on 2/20/23.    Changed to Isatuximab maintenace 10mg/kg every 4 week starting 10/9/23  Rising light chains noted April 2024; cell therapy consult 4/26/24 with multiple options presented.    CAR-T therapy 7/29/24 with Carvykti  Xgeva every 6 months, last dose 5/31/24, next due Nov 2024;  Start Pen V while neutropenic. Recheck labs on 10/14 and stop Pen V if ANC >1.0      HTN  Continue amlodipine 5 mg daily.  Followed by cardiology     Hypothyroidism  Remains on levothyroxine    ID Prophylaxis   Acyclovir 800 mg BID for HSV prophylaxis  Pentamidine monthly for 1 year following CAR-T - may consider changing to Bactrim once platelets recover    30 minutes spent on the date of  the encounter doing chart review, review of test results, interpretation of tests, patient visit, and documentation     The longitudinal plan of care for the diagnosis(es)/condition(s) as documented were addressed during this visit. Due to the added complexity in care, I will continue to support Shena in the subsequent management and with ongoing continuity of care.    Nighat Chavez MD PhD        Again, thank you for allowing me to participate in the care of your patient.        Sincerely,        Nighat Chavez MD

## 2024-10-01 NOTE — NURSING NOTE
"Oncology Rooming Note    October 1, 2024 1:42 PM   Shena Hartmann is a 73 year old female who presents for:    Chief Complaint   Patient presents with    Oncology Clinic Visit     Multiple Myeloma    Port Draw     Port accessed and labs drawn by rn in lab. Vital signs taken.     Initial Vitals: BP (!) 150/80 (BP Location: Left arm, Patient Position: Sitting, Cuff Size: Adult Regular)   Pulse 82   Temp 98  F (36.7  C) (Oral)   Resp 18   Wt 54.7 kg (120 lb 9.6 oz)   LMP  (LMP Unknown)   SpO2 98%   BMI 22.19 kg/m   Estimated body mass index is 22.19 kg/m  as calculated from the following:    Height as of 7/29/24: 1.57 m (5' 1.81\").    Weight as of this encounter: 54.7 kg (120 lb 9.6 oz). Body surface area is 1.54 meters squared.  Moderate Pain (5) Comment: Data Unavailable   No LMP recorded (lmp unknown). Patient is postmenopausal.  Allergies reviewed: Yes  Medications reviewed: Yes    Medications: Medication refills not needed today.  Pharmacy name entered into [employer]:    [id number] - [place], [place] - [address] [facility] [address]  WRITTEN PRESCRIPTION REQUESTED  [facility] - [place], [place] - [address]    Frailty Screening:   Is the patient here for a new oncology consult visit in cancer care? 2. No      Clinical concerns: None       Yamileth Mcknight< LPN  10/1/2024              "

## 2024-10-02 LAB
ALBUMIN SERPL ELPH-MCNC: 4.2 G/DL (ref 3.7–5.1)
ALPHA1 GLOB SERPL ELPH-MCNC: 0.3 G/DL (ref 0.2–0.4)
ALPHA2 GLOB SERPL ELPH-MCNC: 0.6 G/DL (ref 0.5–0.9)
B-GLOBULIN SERPL ELPH-MCNC: 0.6 G/DL (ref 0.6–1)
B2 MICROGLOB TUMOR MARKER SER-MCNC: 1.7 MG/L
CMV DNA SPEC NAA+PROBE-ACNC: NOT DETECTED IU/ML
GAMMA GLOB SERPL ELPH-MCNC: 0.3 G/DL (ref 0.7–1.6)
IGG SERPL-MCNC: 296 MG/DL (ref 610–1616)
LOCATION OF TASK: ABNORMAL
LOCATION OF TASK: NORMAL
M PROTEIN SERPL ELPH-MCNC: 0.2 G/DL
PROT PATTERN SERPL ELPH-IMP: ABNORMAL
PROT PATTERN SERPL IFE-IMP: NORMAL

## 2024-10-03 ENCOUNTER — LAB (OUTPATIENT)
Dept: LAB | Facility: CLINIC | Age: 74
End: 2024-10-03
Payer: MEDICARE

## 2024-10-03 DIAGNOSIS — C90.00 MULTIPLE MYELOMA NOT HAVING ACHIEVED REMISSION (H): ICD-10-CM

## 2024-10-03 LAB
COLLECT DURATION TIME UR: 24 H
CREAT 24H UR-MRATE: 0.91 G/SPEC (ref 0.72–1.51)
CREAT CL 24H UR+SERPL-VRATE: 70 ML/MIN (ref 66–143)
CREAT CL/1.73 SQ M 24H UR+SERPL-ARVRAT: 78 ML/MIN/1.7M2 (ref 100–160)
CREAT SERPL-MCNC: 0.9 MG/DL (ref 0.51–0.95)
CREAT SERPL-MCNC: 0.9 MG/DL (ref 0.51–0.95)
CREAT UR-MCNC: 57.8 MG/DL
SPECIMEN VOL UR: 1572 ML

## 2024-10-03 PROCEDURE — 82575 CREATININE CLEARANCE TEST: CPT | Performed by: PATHOLOGY

## 2024-10-14 ENCOUNTER — LAB (OUTPATIENT)
Dept: LAB | Facility: CLINIC | Age: 74
End: 2024-10-14
Attending: STUDENT IN AN ORGANIZED HEALTH CARE EDUCATION/TRAINING PROGRAM
Payer: MEDICARE

## 2024-10-14 ENCOUNTER — OFFICE VISIT (OUTPATIENT)
Dept: CARDIOLOGY | Facility: CLINIC | Age: 74
End: 2024-10-14
Attending: INTERNAL MEDICINE
Payer: MEDICARE

## 2024-10-14 VITALS
BODY MASS INDEX: 22.25 KG/M2 | SYSTOLIC BLOOD PRESSURE: 129 MMHG | HEART RATE: 78 BPM | DIASTOLIC BLOOD PRESSURE: 75 MMHG | OXYGEN SATURATION: 98 % | WEIGHT: 120.9 LBS

## 2024-10-14 DIAGNOSIS — I10 BENIGN ESSENTIAL HYPERTENSION: Primary | ICD-10-CM

## 2024-10-14 DIAGNOSIS — C50.911 MALIGNANT NEOPLASM OF RIGHT FEMALE BREAST, UNSPECIFIED ESTROGEN RECEPTOR STATUS, UNSPECIFIED SITE OF BREAST (H): ICD-10-CM

## 2024-10-14 DIAGNOSIS — C90.01 MULTIPLE MYELOMA IN REMISSION (H): ICD-10-CM

## 2024-10-14 DIAGNOSIS — C90.00 MULTIPLE MYELOMA NOT HAVING ACHIEVED REMISSION (H): ICD-10-CM

## 2024-10-14 DIAGNOSIS — Z79.899 ENCOUNTER FOR LONG-TERM (CURRENT) USE OF MEDICATIONS: ICD-10-CM

## 2024-10-14 LAB
ALBUMIN SERPL BCG-MCNC: 4.3 G/DL (ref 3.5–5.2)
ALP SERPL-CCNC: 52 U/L (ref 40–150)
ALT SERPL W P-5'-P-CCNC: 19 U/L (ref 0–50)
ANION GAP SERPL CALCULATED.3IONS-SCNC: 9 MMOL/L (ref 7–15)
AST SERPL W P-5'-P-CCNC: 19 U/L (ref 0–45)
BASOPHILS # BLD AUTO: 0 10E3/UL (ref 0–0.2)
BASOPHILS NFR BLD AUTO: 0 %
BILIRUB SERPL-MCNC: 0.2 MG/DL
BUN SERPL-MCNC: 15.7 MG/DL (ref 8–23)
CALCIUM SERPL-MCNC: 9.4 MG/DL (ref 8.8–10.4)
CHLORIDE SERPL-SCNC: 99 MMOL/L (ref 98–107)
CREAT SERPL-MCNC: 0.73 MG/DL (ref 0.51–0.95)
EGFRCR SERPLBLD CKD-EPI 2021: 86 ML/MIN/1.73M2
EOSINOPHIL # BLD AUTO: 0 10E3/UL (ref 0–0.7)
EOSINOPHIL NFR BLD AUTO: 0 %
ERYTHROCYTE [DISTWIDTH] IN BLOOD BY AUTOMATED COUNT: 20.7 % (ref 10–15)
GLUCOSE SERPL-MCNC: 101 MG/DL (ref 70–99)
HCO3 SERPL-SCNC: 28 MMOL/L (ref 22–29)
HCT VFR BLD AUTO: 33.9 % (ref 35–47)
HGB BLD-MCNC: 11.2 G/DL (ref 11.7–15.7)
IMM GRANULOCYTES # BLD: 0 10E3/UL
IMM GRANULOCYTES NFR BLD: 1 %
LYMPHOCYTES # BLD AUTO: 0.9 10E3/UL (ref 0.8–5.3)
LYMPHOCYTES NFR BLD AUTO: 37 %
MCH RBC QN AUTO: 33.2 PG (ref 26.5–33)
MCHC RBC AUTO-ENTMCNC: 33 G/DL (ref 31.5–36.5)
MCV RBC AUTO: 101 FL (ref 78–100)
MONOCYTES # BLD AUTO: 0.8 10E3/UL (ref 0–1.3)
MONOCYTES NFR BLD AUTO: 33 %
NEUTROPHILS # BLD AUTO: 0.7 10E3/UL (ref 1.6–8.3)
NEUTROPHILS NFR BLD AUTO: 29 %
NRBC # BLD AUTO: 0 10E3/UL
NRBC BLD AUTO-RTO: 0 /100
PLATELET # BLD AUTO: 59 10E3/UL (ref 150–450)
POTASSIUM SERPL-SCNC: 4 MMOL/L (ref 3.4–5.3)
PROT SERPL-MCNC: 6.2 G/DL (ref 6.4–8.3)
RBC # BLD AUTO: 3.37 10E6/UL (ref 3.8–5.2)
SODIUM SERPL-SCNC: 136 MMOL/L (ref 135–145)
WBC # BLD AUTO: 2.6 10E3/UL (ref 4–11)

## 2024-10-14 PROCEDURE — 80053 COMPREHEN METABOLIC PANEL: CPT

## 2024-10-14 PROCEDURE — 85025 COMPLETE CBC W/AUTO DIFF WBC: CPT

## 2024-10-14 PROCEDURE — G0463 HOSPITAL OUTPT CLINIC VISIT: HCPCS | Performed by: INTERNAL MEDICINE

## 2024-10-14 PROCEDURE — 250N000011 HC RX IP 250 OP 636: Performed by: STUDENT IN AN ORGANIZED HEALTH CARE EDUCATION/TRAINING PROGRAM

## 2024-10-14 PROCEDURE — 99214 OFFICE O/P EST MOD 30 MIN: CPT | Performed by: INTERNAL MEDICINE

## 2024-10-14 PROCEDURE — 36591 DRAW BLOOD OFF VENOUS DEVICE: CPT

## 2024-10-14 RX ORDER — HEPARIN SODIUM (PORCINE) LOCK FLUSH IV SOLN 100 UNIT/ML 100 UNIT/ML
5 SOLUTION INTRAVENOUS EVERY 8 HOURS
Status: DISCONTINUED | OUTPATIENT
Start: 2024-10-14 | End: 2024-10-20 | Stop reason: HOSPADM

## 2024-10-14 RX ADMIN — Medication 5 ML: at 11:10

## 2024-10-14 ASSESSMENT — PAIN SCALES - GENERAL: PAINLEVEL: NO PAIN (0)

## 2024-10-14 NOTE — PATIENT INSTRUCTIONS
Follow up Appointment Information:  Follow-up in 1 year      Thank you for allowing us to be a part of your care here at the [facility].      If you have questions or concerns please contact us at:  Cardiovascular Clinic  [facility]   [phone] Press #1 to send a message to your care team  On Call Cardiologist for after hours or on weekends: [phone]  option #4

## 2024-10-14 NOTE — LETTER
10/14/2024      RE: Shena Hartmann  [address] [place] [address]       Dear Colleague,    Thank you for the opportunity to participate in the care of your patient, Shena Hartmann, at the [facility]. Please see a copy of my visit note below.    HISTORY:    Shena Hartmann is a very pleasant 73 year old woman with     Breast cancer  - post surgery, chemotherapy and is without recurrence  MGUS diagnosed in   Autologous transplantation 2014  Relapsed Myeloma    Hypertension was initially diagnosed in 2020 and she has had periods of poorly controlled blood pressure. She was started on lisinopril in 2021 and eventually amlodipine was added. She developed a persistent cough and the lisinopril was stopped.     She received CAR-T cell therapy on 24. She has fatigue and diarrhea as a side effect.Otherwise, no CV issues.     Denies any chest pain, sob, lightheadedness, dizziness, syncope, edema.     PAST MEDICAL HISTORY:  Past Medical History:   Diagnosis Date     Acquired hypothyroidism 8/10/2023     Breast cancer (H) 1994    right     H/O stem cell transplant (H) 2014     History of blood transfusion  & 2014    During stem cell tx process     Hypertension 2021    Runs 140 s systolically, about 20 above my usual     Multiple myeloma, without mention of having achieved remission 2012       FAMILY HISTORY:  Family History   Problem Relation Age of Onset     Hypertension Mother      Cerebrovascular Disease Mother          8-11-15 from strokes     Hypertension Father      Prostate Cancer Father      Skin Cancer Paternal Grandmother      Cancer Paternal Grandmother         skin cancer       SOCIAL HISTORY:  Non smoker    CURRENT MEDICATIONS:  Current Outpatient Medications   Medication Sig Dispense Refill     acetaminophen (TYLENOL) 325 MG tablet Take 325-650 mg by  mouth every 6 hours as needed for mild pain       acyclovir (ZOVIRAX) 800 MG tablet Take 1 tablet (800 mg) by mouth every 12 hours. 180 tablet 3     amLODIPine (NORVASC) 2.5 MG tablet Take 2 tablets (5 mg) by mouth daily Take 5 mg (2 tablets) in the morning. 180 tablet 3     Ascorbic Acid (VITAMIN C PO) Take 1,000 mg by mouth 2 times daily        CALCIUM-VITAMIN D-VITAMIN K PO Take 2 tablets by mouth daily.       clobetasol (TEMOVATE) 0.05 % external ointment Apply topically 2 times daily as needed (itching and rash). Topically to rash on hands until resolved 45 g 0     COENZYME Q-10 PO Take 100 mg by mouth daily        Cyanocobalamin 1000 MCG/ML LIQD Take 500 mcg by mouth 2 times daily       levothyroxine (SYNTHROID/LEVOTHROID) 50 MCG tablet TAKE 1 TABLET(50 MCG) BY MOUTH DAILY 90 tablet 0     MAGNESIUM PO Take 235 mg by mouth 2 times daily.       Multiple Vitamins-Minerals (MULTIVITAMIN OR) Take 1 tablet by mouth 2 times daily.       ondansetron (ZOFRAN) 4 MG tablet Take 1 tablet (4 mg) by mouth every 8 hours as needed for nausea 30 tablet 0     order for DME 6 mastectomy bras  Length of need: 99 months 6 Device 1     order for DME R mastectomy prosthesis   Length of need:  99 months 1 Device 1     penicillin V (VEETID) 250 MG tablet Take 1 tablet (250 mg) by mouth 2 times daily. 60 tablet 2     Probiotic Product (PROBIOTIC PO) Take 1 capsule by mouth daily. Theralac Pro Probiotic       Quercetin 500 MG CAPS Take 500 mg by mouth daily       saccharomyces boulardii (SACCHAROMYCIN DF) 250 MG capsule Take 250 mg by mouth daily. Hold until after day +14 and ANC > 1.0       triamcinolone (KENALOG) 0.1 % external cream Apply topically 2 times daily as needed for irritation. 15 g 11     ZINC PICOLINATE PO Take 30 mg by mouth daily         ROS:   Comprehensive ROS negative except HPI    EXAM:  /75 (BP Location: Left arm, Patient Position: Chair, Cuff Size: Adult Regular)   Pulse 78   Wt 54.8 kg (120 lb 14.4 oz)    "LMP  (LMP Unknown)   SpO2 98%   BMI 22.25 kg/m    In general, the patient is in no apparent distress.        HEENT: Sclerae white, not injected.    Neck: No JVD. No thyromegaly  Heart: regular with normal S1, S2. No murmur. No audible rub or gallop.    Lungs: Clear bilaterally.  No rhonchi, wheezes, rales.   Extremities: No edema.  The pulses are 2+at the radial bilaterally.    Labs:    I have independently reviewed this patient's relevant laboratory and cardiac data :    Recent Labs   Lab Test 02/19/24  1333 03/23/18  0737   CHOL 230* 234*   HDL 83 64   * 153*   TRIG 116 85      Recent Labs   Lab Test 05/06/24  0827 04/10/24  0817   AST 26 21     Recent Labs   Lab Test 05/06/24  0827 04/10/24  0817   ALT 19 23     Recent Labs   Lab Test 05/06/24  0827 04/10/24  0817    136   POTASSIUM 4.1 4.3   CHLORIDE 100 99   CO2 27 26   ANIONGAP 11 11   BUN 15.2 12.7   CR 0.88 0.78     Recent Labs   Lab Test 05/06/24  0827 04/10/24  0817   WBC 4.1 5.8   RBC 3.92 3.70*   HGB 13.3 12.9    99    199     No results for input(s): \"A1C\" in the last 34983 hours.  Recent Labs   Lab Test 02/19/24  1333 08/17/23  1349   TSH 3.08 2.12     ECG July 2024  Sinus bradycardia    PET CT scan 7/2024- mild coronary calcification. None in 2021    Echocardiogram 5/9/24:  Global and regional left ventricular function is normal with an EF of 60-65%.  Right ventricular function, chamber size, wall motion, and thickness are normal.  No valvular dysfunction of significance.  The inferior vena cava is normal.  No pericardial effusion is present.    Assessment and Plan:   Shena Hartmann is a 73 year old with     Essential hypertension  Normal biventricular function   Multiple myeloma, post CAR-T cell therapy  Personal history of Breast cancer     Shena Hartamnn has hypertension for which she is on monotherapy with amlodipine. She is doing well without chest pain or dyspnea. We reviewed PET scan from July 2024 for coronary " calcification to determine if she would potentially benefit from statin therapy. She has mild coronary calcification in the left coronary arteries which was not present in 2021. She is not inclined to start statin therapy, and chooses to address with lifestyle modification.      Recommendations:  - Continue amlodipine 5 mg daily  - She will monitor her blood pressure at home and call with any significant changes.    - Follow up 1 year  Sadia Price MD, MS  Professor of Medicine  Cardiovascular Medicine        Please do not hesitate to contact me if you have any questions/concerns.     Sincerely,     Sadia Price MD

## 2024-10-14 NOTE — NURSING NOTE
Chief Complaint   Patient presents with    Follow Up     RETURN CARDIOLOGY      Vitals were taken and medications reconciled.    Fabien Stevens, EMT  11:33 AM

## 2024-10-14 NOTE — PROGRESS NOTES
HISTORY:    Shena Hartmann is a very pleasant 73 year old woman with     Breast cancer  - post surgery, chemotherapy and is without recurrence  MGUS diagnosed in   Autologous transplantation 2014  Relapsed Myeloma    Hypertension was initially diagnosed in 2020 and she has had periods of poorly controlled blood pressure. She was started on lisinopril in 2021 and eventually amlodipine was added. She developed a persistent cough and the lisinopril was stopped.     She received CAR-T cell therapy on 24. She has fatigue and diarrhea as a side effect.Otherwise, no CV issues.     Denies any chest pain, sob, lightheadedness, dizziness, syncope, edema.     PAST MEDICAL HISTORY:  Past Medical History:   Diagnosis Date    Acquired hypothyroidism 8/10/2023    Breast cancer (H) 1994    right    H/O stem cell transplant (H) 2014    History of blood transfusion  &     During stem cell tx process    Hypertension 2021    Runs 140 s systolically, about 20 above my usual    Multiple myeloma, without mention of having achieved remission 2012       FAMILY HISTORY:  Family History   Problem Relation Age of Onset    Hypertension Mother     Cerebrovascular Disease Mother          8-11-15 from strokes    Hypertension Father     Prostate Cancer Father     Skin Cancer Paternal Grandmother     Cancer Paternal Grandmother         skin cancer       SOCIAL HISTORY:  Non smoker    CURRENT MEDICATIONS:  Current Outpatient Medications   Medication Sig Dispense Refill    acetaminophen (TYLENOL) 325 MG tablet Take 325-650 mg by mouth every 6 hours as needed for mild pain      acyclovir (ZOVIRAX) 800 MG tablet Take 1 tablet (800 mg) by mouth every 12 hours. 180 tablet 3    amLODIPine (NORVASC) 2.5 MG tablet Take 2 tablets (5 mg) by mouth daily Take 5 mg (2 tablets) in the morning. 180 tablet 3    Ascorbic Acid (VITAMIN C PO) Take 1,000 mg by mouth 2 times daily       CALCIUM-VITAMIN  D-VITAMIN K PO Take 2 tablets by mouth daily.      clobetasol (TEMOVATE) 0.05 % external ointment Apply topically 2 times daily as needed (itching and rash). Topically to rash on hands until resolved 45 g 0    COENZYME Q-10 PO Take 100 mg by mouth daily       Cyanocobalamin 1000 MCG/ML LIQD Take 500 mcg by mouth 2 times daily      levothyroxine (SYNTHROID/LEVOTHROID) 50 MCG tablet TAKE 1 TABLET(50 MCG) BY MOUTH DAILY 90 tablet 0    MAGNESIUM PO Take 235 mg by mouth 2 times daily.      Multiple Vitamins-Minerals (MULTIVITAMIN OR) Take 1 tablet by mouth 2 times daily.      ondansetron (ZOFRAN) 4 MG tablet Take 1 tablet (4 mg) by mouth every 8 hours as needed for nausea 30 tablet 0    order for DME 6 mastectomy bras  Length of need: 99 months 6 Device 1    order for DME R mastectomy prosthesis   Length of need:  99 months 1 Device 1    penicillin V (VEETID) 250 MG tablet Take 1 tablet (250 mg) by mouth 2 times daily. 60 tablet 2    Probiotic Product (PROBIOTIC PO) Take 1 capsule by mouth daily. Theralac Pro Probiotic      Quercetin 500 MG CAPS Take 500 mg by mouth daily      saccharomyces boulardii (SACCHAROMYCIN DF) 250 MG capsule Take 250 mg by mouth daily. Hold until after day +14 and ANC > 1.0      triamcinolone (KENALOG) 0.1 % external cream Apply topically 2 times daily as needed for irritation. 15 g 11    ZINC PICOLINATE PO Take 30 mg by mouth daily         ROS:   Comprehensive ROS negative except HPI    EXAM:  /75 (BP Location: Left arm, Patient Position: Chair, Cuff Size: Adult Regular)   Pulse 78   Wt 54.8 kg (120 lb 14.4 oz)   LMP  (LMP Unknown)   SpO2 98%   BMI 22.25 kg/m    In general, the patient is in no apparent distress.        HEENT: Sclerae white, not injected.    Neck: No JVD. No thyromegaly  Heart: regular with normal S1, S2. No murmur. No audible rub or gallop.    Lungs: Clear bilaterally.  No rhonchi, wheezes, rales.   Extremities: No edema.  The pulses are 2+at the radial  "bilaterally.    Labs:    I have independently reviewed this patient's relevant laboratory and cardiac data :    Recent Labs   Lab Test 02/19/24  1333 03/23/18  0737   CHOL 230* 234*   HDL 83 64   * 153*   TRIG 116 85      Recent Labs   Lab Test 05/06/24  0827 04/10/24  0817   AST 26 21     Recent Labs   Lab Test 05/06/24  0827 04/10/24  0817   ALT 19 23     Recent Labs   Lab Test 05/06/24  0827 04/10/24  0817    136   POTASSIUM 4.1 4.3   CHLORIDE 100 99   CO2 27 26   ANIONGAP 11 11   BUN 15.2 12.7   CR 0.88 0.78     Recent Labs   Lab Test 05/06/24  0827 04/10/24  0817   WBC 4.1 5.8   RBC 3.92 3.70*   HGB 13.3 12.9    99    199     No results for input(s): \"A1C\" in the last 85174 hours.  Recent Labs   Lab Test 02/19/24  1333 08/17/23  1349   TSH 3.08 2.12     ECG July 2024  Sinus bradycardia    PET CT scan 7/2024- mild coronary calcification. None in 2021    Echocardiogram 5/9/24:  Global and regional left ventricular function is normal with an EF of 60-65%.  Right ventricular function, chamber size, wall motion, and thickness are normal.  No valvular dysfunction of significance.  The inferior vena cava is normal.  No pericardial effusion is present.    Assessment and Plan:   Shena Hartmann is a 73 year old with     Essential hypertension  Normal biventricular function   Multiple myeloma, post CAR-T cell therapy  Personal history of Breast cancer     Shena Hartmann has hypertension for which she is on monotherapy with amlodipine. She is doing well without chest pain or dyspnea. We reviewed PET scan from July 2024 for coronary calcification to determine if she would potentially benefit from statin therapy. She has mild coronary calcification in the left coronary arteries which was not present in 2021. She is not inclined to start statin therapy, and chooses to address with lifestyle modification.      Recommendations:  - Continue amlodipine 5 mg daily  - She will monitor her blood pressure " at home and call with any significant changes.    - Follow up 1 year  Sadia Price MD, MS  Professor of Medicine  Cardiovascular Medicine

## 2024-10-25 ENCOUNTER — TELEPHONE (OUTPATIENT)
Dept: PULMONOLOGY | Facility: CLINIC | Age: 74
End: 2024-10-25
Payer: MEDICARE

## 2024-10-25 NOTE — TELEPHONE ENCOUNTER
Patient called to inform of upcoming road work in the area surrounding clinics and surgery center and to plan extra time to arrive to their appointment on 10/28/2024.

## 2024-10-27 ENCOUNTER — MYC MEDICAL ADVICE (OUTPATIENT)
Dept: ONCOLOGY | Facility: CLINIC | Age: 74
End: 2024-10-27
Payer: MEDICARE

## 2024-10-28 ENCOUNTER — LAB (OUTPATIENT)
Dept: LAB | Facility: CLINIC | Age: 74
End: 2024-10-28
Payer: MEDICARE

## 2024-10-28 DIAGNOSIS — C90.00 MULTIPLE MYELOMA, REMISSION STATUS UNSPECIFIED (H): Primary | ICD-10-CM

## 2024-10-28 DIAGNOSIS — C90.00 MULTIPLE MYELOMA NOT HAVING ACHIEVED REMISSION (H): Primary | ICD-10-CM

## 2024-10-28 PROCEDURE — 94640 AIRWAY INHALATION TREATMENT: CPT

## 2024-10-28 PROCEDURE — 94642 AEROSOL INHALATION TREATMENT: CPT

## 2024-10-28 RX ORDER — HEPARIN SODIUM,PORCINE 10 UNIT/ML
5-20 VIAL (ML) INTRAVENOUS DAILY PRN
OUTPATIENT
Start: 2024-11-01

## 2024-10-28 RX ORDER — PENTAMIDINE ISETHIONATE 300 MG/300MG
300 INHALANT RESPIRATORY (INHALATION) ONCE
Start: 2024-11-01 | End: 2024-11-01

## 2024-10-28 RX ORDER — HEPARIN SODIUM (PORCINE) LOCK FLUSH IV SOLN 100 UNIT/ML 100 UNIT/ML
5 SOLUTION INTRAVENOUS
Status: CANCELLED | OUTPATIENT
Start: 2024-11-01

## 2024-10-28 RX ORDER — ALBUTEROL SULFATE 5 MG/ML
2.5 SOLUTION RESPIRATORY (INHALATION) ONCE
Start: 2024-11-01 | End: 2024-11-01

## 2024-10-28 RX ORDER — ALBUTEROL SULFATE 5 MG/ML
2.5 SOLUTION RESPIRATORY (INHALATION) ONCE
Status: DISCONTINUED | OUTPATIENT
Start: 2024-10-28 | End: 2024-10-28 | Stop reason: HOSPADM

## 2024-10-28 RX ORDER — PENTAMIDINE ISETHIONATE 300 MG/300MG
300 INHALANT RESPIRATORY (INHALATION) ONCE
Status: COMPLETED | OUTPATIENT
Start: 2024-10-28 | End: 2024-10-28

## 2024-10-28 RX ADMIN — PENTAMIDINE ISETHIONATE 300 MG: 300 INHALANT RESPIRATORY (INHALATION) at 15:31

## 2024-10-28 NOTE — PROGRESS NOTES
Shena Hartmann was seen today for a Pentamidine nebulizer tx ordered by Dr. Nighat Chavez.    Patient was first given 2.5 mg / 3 ml of albuterol (LOT#[id number], NDC# [id number], EXP 05/31/2026) nebulizer, after which Pentamidine 300 mg (Lot #[id number], NDC# [id number], EXP 10/31/2025 ) mixed with 6cc Sterile H20 was administered through a filtered nebulizer.    Pre-treatment: SpO2 = 97%   HR = 81 bpm   BBS = clear   Post-treatment: SpO2 = 95%  HR = 89 bpm   BBS = clear    No adverse side effects noted by the patient.    This service today was provided under the supervision of Dr. Jeffrey Land, who was available if needed.     Procedure was completed by Quique Royal RRT

## 2024-10-29 NOTE — TELEPHONE ENCOUNTER
Oncology Nurse Triage - Reporting Symptoms  Situation:   Shena reporting the following symptoms per my chart: pain/tingling in L fingers of hand, zingers to elbow, slight swelling in hand, wrist to two inches above wrist.    Background:   Treating Provider: Nighat Chavez MD  Date of last office visit: 10/1/24 with Dr. Chavez  Recent treatments: Yes: CAR HANK and Carvykti    Assessment  Onset of symptoms: 10/24  From [facility] message: Pain/tingling in Left little finger coming from elbow. Finger was swollen and stiff; by 10/26, sensations spread to 4 fingers and hand, very uncomfortable and tylenol, movement, hot, cold didn't ease sensations much. Not a swollen arm like a blood clot. Pain is 4-5/10, increasing to 8/10 sometimes. Difficulty sleeping.      Spoke with pt who reports:  Pain/tingling has decreased some when saw chiropractor and energy worker.   Now 4/10, if doesn't use her hand or bend her hand very much. If she moves her hand (when flexing wrist or using a pinching motion), pain/tingling/zinger is initiated and is pretty intense. Feels nerve pain up to the elbow.  Hand and wrist are slightly swollen. Swelling ~ 2 inches above wrist.  Sometimes hand feels hot or looks mottled. Appears a little more pink than the other hand.   Does not seem like signs of a blood clot. No fever/chills.  Chiropractor worked on her neck, shoulder, and elbow and readjusted gently one wrist bone. Worked her neck a lot.  She has also tried epsom salt baths and warm stone, CBD oil helped ease some of the pain temporarily.   Other things she tried that were not effective include: tylenol, heat, cold.      Recommendations:   Informed caller that this writer will reach out to Care Team for recommendations and will call her back.  Pt voiced understanding.     Routed to Care Team.

## 2024-10-31 ENCOUNTER — ONCOLOGY VISIT (OUTPATIENT)
Dept: ONCOLOGY | Facility: CLINIC | Age: 74
End: 2024-10-31
Attending: REGISTERED NURSE
Payer: MEDICARE

## 2024-10-31 VITALS
SYSTOLIC BLOOD PRESSURE: 143 MMHG | RESPIRATION RATE: 16 BRPM | BODY MASS INDEX: 22.17 KG/M2 | OXYGEN SATURATION: 98 % | DIASTOLIC BLOOD PRESSURE: 77 MMHG | WEIGHT: 120.5 LBS | HEART RATE: 92 BPM | TEMPERATURE: 97.6 F

## 2024-10-31 DIAGNOSIS — M79.2 NERVE PAIN: ICD-10-CM

## 2024-10-31 DIAGNOSIS — C90.02 MULTIPLE MYELOMA IN RELAPSE (H): ICD-10-CM

## 2024-10-31 DIAGNOSIS — M79.642 PAIN OF LEFT HAND: Primary | ICD-10-CM

## 2024-10-31 PROCEDURE — G0463 HOSPITAL OUTPT CLINIC VISIT: HCPCS | Performed by: REGISTERED NURSE

## 2024-10-31 PROCEDURE — 99214 OFFICE O/P EST MOD 30 MIN: CPT | Performed by: REGISTERED NURSE

## 2024-10-31 PROCEDURE — G2211 COMPLEX E/M VISIT ADD ON: HCPCS | Performed by: REGISTERED NURSE

## 2024-10-31 ASSESSMENT — PAIN SCALES - GENERAL: PAINLEVEL_OUTOF10: MODERATE PAIN (5)

## 2024-10-31 NOTE — NURSING NOTE
"Oncology Rooming Note    October 31, 2024 7:04 AM   Shena Hartmann is a 74 year old female who presents for:    Chief Complaint   Patient presents with    Oncology Clinic Visit     Increasd left hand pain     Initial Vitals: BP (!) 143/77 (BP Location: Left arm, Patient Position: Sitting, Cuff Size: Adult Regular)   Pulse 92   Temp 97.6  F (36.4  C) (Axillary)   Resp 16   Wt 54.7 kg (120 lb 8 oz)   LMP  (LMP Unknown)   SpO2 98%   BMI 22.17 kg/m   Estimated body mass index is 22.17 kg/m  as calculated from the following:    Height as of 7/29/24: 1.57 m (5' 1.81\").    Weight as of this encounter: 54.7 kg (120 lb 8 oz). Body surface area is 1.54 meters squared.  Moderate Pain (5) Comment: Data Unavailable   No LMP recorded (lmp unknown). Patient is postmenopausal.  Allergies reviewed: Yes  Medications reviewed: Yes    Medications: Medication refills not needed today.  Pharmacy name entered into [facility]:    [id number] - [place], [place] - [address] AT [facility] [address]  WRITTEN PRESCRIPTION REQUESTED  [facility] - [place], [place] - [address]    Frailty Screening:   Is the patient here for a new oncology consult visit in cancer care? 2. No      Clinical concerns: left wrist pain (5)      Farheen Wagner, EMT  10/31/2024              "

## 2024-10-31 NOTE — LETTER
"10/31/2024      Shena Hartmann  [address] [place] [address]      Dear Colleague,    Thank you for referring your patient, Shena Hartmann, to the [facility]. Please see a copy of my visit note below.        [facility]    Hematology/Oncology Clinic Note    Oct 31, 2024    Reason for Office Visit   Follow-up for multiple myeloma      Cancer Diagnosis   Multiple Myeloma    Oncology History of Present Illness   Breast cancer dx in 1994. Underwent surgery and chemotherapy without reoccurence  MGUS dx 1999  T8 pathologic fracture with radiation  Revlimid and velcade,   2013  Cytoxan IV 9/2013  D-PACE 11/2013  Auto 3/27/14  5/2014 thalidomide caused rash so switched to pomalidomide   on kenalog and clobetasol creams for IMID rash  FLC began to rise so riky added to pom 9/2020  she has been on xgeva for an unclear amount of time  Teodora-Kd 2/20/2023  Teodora maintenance Nov 2023  Spring 2024 FLC began to rise again  5/22/24 Underwent apheresis for CAR-T  7/29/24 Carvykti (CAR-T)    Interval History   On 10/24 started having pain and numbness in the 5th finger of her left hand.  In the following days the pain and numbness spread to her fourth and third fingers of that hand.  She saw her chiropractor and had good symptom relief initially but then started having shooting pains that she describes as \"zingers\" shooting up the posterior medial side of her left arm up to the elbow.  She went back to the [facility] and had some gentle neck work done and they popped one bone in the wrist with some relief.  She then went to her energy worker who described a disruption in the myelin sheath on the nerves anterior to the spine behind the clavicle.  She initially noticed some swelling in her left hand which has reduced some, but she still is not able to wear her rings on that hand.   She can touch her second and third fingers to her thumb but has difficulty with " the fourth and fifth fingers due to pain.  She has not tried wearing a splint because it makes her feel even more stiff; prefers to just pay attention to how she uses her left arm and tries to rest it.  She has been using celragesic cream, CBD muscle rub, and a CBD oil and gets temporary relief.  She has avoided ibuprofen due to her low platelets.    Denies fevers, neck pain, new lumps or bumps, rashes or blisters.  Continues to have loose stools most of the time ever since Carvykti.  Appetite and weight are stable.    ROS neg other than the symptoms noted above in the HPI.        Past Medical History     Past Medical History:   Diagnosis Date     Acquired hypothyroidism 8/10/2023     Breast cancer (H) 01/01/1994    right     H/O stem cell transplant (H) 03/01/2014     History of blood transfusion 2013 & 2014    During stem cell tx process     Hypertension Sept. 2021    Runs 140 s systolically, about 20 above my usual     Multiple myeloma, without mention of having achieved remission 11/06/2012       Past Surgical History:      Past Surgical History:   Procedure Laterality Date     BIOPSY  10/1994; [id number]    Lt. Breast in '94; multiple bone marrow bx's for MM     BREAST SURGERY  10/1994    Lt. Mastectomy w/lymph node resection     COLONOSCOPY  11/2015    Normal results     EYE SURGERY  2020    Bilateral cataract surgery     INSERT CATHETER VASCULAR ACCESS Left 5/22/2024    Procedure: central venous catheter non tunneled insertion, vascular access;  Surgeon: Ric Castaneda PA-C;  Location: [facility] OR     INSERT PORT VASCULAR ACCESS Left 2/15/2023    Procedure: INSERTION, VASCULAR ACCESS PORT;  Surgeon: Luc Antunez MD;  Location: [facility] OR     IR CHEST PORT PLACEMENT > 5 YRS OF AGE  2/15/2023     IR CVC NON TUNNEL LINE REMOVAL  5/22/2024     IR CVC NON TUNNEL PLACEMENT > 5 YRS  5/22/2024     IR PICC PLACEMENT > 5 YRS OF AGE  7/26/2024     MASTECTOMY      Right, with lymphe node disection       PICC INSERTION   09/10/2013    5fr DL Power PICC, 44cm (1cm external) in the L lateral brachial vein with tip in the low SVC     TRANSPLANT  3/27/2014    Autologous stem cell tx       Social History     Socioeconomic History     Marital status:    Tobacco Use     Smoking status: Never     Passive exposure: Never     Smokeless tobacco: Never   Substance and Sexual Activity     Alcohol use: Yes     Comment: occ     Drug use: No     Sexual activity: Not Currently     Partners: Male     Birth control/protection: Post-menopausal   Other Topics Concern     Parent/sibling w/ CABG, MI or angioplasty before 65F 55M? No      Service No     Blood Transfusions No     Caffeine Concern No     Occupational Exposure No     Hobby Hazards No     Family History     Family History   Problem Relation Age of Onset     Hypertension Mother      Cerebrovascular Disease Mother          8-11-15 from strokes     Hypertension Father      Prostate Cancer Father      Skin Cancer Paternal Grandmother      Cancer Paternal Grandmother         skin cancer       Allergies:     Allergies   Allergen Reactions     Blood Transfusion Related (Informational Only) Other (See Comments)     Patient has a history of a clinically significant antibody against RBC antigens.  A delay in compatible RBCs may occur.      Cayenne Pepper [Cayenne]      Corn-Containing Products GI Disturbance     Levaquin Other (See Comments)     Tendon pain     Milk Protein GI Disturbance     Mold      Facial rash/lip swelling     Morphine Sulfate Other (See Comments)     Per pt - see things (hallucination) when she was on Morphine .     Pollen Extract      Environmental allergies      Shrimp Nausea and Vomiting     Tomato      Lip swelling, facial rash     Azithromycin Rash     Keflex [Cephalexin] Rash     Oat Rash     Tape [Adhesive Tape] Itching and Rash     All adhesives     Wheat Rash     Swelling of lips         Medications:     Current Outpatient Medications   Medication Sig  Dispense Refill     acetaminophen (TYLENOL) 325 MG tablet Take 325-650 mg by mouth every 6 hours as needed for mild pain       acyclovir (ZOVIRAX) 800 MG tablet Take 1 tablet (800 mg) by mouth every 12 hours. 180 tablet 3     amLODIPine (NORVASC) 2.5 MG tablet Take 2 tablets (5 mg) by mouth daily Take 5 mg (2 tablets) in the morning. 180 tablet 3     Ascorbic Acid (VITAMIN C PO) Take 1,000 mg by mouth 2 times daily        CALCIUM-VITAMIN D-VITAMIN K PO Take 2 tablets by mouth daily.       clobetasol (TEMOVATE) 0.05 % external ointment Apply topically 2 times daily as needed (itching and rash). Topically to rash on hands until resolved 45 g 0     COENZYME Q-10 PO Take 100 mg by mouth daily        Cyanocobalamin 1000 MCG/ML LIQD Take 500 mcg by mouth 2 times daily       levothyroxine (SYNTHROID/LEVOTHROID) 50 MCG tablet TAKE 1 TABLET(50 MCG) BY MOUTH DAILY 90 tablet 0     MAGNESIUM PO Take 235 mg by mouth 2 times daily.       Multiple Vitamins-Minerals (MULTIVITAMIN OR) Take 1 tablet by mouth 2 times daily.       ondansetron (ZOFRAN) 4 MG tablet Take 1 tablet (4 mg) by mouth every 8 hours as needed for nausea 30 tablet 0     order for DME 6 mastectomy bras  Length of need: 99 months 6 Device 1     order for DME R mastectomy prosthesis   Length of need:  99 months 1 Device 1     penicillin V (VEETID) 250 MG tablet Take 1 tablet (250 mg) by mouth 2 times daily. 60 tablet 2     Probiotic Product (PROBIOTIC PO) Take 1 capsule by mouth daily. Theralac Pro Probiotic       Quercetin 500 MG CAPS Take 500 mg by mouth daily       saccharomyces boulardii (SACCHAROMYCIN DF) 250 MG capsule Take 250 mg by mouth daily. Hold until after day +14 and ANC > 1.0       triamcinolone (KENALOG) 0.1 % external cream Apply topically 2 times daily as needed for irritation. 15 g 11     ZINC PICOLINATE PO Take 30 mg by mouth daily       No current facility-administered medications for this visit.     Facility-Administered Medications Ordered in  Other Visits   Medication Dose Route Frequency Provider Last Rate Last Admin     potassium chloride 20 mEq in 50 mL IVPB  20 mEq Intravenous Once Mae Kay PA-C         potassium chloride SA (K-DUR,KLOR-CON M) CR tablet 20 mEq  20 mEq Oral Once Mae Kay PA-C           Physical Exam   BP (!) 143/77 (BP Location: Left arm, Patient Position: Sitting, Cuff Size: Adult Regular)   Pulse 92   Temp 97.6  F (36.4  C) (Axillary)   Resp 16   Wt 54.7 kg (120 lb 8 oz)   LMP  (LMP Unknown)   SpO2 98%   BMI 22.17 kg/m      Wt Readings from Last 5 Encounters:   10/31/24 54.7 kg (120 lb 8 oz)   10/14/24 54.8 kg (120 lb 14.4 oz)   10/01/24 54.7 kg (120 lb 9.6 oz)   09/05/24 53.9 kg (118 lb 14.4 oz)   08/26/24 54.2 kg (119 lb 6.4 oz)     Gen: alert, pleasant and conversational, NAD  HEENT: NC/AT,EOMI w/ PERRL, anicteric sclera.   MSK: No neck pain with palpation, full ROM. Mildly positive Phalen's sign with tingling in third finger of left hand.  Strength 4/5 in left hand , 5/5 right hand , 5/5 bilateral shoulder flexion/extension  Skin: no concerning lesions or rashes  Neuro: A&Ox4  Psych: appropriate, reactive      Data     Most Recent 3 CBC's:  Recent Labs   Lab Test 10/14/24  1123 10/01/24  1333 09/26/24  1518   WBC 2.6* 2.8* 3.0*   HGB 11.2* 10.7* 10.3*   * 99 99   PLT 59* 51* 44*   ANEUTAUTO 0.7* 0.8* 0.8*     Most Recent 3 BMP's:  Recent Labs   Lab Test 10/14/24  1123 10/03/24  0800 10/01/24  1333 09/26/24  1518     --  139 138   POTASSIUM 4.0  --  4.0 4.3   CHLORIDE 99  --  102 101   CO2 28  --  27 27   BUN 15.7  --  17.7 19.3   CR 0.73 0.90 0.90  0.90 0.83   ANIONGAP 9  --  10 10   PAULINE 9.4  --  9.4 9.3   *  --  114* 121*   PROTTOTAL 6.2*  --  6.1* 6.1*   ALBUMIN 4.3  --  4.2 4.2    Most Recent 3 LFT's:  Recent Labs   Lab Test 10/14/24  1123 10/01/24  1333 09/26/24  1518   AST 19 28 27   ALT 19 31 28   ALKPHOS 52 57 65   BILITOTAL 0.2 0.2 <0.2    Most Recent 2  TSH and T4:  Recent Labs   Lab Test 02/19/24  1333 08/17/23  1349 10/03/22  0820 06/13/22  0822 03/21/22  0803   TSH 3.08 2.12   < > 9.47*  9.31* 5.79*   T4  --   --   --  0.99  0.97 0.83    < > = values in this interval not displayed.     I reviewed the above labs today myself and with the patient.    Assessment/Plan     Relapsed Multiple Myeloma  Started kyprolis/isatuximab/dex on 2/20/23.    Changed to Isatuximab maintenace 10mg/kg every 4 week starting 10/9/23  Rising light chains noted April 2024; cell therapy consult 4/26/24 with multiple options presented.    CAR-T therapy 7/29/24 with Carvykti  Xgeva every 6 months, last dose 5/31/24, next due Nov 2024;  Continue Pen V while ANC < 1.0    HTN  Continue amlodipine 5 mg daily.  Followed by cardiology     Hypothyroidism  Remains on levothyroxine    ID Prophylaxis   Acyclovir 800 mg BID for HSV prophylaxis  Pentamidine monthly for 1 year following CAR-T - may consider changing to Bactrim once platelets recover    Left Hand Pain  Discussed that her nerve pain seems to be distributed along the ulnar and medial antebrachial cutaneous nerves.  This most likely represents some sort of impingement or entrapment syndrome starting at the elbow, but can not fully rule out pathology in the cervical spine or brachial plexus. Discussed low likelihood of myelomatous source considering excellent biochemical response to Carvykti, but will confirm this with already-scheduled PET scan on 11/4/24.  I would also recommend a neuro referral for further evaluation and treatment recommendations.  No skin changes that would suggest shingles.  - PET 11/4/24  - Neuro consult  - Diclofenac gel to the elbow, forearm, and wrist for antiinflammatory effect  - Recommend splinting, but if she declines, agree with at least trying to use conscious resting of the elbow and wrist    36 minutes spent on the date of the encounter doing chart review, review of test results, interpretation of tests,  patient visit, and documentation     The longitudinal plan of care for the diagnosis(es)/condition(s) as documented were addressed during this visit. Due to the added complexity in care, I will continue to support Shena in the subsequent management and with ongoing continuity of care.    MARINO Fuller CNP  [facility] [address] [address]  [phone]      Again, thank you for allowing me to participate in the care of your patient.        Sincerely,        MARINO Lee CNP

## 2024-10-31 NOTE — PROGRESS NOTES
"    [facility]    Hematology/Oncology Clinic Note    Oct 31, 2024    Reason for Office Visit   Follow-up for multiple myeloma      Cancer Diagnosis   Multiple Myeloma    Oncology History of Present Illness   Breast cancer dx in 1994. Underwent surgery and chemotherapy without reoccurence  MGUS dx 1999  T8 pathologic fracture with radiation  Revlimid and velcade,   2013  Cytoxan IV 9/2013  D-PACE 11/2013  Auto 3/27/14  5/2014 thalidomide caused rash so switched to pomalidomide   on kenalog and clobetasol creams for IMID rash  FLC began to rise so riky added to pom 9/2020  she has been on xgeva for an unclear amount of time  Teodora-Kd 2/20/2023  Teodora maintenance Nov 2023  Spring 2024 FLC began to rise again  5/22/24 Underwent apheresis for CAR-T  7/29/24 Carvykti (CAR-T)    Interval History   On 10/24 started having pain and numbness in the 5th finger of her left hand.  In the following days the pain and numbness spread to her fourth and third fingers of that hand.  She saw her chiropractor and had good symptom relief initially but then started having shooting pains that she describes as \"zingers\" shooting up the posterior medial side of her left arm up to the elbow.  She went back to the [facility] and had some gentle neck work done and they popped one bone in the wrist with some relief.  She then went to her energy worker who described a disruption in the myelin sheath on the nerves anterior to the spine behind the clavicle.  She initially noticed some swelling in her left hand which has reduced some, but she still is not able to wear her rings on that hand.   She can touch her second and third fingers to her thumb but has difficulty with the fourth and fifth fingers due to pain.  She has not tried wearing a splint because it makes her feel even more stiff; prefers to just pay attention to how she uses her left arm and tries to rest it.  She has been using celragesic cream, CBD " muscle rub, and a CBD oil and gets temporary relief.  She has avoided ibuprofen due to her low platelets.    Denies fevers, neck pain, new lumps or bumps, rashes or blisters.  Continues to have loose stools most of the time ever since Carvykti.  Appetite and weight are stable.    ROS neg other than the symptoms noted above in the HPI.        Past Medical History     Past Medical History:   Diagnosis Date    Acquired hypothyroidism 8/10/2023    Breast cancer (H) 01/01/1994    right    H/O stem cell transplant (H) 03/01/2014    History of blood transfusion 2013 & 2014    During stem cell tx process    Hypertension Sept. 2021    Runs 140 s systolically, about 20 above my usual    Multiple myeloma, without mention of having achieved remission 11/06/2012       Past Surgical History:      Past Surgical History:   Procedure Laterality Date    BIOPSY  10/1994; [id number]    Lt. Breast in '94; multiple bone marrow bx's for MM    BREAST SURGERY  10/1994    Lt. Mastectomy w/lymph node resection    COLONOSCOPY  11/2015    Normal results    EYE SURGERY  2020    Bilateral cataract surgery    INSERT CATHETER VASCULAR ACCESS Left 5/22/2024    Procedure: central venous catheter non tunneled insertion, vascular access;  Surgeon: Ric Castaneda PA-C;  Location: [facility] OR    INSERT PORT VASCULAR ACCESS Left 2/15/2023    Procedure: INSERTION, VASCULAR ACCESS PORT;  Surgeon: Luc Antunez MD;  Location: [facility] OR    IR CHEST PORT PLACEMENT > 5 YRS OF AGE  2/15/2023    IR CVC NON TUNNEL LINE REMOVAL  5/22/2024    IR CVC NON TUNNEL PLACEMENT > 5 YRS  5/22/2024    IR PICC PLACEMENT > 5 YRS OF AGE  7/26/2024    MASTECTOMY      Right, with lymphe node disection      PICC INSERTION  09/10/2013    5fr DL Power PICC, 44cm (1cm external) in the L lateral brachial vein with tip in the low SVC    TRANSPLANT  3/27/2014    Autologous stem cell tx       Social History     Socioeconomic History    Marital status:    Tobacco Use    Smoking  status: Never     Passive exposure: Never    Smokeless tobacco: Never   Substance and Sexual Activity    Alcohol use: Yes     Comment: occ    Drug use: No    Sexual activity: Not Currently     Partners: Male     Birth control/protection: Post-menopausal   Other Topics Concern    Parent/sibling w/ CABG, MI or angioplasty before 65F 55M? No     Service No    Blood Transfusions No    Caffeine Concern No    Occupational Exposure No    Hobby Hazards No     Family History     Family History   Problem Relation Age of Onset    Hypertension Mother     Cerebrovascular Disease Mother          8-11-15 from strokes    Hypertension Father     Prostate Cancer Father     Skin Cancer Paternal Grandmother     Cancer Paternal Grandmother         skin cancer       Allergies:     Allergies   Allergen Reactions    Blood Transfusion Related (Informational Only) Other (See Comments)     Patient has a history of a clinically significant antibody against RBC antigens.  A delay in compatible RBCs may occur.     Cayenne Pepper [Cayenne]     Corn-Containing Products GI Disturbance    Levaquin Other (See Comments)     Tendon pain    Milk Protein GI Disturbance    Mold      Facial rash/lip swelling    Morphine Sulfate Other (See Comments)     Per pt - see things (hallucination) when she was on Morphine .    Pollen Extract      Environmental allergies     Shrimp Nausea and Vomiting    Tomato      Lip swelling, facial rash    Azithromycin Rash    Keflex [Cephalexin] Rash    Oat Rash    Tape [Adhesive Tape] Itching and Rash     All adhesives    Wheat Rash     Swelling of lips         Medications:     Current Outpatient Medications   Medication Sig Dispense Refill    acetaminophen (TYLENOL) 325 MG tablet Take 325-650 mg by mouth every 6 hours as needed for mild pain      acyclovir (ZOVIRAX) 800 MG tablet Take 1 tablet (800 mg) by mouth every 12 hours. 180 tablet 3    amLODIPine (NORVASC) 2.5 MG tablet Take 2 tablets (5 mg) by mouth daily  Take 5 mg (2 tablets) in the morning. 180 tablet 3    Ascorbic Acid (VITAMIN C PO) Take 1,000 mg by mouth 2 times daily       CALCIUM-VITAMIN D-VITAMIN K PO Take 2 tablets by mouth daily.      clobetasol (TEMOVATE) 0.05 % external ointment Apply topically 2 times daily as needed (itching and rash). Topically to rash on hands until resolved 45 g 0    COENZYME Q-10 PO Take 100 mg by mouth daily       Cyanocobalamin 1000 MCG/ML LIQD Take 500 mcg by mouth 2 times daily      levothyroxine (SYNTHROID/LEVOTHROID) 50 MCG tablet TAKE 1 TABLET(50 MCG) BY MOUTH DAILY 90 tablet 0    MAGNESIUM PO Take 235 mg by mouth 2 times daily.      Multiple Vitamins-Minerals (MULTIVITAMIN OR) Take 1 tablet by mouth 2 times daily.      ondansetron (ZOFRAN) 4 MG tablet Take 1 tablet (4 mg) by mouth every 8 hours as needed for nausea 30 tablet 0    order for DME 6 mastectomy bras  Length of need: 99 months 6 Device 1    order for DME R mastectomy prosthesis   Length of need:  99 months 1 Device 1    penicillin V (VEETID) 250 MG tablet Take 1 tablet (250 mg) by mouth 2 times daily. 60 tablet 2    Probiotic Product (PROBIOTIC PO) Take 1 capsule by mouth daily. Theralac Pro Probiotic      Quercetin 500 MG CAPS Take 500 mg by mouth daily      saccharomyces boulardii (SACCHAROMYCIN DF) 250 MG capsule Take 250 mg by mouth daily. Hold until after day +14 and ANC > 1.0      triamcinolone (KENALOG) 0.1 % external cream Apply topically 2 times daily as needed for irritation. 15 g 11    ZINC PICOLINATE PO Take 30 mg by mouth daily       No current facility-administered medications for this visit.     Facility-Administered Medications Ordered in Other Visits   Medication Dose Route Frequency Provider Last Rate Last Admin    potassium chloride 20 mEq in 50 mL IVPB  20 mEq Intravenous Once Mae Kay PA-C        potassium chloride SA (K-DUR,KLOR-CON M) CR tablet 20 mEq  20 mEq Oral Once Mae Kay PA-C           Physical Exam   BP  (!) 143/77 (BP Location: Left arm, Patient Position: Sitting, Cuff Size: Adult Regular)   Pulse 92   Temp 97.6  F (36.4  C) (Axillary)   Resp 16   Wt 54.7 kg (120 lb 8 oz)   LMP  (LMP Unknown)   SpO2 98%   BMI 22.17 kg/m      Wt Readings from Last 5 Encounters:   10/31/24 54.7 kg (120 lb 8 oz)   10/14/24 54.8 kg (120 lb 14.4 oz)   10/01/24 54.7 kg (120 lb 9.6 oz)   09/05/24 53.9 kg (118 lb 14.4 oz)   08/26/24 54.2 kg (119 lb 6.4 oz)     Gen: alert, pleasant and conversational, NAD  HEENT: NC/AT,EOMI w/ PERRL, anicteric sclera.   MSK: No neck pain with palpation, full ROM. Mildly positive Phalen's sign with tingling in third finger of left hand.  Strength 4/5 in left hand , 5/5 right hand , 5/5 bilateral shoulder flexion/extension  Skin: no concerning lesions or rashes  Neuro: A&Ox4  Psych: appropriate, reactive      Data     Most Recent 3 CBC's:  Recent Labs   Lab Test 10/14/24  1123 10/01/24  1333 09/26/24  1518   WBC 2.6* 2.8* 3.0*   HGB 11.2* 10.7* 10.3*   * 99 99   PLT 59* 51* 44*   ANEUTAUTO 0.7* 0.8* 0.8*     Most Recent 3 BMP's:  Recent Labs   Lab Test 10/14/24  1123 10/03/24  0800 10/01/24  1333 09/26/24  1518     --  139 138   POTASSIUM 4.0  --  4.0 4.3   CHLORIDE 99  --  102 101   CO2 28  --  27 27   BUN 15.7  --  17.7 19.3   CR 0.73 0.90 0.90  0.90 0.83   ANIONGAP 9  --  10 10   PAULINE 9.4  --  9.4 9.3   *  --  114* 121*   PROTTOTAL 6.2*  --  6.1* 6.1*   ALBUMIN 4.3  --  4.2 4.2    Most Recent 3 LFT's:  Recent Labs   Lab Test 10/14/24  1123 10/01/24  1333 09/26/24  1518   AST 19 28 27   ALT 19 31 28   ALKPHOS 52 57 65   BILITOTAL 0.2 0.2 <0.2    Most Recent 2 TSH and T4:  Recent Labs   Lab Test 02/19/24  1333 08/17/23  1349 10/03/22  0820 06/13/22  0822 03/21/22  0803   TSH 3.08 2.12   < > 9.47*  9.31* 5.79*   T4  --   --   --  0.99  0.97 0.83    < > = values in this interval not displayed.     I reviewed the above labs today myself and with the  patient.    Assessment/Plan     Relapsed Multiple Myeloma  Started kyprolis/isatuximab/dex on 2/20/23.    Changed to Isatuximab maintenace 10mg/kg every 4 week starting 10/9/23  Rising light chains noted April 2024; cell therapy consult 4/26/24 with multiple options presented.    CAR-T therapy 7/29/24 with Carvykti  Xgeva every 6 months, last dose 5/31/24, next due Nov 2024;  Continue Pen V while ANC < 1.0    HTN  Continue amlodipine 5 mg daily.  Followed by cardiology     Hypothyroidism  Remains on levothyroxine    ID Prophylaxis   Acyclovir 800 mg BID for HSV prophylaxis  Pentamidine monthly for 1 year following CAR-T - may consider changing to Bactrim once platelets recover    Left Hand Pain  Discussed that her nerve pain seems to be distributed along the ulnar and medial antebrachial cutaneous nerves.  This most likely represents some sort of impingement or entrapment syndrome starting at the elbow, but can not fully rule out pathology in the cervical spine or brachial plexus. Discussed low likelihood of myelomatous source considering excellent biochemical response to Carvykti, but will confirm this with already-scheduled PET scan on 11/4/24.  I would also recommend a neuro referral for further evaluation and treatment recommendations.  No skin changes that would suggest shingles.  - PET 11/4/24  - Neuro consult  - Diclofenac gel to the elbow, forearm, and wrist for antiinflammatory effect  - Recommend splinting, but if she declines, agree with at least trying to use conscious resting of the elbow and wrist    36 minutes spent on the date of the encounter doing chart review, review of test results, interpretation of tests, patient visit, and documentation     The longitudinal plan of care for the diagnosis(es)/condition(s) as documented were addressed during this visit. Due to the added complexity in care, I will continue to support Shena in the subsequent management and with ongoing continuity of care.    Selena  MARINO Longoria CNP  [facility] [place] [id number] [place], [place] [id number] [phone]

## 2024-11-04 ENCOUNTER — HOSPITAL ENCOUNTER (OUTPATIENT)
Dept: PET IMAGING | Facility: CLINIC | Age: 74
Discharge: HOME | End: 2024-11-04
Attending: PHYSICIAN ASSISTANT | Admitting: PHYSICIAN ASSISTANT
Payer: MEDICARE

## 2024-11-04 ENCOUNTER — APPOINTMENT (OUTPATIENT)
Dept: LAB | Facility: CLINIC | Age: 74
End: 2024-11-04
Attending: STUDENT IN AN ORGANIZED HEALTH CARE EDUCATION/TRAINING PROGRAM
Payer: MEDICARE

## 2024-11-04 ENCOUNTER — OFFICE VISIT (OUTPATIENT)
Dept: TRANSPLANT | Facility: CLINIC | Age: 74
End: 2024-11-04
Attending: STUDENT IN AN ORGANIZED HEALTH CARE EDUCATION/TRAINING PROGRAM
Payer: MEDICARE

## 2024-11-04 VITALS
HEART RATE: 90 BPM | TEMPERATURE: 98.4 F | DIASTOLIC BLOOD PRESSURE: 82 MMHG | SYSTOLIC BLOOD PRESSURE: 125 MMHG | RESPIRATION RATE: 16 BRPM | OXYGEN SATURATION: 100 %

## 2024-11-04 DIAGNOSIS — Z94.84 STEM CELLS TRANSPLANT STATUS (H): ICD-10-CM

## 2024-11-04 DIAGNOSIS — C90.00 MULTIPLE MYELOMA, REMISSION STATUS UNSPECIFIED (H): ICD-10-CM

## 2024-11-04 DIAGNOSIS — C90.00 MULTIPLE MYELOMA NOT HAVING ACHIEVED REMISSION (H): Primary | ICD-10-CM

## 2024-11-04 DIAGNOSIS — C90.00 MULTIPLE MYELOMA NOT HAVING ACHIEVED REMISSION (H): ICD-10-CM

## 2024-11-04 LAB
ALBUMIN SERPL BCG-MCNC: 4.4 G/DL (ref 3.5–5.2)
ALP SERPL-CCNC: 60 U/L (ref 40–150)
ALT SERPL W P-5'-P-CCNC: 22 U/L (ref 0–50)
ANION GAP SERPL CALCULATED.3IONS-SCNC: 10 MMOL/L (ref 7–15)
AST SERPL W P-5'-P-CCNC: 22 U/L (ref 0–45)
BASOPHILS # BLD AUTO: 0 10E3/UL (ref 0–0.2)
BASOPHILS NFR BLD AUTO: 1 %
BILIRUB SERPL-MCNC: 0.2 MG/DL
BUN SERPL-MCNC: 15.6 MG/DL (ref 8–23)
CALCIUM SERPL-MCNC: 9.7 MG/DL (ref 8.8–10.4)
CD19 CELLS # BLD: 1 CELLS/UL (ref 107–698)
CD19 CELLS NFR BLD: <1 % (ref 6–27)
CD3 CELLS # BLD: 1593 CELLS/UL (ref 603–2990)
CD3 CELLS NFR BLD: 94 % (ref 49–84)
CD3+CD4+ CELLS # BLD: 179 CELLS/UL (ref 441–2156)
CD3+CD4+ CELLS NFR BLD: 11 % (ref 28–63)
CD3+CD4+ CELLS/CD3+CD8+ CLL BLD: 0.13 % (ref 1.4–2.6)
CD3+CD8+ CELLS # BLD: 1392 CELLS/UL (ref 125–1312)
CD3+CD8+ CELLS NFR BLD: 82 % (ref 10–40)
CD3-CD16+CD56+ CELLS # BLD: 100 CELLS/UL (ref 95–640)
CD3-CD16+CD56+ CELLS NFR BLD: 6 % (ref 4–25)
CHLORIDE SERPL-SCNC: 99 MMOL/L (ref 98–107)
CMV DNA SPEC NAA+PROBE-ACNC: NOT DETECTED IU/ML
CREAT SERPL-MCNC: 0.71 MG/DL (ref 0.51–0.95)
EGFRCR SERPLBLD CKD-EPI 2021: 89 ML/MIN/1.73M2
EOSINOPHIL # BLD AUTO: 0 10E3/UL (ref 0–0.7)
EOSINOPHIL NFR BLD AUTO: 0 %
ERYTHROCYTE [DISTWIDTH] IN BLOOD BY AUTOMATED COUNT: 20.1 % (ref 10–15)
GLUCOSE SERPL-MCNC: 111 MG/DL (ref 70–99)
HCO3 SERPL-SCNC: 28 MMOL/L (ref 22–29)
HCT VFR BLD AUTO: 34.1 % (ref 35–47)
HGB BLD-MCNC: 11.5 G/DL (ref 11.7–15.7)
IMM GRANULOCYTES # BLD: 0.2 10E3/UL
IMM GRANULOCYTES NFR BLD: 5 %
LDH SERPL L TO P-CCNC: 325 U/L (ref 0–250)
LYMPHOCYTES # BLD AUTO: 1.3 10E3/UL (ref 0.8–5.3)
LYMPHOCYTES NFR BLD AUTO: 31 %
MAGNESIUM SERPL-MCNC: 2 MG/DL (ref 1.7–2.3)
MCH RBC QN AUTO: 33.9 PG (ref 26.5–33)
MCHC RBC AUTO-ENTMCNC: 33.7 G/DL (ref 31.5–36.5)
MCV RBC AUTO: 101 FL (ref 78–100)
MONOCYTES # BLD AUTO: 0.7 10E3/UL (ref 0–1.3)
MONOCYTES NFR BLD AUTO: 16 %
NEUTROPHILS # BLD AUTO: 2 10E3/UL (ref 1.6–8.3)
NEUTROPHILS NFR BLD AUTO: 48 %
NRBC # BLD AUTO: 0 10E3/UL
NRBC BLD AUTO-RTO: 0 /100
PHOSPHATE SERPL-MCNC: 3.4 MG/DL (ref 2.5–4.5)
PLATELET # BLD AUTO: 54 10E3/UL (ref 150–450)
POTASSIUM SERPL-SCNC: 4.2 MMOL/L (ref 3.4–5.3)
PROT SERPL-MCNC: 6.4 G/DL (ref 6.4–8.3)
RBC # BLD AUTO: 3.39 10E6/UL (ref 3.8–5.2)
SODIUM SERPL-SCNC: 137 MMOL/L (ref 135–145)
T CELL EXTENDED COMMENT: ABNORMAL
TOTAL PROTEIN SERUM FOR ELP: 6.1 G/DL (ref 6.4–8.3)
WBC # BLD AUTO: 4.2 10E3/UL (ref 4–11)

## 2024-11-04 PROCEDURE — 99000 SPECIMEN HANDLING OFFICE-LAB: CPT | Performed by: PATHOLOGY

## 2024-11-04 PROCEDURE — 88271 CYTOGENETICS DNA PROBE: CPT | Performed by: PHYSICIAN ASSISTANT

## 2024-11-04 PROCEDURE — 88237 TISSUE CULTURE BONE MARROW: CPT | Performed by: PHYSICIAN ASSISTANT

## 2024-11-04 PROCEDURE — 36415 COLL VENOUS BLD VENIPUNCTURE: CPT | Performed by: PHYSICIAN ASSISTANT

## 2024-11-04 PROCEDURE — 88305 TISSUE EXAM BY PATHOLOGIST: CPT | Mod: TC | Performed by: PHYSICIAN ASSISTANT

## 2024-11-04 PROCEDURE — 84165 PROTEIN E-PHORESIS SERUM: CPT | Mod: TC | Performed by: PATHOLOGY

## 2024-11-04 PROCEDURE — 82784 ASSAY IGA/IGD/IGG/IGM EACH: CPT | Performed by: PHYSICIAN ASSISTANT

## 2024-11-04 PROCEDURE — 85025 COMPLETE CBC W/AUTO DIFF WBC: CPT | Performed by: PHYSICIAN ASSISTANT

## 2024-11-04 PROCEDURE — 78816 PET IMAGE W/CT FULL BODY: CPT | Mod: PS

## 2024-11-04 PROCEDURE — 84166 PROTEIN E-PHORESIS/URINE/CSF: CPT | Performed by: PATHOLOGY

## 2024-11-04 PROCEDURE — 83735 ASSAY OF MAGNESIUM: CPT | Performed by: PHYSICIAN ASSISTANT

## 2024-11-04 PROCEDURE — 88185 FLOWCYTOMETRY/TC ADD-ON: CPT | Performed by: PHYSICIAN ASSISTANT

## 2024-11-04 PROCEDURE — 84155 ASSAY OF PROTEIN SERUM: CPT | Performed by: PHYSICIAN ASSISTANT

## 2024-11-04 PROCEDURE — 84166 PROTEIN E-PHORESIS/URINE/CSF: CPT | Mod: 26 | Performed by: PATHOLOGY

## 2024-11-04 PROCEDURE — A9552 F18 FDG: HCPCS | Performed by: PHYSICIAN ASSISTANT

## 2024-11-04 PROCEDURE — 343N000001 HC RX 343 MED OP 636: Performed by: PHYSICIAN ASSISTANT

## 2024-11-04 PROCEDURE — 83521 IG LIGHT CHAINS FREE EACH: CPT | Performed by: PHYSICIAN ASSISTANT

## 2024-11-04 PROCEDURE — 78816 PET IMAGE W/CT FULL BODY: CPT | Mod: 26 | Performed by: RADIOLOGY

## 2024-11-04 PROCEDURE — 82232 ASSAY OF BETA-2 PROTEIN: CPT | Performed by: PHYSICIAN ASSISTANT

## 2024-11-04 PROCEDURE — 84100 ASSAY OF PHOSPHORUS: CPT | Performed by: PHYSICIAN ASSISTANT

## 2024-11-04 PROCEDURE — 83615 LACTATE (LD) (LDH) ENZYME: CPT | Performed by: PHYSICIAN ASSISTANT

## 2024-11-04 PROCEDURE — 88161 CYTOPATH SMEAR OTHER SOURCE: CPT | Mod: TC | Performed by: PHYSICIAN ASSISTANT

## 2024-11-04 PROCEDURE — 38222 DX BONE MARROW BX & ASPIR: CPT | Performed by: PHYSICIAN ASSISTANT

## 2024-11-04 PROCEDURE — 81050 URINALYSIS VOLUME MEASURE: CPT | Performed by: PATHOLOGY

## 2024-11-04 PROCEDURE — 250N000011 HC RX IP 250 OP 636: Performed by: STUDENT IN AN ORGANIZED HEALTH CARE EDUCATION/TRAINING PROGRAM

## 2024-11-04 PROCEDURE — 86359 T CELLS TOTAL COUNT: CPT | Performed by: PHYSICIAN ASSISTANT

## 2024-11-04 PROCEDURE — 86334 IMMUNOFIX E-PHORESIS SERUM: CPT | Performed by: PATHOLOGY

## 2024-11-04 RX ORDER — FLUDEOXYGLUCOSE F 18 200 MCI/ML
10-18 INJECTION, SOLUTION INTRAVENOUS ONCE
Status: COMPLETED | OUTPATIENT
Start: 2024-11-04 | End: 2024-11-04

## 2024-11-04 RX ORDER — HEPARIN SODIUM (PORCINE) LOCK FLUSH IV SOLN 100 UNIT/ML 100 UNIT/ML
5 SOLUTION INTRAVENOUS
OUTPATIENT
Start: 2024-12-01

## 2024-11-04 RX ORDER — HEPARIN SODIUM (PORCINE) LOCK FLUSH IV SOLN 100 UNIT/ML 100 UNIT/ML
5 SOLUTION INTRAVENOUS
Status: DISCONTINUED | OUTPATIENT
Start: 2024-11-04 | End: 2024-11-04 | Stop reason: HOSPADM

## 2024-11-04 RX ORDER — HEPARIN SODIUM,PORCINE 10 UNIT/ML
5 VIAL (ML) INTRAVENOUS ONCE
Status: COMPLETED | OUTPATIENT
Start: 2024-11-04 | End: 2024-11-04

## 2024-11-04 RX ORDER — PENTAMIDINE ISETHIONATE 300 MG/300MG
300 INHALANT RESPIRATORY (INHALATION) ONCE
Start: 2024-12-01 | End: 2024-12-01

## 2024-11-04 RX ORDER — ALBUTEROL SULFATE 5 MG/ML
2.5 SOLUTION RESPIRATORY (INHALATION) ONCE
Start: 2024-12-01 | End: 2024-12-01

## 2024-11-04 RX ORDER — HEPARIN SODIUM,PORCINE 10 UNIT/ML
5-20 VIAL (ML) INTRAVENOUS DAILY PRN
OUTPATIENT
Start: 2024-12-01

## 2024-11-04 RX ADMIN — FLUDEOXYGLUCOSE F 18 9.94 MILLICURIE: 200 INJECTION, SOLUTION INTRAVENOUS at 06:49

## 2024-11-04 RX ADMIN — Medication 5 ML: at 07:01

## 2024-11-04 RX ADMIN — Medication 5 ML: at 11:25

## 2024-11-04 ASSESSMENT — PAIN SCALES - GENERAL: PAINLEVEL_OUTOF10: MODERATE PAIN (5)

## 2024-11-04 NOTE — PROGRESS NOTES
BMT ONC Adult Bone Marrow Biopsy Procedure Note  November 4, 2024  /82   Pulse 90   Temp 98.4  F (36.9  C) (Oral)   Resp 16   LMP  (LMP Unknown)   SpO2 100%      Learning needs assessment complete within 12 months? YES    DIAGNOSIS: MM    PROCEDURE: Unilateral Bone Marrow Biopsy and Unilateral Aspirate    LOCATION: [facility] 2nd Floor    Patient s identification was positively verified by verbal identification and invasive procedure safety checklist was completed. Informed consent was obtained. Following the administration of  none  as pre-medication, patient was placed in the prone position and prepped and draped in a sterile manner. Approximately 10 cc of 1% Lidocaine was used over the left posterior iliac spine. Following this a 3 mm incision was made. Trephine bone marrow core(s) was (were) obtained from the [facility]. Bone marrow aspirates were obtained from the IC. Aspirates were sent for morphology, immunophenotyping, cytogenetics, research studies, and clonoseq. A total of approximately 30 ml of marrow was aspirated. Following this procedure a sterile dressing was applied to the bone marrow biopsy site(s). The patient was placed in the supine position to maintain pressure on the biopsy site. Post-procedure wound care instructions were given.     Complications: NO    Pre-procedural pain: 0 out of 10 on the numeric pain rating scale.     Procedural pain: 8 out of 10 on the numeric pain rating scale.     Post-procedural pain assessment: 0 out of 10 on the numeric pain rating scale.     Interventions: NO    Length of procedure:20 minutes or less      Procedure performed by: Prasanna Moody PA-C

## 2024-11-04 NOTE — NURSING NOTE
"Oncology Rooming Note    November 4, 2024 10:05 AM   Shena Hartmann is a 74 year old female who presents for:    Chief Complaint   Patient presents with    Oncology Clinic Visit     BMBx r/t BMT BAN day 100     Initial Vitals: LMP  (LMP Unknown)  Estimated body mass index is 22.17 kg/m  as calculated from the following:    Height as of 7/29/24: 1.57 m (5' 1.81\").    Weight as of 10/31/24: 54.7 kg (120 lb 8 oz). There is no height or weight on file to calculate BSA.  Data Unavailable Comment: Data Unavailable   No LMP recorded (lmp unknown). Patient is postmenopausal.  Allergies reviewed: Yes  Medications reviewed: Yes    Medications: Medication refills not needed today.  Pharmacy name entered into [employer]:    [id number] - [place], [place] - [address] [facility] [address]  WRITTEN PRESCRIPTION REQUESTED  [facility] - [place], [place] - [address]    Frailty Screening:   Is the patient here for a new oncology consult visit in cancer care? 2. No      Clinical concerns: Prolia due this month. Will do 11/12 during visit with Dr. Chavez.    BMBX Teaching and Assessment       Teaching concerns addressed: Bone marrow biopsy and infection prevention.     Person(s) involved in teaching: Patient  Motivation Level  Asks Questions: Yes  Eager to Learn: Yes  Cooperative: Yes  Receptive (willing/able to accept information): Yes    Patient demonstrates understanding of the following:     Reason for the appointment, diagnosis and treatment plan: Yes  Knowledge of proper use of medications and conditions for which they are ordered (with special attention to potential side effects or drug interactions): Yes  Which situations necessitate calling provider and whom to contact: Yes    Teaching concerns addressed:   Reviewed activity restrictions if received premeds, potential for bleeding and actions to take if develops any of the issues below    Pain management techniques: " Yes  Patient instructed on hand hygiene: Yes  How and/when to access community resources: Yes    Infection Control:  Patient demonstrates understanding of the following:   Bone marrow procedure site care taught: Yes  Signs and symptoms of infection taught: Yes       Instructional Materials Used/Given: Pt instructed to keep bmbx site clean and dry for 24hrs. Pt educated to monitor site for signs of infection such as redness, rash, oozing, puss, bleeding, pain, and elevated temp. Pt instructed to go to call the [facility] triage line or go to the ER if any signs of infection should occur. Pt educated to not operate machinery if receiving versed. Pt and  verbalize understanding.     Pre-procedure labs drawn via port. Post procedure: Patient vital signs stable, ambulating, site is clean, dry and intact prior to discharge and line removed. Pt discharged with  as .        Sarai Brambila RN

## 2024-11-04 NOTE — LETTER
11/4/2024      Shena Hartmann  [address] [place] [address]      Dear Colleague,    Thank you for referring your patient, Shena Hartmann, to the [facility] BLOOD AND MARROW TRANSPLANT PROGRAM [place]. Please see a copy of my visit note below.    BMT ONC Adult Bone Marrow Biopsy Procedure Note  November 4, 2024  /82   Pulse 90   Temp 98.4  F (36.9  C) (Oral)   Resp 16   LMP  (LMP Unknown)   SpO2 100%      Learning needs assessment complete within 12 months? YES    DIAGNOSIS: MM    PROCEDURE: Unilateral Bone Marrow Biopsy and Unilateral Aspirate    LOCATION: [facility] 2nd Floor    Patient s identification was positively verified by verbal identification and invasive procedure safety checklist was completed. Informed consent was obtained. Following the administration of  none  as pre-medication, patient was placed in the prone position and prepped and draped in a sterile manner. Approximately 10 cc of 1% Lidocaine was used over the left posterior iliac spine. Following this a 3 mm incision was made. Trephine bone marrow core(s) was (were) obtained from the [facility]. Bone marrow aspirates were obtained from the [facility]. Aspirates were sent for morphology, immunophenotyping, cytogenetics, research studies, and clonoseq. A total of approximately 30 ml of marrow was aspirated. Following this procedure a sterile dressing was applied to the bone marrow biopsy site(s). The patient was placed in the supine position to maintain pressure on the biopsy site. Post-procedure wound care instructions were given.     Complications: NO    Pre-procedural pain: 0 out of 10 on the numeric pain rating scale.     Procedural pain: 8 out of 10 on the numeric pain rating scale.     Post-procedural pain assessment: 0 out of 10 on the numeric pain rating scale.     Interventions: NO    Length of procedure:20 minutes or less      Procedure performed by: Prasanna Moody PA-C       Again, thank you for allowing me to  participate in the care of your patient.        Sincerely,        BMT Advanced Practice Provider

## 2024-11-05 LAB
ALBUMIN MFR UR ELPH: 56.4 %
ALBUMIN SERPL ELPH-MCNC: 4.3 G/DL (ref 3.7–5.1)
ALPHA1 GLOB MFR UR ELPH: 4.9 %
ALPHA1 GLOB SERPL ELPH-MCNC: 0.3 G/DL (ref 0.2–0.4)
ALPHA2 GLOB MFR UR ELPH: 11 %
ALPHA2 GLOB SERPL ELPH-MCNC: 0.7 G/DL (ref 0.5–0.9)
B-GLOBULIN MFR UR ELPH: 15.3 %
B-GLOBULIN SERPL ELPH-MCNC: 0.6 G/DL (ref 0.6–1)
B2 MICROGLOB TUMOR MARKER SER-MCNC: 1.6 MG/L
GAMMA GLOB MFR UR ELPH: 12.4 %
GAMMA GLOB SERPL ELPH-MCNC: 0.2 G/DL (ref 0.7–1.6)
IGG SERPL-MCNC: 226 MG/DL (ref 610–1616)
KAPPA LC FREE SER-MCNC: <0.06 MG/DL (ref 0.33–1.94)
KAPPA LC FREE/LAMBDA FREE SER NEPH: ABNORMAL {RATIO}
LAMBDA LC FREE SERPL-MCNC: <0.13 MG/DL (ref 0.57–2.63)
LOCATION OF TASK: ABNORMAL
LOCATION OF TASK: ABNORMAL
LOCATION OF TASK: NORMAL
M PROTEIN MFR UR ELPH: 0 %
M PROTEIN SERPL ELPH-MCNC: 0.1 G/DL
PROT PATTERN SERPL ELPH-IMP: ABNORMAL
PROT PATTERN SERPL IFE-IMP: NORMAL
PROT PATTERN UR ELPH-IMP: ABNORMAL

## 2024-11-06 LAB
PATH REPORT.COMMENTS IMP SPEC: NORMAL
PATH REPORT.FINAL DX SPEC: NORMAL
PATH REPORT.FINAL DX SPEC: NORMAL
PATH REPORT.GROSS SPEC: NORMAL
PATH REPORT.MICROSCOPIC SPEC OTHER STN: NORMAL
PATH REPORT.RELEVANT HX SPEC: NORMAL
PATH REPORT.RELEVANT HX SPEC: NORMAL

## 2024-11-11 LAB
ABC 95% CONFIDENCE INTERVAL (B-CELL): NORMAL
ABC CLONOSEQ B-CELL TRACKING (MRD) RESULT: NORMAL
ABC DOMINANT SEQUENCES (B-CELL): 2
ABC RESIDUAL CLONAL CELLS/ MILL NUCLEATED CELLS BCELL: NORMAL

## 2024-11-12 ENCOUNTER — OFFICE VISIT (OUTPATIENT)
Dept: ONCOLOGY | Facility: CLINIC | Age: 74
End: 2024-11-12
Attending: STUDENT IN AN ORGANIZED HEALTH CARE EDUCATION/TRAINING PROGRAM
Payer: MEDICARE

## 2024-11-12 VITALS
TEMPERATURE: 98.3 F | HEART RATE: 87 BPM | BODY MASS INDEX: 22.32 KG/M2 | WEIGHT: 121.3 LBS | OXYGEN SATURATION: 97 % | DIASTOLIC BLOOD PRESSURE: 80 MMHG | SYSTOLIC BLOOD PRESSURE: 130 MMHG

## 2024-11-12 DIAGNOSIS — D69.6 THROMBOCYTOPENIA (H): Primary | ICD-10-CM

## 2024-11-12 LAB — CULTURE HARVEST COMPLETE DATE: NORMAL

## 2024-11-12 PROCEDURE — G0463 HOSPITAL OUTPT CLINIC VISIT: HCPCS | Performed by: STUDENT IN AN ORGANIZED HEALTH CARE EDUCATION/TRAINING PROGRAM

## 2024-11-12 PROCEDURE — 99214 OFFICE O/P EST MOD 30 MIN: CPT | Performed by: STUDENT IN AN ORGANIZED HEALTH CARE EDUCATION/TRAINING PROGRAM

## 2024-11-12 ASSESSMENT — PAIN SCALES - GENERAL: PAINLEVEL_OUTOF10: MODERATE PAIN (4)

## 2024-11-12 NOTE — LETTER
11/12/2024      Shena Hartmann  [address] [place] [address]      Dear Colleague,    Thank you for referring your patient, Shena Hartmann, to the [facility]. Please see a copy of my visit note below.        [facility]    Hematology/Oncology Clinic Note    Nov 12, 2024    Reason for Office Visit   Follow-up for multiple myeloma      Cancer Diagnosis   Multiple Myeloma    Oncology History of Present Illness   Breast cancer dx in 1994. Underwent surgery and chemotherapy without reoccurence  MGUS dx 1999  T8 pathologic fracture with radiation  Revlimid and velcade,   2013  Cytoxan IV 9/2013  D-PACE 11/2013  Auto 3/27/14  5/2014 thalidomide caused rash so switched to pomalidomide   on kenalog and clobetasol creams for IMID rash  FLC began to rise so riky added to pom 9/2020  she has been on xgeva for an unclear amount of time  Teodora-Kd 2/20/2023  [place] maintenance Nov 2023  Spring 2024 FLC began to rise again  5/22/24 Underwent apheresis for CAR-T  7/29/24 Carvykti (CAR-T)    Interval History   She was recently seen 10/31 due to pain and numbness in her left hand.  At that visit a neurology referral was placed but this is not scheduled until 3/2025.  She says today that the pain and numbness seem to improve over the weekend but then she overused her arm and it is now worse today.      Aside from this she overall seems to be improving.  Fatigue is improving.  Stools are less frequent and more formed.  No recent infections.  No new pain or lumps/bumps.  Appetite remains stable.    ROS neg other than the symptoms noted above in the HPI.        Past Medical History     Past Medical History:   Diagnosis Date     Acquired hypothyroidism 8/10/2023     Breast cancer (H) 01/01/1994    right     H/O stem cell transplant (H) 03/01/2014     History of blood transfusion 2013 & 2014    During stem cell tx process     Hypertension Sept. 2021    Runs 140 s  systolically, about 20 above my usual     Multiple myeloma, without mention of having achieved remission 2012       Past Surgical History:      Past Surgical History:   Procedure Laterality Date     BIOPSY  10/1994; [id number]    Lt. Breast in ; multiple bone marrow bx's for MM     BREAST SURGERY  10/1994    Lt. Mastectomy w/lymph node resection     COLONOSCOPY  2015    Normal results     EYE SURGERY      Bilateral cataract surgery     INSERT CATHETER VASCULAR ACCESS Left 2024    Procedure: central venous catheter non tunneled insertion, vascular access;  Surgeon: Ric Castaneda PA-C;  Location: [facility] OR     INSERT PORT VASCULAR ACCESS Left 2/15/2023    Procedure: INSERTION, VASCULAR ACCESS PORT;  Surgeon: Luc Antunez MD;  Location: [facility] OR     IR CHEST PORT PLACEMENT > 5 YRS OF AGE  2/15/2023     IR CVC NON TUNNEL LINE REMOVAL  2024     IR CVC NON TUNNEL PLACEMENT > 5 YRS  2024     IR PICC PLACEMENT > 5 YRS OF AGE  2024     MASTECTOMY      Right, with lymphe node disection       PICC INSERTION  09/10/2013    5fr DL Power PICC, 44cm (1cm external) in the L lateral brachial vein with tip in the low SVC     TRANSPLANT  3/27/2014    Autologous stem cell tx       Social History     Socioeconomic History     Marital status:    Tobacco Use     Smoking status: Never     Passive exposure: Never     Smokeless tobacco: Never   Substance and Sexual Activity     Alcohol use: Yes     Comment: occ     Drug use: No     Sexual activity: Not Currently     Partners: Male     Birth control/protection: Post-menopausal   Other Topics Concern     Parent/sibling w/ CABG, MI or angioplasty before 65F 55M? No      Service No     Blood Transfusions No     Caffeine Concern No     Occupational Exposure No     Hobby Hazards No     Family History     Family History   Problem Relation Age of Onset     Hypertension Mother      Cerebrovascular Disease Mother          8-11-15 from  strokes     Hypertension Father      Prostate Cancer Father      Skin Cancer Paternal Grandmother      Cancer Paternal Grandmother         skin cancer       Allergies:     Allergies   Allergen Reactions     Blood Transfusion Related (Informational Only) Other (See Comments)     Patient has a history of a clinically significant antibody against RBC antigens.  A delay in compatible RBCs may occur.      Cayenne Pepper [Cayenne]      Corn-Containing Products GI Disturbance     Levaquin Other (See Comments)     Tendon pain     Milk Protein GI Disturbance     Mold      Facial rash/lip swelling     Morphine Sulfate Other (See Comments)     Per pt - see things (hallucination) when she was on Morphine .     Pollen Extract      Environmental allergies      Shrimp Nausea and Vomiting     Tomato      Lip swelling, facial rash     Azithromycin Rash     Keflex [Cephalexin] Rash     Oat Rash     Tape [Adhesive Tape] Itching and Rash     All adhesives     Wheat Rash     Swelling of lips         Medications:     Current Outpatient Medications   Medication Sig Dispense Refill     acetaminophen (TYLENOL) 325 MG tablet Take 325-650 mg by mouth every 6 hours as needed for mild pain       acyclovir (ZOVIRAX) 800 MG tablet Take 1 tablet (800 mg) by mouth every 12 hours. 180 tablet 3     amLODIPine (NORVASC) 2.5 MG tablet Take 2 tablets (5 mg) by mouth daily Take 5 mg (2 tablets) in the morning. 180 tablet 3     Ascorbic Acid (VITAMIN C PO) Take 1,000 mg by mouth 2 times daily        CALCIUM-VITAMIN D-VITAMIN K PO Take 2 tablets by mouth daily.       clobetasol (TEMOVATE) 0.05 % external ointment Apply topically 2 times daily as needed (itching and rash). Topically to rash on hands until resolved 45 g 0     COENZYME Q-10 PO Take 100 mg by mouth daily        Cyanocobalamin 1000 MCG/ML LIQD Take 500 mcg by mouth 2 times daily       diclofenac (VOLTAREN) 1 % topical gel Apply 2 g topically 2 times daily.       levothyroxine  (SYNTHROID/LEVOTHROID) 50 MCG tablet TAKE 1 TABLET(50 MCG) BY MOUTH DAILY 90 tablet 0     MAGNESIUM PO Take 235 mg by mouth 2 times daily.       Multiple Vitamins-Minerals (MULTIVITAMIN OR) Take 1 tablet by mouth 2 times daily.       order for DME 6 mastectomy bras  Length of need: 99 months 6 Device 1     order for DME R mastectomy prosthesis   Length of need:  99 months 1 Device 1     penicillin V (VEETID) 250 MG tablet Take 1 tablet (250 mg) by mouth 2 times daily. 60 tablet 2     Probiotic Product (PROBIOTIC PO) Take 1 capsule by mouth daily. Theralac Pro Probiotic       Quercetin 500 MG CAPS Take 500 mg by mouth daily       triamcinolone (KENALOG) 0.1 % external cream Apply topically 2 times daily as needed for irritation. 15 g 11     ZINC PICOLINATE PO Take 30 mg by mouth daily       ondansetron (ZOFRAN) 4 MG tablet Take 1 tablet (4 mg) by mouth every 8 hours as needed for nausea (Patient not taking: Reported on 11/12/2024) 30 tablet 0     saccharomyces boulardii (SACCHAROMYCIN DF) 250 MG capsule Take 250 mg by mouth daily. Hold until after day +14 and ANC > 1.0 (Patient not taking: Reported on 11/12/2024)       No current facility-administered medications for this visit.     Facility-Administered Medications Ordered in Other Visits   Medication Dose Route Frequency Provider Last Rate Last Admin     potassium chloride 20 mEq in 50 mL IVPB  20 mEq Intravenous Once Mae Kay PA-C         potassium chloride SA (K-DUR,KLOR-CON M) CR tablet 20 mEq  20 mEq Oral Once Mae Kay PA-C           Physical Exam   /80 (BP Location: Left arm, Patient Position: Sitting, Cuff Size: Adult Regular)   Pulse 87   Temp 98.3  F (36.8  C) (Oral)   Wt 55 kg (121 lb 4.8 oz)   LMP  (LMP Unknown)   SpO2 97%   BMI 22.32 kg/m      Wt Readings from Last 5 Encounters:   11/12/24 55 kg (121 lb 4.8 oz)   10/31/24 54.7 kg (120 lb 8 oz)   10/14/24 54.8 kg (120 lb 14.4 oz)   10/01/24 54.7 kg (120 lb 9.6 oz)    09/05/24 53.9 kg (118 lb 14.4 oz)     Gen: alert, pleasant and conversational, NAD  HEENT: NC/AT,EOMI w/ PERRL, anicteric sclera.   MSK: No neck pain with palpation, full ROM.  Strength 4/5 in left hand , 5/5 right hand , 5/5 bilateral shoulder flexion/extension  Skin: no concerning lesions or rashes  Neuro: A&Ox4  Psych: appropriate, reactive      Data     Most Recent 3 CBC's:  Recent Labs   Lab Test 11/04/24  1020 10/14/24  1123 10/01/24  1333   WBC 4.2 2.6* 2.8*   HGB 11.5* 11.2* 10.7*   * 101* 99   PLT 54* 59* 51*   ANEUTAUTO 2.0 0.7* 0.8*     Most Recent 3 BMP's:  Recent Labs   Lab Test 11/04/24  1020 10/14/24  1123 10/03/24  0800 10/01/24  1333    136  --  139   POTASSIUM 4.2 4.0  --  4.0   CHLORIDE 99 99  --  102   CO2 28 28  --  27   BUN 15.6 15.7  --  17.7   CR 0.71 0.73 0.90 0.90  0.90   ANIONGAP 10 9  --  10   PAULINE 9.7 9.4  --  9.4   * 101*  --  114*   PROTTOTAL 6.4 6.2*  --  6.1*   ALBUMIN 4.4 4.3  --  4.2    Most Recent 3 LFT's:  Recent Labs   Lab Test 11/04/24  1020 10/14/24  1123 10/01/24  1333   AST 22 19 28   ALT 22 19 31   ALKPHOS 60 52 57   BILITOTAL 0.2 0.2 0.2    Most Recent 2 TSH and T4:  Recent Labs   Lab Test 02/19/24  1333 08/17/23  1349 10/03/22  0820 06/13/22  0822 03/21/22  0803   TSH 3.08 2.12   < > 9.47*  9.31* 5.79*   T4  --   --   --  0.99  0.97 0.83    < > = values in this interval not displayed.     I reviewed the above labs today myself and with the patient.    Assessment/Plan     Relapsed Multiple Myeloma  Started kyprolis/isatuximab/dex on 2/20/23.    Changed to Isatuximab maintenace 10mg/kg every 4 week starting 10/9/23  Rising light chains noted April 2024; cell therapy consult 4/26/24 with multiple options presented.    CAR-T therapy 7/29/24 with Carvykti.  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  Xgeva every 6 months, last dose 5/31/24, next due Nov 2024    HTN  Continue amlodipine 5 mg daily.  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  Neuro consult placed but not scheduled until 3/2025.  Patient will go to see Ortho to obtain imaging.  - Diclofenac gel to the elbow, forearm, and wrist for antiinflammatory effect  - Recommend splinting and trying to use conscious resting of the elbow and wrist      Nighat Chavez MD PhD              Again, thank you for allowing me to participate in the care of your patient.        Sincerely,        Nighat Chavez MD

## 2024-11-12 NOTE — PROGRESS NOTES
[facility]    Hematology/Oncology Clinic Note    Nov 12, 2024    Reason for Office Visit   Follow-up for multiple myeloma      Cancer Diagnosis   Multiple Myeloma    Oncology History of Present Illness   Breast cancer dx in 1994. Underwent surgery and chemotherapy without reoccurence  MGUS dx 1999  T8 pathologic fracture with radiation  Revlimid and velcade,   2013  Cytoxan IV 9/2013  D-PACE 11/2013  Auto 3/27/14  5/2014 thalidomide caused rash so switched to pomalidomide   on kenalog and clobetasol creams for IMID rash  FLC began to rise so riky added to pom 9/2020  she has been on xgeva for an unclear amount of time  Teodora-Kd 2/20/2023  Teodora maintenance Nov 2023  Spring 2024 FLC began to rise again  5/22/24 Underwent apheresis for CAR-T  7/29/24 Carvykti (CAR-T)    Interval History   She was recently seen 10/31 due to pain and numbness in her left hand.  At that visit a neurology referral was placed but this is not scheduled until 3/2025.  She says today that the pain and numbness seem to improve over the weekend but then she overused her arm and it is now worse today.      Aside from this she overall seems to be improving.  Fatigue is improving.  Stools are less frequent and more formed.  No recent infections.  No new pain or lumps/bumps.  Appetite remains stable.    ROS neg other than the symptoms noted above in the HPI.        Past Medical History     Past Medical History:   Diagnosis Date    Acquired hypothyroidism 8/10/2023    Breast cancer (H) 01/01/1994    right    H/O stem cell transplant (H) 03/01/2014    History of blood transfusion 2013 & 2014    During stem cell tx process    Hypertension Sept. 2021    Runs 140 s systolically, about 20 above my usual    Multiple myeloma, without mention of having achieved remission 11/06/2012       Past Surgical History:      Past Surgical History:   Procedure Laterality Date    BIOPSY  10/1994; [id number]    Lt. Breast in '94;  multiple bone marrow bx's for MM    BREAST SURGERY  10/1994    Lt. Mastectomy w/lymph node resection    COLONOSCOPY  2015    Normal results    EYE SURGERY      Bilateral cataract surgery    INSERT CATHETER VASCULAR ACCESS Left 2024    Procedure: central venous catheter non tunneled insertion, vascular access;  Surgeon: Ric Castaneda PA-C;  Location: [facility] OR    INSERT PORT VASCULAR ACCESS Left 2/15/2023    Procedure: INSERTION, VASCULAR ACCESS PORT;  Surgeon: Luc Antunez MD;  Location: [facility] OR    IR CHEST PORT PLACEMENT > 5 YRS OF AGE  2/15/2023    IR CVC NON TUNNEL LINE REMOVAL  2024    IR CVC NON TUNNEL PLACEMENT > 5 YRS  2024    IR PICC PLACEMENT > 5 YRS OF AGE  2024    MASTECTOMY      Right, with lymphe node disection      PICC INSERTION  09/10/2013    5fr DL Power PICC, 44cm (1cm external) in the L lateral brachial vein with tip in the low SVC    TRANSPLANT  3/27/2014    Autologous stem cell tx       Social History     Socioeconomic History    Marital status:    Tobacco Use    Smoking status: Never     Passive exposure: Never    Smokeless tobacco: Never   Substance and Sexual Activity    Alcohol use: Yes     Comment: occ    Drug use: No    Sexual activity: Not Currently     Partners: Male     Birth control/protection: Post-menopausal   Other Topics Concern    Parent/sibling w/ CABG, MI or angioplasty before 65F 55M? No     Service No    Blood Transfusions No    Caffeine Concern No    Occupational Exposure No    Hobby Hazards No     Family History     Family History   Problem Relation Age of Onset    Hypertension Mother     Cerebrovascular Disease Mother          8-11-15 from strokes    Hypertension Father     Prostate Cancer Father     Skin Cancer Paternal Grandmother     Cancer Paternal Grandmother         skin cancer       Allergies:     Allergies   Allergen Reactions    Blood Transfusion Related (Informational Only) Other (See Comments)     Patient has  a history of a clinically significant antibody against RBC antigens.  A delay in compatible RBCs may occur.     Cayenne Pepper [Cayenne]     Corn-Containing Products GI Disturbance    Levaquin Other (See Comments)     Tendon pain    Milk Protein GI Disturbance    Mold      Facial rash/lip swelling    Morphine Sulfate Other (See Comments)     Per pt - see things (hallucination) when she was on Morphine .    Pollen Extract      Environmental allergies     Shrimp Nausea and Vomiting    Tomato      Lip swelling, facial rash    Azithromycin Rash    Keflex [Cephalexin] Rash    Oat Rash    Tape [Adhesive Tape] Itching and Rash     All adhesives    Wheat Rash     Swelling of lips         Medications:     Current Outpatient Medications   Medication Sig Dispense Refill    acetaminophen (TYLENOL) 325 MG tablet Take 325-650 mg by mouth every 6 hours as needed for mild pain      acyclovir (ZOVIRAX) 800 MG tablet Take 1 tablet (800 mg) by mouth every 12 hours. 180 tablet 3    amLODIPine (NORVASC) 2.5 MG tablet Take 2 tablets (5 mg) by mouth daily Take 5 mg (2 tablets) in the morning. 180 tablet 3    Ascorbic Acid (VITAMIN C PO) Take 1,000 mg by mouth 2 times daily       CALCIUM-VITAMIN D-VITAMIN K PO Take 2 tablets by mouth daily.      clobetasol (TEMOVATE) 0.05 % external ointment Apply topically 2 times daily as needed (itching and rash). Topically to rash on hands until resolved 45 g 0    COENZYME Q-10 PO Take 100 mg by mouth daily       Cyanocobalamin 1000 MCG/ML LIQD Take 500 mcg by mouth 2 times daily      diclofenac (VOLTAREN) 1 % topical gel Apply 2 g topically 2 times daily.      levothyroxine (SYNTHROID/LEVOTHROID) 50 MCG tablet TAKE 1 TABLET(50 MCG) BY MOUTH DAILY 90 tablet 0    MAGNESIUM PO Take 235 mg by mouth 2 times daily.      Multiple Vitamins-Minerals (MULTIVITAMIN OR) Take 1 tablet by mouth 2 times daily.      order for DME 6 mastectomy bras  Length of need: 99 months 6 Device 1    order for DME R mastectomy  prosthesis   Length of need:  99 months 1 Device 1    penicillin V (VEETID) 250 MG tablet Take 1 tablet (250 mg) by mouth 2 times daily. 60 tablet 2    Probiotic Product (PROBIOTIC PO) Take 1 capsule by mouth daily. Theralac Pro Probiotic      Quercetin 500 MG CAPS Take 500 mg by mouth daily      triamcinolone (KENALOG) 0.1 % external cream Apply topically 2 times daily as needed for irritation. 15 g 11    ZINC PICOLINATE PO Take 30 mg by mouth daily      ondansetron (ZOFRAN) 4 MG tablet Take 1 tablet (4 mg) by mouth every 8 hours as needed for nausea (Patient not taking: Reported on 11/12/2024) 30 tablet 0    saccharomyces boulardii (SACCHAROMYCIN DF) 250 MG capsule Take 250 mg by mouth daily. Hold until after day +14 and ANC > 1.0 (Patient not taking: Reported on 11/12/2024)       No current facility-administered medications for this visit.     Facility-Administered Medications Ordered in Other Visits   Medication Dose Route Frequency Provider Last Rate Last Admin    potassium chloride 20 mEq in 50 mL IVPB  20 mEq Intravenous Once Mae Kay PA-C        potassium chloride SA (K-DUR,KLOR-CON M) CR tablet 20 mEq  20 mEq Oral Once Mae Kay PA-C           Physical Exam   /80 (BP Location: Left arm, Patient Position: Sitting, Cuff Size: Adult Regular)   Pulse 87   Temp 98.3  F (36.8  C) (Oral)   Wt 55 kg (121 lb 4.8 oz)   LMP  (LMP Unknown)   SpO2 97%   BMI 22.32 kg/m      Wt Readings from Last 5 Encounters:   11/12/24 55 kg (121 lb 4.8 oz)   10/31/24 54.7 kg (120 lb 8 oz)   10/14/24 54.8 kg (120 lb 14.4 oz)   10/01/24 54.7 kg (120 lb 9.6 oz)   09/05/24 53.9 kg (118 lb 14.4 oz)     Gen: alert, pleasant and conversational, NAD  HEENT: NC/AT,EOMI w/ PERRL, anicteric sclera.   MSK: No neck pain with palpation, full ROM.  Strength 4/5 in left hand , 5/5 right hand , 5/5 bilateral shoulder flexion/extension  Skin: no concerning lesions or rashes  Neuro: A&Ox4  Psych:  appropriate, reactive      Data     Most Recent 3 CBC's:  Recent Labs   Lab Test 11/04/24  1020 10/14/24  1123 10/01/24  1333   WBC 4.2 2.6* 2.8*   HGB 11.5* 11.2* 10.7*   * 101* 99   PLT 54* 59* 51*   ANEUTAUTO 2.0 0.7* 0.8*     Most Recent 3 BMP's:  Recent Labs   Lab Test 11/04/24  1020 10/14/24  1123 10/03/24  0800 10/01/24  1333    136  --  139   POTASSIUM 4.2 4.0  --  4.0   CHLORIDE 99 99  --  102   CO2 28 28  --  27   BUN 15.6 15.7  --  17.7   CR 0.71 0.73 0.90 0.90  0.90   ANIONGAP 10 9  --  10   PAULINE 9.7 9.4  --  9.4   * 101*  --  114*   PROTTOTAL 6.4 6.2*  --  6.1*   ALBUMIN 4.4 4.3  --  4.2    Most Recent 3 LFT's:  Recent Labs   Lab Test 11/04/24  1020 10/14/24  1123 10/01/24  1333   AST 22 19 28   ALT 22 19 31   ALKPHOS 60 52 57   BILITOTAL 0.2 0.2 0.2    Most Recent 2 TSH and T4:  Recent Labs   Lab Test 02/19/24  1333 08/17/23  1349 10/03/22  0820 06/13/22  0822 03/21/22  0803   TSH 3.08 2.12   < > 9.47*  9.31* 5.79*   T4  --   --   --  0.99  0.97 0.83    < > = values in this interval not displayed.     I reviewed the above labs today myself and with the patient.    Assessment/Plan     Relapsed Multiple Myeloma  Started kyprolis/isatuximab/dex on 2/20/23.    Changed to Isatuximab maintenace 10mg/kg every 4 week starting 10/9/23  Rising light chains noted April 2024; cell therapy consult 4/26/24 with multiple options presented.    CAR-T therapy 7/29/24 with Carvykti.  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  Xgeva every 6 months, last dose 5/31/24, next due Nov 2024    HTN  Continue amlodipine 5 mg daily.  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  Neuro consult placed but not scheduled until 3/2025.   Patient will go to see Ortho to obtain imaging.  - Diclofenac gel to the elbow, forearm, and wrist for antiinflammatory effect  - Recommend splinting and trying to use conscious resting of the elbow and wrist      Nighat Chavez MD PhD

## 2024-11-12 NOTE — NURSING NOTE
"Oncology Rooming Note    November 12, 2024 1:52 PM   Shena Hartmann is a 74 year old female who presents for:    Chief Complaint   Patient presents with    Oncology Clinic Visit     RTN MM     Initial Vitals: /80 (BP Location: Left arm, Patient Position: Sitting, Cuff Size: Adult Regular)   Pulse 87   Temp 98.3  F (36.8  C) (Oral)   Wt 55 kg (121 lb 4.8 oz)   LMP  (LMP Unknown)   SpO2 97%   BMI 22.32 kg/m   Estimated body mass index is 22.32 kg/m  as calculated from the following:    Height as of 7/29/24: 1.57 m (5' 1.81\").    Weight as of this encounter: 55 kg (121 lb 4.8 oz). Body surface area is 1.55 meters squared.  Moderate Pain (4) Comment: Data Unavailable   No LMP recorded (lmp unknown). Patient is postmenopausal.  Allergies reviewed: Yes  Medications reviewed: Yes    Medications: Medication refills not needed today.  Pharmacy name entered into [facility]:    [id number] - [place], [place] - [address] [facility] [place] [address]  WRITTEN PRESCRIPTION REQUESTED  [facility] - [place], [place] - [address]    Frailty Screening:   Is the patient here for a new oncology consult visit in cancer care? 2. No      Clinical concerns: Would like to know if can receive Prolia today 11/12/24, if not can it be moved to closer to the appt. On 11/25/24       Justa Mueller"

## 2024-11-13 ENCOUNTER — TRANSFERRED RECORDS (OUTPATIENT)
Dept: HEALTH INFORMATION MANAGEMENT | Facility: CLINIC | Age: 74
End: 2024-11-13
Payer: MEDICARE

## 2024-11-16 PROBLEM — D69.6 THROMBOCYTOPENIA (H): Status: ACTIVE | Noted: 2024-11-16

## 2024-11-19 RX ORDER — METHYLPREDNISOLONE SODIUM SUCCINATE 40 MG/ML
40 INJECTION INTRAMUSCULAR; INTRAVENOUS
Start: 2024-11-19

## 2024-11-19 RX ORDER — ALBUTEROL SULFATE 90 UG/1
1-2 INHALANT RESPIRATORY (INHALATION)
Start: 2024-11-19

## 2024-11-19 RX ORDER — ALBUTEROL SULFATE 0.83 MG/ML
2.5 SOLUTION RESPIRATORY (INHALATION)
OUTPATIENT
Start: 2024-11-19

## 2024-11-19 RX ORDER — MEPERIDINE HYDROCHLORIDE 25 MG/ML
25 INJECTION INTRAMUSCULAR; INTRAVENOUS; SUBCUTANEOUS
OUTPATIENT
Start: 2024-11-19

## 2024-11-19 RX ORDER — DIPHENHYDRAMINE HYDROCHLORIDE 50 MG/ML
25 INJECTION INTRAMUSCULAR; INTRAVENOUS
Start: 2024-11-19

## 2024-11-19 RX ORDER — DIPHENHYDRAMINE HYDROCHLORIDE 50 MG/ML
50 INJECTION INTRAMUSCULAR; INTRAVENOUS
Start: 2024-11-19

## 2024-11-19 RX ORDER — EPINEPHRINE 1 MG/ML
0.3 INJECTION, SOLUTION, CONCENTRATE INTRAVENOUS EVERY 5 MIN PRN
OUTPATIENT
Start: 2024-11-19

## 2024-11-22 RX ORDER — ALBUTEROL SULFATE 0.83 MG/ML
2.5 SOLUTION RESPIRATORY (INHALATION)
OUTPATIENT
Start: 2024-11-27

## 2024-11-22 RX ORDER — MEPERIDINE HYDROCHLORIDE 25 MG/ML
25 INJECTION INTRAMUSCULAR; INTRAVENOUS; SUBCUTANEOUS
OUTPATIENT
Start: 2024-11-27

## 2024-11-22 RX ORDER — ALBUTEROL SULFATE 90 UG/1
1-2 INHALANT RESPIRATORY (INHALATION)
Start: 2024-11-27

## 2024-11-22 RX ORDER — METHYLPREDNISOLONE SODIUM SUCCINATE 40 MG/ML
40 INJECTION INTRAMUSCULAR; INTRAVENOUS
Start: 2024-11-27

## 2024-11-22 RX ORDER — DIPHENHYDRAMINE HYDROCHLORIDE 50 MG/ML
50 INJECTION INTRAMUSCULAR; INTRAVENOUS
Start: 2024-11-27

## 2024-11-22 RX ORDER — EPINEPHRINE 1 MG/ML
0.3 INJECTION, SOLUTION INTRAMUSCULAR; SUBCUTANEOUS EVERY 5 MIN PRN
OUTPATIENT
Start: 2024-11-27

## 2024-11-22 RX ORDER — DIPHENHYDRAMINE HYDROCHLORIDE 50 MG/ML
25 INJECTION INTRAMUSCULAR; INTRAVENOUS
Start: 2024-11-27

## 2024-11-25 ENCOUNTER — INFUSION THERAPY VISIT (OUTPATIENT)
Dept: ONCOLOGY | Facility: CLINIC | Age: 74
End: 2024-11-25
Payer: MEDICARE

## 2024-11-25 VITALS
RESPIRATION RATE: 16 BRPM | WEIGHT: 120.9 LBS | BODY MASS INDEX: 22.25 KG/M2 | TEMPERATURE: 97.9 F | DIASTOLIC BLOOD PRESSURE: 77 MMHG | OXYGEN SATURATION: 95 % | SYSTOLIC BLOOD PRESSURE: 137 MMHG | HEART RATE: 90 BPM

## 2024-11-25 DIAGNOSIS — M81.0 OSTEOPOROSIS, UNSPECIFIED OSTEOPOROSIS TYPE, UNSPECIFIED PATHOLOGICAL FRACTURE PRESENCE: ICD-10-CM

## 2024-11-25 DIAGNOSIS — C90.00 MULTIPLE MYELOMA, REMISSION STATUS UNSPECIFIED (H): Primary | ICD-10-CM

## 2024-11-25 DIAGNOSIS — C90.00 MULTIPLE MYELOMA NOT HAVING ACHIEVED REMISSION (H): Primary | ICD-10-CM

## 2024-11-25 DIAGNOSIS — T50.995S ADVERSE EFFECT OF OTHER DRUGS, MEDICAMENTS AND BIOLOGICAL SUBSTANCES, SEQUELA: ICD-10-CM

## 2024-11-25 DIAGNOSIS — Z79.899 ENCOUNTER FOR LONG-TERM (CURRENT) USE OF MEDICATIONS: ICD-10-CM

## 2024-11-25 LAB
ALBUMIN SERPL BCG-MCNC: 4.2 G/DL (ref 3.5–5.2)
BASOPHILS # BLD AUTO: 0 10E3/UL (ref 0–0.2)
BASOPHILS NFR BLD AUTO: 1 %
CALCIUM SERPL-MCNC: 9.8 MG/DL (ref 8.8–10.4)
CREAT SERPL-MCNC: 0.79 MG/DL (ref 0.51–0.95)
CULTURE HARVEST COMPLETE DATE: NORMAL
EGFRCR SERPLBLD CKD-EPI 2021: 78 ML/MIN/1.73M2
EOSINOPHIL # BLD AUTO: 0 10E3/UL (ref 0–0.7)
EOSINOPHIL NFR BLD AUTO: 1 %
ERYTHROCYTE [DISTWIDTH] IN BLOOD BY AUTOMATED COUNT: 18.9 % (ref 10–15)
HCT VFR BLD AUTO: 33 % (ref 35–47)
HGB BLD-MCNC: 11.1 G/DL (ref 11.7–15.7)
IMM GRANULOCYTES # BLD: 0 10E3/UL
IMM GRANULOCYTES NFR BLD: 0 %
LYMPHOCYTES # BLD AUTO: 1 10E3/UL (ref 0.8–5.3)
LYMPHOCYTES NFR BLD AUTO: 33 %
MCH RBC QN AUTO: 34.8 PG (ref 26.5–33)
MCHC RBC AUTO-ENTMCNC: 33.6 G/DL (ref 31.5–36.5)
MCV RBC AUTO: 103 FL (ref 78–100)
MONOCYTES # BLD AUTO: 0.7 10E3/UL (ref 0–1.3)
MONOCYTES NFR BLD AUTO: 23 %
NEUTROPHILS # BLD AUTO: 1.3 10E3/UL (ref 1.6–8.3)
NEUTROPHILS NFR BLD AUTO: 43 %
NRBC # BLD AUTO: 0 10E3/UL
NRBC BLD AUTO-RTO: 0 /100
PLATELET # BLD AUTO: 51 10E3/UL (ref 150–450)
RBC # BLD AUTO: 3.19 10E6/UL (ref 3.8–5.2)
WBC # BLD AUTO: 3.2 10E3/UL (ref 4–11)

## 2024-11-25 PROCEDURE — 85041 AUTOMATED RBC COUNT: CPT

## 2024-11-25 PROCEDURE — 85025 COMPLETE CBC W/AUTO DIFF WBC: CPT

## 2024-11-25 PROCEDURE — 36591 DRAW BLOOD OFF VENOUS DEVICE: CPT | Performed by: REGISTERED NURSE

## 2024-11-25 PROCEDURE — 96372 THER/PROPH/DIAG INJ SC/IM: CPT | Performed by: REGISTERED NURSE

## 2024-11-25 PROCEDURE — 82310 ASSAY OF CALCIUM: CPT | Performed by: REGISTERED NURSE

## 2024-11-25 PROCEDURE — 94640 AIRWAY INHALATION TREATMENT: CPT | Performed by: INTERNAL MEDICINE

## 2024-11-25 PROCEDURE — 250N000011 HC RX IP 250 OP 636: Performed by: REGISTERED NURSE

## 2024-11-25 PROCEDURE — 82565 ASSAY OF CREATININE: CPT | Performed by: REGISTERED NURSE

## 2024-11-25 PROCEDURE — 94642 AEROSOL INHALATION TREATMENT: CPT | Performed by: INTERNAL MEDICINE

## 2024-11-25 PROCEDURE — 82040 ASSAY OF SERUM ALBUMIN: CPT | Performed by: REGISTERED NURSE

## 2024-11-25 RX ORDER — HEPARIN SODIUM (PORCINE) LOCK FLUSH IV SOLN 100 UNIT/ML 100 UNIT/ML
5 SOLUTION INTRAVENOUS EVERY 8 HOURS
Status: DISCONTINUED | OUTPATIENT
Start: 2024-11-25 | End: 2024-11-25 | Stop reason: HOSPADM

## 2024-11-25 RX ORDER — ALBUTEROL SULFATE 5 MG/ML
2.5 SOLUTION RESPIRATORY (INHALATION) ONCE
Status: DISCONTINUED | OUTPATIENT
Start: 2024-11-25 | End: 2024-11-25 | Stop reason: HOSPADM

## 2024-11-25 RX ORDER — PENTAMIDINE ISETHIONATE 300 MG/300MG
300 INHALANT RESPIRATORY (INHALATION) ONCE
Start: 2024-12-01 | End: 2024-12-01

## 2024-11-25 RX ORDER — ALBUTEROL SULFATE 5 MG/ML
2.5 SOLUTION RESPIRATORY (INHALATION) ONCE
Start: 2024-12-01 | End: 2024-12-01

## 2024-11-25 RX ORDER — HEPARIN SODIUM (PORCINE) LOCK FLUSH IV SOLN 100 UNIT/ML 100 UNIT/ML
5 SOLUTION INTRAVENOUS
OUTPATIENT
Start: 2024-12-01

## 2024-11-25 RX ORDER — PENTAMIDINE ISETHIONATE 300 MG/300MG
300 INHALANT RESPIRATORY (INHALATION) ONCE
Status: COMPLETED | OUTPATIENT
Start: 2024-11-25 | End: 2024-11-25

## 2024-11-25 RX ORDER — HEPARIN SODIUM,PORCINE 10 UNIT/ML
5-20 VIAL (ML) INTRAVENOUS DAILY PRN
OUTPATIENT
Start: 2024-12-01

## 2024-11-25 RX ADMIN — HEPARIN SODIUM (PORCINE) LOCK FLUSH IV SOLN 100 UNIT/ML 5 ML: 100 SOLUTION at 10:19

## 2024-11-25 RX ADMIN — DENOSUMAB 60 MG: 60 INJECTION SUBCUTANEOUS at 11:01

## 2024-11-25 RX ADMIN — PENTAMIDINE ISETHIONATE 300 MG: 300 INHALANT RESPIRATORY (INHALATION) at 09:00

## 2024-11-25 ASSESSMENT — PAIN SCALES - GENERAL: PAINLEVEL_OUTOF10: MODERATE PAIN (4)

## 2024-11-25 NOTE — NURSING NOTE
Chief Complaint   Patient presents with    Port Draw     Gripper needle. Heparin locked, vitals checked       Maria Eugenia Murdock RN on 11/25/2024 at 10:25 AM

## 2024-11-25 NOTE — PROGRESS NOTES
IB sent to RN[facility] regarding slight drop in WBC/ANC. Patient wondering if she should restart taking Penicillin. RN instructed patient to continue NOT taking it and that the RN[facility] would follow-up with patient on next steps.

## 2024-11-25 NOTE — PROGRESS NOTES
Infusion Injection Note:  Shena Hartmann presents today for Prolia injection.    Patient seen by provider today: No, last provider visit on 11/12 w Dr. Chavez   present during visit today: Not Applicable.    Patient arrives to infusion today feeling generally well. She states the pain and numbness in her L arm/hand has improved and is manageable.     Treatment Conditions:  Lab Results   Component Value Date    HGB 11.1 (L) 11/25/2024    WBC 3.2 (L) 11/25/2024    ANEU 1.5 (L) 09/05/2024    ANEUTAUTO 1.3 (L) 11/25/2024    PLT 51 (L) 11/25/2024        Lab Results   Component Value Date     11/04/2024    POTASSIUM 4.2 11/04/2024    MAG 2.0 11/04/2024    CR 0.79 11/25/2024    PAULINE 9.8 11/25/2024    BILITOTAL 0.2 11/04/2024    ALBUMIN 4.2 11/25/2024    ALT 22 11/04/2024    AST 22 11/04/2024       Results reviewed, labs MET treatment parameters, ok to proceed with treatment - Ca = WNL    Pre and Post Injection:  Prolia injection given to Left Arm without incident.   Patient tolerated procedure well.    Discharge Plan:  Patient declined prescription refills.  Discharge instructions reviewed with: Patient.  Patient and/or family verbalized understanding of discharge instructions and all questions answered.  AVS to patient via [employer].   Patient discharged in stable condition accompanied by: self.  Departure Mode: Ambulatory.  Patient will return in 6 mo. for next injection appointment.      Ivon Ca CA on 11/25/2024 at 11:18 AM

## 2024-11-25 NOTE — PROGRESS NOTES
Shena Hartmann was seen today for a Pentamidine nebulizer tx ordered by Dr. Nighat Chavez.    Patient was first given 2.5 mg / 3 ml of albuterol (LOT# [id number], NDC# [id number], EXP 05/31/2026) nebulizer, after which Pentamidine 300 mg (Lot # [id number], NDC# [id number], EXP 10/31/2025) mixed with 6cc Sterile H20 was administered through a filtered nebulizer.    Pre-treatment: SpO2 = 98%   HR = 83 bpm   BBS = clear   Post-treatment: SpO2 = 98%  HR = 91 bpm  BBS = clear    No adverse side effects noted by the patient.    This service today was provided under the supervision of Dr. Arnoldo Mckay, who was available if needed.     Procedure was completed by Quique Royal RRT

## 2024-11-26 ENCOUNTER — PATIENT OUTREACH (OUTPATIENT)
Dept: ONCOLOGY | Facility: CLINIC | Age: 74
End: 2024-11-26
Payer: MEDICARE

## 2024-11-26 LAB
INTERPRETATION: NORMAL
ISCN: NORMAL
METHODS: NORMAL

## 2024-11-26 NOTE — PROGRESS NOTES
[facility]: Cancer Care                                                                                          Call to Shena regarding a question she asked her infusion nurse about her blood cell counts and her penicillin prescription    MARINO Lee,CNP  confirms that Shena does not need to take the penicillin unless her ANC drops below 1.0.  Shena was notified that since her current ANC is 1.3 she does not need to resume penicillin at this point.  She appreciates the call back    Signature:  Shabnam Hall RN

## 2024-11-27 LAB — INTERPRETATION: NORMAL

## 2024-12-04 NOTE — TELEPHONE ENCOUNTER
FUTURE VISIT INFORMATION      FUTURE VISIT INFORMATION:  Date: 3/4/2025  Time: 7:00 am  Location: [facility]  REFERRAL INFORMATION:  Referring provider: Neela Mcknight   Referring location: [facility]  Reason for visit/diagnosis:  Consult for Patch Testing - chronic hand dermatitis with washing dishes/hand soaps, essential oils     RECORDS REQUESTED FROM:       Clinic name Comments Records Status   [facility] 8/28/24 - OV, Juan Luis  8/29/23 - OV, Juan Luis Hutchinson

## 2024-12-18 ENCOUNTER — MYC MEDICAL ADVICE (OUTPATIENT)
Dept: INTERNAL MEDICINE | Facility: CLINIC | Age: 74
End: 2024-12-18
Payer: MEDICARE

## 2024-12-18 DIAGNOSIS — E03.9 ACQUIRED HYPOTHYROIDISM: ICD-10-CM

## 2024-12-18 RX ORDER — LEVOTHYROXINE SODIUM 50 UG/1
50 TABLET ORAL DAILY
Qty: 90 TABLET | Refills: 0 | Status: SHIPPED | OUTPATIENT
Start: 2024-12-18

## 2024-12-20 ENCOUNTER — APPOINTMENT (OUTPATIENT)
Dept: LAB | Facility: CLINIC | Age: 74
End: 2024-12-20
Attending: REGISTERED NURSE
Payer: MEDICARE

## 2024-12-23 ENCOUNTER — PATIENT OUTREACH (OUTPATIENT)
Dept: ONCOLOGY | Facility: CLINIC | Age: 74
End: 2024-12-23

## 2024-12-23 ENCOUNTER — MYC MEDICAL ADVICE (OUTPATIENT)
Dept: ONCOLOGY | Facility: CLINIC | Age: 74
End: 2024-12-23

## 2024-12-23 DIAGNOSIS — C90.00 MULTIPLE MYELOMA NOT HAVING ACHIEVED REMISSION (H): ICD-10-CM

## 2024-12-23 DIAGNOSIS — D80.1 HYPOGAMMAGLOBULINEMIA (H): Primary | ICD-10-CM

## 2024-12-23 DIAGNOSIS — C90.00 MULTIPLE MYELOMA, REMISSION STATUS UNSPECIFIED (H): Primary | ICD-10-CM

## 2024-12-23 PROCEDURE — 94642 AEROSOL INHALATION TREATMENT: CPT | Performed by: INTERNAL MEDICINE

## 2024-12-23 PROCEDURE — 94640 AIRWAY INHALATION TREATMENT: CPT | Performed by: INTERNAL MEDICINE

## 2024-12-23 RX ORDER — HEPARIN SODIUM,PORCINE 10 UNIT/ML
5-20 VIAL (ML) INTRAVENOUS DAILY PRN
OUTPATIENT
Start: 2025-01-01

## 2024-12-23 RX ORDER — DIPHENHYDRAMINE HYDROCHLORIDE 50 MG/ML
50 INJECTION INTRAMUSCULAR; INTRAVENOUS
Start: 2024-12-23

## 2024-12-23 RX ORDER — HEPARIN SODIUM (PORCINE) LOCK FLUSH IV SOLN 100 UNIT/ML 100 UNIT/ML
5 SOLUTION INTRAVENOUS
OUTPATIENT
Start: 2025-01-01

## 2024-12-23 RX ORDER — DIPHENHYDRAMINE HCL 25 MG
50 CAPSULE ORAL ONCE
Start: 2024-12-23

## 2024-12-23 RX ORDER — HYDROCORTISONE SODIUM SUCCINATE 100 MG/2ML
100 INJECTION INTRAMUSCULAR; INTRAVENOUS
OUTPATIENT
Start: 2024-12-23

## 2024-12-23 RX ORDER — ALBUTEROL SULFATE 5 MG/ML
2.5 SOLUTION RESPIRATORY (INHALATION) ONCE
Status: DISCONTINUED | OUTPATIENT
Start: 2024-12-23 | End: 2024-12-23 | Stop reason: HOSPADM

## 2024-12-23 RX ORDER — ALBUTEROL SULFATE 90 UG/1
1-2 INHALANT RESPIRATORY (INHALATION)
Start: 2024-12-23

## 2024-12-23 RX ORDER — HEPARIN SODIUM,PORCINE 10 UNIT/ML
5-20 VIAL (ML) INTRAVENOUS DAILY PRN
OUTPATIENT
Start: 2024-12-23

## 2024-12-23 RX ORDER — PENTAMIDINE ISETHIONATE 300 MG/300MG
300 INHALANT RESPIRATORY (INHALATION) ONCE
Start: 2025-01-01 | End: 2025-01-01

## 2024-12-23 RX ORDER — DIPHENHYDRAMINE HYDROCHLORIDE 50 MG/ML
25 INJECTION INTRAMUSCULAR; INTRAVENOUS
Start: 2024-12-23

## 2024-12-23 RX ORDER — EPINEPHRINE 1 MG/ML
0.3 INJECTION, SOLUTION INTRAMUSCULAR; SUBCUTANEOUS EVERY 5 MIN PRN
OUTPATIENT
Start: 2024-12-23

## 2024-12-23 RX ORDER — ACETAMINOPHEN 325 MG/1
650 TABLET ORAL ONCE
Start: 2024-12-23

## 2024-12-23 RX ORDER — ALBUTEROL SULFATE 0.83 MG/ML
2.5 SOLUTION RESPIRATORY (INHALATION)
OUTPATIENT
Start: 2024-12-23

## 2024-12-23 RX ORDER — METHYLPREDNISOLONE SODIUM SUCCINATE 40 MG/ML
40 INJECTION INTRAMUSCULAR; INTRAVENOUS
Start: 2024-12-23

## 2024-12-23 RX ORDER — HEPARIN SODIUM (PORCINE) LOCK FLUSH IV SOLN 100 UNIT/ML 100 UNIT/ML
5 SOLUTION INTRAVENOUS
OUTPATIENT
Start: 2024-12-23

## 2024-12-23 RX ORDER — PENTAMIDINE ISETHIONATE 300 MG/300MG
300 INHALANT RESPIRATORY (INHALATION) ONCE
Status: COMPLETED | OUTPATIENT
Start: 2024-12-23 | End: 2024-12-23

## 2024-12-23 RX ORDER — ALBUTEROL SULFATE 5 MG/ML
2.5 SOLUTION RESPIRATORY (INHALATION) ONCE
Start: 2025-01-01 | End: 2025-01-01

## 2024-12-23 RX ORDER — MEPERIDINE HYDROCHLORIDE 25 MG/ML
25 INJECTION INTRAMUSCULAR; INTRAVENOUS; SUBCUTANEOUS
OUTPATIENT
Start: 2024-12-23

## 2024-12-23 RX ADMIN — PENTAMIDINE ISETHIONATE 300 MG: 300 INHALANT RESPIRATORY (INHALATION) at 11:04

## 2024-12-23 NOTE — PROGRESS NOTES
Shena Hartmann  Patient was seen today for a Pentamidine nebulizer tx ordered by ANTHONY PICHARDO CNP.    Patient was first given 2.5 mg of Albuterol nebulizer, after which Pentamidine 300 mg (Lot # [id number]) mixed with 6cc Sterile H20 was administered through a filtered nebulizer.    Pre-treatment: SpO2 = 97%   HR = 89   BBS = Clear   Post-treatment: SpO2 = 97%  HR = 94  BBS = Clear    No adverse side effects noted by the patient.    This service today was provided under the direct supervision of Dr. Dawson, who was available if needed.     Procedure was completed by CARLOS MCNEAL.

## 2024-12-26 NOTE — PROGRESS NOTES
[facility]: Cancer Care                                                                                          Call to Shena to review the Pentamidine appointments in relation to her upcoming .  She was advised that the appointments need to be 4 weeks apart.  We reviewed her dates that she would be away for travel.  Will ask scheduling to contact her to update her appointments    Signature:  Shabnam Hall RN

## 2024-12-30 NOTE — NURSING NOTE
"Oncology Rooming Note    December 3, 2021 8:46 AM   Shena Hartmann is a 71 year old female who presents for:    Chief Complaint   Patient presents with     RECHECK     MULTIPLE MYELOMA      Initial Vitals: BP (!) 162/78   Pulse 74   Temp 97.5  F (36.4  C) (Oral)   Wt 56 kg (123 lb 6.4 oz)   SpO2 99%   BMI 22.71 kg/m   Estimated body mass index is 22.71 kg/m  as calculated from the following:    Height as of 5/12/21: 1.57 m (5' 1.81\").    Weight as of this encounter: 56 kg (123 lb 6.4 oz). Body surface area is 1.56 meters squared.  No Pain (0) Comment: Data Unavailable   No LMP recorded. Patient is postmenopausal.  Allergies reviewed: Yes  Medications reviewed: Yes    Medications: Medication refills not needed today.  Pharmacy name entered into [employer]:    [facility] #[id number] - [place], [place] - [address] AT [facility] [address]  WRITTEN PRESCRIPTION REQUESTED  [facility] - [place], [place] - [address]    Clinical concerns: NONE       Lizzy Morales CMA              " MOC in room, requesting CTH. Low concern for ICH as reason for headache given history and physical exam. Discussed risks of radiation and increased risk of malignancy with both parent and patient. MOC understanding risk and prefers to go forward with CTH.   - , PGY-3 Reassessed after IV tylenol and fluids. Pain improved. CTH with no evidence of IC pathology.   - , PGY-3

## 2024-12-31 RX ORDER — DIPHENHYDRAMINE HYDROCHLORIDE 50 MG/ML
37.5 INJECTION INTRAMUSCULAR; INTRAVENOUS ONCE
Start: 2024-12-31 | End: 2024-12-31

## 2025-01-02 ENCOUNTER — TELEPHONE (OUTPATIENT)
Dept: PHARMACY | Facility: CLINIC | Age: 75
End: 2025-01-02
Payer: MEDICARE

## 2025-01-02 NOTE — TELEPHONE ENCOUNTER
Pharmacist IVIG Stewardship Program    Diagnosis: Hypogammaglobulinemia   Date  12/20/2024   IgG Level (mg/dL) 143     Current Dosing Regimen: Privigen 20g IV monthly PRN for IgG <300 mg/dL   Patient is dosed as needed based on an IgG level of less than 300 mg/dL to ensure the lowest amount of medication is administered to achieve beneficial outcomes.  Pre-medications:   Acetaminophen 650 mg by mouth, 30 minutes prior to infusion  Diphenhydramine 37.5 mg IV prior to IVIG infusion  Hydrocortisone 100 mg IV PRN for previous reactions to IVIG therapy   Current Titration Regimen: Start infusion at 0.5 ml/kg/hr x 15 min. If tolerated, increase rate by 0.5 ml/kg/hr every 15 min to a maximum of 4 ml/kg/hr.  Previously Tried Products: None     Side Effects: Unable to reach patient to discuss.     Interventions: Lab review completed.     Assessment:   Date  12/20/2024   IgG Level (mg/dL) 143   Patient is appropriate for IVIG therapy when their level is within parameter. IVIG infusions are effective in increasing IgG levels to an appropriate level to minimize patient's risk of infection. Patient should continue on treatment as they have been per provider order. Without therapy their levels would put them at an increased risk of infections.    Plan: Patient is okay to proceed with IVIG infusion on 1/9/2024. Patient will have labs checked monthly to assess the need for additional IVIG infusions moving forward.     Next Review Needed: 1/2025    Willa Momin, PharmD, IgCP  Medication Access Pharmacist

## 2025-01-09 ENCOUNTER — INFUSION THERAPY VISIT (OUTPATIENT)
Dept: ONCOLOGY | Facility: CLINIC | Age: 75
End: 2025-01-09
Payer: MEDICARE

## 2025-01-09 VITALS
SYSTOLIC BLOOD PRESSURE: 170 MMHG | HEART RATE: 77 BPM | OXYGEN SATURATION: 98 % | DIASTOLIC BLOOD PRESSURE: 75 MMHG | RESPIRATION RATE: 18 BRPM | TEMPERATURE: 97.8 F

## 2025-01-09 DIAGNOSIS — C90.00 MULTIPLE MYELOMA NOT HAVING ACHIEVED REMISSION (H): Primary | ICD-10-CM

## 2025-01-09 DIAGNOSIS — D80.1 HYPOGAMMAGLOBULINEMIA: ICD-10-CM

## 2025-01-09 PROCEDURE — 250N000011 HC RX IP 250 OP 636: Performed by: REGISTERED NURSE

## 2025-01-09 PROCEDURE — 258N000003 HC RX IP 258 OP 636: Performed by: REGISTERED NURSE

## 2025-01-09 PROCEDURE — 250N000013 HC RX MED GY IP 250 OP 250 PS 637: Performed by: REGISTERED NURSE

## 2025-01-09 RX ORDER — MEPERIDINE HYDROCHLORIDE 25 MG/ML
25 INJECTION INTRAMUSCULAR; INTRAVENOUS; SUBCUTANEOUS
OUTPATIENT
Start: 2025-01-09

## 2025-01-09 RX ORDER — ALBUTEROL SULFATE 0.83 MG/ML
2.5 SOLUTION RESPIRATORY (INHALATION)
OUTPATIENT
Start: 2025-01-09

## 2025-01-09 RX ORDER — EPINEPHRINE 1 MG/ML
0.3 INJECTION, SOLUTION, CONCENTRATE INTRAVENOUS EVERY 5 MIN PRN
OUTPATIENT
Start: 2025-01-09

## 2025-01-09 RX ORDER — DIPHENHYDRAMINE HYDROCHLORIDE 50 MG/ML
25 INJECTION INTRAMUSCULAR; INTRAVENOUS
Start: 2025-01-09

## 2025-01-09 RX ORDER — EPINEPHRINE 1 MG/ML
0.3 INJECTION, SOLUTION, CONCENTRATE INTRAVENOUS EVERY 5 MIN PRN
Status: DISCONTINUED | OUTPATIENT
Start: 2025-01-09 | End: 2025-01-09 | Stop reason: HOSPADM

## 2025-01-09 RX ORDER — HEPARIN SODIUM (PORCINE) LOCK FLUSH IV SOLN 100 UNIT/ML 100 UNIT/ML
5 SOLUTION INTRAVENOUS
OUTPATIENT
Start: 2025-01-09

## 2025-01-09 RX ORDER — HEPARIN SODIUM,PORCINE 10 UNIT/ML
5-20 VIAL (ML) INTRAVENOUS DAILY PRN
OUTPATIENT
Start: 2025-01-09

## 2025-01-09 RX ORDER — DIPHENHYDRAMINE HYDROCHLORIDE 50 MG/ML
25 INJECTION INTRAMUSCULAR; INTRAVENOUS
Status: COMPLETED | OUTPATIENT
Start: 2025-01-09 | End: 2025-01-09

## 2025-01-09 RX ORDER — DIPHENHYDRAMINE HYDROCHLORIDE 50 MG/ML
50 INJECTION INTRAMUSCULAR; INTRAVENOUS
Start: 2025-01-09

## 2025-01-09 RX ORDER — DIPHENHYDRAMINE HYDROCHLORIDE 50 MG/ML
37.5 INJECTION INTRAMUSCULAR; INTRAVENOUS ONCE
Start: 2025-01-09 | End: 2025-01-09

## 2025-01-09 RX ORDER — METHYLPREDNISOLONE SODIUM SUCCINATE 40 MG/ML
40 INJECTION INTRAMUSCULAR; INTRAVENOUS
Start: 2025-01-09

## 2025-01-09 RX ORDER — ACETAMINOPHEN 325 MG/1
650 TABLET ORAL ONCE
Start: 2025-01-09

## 2025-01-09 RX ORDER — ALBUTEROL SULFATE 90 UG/1
1-2 INHALANT RESPIRATORY (INHALATION)
Start: 2025-01-09

## 2025-01-09 RX ORDER — MEPERIDINE HYDROCHLORIDE 25 MG/ML
25 INJECTION INTRAMUSCULAR; INTRAVENOUS; SUBCUTANEOUS
Status: DISCONTINUED | OUTPATIENT
Start: 2025-01-09 | End: 2025-01-09 | Stop reason: HOSPADM

## 2025-01-09 RX ORDER — DIPHENHYDRAMINE HYDROCHLORIDE 50 MG/ML
37.5 INJECTION INTRAMUSCULAR; INTRAVENOUS ONCE
Status: COMPLETED | OUTPATIENT
Start: 2025-01-09 | End: 2025-01-09

## 2025-01-09 RX ORDER — METHYLPREDNISOLONE SODIUM SUCCINATE 40 MG/ML
40 INJECTION INTRAMUSCULAR; INTRAVENOUS
Status: DISCONTINUED | OUTPATIENT
Start: 2025-01-09 | End: 2025-01-09 | Stop reason: HOSPADM

## 2025-01-09 RX ORDER — ALBUTEROL SULFATE 0.83 MG/ML
2.5 SOLUTION RESPIRATORY (INHALATION)
Status: DISCONTINUED | OUTPATIENT
Start: 2025-01-09 | End: 2025-01-09 | Stop reason: HOSPADM

## 2025-01-09 RX ORDER — HEPARIN SODIUM (PORCINE) LOCK FLUSH IV SOLN 100 UNIT/ML 100 UNIT/ML
5 SOLUTION INTRAVENOUS
Status: DISCONTINUED | OUTPATIENT
Start: 2025-01-09 | End: 2025-01-09 | Stop reason: HOSPADM

## 2025-01-09 RX ORDER — ACETAMINOPHEN 325 MG/1
650 TABLET ORAL ONCE
Status: COMPLETED | OUTPATIENT
Start: 2025-01-09 | End: 2025-01-09

## 2025-01-09 RX ORDER — DIPHENHYDRAMINE HYDROCHLORIDE 50 MG/ML
50 INJECTION INTRAMUSCULAR; INTRAVENOUS
Status: DISCONTINUED | OUTPATIENT
Start: 2025-01-09 | End: 2025-01-09 | Stop reason: HOSPADM

## 2025-01-09 RX ORDER — HYDROCORTISONE SODIUM SUCCINATE 100 MG/2ML
100 INJECTION INTRAMUSCULAR; INTRAVENOUS
OUTPATIENT
Start: 2025-01-09

## 2025-01-09 RX ORDER — ALBUTEROL SULFATE 90 UG/1
1-2 INHALANT RESPIRATORY (INHALATION)
Status: DISCONTINUED | OUTPATIENT
Start: 2025-01-09 | End: 2025-01-09 | Stop reason: HOSPADM

## 2025-01-09 RX ADMIN — DIPHENHYDRAMINE HYDROCHLORIDE 25 MG: 50 INJECTION INTRAMUSCULAR; INTRAVENOUS at 11:22

## 2025-01-09 RX ADMIN — DIPHENHYDRAMINE HYDROCHLORIDE 37.5 MG: 50 INJECTION INTRAMUSCULAR; INTRAVENOUS at 09:13

## 2025-01-09 RX ADMIN — SODIUM CHLORIDE 1000 ML: 9 INJECTION, SOLUTION INTRAVENOUS at 11:21

## 2025-01-09 RX ADMIN — HEPARIN 5 ML: 100 SYRINGE at 13:16

## 2025-01-09 RX ADMIN — FAMOTIDINE 20 MG: 10 INJECTION INTRAVENOUS at 11:23

## 2025-01-09 RX ADMIN — ACETAMINOPHEN 650 MG: 325 TABLET ORAL at 09:13

## 2025-01-09 RX ADMIN — HUMAN IMMUNOGLOBULIN G 20 G: 20 LIQUID INTRAVENOUS at 09:53

## 2025-01-09 NOTE — PATIENT INSTRUCTIONS
[facility] Triage and after hours / weekends / holidays:  [phone]    Please call the triage or after hours line if you experience a temperature greater than or equal to 100.4, shaking chills, have uncontrolled nausea, vomiting and/or diarrhea, dizziness, shortness of breath, chest pain, bleeding, unexplained bruising, or if you have any other new/concerning symptoms, questions or concerns.      If you are having any concerning symptoms or wish to speak to a provider before your next infusion visit, please call triage to notify them so we can adequately serve you.     If you need a refill on a narcotic prescription or other medication, please call before your infusion appointment.

## 2025-01-09 NOTE — PROGRESS NOTES
Infusion Nursing Note:  Shena Hartmann presents today for IVIG (first dose reaction).    Patient seen by provider today: No   present during visit today: Not Applicable.    Note: Shena comes into the clinic today doing well. She has tennis elbow bilaterally which causes some discomfort but does not give this a pain rating. She has neuropathy to her feet which has not changed. She does not attest to any SOB/chest tightness/signs of infection/dizziness/bruising/swelling/diarrhea/constipation/nausea/urinary issues/vision or hearing changes.    [place] through IVIG, patient started with chills and pain. Infusion stopped, hypersensitivity meds administered and patient tolerated re challenge (rate reduced by 50% and increased by 0.5mg/kg/hr from there until EOI)    Per [facility] application Dr. Chavez/Colleen Edmonds RN 1/9/25 @1030:   - ok to restart IVIG at 50% once patient is back to baseline.    Please give patient Solu-Cortef before next IVIG infusion as pre-medication.     Intravenous Access:  Implanted Port.    Treatment Conditions:   Latest Reference Range & Units 12/20/24 12:35   IGA 84 - 499 mg/dL <2 (L)    - 1,616 mg/dL 143 (L)   IGM 35 - 242 mg/dL <10 (L)   (L): Data is abnormally low  Results reviewed, labs MET treatment parameters, ok to proceed with treatment.      Post Infusion Assessment:  Patient tolerated infusion poorly due to : Hypersensitivity: Did patient have a hypersensitivity reaction? : Yes  Drug or Product name: IVIG  Were pre-meds administered?: Yes  What pre-meds were administered?: Acetaminophen (Tylenol), Diphenhydramine (Benadryl)  First or Subsequent treatment: First time receiving  Rate of infusion when patient had hypersensitivity reaction: 3mg/kg/hr  Time the hypersensitivity reaction was first recognized: 1018  Symptoms observed or reported (select all that apply): Chills (pain)  Interventions/treatment following reaction: Infusion stopped, Hypersensitivity  medications administered, Reduced rate of medication administration (reduced rate by 50%)  What hypersensitivity medications were administered?: DiphendydrAMINE (benadryl), NaCl 0.9% Bolus, Famotidine(Pepcid)  Name of provider notified: Scott  Time provider notified: 7820  Type of notification (select all that apply): Other: (Comment) (vocera)  Was the patient re-challenged today?: Yes - tolerated well  Blood return noted pre and post infusion.  Site patent and intact, free from redness, edema or discomfort.  No evidence of extravasations.  Access discontinued per protocol.       Discharge Plan:   Patient declined prescription refills.  Discharge instructions reviewed with: Patient.  Patient and/or family verbalized understanding of discharge instructions and all questions answered.  AVS to patient via [employer].    Patient discharged in stable condition accompanied by: self.  Departure Mode: Ambulatory.      Colleen Edmonds RN

## 2025-01-13 DIAGNOSIS — R68.89 OTHER GENERAL SYMPTOMS AND SIGNS: Primary | ICD-10-CM

## 2025-01-14 NOTE — TELEPHONE ENCOUNTER
MEDICAL RECORDS REQUEST   [facility]  Urology Clinic  [address] [place] [address]  PHONE: [phone]  Fax: [phone]        FUTURE VISIT INFORMATION                                                   Shena Hartmann, : 1950 scheduled for future visit at [facility]    APPOINTMENT INFORMATION:  Date: 2025  Provider:  MAVIS LLOYD  Reason for Visit/Diagnosis: UROTHROCELE    REFERRAL INFORMATION:  Referring provider:  LEN JENSEN CNP  Specialty: ONCOLOGY  Referring providers clinic:    Clinic contact number:  [phone]    RECORDS REQUESTED FOR VISIT                                                     NOTES  STATUS/DETAILS   OFFICE NOTE from referring provider  yes 2024   OFFICE NOTE from other specialist  yes 2024   MEDICATION LIST  yes       PRE-VISIT CHECKLIST      Record collection complete Yes   Appointment appropriately scheduled           (right time/right provider) Yes     Completed by: Magui Jansen

## 2025-01-20 DIAGNOSIS — C90.00 MULTIPLE MYELOMA, REMISSION STATUS UNSPECIFIED (H): Primary | ICD-10-CM

## 2025-01-20 PROCEDURE — 94642 AEROSOL INHALATION TREATMENT: CPT | Performed by: INTERNAL MEDICINE

## 2025-01-20 PROCEDURE — 94640 AIRWAY INHALATION TREATMENT: CPT | Performed by: INTERNAL MEDICINE

## 2025-01-20 RX ORDER — ALBUTEROL SULFATE 5 MG/ML
2.5 SOLUTION RESPIRATORY (INHALATION) ONCE
Start: 2025-02-01 | End: 2025-02-01

## 2025-01-20 RX ORDER — PENTAMIDINE ISETHIONATE 300 MG/300MG
300 INHALANT RESPIRATORY (INHALATION) ONCE
Status: COMPLETED | OUTPATIENT
Start: 2025-01-20 | End: 2025-01-20

## 2025-01-20 RX ORDER — PENTAMIDINE ISETHIONATE 300 MG/300MG
300 INHALANT RESPIRATORY (INHALATION) ONCE
Start: 2025-02-01 | End: 2025-02-01

## 2025-01-20 RX ORDER — HEPARIN SODIUM (PORCINE) LOCK FLUSH IV SOLN 100 UNIT/ML 100 UNIT/ML
5 SOLUTION INTRAVENOUS
OUTPATIENT
Start: 2025-02-01

## 2025-01-20 RX ORDER — HEPARIN SODIUM,PORCINE 10 UNIT/ML
5-20 VIAL (ML) INTRAVENOUS DAILY PRN
OUTPATIENT
Start: 2025-02-01

## 2025-01-20 RX ADMIN — PENTAMIDINE ISETHIONATE 300 MG: 300 INHALANT RESPIRATORY (INHALATION) at 10:32

## 2025-01-20 NOTE — PROGRESS NOTES
Shena Hartmann was seen today for a Pentamidine nebulizer tx ordered by MARINO Fitch, CNP.    Patient was first given 4 puffs Albuterol MDI, after which Pentamidine 300 mg (Lot # [id number]) mixed with 6cc Sterile H20 was administered through a filtered nebulizer.    Pre-treatment: SpO2 = 96%   HR = 86   BBS = clear   Post-treatment: SpO2 = 97%  HR = 87  BBS = clear    No adverse side effects noted by the patient.    This service today was provided under Dr. Smith, who was available if needed.     Procedure was completed by Kiki Bautista.

## 2025-01-21 ENCOUNTER — LAB (OUTPATIENT)
Dept: LAB | Facility: CLINIC | Age: 75
End: 2025-01-21
Attending: STUDENT IN AN ORGANIZED HEALTH CARE EDUCATION/TRAINING PROGRAM
Payer: MEDICARE

## 2025-01-21 ENCOUNTER — PATIENT OUTREACH (OUTPATIENT)
Dept: ONCOLOGY | Facility: CLINIC | Age: 75
End: 2025-01-21

## 2025-01-21 ENCOUNTER — OFFICE VISIT (OUTPATIENT)
Dept: TRANSPLANT | Facility: CLINIC | Age: 75
End: 2025-01-21
Attending: STUDENT IN AN ORGANIZED HEALTH CARE EDUCATION/TRAINING PROGRAM
Payer: MEDICARE

## 2025-01-21 ENCOUNTER — ANCILLARY PROCEDURE (OUTPATIENT)
Dept: CARDIOLOGY | Facility: CLINIC | Age: 75
End: 2025-01-21
Attending: PHYSICIAN ASSISTANT
Payer: MEDICARE

## 2025-01-21 VITALS
WEIGHT: 121.2 LBS | HEART RATE: 78 BPM | RESPIRATION RATE: 18 BRPM | DIASTOLIC BLOOD PRESSURE: 79 MMHG | OXYGEN SATURATION: 98 % | BODY MASS INDEX: 22.3 KG/M2 | TEMPERATURE: 97.5 F | SYSTOLIC BLOOD PRESSURE: 145 MMHG

## 2025-01-21 DIAGNOSIS — C90.00 MULTIPLE MYELOMA NOT HAVING ACHIEVED REMISSION (H): Primary | ICD-10-CM

## 2025-01-21 DIAGNOSIS — Z94.84 STEM CELLS TRANSPLANT STATUS (H): ICD-10-CM

## 2025-01-21 DIAGNOSIS — C90.00 MULTIPLE MYELOMA NOT HAVING ACHIEVED REMISSION (H): ICD-10-CM

## 2025-01-21 DIAGNOSIS — Z94.84 STEM CELLS TRANSPLANT STATUS (H): Primary | ICD-10-CM

## 2025-01-21 LAB
ALBUMIN SERPL BCG-MCNC: 4.4 G/DL (ref 3.5–5.2)
ALP SERPL-CCNC: 61 U/L (ref 40–150)
ALT SERPL W P-5'-P-CCNC: 48 U/L (ref 0–50)
ANION GAP SERPL CALCULATED.3IONS-SCNC: 10 MMOL/L (ref 7–15)
AST SERPL W P-5'-P-CCNC: 41 U/L (ref 0–45)
B2 MICROGLOB TUMOR MARKER SER-MCNC: 2.8 MG/L
BASOPHILS # BLD AUTO: 0 10E3/UL (ref 0–0.2)
BASOPHILS NFR BLD AUTO: 0 %
BILIRUB SERPL-MCNC: 0.2 MG/DL
BUN SERPL-MCNC: 14.9 MG/DL (ref 8–23)
CALCIUM SERPL-MCNC: 9.6 MG/DL (ref 8.8–10.4)
CHLORIDE SERPL-SCNC: 101 MMOL/L (ref 98–107)
CREAT SERPL-MCNC: 0.79 MG/DL (ref 0.51–0.95)
EGFRCR SERPLBLD CKD-EPI 2021: 78 ML/MIN/1.73M2
EOSINOPHIL # BLD AUTO: 0 10E3/UL (ref 0–0.7)
EOSINOPHIL NFR BLD AUTO: 2 %
ERYTHROCYTE [DISTWIDTH] IN BLOOD BY AUTOMATED COUNT: 15.2 % (ref 10–15)
GLUCOSE SERPL-MCNC: 90 MG/DL (ref 70–99)
HCO3 SERPL-SCNC: 28 MMOL/L (ref 22–29)
HCT VFR BLD AUTO: 35.4 % (ref 35–47)
HGB BLD-MCNC: 11.9 G/DL (ref 11.7–15.7)
IGA SERPL-MCNC: <2 MG/DL (ref 84–499)
IGG SERPL-MCNC: 480 MG/DL (ref 610–1616)
IGM SERPL-MCNC: <10 MG/DL (ref 35–242)
IMM GRANULOCYTES # BLD: 0 10E3/UL
IMM GRANULOCYTES NFR BLD: 0 %
KAPPA LC FREE SER-MCNC: 0.14 MG/DL (ref 0.33–1.94)
KAPPA LC FREE/LAMBDA FREE SER NEPH: ABNORMAL {RATIO}
LAMBDA LC FREE SERPL-MCNC: <0.13 MG/DL (ref 0.57–2.63)
LDH SERPL L TO P-CCNC: 245 U/L (ref 0–250)
LVEF ECHO: NORMAL
LYMPHOCYTES # BLD AUTO: 1.5 10E3/UL (ref 0.8–5.3)
LYMPHOCYTES NFR BLD AUTO: 55 %
MAGNESIUM SERPL-MCNC: 2 MG/DL (ref 1.7–2.3)
MCH RBC QN AUTO: 35.1 PG (ref 26.5–33)
MCHC RBC AUTO-ENTMCNC: 33.6 G/DL (ref 31.5–36.5)
MCV RBC AUTO: 104 FL (ref 78–100)
MONOCYTES # BLD AUTO: 0.5 10E3/UL (ref 0–1.3)
MONOCYTES NFR BLD AUTO: 17 %
NEUTROPHILS # BLD AUTO: 0.7 10E3/UL (ref 1.6–8.3)
NEUTROPHILS NFR BLD AUTO: 26 %
NRBC # BLD AUTO: 0 10E3/UL
NRBC BLD AUTO-RTO: 0 /100
PHOSPHATE SERPL-MCNC: 3.7 MG/DL (ref 2.5–4.5)
PLAT MORPH BLD: NORMAL
PLATELET # BLD AUTO: 27 10E3/UL (ref 150–450)
POTASSIUM SERPL-SCNC: 4.1 MMOL/L (ref 3.4–5.3)
PROT SERPL-MCNC: 6.5 G/DL (ref 6.4–8.3)
RBC # BLD AUTO: 3.39 10E6/UL (ref 3.8–5.2)
RBC MORPH BLD: NORMAL
SODIUM SERPL-SCNC: 139 MMOL/L (ref 135–145)
TOTAL PROTEIN SERUM FOR ELP: 6.3 G/DL (ref 6.4–8.3)
WBC # BLD AUTO: 2.7 10E3/UL (ref 4–11)

## 2025-01-21 PROCEDURE — 88264 CHROMOSOME ANALYSIS 20-25: CPT | Performed by: PHYSICIAN ASSISTANT

## 2025-01-21 PROCEDURE — 88237 TISSUE CULTURE BONE MARROW: CPT | Performed by: PHYSICIAN ASSISTANT

## 2025-01-21 PROCEDURE — 84165 PROTEIN E-PHORESIS SERUM: CPT | Mod: TC | Performed by: PATHOLOGY

## 2025-01-21 PROCEDURE — 85025 COMPLETE CBC W/AUTO DIFF WBC: CPT

## 2025-01-21 PROCEDURE — 86334 IMMUNOFIX E-PHORESIS SERUM: CPT | Performed by: PATHOLOGY

## 2025-01-21 PROCEDURE — 82310 ASSAY OF CALCIUM: CPT

## 2025-01-21 PROCEDURE — 88271 CYTOGENETICS DNA PROBE: CPT | Performed by: PHYSICIAN ASSISTANT

## 2025-01-21 PROCEDURE — 36591 DRAW BLOOD OFF VENOUS DEVICE: CPT

## 2025-01-21 PROCEDURE — 83521 IG LIGHT CHAINS FREE EACH: CPT

## 2025-01-21 PROCEDURE — 82784 ASSAY IGA/IGD/IGG/IGM EACH: CPT

## 2025-01-21 PROCEDURE — 250N000011 HC RX IP 250 OP 636: Performed by: STUDENT IN AN ORGANIZED HEALTH CARE EDUCATION/TRAINING PROGRAM

## 2025-01-21 PROCEDURE — 38222 DX BONE MARROW BX & ASPIR: CPT | Performed by: PHYSICIAN ASSISTANT

## 2025-01-21 PROCEDURE — 88342 IMHCHEM/IMCYTCHM 1ST ANTB: CPT | Mod: TC | Performed by: PHYSICIAN ASSISTANT

## 2025-01-21 PROCEDURE — 83615 LACTATE (LD) (LDH) ENZYME: CPT | Performed by: PHYSICIAN ASSISTANT

## 2025-01-21 PROCEDURE — 83735 ASSAY OF MAGNESIUM: CPT | Performed by: PHYSICIAN ASSISTANT

## 2025-01-21 PROCEDURE — 84100 ASSAY OF PHOSPHORUS: CPT | Performed by: PHYSICIAN ASSISTANT

## 2025-01-21 PROCEDURE — 82232 ASSAY OF BETA-2 PROTEIN: CPT | Performed by: PHYSICIAN ASSISTANT

## 2025-01-21 PROCEDURE — 88185 FLOWCYTOMETRY/TC ADD-ON: CPT | Performed by: PHYSICIAN ASSISTANT

## 2025-01-21 PROCEDURE — 38222 DX BONE MARROW BX & ASPIR: CPT | Mod: LT | Performed by: PHYSICIAN ASSISTANT

## 2025-01-21 PROCEDURE — 84155 ASSAY OF PROTEIN SERUM: CPT

## 2025-01-21 PROCEDURE — 88275 CYTOGENETICS 100-300: CPT | Performed by: PHYSICIAN ASSISTANT

## 2025-01-21 PROCEDURE — 88341 IMHCHEM/IMCYTCHM EA ADD ANTB: CPT | Mod: TC | Performed by: PHYSICIAN ASSISTANT

## 2025-01-21 RX ORDER — HEPARIN SODIUM (PORCINE) LOCK FLUSH IV SOLN 100 UNIT/ML 100 UNIT/ML
5 SOLUTION INTRAVENOUS ONCE
Status: COMPLETED | OUTPATIENT
Start: 2025-01-21 | End: 2025-01-21

## 2025-01-21 RX ADMIN — Medication 5 ML: at 07:25

## 2025-01-21 ASSESSMENT — PAIN SCALES - GENERAL: PAINLEVEL_OUTOF10: MILD PAIN (3)

## 2025-01-21 NOTE — NURSING NOTE
DATE/TIME OF CALL RECEIVED FROM LAB:  01/21/25 at 0839 AM   LAB TEST:  Platelet count  LAB VALUE:  27  PROVIDER NOTIFIED?: Yes  PROVIDER NAME: Ct Vargas PA-C  DATE/TIME LAB VALUE REPORTED TO PROVIDER: 1/21/25 at 0900  MECHANISM OF PROVIDER NOTIFICATION:  Offsite Care Resources  PROVIDER RESPONSE: Read message, no new orders given.     Caitie Piper RN

## 2025-01-21 NOTE — PROGRESS NOTES
BMT ONC Adult Bone Marrow Biopsy Procedure Note  January 21, 2025  LMP  (LMP Unknown)      Learning needs assessment complete within 12 months? YES    DIAGNOSIS: MM     PROCEDURE: Unilateral Bone Marrow Biopsy and Unilateral Aspirate    LOCATION: [facility] 2nd Floor    Patient s identification was positively verified by verbal identification and invasive procedure safety checklist was completed. Informed consent was obtained. Following the administration of  no  pre-medication, patient was placed in the prone position and prepped and draped in a sterile manner. Approximately 10 cc of 1% Lidocaine was used over the left posterior iliac spine. Following this a 3 mm incision was made. Trephine bone marrow core(s) was (were) obtained from the [facility]. Bone marrow aspirates were obtained from the [facility]. Aspirates were sent for morphology, immunophenotyping, cytogenetics, and clonoseq. A total of approximately 20 ml of marrow was aspirated. Following this procedure a sterile dressing was applied to the bone marrow biopsy site(s). The patient was placed in the supine position to maintain pressure on the biopsy site. Post-procedure wound care instructions were given.     Complications: NO    Pre-procedural pain: 0 out of 10 on the numeric pain rating scale.     Procedural pain: 10 out of 10 on the numeric pain rating scale.     Post-procedural pain assessment: 0 out of 10 on the numeric pain rating scale.     Interventions: NO--completed aspirate    Length of procedure:20 minutes or less      Procedure performed by: Ct Vargas pa-c  [phone]

## 2025-01-21 NOTE — LETTER
1/21/2025      Shena Hartmann  [address] [place] [address]      Dear Colleague,    Thank you for referring your patient, Shena Hartmann, to the [facility] BLOOD AND MARROW TRANSPLANT PROGRAM [place]. Please see a copy of my visit note below.    BMT ONC Adult Bone Marrow Biopsy Procedure Note  January 21, 2025  LMP  (LMP Unknown)      Learning needs assessment complete within 12 months? YES    DIAGNOSIS: MM     PROCEDURE: Unilateral Bone Marrow Biopsy and Unilateral Aspirate    [place] [facility] 2[facility]    Patient s identification was positively verified by verbal identification and invasive procedure safety checklist was completed. Informed consent was obtained. Following the administration of  no  pre-medication, patient was placed in the prone position and prepped and draped in a sterile manner. Approximately 10 cc of 1% Lidocaine was used over the left posterior iliac spine. Following this a 3 mm incision was made. Trephine bone marrow core(s) was (were) obtained from the [facility]. Bone marrow aspirates were obtained from the [facility]. Aspirates were sent for morphology, immunophenotyping, cytogenetics, and clonoseq. A total of approximately 20 ml of marrow was aspirated. Following this procedure a sterile dressing was applied to the bone marrow biopsy site(s). The patient was placed in the supine position to maintain pressure on the biopsy site. Post-procedure wound care instructions were given.     Complications: NO    Pre-procedural pain: 0 out of 10 on the numeric pain rating scale.     Procedural pain: 10 out of 10 on the numeric pain rating scale.     Post-procedural pain assessment: 0 out of 10 on the numeric pain rating scale.     Interventions: NO--completed aspirate    Length of procedure:20 minutes or less      Procedure performed by: Ct Vargas pa-c  [phone]        Again, thank you for allowing me to participate in the care of your patient.        Sincerely,        BMT  Advanced Practice Provider    Electronically signed

## 2025-01-21 NOTE — NURSING NOTE
"Oncology Rooming Note    January 21, 2025 9:01 AM   Shena Hartmann is a 74 year old female who presents for:    Chief Complaint   Patient presents with    Blood Draw     Labs obtained via noncoring powerport 20 gauge, 3/4 inch needle, heparin flushed, VS taken, checked into next appt     Initial Vitals: BP (!) 145/79   Pulse 78   Temp 97.5  F (36.4  C) (Oral)   Resp 18   Wt 55 kg (121 lb 3.2 oz)   LMP  (LMP Unknown)   SpO2 98%   BMI 22.30 kg/m   Estimated body mass index is 22.3 kg/m  as calculated from the following:    Height as of 7/29/24: 1.57 m (5' 1.81\").    Weight as of this encounter: 55 kg (121 lb 3.2 oz). Body surface area is 1.55 meters squared.  Mild Pain (3) Comment: Data Unavailable   No LMP recorded (lmp unknown). Patient is postmenopausal.  Allergies reviewed: Yes  Medications reviewed: Yes    Medications: Medication refills not needed today.  Pharmacy name entered into Smeet:    [id number] - [place], [place] - [address] AT [facility] [address]  WRITTEN PRESCRIPTION REQUESTED  [facility] - [place], [place] - [address]    Frailty Screening:   Is the patient here for a new oncology consult visit in cancer care? 2. No      Clinical concerns: None.  MADISON Lira was NOT notified.      Shanita Piper RN    BMBX Teaching and Assessment       Teaching concerns addressed: Bone marrow biopsy and infection prevention.     Person(s) involved in teaching: Patient  Motivation Level  Asks Questions: Yes  Eager to Learn: Yes  Cooperative: Yes  Receptive (willing/able to accept information): Yes    Patient demonstrates understanding of the following:     Reason for the appointment, diagnosis and treatment plan: Yes  Knowledge of proper use of medications and conditions for which they are ordered (with special attention to potential side effects or drug interactions): Yes  Which situations necessitate calling provider and whom to " contact: Yes    Teaching concerns addressed:   Reviewed activity restrictions if received premeds, potential for bleeding and actions to take if develops any of the issues below    Pain management techniques: Yes  Patient instructed on hand hygiene: Yes  How and/when to access community resources: Yes    Infection Control:  Patient demonstrates understanding of the following:   Bone marrow procedure site care taught: Yes  Signs and symptoms of infection taught: Yes       Instructional Materials Used/Given: Pt instructed to keep bmbx site clean and dry for 24hrs. Pt educated to monitor site for signs of infection such as redness, rash, oozing, puss, bleeding, pain, and elevated temp. Pt instructed to go to call the [facility] triage line or go to the ER if any signs of infection should occur. Pt educated to not operate machinery if receiving versed. Pt and  verbalize understanding.     Pre-procedure labs drawn via Port. Pt declined Versed. Post procedure: Bleeding noted, saturated through gauze. Ct Vargas came to assess - requested dressing to be changed, with 15 additional minutes of flat bedrest. Procedure site dressing changed with 4x4 gauze and pressure dressing. After additional 15 minutes: patient ambulating, site is clean, dry and intact prior to discharge and line removed. Pt discharged with  (Sonny) as . Pt aware to monitor dressing at home and notify Triage with any recurrence of bleeding.       Procedure sit

## 2025-01-21 NOTE — PROGRESS NOTES
[facility]: Cancer Care                                                                                          Call placed to Shena and she was advised that she should have her labs rechecked on Friday due to her low platelet count and ANC.  Shena will resume taking penicillin for the low ANC and confirms she has enough of the medication for 2 weeks plus a refill.    She will be traveling and needs her pentamidine appointments adjusted     She states she will not be available the following dates  February 12-28 March 29 - April 13 June 26 - July 2    These will be shared with scheduling to coordinate her monthly appointments.    Signature:  Shabnam Hall RN

## 2025-01-22 ENCOUNTER — DOCUMENTATION ONLY (OUTPATIENT)
Dept: PHARMACY | Facility: CLINIC | Age: 75
End: 2025-01-22
Payer: COMMERCIAL

## 2025-01-22 LAB
ALBUMIN SERPL ELPH-MCNC: 4.2 G/DL (ref 3.7–5.1)
ALPHA1 GLOB SERPL ELPH-MCNC: 0.3 G/DL (ref 0.2–0.4)
ALPHA2 GLOB SERPL ELPH-MCNC: 0.7 G/DL (ref 0.5–0.9)
B-GLOBULIN SERPL ELPH-MCNC: 0.7 G/DL (ref 0.6–1)
GAMMA GLOB SERPL ELPH-MCNC: 0.5 G/DL (ref 0.7–1.6)
LOCATION OF TASK: ABNORMAL
LOCATION OF TASK: NORMAL
M PROTEIN SERPL ELPH-MCNC: 0.1 G/DL
PATH REPORT.COMMENTS IMP SPEC: NORMAL
PATH REPORT.FINAL DX SPEC: NORMAL
PATH REPORT.FINAL DX SPEC: NORMAL
PATH REPORT.GROSS SPEC: NORMAL
PATH REPORT.MICROSCOPIC SPEC OTHER STN: NORMAL
PATH REPORT.RELEVANT HX SPEC: NORMAL
PATH REPORT.RELEVANT HX SPEC: NORMAL
PROT PATTERN SERPL ELPH-IMP: ABNORMAL
PROT PATTERN SERPL IFE-IMP: NORMAL

## 2025-01-22 NOTE — PROGRESS NOTES
Pharmacist IVIG Stewardship Program    Diagnosis: Hypogammaglobulinemia   Date  12/20/2024   IgG Level (mg/dL) 143     Current Dosing Regimen: Privigen 20g IV monthly PRN for IgG <300 mg/dL   Patient is dosed as needed based on an IgG level of less than 300 mg/dL to ensure the lowest amount of medication is administered to achieve beneficial outcomes.  Pre-medications:   Acetaminophen 650 mg by mouth, 30 minutes prior to infusion  Diphenhydramine 37.5 mg IV prior to IVIG infusion  Hydrocortisone 100 mg IV PRN for previous reactions to IVIG therapy   Current Titration Regimen: Start infusion at 0.5 ml/kg/hr x 15 min. If tolerated, increase rate by 0.5 ml/kg/hr every 15 min to a maximum of 4 ml/kg/hr.  Previously Tried Products: None     Side Effects: Unable to reach patient to discuss.     Interventions: Lab review completed.     Assessment:   Date  1/21/2025   IgG Level (mg/dL) 480   Patient is appropriate for IVIG therapy when their level is within parameter. IVIG infusions are effective in increasing IgG levels to an appropriate level to minimize patient's risk of infection. Patient should continue on treatment as they have been per provider order. Without therapy their levels would put them at an increased risk of infections.    Plan: Patient is not okay to proceed with IVIG infusions at this time as her level is above her provider ordered parameter. Patient will have labs checked monthly to assess the need for additional IVIG infusions moving forward.     Next Review Needed: 2/2025    Willa Momin, PharmD, IgCP  Medication Access Pharmacist

## 2025-01-24 ENCOUNTER — LAB (OUTPATIENT)
Dept: LAB | Facility: CLINIC | Age: 75
End: 2025-01-24
Attending: STUDENT IN AN ORGANIZED HEALTH CARE EDUCATION/TRAINING PROGRAM
Payer: MEDICARE

## 2025-01-24 DIAGNOSIS — C90.00 MULTIPLE MYELOMA NOT HAVING ACHIEVED REMISSION (H): ICD-10-CM

## 2025-01-24 DIAGNOSIS — Z79.899 ENCOUNTER FOR LONG-TERM (CURRENT) USE OF MEDICATIONS: ICD-10-CM

## 2025-01-24 LAB
ALBUMIN SERPL BCG-MCNC: 4.3 G/DL (ref 3.5–5.2)
ALP SERPL-CCNC: 57 U/L (ref 40–150)
ALT SERPL W P-5'-P-CCNC: 45 U/L (ref 0–50)
ANION GAP SERPL CALCULATED.3IONS-SCNC: 9 MMOL/L (ref 7–15)
AST SERPL W P-5'-P-CCNC: 41 U/L (ref 0–45)
BASOPHILS # BLD AUTO: 0 10E3/UL (ref 0–0.2)
BASOPHILS NFR BLD AUTO: 0 %
BILIRUB SERPL-MCNC: 0.2 MG/DL
BUN SERPL-MCNC: 18.7 MG/DL (ref 8–23)
CALCIUM SERPL-MCNC: 9.4 MG/DL (ref 8.8–10.4)
CHLORIDE SERPL-SCNC: 100 MMOL/L (ref 98–107)
CREAT SERPL-MCNC: 0.73 MG/DL (ref 0.51–0.95)
EGFRCR SERPLBLD CKD-EPI 2021: 86 ML/MIN/1.73M2
EOSINOPHIL # BLD AUTO: 0 10E3/UL (ref 0–0.7)
EOSINOPHIL NFR BLD AUTO: 1 %
ERYTHROCYTE [DISTWIDTH] IN BLOOD BY AUTOMATED COUNT: 15.1 % (ref 10–15)
GLUCOSE SERPL-MCNC: 103 MG/DL (ref 70–99)
HCO3 SERPL-SCNC: 28 MMOL/L (ref 22–29)
HCT VFR BLD AUTO: 34.1 % (ref 35–47)
HGB BLD-MCNC: 11.6 G/DL (ref 11.7–15.7)
IMM GRANULOCYTES # BLD: 0 10E3/UL
IMM GRANULOCYTES NFR BLD: 0 %
LYMPHOCYTES # BLD AUTO: 1.9 10E3/UL (ref 0.8–5.3)
LYMPHOCYTES NFR BLD AUTO: 59 %
MCH RBC QN AUTO: 35.3 PG (ref 26.5–33)
MCHC RBC AUTO-ENTMCNC: 34 G/DL (ref 31.5–36.5)
MCV RBC AUTO: 104 FL (ref 78–100)
MONOCYTES # BLD AUTO: 0.5 10E3/UL (ref 0–1.3)
MONOCYTES NFR BLD AUTO: 16 %
NEUTROPHILS # BLD AUTO: 0.8 10E3/UL (ref 1.6–8.3)
NEUTROPHILS NFR BLD AUTO: 24 %
NRBC # BLD AUTO: 0 10E3/UL
NRBC BLD AUTO-RTO: 0 /100
PLATELET # BLD AUTO: 31 10E3/UL (ref 150–450)
POTASSIUM SERPL-SCNC: 3.9 MMOL/L (ref 3.4–5.3)
PROT SERPL-MCNC: 6.4 G/DL (ref 6.4–8.3)
RBC # BLD AUTO: 3.29 10E6/UL (ref 3.8–5.2)
SODIUM SERPL-SCNC: 137 MMOL/L (ref 135–145)
WBC # BLD AUTO: 3.2 10E3/UL (ref 4–11)

## 2025-01-24 PROCEDURE — 81050 URINALYSIS VOLUME MEASURE: CPT | Performed by: PATHOLOGY

## 2025-01-24 PROCEDURE — 36591 DRAW BLOOD OFF VENOUS DEVICE: CPT

## 2025-01-24 PROCEDURE — 84166 PROTEIN E-PHORESIS/URINE/CSF: CPT | Mod: 26 | Performed by: PATHOLOGY

## 2025-01-24 PROCEDURE — 85004 AUTOMATED DIFF WBC COUNT: CPT

## 2025-01-24 PROCEDURE — 84166 PROTEIN E-PHORESIS/URINE/CSF: CPT | Performed by: PATHOLOGY

## 2025-01-24 PROCEDURE — 82310 ASSAY OF CALCIUM: CPT

## 2025-01-24 RX ORDER — HEPARIN SODIUM (PORCINE) LOCK FLUSH IV SOLN 100 UNIT/ML 100 UNIT/ML
5 SOLUTION INTRAVENOUS DAILY PRN
Status: DISCONTINUED | OUTPATIENT
Start: 2025-01-24 | End: 2025-01-30 | Stop reason: HOSPADM

## 2025-01-24 NOTE — NURSING NOTE
Chief Complaint   Patient presents with    Port Draw     Labs drawn via port by RN in lab.      Labs drawn via Port accessed using 20g flat needle. Line flushed and Heparin locked.     Radha Walter RN

## 2025-01-27 LAB
INTERPRETATION: NORMAL
LOCATION OF TASK: NORMAL
PROT PATTERN UR ELPH-IMP: NORMAL

## 2025-01-28 ENCOUNTER — OFFICE VISIT (OUTPATIENT)
Dept: ONCOLOGY | Facility: CLINIC | Age: 75
End: 2025-01-28
Attending: STUDENT IN AN ORGANIZED HEALTH CARE EDUCATION/TRAINING PROGRAM
Payer: MEDICARE

## 2025-01-28 VITALS
TEMPERATURE: 97.1 F | RESPIRATION RATE: 20 BRPM | HEART RATE: 81 BPM | DIASTOLIC BLOOD PRESSURE: 80 MMHG | BODY MASS INDEX: 22.66 KG/M2 | SYSTOLIC BLOOD PRESSURE: 140 MMHG | WEIGHT: 120 LBS | OXYGEN SATURATION: 96 % | HEIGHT: 61 IN

## 2025-01-28 DIAGNOSIS — C90.00 MULTIPLE MYELOMA, REMISSION STATUS UNSPECIFIED (H): ICD-10-CM

## 2025-01-28 DIAGNOSIS — C90.02 MULTIPLE MYELOMA IN RELAPSE (H): ICD-10-CM

## 2025-01-28 DIAGNOSIS — C90.01 MULTIPLE MYELOMA IN REMISSION (H): Primary | ICD-10-CM

## 2025-01-28 DIAGNOSIS — D61.818 OTHER PANCYTOPENIA (H): ICD-10-CM

## 2025-01-28 LAB
ABC 95% CONFIDENCE INTERVAL (B-CELL): NORMAL
ABC CLONOSEQ B-CELL TRACKING (MRD) RESULT: NORMAL
ABC DOMINANT SEQUENCES (B-CELL): 2
ABC RESIDUAL CLONAL CELLS/ MILL NUCLEATED CELLS BCELL: 0

## 2025-01-28 PROCEDURE — G0463 HOSPITAL OUTPT CLINIC VISIT: HCPCS | Performed by: STUDENT IN AN ORGANIZED HEALTH CARE EDUCATION/TRAINING PROGRAM

## 2025-01-28 PROCEDURE — 99215 OFFICE O/P EST HI 40 MIN: CPT | Performed by: STUDENT IN AN ORGANIZED HEALTH CARE EDUCATION/TRAINING PROGRAM

## 2025-01-28 RX ORDER — ACYCLOVIR 400 MG/1
800 TABLET ORAL EVERY 12 HOURS
Qty: 60 TABLET | Refills: 11 | Status: SHIPPED | OUTPATIENT
Start: 2025-01-28 | End: 2025-01-28

## 2025-01-28 RX ORDER — PENICILLIN V POTASSIUM 250 MG/1
250 TABLET, FILM COATED ORAL 2 TIMES DAILY
Qty: 60 TABLET | Refills: 2 | Status: SHIPPED | OUTPATIENT
Start: 2025-01-28

## 2025-01-28 RX ORDER — ACYCLOVIR 400 MG/1
400 TABLET ORAL EVERY 12 HOURS
Qty: 60 TABLET | Refills: 11 | Status: SHIPPED | OUTPATIENT
Start: 2025-01-28

## 2025-01-28 ASSESSMENT — PAIN SCALES - GENERAL: PAINLEVEL_OUTOF10: MODERATE PAIN (4)

## 2025-01-28 NOTE — LETTER
1/28/2025      Shena Hartmann  [address] [place] [address]      Dear Colleague,    Thank you for referring your patient, Shena Hartmann, to the [facility]. Please see a copy of my visit note below.        [facility]    BMT Day 180 Clinic Note    Jan 28, 2025    Reason for Office Visit   Follow-up for multiple myeloma      Cancer Diagnosis   Multiple Myeloma    Oncology History of Present Illness   Breast cancer dx in 1994. Underwent surgery and chemotherapy without reoccurence  MGUS dx 1999  T8 pathologic fracture with radiation  Revlimid and velcade,   2013  Cytoxan IV 9/2013  D-PACE 11/2013  Auto 3/27/14  5/2014 thalidomide caused rash so switched to pomalidomide   on kenalog and clobetasol creams for IMID rash  FLC began to rise so riky added to pom 9/2020  she has been on xgeva for an unclear amount of time  Teodora-Kd 2/20/2023  Teodora maintenance Nov 2023  Spring 2024 FLC began to rise again  5/22/24 Underwent apheresis for CAR-T  7/29/24 Carvykti (CAR-T)    Interval History   Bruising easier since platelets have been low. Occasional bleeding gums with tooth brushing. Otherwise feeling well. Notes lower extremity 'aches' in the  Morning. Exercising regularly. Occasional liquid stool without clear etiology. Psyllium bread seems to help.     Vacation coming up Feb 12- Feb 28. Kuai. March 29-April 12 Marta    ROS neg other than the symptoms noted above in the HPI.      Past Medical History     Past Medical History:   Diagnosis Date     Acquired hypothyroidism 8/10/2023     Breast cancer (H) 01/01/1994    right     H/O stem cell transplant (H) 03/01/2014     History of blood transfusion 2013 & 2014    During stem cell tx process     Hypertension Sept. 2021    Runs 140 s systolically, about 20 above my usual     Multiple myeloma, without mention of having achieved remission 11/06/2012       Past Surgical History:      Past Surgical History:    Procedure Laterality Date     BIOPSY  10/1994; [id number]    Lt. Breast in ; multiple bone marrow bx's for MM     BREAST SURGERY  10/1994    Lt. Mastectomy w/lymph node resection     COLONOSCOPY  2015    Normal results     EYE SURGERY      Bilateral cataract surgery     INSERT CATHETER VASCULAR ACCESS Left 2024    Procedure: central venous catheter non tunneled insertion, vascular access;  Surgeon: Ric Castaneda PA-C;  Location: [facility] OR     INSERT PORT VASCULAR ACCESS Left 2/15/2023    Procedure: INSERTION, VASCULAR ACCESS PORT;  Surgeon: Luc Antunez MD;  Location: [facility] OR     IR CHEST PORT PLACEMENT > 5 YRS OF AGE  2/15/2023     IR CVC NON TUNNEL LINE REMOVAL  2024     IR CVC NON TUNNEL PLACEMENT > 5 YRS  2024     IR PICC PLACEMENT > 5 YRS OF AGE  2024     MASTECTOMY      Right, with lymphe node disection       PICC INSERTION  09/10/2013    5fr DL Power PICC, 44cm (1cm external) in the L lateral brachial vein with tip in the low SVC     TRANSPLANT  3/27/2014    Autologous stem cell tx       Social History     Socioeconomic History     Marital status:    Tobacco Use     Smoking status: Never     Passive exposure: Never     Smokeless tobacco: Never   Substance and Sexual Activity     Alcohol use: Yes     Comment: occ     Drug use: No     Sexual activity: Not Currently     Partners: Male     Birth control/protection: Post-menopausal   Other Topics Concern     Parent/sibling w/ CABG, MI or angioplasty before 65F 55M? No      Service No     Blood Transfusions No     Caffeine Concern No     Occupational Exposure No     Hobby Hazards No     Family History     Family History   Problem Relation Age of Onset     Hypertension Mother      Cerebrovascular Disease Mother          8-11-15 from strokes     Hypertension Father      Prostate Cancer Father      Skin Cancer Paternal Grandmother      Cancer Paternal Grandmother         skin cancer       Allergies:      Allergies   Allergen Reactions     Blood Transfusion Related (Informational Only) Other (See Comments)     Patient has a history of a clinically significant antibody against RBC antigens.  A delay in compatible RBCs may occur.      Cayenne Pepper [Cayenne]      Corn-Containing Products GI Disturbance     Levaquin Other (See Comments)     Tendon pain     Milk Protein GI Disturbance     Mold      Facial rash/lip swelling     Morphine Sulfate Other (See Comments)     Per pt - see things (hallucination) when she was on Morphine .     Pollen Extract      Environmental allergies      Shrimp Nausea and Vomiting     Tomato      Lip swelling, facial rash     Azithromycin Rash     Keflex [Cephalexin] Rash     Oat Rash     Tape [Adhesive Tape] Itching and Rash     All adhesives     Wheat Rash     Swelling of lips         Medications:     Current Outpatient Medications   Medication Sig Dispense Refill     acetaminophen (TYLENOL) 325 MG tablet Take 325-650 mg by mouth every 6 hours as needed for mild pain       acyclovir (ZOVIRAX) 800 MG tablet Take 1 tablet (800 mg) by mouth every 12 hours. 180 tablet 3     amLODIPine (NORVASC) 2.5 MG tablet Take 2 tablets (5 mg) by mouth daily Take 5 mg (2 tablets) in the morning. 180 tablet 3     Ascorbic Acid (VITAMIN C PO) Take 1,000 mg by mouth 2 times daily        CALCIUM-VITAMIN D-VITAMIN K PO Take 2 tablets by mouth daily.       clobetasol (TEMOVATE) 0.05 % external ointment Apply topically 2 times daily as needed (itching and rash). Topically to rash on hands until resolved 45 g 0     COENZYME Q-10 PO Take 100 mg by mouth daily        Cyanocobalamin 1000 MCG/ML LIQD Take 500 mcg by mouth 2 times daily       diclofenac (VOLTAREN) 1 % topical gel Apply 2 g topically 2 times daily.       levothyroxine (SYNTHROID/LEVOTHROID) 50 MCG tablet Take 1 tablet (50 mcg) by mouth daily. 90 tablet 0     MAGNESIUM PO Take 235 mg by mouth 2 times daily.       Multiple Vitamins-Minerals  "(MULTIVITAMIN OR) Take 1 tablet by mouth 2 times daily.       ondansetron (ZOFRAN) 4 MG tablet Take 1 tablet (4 mg) by mouth every 8 hours as needed for nausea 30 tablet 0     order for DME 6 mastectomy bras  Length of need: 99 months 6 Device 1     order for DME R mastectomy prosthesis   Length of need:  99 months 1 Device 1     penicillin V (VEETID) 250 MG tablet Take 1 tablet (250 mg) by mouth 2 times daily. 60 tablet 2     Probiotic Product (PROBIOTIC PO) Take 1 capsule by mouth daily. Theralac Pro Probiotic       Quercetin 500 MG CAPS Take 500 mg by mouth daily       saccharomyces boulardii (SACCHAROMYCIN DF) 250 MG capsule Take 250 mg by mouth daily. Hold until after day +14 and ANC > 1.0 (Patient not taking: Reported on 11/12/2024)       triamcinolone (KENALOG) 0.1 % external cream Apply topically 2 times daily as needed for irritation. 15 g 11     ZINC PICOLINATE PO Take 30 mg by mouth daily       No current facility-administered medications for this visit.     Facility-Administered Medications Ordered in Other Visits   Medication Dose Route Frequency Provider Last Rate Last Admin     heparin lock flush 100 unit/mL injection 5 mL  5 mL Intracatheter Daily PRN Nighat Chavez MD         potassium chloride 20 mEq in 50 mL IVPB  20 mEq Intravenous Once Mae Kay PA-C         potassium chloride SA (K-DUR,KLOR-CON M) CR tablet 20 mEq  20 mEq Oral Once Mae Kay PA-C           Physical Exam   BP (!) 140/80   Pulse 81   Temp 97.1  F (36.2  C) (Tympanic)   Resp 20   Ht 1.549 m (5' 1\")   Wt 54.4 kg (120 lb)   LMP  (LMP Unknown)   SpO2 96%   BMI 22.67 kg/m          Wt Readings from Last 5 Encounters:   01/21/25 55 kg (121 lb 3.2 oz)   12/20/24 55.2 kg (121 lb 11.2 oz)   11/25/24 54.8 kg (120 lb 14.4 oz)   11/12/24 55 kg (121 lb 4.8 oz)   10/31/24 54.7 kg (120 lb 8 oz)     Constitutional: NAD  Eyes: no scleral icterus  ENT: no oral lesions  Pulm: CTAB  CV: RRR, no m/r/g  GI: " bowel sounds present, soft and nontender to palpation  MSK: No edema in the extremities  Neuro: alert, conversant, normal gait  Psych: appropriate mood and affect        Data     Most Recent 3 CBC's:  Recent Labs   Lab Test 01/24/25  1146 01/21/25  0720 12/20/24  1235   WBC 3.2* 2.7* 3.3*   HGB 11.6* 11.9 11.3*   * 104* 104*   PLT 31* 27* 45*   ANEUTAUTO 0.8* 0.7* 1.3*     Most Recent 3 BMP's:  Recent Labs   Lab Test 01/24/25  1146 01/21/25  0720 12/20/24  1235    139 141   POTASSIUM 3.9 4.1 3.9   CHLORIDE 100 101 103   CO2 28 28 28   BUN 18.7 14.9 17.3   CR 0.73 0.79 0.70   ANIONGAP 9 10 10   PAULINE 9.4 9.6 9.3   * 90 103*   PROTTOTAL 6.4 6.5 5.8*   ALBUMIN 4.3 4.4 4.1    Most Recent 3 LFT's:  Recent Labs   Lab Test 01/24/25  1146 01/21/25  0720 12/20/24  1235   AST 41 41 24   ALT 45 48 26   ALKPHOS 57 61 61   BILITOTAL 0.2 0.2 0.2    Most Recent 2 TSH and T4:  Recent Labs   Lab Test 02/19/24  1333 08/17/23  1349 10/03/22  0820 06/13/22  0822 03/21/22  0803   TSH 3.08 2.12   < > 9.47*  9.31* 5.79*   T4  --   --   --  0.99  0.97 0.83    < > = values in this interval not displayed.     I reviewed the above labs today myself and with the patient.    Assessment/Plan     Relapsed Multiple Myeloma  CAR-T therapy 7/29/24 with Carvykti.  Restaging at day +180 shows CR MRD-  Xgeva every 6 months    Macrocytic anemia- check B12 with next lab draw. Normocellular marrow  Neutropenia- penicillin V ppx when ANC <1.0. Suspect ongoing CAR-T activity in the marrow is responsible for neutropenia given minimal residual disease at day 100 now MRD-.  IgG- Continue to check monthly and replace as needed       HTN  Continue amlodipine 5 mg daily.  Followed by cardiology     Hypothyroidism  Remains on levothyroxine    ID Prophylaxis   Decrease Acyclovir to 400 mg BID for HSV prophylaxis  Pentamidine monthly for 1 year following CAR-T - may consider changing to Bactrim once platelets recover  penicillin V when ANC  <1.0    Left Hand Pain  Saw ortho who prescribed a brace and stretching for lateral epicondylitis and carpal tunnel syndrome.       Nighat Chavez MD PhD  40 min spent reviewing the chart, counseling the patient, placing orders, and coordinating care on the day of the visit.               Again, thank you for allowing me to participate in the care of your patient.        Sincerely,        Nighta Chavez MD    Electronically signed

## 2025-01-28 NOTE — PROGRESS NOTES
[facility]    BMT Day 180 Clinic Note    Jan 28, 2025    Reason for Office Visit   Follow-up for multiple myeloma      Cancer Diagnosis   Multiple Myeloma    Oncology History of Present Illness   Breast cancer dx in 1994. Underwent surgery and chemotherapy without reoccurence  MGUS dx 1999  T8 pathologic fracture with radiation  Revlimid and velcade,   2013  Cytoxan IV 9/2013  D-PACE 11/2013  Auto 3/27/14  5/2014 thalidomide caused rash so switched to pomalidomide   on kenalog and clobetasol creams for IMID rash  FLC began to rise so riky added to pom 9/2020  she has been on xgeva for an unclear amount of time  Teodora-Kd 2/20/2023  Teodora maintenance Nov 2023  Spring 2024 FLC began to rise again  5/22/24 Underwent apheresis for CAR-T  7/29/24 Carvykti (CAR-T)    Interval History   Bruising easier since platelets have been low. Occasional bleeding gums with tooth brushing. Otherwise feeling well. Notes lower extremity 'aches' in the  Morning. Exercising regularly. Occasional liquid stool without clear etiology. Psyllium bread seems to help.     Vacation coming up Feb 12- Feb 28. [place]. March 29-April 12 Marta    ROS neg other than the symptoms noted above in the HPI.      Past Medical History     Past Medical History:   Diagnosis Date    Acquired hypothyroidism 8/10/2023    Breast cancer (H) 01/01/1994    right    H/O stem cell transplant (H) 03/01/2014    History of blood transfusion 2013 & 2014    During stem cell tx process    Hypertension Sept. 2021    Runs 140 s systolically, about 20 above my usual    Multiple myeloma, without mention of having achieved remission 11/06/2012       Past Surgical History:      Past Surgical History:   Procedure Laterality Date    BIOPSY  10/1994; [id number]    Lt. Breast in '94; multiple bone marrow bx's for MM    BREAST SURGERY  10/1994    Lt. Mastectomy w/lymph node resection    COLONOSCOPY  11/2015    Normal results    EYE SURGERY  2020     Bilateral cataract surgery    INSERT CATHETER VASCULAR ACCESS Left 2024    Procedure: central venous catheter non tunneled insertion, vascular access;  Surgeon: Ric Castaneda PA-C;  Location: [facility] OR    INSERT PORT VASCULAR ACCESS Left 2/15/2023    Procedure: INSERTION, VASCULAR ACCESS PORT;  Surgeon: Luc Antunez MD;  Location: [facility] OR    IR CHEST PORT PLACEMENT > 5 YRS OF AGE  2/15/2023    IR CVC NON TUNNEL LINE REMOVAL  2024    IR CVC NON TUNNEL PLACEMENT > 5 YRS  2024    IR PICC PLACEMENT > 5 YRS OF AGE  2024    MASTECTOMY      Right, with lymphe node disection      PICC INSERTION  09/10/2013    5fr DL Power PICC, 44cm (1cm external) in the L lateral brachial vein with tip in the low SVC    TRANSPLANT  3/27/2014    Autologous stem cell tx       Social History     Socioeconomic History    Marital status:    Tobacco Use    Smoking status: Never     Passive exposure: Never    Smokeless tobacco: Never   Substance and Sexual Activity    Alcohol use: Yes     Comment: occ    Drug use: No    Sexual activity: Not Currently     Partners: Male     Birth control/protection: Post-menopausal   Other Topics Concern    Parent/sibling w/ CABG, MI or angioplasty before 65F 55M? No     Service No    Blood Transfusions No    Caffeine Concern No    Occupational Exposure No    Hobby Hazards No     Family History     Family History   Problem Relation Age of Onset    Hypertension Mother     Cerebrovascular Disease Mother          8-11-15 from strokes    Hypertension Father     Prostate Cancer Father     Skin Cancer Paternal Grandmother     Cancer Paternal Grandmother         skin cancer       Allergies:     Allergies   Allergen Reactions    Blood Transfusion Related (Informational Only) Other (See Comments)     Patient has a history of a clinically significant antibody against RBC antigens.  A delay in compatible RBCs may occur.     Cayenne Pepper [Cayenne]     Corn-Containing Products  GI Disturbance    Levaquin Other (See Comments)     Tendon pain    Milk Protein GI Disturbance    Mold      Facial rash/lip swelling    Morphine Sulfate Other (See Comments)     Per pt - see things (hallucination) when she was on Morphine .    Pollen Extract      Environmental allergies     Shrimp Nausea and Vomiting    Tomato      Lip swelling, facial rash    Azithromycin Rash    Keflex [Cephalexin] Rash    Oat Rash    Tape [Adhesive Tape] Itching and Rash     All adhesives    Wheat Rash     Swelling of lips         Medications:     Current Outpatient Medications   Medication Sig Dispense Refill    acetaminophen (TYLENOL) 325 MG tablet Take 325-650 mg by mouth every 6 hours as needed for mild pain      acyclovir (ZOVIRAX) 800 MG tablet Take 1 tablet (800 mg) by mouth every 12 hours. 180 tablet 3    amLODIPine (NORVASC) 2.5 MG tablet Take 2 tablets (5 mg) by mouth daily Take 5 mg (2 tablets) in the morning. 180 tablet 3    Ascorbic Acid (VITAMIN C PO) Take 1,000 mg by mouth 2 times daily       CALCIUM-VITAMIN D-VITAMIN K PO Take 2 tablets by mouth daily.      clobetasol (TEMOVATE) 0.05 % external ointment Apply topically 2 times daily as needed (itching and rash). Topically to rash on hands until resolved 45 g 0    COENZYME Q-10 PO Take 100 mg by mouth daily       Cyanocobalamin 1000 MCG/ML LIQD Take 500 mcg by mouth 2 times daily      diclofenac (VOLTAREN) 1 % topical gel Apply 2 g topically 2 times daily.      levothyroxine (SYNTHROID/LEVOTHROID) 50 MCG tablet Take 1 tablet (50 mcg) by mouth daily. 90 tablet 0    MAGNESIUM PO Take 235 mg by mouth 2 times daily.      Multiple Vitamins-Minerals (MULTIVITAMIN OR) Take 1 tablet by mouth 2 times daily.      ondansetron (ZOFRAN) 4 MG tablet Take 1 tablet (4 mg) by mouth every 8 hours as needed for nausea 30 tablet 0    order for DME 6 mastectomy bras  Length of need: 99 months 6 Device 1    order for DME R mastectomy prosthesis   Length of need:  99 months 1 Device 1     "penicillin V (VEETID) 250 MG tablet Take 1 tablet (250 mg) by mouth 2 times daily. 60 tablet 2    Probiotic Product (PROBIOTIC PO) Take 1 capsule by mouth daily. Theralac Pro Probiotic      Quercetin 500 MG CAPS Take 500 mg by mouth daily      saccharomyces boulardii (SACCHAROMYCIN DF) 250 MG capsule Take 250 mg by mouth daily. Hold until after day +14 and ANC > 1.0 (Patient not taking: Reported on 11/12/2024)      triamcinolone (KENALOG) 0.1 % external cream Apply topically 2 times daily as needed for irritation. 15 g 11    ZINC PICOLINATE PO Take 30 mg by mouth daily       No current facility-administered medications for this visit.     Facility-Administered Medications Ordered in Other Visits   Medication Dose Route Frequency Provider Last Rate Last Admin    heparin lock flush 100 unit/mL injection 5 mL  5 mL Intracatheter Daily PRN Nighat Chavez MD        potassium chloride 20 mEq in 50 mL IVPB  20 mEq Intravenous Once Mae Kay PA-C        potassium chloride SA (K-DUR,KLOR-CON M) CR tablet 20 mEq  20 mEq Oral Once Mae Kay PA-C           Physical Exam   BP (!) 140/80   Pulse 81   Temp 97.1  F (36.2  C) (Tympanic)   Resp 20   Ht 1.549 m (5' 1\")   Wt 54.4 kg (120 lb)   LMP  (LMP Unknown)   SpO2 96%   BMI 22.67 kg/m          Wt Readings from Last 5 Encounters:   01/21/25 55 kg (121 lb 3.2 oz)   12/20/24 55.2 kg (121 lb 11.2 oz)   11/25/24 54.8 kg (120 lb 14.4 oz)   11/12/24 55 kg (121 lb 4.8 oz)   10/31/24 54.7 kg (120 lb 8 oz)     Constitutional: NAD  Eyes: no scleral icterus  ENT: no oral lesions  Pulm: CTAB  CV: RRR, no m/r/g  GI: bowel sounds present, soft and nontender to palpation  MSK: No edema in the extremities  Neuro: alert, conversant, normal gait  Psych: appropriate mood and affect        Data     Most Recent 3 CBC's:  Recent Labs   Lab Test 01/24/25  1146 01/21/25  0720 12/20/24  1235   WBC 3.2* 2.7* 3.3*   HGB 11.6* 11.9 11.3*   * 104* 104*   PLT 31* " 27* 45*   ANEUTAUTO 0.8* 0.7* 1.3*     Most Recent 3 BMP's:  Recent Labs   Lab Test 01/24/25  1146 01/21/25  0720 12/20/24  1235    139 141   POTASSIUM 3.9 4.1 3.9   CHLORIDE 100 101 103   CO2 28 28 28   BUN 18.7 14.9 17.3   CR 0.73 0.79 0.70   ANIONGAP 9 10 10   PAULINE 9.4 9.6 9.3   * 90 103*   PROTTOTAL 6.4 6.5 5.8*   ALBUMIN 4.3 4.4 4.1    Most Recent 3 LFT's:  Recent Labs   Lab Test 01/24/25  1146 01/21/25  0720 12/20/24  1235   AST 41 41 24   ALT 45 48 26   ALKPHOS 57 61 61   BILITOTAL 0.2 0.2 0.2    Most Recent 2 TSH and T4:  Recent Labs   Lab Test 02/19/24  1333 08/17/23  1349 10/03/22  0820 06/13/22  0822 03/21/22  0803   TSH 3.08 2.12   < > 9.47*  9.31* 5.79*   T4  --   --   --  0.99  0.97 0.83    < > = values in this interval not displayed.     I reviewed the above labs today myself and with the patient.    Assessment/Plan     Relapsed Multiple Myeloma  CAR-T therapy 7/29/24 with Carvykti.  Restaging at day +180 shows CR MRD-  Xgeva every 6 months    Macrocytic anemia- check B12 with next lab draw. Normocellular marrow  Neutropenia- penicillin V ppx when ANC <1.0. Suspect ongoing CAR-T activity in the marrow is responsible for neutropenia given minimal residual disease at day 100 now MRD-.  IgG- Continue to check monthly and replace as needed       HTN  Continue amlodipine 5 mg daily.  Followed by cardiology     Hypothyroidism  Remains on levothyroxine    ID Prophylaxis   Decrease Acyclovir to 400 mg BID for HSV prophylaxis  Pentamidine monthly for 1 year following CAR-T - may consider changing to Bactrim once platelets recover  penicillin V when ANC <1.0    Left Hand Pain  Saw ortho who prescribed a brace and stretching for lateral epicondylitis and carpal tunnel syndrome.       Nighat Chavez MD PhD  40 min spent reviewing the chart, counseling the patient, placing orders, and coordinating care on the day of the visit.

## 2025-01-28 NOTE — NURSING NOTE
"Oncology Rooming Note    January 28, 2025 4:38 PM   Shena Hartmann is a 74 year old female who presents for:    Chief Complaint   Patient presents with    Oncology Clinic Visit     Multiple Myeloma     Initial Vitals: BP (!) 140/80   Pulse 81   Temp 97.1  F (36.2  C) (Tympanic)   Resp 20   Ht 1.549 m (5' 1\")   Wt 54.4 kg (120 lb)   LMP  (LMP Unknown)   SpO2 96%   BMI 22.67 kg/m   Estimated body mass index is 22.67 kg/m  as calculated from the following:    Height as of this encounter: 1.549 m (5' 1\").    Weight as of this encounter: 54.4 kg (120 lb). Body surface area is 1.53 meters squared.  Moderate Pain (4) Comment: Data Unavailable   No LMP recorded (lmp unknown). Patient is postmenopausal.  Allergies reviewed: Yes  Medications reviewed: Yes    Medications: Medication refills not needed today.  Pharmacy name entered into [facility]:    [id number] - [place], [place] - [address] AT [facility] [address]  WRITTEN PRESCRIPTION REQUESTED  [facility] - [place], [place] - [address]    Frailty Screening:   Is the patient here for a new oncology consult visit in cancer care? 2. No      Clinical concerns: Currently only taking Veetid 250mg once a day.      Elvin Blum LPN              "

## 2025-02-04 DIAGNOSIS — C90.00 MULTIPLE MYELOMA, REMISSION STATUS UNSPECIFIED (H): ICD-10-CM

## 2025-02-04 RX ORDER — PENICILLIN V POTASSIUM 250 MG/1
250 TABLET, FILM COATED ORAL DAILY
Qty: 60 TABLET | Refills: 2 | Status: SHIPPED | OUTPATIENT
Start: 2025-02-04

## 2025-02-09 NOTE — PROGRESS NOTES
[facility] Dermato-allergology Note  Office visit  Encounter Date: Feb 11, 2025  ____________________________________________    CC: Allergy Consult (Referral from Dr. Mcknight, patch testing. Per pt, Dr. Sethi wants to know what is causing dermatitis. 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 Uses gentle cleansers and lotions.)      HPI:  (Feb 11, 2025)  Ms. Shena Hartmann is a(n) 74 year old female who presents today as a return patient for allergy consultation  - Referred back to this clinic by Dr. Mcknight on 8/29/23 for chronic hand dermatitis  - Since I last saw her on 1/28/19, she has been following with general dermatology for regular skin checks, and has been seeing Dr. Mcknight for hand dermatitis since 7/7/20  - Patient last saw Dr. Mcknight on 8/28/24:  # Dermatitis- both contact and drug induced- not currently active but when flares, patient finds the clobetasol and triamcinolone helpful.  Had to reschedule patient testing due to family and health issues.  Planned for Spring 2025  - refilled topical sterioids.  Patient is undergoing CAR- T cell therapy at present.  Will review with Onc before starting creams if needed due to red flag warning with steroids and therapy  - Only derm path in Epic is from 1/28/19, which I performed: lesion confirmed to be a compound melanocytic nevus with mild atypia of left upper ventrolateral lag  - Of note, patient is a H/O multiple myeloma s/p stem cell transplant and current on CAR T-cell therapy  - Last follow up in oncology with Dr. Chavez on 1/28/25:  Relapsed Multiple Myeloma  CAR-T therapy 7/29/24 with [facility].  Restaging at day +180 shows CR MRD-  Xgeva every 6 months     Macrocytic anemia- check B12 with next lab draw. Normocellular marrow  Neutropenia- penicillin V ppx when ANC <1.0. Suspect ongoing CAR-T activity in the marrow is responsible for neutropenia given minimal residual disease at day 100 now MRD-.  IgG- Continue to check monthly  and replace as needed     HTN  Continue amlodipine 5 mg daily.  Followed by cardiology      Hypothyroidism  Remains on levothyroxine     ID Prophylaxis   Decrease Acyclovir to 400 mg BID for HSV prophylaxis  Pentamidine monthly for 1 year following CAR-T - may consider changing to Bactrim once platelets recover  penicillin V when ANC <1.0     Left Hand Pain  Saw ortho who prescribed a brace and stretching for lateral epicondylitis and carpal tunnel syndrome.   - Otherwise feeling well in usual state of health    Physical Exam:  General: In no acute distress, well-developed, well-nourished  Eyes: no conjunctivitis  ENT: no signs of rhinitis   Pulmonary: no wheezing or coughing  Skin: Focused examination of the skin on test sites was performed = see test results below  Focused examination of the hands was performed.    Earlier History and Allergy Exams:  (1/28/19)  Patient had since early childhood problems with eczemas, but with Thalidomide and later Pomalidomide (since 4.5years) recurrent dermatitis with sometimes blisters and papules on face, neck, back of hands and forearms. Patient was 2 weeks ago in [place] and the rash seemed to be worse.  In addition some reaction with sweating and heat on the mastectomy site below the breast prosthetic (silicone)        SKIN: Full skin, which includes the head/face, both arms, chest, back, abdomen,both legs, genitalia and/or groin buttocks, digits and/or nails, was examined.  Many seborrhoic keratoses on trunk and less on extremities. One bigger one on right upper leg ventral side.  On the left upper leg ventrolateral a 5mm diameter, pigmented lesion, not symmetric and different colors. According to patient there since birth, but not sure if recently changes of color    Past Medical History:   Patient Active Problem List   Diagnosis    Pain in thoracic spine    Multiple myeloma, dx 2002,  s/p ASCBMT 3/26/14    Personal history of malignant neoplasm of breast    Seasonal  allergies    Osteoporosis    Primary hypertension    Stem cells transplant status (H)    Adverse effect of other drugs, medicaments and biological substances, sequela    Acquired hypothyroidism    Other amyloidosis (H)    Examination of participant in clinical trial    Multiple myeloma not having achieved remission (H)    Thrombocytopenia    Hypogammaglobulinemia    Other pancytopenia (H)     Past Medical History:   Diagnosis Date    Acquired hypothyroidism 8/10/2023    Breast cancer (H) 01/01/1994    right    H/O stem cell transplant (H) 03/01/2014    History of blood transfusion 2013 & 2014    During stem cell tx process    Hypertension Sept. 2021    Runs 140 s systolically, about 20 above my usual    Multiple myeloma, without mention of having achieved remission 11/06/2012     Allergies:  Allergies   Allergen Reactions    Blood Transfusion Related (Informational Only) Other (See Comments)     Patient has a history of a clinically significant antibody against RBC antigens.  A delay in compatible RBCs may occur.     Cayenne Pepper [Cayenne]     Corn-Containing Products GI Disturbance    Levaquin Other (See Comments)     Tendon pain    Milk Protein GI Disturbance    Mold      Facial rash/lip swelling    Morphine Sulfate Other (See Comments)     Per pt - see things (hallucination) when she was on Morphine .    Pollen Extract      Environmental allergies     Shrimp Nausea and Vomiting    Tomato      Lip swelling, facial rash    Azithromycin Rash    Keflex [Cephalexin] Rash    Oat Rash    Tape [Adhesive Tape] Itching and Rash     All adhesives    Wheat Rash     Swelling of lips     Medications:  Current Outpatient Medications   Medication Sig Dispense Refill    acetaminophen (TYLENOL) 325 MG tablet Take 325-650 mg by mouth every 6 hours as needed for mild pain      acyclovir (ZOVIRAX) 400 MG tablet Take 1 tablet (400 mg) by mouth every 12 hours. 60 tablet 11    amLODIPine (NORVASC) 2.5 MG tablet Take 2 tablets (5 mg)  by mouth daily Take 5 mg (2 tablets) in the morning. 180 tablet 3    Ascorbic Acid (VITAMIN C PO) Take 1,000 mg by mouth 2 times daily       CALCIUM-VITAMIN D-VITAMIN K PO Take 2 tablets by mouth daily.      clobetasol (TEMOVATE) 0.05 % external ointment Apply topically 2 times daily as needed (itching and rash). Topically to rash on hands until resolved 45 g 0    COENZYME Q-10 PO Take 100 mg by mouth daily       Cyanocobalamin 1000 MCG/ML LIQD Take 500 mcg by mouth 2 times daily      diclofenac (VOLTAREN) 1 % topical gel Apply 2 g topically 2 times daily.      levothyroxine (SYNTHROID/LEVOTHROID) 50 MCG tablet Take 1 tablet (50 mcg) by mouth daily. 90 tablet 0    MAGNESIUM PO Take 235 mg by mouth 2 times daily.      Multiple Vitamins-Minerals (MULTIVITAMIN OR) Take 1 tablet by mouth 2 times daily.      ondansetron (ZOFRAN) 4 MG tablet Take 1 tablet (4 mg) by mouth every 8 hours as needed for nausea 30 tablet 0    order for DME 6 mastectomy bras  Length of need: 99 months 6 Device 1    order for DME R mastectomy prosthesis   Length of need:  99 months 1 Device 1    penicillin V (VEETID) 250 MG tablet Take 1 tablet (250 mg) by mouth daily. 60 tablet 2    Probiotic Product (PROBIOTIC PO) Take 1 capsule by mouth daily. Theralac Pro Probiotic      Quercetin 500 MG CAPS Take 500 mg by mouth daily      triamcinolone (KENALOG) 0.1 % external cream Apply topically 2 times daily as needed for irritation. 15 g 11    ZINC PICOLINATE PO Take 30 mg by mouth daily       Family History:  Family History   Problem Relation Age of Onset    Hypertension Mother     Cerebrovascular Disease Mother          8-11-15 from strokes    Hypertension Father     Prostate Cancer Father     Skin Cancer Paternal Grandmother     Cancer Paternal Grandmother         skin cancer   No allergies.    Social History:  The patient is a retired bedside nurse.  Patient has the following hobbies or non-occupational exposure: none currently.  Only chemical  "exposure now is cleaning. In childhood, her dad was a contractor, and she was exposed to various chemicals, including benzene.    Order for Future Allergy Testing:    [x] Outpatient  [] Inpatient: Cash..../ Bed ...    Skin Atopy (atopic dermatitis)? [] Yes     [x] No  Comments:   Childhood eczema?   [x] Yes     [] No  Comments: as a child, was told she was allergic to soap, so she had to use a \"stinky brown\" soap  Hand eczema?   [x] Yes     [] No  If yes, leading hand? [x] Right     [] Left     [] Ambidextrous  Comments:   - hands are good today  - more between her fingers than on the top or palms  - very itchy blisters  - both hands affected, but usually it is whichever hand touches chemicals; denies any other area of involvement  - notices she will get a rash within a few hours of touching items in her house after   - has been having this for decades, including while nursing  - used to have issues with latex gloves until employer had switched to latex-free gloves, but she still uses latex-free gloves to clean; though, she thought irritated may really have been due to hand cleanser that was being used    Contact allergies?     Comments:   Including adhesives/bandages? [x] Yes     [] No  Comments: if they are on for a few hours, will get itchy  Including metals?   [x] Yes     [] No  Comments: reacted to cheap earrings, so at one point stopped wearing them for decades  Other substances?   [x] Yes     [] No  Comments:   - cleaning products and possibly latex as above  - Silicone entry in Epic: if she gets hot on her chest, gets rash around breast prosthesis  - does not dye her hair    Drug allergies?   [x] Yes     [] No  Comments:   - within a few doses of azithromycin and Keflex, will get itchy rash on her back  - Levaquin caused tendonitis    Angioedema?   [x] Yes     [] No  Comments:   - lips swell when she is at her cabin around [place] (see environmental sx below)    Urticaria?   [x] Yes     [] No  Comments: gets " rashes while she is working out in the garden    Food allergies?   [] Yes     [] No  Comments:   - Shrimp: nausea and vomiting for days since childhood  - Milk: GI upset so avoids; has noticed she can tolerate milk from European cows; does ok with cheese, but not too much cheese made from cow milk  - Corn: popcorn causes indigestion and bloating; no issues with corn in [place], which she attributes to them not using GMOs    Pet allergies?   [] Yes     [x] No  Comments: none at home and does not react to kids' pets    Environmental allergy symptoms?  [x] Conjunctivitis - red eyes depending on season  [] Otitis  [] Pharyngitis  [] Polyposis  [x] Postnasal Drip - slight  [x] Rhinitis - congestion  [] Sinusitis  [] None  Comments:   - most of her seasonal symptoms are respiratory  - when she was seeing a naturopath, he had her take air samples at her cabin (as she would start to not feel good with facial swelling); was told she is allergic to pine    HENT Operations?  [] Adenoids [] Septum [] Sinus  [] Tonsils        [] Other:   [x] None  Comments:     Pulmonary symptoms (from birth to present)?  [] Asthma bronchiale  Inhaler(s)?:   [] Coughing  [] Other  [x] None  Comments:     Environmental and pulmonary symptoms aggravated by?  Season: [] None     [] I     [] II     [x] III     [x] IV     [] V     [] VI          [] VII     [] VIII     [] IX     [x] X     [x] XI     [] XII     [] Perennial  Time of Day: [] None     [] Morning     [] Noon     [] Evening     [] Night     [] Whole Day  Location/Changes: [] None     [] Inside     [] Outside     [] Mountain     [] Sea     [] Other:   Triggers (specific): [] None     [] Animals     [] Dust     [] Mold     [] Plants     [] Other:   Triggers (other): [] None     [] Psyche     [] Sport     [] Work     [] Other:   Irritant: [] None     [] Cold     [] Heat     [] Odors     [] Physical Efforts     [] Smoke     [] Other:     Order for PATCH TESTS  Reason for tests (suspected  allergy): recurrent dyshidrotic hand eczema  Known previous allergies: nickel?  Standardized panels  [x] Standard panel (40 tests)  [x] Preservatives & Antimicrobials (31 tests)  [x] Emulsifiers & Additives (25 tests)   [x] Perfumes/Flavours & Plants (25 tests)  [] Hairdresser panel (12 tests)  [x] Rubber Chemicals (22 tests)  [] Plastics (26 tests)  [] Colorants/Dyes/Food additives (20 tests)  [] Metals (implants/dental) (24 tests)  [] Local anaesthetics/NSAIDs (13 tests)  [] Antibiotics & Antimycotics (14 tests)   [] Corticosteroids (15 tests)   [] Photopatch test (62 tests)   [] Others:     [] Patient's Own Products:   DO NOT test if chemical or biological identity is unknown!   always ask from patient the product information and safety sheets (MSDS)       Order for PRICK TESTS    Reason for tests (suspected allergy): seasonal RC in early spring and late fall  Known previous allergies: none    Standardized prick panels  [] Atopic panel (20 tests)  [] Pediatric Panel (12 tests)  [] Milk, Meat, Eggs, Grains (20 tests)   [] Dust, Epithelia, Feathers (10 tests)  [] Fish, Seafood, Shellfish (17 tests)  [] Nuts, Beans (14 tests)  [] Spice, Vegetable, Fruit (17 tests)  [] Pollen Panel = Tree, Grass, Weed (24 tests)  [] Others:     [] Patient's Own Products:   DO NOT test if chemical or biological identity is unknown!   always ask from patient the product information and safety sheets (MSDS)     Standardized intradermal tests (at same time as patch tests)  [x] Alternaria alternata  [x] Aspergillus fumigatus  [] Cladosporium herbarum   [x] Penicillium notatum  [x] Dermatophagoides farinae  [x] Dermatophagoides pteronyssinus  [] Dog Epithelium  [] Cat Epithelium  [] Others:     [] Bee venom   [] Wasp venom  !!Specific protocol with dilutions!!       Order for Drug allergy tests (prick & intradermal & patch tests)    [] Penicillin G     [] Ampicillin   [x] Cefazolin        [] Ceftriaxone     [] Ceftazidime     [] Cefepime      [] Cefuroxime  [] Bactrim  [] Iodixanol     [] Iopamidol        [] Iohexol  [x] Others: azithromycin  [] Patient's Own:   Order for 6/2/25 as test date      Atopy Screen (Placed Feb 11, 2025)  No Substance Readings (15 min) Evaluation   POS Histamine 1mg/ml ++    NEG NaCl 0.9% -      No Substance Readings (15 min) Evaluation   1 Alternaria alternata (tenuis)  -    2 Cladosporium herbarum -    3 Aspergillus fumigatus -    4 Penicillium notatum -    5 Dermatophagoides pteronyssinus -    6 Dermatophagoides farinae -    7 Dog epithelium (canis spp) -    8 Cat hair (flaco catus) +    9 Cockroach   (Blatella americana & germanica) -    10 Grass mix midwest   (Teresa, Orchard, Redtop, George) -    11 Jv grass (sorghum halepense) -    12 Weed mix   (common Cocklebur, Lamb s quarters, rough redroot Pigweed, Dock/Sorrel) -    13 Mug wort (artemisia vulgare) -    14 Ragweed giant/short (ambrosia spp) -    15 White birch (Betula papyrifera) -    16 Tree mix 1 (Pecan, Maple BHR, Oak RVW, american Rozel, black [place]) -    17 Red cedar (juniperus virginia) -    18 Tree mix 2   (white Arley, river/red Birch, black Birch Tree, common [place], american Elm) -    19 Box elder/Maple mix (acer spp) -    20 [place] shagbark (carya ovata) -    Conclusion: Cat reaction is slight and does not correlate with her clinical symptoms. Will plan for IDT at same time as patch and drug allergy testing.      Assessment & Plan:    ==> Final Diagnosis:     # Recurrent dyshidrotic hand dermatitis  DDx: Atopic dermatitis and/or  DDx: Allergic contact dermatitis to rubber chemicals, ingredients in soaps, and/or emulsifiers  * chronic illness with exacerbation, progression, side effects from treatment    # Suspicion for atopic predisposition with:   PND and RC during change of seasons  Dry, hypersensitive skin with dyshidrosis (atopic dermatitis?)  * chronic illness with exacerbation, progression, side effects from treatment    # Possible  phototoxic/photoallergic reaction to thalidomide and pomalidomide  Previously assessed 1/28/19  At the time, was to intensify sun protection (clothes, hat, etc.) and use physical sunscreens  For flare ups, was to use triamcinolone or clobetasol (DesOwen) cream    These conclusions are made at the best of one's knowledge and belief based on the provided evidence such as patient's history and allergy test results and they can change over time or can be incomplete because of missing information.    ==> Treatment Plan:    >> Until patch tests can be performed, continue with treatment plan set with Dr. Mcknight.    Procedures Performed: Allergy prick tests    Staff and Scribe: provider    Scribe Disclosure:   I, MARLO MARIO, am serving as a scribe to document services personally performed by Sunil Rosario MD based on data collection and the provider's statements to me.     Staff Physician Comments:  I was present with the scribe who participated in the documentation of the note. I have verified the history and personally performed the physical exam and medical decision making. I agree with the assessment and plan as documented in the note. I have reviewed and if necessary amended the note.      Sunil Rosario MD  Professor  Head of Dermato-Allergy Division  Department of Dermatology  [facility]     Follow-up in Derm-Allergy clinic for allergy tests as planned  - next appointment with referring provider Dr. Mcknight on 4/15/25  ___________________________    I spent a total of 40 minutes with Shena Hartmann during today s visit. This time was spent discussing all the individual test results, correlating them to the clinical relevance, counseling the patient and/or coordinating care and performing allergy tests or procedures.

## 2025-02-11 ENCOUNTER — OFFICE VISIT (OUTPATIENT)
Dept: ALLERGY | Facility: CLINIC | Age: 75
End: 2025-02-11
Payer: MEDICARE

## 2025-02-11 ENCOUNTER — LAB (OUTPATIENT)
Dept: LAB | Facility: CLINIC | Age: 75
End: 2025-02-11
Attending: STUDENT IN AN ORGANIZED HEALTH CARE EDUCATION/TRAINING PROGRAM
Payer: MEDICARE

## 2025-02-11 VITALS
HEART RATE: 76 BPM | WEIGHT: 121 LBS | TEMPERATURE: 98.3 F | DIASTOLIC BLOOD PRESSURE: 81 MMHG | RESPIRATION RATE: 16 BRPM | SYSTOLIC BLOOD PRESSURE: 137 MMHG | BODY MASS INDEX: 22.86 KG/M2 | OXYGEN SATURATION: 98 %

## 2025-02-11 DIAGNOSIS — R09.82 ALLERGIC RHINITIS WITH POSTNASAL DRIP: ICD-10-CM

## 2025-02-11 DIAGNOSIS — Z88.9 ATOPY: ICD-10-CM

## 2025-02-11 DIAGNOSIS — C90.01 MULTIPLE MYELOMA IN REMISSION (H): ICD-10-CM

## 2025-02-11 DIAGNOSIS — L30.1 DYSHIDROTIC HAND DERMATITIS: ICD-10-CM

## 2025-02-11 DIAGNOSIS — J30.9 ALLERGIC RHINITIS WITH POSTNASAL DRIP: ICD-10-CM

## 2025-02-11 DIAGNOSIS — L20.89 OTHER ATOPIC DERMATITIS: ICD-10-CM

## 2025-02-11 DIAGNOSIS — C90.00 MULTIPLE MYELOMA NOT HAVING ACHIEVED REMISSION (H): ICD-10-CM

## 2025-02-11 DIAGNOSIS — Z88.9 MULTIPLE DRUG ALLERGIES: Primary | ICD-10-CM

## 2025-02-11 DIAGNOSIS — C90.00 MULTIPLE MYELOMA, REMISSION STATUS UNSPECIFIED (H): ICD-10-CM

## 2025-02-11 DIAGNOSIS — L23.5 ALLERGIC DERMATITIS DUE TO OTHER CHEMICAL PRODUCT: ICD-10-CM

## 2025-02-11 LAB
ALBUMIN SERPL BCG-MCNC: 4.4 G/DL (ref 3.5–5.2)
ALP SERPL-CCNC: 53 U/L (ref 40–150)
ALT SERPL W P-5'-P-CCNC: 26 U/L (ref 0–50)
ANION GAP SERPL CALCULATED.3IONS-SCNC: 10 MMOL/L (ref 7–15)
AST SERPL W P-5'-P-CCNC: 26 U/L (ref 0–45)
BASOPHILS # BLD AUTO: 0 10E3/UL (ref 0–0.2)
BASOPHILS NFR BLD AUTO: 1 %
BILIRUB SERPL-MCNC: 0.2 MG/DL
BUN SERPL-MCNC: 17.8 MG/DL (ref 8–23)
CALCIUM SERPL-MCNC: 9.3 MG/DL (ref 8.8–10.4)
CHLORIDE SERPL-SCNC: 100 MMOL/L (ref 98–107)
CREAT SERPL-MCNC: 0.79 MG/DL (ref 0.51–0.95)
EGFRCR SERPLBLD CKD-EPI 2021: 78 ML/MIN/1.73M2
EOSINOPHIL # BLD AUTO: 0 10E3/UL (ref 0–0.7)
EOSINOPHIL NFR BLD AUTO: 1 %
ERYTHROCYTE [DISTWIDTH] IN BLOOD BY AUTOMATED COUNT: 15.3 % (ref 10–15)
GLUCOSE SERPL-MCNC: 119 MG/DL (ref 70–99)
HCO3 SERPL-SCNC: 29 MMOL/L (ref 22–29)
HCT VFR BLD AUTO: 34.9 % (ref 35–47)
HGB BLD-MCNC: 12 G/DL (ref 11.7–15.7)
IMM GRANULOCYTES # BLD: 0 10E3/UL
IMM GRANULOCYTES NFR BLD: 0 %
LYMPHOCYTES # BLD AUTO: 1.6 10E3/UL (ref 0.8–5.3)
LYMPHOCYTES NFR BLD AUTO: 44 %
MCH RBC QN AUTO: 35.7 PG (ref 26.5–33)
MCHC RBC AUTO-ENTMCNC: 34.4 G/DL (ref 31.5–36.5)
MCV RBC AUTO: 104 FL (ref 78–100)
MONOCYTES # BLD AUTO: 0.5 10E3/UL (ref 0–1.3)
MONOCYTES NFR BLD AUTO: 14 %
NEUTROPHILS # BLD AUTO: 1.5 10E3/UL (ref 1.6–8.3)
NEUTROPHILS NFR BLD AUTO: 41 %
NRBC # BLD AUTO: 0 10E3/UL
NRBC BLD AUTO-RTO: 0 /100
PLAT MORPH BLD: NORMAL
PLATELET # BLD AUTO: 47 10E3/UL (ref 150–450)
POTASSIUM SERPL-SCNC: 3.9 MMOL/L (ref 3.4–5.3)
PROT SERPL-MCNC: 6.4 G/DL (ref 6.4–8.3)
RBC # BLD AUTO: 3.36 10E6/UL (ref 3.8–5.2)
RBC MORPH BLD: NORMAL
SODIUM SERPL-SCNC: 139 MMOL/L (ref 135–145)
TOTAL PROTEIN SERUM FOR ELP: 6.2 G/DL (ref 6.4–8.3)
VIT B12 SERPL-MCNC: 3112 PG/ML (ref 232–1245)
WBC # BLD AUTO: 3.7 10E3/UL (ref 4–11)

## 2025-02-11 PROCEDURE — 82607 VITAMIN B-12: CPT

## 2025-02-11 PROCEDURE — 95004 PERQ TESTS W/ALRGNC XTRCS: CPT | Performed by: DERMATOLOGY

## 2025-02-11 PROCEDURE — 84155 ASSAY OF PROTEIN SERUM: CPT

## 2025-02-11 PROCEDURE — 86334 IMMUNOFIX E-PHORESIS SERUM: CPT | Mod: 26 | Performed by: STUDENT IN AN ORGANIZED HEALTH CARE EDUCATION/TRAINING PROGRAM

## 2025-02-11 PROCEDURE — 85025 COMPLETE CBC W/AUTO DIFF WBC: CPT

## 2025-02-11 PROCEDURE — 84165 PROTEIN E-PHORESIS SERUM: CPT | Mod: TC | Performed by: STUDENT IN AN ORGANIZED HEALTH CARE EDUCATION/TRAINING PROGRAM

## 2025-02-11 PROCEDURE — 99215 OFFICE O/P EST HI 40 MIN: CPT | Mod: 25 | Performed by: DERMATOLOGY

## 2025-02-11 PROCEDURE — 82565 ASSAY OF CREATININE: CPT

## 2025-02-11 PROCEDURE — 83521 IG LIGHT CHAINS FREE EACH: CPT

## 2025-02-11 PROCEDURE — 250N000011 HC RX IP 250 OP 636: Performed by: STUDENT IN AN ORGANIZED HEALTH CARE EDUCATION/TRAINING PROGRAM

## 2025-02-11 PROCEDURE — 36591 DRAW BLOOD OFF VENOUS DEVICE: CPT

## 2025-02-11 PROCEDURE — 82784 ASSAY IGA/IGD/IGG/IGM EACH: CPT

## 2025-02-11 PROCEDURE — 84165 PROTEIN E-PHORESIS SERUM: CPT | Mod: 26 | Performed by: STUDENT IN AN ORGANIZED HEALTH CARE EDUCATION/TRAINING PROGRAM

## 2025-02-11 PROCEDURE — 86334 IMMUNOFIX E-PHORESIS SERUM: CPT | Performed by: STUDENT IN AN ORGANIZED HEALTH CARE EDUCATION/TRAINING PROGRAM

## 2025-02-11 RX ORDER — HEPARIN SODIUM (PORCINE) LOCK FLUSH IV SOLN 100 UNIT/ML 100 UNIT/ML
5 SOLUTION INTRAVENOUS ONCE
Status: COMPLETED | OUTPATIENT
Start: 2025-02-11 | End: 2025-02-11

## 2025-02-11 RX ADMIN — Medication 5 ML: at 12:06

## 2025-02-11 ASSESSMENT — PAIN SCALES - GENERAL: PAINLEVEL_OUTOF10: MODERATE PAIN (4)

## 2025-02-11 NOTE — NURSING NOTE
Chief Complaint   Patient presents with    Allergy Consult     Referral from Dr. Mcknight, patch testing. Per pt, Dr. Sethi wants to know what is causing dermatitis. 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 Uses gentle cleansers and lotions.     Morena Pollock RN

## 2025-02-11 NOTE — PATIENT INSTRUCTIONS
_____________________________    PATCH TESTING    WHAT IS A PATCH TEST ?    A patch test is a way of identifying whether a substance has caused a delayed reaction with skin inflammation, such as contact eczema or delayed (after days) reactions to drugs. We will use various different types of test compounds, which may include common allergens in occupation and daily life such as  preservatives, fragrances or even drugs. Most of the time we will use standardized, prefabricated test solutions and the choice of the substances and series tested will depend on the history of the allergic reactions. Sometimes we will test your own products you are exposed at home or at work. In order to avoid severe toxic reactions we need detailed information s or safety sheets about each of these test compounds.    HOW IS A PATCH TEST PERFORMED ?    You will be given three appointments to complete this test. On the first appointment the nurse will apply 100-180 small test chambers each one of them containing a different allergen on your back and/or on your arms. We will use hypoallergenic and somehow waterproof adhesive tape. Afterwards the different sites will be marked with a waterproof marker. These patches must stay in place for 2 days. On the second appointment the patches will be removed and the allergy doctor/nurse will evaluate the skin reactions and maybe re-apply the marks. On the third appointment the allergy doctor will re-evaluate possible allergic reactions and discuss with you the nature of the allergens you react to and how to avoid them.    AVOID THE FOLLOWING:    DO NOT wash your back and other test areas during the entire test period (3-5 days), NO bathing or swimming  AVOID strenuous exercise with sweating  DO NOT scratch the test area  AVOID exposure to UV irradiation (sun, therapy)    Patch tests should be performed when the allergy is resolved  Remove patch tests only if severe reaction (itch, pain, blistering)  and report to your doctor immediately. Arun then the locations of each test field  If patch tests peel off, then try to fix them and record time and arun test field  No oral steroids (e.g. Prednisone) 1 month prior to tests    WHAT ARE THE POSSIBLE RISKS OF PATCH TESTS ?    If you are allergic to a compound tested you will develop after a few days localized skin reactions similar to your previous allergic reaction. This includes formation of red, itchy skin lesions of few mm to cm with small vesicles and even blisters. The lesions will fade off over days and weeks and might sometimes leave for a few months some skin pigmentations. In rare cases a localized reaction to patch tests can become generalized. The tests with your own products might have some risks because they are not standardized and unanticipated reactions can occur. We need as much information as possible to evaluate if your own products are safe to test and under what conditions. This has to be evaluated for each individual product.        ? WHAT ARE THE PREPARATIONS NEEDED FOR THESE VARIOUS ALLERGY TESTS ?    Some medications can affect the reactions to allergens during the tests. Therefore reveal all the medications you take to the allergy team and the doctor will discuss with you the medications before/during the tests. Normally, you have to respect following rules (unless otherwise instructed by doctor):  For prick, intradermal and provocation tests stop antihistamines (e.g. loratadine (Claritin), cetirizine (Zyrtec), fexofenadine (Allegra) etc 1 week prior to the appointment   For patch tests and provocation tests, stop systemic corticosteroids 1 month and topical steroids 1 week prior to tests  Eat normally and take a shower before testing begins (do not put anything, including lotion, on the back after showering)    _____________________________    SKIN PRICK TESTING    WHAT IS A SKIN PRICK TEST (SPT) ?    A skin prick test (SPT) is a simple and  reliable diagnostic test used to identify substances ( allergens ) responsible for triggering the symptoms of allergy. The basic SPT panel includes common allergens, such as house dust mites, molds, dog and cat allergens and grass/tree pollen allergens. We have other more specialized series for various food allergens and sometimes we test your own food directly on your skin. Tests will be usually performed on the skin of the forearm (rarely on back). The skin may develop a red and itchy reaction which can be an indication of an allergy to to tested substance, but will be confirmed by discussion with the allergy specialist    HOW IS A SPT PERFORMED ?    The skin will be disinfected with 70% Isopropanol alcohol, then marked and numbered with a pen to identify the areas where the specific allergens will be tested. Afterwards a drop of the allergen solution will be placed on the skin and then the skin gently pricked using the tip of a specially designed prick needle. With this procedure the most superficial part of the skin will be pierced to allow the test solution to diffuse into the skin and make contact with the reactive immune cells. After 15-30min the area will be examined and evaluated for possible allergic reactions.        WHAT ARE THE POSSIBLE RISKS OF SPT ?    If the skin reacts it will develop red, itchy wheals of 5-30mm diameter. The wheal and itch will usually disappear spontaneously after 1-2 hours. Sometimes there might be a delayed reactions after days and this has to be reported to the treating allergy specialist (could be another kind of reaction pattern and important piece of information). Rarely there are more serious generalized allergic reactions observed and therefore you will be under observation of the allergy team during the entire procedure. There is a small risk for some bleeding and skin infection by the SPT.    _____________________________    INTRADERMAL TESTS      WHAT IS AN INTRADERMAL  TEST (IDT) ?    An intradermal test (IDT) is basically a continuation of the SPT and is sometimes proposed if the skin prick test is negative and the person is strongly suspected to have an allergy to a particular substance. IDT is particularly used for diagnosis of drug allergies and only sterile solutions will be tested by IDT. Because more allergen is delivered to the skin than in SPT the IDT can add more sensitivity to the allergy tests, but with a higher potential risk.     HOW IS AN INTRADERMAL TEST (IDT) PERFORMED ?    A small amount (~ 0.05ml) of the suspected allergen is injected with a very fine insulin needle just beneath the skin. The injections are made at spaced intervals after disinfection and marking of the skin areas. The tests are usually performed on the forearm (rarely back). The initial test will be started with a very diluted solution and if the results are negatives the procedure might be repeated with serial dilutions of higher concentrations. Therefore, the estimated duration of this test is about 45-60 min. Sometimes we observe delayed type reactions to the intradermal tests after 1-4 days and if this is the case take a photo and inform our staff and load the photo into [employer]. Best would be to take the photos with a ruler that we know at which position the positive test was.          WHAT ARE THE POSSIBLE RISKS OF IDT ?    If the skin reacts it will develop red, itchy wheals of 5-30mm diameter. The wheal and itch will usually disappear spontaneously after 1-2 hours. Sometimes there might be a delayed reactions after days and this has to be reported to the treating allergy specialist (could be another kind of reaction pattern and important piece of information). Rarely there are more serious generalized allergic reactions observed and therefore you will be under observation of the allergy team during the entire procedure. Only sterile solutions will be used for injections, but there is  still a small risk for infections or unpredictable local toxic reactions by the allergens. Some transient bleeding might occur.     _____________________________      ORAL PROVOCATION TEST      WHAT IS AN  ORAL PROVOCATION TEST (OPT) ?    An oral provocation test (OPT) is a procedure primarily used to exclude a specific allergy to a particular drug or food. These substances are then administered orally, rarely in case of drugs by subcutaneous or intravenous injections. This test is only conducted if the skin prick and intradermal and/or patch tests were negatives. A formal written consent will be taken prior to the provocation test.         HOW IS AN ORAL PROVOCATION TEST PERFORMED?    For oral (food/drugs) provocation tests you will have to ingest the food or drug hidden in a capsule or in its natural form. The test will usually be placebo-controlled and will include a capsule or other application form not containing the suspected allergen, but non-reactive Mannitol sugar. The various drugs/food will be given at specific time intervals under strict medical supervision.     Two to six serial doses containing the test food or drug will given at normally 30min time intervals, which might vary for each individual. You will be required to stay at the clinic at all times so that the allergy doctors and nurses can early recognize and treat immediately possible allergic reactions. After the last dose you have to stay during at least 1h for observation. The entire procedure will take about 2-6hours, depending on the number of incremental doses and the observation time.     WHAT ARE THE POSSIBLE RISKS OF ORAL PROVOCATION TEST ?    The provocation tests have the highest risk potential of all the tests and therefore close observation is mandatory. The entire procedure will be discussed prior to the test (except when the placebo will be given). The reactions can go from local allergic reactions to more severe generalized  reactions. Therefore, we will not perform provocation tests without prior evaluation by prick or intradermal tests and we plan to exclude an allergy by this test. If there is a higher risk, the test will be performed in a bed and with a secure intravenous access with infusion. The medication of a severe allergic reactions include high dose corticosteroids, antihistamines and if necessary epinephrine (Epi-Pen).    Useful Contact Information    Change of appointments or allergy-related [phone]  Email: [email or web address]  Location/address: [address] [place] [id number]    If you develop any serious or adverse allergic reaction after office hours please seek immediate medical assistance at the nearest clinic or emergency room.

## 2025-02-11 NOTE — NURSING NOTE
Chief Complaint   Patient presents with    Port Draw     Labs collected from port by RN. Vitals taken. Checked in for appointment(s).      Port accessed with 20 gauge 3/4 inch flat needle by RN, labs collected, line flushed with saline and heparin.      Rosamaria Gacría RN

## 2025-02-11 NOTE — LETTER
2/11/2025      Shena Hartmann  [address] [place] [address]      Dear Colleague,    Thank you for referring your patient, Shena Hartmann, to the [facility] [place]. Please see a copy of my visit note below.    [facility] Dermato-allergology Note  Office visit  Encounter Date: Feb 11, 2025  ____________________________________________    CC: Allergy Consult (Referral from Dr. Mcknight, patch testing. Per pt, Dr. Sethi wants to know what is causing dermatitis. 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 Uses gentle cleansers and lotions.)      HPI:  (Feb 11, 2025)  Ms. Shena Hartmann is a(n) 74 year old female who presents today as a return patient for allergy consultation  - Referred back to this clinic by Dr. Mcknight on 8/29/23 for chronic hand dermatitis  - Since I last saw her on 1/28/19, she has been following with general dermatology for regular skin checks, and has been seeing Dr. Mcknight for hand dermatitis since 7/7/20  - Patient last saw Dr. Mcknight on 8/28/24:  # Dermatitis- both contact and drug induced- not currently active but when flares, patient finds the clobetasol and triamcinolone helpful.  Had to reschedule patient testing due to family and health issues.  Planned for Spring 2025  - refilled topical sterioids.  Patient is undergoing CAR- T cell therapy at present.  Will review with Onc before starting creams if needed due to red flag warning with steroids and therapy  - Only derm path in Epic is from 1/28/19, which I performed: lesion confirmed to be a compound melanocytic nevus with mild atypia of left upper ventrolateral lag  - Of note, patient is a H/O multiple myeloma s/p stem cell transplant and current on CAR T-cell therapy  - Last follow up in oncology with Dr. Chavez on 1/28/25:  Relapsed Multiple Myeloma  CAR-T therapy 7/29/24 with Carvykti.  Restaging at day +180 shows CR MRD-  Xgeva every 6 months     Macrocytic anemia- check  B12 with next lab draw. Normocellular marrow  Neutropenia- penicillin V ppx when ANC <1.0. Suspect ongoing CAR-T activity in the marrow is responsible for neutropenia given minimal residual disease at day 100 now MRD-.  IgG- Continue to check monthly and replace as needed     HTN  Continue amlodipine 5 mg daily.  Followed by cardiology      Hypothyroidism  Remains on levothyroxine     ID Prophylaxis   Decrease Acyclovir to 400 mg BID for HSV prophylaxis  Pentamidine monthly for 1 year following CAR-T - may consider changing to Bactrim once platelets recover  penicillin V when ANC <1.0     Left Hand Pain  Saw ortho who prescribed a brace and stretching for lateral epicondylitis and carpal tunnel syndrome.   - Otherwise feeling well in usual state of health    Physical Exam:  General: In no acute distress, well-developed, well-nourished  Eyes: no conjunctivitis  ENT: no signs of rhinitis   Pulmonary: no wheezing or coughing  Skin: Focused examination of the skin on test sites was performed = see test results below  Focused examination of the hands was performed.  - ***    Earlier History and Allergy Exams:  (1/28/19)  Patient had since early childhood problems with eczemas, but with Thalidomide and later Pomalidomide (since 4.5years) recurrent dermatitis with sometimes blisters and papules on face, neck, back of hands and forearms. Patient was 2 weeks ago in [place] and the rash seemed to be worse.  In addition some reaction with sweating and heat on the mastectomy site below the breast prosthetic (silicone)        SKIN: Full skin, which includes the head/face, both arms, chest, back, abdomen,both legs, genitalia and/or groin buttocks, digits and/or nails, was examined.  Many seborrhoic keratoses on trunk and less on extremities. One bigger one on right upper leg ventral side.  On the left upper leg ventrolateral a 5mm diameter, pigmented lesion, not symmetric and different colors. According to patient there since  birth, but not sure if recently changes of color    Past Medical History:   Patient Active Problem List   Diagnosis     Pain in thoracic spine     Multiple myeloma, dx 2002,  s/p ASCBMT 3/26/14     Personal history of malignant neoplasm of breast     Seasonal allergies     Osteoporosis     Primary hypertension     Stem cells transplant status (H)     Adverse effect of other drugs, medicaments and biological substances, sequela     Acquired hypothyroidism     Other amyloidosis (H)     Examination of participant in clinical trial     Multiple myeloma not having achieved remission (H)     Thrombocytopenia     Hypogammaglobulinemia     Other pancytopenia (H)     Past Medical History:   Diagnosis Date     Acquired hypothyroidism 8/10/2023     Breast cancer (H) 01/01/1994    right     H/O stem cell transplant (H) 03/01/2014     History of blood transfusion 2013 & 2014    During stem cell tx process     Hypertension Sept. 2021    Runs 140 s systolically, about 20 above my usual     Multiple myeloma, without mention of having achieved remission 11/06/2012     Allergies:  Allergies   Allergen Reactions     Blood Transfusion Related (Informational Only) Other (See Comments)     Patient has a history of a clinically significant antibody against RBC antigens.  A delay in compatible RBCs may occur.      Cayenne Pepper [Cayenne]      Corn-Containing Products GI Disturbance     Levaquin Other (See Comments)     Tendon pain     Milk Protein GI Disturbance     Mold      Facial rash/lip swelling     Morphine Sulfate Other (See Comments)     Per pt - see things (hallucination) when she was on Morphine .     Pollen Extract      Environmental allergies      Shrimp Nausea and Vomiting     Tomato      Lip swelling, facial rash     Azithromycin Rash     Keflex [Cephalexin] Rash     Oat Rash     Tape [Adhesive Tape] Itching and Rash     All adhesives     Wheat Rash     Swelling of lips     Medications:  Current Outpatient Medications    Medication Sig Dispense Refill     acetaminophen (TYLENOL) 325 MG tablet Take 325-650 mg by mouth every 6 hours as needed for mild pain       acyclovir (ZOVIRAX) 400 MG tablet Take 1 tablet (400 mg) by mouth every 12 hours. 60 tablet 11     amLODIPine (NORVASC) 2.5 MG tablet Take 2 tablets (5 mg) by mouth daily Take 5 mg (2 tablets) in the morning. 180 tablet 3     Ascorbic Acid (VITAMIN C PO) Take 1,000 mg by mouth 2 times daily        CALCIUM-VITAMIN D-VITAMIN K PO Take 2 tablets by mouth daily.       clobetasol (TEMOVATE) 0.05 % external ointment Apply topically 2 times daily as needed (itching and rash). Topically to rash on hands until resolved 45 g 0     COENZYME Q-10 PO Take 100 mg by mouth daily        Cyanocobalamin 1000 MCG/ML LIQD Take 500 mcg by mouth 2 times daily       diclofenac (VOLTAREN) 1 % topical gel Apply 2 g topically 2 times daily.       levothyroxine (SYNTHROID/LEVOTHROID) 50 MCG tablet Take 1 tablet (50 mcg) by mouth daily. 90 tablet 0     MAGNESIUM PO Take 235 mg by mouth 2 times daily.       Multiple Vitamins-Minerals (MULTIVITAMIN OR) Take 1 tablet by mouth 2 times daily.       ondansetron (ZOFRAN) 4 MG tablet Take 1 tablet (4 mg) by mouth every 8 hours as needed for nausea 30 tablet 0     order for DME 6 mastectomy bras  Length of need: 99 months 6 Device 1     order for DME R mastectomy prosthesis   Length of need:  99 months 1 Device 1     penicillin V (VEETID) 250 MG tablet Take 1 tablet (250 mg) by mouth daily. 60 tablet 2     Probiotic Product (PROBIOTIC PO) Take 1 capsule by mouth daily. Theralac Pro Probiotic       Quercetin 500 MG CAPS Take 500 mg by mouth daily       triamcinolone (KENALOG) 0.1 % external cream Apply topically 2 times daily as needed for irritation. 15 g 11     ZINC PICOLINATE PO Take 30 mg by mouth daily       Family History:  Family History   Problem Relation Age of Onset     Hypertension Mother      Cerebrovascular Disease Mother          8-11-15 from  "strokes     Hypertension Father      Prostate Cancer Father      Skin Cancer Paternal Grandmother      Cancer Paternal Grandmother         skin cancer   No allergies.    Social History:  The patient is a retired bedside nurse.  Patient has the following hobbies or non-occupational exposure: none currently.  Only chemical exposure now is cleaning. In childhood, her dad was a contractor, and she was exposed to various chemicals, including benzene.    Order for Future Allergy Testing:    [x] Outpatient  [] Inpatient: Cash..../ Bed ...    Skin Atopy (atopic dermatitis)? [] Yes     [x] No  Comments:   Childhood eczema?   [x] Yes     [] No  Comments: as a child, was told she was allergic to soap, so she had to use a \"stinky brown\" soap  Hand eczema?   [x] Yes     [] No  If yes, leading hand? [x] Right     [] Left     [] Ambidextrous  Comments:   - hands are good today  - more between her fingers than on the top or palms  - very itchy blisters  - both hands affected, but usually it is whichever hand touches chemicals; denies any other area of involvement  - notices she will get a rash within a few hours of touching items in her house after   - has been having this for decades, including while nursing  - used to have issues with latex gloves until employer had switched to latex-free gloves, but she still uses latex-free gloves to clean; though, she thought irritated may really have been due to hand cleanser that was being used    Contact allergies?     Comments:   Including adhesives/bandages? [x] Yes     [] No  Comments: if they are on for a few hours, will get itchy  Including metals?   [x] Yes     [] No  Comments: reacted to cheap earrings, so at one point stopped wearing them for decades  Other substances?   [x] Yes     [] No  Comments:   - cleaning products and possibly latex as above  - Silicone entry in Epic: if she gets hot on her chest, gets rash around breast prosthesis  - does not dye her hair    Drug " allergies?   [x] Yes     [] No  Comments:   - within a few doses of azithromycin and Keflex, will get itchy rash on her back  - Levaquin caused tendonitis    Angioedema?   [x] Yes     [] No  Comments:   - lips swell when she is at her cabin around [place] (see environmental sx below)    Urticaria?   [x] Yes     [] No  Comments: gets rashes while she is working out in the garden    Food allergies?   [] Yes     [] No  Comments:   - Shrimp: nausea and vomiting for days since childhood  - Milk: GI upset so avoids; has noticed she can tolerate milk from European cows; does ok with cheese, but not too much cheese made from cow milk  - Corn: popcorn causes indigestion and bloating; no issues with corn in [place], which she attributes to them not using GMOs    Pet allergies?   [] Yes     [x] No  Comments: none at home and does not react to kids' pets    Environmental allergy symptoms?  [x] Conjunctivitis - red eyes depending on season  [] Otitis  [] Pharyngitis  [] Polyposis  [x] Postnasal Drip - slight  [x] Rhinitis - congestion  [] Sinusitis  [] None  Comments:   - most of her seasonal symptoms are respiratory  - when she was seeing a naturopath, he had her take air samples at her cabin (as she would start to not feel good with facial swelling); was told she is allergic to pine    HENT Operations?  [] Adenoids [] Septum [] Sinus  [] Tonsils        [] Other:   [x] None  Comments:     Pulmonary symptoms (from birth to present)?  [] Asthma bronchiale  Inhaler(s)?:   [] Coughing  [] Other  [x] None  Comments:     Environmental and pulmonary symptoms aggravated by?  Season: [] None     [] I     [] II     [x] III     [x] IV     [] V     [] VI          [] VII     [] VIII     [] IX     [x] X     [x] XI     [] XII     [] Perennial  Time of Day: [] None     [] Morning     [] Noon     [] Evening     [] Night     [] Whole Day  Location/Changes: [] None     [] Inside     [] Outside     [] Mountain     [] Sea     [] Other:   Triggers  (specific): [] None     [] Animals     [] Dust     [] Mold     [] Plants     [] Other:   Triggers (other): [] None     [] Psyche     [] Sport     [] Work     [] Other:   Irritant: [] None     [] Cold     [] Heat     [] Odors     [] Physical Efforts     [] Smoke     [] Other:     Order for PATCH TESTS  Reason for tests (suspected allergy): recurrent dyshidrotic hand eczema  Known previous allergies: nickel?  Standardized panels  [x] Standard panel (40 tests)  [x] Preservatives & Antimicrobials (31 tests)  [x] Emulsifiers & Additives (25 tests)   [x] Perfumes/Flavours & Plants (25 tests)  [] Hairdresser panel (12 tests)  [x] Rubber Chemicals (22 tests)  [] Plastics (26 tests)  [] Colorants/Dyes/Food additives (20 tests)  [] Metals (implants/dental) (24 tests)  [] Local anaesthetics/NSAIDs (13 tests)  [] Antibiotics & Antimycotics (14 tests)   [] Corticosteroids (15 tests)   [] Photopatch test (62 tests)   [] Others:     [] Patient's Own Products:   DO NOT test if chemical or biological identity is unknown!   always ask from patient the product information and safety sheets (MSDS)       Order for PRICK TESTS    Reason for tests (suspected allergy): seasonal RC in early spring and late fall  Known previous allergies: none    Standardized prick panels  [] Atopic panel (20 tests)  [] Pediatric Panel (12 tests)  [] Milk, Meat, Eggs, Grains (20 tests)   [] Dust, Epithelia, Feathers (10 tests)  [] Fish, Seafood, Shellfish (17 tests)  [] Nuts, Beans (14 tests)  [] Spice, Vegetable, Fruit (17 tests)  [] Pollen Panel = Tree, Grass, Weed (24 tests)  [] Others:     [] Patient's Own Products:   DO NOT test if chemical or biological identity is unknown!   always ask from patient the product information and safety sheets (MSDS)     Standardized intradermal tests (at same time as patch tests)  [x] Alternaria alternata  [x] Aspergillus fumigatus  [] Cladosporium herbarum   [x] Penicillium notatum  [x] Dermatophagoides farinae  [x]  Dermatophagoides pteronyssinus  [] Dog Epithelium  [] Cat Epithelium  [] Others:     [] Bee venom   [] Wasp venom  !!Specific protocol with dilutions!!       Order for Drug allergy tests (prick & intradermal & patch tests)    [] Penicillin G     [] Ampicillin   [x] Cefazolin        [] Ceftriaxone     [] Ceftazidime     [] Cefepime     [] Cefuroxime  [] Bactrim  [] Iodixanol     [] Iopamidol        [] Iohexol  [x] Others: azithromycin  [] Patient's Own:   Order for 6/2/25 as test date      Atopy Screen (Placed Feb 11, 2025)  No Substance Readings (15 min) Evaluation   POS Histamine 1mg/ml ++    NEG NaCl 0.9% -      No Substance Readings (15 min) Evaluation   1 Alternaria alternata (tenuis)  -    2 Cladosporium herbarum -    3 Aspergillus fumigatus -    4 Penicillium notatum -    5 Dermatophagoides pteronyssinus -    6 Dermatophagoides farinae -    7 Dog epithelium (canis spp) -    8 Cat hair (flaco catus) +    9 Cockroach   (Blatella americana & germanica) -    10 Grass mix midwest   (Teresa, Orchard, Redtop, George) -    11 Jv grass (sorghum halepense) -    12 Weed mix   (common Cocklebur, Lamb s quarters, rough redroot Pigweed, Dock/Sorrel) -    13 Mug wort (artemisia vulgare) -    14 Ragweed giant/short (ambrosia spp) -    15 White birch (Betula papyrifera) -    16 Tree mix 1 (Pecan, Maple BHR, Oak RVW, american [place], black Ringling) -    17 Red cedar (juniperus virginia) -    18 Tree mix 2   (white [place]ley, river/red Birch, black [place], common Stokes, american Elm) -    19 Box elder/Maple mix (acer spp) -    20 [place]worth shagbark (carya ovata) -    Conclusion: Cat reaction is slight and does not correlate with her clinical symptoms. Will plan for IDT at same time as patch and drug allergy testing.      Assessment & Plan:    ==> Final Diagnosis:     # Recurrent dyshidrotic hand dermatitis  DDx: Atopic dermatitis and/or  DDx: Allergic contact dermatitis to rubber chemicals, ingredients in soaps, and/or  emulsifiers  * chronic illness with exacerbation, progression, side effects from treatment    # Suspicion for atopic predisposition with:   PND and RC during change of seasons  Dry, hypersensitive skin with dyshidrosis (atopic dermatitis?)  * chronic illness with exacerbation, progression, side effects from treatment    # Possible phototoxic/photoallergic reaction to thalidomide and pomalidomide  Previously assessed 1/28/19  At the time, was to intensify sun protection (clothes, hat, etc.) and use physical sunscreens  For flare ups, was to use triamcinolone or clobetasol (DesOwen) cream    These conclusions are made at the best of one's knowledge and belief based on the provided evidence such as patient's history and allergy test results and they can change over time or can be incomplete because of missing information.    ==> Treatment Plan:    >> Until patch tests can be performed, continue with treatment plan set with Dr. Mcknight.    Procedures Performed: Allergy prick tests    Staff and Scribe: provider    Scribe Disclosure:   I, MARLO MARIO, am serving as a scribe to document services personally performed by Sunil Rosario MD based on data collection and the provider's statements to me.     Staff Physician Comments:  I was present with the scribe who participated in the documentation of the note. I have verified the history and personally performed the physical exam and medical decision making. I agree with the assessment and plan as documented in the note. I have reviewed and if necessary amended the note.      Sunil Rosario MD  Professor  Head of Dermato-Allergy Division  Department of Dermatology  [facility]     Follow-up in Derm-Allergy clinic for allergy tests as planned  - next appointment with referring provider Dr. Mcknight on 4/15/25  ___________________________    I spent a total of 40 minutes with Shena Hartmann during today s visit. This time was spent  discussing all the individual test results, correlating them to the clinical relevance, counseling the patient and/or coordinating care and performing allergy tests or procedures.       Again, thank you for allowing me to participate in the care of your patient.        Sincerely,        Sunil Rosario MD    Electronically signed

## 2025-02-12 ENCOUNTER — DOCUMENTATION ONLY (OUTPATIENT)
Dept: PHARMACY | Facility: CLINIC | Age: 75
End: 2025-02-12
Payer: COMMERCIAL

## 2025-02-12 LAB
ALBUMIN SERPL ELPH-MCNC: 4.4 G/DL (ref 3.7–5.1)
ALPHA1 GLOB SERPL ELPH-MCNC: 0.2 G/DL (ref 0.2–0.4)
ALPHA2 GLOB SERPL ELPH-MCNC: 0.6 G/DL (ref 0.5–0.9)
B-GLOBULIN SERPL ELPH-MCNC: 0.6 G/DL (ref 0.6–1)
GAMMA GLOB SERPL ELPH-MCNC: 0.3 G/DL (ref 0.7–1.6)
IGA SERPL-MCNC: 3 MG/DL (ref 84–499)
IGG SERPL-MCNC: 360 MG/DL (ref 610–1616)
IGM SERPL-MCNC: <10 MG/DL (ref 35–242)
KAPPA LC FREE SER-MCNC: 0.16 MG/DL (ref 0.33–1.94)
KAPPA LC FREE/LAMBDA FREE SER NEPH: 0.59 {RATIO} (ref 0.26–1.65)
LAMBDA LC FREE SERPL-MCNC: 0.27 MG/DL (ref 0.57–2.63)
LOCATION OF TASK: ABNORMAL
LOCATION OF TASK: NORMAL
M PROTEIN SERPL ELPH-MCNC: 0.1 G/DL
PROT PATTERN SERPL ELPH-IMP: ABNORMAL
PROT PATTERN SERPL IFE-IMP: NORMAL

## 2025-02-12 NOTE — PROGRESS NOTES
Pharmacist IVIG Stewardship Program    Diagnosis: Hypogammaglobulinemia   Date  12/20/2024   IgG Level (mg/dL) 143     Current Dosing Regimen: Privigen 20g IV monthly PRN for IgG <300 mg/dL   Patient is dosed as needed based on an IgG level of less than 300 mg/dL to ensure the lowest amount of medication is administered to achieve beneficial outcomes.  Pre-medications:   Acetaminophen 650 mg by mouth, 30 minutes prior to infusion  Diphenhydramine 37.5 mg IV prior to IVIG infusion  Hydrocortisone 100 mg IV PRN for previous reactions to IVIG therapy   Current Titration Regimen: Start infusion at 0.5 ml/kg/hr x 15 min. If tolerated, increase rate by 0.5 ml/kg/hr every 15 min to a maximum of 4 ml/kg/hr.  Previously Tried Products: None     Side Effects: Unable to reach patient to discuss.     Interventions: Lab review completed.     Assessment:   Date  2/11/2025   IgG Level (mg/dL) 360   Patient is appropriate for IVIG therapy when their level is within parameter. IVIG infusions are effective in increasing IgG levels to an appropriate level to minimize patient's risk of infection. Patient should continue on treatment as they have been per provider order. Without therapy their levels would put them at an increased risk of infections.    Plan: Patient is not okay to proceed with IVIG infusions at this time as her level is above her provider ordered parameter. Patient will have labs checked monthly to assess the need for additional IVIG infusions moving forward.     Next Review Needed: 3/2025    Willa Momin, PharmD, IgCP  Medication Access Pharmacist

## 2025-03-03 ENCOUNTER — ONCOLOGY VISIT (OUTPATIENT)
Dept: ONCOLOGY | Facility: CLINIC | Age: 75
End: 2025-03-03
Attending: INTERNAL MEDICINE
Payer: MEDICARE

## 2025-03-03 ENCOUNTER — ANCILLARY PROCEDURE (OUTPATIENT)
Dept: MAMMOGRAPHY | Facility: CLINIC | Age: 75
End: 2025-03-03
Attending: INTERNAL MEDICINE
Payer: MEDICARE

## 2025-03-03 VITALS
SYSTOLIC BLOOD PRESSURE: 152 MMHG | RESPIRATION RATE: 16 BRPM | BODY MASS INDEX: 23.03 KG/M2 | HEART RATE: 85 BPM | WEIGHT: 121.9 LBS | DIASTOLIC BLOOD PRESSURE: 79 MMHG | OXYGEN SATURATION: 97 % | TEMPERATURE: 97.9 F

## 2025-03-03 DIAGNOSIS — C90.00 MULTIPLE MYELOMA, REMISSION STATUS UNSPECIFIED (H): Primary | ICD-10-CM

## 2025-03-03 DIAGNOSIS — Z85.3 PERSONAL HISTORY OF MALIGNANT NEOPLASM OF BREAST: Primary | ICD-10-CM

## 2025-03-03 DIAGNOSIS — Z12.31 VISIT FOR SCREENING MAMMOGRAM: ICD-10-CM

## 2025-03-03 PROCEDURE — 77063 BREAST TOMOSYNTHESIS BI: CPT | Mod: 52

## 2025-03-03 PROCEDURE — 77067 SCR MAMMO BI INCL CAD: CPT | Mod: 52

## 2025-03-03 PROCEDURE — G0463 HOSPITAL OUTPT CLINIC VISIT: HCPCS | Performed by: INTERNAL MEDICINE

## 2025-03-03 PROCEDURE — 99213 OFFICE O/P EST LOW 20 MIN: CPT | Performed by: INTERNAL MEDICINE

## 2025-03-03 PROCEDURE — 94640 AIRWAY INHALATION TREATMENT: CPT | Performed by: PHYSICIAN ASSISTANT

## 2025-03-03 RX ORDER — HEPARIN SODIUM,PORCINE 10 UNIT/ML
5-20 VIAL (ML) INTRAVENOUS DAILY PRN
OUTPATIENT
Start: 2025-04-01

## 2025-03-03 RX ORDER — PENTAMIDINE ISETHIONATE 300 MG/300MG
300 INHALANT RESPIRATORY (INHALATION) ONCE
Start: 2025-04-01 | End: 2025-04-01

## 2025-03-03 RX ORDER — ALBUTEROL SULFATE 5 MG/ML
2.5 SOLUTION RESPIRATORY (INHALATION) ONCE
Status: DISCONTINUED | OUTPATIENT
Start: 2025-03-03 | End: 2025-03-03 | Stop reason: HOSPADM

## 2025-03-03 RX ORDER — ALBUTEROL SULFATE 5 MG/ML
2.5 SOLUTION RESPIRATORY (INHALATION) ONCE
Start: 2025-04-01 | End: 2025-04-01

## 2025-03-03 RX ORDER — HEPARIN SODIUM (PORCINE) LOCK FLUSH IV SOLN 100 UNIT/ML 100 UNIT/ML
5 SOLUTION INTRAVENOUS
OUTPATIENT
Start: 2025-04-01

## 2025-03-03 RX ORDER — PENTAMIDINE ISETHIONATE 300 MG/300MG
300 INHALANT RESPIRATORY (INHALATION) ONCE
Status: COMPLETED | OUTPATIENT
Start: 2025-03-03 | End: 2025-03-03

## 2025-03-03 RX ADMIN — PENTAMIDINE ISETHIONATE 300 MG: 300 INHALANT RESPIRATORY (INHALATION) at 12:48

## 2025-03-03 ASSESSMENT — PAIN SCALES - GENERAL: PAINLEVEL_OUTOF10: MODERATE PAIN (4)

## 2025-03-03 NOTE — LETTER
"3/3/2025      Shena Hartmann  [address] [place] [address]      Dear Colleague,    Thank you for referring your patient, Shena Hartmann, to the [facility]. Please see a copy of my visit note below.    March 3, 2025    CC:  History of breast cancer and multiple myeloma    HPI:  Ms Chatterjee has a history of multiple myeloma being followed by Dr Hills and now Dr Chavez.    Breast cancer history:    Today she is here because she has a past history of Breast cancer which was diagnosed in Oct 1994. It was treated with a right mastectomy and right axillary LN dissection. 8 out of 16 lymph nodes were positive. She did not have reconstruction done. This was followed by 4 cycles of adjuvant Cytoxan, Adriamycin and 5 FU ( CAF ) chemotherapy which finished in Feb 1995. She did not get radiation or hormonal treatment. Note I do not have those records to review and this history is per patient's recall of events.  Since then she has been followed by serial annual mammograms and has been doing well from the breast cancer aspect.    Briefly her myeloma history is summarized below:  She was diagnosed with \"smoldering multiple myeloma\" in 2001, which was watched until 12/2012, when she presented with symptomatic multiple myeloma requiring therapy.   She initially was treated with Revlimid and dexamethasone. However, given poor tolerance and not adequate response to therapy, that was subsequently switched to Velcade and dexamethasone. She received this therapy until 04/2013. However, given minimal response to therapy, Cytoxan was added to the regimen. She received CyBorD until 08/2013, and had a maximal reduction of M-protein to 2.6-2.8 g/dL. A bone marrow biopsy still showed significant disease. Then she received 2 cycles of high-dose Cytoxan with inadequate decline in M-spike. She was then switched D-PACE. She received 2 cycles; one in November 2013, and the last one on 12/05/2013. Despite " that she had significant disease burden. She underwent Cytoxan priming on 2/20/14. She received Melphalan conditioning and then received her Auto HCT on 3/27/14.   Since then she has been maintained on pomalidomide maintenance therapy along with daratumumab.  Her M spike is mildly increased.      She was switched to Isatuximab+Kyprolis+dex since 2/2023 under the care of Dr Chavez. Now in CR. Changed to isatuximab maintenance since 10/2023  Spring 2024 FLC began to rise again  5/22/24 Underwent apheresis for CAR-T  7/29/24 Carvykti (CAR-T)    Interval History:    In returning today, she has no concerns.  She denies any new lumps or bumps.  She had her bilateral mammogram today.    She does have some hot flashes and vaginal dryness but that has been stable.  No new bone pains or joint pains.     She was is being followed by cardiology for HTN and provided recs for better HTN control for mild diastolic dysfunction noted on ECHO last year.    She was on Isatuximab maintenance after being in CR with park/Kd reimen.  Spring 2024 FLC began to rise again  5/22/24 Underwent apheresis for CAR-T  7/29/24 Carvykti (CAR-T)    She does not have breast specific complaints today.  She is excited her most recent PET was negative.       ROS: 10 point ROS neg other than the symptoms noted above in the HPI.      Current Outpatient Medications   Medication Sig Dispense Refill     acetaminophen (TYLENOL) 325 MG tablet Take 325-650 mg by mouth every 6 hours as needed for mild pain       acyclovir (ZOVIRAX) 400 MG tablet Take 1 tablet (400 mg) by mouth every 12 hours. 60 tablet 11     amLODIPine (NORVASC) 2.5 MG tablet Take 2 tablets (5 mg) by mouth daily Take 5 mg (2 tablets) in the morning. 180 tablet 3     Ascorbic Acid (VITAMIN C PO) Take 1,000 mg by mouth 2 times daily        CALCIUM-VITAMIN D-VITAMIN K PO Take 2 tablets by mouth daily.       clobetasol (TEMOVATE) 0.05 % external ointment Apply topically 2 times daily as needed  (itching and rash). Topically to rash on hands until resolved 45 g 0     COENZYME Q-10 PO Take 100 mg by mouth daily        Cyanocobalamin 1000 MCG/ML LIQD Take 500 mcg by mouth 2 times daily       diclofenac (VOLTAREN) 1 % topical gel Apply 2 g topically 2 times daily.       levothyroxine (SYNTHROID/LEVOTHROID) 50 MCG tablet Take 1 tablet (50 mcg) by mouth daily. 90 tablet 0     MAGNESIUM PO Take 235 mg by mouth 2 times daily.       Multiple Vitamins-Minerals (MULTIVITAMIN OR) Take 1 tablet by mouth 2 times daily.       ondansetron (ZOFRAN) 4 MG tablet Take 1 tablet (4 mg) by mouth every 8 hours as needed for nausea 30 tablet 0     order for DME 6 mastectomy bras  Length of need: 99 months 6 Device 1     order for DME R mastectomy prosthesis   Length of need:  99 months 1 Device 1     penicillin V (VEETID) 250 MG tablet Take 1 tablet (250 mg) by mouth daily. 60 tablet 2     Probiotic Product (PROBIOTIC PO) Take 1 capsule by mouth daily. Theralac Pro Probiotic       triamcinolone (KENALOG) 0.1 % external cream Apply topically 2 times daily as needed for irritation. 15 g 11     ZINC PICOLINATE PO Take 30 mg by mouth daily       Quercetin 500 MG CAPS Take 500 mg by mouth daily       Current Facility-Administered Medications   Medication Dose Route Frequency Provider Last Rate Last Admin     [START ON 6/2/2025] azithromycin (ZITHROMAX) 500 mg for allergy testing   Other Once          [START ON 6/2/2025] ceFAZolin (ANCEF) 500 mg for allergy testing   Other Once          Facility-Administered Medications Ordered in Other Visits   Medication Dose Route Frequency Provider Last Rate Last Admin     potassium chloride 20 mEq in 50 mL IVPB  20 mEq Intravenous Once Mae Kay PA-C         potassium chloride SA (K-DUR,KLOR-CON M) CR tablet 20 mEq  20 mEq Oral Once Mae Kay PA-C               BP (!) 152/79 (BP Location: Right arm, Patient Position: Sitting, Cuff Size: Adult Regular)   Pulse 85   Temp  97.9  F (36.6  C) (Oral)   Resp 16   Wt 55.3 kg (121 lb 14.4 oz)   LMP  (LMP Unknown)   SpO2 97%   BMI 23.03 kg/m    General: no acute distress  HEENT: PERRLA, no palor or icterus.   NECK: supple - no cervical or supraclavicular LAD  CVS: s1s2 no m r g . No edema  CHEST: clear to auscultation b/l  ABDOMEN: soft non tender no hepatosplenomegaly  NEURO: AAOX3  Grossly non focal neuro exam  SKIN: no obvious rashes  LYMPH NODES: no palpable cervical, supraclavicular, axillary or inguinal LAD  BREAST: right sided mastectomy and well healed surgical scar. No palpable abnormality or lumps noted on either side.      Mammogram today was reviewed by me and with radiology; overall is benign.    Assessment and Recommendations    1. Breast Cancer: She is now more than 20 years out from her diagnosis and completion of the therapy.  She had a left screening mammogram done today which was normal.  We also discussed about the limited utility of yearly mammograms after 75 years of age.  For now recommend continuing yearly mammograms.  I reviewed her imaging with radiology.  Benign.  Return in 1 year in survivorship clinic.        2. Multiple Myeloma s/p Auto HCT on 3/27/2014- was on Pomalidomide maintenance with daratumumab- she is now being seen by Dr Chavez. She was switched to Isatuximab+Kyprolis+dex since 2/2023 with CR. Changed to isatuximab maintenance and then most recently underwent CAR T therapy.  She has no active disease.  SHe is thrilled.  SHe continues to see Dr Chavez.    3. Cardiovascular health: She has had prior anthracyclines totalling an estimation of 240 mg/m2 ; we reviewed the importance of CV risk reduction.  She is on antihypertensives with lisinopril.  We discussed the importance of seeing PCP for cholesterol mgmt, etc.  Based on cardiooncology guidelines, I recommended ECHO done 3/27/2023 normal EF 60-65% with some evidence of concentric remodeling. Following cardio onc Dr Price.      Following PCP   Thomas Da Silva MD                  Again, thank you for allowing me to participate in the care of your patient.        Sincerely,        Neelima Da Silva MD    Electronically signed

## 2025-03-03 NOTE — PROGRESS NOTES
Shena Hartmann was seen today for a Pentamidine nebulizer tx ordered by Dr. Chavez.    Patient was first given 2.5 mg of Albuterol nebulizer (NDC 13704-614-84, LOT#[id number], EXP 11/30/2026), after which Pentamidine 300 mg (Lot # [id number], EXP 08/2026) mixed with 6cc Sterile H20 was administered through a filtered nebulizer.    Pre-treatment: SpO2 = 96%   HR = 77   BBS = clear   Post-treatment: SpO2 = 96%  HR = 77  BBS = clear    Neb treatments administered at 5 LPM to minimize cough.    This service today was provided under Ruma Hermosillo PA-C, who was available if needed.     Procedure was completed by Mely Gomez.

## 2025-03-03 NOTE — NURSING NOTE
"Oncology Rooming Note    March 3, 2025 1:52 PM   Shena Hartmann is a 74 year old female who presents for:    Chief Complaint   Patient presents with    Oncology Clinic Visit    Breast Cancer     1 yr follow-up     Initial Vitals: BP (!) 152/79 (BP Location: Right arm, Patient Position: Sitting, Cuff Size: Adult Regular)   Pulse 85   Temp 97.9  F (36.6  C) (Oral)   Resp 16   Wt 55.3 kg (121 lb 14.4 oz)   LMP  (LMP Unknown)   SpO2 97%   BMI 23.03 kg/m   Estimated body mass index is 23.03 kg/m  as calculated from the following:    Height as of 1/28/25: 1.549 m (5' 1\").    Weight as of this encounter: 55.3 kg (121 lb 14.4 oz). Body surface area is 1.54 meters squared.  Moderate Pain (4) Comment: Data Unavailable   No LMP recorded (lmp unknown). Patient is postmenopausal.  Allergies reviewed: Yes  Medications reviewed: Yes    Medications: Medication refills not needed today.  Pharmacy name entered into [facility]:    [id number] - [place], [place] - [address] AT [facility] [address]  WRITTEN PRESCRIPTION REQUESTED  [facility] - [place], [place] - [address]    Frailty Screening:   Is the patient here for a new oncology consult visit in cancer care? 2. No      Clinical concerns: Pt reports no new concerns today.       Angela Miranda, EMT     "

## 2025-03-03 NOTE — PROGRESS NOTES
"March 3, 2025    CC:  History of breast cancer and multiple myeloma    HPI:  Ms Chatterjee has a history of multiple myeloma being followed by Dr Hills and now Dr Chavez.    Breast cancer history:    Today she is here because she has a past history of Breast cancer which was diagnosed in Oct 1994. It was treated with a right mastectomy and right axillary LN dissection. 8 out of 16 lymph nodes were positive. She did not have reconstruction done. This was followed by 4 cycles of adjuvant Cytoxan, Adriamycin and 5 FU ( CAF ) chemotherapy which finished in Feb 1995. She did not get radiation or hormonal treatment. Note I do not have those records to review and this history is per patient's recall of events.  Since then she has been followed by serial annual mammograms and has been doing well from the breast cancer aspect.    Briefly her myeloma history is summarized below:  She was diagnosed with \"smoldering multiple myeloma\" in 2001, which was watched until 12/2012, when she presented with symptomatic multiple myeloma requiring therapy.   She initially was treated with Revlimid and dexamethasone. However, given poor tolerance and not adequate response to therapy, that was subsequently switched to Velcade and dexamethasone. She received this therapy until 04/2013. However, given minimal response to therapy, Cytoxan was added to the regimen. She received CyBorD until 08/2013, and had a maximal reduction of M-protein to 2.6-2.8 g/dL. A bone marrow biopsy still showed significant disease. Then she received 2 cycles of high-dose Cytoxan with inadequate decline in M-spike. She was then switched D-PACE. She received 2 cycles; one in November 2013, and the last one on 12/05/2013. Despite that she had significant disease burden. She underwent Cytoxan priming on 2/20/14. She received Melphalan conditioning and then received her Auto HCT on 3/27/14.   Since then she has been maintained on pomalidomide maintenance therapy along with " daratumumab.  Her M spike is mildly increased.      She was switched to Isatuximab+Kyprolis+dex since 2/2023 under the care of Dr Chavez. Now in CR. Changed to isatuximab maintenance since 10/2023  Spring 2024 FLC began to rise again  5/22/24 Underwent apheresis for CAR-T  7/29/24 Carvykti (CAR-T)    Interval History:    In returning today, she has no concerns.  She denies any new lumps or bumps.  She had her bilateral mammogram today.    She does have some hot flashes and vaginal dryness but that has been stable.  No new bone pains or joint pains.     She was is being followed by cardiology for HTN and provided recs for better HTN control for mild diastolic dysfunction noted on ECHO last year.    She was on Isatuximab maintenance after being in CR with park/Kd tashi.  Spring 2024 FLC began to rise again  5/22/24 Underwent apheresis for CAR-T  7/29/24 Carvykti (CAR-T)    She does not have breast specific complaints today.  She is excited her most recent PET was negative.       ROS: 10 point ROS neg other than the symptoms noted above in the HPI.      Current Outpatient Medications   Medication Sig Dispense Refill    acetaminophen (TYLENOL) 325 MG tablet Take 325-650 mg by mouth every 6 hours as needed for mild pain      acyclovir (ZOVIRAX) 400 MG tablet Take 1 tablet (400 mg) by mouth every 12 hours. 60 tablet 11    amLODIPine (NORVASC) 2.5 MG tablet Take 2 tablets (5 mg) by mouth daily Take 5 mg (2 tablets) in the morning. 180 tablet 3    Ascorbic Acid (VITAMIN C PO) Take 1,000 mg by mouth 2 times daily       CALCIUM-VITAMIN D-VITAMIN K PO Take 2 tablets by mouth daily.      clobetasol (TEMOVATE) 0.05 % external ointment Apply topically 2 times daily as needed (itching and rash). Topically to rash on hands until resolved 45 g 0    COENZYME Q-10 PO Take 100 mg by mouth daily       Cyanocobalamin 1000 MCG/ML LIQD Take 500 mcg by mouth 2 times daily      diclofenac (VOLTAREN) 1 % topical gel Apply 2 g topically 2  times daily.      levothyroxine (SYNTHROID/LEVOTHROID) 50 MCG tablet Take 1 tablet (50 mcg) by mouth daily. 90 tablet 0    MAGNESIUM PO Take 235 mg by mouth 2 times daily.      Multiple Vitamins-Minerals (MULTIVITAMIN OR) Take 1 tablet by mouth 2 times daily.      ondansetron (ZOFRAN) 4 MG tablet Take 1 tablet (4 mg) by mouth every 8 hours as needed for nausea 30 tablet 0    order for DME 6 mastectomy bras  Length of need: 99 months 6 Device 1    order for DME R mastectomy prosthesis   Length of need:  99 months 1 Device 1    penicillin V (VEETID) 250 MG tablet Take 1 tablet (250 mg) by mouth daily. 60 tablet 2    Probiotic Product (PROBIOTIC PO) Take 1 capsule by mouth daily. Theralac Pro Probiotic      triamcinolone (KENALOG) 0.1 % external cream Apply topically 2 times daily as needed for irritation. 15 g 11    ZINC PICOLINATE PO Take 30 mg by mouth daily      Quercetin 500 MG CAPS Take 500 mg by mouth daily       Current Facility-Administered Medications   Medication Dose Route Frequency Provider Last Rate Last Admin    [START ON 6/2/2025] azithromycin (ZITHROMAX) 500 mg for allergy testing   Other Once         [START ON 6/2/2025] ceFAZolin (ANCEF) 500 mg for allergy testing   Other Once          Facility-Administered Medications Ordered in Other Visits   Medication Dose Route Frequency Provider Last Rate Last Admin    potassium chloride 20 mEq in 50 mL IVPB  20 mEq Intravenous Once Mae Kay PA-C        potassium chloride SA (K-DUR,KLOR-CON M) CR tablet 20 mEq  20 mEq Oral Once Mae Kay PA-C               BP (!) 152/79 (BP Location: Right arm, Patient Position: Sitting, Cuff Size: Adult Regular)   Pulse 85   Temp 97.9  F (36.6  C) (Oral)   Resp 16   Wt 55.3 kg (121 lb 14.4 oz)   LMP  (LMP Unknown)   SpO2 97%   BMI 23.03 kg/m    General: no acute distress  HEENT: PERRLA, no palor or icterus.   NECK: supple - no cervical or supraclavicular LAD  CVS: s1s2 no m r g . No  edema  CHEST: clear to auscultation b/l  ABDOMEN: soft non tender no hepatosplenomegaly  NEURO: AAOX3  Grossly non focal neuro exam  SKIN: no obvious rashes  LYMPH NODES: no palpable cervical, supraclavicular, axillary or inguinal LAD  BREAST: right sided mastectomy and well healed surgical scar. No palpable abnormality or lumps noted on either side.      Mammogram today was reviewed by me and with radiology; overall is benign.    Assessment and Recommendations    1. Breast Cancer: She is now more than 20 years out from her diagnosis and completion of the therapy.  She had a left screening mammogram done today which was normal.  We also discussed about the limited utility of yearly mammograms after 75 years of age.  For now recommend continuing yearly mammograms.  I reviewed her imaging with radiology.  Benign.  Return in 1 year in survivorship clinic.        2. Multiple Myeloma s/p Auto HCT on 3/27/2014- was on Pomalidomide maintenance with daratumumab- she is now being seen by Dr Chavez. She was switched to Isatuximab+Kyprolis+dex since 2/2023 with CR. Changed to isatuximab maintenance and then most recently underwent CAR T therapy.  She has no active disease.  SHe is thrilled.  SHe continues to see Dr Chavez.    3. Cardiovascular health: She has had prior anthracyclines totalling an estimation of 240 mg/m2 ; we reviewed the importance of CV risk reduction.  She is on antihypertensives with lisinopril.  We discussed the importance of seeing PCP for cholesterol mgmt, etc.  Based on cardiooncology guidelines, I recommended ECHO done 3/27/2023 normal EF 60-65% with some evidence of concentric remodeling. Following cardio onc Dr Price.      Following PCP Dr. Thomas Da Silva MD

## 2025-03-04 ENCOUNTER — PRE VISIT (OUTPATIENT)
Dept: ALLERGY | Facility: CLINIC | Age: 75
End: 2025-03-04

## 2025-03-05 ENCOUNTER — PRE VISIT (OUTPATIENT)
Dept: UROLOGY | Facility: CLINIC | Age: 75
End: 2025-03-05
Payer: COMMERCIAL

## 2025-03-05 NOTE — TELEPHONE ENCOUNTER
"Reason for visit: Urethrocele      Relevant information: Referred by Oncology provider due to \"concerns for worsening urethrocele that is irritating her\". H/o multiple myeloma, stem cell transplant, hypothyroidism.    Records/imaging/labs/orders: all records available    Pt called: no need for a call    At Rooming: Standard. Have Dr. Velez see the patient first, then per provider request-have patient empty their bladder and PVR. Get a urine sample and dip the urine if they have UTI symptoms.    Sharlene Gasca  3/5/2025  1:59 PM  "

## 2025-03-07 ENCOUNTER — PRE VISIT (OUTPATIENT)
Dept: UROLOGY | Facility: CLINIC | Age: 75
End: 2025-03-07

## 2025-03-11 ENCOUNTER — PATIENT OUTREACH (OUTPATIENT)
Dept: ONCOLOGY | Facility: CLINIC | Age: 75
End: 2025-03-11
Payer: COMMERCIAL

## 2025-03-11 NOTE — PROGRESS NOTES
"New Patient Oncology Nurse Navigator Note     Referring provider: Neelima Da Silva     Referring Clinic/Organization:  Oncology Adult     Referred to (specialty:) Survivorship     Requested provider (if applicable): Lisa Pope PA-C     Date Referral Received: March 11, 2025     Evaluation for:  Personal history of malignant neoplasm of breast, Z85.3 (ICD-10-CM)      Clinical History (per Nurse review of records provided):  Right breast cancer diagnosed in 1994 treated with mastectomy and axillary lymph node dissection with 8/16 lymph nodes positive.  She received 4 cycles adjuvant cytoxan, adriamycin and 5 Follow-up, which was completed in February 1995.  She did not receive endocrine therapy or radiation.  No records are available.    Also has history of multiple myeloma diagnosed in 2012 (initial diagnosis of smoldering multiple myeloma in 2001).  As takend from Dr. Da Silva' note, \"she initially was treated with Revlimid and dexamethasone. However, given poor tolerance and not adequate response to therapy, that was subsequently switched to Velcade and dexamethasone. She received this therapy until 04/2013. However, given minimal response to therapy, Cytoxan was added to the regimen. She received CyBorD until 08/2013, and had a maximal reduction of M-protein to 2.6-2.8 g/dL. A bone marrow biopsy still showed significant disease. Then she received 2 cycles of high-dose Cytoxan with inadequate decline in M-spike. She was then switched D-PACE. She received 2 cycles; one in November 2013, and the last one on 12/05/2013. Despite that she had significant disease burden. She underwent Cytoxan priming on 2/20/14. She received Melphalan conditioning and then received her Auto HCT on 3/27/14.   Since then she has been maintained on pomalidomide maintenance therapy along with daratumumab.  Her M spike is mildly increased.       She was switched to Isatuximab+Kyprolis+dex since 2/2023 under the care of Dr Chavez. Now in CR. Changed " "to isatuximab maintenance since 10/2023  Spring 2024 FLC began to rise again  5/22/24 Underwent apheresis for CAR-T  7/29/24 Carvykti (CAR-T)\"      Records Location: See Bookmarked material     Records Needed: none    Payor: BCBS / Plan: [employer] [place] MEDICARE SUPPLEMENT / Product Type: [place] /     RN left a voice message on patient's phone indicating the scheduling will be calling her in the next 2-3 days to schedule with Lisa Pope in the survivorship clinic.  Call back number left with instructions for return the phone call if she has any questions about the survivorship clinic.      Comfort Chirinos RN  Cancer Survivorship Clinic     "

## 2025-03-12 ENCOUNTER — LAB (OUTPATIENT)
Dept: LAB | Facility: CLINIC | Age: 75
End: 2025-03-12
Attending: REGISTERED NURSE
Payer: MEDICARE

## 2025-03-12 ENCOUNTER — OFFICE VISIT (OUTPATIENT)
Dept: NEUROLOGY | Facility: CLINIC | Age: 75
End: 2025-03-12
Attending: REGISTERED NURSE
Payer: MEDICARE

## 2025-03-12 VITALS — HEART RATE: 80 BPM | SYSTOLIC BLOOD PRESSURE: 132 MMHG | OXYGEN SATURATION: 96 % | DIASTOLIC BLOOD PRESSURE: 73 MMHG

## 2025-03-12 DIAGNOSIS — M79.10 MUSCLE SORENESS: ICD-10-CM

## 2025-03-12 DIAGNOSIS — M79.2 NERVE PAIN: ICD-10-CM

## 2025-03-12 DIAGNOSIS — M79.642 PAIN OF LEFT HAND: ICD-10-CM

## 2025-03-12 DIAGNOSIS — C90.00 MULTIPLE MYELOMA NOT HAVING ACHIEVED REMISSION (H): ICD-10-CM

## 2025-03-12 DIAGNOSIS — E03.9 ACQUIRED HYPOTHYROIDISM: ICD-10-CM

## 2025-03-12 DIAGNOSIS — C90.02 MULTIPLE MYELOMA IN RELAPSE (H): ICD-10-CM

## 2025-03-12 DIAGNOSIS — G56.20 ULNAR NEUROPATHY, UNSPECIFIED LATERALITY: Primary | ICD-10-CM

## 2025-03-12 DIAGNOSIS — C90.00 MULTIPLE MYELOMA, REMISSION STATUS UNSPECIFIED (H): ICD-10-CM

## 2025-03-12 LAB
ALBUMIN SERPL BCG-MCNC: 4.4 G/DL (ref 3.5–5.2)
ALP SERPL-CCNC: 58 U/L (ref 40–150)
ALT SERPL W P-5'-P-CCNC: 24 U/L (ref 0–50)
ANION GAP SERPL CALCULATED.3IONS-SCNC: 10 MMOL/L (ref 7–15)
AST SERPL W P-5'-P-CCNC: 26 U/L (ref 0–45)
BASOPHILS # BLD AUTO: 0 10E3/UL (ref 0–0.2)
BASOPHILS NFR BLD AUTO: 0 %
BILIRUB SERPL-MCNC: <0.2 MG/DL
BUN SERPL-MCNC: 13.4 MG/DL (ref 8–23)
CALCIUM SERPL-MCNC: 9.3 MG/DL (ref 8.8–10.4)
CHLORIDE SERPL-SCNC: 102 MMOL/L (ref 98–107)
CK SERPL-CCNC: 106 U/L (ref 26–192)
CREAT SERPL-MCNC: 0.78 MG/DL (ref 0.51–0.95)
EGFRCR SERPLBLD CKD-EPI 2021: 79 ML/MIN/1.73M2
EOSINOPHIL # BLD AUTO: 0 10E3/UL (ref 0–0.7)
EOSINOPHIL NFR BLD AUTO: 1 %
ERYTHROCYTE [DISTWIDTH] IN BLOOD BY AUTOMATED COUNT: 15 % (ref 10–15)
GLUCOSE SERPL-MCNC: 99 MG/DL (ref 70–99)
HCO3 SERPL-SCNC: 26 MMOL/L (ref 22–29)
HCT VFR BLD AUTO: 35.8 % (ref 35–47)
HGB BLD-MCNC: 12.1 G/DL (ref 11.7–15.7)
IMM GRANULOCYTES # BLD: 0 10E3/UL
IMM GRANULOCYTES NFR BLD: 0 %
LYMPHOCYTES # BLD AUTO: 1.5 10E3/UL (ref 0.8–5.3)
LYMPHOCYTES NFR BLD AUTO: 39 %
MCH RBC QN AUTO: 35.9 PG (ref 26.5–33)
MCHC RBC AUTO-ENTMCNC: 33.8 G/DL (ref 31.5–36.5)
MCV RBC AUTO: 106 FL (ref 78–100)
MONOCYTES # BLD AUTO: 0.6 10E3/UL (ref 0–1.3)
MONOCYTES NFR BLD AUTO: 16 %
NEUTROPHILS # BLD AUTO: 1.7 10E3/UL (ref 1.6–8.3)
NEUTROPHILS NFR BLD AUTO: 44 %
NRBC # BLD AUTO: 0 10E3/UL
NRBC BLD AUTO-RTO: 0 /100
PLATELET # BLD AUTO: 61 10E3/UL (ref 150–450)
POTASSIUM SERPL-SCNC: 4 MMOL/L (ref 3.4–5.3)
PROT SERPL-MCNC: 6.4 G/DL (ref 6.4–8.3)
RBC # BLD AUTO: 3.37 10E6/UL (ref 3.8–5.2)
SODIUM SERPL-SCNC: 138 MMOL/L (ref 135–145)
TOTAL PROTEIN SERUM FOR ELP: 6.2 G/DL (ref 6.4–8.3)
WBC # BLD AUTO: 3.8 10E3/UL (ref 4–11)

## 2025-03-12 PROCEDURE — 85041 AUTOMATED RBC COUNT: CPT

## 2025-03-12 PROCEDURE — 86334 IMMUNOFIX E-PHORESIS SERUM: CPT | Performed by: PATHOLOGY

## 2025-03-12 PROCEDURE — 83521 IG LIGHT CHAINS FREE EACH: CPT

## 2025-03-12 PROCEDURE — 82550 ASSAY OF CK (CPK): CPT

## 2025-03-12 PROCEDURE — 82784 ASSAY IGA/IGD/IGG/IGM EACH: CPT

## 2025-03-12 PROCEDURE — 250N000011 HC RX IP 250 OP 636: Performed by: REGISTERED NURSE

## 2025-03-12 PROCEDURE — 84443 ASSAY THYROID STIM HORMONE: CPT

## 2025-03-12 PROCEDURE — 80053 COMPREHEN METABOLIC PANEL: CPT

## 2025-03-12 PROCEDURE — 36591 DRAW BLOOD OFF VENOUS DEVICE: CPT

## 2025-03-12 PROCEDURE — 84155 ASSAY OF PROTEIN SERUM: CPT

## 2025-03-12 PROCEDURE — 85004 AUTOMATED DIFF WBC COUNT: CPT

## 2025-03-12 PROCEDURE — 84165 PROTEIN E-PHORESIS SERUM: CPT | Mod: TC | Performed by: PATHOLOGY

## 2025-03-12 RX ORDER — HEPARIN SODIUM (PORCINE) LOCK FLUSH IV SOLN 100 UNIT/ML 100 UNIT/ML
5 SOLUTION INTRAVENOUS ONCE
Status: COMPLETED | OUTPATIENT
Start: 2025-03-12 | End: 2025-03-12

## 2025-03-12 RX ADMIN — Medication 5 ML: at 15:38

## 2025-03-12 NOTE — LETTER
3/12/2025       RE: Shena Hartmann  [address] [place] [address]     Dear Colleague,    Thank you for referring your patient, Shena Hartmann, to the [facility]UROLOG[facility] at [facility]. Please see a copy of my visit note below.    [facility] Neurology Consultation    Shena Hartmann MRN# [id number]   Age: 74 year old YOB: 1950     Requesting physician: Thomas Delgado     Reason for Consultation: left hand pain      History of Presenting Symptoms:   Shena Hartmann is a 74 year old female who presents today for evaluation of left hand pain.  The patient has a pertinent medical history of HTN, multiple myeloma (s/p ASCBMT 3/2014. Other tx Cytoxan 2013, D-PACE 2013, CAR-T 7/29/2024).  During a visit with her hematology provider 10/31/2024 the patient noted having pain and numbness in her 5th digit on the left spreading to her 3-4th digits.  This led to sharp pain shooting up her left arm to the elbow at times. Symptoms improved by her next visit, 11/12/2024.  No specific abnormality of the left arm, cervical spine, or brain was noted on PET 11/4/2024.    The patient went to an orthopedics consultation since her referral was placed.  She was told she had lateral epicondylitis on the left.  This symptom occurred in the end of October.  She has treated this with some improvement.  She feels that since treatment, she has had development of right sided elbow symptoms. This developed around 12/2024. If she uses her arms to cook or chop food, they become aching in nature.  There is numbness and tingling present on both arms, often worsened when flexing her wrist and pronating her arm. She finds the sensory issues occur in the 4th and 5th digits. Symptoms go away with limited use.  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      Her  main issues though are descriptions of hand zingers, and weakness in her hands.  She also describes having anterior-lateral leg related tightness b/l, but not in the calves. Both of these issues are transient.  She has worked with PT to help her with these symptoms.  She feels with exercises she has moved from 8/10 pain to 2-4/10.  There is no urinary incontinence, there is no groin numbness.  She does find that after CAR-T therapy she had loose stools, but this has improved over the last month.     Social History:   No alcohol abuse. No smoking history.     Medications:   Amlodipine  Levothyroxine  Acyclovir  Zofran  Quercetin  Zinc     Physical Exam:   Vitals: /73 (BP Location: Right arm, Patient Position: Sitting, Cuff Size: Adult Regular)   Pulse 80   LMP  (LMP Unknown)   SpO2 96%    General: Seated comfortably in no acute distress.  Neurologic:     Mental Status: Fully alert, attentive and oriented. Speech clear and fluent, no paraphasic errors.      Cranial Nerves: Visual fields intact. PERRL. EOMI with normal smooth pursuit. Facial sensation intact/symmetric. Facial movements symmetric. Hearing not formally tested but intact to conversation. Palate elevation symmetric, uvula midline. No dysarthria. Shoulder shrug strong bilaterally. Tongue protrusion midline.     Motor: No tremors or other abnormal movements observed. Muscle tone normal throughout. No pronator drift. Normal/symmetric rapid finger tapping.   R/L  Upper:  Shoulder abduction, Deltoid (Axillary n), C5,6: 5/5  Elbow flexion, Bicep (Musculocutaneous n), C5,6: 5/5  Elbow extension, Tricep (Radial n), C6,7,8: 5/5  Wrist Flexion, Flexor carpi radialis, (Median), C6, 7: 5/5  Wrist Extension, Extensor carpi radialis longus (Radial), C6,7: 5/5  Finger Flexion, flexor digitorum profundus (median and ulnar), C7,8, T1: 5/5  Finger Flexion, flexor pollicis longus (Anterior interosseus n), C7, 8: 5/5  Finger Extension, Extensor digitorum communis  (Posterior interosseus n), C7,8: 5/5  Finger Extension, extensor indicis proprius (radial), C8: 5/5  Finger abduction:   Abductor digiti minimi (ulnar), C8, T1: 5/5   Abductor pollicis brevis (median), C8, T1: 5/5   Lower Extremities  Hip Flexion, Iliopsoas, L1.2: 5/5  Hip Adduction, Adductors, (Obdurator n), L2.3: 5/5  Knee Flexion, Hamstrings (Sciatic n), S1: 5/5  Knee Extension, Quadriceps (Femoral n), L3.4: 5/5  Ankle Dorsiflexion, Tibialis anterior (Deep peroneal n), L4: 5/5  Ankle Plantarflexion, Gastrocnemius, Soleus (Tibial n), S1.2: 5/5  Inversion, in plantar flexed position (posterior tibialis from tibial, L4-5): 5/5  Eversion in dorsiflexed position (anterior tibialis, peroneal, L4-5): 5/5       Deep Tendon Reflexes: 2+/symmetric throughout upper and lower extremities. No clonus. Toes downgoing bilaterally.     Sensory: Intact/symmetric to light touch, pinprick, temperature, vibration and proprioception throughout upper and lower extremities. Negative Romberg.      Coordination: Finger-nose-finger without dysmetria. Rapid alternating movements intact/symmetric with normal speed and rhythm.     Gait: Normal, steady casual gait. Able to walk on toes, heels and tandem without difficulty.         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  I suspect this may be compression related overall, but given the onset of symptoms seems to be after CAR-T therapy I do wonder if this is contributing.  CAR-T can lead to neurotoxicity in multiple domains (central and peripheral).  I would expect more of a distal symmetric pattern, but I do think it is possible to have multifocal mononeuropathy like presentation.  Obtaining an EMG would be helpful to determine  if there is indeed classic findings for ulnar neuropathy that is healing or mild.  If so, then referral to ortho could be done with plans for surgical correction or possibly steroid injection. If negative, then consideration towards central axis imaging would be made.    Plan:  - Wear wrist braces nightly  - Wear elbow braces nightly  - EMG b/l upper extremities    Follow up in Neurology clinic in 3 months, or should new concerns arise.    CHELSIE Hillman D.O.   of Neurology    Total time today (68 min) in this patient encounter was spent on pre-charting, counseling and/or coordination of care.  The patient is in agreement with this plan and has no further questions. The longitudinal plan of care for the diagnosis(es)/condition(s) as documented were addressed during this visit. Due to the added complexity in care, I will continue to support Shena in the subsequent management and with ongoing continuity of care.        Again, thank you for allowing me to participate in the care of your patient.      Sincerely,    Jhon Hillman, DO

## 2025-03-12 NOTE — PATIENT INSTRUCTIONS
I suspect you may have compression of the ulnar and median nerves on both arms.  Your exam isn't really positive for a clear finding, but your clinical picture as described is consistent.  An EMG will either prove this to be true, or help you focus on more musculoskeletal pain management moving forward.

## 2025-03-12 NOTE — NURSING NOTE
"Chief Complaint   Patient presents with    Port Draw     Labs drawn via port by RN. Port accessed with 20g 3/4\" power needle. Flushed with saline and heparin. Pt tolerated well.     Nichole Sanders RN    "

## 2025-03-12 NOTE — PROGRESS NOTES
[facility] Neurology Consultation    Shena Hartmann MRN# [id number]   Age: 74 year old YOB: 1950     Requesting physician: Thomas Delgado     Reason for Consultation: left hand pain      History of Presenting Symptoms:   Shena Hartmann is a 74 year old female who presents today for evaluation of left hand pain.  The patient has a pertinent medical history of HTN, multiple myeloma (s/p ASCBMT 3/2014. Other tx Cytoxan 2013, D-PACE 2013, CAR-T 7/29/2024).  During a visit with her hematology provider 10/31/2024 the patient noted having pain and numbness in her 5th digit on the left spreading to her 3-4th digits.  This led to sharp pain shooting up her left arm to the elbow at times. Symptoms improved by her next visit, 11/12/2024.  No specific abnormality of the left arm, cervical spine, or brain was noted on PET 11/4/2024.    The patient went to an orthopedics consultation since her referral was placed.  She was told she had lateral epicondylitis on the left.  This symptom occurred in the end of October.  She has treated this with some improvement.  She feels that since treatment, she has had development of right sided elbow symptoms. This developed around 12/2024. If she uses her arms to cook or chop food, they become aching in nature.  There is numbness and tingling present on both arms, often worsened when flexing her wrist and pronating her arm. She finds the sensory issues occur in the 4th and 5th digits. Symptoms go away with limited use.  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      Her main issues though are descriptions of hand zingers, and weakness in her hands.  She also describes having anterior-lateral leg related tightness b/l, but not in the calves. Both of these issues are transient.  She has worked with PT to help her with these symptoms.  She feels with exercises she has moved from 8/10 pain  to 2-4/10.  There is no urinary incontinence, there is no groin numbness.  She does find that after CAR-T therapy she had loose stools, but this has improved over the last month.     Social History:   No alcohol abuse. No smoking history.     Medications:   Amlodipine  Levothyroxine  Acyclovir  Zofran  Quercetin  Zinc     Physical Exam:   Vitals: /73 (BP Location: Right arm, Patient Position: Sitting, Cuff Size: Adult Regular)   Pulse 80   LMP  (LMP Unknown)   SpO2 96%    General: Seated comfortably in no acute distress.  Neurologic:     Mental Status: Fully alert, attentive and oriented. Speech clear and fluent, no paraphasic errors.      Cranial Nerves: Visual fields intact. PERRL. EOMI with normal smooth pursuit. Facial sensation intact/symmetric. Facial movements symmetric. Hearing not formally tested but intact to conversation. Palate elevation symmetric, uvula midline. No dysarthria. Shoulder shrug strong bilaterally. Tongue protrusion midline.     Motor: No tremors or other abnormal movements observed. Muscle tone normal throughout. No pronator drift. Normal/symmetric rapid finger tapping.   R/L  Upper:  Shoulder abduction, Deltoid (Axillary n), C5,6: 5/5  Elbow flexion, Bicep (Musculocutaneous n), C5,6: 5/5  Elbow extension, Tricep (Radial n), C6,7,8: 5/5  Wrist Flexion, Flexor carpi radialis, (Median), C6, 7: 5/5  Wrist Extension, Extensor carpi radialis longus (Radial), C6,7: 5/5  Finger Flexion, flexor digitorum profundus (median and ulnar), C7,8, T1: 5/5  Finger Flexion, flexor pollicis longus (Anterior interosseus n), C7, 8: 5/5  Finger Extension, Extensor digitorum communis (Posterior interosseus n), C7,8: 5/5  Finger Extension, extensor indicis proprius (radial), C8: 5/5  Finger abduction:   Abductor digiti minimi (ulnar), C8, T1: 5/5   Abductor pollicis brevis (median), C8, T1: 5/5   Lower Extremities  Hip Flexion, Iliopsoas, L1.2: 5/5  Hip Adduction, Adductors, (Obdurator n), L2.3:  5/5  Knee Flexion, Hamstrings (Sciatic n), S1: 5/5  Knee Extension, Quadriceps (Femoral n), L3.4: 5/5  Ankle Dorsiflexion, Tibialis anterior (Deep peroneal n), L4: 5/5  Ankle Plantarflexion, Gastrocnemius, Soleus (Tibial n), S1.2: 5/5  Inversion, in plantar flexed position (posterior tibialis from tibial, L4-5): 5/5  Eversion in dorsiflexed position (anterior tibialis, peroneal, L4-5): 5/5       Deep Tendon Reflexes: 2+/symmetric throughout upper and lower extremities. No clonus. Toes downgoing bilaterally.     Sensory: Intact/symmetric to light touch, pinprick, temperature, vibration and proprioception throughout upper and lower extremities. Negative Romberg.      Coordination: Finger-nose-finger without dysmetria. Rapid alternating movements intact/symmetric with normal speed and rhythm.     Gait: Normal, steady casual gait. Able to walk on toes, heels and tandem without difficulty.         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  I suspect this may be compression related overall, but given the onset of symptoms seems to be after CAR-T therapy I do wonder if this is contributing.  CAR-T can lead to neurotoxicity in multiple domains (central and peripheral).  I would expect more of a distal symmetric pattern, but I do think it is possible to have multifocal mononeuropathy like presentation.  Obtaining an EMG would be helpful to determine if there is indeed classic findings for ulnar neuropathy that is healing or mild.  If so, then referral to ortho could be done with plans for surgical correction or possibly steroid injection. If negative, then consideration towards central axis imaging would be made.    Plan:  - Wear wrist braces nightly  - Wear  elbow braces nightly  - EMG b/l upper extremities    Follow up in Neurology clinic in 3 months, or should new concerns arise.    CHELSIE Hillman D.O.   of Neurology    Total time today (68 min) in this patient encounter was spent on pre-charting, counseling and/or coordination of care.  The patient is in agreement with this plan and has no further questions. The longitudinal plan of care for the diagnosis(es)/condition(s) as documented were addressed during this visit. Due to the added complexity in care, I will continue to support Shena in the subsequent management and with ongoing continuity of care.

## 2025-03-13 ENCOUNTER — MYC MEDICAL ADVICE (OUTPATIENT)
Dept: INTERNAL MEDICINE | Facility: CLINIC | Age: 75
End: 2025-03-13

## 2025-03-13 DIAGNOSIS — M72.0 DUPUYTREN'S CONTRACTURE: Primary | ICD-10-CM

## 2025-03-13 LAB
ALBUMIN SERPL ELPH-MCNC: 4.4 G/DL (ref 3.7–5.1)
ALPHA1 GLOB SERPL ELPH-MCNC: 0.3 G/DL (ref 0.2–0.4)
ALPHA2 GLOB SERPL ELPH-MCNC: 0.7 G/DL (ref 0.5–0.9)
B-GLOBULIN SERPL ELPH-MCNC: 0.6 G/DL (ref 0.6–1)
GAMMA GLOB SERPL ELPH-MCNC: 0.3 G/DL (ref 0.7–1.6)
IGA SERPL-MCNC: 3 MG/DL (ref 84–499)
IGG SERPL-MCNC: 293 MG/DL (ref 610–1616)
IGM SERPL-MCNC: <10 MG/DL (ref 35–242)
KAPPA LC FREE SER-MCNC: 0.15 MG/DL (ref 0.33–1.94)
KAPPA LC FREE/LAMBDA FREE SER NEPH: 0.63 {RATIO} (ref 0.26–1.65)
LAMBDA LC FREE SERPL-MCNC: 0.24 MG/DL (ref 0.57–2.63)
LOCATION OF TASK: ABNORMAL
LOCATION OF TASK: NORMAL
M PROTEIN SERPL ELPH-MCNC: 0 G/DL
PROT PATTERN SERPL ELPH-IMP: ABNORMAL
PROT PATTERN SERPL IFE-IMP: NORMAL
TSH SERPL DL<=0.005 MIU/L-ACNC: 1.57 UIU/ML (ref 0.3–4.2)

## 2025-03-13 NOTE — TELEPHONE ENCOUNTER
Provider please review message and advise.     PT referral pended to review, change and sign order. Would probably recommend patient reach out them to arrange visit.

## 2025-03-16 ENCOUNTER — DOCUMENTATION ONLY (OUTPATIENT)
Dept: PHARMACY | Facility: CLINIC | Age: 75
End: 2025-03-16
Payer: COMMERCIAL

## 2025-03-16 NOTE — PROGRESS NOTES
Pharmacist IVIG Stewardship Program    Diagnosis: Hypogammaglobulinemia   Date  12/20/2024   IgG Level (mg/dL) 143     Current Dosing Regimen: Privigen 20g IV monthly PRN for IgG <300 mg/dL   Patient is dosed as needed based on an IgG level of less than 300 mg/dL to ensure the lowest amount of medication is administered to achieve beneficial outcomes.  Pre-medications:   Acetaminophen 650 mg by mouth, 30 minutes prior to infusion  Diphenhydramine 37.5 mg IV prior to IVIG infusion  Hydrocortisone 100 mg IV PRN for previous reactions to IVIG therapy- Patient has had a previous reaction to IVIG on 1/9/2025 so she will receive this.    Current Titration Regimen: Start infusion at 0.5 ml/kg/hr x 15 min. If tolerated, increase rate by 0.5 ml/kg/hr every 15 min to a maximum of 4 ml/kg/hr.  Previously Tried Products: None     Side Effects: Unable to reach patient to discuss.     Interventions: Lab review completed.     Assessment:   Date  3/12/2025   IgG Level (mg/dL) 293   Patient is appropriate for IVIG therapy when their level is within parameter. IVIG infusions are effective in increasing IgG levels to an appropriate level to minimize patient's risk of infection. Patient should continue on treatment as they have been per provider order. Without therapy their levels would put them at an increased risk of infections.    Plan: Patient is okay to proceed with IVIG infusions at this time as her level is below her provider ordered parameter. Care team has requested that patient be scheduled in the next 1-2 weeks. Patient will have labs checked monthly to assess the need for additional IVIG infusions moving forward.     Next Review Needed: 4/2025    Willa Momin, PharmD, IgCP  Medication Access Pharmacist

## 2025-03-20 DIAGNOSIS — Z92.29 PERSONAL HISTORY OF OTHER DRUG THERAPY: Primary | ICD-10-CM

## 2025-03-25 ENCOUNTER — INFUSION THERAPY VISIT (OUTPATIENT)
Dept: ONCOLOGY | Facility: CLINIC | Age: 75
End: 2025-03-25
Attending: REGISTERED NURSE
Payer: MEDICARE

## 2025-03-25 VITALS
BODY MASS INDEX: 23.2 KG/M2 | OXYGEN SATURATION: 98 % | SYSTOLIC BLOOD PRESSURE: 136 MMHG | RESPIRATION RATE: 18 BRPM | HEART RATE: 67 BPM | WEIGHT: 122.8 LBS | DIASTOLIC BLOOD PRESSURE: 72 MMHG | TEMPERATURE: 97.6 F

## 2025-03-25 DIAGNOSIS — C90.00 MULTIPLE MYELOMA NOT HAVING ACHIEVED REMISSION (H): ICD-10-CM

## 2025-03-25 DIAGNOSIS — D80.1 HYPOGAMMAGLOBULINEMIA: Primary | ICD-10-CM

## 2025-03-25 PROCEDURE — 96365 THER/PROPH/DIAG IV INF INIT: CPT

## 2025-03-25 PROCEDURE — 96376 TX/PRO/DX INJ SAME DRUG ADON: CPT

## 2025-03-25 PROCEDURE — 250N000013 HC RX MED GY IP 250 OP 250 PS 637: Performed by: REGISTERED NURSE

## 2025-03-25 PROCEDURE — 250N000011 HC RX IP 250 OP 636: Performed by: REGISTERED NURSE

## 2025-03-25 PROCEDURE — 96366 THER/PROPH/DIAG IV INF ADDON: CPT

## 2025-03-25 PROCEDURE — 96375 TX/PRO/DX INJ NEW DRUG ADDON: CPT

## 2025-03-25 RX ORDER — DIPHENHYDRAMINE HYDROCHLORIDE 50 MG/ML
50 INJECTION, SOLUTION INTRAMUSCULAR; INTRAVENOUS
Start: 2025-03-25

## 2025-03-25 RX ORDER — MEPERIDINE HYDROCHLORIDE 25 MG/ML
25 INJECTION INTRAMUSCULAR; INTRAVENOUS; SUBCUTANEOUS
OUTPATIENT
Start: 2025-03-25

## 2025-03-25 RX ORDER — DIPHENHYDRAMINE HYDROCHLORIDE 50 MG/ML
37.5 INJECTION, SOLUTION INTRAMUSCULAR; INTRAVENOUS ONCE
Status: COMPLETED | OUTPATIENT
Start: 2025-03-25 | End: 2025-03-25

## 2025-03-25 RX ORDER — DIPHENHYDRAMINE HYDROCHLORIDE 50 MG/ML
37.5 INJECTION, SOLUTION INTRAMUSCULAR; INTRAVENOUS ONCE
Start: 2025-03-25 | End: 2025-03-25

## 2025-03-25 RX ORDER — DIPHENHYDRAMINE HYDROCHLORIDE 50 MG/ML
25 INJECTION, SOLUTION INTRAMUSCULAR; INTRAVENOUS
Start: 2025-03-25

## 2025-03-25 RX ORDER — METHYLPREDNISOLONE SODIUM SUCCINATE 40 MG/ML
40 INJECTION INTRAMUSCULAR; INTRAVENOUS
Start: 2025-03-25

## 2025-03-25 RX ORDER — HEPARIN SODIUM,PORCINE 10 UNIT/ML
5-20 VIAL (ML) INTRAVENOUS DAILY PRN
OUTPATIENT
Start: 2025-03-25

## 2025-03-25 RX ORDER — ALBUTEROL SULFATE 90 UG/1
1-2 INHALANT RESPIRATORY (INHALATION)
Start: 2025-03-25

## 2025-03-25 RX ORDER — DIPHENHYDRAMINE HYDROCHLORIDE 50 MG/ML
25 INJECTION, SOLUTION INTRAMUSCULAR; INTRAVENOUS
Status: DISCONTINUED | OUTPATIENT
Start: 2025-03-25 | End: 2025-03-25 | Stop reason: HOSPADM

## 2025-03-25 RX ORDER — HEPARIN SODIUM (PORCINE) LOCK FLUSH IV SOLN 100 UNIT/ML 100 UNIT/ML
5 SOLUTION INTRAVENOUS
Status: DISCONTINUED | OUTPATIENT
Start: 2025-03-25 | End: 2025-03-25 | Stop reason: HOSPADM

## 2025-03-25 RX ORDER — EPINEPHRINE 1 MG/ML
0.3 INJECTION, SOLUTION, CONCENTRATE INTRAVENOUS EVERY 5 MIN PRN
OUTPATIENT
Start: 2025-03-25

## 2025-03-25 RX ORDER — ACETAMINOPHEN 325 MG/1
650 TABLET ORAL ONCE
Status: COMPLETED | OUTPATIENT
Start: 2025-03-25 | End: 2025-03-25

## 2025-03-25 RX ORDER — DIPHENHYDRAMINE HYDROCHLORIDE 50 MG/ML
25 INJECTION, SOLUTION INTRAMUSCULAR; INTRAVENOUS EVERY 6 HOURS PRN
Status: CANCELLED
Start: 2025-03-25

## 2025-03-25 RX ORDER — ACETAMINOPHEN 325 MG/1
650 TABLET ORAL ONCE
Start: 2025-03-25

## 2025-03-25 RX ORDER — HEPARIN SODIUM (PORCINE) LOCK FLUSH IV SOLN 100 UNIT/ML 100 UNIT/ML
5 SOLUTION INTRAVENOUS
OUTPATIENT
Start: 2025-03-25

## 2025-03-25 RX ORDER — ALBUTEROL SULFATE 0.83 MG/ML
2.5 SOLUTION RESPIRATORY (INHALATION)
OUTPATIENT
Start: 2025-03-25

## 2025-03-25 RX ADMIN — DIPHENHYDRAMINE HYDROCHLORIDE 25 MG: 50 INJECTION, SOLUTION INTRAMUSCULAR; INTRAVENOUS at 10:10

## 2025-03-25 RX ADMIN — FAMOTIDINE 20 MG: 10 INJECTION, SOLUTION INTRAVENOUS at 07:41

## 2025-03-25 RX ADMIN — ACETAMINOPHEN 650 MG: 325 TABLET ORAL at 07:41

## 2025-03-25 RX ADMIN — HEPARIN 5 ML: 100 SYRINGE at 10:32

## 2025-03-25 RX ADMIN — DIPHENHYDRAMINE HYDROCHLORIDE 37.5 MG: 50 INJECTION, SOLUTION INTRAMUSCULAR; INTRAVENOUS at 07:41

## 2025-03-25 RX ADMIN — HUMAN IMMUNOGLOBULIN G 20 G: 20 LIQUID INTRAVENOUS at 08:20

## 2025-03-25 NOTE — PROGRESS NOTES
Infusion Nursing Note:  Shena Hartmann presents today for IVIG.    Patient seen by provider today: No   present during visit today: Not Applicable.    Note: Shena presents to the clinic today feeling well for her IVIG infusion. She noted some musculoskeletal pain that she has when she awakes in the morning.   Premedicated with Tylenol, Benadryl, and Pepcid. Prior to the infusion, she denied nausea and vomiting, SOB and fevers.     She was tolerating the infusion until the end, then she mentioned neck pain, chills, and nausea. Benadryl 25mg was given after the infusion.  Antiemetics were offered and pt declined; [place] were offered. Vitals were stable throughout, chill subsided with blankets. Monitored her for 15 min prior to discharge.       Intravenous Access:  Implanted Port; accessed in infusion    Treatment Conditions:  Lab Results   Component Value Date    HGB 12.1 03/12/2025    WBC 3.8 (L) 03/12/2025    ANEU 1.5 (L) 09/05/2024    ANEUTAUTO 1.7 03/12/2025    PLT 61 (L) 03/12/2025        Lab Results   Component Value Date     03/12/2025    POTASSIUM 4.0 03/12/2025    MAG 2.0 01/21/2025    CR 0.78 03/12/2025    PAULINE 9.3 03/12/2025    BILITOTAL <0.2 03/12/2025    ALBUMIN 4.4 03/12/2025    ALT 24 03/12/2025    AST 26 03/12/2025       Results reviewed, labs MET treatment parameters, ok to proceed with treatment.      Post Infusion Assessment:  Pt was feeling nauseated and fatigued after her infusion, she was treated with Benadryl.   Patient observed for 15 minutes post IVIG.  Blood return noted pre and post infusion.  Site patent and intact, free from redness, edema or discomfort.  No evidence of extravasations.  Access discontinued per protocol.       Discharge Plan:   Patient declined prescription refills.  Discharge instructions reviewed with: Patient.  Patient and/or family verbalized understanding of discharge instructions and all questions answered.  AVS to patient via [employer].  Patient will  return 4/28/25 for next appointment.   Patient discharged in stable condition accompanied by: self.  Departure Mode: Ambulatory.      Regina Craig RN

## 2025-03-25 NOTE — PATIENT INSTRUCTIONS
March 2025 Sunday Monday Tuesday Wednesday Thursday Friday Saturday                                 1       2     3    PENTAMADINE NEB  12:15 PM   (60 min.)   [facility] PFL PENT   [facility] Pulmonary Function Testing [facility] SCREENING LEFT W/ NAVID   1:15 PM   (15 min.)   [email or web address]   [facility] Breast Center Imaging [place]    RETURN CCSL   1:45 PM   (30 min.)   Neelima Da Silva MD   [facility] 4     5     6     7    NEW FEMALE PELVIC  10:45 AM   (30 min.)   Angela Velez MD   [facility] Urology [facility] 8       9     10     11     12    NEW NEUROLOGY   2:15 PM   (60 min.)   Jhon Hillman DO   [facility] Neurology [facility]    LAB PERIPHERAL   3:30 PM   (15 min.)    [facility] LAB DRAW   [facility] 13    MYC MEDICARE ANNUAL WELLNESS   1:40 PM   (30 min.)   Thomas Villalobos MD   [facility] 14    RETURN CCSL   9:45 AM   (45 min.)   Selena Suggs APRN CNP   [facility] 15       16     17     18     19     20     21     22       23     24     25    ONC INFUSION 5 HR (300 MIN)   7:00 AM   (300 min.)    ONC INFUSION NURSE   [facility] 26     27     28    PENTAMADINE NEB   9:15 AM   (60 min.)   [facility] PFL PENT   [facility] Pulmonary Function Testing [place] 29       30     31 April 2025 Sunday Monday Tuesday Wednesday Thursday Friday Saturday             1     2     3     4     5       6     7     8     9     10     11     12       13     14     15    HAND THERAPY EVAL   8:45 AM   (60 min.)   BRY Gill, OTR   [facility] Rehabilitation Services [facility]    RETURN DERMATOLOGY  10:30 AM   (15 min.)   Neela Mcknight MD   [facility] Dermatology [facility] 16     17     18     19       20     21      22    HAND THERAPY TREATMENT   1:00 PM   (30 min.)   BRY Gill OTR   [facility] 23     24     25     26       27     28    LAB PERIPHERAL  11:00 AM   (15 min.)    [facility] LAB DRAW   [facility]    [facility]  11:15 AM   (60 min.)    PFL PENT   [facility] Pulmonary Function Testing [place] 29    HAND THERAPY TREATMENT   1:00 PM   (30 min.)   BRY Gill OTR   [facility] 30                                    Lab Results:  No results found for this or any previous visit (from the past 12 hours).       [facility] Triage and after hours / weekends / holidays:  [phone]    Please call the triage or after hours line if you experience a temperature greater than or equal to 100.4, shaking chills, have uncontrolled nausea, vomiting and/or diarrhea, dizziness, shortness of breath, chest pain, bleeding, unexplained bruising, or if you have any other new/concerning symptoms, questions or concerns.      If you are having any concerning symptoms or wish to speak to a provider before your next infusion visit, please call triage to notify them so we can adequately serve you.     If you need a refill on a narcotic prescription or other medication, please call before your infusion appointment.

## 2025-04-08 ENCOUNTER — MYC MEDICAL ADVICE (OUTPATIENT)
Dept: OTOLARYNGOLOGY | Facility: CLINIC | Age: 75
End: 2025-04-08
Payer: MEDICARE

## 2025-04-08 NOTE — TELEPHONE ENCOUNTER
This patient needs to reschedule their appointment with Dr. Rosario on Friday, 6/6. Per provider, Patch Testing appointments can be rescheduled to Thursday, 6/5

## 2025-04-15 ENCOUNTER — OFFICE VISIT (OUTPATIENT)
Dept: DERMATOLOGY | Facility: CLINIC | Age: 75
End: 2025-04-15
Attending: DERMATOLOGY
Payer: MEDICARE

## 2025-04-15 ENCOUNTER — THERAPY VISIT (OUTPATIENT)
Dept: OCCUPATIONAL THERAPY | Facility: CLINIC | Age: 75
End: 2025-04-15
Attending: INTERNAL MEDICINE
Payer: MEDICARE

## 2025-04-15 DIAGNOSIS — L81.4 SOLAR LENTIGO: ICD-10-CM

## 2025-04-15 DIAGNOSIS — D18.01 CHERRY ANGIOMA: ICD-10-CM

## 2025-04-15 DIAGNOSIS — D22.9 MULTIPLE BENIGN NEVI: ICD-10-CM

## 2025-04-15 DIAGNOSIS — L82.1 SK (SEBORRHEIC KERATOSIS): ICD-10-CM

## 2025-04-15 DIAGNOSIS — M72.0 DUPUYTREN'S CONTRACTURE: ICD-10-CM

## 2025-04-15 DIAGNOSIS — M79.641 PAIN OF RIGHT HAND: Primary | ICD-10-CM

## 2025-04-15 DIAGNOSIS — L82.0 INFLAMED SEBORRHEIC KERATOSIS: ICD-10-CM

## 2025-04-15 DIAGNOSIS — L30.9 CHRONIC DERMATITIS OF HANDS: Primary | ICD-10-CM

## 2025-04-15 PROCEDURE — 17110 DESTRUCTION B9 LES UP TO 14: CPT | Performed by: DERMATOLOGY

## 2025-04-15 PROCEDURE — 99214 OFFICE O/P EST MOD 30 MIN: CPT | Mod: 25 | Performed by: DERMATOLOGY

## 2025-04-15 PROCEDURE — 97535 SELF CARE MNGMENT TRAINING: CPT | Mod: GO | Performed by: OCCUPATIONAL THERAPIST

## 2025-04-15 PROCEDURE — 97165 OT EVAL LOW COMPLEX 30 MIN: CPT | Mod: GO | Performed by: OCCUPATIONAL THERAPIST

## 2025-04-15 PROCEDURE — 1126F AMNT PAIN NOTED NONE PRSNT: CPT | Performed by: DERMATOLOGY

## 2025-04-15 PROCEDURE — 97110 THERAPEUTIC EXERCISES: CPT | Mod: GO | Performed by: OCCUPATIONAL THERAPIST

## 2025-04-15 ASSESSMENT — PAIN SCALES - GENERAL: PAINLEVEL_OUTOF10: NO PAIN (0)

## 2025-04-15 NOTE — PATIENT INSTRUCTIONS
Cryotherapy    What is it?  Use of a very cold liquid, such as liquid nitrogen, to freeze and destroy abnormal skin cells that need to be removed    What should I expect?  Tenderness and redness  A small blister that might grow and fill with dark purple blood. There may be crusting.  More than one treatment may be needed if the lesions do not go away.    How do I care for the treated area?  Gently wash the area with your hands when bathing.  Use a thin layer of Vaseline to help with healing. You may use a Band-Aid.   The area should heal within 7-10 days and may leave behind a pink or lighter color.   Do not use an antibiotic or Neosporin ointment.   You may take acetaminophen (Tylenol) for pain.     Call your doctor if you have:  Severe pain  Signs of infection (warmth, redness, cloudy yellow drainage, and or a bad smell)  Questions or concerns    Who should I call with questions?      [facility]: [phone]      [facility]: [phone]      For urgent needs outside of business hours call the [facility] at [phone] and ask for the dermatology resident on call

## 2025-04-15 NOTE — LETTER
4/15/2025       RE: Shena Hartmann  [address] [place] [address]     Dear Colleague,    Thank you for referring your patient, Shena Hartmann, to the [facility] [place] at [facility]. Please see a copy of my visit note below.    [facility] Dermatology Note  Encounter Date: Apr 15, 2025  Office Visit     Dermatology Problem List:  1. History of stem cell transplant for multiple myeloma on CAR- T cell therapy at present  2. Possible phototoxic/photoallergic reaction to thalidomide or pamolidomide, resolved  3. Chronic contact dermatitis to soaps/disinfectant, hands  - Derm Allergy referral ordered 8/29/23  -Current tx: clobetasol ointment BID prn for flares  4. Atypical melanocytic nevus left upper leg s/p biopsy   5. Seborrheic keratoses  6. Longitudinal ridging, onycholysis - bilateral 5th fingernails  7. Lesions to monitor - R forearm/R shin (favor BLKs)     ____________________________________________    Assessment & Plan:    #Hx of stem cell transplant for MM on CAR-T cell therapy and IVIG  Last FBSE 4/15/25  - Continue approximately yearly skin exams.    # Seborrheic keratosis, inflamed.   Reassured benign nature of this lesion. Given that it is inflamed and has been irritating to her, discussed risks and benefits of cryotherapy and patient opts to pursue today.   - Cryotherapy performed today (see procedure note(s) below).    #Benign skin lesions: seborrheic keratoses, nevi without atypia, solar lentigo, lentigo, cherry hemangiomas  - Reassured patient of benign etiology of her many skin lesions  - Counseled on warning signs of skin malignancies  - Sun protection: Counseled SPF30+ sunscreen, UPF clothing, sun avoidance, tanning bed avoidance.    # Toenail fungus  Patient wearing nail polish today so unable to perform exam. She notes that the toenail fungus is still present. Discussed that we would not  start oral terbinafine given low blood counts. At next appointment, can consider nail laquer for treatment. Ask patient to return to clinic without nail polish for next appointment.     Procedures Performed:   - Cryotherapy procedure note, location(s): mid-back. After verbal consent and discussion of risks and benefits including, but not limited to, dyspigmentation/scar, blister, and pain, 1 lesion(s) was(were) treated with 1-2 mm freeze border for 1-2 cycles with liquid nitrogen. Post cryotherapy instructions were provided.  - Procedure(s) performed by faculty.     Follow-up: 1 year(s) in-person, or earlier for new or changing lesions    Staff and Medical Student:   Aurelia Harding, MS4  I was present with the medical student who participated in the service and in the documentation of the note. I have verified the history and personally performed the physical exam and medical decision making. I agree with the assessment and plan of care as documented in the note.  Neela Mcknight MD     ____________________________________________    CC: Skin Check (FBSE. L mid back, itchy spot. Spot on right nare that has been bleeding)    HPI:  Ms. Shena Hartmann is a(n) 74 year old female who presents today as a return patient for FBSE. She has one spot of concern on her nose - she noticed that it started bleeding on her nose when she woke up this morning and wants the spot checked out. She has not noticed any new raised or tender spots in that area. She notes that it looks like she might have scratched     Done with CAR T-cell    Patient is otherwise feeling well, without additional skin concerns.    Labs:  CBC , CMP , and Hepatic panel  reviewed.    Physical Exam:  Vitals: LMP  (LMP Unknown)   SKIN: Total skin excluding the undergarment areas was performed. The exam included the head/face, neck, both arms, chest, back, abdomen, both legs, digits and/or nails.   - Dome-shaped bright red papules on the trunk and extremities.    - Multiple regular brown pigmented macules and papules are identified on the face, trunk, and extremities. No concerning findings on dermoscopy   - Scattered brown macules on sun exposed areas.  - There are waxy stuck on tan to brown papules on the face, trunk, and extremities.   - Painted toenails  - No other lesions of concern on areas examined.     Medications:  Current Outpatient Medications   Medication Sig Dispense Refill     acetaminophen (TYLENOL) 325 MG tablet Take 325-650 mg by mouth every 6 hours as needed for mild pain       acyclovir (ZOVIRAX) 400 MG tablet Take 1 tablet (400 mg) by mouth every 12 hours. 60 tablet 11     amLODIPine (NORVASC) 2.5 MG tablet Take 2 tablets (5 mg) by mouth daily Take 5 mg (2 tablets) in the morning. 180 tablet 3     CALCIUM-VITAMIN D-VITAMIN K PO Take 2 tablets by mouth daily.       clobetasol (TEMOVATE) 0.05 % external ointment Apply topically 2 times daily as needed (itching and rash). Topically to rash on hands until resolved 45 g 0     estradiol (ESTRACE) 0.1 MG/GM vaginal cream Place 1 g vaginally three times a week. Ok to use your fingers or the applicator 42.5 g 3     levothyroxine (SYNTHROID/LEVOTHROID) 50 MCG tablet Take 1 tablet (50 mcg) by mouth daily. 90 tablet 3     MAGNESIUM PO Take 235 mg by mouth 2 times daily.       Multiple Vitamins-Minerals (MULTIVITAMIN OR) Take 1 tablet by mouth 2 times daily.       order for DME 6 mastectomy bras  Length of need: 99 months 6 Device 1     order for DME R mastectomy prosthesis   Length of need:  99 months 1 Device 1     penicillin V (VEETID) 250 MG tablet Take 1 tablet (250 mg) by mouth daily. 60 tablet 2     triamcinolone (KENALOG) 0.1 % external cream Apply topically 2 times daily as needed for irritation. 15 g 11     Current Facility-Administered Medications   Medication Dose Route Frequency Provider Last Rate Last Admin     [START ON 6/2/2025] azithromycin (ZITHROMAX) 500 mg for allergy testing   Other Once           [START ON 6/2/2025] ceFAZolin (ANCEF) 500 mg for allergy testing   Other Once          Facility-Administered Medications Ordered in Other Visits   Medication Dose Route Frequency Provider Last Rate Last Admin     potassium chloride 20 mEq in 50 mL IVPB  20 mEq Intravenous Once Mae Kay PA-C         potassium chloride SA (K-DUR,KLOR-CON M) CR tablet 20 mEq  20 mEq Oral Once Mae Kay PA-C          Past Medical History:   Patient Active Problem List   Diagnosis     Pain in thoracic spine     Multiple myeloma, dx 2002,  s/p ASCBMT 3/26/14     Personal history of malignant neoplasm of breast     Seasonal allergies     Osteoporosis     Primary hypertension     Stem cells transplant status (H)     Adverse effect of other drugs, medicaments and biological substances, sequela     Acquired hypothyroidism     Other amyloidosis (H)     Examination of participant in clinical trial     Multiple myeloma not having achieved remission (H)     Thrombocytopenia     Hypogammaglobulinemia     Other pancytopenia (H)     Personal history of other drug therapy     Pain of right hand     Past Medical History:   Diagnosis Date     Acquired hypothyroidism 8/10/2023     Breast cancer (H) 01/01/1994    right     H/O stem cell transplant (H) 03/01/2014     History of blood transfusion 2013 & 2014    During stem cell tx process     Hypertension Sept. 2021    Runs 140 s systolically, about 20 above my usual     Multiple myeloma, without mention of having achieved remission 11/06/2012        CC Neela Mcknight MD  [address] [place] [phone] on close of this encounter.      Again, thank you for allowing me to participate in the care of your patient.      Sincerely,    Neela Mcknight MD

## 2025-04-15 NOTE — NURSING NOTE
Dermatology Rooming Note    Shena Hartmann's goals for this visit include:   Chief Complaint   Patient presents with    Skin Check     FBSE. L mid back, itchy spot. Spot on right nare that has been bleeding     Dalila Londono - EMT

## 2025-04-15 NOTE — PROGRESS NOTES
[facility] Dermatology Note  Encounter Date: Apr 15, 2025  Office Visit     Dermatology Problem List:  1. History of stem cell transplant for multiple myeloma on CAR- T cell therapy at present  2. Possible phototoxic/photoallergic reaction to thalidomide or pamolidomide, resolved  3. Chronic contact dermatitis to soaps/disinfectant, hands  - Derm Allergy referral ordered 8/29/23  -Current tx: clobetasol ointment BID prn for flares  4. Atypical melanocytic nevus left upper leg s/p biopsy   5. Seborrheic keratoses  6. Longitudinal ridging, onycholysis - bilateral 5th fingernails  7. Lesions to monitor - R forearm/R shin (favor BLKs)     ____________________________________________    Assessment & Plan:    #Hx of stem cell transplant for MM on CAR-T cell therapy and IVIG  Last FBSE 4/15/25  - Continue approximately yearly skin exams.    # Seborrheic keratosis, inflamed.   Reassured benign nature of this lesion. Given that it is inflamed and has been irritating to her, discussed risks and benefits of cryotherapy and patient opts to pursue today.   - Cryotherapy performed today (see procedure note(s) below).    #Benign skin lesions: seborrheic keratoses, nevi without atypia, solar lentigo, lentigo, cherry hemangiomas  - Reassured patient of benign etiology of her many skin lesions  - Counseled on warning signs of skin malignancies  - Sun protection: Counseled SPF30+ sunscreen, UPF clothing, sun avoidance, tanning bed avoidance.    # Toenail fungus  Patient wearing nail polish today so unable to perform exam. She notes that the toenail fungus is still present. Discussed that we would not start oral terbinafine given low blood counts. At next appointment, can consider nail laquer for treatment. Ask patient to return to clinic without nail polish for next appointment.     Procedures Performed:   - Cryotherapy procedure note, location(s): mid-back. After verbal consent and discussion of risks and benefits  including, but not limited to, dyspigmentation/scar, blister, and pain, 1 lesion(s) was(were) treated with 1-2 mm freeze border for 1-2 cycles with liquid nitrogen. Post cryotherapy instructions were provided.  - Procedure(s) performed by faculty.     Follow-up: 1 year(s) in-person, or earlier for new or changing lesions    Staff and Medical Student:   Aurelia Harding, MS4  I was present with the medical student who participated in the service and in the documentation of the note. I have verified the history and personally performed the physical exam and medical decision making. I agree with the assessment and plan of care as documented in the note.  Neela Mcknight MD     ____________________________________________    CC: Skin Check (FBSE. L mid back, itchy spot. Spot on right nare that has been bleeding)    HPI:  Ms. Shena Hartmann is a(n) 74 year old female who presents today as a return patient for FBSE. She has one spot of concern on her nose - she noticed that it started bleeding on her nose when she woke up this morning and wants the spot checked out. She has not noticed any new raised or tender spots in that area. She notes that it looks like she might have scratched     Done with CAR T-cell    Patient is otherwise feeling well, without additional skin concerns.    Labs:  CBC , CMP , and Hepatic panel  reviewed.    Physical Exam:  Vitals: LMP  (LMP Unknown)   SKIN: Total skin excluding the undergarment areas was performed. The exam included the head/face, neck, both arms, chest, back, abdomen, both legs, digits and/or nails.   - Dome-shaped bright red papules on the trunk and extremities.   - Multiple regular brown pigmented macules and papules are identified on the face, trunk, and extremities. No concerning findings on dermoscopy   - Scattered brown macules on sun exposed areas.  - There are waxy stuck on tan to brown papules on the face, trunk, and extremities.   - Painted toenails  - No other lesions  of concern on areas examined.     Medications:  Current Outpatient Medications   Medication Sig Dispense Refill    acetaminophen (TYLENOL) 325 MG tablet Take 325-650 mg by mouth every 6 hours as needed for mild pain      acyclovir (ZOVIRAX) 400 MG tablet Take 1 tablet (400 mg) by mouth every 12 hours. 60 tablet 11    amLODIPine (NORVASC) 2.5 MG tablet Take 2 tablets (5 mg) by mouth daily Take 5 mg (2 tablets) in the morning. 180 tablet 3    CALCIUM-VITAMIN D-VITAMIN K PO Take 2 tablets by mouth daily.      clobetasol (TEMOVATE) 0.05 % external ointment Apply topically 2 times daily as needed (itching and rash). Topically to rash on hands until resolved 45 g 0    estradiol (ESTRACE) 0.1 MG/GM vaginal cream Place 1 g vaginally three times a week. Ok to use your fingers or the applicator 42.5 g 3    levothyroxine (SYNTHROID/LEVOTHROID) 50 MCG tablet Take 1 tablet (50 mcg) by mouth daily. 90 tablet 3    MAGNESIUM PO Take 235 mg by mouth 2 times daily.      Multiple Vitamins-Minerals (MULTIVITAMIN OR) Take 1 tablet by mouth 2 times daily.      order for DME 6 mastectomy bras  Length of need: 99 months 6 Device 1    order for DME R mastectomy prosthesis   Length of need:  99 months 1 Device 1    penicillin V (VEETID) 250 MG tablet Take 1 tablet (250 mg) by mouth daily. 60 tablet 2    triamcinolone (KENALOG) 0.1 % external cream Apply topically 2 times daily as needed for irritation. 15 g 11     Current Facility-Administered Medications   Medication Dose Route Frequency Provider Last Rate Last Admin    [START ON 6/2/2025] azithromycin (ZITHROMAX) 500 mg for allergy testing   Other Once         [START ON 6/2/2025] ceFAZolin (ANCEF) 500 mg for allergy testing   Other Once          Facility-Administered Medications Ordered in Other Visits   Medication Dose Route Frequency Provider Last Rate Last Admin    potassium chloride 20 mEq in 50 mL IVPB  20 mEq Intravenous Once Mae Kay PA-C        potassium chloride SA  (K-DUR,KLOR-CON M) CR tablet 20 mEq  20 mEq Oral Once Mae Kay PA-C          Past Medical History:   Patient Active Problem List   Diagnosis    Pain in thoracic spine    Multiple myeloma, dx 2002,  s/p ASCBMT 3/26/14    Personal history of malignant neoplasm of breast    Seasonal allergies    Osteoporosis    Primary hypertension    Stem cells transplant status (H)    Adverse effect of other drugs, medicaments and biological substances, sequela    Acquired hypothyroidism    Other amyloidosis (H)    Examination of participant in clinical trial    Multiple myeloma not having achieved remission (H)    Thrombocytopenia    Hypogammaglobulinemia    Other pancytopenia (H)    Personal history of other drug therapy    Pain of right hand     Past Medical History:   Diagnosis Date    Acquired hypothyroidism 8/10/2023    Breast cancer (H) 01/01/1994    right    H/O stem cell transplant (H) 03/01/2014    History of blood transfusion 2013 & 2014    During stem cell tx process    Hypertension Sept. 2021    Runs 140 s systolically, about 20 above my usual    Multiple myeloma, without mention of having achieved remission 11/06/2012        CC Neela Mcknight MD  [address] [place] [address] [address]  [place] [address] on close of this encounter.

## 2025-04-15 NOTE — PROGRESS NOTES
OCCUPATIONAL THERAPY EVALUATION  Type of Visit: Evaluation              Subjective        Presenting condition or subjective complaint: right ring finger Dupreytren's contracture  Date of onset: 03/14/25 (Order date)    Relevant medical history:     Past Medical History:   Diagnosis Date    Acquired hypothyroidism 8/10/2023    Breast cancer (H) 01/01/1994    right    H/O stem cell transplant (H) 03/01/2014    History of blood transfusion 2013 & 2014    During stem cell tx process    Hypertension Sept. 2021    Runs 140 s systolically, about 20 above my usual    Multiple myeloma, without mention of having achieved remission 11/06/2012      Dates & types of surgery: right mastectomy 1994, port placement 2024  Past Surgical History:   Procedure Laterality Date    BIOPSY  10/1994; 4100-3021    Lt. Breast in '94; multiple bone marrow bx's for MM    BREAST SURGERY  10/1994    Lt. Mastectomy w/lymph node resection    COLONOSCOPY  11/2015    Normal results    EYE SURGERY  2020    Bilateral cataract surgery    INSERT CATHETER VASCULAR ACCESS Left 5/22/2024    Procedure: central venous catheter non tunneled insertion, vascular access;  Surgeon: Ric Castaneda PA-C;  Location: [facility] OR    INSERT PORT VASCULAR ACCESS Left 2/15/2023    Procedure: INSERTION, VASCULAR ACCESS PORT;  Surgeon: Luc Antunez MD;  Location: [facility] OR    IR CHEST PORT PLACEMENT > 5 YRS OF AGE  2/15/2023    IR CVC NON TUNNEL LINE REMOVAL  5/22/2024    IR CVC NON TUNNEL PLACEMENT > 5 YRS  5/22/2024    IR PICC PLACEMENT > 5 YRS OF AGE  7/26/2024    MASTECTOMY      Right, with lymphe node disection      PICC INSERTION  09/10/2013    5fr DL Power PICC, 44cm (1cm external) in the L lateral brachial vein with tip in the low SVC    TRANSPLANT  3/27/2014    Autologous stem cell tx        Prior diagnostic imaging/testing results:       Prior therapy history for the same diagnosis, illness or injury: No      Prior Level of Function  Transfers:  "Independent  Ambulation: Independent  ADL: Independent  IADL:  IND    Living Environment  Social support: With a significant other or spouse   Type of home: House   Stairs to enter the home: Yes 4 Is there a railing: Yes     Ramp: No   Stairs inside the home: Yes 16 Is there a railing: Yes     Help at home: None  Equipment owned:       Employment: No    Hobbies/Interests: rajiv, cooking,gardening,gathering with friends, exercise    Patient goals for therapy: straighten out my right ring finger,strengthen my hand,ease pain    Pain assessment: Pain present     Objective   ADDITIONAL HISTORY:  Right hand dominant  Patient reports symptoms of pain, stiffness/loss of motion, weakness/loss of strength, numbness, and tingling   Transportation: drives    Functional Outcome Measure:   Upper Extremity Functional Index Score:  SCORE:   Column Totals: /80: (Patient-Rptd) 64   (A lower score indicates greater disability.)     PAIN:  Pain Level at Rest: 1/10  Pain Level with Use: 7/10  Pain Location: ring finger  Pain Quality: Aching, Dull, Nagging, Tender, Throbbing, Tingling, and Tiring  Pain Frequency: intermittent or constant  Pain is Worst: daytime, nighttime, or does wake up at night times as well  Pain is Exacerbated By: chopping, opening jars, table top position  Pain is Relieved By: herbal rub(Gold Cream)massage, heat,nerve glides, massage deep pressure, thumby  Pain Progression: Worsened    EDEMA: None     SCAR/WOUND:  Mild cording felt over R RF palmar area    SENSATION: Decreased Ulnar Nerve distribution per pt report and Unsure if its on the dorsal and volar side     ROM:   Hand ROM  Left AROM Right AROM    RING MP  -15/90   PIP  0/90   DIP  0/75   Total Active Motion       RESISTED TESTING: Resisted Testing (pain report)   Left Right   FDS 5 5   FDP 5 5   Lumbricals 5 + \"marycruznger 4      STRENGTH:     Measured in pounds 4/15/2025 4/15/2025    Left Right   Trial 1 44 40     Lateral Pinch  Measured in pounds " 4/15/2025 4/15/2025    Left Right   Trial 1 12 12     3 Point Pinch  Measured in pounds 4/15/2025 4/15/2025    Left Right   Trial 1 12 10     PALPATION:   Finger Palpation    CMC Joint -   A1 Pulley -   A3 Pulley/PIP Joint -   Over Dup cording in the palms -     Assessment & Plan   CLINICAL IMPRESSIONS  Medical Diagnosis: R  RF Dup Contracture    Treatment Diagnosis: R Hand Pain    Impression/Assessment: Pt is a 74 year old female presenting to Occupational Therapy due to R Dup Contracture Conservative Management.  The following significant findings have been identified: Impaired ROM, Impaired sensation, Impaired strength, and Pain.  These identified deficits interfere with their ability to perform recreational activities and meal planning and preparation as compared to previous level of function.     Clinical Decision Making (Complexity):  Assessment of Occupational Performance: 1-3 Performance Deficits  Occupational Performance Limitations: meal preparation and cleanup and leisure activities  Clinical Decision Making (Complexity): Low complexity    PLAN OF CARE  Treatment Interventions:  Modalities:  US, Iontophoresis, Paraffin, TENS, and E-Stim  Therapeutic Exercise:  AROM, AAROM, PROM, Tendon Gliding, Blocking, Reverse Blocking, Place and Hold, Contract Relax, Extensor Tracking, Isotonics, Isometrics, and Stabilization  Neuromuscular re-education:  Nerve Gliding, Coordination/Dexterity, Sensory re-education, Desensitization, Kinesthetic Training, Proprioceptive Training, Posture, Kinesiotaping, Isometrics, and Stabilization  Manual Techniques:  Coordination/Dexterity, Joint mobilization, Scar mobilization, Friction massage, Myofascial release, and Manual edema mobilization  Orthotic Fabrication:  Static, Static progressive, and Dynamic  Self Care:  Self Care Tasks, Ergonomic Considerations, and Work Tasks    Long Term Goals   OT Goal 1  Goal Identifier: HEP  Goal Description: Pt will complete HEP 3x a week for 3  weeks with no sx.  Rationale: In order to maximize safety and independence with performance of self-care activities  Goal Progress: Initial  Target Date: 06/15/25      Frequency of Treatment: 2x a month  Duration of Treatment: 1 month     Recommended Referrals to Other Professionals:   Education Assessment: Learner/Method: Patient;Demonstration;Pictures/Video;No Barriers to Learning     Risks and benefits of evaluation/treatment have been explained.   Patient/Family/caregiver agrees with Plan of Care.     Evaluation Time:    OT Eval, Low Complexity Minutes (52096): 30       Signing Clinician: CONOR Payan [facility]                                                                                   OUTPATIENT OCCUPATIONAL THERAPY      PLAN OF TREATMENT FOR OUTPATIENT REHABILITATION   Patient's Last Name, First Name, Shena Pfeiffer YOB: 1950   Provider's Name   [facility]   Medical Record No.  [id number]     Onset Date: 03/14/25 (Order date) Start of Care Date: 04/15/25     Medical Diagnosis:  R  RF Dup Contracture      OT Treatment Diagnosis:  R Hand Pain Plan of Treatment  Frequency/Duration:2x a month/1 month    Certification date from 04/15/25   To 06/15/25        See note for plan of treatment details and functional goals     CONOR Payan                         I CERTIFY THE NEED FOR THESE SERVICES FURNISHED UNDER        THIS PLAN OF TREATMENT AND WHILE UNDER MY CARE     (Physician attestation of this document indicates review and certification of the therapy plan).              Referring Provider:  Thomas Villalobos    Initial Assessment  See Epic Evaluation- 04/15/25

## 2025-04-21 DIAGNOSIS — C90.00 MULTIPLE MYELOMA NOT HAVING ACHIEVED REMISSION (H): Primary | ICD-10-CM

## 2025-04-21 DIAGNOSIS — Z94.84 STEM CELLS TRANSPLANT STATUS (H): ICD-10-CM

## 2025-04-28 ENCOUNTER — LAB (OUTPATIENT)
Dept: LAB | Facility: CLINIC | Age: 75
End: 2025-04-28
Payer: MEDICARE

## 2025-04-28 DIAGNOSIS — C90.00 MULTIPLE MYELOMA, REMISSION STATUS UNSPECIFIED (H): ICD-10-CM

## 2025-04-28 DIAGNOSIS — T50.995S ADVERSE EFFECT OF OTHER DRUGS, MEDICAMENTS AND BIOLOGICAL SUBSTANCES, SEQUELA: ICD-10-CM

## 2025-04-28 DIAGNOSIS — Z92.29 PERSONAL HISTORY OF OTHER DRUG THERAPY: ICD-10-CM

## 2025-04-28 DIAGNOSIS — C90.00 MULTIPLE MYELOMA, REMISSION STATUS UNSPECIFIED (H): Primary | ICD-10-CM

## 2025-04-28 DIAGNOSIS — M81.0 OSTEOPOROSIS, UNSPECIFIED OSTEOPOROSIS TYPE, UNSPECIFIED PATHOLOGICAL FRACTURE PRESENCE: ICD-10-CM

## 2025-04-28 DIAGNOSIS — C90.00 MULTIPLE MYELOMA NOT HAVING ACHIEVED REMISSION (H): ICD-10-CM

## 2025-04-28 LAB
ALBUMIN SERPL BCG-MCNC: 4.3 G/DL (ref 3.5–5.2)
ALP SERPL-CCNC: 56 U/L (ref 40–150)
ALT SERPL W P-5'-P-CCNC: 23 U/L (ref 0–50)
ANION GAP SERPL CALCULATED.3IONS-SCNC: 11 MMOL/L (ref 7–15)
AST SERPL W P-5'-P-CCNC: 28 U/L (ref 0–45)
BASOPHILS # BLD AUTO: 0 10E3/UL (ref 0–0.2)
BASOPHILS NFR BLD AUTO: 1 %
BILIRUB SERPL-MCNC: 0.3 MG/DL
BUN SERPL-MCNC: 19.4 MG/DL (ref 8–23)
CALCIUM SERPL-MCNC: 9.7 MG/DL (ref 8.8–10.4)
CHLORIDE SERPL-SCNC: 101 MMOL/L (ref 98–107)
CREAT SERPL-MCNC: 0.81 MG/DL (ref 0.51–0.95)
EGFRCR SERPLBLD CKD-EPI 2021: 76 ML/MIN/1.73M2
EOSINOPHIL # BLD AUTO: 0 10E3/UL (ref 0–0.7)
EOSINOPHIL NFR BLD AUTO: 1 %
ERYTHROCYTE [DISTWIDTH] IN BLOOD BY AUTOMATED COUNT: 14.3 % (ref 10–15)
GLUCOSE SERPL-MCNC: 95 MG/DL (ref 70–99)
HCO3 SERPL-SCNC: 26 MMOL/L (ref 22–29)
HCT VFR BLD AUTO: 34.5 % (ref 35–47)
HGB BLD-MCNC: 12.3 G/DL (ref 11.7–15.7)
IMM GRANULOCYTES # BLD: 0 10E3/UL
IMM GRANULOCYTES NFR BLD: 0 %
LYMPHOCYTES # BLD AUTO: 1.2 10E3/UL (ref 0.8–5.3)
LYMPHOCYTES NFR BLD AUTO: 35 %
MCH RBC QN AUTO: 37.3 PG (ref 26.5–33)
MCHC RBC AUTO-ENTMCNC: 35.7 G/DL (ref 31.5–36.5)
MCV RBC AUTO: 105 FL (ref 78–100)
MONOCYTES # BLD AUTO: 0.5 10E3/UL (ref 0–1.3)
MONOCYTES NFR BLD AUTO: 15 %
NEUTROPHILS # BLD AUTO: 1.7 10E3/UL (ref 1.6–8.3)
NEUTROPHILS NFR BLD AUTO: 48 %
NRBC # BLD AUTO: 0 10E3/UL
NRBC BLD AUTO-RTO: 0 /100
PLATELET # BLD AUTO: 62 10E3/UL (ref 150–450)
POTASSIUM SERPL-SCNC: 3.9 MMOL/L (ref 3.4–5.3)
PROT SERPL-MCNC: 6.4 G/DL (ref 6.4–8.3)
RBC # BLD AUTO: 3.3 10E6/UL (ref 3.8–5.2)
SODIUM SERPL-SCNC: 138 MMOL/L (ref 135–145)
TOTAL PROTEIN SERUM FOR ELP: 6 G/DL (ref 6.4–8.3)
WBC # BLD AUTO: 3.4 10E3/UL (ref 4–11)

## 2025-04-28 PROCEDURE — 94642 AEROSOL INHALATION TREATMENT: CPT | Performed by: INTERNAL MEDICINE

## 2025-04-28 PROCEDURE — 86334 IMMUNOFIX E-PHORESIS SERUM: CPT | Performed by: PATHOLOGY

## 2025-04-28 PROCEDURE — 86334 IMMUNOFIX E-PHORESIS SERUM: CPT | Mod: 26 | Performed by: PATHOLOGY

## 2025-04-28 PROCEDURE — 36591 DRAW BLOOD OFF VENOUS DEVICE: CPT

## 2025-04-28 PROCEDURE — 84155 ASSAY OF PROTEIN SERUM: CPT

## 2025-04-28 PROCEDURE — 83521 IG LIGHT CHAINS FREE EACH: CPT

## 2025-04-28 PROCEDURE — 84165 PROTEIN E-PHORESIS SERUM: CPT | Mod: 26 | Performed by: PATHOLOGY

## 2025-04-28 PROCEDURE — 250N000011 HC RX IP 250 OP 636: Performed by: INTERNAL MEDICINE

## 2025-04-28 PROCEDURE — 84165 PROTEIN E-PHORESIS SERUM: CPT | Mod: TC | Performed by: PATHOLOGY

## 2025-04-28 PROCEDURE — 80053 COMPREHEN METABOLIC PANEL: CPT

## 2025-04-28 PROCEDURE — 82784 ASSAY IGA/IGD/IGG/IGM EACH: CPT

## 2025-04-28 PROCEDURE — 85004 AUTOMATED DIFF WBC COUNT: CPT

## 2025-04-28 PROCEDURE — 94640 AIRWAY INHALATION TREATMENT: CPT | Performed by: INTERNAL MEDICINE

## 2025-04-28 RX ORDER — PENTAMIDINE ISETHIONATE 300 MG/300MG
300 INHALANT RESPIRATORY (INHALATION) ONCE
Status: COMPLETED | OUTPATIENT
Start: 2025-04-28 | End: 2025-04-28

## 2025-04-28 RX ORDER — EPINEPHRINE 1 MG/ML
0.3 INJECTION, SOLUTION, CONCENTRATE INTRAVENOUS EVERY 5 MIN PRN
OUTPATIENT
Start: 2025-05-21

## 2025-04-28 RX ORDER — ALBUTEROL SULFATE 0.83 MG/ML
2.5 SOLUTION RESPIRATORY (INHALATION)
Status: DISCONTINUED | OUTPATIENT
Start: 2025-04-28 | End: 2025-04-28 | Stop reason: HOSPADM

## 2025-04-28 RX ORDER — ALBUTEROL SULFATE 5 MG/ML
2.5 SOLUTION RESPIRATORY (INHALATION) ONCE
Start: 2025-05-01 | End: 2025-05-01

## 2025-04-28 RX ORDER — MEPERIDINE HYDROCHLORIDE 25 MG/ML
25 INJECTION INTRAMUSCULAR; INTRAVENOUS; SUBCUTANEOUS
OUTPATIENT
Start: 2025-05-21

## 2025-04-28 RX ORDER — HEPARIN SODIUM (PORCINE) LOCK FLUSH IV SOLN 100 UNIT/ML 100 UNIT/ML
5 SOLUTION INTRAVENOUS
OUTPATIENT
Start: 2025-05-01

## 2025-04-28 RX ORDER — ALBUTEROL SULFATE 5 MG/ML
2.5 SOLUTION RESPIRATORY (INHALATION) ONCE
Status: DISCONTINUED | OUTPATIENT
Start: 2025-04-28 | End: 2025-04-28 | Stop reason: HOSPADM

## 2025-04-28 RX ORDER — DIPHENHYDRAMINE HYDROCHLORIDE 50 MG/ML
50 INJECTION, SOLUTION INTRAMUSCULAR; INTRAVENOUS
Start: 2025-05-21

## 2025-04-28 RX ORDER — METHYLPREDNISOLONE SODIUM SUCCINATE 40 MG/ML
40 INJECTION INTRAMUSCULAR; INTRAVENOUS
Start: 2025-05-21

## 2025-04-28 RX ORDER — PENTAMIDINE ISETHIONATE 300 MG/300MG
300 INHALANT RESPIRATORY (INHALATION) ONCE
Start: 2025-05-01 | End: 2025-05-01

## 2025-04-28 RX ORDER — HEPARIN SODIUM,PORCINE 10 UNIT/ML
5-20 VIAL (ML) INTRAVENOUS DAILY PRN
OUTPATIENT
Start: 2025-05-01

## 2025-04-28 RX ORDER — ALBUTEROL SULFATE 90 UG/1
1-2 INHALANT RESPIRATORY (INHALATION)
Start: 2025-05-21

## 2025-04-28 RX ORDER — ALBUTEROL SULFATE 0.83 MG/ML
2.5 SOLUTION RESPIRATORY (INHALATION)
OUTPATIENT
Start: 2025-05-21

## 2025-04-28 RX ORDER — HEPARIN SODIUM (PORCINE) LOCK FLUSH IV SOLN 100 UNIT/ML 100 UNIT/ML
5 SOLUTION INTRAVENOUS ONCE
Status: COMPLETED | OUTPATIENT
Start: 2025-04-28 | End: 2025-04-28

## 2025-04-28 RX ORDER — DIPHENHYDRAMINE HYDROCHLORIDE 50 MG/ML
25 INJECTION, SOLUTION INTRAMUSCULAR; INTRAVENOUS
Start: 2025-05-21

## 2025-04-28 RX ADMIN — Medication 5 ML: at 11:30

## 2025-04-28 RX ADMIN — ALBUTEROL SULFATE 2.5 MG: 0.83 SOLUTION RESPIRATORY (INHALATION) at 11:45

## 2025-04-28 RX ADMIN — PENTAMIDINE ISETHIONATE 300 MG: 300 INHALANT RESPIRATORY (INHALATION) at 11:43

## 2025-04-28 NOTE — PROGRESS NOTES
Patient was seen today for a Pentamidine nebulizer tx ordered by Dr. Chavez.    Patient was first given 2.5 mg of albuterol nebulizer, after which Pentamidine 300 mg (Lot # [id number]) mixed with 6cc Sterile H20 was administered through a filtered nebulizer.    Pre-treatment: SpO2 = 97%   HR = 77   BBS = clear   Post-treatment: SpO2 = 97%  HR = 76  BBS = clear    No adverse side effects noted by the patient.    This service today was provided under the direct supervision of Dr. Pagan, who was available if needed.     Procedure was completed by Nichole Pinedo.

## 2025-04-28 NOTE — NURSING NOTE
Chief Complaint   Patient presents with    Port Draw     Labs drawn via port by RN in lab.      Labs drawn via port by RN. Port accessed with 20g flat needle. Flushed with saline and heparin. De-accessed. Pt tolerated well.     Germaine Fuentes RN

## 2025-04-29 ENCOUNTER — DOCUMENTATION ONLY (OUTPATIENT)
Dept: PHARMACY | Facility: CLINIC | Age: 75
End: 2025-04-29

## 2025-04-29 LAB
ALBUMIN SERPL ELPH-MCNC: 4.1 G/DL (ref 3.7–5.1)
ALPHA1 GLOB SERPL ELPH-MCNC: 0.3 G/DL (ref 0.2–0.4)
ALPHA2 GLOB SERPL ELPH-MCNC: 0.7 G/DL (ref 0.5–0.9)
B-GLOBULIN SERPL ELPH-MCNC: 0.6 G/DL (ref 0.6–1)
GAMMA GLOB SERPL ELPH-MCNC: 0.4 G/DL (ref 0.7–1.6)
IGA SERPL-MCNC: 5 MG/DL (ref 84–499)
IGG SERPL-MCNC: 420 MG/DL (ref 610–1616)
IGM SERPL-MCNC: <10 MG/DL (ref 35–242)
KAPPA LC FREE SER-MCNC: 0.11 MG/DL (ref 0.33–1.94)
KAPPA LC FREE/LAMBDA FREE SER NEPH: ABNORMAL {RATIO}
LAMBDA LC FREE SERPL-MCNC: <0.14 MG/DL (ref 0.57–2.63)
LOCATION OF TASK: ABNORMAL
LOCATION OF TASK: NORMAL
M PROTEIN SERPL ELPH-MCNC: 0 G/DL
PROT PATTERN SERPL ELPH-IMP: ABNORMAL
PROT PATTERN SERPL IFE-IMP: NORMAL

## 2025-04-29 RX ORDER — HUMAN IMMUNOGLOBULIN G 20 G/200ML
0.4 LIQUID INTRAVENOUS ONCE
Start: 2025-04-29

## 2025-04-29 NOTE — PROGRESS NOTES
Pharmacist IVIG Stewardship Program    Diagnosis: Hypogammaglobulinemia   Date  12/20/2024   IgG Level (mg/dL) 143     Current Dosing Regimen: Privigen 20g IV monthly PRN for IgG <300 mg/dL   Patient is dosed as needed based on an IgG level of less than 300 mg/dL to ensure the lowest amount of medication is administered to achieve beneficial outcomes.  Pre-medications:   Acetaminophen 650 mg by mouth, 30 minutes prior to infusion  Diphenhydramine 37.5 mg IV prior to IVIG infusion an [id number]  Current Titration Regimen: Start infusion at 0.5 ml/kg/hr x 15 min. If tolerated, increase rate by 0.5 ml/kg/hr every 15 min to a maximum of 4 ml/kg/hr.  Previously Tried Products: None     Side Effects: Unable to reach patient to discuss.     Interventions: Lab review completed.     Assessment:   Date  4/28/2025   IgG Level (mg/dL) 420   Patient is appropriate for IVIG therapy when their level is within parameter. IVIG infusions are effective in increasing IgG levels to an appropriate level to minimize patient's risk of infection. Patient should continue on treatment as they have been per provider order. Without therapy their levels would put them at an increased risk of infections.    Plan: Patient is not okay to proceed with IVIG infusions at this time as her level is above her provider ordered parameter. Patient will have labs checked monthly to assess the need for additional IVIG infusions moving forward.     Next Review Needed: 5/2025    Willa Momin, PharmD, IgCP  Medication Access Pharmacist

## 2025-05-01 ENCOUNTER — OFFICE VISIT (OUTPATIENT)
Dept: NEUROLOGY | Facility: CLINIC | Age: 75
End: 2025-05-01
Attending: PSYCHIATRY & NEUROLOGY
Payer: MEDICARE

## 2025-05-01 DIAGNOSIS — G56.20 ULNAR NEUROPATHY, UNSPECIFIED LATERALITY: ICD-10-CM

## 2025-05-01 DIAGNOSIS — G56.03 BILATERAL CARPAL TUNNEL SYNDROME: Primary | ICD-10-CM

## 2025-05-01 NOTE — LETTER
5/1/2025       RE: Shena Hartmann  [address] [place] [address]     Dear Colleague,    Thank you for referring your patient, Shena Hartmann, to the [facility]. Please see a copy of my visit note below.    Please see procedure note.       Again, thank you for allowing me to participate in the care of your patient.      Sincerely,    Katie Bailey MD

## 2025-05-01 NOTE — PROCEDURES
[facility]  Electrodiagnostic Laboratory                 Department of Neurology                                                                                                         Test Date:  2025    Patient: Shena Hartmann : 1950 Physician: Katie Bailey MD   Sex: Female AGE: 74 year Ref Phys: Jhon HillmanDO   ID#: [id number]   Technician: Meera Buckley     History and Examination:  74-year-old right-handed female presenting with intermittent numbness and tingling in the fingers of both hands, predominantly in the third and forth fingers.  Symptoms worsen with wrist flexion.  She reports mild chronic neck stiffness but denies radicular pain.  This study was performed to assess for mononeuropathy of the upper limbs.    Techniques:  Motor and sensory conduction studies were done with surface recording electrodes. Temperature was monitored and recorded throughout the study. Upper extremities were maintained at a temperature of 32 degrees Centigrade or higher and Lower extremities were maintained at a temperature of 31 degrees Centigrade or higher.  EMG was done with a concentric needle electrode.     Results  Nerve conduction studies showed prolonged median sensory peak latencies with mild slowing of conduction velocities on both sides.  Bilateral median distal motor latencies were also prolonged with preserved amplitudes.  Median-ulnar palmar latency difference were prolonged on both sides.    Needle EMG of bilateral upper limbs showed mildly reduced recruitment of polyphasic motor unit potentials in the right abductor pollicis brevis muscle.    Interpretation:  This is an abnormal study.  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  There is no electrophysiologic evidence of a superimposed cervical radiculopathy in the current study.    Katie Bailey  MD  Department of Neurology        Nerve Conduction Studies  Motor Sites      Latency Neg. Amp Neg. Amp Diff Segment Distance Velocity Neg. Dur Neg Area Diff Temperature Comment   Site (ms) Norm (mV) Norm (%)  cm m/s Norm (ms) (%) ( C)    Left Median (APB) Motor   Wrist 4.5  < 4.4 7.1  > 5.0  Wrist-APB 8   6.1  31.8    Elbow 7.9 - 7.0  > 5.0 -1 Elbow-Wrist 20 59  > 48 7.3 1 31.8    Right Median (APB) Motor   Wrist 4.6  < 4.4 7.2  > 5.0  Wrist-APB 8   6.3  30.4    Elbow 8.6 - 6.8  > 5.0 -6 Elbow-Wrist 19.5 49  > 48 6.3 -5 30.5    Left Ulnar (ADM) Motor   Wrist 3.0  < 3.5 9.8  > 5.0  Wrist-ADM 8   5.7  32.1    Below Elbow 6.2 - 8.7 - -11 Below Elbow-Wrist 17.8 56  > 48 6.7 -3 32.1    Above Elbow 7.8 - 8.2 - -6 Above Elbow-Below Elbow 10 63  > 48 7.0 2 31.9    Right Ulnar (ADM) Motor   Wrist 3.5  < 3.5 11.2  > 5.0  Wrist-ADM 8   6.4  31.4    Below Elbow 6.9 - 10.3 - -8 Below Elbow-Wrist 18.5 54  > 48 6.9 -1 31.4    Above Elbow 8.1 - 10.0 - -3 Above Elbow-Below Elbow 10 83  > 48 7.4 1 31.4      Sensory Sites      Onset Lat Peak Lat Amp (O-P) Amp (P-P) Segment Distance Velocity Temperature Comment   Site ms (ms)  V Norm ( V)  cm m/s Norm ( C)    Left Median Sensory   Wrist-Dig II 3.2 4.2 14  > 10 21 Wrist-Dig II 14 44  > 48 32.7    Right Median Sensory   Wrist-Dig II 3.2 4.3 16  > 10 25 Wrist-Dig II 14 44  > 48 31.4    Left Median-Ulnar Palmar Sensory        Median   Palm-Wrist 2.4 3.0 17 - 22 Palm-Wrist 8 33 - 33.1         Ulnar   Palm-Wrist 1.33 2.0 21 - 27 Palm-Wrist - - - 33.6    Right Median-Ulnar Palmar Sensory        Median   Palm-Wrist 2.1 2.8 35 - 34 Palm-Wrist 8 38 - 32         Ulnar   Palm-Wrist 1.55 2.1 21 - 22 Palm-Wrist - - - 32.4    Left Ulnar Sensory   Wrist-Dig V 2.6 3.4 21  > 8 24 Wrist-Dig V 12.5 48  > 48 32.9    Right Ulnar Sensory   Wrist-Dig V 2.4 3.3 29  > 8 47 Wrist-Dig V 12.5 52  > 48 31.8      Inter-Nerve Comparisons     Nerve 1 Value 1 Nerve 2 Value 2 Parameter Result Normal   Sensory Sites   R  Median Palm-Wrist 2.8 ms R Ulnar Palm-Wrist 2.1 ms Peak Lat Diff 0.70 ms <0.40   L Median Palm-Wrist 3.0 ms L Ulnar Palm-Wrist 2.0 ms Peak Lat Diff 1.00 ms <0.40       Electromyography     Side Muscle Ins Act Fibs/PSW Fasc HF Amp Dur Poly Recrt Int Pat   Right FDI Nml None Nml 0 Nml Nml 0 Nml Nml   Right Pronator Teres Nml None Nml 0 Nml Nml 0 Nml Nml   Right Triceps Nml None Nml 0 Nml Nml 0 Nml Nml   Right FCU Nml None Nml 0 Nml Nml 0 Nml Nml   Right APB Nml None Nml 0 Nml Nml 1+ Radha Nml   Left FDI Nml None Nml 0 Nml Nml 0 Nml Nml   Left Pronator Teres Nml None Nml 0 Nml Nml 0 Nml Nml   Left Triceps Nml None Nml 0 Nml Nml 0 Nml Nml         Waveforms / Images:    Motor                Sensory

## 2025-05-06 ENCOUNTER — OFFICE VISIT (OUTPATIENT)
Dept: TRANSPLANT | Facility: CLINIC | Age: 75
End: 2025-05-06
Attending: REGISTERED NURSE
Payer: MEDICARE

## 2025-05-06 VITALS
RESPIRATION RATE: 16 BRPM | OXYGEN SATURATION: 97 % | TEMPERATURE: 98 F | WEIGHT: 122.78 LBS | HEART RATE: 93 BPM | SYSTOLIC BLOOD PRESSURE: 119 MMHG | BODY MASS INDEX: 23.2 KG/M2 | DIASTOLIC BLOOD PRESSURE: 58 MMHG

## 2025-05-06 DIAGNOSIS — D61.810 ANTINEOPLASTIC CHEMOTHERAPY INDUCED PANCYTOPENIA: ICD-10-CM

## 2025-05-06 DIAGNOSIS — D80.1 ACQUIRED HYPOGAMMAGLOBULINEMIA: ICD-10-CM

## 2025-05-06 DIAGNOSIS — Z92.850 STATUS POST CHIMERIC ANTIGEN RECEPTOR T-CELL THERAPY: ICD-10-CM

## 2025-05-06 DIAGNOSIS — D84.9 ACQUIRED IMMUNOCOMPROMISED STATE: ICD-10-CM

## 2025-05-06 DIAGNOSIS — Z79.899 ENCOUNTER FOR LONG-TERM (CURRENT) USE OF MEDICATIONS: ICD-10-CM

## 2025-05-06 DIAGNOSIS — T45.1X5A ANTINEOPLASTIC CHEMOTHERAPY INDUCED PANCYTOPENIA: ICD-10-CM

## 2025-05-06 DIAGNOSIS — C90.01 MULTIPLE MYELOMA IN REMISSION (H): Primary | ICD-10-CM

## 2025-05-06 PROCEDURE — 99215 OFFICE O/P EST HI 40 MIN: CPT

## 2025-05-06 PROCEDURE — 1125F AMNT PAIN NOTED PAIN PRSNT: CPT

## 2025-05-06 PROCEDURE — 3074F SYST BP LT 130 MM HG: CPT

## 2025-05-06 PROCEDURE — 3078F DIAST BP <80 MM HG: CPT

## 2025-05-06 PROCEDURE — G0463 HOSPITAL OUTPT CLINIC VISIT: HCPCS

## 2025-05-06 ASSESSMENT — PAIN SCALES - GENERAL: PAINLEVEL_OUTOF10: MODERATE PAIN (4)

## 2025-05-06 NOTE — NURSING NOTE
"Oncology Rooming Note    May 6, 2025 2:37 PM   Shena Hartmann is a 74 year old female who presents for:    Chief Complaint   Patient presents with    Oncology Clinic Visit     MM; results review     Initial Vitals: /58 (BP Location: Left arm, Patient Position: Sitting, Cuff Size: Adult Regular)   Pulse 93   Temp 98  F (36.7  C) (Oral)   Resp 16   LMP  (LMP Unknown)   SpO2 97%  Estimated body mass index is 23.2 kg/m  as calculated from the following:    Height as of 3/13/25: 1.549 m (5' 1\").    Weight as of 3/25/25: 55.7 kg (122 lb 12.7 oz). There is no height or weight on file to calculate BSA.  Moderate Pain (4) Comment: Data Unavailable   No LMP recorded (lmp unknown). Patient is postmenopausal.  Allergies reviewed: Yes  Medications reviewed: Yes    Medications: Medication refills not needed today.  Pharmacy name entered into [employer]:    [id number] - [place], [place] - [address] AT [facility] [address]  WRITTEN PRESCRIPTION REQUESTED  [facility] - [place], [place] - [address]    Frailty Screening:   Is the patient here for a new oncology consult visit in cancer care? 2. No    PHQ9:  Did this patient require a PHQ9?: No      Clinical concerns: Pt wondering about 24hr urine for July - per pt they forgot at the last anniversary visit      Laci Sharp              "

## 2025-05-06 NOTE — PROGRESS NOTES
[facility]    BMT Day [address]  Clinic Note    May 6, 2025    Reason for Office Visit   Follow-up for multiple myeloma    Cancer Diagnosis   Multiple Myeloma    Oncology History of Present Illness   Breast cancer dx in 1994. Underwent surgery and chemotherapy without reoccurence  MGUS dx 1999  T8 pathologic fracture with radiation  Revlimid and velcade,   2013  Cytoxan IV 9/2013  D-PACE 11/2013  Auto 3/27/14  5/2014 thalidomide caused rash so switched to pomalidomide   on kenalog and clobetasol creams for IMID rash  FLC began to rise so riky added to pom 9/2020  she has been on xgeva for an unclear amount of time  Teodora-Kd 2/20/2023  Teodora maintenance Nov 2023  Spring 2024 FLC began to rise again  5/22/24 Underwent apheresis for CAR-T  7/29/24 Carvykti (CAR-T)    Interval History     Here for CAR-T BAN visit. Reports worsening numbness and pain in bilateral leg from knee to ankle Heat and massaging has been helping. Able to ambulate without issues. Diarrhea has resolved. No other new issues.    ROS neg other than the symptoms noted above in the HPI.    Past Medical History     Past Medical History:   Diagnosis Date    Acquired hypothyroidism 8/10/2023    Breast cancer (H) 01/01/1994    right    H/O stem cell transplant (H) 03/01/2014    History of blood transfusion 2013 & 2014    During stem cell tx process    Hypertension Sept. 2021    Runs 140 s systolically, about 20 above my usual    Multiple myeloma, without mention of having achieved remission 11/06/2012       Past Surgical History:      Past Surgical History:   Procedure Laterality Date    BIOPSY  10/1994; [id number]    Lt. Breast in '94; multiple bone marrow bx's for MM    BREAST SURGERY  10/1994    Lt. Mastectomy w/lymph node resection    COLONOSCOPY  11/2015    Normal results    EYE SURGERY  2020    Bilateral cataract surgery    INSERT CATHETER VASCULAR ACCESS Left 5/22/2024    Procedure: central venous catheter non tunneled  insertion, vascular access;  Surgeon: Ric Castaneda PA-C;  Location: [facility] OR    INSERT PORT VASCULAR ACCESS Left 2/15/2023    Procedure: INSERTION, VASCULAR ACCESS PORT;  Surgeon: Luc Antunez MD;  Location: [facility] OR    IR CHEST PORT PLACEMENT > 5 YRS OF AGE  2/15/2023    IR CVC NON TUNNEL LINE REMOVAL  2024    IR CVC NON TUNNEL PLACEMENT > 5 YRS  2024    IR PICC PLACEMENT > 5 YRS OF AGE  2024    MASTECTOMY      Right, with lymphe node disection      PICC INSERTION  09/10/2013    5fr DL Power PICC, 44cm (1cm external) in the L lateral brachial vein with tip in the low SVC    TRANSPLANT  3/27/2014    Autologous stem cell tx       Social History     Socioeconomic History    Marital status:    Tobacco Use    Smoking status: Never     Passive exposure: Never    Smokeless tobacco: Never   Substance and Sexual Activity    Alcohol use: Yes     Comment: occ    Drug use: No    Sexual activity: Not Currently     Partners: Male     Birth control/protection: Post-menopausal   Other Topics Concern    Parent/sibling w/ CABG, MI or angioplasty before 65F 55M? No     Service No    Blood Transfusions No    Caffeine Concern No    Occupational Exposure No    Hobby Hazards No     Family History     Family History   Problem Relation Age of Onset    Hypertension Mother     Cerebrovascular Disease Mother          8-11-15 from strokes    Hypertension Father     Prostate Cancer Father     Skin Cancer Paternal Grandmother     Cancer Paternal Grandmother         skin cancer       Allergies:     Allergies   Allergen Reactions    Blood Transfusion Related (Informational Only) Other (See Comments)     Patient has a history of a clinically significant antibody against RBC antigens.  A delay in compatible RBCs may occur.     Cayenne Pepper [Cayenne]     Corn-Containing Products GI Disturbance    Levaquin Other (See Comments)     Tendon pain    Milk Protein GI Disturbance    Mold      Facial rash/lip  swelling    Morphine Sulfate Other (See Comments)     Per pt - see things (hallucination) when she was on Morphine .    Pollen Extract      Environmental allergies     Shrimp Nausea and Vomiting    Tomato      Lip swelling, facial rash    Azithromycin Rash    Keflex [Cephalexin] Rash    Oat Rash    Tape [Adhesive Tape] Itching and Rash     All adhesives    Wheat Rash     Swelling of lips         Medications:     Current Outpatient Medications   Medication Sig Dispense Refill    acetaminophen (TYLENOL) 325 MG tablet Take 325-650 mg by mouth every 6 hours as needed for mild pain      acyclovir (ZOVIRAX) 400 MG tablet Take 1 tablet (400 mg) by mouth every 12 hours. 60 tablet 11    amLODIPine (NORVASC) 2.5 MG tablet Take 2 tablets (5 mg) by mouth daily Take 5 mg (2 tablets) in the morning. 180 tablet 3    CALCIUM-VITAMIN D-VITAMIN K PO Take 2 tablets by mouth daily.      clobetasol (TEMOVATE) 0.05 % external ointment Apply topically 2 times daily as needed (itching and rash). Topically to rash on hands until resolved 45 g 0    estradiol (ESTRACE) 0.1 MG/GM vaginal cream Place 1 g vaginally three times a week. Ok to use your fingers or the applicator 42.5 g 3    levothyroxine (SYNTHROID/LEVOTHROID) 50 MCG tablet Take 1 tablet (50 mcg) by mouth daily. 90 tablet 3    MAGNESIUM PO Take 235 mg by mouth 2 times daily.      Multiple Vitamins-Minerals (MULTIVITAMIN OR) Take 1 tablet by mouth 2 times daily.      order for DME 6 mastectomy bras  Length of need: 99 months 6 Device 1    order for DME R mastectomy prosthesis   Length of need:  99 months 1 Device 1    triamcinolone (KENALOG) 0.1 % external cream Apply topically 2 times daily as needed for irritation. 15 g 11    penicillin V (VEETID) 250 MG tablet Take 1 tablet (250 mg) by mouth daily. (Patient not taking: Reported on 5/6/2025) 60 tablet 2     Current Facility-Administered Medications   Medication Dose Route Frequency Provider Last Rate Last Admin    [START ON  6/2/2025] azithromycin (ZITHROMAX) 500 mg for allergy testing   Other Once         [START ON 6/2/2025] ceFAZolin (ANCEF) 500 mg for allergy testing   Other Once          Facility-Administered Medications Ordered in Other Visits   Medication Dose Route Frequency Provider Last Rate Last Admin    potassium chloride 20 mEq in 50 mL IVPB  20 mEq Intravenous Once Mae Kay PA-C        potassium chloride SA (K-DUR,KLOR-CON M) CR tablet 20 mEq  20 mEq Oral Once Mae Kay PA-C           Physical Exam   /58 (BP Location: Left arm, Patient Position: Sitting, Cuff Size: Adult Regular)   Pulse 93   Temp 98  F (36.7  C) (Oral)   Resp 16   Wt 55.7 kg (122 lb 12.4 oz)   LMP  (LMP Unknown)   SpO2 97%   BMI 23.20 kg/m          Wt Readings from Last 5 Encounters:   05/06/25 55.7 kg (122 lb 12.4 oz)   03/25/25 55.7 kg (122 lb 12.7 oz)   03/14/25 55 kg (121 lb 4.8 oz)   03/13/25 55.4 kg (122 lb 1.6 oz)   03/03/25 55.3 kg (121 lb 14.4 oz)     Constitutional: NAD  Eyes: no scleral icterus  ENT: no oral lesions  Pulm: CTAB  CV: RRR, no m/r/g  GI: bowel sounds present, soft and nontender to palpation  MSK: No edema in the extremities  Neuro: alert, conversant, normal gait  Psych: appropriate mood and affect    Data     Most Recent 3 CBC's:  Recent Labs   Lab Test 04/28/25  1129 03/12/25  1538 02/11/25  1159   WBC 3.4* 3.8* 3.7*   HGB 12.3 12.1 12.0   * 106* 104*   PLT 62* 61* 47*     Most Recent 3 BMP's:  Recent Labs   Lab Test 04/28/25  1129 03/12/25  1538 02/11/25  1159    138 139   POTASSIUM 3.9 4.0 3.9   CHLORIDE 101 102 100   CO2 26 26 29   BUN 19.4 13.4 17.8   CR 0.81 0.78 0.79   ANIONGAP 11 10 10   PAULINE 9.7 9.3 9.3   GLC 95 99 119*   PROTTOTAL 6.4 6.4 6.4   ALBUMIN 4.3 4.4 4.4    Most Recent 3 LFT's:  Recent Labs   Lab Test 04/28/25  1129 03/12/25  1538 02/11/25  1159   AST 28 26 26   ALT 23 24 26   ALKPHOS 56 58 53   BILITOTAL 0.3 <0.2 0.2    Most Recent 2 TSH and T4:  Recent Labs    Lab Test 03/12/25  1538 02/19/24  1333 10/03/22  0820 06/13/22  0822 03/21/22  0803   TSH 1.57 3.08   < > 9.47*  9.31* 5.79*   T4  --   --   --  0.99  0.97 0.83    < > = values in this interval not displayed.     I reviewed the above labs today myself and with the patient.    Assessment/Plan     Relapsed Multiple Myeloma  CAR-T therapy 7/29/24 with Carvykti.  Restaging at day +180 shows CR MRD-  Peripheral restaging at 9m post-CAR-T shows no evidence of recurrence.  Xgeva every 6 months    Macrocytic anemia- check B12 with next lab draw. Normocellular marrow  Neutropenia- penicillin V ppx when ANC <1.0.  IgG- Continue to check monthly and replace as needed     HTN  Continue amlodipine 5 mg daily.  Followed by cardiology     Hypothyroidism  Remains on levothyroxine    ID Prophylaxis   Continue Acyclovir to 400 mg BID for HSV prophylaxis  Pentamidine monthly for 1 year following CAR-T - may consider changing to Bactrim once platelets recover. Last time was on 4/28/25.  penicillin V when ANC <1.0    Left Hand Pain  Saw ortho who prescribed a brace and stretching for lateral epicondylitis and carpal tunnel syndrome.     I spent 40 min spent reviewing the chart, counseling the patient, placing orders, and coordinating care on the day of the visit.     Laquita Esteves MD

## 2025-05-06 NOTE — LETTER
5/6/2025      Shena Hartmann  [address] [place] [address]      Dear Colleague,    Thank you for referring your patient, Shena Hartmann, to the [facility] BLOOD AND MARROW TRANSPLANT PROGRAM [place]. Please see a copy of my visit note below.        [facility]    BMT Day 281  Clinic Note    May 6, 2025    Reason for Office Visit   Follow-up for multiple myeloma    Cancer Diagnosis   Multiple Myeloma    Oncology History of Present Illness   Breast cancer dx in 1994. Underwent surgery and chemotherapy without reoccurence  MGUS dx 1999  T8 pathologic fracture with radiation  Revlimid and velcade,   2013  Cytoxan IV 9/2013  D-PACE 11/2013  Auto 3/27/14  5/2014 thalidomide caused rash so switched to pomalidomide   on kenalog and clobetasol creams for IMID rash  FLC began to rise so riky added to pom 9/2020  she has been on xgeva for an unclear amount of time  Teodora-Kd 2/20/2023  Teodora maintenance Nov 2023  Spring 2024 FLC began to rise again  5/22/24 Underwent apheresis for CAR-T  7/29/24 Carvykti (CAR-T)    Interval History     Here for CAR-T BAN visit. Reports worsening numbness and pain in bilateral leg from knee to ankle Heat and massaging has been helping. Able to ambulate without issues. Diarrhea has resolved. No other new issues.    ROS neg other than the symptoms noted above in the HPI.    Past Medical History     Past Medical History:   Diagnosis Date     Acquired hypothyroidism 8/10/2023     Breast cancer (H) 01/01/1994    right     H/O stem cell transplant (H) 03/01/2014     History of blood transfusion 2013 & 2014    During stem cell tx process     Hypertension Sept. 2021    Runs 140 s systolically, about 20 above my usual     Multiple myeloma, without mention of having achieved remission 11/06/2012       Past Surgical History:      Past Surgical History:   Procedure Laterality Date     BIOPSY  10/1994; [id number]    Lt. Breast in '94; multiple bone  marrow bx's for MM     BREAST SURGERY  10/1994    Lt. Mastectomy w/lymph node resection     COLONOSCOPY  2015    Normal results     EYE SURGERY      Bilateral cataract surgery     INSERT CATHETER VASCULAR ACCESS Left 2024    Procedure: central venous catheter non tunneled insertion, vascular access;  Surgeon: Ric Castaneda PA-C;  Location: [facility] OR     INSERT PORT VASCULAR ACCESS Left 2/15/2023    Procedure: INSERTION, VASCULAR ACCESS PORT;  Surgeon: Luc Antunez MD;  Location: [facility] OR     IR CHEST PORT PLACEMENT > 5 YRS OF AGE  2/15/2023     IR CVC NON TUNNEL LINE REMOVAL  2024     IR CVC NON TUNNEL PLACEMENT > 5 YRS  2024     IR PICC PLACEMENT > 5 YRS OF AGE  2024     MASTECTOMY      Right, with lymphe node disection       PICC INSERTION  09/10/2013    5fr DL Power PICC, 44cm (1cm external) in the L lateral brachial vein with tip in the low SVC     TRANSPLANT  3/27/2014    Autologous stem cell tx       Social History     Socioeconomic History     Marital status:    Tobacco Use     Smoking status: Never     Passive exposure: Never     Smokeless tobacco: Never   Substance and Sexual Activity     Alcohol use: Yes     Comment: occ     Drug use: No     Sexual activity: Not Currently     Partners: Male     Birth control/protection: Post-menopausal   Other Topics Concern     Parent/sibling w/ CABG, MI or angioplasty before 65F 55M? No      Service No     Blood Transfusions No     Caffeine Concern No     Occupational Exposure No     Hobby Hazards No     Family History     Family History   Problem Relation Age of Onset     Hypertension Mother      Cerebrovascular Disease Mother          15 from strokes     Hypertension Father      Prostate Cancer Father      Skin Cancer Paternal Grandmother      Cancer Paternal Grandmother         skin cancer       Allergies:     Allergies   Allergen Reactions     Blood Transfusion Related (Informational Only) Other (See Comments)      Patient has a history of a clinically significant antibody against RBC antigens.  A delay in compatible RBCs may occur.      Cayenne Pepper [Cayenne]      Corn-Containing Products GI Disturbance     Levaquin Other (See Comments)     Tendon pain     Milk Protein GI Disturbance     Mold      Facial rash/lip swelling     Morphine Sulfate Other (See Comments)     Per pt - see things (hallucination) when she was on Morphine .     Pollen Extract      Environmental allergies      Shrimp Nausea and Vomiting     Tomato      Lip swelling, facial rash     Azithromycin Rash     Keflex [Cephalexin] Rash     Oat Rash     Tape [Adhesive Tape] Itching and Rash     All adhesives     Wheat Rash     Swelling of lips         Medications:     Current Outpatient Medications   Medication Sig Dispense Refill     acetaminophen (TYLENOL) 325 MG tablet Take 325-650 mg by mouth every 6 hours as needed for mild pain       acyclovir (ZOVIRAX) 400 MG tablet Take 1 tablet (400 mg) by mouth every 12 hours. 60 tablet 11     amLODIPine (NORVASC) 2.5 MG tablet Take 2 tablets (5 mg) by mouth daily Take 5 mg (2 tablets) in the morning. 180 tablet 3     CALCIUM-VITAMIN D-VITAMIN K PO Take 2 tablets by mouth daily.       clobetasol (TEMOVATE) 0.05 % external ointment Apply topically 2 times daily as needed (itching and rash). Topically to rash on hands until resolved 45 g 0     estradiol (ESTRACE) 0.1 MG/GM vaginal cream Place 1 g vaginally three times a week. Ok to use your fingers or the applicator 42.5 g 3     levothyroxine (SYNTHROID/LEVOTHROID) 50 MCG tablet Take 1 tablet (50 mcg) by mouth daily. 90 tablet 3     MAGNESIUM PO Take 235 mg by mouth 2 times daily.       Multiple Vitamins-Minerals (MULTIVITAMIN OR) Take 1 tablet by mouth 2 times daily.       order for DME 6 mastectomy bras  Length of need: 99 months 6 Device 1     order for DME R mastectomy prosthesis   Length of need:  99 months 1 Device 1     triamcinolone (KENALOG) 0.1 % external  cream Apply topically 2 times daily as needed for irritation. 15 g 11     penicillin V (VEETID) 250 MG tablet Take 1 tablet (250 mg) by mouth daily. (Patient not taking: Reported on 5/6/2025) 60 tablet 2     Current Facility-Administered Medications   Medication Dose Route Frequency Provider Last Rate Last Admin     [START ON 6/2/2025] azithromycin (ZITHROMAX) 500 mg for allergy testing   Other Once          [START ON 6/2/2025] ceFAZolin (ANCEF) 500 mg for allergy testing   Other Once          Facility-Administered Medications Ordered in Other Visits   Medication Dose Route Frequency Provider Last Rate Last Admin     potassium chloride 20 mEq in 50 mL IVPB  20 mEq Intravenous Once Mae Kay PA-C         potassium chloride SA (K-DUR,KLOR-CON M) CR tablet 20 mEq  20 mEq Oral Once Mae Kay PA-C           Physical Exam   /58 (BP Location: Left arm, Patient Position: Sitting, Cuff Size: Adult Regular)   Pulse 93   Temp 98  F (36.7  C) (Oral)   Resp 16   Wt 55.7 kg (122 lb 12.4 oz)   LMP  (LMP Unknown)   SpO2 97%   BMI 23.20 kg/m          Wt Readings from Last 5 Encounters:   05/06/25 55.7 kg (122 lb 12.4 oz)   03/25/25 55.7 kg (122 lb 12.7 oz)   03/14/25 55 kg (121 lb 4.8 oz)   03/13/25 55.4 kg (122 lb 1.6 oz)   03/03/25 55.3 kg (121 lb 14.4 oz)     Constitutional: NAD  Eyes: no scleral icterus  ENT: no oral lesions  Pulm: CTAB  CV: RRR, no m/r/g  GI: bowel sounds present, soft and nontender to palpation  MSK: No edema in the extremities  Neuro: alert, conversant, normal gait  Psych: appropriate mood and affect    Data     Most Recent 3 CBC's:  Recent Labs   Lab Test 04/28/25  1129 03/12/25  1538 02/11/25  1159   WBC 3.4* 3.8* 3.7*   HGB 12.3 12.1 12.0   * 106* 104*   PLT 62* 61* 47*     Most Recent 3 BMP's:  Recent Labs   Lab Test 04/28/25  1129 03/12/25  1538 02/11/25  1159    138 139   POTASSIUM 3.9 4.0 3.9   CHLORIDE 101 102 100   CO2 26 26 29   BUN 19.4 13.4  17.8   CR 0.81 0.78 0.79   ANIONGAP 11 10 10   PAULINE 9.7 9.3 9.3   GLC 95 99 119*   PROTTOTAL 6.4 6.4 6.4   ALBUMIN 4.3 4.4 4.4    Most Recent 3 LFT's:  Recent Labs   Lab Test 04/28/25  1129 03/12/25  1538 02/11/25  1159   AST 28 26 26   ALT 23 24 26   ALKPHOS 56 58 53   BILITOTAL 0.3 <0.2 0.2    Most Recent 2 TSH and T4:  Recent Labs   Lab Test 03/12/25  1538 02/19/24  1333 10/03/22  0820 06/13/22  0822 03/21/22  0803   TSH 1.57 3.08   < > 9.47*  9.31* 5.79*   T4  --   --   --  0.99  0.97 0.83    < > = values in this interval not displayed.     I reviewed the above labs today myself and with the patient.    Assessment/Plan     Relapsed Multiple Myeloma  CAR-T therapy 7/29/24 with Sravan.  Restaging at day +180 shows CR MRD-  Peripheral restaging at 9m post-CAR-T shows no evidence of recurrence.  Xgeva every 6 months    Macrocytic anemia- check B12 with next lab draw. Normocellular marrow  Neutropenia- penicillin V ppx when ANC <1.0.  IgG- Continue to check monthly and replace as needed     HTN  Continue amlodipine 5 mg daily.  Followed by cardiology     Hypothyroidism  Remains on levothyroxine    ID Prophylaxis   Continue Acyclovir to 400 mg BID for HSV prophylaxis  Pentamidine monthly for 1 year following CAR-T - may consider changing to Bactrim once platelets recover. Last time was on 4/28/25.  penicillin V when ANC <1.0    Left Hand Pain  Saw ortho who prescribed a brace and stretching for lateral epicondylitis and carpal tunnel syndrome.     I spent 40 min spent reviewing the chart, counseling the patient, placing orders, and coordinating care on the day of the visit.     Laquita Esteves MD     Again, thank you for allowing me to participate in the care of your patient.        Sincerely,         BMT Auto Cell Therapy    Electronically signed

## 2025-05-07 DIAGNOSIS — Z92.29 PERSONAL HISTORY OF OTHER DRUG THERAPY: ICD-10-CM

## 2025-05-07 DIAGNOSIS — C90.00 MULTIPLE MYELOMA NOT HAVING ACHIEVED REMISSION (H): Primary | ICD-10-CM

## 2025-05-07 DIAGNOSIS — M81.0 OSTEOPOROSIS, UNSPECIFIED OSTEOPOROSIS TYPE, UNSPECIFIED PATHOLOGICAL FRACTURE PRESENCE: ICD-10-CM

## 2025-05-07 DIAGNOSIS — T50.995S ADVERSE EFFECT OF OTHER DRUGS, MEDICAMENTS AND BIOLOGICAL SUBSTANCES, SEQUELA: ICD-10-CM

## 2025-05-23 RX ORDER — ALBUTEROL SULFATE 90 UG/1
1-2 INHALANT RESPIRATORY (INHALATION)
Start: 2025-05-26

## 2025-05-23 RX ORDER — ALBUTEROL SULFATE 0.83 MG/ML
2.5 SOLUTION RESPIRATORY (INHALATION)
OUTPATIENT
Start: 2025-05-26

## 2025-05-23 RX ORDER — DIPHENHYDRAMINE HYDROCHLORIDE 50 MG/ML
25 INJECTION, SOLUTION INTRAMUSCULAR; INTRAVENOUS
Start: 2025-05-26

## 2025-05-23 RX ORDER — DIPHENHYDRAMINE HYDROCHLORIDE 50 MG/ML
50 INJECTION, SOLUTION INTRAMUSCULAR; INTRAVENOUS
Start: 2025-05-26

## 2025-05-23 RX ORDER — EPINEPHRINE 1 MG/ML
0.3 INJECTION, SOLUTION INTRAMUSCULAR; SUBCUTANEOUS EVERY 5 MIN PRN
OUTPATIENT
Start: 2025-05-26

## 2025-05-23 RX ORDER — METHYLPREDNISOLONE SODIUM SUCCINATE 40 MG/ML
40 INJECTION INTRAMUSCULAR; INTRAVENOUS
Start: 2025-05-26

## 2025-05-23 RX ORDER — MEPERIDINE HYDROCHLORIDE 25 MG/ML
25 INJECTION INTRAMUSCULAR; INTRAVENOUS; SUBCUTANEOUS
OUTPATIENT
Start: 2025-05-26

## 2025-05-27 ENCOUNTER — INFUSION THERAPY VISIT (OUTPATIENT)
Dept: ONCOLOGY | Facility: CLINIC | Age: 75
End: 2025-05-27
Attending: REGISTERED NURSE
Payer: MEDICARE

## 2025-05-27 VITALS
WEIGHT: 124 LBS | SYSTOLIC BLOOD PRESSURE: 138 MMHG | HEART RATE: 80 BPM | RESPIRATION RATE: 16 BRPM | BODY MASS INDEX: 23.43 KG/M2 | DIASTOLIC BLOOD PRESSURE: 71 MMHG | OXYGEN SATURATION: 96 % | TEMPERATURE: 97.8 F

## 2025-05-27 DIAGNOSIS — Z92.29 PERSONAL HISTORY OF OTHER DRUG THERAPY: ICD-10-CM

## 2025-05-27 DIAGNOSIS — T50.995S ADVERSE EFFECT OF OTHER DRUGS, MEDICAMENTS AND BIOLOGICAL SUBSTANCES, SEQUELA: ICD-10-CM

## 2025-05-27 DIAGNOSIS — C90.00 MULTIPLE MYELOMA, REMISSION STATUS UNSPECIFIED (H): ICD-10-CM

## 2025-05-27 DIAGNOSIS — C90.00 MULTIPLE MYELOMA NOT HAVING ACHIEVED REMISSION (H): Primary | ICD-10-CM

## 2025-05-27 DIAGNOSIS — M81.0 OSTEOPOROSIS, UNSPECIFIED OSTEOPOROSIS TYPE, UNSPECIFIED PATHOLOGICAL FRACTURE PRESENCE: ICD-10-CM

## 2025-05-27 LAB
ALBUMIN SERPL BCG-MCNC: 4.2 G/DL (ref 3.5–5.2)
ALP SERPL-CCNC: 63 U/L (ref 40–150)
ALT SERPL W P-5'-P-CCNC: 20 U/L (ref 0–50)
ANION GAP SERPL CALCULATED.3IONS-SCNC: 13 MMOL/L (ref 7–15)
AST SERPL W P-5'-P-CCNC: 26 U/L (ref 0–45)
BASOPHILS # BLD AUTO: 0 10E3/UL (ref 0–0.2)
BASOPHILS NFR BLD AUTO: 0 %
BILIRUB SERPL-MCNC: 0.2 MG/DL
BUN SERPL-MCNC: 17.3 MG/DL (ref 8–23)
CALCIUM SERPL-MCNC: 9.5 MG/DL (ref 8.8–10.4)
CALCIUM SERPL-MCNC: 9.5 MG/DL (ref 8.8–10.4)
CHLORIDE SERPL-SCNC: 100 MMOL/L (ref 98–107)
CREAT SERPL-MCNC: 0.83 MG/DL (ref 0.51–0.95)
EGFRCR SERPLBLD CKD-EPI 2021: 74 ML/MIN/1.73M2
EOSINOPHIL # BLD AUTO: 0 10E3/UL (ref 0–0.7)
EOSINOPHIL NFR BLD AUTO: 1 %
ERYTHROCYTE [DISTWIDTH] IN BLOOD BY AUTOMATED COUNT: 14.3 % (ref 10–15)
GLUCOSE SERPL-MCNC: 112 MG/DL (ref 70–99)
HCO3 SERPL-SCNC: 25 MMOL/L (ref 22–29)
HCT VFR BLD AUTO: 35 % (ref 35–47)
HGB BLD-MCNC: 12.1 G/DL (ref 11.7–15.7)
IMM GRANULOCYTES # BLD: 0 10E3/UL
IMM GRANULOCYTES NFR BLD: 0 %
LYMPHOCYTES # BLD AUTO: 1.4 10E3/UL (ref 0.8–5.3)
LYMPHOCYTES NFR BLD AUTO: 36 %
MCH RBC QN AUTO: 36.4 PG (ref 26.5–33)
MCHC RBC AUTO-ENTMCNC: 34.6 G/DL (ref 31.5–36.5)
MCV RBC AUTO: 105 FL (ref 78–100)
MONOCYTES # BLD AUTO: 0.5 10E3/UL (ref 0–1.3)
MONOCYTES NFR BLD AUTO: 14 %
NEUTROPHILS # BLD AUTO: 1.9 10E3/UL (ref 1.6–8.3)
NEUTROPHILS NFR BLD AUTO: 49 %
NRBC # BLD AUTO: 0 10E3/UL
NRBC BLD AUTO-RTO: 0 /100
PLATELET # BLD AUTO: 76 10E3/UL (ref 150–450)
POTASSIUM SERPL-SCNC: 4.2 MMOL/L (ref 3.4–5.3)
PROT SERPL-MCNC: 6.2 G/DL (ref 6.4–8.3)
RBC # BLD AUTO: 3.32 10E6/UL (ref 3.8–5.2)
SODIUM SERPL-SCNC: 138 MMOL/L (ref 135–145)
TOTAL PROTEIN SERUM FOR ELP: 6 G/DL (ref 6.4–8.3)
WBC # BLD AUTO: 3.9 10E3/UL (ref 4–11)

## 2025-05-27 PROCEDURE — 86334 IMMUNOFIX E-PHORESIS SERUM: CPT | Mod: 26 | Performed by: PATHOLOGY

## 2025-05-27 PROCEDURE — 84155 ASSAY OF PROTEIN SERUM: CPT

## 2025-05-27 PROCEDURE — 96372 THER/PROPH/DIAG INJ SC/IM: CPT | Performed by: REGISTERED NURSE

## 2025-05-27 PROCEDURE — 82310 ASSAY OF CALCIUM: CPT | Performed by: REGISTERED NURSE

## 2025-05-27 PROCEDURE — 85004 AUTOMATED DIFF WBC COUNT: CPT

## 2025-05-27 PROCEDURE — 84165 PROTEIN E-PHORESIS SERUM: CPT | Mod: 26 | Performed by: PATHOLOGY

## 2025-05-27 PROCEDURE — 36591 DRAW BLOOD OFF VENOUS DEVICE: CPT

## 2025-05-27 PROCEDURE — 250N000011 HC RX IP 250 OP 636: Performed by: REGISTERED NURSE

## 2025-05-27 PROCEDURE — 84165 PROTEIN E-PHORESIS SERUM: CPT | Mod: TC | Performed by: PATHOLOGY

## 2025-05-27 PROCEDURE — 83521 IG LIGHT CHAINS FREE EACH: CPT

## 2025-05-27 PROCEDURE — 82784 ASSAY IGA/IGD/IGG/IGM EACH: CPT

## 2025-05-27 PROCEDURE — 86334 IMMUNOFIX E-PHORESIS SERUM: CPT | Performed by: PATHOLOGY

## 2025-05-27 RX ORDER — HEPARIN SODIUM (PORCINE) LOCK FLUSH IV SOLN 100 UNIT/ML 100 UNIT/ML
5 SOLUTION INTRAVENOUS EVERY 8 HOURS
Status: DISCONTINUED | OUTPATIENT
Start: 2025-05-27 | End: 2025-05-27 | Stop reason: HOSPADM

## 2025-05-27 RX ADMIN — Medication 5 ML: at 13:49

## 2025-05-27 RX ADMIN — DENOSUMAB 60 MG: 60 INJECTION SUBCUTANEOUS at 14:33

## 2025-05-27 ASSESSMENT — PAIN SCALES - GENERAL: PAINLEVEL_OUTOF10: MILD PAIN (3)

## 2025-05-27 NOTE — NURSING NOTE
Chief Complaint   Patient presents with    Port Draw     Gripper needle. Heparin locked, vitals checked     Maria Eugenia Murdock RN on 5/27/2025 at 1:54 PM

## 2025-05-27 NOTE — PROGRESS NOTES
Infusion Injection Note:  Shena Hartmann presents today for Prolia Injection.      Patient seen by provider today: No   present during visit today: Not Applicable.    Patient declined to speak with an RN today.       Treatment Conditions:  Results reviewed, labs MET treatment parameters, ok to proceed with treatment.    Pre and Post Injection:  Prolia injection given to Left Arm without incident.   Patient tolerated procedure well..    Discharge Plan:  Patient discharged in stable condition

## 2025-05-27 NOTE — PROGRESS NOTES
Nursing Note:    Shena Hartmann presents today for Prolia injection.   Patient seen by provider today: No   present during visit today: Not Applicable.     Note: Patient denies any need to speak to an RN today. Writer did not see or assess the patient. Plan for patient to follow-up with BMT team on 07/21/25.     Treatment Conditions:  Lab Results   Component Value Date    HGB 12.1 05/27/2025    WBC 3.9 (L) 05/27/2025    ANEU 1.9 05/27/2025    PLT 76 (L) 05/27/2025        Lab Results   Component Value Date     05/27/2025    POTASSIUM 4.2 05/27/2025    MAG 2.0 01/21/2025    CR 0.83 05/27/2025    PAULINE 9.5 05/27/2025    PAULINE 9.5 05/27/2025    BILITOTAL 0.2 05/27/2025    ALBUMIN 4.2 05/27/2025    ALT 20 05/27/2025    AST 26 05/27/2025     Results reviewed, labs MET treatment parameters, ok to proceed with treatment.    I released the medication and delegated administration to the supportive staff team member. Please see MAR and administration note for additional details.     Niki Bowman, MELISSA h

## 2025-05-28 ENCOUNTER — DOCUMENTATION ONLY (OUTPATIENT)
Dept: PHARMACY | Facility: CLINIC | Age: 75
End: 2025-05-28
Payer: MEDICARE

## 2025-05-28 LAB
ALBUMIN SERPL ELPH-MCNC: 4.2 G/DL (ref 3.7–5.1)
ALPHA1 GLOB SERPL ELPH-MCNC: 0.2 G/DL (ref 0.2–0.4)
ALPHA2 GLOB SERPL ELPH-MCNC: 0.6 G/DL (ref 0.5–0.9)
B-GLOBULIN SERPL ELPH-MCNC: 0.6 G/DL (ref 0.6–1)
GAMMA GLOB SERPL ELPH-MCNC: 0.3 G/DL (ref 0.7–1.6)
IGA SERPL-MCNC: 5 MG/DL (ref 84–499)
IGG SERPL-MCNC: 324 MG/DL (ref 610–1616)
IGM SERPL-MCNC: <10 MG/DL (ref 35–242)
KAPPA LC FREE SER-MCNC: 0.12 MG/DL (ref 0.33–1.94)
KAPPA LC FREE/LAMBDA FREE SER NEPH: ABNORMAL {RATIO}
LAMBDA LC FREE SERPL-MCNC: <0.14 MG/DL (ref 0.57–2.63)
LOCATION OF TASK: ABNORMAL
LOCATION OF TASK: NORMAL
M PROTEIN SERPL ELPH-MCNC: 0 G/DL
PROT PATTERN SERPL ELPH-IMP: ABNORMAL
PROT PATTERN SERPL IFE-IMP: NORMAL

## 2025-05-28 NOTE — PROGRESS NOTES
Pharmacist IVIG Stewardship Program    Diagnosis: Hypogammaglobulinemia   Date  12/20/2024   IgG Level (mg/dL) 143     Current Dosing Regimen: Privigen 20g IV monthly PRN for IgG <300 mg/dL   Patient is dosed as needed based on an IgG level of less than 300 mg/dL to ensure the lowest amount of medication is administered to achieve beneficial outcomes.  Pre-medications:   Acetaminophen 650 mg by mouth, 30 minutes prior to infusion  Diphenhydramine 37.5 mg IV prior to IVIG infusion and again 90 minutes after IVIG has started  Previously Tried Products: None     Side Effects: Unable to reach patient to discuss.     Interventions: Lab review completed.     Assessment:   Date  5/27/2025   IgG Level (mg/dL) 324   Patient is appropriate for IVIG therapy when their level is within parameter. IVIG infusions are effective in increasing IgG levels to an appropriate level to minimize patient's risk of infection. Patient should continue on treatment as they have been per provider order. Without therapy their levels would put them at an increased risk of infections.    Plan: Patient is not okay to proceed with IVIG infusions at this time as her level is above her provider ordered parameter. Patient will have labs checked monthly to assess the need for additional IVIG infusions moving forward.     Next Review Needed: 6/2025    Willa Momin, PharmD, IgCP  Medication Access Pharmacist

## 2025-05-29 ENCOUNTER — TELEPHONE (OUTPATIENT)
Dept: ALLERGY | Facility: CLINIC | Age: 75
End: 2025-05-29
Payer: MEDICARE

## 2025-05-29 DIAGNOSIS — C90.01 MULTIPLE MYELOMA IN REMISSION (H): Primary | ICD-10-CM

## 2025-05-29 NOTE — TELEPHONE ENCOUNTER
Standardized panels  [x] Standard panel (40 tests)  [x] Preservatives & Antimicrobials (31 tests)  [x] Emulsifiers & Additives (25 tests)   [x] Perfumes/Flavours & Plants (25 tests)  [] Hairdresser panel (12 tests)  [x] Rubber Chemicals (22 tests)  [] Plastics (26 tests)  [] Colorants/Dyes/Food additives (20 tests)  [] Metals (implants/dental) (24 tests)  [] Local anaesthetics/NSAIDs (13 tests)  [] Antibiotics & Antimycotics (14 tests)   [] Corticosteroids (15 tests)   [] Photopatch test (62 tests)   [] Others:     STANDARD Series                                          # Substance 2 days 4 days remarks     1 Kash Mix III 10% - -       2 Colophony - -       3  2-Mercaptobenzothiazole  - -       4 Methylisothiazolinone - -       5 Carba Mix - -       6 Thiuram Mix [A] - -       7 Bisphenol A Epoxy Resin - -       8 N-Embi-Nfugoprisko-Formaldehyde Resin - -       9 Mercapto Mix [A] - -       10 Black Rubber Mix- PPD [B] - -       11 Potassium Dichromate  -  -       12 Balsam of [place] (Myroxylon Pereirae Resin) - -       13 Nickel Sulphate Hexahydrate - -       14 Mixed Dialkyl Thiourea - -       15 Paraben Mix [B] - -       16 Methyldibromo Glutaronitrile - -       17 Fragrance Mix 8% - -       18 2-Bromo-2-Nitropropane-1,3-Diol (Bronopol) CT - -       19 Lyral - -       20 Tixocortol-21- Pivalate CT - -       21 Diazolidinyl urea (Germall II) - -        22 Methyl Methacrylate - -       23 Cobalt (II) Chloride Hexahydrate - -       24 Fragrance Mix II  - -       25 Compositae Mix II - -       26 Benzoyl Peroxide - -       27 Bacitracin - -       28 Formaldehyde - -       29 Methylchloroisothiazolinone / Methylisothiazolinone - -       30 Corticosteroid Mix CT - -       31 Sodium Lauryl Sulfate - -       32 Lanolin Alcohol - -       33 Turpentine - -       34 Cetylstearylalcohol - -       35 Chlorhexidine Dicluconate - -       36 Budesonide - -       37 Imidazolidinyl Urea  - -       38 Ethyl-2 Cyanoacrylate - -        39 Quaternium 15 (Dowicil 200) - -       40 Decyl Glucoside - -     Compositae Mix II - common yarrow, mountain arnica, Spanish chamomile, feverfew, and the common tansy   PRESERVATIVES & ANTIMICROBIALS        # Substance 2 days 4 days remarks   41 1 1,2-Benzisothiazoline-3-One, Sodium Salt - -     2 1,3,5-Paxton (2-Hydroxyethyl) - Hexahydrotriazine (Grotan BK) - -     3 Dichlorophene - -     4 3, 4, 4' - Triclocarban - -    45 5 4 - Chloro - 3 - Cresol - -     6 4 - Chloro - 4 - Xylenol (PCMX) - -     7 7-Ethylbicyclooxazolidine (Bioban [id number]) - -     8 Benzalkonium Chloride CT - -     9 Benzyl Alcohol - -    50 10 Cetalkonium Chloride - -     11 Cetylpyrimidine Chloride  - -     12 Chloroacetamide - -     13 DMDM Hydantoin - -     14 Glutaraldehyde - -    55 15 Triclosan - -     16 Glyoxal Trimeric Dihydrate - -     17 Iodopropynyl Butylcarbamate - -     18 Octylisothiazoline - -     19 Acetophenone Azine - -    60 20 Bioban P 1487 (Nitrobutyl) Morpholine/(Ethylnitro-Trimethylene) Dimorpholine - -     21 Phenoxyethanol - -     22 Phenyl Salicylate - -     23 Povidone Iodine - -     24 Sodium Benzoate - -    65 25 Sodium Disulfite - -     26 Sorbic Acid - -     27 Thimerosal - -     28 Melamine Formaldehyde Resin - -     29 Ethylenediamine Dihydrochloride - -      Parabens      70 30 Butyl-P-Hydroxybenzoate - -     31 Ethyl-P-Hydroxybenzoate - -     32 Methyl-P-Hydroxybenzoate - -    73 33 Propyl-P-Hydroxybenzoate - -     EMULSIFIERS & ADDITIVES       # Substance 2 days 4 days remarks   74 1 Polyethylene Glycol-400 - -    75 2 Cocamidopropyl Betaine - -     3 Amerchol L101 - -     4 Propylene Glycol - -     5 Triethanolamine - -     6 Sorbitane Sesquiolate CT - -    80 7 Isopropylmyristate - -     8 Polysorbate 80 CT - -     9 Amidoamine   (Stearamidopropyl Dimethylamine) - -     10 Oleamidopropyl Dimethylamine - -     11 Lauryl Glucoside - -    85 12 Coconut Diethanolamide  - -     13 2-Hydroxy-4-Methoxy  Benzophenone (Oxybenzone) - -     14 Benzophenone-4 (Sulisobenzon) - -     15 Propolis - -     16 Dexpanthenol - -    90 17 Abitol (Hydroabietyl Alcohol) - -     18 Tert-Butylhydroquinone - -     19 Benzyl Salicylate - -     20 Dimethylaminopropylamin (DMPA) - -     21 Zinc Pyrithione (Zinc Omadine)  - -    95 22 Paxton(Hydroxymethyl) Nitromethane  - -      Antioxidant       23 Dodecyl Gallate - -     24 Butylhydroxyanisole (BHA) - -     25 Butylhydroxytoluene (BHT) - -     26 Di-Alpha-Tocopherol (Vit E) - -    100 27 Propyl Gallate - -     PERFUMES, FLAVORS & PLANTS        # Substance 2 days 4 days remarks   101 1 Benzyl Cinnamate - -     2 Di-Limonene (Dipentene) - -     3 Cananga Odorata (Franklyn Estes) (I) - -     4 Lichen Acid Mix - -    105 5 Mentha Piperita Oil (Peppermint Oil) - -     6 Sesquiterpenelactone mix - -     7 Tea Tree Oil, Oxidized - -     8 Wood Tar Mix - -     9 Abietic Acid - -    110 10 Lavendula Angustifolia Oil (Lavender Oil) - -     11 Fragrance mix II CT * 14% - -      Fragrance Mix I       12 Oakmoss Absolute - -     13 Eugenol - -     14 Geraniol - -    115 15 Hydroxycitronellal - -     16 Isoeugenol - -     17 Cinnamic Aldehyde - -     18 Cinnamic Alcohol  - -      Fragrance mix II       19 Citronellol - -    120 20 Alpha-Hexylcinnamic Aldehyde    - -     21 Citral - -     22 Farnesol - -    123 23 Coumarin - -    Hexylcinnamic aldehyde, Coumarin, Farnesol, Hydroxyisohexy3-cyclohexene carboxaldehyde, citral, citrolellol  RUBBER CHEMICALS        # Substance 2 days 4 days remarks     Carbamate      124 1 Zinc Bis ( Diethyldithio carbamate ) (ZDEC) - -    125 2 Zinc Bis (Dibutyldithiocarbamate) - -     3 1,3-Diphenylguanidine (DPG) - -      Thiourea       4 Dibutylthiourea - -     5 Diphenyltiourea - -     6 Thiourea - -      Mercapto chemicals      130 7 Morpholinyl Mercaptobenzothiazole - -     8 U-Huaefqgrct-3-Benzothiazyl-Sulfenamide - -     9 Dibenzothiazyl Disulfide - -     10 Dodecyl  Mercaptan  - -      Thiuram chemicals       11 Dipentamethylenethiuram Disulfide - -    135 12 Tetraethylthiuram Disulfide (Disulfiram) - -     13 Tetramethylthiuram Disulfide - -     14 Tetramethyl Thiuram Monosulfide (TMTM) - -      4-Phenylenediamine derivatives       15 N-Isopropyl-N'-Phenyl-P-Phenylenediamine (IPPD) - -     16 Kkgtiiwb-Q-Ydczjdwtmlyyslgv (DPPD) - -      Various Rubber Additives      140 17 Hydroquinone Monobenzylether  - -     18 Hexamethylenetetramine - -     19 4,4'-Dihydroxybiphenyl - -     20 Cyclohexylthiophtalimide - -    144 21 N-Phenyl-B-Naphthylamine - -       Results of patch tests:                         Interpretation:  - Negative                    A    = Allergic      (+) Erythema    TI   = Toxic/irritant   + E + Infiltration    RaP = Relevance at Present     ++ E/I + Papulovesicle   Rpr  = Relevance Previously     +++ E/I/P + Blister     nR   = No Relevance

## 2025-06-02 ENCOUNTER — OFFICE VISIT (OUTPATIENT)
Dept: ALLERGY | Facility: CLINIC | Age: 75
End: 2025-06-02
Payer: MEDICARE

## 2025-06-02 DIAGNOSIS — Z88.9 ATOPY: ICD-10-CM

## 2025-06-02 DIAGNOSIS — L30.1 DYSHIDROTIC HAND DERMATITIS: ICD-10-CM

## 2025-06-02 DIAGNOSIS — J30.9 ALLERGIC RHINITIS WITH POSTNASAL DRIP: ICD-10-CM

## 2025-06-02 DIAGNOSIS — L23.5 ALLERGIC DERMATITIS DUE TO OTHER CHEMICAL PRODUCT: ICD-10-CM

## 2025-06-02 DIAGNOSIS — R09.82 ALLERGIC RHINITIS WITH POSTNASAL DRIP: ICD-10-CM

## 2025-06-02 DIAGNOSIS — L20.89 OTHER ATOPIC DERMATITIS: ICD-10-CM

## 2025-06-02 DIAGNOSIS — Z88.9 MULTIPLE DRUG ALLERGIES: Primary | ICD-10-CM

## 2025-06-02 PROCEDURE — 95044 PATCH/APPLICATION TESTS: CPT | Mod: 59 | Performed by: DERMATOLOGY

## 2025-06-02 PROCEDURE — 95018 ALL TSTG PERQ&IQ DRUGS/BIOL: CPT | Performed by: DERMATOLOGY

## 2025-06-02 PROCEDURE — 95024 IQ TESTS W/ALLERGENIC XTRCS: CPT | Performed by: DERMATOLOGY

## 2025-06-02 NOTE — PROGRESS NOTES
[facility] Dermato-allergology Note  Office visit  Encounter Date: Jun 2, 2025  ____________________________________________    CC: Allergy Testing Followup (Patch day 1 / drug allergy testing)    HPI:  (Jun 2, 2025)  Ms. Shena Hartmann is a(n) 74 year old female who presents today as a return patient for allergy tests as planned  - Follow-up in Derm-Allergy clinic for allergy tests as planned   - Skin rashes with oat and wheat, GI symptoms with dairy  - Can have dairy, oat, wheat in [place] without issue  - Otherwise feeling well in usual state of health    Physical Exam:  General: In no acute distress, well-developed, well-nourished  Eyes: no conjunctivitis  ENT: no signs of rhinitis   Pulmonary: no wheezing or coughing  Skin: Focused examination of the skin on test sites was performed = see test results below  No active eczematous skin lesions on tests sites, particularly back    Earlier History and Allergy Exams:  (Feb 11, 2025)  Presents today as a return patient for allergy consultation  - Referred back to this clinic by Dr. Mcknight on 8/29/23 for chronic hand dermatitis  - Since I last saw her on 1/28/19, she has been following with general dermatology for regular skin checks, and has been seeing Dr. Mcknight for hand dermatitis since 7/7/20  - Patient last saw Dr. Mcknight on 8/28/24:  # Dermatitis- both contact and drug induced- not currently active but when flares, patient finds the clobetasol and triamcinolone helpful.  Had to reschedule patient testing due to family and health issues.  Planned for Spring 2025  - refilled topical sterioids.  Patient is undergoing CAR- T cell therapy at present.  Will review with Onc before starting creams if needed due to red flag warning with steroids and therapy  - Only derm path in Epic is from 1/28/19, which I performed: lesion confirmed to be a compound melanocytic nevus with mild atypia of left upper ventrolateral lag  - Of note, patient is a  H/O multiple myeloma s/p stem cell transplant and current on CAR T-cell therapy  - Last follow up in oncology with Dr. Chavez on 1/28/25:  Relapsed Multiple Myeloma  CAR-T therapy 7/29/24 with [facility].  Restaging at day +180 shows CR MRD-  Xgeva every 6 months     Macrocytic anemia- check B12 with next lab draw. Normocellular marrow  Neutropenia- penicillin V ppx when ANC <1.0. Suspect ongoing CAR-T activity in the marrow is responsible for neutropenia given minimal residual disease at day 100 now MRD-.  IgG- Continue to check monthly and replace as needed     HTN  Continue amlodipine 5 mg daily.  Followed by cardiology      Hypothyroidism  Remains on levothyroxine     ID Prophylaxis   Decrease Acyclovir to 400 mg BID for HSV prophylaxis  Pentamidine monthly for 1 year following CAR-T - may consider changing to Bactrim once platelets recover  penicillin V when ANC <1.0     Left Hand Pain  Saw ortho who prescribed a brace and stretching for lateral epicondylitis and carpal tunnel syndrome.      (1/28/19)  Patient had since early childhood problems with eczemas, but with Thalidomide and later Pomalidomide (since 4.5years) recurrent dermatitis with sometimes blisters and papules on face, neck, back of hands and forearms. Patient was 2 weeks ago in [place] and the rash seemed to be worse.  In addition some reaction with sweating and heat on the mastectomy site below the breast prosthetic (silicone)        SKIN: Full skin, which includes the head/face, both arms, chest, back, abdomen,both legs, genitalia and/or groin buttocks, digits and/or nails, was examined.  Many seborrhoic keratoses on trunk and less on extremities. One bigger one on right upper leg ventral side.  On the left upper leg ventrolateral a 5mm diameter, pigmented lesion, not symmetric and different colors. According to patient there since birth, but not sure if recently changes of color    Past Medical History:   Patient Active Problem List   Diagnosis     Pain in thoracic spine    Multiple myeloma, dx 2002,  s/p ASCBMT 3/26/14    Personal history of malignant neoplasm of breast    Seasonal allergies    Osteoporosis    Primary hypertension    Stem cells transplant status (H)    Adverse effect of other drugs, medicaments and biological substances, sequela    Acquired hypothyroidism    Other amyloidosis (H)    Examination of participant in clinical trial    Multiple myeloma not having achieved remission (H)    Thrombocytopenia    Hypogammaglobulinemia    Other pancytopenia (H)    Personal history of other drug therapy    Pain of right hand     Past Medical History:   Diagnosis Date    Acquired hypothyroidism 8/10/2023    Breast cancer (H) 01/01/1994    right    H/O stem cell transplant (H) 03/01/2014    History of blood transfusion 2013 & 2014    During stem cell tx process    Hypertension Sept. 2021    Runs 140 s systolically, about 20 above my usual    Multiple myeloma, without mention of having achieved remission 11/06/2012     Allergies:  Allergies   Allergen Reactions    Blood Transfusion Related (Informational Only) Other (See Comments)     Patient has a history of a clinically significant antibody against RBC antigens.  A delay in compatible RBCs may occur.     Cayenne Pepper [Cayenne]     Corn-Containing Products GI Disturbance    Levaquin Other (See Comments)     Tendon pain    Milk Protein GI Disturbance    Mold      Facial rash/lip swelling    Morphine Sulfate Other (See Comments)     Per pt - see things (hallucination) when she was on Morphine .    Pollen Extract      Environmental allergies     Shrimp Nausea and Vomiting    Tomato      Lip swelling, facial rash    Azithromycin Rash    Keflex [Cephalexin] Rash    Oat Rash    Tape [Adhesive Tape] Itching and Rash     All adhesives    Wheat Rash     Swelling of lips     Medications:  Current Outpatient Medications   Medication Sig Dispense Refill    acetaminophen (TYLENOL) 325 MG tablet Take 325-650 mg by  mouth every 6 hours as needed for mild pain      acyclovir (ZOVIRAX) 400 MG tablet Take 1 tablet (400 mg) by mouth every 12 hours. 60 tablet 11    amLODIPine (NORVASC) 2.5 MG tablet Take 2 tablets (5 mg) by mouth daily Take 5 mg (2 tablets) in the morning. 180 tablet 3    CALCIUM-VITAMIN D-VITAMIN K PO Take 2 tablets by mouth daily.      clobetasol (TEMOVATE) 0.05 % external ointment Apply topically 2 times daily as needed (itching and rash). Topically to rash on hands until resolved 45 g 0    estradiol (ESTRACE) 0.1 MG/GM vaginal cream Place 1 g vaginally three times a week. Ok to use your fingers or the applicator 42.5 g 3    levothyroxine (SYNTHROID/LEVOTHROID) 50 MCG tablet Take 1 tablet (50 mcg) by mouth daily. 90 tablet 3    MAGNESIUM PO Take 235 mg by mouth 2 times daily.      Multiple Vitamins-Minerals (MULTIVITAMIN OR) Take 1 tablet by mouth 2 times daily.      order for DME 6 mastectomy bras  Length of need: 99 months 6 Device 1    order for DME R mastectomy prosthesis   Length of need:  99 months 1 Device 1    penicillin V (VEETID) 250 MG tablet Take 1 tablet (250 mg) by mouth daily. (Patient not taking: Reported on 2025) 60 tablet 2    triamcinolone (KENALOG) 0.1 % external cream Apply topically 2 times daily as needed for irritation. 15 g 11     Family History:  Family History   Problem Relation Age of Onset    Hypertension Mother     Cerebrovascular Disease Mother          8-11-15 from strokes    Hypertension Father     Prostate Cancer Father     Skin Cancer Paternal Grandmother     Cancer Paternal Grandmother         skin cancer   No allergies.    Social History:  The patient is a retired bedside nurse.  Patient has the following hobbies or non-occupational exposure: none currently.  Only chemical exposure now is cleaning. In childhood, her dad was a contractor, and she was exposed to various chemicals, including benzene.    Order for Future Allergy Testing:    [x] Outpatient  [] Inpatient:  "Cash..../ Bed ...    Skin Atopy (atopic dermatitis)? [] Yes     [x] No  Comments:   Childhood eczema?   [x] Yes     [] No  Comments: as a child, was told she was allergic to soap, so she had to use a \"stinky brown\" soap  Hand eczema?   [x] Yes     [] No  If yes, leading hand? [x] Right     [] Left     [] Ambidextrous  Comments:   - hands are good today  - more between her fingers than on the top or palms  - very itchy blisters  - both hands affected, but usually it is whichever hand touches chemicals; denies any other area of involvement  - notices she will get a rash within a few hours of touching items in her house after   - has been having this for decades, including while nursing  - used to have issues with latex gloves until employer had switched to latex-free gloves, but she still uses latex-free gloves to clean; though, she thought irritated may really have been due to hand cleanser that was being used    Contact allergies?     Comments:   Including adhesives/bandages? [x] Yes     [] No  Comments: if they are on for a few hours, will get itchy  Including metals?   [x] Yes     [] No  Comments: reacted to cheap earrings, so at one point stopped wearing them for decades  Other substances?   [x] Yes     [] No  Comments:   - cleaning products and possibly latex as above  - Silicone entry in Epic: if she gets hot on her chest, gets rash around breast prosthesis  - does not dye her hair    Drug allergies?   [x] Yes     [] No  Comments:   - within a few doses of azithromycin and Keflex, will get itchy rash on her back  - Levaquin caused tendonitis    Angioedema?   [x] Yes     [] No  Comments:   - lips swell when she is at [place] cabin around [place] (see environmental sx below)    Urticaria?   [x] Yes     [] No  Comments: gets rashes while she is working out in the garden    Food allergies?   [] Yes     [] No  Comments:   - Shrimp: nausea and vomiting for days since childhood  - Milk: GI upset so avoids; has noticed she " can tolerate milk from [place] cows; does ok with cheese, but not too much cheese made from cow milk  - Corn: popcorn causes indigestion and bloating; no issues with corn in [place], which she attributes to them not using GMOs    Pet allergies?   [] Yes     [x] No  Comments: none at home and does not react to kids' pets    Environmental allergy symptoms?  [x] Conjunctivitis - red eyes depending on season  [] Otitis  [] Pharyngitis  [] Polyposis  [x] Postnasal Drip - slight  [x] Rhinitis - congestion  [] Sinusitis  [] None  Comments:   - most of her seasonal symptoms are respiratory  - when she was seeing a naturopath, he had her take air samples at her cabin (as she would start to not feel good with facial swelling); was told she is allergic to pine    HENT Operations?  [] Adenoids [] Septum [] Sinus  [] Tonsils        [] Other:   [x] None  Comments:     Pulmonary symptoms (from birth to present)?  [] Asthma bronchiale  Inhaler(s)?:   [] Coughing  [] Other  [x] None  Comments:     Environmental and pulmonary symptoms aggravated by?  Season: [] None     [] I     [] II     [x] III     [x] IV     [] V     [] VI          [] VII     [] VIII     [] IX     [x] X     [x] XI     [] XII     [] Perennial  Time of Day: [] None     [] Morning     [] Noon     [] Evening     [] Night     [] Whole Day  Location/Changes: [] None     [] Inside     [] Outside     [] Mountain     [] Sea     [] Other:   Triggers (specific): [] None     [] Animals     [] Dust     [] Mold     [] Plants     [] Other:   Triggers (other): [] None     [] Psyche     [] Sport     [] Work     [] Other:   Irritant: [] None     [] Cold     [] Heat     [] Odors     [] Physical Efforts     [] Smoke     [] Other:     Order for PATCH TESTS  Reason for tests (suspected allergy): recurrent dyshidrotic hand eczema  Known previous allergies: nickel?  Standardized panels  [x] Standard panel (40 tests)  [x] Preservatives & Antimicrobials (31 tests)  [x] Emulsifiers &  Additives (25 tests)   [x] Perfumes/Flavours & Plants (25 tests)  [] Hairdresser panel (12 tests)  [x] Rubber Chemicals (22 tests)  [] Plastics (26 tests)  [] Colorants/Dyes/Food additives (20 tests)  [] Metals (implants/dental) (24 tests)  [] Local anaesthetics/NSAIDs (13 tests)  [] Antibiotics & Antimycotics (14 tests)   [] Corticosteroids (15 tests)   [] Photopatch test (62 tests)   [] Others:     [] Patient's Own Products:   DO NOT test if chemical or biological identity is unknown!   always ask from patient the product information and safety sheets (MSDS)       Order for PRICK TESTS    Reason for tests (suspected allergy): seasonal RC in early spring and late fall  Known previous allergies: none    Standardized prick panels  [] Atopic panel (20 tests)  [] Pediatric Panel (12 tests)  [] Milk, Meat, Eggs, Grains (20 tests)   [] Dust, Epithelia, Feathers (10 tests)  [] Fish, Seafood, Shellfish (17 tests)  [] Nuts, Beans (14 tests)  [] Spice, Vegetable, Fruit (17 tests)  [] Pollen Panel = Tree, Grass, Weed (24 tests)  [] Others:     [] Patient's Own Products:   DO NOT test if chemical or biological identity is unknown!   always ask from patient the product information and safety sheets (MSDS)     Standardized intradermal tests (at same time as patch tests)  [x] Alternaria alternata  [x] Aspergillus fumigatus  [] Cladosporium herbarum   [x] Penicillium notatum  [x] Dermatophagoides farinae  [x] Dermatophagoides pteronyssinus  [] Dog Epithelium  [] Cat Epithelium  [] Others:     [] Bee venom   [] Wasp venom  !!Specific protocol with dilutions!!       Order for Drug allergy tests (prick & intradermal & patch tests)    [] Penicillin G     [] Ampicillin   [x] Cefazolin        [] Ceftriaxone     [] Ceftazidime     [] Cefepime     [] Cefuroxime  [] Bactrim  [] Iodixanol     [] Iopamidol        [] Iohexol  [x] Others: azithromycin  [] Patient's Own:   Order for 6/2/25 as test date      Atopy Screen (Placed Feb 11,  2025)  No Substance Readings (15 min) Evaluation   POS Histamine 1mg/ml ++    NEG NaCl 0.9% -      No Substance Readings (15 min) Evaluation   1 Alternaria alternata (tenuis)  -    2 Cladosporium herbarum -    3 Aspergillus fumigatus -    4 Penicillium notatum -    5 Dermatophagoides pteronyssinus -    6 Dermatophagoides farinae -    7 Dog epithelium (canis spp) -    8 Cat hair (flaco catus) +    9 Cockroach   (Blatella americana & germanica) -    10 Grass mix midwest   (Teresa, Orchard, Redtop, George) -    11 Jv grass (sorghum halepense) -    12 Weed mix   (common Cocklebur, Lamb s quarters, rough redroot Pigweed, Dock/Sorrel) -    13 Mug wort (artemisia vulgare) -    14 Ragweed giant/short (ambrosia spp) -    15 White birch (Betula papyrifera) -    16 Tree mix 1 (Pecan, Maple BHR, Oak RVW, american Bena, black [place]) -    17 Red cedar (juniperus virginia) -    18 Tree mix 2   (white Arley, river/red Birch, black [place]vale, common [place], american Elm) -    19 Box elder/Maple mix (acer spp) -    20 Claiborne shagbark (carya ovata) -    Conclusion: Cat reaction is slight and does not correlate with her clinical symptoms. Will plan for IDT at same time as patch and drug allergy testing.      Intradermal Testing (Placed Jun 2, 2025)  No Substance Conc.  Reading (15min)  immediate Papule [mm] / Erythema [mm] Reading   (... days)  delayed Papule [mm] / Erythema [mm] Remarks   DF Standard Dust Mite - D. Farinae 1:10 -   -    DP Standard Dust Mite - D. Pteronyssinus 1:10 -   -    A Aspergillus fumigatus  1:10 -   -    P Penicillium notatum 1:10 -   -     Alternaria alternata  1:10 -   -    Conclusions: No signs for immediate-type reaction. Patient will provide feedback if delayed-type reaction is experienced. .    DRUG ALLERGY TEST SERIES Jun 2, 2025    No Substance Readings (15min) Evaluation   POS Histamine 1mg/ml +/++    NEG NaCl 0.9% -         Prick Tests         Substance/ Allergen Conc Result (20 min) Remarks     Cefazolin[1] 100 mg/ml -     Azithromycin 100 mg/ml -       Intradermal Tests   immed immed delay delay      Substance Conc 1st dil  2nd dil  2 days  3 days remarks    Cefazolin[1] 1:5 -        Azithromycin  1:1000 -           Patch Tests  as is as is 1:2 1:2     As Is  Vas Substance Conc 2 days 3 days 2 days 3 days remarks   146 147 Cefazolin[1] 100 mg/ml        148 149 Azithromycin  100 mg/ml          ____________________________________________  RESULTS & EVALUATION of PATCH TESTS    Jun 2, 2025 application of patch tests:    Patch test readings after     [x] 2 days, [x] 3 days [] 4 days, [] 5 days,  Other duration: ...      STANDARD Series                                          # Substance 2 days 3 days remarks     1 Kash Mix III 10% - -       2 Colophony - -       3  2-Mercaptobenzothiazole  - -       4 Methylisothiazolinone - -       5 Carba Mix - -       6 Thiuram Mix [A] - -       7 Bisphenol A Epoxy Resin - -       8 E-Dhdp-Hmskgkjxqqw-Formaldehyde Resin - -       9 Mercapto Mix [A] - -       10 Black Rubber Mix- PPD [B] - -       11 Potassium Dichromate  -  -       12 Balsam of [place] (Myroxylon Pereirae Resin) - -       13 Nickel Sulphate Hexahydrate - -       14 Mixed Dialkyl Thiourea - -       15 Paraben Mix [B] - -       16 Methyldibromo Glutaronitrile - -       17 Fragrance Mix 8% - -       18 2-Bromo-2-Nitropropane-1,3-Diol (Bronopol) CT - -       19 Lyral - -       20 Tixocortol-21- Pivalate CT - -       21 Diazolidinyl urea (Germall II) - -        22 Methyl Methacrylate - -       23 Cobalt (II) Chloride Hexahydrate - -       24 Fragrance Mix II  - -       25 Compositae Mix II - -       26 Benzoyl Peroxide - -       27 Bacitracin - -       28 Formaldehyde - -       29 Methylchloroisothiazolinone / Methylisothiazolinone - -       30 Corticosteroid Mix CT - -       31 Sodium Lauryl Sulfate - -       32 Lanolin Alcohol - -       33 Turpentine - -       34 Cetylstearylalcohol - -       35  Chlorhexidine Dicluconate - -       36 Budesonide - -       37 Imidazolidinyl Urea  - -       38 Ethyl-2 Cyanoacrylate - -       39 Quaternium 15 (Dowicil 200) - -       40 Decyl Glucoside - -     Compositae Mix II - common yarrow, mountain arnica, Kyrgyz chamomile, feverfew, and the common tansy   PRESERVATIVES & ANTIMICROBIALS        # Substance 2 days 3 days remarks   41 1 1,2-Benzisothiazoline-3-One, Sodium Salt - -     2 1,3,5-Paxton (2-Hydroxyethyl) - Hexahydrotriazine (Grotan BK) - -     3 Dichlorophene - -     4 3, 4, 4' - Triclocarban - -    45 5 4 - Chloro - 3 - Cresol - -     6 4 - Chloro - 4 - Xylenol (PCMX) - -     7 7-Ethylbicyclooxazolidine (Bioban [id number]) - -     8 Benzalkonium Chloride CT - -     9 Benzyl Alcohol - -    50 10 Cetalkonium Chloride - -     11 Cetylpyrimidine Chloride  - -     12 Chloroacetamide - -     13 DMDM Hydantoin - -     14 Glutaraldehyde - -    55 15 Triclosan - -     16 Glyoxal Trimeric Dihydrate - -     17 Iodopropynyl Butylcarbamate - -     18 Octylisothiazoline - -     19 Acetophenone Azine - -    60 20 Bioban P 1487 (Nitrobutyl) Morpholine/(Ethylnitro-Trimethylene) Dimorpholine - -     21 Phenoxyethanol - -     22 Phenyl Salicylate - -     23 Povidone Iodine - -     24 Sodium Benzoate - -    65 25 Sodium Disulfite - -     26 Sorbic Acid - -     27 Thimerosal - -     28 Melamine Formaldehyde Resin - -     29 Ethylenediamine Dihydrochloride - -      Parabens      70 30 Butyl-P-Hydroxybenzoate - -     31 Ethyl-P-Hydroxybenzoate - -     32 Methyl-P-Hydroxybenzoate - -    73 33 Propyl-P-Hydroxybenzoate - -     EMULSIFIERS & ADDITIVES       # Substance 2 days 3 days remarks   74 1 Polyethylene Glycol-400 - -    75 2 Cocamidopropyl Betaine - -     3 Amerchol L101 - -     4 Propylene Glycol - -     5 Triethanolamine - -     6 Sorbitane Sesquiolate CT - -    80 7 Isopropylmyristate - -     8 Polysorbate 80 CT - -     9 Amidoamine   (Stearamidopropyl Dimethylamine) - -     10  Oleamidopropyl Dimethylamine - -     11 Lauryl Glucoside - -    85 12 Coconut Diethanolamide  - -     13 2-Hydroxy-4-Methoxy Benzophenone (Oxybenzone) - -     14 Benzophenone-4 (Sulisobenzon) - -     15 Propolis - -     16 Dexpanthenol - -    90 17 Abitol (Hydroabietyl Alcohol) - -     18 Tert-Butylhydroquinone - -     19 Benzyl Salicylate - -     20 Dimethylaminopropylamin (DMPA) - -     21 Zinc Pyrithione (Zinc Omadine)  - -    95 22 Paxton(Hydroxymethyl) Nitromethane  - -      Antioxidant       23 Dodecyl Gallate - -     24 Butylhydroxyanisole (BHA) - -     25 Butylhydroxytoluene (BHT) - -     26 Di-Alpha-Tocopherol (Vit E) - -    100 27 Propyl Gallate - -     PERFUMES, FLAVORS & PLANTS        # Substance 2 days 3 days remarks   101 1 Benzyl Cinnamate - -     2 Di-Limonene (Dipentene) - -     3 Cananga Odorata (Franklyn Estes) (I) - -     4 Lichen Acid Mix - -    105 5 Mentha Piperita Oil (Peppermint Oil) - -     6 Sesquiterpenelactone mix - -     7 Tea Tree Oil, Oxidized - -     8 Wood Tar Mix - -     9 Abietic Acid - -    110 10 Lavendula Angustifolia Oil (Lavender Oil) - -     11 Fragrance mix II CT * 14% - -      Fragrance Mix I       12 Oakmoss Absolute - -     13 Eugenol - -     14 Geraniol - -    115 15 Hydroxycitronellal - -     16 Isoeugenol - -     17 Cinnamic Aldehyde - -     18 Cinnamic Alcohol  - -      Fragrance mix II       19 Citronellol - -    120 20 Alpha-Hexylcinnamic Aldehyde    - -     21 Citral - -     22 Farnesol - -    123 23 Coumarin - -    Hexylcinnamic aldehyde, Coumarin, Farnesol, Hydroxyisohexy3-cyclohexene carboxaldehyde, citral, citrolellol  RUBBER CHEMICALS        # Substance 2 days 3 days remarks     Carbamate      124 1 Zinc Bis ( Diethyldithio carbamate ) (ZDEC) - -    125 2 Zinc Bis (Dibutyldithiocarbamate) - -     3 1,3-Diphenylguanidine (DPG) - -      Thiourea       4 Dibutylthiourea - -     5 Diphenyltiourea - -     6 Thiourea - -      Mercapto chemicals      130 7 Morpholinyl  Mercaptobenzothiazole - -     8 H-Cqwcrlkdsu-3-Benzothiazyl-Sulfenamide - -     9 Dibenzothiazyl Disulfide - -     10 Dodecyl Mercaptan  - -      Thiuram chemicals       11 Dipentamethylenethiuram Disulfide - -    135 12 Tetraethylthiuram Disulfide (Disulfiram) - -     13 Tetramethylthiuram Disulfide - -     14 Tetramethyl Thiuram Monosulfide (TMTM) - -      4-Phenylenediamine derivatives       15 N-Isopropyl-N'-Phenyl-P-Phenylenediamine (IPPD) - -     16 Skmhhxny-X-Umfxmeyndylwhjrs (DPPD) - -      Various Rubber Additives      140 17 Hydroquinone Monobenzylether  - -     18 Hexamethylenetetramine - -     19 4,4'-Dihydroxybiphenyl - -     20 Cyclohexylthiophtalimide - -    144 21 N-Phenyl-B-Naphthylamine - -    145 22 White flour         Results of patch tests:                         Interpretation:  - Negative                    A    = Allergic      (+) Erythema    TI   = Toxic/irritant   + E + Infiltration    RaP = Relevance at Present     ++ E/I + Papulovesicle   Rpr  = Relevance Previously     +++ E/I/P + Blister     nR   = No Relevance     [] No relevant allergic reaction observed    [] Allergic reaction diagnosed against following allergens:      Interpretation/ remarks:   See later    [] Patient information given   [] ACDS CAMP information's (# ....) to following compounds: .....   [] General information's to following compounds: ......    ____________________________________________    Assessment & Plan:    ==> Final Diagnosis:     # Recurrent dyshidrotic hand dermatitis  DDx: Atopic dermatitis and/or  DDx: Allergic contact dermatitis to rubber chemicals, ingredients in soaps, and/or emulsifiers  * chronic illness with exacerbation, progression, side effects from treatment    # Suspicion for atopic predisposition with:   PND and RC during change of seasons  Dry, hypersensitive skin with dyshidrosis (atopic dermatitis?)  * chronic illness with exacerbation, progression, side effects from treatment    #  Possible phototoxic/photoallergic reaction to thalidomide and pomalidomide  Previously assessed 1/28/19  At the time, was to intensify sun protection (clothes, hat, etc.) and use physical sunscreens  For flare ups, was to use triamcinolone or clobetasol (DesOwen) cream    These conclusions are made at the best of one's knowledge and belief based on the provided evidence such as patient's history and allergy test results and they can change over time or can be incomplete because of missing information.    ==> Treatment Plan:    >> For IDT  test sites from today, patient will monitor sites for the next 2 and 4 days. If there are any delayed reactions, patient will take photos with the ruler provided and send to clinic via [employer] message for review. Be sure to specify RIGHT or LEFT arm.  - Do not apply topical steroids to sites until after 4 total days.    >> Will update below treatment plan on Thursday after 2nd readings and final conclusions:  >> Until patch tests can be performed, continue with treatment plan set with Dr. Mcknight.      Procedures Performed: intradermal, patch, and drug allergy tests    Staff Involved: Provider, Staff, and Medical Student    Scribe Disclosure:   I, Yadiel Simon, am serving as a scribe to document services personally performed by Sunil Rosario MD based on data collection and the provider's statements to me.     Staff Physician Comments:  I was present with the scribe who participated in the documentation of the note. I have verified the history and personally performed the physical exam and medical decision making. I agree with the assessment and plan as documented in the note. I have reviewed and if necessary amended the note.      Sunil Rosario MD  Professor  Head of Dermato-Allergy Division  Department of Dermatology  [facility]       Follow-up in Derm-Allergy clinic for 1st readings of patch tests after 2 days    ___________________________    I spent a total of 35 minutes with Shena Hartmann during today s  visit. This time was spent discussing all the individual test results, correlating them to the clinical relevance, counseling the patient and/or coordinating care and performing allergy tests or procedures.

## 2025-06-02 NOTE — NURSING NOTE
Chief Complaint   Patient presents with    Allergy Testing Followup     Patch day 1 / drug allergy testing     Morena Pollock RN

## 2025-06-02 NOTE — LETTER
6/2/2025      Shena Hartmann  [address] [place] [address]      Dear Colleague,    Thank you for referring your patient, Shena Hartmann, to the [facility] [place]. Please see a copy of my visit note below.    [facility] Dermato-allergology Note  Office visit  Encounter Date: Jun 2, 2025  ____________________________________________    CC: Allergy Testing Followup (Patch day 1 / drug allergy testing)    HPI:  (Jun 2, 2025)  Ms. Shena Hartmann is a(n) 74 year old female who presents today as a return patient for allergy tests as planned  - Follow-up in Derm-Allergy clinic for allergy tests as planned   - Skin rashes with oat and wheat, GI symptoms with dairy  - Can have dairy, oat, wheat in [place] without issue  - Otherwise feeling well in usual state of health    Physical Exam:  General: In no acute distress, well-developed, well-nourished  Eyes: no conjunctivitis  ENT: no signs of rhinitis   Pulmonary: no wheezing or coughing  Skin: Focused examination of the skin on test sites was performed = see test results below  No active eczematous skin lesions on tests sites, particularly back    Earlier History and Allergy Exams:  (Feb 11, 2025)  Presents today as a return patient for allergy consultation  - Referred back to this clinic by Dr. Mcknight on 8/29/23 for chronic hand dermatitis  - Since I last saw her on 1/28/19, she has been following with general dermatology for regular skin checks, and has been seeing Dr. Mcknight for hand dermatitis since 7/7/20  - Patient last saw Dr. Mcknight on 8/28/24:  # Dermatitis- both contact and drug induced- not currently active but when flares, patient finds the clobetasol and triamcinolone helpful.  Had to reschedule patient testing due to family and health issues.  Planned for Spring 2025  - refilled topical sterioids.  Patient is undergoing CAR- T cell therapy at present.  Will review with Onc before starting  creams if needed due to red flag warning with steroids and therapy  - Only derm path in Epic is from 1/28/19, which I performed: lesion confirmed to be a compound melanocytic nevus with mild atypia of left upper ventrolateral lag  - Of note, patient is a H/O multiple myeloma s/p stem cell transplant and current on CAR T-cell therapy  - Last follow up in oncology with Dr. Chavez on 1/28/25:  Relapsed Multiple Myeloma  CAR-T therapy 7/29/24 with [facility].  Restaging at day +180 shows CR MRD-  Xgeva every 6 months     Macrocytic anemia- check B12 with next lab draw. Normocellular marrow  Neutropenia- penicillin V ppx when ANC <1.0. Suspect ongoing CAR-T activity in the marrow is responsible for neutropenia given minimal residual disease at day 100 now MRD-.  IgG- Continue to check monthly and replace as needed     HTN  Continue amlodipine 5 mg daily.  Followed by cardiology      Hypothyroidism  Remains on levothyroxine     ID Prophylaxis   Decrease Acyclovir to 400 mg BID for HSV prophylaxis  Pentamidine monthly for 1 year following CAR-T - may consider changing to Bactrim once platelets recover  penicillin V when ANC <1.0     Left Hand Pain  Saw ortho who prescribed a brace and stretching for lateral epicondylitis and carpal tunnel syndrome.      (1/28/19)  Patient had since early childhood problems with eczemas, but with Thalidomide and later Pomalidomide (since 4.5years) recurrent dermatitis with sometimes blisters and papules on face, neck, back of hands and forearms. Patient was 2 weeks ago in [place] and the rash seemed to be worse.  In addition some reaction with sweating and heat on the mastectomy site below the breast prosthetic (silicone)        SKIN: Full skin, which includes the head/face, both arms, chest, back, abdomen,both legs, genitalia and/or groin buttocks, digits and/or nails, was examined.  Many seborrhoic keratoses on trunk and less on extremities. One bigger one on right upper leg ventral  side.  On the left upper leg ventrolateral a 5mm diameter, pigmented lesion, not symmetric and different colors. According to patient there since birth, but not sure if recently changes of color    Past Medical History:   Patient Active Problem List   Diagnosis     Pain in thoracic spine     Multiple myeloma, dx 2002,  s/p ASCBMT 3/26/14     Personal history of malignant neoplasm of breast     Seasonal allergies     Osteoporosis     Primary hypertension     Stem cells transplant status (H)     Adverse effect of other drugs, medicaments and biological substances, sequela     Acquired hypothyroidism     Other amyloidosis (H)     Examination of participant in clinical trial     Multiple myeloma not having achieved remission (H)     Thrombocytopenia     Hypogammaglobulinemia     Other pancytopenia (H)     Personal history of other drug therapy     Pain of right hand     Past Medical History:   Diagnosis Date     Acquired hypothyroidism 8/10/2023     Breast cancer (H) 01/01/1994    right     H/O stem cell transplant (H) 03/01/2014     History of blood transfusion 2013 & 2014    During stem cell tx process     Hypertension Sept. 2021    Runs 140 s systolically, about 20 above my usual     Multiple myeloma, without mention of having achieved remission 11/06/2012     Allergies:  Allergies   Allergen Reactions     Blood Transfusion Related (Informational Only) Other (See Comments)     Patient has a history of a clinically significant antibody against RBC antigens.  A delay in compatible RBCs may occur.      Cayenne Pepper [Cayenne]      Corn-Containing Products GI Disturbance     Levaquin Other (See Comments)     Tendon pain     Milk Protein GI Disturbance     Mold      Facial rash/lip swelling     Morphine Sulfate Other (See Comments)     Per pt - see things (hallucination) when she was on Morphine .     Pollen Extract      Environmental allergies      Shrimp Nausea and Vomiting     Tomato      Lip swelling, facial rash      Azithromycin Rash     Keflex [Cephalexin] Rash     Oat Rash     Tape [Adhesive Tape] Itching and Rash     All adhesives     Wheat Rash     Swelling of lips     Medications:  Current Outpatient Medications   Medication Sig Dispense Refill     acetaminophen (TYLENOL) 325 MG tablet Take 325-650 mg by mouth every 6 hours as needed for mild pain       acyclovir (ZOVIRAX) 400 MG tablet Take 1 tablet (400 mg) by mouth every 12 hours. 60 tablet 11     amLODIPine (NORVASC) 2.5 MG tablet Take 2 tablets (5 mg) by mouth daily Take 5 mg (2 tablets) in the morning. 180 tablet 3     CALCIUM-VITAMIN D-VITAMIN K PO Take 2 tablets by mouth daily.       clobetasol (TEMOVATE) 0.05 % external ointment Apply topically 2 times daily as needed (itching and rash). Topically to rash on hands until resolved 45 g 0     estradiol (ESTRACE) 0.1 MG/GM vaginal cream Place 1 g vaginally three times a week. Ok to use your fingers or the applicator 42.5 g 3     levothyroxine (SYNTHROID/LEVOTHROID) 50 MCG tablet Take 1 tablet (50 mcg) by mouth daily. 90 tablet 3     MAGNESIUM PO Take 235 mg by mouth 2 times daily.       Multiple Vitamins-Minerals (MULTIVITAMIN OR) Take 1 tablet by mouth 2 times daily.       order for DME 6 mastectomy bras  Length of need: 99 months 6 Device 1     order for DME R mastectomy prosthesis   Length of need:  99 months 1 Device 1     penicillin V (VEETID) 250 MG tablet Take 1 tablet (250 mg) by mouth daily. (Patient not taking: Reported on 2025) 60 tablet 2     triamcinolone (KENALOG) 0.1 % external cream Apply topically 2 times daily as needed for irritation. 15 g 11     Family History:  Family History   Problem Relation Age of Onset     Hypertension Mother      Cerebrovascular Disease Mother          8-11-15 from strokes     Hypertension Father      Prostate Cancer Father      Skin Cancer Paternal Grandmother      Cancer Paternal Grandmother         skin cancer   No allergies.    Social History:  The patient is a  "retired bedside nurse.  Patient has the following hobbies or non-occupational exposure: none currently.  Only chemical exposure now is cleaning. In childhood, her dad was a contractor, and she was exposed to various chemicals, including benzene.    Order for Future Allergy Testing:    [x] Outpatient  [] Inpatient: Cash..../ Bed ...    Skin Atopy (atopic dermatitis)? [] Yes     [x] No  Comments:   Childhood eczema?   [x] Yes     [] No  Comments: as a child, was told she was allergic to soap, so she had to use a \"stinky brown\" soap  Hand eczema?   [x] Yes     [] No  If yes, leading hand? [x] Right     [] Left     [] Ambidextrous  Comments:   - hands are good today  - more between her fingers than on the top or palms  - very itchy blisters  - both hands affected, but usually it is whichever hand touches chemicals; denies any other area of involvement  - notices she will get a rash within a few hours of touching items in her house after   - has been having this for decades, including while nursing  - used to have issues with latex gloves until employer had switched to latex-free gloves, but she still uses latex-free gloves to clean; though, she thought irritated may really have been due to hand cleanser that was being used    Contact allergies?     Comments:   Including adhesives/bandages? [x] Yes     [] No  Comments: if they are on for a few hours, will get itchy  Including metals?   [x] Yes     [] No  Comments: reacted to cheap earrings, so at one point stopped wearing them for decades  Other substances?   [x] Yes     [] No  Comments:   - cleaning products and possibly latex as above  - Silicone entry in Epic: if she gets hot on her chest, gets rash around breast prosthesis  - does not dye her hair    Drug allergies?   [x] Yes     [] No  Comments:   - within a few doses of azithromycin and Keflex, will get itchy rash on her back  - Levaquin caused tendonitis    Angioedema?   [x] Yes     [] No  Comments:   - lips swell " when she is at her cabin around [place] (see environmental sx below)    Urticaria?   [x] Yes     [] No  Comments: gets rashes while she is working out in the garden    Food allergies?   [] Yes     [] No  Comments:   - Shrimp: nausea and vomiting for days since childhood  - Milk: GI upset so avoids; has noticed she can tolerate milk from European cows; does ok with cheese, but not too much cheese made from cow milk  - Corn: popcorn causes indigestion and bloating; no issues with corn in [place], which she attributes to them not using GMOs    Pet allergies?   [] Yes     [x] No  Comments: none at home and does not react to kids' pets    Environmental allergy symptoms?  [x] Conjunctivitis - red eyes depending on season  [] Otitis  [] Pharyngitis  [] Polyposis  [x] Postnasal Drip - slight  [x] Rhinitis - congestion  [] Sinusitis  [] None  Comments:   - most of her seasonal symptoms are respiratory  - when she was seeing a naturopath, he had her take air samples at her cabin (as she would start to not feel good with facial swelling); was told she is allergic to pine    HENT Operations?  [] Adenoids [] Septum [] Sinus  [] Tonsils        [] Other:   [x] None  Comments:     Pulmonary symptoms (from birth to present)?  [] Asthma bronchiale  Inhaler(s)?:   [] Coughing  [] Other  [x] None  Comments:     Environmental and pulmonary symptoms aggravated by?  Season: [] None     [] I     [] II     [x] III     [x] IV     [] V     [] VI          [] VII     [] VIII     [] IX     [x] X     [x] XI     [] XII     [] Perennial  Time of Day: [] None     [] Morning     [] Noon     [] Evening     [] Night     [] Whole Day  Location/Changes: [] None     [] Inside     [] Outside     [] Mountain     [] Sea     [] Other:   Triggers (specific): [] None     [] Animals     [] Dust     [] Mold     [] Plants     [] Other:   Triggers (other): [] None     [] Psyche     [] Sport     [] Work     [] Other:   Irritant: [] None     [] Cold     [] Heat     []  Odors     [] Physical Efforts     [] Smoke     [] Other:     Order for PATCH TESTS  Reason for tests (suspected allergy): recurrent dyshidrotic hand eczema  Known previous allergies: nickel?  Standardized panels  [x] Standard panel (40 tests)  [x] Preservatives & Antimicrobials (31 tests)  [x] Emulsifiers & Additives (25 tests)   [x] Perfumes/Flavours & Plants (25 tests)  [] Hairdresser panel (12 tests)  [x] Rubber Chemicals (22 tests)  [] Plastics (26 tests)  [] Colorants/Dyes/Food additives (20 tests)  [] Metals (implants/dental) (24 tests)  [] Local anaesthetics/NSAIDs (13 tests)  [] Antibiotics & Antimycotics (14 tests)   [] Corticosteroids (15 tests)   [] Photopatch test (62 tests)   [] Others:     [] Patient's Own Products:   DO NOT test if chemical or biological identity is unknown!   always ask from patient the product information and safety sheets (MSDS)       Order for PRICK TESTS    Reason for tests (suspected allergy): seasonal RC in early spring and late fall  Known previous allergies: none    Standardized prick panels  [] Atopic panel (20 tests)  [] Pediatric Panel (12 tests)  [] Milk, Meat, Eggs, Grains (20 tests)   [] Dust, Epithelia, Feathers (10 tests)  [] Fish, Seafood, Shellfish (17 tests)  [] Nuts, Beans (14 tests)  [] Spice, Vegetable, Fruit (17 tests)  [] Pollen Panel = Tree, Grass, Weed (24 tests)  [] Others:     [] Patient's Own Products:   DO NOT test if chemical or biological identity is unknown!   always ask from patient the product information and safety sheets (MSDS)     Standardized intradermal tests (at same time as patch tests)  [x] Alternaria alternata  [x] Aspergillus fumigatus  [] Cladosporium herbarum   [x] Penicillium notatum  [x] Dermatophagoides farinae  [x] Dermatophagoides pteronyssinus  [] Dog Epithelium  [] Cat Epithelium  [] Others:     [] Bee venom   [] Wasp venom  !!Specific protocol with dilutions!!       Order for Drug allergy tests (prick & intradermal & patch  tests)    [] Penicillin G     [] Ampicillin   [x] Cefazolin        [] Ceftriaxone     [] Ceftazidime     [] Cefepime     [] Cefuroxime  [] Bactrim  [] Iodixanol     [] Iopamidol        [] Iohexol  [x] Others: azithromycin  [] Patient's Own:   Order for 6/2/25 as test date      Atopy Screen (Placed Feb 11, 2025)  No Substance Readings (15 min) Evaluation   POS Histamine 1mg/ml ++    NEG NaCl 0.9% -      No Substance Readings (15 min) Evaluation   1 Alternaria alternata (tenuis)  -    2 Cladosporium herbarum -    3 Aspergillus fumigatus -    4 Penicillium notatum -    5 Dermatophagoides pteronyssinus -    6 Dermatophagoides farinae -    7 Dog epithelium (canis spp) -    8 Cat hair (flaco catus) +    9 Cockroach   (Blatella americana & germanica) -    10 Grass mix midwest   (Teresa, Orchard, Redtop, George) -    11 Jv grass (sorghum halepense) -    12 Weed mix   (common Cocklebur, Lamb s quarters, rough redroot Pigweed, Dock/Sorrel) -    13 Mug wort (artemisia vulgare) -    14 Ragweed giant/short (ambrosia spp) -    15 White birch (Betula papyrifera) -    16 Tree mix 1 (Pecan, Maple BHR, Oak RVW, american [place], black [place]) -    17 Red cedar (juniperus virginia) -    18 Tree mix 2   (white [place], river/red Birch, black [place], common [place], american Elm) -    19 Box elder/Maple mix (acer spp) -    20 [place] shagbark (carya ovata) -    Conclusion: Cat reaction is slight and does not correlate with her clinical symptoms. Will plan for IDT at same time as patch and drug allergy testing.      Intradermal Testing (Placed Jun 2, 2025)  No Substance Conc.  Reading (15min)  immediate Papule [mm] / Erythema [mm] Reading   (... days)  delayed Papule [mm] / Erythema [mm] Remarks   DF Standard Dust Mite - D. Farinae 1:10 -   -    DP Standard Dust Mite - D. Pteronyssinus 1:10 -   -    A Aspergillus fumigatus  1:10 -   -    P Penicillium notatum 1:10 -   -     Alternaria alternata  1:10 -   -    Conclusions: No signs  for immediate-type reaction. Patient will provide feedback if delayed-type reaction is experienced. .    DRUG ALLERGY TEST SERIES Jun 2, 2025    No Substance Readings (15min) Evaluation   POS Histamine 1mg/ml +/++    NEG NaCl 0.9% -         Prick Tests         Substance/ Allergen Conc Result (20 min) Remarks    Cefazolin[1] 100 mg/ml -     Azithromycin 100 mg/ml -       Intradermal Tests   immed immed delay delay      Substance Conc 1st dil  2nd dil  2 days  3 days remarks    Cefazolin[1] 1:5 -        Azithromycin  1:1000 -           Patch Tests  as is as is 1:2 1:2     As Is  Vas Substance Conc 2 days 3 days 2 days 3 days remarks   146 147 Cefazolin[1] 100 mg/ml        148 149 Azithromycin  100 mg/ml          ____________________________________________  RESULTS & EVALUATION of PATCH TESTS    Jun 2, 2025 application of patch tests:    Patch test readings after     [x] 2 days, [x] 3 days [] 4 days, [] 5 days,  Other duration: ...      STANDARD Series                                          # Substance 2 days 3 days remarks     1 Kash Mix III 10% - -       2 Colophony - -       3  2-Mercaptobenzothiazole  - -       4 Methylisothiazolinone - -       5 Carba Mix - -       6 Thiuram Mix [A] - -       7 Bisphenol A Epoxy Resin - -       8 Y-Qrjb-Zmttvvxnaae-Formaldehyde Resin - -       9 Mercapto Mix [A] - -       10 Black Rubber Mix- PPD [B] - -       11 Potassium Dichromate  -  -       12 Balsam of [place] (Myroxylon Pereirae Resin) - -       13 Nickel Sulphate Hexahydrate - -       14 Mixed Dialkyl Thiourea - -       15 Paraben Mix [B] - -       16 Methyldibromo Glutaronitrile - -       17 Fragrance Mix 8% - -       18 2-Bromo-2-Nitropropane-1,3-Diol (Bronopol) CT - -       19 Lyral - -       20 Tixocortol-21- Pivalate CT - -       21 Diazolidinyl urea (Germall II) - -        22 Methyl Methacrylate - -       23 Cobalt (II) Chloride Hexahydrate - -       24 Fragrance Mix II  - -       25 Compositae Mix II - -       26  Benzoyl Peroxide - -       27 Bacitracin - -       28 Formaldehyde - -       29 Methylchloroisothiazolinone / Methylisothiazolinone - -       30 Corticosteroid Mix CT - -       31 Sodium Lauryl Sulfate - -       32 Lanolin Alcohol - -       33 Turpentine - -       34 Cetylstearylalcohol - -       35 Chlorhexidine Dicluconate - -       36 Budesonide - -       37 Imidazolidinyl Urea  - -       38 Ethyl-2 Cyanoacrylate - -       39 Quaternium 15 (Dowicil 200) - -       40 Decyl Glucoside - -     Compositae Mix II - common yarrow, mountain arnica, Slovenian chamomile, feverfew, and the common tansy   PRESERVATIVES & ANTIMICROBIALS        # Substance 2 days 3 days remarks   41 1 1,2-Benzisothiazoline-3-One, Sodium Salt - -     2 1,3,5-Paxton (2-Hydroxyethyl) - Hexahydrotriazine (Grotan BK) - -     3 Dichlorophene - -     4 3, 4, 4' - Triclocarban - -    45 5 4 - Chloro - 3 - Cresol - -     6 4 - Chloro - 4 - Xylenol (PCMX) - -     7 7-Ethylbicyclooxazolidine (Bioban VR4167) - -     8 Benzalkonium Chloride CT - -     9 Benzyl Alcohol - -    50 10 Cetalkonium Chloride - -     11 Cetylpyrimidine Chloride  - -     12 Chloroacetamide - -     13 DMDM Hydantoin - -     14 Glutaraldehyde - -    55 15 Triclosan - -     16 Glyoxal Trimeric Dihydrate - -     17 Iodopropynyl Butylcarbamate - -     18 Octylisothiazoline - -     19 Acetophenone Azine - -    60 20 Bioban P 1487 (Nitrobutyl) Morpholine/(Ethylnitro-Trimethylene) Dimorpholine - -     21 Phenoxyethanol - -     22 Phenyl Salicylate - -     23 Povidone Iodine - -     24 Sodium Benzoate - -    65 25 Sodium Disulfite - -     26 Sorbic Acid - -     27 Thimerosal - -     28 Melamine Formaldehyde Resin - -     29 Ethylenediamine Dihydrochloride - -      Parabens      70 30 Butyl-P-Hydroxybenzoate - -     31 Ethyl-P-Hydroxybenzoate - -     32 Methyl-P-Hydroxybenzoate - -    73 33 Propyl-P-Hydroxybenzoate - -     EMULSIFIERS & ADDITIVES       # Substance 2 days 3 days remarks   74 1  Polyethylene Glycol-400 - -    75 2 Cocamidopropyl Betaine - -     3 Amerchol L101 - -     4 Propylene Glycol - -     5 Triethanolamine - -     6 Sorbitane Sesquiolate CT - -    80 7 Isopropylmyristate - -     8 Polysorbate 80 CT - -     9 Amidoamine   (Stearamidopropyl Dimethylamine) - -     10 Oleamidopropyl Dimethylamine - -     11 Lauryl Glucoside - -    85 12 Coconut Diethanolamide  - -     13 2-Hydroxy-4-Methoxy Benzophenone (Oxybenzone) - -     14 Benzophenone-4 (Sulisobenzon) - -     15 Propolis - -     16 Dexpanthenol - -    90 17 Abitol (Hydroabietyl Alcohol) - -     18 Tert-Butylhydroquinone - -     19 Benzyl Salicylate - -     20 Dimethylaminopropylamin (DMPA) - -     21 Zinc Pyrithione (Zinc Omadine)  - -    95 22 Paxton(Hydroxymethyl) Nitromethane  - -      Antioxidant       23 Dodecyl Gallate - -     24 Butylhydroxyanisole (BHA) - -     25 Butylhydroxytoluene (BHT) - -     26 Di-Alpha-Tocopherol (Vit E) - -    100 27 Propyl Gallate - -     PERFUMES, FLAVORS & PLANTS        # Substance 2 days 3 days remarks   101 1 Benzyl Cinnamate - -     2 Di-Limonene (Dipentene) - -     3 Cananga Odorata (Franklyn Estes) (I) - -     4 Lichen Acid Mix - -    105 5 Mentha Piperita Oil (Peppermint Oil) - -     6 Sesquiterpenelactone mix - -     7 Tea Tree Oil, Oxidized - -     8 Wood Tar Mix - -     9 Abietic Acid - -    110 10 Lavendula Angustifolia Oil (Lavender Oil) - -     11 Fragrance mix II CT * 14% - -      Fragrance Mix I       12 Oakmoss Absolute - -     13 Eugenol - -     14 Geraniol - -    115 15 Hydroxycitronellal - -     16 Isoeugenol - -     17 Cinnamic Aldehyde - -     18 Cinnamic Alcohol  - -      Fragrance mix II       19 Citronellol - -    120 20 Alpha-Hexylcinnamic Aldehyde    - -     21 Citral - -     22 Farnesol - -    123 23 Coumarin - -    Hexylcinnamic aldehyde, Coumarin, Farnesol, Hydroxyisohexy3-cyclohexene carboxaldehyde, citral, citrolellol  RUBBER CHEMICALS        # Substance 2 days 3 days  remarks     Carbamate      124 1 Zinc Bis ( Diethyldithio carbamate ) (ZDEC) - -    125 2 Zinc Bis (Dibutyldithiocarbamate) - -     3 1,3-Diphenylguanidine (DPG) - -      Thiourea       4 Dibutylthiourea - -     5 Diphenyltiourea - -     6 Thiourea - -      Mercapto chemicals      130 7 Morpholinyl Mercaptobenzothiazole - -     8 W-Ukafjguumy-3-Benzothiazyl-Sulfenamide - -     9 Dibenzothiazyl Disulfide - -     10 Dodecyl Mercaptan  - -      Thiuram chemicals       11 Dipentamethylenethiuram Disulfide - -    135 12 Tetraethylthiuram Disulfide (Disulfiram) - -     13 Tetramethylthiuram Disulfide - -     14 Tetramethyl Thiuram Monosulfide (TMTM) - -      4-Phenylenediamine derivatives       15 N-Isopropyl-N'-Phenyl-P-Phenylenediamine (IPPD) - -     16 Pjowddyb-N-Vbkfwdzozqddijgc (DPPD) - -      Various Rubber Additives      140 17 Hydroquinone Monobenzylether  - -     18 Hexamethylenetetramine - -     19 4,4'-Dihydroxybiphenyl - -     20 Cyclohexylthiophtalimide - -    144 21 N-Phenyl-B-Naphthylamine - -    145 22 White flour         Results of patch tests:                         Interpretation:  - Negative                    A    = Allergic      (+) Erythema    TI   = Toxic/irritant   + E + Infiltration    RaP = Relevance at Present     ++ E/I + Papulovesicle   Rpr  = Relevance Previously     +++ E/I/P + Blister     nR   = No Relevance     [] No relevant allergic reaction observed    [] Allergic reaction diagnosed against following allergens:      Interpretation/ remarks:   See later    [] Patient information given   [] ACDS CAMP information's (# ....) to following compounds: .....   [] General information's to following compounds: ......    ____________________________________________    Assessment & Plan:    ==> Final Diagnosis:     # Recurrent dyshidrotic hand dermatitis  DDx: Atopic dermatitis and/or  DDx: Allergic contact dermatitis to rubber chemicals, ingredients in soaps, and/or emulsifiers  * chronic  illness with exacerbation, progression, side effects from treatment    # Suspicion for atopic predisposition with:   PND and RC during change of seasons  Dry, hypersensitive skin with dyshidrosis (atopic dermatitis?)  * chronic illness with exacerbation, progression, side effects from treatment    # Possible phototoxic/photoallergic reaction to thalidomide and pomalidomide  Previously assessed 1/28/19  At the time, was to intensify sun protection (clothes, hat, etc.) and use physical sunscreens  For flare ups, was to use triamcinolone or clobetasol (DesOwen) cream    These conclusions are made at the best of one's knowledge and belief based on the provided evidence such as patient's history and allergy test results and they can change over time or can be incomplete because of missing information.    ==> Treatment Plan:    >> For IDT  test sites from today, patient will monitor sites for the next 2 and 4 days. If there are any delayed reactions, patient will take photos with the ruler provided and send to clinic via [employer] message for review. Be sure to specify RIGHT or LEFT arm.  - Do not apply topical steroids to sites until after 4 total days.    >> Will update below treatment plan on Thursday after 2nd readings and final conclusions:  >> Until patch tests can be performed, continue with treatment plan set with Dr. Mcknight.      Procedures Performed: intradermal, patch, and drug allergy tests    Staff Involved: Provider, Staff, and Medical Student    Scribe Disclosure:   I, Yadiel Simon, am serving as a scribe to document services personally performed by Sunil Rosario MD based on data collection and the provider's statements to me.     Staff Physician Comments:  I was present with the scribe who participated in the documentation of the note. I have verified the history and personally performed the physical exam and medical decision making. I agree with the assessment and plan as documented in the note.  I have reviewed and if necessary amended the note.      Sunil Rosario MD  Professor  Head of Dermato-Allergy Division  Department of Dermatology  [facility], [facility]       Follow-up in Derm-Allergy clinic for 1st readings of patch tests after 2 days   ___________________________    I spent a total of *** minutes with Shena Hartmann during today s  visit. This time was spent discussing all the individual test results, correlating them to the clinical relevance, counseling the patient and/or coordinating care and performing allergy tests or procedures.      Again, thank you for allowing me to participate in the care of your patient.        Sincerely,        Sunil Rosario MD    Electronically signed

## 2025-06-03 DIAGNOSIS — C90.00 MULTIPLE MYELOMA, REMISSION STATUS UNSPECIFIED (H): Primary | ICD-10-CM

## 2025-06-03 PROCEDURE — 94640 AIRWAY INHALATION TREATMENT: CPT | Performed by: INTERNAL MEDICINE

## 2025-06-03 PROCEDURE — 94642 AEROSOL INHALATION TREATMENT: CPT | Performed by: INTERNAL MEDICINE

## 2025-06-03 RX ORDER — HEPARIN SODIUM,PORCINE 10 UNIT/ML
5-20 VIAL (ML) INTRAVENOUS DAILY PRN
OUTPATIENT
Start: 2025-07-01

## 2025-06-03 RX ORDER — PENTAMIDINE ISETHIONATE 300 MG/300MG
300 INHALANT RESPIRATORY (INHALATION) ONCE
Start: 2025-07-01 | End: 2025-07-01

## 2025-06-03 RX ORDER — PENTAMIDINE ISETHIONATE 300 MG/300MG
300 INHALANT RESPIRATORY (INHALATION) ONCE
Status: COMPLETED | OUTPATIENT
Start: 2025-06-03 | End: 2025-06-03

## 2025-06-03 RX ORDER — HEPARIN SODIUM (PORCINE) LOCK FLUSH IV SOLN 100 UNIT/ML 100 UNIT/ML
5 SOLUTION INTRAVENOUS
OUTPATIENT
Start: 2025-07-01

## 2025-06-03 RX ORDER — ALBUTEROL SULFATE 5 MG/ML
2.5 SOLUTION RESPIRATORY (INHALATION) ONCE
Status: DISCONTINUED | OUTPATIENT
Start: 2025-06-03 | End: 2025-06-03 | Stop reason: HOSPADM

## 2025-06-03 RX ORDER — ALBUTEROL SULFATE 5 MG/ML
2.5 SOLUTION RESPIRATORY (INHALATION) ONCE
Start: 2025-07-01 | End: 2025-07-01

## 2025-06-03 RX ADMIN — PENTAMIDINE ISETHIONATE 300 MG: 300 INHALANT RESPIRATORY (INHALATION) at 10:50

## 2025-06-03 NOTE — PROGRESS NOTES
Shena Hartmann was seen today for a Pentamidine nebulizer tx ordered by MARINO Llamas.    Patient was first given 2.5 mg / 3 ml of albuterol (LOT# [id number], NDC# [id number], EXP 01/31/2027) nebulizer, after which Pentamidine 300 mg (Lot # [id number], NDC# [id number], EXP 12/31/2026) mixed with 6cc Sterile H20 was administered through a filtered nebulizer.    Pre-treatment: SpO2 = 98%   HR = 77 bpm   BBS = clear   Post-treatment: SpO2 = 96%  HR = 77 bpm  BBS = clear    No adverse side effects noted by the patient.    This service today was provided under the supervision of Dr. Fernandez, who was available if needed.     Procedure was completed by Quique Royal, LANG.

## 2025-06-03 NOTE — PROGRESS NOTES
[facility] Dermato-allergology Note  Office visit  Encounter Date: Jun 4, 2025  ____________________________________________    CC: Allergy Testing Followup (Patch day 3)    HPI:  (Jun 4, 2025)  Ms. Shena Hartmann is a(n) 74 year old female who presents today as a return patient for allergy tests as planned  - Follow-up in Derm-Allergy clinic for 1st readings of patch tests after 2 days   - Otherwise feeling well in usual state of health    Physical Exam:  General: In no acute distress, well-developed, well-nourished  Eyes: no conjunctivitis  ENT: no signs of rhinitis   Pulmonary: no wheezing or coughing  Skin: Focused examination of the skin on test sites was performed = see test results below  No active eczematous skin lesions on tests sites, particularly back    Earlier History and Allergy Exams:  (Jun 2, 2025)  Presents today as a return patient for allergy tests as planned  - Follow-up in Derm-Allergy clinic for allergy tests as planned   - Skin rashes with oat and wheat, GI symptoms with dairy  - Can have dairy, oat, wheat in [place] without issue     (Feb 11, 2025)  Presents today as a return patient for allergy consultation  - Referred back to this clinic by Dr. Mcknight on 8/29/23 for chronic hand dermatitis  - Since I last saw her on 1/28/19, she has been following with general dermatology for regular skin checks, and has been seeing Dr. Mcknight for hand dermatitis since 7/7/20  - Patient last saw Dr. Mcknight on 8/28/24:  # Dermatitis- both contact and drug induced- not currently active but when flares, patient finds the clobetasol and triamcinolone helpful.  Had to reschedule patient testing due to family and health issues.  Planned for Spring 2025  - refilled topical sterioids.  Patient is undergoing CAR- T cell therapy at present.  Will review with Onc before starting creams if needed due to red flag warning with steroids and therapy  - Only derm path in Epic is from 1/28/19, which  I performed: lesion confirmed to be a compound melanocytic nevus with mild atypia of left upper ventrolateral lag  - Of note, patient is a H/O multiple myeloma s/p stem cell transplant and current on CAR T-cell therapy  - Last follow up in oncology with Dr. Chavez on 1/28/25:  Relapsed Multiple Myeloma  CAR-T therapy 7/29/24 with Carvandersti.  Restaging at day +180 shows CR MRD-  Xgeva every 6 months     Macrocytic anemia- check B12 with next lab draw. Normocellular marrow  Neutropenia- penicillin V ppx when ANC <1.0. Suspect ongoing CAR-T activity in the marrow is responsible for neutropenia given minimal residual disease at day 100 now MRD-.  IgG- Continue to check monthly and replace as needed     HTN  Continue amlodipine 5 mg daily.  Followed by cardiology      Hypothyroidism  Remains on levothyroxine     ID Prophylaxis   Decrease Acyclovir to 400 mg BID for HSV prophylaxis  Pentamidine monthly for 1 year following CAR-T - may consider changing to Bactrim once platelets recover  penicillin V when ANC <1.0     Left Hand Pain  Saw ortho who prescribed a brace and stretching for lateral epicondylitis and carpal tunnel syndrome.      (1/28/19)  Patient had since early childhood problems with eczemas, but with Thalidomide and later Pomalidomide (since 4.5years) recurrent dermatitis with sometimes blisters and papules on face, neck, back of hands and forearms. Patient was 2 weeks ago in [place] and the rash seemed to be worse.  In addition some reaction with sweating and heat on the mastectomy site below the breast prosthetic (silicone)        SKIN: Full skin, which includes the head/face, both arms, chest, back, abdomen,both legs, genitalia and/or groin buttocks, digits and/or nails, was examined.  Many seborrhoic keratoses on trunk and less on extremities. One bigger one on right upper leg ventral side.  On the left upper leg ventrolateral a 5mm diameter, pigmented lesion, not symmetric and different colors. According  to patient there since birth, but not sure if recently changes of color    Past Medical History:   Patient Active Problem List   Diagnosis    Pain in thoracic spine    Multiple myeloma, dx 2002,  s/p ASCBMT 3/26/14    Personal history of malignant neoplasm of breast    Seasonal allergies    Osteoporosis    Primary hypertension    Stem cells transplant status (H)    Adverse effect of other drugs, medicaments and biological substances, sequela    Acquired hypothyroidism    Other amyloidosis (H)    Examination of participant in clinical trial    Multiple myeloma not having achieved remission (H)    Thrombocytopenia    Hypogammaglobulinemia    Other pancytopenia (H)    Personal history of other drug therapy    Pain of right hand     Past Medical History:   Diagnosis Date    Acquired hypothyroidism 8/10/2023    Breast cancer (H) 01/01/1994    right    H/O stem cell transplant (H) 03/01/2014    History of blood transfusion 2013 & 2014    During stem cell tx process    Hypertension Sept. 2021    Runs 140 s systolically, about 20 above my usual    Multiple myeloma, without mention of having achieved remission 11/06/2012     Allergies:  Allergies   Allergen Reactions    Blood Transfusion Related (Informational Only) Other (See Comments)     Patient has a history of a clinically significant antibody against RBC antigens.  A delay in compatible RBCs may occur.     Cayenne Pepper [Cayenne]     Corn-Containing Products GI Disturbance    Levaquin Other (See Comments)     Tendon pain    Milk Protein GI Disturbance    Mold      Facial rash/lip swelling    Morphine Sulfate Other (See Comments)     Per pt - see things (hallucination) when she was on Morphine .    Pollen Extract      Environmental allergies     Shrimp Nausea and Vomiting    Tomato      Lip swelling, facial rash    Azithromycin Rash    Keflex [Cephalexin] Rash    Oat Rash    Tape [Adhesive Tape] Itching and Rash     All adhesives    Wheat Rash     Swelling of lips      Medications:  Current Outpatient Medications   Medication Sig Dispense Refill    acetaminophen (TYLENOL) 325 MG tablet Take 325-650 mg by mouth every 6 hours as needed for mild pain      acyclovir (ZOVIRAX) 400 MG tablet Take 1 tablet (400 mg) by mouth every 12 hours. 60 tablet 11    amLODIPine (NORVASC) 2.5 MG tablet Take 2 tablets (5 mg) by mouth daily Take 5 mg (2 tablets) in the morning. 180 tablet 3    CALCIUM-VITAMIN D-VITAMIN K PO Take 2 tablets by mouth daily.      clobetasol (TEMOVATE) 0.05 % external ointment Apply topically 2 times daily as needed (itching and rash). Topically to rash on hands until resolved 45 g 0    estradiol (ESTRACE) 0.1 MG/GM vaginal cream Place 1 g vaginally three times a week. Ok to use your fingers or the applicator 42.5 g 3    levothyroxine (SYNTHROID/LEVOTHROID) 50 MCG tablet Take 1 tablet (50 mcg) by mouth daily. 90 tablet 3    MAGNESIUM PO Take 235 mg by mouth 2 times daily.      Multiple Vitamins-Minerals (MULTIVITAMIN OR) Take 1 tablet by mouth 2 times daily.      order for DME 6 mastectomy bras  Length of need: 99 months 6 Device 1    order for DME R mastectomy prosthesis   Length of need:  99 months 1 Device 1    penicillin V (VEETID) 250 MG tablet Take 1 tablet (250 mg) by mouth daily. (Patient not taking: Reported on 2025) 60 tablet 2    triamcinolone (KENALOG) 0.1 % external cream Apply topically 2 times daily as needed for irritation. 15 g 11     Family History:  Family History   Problem Relation Age of Onset    Hypertension Mother     Cerebrovascular Disease Mother          8-11-15 from strokes    Hypertension Father     Prostate Cancer Father     Skin Cancer Paternal Grandmother     Cancer Paternal Grandmother         skin cancer   No allergies.    Social History:  The patient is a retired bedside nurse.  Patient has the following hobbies or non-occupational exposure: none currently.  Only chemical exposure now is cleaning. In childhood, her dad was a  "contractor, and she was exposed to various chemicals, including benzene.    Order for Future Allergy Testing:    [x] Outpatient  [] Inpatient: Cash..../ Bed ...    Skin Atopy (atopic dermatitis)? [] Yes     [x] No  Comments:   Childhood eczema?   [x] Yes     [] No  Comments: as a child, was told she was allergic to soap, so she had to use a \"stinky brown\" soap  Hand eczema?   [x] Yes     [] No  If yes, leading hand? [x] Right     [] Left     [] Ambidextrous  Comments:   - hands are good today  - more between her fingers than on the top or palms  - very itchy blisters  - both hands affected, but usually it is whichever hand touches chemicals; denies any other area of involvement  - notices she will get a rash within a few hours of touching items in her house after   - has been having this for decades, including while nursing  - used to have issues with latex gloves until employer had switched to latex-free gloves, but she still uses latex-free gloves to clean; though, she thought irritated may really have been due to hand cleanser that was being used    Contact allergies?     Comments:   Including adhesives/bandages? [x] Yes     [] No  Comments: if they are on for a few hours, will get itchy  Including metals?   [x] Yes     [] No  Comments: reacted to cheap earrings, so at one point stopped wearing them for decades  Other substances?   [x] Yes     [] No  Comments:   - cleaning products and possibly latex as above  - Silicone entry in Epic: if she gets hot on her chest, gets rash around breast prosthesis  - does not dye her hair    Drug allergies?   [x] Yes     [] No  Comments:   - within a few doses of azithromycin and Keflex, will get itchy rash on her back  - Levaquin caused tendonitis    Angioedema?   [x] Yes     [] No  Comments:   - lips swell when she is at [place] cabin around [place] (see environmental sx below)    Urticaria?   [x] Yes     [] No  Comments: gets rashes while she is working out in the garden    Food " allergies?   [] Yes     [] No  Comments:   - Shrimp: nausea and vomiting for days since childhood  - Milk: GI upset so avoids; has noticed she can tolerate milk from [place] cows; does ok with cheese, but not too much cheese made from cow milk  - Corn: popcorn causes indigestion and bloating; no issues with corn in [place], which she attributes to them not using GMOs    Pet allergies?   [] Yes     [x] No  Comments: none at home and does not react to kids' pets    Environmental allergy symptoms?  [x] Conjunctivitis - red eyes depending on season  [] Otitis  [] Pharyngitis  [] Polyposis  [x] Postnasal Drip - slight  [x] Rhinitis - congestion  [] Sinusitis  [] None  Comments:   - most of her seasonal symptoms are respiratory  - when she was seeing a naturopath, he had her take air samples at her cabin (as she would start to not feel good with facial swelling); was told she is allergic to pine    HENT Operations?  [] Adenoids [] Septum [] Sinus  [] Tonsils        [] Other:   [x] None  Comments:     Pulmonary symptoms (from birth to present)?  [] Asthma bronchiale  Inhaler(s)?:   [] Coughing  [] Other  [x] None  Comments:     Environmental and pulmonary symptoms aggravated by?  Season: [] None     [] I     [] II     [x] III     [x] IV     [] V     [] VI          [] VII     [] VIII     [] IX     [x] X     [x] XI     [] XII     [] Perennial  Time of Day: [] None     [] Morning     [] Noon     [] Evening     [] Night     [] Whole Day  Location/Changes: [] None     [] Inside     [] Outside     [] Mountain     [] Sea     [] Other:   Triggers (specific): [] None     [] Animals     [] Dust     [] Mold     [] Plants     [] Other:   Triggers (other): [] None     [] Psyche     [] Sport     [] Work     [] Other:   Irritant: [] None     [] Cold     [] Heat     [] Odors     [] Physical Efforts     [] Smoke     [] Other:     Order for PATCH TESTS  Reason for tests (suspected allergy): recurrent dyshidrotic hand eczema  Known previous  allergies: nickel?  Standardized panels  [x] Standard panel (40 tests)  [x] Preservatives & Antimicrobials (31 tests)  [x] Emulsifiers & Additives (25 tests)   [x] Perfumes/Flavours & Plants (25 tests)  [] Hairdresser panel (12 tests)  [x] Rubber Chemicals (22 tests)  [] Plastics (26 tests)  [] Colorants/Dyes/Food additives (20 tests)  [] Metals (implants/dental) (24 tests)  [] Local anaesthetics/NSAIDs (13 tests)  [] Antibiotics & Antimycotics (14 tests)   [] Corticosteroids (15 tests)   [] Photopatch test (62 tests)   [] Others:     [] Patient's Own Products:   DO NOT test if chemical or biological identity is unknown!   always ask from patient the product information and safety sheets (MSDS)       Order for PRICK TESTS    Reason for tests (suspected allergy): seasonal RC in early spring and late fall  Known previous allergies: none    Standardized prick panels  [] Atopic panel (20 tests)  [] Pediatric Panel (12 tests)  [] Milk, Meat, Eggs, Grains (20 tests)   [] Dust, Epithelia, Feathers (10 tests)  [] Fish, Seafood, Shellfish (17 tests)  [] Nuts, Beans (14 tests)  [] Spice, Vegetable, Fruit (17 tests)  [] Pollen Panel = Tree, Grass, Weed (24 tests)  [] Others:     [] Patient's Own Products:   DO NOT test if chemical or biological identity is unknown!   always ask from patient the product information and safety sheets (MSDS)     Standardized intradermal tests (at same time as patch tests)  [x] Alternaria alternata  [x] Aspergillus fumigatus  [] Cladosporium herbarum   [x] Penicillium notatum  [x] Dermatophagoides farinae  [x] Dermatophagoides pteronyssinus  [] Dog Epithelium  [] Cat Epithelium  [] Others:     [] Bee venom   [] Wasp venom  !!Specific protocol with dilutions!!       Order for Drug allergy tests (prick & intradermal & patch tests)    [] Penicillin G     [] Ampicillin   [x] Cefazolin        [] Ceftriaxone     [] Ceftazidime     [] Cefepime     [] Cefuroxime  [] Bactrim  [] Iodixanol     [] Iopamidol         [] Iohexol  [x] Others: azithromycin  [] Patient's Own:   Order for 6/2/25 as test date      Atopy Screen (Placed Feb 11, 2025)  No Substance Readings (15 min) Evaluation   POS Histamine 1mg/ml ++    NEG NaCl 0.9% -      No Substance Readings (15 min) Evaluation   1 Alternaria alternata (tenuis)  -    2 Cladosporium herbarum -    3 Aspergillus fumigatus -    4 Penicillium notatum -    5 Dermatophagoides pteronyssinus -    6 Dermatophagoides farinae -    7 Dog epithelium (canis spp) -    8 Cat hair (flaco catus) +    9 Cockroach   (Blatella americana & germanica) -    10 Grass mix midwest   (Teresa, Orchard, Redtop, George) -    11 Jv grass (sorghum halepense) -    12 Weed mix   (common Cocklebur, Lamb s quarters, rough redroot Pigweed, Dock/Sorrel) -    13 Mug wort (artemisia vulgare) -    14 Ragweed giant/short (ambrosia spp) -    15 White birch (Betula papyrifera) -    16 Tree mix 1 (Pecan, Maple BHR, Oak RVW, american [place], black [place]) -    17 Red cedar (juniperus virginia) -    18 Tree mix 2   (white Arley, river/red Birch, black [place], common [place], american Elm) -    19 Box elder/Maple mix (acer spp) -    20 [place] shagbark (carya ovata) -    Conclusion: Cat reaction is slight and does not correlate with her clinical symptoms. Will plan for IDT at same time as patch and drug allergy testing.      Intradermal Testing (Placed Jun 2, 2025)  No Substance Conc.  Reading (15min)  immediate Papule [mm] / Erythema [mm] Reading   (... days)  delayed Papule [mm] / Erythema [mm] Remarks   DF Standard Dust Mite - D. Farinae 1:10 -   -    DP Standard Dust Mite - D. Pteronyssinus 1:10 -   -    A Aspergillus fumigatus  1:10 -   -    P Penicillium notatum 1:10 -   -     Alternaria alternata  1:10 -   -    Conclusions: No signs for immediate-type reaction. Patient will provide feedback if delayed-type reaction is experienced. .    DRUG ALLERGY TEST SERIES Jun 2, 2025    No Substance Readings (15min)  Evaluation   POS Histamine 1mg/ml +/++    NEG NaCl 0.9% -         Prick Tests         Substance/ Allergen Conc Result (20 min) Remarks    Cefazolin[1] 100 mg/ml -     Azithromycin 100 mg/ml -       Intradermal Tests   immed immed delay delay      Substance Conc 1st dil  2nd dil  2 days  3 days remarks    Cefazolin[1] 1:5 -        Azithromycin  1:1000 -           Patch Tests  as is as is 1:2 1:2     As Is  Vas Substance Conc 2 days 3 days 2 days 3 days remarks   146 147 Cefazolin[1] 100 mg/ml        148 149 Azithromycin  100 mg/ml          ____________________________________________  RESULTS & EVALUATION of PATCH TESTS    Jun 2, 2025 application of patch tests:    Patch test readings after     [x] 2 days, [x] 3 days [] 4 days, [] 5 days,  Other duration: ...      STANDARD Series                                          # Substance 2 days 3 days remarks     1 Kash Mix III 10% - -       2 Colophony +/++ -       3  2-Mercaptobenzothiazole  - -       4 Methylisothiazolinone - -       5 Carba Mix - -       6 Thiuram Mix [A] - -       7 Bisphenol A Epoxy Resin - -       8 P-Wzlj-Vvkfhjlcjlw-Formaldehyde Resin - -       9 Mercapto Mix [A] - -       10 Black Rubber Mix- PPD [B] - -       11 Potassium Dichromate  -  -       12 Balsam of [place] (Myroxylon Pereirae Resin) - -       13 Nickel Sulphate Hexahydrate - -       14 Mixed Dialkyl Thiourea - -       15 Paraben Mix [B] - -       16 Methyldibromo Glutaronitrile - -       17 Fragrance Mix 8% - -       18 2-Bromo-2-Nitropropane-1,3-Diol (Bronopol) CT - -       19 Lyral - -       20 Tixocortol-21- Pivalate CT - -       21 Diazolidinyl urea (Germall II) - -        22 Methyl Methacrylate - -       23 Cobalt (II) Chloride Hexahydrate - -       24 Fragrance Mix II  - -       25 Compositae Mix II - -       26 Benzoyl Peroxide - -       27 Bacitracin - -       28 Formaldehyde - -       29 Methylchloroisothiazolinone / Methylisothiazolinone - -       30 Corticosteroid Mix CT - -        31 Sodium Lauryl Sulfate - -       32 Lanolin Alcohol - -       33 Turpentine - -       34 Cetylstearylalcohol - -       35 Chlorhexidine Dicluconate - -       36 Budesonide - -       37 Imidazolidinyl Urea  - -       38 Ethyl-2 Cyanoacrylate - -       39 Quaternium 15 (Dowicil 200) - -       40 Decyl Glucoside - -     Compositae Mix II - common yarrow, mountain arnica, [place] Islander chamomile, feverfew, and the common tansy   PRESERVATIVES & ANTIMICROBIALS        # Substance 2 days 3 days remarks   41 1 1,2-Benzisothiazoline-3-One, Sodium Salt - -     2 1,3,5-Paxton (2-Hydroxyethyl) - Hexahydrotriazine (Grotan BK) - -     3 Dichlorophene - -     4 3, 4, 4' - Triclocarban - -    45 5 4 - Chloro - 3 - Cresol - -     6 4 - Chloro - 4 - Xylenol (PCMX) - -     7 7-Ethylbicyclooxazolidine (Bioban [id number]) - -     8 Benzalkonium Chloride CT - -     9 Benzyl Alcohol - -    50 10 Cetalkonium Chloride - -     11 Cetylpyrimidine Chloride  - -     12 Chloroacetamide - -     13 DMDM Hydantoin - -     14 Glutaraldehyde - -    55 15 Triclosan - -     16 Glyoxal Trimeric Dihydrate - -     17 Iodopropynyl Butylcarbamate - -     18 Octylisothiazoline - -     19 Acetophenone Azine - -    60 20 Bioban P 1487 (Nitrobutyl) Morpholine/(Ethylnitro-Trimethylene) Dimorpholine - -     21 Phenoxyethanol - -     22 Phenyl Salicylate - -     23 Povidone Iodine - -     24 Sodium Benzoate - -    65 25 Sodium Disulfite - -     26 Sorbic Acid - -     27 Thimerosal - -     28 Melamine Formaldehyde Resin - -     29 Ethylenediamine Dihydrochloride - -      Parabens      70 30 Butyl-P-Hydroxybenzoate - -     31 Ethyl-P-Hydroxybenzoate - -     32 Methyl-P-Hydroxybenzoate - -    73 33 Propyl-P-Hydroxybenzoate - -     EMULSIFIERS & ADDITIVES       # Substance 2 days 3 days remarks   74 1 Polyethylene Glycol-400 - -    75 2 Cocamidopropyl Betaine - -     3 Amerchol L101 - -     4 Propylene Glycol - -     5 Triethanolamine - -     6 Sorbitane Sesquiolate CT - -     80 7 Isopropylmyristate - -     8 Polysorbate 80 CT - -     9 Amidoamine   (Stearamidopropyl Dimethylamine) - -     10 Oleamidopropyl Dimethylamine - -     11 Lauryl Glucoside - -    85 12 Coconut Diethanolamide  - -     13 2-Hydroxy-4-Methoxy Benzophenone (Oxybenzone) - -     14 Benzophenone-4 (Sulisobenzon) - -     15 Propolis (+) -     16 Dexpanthenol - -    90 17 Abitol (Hydroabietyl Alcohol) - -     18 Tert-Butylhydroquinone - -     19 Benzyl Salicylate - -     20 Dimethylaminopropylamin (DMPA) - -     21 Zinc Pyrithione (Zinc Omadine)  - -    95 22 Paxton(Hydroxymethyl) Nitromethane  - -      Antioxidant       23 Dodecyl Gallate - -     24 Butylhydroxyanisole (BHA) - -     25 Butylhydroxytoluene (BHT) - -     26 Di-Alpha-Tocopherol (Vit E) - -    100 27 Propyl Gallate - -     PERFUMES, FLAVORS & PLANTS        # Substance 2 days 3 days remarks   101 1 Benzyl Cinnamate - -     2 Di-Limonene (Dipentene) - -     3 Cananga Odorata (Franklyn Estes) (I) - -     4 Lichen Acid Mix - -    105 5 Mentha Piperita Oil (Peppermint Oil) - -     6 Sesquiterpenelactone mix - -     7 Tea Tree Oil, Oxidized - -     8 Wood Tar Mix (+) -     9 Abietic Acid + -    110 10 Lavendula Angustifolia Oil (Lavender Oil) - -     11 Fragrance mix II CT * 14% - -      Fragrance Mix I       12 [place]moss Absolute - -     13 Eugenol - -     14 Geraniol - -    115 15 Hydroxycitronellal - -     16 Isoeugenol - -     17 Cinnamic Aldehyde - -     18 Cinnamic Alcohol  - -      Fragrance mix II       19 Citronellol - -    120 20 Alpha-Hexylcinnamic Aldehyde    - -     21 Citral - -     22 Farnesol - -    123 23 Coumarin - -    Hexylcinnamic aldehyde, Coumarin, Farnesol, Hydroxyisohexy3-cyclohexene carboxaldehyde, citral, citrolellol  RUBBER CHEMICALS        # Substance 2 days 3 days remarks     Carbamate      124 1 Zinc Bis ( Diethyldithio carbamate ) (ZDEC) - -    125 2 Zinc Bis (Dibutyldithiocarbamate) - -     3 1,3-Diphenylguanidine (DPG) - -       Thiourea       4 Dibutylthiourea - -     5 Diphenyltiourea - -     6 Thiourea - -      Mercapto chemicals      130 7 Morpholinyl Mercaptobenzothiazole - -     8 I-Yvdqmegzxb-7-Benzothiazyl-Sulfenamide - -     9 Dibenzothiazyl Disulfide - -     10 Dodecyl Mercaptan  - -      Thiuram chemicals       11 Dipentamethylenethiuram Disulfide - -    135 12 Tetraethylthiuram Disulfide (Disulfiram) - -     13 Tetramethylthiuram Disulfide - -     14 Tetramethyl Thiuram Monosulfide (TMTM) - -      4-Phenylenediamine derivatives       15 N-Isopropyl-N'-Phenyl-P-Phenylenediamine (IPPD) - -     16 Oeruvnyb-D-Bxbssdqeqyxvssyz (DPPD) - -      Various Rubber Additives      140 17 Hydroquinone Monobenzylether  - -     18 Hexamethylenetetramine - -     19 4,4'-Dihydroxybiphenyl - -     20 Cyclohexylthiophtalimide - -    144 21 N-Phenyl-B-Naphthylamine - -    145 22 White flour         Results of patch tests:                         Interpretation:  - Negative                    A    = Allergic      (+) Erythema    TI   = Toxic/irritant   + E + Infiltration    RaP = Relevance at Present     ++ E/I + Papulovesicle   Rpr  = Relevance Previously     +++ E/I/P + Blister     nR   = No Relevance     [] No relevant allergic reaction observed    [x] Allergic reaction diagnosed against following allergens:    Wood resins/adhesives: +/++ Colophony, + Abietic acid, (+) wood tar mix  (+) Propolis      Interpretation/ remarks:   See later    [] Patient information given   [] ACDS CAMP information's (# ....) to following compounds: .....   [] General information's to following compounds: ......    ____________________________________________    Assessment & Plan:    ==> Final Diagnosis:     # Recurrent dyshidrotic hand dermatitis  DDx: Atopic dermatitis and/or  DDx: Allergic contact dermatitis to rubber chemicals, ingredients in soaps, and/or emulsifiers  * chronic illness with exacerbation, progression, side effects from treatment    # Suspicion for  atopic predisposition with:   PND and RC during change of seasons  Dry, hypersensitive skin with dyshidrosis (atopic dermatitis?)  * chronic illness with exacerbation, progression, side effects from treatment    # Possible phototoxic/photoallergic reaction to thalidomide and pomalidomide  Previously assessed 1/28/19  At the time, was to intensify sun protection (clothes, hat, etc.) and use physical sunscreens  For flare ups, was to use triamcinolone or clobetasol (DesOwen) cream    These conclusions are made at the best of one's knowledge and belief based on the provided evidence such as patient's history and allergy test results and they can change over time or can be incomplete because of missing information.    ==> Treatment Plan:    >> For IDT  test sites from today, patient will monitor sites for the next 2 and 4 days. If there are any delayed reactions, patient will take photos with the ruler provided and send to clinic via [facility] message for review. Be sure to specify RIGHT or LEFT arm.  - Do not apply topical steroids to sites until after 4 total days.    >> Will update below treatment plan on Thursday after 2nd readings and final conclusions:  >> Until patch tests can be performed, continue with treatment plan set with Dr. Mcknight.      Procedures Performed: none    Staff Involved: Provider and Staff    Scribe Disclosure:   I, Yadiel Simon, am serving as a scribe to document services personally performed by Sunil Rosario MD based on data collection and the provider's statements to me.     Staff Physician Comments:  I was present with the scribe who participated in the documentation of the note. I have verified the history and personally performed the physical exam and medical decision making. I agree with the assessment and plan as documented in the note. I have reviewed and if necessary amended the note.      Sunil Rosario MD  Professor  Head of Dermato-Allergy Division  Department of  Dermatology  [facility]     Follow-up in Derm-Allergy clinic for 2nd readings and final conclusions after 3 days   ___________________________    I spent a total of 15 minutes with Shena Hartmann during today s  visit. This time was spent discussing all the individual test results, correlating them to the clinical relevance, counseling the patient and/or coordinating care

## 2025-06-04 ENCOUNTER — LAB (OUTPATIENT)
Dept: LAB | Facility: CLINIC | Age: 75
End: 2025-06-04
Payer: MEDICARE

## 2025-06-04 ENCOUNTER — OFFICE VISIT (OUTPATIENT)
Dept: ALLERGY | Facility: CLINIC | Age: 75
End: 2025-06-04
Payer: MEDICARE

## 2025-06-04 DIAGNOSIS — Z88.9 MULTIPLE DRUG ALLERGIES: Primary | ICD-10-CM

## 2025-06-04 DIAGNOSIS — Z88.9 ATOPY: ICD-10-CM

## 2025-06-04 DIAGNOSIS — L20.89 OTHER ATOPIC DERMATITIS: ICD-10-CM

## 2025-06-04 DIAGNOSIS — L30.1 DYSHIDROTIC HAND DERMATITIS: ICD-10-CM

## 2025-06-04 DIAGNOSIS — L23.5 ALLERGIC DERMATITIS DUE TO OTHER CHEMICAL PRODUCT: ICD-10-CM

## 2025-06-04 DIAGNOSIS — C90.01 MULTIPLE MYELOMA IN REMISSION (H): ICD-10-CM

## 2025-06-04 PROCEDURE — 99207 PR NO CHARGE LOS: CPT | Performed by: DERMATOLOGY

## 2025-06-04 NOTE — NURSING NOTE
Chief Complaint   Patient presents with    Allergy Testing Followup     Patch day 3     Mamadou Waldron RN    
Alert and oriented to person, place and time

## 2025-06-04 NOTE — LETTER
6/4/2025      Shena Hartmann  [address] [place] [address]      Dear Colleague,    Thank you for referring your patient, Shena Hartmann, to the [facility] [place]. Please see a copy of my visit note below.    [facility] Dermato-allergology Note  Office visit  Encounter Date: Jun 4, 2025  ____________________________________________    CC: Allergy Testing Followup (Patch day 3)    HPI:  (Jun 4, 2025)  Ms. Shena Hartmann is a(n) 74 year old female who presents today as a return patient for allergy tests as planned  - Follow-up in Derm-Allergy clinic for 1st readings of patch tests after 2 days   - Otherwise feeling well in usual state of health    Physical Exam:  General: In no acute distress, well-developed, well-nourished  Eyes: no conjunctivitis  ENT: no signs of rhinitis   Pulmonary: no wheezing or coughing  Skin: Focused examination of the skin on test sites was performed = see test results below  No active eczematous skin lesions on tests sites, particularly back    Earlier History and Allergy Exams:  (Jun 2, 2025)  Presents today as a return patient for allergy tests as planned  - Follow-up in Derm-Allergy clinic for allergy tests as planned   - Skin rashes with oat and wheat, GI symptoms with dairy  - Can have dairy, oat, wheat in [place] without issue     (Feb 11, 2025)  Presents today as a return patient for allergy consultation  - Referred back to this clinic by Dr. Mcknight on 8/29/23 for chronic hand dermatitis  - Since I last saw her on 1/28/19, she has been following with general dermatology for regular skin checks, and has been seeing Dr. Mcknight for hand dermatitis since 7/7/20  - Patient last saw Dr. Mcknight on 8/28/24:  # Dermatitis- both contact and drug induced- not currently active but when flares, patient finds the clobetasol and triamcinolone helpful.  Had to reschedule patient testing due to family and health issues.  Planned for  Spring 2025  - refilled topical sterioids.  Patient is undergoing CAR- T cell therapy at present.  Will review with Onc before starting creams if needed due to red flag warning with steroids and therapy  - Only derm path in Epic is from 1/28/19, which I performed: lesion confirmed to be a compound melanocytic nevus with mild atypia of left upper ventrolateral lag  - Of note, patient is a H/O multiple myeloma s/p stem cell transplant and current on CAR T-cell therapy  - Last follow up in oncology with Dr. Chavez on 1/28/25:  Relapsed Multiple Myeloma  CAR-T therapy 7/29/24 with [facility].  Restaging at day +180 shows CR MRD-  Xgeva every 6 months     Macrocytic anemia- check B12 with next lab draw. Normocellular marrow  Neutropenia- penicillin V ppx when ANC <1.0. Suspect ongoing CAR-T activity in the marrow is responsible for neutropenia given minimal residual disease at day 100 now MRD-.  IgG- Continue to check monthly and replace as needed     HTN  Continue amlodipine 5 mg daily.  Followed by cardiology      Hypothyroidism  Remains on levothyroxine     ID Prophylaxis   Decrease Acyclovir to 400 mg BID for HSV prophylaxis  Pentamidine monthly for 1 year following CAR-T - may consider changing to Bactrim once platelets recover  penicillin V when ANC <1.0     Left Hand Pain  Saw ortho who prescribed a brace and stretching for lateral epicondylitis and carpal tunnel syndrome.      (1/28/19)  Patient had since early childhood problems with eczemas, but with Thalidomide and later Pomalidomide (since 4.5years) recurrent dermatitis with sometimes blisters and papules on face, neck, back of hands and forearms. Patient was 2 weeks ago in [place] and the rash seemed to be worse.  In addition some reaction with sweating and heat on the mastectomy site below the breast prosthetic (silicone)        SKIN: Full skin, which includes the head/face, both arms, chest, back, abdomen,both legs, genitalia and/or groin buttocks, digits  and/or nails, was examined.  Many seborrhoic keratoses on trunk and less on extremities. One bigger one on right upper leg ventral side.  On the left upper leg ventrolateral a 5mm diameter, pigmented lesion, not symmetric and different colors. According to patient there since birth, but not sure if recently changes of color    Past Medical History:   Patient Active Problem List   Diagnosis     Pain in thoracic spine     Multiple myeloma, dx 2002,  s/p ASCBMT 3/26/14     Personal history of malignant neoplasm of breast     Seasonal allergies     Osteoporosis     Primary hypertension     Stem cells transplant status (H)     Adverse effect of other drugs, medicaments and biological substances, sequela     Acquired hypothyroidism     Other amyloidosis (H)     Examination of participant in clinical trial     Multiple myeloma not having achieved remission (H)     Thrombocytopenia     Hypogammaglobulinemia     Other pancytopenia (H)     Personal history of other drug therapy     Pain of right hand     Past Medical History:   Diagnosis Date     Acquired hypothyroidism 8/10/2023     Breast cancer (H) 01/01/1994    right     H/O stem cell transplant (H) 03/01/2014     History of blood transfusion 2013 & 2014    During stem cell tx process     Hypertension Sept. 2021    Runs 140 s systolically, about 20 above my usual     Multiple myeloma, without mention of having achieved remission 11/06/2012     Allergies:  Allergies   Allergen Reactions     Blood Transfusion Related (Informational Only) Other (See Comments)     Patient has a history of a clinically significant antibody against RBC antigens.  A delay in compatible RBCs may occur.      Cayenne Pepper [Cayenne]      Corn-Containing Products GI Disturbance     Levaquin Other (See Comments)     Tendon pain     Milk Protein GI Disturbance     Mold      Facial rash/lip swelling     Morphine Sulfate Other (See Comments)     Per pt - see things (hallucination) when she was on  Morphine .     Pollen Extract      Environmental allergies      Shrimp Nausea and Vomiting     Tomato      Lip swelling, facial rash     Azithromycin Rash     Keflex [Cephalexin] Rash     Oat Rash     Tape [Adhesive Tape] Itching and Rash     All adhesives     Wheat Rash     Swelling of lips     Medications:  Current Outpatient Medications   Medication Sig Dispense Refill     acetaminophen (TYLENOL) 325 MG tablet Take 325-650 mg by mouth every 6 hours as needed for mild pain       acyclovir (ZOVIRAX) 400 MG tablet Take 1 tablet (400 mg) by mouth every 12 hours. 60 tablet 11     amLODIPine (NORVASC) 2.5 MG tablet Take 2 tablets (5 mg) by mouth daily Take 5 mg (2 tablets) in the morning. 180 tablet 3     CALCIUM-VITAMIN D-VITAMIN K PO Take 2 tablets by mouth daily.       clobetasol (TEMOVATE) 0.05 % external ointment Apply topically 2 times daily as needed (itching and rash). Topically to rash on hands until resolved 45 g 0     estradiol (ESTRACE) 0.1 MG/GM vaginal cream Place 1 g vaginally three times a week. Ok to use your fingers or the applicator 42.5 g 3     levothyroxine (SYNTHROID/LEVOTHROID) 50 MCG tablet Take 1 tablet (50 mcg) by mouth daily. 90 tablet 3     MAGNESIUM PO Take 235 mg by mouth 2 times daily.       Multiple Vitamins-Minerals (MULTIVITAMIN OR) Take 1 tablet by mouth 2 times daily.       order for DME 6 mastectomy bras  Length of need: 99 months 6 Device 1     order for DME R mastectomy prosthesis   Length of need:  99 months 1 Device 1     penicillin V (VEETID) 250 MG tablet Take 1 tablet (250 mg) by mouth daily. (Patient not taking: Reported on 2025) 60 tablet 2     triamcinolone (KENALOG) 0.1 % external cream Apply topically 2 times daily as needed for irritation. 15 g 11     Family History:  Family History   Problem Relation Age of Onset     Hypertension Mother      Cerebrovascular Disease Mother          8-11-15 from strokes     Hypertension Father      Prostate Cancer Father       "Skin Cancer Paternal Grandmother      Cancer Paternal Grandmother         skin cancer   No allergies.    Social History:  The patient is a retired bedside nurse.  Patient has the following hobbies or non-occupational exposure: none currently.  Only chemical exposure now is cleaning. In childhood, her dad was a contractor, and she was exposed to various chemicals, including benzene.    Order for Future Allergy Testing:    [x] Outpatient  [] Inpatient: Cash..../ Bed ...    Skin Atopy (atopic dermatitis)? [] Yes     [x] No  Comments:   Childhood eczema?   [x] Yes     [] No  Comments: as a child, was told she was allergic to soap, so she had to use a \"stinky brown\" soap  Hand eczema?   [x] Yes     [] No  If yes, leading hand? [x] Right     [] Left     [] Ambidextrous  Comments:   - hands are good today  - more between her fingers than on the top or palms  - very itchy blisters  - both hands affected, but usually it is whichever hand touches chemicals; denies any other area of involvement  - notices she will get a rash within a few hours of touching items in her house after   - has been having this for decades, including while nursing  - used to have issues with latex gloves until employer had switched to latex-free gloves, but she still uses latex-free gloves to clean; though, she thought irritated may really have been due to hand cleanser that was being used    Contact allergies?     Comments:   Including adhesives/bandages? [x] Yes     [] No  Comments: if they are on for a few hours, will get itchy  Including metals?   [x] Yes     [] No  Comments: reacted to cheap earrings, so at one point stopped wearing them for decades  Other substances?   [x] Yes     [] No  Comments:   - cleaning products and possibly latex as above  - Silicone entry in Epic: if she gets hot on her chest, gets rash around breast prosthesis  - does not dye her hair    Drug allergies?   [x] Yes     [] No  Comments:   - within a few doses of " azithromycin and Keflex, will get itchy rash on her back  - Levaquin caused tendonitis    Angioedema?   [x] Yes     [] No  Comments:   - lips swell when she is at her cabin around [place] (see environmental sx below)    Urticaria?   [x] Yes     [] No  Comments: gets rashes while she is working out in the garden    Food allergies?   [] Yes     [] No  Comments:   - Shrimp: nausea and vomiting for days since childhood  - Milk: GI upset so avoids; has noticed she can tolerate milk from [place] cows; does ok with cheese, but not too much cheese made from cow milk  - Corn: popcorn causes indigestion and bloating; no issues with corn in [place], which she attributes to them not using GMOs    Pet allergies?   [] Yes     [x] No  Comments: none at home and does not react to kids' pets    Environmental allergy symptoms?  [x] Conjunctivitis - red eyes depending on season  [] Otitis  [] Pharyngitis  [] Polyposis  [x] Postnasal Drip - slight  [x] Rhinitis - congestion  [] Sinusitis  [] None  Comments:   - most of her seasonal symptoms are respiratory  - when she was seeing a naturopath, he had her take air samples at her cabin (as she would start to not feel good with facial swelling); was told she is allergic to pine    HENT Operations?  [] Adenoids [] Septum [] Sinus  [] Tonsils        [] Other:   [x] None  Comments:     Pulmonary symptoms (from birth to present)?  [] Asthma bronchiale  Inhaler(s)?:   [] Coughing  [] Other  [x] None  Comments:     Environmental and pulmonary symptoms aggravated by?  Season: [] None     [] I     [] II     [x] III     [x] IV     [] V     [] VI          [] VII     [] VIII     [] IX     [x] X     [x] XI     [] XII     [] Perennial  Time of Day: [] None     [] Morning     [] Noon     [] Evening     [] Night     [] Whole Day  Location/Changes: [] None     [] Inside     [] Outside     [] Mountain     [] Sea     [] Other:   Triggers (specific): [] None     [] Animals     [] Dust     [] Mold     [] Plants      [] Other:   Triggers (other): [] None     [] Psyche     [] Sport     [] Work     [] Other:   Irritant: [] None     [] Cold     [] Heat     [] Odors     [] Physical Efforts     [] Smoke     [] Other:     Order for PATCH TESTS  Reason for tests (suspected allergy): recurrent dyshidrotic hand eczema  Known previous allergies: nickel?  Standardized panels  [x] Standard panel (40 tests)  [x] Preservatives & Antimicrobials (31 tests)  [x] Emulsifiers & Additives (25 tests)   [x] Perfumes/Flavours & Plants (25 tests)  [] Hairdresser panel (12 tests)  [x] Rubber Chemicals (22 tests)  [] Plastics (26 tests)  [] Colorants/Dyes/Food additives (20 tests)  [] Metals (implants/dental) (24 tests)  [] Local anaesthetics/NSAIDs (13 tests)  [] Antibiotics & Antimycotics (14 tests)   [] Corticosteroids (15 tests)   [] Photopatch test (62 tests)   [] Others:     [] Patient's Own Products:   DO NOT test if chemical or biological identity is unknown!   always ask from patient the product information and safety sheets (MSDS)       Order for PRICK TESTS    Reason for tests (suspected allergy): seasonal RC in early spring and late fall  Known previous allergies: none    Standardized prick panels  [] Atopic panel (20 tests)  [] Pediatric Panel (12 tests)  [] Milk, Meat, Eggs, Grains (20 tests)   [] Dust, Epithelia, Feathers (10 tests)  [] Fish, Seafood, Shellfish (17 tests)  [] Nuts, Beans (14 tests)  [] Spice, Vegetable, Fruit (17 tests)  [] Pollen Panel = Tree, Grass, Weed (24 tests)  [] Others:     [] Patient's Own Products:   DO NOT test if chemical or biological identity is unknown!   always ask from patient the product information and safety sheets (MSDS)     Standardized intradermal tests (at same time as patch tests)  [x] Alternaria alternata  [x] Aspergillus fumigatus  [] Cladosporium herbarum   [x] Penicillium notatum  [x] Dermatophagoides farinae  [x] Dermatophagoides pteronyssinus  [] Dog Epithelium  [] Cat Epithelium  []  Others:     [] Bee venom   [] Wasp venom  !!Specific protocol with dilutions!!       Order for Drug allergy tests (prick & intradermal & patch tests)    [] Penicillin G     [] Ampicillin   [x] Cefazolin        [] Ceftriaxone     [] Ceftazidime     [] Cefepime     [] Cefuroxime  [] Bactrim  [] Iodixanol     [] Iopamidol        [] Iohexol  [x] Others: azithromycin  [] Patient's Own:   Order for 6/2/25 as test date      Atopy Screen (Placed Feb 11, 2025)  No Substance Readings (15 min) Evaluation   POS Histamine 1mg/ml ++    NEG NaCl 0.9% -      No Substance Readings (15 min) Evaluation   1 Alternaria alternata (tenuis)  -    2 Cladosporium herbarum -    3 Aspergillus fumigatus -    4 Penicillium notatum -    5 Dermatophagoides pteronyssinus -    6 Dermatophagoides farinae -    7 Dog epithelium (canis spp) -    8 Cat hair (flaco catus) +    9 Cockroach   (Blatella americana & germanica) -    10 Grass mix midwest   (Teresa, Orchard, Redtop, George) -    11 Jv grass (sorghum halepense) -    12 Weed mix   (common Cocklebur, Lamb s quarters, rough redroot Pigweed, Dock/Sorrel) -    13 Mug wort (artemisia vulgare) -    14 Ragweed giant/short (ambrosia spp) -    15 White birch (Betula papyrifera) -    16 Tree mix 1 (Pecan, Maple BHR, Oak RVW, american [place], black [place]) -    17 Red cedar (juniperus virginia) -    18 Tree mix 2   (white Arley, river/red Birch, black Rothschild, common Lake, american Elm) -    19 Box elder/Maple mix (acer spp) -    20 [place] shagbark (carya ovata) -    Conclusion: Cat reaction is slight and does not correlate with her clinical symptoms. Will plan for IDT at same time as patch and drug allergy testing.      Intradermal Testing (Placed Jun 2, 2025)  No Substance Conc.  Reading (15min)  immediate Papule [mm] / Erythema [mm] Reading   (... days)  delayed Papule [mm] / Erythema [mm] Remarks   DF Standard Dust Mite - D. Farinae 1:10 -   -    DP Standard Dust Mite - D. Pteronyssinus 1:10 -    -    A Aspergillus fumigatus  1:10 -   -    P Penicillium notatum 1:10 -   -     Alternaria alternata  1:10 -   -    Conclusions: No signs for immediate-type reaction. Patient will provide feedback if delayed-type reaction is experienced. .    DRUG ALLERGY TEST SERIES Jun 2, 2025    No Substance Readings (15min) Evaluation   POS Histamine 1mg/ml +/++    NEG NaCl 0.9% -         Prick Tests         Substance/ Allergen Conc Result (20 min) Remarks    Cefazolin[1] 100 mg/ml -     Azithromycin 100 mg/ml -       Intradermal Tests   immed immed delay delay      Substance Conc 1st dil  2nd dil  2 days  3 days remarks    Cefazolin[1] 1:5 -        Azithromycin  1:1000 -           Patch Tests  as is as is 1:2 1:2     As Is  Vas Substance Conc 2 days 3 days 2 days 3 days remarks   146 147 Cefazolin[1] 100 mg/ml        148 149 Azithromycin  100 mg/ml          ____________________________________________  RESULTS & EVALUATION of PATCH TESTS    Jun 2, 2025 application of patch tests:    Patch test readings after     [x] 2 days, [x] 3 days [] 4 days, [] 5 days,  Other duration: ...      STANDARD Series                                          # Substance 2 days 3 days remarks     1 Kash Mix III 10% - -       2 Colophony +/++ -       3  2-Mercaptobenzothiazole  - -       4 Methylisothiazolinone - -       5 Carba Mix - -       6 Thiuram Mix [A] - -       7 Bisphenol A Epoxy Resin - -       8 J-Pyuk-Aztcjxdwndg-Formaldehyde Resin - -       9 Mercapto Mix [A] - -       10 Black Rubber Mix- PPD [B] - -       11 Potassium Dichromate  -  -       12 Balsam of [place] (Myroxylon Pereirae Resin) - -       13 Nickel Sulphate Hexahydrate - -       14 Mixed Dialkyl Thiourea - -       15 Paraben Mix [B] - -       16 Methyldibromo Glutaronitrile - -       17 Fragrance Mix 8% - -       18 2-Bromo-2-Nitropropane-1,3-Diol (Bronopol) CT - -       19 Lyral - -       20 Tixocortol-21- Pivalate CT - -       21 Diazolidinyl urea (Germall II) - -        22  Methyl Methacrylate - -       23 Cobalt (II) Chloride Hexahydrate - -       24 Fragrance Mix II  - -       25 Compositae Mix II - -       26 Benzoyl Peroxide - -       27 Bacitracin - -       28 Formaldehyde - -       29 Methylchloroisothiazolinone / Methylisothiazolinone - -       30 Corticosteroid Mix CT - -       31 Sodium Lauryl Sulfate - -       32 Lanolin Alcohol - -       33 Turpentine - -       34 Cetylstearylalcohol - -       35 Chlorhexidine Dicluconate - -       36 Budesonide - -       37 Imidazolidinyl Urea  - -       38 Ethyl-2 Cyanoacrylate - -       39 Quaternium 15 (Dowicil 200) - -       40 Decyl Glucoside - -     Compositae Mix II - common yarrow, mountain arnica, Canadian chamomile, feverfew, and the common tansy   PRESERVATIVES & ANTIMICROBIALS        # Substance 2 days 3 days remarks   41 1 1,2-Benzisothiazoline-3-One, Sodium Salt - -     2 1,3,5-Paxton (2-Hydroxyethyl) - Hexahydrotriazine (Grotan BK) - -     3 Dichlorophene - -     4 3, 4, 4' - Triclocarban - -    45 5 4 - Chloro - 3 - Cresol - -     6 4 - Chloro - 4 - Xylenol (PCMX) - -     7 7-Ethylbicyclooxazolidine (Bioban CI7068) - -     8 Benzalkonium Chloride CT - -     9 Benzyl Alcohol - -    50 10 Cetalkonium Chloride - -     11 Cetylpyrimidine Chloride  - -     12 Chloroacetamide - -     13 DMDM Hydantoin - -     14 Glutaraldehyde - -    55 15 Triclosan - -     16 Glyoxal Trimeric Dihydrate - -     17 Iodopropynyl Butylcarbamate - -     18 Octylisothiazoline - -     19 Acetophenone Azine - -    60 20 Bioban P 1487 (Nitrobutyl) Morpholine/(Ethylnitro-Trimethylene) Dimorpholine - -     21 Phenoxyethanol - -     22 Phenyl Salicylate - -     23 Povidone Iodine - -     24 Sodium Benzoate - -    65 25 Sodium Disulfite - -     26 Sorbic Acid - -     27 Thimerosal - -     28 Melamine Formaldehyde Resin - -     29 Ethylenediamine Dihydrochloride - -      Parabens      70 30 Butyl-P-Hydroxybenzoate - -     31 Ethyl-P-Hydroxybenzoate - -     32  Methyl-P-Hydroxybenzoate - -    73 33 Propyl-P-Hydroxybenzoate - -     EMULSIFIERS & ADDITIVES       # Substance 2 days 3 days remarks   74 1 Polyethylene Glycol-400 - -    75 2 Cocamidopropyl Betaine - -     3 Amerchol L101 - -     4 Propylene Glycol - -     5 Triethanolamine - -     6 Sorbitane Sesquiolate CT - -    80 7 Isopropylmyristate - -     8 Polysorbate 80 CT - -     9 Amidoamine   (Stearamidopropyl Dimethylamine) - -     10 Oleamidopropyl Dimethylamine - -     11 Lauryl Glucoside - -    85 12 Coconut Diethanolamide  - -     13 2-Hydroxy-4-Methoxy Benzophenone (Oxybenzone) - -     14 Benzophenone-4 (Sulisobenzon) - -     15 Propolis (+) -     16 Dexpanthenol - -    90 17 Abitol (Hydroabietyl Alcohol) - -     18 Tert-Butylhydroquinone - -     19 Benzyl Salicylate - -     20 Dimethylaminopropylamin (DMPA) - -     21 Zinc Pyrithione (Zinc Omadine)  - -    95 22 Paxton(Hydroxymethyl) Nitromethane  - -      Antioxidant       23 Dodecyl Gallate - -     24 Butylhydroxyanisole (BHA) - -     25 Butylhydroxytoluene (BHT) - -     26 Di-Alpha-Tocopherol (Vit E) - -    100 27 Propyl Gallate - -     PERFUMES, FLAVORS & PLANTS        # Substance 2 days 3 days remarks   101 1 Benzyl Cinnamate - -     2 Di-Limonene (Dipentene) - -     3 Cananga Odorata (Franklyn Estes) (I) - -     4 Lichen Acid Mix - -    105 5 Mentha Piperita Oil (Peppermint Oil) - -     6 Sesquiterpenelactone mix - -     7 Tea Tree Oil, Oxidized - -     8 Wood Tar Mix (+) -     9 Abietic Acid + -    110 10 Lavendula Angustifolia Oil (Lavender Oil) - -     11 Fragrance mix II CT * 14% - -      Fragrance Mix I       12 Oakmoss Absolute - -     13 Eugenol - -     14 Geraniol - -    115 15 Hydroxycitronellal - -     16 Isoeugenol - -     17 Cinnamic Aldehyde - -     18 Cinnamic Alcohol  - -      Fragrance mix II       19 Citronellol - -    120 20 Alpha-Hexylcinnamic Aldehyde    - -     21 Citral - -     22 Farnesol - -    123 23 Coumarin - -    Hexylcinnamic  aldehyde, Coumarin, Farnesol, Hydroxyisohexy3-cyclohexene carboxaldehyde, citral, citrolellol  RUBBER CHEMICALS        # Substance 2 days 3 days remarks     Carbamate      124 1 Zinc Bis ( Diethyldithio carbamate ) (ZDEC) - -    125 2 Zinc Bis (Dibutyldithiocarbamate) - -     3 1,3-Diphenylguanidine (DPG) - -      Thiourea       4 Dibutylthiourea - -     5 Diphenyltiourea - -     6 Thiourea - -      Mercapto chemicals      130 7 Morpholinyl Mercaptobenzothiazole - -     8 K-Ebrzypmypv-0-Benzothiazyl-Sulfenamide - -     9 Dibenzothiazyl Disulfide - -     10 Dodecyl Mercaptan  - -      Thiuram chemicals       11 Dipentamethylenethiuram Disulfide - -    135 12 Tetraethylthiuram Disulfide (Disulfiram) - -     13 Tetramethylthiuram Disulfide - -     14 Tetramethyl Thiuram Monosulfide (TMTM) - -      4-Phenylenediamine derivatives       15 N-Isopropyl-N'-Phenyl-P-Phenylenediamine (IPPD) - -     16 Iggdsyyk-M-Rkvalaratvcdnrph (DPPD) - -      Various Rubber Additives      140 17 Hydroquinone Monobenzylether  - -     18 Hexamethylenetetramine - -     19 4,4'-Dihydroxybiphenyl - -     20 Cyclohexylthiophtalimide - -    144 21 N-Phenyl-B-Naphthylamine - -    145 22 White flour         Results of patch tests:                         Interpretation:  - Negative                    A    = Allergic      (+) Erythema    TI   = Toxic/irritant   + E + Infiltration    RaP = Relevance at Present     ++ E/I + Papulovesicle   Rpr  = Relevance Previously     +++ E/I/P + Blister     nR   = No Relevance     [] No relevant allergic reaction observed    [x] Allergic reaction diagnosed against following allergens:    Wood resins/adhesives: +/++ Colophony, + Abietic acid, (+) wood tar mix  (+) Propolis      Interpretation/ remarks:   See later    [] Patient information given   [] ACDS CAMP information's (# ....) to following compounds: .....   [] General information's to following compounds:  ......    ____________________________________________    Assessment & Plan:    ==> Final Diagnosis:     # Recurrent dyshidrotic hand dermatitis  DDx: Atopic dermatitis and/or  DDx: Allergic contact dermatitis to rubber chemicals, ingredients in soaps, and/or emulsifiers  * chronic illness with exacerbation, progression, side effects from treatment    # Suspicion for atopic predisposition with:   PND and RC during change of seasons  Dry, hypersensitive skin with dyshidrosis (atopic dermatitis?)  * chronic illness with exacerbation, progression, side effects from treatment    # Possible phototoxic/photoallergic reaction to thalidomide and pomalidomide  Previously assessed 1/28/19  At the time, was to intensify sun protection (clothes, hat, etc.) and use physical sunscreens  For flare ups, was to use triamcinolone or clobetasol (DesOwen) cream    These conclusions are made at the best of one's knowledge and belief based on the provided evidence such as patient's history and allergy test results and they can change over time or can be incomplete because of missing information.    ==> Treatment Plan:    >> For IDT  test sites from today, patient will monitor sites for the next 2 and 4 days. If there are any delayed reactions, patient will take photos with the ruler provided and send to clinic via [employer] message for review. Be sure to specify RIGHT or LEFT arm.  - Do not apply topical steroids to sites until after 4 total days.    >> Will update below treatment plan on Thursday after 2nd readings and final conclusions:  >> Until patch tests can be performed, continue with treatment plan set with Dr. Mcknight.      Procedures Performed: none    Staff Involved: Provider and Staff    Scribe Disclosure:   I, Yadiel Simon, am serving as a scribe to document services personally performed by Sunil Rosario MD based on data collection and the provider's statements to me.     Staff Physician Comments:  I was present with  the scribe who participated in the documentation of the note. I have verified the history and personally performed the physical exam and medical decision making. I agree with the assessment and plan as documented in the note. I have reviewed and if necessary amended the note.      Sunil Rosario MD  Professor  Head of Dermato-Allergy Division  Department of Dermatology  [facility]     Follow-up in Derm-Allergy clinic for 2nd readings and final conclusions after 3 days   ___________________________    I spent a total of 15 minutes with Shena Hartmann during today s  visit. This time was spent discussing all the individual test results, correlating them to the clinical relevance, counseling the patient and/or coordinating care     Again, thank you for allowing me to participate in the care of your patient.        Sincerely,        Sunil Rosario MD    Electronically signed

## 2025-06-12 ENCOUNTER — VIRTUAL VISIT (OUTPATIENT)
Dept: NEUROLOGY | Facility: CLINIC | Age: 75
End: 2025-06-12
Payer: MEDICARE

## 2025-06-12 VITALS — HEIGHT: 62 IN | BODY MASS INDEX: 21.71 KG/M2 | WEIGHT: 118 LBS

## 2025-06-12 DIAGNOSIS — G56.03 BILATERAL CARPAL TUNNEL SYNDROME: Primary | ICD-10-CM

## 2025-06-12 ASSESSMENT — PAIN SCALES - GENERAL: PAINLEVEL_OUTOF10: MODERATE PAIN (5)

## 2025-06-12 NOTE — NURSING NOTE
Current patient location: [address] [place] [address]    Is the patient currently in the state of [place]? YES    Visit mode: VIDEO    If the visit is dropped, the patient can be reconnected by:VIDEO VISIT: Text to cell phone:   Telephone Information:   Mobile [phone]       Will anyone else be joining the visit? NO  (If patient encounters technical issues they should call [phone]    Are changes needed to the allergy or medication list? Pt stated no changes to allergies and Pt stated no med changes    Are refills needed on medications prescribed by this physician? NO    Rooming Documentation:  Not applicable    Reason for visit: FALGUNI Diaz F

## 2025-06-12 NOTE — PROGRESS NOTES
[facility] Neurology Follow Up Visit    Shena Hartmann MRN# [id number]   Age: 74 year old YOB: 1950            Assessment and Plan:   Assessment:  B/l Carpal Tunnel Syndrome     The patient has improvements towards wrist and arm symptoms likely related to CTS b/l (R>L). I am still unsure as to what her tightness in the arms and legs is primarily related to and will follow up with her in a few months to better understand if this would require imaging.  Regarding her CTS, an orthopedics referral could be done to see if steroid injection may help her recovery over any surgical intervention.    Plan:  Orthopedics referral   - consideration of steroid injection versus carpal     Follow up in Neurology clinic in 6-9 months, or earlier as needed should new concerns arise.    CHELSIE Hillman D.O.   of Neurology    Total time today (33 min) in this patient encounter was spent on pre-charting, counseling and/or coordination of care.      Prior History and Update:   The patient was initially seen in neurologic consultation on 3/12/2025 for evaluation of left hand pain. Please see the comprehensive neurologic consultation notes from those dates in the Epic records for details.     The patient had pain/numbness in her 5th digit spreading to 3rd to 4th digits over time (since 2024) in the setting of a complicated history of multiple myeloma s/p CAR-T therapy in 2024).  Exam wasn't suggestive for any weakness or sensory loss, but clinically an ulnar neuropathy was suspected.  EMG was to be done.    Interval history:   - EMG on 5/1/2025 showed b/l CTS, but no evidence of ulnar injury or the presence of cervical radiculopathy.    Today, the patient wore braces on wrist and elbows b/l for a few months.  She had a specific hand brace for her made by a hand physical therapist given her dupuytren contracture.  She feels PT, and brace therapy are helping relieve some weakness and sensory issues she has in  "her arms and hands.  She still feels weak in her hands (R>L).  Opening jars are still difficult.  She still gets nerve \"zingers\" in her arms and hands.  If she works with her hands often, she can get aches in her hands and wrists.      Physical Exam:   General: Seated comfortably in no acute distress.  Neurologic:     Mental Status: Fully alert, attentive and oriented. Speech clear and fluent, no paraphasic errors.     Cranial Nerves: EOMI with normal smooth pursuit. Facial movements symmetric. Hearing not formally tested but intact to conversation.  No dysarthria.     Motor: No tremors or other abnormal movements observed.     "

## 2025-06-12 NOTE — PROGRESS NOTES
Virtual Visit Details    Type of service:  Video Visit   Video Start Time: 1029  Video End Time:10:50 AM    Originating Location (pt. Location): Home    Distant Location (provider location):  On-site  Platform used for Video Visit: Lovely

## 2025-06-12 NOTE — LETTER
6/12/2025       RE: Shena Hartmann  [address] [place] [address]     Dear Colleague,    Thank you for referring your patient, Shena Hartmann, to the [facility] [place] [facility]. Please see a copy of my visit note below.    [facility] Neurology Follow Up Visit    Shena Hartmann MRN# [id number]   Age: 74 year old YOB: 1950            Assessment and Plan:   Assessment:  B/l Carpal Tunnel Syndrome     The patient has improvements towards wrist and arm symptoms likely related to CTS b/l (R>L). I am still unsure as to what her tightness in the arms and legs is primarily related to and will follow up with her in a few months to better understand if this would require imaging.  Regarding her CTS, an orthopedics referral could be done to see if steroid injection may help her recovery over any surgical intervention.    Plan:  Orthopedics referral   - consideration of steroid injection versus carpal     Follow up in Neurology clinic in 6-9 months, or earlier as needed should new concerns arise.    CHELSIE Hillman D.O.   of Neurology    Total time today (33 min) in this patient encounter was spent on pre-charting, counseling and/or coordination of care.      Prior History and Update:   The patient was initially seen in neurologic consultation on 3/12/2025 for evaluation of left hand pain. Please see the comprehensive neurologic consultation notes from those dates in the Epic records for details.     The patient had pain/numbness in her 5th digit spreading to 3rd to 4th digits over time (since 2024) in the setting of a complicated history of multiple myeloma s/p CAR-T therapy in 2024).  Exam wasn't suggestive for any weakness or sensory loss, but clinically an ulnar neuropathy was suspected.  EMG was to be done.    Interval history:   - EMG on 5/1/2025 showed b/l CTS, but no evidence of ulnar injury or  "the presence of cervical radiculopathy.    Today, the patient wore braces on wrist and elbows b/l for a few months.  She had a specific hand brace for her made by a hand physical therapist given her dupuytren contracture.  She feels PT, and brace therapy are helping relieve some weakness and sensory issues she has in her arms and hands.  She still feels weak in her hands (R>L).  Opening jars are still difficult.  She still gets nerve \"zingers\" in her arms and hands.  If she works with her hands often, she can get aches in her hands and wrists.      Physical Exam:   General: Seated comfortably in no acute distress.  Neurologic:     Mental Status: Fully alert, attentive and oriented. Speech clear and fluent, no paraphasic errors.     Cranial Nerves: EOMI with normal smooth pursuit. Facial movements symmetric. Hearing not formally tested but intact to conversation.  No dysarthria.     Motor: No tremors or other abnormal movements observed.       Virtual Visit Details    Type of service:  Video Visit   Video Start Time: 1029  Video End Time:10:50 AM    Originating Location (pt. Location): Home    Distant Location (provider location):  On-site  Platform used for Video Visit: Tiffany      Again, thank you for allowing me to participate in the care of your patient.      Sincerely,    Jhon Hillman, DO    "

## 2025-06-16 ENCOUNTER — OFFICE VISIT (OUTPATIENT)
Dept: ORTHOPEDICS | Facility: CLINIC | Age: 75
End: 2025-06-16
Attending: PSYCHIATRY & NEUROLOGY
Payer: MEDICARE

## 2025-06-16 VITALS — HEIGHT: 61 IN | BODY MASS INDEX: 23.03 KG/M2 | WEIGHT: 122 LBS

## 2025-06-16 DIAGNOSIS — G56.03 BILATERAL CARPAL TUNNEL SYNDROME: ICD-10-CM

## 2025-06-16 DIAGNOSIS — M65.30 TRIGGER FINGER, ACQUIRED: Primary | ICD-10-CM

## 2025-06-16 DIAGNOSIS — M79.631 PAIN IN BOTH FOREARMS: ICD-10-CM

## 2025-06-16 DIAGNOSIS — M79.632 PAIN IN BOTH FOREARMS: ICD-10-CM

## 2025-06-16 PROCEDURE — 20526 THER INJECTION CARP TUNNEL: CPT | Mod: 50 | Performed by: PHYSICIAN ASSISTANT

## 2025-06-16 PROCEDURE — 99203 OFFICE O/P NEW LOW 30 MIN: CPT | Mod: 25 | Performed by: PHYSICIAN ASSISTANT

## 2025-06-16 PROCEDURE — 20550 NJX 1 TENDON SHEATH/LIGAMENT: CPT | Mod: F8 | Performed by: PHYSICIAN ASSISTANT

## 2025-06-16 RX ADMIN — LIDOCAINE HYDROCHLORIDE 2 ML: 10 INJECTION, SOLUTION EPIDURAL; INFILTRATION; INTRACAUDAL; PERINEURAL at 09:05

## 2025-06-16 RX ADMIN — LIDOCAINE HYDROCHLORIDE 1 ML: 10 INJECTION, SOLUTION EPIDURAL; INFILTRATION; INTRACAUDAL; PERINEURAL at 09:06

## 2025-06-16 RX ADMIN — DEXAMETHASONE SODIUM PHOSPHATE 4 MG: 4 INJECTION, SOLUTION INTRA-ARTICULAR; INTRALESIONAL; INTRAMUSCULAR; INTRAVENOUS; SOFT TISSUE at 09:05

## 2025-06-16 RX ADMIN — DEXAMETHASONE SODIUM PHOSPHATE 4 MG: 4 INJECTION, SOLUTION INTRA-ARTICULAR; INTRALESIONAL; INTRAMUSCULAR; INTRAVENOUS; SOFT TISSUE at 09:06

## 2025-06-16 NOTE — PROGRESS NOTES
Hand / Upper Extremity Injection/Arthrocentesis: bilateral carpal tunnel    Date/Time: 2025 9:05 AM    Performed by: Nadia Good PA-C  Authorized by: Nadia Good PA-C    Indications:  Pain  Needle Size:  25 G  Guidance: landmark    Condition: carpal tunnel    Laterality:  Bilateral    Site:  Bilateral carpal tunnel  Medications (Right):  4 mg dexAMETHasone 4 MG/ML; 2 mL lidocaine (PF) 1 %  Medications (Left):  4 mg dexAMETHasone 4 MG/ML; 2 mL lidocaine (PF) 1 %  Outcome:  Tolerated well, no immediate complications  Procedure discussed: discussed risks, benefits, and alternatives    Consent Given by:  Patient  Timeout: timeout called immediately prior to procedure    Prep: patient was prepped and draped in usual sterile fashion    Hand / Upper Extremity Injection/Arthrocentesis: R ring A1    Date/Time: 2025 9:06 AM    Performed by: Nadia Good PA-C  Authorized by: Nadia Good PA-C    Indications:  Pain  Needle Size:  25 G  Guidance: landmark    Condition: trigger finger    Location:  Ring finger    Site:  R ring A1  Medications:  4 mg dexAMETHasone 4 MG/ML; 1 mL lidocaine (PF) 1 %  Outcome:  Tolerated well, no immediate complications  Procedure discussed: discussed risks, benefits, and alternatives    Consent Given by:  Patient  Timeout: timeout called immediately prior to procedure    Prep: patient was prepped and draped in usual sterile fashion        [facility] ORTHOPEDIC [address] [address] [phone]  Dept: [phone]  ______________________________________________________________________________    Patient: Shena Hartmann   : 1950   MRN: [id number]   2025    INVASIVE PROCEDURE SAFETY CHECKLIST    Date: 2025   Procedure:Bilateral Carpal Tunnel and Right ring finger trigger finger steroid injections  Patient Name: Shena Hartmann  MRN: [id number]  Date of birth:  1950    Action: Complete sections as appropriate. Any discrepancy results in a HARD COPY until resolved.     PRE PROCEDURE:  Patient ID verified with 2 identifiers (name and  or MRN): Yes  Procedure and site verified with patient/designee (when able): Yes  Accurate consent documentation in medical record: Yes  H&P (or appropriate assessment) documented in medical record: Yes  H&P must be up to 20 days prior to procedure and updates within 24 hours of procedure as applicable: Yes  Relevant diagnostic and radiology test results appropriately labeled and displayed as applicable: NA  Procedure site(s) marked with provider initials: NA    TIMEOUT:  Time-Out performed immediately prior to starting procedure, including verbal and active participation of all team members addressing the following:Yes  * Correct patient identify  * Confirmed that the correct side and site are marked  * An accurate procedure consent form  * Agreement on the procedure to be done  * Correct patient position  * Relevant images and results are properly labeled and appropriately displayed  * The need to administer antibiotics or fluids for irrigation purposes during the procedure as applicable   * Safety precautions based on patient history or medication use    DURING PROCEDURE: Verification of correct person, site, and procedures any time the responsibility for care of the patient is transferred to another member of the care team.       The following medications were given:         Prior to injection, verified patient identity using patient's name and date of birth.  Due to injection administration, patient instructed to remain in clinic for 15 minutes  afterwards, and to report any adverse reaction to me immediately.    Tendon sheath injection was performed.  and Trigger point injection was performed.   Medication Name: Dexamethasone Sodium Phosphate 4mg/mL x3 NDC 76455-168-44  Drug Amount Wasted:  None.  Vial/Syringe: Single dose  vial  Expiration Date:  5/1/26    Medication Name Lidocaine 1% NDC 68879-583-12    Scribed by DAVID KHAN, ATC for Nadia Good PA-C on June 16, 2025 at 9:09am based on the provider's statements to me.     DAVID KHAN, ATC

## 2025-06-16 NOTE — PROGRESS NOTES
Date of Service: Jun 16, 2025    Chief Complaint:   Chief Complaint   Patient presents with    Consult     Bilateral Right > Left carpal tunnel syndrome        History of Present Illness: Shena Hartmann is a 74 year old, right handed female  who presents today for further evaluation of bilateral hand numbness and tingling, right worse than left as well as bilateral forearm pain.  Patient reports that she developed symptoms last fall.  She says she was seen at [facility] and diagnosed with bilateral tennis elbow.  She had been seeing therapy for this to work on arm stretching and pointed to the dorsal proximal forearm as area of pain. Therapy has improved these symptoms.   She then developed numbness and tingling in the bilateral hands right worse than left.  She saw neurology this spring and obtained an EMG demonstrating bilateral carpal tunnel syndrome. She reports tinglng and shooting pain into the dorsal hand and middle finger with wrist flexion of the right hand. She has tried wrist splints with out much improvement.      Patient also reports in March she had stiffness of the right ring finger which she thought was a Dupuytren's contracture.  She saw hand therapy, is using a splint, she is also using several Chinese medicine topical medications that have helped with this.  She still does have some tenderness at the base of the ring finger, but her ROM has improved.       Review of Systems: A 14-point review of systems was obtained on intake and reviewed.      Past Medical History:   Diagnosis Date    Acquired hypothyroidism 8/10/2023    Breast cancer (H) 01/01/1994    right    H/O stem cell transplant (H) 03/01/2014    History of blood transfusion 2013 & 2014    During stem cell tx process    Hypertension Sept. 2021    Runs 140 s systolically, about 20 above my usual    Multiple myeloma, without mention of having achieved remission 11/06/2012       Past Surgical History:   Procedure Laterality Date    BIOPSY  10/1994;  [id number]    Lt. Breast in '94; multiple bone marrow bx's for MM    BREAST SURGERY  10/1994    Lt. Mastectomy w/lymph node resection    COLONOSCOPY  11/2015    Normal results    EYE SURGERY  2020    Bilateral cataract surgery    INSERT CATHETER VASCULAR ACCESS Left 5/22/2024    Procedure: central venous catheter non tunneled insertion, vascular access;  Surgeon: Ric Castaneda PA-C;  Location: [facility] OR    INSERT PORT VASCULAR ACCESS Left 2/15/2023    Procedure: INSERTION, VASCULAR ACCESS PORT;  Surgeon: Luc Antunez MD;  Location: [facility] OR    IR CHEST PORT PLACEMENT > 5 YRS OF AGE  2/15/2023    IR CVC NON TUNNEL LINE REMOVAL  5/22/2024    IR CVC NON TUNNEL PLACEMENT > 5 YRS  5/22/2024    IR PICC PLACEMENT > 5 YRS OF AGE  7/26/2024    MASTECTOMY      Right, with lymphe node disection      PICC INSERTION  09/10/2013    5fr DL Power PICC, 44cm (1cm external) in the L lateral brachial vein with tip in the low SVC    TRANSPLANT  3/27/2014    Autologous stem cell tx         Current Outpatient Medications:     acetaminophen (TYLENOL) 325 MG tablet, Take 325-650 mg by mouth every 6 hours as needed for mild pain, Disp: , Rfl:     acyclovir (ZOVIRAX) 400 MG tablet, Take 1 tablet (400 mg) by mouth every 12 hours., Disp: 60 tablet, Rfl: 11    amLODIPine (NORVASC) 2.5 MG tablet, Take 2 tablets (5 mg) by mouth daily Take 5 mg (2 tablets) in the morning., Disp: 180 tablet, Rfl: 3    CALCIUM-VITAMIN D-VITAMIN K PO, Take 2 tablets by mouth daily., Disp: , Rfl:     clobetasol (TEMOVATE) 0.05 % external ointment, Apply topically 2 times daily as needed (itching and rash). Topically to rash on hands until resolved, Disp: 45 g, Rfl: 0    estradiol (ESTRACE) 0.1 MG/GM vaginal cream, Place 1 g vaginally three times a week. Ok to use your fingers or the applicator, Disp: 42.5 g, Rfl: 3    levothyroxine (SYNTHROID/LEVOTHROID) 50 MCG tablet, Take 1 tablet (50 mcg) by mouth daily., Disp: 90 tablet, Rfl: 3    MAGNESIUM PO, Take 235 mg  by mouth 2 times daily., Disp: , Rfl:     Multiple Vitamins-Minerals (MULTIVITAMIN OR), Take 1 tablet by mouth 2 times daily., Disp: , Rfl:     order for DME, 6 mastectomy bras  Length of need: 99 months, Disp: 6 Device, Rfl: 1    order for DME, R mastectomy prosthesis   Length of need:  99 months, Disp: 1 Device, Rfl: 1    penicillin V (VEETID) 250 MG tablet, Take 1 tablet (250 mg) by mouth daily. (Patient not taking: Reported on 5/6/2025), Disp: 60 tablet, Rfl: 2    triamcinolone (KENALOG) 0.1 % external cream, Apply topically 2 times daily as needed for irritation., Disp: 15 g, Rfl: 11  No current facility-administered medications for this visit.    Facility-Administered Medications Ordered in Other Visits:     potassium chloride 20 mEq in 50 mL IVPB, 20 mEq, Intravenous, Once, Mae Kay PA-C    potassium chloride SA (K-DUR,KLOR-CON M) CR tablet 20 mEq, 20 mEq, Oral, Once, Mae Kay PA-C    Allergies   Allergen Reactions    Blood Transfusion Related (Informational Only) Other (See Comments)     Patient has a history of a clinically significant antibody against RBC antigens.  A delay in compatible RBCs may occur.     Tape [Adhesive Tape] Dermatitis, Itching and Rash     Strong reaction in the patch tests to the natural resins used in adhesives such as Colophony and Abietic acid    Cayenne Pepper [Cayenne]     Corn-Containing Products GI Disturbance    Levaquin Other (See Comments)     Tendon pain    Milk Protein GI Disturbance    Morphine Sulfate Other (See Comments)     Per pt - see things (hallucination) when she was on Morphine .    Shrimp Nausea and Vomiting    Tomato      Lip swelling, facial rash    Oat Rash    Wheat Rash     Swelling of lips       Social History     Tobacco Use    Smoking status: Never     Passive exposure: Never    Smokeless tobacco: Never   Substance Use Topics    Alcohol use: Yes     Comment: occ    Drug use: No       Family History   Problem Relation Age of  Onset    Hypertension Mother     Cerebrovascular Disease Mother          8-11-15 from strokes    Hypertension Father     Prostate Cancer Father     Skin Cancer Paternal Grandmother     Cancer Paternal Grandmother         skin cancer       Physical examination:  Well-developed, well-nourished and in no acute distress.  Alert and oriented to surroundings.  On examination of the right upper extremity:     Skin clean, dry and intact  Sensation:  Median: diminished  Radial: intact  Ulnar: intact  Thumb opposition strength: intact  Interosseous strength: intact  Thenar atrophy: Not Present  Interosseous atrophy: Not Present  Tinel's over carpal tunnel: Not Present  Tinel's over cubital tunnel: Not Present  Phalen's: Positive  Carpal tunnel compression: Positive  Elbow flexion compression: Negative    Two point discrimination (mm):   Median: 4 mm   Ulnar: 6 mm     Tenderness to palpation to the proximal forearm along the extensor musculature. Minimal tenderness to palpation along the lateral epicondyle, nontender to medial epicondyle.  Palpable tender nodule at the level of the A1 pulley of the ring finger. No palpable clicking, but palpation of the nodule and tenderness at the proximal edge of the pulley. Able to achieve near full extension. No palpable significant dupuytrens cords or nodules on exam    On examination of the left upper extremity:     Skin clean, dry and intact  Sensation:  Median: intact  Radial: intact  Ulnar: intact  Thumb opposition strength: intact  Interosseous strength: intact  Thenar atrophy: Not Present  Interosseous atrophy: Not Present  Tinel's over carpal tunnel: Not Present  Tinel's over cubital tunnel: Not Present  Phalen's: Negative  Carpal tunnel compression: Negative  Elbow flexion compression: Negative    Two point discrimination (mm):   Median: 5 mm   Ulnar: 6 mm bilaterally.     EMG/NCS: EMG obtained on 25 demonstrates:  Results  Nerve conduction studies showed prolonged median  sensory peak latencies with mild slowing of conduction velocities on both sides.  Bilateral median distal motor latencies were also prolonged with preserved amplitudes.  Median-ulnar palmar latency difference were prolonged on both sides.     Needle EMG of bilateral upper limbs showed mildly reduced recruitment of polyphasic motor unit potentials in the right abductor pollicis brevis muscle.     Interpretation:  This is an abnormal study.  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  There is no electrophysiologic evidence of a superimposed cervical radiculopathy in the current study.     Assessment: 74 year old female with:  Bilateral carpal tunnel syndrome.   Right ring stiffness. Suspect trigger finger.   Bilateral dorsal forearm pain, right worse than left. Differential diagnosis include tennis elbow vs. Radial tunnel syndrome. Symptoms improving with therapy.     Plan:     For management of her carpal tunnel, We had a lengthy discussion about EMG results and possible treatment options. We discussed three possible treatment options. The first would be to continue night splinting and to observe the symptoms for any change or progression. The second would be to perform a corticosteroid injection. The third is to consider surgery, which would be an open carpal tunnel release.Patient would like to trial bilateral carpal tunnel steroid injections. Recommend continued night splinting. Showed available bracing options on [employer].   Discussed that I do not feel any dupuytren's cords on exam today, she does have a palpable tender nodule.Discussed continued observation vs trial of a trigger finger steroid injection. Patient agreeable to injection. We discussed if partial improvement could consider repeat injection in 6 weeks. She should continue to work on ROM of the digit.   For her bilateral forearm pain. We discussed continued  therapy. Her symptoms are improving. If they worsen or fail to resolve could consider further work up for possible radial tunnel.   Following 6-8 weeks for recheck or as needed.       CTS6  Numbness predominantly or exclusively in the median nerve territory: Yes (3.5 points)  Nocturnal numbness: Yes (4 points)  Thenar atrophy/weakness: No (5 points)  Positive Phalen's test:Yes (5 points)  Loss of 2 point discrimination at 5mm: No (4.5 points)  Positive Tinel sign: No (4 points)    Total: 12.5 points (>12 = 80% probability of carpal tunnel syndrome)    Procedure:    Bilateral carpal tunnel injections  After written informed consent was obtained, the patient's bilateral wrists were prepped with chloraprep.  3 mL mixture of 4 mg/ mL dexamethasone and 2 ml 1% lidocaine was injected into the bilateral carpal tunnels.  There were no immediate complications.    Right ring trigger injection  After written informed consent was obtained, the patients right ring finger was prepped with chloraprep. 2mL of a 1:1 mixture of 4mg/ml of dexamethasone and  of 1% lidocaine was injected into the flexor sheath of the ring finger at the level of the A1 pulley. There were no immediate complications.     CHADWICK TIMMONS PA-C  Orthopaedic Surgery

## 2025-06-16 NOTE — LETTER
6/16/2025      Shena Hartmann  [address] [place] [address]      Dear Colleague,    Thank you for referring your patient, Shena Hartmann, to the [facility] [place]. Please see a copy of my visit note below.    Date of Service: Jun 16, 2025    Chief Complaint:   Chief Complaint   Patient presents with     Consult     Bilateral Right > Left carpal tunnel syndrome        History of Present Illness: Shena Hartmann is a 74 year old, right handed female  who presents today for further evaluation of bilateral hand numbness and tingling, right worse than left as well as bilateral forearm pain.  Patient reports that she developed symptoms last fall.  She says she was seen at [facility] [place] and diagnosed with bilateral tennis elbow.  She had been seeing therapy for this to work on arm stretching and pointed to the dorsal proximal forearm as area of pain. Therapy has improved these symptoms.   She then developed numbness and tingling in the bilateral hands right worse than left.  She saw neurology this spring and obtained an EMG demonstrating bilateral carpal tunnel syndrome. She reports tinglng and shooting pain into the dorsal hand and middle finger with wrist flexion of the right hand. She has tried wrist splints with out much improvement.      Patient also reports in March she had stiffness of the right ring finger which she thought was a Dupuytren's contracture.  She saw hand therapy, is using a splint, she is also using several Chinese medicine topical medications that have helped with this.  She still does have some tenderness at the base of the ring finger, but her ROM has improved.       Review of Systems: A 14-point review of systems was obtained on intake and reviewed.      Past Medical History:   Diagnosis Date     Acquired hypothyroidism 8/10/2023     Breast cancer (H) 01/01/1994    right     H/O stem cell transplant (H) 03/01/2014     History of blood transfusion 2013 & 2014     During stem cell tx process     Hypertension Sept. 2021    Runs 140 s systolically, about 20 above my usual     Multiple myeloma, without mention of having achieved remission 11/06/2012       Past Surgical History:   Procedure Laterality Date     BIOPSY  10/1994; [id number]    Lt. Breast in '94; multiple bone marrow bx's for MM     BREAST SURGERY  10/1994    Lt. Mastectomy w/lymph node resection     COLONOSCOPY  11/2015    Normal results     EYE SURGERY  2020    Bilateral cataract surgery     INSERT CATHETER VASCULAR ACCESS Left 5/22/2024    Procedure: central venous catheter non tunneled insertion, vascular access;  Surgeon: Ric Castaneda PA-C;  Location: [facility] OR     INSERT PORT VASCULAR ACCESS Left 2/15/2023    Procedure: INSERTION, VASCULAR ACCESS PORT;  Surgeon: Luc Antunez MD;  Location: [facility] OR     IR CHEST PORT PLACEMENT > 5 YRS OF AGE  2/15/2023     IR CVC NON TUNNEL LINE REMOVAL  5/22/2024     IR CVC NON TUNNEL PLACEMENT > 5 YRS  5/22/2024     IR PICC PLACEMENT > 5 YRS OF AGE  7/26/2024     MASTECTOMY      Right, with lymphe node disection       PICC INSERTION  09/10/2013    5fr DL Power PICC, 44cm (1cm external) in the L lateral brachial vein with tip in the low SVC     TRANSPLANT  3/27/2014    Autologous stem cell tx         Current Outpatient Medications:      acetaminophen (TYLENOL) 325 MG tablet, Take 325-650 mg by mouth every 6 hours as needed for mild pain, Disp: , Rfl:      acyclovir (ZOVIRAX) 400 MG tablet, Take 1 tablet (400 mg) by mouth every 12 hours., Disp: 60 tablet, Rfl: 11     amLODIPine (NORVASC) 2.5 MG tablet, Take 2 tablets (5 mg) by mouth daily Take 5 mg (2 tablets) in the morning., Disp: 180 tablet, Rfl: 3     CALCIUM-VITAMIN D-VITAMIN K PO, Take 2 tablets by mouth daily., Disp: , Rfl:      clobetasol (TEMOVATE) 0.05 % external ointment, Apply topically 2 times daily as needed (itching and rash). Topically to rash on hands until resolved, Disp: 45 g, Rfl: 0     estradiol  (ESTRACE) 0.1 MG/GM vaginal cream, Place 1 g vaginally three times a week. Ok to use your fingers or the applicator, Disp: 42.5 g, Rfl: 3     levothyroxine (SYNTHROID/LEVOTHROID) 50 MCG tablet, Take 1 tablet (50 mcg) by mouth daily., Disp: 90 tablet, Rfl: 3     MAGNESIUM PO, Take 235 mg by mouth 2 times daily., Disp: , Rfl:      Multiple Vitamins-Minerals (MULTIVITAMIN OR), Take 1 tablet by mouth 2 times daily., Disp: , Rfl:      order for DME, 6 mastectomy bras  Length of need: 99 months, Disp: 6 Device, Rfl: 1     order for DME, R mastectomy prosthesis   Length of need:  99 months, Disp: 1 Device, Rfl: 1     penicillin V (VEETID) 250 MG tablet, Take 1 tablet (250 mg) by mouth daily. (Patient not taking: Reported on 5/6/2025), Disp: 60 tablet, Rfl: 2     triamcinolone (KENALOG) 0.1 % external cream, Apply topically 2 times daily as needed for irritation., Disp: 15 g, Rfl: 11  No current facility-administered medications for this visit.    Facility-Administered Medications Ordered in Other Visits:      potassium chloride 20 mEq in 50 mL IVPB, 20 mEq, Intravenous, Once, Mae Kay PA-C     potassium chloride SA (K-DUR,KLOR-CON M) CR tablet 20 mEq, 20 mEq, Oral, Once, Mae Kay PA-C    Allergies   Allergen Reactions     Blood Transfusion Related (Informational Only) Other (See Comments)     Patient has a history of a clinically significant antibody against RBC antigens.  A delay in compatible RBCs may occur.      Tape [Adhesive Tape] Dermatitis, Itching and Rash     Strong reaction in the patch tests to the natural resins used in adhesives such as Colophony and Abietic acid     Cayenne Pepper [Cayenne]      Corn-Containing Products GI Disturbance     Levaquin Other (See Comments)     Tendon pain     Milk Protein GI Disturbance     Morphine Sulfate Other (See Comments)     Per pt - see things (hallucination) when she was on Morphine .     Shrimp Nausea and Vomiting     Tomato      Lip  swelling, facial rash     Oat Rash     Wheat Rash     Swelling of lips       Social History     Tobacco Use     Smoking status: Never     Passive exposure: Never     Smokeless tobacco: Never   Substance Use Topics     Alcohol use: Yes     Comment: occ     Drug use: No       Family History   Problem Relation Age of Onset     Hypertension Mother      Cerebrovascular Disease Mother          8-11-15 from strokes     Hypertension Father      Prostate Cancer Father      Skin Cancer Paternal Grandmother      Cancer Paternal Grandmother         skin cancer       Physical examination:  Well-developed, well-nourished and in no acute distress.  Alert and oriented to surroundings.  On examination of the right upper extremity:     Skin clean, dry and intact  Sensation:  Median: diminished  Radial: intact  Ulnar: intact  Thumb opposition strength: intact  Interosseous strength: intact  Thenar atrophy: Not Present  Interosseous atrophy: Not Present  Tinel's over carpal tunnel: Not Present  Tinel's over cubital tunnel: Not Present  Phalen's: Positive  Carpal tunnel compression: Positive  Elbow flexion compression: Negative    Two point discrimination (mm):   Median: 4 mm   Ulnar: 6 mm     Tenderness to palpation to the proximal forearm along the extensor musculature. Minimal tenderness to palpation along the lateral epicondyle, nontender to medial epicondyle.  Palpable tender nodule at the level of the A1 pulley of the ring finger. No palpable clicking, but palpation of the nodule and tenderness at the proximal edge of the pulley. Able to achieve near full extension. No palpable significant dupuytrens cords or nodules on exam    On examination of the left upper extremity:     Skin clean, dry and intact  Sensation:  Median: intact  Radial: intact  Ulnar: intact  Thumb opposition strength: intact  Interosseous strength: intact  Thenar atrophy: Not Present  Interosseous atrophy: Not Present  Tinel's over carpal tunnel: Not  Present  Tinel's over cubital tunnel: Not Present  Phalen's: Negative  Carpal tunnel compression: Negative  Elbow flexion compression: Negative    Two point discrimination (mm):   Median: 5 mm   Ulnar: 6 mm bilaterally.     EMG/NCS: EMG obtained on 5/1/25 demonstrates:  Results  Nerve conduction studies showed prolonged median sensory peak latencies with mild slowing of conduction velocities on both sides.  Bilateral median distal motor latencies were also prolonged with preserved amplitudes.  Median-ulnar palmar latency difference were prolonged on both sides.     Needle EMG of bilateral upper limbs showed mildly reduced recruitment of polyphasic motor unit potentials in the right abductor pollicis brevis muscle.     Interpretation:  This is an abnormal study.  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  There is no electrophysiologic evidence of a superimposed cervical radiculopathy in the current study.     Assessment: 74 year old female with:  Bilateral carpal tunnel syndrome.   Right ring stiffness. Suspect trigger finger.   Bilateral dorsal forearm pain, right worse than left. Differential diagnosis include tennis elbow vs. Radial tunnel syndrome. Symptoms improving with therapy.     Plan:     For management of her carpal tunnel, We had a lengthy discussion about EMG results and possible treatment options. We discussed three possible treatment options. The first would be to continue night splinting and to observe the symptoms for any change or progression. The second would be to perform a corticosteroid injection. The third is to consider surgery, which would be an open carpal tunnel release.Patient would like to trial bilateral carpal tunnel steroid injections. Recommend continued night splinting. Showed available bracing options on [employer].   Discussed that I do not feel any dupuytren's cords on exam today, she does  have a palpable tender nodule.Discussed continued observation vs trial of a trigger finger steroid injection. Patient agreeable to injection. We discussed if partial improvement could consider repeat injection in 6 weeks. She should continue to work on ROM of the digit.   For her bilateral forearm pain. We discussed continued therapy. Her symptoms are improving. If they worsen or fail to resolve could consider further work up for possible radial tunnel.   Following 6-8 weeks for recheck or as needed.       CTS6  Numbness predominantly or exclusively in the median nerve territory: Yes (3.5 points)  Nocturnal numbness: Yes (4 points)  Thenar atrophy/weakness: No (5 points)  Positive Phalen's test:Yes (5 points)  Loss of 2 point discrimination at 5mm: No (4.5 points)  Positive Tinel sign: No (4 points)    Total: 12.5 points (>12 = 80% probability of carpal tunnel syndrome)    Procedure:    Bilateral carpal tunnel injections  After written informed consent was obtained, the patient's bilateral wrists were prepped with chloraprep.  3 mL mixture of 4 mg/ mL dexamethasone and 2 ml 1% lidocaine was injected into the bilateral carpal tunnels.  There were no immediate complications.    Right ring trigger injection  After written informed consent was obtained, the patients right ring finger was prepped with chloraprep. 2mL of a 1:1 mixture of 4mg/ml of dexamethasone and  of 1% lidocaine was injected into the flexor sheath of the ring finger at the level of the A1 pulley. There were no immediate complications.     NADIA TIMMONS PA-C  Orthopaedic Surgery       Hand / Upper Extremity Injection/Arthrocentesis: bilateral carpal tunnel    Date/Time: 6/16/2025 9:05 AM    Performed by: Nadia Timmons PA-C  Authorized by: Nadia Timmons PA-C    Indications:  Pain  Needle Size:  25 G  Guidance: landmark    Condition: carpal tunnel    Laterality:  Bilateral    Site:  Bilateral carpal tunnel  Medications (Right):  4  mg dexAMETHasone 4 MG/ML; 2 mL lidocaine (PF) 1 %  Medications (Left):  4 mg dexAMETHasone 4 MG/ML; 2 mL lidocaine (PF) 1 %  Outcome:  Tolerated well, no immediate complications  Procedure discussed: discussed risks, benefits, and alternatives    Consent Given by:  Patient  Timeout: timeout called immediately prior to procedure    Prep: patient was prepped and draped in usual sterile fashion    Hand / Upper Extremity Injection/Arthrocentesis: R ring A1    Date/Time: 2025 9:06 AM    Performed by: Nadia Good PA-C  Authorized by: Nadia Good PA-C    Indications:  Pain  Needle Size:  25 G  Guidance: landmark    Condition: trigger finger    Location:  Ring finger    Site:  R ring A1  Medications:  4 mg dexAMETHasone 4 MG/ML; 1 mL lidocaine (PF) 1 %  Outcome:  Tolerated well, no immediate complications  Procedure discussed: discussed risks, benefits, and alternatives    Consent Given by:  Patient  Timeout: timeout called immediately prior to procedure    Prep: patient was prepped and draped in usual sterile fashion        [facility] [address] [address] [phone]  Dept: [phone]  ______________________________________________________________________________    Patient: Shena Hartmann   : 1950   MRN: [id number]   2025    INVASIVE PROCEDURE SAFETY CHECKLIST    Date: 2025   Procedure:Bilateral Carpal Tunnel and Right ring finger trigger finger steroid injections  Patient Name: Shena Hartmann  MRN: [id number]  YOB: 1950    Action: Complete sections as appropriate. Any discrepancy results in a HARD COPY until resolved.     PRE PROCEDURE:  Patient ID verified with 2 identifiers (name and  or MRN): Yes  Procedure and site verified with patient/designee (when able): Yes  Accurate consent documentation in medical record: Yes  H&P (or appropriate assessment) documented in  medical record: Yes  H&P must be up to 20 days prior to procedure and updates within 24 hours of procedure as applicable: Yes  Relevant diagnostic and radiology test results appropriately labeled and displayed as applicable: NA  Procedure site(s) marked with provider initials: NA    TIMEOUT:  Time-Out performed immediately prior to starting procedure, including verbal and active participation of all team members addressing the following:Yes  * Correct patient identify  * Confirmed that the correct side and site are marked  * An accurate procedure consent form  * Agreement on the procedure to be done  * Correct patient position  * Relevant images and results are properly labeled and appropriately displayed  * The need to administer antibiotics or fluids for irrigation purposes during the procedure as applicable   * Safety precautions based on patient history or medication use    DURING PROCEDURE: Verification of correct person, site, and procedures any time the responsibility for care of the patient is transferred to another member of the care team.       The following medications were given:         Prior to injection, verified patient identity using patient's name and date of birth.  Due to injection administration, patient instructed to remain in clinic for 15 minutes  afterwards, and to report any adverse reaction to me immediately.    Tendon sheath injection was performed.  and Trigger point injection was performed.   Medication Name: Dexamethasone Sodium Phosphate 4mg/mL x3 NDC 60528-647-10  Drug Amount Wasted:  None.  Vial/Syringe: Single dose vial  Expiration Date:  5/1/26    Medication Name Lidocaine 1% NDC 67866-449-53    Scribed by DAVID KHAN, ATC for Nadia Good PA-C on June 16, 2025 at 9:09am based on the provider's statements to me.     DAVID KHAN ATC      Again, thank you for allowing me to participate in the care of your patient.        Sincerely,        Nadia Good  ERIS    Electronically signed

## 2025-06-16 NOTE — NURSING NOTE
"Reason For Visit:   Chief Complaint   Patient presents with    Consult     Bilateral Right > Left carpal tunnel syndrome        Primary MD: Thomas Villalobos  Ref. MD: Jhon Hillman    Age: 74 year old    ?  No      Ht 1.549 m (5' 1\")   Wt 55.3 kg (122 lb)   LMP  (LMP Unknown)   BMI 23.05 kg/m        Pain Assessment  Patient Currently in Pain: Yes  Patient's Stated Pain Goal: 4  Primary Pain Location: Hand (Right > Left)  Pain Descriptors: Aching, Intermittent  Alleviating Factors: NSAIDS    Hand Dominance Evaluation  Hand Dominance: Right          QuickDASH Assessment      6/14/2025     7:17 PM   QuickDASH Main   1. Open a tight or new jar Moderate difficulty   2. Do heavy household chores (e.g., wash walls, floors) Moderate difficulty   3. Carry a shopping bag or briefcase Moderate difficulty   4. Wash your back Mild difficulty   5. Use a knife to cut food Mild difficulty   6. Recreational activities in which you take some force or impact through your arm, shoulder or hand (e.g., golf, hammering, tennis, etc.) Moderate difficulty   7. During the past week, to what extent has your arm, shoulder or hand problem interfered with your normal social activities with family, friends, neighbours or groups Slightly   8. During the past week, were you limited in your work or other regular daily activities as a result of your arm, shoulder or hand problem Moderately limited   9. Arm, shoulder or hand pain Moderate   10.Tingling (pins and needles) in your arm,shoulder or hand Mild   11. During the past week, how much difficulty have you had sleeping because of the pain in your arm, shoulder or hand Mild difficulty   Quickdash Ability Score 38.64          Current Outpatient Medications   Medication Sig Dispense Refill    acetaminophen (TYLENOL) 325 MG tablet Take 325-650 mg by mouth every 6 hours as needed for mild pain      acyclovir (ZOVIRAX) 400 MG tablet Take 1 tablet (400 mg) by mouth every 12 hours. " 60 tablet 11    amLODIPine (NORVASC) 2.5 MG tablet Take 2 tablets (5 mg) by mouth daily Take 5 mg (2 tablets) in the morning. 180 tablet 3    CALCIUM-VITAMIN D-VITAMIN K PO Take 2 tablets by mouth daily.      clobetasol (TEMOVATE) 0.05 % external ointment Apply topically 2 times daily as needed (itching and rash). Topically to rash on hands until resolved 45 g 0    estradiol (ESTRACE) 0.1 MG/GM vaginal cream Place 1 g vaginally three times a week. Ok to use your fingers or the applicator 42.5 g 3    levothyroxine (SYNTHROID/LEVOTHROID) 50 MCG tablet Take 1 tablet (50 mcg) by mouth daily. 90 tablet 3    MAGNESIUM PO Take 235 mg by mouth 2 times daily.      Multiple Vitamins-Minerals (MULTIVITAMIN OR) Take 1 tablet by mouth 2 times daily.      order for DME 6 mastectomy bras  Length of need: 99 months 6 Device 1    order for DME R mastectomy prosthesis   Length of need:  99 months 1 Device 1    penicillin V (VEETID) 250 MG tablet Take 1 tablet (250 mg) by mouth daily. (Patient not taking: Reported on 5/6/2025) 60 tablet 2    triamcinolone (KENALOG) 0.1 % external cream Apply topically 2 times daily as needed for irritation. 15 g 11       Allergies   Allergen Reactions    Blood Transfusion Related (Informational Only) Other (See Comments)     Patient has a history of a clinically significant antibody against RBC antigens.  A delay in compatible RBCs may occur.     Tape [Adhesive Tape] Dermatitis, Itching and Rash     Strong reaction in the patch tests to the natural resins used in adhesives such as Colophony and Abietic acid    Cayenne Pepper [Cayenne]     Corn-Containing Products GI Disturbance    Levaquin Other (See Comments)     Tendon pain    Milk Protein GI Disturbance    Morphine Sulfate Other (See Comments)     Per pt - see things (hallucination) when she was on Morphine .    Shrimp Nausea and Vomiting    Tomato      Lip swelling, facial rash    Oat Rash    Wheat Rash     Swelling of lips       DAVID KHAN  ATC

## 2025-06-18 RX ORDER — DEXAMETHASONE SODIUM PHOSPHATE 4 MG/ML
4 INJECTION, SOLUTION INTRA-ARTICULAR; INTRALESIONAL; INTRAMUSCULAR; INTRAVENOUS; SOFT TISSUE
Status: COMPLETED | OUTPATIENT
Start: 2025-06-16 | End: 2025-06-16

## 2025-06-18 RX ORDER — LIDOCAINE HYDROCHLORIDE 10 MG/ML
2 INJECTION, SOLUTION EPIDURAL; INFILTRATION; INTRACAUDAL; PERINEURAL
Status: COMPLETED | OUTPATIENT
Start: 2025-06-16 | End: 2025-06-16

## 2025-06-18 RX ORDER — LIDOCAINE HYDROCHLORIDE 10 MG/ML
1 INJECTION, SOLUTION EPIDURAL; INFILTRATION; INTRACAUDAL; PERINEURAL
Status: COMPLETED | OUTPATIENT
Start: 2025-06-16 | End: 2025-06-16

## 2025-07-08 ENCOUNTER — LAB (OUTPATIENT)
Dept: LAB | Facility: CLINIC | Age: 75
End: 2025-07-08
Attending: STUDENT IN AN ORGANIZED HEALTH CARE EDUCATION/TRAINING PROGRAM
Payer: MEDICARE

## 2025-07-08 DIAGNOSIS — C90.00 MULTIPLE MYELOMA, REMISSION STATUS UNSPECIFIED (H): Primary | ICD-10-CM

## 2025-07-08 DIAGNOSIS — C90.00 MULTIPLE MYELOMA NOT HAVING ACHIEVED REMISSION (H): ICD-10-CM

## 2025-07-08 DIAGNOSIS — C90.00 MULTIPLE MYELOMA, REMISSION STATUS UNSPECIFIED (H): ICD-10-CM

## 2025-07-08 DIAGNOSIS — Z94.84 STEM CELLS TRANSPLANT STATUS (H): Primary | ICD-10-CM

## 2025-07-08 LAB
ALBUMIN SERPL BCG-MCNC: 4.1 G/DL (ref 3.5–5.2)
ALP SERPL-CCNC: 59 U/L (ref 40–150)
ALT SERPL W P-5'-P-CCNC: 19 U/L (ref 0–50)
ANION GAP SERPL CALCULATED.3IONS-SCNC: 12 MMOL/L (ref 7–15)
AST SERPL W P-5'-P-CCNC: 23 U/L (ref 0–45)
BASOPHILS # BLD AUTO: 0 10E3/UL (ref 0–0.2)
BASOPHILS NFR BLD AUTO: 0 %
BILIRUB SERPL-MCNC: 0.3 MG/DL
BUN SERPL-MCNC: 23.5 MG/DL (ref 8–23)
CALCIUM SERPL-MCNC: 9.8 MG/DL (ref 8.8–10.4)
CHLORIDE SERPL-SCNC: 102 MMOL/L (ref 98–107)
CREAT SERPL-MCNC: 0.88 MG/DL (ref 0.51–0.95)
EGFRCR SERPLBLD CKD-EPI 2021: 69 ML/MIN/1.73M2
EOSINOPHIL # BLD AUTO: 0.1 10E3/UL (ref 0–0.7)
EOSINOPHIL NFR BLD AUTO: 1 %
ERYTHROCYTE [DISTWIDTH] IN BLOOD BY AUTOMATED COUNT: 14.4 % (ref 10–15)
GLUCOSE SERPL-MCNC: 103 MG/DL (ref 70–99)
HCO3 SERPL-SCNC: 26 MMOL/L (ref 22–29)
HCT VFR BLD AUTO: 34 % (ref 35–47)
HGB BLD-MCNC: 11.6 G/DL (ref 11.7–15.7)
IGA SERPL-MCNC: 8 MG/DL (ref 84–499)
IGG SERPL-MCNC: 227 MG/DL (ref 610–1616)
IGM SERPL-MCNC: 12 MG/DL (ref 35–242)
IMM GRANULOCYTES # BLD: 0 10E3/UL
IMM GRANULOCYTES NFR BLD: 0 %
KAPPA LC FREE SER-MCNC: 0.38 MG/DL (ref 0.33–1.94)
KAPPA LC FREE/LAMBDA FREE SER NEPH: 1.73 {RATIO} (ref 0.26–1.65)
LAMBDA LC FREE SERPL-MCNC: 0.22 MG/DL (ref 0.57–2.63)
LYMPHOCYTES # BLD AUTO: 1.2 10E3/UL (ref 0.8–5.3)
LYMPHOCYTES NFR BLD AUTO: 26 %
MCH RBC QN AUTO: 35.7 PG (ref 26.5–33)
MCHC RBC AUTO-ENTMCNC: 34.1 G/DL (ref 31.5–36.5)
MCV RBC AUTO: 105 FL (ref 78–100)
MONOCYTES # BLD AUTO: 0.7 10E3/UL (ref 0–1.3)
MONOCYTES NFR BLD AUTO: 15 %
NEUTROPHILS # BLD AUTO: 2.6 10E3/UL (ref 1.6–8.3)
NEUTROPHILS NFR BLD AUTO: 58 %
NRBC # BLD AUTO: 0 10E3/UL
NRBC BLD AUTO-RTO: 0 /100
PLATELET # BLD AUTO: 85 10E3/UL (ref 150–450)
POTASSIUM SERPL-SCNC: 4.1 MMOL/L (ref 3.4–5.3)
PROT SERPL-MCNC: 6 G/DL (ref 6.4–8.3)
RBC # BLD AUTO: 3.25 10E6/UL (ref 3.8–5.2)
SODIUM SERPL-SCNC: 140 MMOL/L (ref 135–145)
TOTAL PROTEIN SERUM FOR ELP: 5.8 G/DL (ref 6.4–8.3)
WBC # BLD AUTO: 4.5 10E3/UL (ref 4–11)

## 2025-07-08 PROCEDURE — 36591 DRAW BLOOD OFF VENOUS DEVICE: CPT

## 2025-07-08 PROCEDURE — 250N000011 HC RX IP 250 OP 636: Performed by: STUDENT IN AN ORGANIZED HEALTH CARE EDUCATION/TRAINING PROGRAM

## 2025-07-08 PROCEDURE — 94642 AEROSOL INHALATION TREATMENT: CPT | Performed by: INTERNAL MEDICINE

## 2025-07-08 PROCEDURE — 94640 AIRWAY INHALATION TREATMENT: CPT | Performed by: INTERNAL MEDICINE

## 2025-07-08 PROCEDURE — 84166 PROTEIN E-PHORESIS/URINE/CSF: CPT | Performed by: PATHOLOGY

## 2025-07-08 PROCEDURE — 84155 ASSAY OF PROTEIN SERUM: CPT

## 2025-07-08 PROCEDURE — 82784 ASSAY IGA/IGD/IGG/IGM EACH: CPT

## 2025-07-08 PROCEDURE — 86335 IMMUNFIX E-PHORSIS/URINE/CSF: CPT | Performed by: PATHOLOGY

## 2025-07-08 PROCEDURE — 84165 PROTEIN E-PHORESIS SERUM: CPT | Mod: TC | Performed by: PATHOLOGY

## 2025-07-08 PROCEDURE — 83521 IG LIGHT CHAINS FREE EACH: CPT

## 2025-07-08 PROCEDURE — 86334 IMMUNOFIX E-PHORESIS SERUM: CPT | Performed by: PATHOLOGY

## 2025-07-08 PROCEDURE — 85004 AUTOMATED DIFF WBC COUNT: CPT

## 2025-07-08 RX ORDER — HEPARIN SODIUM (PORCINE) LOCK FLUSH IV SOLN 100 UNIT/ML 100 UNIT/ML
5 SOLUTION INTRAVENOUS ONCE
Status: COMPLETED | OUTPATIENT
Start: 2025-07-08 | End: 2025-07-08

## 2025-07-08 RX ORDER — PENTAMIDINE ISETHIONATE 300 MG/300MG
300 INHALANT RESPIRATORY (INHALATION) ONCE
Status: COMPLETED | OUTPATIENT
Start: 2025-07-08 | End: 2025-07-08

## 2025-07-08 RX ORDER — PENTAMIDINE ISETHIONATE 300 MG/300MG
300 INHALANT RESPIRATORY (INHALATION) ONCE
Start: 2025-08-01 | End: 2025-08-01

## 2025-07-08 RX ORDER — ALBUTEROL SULFATE 5 MG/ML
2.5 SOLUTION RESPIRATORY (INHALATION) ONCE
Status: COMPLETED | OUTPATIENT
Start: 2025-07-08 | End: 2025-07-08

## 2025-07-08 RX ORDER — ALBUTEROL SULFATE 5 MG/ML
2.5 SOLUTION RESPIRATORY (INHALATION) ONCE
Start: 2025-08-01 | End: 2025-08-01

## 2025-07-08 RX ORDER — HEPARIN SODIUM (PORCINE) LOCK FLUSH IV SOLN 100 UNIT/ML 100 UNIT/ML
5 SOLUTION INTRAVENOUS
OUTPATIENT
Start: 2025-08-01

## 2025-07-08 RX ORDER — HEPARIN SODIUM,PORCINE 10 UNIT/ML
5-20 VIAL (ML) INTRAVENOUS DAILY PRN
OUTPATIENT
Start: 2025-08-01

## 2025-07-08 RX ADMIN — PENTAMIDINE ISETHIONATE 300 MG: 300 INHALANT RESPIRATORY (INHALATION) at 10:42

## 2025-07-08 RX ADMIN — Medication 5 ML: at 10:24

## 2025-07-08 NOTE — NURSING NOTE
Chief Complaint   Patient presents with    Port Draw     Labs drawn via port by RN in lab     Port accessed with 20g gripper needle by RN, labs collected, line flushed with saline and heparin.       Ching KRAUS RN PHN BSN  BMT/Oncology Lab

## 2025-07-08 NOTE — PROGRESS NOTES
Shena Hartmann was seen today for a Pentamidine nebulizer tx ordered by MARINO Fitch CNP.    Patient was first given 2.5 mg of albuterol nebulizer, after which Pentamidine 300 mg (Lot # [id number]) mixed with 6cc Sterile H20 was administered through a filtered nebulizer.    Pre-treatment: SpO2 = 98%   HR = 74   BBS = Clear   Post-treatment: SpO2 = 97%  HR = 81  BBS = Clear    No adverse side effects noted by the patient.    This service today was provided with Dr. Diaz in clinic, who was available if needed.     Procedure was completed by Vijay Whittaker.

## 2025-07-09 ENCOUNTER — DOCUMENTATION ONLY (OUTPATIENT)
Dept: PHARMACY | Facility: CLINIC | Age: 75
End: 2025-07-09
Payer: MEDICARE

## 2025-07-09 NOTE — PROGRESS NOTES
Pharmacist IVIG Stewardship Program    Diagnosis: Hypogammaglobulinemia   Date  12/20/2024   IgG Level (mg/dL) 143     Current Dosing Regimen: Privigen 20g IV monthly PRN for IgG <300 mg/dL   Patient is dosed as needed based on an IgG level of less than 300 mg/dL to ensure the lowest amount of medication is administered to achieve beneficial outcomes.  Pre-medications:   Acetaminophen 650 mg by mouth, 30 minutes prior to infusion  Diphenhydramine 37.5 mg IV prior to IVIG infusion and again 90 minutes after IVIG has started  Previously Tried Products: None     Side Effects: Unable to reach patient to discuss.     Interventions: Lab review completed.     Assessment:   Date  7/8/2025   IgG Level (mg/dL) 227   Patient is appropriate for IVIG therapy when their level is within parameter. IVIG infusions are effective in increasing IgG levels to an appropriate level to minimize patient's risk of infection. Patient should continue on treatment as they have been per provider order. Without therapy their levels would put them at an increased risk of infections.    Plan: Patient is okay to proceed with IVIG infusions at this time as her level is below her provider ordered parameter. The care team has requested an IVIG infusion be scheduled in the next 1-2 weeks. Patient will have labs checked monthly to assess the need for additional IVIG infusions moving forward.     Next Review Needed: 8/2025    Willa Momin, PharmD, IgCP  Medication Access Pharmacist

## 2025-07-10 LAB
ALBUMIN MFR UR ELPH: 0 %
ALBUMIN SERPL ELPH-MCNC: 4 G/DL (ref 3.7–5.1)
ALPHA1 GLOB MFR UR ELPH: 0 %
ALPHA1 GLOB SERPL ELPH-MCNC: 0.3 G/DL (ref 0.2–0.4)
ALPHA2 GLOB MFR UR ELPH: 0 %
ALPHA2 GLOB SERPL ELPH-MCNC: 0.7 G/DL (ref 0.5–0.9)
B-GLOBULIN MFR UR ELPH: 0 %
B-GLOBULIN SERPL ELPH-MCNC: 0.6 G/DL (ref 0.6–1)
GAMMA GLOB MFR UR ELPH: 0 %
GAMMA GLOB SERPL ELPH-MCNC: 0.3 G/DL (ref 0.7–1.6)
LOCATION OF TASK: ABNORMAL
LOCATION OF TASK: NORMAL
M PROTEIN MFR UR ELPH: 0 %
M PROTEIN SERPL ELPH-MCNC: 0 G/DL
PROT ELPH PNL UR ELPH: NORMAL
PROT PATTERN SERPL ELPH-IMP: ABNORMAL
PROT PATTERN SERPL IFE-IMP: NORMAL
PROT PATTERN UR ELPH-IMP: NORMAL

## 2025-07-21 ENCOUNTER — OFFICE VISIT (OUTPATIENT)
Dept: TRANSPLANT | Facility: CLINIC | Age: 75
End: 2025-07-21
Payer: MEDICARE

## 2025-07-21 ENCOUNTER — HOSPITAL ENCOUNTER (OUTPATIENT)
Dept: PET IMAGING | Facility: CLINIC | Age: 75
Discharge: HOME | End: 2025-07-21
Attending: STUDENT IN AN ORGANIZED HEALTH CARE EDUCATION/TRAINING PROGRAM | Admitting: STUDENT IN AN ORGANIZED HEALTH CARE EDUCATION/TRAINING PROGRAM
Payer: MEDICARE

## 2025-07-21 VITALS
OXYGEN SATURATION: 99 % | SYSTOLIC BLOOD PRESSURE: 149 MMHG | HEART RATE: 73 BPM | BODY MASS INDEX: 23.15 KG/M2 | RESPIRATION RATE: 16 BRPM | TEMPERATURE: 98.1 F | WEIGHT: 122.5 LBS | DIASTOLIC BLOOD PRESSURE: 85 MMHG

## 2025-07-21 DIAGNOSIS — C90.00 MULTIPLE MYELOMA NOT HAVING ACHIEVED REMISSION (H): ICD-10-CM

## 2025-07-21 DIAGNOSIS — Z94.84 STEM CELLS TRANSPLANT STATUS (H): ICD-10-CM

## 2025-07-21 DIAGNOSIS — C90.00 MULTIPLE MYELOMA NOT HAVING ACHIEVED REMISSION (H): Primary | ICD-10-CM

## 2025-07-21 LAB
ALBUMIN SERPL BCG-MCNC: 4.3 G/DL (ref 3.5–5.2)
ALP SERPL-CCNC: 55 U/L (ref 40–150)
ALT SERPL W P-5'-P-CCNC: 19 U/L (ref 0–50)
ANION GAP SERPL CALCULATED.3IONS-SCNC: 12 MMOL/L (ref 7–15)
AST SERPL W P-5'-P-CCNC: 21 U/L (ref 0–45)
B2 MICROGLOB TUMOR MARKER SER-MCNC: 1.6 MG/L
BASOPHILS # BLD AUTO: 0 10E3/UL (ref 0–0.2)
BASOPHILS NFR BLD AUTO: 0 %
BILIRUB SERPL-MCNC: 0.2 MG/DL
BUN SERPL-MCNC: 16 MG/DL (ref 8–23)
CALCIUM SERPL-MCNC: 9.6 MG/DL (ref 8.8–10.4)
CD19 CELLS # BLD: 39 CELLS/UL (ref 107–698)
CD19 CELLS NFR BLD: 3 % (ref 6–27)
CD3 CELLS # BLD: 1314 CELLS/UL (ref 603–2990)
CD3 CELLS NFR BLD: 84 % (ref 49–84)
CD3+CD4+ CELLS # BLD: 235 CELLS/UL (ref 441–2156)
CD3+CD4+ CELLS NFR BLD: 15 % (ref 28–63)
CD3+CD4+ CELLS/CD3+CD8+ CLL BLD: 0.22 % (ref 1.4–2.6)
CD3+CD8+ CELLS # BLD: 1062 CELLS/UL (ref 125–1312)
CD3+CD8+ CELLS NFR BLD: 68 % (ref 10–40)
CD3-CD16+CD56+ CELLS # BLD: 200 CELLS/UL (ref 95–640)
CD3-CD16+CD56+ CELLS NFR BLD: 13 % (ref 4–25)
CHLORIDE SERPL-SCNC: 101 MMOL/L (ref 98–107)
CREAT SERPL-MCNC: 0.84 MG/DL (ref 0.51–0.95)
EGFRCR SERPLBLD CKD-EPI 2021: 73 ML/MIN/1.73M2
EOSINOPHIL # BLD AUTO: 0.1 10E3/UL (ref 0–0.7)
EOSINOPHIL NFR BLD AUTO: 1 %
ERYTHROCYTE [DISTWIDTH] IN BLOOD BY AUTOMATED COUNT: 14.3 % (ref 10–15)
GLUCOSE SERPL-MCNC: 86 MG/DL (ref 70–99)
HCO3 SERPL-SCNC: 26 MMOL/L (ref 22–29)
HCT VFR BLD AUTO: 36.8 % (ref 35–47)
HGB BLD-MCNC: 12.5 G/DL (ref 11.7–15.7)
IMM GRANULOCYTES # BLD: 0 10E3/UL
IMM GRANULOCYTES NFR BLD: 0 %
LDH SERPL L TO P-CCNC: 198 U/L (ref 0–250)
LYMPHOCYTES # BLD AUTO: 1.3 10E3/UL (ref 0.8–5.3)
LYMPHOCYTES NFR BLD AUTO: 29 %
MAGNESIUM SERPL-MCNC: 2 MG/DL (ref 1.7–2.3)
MCH RBC QN AUTO: 35.6 PG (ref 26.5–33)
MCHC RBC AUTO-ENTMCNC: 34 G/DL (ref 31.5–36.5)
MCV RBC AUTO: 105 FL (ref 78–100)
MONOCYTES # BLD AUTO: 0.7 10E3/UL (ref 0–1.3)
MONOCYTES NFR BLD AUTO: 15 %
NEUTROPHILS # BLD AUTO: 2.4 10E3/UL (ref 1.6–8.3)
NEUTROPHILS NFR BLD AUTO: 53 %
NRBC # BLD AUTO: 0 10E3/UL
NRBC BLD AUTO-RTO: 0 /100
PHOSPHATE SERPL-MCNC: 3.4 MG/DL (ref 2.5–4.5)
PLATELET # BLD AUTO: 93 10E3/UL (ref 150–450)
POTASSIUM SERPL-SCNC: 3.8 MMOL/L (ref 3.4–5.3)
PROT SERPL-MCNC: 5.8 G/DL (ref 6.4–8.3)
PROT SERPL-MCNC: 6 G/DL (ref 6.4–8.3)
RBC # BLD AUTO: 3.51 10E6/UL (ref 3.8–5.2)
SODIUM SERPL-SCNC: 139 MMOL/L (ref 135–145)
T CELL EXTENDED COMMENT: ABNORMAL
WBC # BLD AUTO: 4.5 10E3/UL (ref 4–11)

## 2025-07-21 PROCEDURE — 78816 PET IMAGE W/CT FULL BODY: CPT | Mod: 26 | Performed by: RADIOLOGY

## 2025-07-21 PROCEDURE — 84100 ASSAY OF PHOSPHORUS: CPT | Performed by: REGISTERED NURSE

## 2025-07-21 PROCEDURE — 343N000001 HC RX 343 MED OP 636: Performed by: STUDENT IN AN ORGANIZED HEALTH CARE EDUCATION/TRAINING PROGRAM

## 2025-07-21 PROCEDURE — 86334 IMMUNOFIX E-PHORESIS SERUM: CPT | Performed by: STUDENT IN AN ORGANIZED HEALTH CARE EDUCATION/TRAINING PROGRAM

## 2025-07-21 PROCEDURE — 88187 FLOWCYTOMETRY/READ 2-8: CPT | Mod: GC | Performed by: STUDENT IN AN ORGANIZED HEALTH CARE EDUCATION/TRAINING PROGRAM

## 2025-07-21 PROCEDURE — 38222 DX BONE MARROW BX & ASPIR: CPT | Mod: LT

## 2025-07-21 PROCEDURE — 88237 TISSUE CULTURE BONE MARROW: CPT | Performed by: REGISTERED NURSE

## 2025-07-21 PROCEDURE — 36591 DRAW BLOOD OFF VENOUS DEVICE: CPT | Performed by: REGISTERED NURSE

## 2025-07-21 PROCEDURE — 88342 IMHCHEM/IMCYTCHM 1ST ANTB: CPT | Mod: 26 | Performed by: STUDENT IN AN ORGANIZED HEALTH CARE EDUCATION/TRAINING PROGRAM

## 2025-07-21 PROCEDURE — 85004 AUTOMATED DIFF WBC COUNT: CPT | Performed by: REGISTERED NURSE

## 2025-07-21 PROCEDURE — 78816 PET IMAGE W/CT FULL BODY: CPT | Mod: PS

## 2025-07-21 PROCEDURE — 84165 PROTEIN E-PHORESIS SERUM: CPT | Mod: TC | Performed by: STUDENT IN AN ORGANIZED HEALTH CARE EDUCATION/TRAINING PROGRAM

## 2025-07-21 PROCEDURE — 88305 TISSUE EXAM BY PATHOLOGIST: CPT | Mod: 26 | Performed by: STUDENT IN AN ORGANIZED HEALTH CARE EDUCATION/TRAINING PROGRAM

## 2025-07-21 PROCEDURE — 3079F DIAST BP 80-89 MM HG: CPT

## 2025-07-21 PROCEDURE — 84155 ASSAY OF PROTEIN SERUM: CPT | Performed by: REGISTERED NURSE

## 2025-07-21 PROCEDURE — 83521 IG LIGHT CHAINS FREE EACH: CPT | Performed by: REGISTERED NURSE

## 2025-07-21 PROCEDURE — 1125F AMNT PAIN NOTED PAIN PRSNT: CPT

## 2025-07-21 PROCEDURE — 88271 CYTOGENETICS DNA PROBE: CPT | Performed by: REGISTERED NURSE

## 2025-07-21 PROCEDURE — A9552 F18 FDG: HCPCS | Performed by: STUDENT IN AN ORGANIZED HEALTH CARE EDUCATION/TRAINING PROGRAM

## 2025-07-21 PROCEDURE — 82232 ASSAY OF BETA-2 PROTEIN: CPT | Performed by: REGISTERED NURSE

## 2025-07-21 PROCEDURE — 250N000011 HC RX IP 250 OP 636

## 2025-07-21 PROCEDURE — 88341 IMHCHEM/IMCYTCHM EA ADD ANTB: CPT | Mod: TC | Performed by: REGISTERED NURSE

## 2025-07-21 PROCEDURE — 88341 IMHCHEM/IMCYTCHM EA ADD ANTB: CPT | Mod: 26 | Performed by: STUDENT IN AN ORGANIZED HEALTH CARE EDUCATION/TRAINING PROGRAM

## 2025-07-21 PROCEDURE — 83735 ASSAY OF MAGNESIUM: CPT | Performed by: REGISTERED NURSE

## 2025-07-21 PROCEDURE — 88311 DECALCIFY TISSUE: CPT | Mod: 26 | Performed by: STUDENT IN AN ORGANIZED HEALTH CARE EDUCATION/TRAINING PROGRAM

## 2025-07-21 PROCEDURE — 82784 ASSAY IGA/IGD/IGG/IGM EACH: CPT

## 2025-07-21 PROCEDURE — 82310 ASSAY OF CALCIUM: CPT | Performed by: REGISTERED NURSE

## 2025-07-21 PROCEDURE — 86357 NK CELLS TOTAL COUNT: CPT | Performed by: REGISTERED NURSE

## 2025-07-21 PROCEDURE — 88185 FLOWCYTOMETRY/TC ADD-ON: CPT | Performed by: REGISTERED NURSE

## 2025-07-21 PROCEDURE — 3077F SYST BP >= 140 MM HG: CPT

## 2025-07-21 PROCEDURE — 85097 BONE MARROW INTERPRETATION: CPT | Mod: GC | Performed by: STUDENT IN AN ORGANIZED HEALTH CARE EDUCATION/TRAINING PROGRAM

## 2025-07-21 PROCEDURE — 83615 LACTATE (LD) (LDH) ENZYME: CPT | Performed by: REGISTERED NURSE

## 2025-07-21 RX ORDER — HEPARIN SODIUM (PORCINE) LOCK FLUSH IV SOLN 100 UNIT/ML 100 UNIT/ML
500 SOLUTION INTRAVENOUS ONCE
Status: COMPLETED | OUTPATIENT
Start: 2025-07-21 | End: 2025-07-21

## 2025-07-21 RX ORDER — HEPARIN SODIUM (PORCINE) LOCK FLUSH IV SOLN 100 UNIT/ML 100 UNIT/ML
5 SOLUTION INTRAVENOUS ONCE
Status: COMPLETED | OUTPATIENT
Start: 2025-07-21 | End: 2025-07-21

## 2025-07-21 RX ORDER — FLUDEOXYGLUCOSE F 18 200 MCI/ML
10-18 INJECTION, SOLUTION INTRAVENOUS ONCE
Status: COMPLETED | OUTPATIENT
Start: 2025-07-21 | End: 2025-07-21

## 2025-07-21 RX ADMIN — Medication 5 ML: at 14:18

## 2025-07-21 RX ADMIN — FLUDEOXYGLUCOSE F 18 10.15 MILLICURIE: 200 INJECTION, SOLUTION INTRAVENOUS at 07:02

## 2025-07-21 RX ADMIN — HEPARIN SODIUM (PORCINE) LOCK FLUSH IV SOLN 100 UNIT/ML 500 UNITS: 100 SOLUTION at 07:06

## 2025-07-21 ASSESSMENT — PAIN SCALES - GENERAL: PAINLEVEL_OUTOF10: MILD PAIN (2)

## 2025-07-21 NOTE — LETTER
7/21/2025      Shena Hartmann  [address] [place] [address]      Dear Colleague,    Thank you for referring your patient, Shena Hartmann, to the [facility] BLOOD AND MARROW TRANSPLANT PROGRAM [place]. Please see a copy of my visit note below.    BMT ONC Adult Bone Marrow Biopsy Procedure Note  July 21, 2025  BP (!) 149/85   Pulse 73   Temp 98.1  F (36.7  C) (Oral)   Resp 16   Wt 55.6 kg (122 lb 8 oz)   LMP  (LMP Unknown)   SpO2 99%   BMI 23.15 kg/m       Learning needs assessment complete within 12 months? YES    DIAGNOSIS: MM     PROCEDURE: Unilateral Bone Marrow Biopsy and Unilateral Aspirate    LOCATION: [facility] 2nd Floor    Patient s identification was positively verified by verbal identification and invasive procedure safety checklist was completed. Informed consent was obtained. Following the administration of no pre-medication, patient was placed in the prone position and prepped and draped in a sterile manner. Approximately 10 cc of 1% Lidocaine was used over the left posterior iliac spine. Following this a 3 mm incision was made. Trephine bone marrow core(s) was (were) obtained from the IC. Bone marrow aspirates were obtained from the [facility]. Aspirates were sent for morphology, immunophenotyping, cytogenetics, TTL and clonoseq. A total of approximately 35 ml of marrow was aspirated. Following this procedure a sterile dressing was applied to the bone marrow biopsy site(s). The patient was placed in the supine position to maintain pressure on the biopsy site. Post-procedure wound care instructions were given.     Complications: NO    Pre-procedural pain: 0 out of 10 on the numeric pain rating scale.     Procedural pain: 7 out of 10 on the numeric pain rating scale.     Post-procedural pain assessment: 0 out of 10 on the numeric pain rating scale.     Interventions: NO    Length of procedure:20 minutes or less      Procedure performed by: Terrence Carias PA-C     Again, thank you  for allowing me to participate in the care of your patient.        Sincerely,        Ulices Carias PA-C    Electronically signed

## 2025-07-21 NOTE — PROGRESS NOTES
BMT ONC Adult Bone Marrow Biopsy Procedure Note  July 21, 2025  BP (!) 149/85   Pulse 73   Temp 98.1  F (36.7  C) (Oral)   Resp 16   Wt 55.6 kg (122 lb 8 oz)   LMP  (LMP Unknown)   SpO2 99%   BMI 23.15 kg/m       Learning needs assessment complete within 12 months? YES    DIAGNOSIS: MM     PROCEDURE: Unilateral Bone Marrow Biopsy and Unilateral Aspirate    LOCATION: [facility] 2nd Floor    Patient s identification was positively verified by verbal identification and invasive procedure safety checklist was completed. Informed consent was obtained. Following the administration of no pre-medication, patient was placed in the prone position and prepped and draped in a sterile manner. Approximately 10 cc of 1% Lidocaine was used over the left posterior iliac spine. Following this a 3 mm incision was made. Trephine bone marrow core(s) was (were) obtained from the IC. Bone marrow aspirates were obtained from the [facility]. Aspirates were sent for morphology, immunophenotyping, cytogenetics, TTL and clonoseq. A total of approximately 35 ml of marrow was aspirated. Following this procedure a sterile dressing was applied to the bone marrow biopsy site(s). The patient was placed in the supine position to maintain pressure on the biopsy site. Post-procedure wound care instructions were given.     Complications: NO    Pre-procedural pain: 0 out of 10 on the numeric pain rating scale.     Procedural pain: 7 out of 10 on the numeric pain rating scale.     Post-procedural pain assessment: 0 out of 10 on the numeric pain rating scale.     Interventions: NO    Length of procedure:20 minutes or less      Procedure performed by: Terrence Carias PA-C

## 2025-07-22 LAB
ALBUMIN SERPL ELPH-MCNC: 4.1 G/DL (ref 3.7–5.1)
ALPHA1 GLOB SERPL ELPH-MCNC: 0.2 G/DL (ref 0.2–0.4)
ALPHA2 GLOB SERPL ELPH-MCNC: 0.6 G/DL (ref 0.5–0.9)
B-GLOBULIN SERPL ELPH-MCNC: 0.6 G/DL (ref 0.6–1)
GAMMA GLOB SERPL ELPH-MCNC: 0.2 G/DL (ref 0.7–1.6)
IGA SERPL-MCNC: 8 MG/DL (ref 84–499)
IGG SERPL-MCNC: 230 MG/DL (ref 610–1616)
IGM SERPL-MCNC: <10 MG/DL (ref 35–242)
KAPPA LC FREE SER-MCNC: 0.33 MG/DL (ref 0.33–1.94)
KAPPA LC FREE/LAMBDA FREE SER NEPH: 1.38 {RATIO} (ref 0.26–1.65)
LAMBDA LC FREE SERPL-MCNC: 0.24 MG/DL (ref 0.57–2.63)
LOCATION OF TASK: ABNORMAL
LOCATION OF TASK: NORMAL
M PROTEIN SERPL ELPH-MCNC: 0 G/DL
PATH REPORT.COMMENTS IMP SPEC: NORMAL
PATH REPORT.FINAL DX SPEC: NORMAL
PATH REPORT.MICROSCOPIC SPEC OTHER STN: NORMAL
PATH REPORT.RELEVANT HX SPEC: NORMAL
PROT PATTERN SERPL ELPH-IMP: ABNORMAL
PROT PATTERN SERPL IFE-IMP: NORMAL

## 2025-07-23 LAB
CULTURE HARVEST COMPLETE DATE: NORMAL
CULTURE HARVEST COMPLETE DATE: NORMAL
PATH REPORT.COMMENTS IMP SPEC: NORMAL
PATH REPORT.COMMENTS IMP SPEC: NORMAL
PATH REPORT.FINAL DX SPEC: NORMAL
PATH REPORT.GROSS SPEC: NORMAL
PATH REPORT.MICROSCOPIC SPEC OTHER STN: NORMAL
PATH REPORT.MICROSCOPIC SPEC OTHER STN: NORMAL
PATH REPORT.RELEVANT HX SPEC: NORMAL

## 2025-07-28 ENCOUNTER — OFFICE VISIT (OUTPATIENT)
Dept: ORTHOPEDICS | Facility: CLINIC | Age: 75
End: 2025-07-28
Payer: MEDICARE

## 2025-07-28 DIAGNOSIS — M79.631 RIGHT FOREARM PAIN: ICD-10-CM

## 2025-07-28 DIAGNOSIS — G56.03 BILATERAL CARPAL TUNNEL SYNDROME: Primary | ICD-10-CM

## 2025-07-28 DIAGNOSIS — M65.30 TRIGGER FINGER, ACQUIRED: ICD-10-CM

## 2025-07-28 DIAGNOSIS — M79.631 PAIN IN BOTH FOREARMS: ICD-10-CM

## 2025-07-28 DIAGNOSIS — M79.632 PAIN IN BOTH FOREARMS: ICD-10-CM

## 2025-07-28 PROCEDURE — 20550 NJX 1 TENDON SHEATH/LIGAMENT: CPT | Mod: F6 | Performed by: PHYSICIAN ASSISTANT

## 2025-07-28 PROCEDURE — 20550 NJX 1 TENDON SHEATH/LIGAMENT: CPT | Mod: F8 | Performed by: PHYSICIAN ASSISTANT

## 2025-07-28 PROCEDURE — 99213 OFFICE O/P EST LOW 20 MIN: CPT | Mod: 25 | Performed by: PHYSICIAN ASSISTANT

## 2025-07-28 RX ORDER — DEXAMETHASONE SODIUM PHOSPHATE 4 MG/ML
4 INJECTION, SOLUTION INTRA-ARTICULAR; INTRALESIONAL; INTRAMUSCULAR; INTRAVENOUS; SOFT TISSUE
Status: COMPLETED | OUTPATIENT
Start: 2025-07-28 | End: 2025-07-28

## 2025-07-28 RX ORDER — LIDOCAINE HYDROCHLORIDE 10 MG/ML
1 INJECTION, SOLUTION EPIDURAL; INFILTRATION; INTRACAUDAL; PERINEURAL
Status: COMPLETED | OUTPATIENT
Start: 2025-07-28 | End: 2025-07-28

## 2025-07-28 RX ADMIN — LIDOCAINE HYDROCHLORIDE 1 ML: 10 INJECTION, SOLUTION EPIDURAL; INFILTRATION; INTRACAUDAL; PERINEURAL at 11:09

## 2025-07-28 RX ADMIN — DEXAMETHASONE SODIUM PHOSPHATE 4 MG: 4 INJECTION, SOLUTION INTRA-ARTICULAR; INTRALESIONAL; INTRAMUSCULAR; INTRAVENOUS; SOFT TISSUE at 11:09

## 2025-07-28 RX ADMIN — DEXAMETHASONE SODIUM PHOSPHATE 4 MG: 4 INJECTION, SOLUTION INTRA-ARTICULAR; INTRALESIONAL; INTRAMUSCULAR; INTRAVENOUS; SOFT TISSUE at 11:08

## 2025-07-28 RX ADMIN — LIDOCAINE HYDROCHLORIDE 1 ML: 10 INJECTION, SOLUTION EPIDURAL; INFILTRATION; INTRACAUDAL; PERINEURAL at 11:08

## 2025-07-28 NOTE — NURSING NOTE
Reason For Visit:   Chief Complaint   Patient presents with    RECHECK     Bilateral Carpal Tunnel Syndrome follow-up        Primary MD: Thomas Villalobos  Ref. MD: Denis    Age: 74 year old    ?  No      LMP  (LMP Unknown)       Pain Assessment  Patient Currently in Pain: Yes  Patient's Stated Pain Goal: 4  Primary Pain Location: Hand (Right Ring Finger)  Pain Descriptors:  (Nerve pain and stiffness)    Hand Dominance Evaluation  Hand Dominance: Right          QuickDASH Assessment      7/27/2025    12:06 PM   QuickDASH Main   1. Open a tight or new jar Moderate difficulty   2. Do heavy household chores (e.g., wash walls, floors) Moderate difficulty   3. Carry a shopping bag or briefcase Mild difficulty   4. Wash your back Mild difficulty   5. Use a knife to cut food Mild difficulty   6. Recreational activities in which you take some force or impact through your arm, shoulder or hand (e.g., golf, hammering, tennis, etc.) Mild difficulty   7. During the past week, to what extent has your arm, shoulder or hand problem interfered with your normal social activities with family, friends, neighbours or groups Slightly   8. During the past week, were you limited in your work or other regular daily activities as a result of your arm, shoulder or hand problem Slightly limited   9. Arm, shoulder or hand pain Mild   10.Tingling (pins and needles) in your arm,shoulder or hand Mild   11. During the past week, how much difficulty have you had sleeping because of the pain in your arm, shoulder or hand No difficulty   Quickdash Ability Score 27.27          Current Outpatient Medications   Medication Sig Dispense Refill    acetaminophen (TYLENOL) 325 MG tablet Take 325-650 mg by mouth every 6 hours as needed for mild pain      acyclovir (ZOVIRAX) 400 MG tablet Take 1 tablet (400 mg) by mouth every 12 hours. 60 tablet 11    amLODIPine (NORVASC) 2.5 MG tablet Take 2 tablets (5 mg) by mouth daily Take 5 mg (2 tablets) in the  morning. 180 tablet 3    CALCIUM-VITAMIN D-VITAMIN K PO Take 2 tablets by mouth daily.      clobetasol (TEMOVATE) 0.05 % external ointment Apply topically 2 times daily as needed (itching and rash). Topically to rash on hands until resolved 45 g 0    estradiol (ESTRACE) 0.1 MG/GM vaginal cream Place 1 g vaginally three times a week. Ok to use your fingers or the applicator 42.5 g 3    levothyroxine (SYNTHROID/LEVOTHROID) 50 MCG tablet Take 1 tablet (50 mcg) by mouth daily. 90 tablet 3    MAGNESIUM PO Take 235 mg by mouth 2 times daily.      Multiple Vitamins-Minerals (MULTIVITAMIN OR) Take 1 tablet by mouth 2 times daily.      order for DME 6 mastectomy bras  Length of need: 99 months 6 Device 1    order for DME R mastectomy prosthesis   Length of need:  99 months 1 Device 1    penicillin V (VEETID) 250 MG tablet Take 1 tablet (250 mg) by mouth daily. (Patient not taking: Reported on 5/6/2025) 60 tablet 2    triamcinolone (KENALOG) 0.1 % external cream Apply topically 2 times daily as needed for irritation. 15 g 11       Allergies   Allergen Reactions    Blood Transfusion Related (Informational Only) Other (See Comments)     Patient has a history of a clinically significant antibody against RBC antigens.  A delay in compatible RBCs may occur.     Tape [Adhesive Tape] Dermatitis, Itching and Rash     Strong reaction in the patch tests to the natural resins used in adhesives such as Colophony and Abietic acid    Cayenne Pepper [Cayenne]     Corn-Containing Products GI Disturbance    Levaquin Other (See Comments)     Tendon pain    Milk Protein GI Disturbance    Morphine Sulfate Other (See Comments)     Per pt - see things (hallucination) when she was on Morphine .    Shrimp Nausea and Vomiting    Tomato      Lip swelling, facial rash    Oat Rash    Wheat Rash     Swelling of lips       Chyna Alegre, ATC

## 2025-07-28 NOTE — PROGRESS NOTES
Hand / Upper Extremity Injection/Arthrocentesis: R index A1    Date/Time: 2025 11:08 AM    Performed by: Nadia Good PA-C  Authorized by: Nadia Good PA-C    Indications:  Pain  Needle Size:  25 G  Condition: trigger finger    Location:  Index finger    Site:  R index A1  Medications:  4 mg dexAMETHasone 4 MG/ML; 1 mL lidocaine (PF) 1 %  Outcome:  Tolerated well, no immediate complications  Procedure discussed: discussed risks, benefits, and alternatives    Consent Given by:  Patient  Timeout: timeout called immediately prior to procedure    Prep: patient was prepped and draped in usual sterile fashion    Hand / Upper Extremity Injection/Arthrocentesis: R ring A1    Date/Time: 2025 11:09 AM    Performed by: Nadia Good PA-C  Authorized by: Nadia Good PA-C    Indications:  Pain  Needle Size:  25 G  Condition: trigger finger    Location:  Ring finger    Site:  R ring A1  Medications:  4 mg dexAMETHasone 4 MG/ML; 1 mL lidocaine (PF) 1 %  Outcome:  Tolerated well, no immediate complications  Procedure discussed: discussed risks, benefits, and alternatives    Consent Given by:  Patient  Timeout: timeout called immediately prior to procedure    Prep: patient was prepped and draped in usual sterile fashion        [facility] [address] [address]  [phone]  Dept: [phone]  ______________________________________________________________________________    Patient: Shena Hartmann   : 1950   MRN: [id number]   2025    INVASIVE PROCEDURE SAFETY CHECKLIST    Date: 2025   Procedure: Right index and ring finger trigger finger steroid injections  Patient Name: Shena Hartmann  MRN: [id number]  YOB: 1950    Action: Complete sections as appropriate. Any discrepancy results in a HARD COPY until resolved.     PRE PROCEDURE:  Patient ID verified with 2  identifiers (name and  or MRN): Yes  Procedure and site verified with patient/designee (when able): Yes  Accurate consent documentation in medical record: Yes  H&P (or appropriate assessment) documented in medical record: Yes  H&P must be up to 20 days prior to procedure and updates within 24 hours of procedure as applicable: Yes  Relevant diagnostic and radiology test results appropriately labeled and displayed as applicable: NA  Procedure site(s) marked with provider initials: NA    TIMEOUT:  Time-Out performed immediately prior to starting procedure, including verbal and active participation of all team members addressing the following:Yes  * Correct patient identify  * Confirmed that the correct side and site are marked  * An accurate procedure consent form  * Agreement on the procedure to be done  * Correct patient position  * Relevant images and results are properly labeled and appropriately displayed  * The need to administer antibiotics or fluids for irrigation purposes during the procedure as applicable   * Safety precautions based on patient history or medication use    DURING PROCEDURE: Verification of correct person, site, and procedures any time the responsibility for care of the patient is transferred to another member of the care team.       The following medications were given:         Prior to injection, verified patient identity using patient's name and date of birth.  Due to injection administration, patient instructed to remain in clinic for 15 minutes  afterwards, and to report any adverse reaction to me immediately.    Trigger point injection was performed.   Medication Name: Dexamethasone sodium phosphate NDC 13772-600-56  Drug Amount Wasted:  None.  Vial/Syringe: Single dose vial  Expiration Date:  26    Medication Name Lidocaine 1% NDC 86927-454-54    Scribed by Iesha Vaughan ATC for Nadia Good PA-C on 2025 at 11:11am based on the provider's statements to me.      Iesha Vaughan, ATC

## 2025-07-28 NOTE — PROGRESS NOTES
Date of Service: Jul 28, 2025    Chief Complaint:   Chief Complaint   Patient presents with    RECHECK     Bilateral Carpal Tunnel Syndrome follow-up        Interval events: Shena Hartmann is a 74 year old female who presents today in follow-up for lateral carpal tunnel syndrome and right trigger fingers.  Patient reports she has been doing therapy.  She is wearing night splints.  Regards to her numbness and tingling, night splinting does seem to's help, though sometimes she has increased stiffness in the hand in the morning.  She does note during the day any sort of wrist flexion causes symptoms.  She also notes potentially symptoms rating down the top of her hand.  Trigger finger to the right ring finger with moderate improvement after last injection.  She is not have any further triggering, but she feels tightness if she tries to fully extend the digit.  She does have a splint that she wears at night.  She also noticed some clicking in the index finger of the right hand few days ago as well.    The past medical history was reviewed updated in the EMR. This includes medications, surgeries, social history, and review of systems.    Physical examination:  Well-developed, well-nourished and in no acute distress.  Alert and oriented to surroundings.  On examination of the right upper extremity:   Tenderness to palpation to the proximal forearm along the extensor musculature. Minimal tenderness to palpation along the lateral epicondyle.  Mild pain with resisted supination, some mild discomfort with resisted forearm pronation with wrist in flexion and elbow extension.   Palpable tender nodule at the level of the A1 pulley of the ring finger. No palpable clicking, but palpation of the nodule and tenderness at the proximal edge of the pulley. Able to achieve near full extension. No palpable significant dupuytrens cords or nodules on exam.  Nonpalpable non nodule to the index finger at the level of the A1 pulley. Positive  Phalen's and carpal tunnel compression test.    EMG/NCS: EMG obtained on 5/1/25 demonstrates:  Results  Nerve conduction studies showed prolonged median sensory peak latencies with mild slowing of conduction velocities on both sides.  Bilateral median distal motor latencies were also prolonged with preserved amplitudes.  Median-ulnar palmar latency difference were prolonged on both sides.     Needle EMG of bilateral upper limbs showed mildly reduced recruitment of polyphasic motor unit potentials in the right abductor pollicis brevis muscle.     Interpretation:  This is an abnormal study.  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  There is no electrophysiologic evidence of a superimposed cervical radiculopathy in the current study.      Assessment: 74 year old female with:  Bilateral carpal tunnel syndrome, status post bilateral carpal tunnel injections on 6/16/2025.  Right ring trigger finger, status post corticosteroid injection on 6/16/2025.   Mild right index finger trigger finger.  Bilateral dorsal forearm pain, right worse than left. Differential diagnosis include tennis elbow vs. Radial tunnel syndrome. Symptoms improving with therapy.     Plan:    For management of her carpal tunnel, We again had a lengthy discussion about EMG results and possible treatment options. We discussed possible treatment options. The first would be to continue night splinting and to observe the symptoms for any change or progression. The second is to consider surgery, which would be an open vs endoscopic carpal tunnel release.  I briefly described the procedure and postoperative plan for both open and endoscopic carpal tunnel releases.  Patient will continue with conservative management at this time.  For management of her right ring trigger finger, patient had partial improvement with previous steroid injection and is interested in trying  a second injection today.  Procedure elbow.  For her mild right index finger trigger finger, recommended trial of a corticosteroid injection today, patient agreeable.  See procedure note below.  For bilateral forearm pain, continued therapy.  New therapy order provided for her today.  If her symptoms worsen or fail to improve could consider MRI versus ultrasound of the elbow to further evaluate the radial tunnel, could also consider diagnostic radial tunnel steroid injection.    Right ring trigger injection  After written informed consent was obtained, the patients right ring finger was prepped with chloraprep. 2mL of a 1:1 mixture of 4mg/ml of dexamethasone and  of 1% lidocaine was injected into the flexor sheath of the ring finger at the level of the A1 pulley. There were no immediate complications.     Right index trigger injection  After written informed consent was obtained, the patients right index finger was prepped with chloraprep. 2mL of a 1:1 mixture of 4mg/ml of dexamethasone and  of 1% lidocaine was injected into the flexor sheath of the ring finger at the level of the A1 pulley. There were no immediate complications.     CHADWICK TIMMONS PA-C  July 28, 2025 10:42 AM   Orthopaedic Surgery

## 2025-07-28 NOTE — LETTER
7/28/2025      Shena Hartmann  [address] [place] [address]      Dear Colleague,    Thank you for referring your patient, Shena Hartmann, to the [facility] [place]. Please see a copy of my visit note below.    Date of Service: Jul 28, 2025    Chief Complaint:   Chief Complaint   Patient presents with     RECHECK     Bilateral Carpal Tunnel Syndrome follow-up        Interval events: Shena Hartmann is a 74 year old female who presents today in follow-up for lateral carpal tunnel syndrome and right trigger fingers.  Patient reports she has been doing therapy.  She is wearing night splints.  Regards to her numbness and tingling, night splinting does seem to's help, though sometimes she has increased stiffness in the hand in the morning.  She does note during the day any sort of wrist flexion causes symptoms.  She also notes potentially symptoms rating down the top of her hand.  Trigger finger to the right ring finger with moderate improvement after last injection.  She is not have any further triggering, but she feels tightness if she tries to fully extend the digit.  She does have a splint that she wears at night.  She also noticed some clicking in the index finger of the right hand few days ago as well.    The past medical history was reviewed updated in the EMR. This includes medications, surgeries, social history, and review of systems.    Physical examination:  Well-developed, well-nourished and in no acute distress.  Alert and oriented to surroundings.  On examination of the right upper extremity:   Tenderness to palpation to the proximal forearm along the extensor musculature. Minimal tenderness to palpation along the lateral epicondyle.  Mild pain with resisted supination, some mild discomfort with resisted forearm pronation with wrist in flexion and elbow extension.   Palpable tender nodule at the level of the A1 pulley of the ring finger. No palpable clicking, but  palpation of the nodule and tenderness at the proximal edge of the pulley. Able to achieve near full extension. No palpable significant dupuytrens cords or nodules on exam.  Nonpalpable non nodule to the index finger at the level of the A1 pulley. Positive Phalen's and carpal tunnel compression test.    EMG/NCS: EMG obtained on 5/1/25 demonstrates:  Results  Nerve conduction studies showed prolonged median sensory peak latencies with mild slowing of conduction velocities on both sides.  Bilateral median distal motor latencies were also prolonged with preserved amplitudes.  Median-ulnar palmar latency difference were prolonged on both sides.     Needle EMG of bilateral upper limbs showed mildly reduced recruitment of polyphasic motor unit potentials in the right abductor pollicis brevis muscle.     Interpretation:  This is an abnormal study.  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  There is no electrophysiologic evidence of a superimposed cervical radiculopathy in the current study.      Assessment: 74 year old female with:  Bilateral carpal tunnel syndrome, status post bilateral carpal tunnel injections on 6/16/2025.  Right ring trigger finger, status post corticosteroid injection on 6/16/2025.   Mild right index finger trigger finger.  Bilateral dorsal forearm pain, right worse than left. Differential diagnosis include tennis elbow vs. Radial tunnel syndrome. Symptoms improving with therapy.     Plan:    For management of her carpal tunnel, We again had a lengthy discussion about EMG results and possible treatment options. We discussed possible treatment options. The first would be to continue night splinting and to observe the symptoms for any change or progression. The second is to consider surgery, which would be an open vs endoscopic carpal tunnel release.  I briefly described the procedure and postoperative plan for  both open and endoscopic carpal tunnel releases.  Patient will continue with conservative management at this time.  For management of her right ring trigger finger, patient had partial improvement with previous steroid injection and is interested in trying a second injection today.  Procedure elbow.  For her mild right index finger trigger finger, recommended trial of a corticosteroid injection today, patient agreeable.  See procedure note below.  For bilateral forearm pain, continued therapy.  New therapy order provided for her today.  If her symptoms worsen or fail to improve could consider MRI versus ultrasound of the elbow to further evaluate the radial tunnel, could also consider diagnostic radial tunnel steroid injection.    Right ring trigger injection  After written informed consent was obtained, the patients right ring finger was prepped with chloraprep. 2mL of a 1:1 mixture of 4mg/ml of dexamethasone and  of 1% lidocaine was injected into the flexor sheath of the ring finger at the level of the A1 pulley. There were no immediate complications.     Right index trigger injection  After written informed consent was obtained, the patients right index finger was prepped with chloraprep. 2mL of a 1:1 mixture of 4mg/ml of dexamethasone and  of 1% lidocaine was injected into the flexor sheath of the ring finger at the level of the A1 pulley. There were no immediate complications.     NADIA TIMMONS PA-C  July 28, 2025 10:42 AM   Orthopaedic Surgery        Hand / Upper Extremity Injection/Arthrocentesis: R index A1    Date/Time: 7/28/2025 11:08 AM    Performed by: Nadia Timmons PA-C  Authorized by: Nadia Timmons PA-C    Indications:  Pain  Needle Size:  25 G  Condition: trigger finger    Location:  Index finger    Site:  R index A1  Medications:  4 mg dexAMETHasone 4 MG/ML; 1 mL lidocaine (PF) 1 %  Outcome:  Tolerated well, no immediate complications  Procedure discussed: discussed risks,  benefits, and alternatives    Consent Given by:  Patient  Timeout: timeout called immediately prior to procedure    Prep: patient was prepped and draped in usual sterile fashion    Hand / Upper Extremity Injection/Arthrocentesis: R ring A1    Date/Time: 2025 11:09 AM    Performed by: Nadia Good PA-C  Authorized by: Nadia Good PA-C    Indications:  Pain  Needle Size:  25 G  Condition: trigger finger    Location:  Ring finger    Site:  R ring A1  Medications:  4 mg dexAMETHasone 4 MG/ML; 1 mL lidocaine (PF) 1 %  Outcome:  Tolerated well, no immediate complications  Procedure discussed: discussed risks, benefits, and alternatives    Consent Given by:  Patient  Timeout: timeout called immediately prior to procedure    Prep: patient was prepped and draped in usual sterile fashion        [facility] [address] [address]  [phone]  Dept: [phone]  ______________________________________________________________________________    Patient: Shena Hartmann   : 1950   MRN: [id number]   2025    INVASIVE PROCEDURE SAFETY CHECKLIST    Date: 2025   Procedure: Right index and ring finger trigger finger steroid injections  Patient Name: Shena Hartmann  MRN: [id number]  YOB: 1950    Action: Complete sections as appropriate. Any discrepancy results in a HARD COPY until resolved.     PRE PROCEDURE:  Patient ID verified with 2 identifiers (name and  or MRN): Yes  Procedure and site verified with patient/designee (when able): Yes  Accurate consent documentation in medical record: Yes  H&P (or appropriate assessment) documented in medical record: Yes  H&P must be up to 20 days prior to procedure and updates within 24 hours of procedure as applicable: Yes  Relevant diagnostic and radiology test results appropriately labeled and displayed as applicable: NA  Procedure site(s) marked with  provider initials: NA    TIMEOUT:  Time-Out performed immediately prior to starting procedure, including verbal and active participation of all team members addressing the following:Yes  * Correct patient identify  * Confirmed that the correct side and site are marked  * An accurate procedure consent form  * Agreement on the procedure to be done  * Correct patient position  * Relevant images and results are properly labeled and appropriately displayed  * The need to administer antibiotics or fluids for irrigation purposes during the procedure as applicable   * Safety precautions based on patient history or medication use    DURING PROCEDURE: Verification of correct person, site, and procedures any time the responsibility for care of the patient is transferred to another member of the care team.       The following medications were given:         Prior to injection, verified patient identity using patient's name and date of birth.  Due to injection administration, patient instructed to remain in clinic for 15 minutes  afterwards, and to report any adverse reaction to me immediately.    Trigger point injection was performed.   Medication Name: Dexamethasone sodium phosphate NDC 68371-399-49  Drug Amount Wasted:  None.  Vial/Syringe: Single dose vial  Expiration Date:  7/1/26    Medication Name Lidocaine 1% NDC 95586-149-58    Scribed by Iesha Vaughan ATC for Nadia Good PA-C on July 28, 2025 at 11:11am based on the provider's statements to me.     Iesha Vaguhan ATC    Again, thank you for allowing me to participate in the care of your patient.        Sincerely,        Nadia Good PA-C    Electronically signed

## 2025-07-30 ENCOUNTER — DOCUMENTATION ONLY (OUTPATIENT)
Dept: PHARMACY | Facility: CLINIC | Age: 75
End: 2025-07-30
Payer: MEDICARE

## 2025-07-30 NOTE — PROGRESS NOTES
Pharmacist IVIG Stewardship Program    Diagnosis: Hypogammaglobulinemia   Date  12/20/2024   IgG Level (mg/dL) 143     Current Dosing Regimen: Privigen 20g IV monthly PRN for IgG <300 mg/dL   Patient is dosed as needed based on an IgG level of less than 300 mg/dL to ensure the lowest amount of medication is administered to achieve beneficial outcomes.  Pre-medications:   Acetaminophen 650 mg by mouth, 30 minutes prior to infusion  Diphenhydramine 37.5 mg IV prior to IVIG infusion and again 90 minutes after IVIG has started  Previously Tried Products: None     Side Effects: Unable to reach patient to discuss.     Interventions: Lab review completed.     Assessment:   Date  7/21/2025   IgG Level (mg/dL) 230   Patient is appropriate for IVIG therapy when their level is within parameter. IVIG infusions are effective in increasing IgG levels to an appropriate level to minimize patient's risk of infection. Patient should continue on treatment as they have been per provider order. Without therapy their levels would put them at an increased risk of infections.    Plan: Patient is okay to proceed with IVIG infusion on 7/31/25, as her level is below her provider ordered parameter. Patient will have labs checked again next month to assess the need for additional IVIG infusions moving forward.     Next Review Needed: 8/2025    Willa Momin, Felipe, IgCP  Medication Access Pharmacist

## 2025-07-31 ENCOUNTER — INFUSION THERAPY VISIT (OUTPATIENT)
Dept: ONCOLOGY | Facility: CLINIC | Age: 75
End: 2025-07-31
Attending: REGISTERED NURSE
Payer: MEDICARE

## 2025-07-31 VITALS
BODY MASS INDEX: 22.56 KG/M2 | SYSTOLIC BLOOD PRESSURE: 151 MMHG | WEIGHT: 119.4 LBS | DIASTOLIC BLOOD PRESSURE: 77 MMHG | RESPIRATION RATE: 16 BRPM | TEMPERATURE: 97.8 F | HEART RATE: 67 BPM | OXYGEN SATURATION: 98 %

## 2025-07-31 DIAGNOSIS — C90.00 MULTIPLE MYELOMA NOT HAVING ACHIEVED REMISSION (H): ICD-10-CM

## 2025-07-31 DIAGNOSIS — D80.1 HYPOGAMMAGLOBULINEMIA: Primary | ICD-10-CM

## 2025-07-31 PROCEDURE — 250N000011 HC RX IP 250 OP 636: Performed by: REGISTERED NURSE

## 2025-07-31 PROCEDURE — 250N000011 HC RX IP 250 OP 636: Performed by: NURSE PRACTITIONER

## 2025-07-31 PROCEDURE — 250N000013 HC RX MED GY IP 250 OP 250 PS 637: Performed by: REGISTERED NURSE

## 2025-07-31 RX ORDER — DIPHENHYDRAMINE HYDROCHLORIDE 50 MG/ML
37.5 INJECTION, SOLUTION INTRAMUSCULAR; INTRAVENOUS ONCE
Start: 2025-07-31 | End: 2025-07-31

## 2025-07-31 RX ORDER — ACETAMINOPHEN 325 MG/1
650 TABLET ORAL ONCE
Status: COMPLETED | OUTPATIENT
Start: 2025-07-31 | End: 2025-07-31

## 2025-07-31 RX ORDER — DIPHENHYDRAMINE HYDROCHLORIDE 50 MG/ML
37.5 INJECTION, SOLUTION INTRAMUSCULAR; INTRAVENOUS ONCE
Status: COMPLETED | OUTPATIENT
Start: 2025-07-31 | End: 2025-07-31

## 2025-07-31 RX ORDER — ACETAMINOPHEN 325 MG/1
650 TABLET ORAL ONCE
Start: 2025-07-31

## 2025-07-31 RX ORDER — HEPARIN SODIUM (PORCINE) LOCK FLUSH IV SOLN 100 UNIT/ML 100 UNIT/ML
5 SOLUTION INTRAVENOUS
OUTPATIENT
Start: 2025-07-31

## 2025-07-31 RX ORDER — METHYLPREDNISOLONE SODIUM SUCCINATE 40 MG/ML
40 INJECTION INTRAMUSCULAR; INTRAVENOUS
Status: COMPLETED | OUTPATIENT
Start: 2025-07-31 | End: 2025-07-31

## 2025-07-31 RX ORDER — HEPARIN SODIUM,PORCINE 10 UNIT/ML
5-20 VIAL (ML) INTRAVENOUS DAILY PRN
OUTPATIENT
Start: 2025-07-31

## 2025-07-31 RX ORDER — METHYLPREDNISOLONE SODIUM SUCCINATE 40 MG/ML
40 INJECTION INTRAMUSCULAR; INTRAVENOUS
Start: 2025-07-31

## 2025-07-31 RX ORDER — ALBUTEROL SULFATE 0.83 MG/ML
2.5 SOLUTION RESPIRATORY (INHALATION)
OUTPATIENT
Start: 2025-07-31

## 2025-07-31 RX ORDER — HUMAN IMMUNOGLOBULIN G 20 G/200ML
0.4 LIQUID INTRAVENOUS ONCE
Status: COMPLETED | OUTPATIENT
Start: 2025-07-31 | End: 2025-07-31

## 2025-07-31 RX ORDER — HUMAN IMMUNOGLOBULIN G 20 G/200ML
0.4 LIQUID INTRAVENOUS ONCE
Start: 2025-07-31

## 2025-07-31 RX ORDER — MEPERIDINE HYDROCHLORIDE 25 MG/ML
25 INJECTION INTRAMUSCULAR; INTRAVENOUS; SUBCUTANEOUS
OUTPATIENT
Start: 2025-07-31

## 2025-07-31 RX ORDER — HEPARIN SODIUM (PORCINE) LOCK FLUSH IV SOLN 100 UNIT/ML 100 UNIT/ML
5 SOLUTION INTRAVENOUS
Status: DISCONTINUED | OUTPATIENT
Start: 2025-07-31 | End: 2025-07-31 | Stop reason: HOSPADM

## 2025-07-31 RX ORDER — DIPHENHYDRAMINE HYDROCHLORIDE 50 MG/ML
50 INJECTION, SOLUTION INTRAMUSCULAR; INTRAVENOUS
Start: 2025-07-31

## 2025-07-31 RX ORDER — DIPHENHYDRAMINE HYDROCHLORIDE 50 MG/ML
25 INJECTION, SOLUTION INTRAMUSCULAR; INTRAVENOUS
Start: 2025-07-31

## 2025-07-31 RX ORDER — EPINEPHRINE 1 MG/ML
0.3 INJECTION, SOLUTION INTRAMUSCULAR; SUBCUTANEOUS EVERY 5 MIN PRN
OUTPATIENT
Start: 2025-07-31

## 2025-07-31 RX ORDER — ALBUTEROL SULFATE 90 UG/1
1-2 INHALANT RESPIRATORY (INHALATION)
Start: 2025-07-31

## 2025-07-31 RX ADMIN — METHYLPREDNISOLONE SODIUM SUCCINATE 40 MG: 40 INJECTION, POWDER, FOR SOLUTION INTRAMUSCULAR; INTRAVENOUS at 10:45

## 2025-07-31 RX ADMIN — DIPHENHYDRAMINE HYDROCHLORIDE 37.5 MG: 50 INJECTION INTRAMUSCULAR; INTRAVENOUS at 08:16

## 2025-07-31 RX ADMIN — HUMAN IMMUNOGLOBULIN G 20 G: 20 LIQUID INTRAVENOUS at 08:44

## 2025-07-31 RX ADMIN — ACETAMINOPHEN 650 MG: 325 TABLET ORAL at 08:16

## 2025-07-31 RX ADMIN — DIPHENHYDRAMINE HYDROCHLORIDE 37.5 MG: 50 INJECTION INTRAMUSCULAR; INTRAVENOUS at 10:22

## 2025-07-31 RX ADMIN — Medication 5 ML: at 11:51

## 2025-07-31 NOTE — PROGRESS NOTES
Infusion Nursing Note:  Shena Hartmann presents today for IVIG.    Patient seen by provider today: No   present during visit today: Not Applicable.    Note: Pt comes to infusion today with no questions or concerns.  Pt has 4/10 generalized muscle aches today and denies any need for intervention at this appointment. She does note lose stools these past two days which she states have been on and off.     Pt has been afebrile and denies signs and symptoms of infection including: cough, SOB, sore throat, vomiting, rash, or pain with urination.  Pt wishes to proceed with today's planned treatment.    About 1.5 hours into the pt's IVIG while at a rate of 3.5 mL/kg/hr, pt c/o chills and minor back pain. Infusion stopped. Second dose of 37.5 mg IV benadryl administered at that time. Vitals stable. Discussed with Dr. Chavez.     TORB Dr. Chavez/Emily Meese, /31/25 1041  - Give solumedrol and resume at half rate of IVIG    40 mg IV solumedrol administered. Pt symptoms continued to improve. VSS. IVIG restarted at 1.5 mL/kg/hr and increased by 0.5 mL/kg/hr to a max rate of 5 mL/kg/hr. Pt able to tolerate remainder of infusion without incident, max rate of 2.5 mL/kg/hr by the completion of infusion.    Inbasket sent to care team regarding reaction.    Intravenous Access:  Implanted Port.    Treatment Conditions:  IgG 227 on 07/08/25.    Results reviewed, labs MET treatment parameters, ok to proceed with treatment.    Biological Infusion Checklist:  ~~~ NOTE: If the patient answers yes to any of the questions below, hold the infusion and contact ordering provider or on-call provider.    Have you recently had an elevated temperature, fever, chills, productive cough, coughing for 3 weeks or longer or hemoptysis,  abnormal vital signs, night sweats,  chest pain or have you noticed a decrease in your appetite, unexplained weight loss or fatigue? No  Do you have any open wounds or new incisions? No  Do you have any  upcoming hospitalizations or surgeries? Does not include esophagogastroduodenoscopy, colonoscopy, endoscopic retrograde cholangiopancreatography (ERCP), endoscopic ultrasound (EUS), dental procedures or joint aspiration/steroid injections No  Do you currently have any signs of illness or infection or are you on any antibiotics? No  Have you had any new, sudden or worsening abdominal pain? No  Have you or anyone in your household received a live vaccination in the past 4 weeks? Please note: No live vaccines while on biologic/chemotherapy until 6 months after the last treatment. Patient can receive the flu vaccine (shot only), pneumovax and the Covid vaccine. It is optimal for the patient to get these vaccines mid cycle, but they can be given at any time as long as it is not on the day of the infusion. No  Have you recently been diagnosed with any new nervous system diseases (ie. Multiple sclerosis, Guillain [place], seizures, neurological changes) or cancer diagnosis? Are you on any form of radiation or chemotherapy? No  Are you pregnant or breast feeding or do you have plans of pregnancy in the future? No  Have you been having any signs of worsening depression or suicidal ideations?  (benlysta only) No  Have there been any other new onset medical symptoms? No  Have you had any new blood clots? (IVIG only) No  Post Infusion Assessment:  Patient tolerated infusion poorly due to : Hypersensitivity: Did patient have a hypersensitivity reaction? : Yes  Drug or Product name: IVIG  Were pre-meds administered?: Yes  What pre-meds were administered?: Acetaminophen (Tylenol), Diphenhydramine (Benadryl)  First or Subsequent treatment: Subsequent infusion  Rate of infusion when patient had hypersensitivity reaction: 3.5 mL/kg/hr  Time the hypersensitivity reaction was first recognized: 1020  Symptoms observed or reported (select all that apply): Chills, Other: (Comment) (back pain)  Interventions/treatment following reaction:  Infusion stopped, Hypersensitivity medications administered, Reduced rate of medication administration  What hypersensitivity medications were administered?: DiphendydrAMINE (benadryl), Methylprednisolone  Name of provider notified: Dr. Chavez  Time provider notified: 2030  Type of notification (select all that apply): Paged/Phone     Blood return noted pre and post infusion.  Site patent and intact, free from redness, edema or discomfort.  No evidence of extravasations.  Access discontinued per protocol.     Discharge Plan:   Patient declined prescription refills.  Discharge instructions reviewed with: Patient.  Patient and/or family verbalized understanding of discharge instructions and all questions answered.  AVS to patient via [employer].  Patient will return once scheduled per Dr. Chavez's check out on 07/29/25 for next appointment.   Patient discharged in stable condition accompanied by: self.  Departure Mode: Ambulatory.      Emily Meese, RN

## 2025-07-31 NOTE — PATIENT INSTRUCTIONS
[facility] Triage and after hours / weekends / holidays:  [phone]    Please call the triage or after hours line if you experience a temperature greater than or equal to 100.4, shaking chills, have uncontrolled nausea, vomiting and/or diarrhea, dizziness, shortness of breath, chest pain, bleeding, unexplained bruising, or if you have any other new/concerning symptoms, questions or concerns.      If you are having any concerning symptoms or wish to speak to a provider before your next infusion visit, please call triage to notify them so we can adequately serve you.     If you need a refill on a narcotic prescription or other medication, please call before your infusion appointment.                July 2025 Sunday Monday Tuesday Wednesday Thursday Friday Saturday             1     2     3     4     5       6     7     8    Lab Peripheral  10:00 AM   (15 min.)   [facility]   [facility]    Pentamadine  10:15 AM   (60 min.)    PFL PENT   [facility] Pulmonary Function Testing [place] 9     10     11     12       13     14     15     16     17     18     19       20     21    PET ONCOLOGY WHOLE BODY   6:30 AM   (30 min.)   [id number]   [facility]    Lab Peripheral  11:15 AM   (15 min.)   [facility]   [facility]    Bone Marrow Biopsy  12:15 PM   (60 min.)   Ulices Carias PA-C   [facility] Blood and Marrow Transplant [facility] [place] 22     23     24     25     26       27     28    RETURN VISIT  10:45 AM   (20 min.)   Nadia Good PA-C   [facility] [place] 29    BMT Anniversary Visit   4:30 PM   (30 min.)   Nighat Chavez MD   [facility] 30     31    Infusion 240   7:00 AM   (240 min.)    ONC INFUSION NURSE   [facility]                         August 2025 Sunday Monday Tuesday Wednesday  Thursday Friday Saturday                            1     2       3     4     5     6     7     8     9       10     11     12     13     14     15     16       17     18     19     20     21     22     23       24     25     26     27     28     29     30       31                                                     Lab Results:  No results found for this or any previous visit (from the past 12 hours).

## 2025-08-05 LAB — INTERPRETATION: NORMAL

## 2025-08-07 LAB — INTERPRETATION: NORMAL

## 2025-08-20 ENCOUNTER — LAB (OUTPATIENT)
Dept: LAB | Facility: CLINIC | Age: 75
End: 2025-08-20
Attending: STUDENT IN AN ORGANIZED HEALTH CARE EDUCATION/TRAINING PROGRAM
Payer: MEDICARE

## 2025-08-20 DIAGNOSIS — C90.00 MULTIPLE MYELOMA NOT HAVING ACHIEVED REMISSION (H): ICD-10-CM

## 2025-08-20 DIAGNOSIS — C90.00 MULTIPLE MYELOMA, REMISSION STATUS UNSPECIFIED (H): ICD-10-CM

## 2025-08-20 LAB
ALBUMIN SERPL BCG-MCNC: 4 G/DL (ref 3.5–5.2)
ALP SERPL-CCNC: 59 U/L (ref 40–150)
ALT SERPL W P-5'-P-CCNC: 25 U/L (ref 0–50)
ANION GAP SERPL CALCULATED.3IONS-SCNC: 12 MMOL/L (ref 7–15)
AST SERPL W P-5'-P-CCNC: 29 U/L (ref 0–45)
BASOPHILS # BLD AUTO: <0.03 10E3/UL (ref 0–0.2)
BASOPHILS NFR BLD AUTO: 0.6 %
BILIRUB SERPL-MCNC: 0.2 MG/DL
BUN SERPL-MCNC: 12.5 MG/DL (ref 8–23)
CALCIUM SERPL-MCNC: 9.6 MG/DL (ref 8.8–10.4)
CHLORIDE SERPL-SCNC: 100 MMOL/L (ref 98–107)
CREAT SERPL-MCNC: 0.89 MG/DL (ref 0.51–0.95)
EGFRCR SERPLBLD CKD-EPI 2021: 68 ML/MIN/1.73M2
EOSINOPHIL # BLD AUTO: 0.04 10E3/UL (ref 0–0.7)
EOSINOPHIL NFR BLD AUTO: 1.3 %
ERYTHROCYTE [DISTWIDTH] IN BLOOD BY AUTOMATED COUNT: 13.5 % (ref 10–15)
GLUCOSE SERPL-MCNC: 94 MG/DL (ref 70–99)
HCO3 SERPL-SCNC: 27 MMOL/L (ref 22–29)
HCT VFR BLD AUTO: 35.7 % (ref 35–47)
HGB BLD-MCNC: 12.2 G/DL (ref 11.7–15.7)
IGA SERPL-MCNC: 11 MG/DL (ref 84–499)
IGG SERPL-MCNC: 478 MG/DL (ref 610–1616)
IGM SERPL-MCNC: 11 MG/DL (ref 35–242)
IMM GRANULOCYTES # BLD: <0.03 10E3/UL
IMM GRANULOCYTES NFR BLD: 0.3 %
KAPPA LC FREE SER-MCNC: 0.33 MG/DL (ref 0.33–1.94)
KAPPA LC FREE/LAMBDA FREE SER NEPH: 1.06 {RATIO} (ref 0.26–1.65)
LAMBDA LC FREE SERPL-MCNC: 0.31 MG/DL (ref 0.57–2.63)
LYMPHOCYTES # BLD AUTO: 1.02 10E3/UL (ref 0.8–5.3)
LYMPHOCYTES NFR BLD AUTO: 32.6 %
MCH RBC QN AUTO: 35.3 PG (ref 26.5–33)
MCHC RBC AUTO-ENTMCNC: 34.2 G/DL (ref 31.5–36.5)
MCV RBC AUTO: 103.2 FL (ref 78–100)
MONOCYTES # BLD AUTO: 0.52 10E3/UL (ref 0–1.3)
MONOCYTES NFR BLD AUTO: 16.6 %
NEUTROPHILS # BLD AUTO: 1.52 10E3/UL (ref 1.6–8.3)
NEUTROPHILS NFR BLD AUTO: 48.6 %
NRBC # BLD AUTO: <0.03 10E3/UL
NRBC BLD AUTO-RTO: 0 /100
PLATELET # BLD AUTO: 62 10E3/UL (ref 150–450)
POTASSIUM SERPL-SCNC: 4 MMOL/L (ref 3.4–5.3)
PROT SERPL-MCNC: 5.9 G/DL (ref 6.4–8.3)
PROT SERPL-MCNC: 6.2 G/DL (ref 6.4–8.3)
RBC # BLD AUTO: 3.46 10E6/UL (ref 3.8–5.2)
SODIUM SERPL-SCNC: 139 MMOL/L (ref 135–145)
WBC # BLD AUTO: 3.13 10E3/UL (ref 4–11)

## 2025-08-20 PROCEDURE — 36591 DRAW BLOOD OFF VENOUS DEVICE: CPT

## 2025-08-20 PROCEDURE — 82040 ASSAY OF SERUM ALBUMIN: CPT

## 2025-08-20 PROCEDURE — 86334 IMMUNOFIX E-PHORESIS SERUM: CPT | Performed by: PATHOLOGY

## 2025-08-20 PROCEDURE — 83521 IG LIGHT CHAINS FREE EACH: CPT

## 2025-08-20 PROCEDURE — 250N000011 HC RX IP 250 OP 636: Performed by: STUDENT IN AN ORGANIZED HEALTH CARE EDUCATION/TRAINING PROGRAM

## 2025-08-20 PROCEDURE — 84155 ASSAY OF PROTEIN SERUM: CPT

## 2025-08-20 PROCEDURE — 85025 COMPLETE CBC W/AUTO DIFF WBC: CPT

## 2025-08-20 PROCEDURE — 82784 ASSAY IGA/IGD/IGG/IGM EACH: CPT

## 2025-08-20 PROCEDURE — 84165 PROTEIN E-PHORESIS SERUM: CPT | Mod: TC | Performed by: PATHOLOGY

## 2025-08-20 RX ORDER — HEPARIN SODIUM (PORCINE) LOCK FLUSH IV SOLN 100 UNIT/ML 100 UNIT/ML
500 SOLUTION INTRAVENOUS ONCE
Status: COMPLETED | OUTPATIENT
Start: 2025-08-20 | End: 2025-08-20

## 2025-08-20 RX ADMIN — Medication 500 UNITS: at 08:53

## 2025-08-21 ENCOUNTER — DOCUMENTATION ONLY (OUTPATIENT)
Dept: PHARMACY | Facility: CLINIC | Age: 75
End: 2025-08-21
Payer: MEDICARE

## 2025-08-22 LAB — PROT PATTERN SERPL IFE-IMP: NORMAL

## 2025-08-25 LAB
ALBUMIN SERPL ELPH-MCNC: 3.8 G/DL (ref 3.7–5.1)
ALPHA1 GLOB SERPL ELPH-MCNC: 0.3 G/DL (ref 0.2–0.4)
ALPHA2 GLOB SERPL ELPH-MCNC: 0.7 G/DL (ref 0.5–0.9)
B-GLOBULIN SERPL ELPH-MCNC: 0.6 G/DL (ref 0.6–1)
GAMMA GLOB SERPL ELPH-MCNC: 0.5 G/DL (ref 0.7–1.6)
LOCATION OF TASK: ABNORMAL
M PROTEIN SERPL ELPH-MCNC: 0 G/DL
PROT PATTERN SERPL ELPH-IMP: ABNORMAL

## (undated) DEVICE — CAP LUER LOCK MALE/FEMALE DUAL 2C6250

## (undated) DEVICE — PROBE COVER INTRAOPERATIVE 5"X96" PC1308

## (undated) DEVICE — Device

## (undated) DEVICE — DILATOR VASCULAR COONS 12FRX20CM DIST TPR G03929

## (undated) DEVICE — DRSG TEGADERM IV ADVANCED 3.5X4.5" 1685

## (undated) DEVICE — DECANTER BAG 2002S

## (undated) DEVICE — ADH SKIN CLOSURE PREMIERPRO EXOFIN 1.0ML 3470

## (undated) DEVICE — GOWN XLG DISP 9545

## (undated) DEVICE — GLOVE PROTEXIS POWDER FREE SMT 7.5  2D72PT75X

## (undated) DEVICE — COVER EASY EQUIP BAG W/BAND LATEX FREE EZ-28

## (undated) DEVICE — GLOVE PROTEXIS BLUE W/NEU-THERA 8.0  2D73EB80

## (undated) DEVICE — SU ETHILON 2-0 FS 18" 664H

## (undated) DEVICE — SU VICRYL 3-0 SH 27" J316H

## (undated) DEVICE — PACK CENTRAL LINE INSERTION SAN32CLFCG

## (undated) DEVICE — KIT INTRODUCER FLUENT MICRO 5FRX10CM ECHO TIP KIT-038-04

## (undated) DEVICE — LINEN TOWEL PACK X5 5464

## (undated) DEVICE — KNIFE HANDLE W/15 BLADE 371615

## (undated) DEVICE — SU MONOCRYL 4-0 P-3 18" UND Y494G

## (undated) DEVICE — LINEN GOWN XLG 5407

## (undated) DEVICE — COVER ULTRASOUND PROBE W/GEL FLEXI-FEEL 6"X58" LF  25-FF658

## (undated) DEVICE — CONNECTOR MALE TO MALE LL

## (undated) DEVICE — GLOVE BIOGEL PI MICRO SZ 8.0 48580

## (undated) RX ORDER — CLINDAMYCIN PHOSPHATE 900 MG/50ML
INJECTION, SOLUTION INTRAVENOUS
Status: DISPENSED
Start: 2023-02-15

## (undated) RX ORDER — ACETAMINOPHEN 325 MG/1
TABLET ORAL
Status: DISPENSED
Start: 2024-05-22

## (undated) RX ORDER — ALBUTEROL SULFATE 0.83 MG/ML
SOLUTION RESPIRATORY (INHALATION)
Status: DISPENSED
Start: 2025-03-03

## (undated) RX ORDER — PENTAMIDINE ISETHIONATE 300 MG/300MG
INHALANT RESPIRATORY (INHALATION)
Status: DISPENSED
Start: 2025-04-28

## (undated) RX ORDER — CALCIUM GLUCONATE 94 MG/ML
INJECTION, SOLUTION INTRAVENOUS
Status: DISPENSED
Start: 2024-05-22

## (undated) RX ORDER — LIDOCAINE HYDROCHLORIDE 10 MG/ML
INJECTION, SOLUTION EPIDURAL; INFILTRATION; INTRACAUDAL; PERINEURAL
Status: DISPENSED
Start: 2024-07-26

## (undated) RX ORDER — LIDOCAINE HYDROCHLORIDE 10 MG/ML
INJECTION, SOLUTION EPIDURAL; INFILTRATION; INTRACAUDAL; PERINEURAL
Status: DISPENSED
Start: 2025-06-16

## (undated) RX ORDER — HEPARIN SODIUM,PORCINE 10 UNIT/ML
VIAL (ML) INTRAVENOUS
Status: DISPENSED
Start: 2024-07-26

## (undated) RX ORDER — ALBUTEROL SULFATE 0.83 MG/ML
SOLUTION RESPIRATORY (INHALATION)
Status: DISPENSED
Start: 2024-09-26

## (undated) RX ORDER — PENTAMIDINE ISETHIONATE 300 MG/300MG
INHALANT RESPIRATORY (INHALATION)
Status: DISPENSED
Start: 2025-03-03

## (undated) RX ORDER — PENTAMIDINE ISETHIONATE 300 MG/300MG
INHALANT RESPIRATORY (INHALATION)
Status: DISPENSED
Start: 2024-09-26

## (undated) RX ORDER — CEFAZOLIN SODIUM 500 MG/2.2ML
INJECTION, POWDER, FOR SOLUTION INTRAMUSCULAR; INTRAVENOUS
Status: DISPENSED
Start: 2025-06-02

## (undated) RX ORDER — PENTAMIDINE ISETHIONATE 300 MG/300MG
INHALANT RESPIRATORY (INHALATION)
Status: DISPENSED
Start: 2024-07-24

## (undated) RX ORDER — DEXAMETHASONE SODIUM PHOSPHATE 4 MG/ML
INJECTION, SOLUTION INTRA-ARTICULAR; INTRALESIONAL; INTRAMUSCULAR; INTRAVENOUS; SOFT TISSUE
Status: DISPENSED
Start: 2025-06-16

## (undated) RX ORDER — ALBUTEROL SULFATE 0.83 MG/ML
SOLUTION RESPIRATORY (INHALATION)
Status: DISPENSED
Start: 2024-11-25

## (undated) RX ORDER — PENTAMIDINE ISETHIONATE 300 MG/300MG
INHALANT RESPIRATORY (INHALATION)
Status: DISPENSED
Start: 2025-01-20

## (undated) RX ORDER — PENTAMIDINE ISETHIONATE 300 MG/300MG
INHALANT RESPIRATORY (INHALATION)
Status: DISPENSED
Start: 2024-12-23

## (undated) RX ORDER — ALBUTEROL SULFATE 0.83 MG/ML
SOLUTION RESPIRATORY (INHALATION)
Status: DISPENSED
Start: 2024-12-23

## (undated) RX ORDER — ALBUTEROL SULFATE 0.83 MG/ML
SOLUTION RESPIRATORY (INHALATION)
Status: DISPENSED
Start: 2025-07-08

## (undated) RX ORDER — ALBUTEROL SULFATE 0.83 MG/ML
SOLUTION RESPIRATORY (INHALATION)
Status: DISPENSED
Start: 2024-10-28

## (undated) RX ORDER — PENTAMIDINE ISETHIONATE 300 MG/300MG
INHALANT RESPIRATORY (INHALATION)
Status: DISPENSED
Start: 2024-11-25

## (undated) RX ORDER — PENTAMIDINE ISETHIONATE 300 MG/300MG
INHALANT RESPIRATORY (INHALATION)
Status: DISPENSED
Start: 2024-08-26

## (undated) RX ORDER — ACETAMINOPHEN 325 MG/1
TABLET ORAL
Status: DISPENSED
Start: 2023-02-15

## (undated) RX ORDER — ALBUTEROL SULFATE 0.83 MG/ML
SOLUTION RESPIRATORY (INHALATION)
Status: DISPENSED
Start: 2025-04-28

## (undated) RX ORDER — AZITHROMYCIN MONOHYDRATE 500 MG/5ML
INJECTION, POWDER, LYOPHILIZED, FOR SOLUTION INTRAVENOUS
Status: DISPENSED
Start: 2025-06-02

## (undated) RX ORDER — ALBUTEROL SULFATE 0.83 MG/ML
SOLUTION RESPIRATORY (INHALATION)
Status: DISPENSED
Start: 2024-08-26

## (undated) RX ORDER — DEXAMETHASONE SODIUM PHOSPHATE 4 MG/ML
INJECTION, SOLUTION INTRA-ARTICULAR; INTRALESIONAL; INTRAMUSCULAR; INTRAVENOUS; SOFT TISSUE
Status: DISPENSED
Start: 2025-07-28

## (undated) RX ORDER — LIDOCAINE HYDROCHLORIDE 10 MG/ML
INJECTION, SOLUTION EPIDURAL; INFILTRATION; INTRACAUDAL; PERINEURAL
Status: DISPENSED
Start: 2025-07-28

## (undated) RX ORDER — ALBUTEROL SULFATE 0.83 MG/ML
SOLUTION RESPIRATORY (INHALATION)
Status: DISPENSED
Start: 2024-07-24

## (undated) RX ORDER — PENTAMIDINE ISETHIONATE 300 MG/300MG
INHALANT RESPIRATORY (INHALATION)
Status: DISPENSED
Start: 2025-07-08

## (undated) RX ORDER — ALBUTEROL SULFATE 0.83 MG/ML
SOLUTION RESPIRATORY (INHALATION)
Status: DISPENSED
Start: 2025-06-03

## (undated) RX ORDER — PENTAMIDINE ISETHIONATE 300 MG/300MG
INHALANT RESPIRATORY (INHALATION)
Status: DISPENSED
Start: 2025-06-03

## (undated) RX ORDER — PENTAMIDINE ISETHIONATE 300 MG/300MG
INHALANT RESPIRATORY (INHALATION)
Status: DISPENSED
Start: 2024-10-28